# Patient Record
Sex: FEMALE | Race: WHITE | NOT HISPANIC OR LATINO | Employment: OTHER | URBAN - METROPOLITAN AREA
[De-identification: names, ages, dates, MRNs, and addresses within clinical notes are randomized per-mention and may not be internally consistent; named-entity substitution may affect disease eponyms.]

---

## 2017-01-07 ENCOUNTER — HOSPITAL ENCOUNTER (EMERGENCY)
Facility: CLINIC | Age: 52
Discharge: HOME | End: 2017-01-07
Attending: EMERGENCY MEDICINE | Admitting: EMERGENCY MEDICINE
Payer: COMMERCIAL

## 2017-01-07 VITALS
WEIGHT: 267 LBS | HEIGHT: 66 IN | BODY MASS INDEX: 42.91 KG/M2 | DIASTOLIC BLOOD PRESSURE: 91 MMHG | OXYGEN SATURATION: 96 % | TEMPERATURE: 98.7 F | SYSTOLIC BLOOD PRESSURE: 146 MMHG | HEART RATE: 72 BPM

## 2017-01-07 DIAGNOSIS — R10.11 RUQ ABDOMINAL PAIN: ICD-10-CM

## 2017-01-07 DIAGNOSIS — R35.0 URINARY FREQUENCY: ICD-10-CM

## 2017-01-07 LAB
ALBUMIN SERPL-MCNC: 3.5 G/DL (ref 3.4–5)
ALBUMIN UR-MCNC: NEGATIVE MG/DL
ALP SERPL-CCNC: 93 U/L (ref 40–150)
ALT SERPL W P-5'-P-CCNC: 41 U/L (ref 0–50)
ANION GAP SERPL CALCULATED.3IONS-SCNC: 5 MMOL/L (ref 3–14)
APPEARANCE UR: ABNORMAL
AST SERPL W P-5'-P-CCNC: 13 U/L (ref 0–45)
BACTERIA #/AREA URNS HPF: ABNORMAL /HPF
BASOPHILS # BLD AUTO: 0 10E9/L (ref 0–0.2)
BASOPHILS NFR BLD AUTO: 0.3 %
BILIRUB SERPL-MCNC: 0.5 MG/DL (ref 0.2–1.3)
BILIRUB UR QL STRIP: NEGATIVE
BUN SERPL-MCNC: 14 MG/DL (ref 7–30)
CALCIUM SERPL-MCNC: 9.3 MG/DL (ref 8.5–10.1)
CHLORIDE SERPL-SCNC: 106 MMOL/L (ref 94–109)
CO2 SERPL-SCNC: 30 MMOL/L (ref 20–32)
COLOR UR AUTO: YELLOW
CREAT SERPL-MCNC: 0.75 MG/DL (ref 0.52–1.04)
DIFFERENTIAL METHOD BLD: NORMAL
EOSINOPHIL # BLD AUTO: 0.4 10E9/L (ref 0–0.7)
EOSINOPHIL NFR BLD AUTO: 3.9 %
ERYTHROCYTE [DISTWIDTH] IN BLOOD BY AUTOMATED COUNT: 13 % (ref 10–15)
GFR SERPL CREATININE-BSD FRML MDRD: 81 ML/MIN/1.7M2
GLUCOSE SERPL-MCNC: 121 MG/DL (ref 70–99)
GLUCOSE UR STRIP-MCNC: NEGATIVE MG/DL
HCT VFR BLD AUTO: 38.4 % (ref 35–47)
HGB BLD-MCNC: 12.8 G/DL (ref 11.7–15.7)
HGB UR QL STRIP: ABNORMAL
IMM GRANULOCYTES # BLD: 0 10E9/L (ref 0–0.4)
IMM GRANULOCYTES NFR BLD: 0.3 %
KETONES UR STRIP-MCNC: NEGATIVE MG/DL
LEUKOCYTE ESTERASE UR QL STRIP: NEGATIVE
LIPASE SERPL-CCNC: 174 U/L (ref 73–393)
LYMPHOCYTES # BLD AUTO: 3.9 10E9/L (ref 0.8–5.3)
LYMPHOCYTES NFR BLD AUTO: 40.9 %
MCH RBC QN AUTO: 29.4 PG (ref 26.5–33)
MCHC RBC AUTO-ENTMCNC: 33.3 G/DL (ref 31.5–36.5)
MCV RBC AUTO: 88 FL (ref 78–100)
MICRO REPORT STATUS: NORMAL
MONOCYTES # BLD AUTO: 0.6 10E9/L (ref 0–1.3)
MONOCYTES NFR BLD AUTO: 6.4 %
NEUTROPHILS # BLD AUTO: 4.6 10E9/L (ref 1.6–8.3)
NEUTROPHILS NFR BLD AUTO: 48.2 %
NITRATE UR QL: NEGATIVE
NRBC # BLD AUTO: 0 10*3/UL
NRBC BLD AUTO-RTO: 0 /100
PH UR STRIP: 5.5 PH (ref 5–7)
PLATELET # BLD AUTO: 330 10E9/L (ref 150–450)
POTASSIUM SERPL-SCNC: 3.7 MMOL/L (ref 3.4–5.3)
PROT SERPL-MCNC: 7.2 G/DL (ref 6.8–8.8)
RBC # BLD AUTO: 4.35 10E12/L (ref 3.8–5.2)
RBC #/AREA URNS AUTO: 1 /HPF (ref 0–2)
SODIUM SERPL-SCNC: 141 MMOL/L (ref 133–144)
SP GR UR STRIP: 1.02 (ref 1–1.03)
SPECIMEN SOURCE: NORMAL
SQUAMOUS #/AREA URNS AUTO: 17 /HPF (ref 0–1)
URN SPEC COLLECT METH UR: ABNORMAL
UROBILINOGEN UR STRIP-MCNC: NORMAL MG/DL (ref 0–2)
WBC # BLD AUTO: 9.5 10E9/L (ref 4–11)
WBC #/AREA URNS AUTO: 2 /HPF (ref 0–2)
WET PREP SPEC: NORMAL

## 2017-01-07 PROCEDURE — 83690 ASSAY OF LIPASE: CPT | Performed by: EMERGENCY MEDICINE

## 2017-01-07 PROCEDURE — 99283 EMERGENCY DEPT VISIT LOW MDM: CPT | Mod: 25

## 2017-01-07 PROCEDURE — 81001 URINALYSIS AUTO W/SCOPE: CPT | Performed by: EMERGENCY MEDICINE

## 2017-01-07 PROCEDURE — 87491 CHLMYD TRACH DNA AMP PROBE: CPT | Performed by: EMERGENCY MEDICINE

## 2017-01-07 PROCEDURE — 87591 N.GONORRHOEAE DNA AMP PROB: CPT | Performed by: EMERGENCY MEDICINE

## 2017-01-07 PROCEDURE — 85025 COMPLETE CBC W/AUTO DIFF WBC: CPT | Performed by: EMERGENCY MEDICINE

## 2017-01-07 PROCEDURE — 80053 COMPREHEN METABOLIC PANEL: CPT | Performed by: EMERGENCY MEDICINE

## 2017-01-07 PROCEDURE — 87210 SMEAR WET MOUNT SALINE/INK: CPT | Performed by: EMERGENCY MEDICINE

## 2017-01-07 PROCEDURE — 96360 HYDRATION IV INFUSION INIT: CPT

## 2017-01-07 PROCEDURE — 25000128 H RX IP 250 OP 636: Performed by: EMERGENCY MEDICINE

## 2017-01-07 RX ORDER — SODIUM CHLORIDE 9 MG/ML
1000 INJECTION, SOLUTION INTRAVENOUS CONTINUOUS
Status: DISCONTINUED | OUTPATIENT
Start: 2017-01-07 | End: 2017-01-07 | Stop reason: HOSPADM

## 2017-01-07 RX ADMIN — SODIUM CHLORIDE 1000 ML: 9 INJECTION, SOLUTION INTRAVENOUS at 02:23

## 2017-01-07 ASSESSMENT — ENCOUNTER SYMPTOMS
VOMITING: 1
NUMBNESS: 0
FREQUENCY: 1
ABDOMINAL PAIN: 1
DIARRHEA: 1
NAUSEA: 1
FEVER: 1
WEAKNESS: 0

## 2017-01-07 NOTE — DISCHARGE INSTRUCTIONS
*Abdominal Pain, Unknown Cause (Female)    The exact cause of your abdominal (stomach) pain is not certain. This does not mean that this is something to worry about, or the right tests were not done. Everyone likes to know the exact cause of the problem, but sometimes with abdominal pain, there is no clear-cut cause, and this could be a good thing. The good news is that your symptoms can be treated, and you will feel better.   Your condition does not seem serious now; however, sometimes the signs of a serious problem may take more time to appear. For this reason, it is important for you to watch for any new symptoms, problems, or worsening of your condition.  Over the next few days, the abdominal pain may come and go, or be continuous. Other common symptoms can include nausea and vomiting. Sometimes it can be difficult to tell if you feel nauseous, you may just feel bad and not associate that feeling with nausea. Constipation, diarrhea, and a fever may go along with the pain.  The pain may continue even if treated correctly over the following days. Depending on how things go, sometimes the cause can become clear and may require further or different treatment. Additional evaluations, medications, or tests may be needed.  Home care  Your health care provider may prescribe medications for pain, symptoms, or an infection.  Follow the health care provider's instructions for taking these medications.  General care    Rest until your next exam. No strenuous activities.    Try to find positions that ease discomfort. A small pillow placed on the abdomen may help relieve pain.    Something warm on your abdomen (such as a heating pad) may help, but be careful not to burn yourself.  Diet    Do not force yourself to eat, especially if having cramps, vomiting, or diarrhea.    Water is important so you do not get dehydrated. Soup may also be good. Sports drinks may also help, especially if they are not too acidic. Make sure you  don't drink sugary drinks as this can make things worse. Take liquids in small amounts. Do not guzzle them.    Caffeine sometimes makes the pain and cramping worse.    Avoid dairy products if you have vomiting or diarrhea.    Don't eat large amounts at a time. Wait a few minutes between bites.    Eat a diet low in fiber (called a low-residue diet). Foods allowed include refined breads, white rice, fruit and vegetable juices without pulp, tender meats. These foods will pass more easily through the intestine.    Avoid fried or fatty foods, dairy, alcohol and spicy foods until your symptoms go away.  Follow-up care  Follow up with your health care provider as instructed, or if your pain does not begin to improve in the next 24 hours.  When to seek medical care  Seek prompt medical care if any of the following occur:    Pain gets worse or moves to the right lower abdomen    New or worsening vomiting or diarrhea    Swelling of the abdomen    Unable to pass stool for more than three days    New fever over 101  F (38.3 C), or rising fever    Blood in vomit or bowel movements (dark red or black color)    Jaundice (yellow color of eyes and skin)    Weakness, dizziness    Chest, arm, back, neck or jaw pain    Unexpected vaginal bleeding or missed period  Call 911  Call emergency services if any of the following occur:    Trouble breathing    Confusion    Fainting or loss of consciousness    Rapid heart rate    Seizure    [id number] [facility], [address] [place] [address], [place] [address]. All rights reserved. This information is not intended as a substitute for professional medical care. Always follow your healthcare professional's instructions.      Return to the emergency department or seek medical care as instructed if your symptoms fail to improve or significantly worsen.    Take Tylenol as needed for symptom/pain relief; use as directed.    Ice area of pain for 20 minutes four times per day for the next two  days    Follow-up as indicated on page 1.  Maintain adequate hydration and get plenty of rest.

## 2017-01-07 NOTE — ED PROVIDER NOTES
History     Chief Complaint:  Abdominal Pain      HPI   Ines Mott is a 51 year old female with a complicated past medical history most significant for chronic pain syndrome, metastatic neuroendocrine tumor and diabetes who presents via EMS with concerns for abdominal pain. The patient reports 2 days of 8/10 RUQ abdominal pain radiating to her back. Her pain is worsened with movement and palpation. Today the patient has had subjective fevers, urinary frequency and foul smelling urine, nausea, emesis x 2 and mild diarrhea. EMS gave the patient fentanyl and Zofran en route which did alleviate the patient's pain but she states that this is now wearing off. Here, the patient is also noting urethra swelling and has some concern for STD. She denies any abnormal vaginal discharge. The patient denies any numbness, weakness or any other symptoms. The patient did run out of her pain medications one week ago. She has an appointment with her pain doctor next Tuesday.    Per chart review the patient does have a care plan in place for her chronic abdominal pain secondary to metastatic neuroendocrine tumor. Recent imaging studies below;    CT abd/pelvis: 8/2015   1. Irregular-shaped 10.6 x 4.1 x 3.7 cm fluid density collection  within the pelvis, slightly increased since 7/4/2015.  2. Multiple hepatic lesions which appear similar to 7/4/2015.  3. CT scan of the abdomen and pelvis otherwise appears essentially  unchanged.   Head CT: 11/11/2015:   Stable head CT demonstrating a  shunt catheter. No evidence for acute intracranial pathology.     Allergies:  Ativan  Macrobid  Amoxicillin  Buspirone  Cephalosporins  Dilaudid  Diphenhydramine Hcl  Imitrex  Ketorolac Tromethamine  Lansoprazole  Metoclopramide  Paroxetine  Penicillins  Prednisone  Sulfa Drugs  Lidoderm       Medications:    cloNIDine (CATAPRES) 0.1 MG tablet  mometasone-formoterol (DULERA) 100-5 MCG/ACT oral inhaler  montelukast (SINGULAIR) 10 MG tablet  VENTOLIN  " (90 BASE) MCG/ACT inhaler  magnesium oxide (MAG-OX) 400 (241.3 MG) MG tablet  topiramate (TOPAMAX) 25 MG tablet  albuterol (2.5 MG/3ML) 0.083% nebulizer solution  losartan-hydrochlorothiazide (HYZAAR) 50-12.5 MG per tablet  potassium chloride (K-TAB,KLOR-CON) 10 MEQ tablet  sertraline (ZOLOFT) 100 MG tablet  ondansetron (ZOFRAN) 8 MG tablet  cyanocobalamin (VITAMIN B12) 1000 MCG/ML injection  Vitamin D, Cholecalciferol, 1000 UNITS TABS  hydrOXYzine (VISTARIL) 25 MG capsule  STATIN NOT PRESCRIBED, INTENTIONAL,  EPINEPHrine (EPIPEN) 0.3 MG/0.3ML injection    Past Medical History:    Hypertension  Hyperlipidemia   Migraines  Asthma  Hydrocephalus  Spina bifida  Depressive disorder  Chronic pain syndrome  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   Gastrointestinal: Positive for nausea, vomiting, abdominal pain and diarrhea.   Genitourinary: Positive for frequency.   Neurological: Negative for weakness and numbness.   All other systems reviewed and are negative.      Physical Exam     Patient Vitals for the past 24 hrs:   BP Temp Temp src Pulse SpO2 Height Weight   01/07/17 0300 - - - - 96 % - -   01/07/17 0221 (!) 146/91 mmHg - - - 96 % - -   01/07/17 0145 - - - - 95 % - -   01/07/17 0117 141/79 mmHg 98.7  F (37.1  C) Oral 72 95 % 1.676 m (5' 6\") 121.11 kg (267 lb)      Physical Exam  General: Alert and cooperative with exam. Patient in mild distress. Normal mentation  Head:  Scalp is NC/AT  Eyes:  No scleral icterus, PERRL,   ENT:  The external nose and ears are normal. The " oropharynx is normal and without erythema; mucus membranes are moist.   Neck:  Normal range of motion without rigidity.   CV:  Regular rate and rhythm    No pathologic murmur   Resp:  Breath sounds are clear bilaterally    Non-labored, no retractions or accessory muscle use  GI:  Abdomen is soft, obese, moderate RUQ tenderness with mild right CVA tenderness. No overlying skin changes. No significant guarding or peritoneal signs.   :  Normal external female genitalia. No CMT, mild scant vaginal mucous.   MS:  No lower extremity edema   Skin:  Warm and dry, No rash or lesions noted.  Neuro: Oriented x 3. No gross motor deficits.    Emergency Department Course     Laboratory:  UA: Slightly cloudy yellow urine, trace blood, few bacteria, 17 squamous cells, otherwise WNL     Wet prep: No Trichomonas seen. No yeast seen. No clue cells seen. No PMNs seen   Chlamydia trachomatis: pending  Neisseria gonorrhoeae: pending      CBC:  WBC 9.5, HGB 12.8, , otherwise WNL   CMP: glucose 121 (H), otherwise WNL (Creatinine 0.75)   Lipase: 174    Interventions:  (0223) Normal Saline, 1 liter, IV bolus     Emergency Department Course:  Nursing notes and vitals reviewed.  (0144) I performed an exam of the patient as documented above.    Blood was drawn from the patient. This was sent for laboratory testing, findings above.   Urine sample was obtained and sent for laboratory analysis, findings above.    (0311) Findings and plan explained to the patient. Patient discharged home with instructions regarding supportive care, medications, and reasons to return. The importance of close follow-up was reviewed.     Impression & Plan      Medical Decision Making:  Ines Mott is a 51 year old female who presents with abdominal pain, subjective fever, and urinary frequency. Patient's medical history and records reviewed. Patient has emergency care plan and frequent visits for recurrent abdominal pain. Abdominal pain consistent with  previous episodes of abdominal pain. Patient with history of cholecystectomy. Initial consideration for, but no limited to, infectious process, chronic pain, biliary pathology, gastritis, pancreatitis, UTI, STI, among others. Pelvic exam ws obtained and unremarkable; GC/chlamydia testing (obtained per patient request) and wet prep negative. Patient did receive fentanyl prior to ED arrival as well as Zofran. Labs obtained and unremarkable including negative UA. Patient's abdominal exam does not show any evidence of peritonitis and is consistent with previous chronic abdominal pain. Additionally patient notes that she has been out of her narcotic pain medication for the past one week; symptoms may be partially secondary to discontinuation of narcotics. At this time no indication for further imaging or laboratory work up. No emergent cause for the patient's pain can be determined. Recommended follow up with pain clinic and PCP in 2 days. Return precautions discussed. Recommended supportive care and OTC pain medication as tolerated. At the time of discharge patient was hemodynamically stable, neurologically intact, afebrile and pain was well controlled.     Diagnosis:    ICD-10-CM   1. RUQ abdominal pain R10.11   2. Urinary frequency R35.0       Disposition:  Patient is discharged to home.         Tommie Reynolds  1/7/2017    EMERGENCY DEPARTMENT    I, Tommie Reynolds, finesse serving as a scribe on 1/7/2017 at 1:44 AM to personally document services performed by Dr. Brewer based on my observations and the provider's statements to me.      Ayad Brewer,   01/07/17 0613

## 2017-01-07 NOTE — ED AVS SNAPSHOT
Emergency Department    [address] [facility] [address]    Phone:  [phone]    Fax:  [phone]                                       Ines Mott   MRN: [id number]    Department:   Emergency Department   Date of Visit:  1/7/2017           Patient Information     Date Of Birth          1965        Your diagnoses for this visit were:     RUQ abdominal pain     Urinary frequency        You were seen by Ayad Brewer DO.      Follow-up Information     Follow up with Dustin Fatima MD In 2 days.    Specialty:  Family Practice    Contact information:    [facility]EAN[facility]  [address]  [facility] [id number] [phone]          Follow up with  Emergency Department.    Specialty:  EMERGENCY MEDICINE    Why:  If symptoms worsen    Contact information:    [address] [facility] [id number] [phone]        Discharge Instructions         *Abdominal Pain, Unknown Cause (Female)    The exact cause of your abdominal (stomach) pain is not certain. This does not mean that this is something to worry about, or the right tests were not done. Everyone likes to know the exact cause of the problem, but sometimes with abdominal pain, there is no clear-cut cause, and this could be a good thing. The good news is that your symptoms can be treated, and you will feel better.   Your condition does not seem serious now; however, sometimes the signs of a serious problem may take more time to appear. For this reason, it is important for you to watch for any new symptoms, problems, or worsening of your condition.  Over the next few days, the abdominal pain may come and go, or be continuous. Other common symptoms can include nausea and vomiting. Sometimes it can be difficult to tell if you feel nauseous, you may just feel bad and not associate that feeling with nausea. Constipation, diarrhea, and a fever may go along with the pain.  The pain may  continue even if treated correctly over the following days. Depending on how things go, sometimes the cause can become clear and may require further or different treatment. Additional evaluations, medications, or tests may be needed.  Home care  Your health care provider may prescribe medications for pain, symptoms, or an infection.  Follow the health care provider's instructions for taking these medications.  General care    Rest until your next exam. No strenuous activities.    Try to find positions that ease discomfort. A small pillow placed on the abdomen may help relieve pain.    Something warm on your abdomen (such as a heating pad) may help, but be careful not to burn yourself.  Diet    Do not force yourself to eat, especially if having cramps, vomiting, or diarrhea.    Water is important so you do not get dehydrated. Soup may also be good. Sports drinks may also help, especially if they are not too acidic. Make sure you don't drink sugary drinks as this can make things worse. Take liquids in small amounts. Do not guzzle them.    Caffeine sometimes makes the pain and cramping worse.    Avoid dairy products if you have vomiting or diarrhea.    Don't eat large amounts at a time. Wait a few minutes between bites.    Eat a diet low in fiber (called a low-residue diet). Foods allowed include refined breads, white rice, fruit and vegetable juices without pulp, tender meats. These foods will pass more easily through the intestine.    Avoid fried or fatty foods, dairy, alcohol and spicy foods until your symptoms go away.  Follow-up care  Follow up with your health care provider as instructed, or if your pain does not begin to improve in the next 24 hours.  When to seek medical care  Seek prompt medical care if any of the following occur:    Pain gets worse or moves to the right lower abdomen    New or worsening vomiting or diarrhea    Swelling of the abdomen    Unable to pass stool for more than three days    New  fever over 101  F (38.3 C), or rising fever    Blood in vomit or bowel movements (dark red or black color)    Jaundice (yellow color of eyes and skin)    Weakness, dizziness    Chest, arm, back, neck or jaw pain    Unexpected vaginal bleeding or missed period  Call 911  Call emergency services if any of the following occur:    Trouble breathing    Confusion    Fainting or loss of consciousness    Rapid heart rate    Seizure    [id number] [facility], [address] [place] [address], [place] [address]. All rights reserved. This information is not intended as a substitute for professional medical care. Always follow your healthcare professional's instructions.      Return to the emergency department or seek medical care as instructed if your symptoms fail to improve or significantly worsen.    Take Tylenol as needed for symptom/pain relief; use as directed.    Ice area of pain for 20 minutes four times per day for the next two days    Follow-up as indicated on page 1.  Maintain adequate hydration and get plenty of rest.      Future Appointments        Provider Department Dept Phone Center    1/9/2017 10:00 AM Jackie Lopez PSYD [facility] [phone] [facility]      24 Hour Appointment Hotline       To make an appointment at any [facility], call [phone] ([phone]). If you don't have a family doctor or clinic, we will help you find one. [facility] are conveniently located to serve the needs of you and your family.             Review of your medicines      Our records show that you are taking the medicines listed below. If these are incorrect, please call your family doctor or clinic.        Dose / Directions Last dose taken    * albuterol (2.5 MG/3ML) 0.083% neb solution   Dose:  1 vial   Quantity:  30 vial        Take 1 vial (2.5 mg) by nebulization every 6 hours as needed for shortness of breath / dyspnea or wheezing   Refills:  3        * VENTOLIN  (90  BASE) MCG/ACT Inhaler   Quantity:  54 g   Generic drug:  albuterol        INHALE TWO PUFFS BY MOUTH EVERY 4 HOURS AS NEEDED FOR SHORTNESS OF BREATH/DYSPNEA   Refills:  1        blood glucose calibration solution   Commonly known as:  no brand specified   Quantity:  1 each        Use to calibrate blood glucose monitor as directed.   Refills:  0        cloNIDine 0.1 MG tablet   Commonly known as:  CATAPRES   Quantity:  28 tablet        Take 1 tab (0.1 mg) twice daily, Increase to 2 tabs (0.2 mg) twice daily If BP not below 140/90   Refills:  1        cyanocobalamin 1000 MCG/ML injection   Commonly known as:  VITAMIN B12   Dose:  1 mL   Quantity:  1 mL        Inject 1 mL (1,000 mcg) Subcutaneous every 30 days   Refills:  11        EPINEPHrine 0.3 MG/0.3ML injection   Dose:  0.3 mg   Quantity:  2 each        Inject 0.3 mLs (0.3 mg) into the muscle once as needed for anaphylaxis   Refills:  5        hydrOXYzine 25 MG capsule   Commonly known as:  VISTARIL   Dose:  25 mg   Quantity:  30 capsule        Take 1 capsule (25 mg) by mouth 4 times daily as needed for itching   Refills:  1        losartan-hydrochlorothiazide 50-12.5 MG per tablet   Commonly known as:  HYZAAR   Dose:  1 tablet   Quantity:  90 tablet        Take 1 tablet by mouth daily   Refills:  3        magnesium oxide 400 (241.3 MG) MG tablet   Commonly known as:  MAG-OX   Dose:  400 mg   Quantity:  90 tablet        Take 1 tablet (400 mg) by mouth daily   Refills:  3        mometasone-formoterol 100-5 MCG/ACT oral inhaler   Commonly known as:  DULERA   Dose:  2 puff   Quantity:  1 Inhaler        Inhale 2 puffs into the lungs 2 times daily   Refills:  11        montelukast 10 MG tablet   Commonly known as:  SINGULAIR   Dose:  10 mg   Quantity:  90 tablet        Take 1 tablet (10 mg) by mouth At Bedtime   Refills:  3        ondansetron 8 MG tablet   Commonly known as:  ZOFRAN   Dose:  4 mg   Quantity:  9 tablet        Take 0.5 tablets (4 mg) by mouth every 8  hours as needed for nausea   Refills:  3        potassium chloride 10 MEQ tablet   Commonly known as:  K-TAB,KLOR-CON   Dose:  10 mEq   Indication:  Low Amount of Potassium in the Blood   Quantity:  120 tablet        Take 1 tablet (10 mEq) by mouth daily   Refills:  11        sertraline 100 MG tablet   Commonly known as:  ZOLOFT   Dose:  100 mg   Indication:  Major Depressive Disorder   Quantity:  30 tablet        Take 1 tablet (100 mg) by mouth daily   Refills:  11        STATIN NOT PRESCRIBED (INTENTIONAL)   Dose:  1 each   Quantity:  0 each        1 each daily Statin not prescribed intentionally due to Active liver disease (liver failure, cirrhosis, hepatitis) LIVER METS   Refills:  0        topiramate 25 MG tablet   Commonly known as:  TOPAMAX   Dose:  25 mg   Quantity:  60 tablet        Take 1 tablet (25 mg) by mouth 2 times daily   Refills:  3        Vitamin D (Cholecalciferol) 1000 UNITS Tabs   Dose:  2000 Units   Quantity:  60 tablet        Take 2,000 Units by mouth daily   Refills:  11        * Notice:  This list has 2 medication(s) that are the same as other medications prescribed for you. Read the directions carefully, and ask your doctor or other care provider to review them with you.            Procedures and tests performed during your visit     CBC with platelets differential    Chlamydia trachomatis PCR    Comprehensive metabolic panel    Lipase    NO Rho (D) immune globulin (RhoGam) needed - NOT obstetric patient    Neisseria gonorrhoea PCR    Prep for procedure - pelvic exam    UA with Microscopic    Wet prep      Orders Needing Specimen Collection     None      Pending Results     Date and Time Order Name Status Description    1/7/2017 0146 Neisseria gonorrhoea PCR In process     1/7/2017 0146 Chlamydia trachomatis PCR In process             Pending Culture Results     Date and Time Order Name Status Description    1/7/2017 0146 Neisseria gonorrhoea PCR In process     1/7/2017 0146 Chlamydia  trachomatis PCR In process        Test Results from your hospital stay           1/7/2017  1:57 AM - Interface, Flexilab Results      Component Results     Component Value Ref Range & Units Status    WBC 9.5 4.0 - 11.0 10e9/L Final    RBC Count 4.35 3.8 - 5.2 10e12/L Final    Hemoglobin 12.8 11.7 - 15.7 g/dL Final    Hematocrit 38.4 35.0 - 47.0 % Final    MCV 88 78 - 100 fl Final    MCH 29.4 26.5 - 33.0 pg Final    MCHC 33.3 31.5 - 36.5 g/dL Final    RDW 13.0 10.0 - 15.0 % Final    Platelet Count 330 150 - 450 10e9/L Final    Diff Method Automated Method  Final    % Neutrophils 48.2 % Final    % Lymphocytes 40.9 % Final    % Monocytes 6.4 % Final    % Eosinophils 3.9 % Final    % Basophils 0.3 % Final    % Immature Granulocytes 0.3 % Final    Nucleated RBCs 0 0 /100 Final    Absolute Neutrophil 4.6 1.6 - 8.3 10e9/L Final    Absolute Lymphocytes 3.9 0.8 - 5.3 10e9/L Final    Absolute Monocytes 0.6 0.0 - 1.3 10e9/L Final    Absolute Eosinophils 0.4 0.0 - 0.7 10e9/L Final    Absolute Basophils 0.0 0.0 - 0.2 10e9/L Final    Abs Immature Granulocytes 0.0 0 - 0.4 10e9/L Final    Absolute Nucleated RBC 0.0  Final         1/7/2017  2:04 AM - Interface, Flexilab Results      Component Results     Component Value Ref Range & Units Status    Color Urine Yellow  Final    Appearance Urine Slightly Cloudy  Final    Glucose Urine Negative NEG mg/dL Final    Bilirubin Urine Negative NEG Final    Ketones Urine Negative NEG mg/dL Final    Specific Gravity Urine 1.017 1.003 - 1.035 Final    Blood Urine Trace (A) NEG Final    pH Urine 5.5 5.0 - 7.0 pH Final    Protein Albumin Urine Negative NEG mg/dL Final    Urobilinogen mg/dL Normal 0.0 - 2.0 mg/dL Final    Nitrite Urine Negative NEG Final    Leukocyte Esterase Urine Negative NEG Final    Source Midstream Urine  Final    WBC Urine 2 0 - 2 /HPF Final    RBC Urine 1 0 - 2 /HPF Final    Bacteria Urine Few (A) NEG /HPF Final    Squamous Epithelial /HPF Urine 17 (H) 0 - 1 /HPF Final          1/7/2017  2:16 AM - Interface, Flexilab Results      Component Results     Component Value Ref Range & Units Status    Sodium 141 133 - 144 mmol/L Final    Potassium 3.7 3.4 - 5.3 mmol/L Final    Chloride 106 94 - 109 mmol/L Final    Carbon Dioxide 30 20 - 32 mmol/L Final    Anion Gap 5 3 - 14 mmol/L Final    Glucose 121 (H) 70 - 99 mg/dL Final    Urea Nitrogen 14 7 - 30 mg/dL Final    Creatinine 0.75 0.52 - 1.04 mg/dL Final    GFR Estimate 81 >60 mL/min/1.7m2 Final    Non  GFR Calc    GFR Estimate If Black >90   GFR Calc   >60 mL/min/1.7m2 Final    Calcium 9.3 8.5 - 10.1 mg/dL Final    Bilirubin Total 0.5 0.2 - 1.3 mg/dL Final    Albumin 3.5 3.4 - 5.0 g/dL Final    Protein Total 7.2 6.8 - 8.8 g/dL Final    Alkaline Phosphatase 93 40 - 150 U/L Final    ALT 41 0 - 50 U/L Final    AST 13 0 - 45 U/L Final         1/7/2017  2:14 AM - Interface, Flexilab Results      Component Results     Component Value Ref Range & Units Status    Lipase 174 73 - 393 U/L Final         1/7/2017  2:37 AM - Interface, Flexilab Results      Component Results     Component    Specimen Description    Cervix    Wet Prep    No Trichomonas seen  No yeast seen  No clue cells seen  No PMNs seen      Micro Report Status    FINAL 01/07/2017 1/7/2017  2:06 AM - Interface, Flexilab Results         1/7/2017  2:06 AM - Interface, Flexilab Results                Clinical Quality Measure: Blood Pressure Screening     Your blood pressure was checked while you were in the emergency department today. The last reading we obtained was  BP: (!) 146/91 mmHg . Please read the guidelines below about what these numbers mean and what you should do about them.  If your systolic blood pressure (the top number) is less than 120 and your diastolic blood pressure (the bottom number) is less than 80, then your blood pressure is normal. There is nothing more that you need to do about it.  If your systolic blood pressure (the top  "number) is 120-139 or your diastolic blood pressure (the bottom number) is 80-89, your blood pressure may be higher than it should be. You should have your blood pressure rechecked within a year by a primary care provider.  If your systolic blood pressure (the top number) is 140 or greater or your diastolic blood pressure (the bottom number) is 90 or greater, you may have high blood pressure. High blood pressure is treatable, but if left untreated over time it can put you at risk for heart attack, stroke, or kidney failure. You should have your blood pressure rechecked by a primary care provider within the next 4 weeks.  If your provider in the emergency department today gave you specific instructions to follow-up with your doctor or provider even sooner than that, you should follow that instruction and not wait for up to 4 weeks for your follow-up visit.        Thank you for choosing [facility]       Thank you for choosing [facility] for your care. Our goal is always to provide you with excellent care. Hearing back from our patients is one way we can continue to improve our services. Please take a few minutes to complete the written survey that you may receive in the mail after you visit with us. Thank you!        [employer] Information     [employer] lets you send messages to your doctor, view your test results, renew your prescriptions, schedule appointments and more. To sign up, go to [email or web address] . Click on \"Log in\" on the left side of the screen, which will take you to the Welcome page. Then click on \"Sign up Now\" on the right side of the page.     You will be asked to enter the access code listed below, as well as some personal information. Please follow the directions to create your username and password.     Your access code is: [email or web address]  Expires: 3/29/2017  8:39 AM     Your access code will  in 90 days. If you need help or a new code, please call your [facility] clinic or [phone].        Care " EveryWhere ID     This is your Care EveryWhere ID. This could be used by other organizations to access your [facility] medical records  [id number]        After Visit Summary       This is your record. Keep this with you and show to your community pharmacist(s) and doctor(s) at your next visit.

## 2017-01-07 NOTE — ED AVS SNAPSHOT
Emergency Department    [address] [place] [address]    Phone:  [phone]    Fax:  [phone]                                       Ines Mott   MRN: [id number]    Department:   Emergency Department   Date of Visit:  1/7/2017           After Visit Summary Signature Page     I have received my discharge instructions, and my questions have been answered. I have discussed any challenges I see with this plan with the nurse or doctor.    ..........................................................................................................................................  Patient/Patient Representative Signature      ..........................................................................................................................................  Patient Representative Print Name and Relationship to Patient    ..................................................               ................................................  Date                                            Time    ..........................................................................................................................................  Reviewed by Signature/Title    ...................................................              ..............................................  Date                                                            Time

## 2017-01-08 LAB
C TRACH DNA SPEC QL NAA+PROBE: NORMAL
N GONORRHOEA DNA SPEC QL NAA+PROBE: NORMAL
SPECIMEN SOURCE: NORMAL
SPECIMEN SOURCE: NORMAL

## 2017-01-18 ENCOUNTER — APPOINTMENT (OUTPATIENT)
Dept: GENERAL RADIOLOGY | Facility: CLINIC | Age: 52
End: 2017-01-18
Attending: EMERGENCY MEDICINE
Payer: COMMERCIAL

## 2017-01-18 ENCOUNTER — HOSPITAL ENCOUNTER (EMERGENCY)
Facility: CLINIC | Age: 52
Discharge: HOME | End: 2017-01-18
Attending: EMERGENCY MEDICINE | Admitting: EMERGENCY MEDICINE
Payer: COMMERCIAL

## 2017-01-18 ENCOUNTER — APPOINTMENT (OUTPATIENT)
Dept: ULTRASOUND IMAGING | Facility: CLINIC | Age: 52
End: 2017-01-18
Attending: EMERGENCY MEDICINE
Payer: COMMERCIAL

## 2017-01-18 VITALS
BODY MASS INDEX: 43.08 KG/M2 | SYSTOLIC BLOOD PRESSURE: 165 MMHG | OXYGEN SATURATION: 95 % | TEMPERATURE: 98.1 F | WEIGHT: 266.76 LBS | DIASTOLIC BLOOD PRESSURE: 90 MMHG | RESPIRATION RATE: 20 BRPM

## 2017-01-18 DIAGNOSIS — R10.9 CHRONIC ABDOMINAL PAIN: ICD-10-CM

## 2017-01-18 DIAGNOSIS — E87.6 HYPOKALEMIA: ICD-10-CM

## 2017-01-18 DIAGNOSIS — I10 ESSENTIAL HYPERTENSION: ICD-10-CM

## 2017-01-18 DIAGNOSIS — G89.29 CHRONIC ABDOMINAL PAIN: ICD-10-CM

## 2017-01-18 LAB
ALBUMIN SERPL-MCNC: 2.6 G/DL (ref 3.4–5)
ALBUMIN UR-MCNC: NEGATIVE MG/DL
ALP SERPL-CCNC: 68 U/L (ref 40–150)
ALT SERPL W P-5'-P-CCNC: 17 U/L (ref 0–50)
ANION GAP SERPL CALCULATED.3IONS-SCNC: 9 MMOL/L (ref 3–14)
APPEARANCE UR: CLEAR
AST SERPL W P-5'-P-CCNC: 9 U/L (ref 0–45)
BACTERIA #/AREA URNS HPF: ABNORMAL /HPF
BASOPHILS # BLD AUTO: 0 10E9/L (ref 0–0.2)
BASOPHILS NFR BLD AUTO: 0.2 %
BILIRUB SERPL-MCNC: 0.4 MG/DL (ref 0.2–1.3)
BILIRUB UR QL STRIP: NEGATIVE
BUN SERPL-MCNC: 10 MG/DL (ref 7–30)
CALCIUM SERPL-MCNC: 7.3 MG/DL (ref 8.5–10.1)
CHLORIDE SERPL-SCNC: 116 MMOL/L (ref 94–109)
CO2 SERPL-SCNC: 23 MMOL/L (ref 20–32)
COLOR UR AUTO: YELLOW
CREAT SERPL-MCNC: 0.66 MG/DL (ref 0.52–1.04)
DIFFERENTIAL METHOD BLD: ABNORMAL
EOSINOPHIL # BLD AUTO: 0.3 10E9/L (ref 0–0.7)
EOSINOPHIL NFR BLD AUTO: 3.2 %
ERYTHROCYTE [DISTWIDTH] IN BLOOD BY AUTOMATED COUNT: 13.1 % (ref 10–15)
GFR SERPL CREATININE-BSD FRML MDRD: ABNORMAL ML/MIN/1.7M2
GLUCOSE SERPL-MCNC: 91 MG/DL (ref 70–99)
GLUCOSE UR STRIP-MCNC: NEGATIVE MG/DL
HCT VFR BLD AUTO: 31.7 % (ref 35–47)
HGB BLD-MCNC: 10.4 G/DL (ref 11.7–15.7)
HGB UR QL STRIP: ABNORMAL
IMM GRANULOCYTES # BLD: 0 10E9/L (ref 0–0.4)
IMM GRANULOCYTES NFR BLD: 0.2 %
KETONES UR STRIP-MCNC: NEGATIVE MG/DL
LEUKOCYTE ESTERASE UR QL STRIP: NEGATIVE
LIPASE SERPL-CCNC: 96 U/L (ref 73–393)
LYMPHOCYTES # BLD AUTO: 2.2 10E9/L (ref 0.8–5.3)
LYMPHOCYTES NFR BLD AUTO: 27 %
MCH RBC QN AUTO: 29.1 PG (ref 26.5–33)
MCHC RBC AUTO-ENTMCNC: 32.8 G/DL (ref 31.5–36.5)
MCV RBC AUTO: 89 FL (ref 78–100)
MONOCYTES # BLD AUTO: 0.6 10E9/L (ref 0–1.3)
MONOCYTES NFR BLD AUTO: 7.4 %
MUCOUS THREADS #/AREA URNS LPF: PRESENT /LPF
NEUTROPHILS # BLD AUTO: 5.1 10E9/L (ref 1.6–8.3)
NEUTROPHILS NFR BLD AUTO: 62 %
NITRATE UR QL: NEGATIVE
NON-SQ EPI CELLS #/AREA URNS LPF: ABNORMAL /LPF
NRBC # BLD AUTO: 0 10*3/UL
NRBC BLD AUTO-RTO: 0 /100
PH UR STRIP: 5.5 PH (ref 5–7)
PLATELET # BLD AUTO: 275 10E9/L (ref 150–450)
POTASSIUM SERPL-SCNC: 3.1 MMOL/L (ref 3.4–5.3)
PROT SERPL-MCNC: 5.3 G/DL (ref 6.8–8.8)
RBC # BLD AUTO: 3.58 10E12/L (ref 3.8–5.2)
RBC #/AREA URNS AUTO: ABNORMAL /HPF (ref 0–2)
SODIUM SERPL-SCNC: 148 MMOL/L (ref 133–144)
SP GR UR STRIP: 1.01 (ref 1–1.03)
URN SPEC COLLECT METH UR: ABNORMAL
UROBILINOGEN UR STRIP-ACNC: 0.2 EU/DL (ref 0.2–1)
WBC # BLD AUTO: 8.2 10E9/L (ref 4–11)
WBC #/AREA URNS AUTO: ABNORMAL /HPF (ref 0–2)

## 2017-01-18 PROCEDURE — 85025 COMPLETE CBC W/AUTO DIFF WBC: CPT | Performed by: EMERGENCY MEDICINE

## 2017-01-18 PROCEDURE — 74020 XR ABDOMEN 2 VW: CPT

## 2017-01-18 PROCEDURE — 81001 URINALYSIS AUTO W/SCOPE: CPT | Performed by: EMERGENCY MEDICINE

## 2017-01-18 PROCEDURE — 76705 ECHO EXAM OF ABDOMEN: CPT

## 2017-01-18 PROCEDURE — 25000132 ZZH RX MED GY IP 250 OP 250 PS 637: Performed by: EMERGENCY MEDICINE

## 2017-01-18 PROCEDURE — 83690 ASSAY OF LIPASE: CPT | Performed by: EMERGENCY MEDICINE

## 2017-01-18 PROCEDURE — 80053 COMPREHEN METABOLIC PANEL: CPT | Performed by: EMERGENCY MEDICINE

## 2017-01-18 PROCEDURE — 25000125 ZZHC RX 250: Performed by: EMERGENCY MEDICINE

## 2017-01-18 PROCEDURE — 99285 EMERGENCY DEPT VISIT HI MDM: CPT | Mod: 25

## 2017-01-18 RX ORDER — POTASSIUM CHLORIDE 1.5 G/1.58G
40 POWDER, FOR SOLUTION ORAL ONCE
Status: COMPLETED | OUTPATIENT
Start: 2017-01-18 | End: 2017-01-18

## 2017-01-18 RX ORDER — ONDANSETRON 4 MG/1
4 TABLET, ORALLY DISINTEGRATING ORAL ONCE
Status: COMPLETED | OUTPATIENT
Start: 2017-01-18 | End: 2017-01-18

## 2017-01-18 RX ORDER — OXYCODONE HYDROCHLORIDE 5 MG/1
10 TABLET ORAL ONCE
Status: COMPLETED | OUTPATIENT
Start: 2017-01-18 | End: 2017-01-18

## 2017-01-18 RX ORDER — ONDANSETRON 4 MG/1
4 TABLET, ORALLY DISINTEGRATING ORAL EVERY 8 HOURS PRN
Qty: 10 TABLET | Refills: 0 | Status: SHIPPED | OUTPATIENT
Start: 2017-01-18 | End: 2017-01-19

## 2017-01-18 RX ADMIN — POTASSIUM CHLORIDE 40 MEQ: 1.5 POWDER, FOR SOLUTION ORAL at 20:15

## 2017-01-18 RX ADMIN — OXYCODONE HYDROCHLORIDE 10 MG: 5 TABLET ORAL at 17:27

## 2017-01-18 RX ADMIN — ONDANSETRON 4 MG: 4 TABLET, ORALLY DISINTEGRATING ORAL at 17:27

## 2017-01-18 ASSESSMENT — ENCOUNTER SYMPTOMS
NAUSEA: 1
ABDOMINAL DISTENTION: 1
FREQUENCY: 1
ABDOMINAL PAIN: 1
FEVER: 1

## 2017-01-18 NOTE — ED AVS SNAPSHOT
Emergency Department    [address] [place] [address]    Phone:  [phone]    Fax:  [phone]                                       Ines Mott   MRN: [id number]    Department:   Emergency Department   Date of Visit:  1/18/2017           After Visit Summary Signature Page     I have received my discharge instructions, and my questions have been answered. I have discussed any challenges I see with this plan with the nurse or doctor.    ..........................................................................................................................................  Patient/Patient Representative Signature      ..........................................................................................................................................  Patient Representative Print Name and Relationship to Patient    ..................................................               ................................................  Date                                            Time    ..........................................................................................................................................  Reviewed by Signature/Title    ...................................................              ..............................................  Date                                                            Time

## 2017-01-18 NOTE — ED NOTES
Bed: ED23  Expected date:   Expected time:   Means of arrival:   Comments:  [age]F abdominal pain

## 2017-01-18 NOTE — ED AVS SNAPSHOT
Emergency Department    [address] [facility] [address]    Phone:  [phone]    Fax:  [phone]                                       Ines Mott   MRN: [id number]    Department:   Emergency Department   Date of Visit:  1/18/2017           Patient Information     Date Of Birth          1965        Your diagnoses for this visit were:     Chronic abdominal pain     Essential hypertension     Hypokalemia        You were seen by Jeronimo Uriarte MD.      Follow-up Information     Follow up with Dustin Fatima MD In 1 day.    Specialty:  Family Practice    Contact information:    [facility]  [address]  [facility] [address] [phone]          Follow up with  Emergency Department.    Specialty:  EMERGENCY MEDICINE    Why:  As needed, If symptoms worsen    Contact information:    [address] [facility] [address] [phone]        Discharge Instructions       Discharge Instructions  Abdominal Pain    Abdominal pain can be caused by many things. Your evaluation today does not show the exact cause for your pain. Your doctor today has decided that it is unlikely your pain is due to a life threatening problem, or a problem requiring surgery or hospital admission. Sometimes those problems cannot be found right away, so it is very important that you follow up as directed.  Sometimes only the changes which occur over time allow the cause of your pain to be found.    Return to the Emergency Department for a recheck in 8-12 hours if your pain continues.  If your pain gets worse, changes in location, or feels different, return to the Emergency Department right away.    ADULTS:  Return to the Emergency Department right away if:      You get an oral temperature above 102oF or as directed by your doctor.    You have blood in your stools (bright red or black, tarry stools).    You keep throwing up or can t drink liquids.    You see  blood when you throw up.    You can t have a bowel movement or you can t pass gas.    Your stomach gets bloated or bigger.    Your skin or the whites of your eyes look yellow.    You faint.    You have bloody, frequent or painful urination.    You have new symptoms or anything that worries you.    CHILDREN:  Return to the Emergency Department right away if your child has any of the above-listed symptoms or the following:      Pushes your hand away or screams/cries when his/her belly is touched.    You notice your child is very fussy or weak.    Your child is very tired and is too tired to eat or drink.    Your child is dehydrated.  Signs of dehydration can be:  o Your infant has had no wet diapers in 4-5 hours.  o Your older child has not passed urine in 6-8 hours.  o Your infant or child starts to have dry mouth and lips, or no saliva or tears.    PREGNANT WOMEN:  Return to the Emergency Department right away if you have any of the above-listed symptoms or the following:      You have bleeding, leaking fluid or passing tissue from the vagina.    You have worse pain or cramping, or pain in your shoulder or back.    You have vomiting that will not stop.    You have painful or bloody urination.    You have a temperature of 100oF or more.    Your baby is not moving as much as usual.    You faint.    You get a bad headache with or without eye problems and abdominal pain.    You have a convulsion or seizure.    You have unusual discharge from your vagina and abdominal pain.    Abdominal pain is pretty common during pregnancy.  Your pain may or may not be related to your pregnancy. You should follow-up closely with your OB doctor so they can evaluate you and your baby.  Until you follow-up with your regular doctor, do the following:       Avoid sex and do not put anything in your vagina.    Drink clear fluids.    Only take medications approved by your doctor.    MORE INFORMATION:    Appendicitis:  A possible cause of  "abdominal pain in any person who still has their appendix is acute appendicitis. Appendicitis is often hard to diagnose.  Testing does not always rule out early appendicitis or other causes of abdominal pain. Close follow-up with your doctor and re-evaluations may be needed to figure out the reason for your abdominal pain.    Follow-up:  It is very important that you make an appointment with your clinic and go to the appointment.  If you do not follow-up with your primary doctor, it may result in missing an important development which could result in permanent injury or disability and/or lasting pain.  If there is any problem keeping your appointment, call your doctor or return to the Emergency Department.    Medications:  Take your medications as directed by your doctor today.  Before using over-the-counter medications, ask your doctor and make sure to take the medications as directed.  If you have any questions about medications, ask your doctor.    Diet:  Resume your normal diet as much as possible, but do not eat fried, fatty or spicy foods while you have pain.  Do not drink alcohol or have caffeine.  Do not smoke tobacco.    Probiotics: If you have been given an antibiotic, you may want to also take a probiotic pill or eat yogurt with live cultures. Probiotics have \"good bacteria\" to help your intestines stay healthy. Studies have shown that probiotics help prevent diarrhea and other intestine problems (including C. diff infection) when you take antibiotics. You can buy these without a prescription in the pharmacy section of the store.     If you were given a prescription for medicine here today, be sure to read all of the information (including the package insert) that comes with your prescription.  This will include important information about the medicine, its side effects, and any warnings that you need to know about.  The pharmacist who fills the prescription can provide more information and answer " questions you may have about the medicine.  If you have questions or concerns that the pharmacist cannot address, please call or return to the Emergency Department.         Opioid Medication Information    Pain medications are among the most commonly prescribed medicines, so we are including this information for all our patients. If you did not receive pain medication or get a prescription for pain medicine, you can ignore it.     You may have been given a prescription for an opioid (narcotic) pain medicine and/or have received a pain medicine while here in the Emergency Department. These medicines can make you drowsy or impaired. You must not drive, operate dangerous equipment, or engage in any other dangerous activities while taking these medications. If you drive while taking these medications, you could be arrested for DUI, or driving under the influence. Do not drink any alcohol while you are taking these medications.     Opioid pain medications can cause addiction. If you have a history of chemical dependency of any type, you are at a higher risk of becoming addicted to pain medications.  Only take these prescribed medications to treat your pain when all other options have been tried. Take it for as short a time and as few doses as possible. Store your pain pills in a secure place, as they are frequently stolen and provide a dangerous opportunity for children or visitors in your house to start abusing these powerful medications. We will not replace any lost or stolen medicine.  As soon as your pain is better, you should flush all your remaining medication.     Many prescription pain medications contain Tylenol  (acetaminophen), including Vicodin , Tylenol #3 , Norco , Lortab , and Percocet .  You should not take any extra pills of Tylenol  if you are using these prescription medications or you can get very sick.  Do not ever take more than 3000 mg of acetaminophen in any 24 hour period.    All opioids tend to  cause constipation. Drink plenty of water and eat foods that have a lot of fiber, such as fruits, vegetables, prune juice, apple juice and high fiber cereal.  Take a laxative if you don t move your bowels at least every other day. Miralax , Milk of Magnesia, Colace , or Senna  can be used to keep you regular.      Remember that you can always come back to the Emergency Department if you are not able to see your regular doctor in the amount of time listed above, if you get any new symptoms, or if there is anything that worries you.    Discharge Instructions  Chronic Pain  You were seen today for an issue regarding chronic or recurrent pain. You may have a condition that gives you pain every day, or a condition that causes pain that keeps coming back, or several conditions involving pain.   Many patients with chronic or recurrent pain come to the Emergency Department thinking that a shot of narcotic pain medicine or a prescription for pain pills to take home is the best answer to their problem. We have discovered, though, that these approaches put our patients at high risk of long-term problems. This sort of treatment may actually make your pain worse, make it harder to control, and put you at an unacceptable risk of complications.   Because of the risks, we are very hesitant to treat your type of pain with short-acting or potentially habit-forming medications. These medications represent a significant risk to your health, and need to be managed by a physician who can follow your care consistently.  We have established a policy for the treatment of patients with chronic or recurrent pain that does not allow for treatment with injection narcotics or take-home prescriptions for narcotics.  We will treat your symptoms with non-narcotic medications and other treatments, and we will make referrals to pain specialists or other specialized providers if we think such referrals would benefit you.  You should expect to receive  treatment consistent with this policy during future visits.    If you have concerns about our policy, please discuss them with your primary care provider or pain specialist, who can contact us if necessary for further information or clarification.  If you were given a prescription for medicine here today, be sure to read all of the information (including the package insert) that comes with your prescription.  This will include important information about the medicine, its side effects, and any warnings that you need to know about.  The pharmacist who fills the prescription can provide more information and answer questions you may have about the medicine.  If you have questions or concerns that the pharmacist cannot address, please call or return to the Emergency Department.   Remember that you can always come back to the Emergency Department if you develop any new symptoms or if there is anything that worries you.      Hypokalemia  Hypokalemia means a low level of potassium in the blood. This most often occurs in patients who take diuretics (water pills). It can also occur due to severe vomiting or diarrhea.  It is also seen in people who take laxatives for long periods of time.  A mild case usually causes no symptoms. It is only found with blood testing. More severe potassium loss causes generalized weakness, muscle or abdominal cramping, heart palpitations (rapid or irregular heartbeats) and low blood pressure.  Home care    Take any potassium supplements prescribed.    Eat foods rich in potassium. The highest amount is found in artichoke, baked potatoes, spinach, cantaloupe, honeydew melon, cod, halibut, salmon, and scallops. White, red, or benitez beans are also very good sources. A modest amount is found in orange juice, bananas, carrots, and tomato juice.    If you take certain types of diuretics, you will also need to take potassium supplements. If take a diuretic, discuss potassium supplements with your  doctor.  Follow-up care  Follow up with your healthcare provider for a repeat blood test within the next week or as advised by our staff.  When to seek medical advice  Call your healthcare provider if any of the following occur:    Increased weakness, fatigue, muscle cramps    Dizziness  Call 911  Call 911 if any of the following occur:    Irregular heartbeat, extra beats or very fast heart rate    Loss of consciousness    [id number] [employer]. [address] [place] [address], [place] [address]. All rights reserved. This information is not intended as a substitute for professional medical care. Always follow your healthcare professional's instructions.          Future Appointments        Provider Department Dept Phone Center    1/19/2017 11:45 AM OTTO YUSUF MD [facility] [phone] [place]      24 Hour Appointment Hotline       To make an appointment at any [facility], call [phone] ([phone]). If you don't have a family doctor or clinic, we will help you find one. [facility] are conveniently located to serve the needs of you and your family.             Review of your medicines      START taking        Dose / Directions Last dose taken    ondansetron 4 MG ODT tab   Commonly known as:  ZOFRAN ODT   Dose:  4 mg   Quantity:  10 tablet        Take 1 tablet (4 mg) by mouth every 8 hours as needed for nausea   Refills:  0          Our records show that you are taking the medicines listed below. If these are incorrect, please call your family doctor or clinic.        Dose / Directions Last dose taken    * albuterol (2.5 MG/3ML) 0.083% neb solution   Dose:  1 vial   Quantity:  30 vial        Take 1 vial (2.5 mg) by nebulization every 6 hours as needed for shortness of breath / dyspnea or wheezing   Refills:  3        * VENTOLIN  (90 BASE) MCG/ACT Inhaler   Quantity:  54 g   Generic drug:  albuterol        INHALE TWO PUFFS BY MOUTH EVERY 4  HOURS AS NEEDED FOR SHORTNESS OF BREATH/DYSPNEA   Refills:  1        blood glucose calibration solution   Commonly known as:  no brand specified   Quantity:  1 each        Use to calibrate blood glucose monitor as directed.   Refills:  0        cloNIDine 0.1 MG tablet   Commonly known as:  CATAPRES   Quantity:  28 tablet        Take 1 tab (0.1 mg) twice daily, Increase to 2 tabs (0.2 mg) twice daily If BP not below 140/90   Refills:  1        cyanocobalamin 1000 MCG/ML injection   Commonly known as:  VITAMIN B12   Dose:  1 mL   Quantity:  1 mL        Inject 1 mL (1,000 mcg) Subcutaneous every 30 days   Refills:  11        EPINEPHrine 0.3 MG/0.3ML injection   Dose:  0.3 mg   Quantity:  2 each        Inject 0.3 mLs (0.3 mg) into the muscle once as needed for anaphylaxis   Refills:  5        hydrOXYzine 25 MG capsule   Commonly known as:  VISTARIL   Dose:  25 mg   Quantity:  30 capsule        Take 1 capsule (25 mg) by mouth 4 times daily as needed for itching   Refills:  1        losartan-hydrochlorothiazide 50-12.5 MG per tablet   Commonly known as:  HYZAAR   Dose:  1 tablet   Quantity:  90 tablet        Take 1 tablet by mouth daily   Refills:  3        magnesium oxide 400 (241.3 MG) MG tablet   Commonly known as:  MAG-OX   Dose:  400 mg   Quantity:  90 tablet        Take 1 tablet (400 mg) by mouth daily   Refills:  3        mometasone-formoterol 100-5 MCG/ACT oral inhaler   Commonly known as:  DULERA   Dose:  2 puff   Quantity:  1 Inhaler        Inhale 2 puffs into the lungs 2 times daily   Refills:  11        montelukast 10 MG tablet   Commonly known as:  SINGULAIR   Dose:  10 mg   Quantity:  90 tablet        Take 1 tablet (10 mg) by mouth At Bedtime   Refills:  3        ondansetron 8 MG tablet   Commonly known as:  ZOFRAN   Dose:  4 mg   Quantity:  9 tablet        Take 0.5 tablets (4 mg) by mouth every 8 hours as needed for nausea   Refills:  3        potassium chloride 10 MEQ tablet   Commonly known as:   K-TAB,KLOR-CON   Dose:  10 mEq   Indication:  Low Amount of Potassium in the Blood   Quantity:  120 tablet        Take 1 tablet (10 mEq) by mouth daily   Refills:  11        sertraline 100 MG tablet   Commonly known as:  ZOLOFT   Dose:  100 mg   Indication:  Major Depressive Disorder   Quantity:  30 tablet        Take 1 tablet (100 mg) by mouth daily   Refills:  11        STATIN NOT PRESCRIBED (INTENTIONAL)   Dose:  1 each   Quantity:  0 each        1 each daily Statin not prescribed intentionally due to Active liver disease (liver failure, cirrhosis, hepatitis) LIVER METS   Refills:  0        topiramate 25 MG tablet   Commonly known as:  TOPAMAX   Dose:  25 mg   Quantity:  60 tablet        Take 1 tablet (25 mg) by mouth 2 times daily   Refills:  3        Vitamin D (Cholecalciferol) 1000 UNITS Tabs   Dose:  2000 Units   Quantity:  60 tablet        Take 2,000 Units by mouth daily   Refills:  11        * Notice:  This list has 2 medication(s) that are the same as other medications prescribed for you. Read the directions carefully, and ask your doctor or other care provider to review them with you.            Prescriptions were sent or printed at these locations (1 Prescription)                   Other Prescriptions                Printed at Department/Unit printer (1 of 1)         ondansetron (ZOFRAN ODT) 4 MG ODT tab                Procedures and tests performed during your visit     *UA reflex to Microscopic    CBC with platelets differential    Comprehensive metabolic panel    Lipase    US Abdomen Limited    Urine Microscopic    XR Abdomen 2 Views      Orders Needing Specimen Collection     None      Pending Results     No orders found from 1/17/2017 to 1/19/2017.            Pending Culture Results     No orders found from 1/17/2017 to 1/19/2017.       Test Results from your hospital stay           1/18/2017  5:41 PM - Interface, NotaryAct Results      Component Results     Component Value Ref Range & Units Status     WBC 8.2 4.0 - 11.0 10e9/L Final    RBC Count 3.58 (L) 3.8 - 5.2 10e12/L Final    Hemoglobin 10.4 (L) 11.7 - 15.7 g/dL Final    Hematocrit 31.7 (L) 35.0 - 47.0 % Final    MCV 89 78 - 100 fl Final    MCH 29.1 26.5 - 33.0 pg Final    MCHC 32.8 31.5 - 36.5 g/dL Final    RDW 13.1 10.0 - 15.0 % Final    Platelet Count 275 150 - 450 10e9/L Final    Diff Method Automated Method  Final    % Neutrophils 62.0 % Final    % Lymphocytes 27.0 % Final    % Monocytes 7.4 % Final    % Eosinophils 3.2 % Final    % Basophils 0.2 % Final    % Immature Granulocytes 0.2 % Final    Nucleated RBCs 0 0 /100 Final    Absolute Neutrophil 5.1 1.6 - 8.3 10e9/L Final    Absolute Lymphocytes 2.2 0.8 - 5.3 10e9/L Final    Absolute Monocytes 0.6 0.0 - 1.3 10e9/L Final    Absolute Eosinophils 0.3 0.0 - 0.7 10e9/L Final    Absolute Basophils 0.0 0.0 - 0.2 10e9/L Final    Abs Immature Granulocytes 0.0 0 - 0.4 10e9/L Final    Absolute Nucleated RBC 0.0  Final         1/18/2017  6:01 PM - Interface, Flexilab Results      Component Results     Component Value Ref Range & Units Status    Sodium 148 (H) 133 - 144 mmol/L Final    Potassium 3.1 (L) 3.4 - 5.3 mmol/L Final    Chloride 116 (H) 94 - 109 mmol/L Final    Carbon Dioxide 23 20 - 32 mmol/L Final    Anion Gap 9 3 - 14 mmol/L Final    Glucose 91 70 - 99 mg/dL Final    Urea Nitrogen 10 7 - 30 mg/dL Final    Creatinine 0.66 0.52 - 1.04 mg/dL Final    GFR Estimate >90  Non  GFR Calc   >60 mL/min/1.7m2 Final    GFR Estimate If Black >90   GFR Calc   >60 mL/min/1.7m2 Final    Calcium 7.3 (L) 8.5 - 10.1 mg/dL Final    Bilirubin Total 0.4 0.2 - 1.3 mg/dL Final    Albumin 2.6 (L) 3.4 - 5.0 g/dL Final    Protein Total 5.3 (L) 6.8 - 8.8 g/dL Final    Alkaline Phosphatase 68 40 - 150 U/L Final    ALT 17 0 - 50 U/L Final    AST 9 0 - 45 U/L Final         1/18/2017  5:59 PM - Interface, Flexilab Results      Component Results     Component Value Ref Range & Units Status    Lipase  96 73 - 393 U/L Final         1/18/2017  8:18 PM - Interface, Radiant Ib      Narrative     RIGHT UPPER QUADRANT ULTRASOUND 1/18/2017 6:24 PM    HISTORY: Right upper quadrant pain. Malignant neuroendocrine tumors.  Metastatic colon cancer.    COMPARISON: 11/18/2016    FINDINGS:    Gallbladder: Cholecystectomy.    Bile ducts:   CHD is normal diameter.  No intrahepatic biliary  dilatation.    Liver:  Multiple hepatic masses. One in the right lobe of liver  anteriorly is 3.4 cm diameter. One posterior segment right lobe of the  liver is 3.2 cm diameter. One in the lateral segment left lobe of the  liver is 1.3 cm diameter. Additional smaller lesions are also visible.  Generally these are unchanged.    Pancreas:  Mostly obscured by gas. Neck and body appear normal.    Right kidney:  Small cysts laterally, unchanged. Otherwise normal.    Patient's obesity limits detail.        Impression     IMPRESSION:    1. Liver metastases are probably unchanged but difficult to compare  precisely with previous exams.  2. Cholecystectomy.  3. Poor visualization of the pancreas.  4. Right renal cyst.    JONES DUARTE MD         1/18/2017  5:53 PM - Interface, Radiant Ib      Narrative     ABDOMEN TWO VIEWS 1/18/2017 5:45 PM     HISTORY: Abdominal pain    COMPARISON: 12/9/2015        Impression     IMPRESSION: Normal bowel gas pattern. No free air. Surgical staples in  the right upper quadrant. Ventriculoperitoneal shunt catheter on the  left side of the abdomen and pelvis with the tip at the midline  anterior to the sacrum. Catheter orientation has changed slightly  since the previous exam.    JONES DUARTE MD         1/18/2017  5:58 PM - Interface, Flexilab Results      Component Results     Component Value Ref Range & Units Status    Color Urine Yellow  Final    Appearance Urine Clear  Final    Glucose Urine Negative NEG mg/dL Final    Bilirubin Urine Negative NEG Final    Ketones Urine Negative NEG mg/dL Final    Specific Gravity  Urine 1.015 1.003 - 1.035 Final    Blood Urine Small (A) NEG Final    pH Urine 5.5 5.0 - 7.0 pH Final    Protein Albumin Urine Negative NEG mg/dL Final    Urobilinogen Urine 0.2 0.2 - 1.0 EU/dL Final    Nitrite Urine Negative NEG Final    Leukocyte Esterase Urine Negative NEG Final    Source Midstream Urine  Final         1/18/2017  6:04 PM - Interface, Flexilab Results      Component Results     Component Value Ref Range & Units Status    WBC Urine O - 2 0 - 2 /HPF Final    RBC Urine O - 2 0 - 2 /HPF Final    Squamous Epithelial /LPF Urine Many (A) FEW /LPF Final    Bacteria Urine Many (A) NEG /HPF Final    Mucous Urine Present (A) NEG /LPF Final                Clinical Quality Measure: Blood Pressure Screening     Your blood pressure was checked while you were in the emergency department today. The last reading we obtained was  BP: 165/90 mmHg . Please read the guidelines below about what these numbers mean and what you should do about them.  If your systolic blood pressure (the top number) is less than 120 and your diastolic blood pressure (the bottom number) is less than 80, then your blood pressure is normal. There is nothing more that you need to do about it.  If your systolic blood pressure (the top number) is 120-139 or your diastolic blood pressure (the bottom number) is 80-89, your blood pressure may be higher than it should be. You should have your blood pressure rechecked within a year by a primary care provider.  If your systolic blood pressure (the top number) is 140 or greater or your diastolic blood pressure (the bottom number) is 90 or greater, you may have high blood pressure. High blood pressure is treatable, but if left untreated over time it can put you at risk for heart attack, stroke, or kidney failure. You should have your blood pressure rechecked by a primary care provider within the next 4 weeks.  If your provider in the emergency department today gave you specific instructions to follow-up  "with your doctor or provider even sooner than that, you should follow that instruction and not wait for up to 4 weeks for your follow-up visit.        Thank you for choosing [facility]       Thank you for choosing [facility] for your care. Our goal is always to provide you with excellent care. Hearing back from our patients is one way we can continue to improve our services. Please take a few minutes to complete the written survey that you may receive in the mail after you visit with us. Thank you!        [facility] Information     [employer] lets you send messages to your doctor, view your test results, renew your prescriptions, schedule appointments and more. To sign up, go to [email or web address] . Click on \"Log in\" on the left side of the screen, which will take you to the Welcome page. Then click on \"Sign up Now\" on the right side of the page.     You will be asked to enter the access code listed below, as well as some personal information. Please follow the directions to create your username and password.     Your access code is: [email or web address]  Expires: 3/29/2017  8:39 AM     Your access code will  in 90 days. If you need help or a new code, please call your [facility] clinic or [phone].        Care EveryWhere ID     This is your Care EveryWhere ID. This could be used by other organizations to access your [facility] medical records  [id number]        After Visit Summary       This is your record. Keep this with you and show to your community pharmacist(s) and doctor(s) at your next visit.                  "

## 2017-01-18 NOTE — ED PROVIDER NOTES
"  History     Chief Complaint:  Multiple complaints    HPI   Ines Mott is a 52 year old female with a history of short-term memory loss and active liver cancer, who presents with abdominal pain.  Today at the ED she complains of abdominal distension, nausea, and urinary frequency, with foul smelling urine.She also refers that her chronic abdominal pain has increased and that it now \"runs down her legs and makes it difficult to walk\". She hasn't taken any opioid medications in the last 3 weeks. She reports a fever of 99 F.The patient reports that she has not been under any cancer treatment x 2 yrs.    Per chart review the patient does have a care plan in place for her chronic abdominal pain secondary to metastatic neuroendocrine tumor. Recent imaging studies below;    CT abd/pelvis: 8/2015   1. Irregular-shaped 10.6 x 4.1 x 3.7 cm fluid density collection  within the pelvis, slightly increased since 7/4/2015.  2. Multiple hepatic lesions which appear similar to 7/4/2015.  3. CT scan of the abdomen and pelvis otherwise appears essentially  unchanged.    Head CT: 11/11/2015:   Stable head CT demonstrating a  shunt catheter. No evidence for acute intracranial pathology.     Allergies:  Ativan  Macrobid  Amoxicillin  Buspirone  Cephalosporins  Dilaudid  Diphenhydramine Hcl  Imitrex  Ketorolac Tromethamine  Lansoprazole  Metoclopramide  Paroxetine  Penicillins  Prednisone  Sulfa Drugs  Lidoderm       Medications:    cloNIDine (CATAPRES) 0.1 MG tablet  mometasone-formoterol (DULERA) 100-5 MCG/ACT oral inhaler  montelukast (SINGULAIR) 10 MG tablet  VENTOLIN  (90 BASE) MCG/ACT inhaler  magnesium oxide (MAG-OX) 400 (241.3 MG) MG tablet  topiramate (TOPAMAX) 25 MG tablet  albuterol (2.5 MG/3ML) 0.083% nebulizer solution  losartan-hydrochlorothiazide (HYZAAR) 50-12.5 MG per tablet  potassium chloride (K-TAB,KLOR-CON) 10 MEQ tablet  sertraline (ZOLOFT) 100 MG tablet  ondansetron (ZOFRAN) 8 MG " tablet  cyanocobalamin (VITAMIN B12) 1000 MCG/ML injection  Vitamin D, Cholecalciferol, 1000 UNITS TABS  hydrOXYzine (VISTARIL) 25 MG capsule  STATIN NOT PRESCRIBED, INTENTIONAL,  EPINEPHrine (EPIPEN) 0.3 MG/0.3ML injection    Past Medical History:    Hypertension  Hyperlipidemia   Migraines  Asthma  Hydrocephalus  Spina bifida  Depressive disorder  Chronic pain syndrome  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   Gastrointestinal: Positive for nausea, abdominal pain and abdominal distention.   Genitourinary: Positive for frequency.   All other systems reviewed and are negative.        Physical Exam     Patient Vitals for the past 24 hrs:   BP Temp Temp src Heart Rate Resp SpO2 Weight   01/18/17 1941 165/90 mmHg - - - - 95 % -   01/18/17 1900 - - - - - 94 % -   01/18/17 1830 - - - - - 96 % -   01/18/17 1716 (!) 180/111 mmHg - - - - 99 % -   01/18/17 1711 (!) 181/102 mmHg 98.1  F (36.7  C) Oral 78 20 100 % 121 kg (266 lb 12.1 oz)          Physical Exam  General: Alert, interactive in mild distress  Head:  Scalp is atraumatic  Eyes:  The pupils are equal, round, and reactive to light    EOM's intact    No scleral icterus  ENT:      Nose:  The external nose is normal  Ears:  External ears are normal  Mouth/Throat: The oropharynx is normal    Mucus membranes are moist      Neck:  Normal range of motion.      There is no rigidity.    Trachea is in the midline         CV:  Regular rate and rhythm    No murmur   Resp:  Breath sounds are clear bilaterally    Non-labored, no  retractions or accessory muscle use      GI:  RUQ tenderness, hepatomegaly. Abdomen is soft, no distension.       MS:  Normal strength in all 4 extremities, No midline cervical, thoracic, or lumbar tenderness  Skin:  Warm and dry, No rash or lesions noted.  Neuro: Strength 5/5 x4.  Sensation intact  In all 4 extremities.        Psych:  Awake. Alert.  Normal affect.      Appropriate interactions.    Emergency Department Course       Imaging:  Radiology findings were communicated with the patient who voiced understanding of the findings.  Abdomen US, per radiology:   1. Liver metastases are probably unchanged but difficult to compare precisely with previous exams.  2. Cholecystectomy.  3. Poor visualization of the pancreas.  4. Right renal cyst.    Abdomen XR, per radiology:   Normal bowel gas pattern. No free air. Surgical staples in  the right upper quadrant. Ventriculoperitoneal shunt catheter on the  left side of the abdomen and pelvis with the tip at the midline  anterior to the sacrum. Catheter orientation has changed slightly  since the previous exam.    Laboratory:  Laboratory findings were communicated with the patient who voiced understanding of the findings.  UA: blood small, many SEC/LPF, many bacteria, mucous urine, o/w WNL  CBC: WBC 8.2, HGB 10.4, , o/w WNL  CMP: Na 148, K 3.1, Cl 116, Ca 7.3, alb 2.6, Prot 5.3 (Creatinine 0.66)  Lipase: 96    Interventions:  1727: Oxycodone 10 mg, PO  1727: Zofran 4mg, PO  2015: KCl 40 mEq, PO     Emergency Department Course:  Nursing notes and vitals reviewed.  I performed an exam of the patient as documented above.   The patient was placed on continuous pulse oximetry and cardiac monitoring.    The patient was sent for an X-ray and US of the abdomen while in the emergency department, findings above.   IV was inserted and blood was drawn for laboratory testing, results above.    At 2000 the patient was rechecked and was updated on the results of her laboratory  and imaging studies.     I discussed the treatment plan with the patient. They expressed understanding of this plan and consented to discharge. They will be discharged home with instructions for care and follow up. In addition, the patient will return to the emergency department if their symptoms persist, worsen, if new symptoms arise or if there is any concern.  All questions were answered.    I personally reviewed the findings with the patient and answered all related questions prior to discharge.    Impression & Plan      Medical Decision Making:  Ms. Mott was seen and evaluated. Her care plan and previous ED visits were reviewed. The above workup was undertaken, demonstrating no significant acute findings. I believe this is an exacerbation of her chronic problem. She received oxycodone however she won't be discharged with narcotics given our chronic pain policy. There is no signs of acute bowel obstruction and from US the hepatic lesions appear stable. With no fever or significant laboratory abnormalities, I believe the risk of CT radiation outweigh the benefits. I prescribed Zofran for nausea, she has an appointment with her PCP tomorrow regarding her pain, I also believe that she'd benefit from seeing a pain clinic. Pt was subsequently discharged to home, will return if new symptoms develop.  Impression: per Epic  Plan: discharge home as noted above    Diagnosis:    ICD-10-CM    1. Chronic abdominal pain R10.9     G89.29    2. Essential hypertension I10    3. Hypokalemia E87.6        Disposition:   Discharge home    Discharge Medications:  Discharge Medication List as of 1/18/2017  8:28 PM      START taking these medications    Details   ondansetron (ZOFRAN ODT) 4 MG ODT tab Take 1 tablet (4 mg) by mouth every 8 hours as needed for nausea, Disp-10 tablet, R-0, Local Print             Scribe Disclosure:  Iman CONDE, am serving as a scribe at 5:16 PM on 1/18/2017 to document services personally  performed by Jeronimo Uriarte MD, based on my observations and the provider's statements to me.            Jeronimo Uriarte MD  01/19/17 0000

## 2017-01-19 ENCOUNTER — CARE COORDINATION (OUTPATIENT)
Dept: CARE COORDINATION | Facility: CLINIC | Age: 52
End: 2017-01-19

## 2017-01-19 ENCOUNTER — OFFICE VISIT (OUTPATIENT)
Dept: FAMILY MEDICINE | Facility: CLINIC | Age: 52
End: 2017-01-19
Payer: COMMERCIAL

## 2017-01-19 ENCOUNTER — TELEPHONE (OUTPATIENT)
Dept: FAMILY MEDICINE | Facility: CLINIC | Age: 52
End: 2017-01-19

## 2017-01-19 VITALS
HEART RATE: 75 BPM | TEMPERATURE: 97.6 F | RESPIRATION RATE: 20 BRPM | OXYGEN SATURATION: 97 % | SYSTOLIC BLOOD PRESSURE: 134 MMHG | DIASTOLIC BLOOD PRESSURE: 84 MMHG | BODY MASS INDEX: 43.86 KG/M2 | WEIGHT: 271.6 LBS

## 2017-01-19 DIAGNOSIS — Z11.59 NEED FOR HEPATITIS C SCREENING TEST: ICD-10-CM

## 2017-01-19 DIAGNOSIS — F33.41 RECURRENT MAJOR DEPRESSION IN PARTIAL REMISSION (H): ICD-10-CM

## 2017-01-19 DIAGNOSIS — G44.219 EPISODIC TENSION-TYPE HEADACHE, NOT INTRACTABLE: ICD-10-CM

## 2017-01-19 DIAGNOSIS — F19.20 CHEMICAL DEPENDENCY (H): ICD-10-CM

## 2017-01-19 DIAGNOSIS — Z12.11 SPECIAL SCREENING FOR MALIGNANT NEOPLASMS, COLON: ICD-10-CM

## 2017-01-19 DIAGNOSIS — K59.01 SLOW TRANSIT CONSTIPATION: ICD-10-CM

## 2017-01-19 DIAGNOSIS — E11.9 TYPE 2 DIABETES MELLITUS WITHOUT COMPLICATION, WITHOUT LONG-TERM CURRENT USE OF INSULIN (H): ICD-10-CM

## 2017-01-19 DIAGNOSIS — F10.10 ALCOHOL ABUSE: ICD-10-CM

## 2017-01-19 DIAGNOSIS — J45.21 MILD INTERMITTENT ASTHMA WITH ACUTE EXACERBATION: ICD-10-CM

## 2017-01-19 DIAGNOSIS — J45.20 MILD INTERMITTENT ASTHMA WITHOUT COMPLICATION: ICD-10-CM

## 2017-01-19 DIAGNOSIS — R25.2 CRAMP OF LIMB: ICD-10-CM

## 2017-01-19 DIAGNOSIS — K59.09 OTHER CONSTIPATION: ICD-10-CM

## 2017-01-19 DIAGNOSIS — E55.9 VITAMIN D DEFICIENCY: ICD-10-CM

## 2017-01-19 DIAGNOSIS — E87.6 HYPOKALEMIA: ICD-10-CM

## 2017-01-19 DIAGNOSIS — R11.0 NAUSEA: ICD-10-CM

## 2017-01-19 DIAGNOSIS — Z63.0 MARITAL DYSFUNCTION: ICD-10-CM

## 2017-01-19 DIAGNOSIS — I10 HYPERTENSION GOAL BP (BLOOD PRESSURE) < 140/80: Chronic | ICD-10-CM

## 2017-01-19 DIAGNOSIS — G89.4 CHRONIC PAIN SYNDROME: Primary | ICD-10-CM

## 2017-01-19 DIAGNOSIS — E53.8 B12 DEFICIENCY: ICD-10-CM

## 2017-01-19 DIAGNOSIS — F41.8 DEPRESSION WITH ANXIETY: ICD-10-CM

## 2017-01-19 PROCEDURE — 99214 OFFICE O/P EST MOD 30 MIN: CPT | Performed by: FAMILY MEDICINE

## 2017-01-19 RX ORDER — ALBUTEROL SULFATE 0.83 MG/ML
1 SOLUTION RESPIRATORY (INHALATION) EVERY 6 HOURS PRN
Qty: 30 VIAL | Refills: 3 | Status: SHIPPED | OUTPATIENT
Start: 2017-01-19 | End: 2017-08-18

## 2017-01-19 RX ORDER — SERTRALINE HYDROCHLORIDE 100 MG/1
100 TABLET, FILM COATED ORAL DAILY
Qty: 30 TABLET | Refills: 11 | Status: SHIPPED | OUTPATIENT
Start: 2017-01-19 | End: 2017-08-18

## 2017-01-19 RX ORDER — ONDANSETRON 4 MG/1
4 TABLET, ORALLY DISINTEGRATING ORAL EVERY 8 HOURS PRN
Qty: 10 TABLET | Refills: 0 | Status: SHIPPED | OUTPATIENT
Start: 2017-01-19 | End: 2017-08-18

## 2017-01-19 RX ORDER — LOSARTAN POTASSIUM AND HYDROCHLOROTHIAZIDE 12.5; 5 MG/1; MG/1
1 TABLET ORAL DAILY
Qty: 90 TABLET | Refills: 3 | Status: SHIPPED | OUTPATIENT
Start: 2017-01-19 | End: 2017-08-18

## 2017-01-19 RX ORDER — ALBUTEROL SULFATE 90 UG/1
AEROSOL, METERED RESPIRATORY (INHALATION)
Qty: 54 G | Refills: 1 | Status: SHIPPED | OUTPATIENT
Start: 2017-01-19 | End: 2017-08-18

## 2017-01-19 RX ORDER — BUPRENORPHINE 5 UG/H
1 PATCH TRANSDERMAL
Qty: 4 PATCH | Refills: 0 | Status: SHIPPED | OUTPATIENT
Start: 2017-01-19 | End: 2017-02-17

## 2017-01-19 RX ORDER — POLYETHYLENE GLYCOL 3350 17 G/17G
1 POWDER, FOR SOLUTION ORAL DAILY
Qty: 510 G | Refills: 3 | Status: SHIPPED | OUTPATIENT
Start: 2017-01-19 | End: 2018-02-21

## 2017-01-19 RX ORDER — POTASSIUM CHLORIDE 750 MG/1
10 TABLET, EXTENDED RELEASE ORAL DAILY
Qty: 120 TABLET | Refills: 11 | Status: SHIPPED | OUTPATIENT
Start: 2017-01-19 | End: 2017-08-18

## 2017-01-19 RX ORDER — ONDANSETRON 8 MG/1
4 TABLET, FILM COATED ORAL EVERY 8 HOURS PRN
Qty: 9 TABLET | Refills: 3 | Status: SHIPPED | OUTPATIENT
Start: 2017-01-19 | End: 2017-02-17

## 2017-01-19 RX ORDER — BLOOD-GLUCOSE CONTROL, NORMAL
EACH MISCELLANEOUS
Qty: 1 EACH | Refills: 0 | Status: SHIPPED | OUTPATIENT
Start: 2017-01-19 | End: 2017-08-18

## 2017-01-19 RX ORDER — CYANOCOBALAMIN 1000 UG/ML
1 INJECTION, SOLUTION INTRAMUSCULAR; SUBCUTANEOUS
Qty: 1 ML | Refills: 11 | Status: SHIPPED | OUTPATIENT
Start: 2017-01-19 | End: 2017-08-18

## 2017-01-19 RX ORDER — TOPIRAMATE 25 MG/1
25 TABLET, FILM COATED ORAL 2 TIMES DAILY
Qty: 60 TABLET | Refills: 3 | Status: SHIPPED | OUTPATIENT
Start: 2017-01-19 | End: 2017-02-17

## 2017-01-19 RX ORDER — MULTIVIT-MIN/IRON/FOLIC ACID/K 18-600-40
2000 CAPSULE ORAL DAILY
Qty: 60 TABLET | Refills: 11 | Status: SHIPPED | OUTPATIENT
Start: 2017-01-19 | End: 2017-08-18

## 2017-01-19 SDOH — SOCIAL STABILITY - SOCIAL INSECURITY: PROBLEMS IN RELATIONSHIP WITH SPOUSE OR PARTNER: Z63.0

## 2017-01-19 NOTE — PROGRESS NOTES
Patient wants to to to methadone clinic instead    Will send another referral to oncologist    States she won't drink anymore    Weekly office visit  Until then    Dustin Fatima Jr., MD

## 2017-01-19 NOTE — PROGRESS NOTES
SUBJECTIVE:                                                    Ines Mott is a 52 year old female who presents to clinic today for the following health issues:  Health Maintenance Due   Topic Date Due     EYE EXAM Q1 YEAR( NO INBASKET)  01/15/1966     PNEUMOVAX 1X HI RISK PATIENT < 65 (NO IB MSG)  01/15/1967     HEPATITIS C SCREENING  01/15/1983     FIT Q1 YR (NO INBASKET)  07/03/2016     TSH W/ FREE T4 REFLEX Q2 YEAR (NO INBASKET)  10/21/2016     Health Maintenance reviewed at today's visit patient asked to schedule/complete:   None, Health Maintenance up to date.    ED/UC Followup:    Facility:  [facility]   Date of visit: 1/18/2017  Reason for visit: abdominal pain,   Current Status: still having pain all over,   hasn't been on any meds for 4 days  SHORTNESS OF BREATH IN PAST MILD INTERMITTENT ASTHMA  HISTORY OF RECURRENT URINARY TRACT INFECTION   MORBIDLY OBESE   RECURRENT HEADACHES with HISTORY OF MYHELOMENINGOCOEL  CHRONIC INTERMITTENT NECK PAIN  HISTORY HYSTERECTOMY FOR BENIGN DISEASE   RECURRENT DEPRESSION   ALCOHOL ABUSE   RIGHT RENAL MASS    LIVER METS WITH SLOW GROWING VASOACTIVE CANCER  NON ADHERANT with FOLLOW UP   SEASONAL AFFECTIVE DISORDERS   CHRONIC PAIN SYNDROME  with  LUMBAR DISC DISEASE   OBSTRUCTIVE SLEEP APNEA  NON ADHERANT  BEHAVIOR  REFERRED TO METHAD[facility] PROGRAM DR MONTES       has Dyspnea and respiratory abnormality; Other specified drug dependence, in remission; Carcinoma of colon metastatic to liver (H); Kidney infection; ALEXUS (obstructive sleep apnea); Myelomeningocele with hydrocephalus, cervical region (H); Fibromyalgia; Angioedema; Pneumonia due to other Gram-negative bacteria; BMI 42; H/O hysterectomy for benign disease; Hyperlipidemia with target LDL less than 130; Infected tooth; Recurrent major depression in partial remission; Hypertension goal BP (blood pressure) < 140/80; Abdominal pain, right lateral; Renal mass, right; Health Care Home; Intracranial shunt; Migraine;  Mild intermittent asthma without complication; Other complicated headache syndrome; Seasonal affective disorder (H); Anxiety; PTSD (post-traumatic stress disorder); Pre diabetes ; Chronic pain syndrome; Chronic tension-type headache, not intractable; and Degeneration of lumbosacral intervertebral disc on her problem list.      .  Current Outpatient Prescriptions   Medication Sig Dispense Refill     ondansetron (ZOFRAN ODT) 4 MG ODT tab Take 1 tablet (4 mg) by mouth every 8 hours as needed for nausea 10 tablet 0     Vitamin D, Cholecalciferol, 1000 UNITS TABS Take 2,000 Units by mouth daily 60 tablet 11     cyanocobalamin (VITAMIN B12) 1000 MCG/ML injection Inject 1 mL (1,000 mcg) Subcutaneous every 30 days 1 mL 11     ondansetron (ZOFRAN) 8 MG tablet Take 0.5 tablets (4 mg) by mouth every 8 hours as needed for nausea 9 tablet 3     potassium chloride (K-TAB,KLOR-CON) 10 MEQ tablet Take 1 tablet (10 mEq) by mouth daily 120 tablet 11     sertraline (ZOLOFT) 100 MG tablet Take 1 tablet (100 mg) by mouth daily 30 tablet 11     losartan-hydrochlorothiazide (HYZAAR) 50-12.5 MG per tablet Take 1 tablet by mouth daily 90 tablet 3     albuterol (2.5 MG/3ML) 0.083% neb solution Take 1 vial (2.5 mg) by nebulization every 6 hours as needed for shortness of breath / dyspnea or wheezing 30 vial 3     topiramate (TOPAMAX) 25 MG tablet Take 1 tablet (25 mg) by mouth 2 times daily 60 tablet 3     magnesium oxide (MAG-OX) 400 (241.3 MG) MG tablet Take 1 tablet (400 mg) by mouth daily 90 tablet 3     albuterol (VENTOLIN HFA) 108 (90 BASE) MCG/ACT Inhaler INHALE TWO PUFFS BY MOUTH EVERY 4 HOURS AS NEEDED FOR SHORTNESS OF BREATH/DYSPNEA 54 g 1     mometasone-formoterol (DULERA) 100-5 MCG/ACT oral inhaler Inhale 2 puffs into the lungs 2 times daily 1 Inhaler 11     blood glucose monitoring (NO BRAND SPECIFIED) meter device kit Use to test blood sugar larisa times daily or as directed. 1 kit 0     blood glucose (NO BRAND SPECIFIED) lancets  standard Use to test blood sugar daily times daily or as directed. 1 Box 11     blood glucose monitoring (NO BRAND SPECIFIED) test strip Use to test blood sugars once times daily or as directed 100 strip 3     blood glucose calibration (NO BRAND SPECIFIED) solution Use to calibrate blood glucose monitor as directed. 1 each 0     polyethylene glycol (MIRALAX) powder Take 17 g (1 capful) by mouth daily 510 g 3     buprenorphine (BUTRANS) 5 MCG/HR WK patch Place 1 patch onto the skin every 7 days 4 patch 0     [DISCONTINUED] mometasone-formoterol (DULERA) 100-5 MCG/ACT oral inhaler Inhale 2 puffs into the lungs 2 times daily 1 Inhaler 11     [DISCONTINUED] VENTOLIN  (90 BASE) MCG/ACT inhaler INHALE TWO PUFFS BY MOUTH EVERY 4 HOURS AS NEEDED FOR SHORTNESS OF BREATH/DYSPNEA 54 g 1     [DISCONTINUED] topiramate (TOPAMAX) 25 MG tablet Take 1 tablet (25 mg) by mouth 2 times daily 60 tablet 3     [DISCONTINUED] albuterol (2.5 MG/3ML) 0.083% nebulizer solution Take 1 vial (2.5 mg) by nebulization every 6 hours as needed for shortness of breath / dyspnea or wheezing 30 vial 3     [DISCONTINUED] losartan-hydrochlorothiazide (HYZAAR) 50-12.5 MG per tablet Take 1 tablet by mouth daily 90 tablet 3     [DISCONTINUED] sertraline (ZOLOFT) 100 MG tablet Take 1 tablet (100 mg) by mouth daily 30 tablet 11     STATIN NOT PRESCRIBED, INTENTIONAL, 1 each daily Statin not prescribed intentionally due to Active liver disease (liver failure, cirrhosis, hepatitis)  LIVER METS 0 each 0     EPINEPHrine (EPIPEN) 0.3 MG/0.3ML injection Inject 0.3 mLs (0.3 mg) into the muscle once as needed for anaphylaxis 2 each 5          Allergies   Allergen Reactions     Ativan [Lorazepam] Anaphylaxis     Macrobid [Nitrofurantoin] Anaphylaxis     Amoxicillin      Buspirone      Buspar     Cephalosporins      Dilaudid [Hydromorphone]      Diphenhydramine Hcl      Benadryl     Imitrex [Sumatriptan Succinate]      blood pressure raised uncontrobably      Ketorolac Tromethamine      Toradol     Lansoprazole      Prevacid     Metoclopramide Hives     Reglan     Paroxetine      Paxil     Penicillins      Prednisone Hives     Sulfa Drugs      THICKENED AND DIFFICULTY SWALLOWING      Lidoderm [Lidocaine] Rash     Localized rash at patch site       Immunization History   Administered Date(s) Administered     Influenza Vaccine IM 3yrs+ 4 Valent IIV4 2015, 2016     TDAP (ADACEL AGES 11-64) 2015         reports that she drinks alcohol.      reports that she does not use illicit drugs.    family history includes CANCER in her father.    indicated that her mother is alive. She indicated that her father is .      has past surgical history that includes GYN surgery; Cholecystectomy; appendectomy; Extraction(s) dental; and Implant shunt ventriculoperitoneal.     reports that she does not engage in sexual activity.  .  Pediatric History   Patient Guardian Status     Mother:  RajatSuleimanSada Tran     Other Topics Concern     Parent/Sibling W/ Cabg, Mi Or Angioplasty Before 65f 55m? No     Social History Narrative         reports that she has quit smoking. She has never used smokeless tobacco.    Medical, social, surgical, and family histories reviewed.    /84 mmHg  Pulse 75  Temp(Src) 97.6  F (36.4  C) (Tympanic)  Resp 20  Wt 271 lb 9.6 oz (123.197 kg)  SpO2 97%    Body mass index is 43.86 kg/(m^2).      Problem list, Medication list, Allergies, and Medical/Social/Surgical histories reviewed in [facility] and updated as appropriate.  Labs reviewed in [facility]  BP Readings from Last 3 Encounters:   17 134/84   17 165/90   17 146/91    Wt Readings from Last 3 Encounters:   17 271 lb 9.6 oz (123.197 kg)   17 266 lb 12.1 oz (121 kg)   17 267 lb (121.11 kg)                  Patient Active Problem List   Diagnosis     Dyspnea and respiratory abnormality     Other specified drug dependence, in remission     Carcinoma of colon  metastatic to liver (H)     Kidney infection     ALEXUS (obstructive sleep apnea)     Myelomeningocele with hydrocephalus, cervical region (H)     Fibromyalgia     Angioedema     Pneumonia due to other Gram-negative bacteria     BMI 42     H/O hysterectomy for benign disease     Hyperlipidemia with target LDL less than 130     Infected tooth     Recurrent major depression in partial remission     Hypertension goal BP (blood pressure) < 140/80     Abdominal pain, right lateral     Renal mass, right     Health Care Home     Intracranial shunt     Migraine     Mild intermittent asthma without complication     Other complicated headache syndrome     Seasonal affective disorder (H)     Anxiety     PTSD (post-traumatic stress disorder)     Pre diabetes      Chronic pain syndrome     Chronic tension-type headache, not intractable     Degeneration of lumbosacral intervertebral disc     Past Surgical History   Procedure Laterality Date     Gyn surgery       Cholecystectomy       Appendectomy       Extraction(s) dental       Implant shunt ventriculoperitoneal         Social History   Substance Use Topics     Smoking status: Former Smoker     Smokeless tobacco: Never Used     Alcohol Use: Yes      Comment: off and on     Family History   Problem Relation Age of Onset     CANCER Father          Current Outpatient Prescriptions   Medication Sig Dispense Refill     ondansetron (ZOFRAN ODT) 4 MG ODT tab Take 1 tablet (4 mg) by mouth every 8 hours as needed for nausea 10 tablet 0     Vitamin D, Cholecalciferol, 1000 UNITS TABS Take 2,000 Units by mouth daily 60 tablet 11     cyanocobalamin (VITAMIN B12) 1000 MCG/ML injection Inject 1 mL (1,000 mcg) Subcutaneous every 30 days 1 mL 11     ondansetron (ZOFRAN) 8 MG tablet Take 0.5 tablets (4 mg) by mouth every 8 hours as needed for nausea 9 tablet 3     potassium chloride (K-TAB,KLOR-CON) 10 MEQ tablet Take 1 tablet (10 mEq) by mouth daily 120 tablet 11     sertraline (ZOLOFT) 100 MG  tablet Take 1 tablet (100 mg) by mouth daily 30 tablet 11     losartan-hydrochlorothiazide (HYZAAR) 50-12.5 MG per tablet Take 1 tablet by mouth daily 90 tablet 3     albuterol (2.5 MG/3ML) 0.083% neb solution Take 1 vial (2.5 mg) by nebulization every 6 hours as needed for shortness of breath / dyspnea or wheezing 30 vial 3     topiramate (TOPAMAX) 25 MG tablet Take 1 tablet (25 mg) by mouth 2 times daily 60 tablet 3     magnesium oxide (MAG-OX) 400 (241.3 MG) MG tablet Take 1 tablet (400 mg) by mouth daily 90 tablet 3     albuterol (VENTOLIN HFA) 108 (90 BASE) MCG/ACT Inhaler INHALE TWO PUFFS BY MOUTH EVERY 4 HOURS AS NEEDED FOR SHORTNESS OF BREATH/DYSPNEA 54 g 1     mometasone-formoterol (DULERA) 100-5 MCG/ACT oral inhaler Inhale 2 puffs into the lungs 2 times daily 1 Inhaler 11     blood glucose monitoring (NO BRAND SPECIFIED) meter device kit Use to test blood sugar larisa times daily or as directed. 1 kit 0     blood glucose (NO BRAND SPECIFIED) lancets standard Use to test blood sugar daily times daily or as directed. 1 Box 11     blood glucose monitoring (NO BRAND SPECIFIED) test strip Use to test blood sugars once times daily or as directed 100 strip 3     blood glucose calibration (NO BRAND SPECIFIED) solution Use to calibrate blood glucose monitor as directed. 1 each 0     polyethylene glycol (MIRALAX) powder Take 17 g (1 capful) by mouth daily 510 g 3     buprenorphine (BUTRANS) 5 MCG/HR WK patch Place 1 patch onto the skin every 7 days 4 patch 0     [DISCONTINUED] mometasone-formoterol (DULERA) 100-5 MCG/ACT oral inhaler Inhale 2 puffs into the lungs 2 times daily 1 Inhaler 11     [DISCONTINUED] VENTOLIN  (90 BASE) MCG/ACT inhaler INHALE TWO PUFFS BY MOUTH EVERY 4 HOURS AS NEEDED FOR SHORTNESS OF BREATH/DYSPNEA 54 g 1     [DISCONTINUED] topiramate (TOPAMAX) 25 MG tablet Take 1 tablet (25 mg) by mouth 2 times daily 60 tablet 3     [DISCONTINUED] albuterol (2.5 MG/3ML) 0.083% nebulizer solution Take  1 vial (2.5 mg) by nebulization every 6 hours as needed for shortness of breath / dyspnea or wheezing 30 vial 3     [DISCONTINUED] losartan-hydrochlorothiazide (HYZAAR) 50-12.5 MG per tablet Take 1 tablet by mouth daily 90 tablet 3     [DISCONTINUED] sertraline (ZOLOFT) 100 MG tablet Take 1 tablet (100 mg) by mouth daily 30 tablet 11     STATIN NOT PRESCRIBED, INTENTIONAL, 1 each daily Statin not prescribed intentionally due to Active liver disease (liver failure, cirrhosis, hepatitis)  LIVER METS 0 each 0     EPINEPHrine (EPIPEN) 0.3 MG/0.3ML injection Inject 0.3 mLs (0.3 mg) into the muscle once as needed for anaphylaxis 2 each 5     Allergies   Allergen Reactions     Ativan [Lorazepam] Anaphylaxis     Macrobid [Nitrofurantoin] Anaphylaxis     Amoxicillin      Buspirone      Buspar     Cephalosporins      Dilaudid [Hydromorphone]      Diphenhydramine Hcl      Benadryl     Imitrex [Sumatriptan Succinate]      blood pressure raised uncontrobably     Ketorolac Tromethamine      Toradol     Lansoprazole      Prevacid     Metoclopramide Hives     Reglan     Paroxetine      Paxil     Penicillins      Prednisone Hives     Sulfa Drugs      THICKENED AND DIFFICULTY SWALLOWING      Lidoderm [Lidocaine] Rash     Localized rash at patch site     Recent Labs   Lab Test  01/18/17   1716  01/07/17   0120  12/29/16   0045   11/04/16   1407   04/07/16   1620   05/07/15   1222   10/21/14   0230   A1C   --    --    --    --   6.1*   --   5.7   --   6.0   --    --    LDL   --    --    --    --   96   --   101*   --   127   --    --    HDL   --    --    --    --   42*   --   42*   --   36*   --    --    TRIG   --    --    --    --   161*   --   306*   --   177*   --    --    ALT  17  41  34   < >  27   < >  30   < >  24   < >  31   CR  0.66  0.75  0.82   < >  1.22*   < >   --    < >   --    < >  0.82   GFRESTIMATED  >90  Non  GFR Calc    81  74   < >  46*   < >   --    < >   --    < >  73   GFRESTBLACK  >90    American GFR Calc    >90   GFR Calc    89   < >  56*   < >   --    < >   --    < >  89   POTASSIUM  3.1*  3.7  3.0*   < >  3.8   < >   --    < >   --    < >  3.8   TSH   --    --    --    --    --    --    --    --    --    --   2.30    < > = values in this interval not displayed.        BP Readings from Last 6 Encounters:   01/19/17 134/84   01/18/17 165/90   01/07/17 146/91   12/29/16 130/80   12/29/16 179/107   12/22/16 132/76       Wt Readings from Last 3 Encounters:   01/19/17 271 lb 9.6 oz (123.197 kg)   01/18/17 266 lb 12.1 oz (121 kg)   01/07/17 267 lb (121.11 kg)         Positive symptoms or findings indicated by bold designation:     ROS: 10 point ROS neg other than the symptoms noted above in the HPI.except  has Dyspnea and respiratory abnormality; Other specified drug dependence, in remission; Carcinoma of colon metastatic to liver (H); Kidney infection; ALEXUS (obstructive sleep apnea); Myelomeningocele with hydrocephalus, cervical region (H); Fibromyalgia; Angioedema; Pneumonia due to other Gram-negative bacteria; BMI 42; H/O hysterectomy for benign disease; Hyperlipidemia with target LDL less than 130; Infected tooth; Recurrent major depression in partial remission; Hypertension goal BP (blood pressure) < 140/80; Abdominal pain, right lateral; Renal mass, right; Health Care Home; Intracranial shunt; Migraine; Mild intermittent asthma without complication; Other complicated headache syndrome; Seasonal affective disorder (H); Anxiety; PTSD (post-traumatic stress disorder); Pre diabetes ; Chronic pain syndrome; Chronic tension-type headache, not intractable; and Degeneration of lumbosacral intervertebral disc on her problem list.       Constitutional: The patient denied fatigue, fever, insomnia, night sweats, recent illness and weight loss.  Insomnia     Eyes: The patient denied blindness, eye pain, eye tearing, photophobia, vision change and visual disturbance.n    Ears/Nose/Throat/Neck:  The patient denied dizziness, facial pain, hearing loss, nasal discharge, oral pain, otalgia, postnasal drip, sinus congestion, sore throat, tinnitus and voice change.   HEADACHE    Cardiovascular: The patient denied arrhythmia, chest pain/pressure, claudication, edema, exercise intolerance, fatigue, orthopnea, palpitations and syncope.  WITHIN NORMAL LIMITS     Respiratory: The patient denied asthma, chest congestion, cough, dyspnea on exertion, dyspnea/shortness of breath, hemoptysis, pedal edema, pleuritic pain, productive sputum, snoring and wheezing. NORMAL BREATHING HISTORY OF ASTHMA     Gastrointestinal: The patient denied abdominal pain, anorexia, constipation, diarrhea, dysphagia, gastroesophageal reflux, hematochezia, hemorrhoids, melena, nausea and vomiting . ASYMPTOMATIC     Genitourinary/Nephrology: The patient denied breast complaint, dysuria, nocturia sexual dysfunction, t, urinary frequency, urinary incontinence, urinary urgency    RECENT URINARY URGENCY     Musculoskeletal: The patient denied arthralgia(s), back pain, joint complaint, muscle weakness, myalgias, osteoporosis, sciatica, stiffness and swelling.  CHRONIC LOWER BACK PAIN   NECK PAIN AND HEADACHE     Dermatoligic:: The patient denied acne, dermatitis, ecchymosis, itching, mole change, rash, skin cancer, skin lesion and sores.  NORMAL   NO NEW RASHES     Neurologic: The patient denied dizziness, gait abnormality, headache, memory loss, mental status change, paresis, paresthesia, seizure, syncope, tremor and vision change. CHRONIC NECK AND HEADACHE PAIN     Psychiatric:  SHE HAS  anxiety, depression, disturbances of memory, drug abuse, insomnia, mood swings and relationship difficulties.      Endocrine: The patient denied , goiter, obesity, polyuria and thyroid disease.  PRE DIABETES     Hematologic/Lymphatic: The patient denied abnormal bleeding and bruising, abnormal ecchymoses, anemia, lymph node enlargement/mass, petechiae and venous   Thrombosis.      Allergy/Immunology: The patient denied food allergy and  Allergic rhinitis or conjunctivitis.        PE:  /84 mmHg  Pulse 75  Temp(Src) 97.6  F (36.4  C) (Tympanic)  Resp 20  Wt 271 lb 9.6 oz (123.197 kg)  SpO2 97% Body mass index is 43.86 kg/(m^2).    Constitutional: general appearance, well nourished, well developed, in no acute distress, well developed, appears stated age, normal body habitus,  NORMAL     Eyes:; The patient has normal eyelids sclerae and conjunctivae : NORMAL      Ears/Nose/Throat: external ear, overall: normal appearance; external nose, overall: benign appearance, normal moujth gums and lips  The patient has:  NORMAL     Neck: thyroid, overall: normal size, normal consistency, nontender,  NORMAL     Respiratory:  palpation of chest, overall: normal excursion,  NORMAL   Clear to percussion and auscultation  NORMAL     Tachypnea NORMAL  Color  NORMAL     Cardiovascular:  Good color with no peripheral edema  NORMAL   Regular sinus rhythm without murmur. Physiologic heart sounds Heart is unelarged  .   Chest/Breast: normal shape  NORMAL      Abdominal exam,  Liver and spleen are  unenlarged NORMAL       Tenderness  NORMAL   Scars  NORMAL     Urogenital; no renal, flank or bladder  tenderness;  NORMAL     Lymphatic: neck nodes, NORMAL    Other notes  NOT APPLICABLE     Musculoskeletal:  Brief ortho exam normal except:   DECREASE RANGE OF MOTION OF BACK  NEGATIVE STRAIGHT LEG RAISING TEST     Integument: inspection of skin, no rash, lesions; and, palpation, no induration, no tenderness.      Neurologic mental status, overall: alert and oriented; gait, no ataxia, no unsteadiness; coordination, no tremors; cranial nerves, overall: normal motor, overall: normal bulk, tone.      Psychiatric: orientation/consciousness, overall: oriented to person, place and time; behavior/psychomotor activity, no tics, normal psychomotor activity; mood and affect, overall: normal mood and  affect; appearance, overall: well-groomed, good eye contact; speech, overall: normal quality, no aphasia and normal quality, quantity, intact.        ICD-10-CM    1. Chronic pain syndrome G89.4 ondansetron (ZOFRAN ODT) 4 MG ODT tab     buprenorphine (BUTRANS) 5 MCG/HR WK patch   2. Need for hepatitis C screening test Z11.59 CANCELED: Hepatitis C Screen Reflex to HCV RNA Quant and Genotype   3. Special screening for malignant neoplasms, colon Z12.11 Fecal colorectal cancer screen FIT - Future (S+30)   4. Vitamin D deficiency E55.9 Vitamin D, Cholecalciferol, 1000 UNITS TABS   5. B12 deficiency E53.8 cyanocobalamin (VITAMIN B12) 1000 MCG/ML injection   6. Nausea R11.0 ondansetron (ZOFRAN) 8 MG tablet   7. Hypokalemia E87.6 potassium chloride (K-TAB,KLOR-CON) 10 MEQ tablet   8. Recurrent major depression in partial remission (H) F33.41 sertraline (ZOLOFT) 100 MG tablet   9. Other constipation K59.09 sertraline (ZOLOFT) 100 MG tablet   10. Hypertension goal BP (blood pressure) < 140/80 I10 losartan-hydrochlorothiazide (HYZAAR) 50-12.5 MG per tablet   11. Mild intermittent asthma with acute exacerbation J45.21 albuterol (2.5 MG/3ML) 0.083% neb solution   12. Episodic tension-type headache, not intractable G44.219 topiramate (TOPAMAX) 25 MG tablet   13. Cramp of limb R25.2 magnesium oxide (MAG-OX) 400 (241.3 MG) MG tablet   14. Mild intermittent asthma without complication J45.20 albuterol (VENTOLIN HFA) 108 (90 BASE) MCG/ACT Inhaler   15. Type 2 diabetes mellitus without complication, without long-term current use of insulin (H) E11.9 blood glucose monitoring (NO BRAND SPECIFIED) meter device kit     blood glucose (NO BRAND SPECIFIED) lancets standard     blood glucose monitoring (NO BRAND SPECIFIED) test strip     blood glucose calibration (NO BRAND SPECIFIED) solution     CANCELED: TSH WITH FREE T4 REFLEX     DISCONTINUED: blood glucose (NO BRAND SPECIFIED) lancets standard   16. Depression with anxiety F41.8    17.  Chemical dependency (H) F19.20    18. Alcohol abuse F10.10    19. Marital dysfunction Z63.0    20. Persistent asthma J45.909 mometasone-formoterol (DULERA) 100-5 MCG/ACT oral inhaler   21. Slow transit constipation K59.01 polyethylene glycol (MIRALAX) powder        .    Side effects benefits and risks thoroughly discussed. .she may come in early if unimproved or getting worse          Importance of adhering to regimen discussed and if medications were dispensed, the importance of taking medications discussed and bringing in the medications after every visit for chronic problems         Please drink 2 glasses of water prior to meals and walk 15-30 minutes after meals    I spent  25 MINUTES SPENT  with patient discussing the following issues    The primary encounter diagnosis was Chronic pain syndrome. Diagnoses of Need for hepatitis C screening test, Special screening for malignant neoplasms, colon, Vitamin D deficiency, B12 deficiency, Nausea, Hypokalemia, Recurrent major depression in partial remission (H), Other constipation, Hypertension goal BP (blood pressure) < 140/80, Mild intermittent asthma with acute exacerbation, Episodic tension-type headache, not intractable, Cramp of limb, Mild intermittent asthma without complication, Type 2 diabetes mellitus without complication, without long-term current use of insulin (H), Depression with anxiety, Chemical dependency (H), Alcohol abuse, Marital dysfunction, Persistent asthma, and Slow transit constipation were also pertinent to this visit. over half of which involved counseling and coordination of care.    There are no Patient Instructions on file for this visit.    Diet:  MEDITERRANEAN DIET AND DIABETES     Exercise:  WALKING   Exercises Range of motion, balance, isometric, and strengthening exercises 30 repetitions twice daily of involved joints  CONCERNING REFERRAL DR MONTES AND [facility] PROGRAM  WEEKLY OFFICE VISIT     .OTTO YUSUF MD 1/19/2017 9:03  PM  January 19, 2017

## 2017-01-19 NOTE — NURSING NOTE
"Chief Complaint   Patient presents with     ER F/U       Initial /84 mmHg  Pulse 75  Temp(Src) 97.6  F (36.4  C) (Tympanic)  Resp 20  Wt 271 lb 9.6 oz (123.197 kg)  SpO2 97% Estimated body mass index is 43.86 kg/(m^2) as calculated from the following:    Height as of 1/7/17: 5' 6\" (1.676 m).    Weight as of this encounter: 271 lb 9.6 oz (123.197 kg).  BP completed using cuff size: wes Harris CMA      "

## 2017-01-19 NOTE — TELEPHONE ENCOUNTER
Suhas from [facility] called.  To fill pt's blood sugar supplies pharmacy needs to know how many times pt tests BG a day.   The test strips order reads, one times daily, and lancets and meter device reads daily. Informed to fill as one times daily.   ROBI sent to PCP.

## 2017-01-19 NOTE — DISCHARGE INSTRUCTIONS
Discharge Instructions  Abdominal Pain    Abdominal pain can be caused by many things. Your evaluation today does not show the exact cause for your pain. Your doctor today has decided that it is unlikely your pain is due to a life threatening problem, or a problem requiring surgery or hospital admission. Sometimes those problems cannot be found right away, so it is very important that you follow up as directed.  Sometimes only the changes which occur over time allow the cause of your pain to be found.    Return to the Emergency Department for a recheck in 8-12 hours if your pain continues.  If your pain gets worse, changes in location, or feels different, return to the Emergency Department right away.    ADULTS:  Return to the Emergency Department right away if:      You get an oral temperature above 102oF or as directed by your doctor.    You have blood in your stools (bright red or black, tarry stools).    You keep throwing up or can t drink liquids.    You see blood when you throw up.    You can t have a bowel movement or you can t pass gas.    Your stomach gets bloated or bigger.    Your skin or the whites of your eyes look yellow.    You faint.    You have bloody, frequent or painful urination.    You have new symptoms or anything that worries you.    CHILDREN:  Return to the Emergency Department right away if your child has any of the above-listed symptoms or the following:      Pushes your hand away or screams/cries when his/her belly is touched.    You notice your child is very fussy or weak.    Your child is very tired and is too tired to eat or drink.    Your child is dehydrated.  Signs of dehydration can be:  o Your infant has had no wet diapers in 4-5 hours.  o Your older child has not passed urine in 6-8 hours.  o Your infant or child starts to have dry mouth and lips, or no saliva or tears.    PREGNANT WOMEN:  Return to the Emergency Department right away if you have any of the above-listed symptoms or  the following:      You have bleeding, leaking fluid or passing tissue from the vagina.    You have worse pain or cramping, or pain in your shoulder or back.    You have vomiting that will not stop.    You have painful or bloody urination.    You have a temperature of 100oF or more.    Your baby is not moving as much as usual.    You faint.    You get a bad headache with or without eye problems and abdominal pain.    You have a convulsion or seizure.    You have unusual discharge from your vagina and abdominal pain.    Abdominal pain is pretty common during pregnancy.  Your pain may or may not be related to your pregnancy. You should follow-up closely with your OB doctor so they can evaluate you and your baby.  Until you follow-up with your regular doctor, do the following:       Avoid sex and do not put anything in your vagina.    Drink clear fluids.    Only take medications approved by your doctor.    MORE INFORMATION:    Appendicitis:  A possible cause of abdominal pain in any person who still has their appendix is acute appendicitis. Appendicitis is often hard to diagnose.  Testing does not always rule out early appendicitis or other causes of abdominal pain. Close follow-up with your doctor and re-evaluations may be needed to figure out the reason for your abdominal pain.    Follow-up:  It is very important that you make an appointment with your clinic and go to the appointment.  If you do not follow-up with your primary doctor, it may result in missing an important development which could result in permanent injury or disability and/or lasting pain.  If there is any problem keeping your appointment, call your doctor or return to the Emergency Department.    Medications:  Take your medications as directed by your doctor today.  Before using over-the-counter medications, ask your doctor and make sure to take the medications as directed.  If you have any questions about medications, ask your doctor.    Diet:   "Resume your normal diet as much as possible, but do not eat fried, fatty or spicy foods while you have pain.  Do not drink alcohol or have caffeine.  Do not smoke tobacco.    Probiotics: If you have been given an antibiotic, you may want to also take a probiotic pill or eat yogurt with live cultures. Probiotics have \"good bacteria\" to help your intestines stay healthy. Studies have shown that probiotics help prevent diarrhea and other intestine problems (including C. diff infection) when you take antibiotics. You can buy these without a prescription in the pharmacy section of the store.     If you were given a prescription for medicine here today, be sure to read all of the information (including the package insert) that comes with your prescription.  This will include important information about the medicine, its side effects, and any warnings that you need to know about.  The pharmacist who fills the prescription can provide more information and answer questions you may have about the medicine.  If you have questions or concerns that the pharmacist cannot address, please call or return to the Emergency Department.         Opioid Medication Information    Pain medications are among the most commonly prescribed medicines, so we are including this information for all our patients. If you did not receive pain medication or get a prescription for pain medicine, you can ignore it.     You may have been given a prescription for an opioid (narcotic) pain medicine and/or have received a pain medicine while here in the Emergency Department. These medicines can make you drowsy or impaired. You must not drive, operate dangerous equipment, or engage in any other dangerous activities while taking these medications. If you drive while taking these medications, you could be arrested for DUI, or driving under the influence. Do not drink any alcohol while you are taking these medications.     Opioid pain medications can cause " addiction. If you have a history of chemical dependency of any type, you are at a higher risk of becoming addicted to pain medications.  Only take these prescribed medications to treat your pain when all other options have been tried. Take it for as short a time and as few doses as possible. Store your pain pills in a secure place, as they are frequently stolen and provide a dangerous opportunity for children or visitors in your house to start abusing these powerful medications. We will not replace any lost or stolen medicine.  As soon as your pain is better, you should flush all your remaining medication.     Many prescription pain medications contain Tylenol  (acetaminophen), including Vicodin , Tylenol #3 , Norco , Lortab , and Percocet .  You should not take any extra pills of Tylenol  if you are using these prescription medications or you can get very sick.  Do not ever take more than 3000 mg of acetaminophen in any 24 hour period.    All opioids tend to cause constipation. Drink plenty of water and eat foods that have a lot of fiber, such as fruits, vegetables, prune juice, apple juice and high fiber cereal.  Take a laxative if you don t move your bowels at least every other day. Miralax , Milk of Magnesia, Colace , or Senna  can be used to keep you regular.      Remember that you can always come back to the Emergency Department if you are not able to see your regular doctor in the amount of time listed above, if you get any new symptoms, or if there is anything that worries you.    Discharge Instructions  Chronic Pain  You were seen today for an issue regarding chronic or recurrent pain. You may have a condition that gives you pain every day, or a condition that causes pain that keeps coming back, or several conditions involving pain.   Many patients with chronic or recurrent pain come to the Emergency Department thinking that a shot of narcotic pain medicine or a prescription for pain pills to take home is  the best answer to their problem. We have discovered, though, that these approaches put our patients at high risk of long-term problems. This sort of treatment may actually make your pain worse, make it harder to control, and put you at an unacceptable risk of complications.   Because of the risks, we are very hesitant to treat your type of pain with short-acting or potentially habit-forming medications. These medications represent a significant risk to your health, and need to be managed by a physician who can follow your care consistently.  We have established a policy for the treatment of patients with chronic or recurrent pain that does not allow for treatment with injection narcotics or take-home prescriptions for narcotics.  We will treat your symptoms with non-narcotic medications and other treatments, and we will make referrals to pain specialists or other specialized providers if we think such referrals would benefit you.  You should expect to receive treatment consistent with this policy during future visits.    If you have concerns about our policy, please discuss them with your primary care provider or pain specialist, who can contact us if necessary for further information or clarification.  If you were given a prescription for medicine here today, be sure to read all of the information (including the package insert) that comes with your prescription.  This will include important information about the medicine, its side effects, and any warnings that you need to know about.  The pharmacist who fills the prescription can provide more information and answer questions you may have about the medicine.  If you have questions or concerns that the pharmacist cannot address, please call or return to the Emergency Department.   Remember that you can always come back to the Emergency Department if you develop any new symptoms or if there is anything that worries you.      Hypokalemia  Hypokalemia means a low  level of potassium in the blood. This most often occurs in patients who take diuretics (water pills). It can also occur due to severe vomiting or diarrhea.  It is also seen in people who take laxatives for long periods of time.  A mild case usually causes no symptoms. It is only found with blood testing. More severe potassium loss causes generalized weakness, muscle or abdominal cramping, heart palpitations (rapid or irregular heartbeats) and low blood pressure.  Home care    Take any potassium supplements prescribed.    Eat foods rich in potassium. The highest amount is found in artichoke, baked potatoes, spinach, cantaloupe, honeydew melon, cod, halibut, salmon, and scallops. White, red, or benitez beans are also very good sources. A modest amount is found in orange juice, bananas, carrots, and tomato juice.    If you take certain types of diuretics, you will also need to take potassium supplements. If take a diuretic, discuss potassium supplements with your doctor.  Follow-up care  Follow up with your healthcare provider for a repeat blood test within the next week or as advised by our staff.  When to seek medical advice  Call your healthcare provider if any of the following occur:    Increased weakness, fatigue, muscle cramps    Dizziness  Call 911  Call 911 if any of the following occur:    Irregular heartbeat, extra beats or very fast heart rate    Loss of consciousness    6146-5998 [employer]. [address] [place] [address]33. All rights reserved. This information is not intended as a substitute for professional medical care. Always follow your healthcare professional's instructions.

## 2017-01-19 NOTE — PROGRESS NOTES
Clinic Care Coordination Contact  Chart Review      Clinical Data: Patient mailed unable to contact letter on 01/10/2017 with request to call Clinic Care CoordinatorSMITH by 01/24/2017. Patient has not called  Clinic Care CoordinatorSMITH received alert that patient was in ED on 01/1/2017  Patient given oxycodone & zofran in ED & d/c with prescription for xofran  ED recommende that patient be seen by pain clinic    11/22/2016-patient seen by Leticia Quinones NP with pain clinic at [place]  The recommendation was that patient was to see PT & psychology with pain clinic & to see her oncologist & then follow up with NP with Pain Clinic at [place]  Patient no showed for PT & psychology appointment at pain clinic at [place] on 12/22/2016 01/09/2017-patient no showed for appointment with individual therapist in [facility] SMITH Brunson will not make call out at this time as patient is seeing her PCP today (01/19/2017) at 11:45am      Plan:Clinic Care CoordinatorSMITH will route this info to PCP as he is seeing patient today at 11:45am  BRISSA Cruz, [facility]  Clinic Care CoordinatorSMITH with [facility]  [phone]

## 2017-01-24 ENCOUNTER — TELEPHONE (OUTPATIENT)
Dept: FAMILY MEDICINE | Facility: CLINIC | Age: 52
End: 2017-01-24

## 2017-01-24 NOTE — TELEPHONE ENCOUNTER
buprenorphine (BUTRANS) 5 MCG/HR WK patch is not covered by insurance please send alternative     PLEASE HAVE PATIENT COME BACK TO DISCUSS ALTERNATIVE     TREATMENT UNTIL SEEING PAIN MD     DR SIMON YUSUF JR., MD

## 2017-01-26 ENCOUNTER — APPOINTMENT (OUTPATIENT)
Dept: ULTRASOUND IMAGING | Facility: CLINIC | Age: 52
End: 2017-01-26
Attending: EMERGENCY MEDICINE
Payer: COMMERCIAL

## 2017-01-26 ENCOUNTER — HOSPITAL ENCOUNTER (EMERGENCY)
Facility: CLINIC | Age: 52
Discharge: HOME | End: 2017-01-26
Attending: EMERGENCY MEDICINE | Admitting: EMERGENCY MEDICINE
Payer: COMMERCIAL

## 2017-01-26 VITALS
HEIGHT: 66 IN | DIASTOLIC BLOOD PRESSURE: 81 MMHG | HEART RATE: 71 BPM | BODY MASS INDEX: 43.39 KG/M2 | RESPIRATION RATE: 16 BRPM | OXYGEN SATURATION: 99 % | SYSTOLIC BLOOD PRESSURE: 131 MMHG | TEMPERATURE: 98 F | WEIGHT: 270 LBS

## 2017-01-26 DIAGNOSIS — G89.29 OTHER CHRONIC PAIN: ICD-10-CM

## 2017-01-26 LAB
ALBUMIN SERPL-MCNC: 3.7 G/DL (ref 3.4–5)
ALP SERPL-CCNC: 94 U/L (ref 40–150)
ALT SERPL W P-5'-P-CCNC: 25 U/L (ref 0–50)
ANION GAP SERPL CALCULATED.3IONS-SCNC: 9 MMOL/L (ref 3–14)
AST SERPL W P-5'-P-CCNC: 11 U/L (ref 0–45)
BASOPHILS # BLD AUTO: 0 10E9/L (ref 0–0.2)
BASOPHILS NFR BLD AUTO: 0.1 %
BILIRUB SERPL-MCNC: 0.9 MG/DL (ref 0.2–1.3)
BUN SERPL-MCNC: 12 MG/DL (ref 7–30)
CALCIUM SERPL-MCNC: 9.2 MG/DL (ref 8.5–10.1)
CHLORIDE SERPL-SCNC: 105 MMOL/L (ref 94–109)
CO2 SERPL-SCNC: 27 MMOL/L (ref 20–32)
CREAT SERPL-MCNC: 0.86 MG/DL (ref 0.52–1.04)
DIFFERENTIAL METHOD BLD: NORMAL
EOSINOPHIL # BLD AUTO: 0.3 10E9/L (ref 0–0.7)
EOSINOPHIL NFR BLD AUTO: 3.1 %
ERYTHROCYTE [DISTWIDTH] IN BLOOD BY AUTOMATED COUNT: 13.4 % (ref 10–15)
GFR SERPL CREATININE-BSD FRML MDRD: 69 ML/MIN/1.7M2
GLUCOSE SERPL-MCNC: 98 MG/DL (ref 70–99)
HCT VFR BLD AUTO: 38.1 % (ref 35–47)
HGB BLD-MCNC: 12.6 G/DL (ref 11.7–15.7)
IMM GRANULOCYTES # BLD: 0 10E9/L (ref 0–0.4)
IMM GRANULOCYTES NFR BLD: 0.3 %
LYMPHOCYTES # BLD AUTO: 2.9 10E9/L (ref 0.8–5.3)
LYMPHOCYTES NFR BLD AUTO: 30.1 %
MCH RBC QN AUTO: 29.2 PG (ref 26.5–33)
MCHC RBC AUTO-ENTMCNC: 33.1 G/DL (ref 31.5–36.5)
MCV RBC AUTO: 88 FL (ref 78–100)
MONOCYTES # BLD AUTO: 0.7 10E9/L (ref 0–1.3)
MONOCYTES NFR BLD AUTO: 7.4 %
NEUTROPHILS # BLD AUTO: 5.7 10E9/L (ref 1.6–8.3)
NEUTROPHILS NFR BLD AUTO: 59 %
NRBC # BLD AUTO: 0 10*3/UL
NRBC BLD AUTO-RTO: 0 /100
PLATELET # BLD AUTO: 326 10E9/L (ref 150–450)
POTASSIUM SERPL-SCNC: 3.5 MMOL/L (ref 3.4–5.3)
PROT SERPL-MCNC: 7.5 G/DL (ref 6.8–8.8)
RBC # BLD AUTO: 4.32 10E12/L (ref 3.8–5.2)
SODIUM SERPL-SCNC: 141 MMOL/L (ref 133–144)
WBC # BLD AUTO: 9.6 10E9/L (ref 4–11)

## 2017-01-26 PROCEDURE — 25000125 ZZHC RX 250: Performed by: EMERGENCY MEDICINE

## 2017-01-26 PROCEDURE — 76705 ECHO EXAM OF ABDOMEN: CPT

## 2017-01-26 PROCEDURE — 85025 COMPLETE CBC W/AUTO DIFF WBC: CPT | Performed by: EMERGENCY MEDICINE

## 2017-01-26 PROCEDURE — 99284 EMERGENCY DEPT VISIT MOD MDM: CPT | Mod: 25

## 2017-01-26 PROCEDURE — 80053 COMPREHEN METABOLIC PANEL: CPT | Performed by: EMERGENCY MEDICINE

## 2017-01-26 RX ORDER — ONDANSETRON 4 MG/1
4 TABLET, ORALLY DISINTEGRATING ORAL ONCE
Status: COMPLETED | OUTPATIENT
Start: 2017-01-26 | End: 2017-01-26

## 2017-01-26 RX ORDER — ONDANSETRON 2 MG/ML
4 INJECTION INTRAMUSCULAR; INTRAVENOUS ONCE
Status: DISCONTINUED | OUTPATIENT
Start: 2017-01-26 | End: 2017-01-26 | Stop reason: CLARIF

## 2017-01-26 RX ADMIN — ONDANSETRON 4 MG: 4 TABLET, ORALLY DISINTEGRATING ORAL at 18:05

## 2017-01-26 ASSESSMENT — ENCOUNTER SYMPTOMS
DYSURIA: 0
ARTHRALGIAS: 0
VOMITING: 1
BACK PAIN: 1
ABDOMINAL PAIN: 1
FEVER: 0
COUGH: 1
NECK PAIN: 0
NAUSEA: 1
DIFFICULTY URINATING: 0
JOINT SWELLING: 0
FREQUENCY: 1

## 2017-01-26 NOTE — ED AVS SNAPSHOT
Emergency Department    [address] [place] [id number]    Phone:  [phone]    Fax:  [phone]                                       Ines Mott   MRN: [id number]    Department:   Emergency Department   Date of Visit:  1/26/2017           Patient Information     Date Of Birth          1965        Your diagnoses for this visit were:     Other chronic pain        You were seen by Pedro Preston MD.      Follow-up Information     Please follow up.    Why:  follow up with your MD and oncology to recheck regarding pain management and your cancer        Discharge Instructions       Discharge Instructions  Chronic Pain  You were seen today for an issue regarding chronic or recurrent pain. You may have a condition that gives you pain every day, or a condition that causes pain that keeps coming back, or several conditions involving pain.   Many patients with chronic or recurrent pain come to the Emergency Department thinking that a shot of narcotic pain medicine or a prescription for pain pills to take home is the best answer to their problem. We have discovered, though, that these approaches put our patients at high risk of long-term problems. This sort of treatment may actually make your pain worse, make it harder to control, and put you at an unacceptable risk of complications.   Because of the risks, we are very hesitant to treat your type of pain with short-acting or potentially habit-forming medications. These medications represent a significant risk to your health, and need to be managed by a physician who can follow your care consistently.  We have established a policy for the treatment of patients with chronic or recurrent pain that does not allow for treatment with injection narcotics or take-home prescriptions for narcotics.  We will treat your symptoms with non-narcotic medications and other treatments, and we will make referrals to pain specialists or other specialized  providers if we think such referrals would benefit you.  You should expect to receive treatment consistent with this policy during future visits.    If you have concerns about our policy, please discuss them with your primary care provider or pain specialist, who can contact us if necessary for further information or clarification.  If you were given a prescription for medicine here today, be sure to read all of the information (including the package insert) that comes with your prescription.  This will include important information about the medicine, its side effects, and any warnings that you need to know about.  The pharmacist who fills the prescription can provide more information and answer questions you may have about the medicine.  If you have questions or concerns that the pharmacist cannot address, please call or return to the Emergency Department.   Remember that you can always come back to the Emergency Department if you develop any new symptoms or if there is anything that worries you.      24 Hour Appointment Hotline       To make an appointment at any [facility], call [phone] ([phone]). If you don't have a family doctor or clinic, we will help you find one. [facility] are conveniently located to serve the needs of you and your family.             Review of your medicines      Our records show that you are taking the medicines listed below. If these are incorrect, please call your family doctor or clinic.        Dose / Directions Last dose taken    * albuterol (2.5 MG/3ML) 0.083% neb solution   Dose:  1 vial   Quantity:  30 vial        Take 1 vial (2.5 mg) by nebulization every 6 hours as needed for shortness of breath / dyspnea or wheezing   Refills:  3        * albuterol 108 (90 BASE) MCG/ACT Inhaler   Commonly known as:  VENTOLIN HFA   Quantity:  54 g        INHALE TWO PUFFS BY MOUTH EVERY 4 HOURS AS NEEDED FOR SHORTNESS OF BREATH/DYSPNEA   Refills:  1        blood glucose  calibration solution   Commonly known as:  no brand specified   Quantity:  1 each        Use to calibrate blood glucose monitor as directed.   Refills:  0        blood glucose lancets standard   Commonly known as:  no brand specified   Quantity:  1 Box        Use to test blood sugar daily times daily or as directed.   Refills:  11        blood glucose monitoring meter device kit   Commonly known as:  no brand specified   Quantity:  1 kit        Use to test blood sugar larisa times daily or as directed.   Refills:  0        blood glucose monitoring test strip   Commonly known as:  no brand specified   Quantity:  100 strip        Use to test blood sugars once times daily or as directed   Refills:  3        buprenorphine 5 MCG/HR WK patch   Commonly known as:  BUTRANS   Dose:  1 patch   Quantity:  4 patch        Place 1 patch onto the skin every 7 days   Refills:  0        cyanocobalamin 1000 MCG/ML injection   Commonly known as:  VITAMIN B12   Dose:  1 mL   Quantity:  1 mL        Inject 1 mL (1,000 mcg) Subcutaneous every 30 days   Refills:  11        EPINEPHrine 0.3 MG/0.3ML injection   Dose:  0.3 mg   Quantity:  2 each        Inject 0.3 mLs (0.3 mg) into the muscle once as needed for anaphylaxis   Refills:  5        losartan-hydrochlorothiazide 50-12.5 MG per tablet   Commonly known as:  HYZAAR   Dose:  1 tablet   Quantity:  90 tablet        Take 1 tablet by mouth daily   Refills:  3        magnesium oxide 400 (241.3 MG) MG tablet   Commonly known as:  MAG-OX   Dose:  400 mg   Quantity:  90 tablet        Take 1 tablet (400 mg) by mouth daily   Refills:  3        mometasone-formoterol 100-5 MCG/ACT oral inhaler   Commonly known as:  DULERA   Dose:  2 puff   Quantity:  1 Inhaler        Inhale 2 puffs into the lungs 2 times daily   Refills:  11        ondansetron 4 MG ODT tab   Commonly known as:  ZOFRAN ODT   Dose:  4 mg   Quantity:  10 tablet        Take 1 tablet (4 mg) by mouth every 8 hours as needed for nausea    Refills:  0        ondansetron 8 MG tablet   Commonly known as:  ZOFRAN   Dose:  4 mg   Quantity:  9 tablet        Take 0.5 tablets (4 mg) by mouth every 8 hours as needed for nausea   Refills:  3        polyethylene glycol powder   Commonly known as:  MIRALAX   Dose:  1 capful   Quantity:  510 g        Take 17 g (1 capful) by mouth daily   Refills:  3        potassium chloride 10 MEQ tablet   Commonly known as:  K-TAB,KLOR-CON   Dose:  10 mEq   Indication:  Low Amount of Potassium in the Blood   Quantity:  120 tablet        Take 1 tablet (10 mEq) by mouth daily   Refills:  11        sertraline 100 MG tablet   Commonly known as:  ZOLOFT   Dose:  100 mg   Indication:  Major Depressive Disorder   Quantity:  30 tablet        Take 1 tablet (100 mg) by mouth daily   Refills:  11        STATIN NOT PRESCRIBED (INTENTIONAL)   Dose:  1 each   Quantity:  0 each        1 each daily Statin not prescribed intentionally due to Active liver disease (liver failure, cirrhosis, hepatitis) LIVER METS   Refills:  0        topiramate 25 MG tablet   Commonly known as:  TOPAMAX   Dose:  25 mg   Quantity:  60 tablet        Take 1 tablet (25 mg) by mouth 2 times daily   Refills:  3        Vitamin D (Cholecalciferol) 1000 UNITS Tabs   Dose:  2000 Units   Quantity:  60 tablet        Take 2,000 Units by mouth daily   Refills:  11        * Notice:  This list has 2 medication(s) that are the same as other medications prescribed for you. Read the directions carefully, and ask your doctor or other care provider to review them with you.            Procedures and tests performed during your visit     CBC with platelets differential    Comprehensive metabolic panel    US Abdomen Limited      Orders Needing Specimen Collection     None      Pending Results     Date and Time Order Name Status Description    1/26/2017 1745 US Abdomen Limited Preliminary             Pending Culture Results     No orders found from 1/25/2017 to 1/27/2017.       Test  Results from your hospital stay           1/26/2017  6:20 PM - Interface, Flexilab Results      Component Results     Component Value Ref Range & Units Status    WBC 9.6 4.0 - 11.0 10e9/L Final    RBC Count 4.32 3.8 - 5.2 10e12/L Final    Hemoglobin 12.6 11.7 - 15.7 g/dL Final    Hematocrit 38.1 35.0 - 47.0 % Final    MCV 88 78 - 100 fl Final    MCH 29.2 26.5 - 33.0 pg Final    MCHC 33.1 31.5 - 36.5 g/dL Final    RDW 13.4 10.0 - 15.0 % Final    Platelet Count 326 150 - 450 10e9/L Final    Diff Method Automated Method  Final    % Neutrophils 59.0 % Final    % Lymphocytes 30.1 % Final    % Monocytes 7.4 % Final    % Eosinophils 3.1 % Final    % Basophils 0.1 % Final    % Immature Granulocytes 0.3 % Final    Nucleated RBCs 0 0 /100 Final    Absolute Neutrophil 5.7 1.6 - 8.3 10e9/L Final    Absolute Lymphocytes 2.9 0.8 - 5.3 10e9/L Final    Absolute Monocytes 0.7 0.0 - 1.3 10e9/L Final    Absolute Eosinophils 0.3 0.0 - 0.7 10e9/L Final    Absolute Basophils 0.0 0.0 - 0.2 10e9/L Final    Abs Immature Granulocytes 0.0 0 - 0.4 10e9/L Final    Absolute Nucleated RBC 0.0  Final         1/26/2017  6:43 PM - Interface, Flexilab Results      Component Results     Component Value Ref Range & Units Status    Sodium 141 133 - 144 mmol/L Final    Potassium 3.5 3.4 - 5.3 mmol/L Final    Chloride 105 94 - 109 mmol/L Final    Carbon Dioxide 27 20 - 32 mmol/L Final    Anion Gap 9 3 - 14 mmol/L Final    Glucose 98 70 - 99 mg/dL Final    Urea Nitrogen 12 7 - 30 mg/dL Final    Creatinine 0.86 0.52 - 1.04 mg/dL Final    GFR Estimate 69 >60 mL/min/1.7m2 Final    Non  GFR Calc    GFR Estimate If Black 84 >60 mL/min/1.7m2 Final    African American GFR Calc    Calcium 9.2 8.5 - 10.1 mg/dL Final    Bilirubin Total 0.9 0.2 - 1.3 mg/dL Final    Albumin 3.7 3.4 - 5.0 g/dL Final    Protein Total 7.5 6.8 - 8.8 g/dL Final    Alkaline Phosphatase 94 40 - 150 U/L Final    ALT 25 0 - 50 U/L Final    AST 11 0 - 45 U/L Final          1/26/2017  7:02 PM - Interface, Radiant Ib      Narrative     US ABDOMEN LIMITED 1/26/2017 6:44 PM    CLINICAL INFORMATION: RUQ pain, chronic, history of liver metastasis,  assess for change from recent US (1/18/2017).    COMPARISON: 1/18/2017.    FINDINGS: Limited right upper quadrant ultrasound demonstrates the  gallbladder is absent. No bile duct dilatation. Multiple liver masses.  One of these in the inferior right hepatic lobe measures 3.2 x 2.9 x  2.7 cm. A lesion higher in the right hepatic lobe measures 3.4 x 3.4 x  2.8 cm. Visualized pancreas is unremarkable. Tail of the pancreas is  obscured. Again seen is a 2.1 cm well-circumscribed hypoechoic lesion  in the lateral right kidney which is indeterminate. This is not  convincingly a cyst. No obvious corresponding lesion is seen on the CT  from 1/22/2016. No free fluid in the right upper quadrant.        Impression     IMPRESSION:  1. Again seen are multiple indeterminate hypoechoic liver lesions  measuring up to 3.4 cm. These may be liver metastases.  2. 2.1 cm indeterminate lesion right kidney.  3. No significant change.                Clinical Quality Measure: Blood Pressure Screening     Your blood pressure was checked while you were in the emergency department today. The last reading we obtained was  BP: 137/88 mmHg . Please read the guidelines below about what these numbers mean and what you should do about them.  If your systolic blood pressure (the top number) is less than 120 and your diastolic blood pressure (the bottom number) is less than 80, then your blood pressure is normal. There is nothing more that you need to do about it.  If your systolic blood pressure (the top number) is 120-139 or your diastolic blood pressure (the bottom number) is 80-89, your blood pressure may be higher than it should be. You should have your blood pressure rechecked within a year by a primary care provider.  If your systolic blood pressure (the top number) is 140  "or greater or your diastolic blood pressure (the bottom number) is 90 or greater, you may have high blood pressure. High blood pressure is treatable, but if left untreated over time it can put you at risk for heart attack, stroke, or kidney failure. You should have your blood pressure rechecked by a primary care provider within the next 4 weeks.  If your provider in the emergency department today gave you specific instructions to follow-up with your doctor or provider even sooner than that, you should follow that instruction and not wait for up to 4 weeks for your follow-up visit.        Thank you for choosing [facility]       Thank you for choosing [facility] for your care. Our goal is always to provide you with excellent care. Hearing back from our patients is one way we can continue to improve our services. Please take a few minutes to complete the written survey that you may receive in the mail after you visit with us. Thank you!        [employer] Information     [employer] lets you send messages to your doctor, view your test results, renew your prescriptions, schedule appointments and more. To sign up, go to [email or web address] . Click on \"Log in\" on the left side of the screen, which will take you to the Welcome page. Then click on \"Sign up Now\" on the right side of the page.     You will be asked to enter the access code listed below, as well as some personal information. Please follow the directions to create your username and password.     Your access code is: [email or web address]  Expires: 3/29/2017  8:39 AM     Your access code will  in 90 days. If you need help or a new code, please call your [facility] clinic or [phone].        Care EveryWhere ID     This is your Care EveryWhere ID. This could be used by other organizations to access your [facility] medical records  [id number]        After Visit Summary       This is your record. Keep this with you and show to your community pharmacist(s) and doctor(s) at " your next visit.

## 2017-01-26 NOTE — ED AVS SNAPSHOT
Emergency Department    [address] [place] [id number]    Phone:  [phone]    Fax:  [phone]                                       Ines Mtot   MRN: [id number]    Department:   Emergency Department   Date of Visit:  1/26/2017           After Visit Summary Signature Page     I have received my discharge instructions, and my questions have been answered. I have discussed any challenges I see with this plan with the nurse or doctor.    ..........................................................................................................................................  Patient/Patient Representative Signature      ..........................................................................................................................................  Patient Representative Print Name and Relationship to Patient    ..................................................               ................................................  Date                                            Time    ..........................................................................................................................................  Reviewed by Signature/Title    ...................................................              ..............................................  Date                                                            Time

## 2017-01-26 NOTE — ED PROVIDER NOTES
"  History     Chief Complaint:  Abdominal Pain      HPI   Ines Mott is a 52 year old female with a history of chronic pain syndrome and metastatic carcinoid tumor of intra-abdominal site who presents to the emergency department today via EMS for evaluation of abdominal pain. The patient reports chronic pain with her cancer and recently stopped taking fentanyl patches and oxycodone two weeks ago due to insurance complications and her primary provider Dr. Fatima has been managing her pain. However, he would prefer to the patient to follow up with [facility]. The patient last saw Dr. Fatima two weeks ago. She has been previously evaluated at [facility] however the patient states she did not like her care there as she was not interested in getting injections or a pain pump. Additionally, the patient was previously on chemotherapy beads into her groin however her last treatment was two years ago and last saw her oncologist in 2012. Then today the patient reports worsening mid abdominal pain that radiates to the left and through to her back. She has been taking tylenol for pain with no improvement, therefore prompting her to call EMS and present to the ED for further evaluation. She also endorses shooting \"stabbing\" pain down her right leg to her foot as well as feeling nauseous and vomiting this morning. She also endorses a productive cough with a brown sputum. The patient denies numbness, vision changes, fever, and changes in bowel or bladder habits other than baseline urinary frequency.     Allergies:  Ativan  Macrobid  Amoxicillin  Buspirone  Cephalosporins  Dilaudid  Diphenhydramine Hcl  Imitrex  Ketorolac Tromethamine  Lansoprazole  Metoclopramide  Paroxetine  Penicillins  Prednisone  Sulfa Drugs  Lidoderm     Medications:    Zofran  Potassium Chloride  Zoloft  Hyzaar  Albuterol  Topamax  Mag-ox  Albuterol  Dulera  Miralax  Butrans  EpiPen     Past Medical History:  " "  Hypertension   Migraines   Asthma   Hydrocephalus   Spina bifida   Depressive disorder   Chronic pain syndrome   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    Methamphetamine abuse   Obesity   Right renal mass   Diabetes (H)   Cancer (H)   Arthritis   Mild intermittent asthma     Past Surgical History:    Gyn surgery  Cholecystectomy  Appendectomy  Extraction dental  Implant shunt ventriculoperitoneal     Family History:    Cancer - Father     Social History:  The patient presents to the ED alone.   Smoking Status: Former  Smokeless Tobacco: Never  Alcohol Use: Yes  Marital Status:        Review of Systems   Constitutional: Negative for fever.   Respiratory: Positive for cough.    Gastrointestinal: Positive for nausea, vomiting and abdominal pain.   Genitourinary: Positive for frequency. Negative for dysuria, urgency, decreased urine volume and difficulty urinating.   Musculoskeletal: Positive for back pain. Negative for joint swelling, arthralgias and neck pain.   All other systems reviewed and are negative.    Physical Exam   First Vitals:  /88 mmHg  Pulse 76  Temp(Src) 98  F (36.7  C)  Resp 20  Ht 1.676 m (5' 6\")  Wt 122.471 kg (270 lb)  BMI 43.60 kg/m2  SpO2 97%       Physical Exam  SKIN:  Warm, dry.  No jaundice.  No ecchymosis about the torso.  HEMATOLOGIC/IMMUNOLOGIC/LYMPHATIC:  No pallor.  EYES:  Conjunctivae normal.  Anicteric.  CARDIOVASCULAR:  Regular rate and rhythm.  RESPIRATORY:  No respiratory distress, breath sounds equal and normal.  GASTROINTESTINAL:  Soft non-tender abdomen with active bowel sounds.  No distension.  No abdominal wall mass.  No hepatosplenomegaly.  MUSCULOSKELETAL:  ROM torso appears painless.  NEUROLOGIC:  Alert, conversant.  PSYCHIATRIC:  Normal mood.    Emergency Department Course     Imaging:  Radiology findings were communicated with the patient who voiced understanding of the findings.  US Abdomen Limited  IMPRESSION:  1. Again seen are multiple " indeterminate hypoechoic liver lesions  measuring up to 3.4 cm. These may be liver metastases.  2. 2.1 cm indeterminate lesion right kidney.  3. No significant change.  Final reading per radiology    Laboratory:  Laboratory findings were communicated with the patient who voiced understanding of the findings.  CBC: WNL. (WBC 9.6, HGB 12.6, )   CMP: AWNL (Creatinine 0.86)    Interventions:  1805 Zofran 4mg PO      Emergency Department Course:  Nursing notes and vitals reviewed.  I performed an exam of the patient as documented above.   The patient was sent for a US Abdomen Limited while in the emergency department, results above.   IV was inserted and blood was drawn for laboratory testing, results above.  1735: I initially examined the patient.   1913: Patient rechecked and updated on the imaging and laboratory results as well as plan of care.   I discussed the treatment plan with the patient. They expressed understanding of this plan and consented to discharge. They will be discharged home with instructions for care and follow up. In addition, the patient will return to the emergency department if their symptoms persist, worsen, if new symptoms arise or if there is any concern.  All questions were answered.  I personally reviewed the laboratory and imaging results with the Patient and answered all related questions prior to discharge.    Impression & Plan      Medical Decision Making:  This patient has chronic pain. Reassuring examination. Reassuring unchanged testing. She has a care plan in place regarding opiates for chronic pain. I do not think administering opiates in this context especially with a care plan in place would be appropriate. I advised tylenol or ibuprofen and primary follow up tomorrow with her physician for pain management and otherwise to follow up with a pain clinic as she has been already been referred to. Also to follow up with oncology.     Diagnosis:    ICD-10-CM    1. Other chronic  pain G89.29      Disposition:   Discharged to home    Scribe Disclosure:  I, Theresa Leivayareli, am serving as a scribe at 5:31 PM on 1/26/2017 to document services personally performed by Pedro Preston MD, based on my observations and the provider's statements to me.    1/26/2017    EMERGENCY DEPARTMENT        Pedro Preston MD  01/27/17 1002

## 2017-01-26 NOTE — ED NOTES
Bed: ED15  Expected date:   Expected time:   Means of arrival:   Comments:  [id number] Cancer pt   Pain

## 2017-01-27 NOTE — DISCHARGE INSTRUCTIONS
Discharge Instructions  Chronic Pain  You were seen today for an issue regarding chronic or recurrent pain. You may have a condition that gives you pain every day, or a condition that causes pain that keeps coming back, or several conditions involving pain.   Many patients with chronic or recurrent pain come to the Emergency Department thinking that a shot of narcotic pain medicine or a prescription for pain pills to take home is the best answer to their problem. We have discovered, though, that these approaches put our patients at high risk of long-term problems. This sort of treatment may actually make your pain worse, make it harder to control, and put you at an unacceptable risk of complications.   Because of the risks, we are very hesitant to treat your type of pain with short-acting or potentially habit-forming medications. These medications represent a significant risk to your health, and need to be managed by a physician who can follow your care consistently.  We have established a policy for the treatment of patients with chronic or recurrent pain that does not allow for treatment with injection narcotics or take-home prescriptions for narcotics.  We will treat your symptoms with non-narcotic medications and other treatments, and we will make referrals to pain specialists or other specialized providers if we think such referrals would benefit you.  You should expect to receive treatment consistent with this policy during future visits.    If you have concerns about our policy, please discuss them with your primary care provider or pain specialist, who can contact us if necessary for further information or clarification.  If you were given a prescription for medicine here today, be sure to read all of the information (including the package insert) that comes with your prescription.  This will include important information about the medicine, its side effects, and any warnings that you need to know about.   The pharmacist who fills the prescription can provide more information and answer questions you may have about the medicine.  If you have questions or concerns that the pharmacist cannot address, please call or return to the Emergency Department.   Remember that you can always come back to the Emergency Department if you develop any new symptoms or if there is anything that worries you.

## 2017-02-02 ENCOUNTER — OFFICE VISIT (OUTPATIENT)
Dept: FAMILY MEDICINE | Facility: CLINIC | Age: 52
End: 2017-02-02
Payer: COMMERCIAL

## 2017-02-02 VITALS
WEIGHT: 270.4 LBS | TEMPERATURE: 96.7 F | SYSTOLIC BLOOD PRESSURE: 118 MMHG | RESPIRATION RATE: 20 BRPM | HEART RATE: 75 BPM | DIASTOLIC BLOOD PRESSURE: 64 MMHG | BODY MASS INDEX: 43.66 KG/M2 | OXYGEN SATURATION: 99 %

## 2017-02-02 DIAGNOSIS — N28.89 RENAL MASS, RIGHT: ICD-10-CM

## 2017-02-02 DIAGNOSIS — F33.41 RECURRENT MAJOR DEPRESSION IN PARTIAL REMISSION (H): Chronic | ICD-10-CM

## 2017-02-02 DIAGNOSIS — F43.10 PTSD (POST-TRAUMATIC STRESS DISORDER): Chronic | ICD-10-CM

## 2017-02-02 DIAGNOSIS — C78.7 CARCINOMA OF COLON METASTATIC TO LIVER (H): ICD-10-CM

## 2017-02-02 DIAGNOSIS — C18.9 CARCINOMA OF COLON METASTATIC TO LIVER (H): ICD-10-CM

## 2017-02-02 DIAGNOSIS — G89.4 CHRONIC PAIN SYNDROME: Primary | ICD-10-CM

## 2017-02-02 DIAGNOSIS — M51.379 DEGENERATION OF LUMBOSACRAL INTERVERTEBRAL DISC: ICD-10-CM

## 2017-02-02 DIAGNOSIS — I10 HYPERTENSION GOAL BP (BLOOD PRESSURE) < 140/80: Chronic | ICD-10-CM

## 2017-02-02 DIAGNOSIS — R10.9 ABDOMINAL PAIN, RIGHT LATERAL: ICD-10-CM

## 2017-02-02 DIAGNOSIS — E78.5 HYPERLIPIDEMIA WITH TARGET LDL LESS THAN 130: Chronic | ICD-10-CM

## 2017-02-02 PROCEDURE — 99214 OFFICE O/P EST MOD 30 MIN: CPT | Performed by: FAMILY MEDICINE

## 2017-02-02 RX ORDER — MORPHINE SULFATE 15 MG/1
15 TABLET, FILM COATED, EXTENDED RELEASE ORAL EVERY 12 HOURS
Qty: 14 TABLET | Refills: 0 | Status: SHIPPED | OUTPATIENT
Start: 2017-02-09 | End: 2017-02-17

## 2017-02-02 RX ORDER — HYDROCODONE BITARTRATE AND ACETAMINOPHEN 7.5; 325 MG/1; MG/1
1 TABLET ORAL EVERY 4 HOURS PRN
Qty: 30 TABLET | Refills: 0 | Status: SHIPPED | OUTPATIENT
Start: 2017-02-09 | End: 2017-02-02

## 2017-02-02 RX ORDER — MORPHINE SULFATE 15 MG/1
15 TABLET, FILM COATED, EXTENDED RELEASE ORAL EVERY 12 HOURS
Qty: 14 TABLET | Refills: 0 | Status: SHIPPED | OUTPATIENT
Start: 2017-02-02 | End: 2017-02-02

## 2017-02-02 RX ORDER — HYDROCODONE BITARTRATE AND ACETAMINOPHEN 7.5; 325 MG/1; MG/1
1 TABLET ORAL EVERY 6 HOURS PRN
Qty: 30 TABLET | Refills: 0 | Status: SHIPPED | OUTPATIENT
Start: 2017-02-02 | End: 2017-02-02

## 2017-02-02 NOTE — PROGRESS NOTES
SUBJECTIVE:                                                    Ines Mott is a 52 year old female who presents to clinic today for the following health issues:  Health Maintenance Due   Topic Date Due     EYE EXAM Q1 YEAR( NO INBASKET)  01/15/1966     PNEUMOVAX 1X HI RISK PATIENT < 65 (NO IB MSG)  01/15/1967     HEPATITIS C SCREENING  01/15/1983     FIT Q1 YR (NO INBASKET)  07/03/2016     TSH W/ FREE T4 REFLEX Q2 YEAR (NO INBASKET)  10/21/2016     Health Maintenance reviewed at today's visit patient asked to schedule/complete:   Colon Cancer:  Patient agrees to schedule    Joint Pain     Onset: 3 weeks     Description:   Location: right leg  Character: Stabbing and Gnawing    Intensity: 10/10    Progression of Symptoms: worse    Accompanying Signs & Symptoms:  Other symptoms: radiation of pain to thigh from bottom of foot   History:   Previous similar pain: no       Precipitating factors:   Trauma or overuse: no     Alleviating factors:  Improved by: nothing       Therapies Tried and outcome: ice, heat, muscle cream,       Hyperlipidemia Follow-Up      Rate your low fat/cholesterol diet?: good    Taking statin?  No    Other lipid medications/supplements?:  none       Depression and Anxiety Follow-Up    Status since last visit: Worsened  MARITAL FRICTION AND VERBAL ABUSE    Other associated symptoms:None    Complicating factors:     Significant life event: No     Current substance abuse: None    PHQ-9 SCORE 5/6/2016 11/4/2016 11/25/2016   Total Score - - -   Total Score 10 7 3     RASTA-7 SCORE 8/24/2015 11/4/2016   Total Score 7 9        PHQ-9  English      PHQ-9   Any Language     GAD7         Chronic Pain Follow-Up  RIGHT UPPER QUADRANT ABDOMEN  HEADACHES  LOWER BACK PAIN RADICULOPATHY RIGHT LEG   SEEING PAIN CLINIC 02/22/2017       Type / Location of Pain:  ABOVE   Analgesia/pain control:       Recent changes:  worse      Overall control: Tolerable with discomfort  Activity level/function:      Daily  activities:  Able to do light housework, cooking    Work:  Unable to work  Adverse effects:  No  Adherance HISTORY OF ALCOHOL OVERUSAGE LAST MONTH    Taking medication as directed?  Yes    Participating in other treatments: yes  Risk Factors:    Sleep:  Fair    Mood/anxiety:  worsened    Recent family or social stressors:  conflict between family members    Other aggravating factors: BENDING AND LIFTING  PHQ-9 SCORE 5/6/2016 11/4/2016 11/25/2016   Total Score - - -   Total Score 10 7 3     RASTA-7 SCORE 8/24/2015 11/4/2016   Total Score 7 9     Encounter-Level CSA - 12/22/16:               Controlled Substance Agreement - Scan on 1/5/2017  1:14 PM : CONTROLLED SUBSTANCE AGREEMENT (below)          Encounter-Level CSA - 12/22/15:               Controlled Substance Agreement - Scan on 12/28/2015  2:53 PM : CONTROLLED MEDICATION CONTRACT, 12-22-15 (below)          Encounter-Level CSA - 4/10/15:               Controlled Substance Agreement - Scan on 4/17/2015  1:45 PM : [facility], Controlled Substance Contract, 04-10-15 (below)                 Problem list and histories reviewed & adjusted, as indicated.  Additional history: as documented      Patient Active Problem List   Diagnosis     Dyspnea and respiratory abnormality     Other specified drug dependence, in remission     Carcinoma of colon metastatic to liver (H)     Kidney infection     ALEXUS (obstructive sleep apnea)     Myelomeningocele with hydrocephalus, cervical region (H)     Fibromyalgia     Angioedema     Pneumonia due to other Gram-negative bacteria     BMI 42     H/O hysterectomy for benign disease     Hyperlipidemia with target LDL less than 130     Infected tooth     Recurrent major depression in partial remission     Hypertension goal BP (blood pressure) < 140/80     Abdominal pain, right lateral     Renal mass, right     Health Care Home     Intracranial shunt     Migraine     Mild intermittent asthma without complication     Other complicated headache syndrome      Seasonal affective disorder (H)     Anxiety     PTSD (post-traumatic stress disorder)     Pre diabetes      Chronic pain syndrome     Chronic tension-type headache, not intractable     Degeneration of lumbosacral intervertebral disc     Past Surgical History   Procedure Laterality Date     Gyn surgery       Cholecystectomy       Appendectomy       Extraction(s) dental       Implant shunt ventriculoperitoneal         Social History   Substance Use Topics     Smoking status: Former Smoker     Smokeless tobacco: Never Used     Alcohol Use: Yes      Comment: off and on     Family History   Problem Relation Age of Onset     CANCER Father          Current Outpatient Prescriptions   Medication Sig Dispense Refill     [START ON 2/9/2017] morphine (MS CONTIN) 15 MG 12 hr tablet Take 1 tablet (15 mg) by mouth every 12 hours Maximum  2* tablet(s) per day 14 tablet 0     ondansetron (ZOFRAN ODT) 4 MG ODT tab Take 1 tablet (4 mg) by mouth every 8 hours as needed for nausea 10 tablet 0     Vitamin D, Cholecalciferol, 1000 UNITS TABS Take 2,000 Units by mouth daily 60 tablet 11     cyanocobalamin (VITAMIN B12) 1000 MCG/ML injection Inject 1 mL (1,000 mcg) Subcutaneous every 30 days 1 mL 11     ondansetron (ZOFRAN) 8 MG tablet Take 0.5 tablets (4 mg) by mouth every 8 hours as needed for nausea 9 tablet 3     potassium chloride (K-TAB,KLOR-CON) 10 MEQ tablet Take 1 tablet (10 mEq) by mouth daily 120 tablet 11     sertraline (ZOLOFT) 100 MG tablet Take 1 tablet (100 mg) by mouth daily 30 tablet 11     losartan-hydrochlorothiazide (HYZAAR) 50-12.5 MG per tablet Take 1 tablet by mouth daily 90 tablet 3     albuterol (2.5 MG/3ML) 0.083% neb solution Take 1 vial (2.5 mg) by nebulization every 6 hours as needed for shortness of breath / dyspnea or wheezing 30 vial 3     topiramate (TOPAMAX) 25 MG tablet Take 1 tablet (25 mg) by mouth 2 times daily 60 tablet 3     magnesium oxide (MAG-OX) 400 (241.3 MG) MG tablet Take 1 tablet (400 mg)  by mouth daily 90 tablet 3     albuterol (VENTOLIN HFA) 108 (90 BASE) MCG/ACT Inhaler INHALE TWO PUFFS BY MOUTH EVERY 4 HOURS AS NEEDED FOR SHORTNESS OF BREATH/DYSPNEA 54 g 1     mometasone-formoterol (DULERA) 100-5 MCG/ACT oral inhaler Inhale 2 puffs into the lungs 2 times daily 1 Inhaler 11     blood glucose monitoring (NO BRAND SPECIFIED) meter device kit Use to test blood sugar larisa times daily or as directed. 1 kit 0     blood glucose (NO BRAND SPECIFIED) lancets standard Use to test blood sugar daily times daily or as directed. 1 Box 11     blood glucose monitoring (NO BRAND SPECIFIED) test strip Use to test blood sugars once times daily or as directed 100 strip 3     blood glucose calibration (NO BRAND SPECIFIED) solution Use to calibrate blood glucose monitor as directed. 1 each 0     polyethylene glycol (MIRALAX) powder Take 17 g (1 capful) by mouth daily 510 g 3     buprenorphine (BUTRANS) 5 MCG/HR WK patch Place 1 patch onto the skin every 7 days 4 patch 0     STATIN NOT PRESCRIBED, INTENTIONAL, 1 each daily Statin not prescribed intentionally due to Active liver disease (liver failure, cirrhosis, hepatitis)  LIVER METS 0 each 0     EPINEPHrine (EPIPEN) 0.3 MG/0.3ML injection Inject 0.3 mLs (0.3 mg) into the muscle once as needed for anaphylaxis 2 each 5     Allergies   Allergen Reactions     Ativan [Lorazepam] Anaphylaxis     Macrobid [Nitrofurantoin] Anaphylaxis     Amoxicillin      Buspirone      Buspar     Cephalosporins      Dilaudid [Hydromorphone]      Diphenhydramine Hcl      Benadryl     Imitrex [Sumatriptan Succinate]      blood pressure raised uncontrobably     Ketorolac Tromethamine      Toradol     Lansoprazole      Prevacid     Metoclopramide Hives     Reglan     Paroxetine      Paxil     Penicillins      Prednisone Hives     Sulfa Drugs      THICKENED AND DIFFICULTY SWALLOWING      Lidoderm [Lidocaine] Rash     Localized rash at patch site     Recent Labs   Lab Test  01/26/17   2371   01/18/17   1716  01/07/17   0120   11/04/16   1407   04/07/16   1620   05/07/15   1222   10/21/14   0230   A1C   --    --    --    --   6.1*   --   5.7   --   6.0   --    --    LDL   --    --    --    --   96   --   101*   --   127   --    --    HDL   --    --    --    --   42*   --   42*   --   36*   --    --    TRIG   --    --    --    --   161*   --   306*   --   177*   --    --    ALT  25  17  41   < >  27   < >  30   < >  24   < >  31   CR  0.86  0.66  0.75   < >  1.22*   < >   --    < >   --    < >  0.82   GFRESTIMATED  69  >90  Non  GFR Calc    81   < >  46*   < >   --    < >   --    < >  73   GFRESTBLACK  84  >90   GFR Calc    >90   GFR Calc     < >  56*   < >   --    < >   --    < >  89   POTASSIUM  3.5  3.1*  3.7   < >  3.8   < >   --    < >   --    < >  3.8   TSH   --    --    --    --    --    --    --    --    --    --   2.30    < > = values in this interval not displayed.      BP Readings from Last 3 Encounters:   02/02/17 118/64   01/26/17 131/81   01/19/17 134/84    Wt Readings from Last 3 Encounters:   02/02/17 270 lb 6.4 oz (122.653 kg)   01/26/17 270 lb (122.471 kg)   01/19/17 271 lb 9.6 oz (123.197 kg)                  Labs reviewed in [facility]  Problem list, Medication list, Allergies, and Medical/Social/Surgical histories reviewed in [facility] and updated as appropriate.    ROS: has Dyspnea and respiratory abnormality; Other specified drug dependence, in remission; Carcinoma of colon metastatic to liver (H); Kidney infection; ALEXUS (obstructive sleep apnea); Myelomeningocele with hydrocephalus, cervical region (H); Fibromyalgia; Angioedema; Pneumonia due to other Gram-negative bacteria; BMI 42; H/O hysterectomy for benign disease; Hyperlipidemia with target LDL less than 130; Infected tooth; Recurrent major depression in partial remission; Hypertension goal BP (blood pressure) < 140/80; Abdominal pain, right lateral; Renal mass, right; Health Care Home;  Intracranial shunt; Migraine; Mild intermittent asthma without complication; Other complicated headache syndrome; Seasonal affective disorder (H); Anxiety; PTSD (post-traumatic stress disorder); Pre diabetes ; Chronic pain syndrome; Chronic tension-type headache, not intractable; and Degeneration of lumbosacral intervertebral disc on her problem list.    C: NEGATIVE for fever, chills, change in weight  I: NEGATIVE for worrisome rashes, moles or lesions  E: NEGATIVE for vision changes or irritation  E/M: NEGATIVE for ear, mouth and throat problems  R: NEGATIVE for significant cough or SOB  CV: NEGATIVE for chest pain, palpitations or peripheral edema  GI: NEGATIVE for nausea, abdominal pain, heartburn, or change in bowel habits RIGHT UPPER QUADRANT ABDOMENAL PAIN  : NEGATIVE for frequency, dysuria, or hematuria  MUSCULOSKELETAL: RIGHT LOWER BACK AND LEG PAIN     N: NEGATIVE for weakness, dizziness or paresthesias  E: NEGATIVE for temperature intolerance, skin/hair changes  H: NEGATIVE for bleeding problems  PSYCHIATRIC: WORSENED DEPRESSION   POST TRAUMATIC STRESS SYMPTOMS   MARITAL FRICTION   SEVERE FINANCIA ISSUES  MORBID OBESITY    OBJECTIVE:                                                    /64 mmHg  Pulse 75  Temp(Src) 96.7  F (35.9  C) (Tympanic)  Resp 20  Wt 270 lb 6.4 oz (122.653 kg)  SpO2 99%  Body mass index is 43.66 kg/(m^2).  GENERAL: healthy, alert and no distress  EYES: Eyes grossly normal to inspection, PERRL and conjunctivae and sclerae normal  HENT: ear canals and TM's normal, nose and mouth without ulcers or lesions  NECK: no adenopathy, no asymmetry, masses, or scars and thyroid normal to palpation  RESP: lungs clear to auscultation - no rales, rhonchi or wheezes  CV: regular rate and rhythm, normal S1 S2, no S3 or S4, no murmur, click or rub, no peripheral edema and peripheral pulses strong  ABDOMEN: soft, nontender, no hepatosplenomegaly, no masses and bowel sounds normal  MS:  LOWER  BACK PAIN with RADICULOPATHY RIGHT  LEG   DECREASE RANGE OF MOTION   NEGATIVE STRAIGHT LEG RAISING TEST   NORMAL REFLEXES     Diagnostic Test Results:  Results for orders placed or performed during the hospital encounter of 01/26/17   US Abdomen Limited    Narrative    US ABDOMEN LIMITED 1/26/2017 6:44 PM    CLINICAL INFORMATION: RUQ pain, chronic, history of liver metastasis,  assess for change from recent US (1/18/2017).    COMPARISON: 1/18/2017.    FINDINGS: Limited right upper quadrant ultrasound demonstrates the  gallbladder is absent. No bile duct dilatation. Multiple liver masses.  One of these in the inferior right hepatic lobe measures 3.2 x 2.9 x  2.7 cm. A lesion higher in the right hepatic lobe measures 3.4 x 3.4 x  2.8 cm. Visualized pancreas is unremarkable. Tail of the pancreas is  obscured. Again seen is a 2.1 cm well-circumscribed hypoechoic lesion  in the lateral right kidney which is indeterminate. This is not  convincingly a cyst. No obvious corresponding lesion is seen on the CT  from 1/22/2016. No free fluid in the right upper quadrant.      Impression    IMPRESSION:  1. Again seen are multiple indeterminate hypoechoic liver lesions  measuring up to 3.4 cm. These may be liver metastases.  2. 2.1 cm indeterminate lesion right kidney.  3. No significant change.    WALLY JONES MD   CBC with platelets differential   Result Value Ref Range    WBC 9.6 4.0 - 11.0 10e9/L    RBC Count 4.32 3.8 - 5.2 10e12/L    Hemoglobin 12.6 11.7 - 15.7 g/dL    Hematocrit 38.1 35.0 - 47.0 %    MCV 88 78 - 100 fl    MCH 29.2 26.5 - 33.0 pg    MCHC 33.1 31.5 - 36.5 g/dL    RDW 13.4 10.0 - 15.0 %    Platelet Count 326 150 - 450 10e9/L    Diff Method Automated Method     % Neutrophils 59.0 %    % Lymphocytes 30.1 %    % Monocytes 7.4 %    % Eosinophils 3.1 %    % Basophils 0.1 %    % Immature Granulocytes 0.3 %    Nucleated RBCs 0 0 /100    Absolute Neutrophil 5.7 1.6 - 8.3 10e9/L    Absolute Lymphocytes 2.9 0.8 -  5.3 10e9/L    Absolute Monocytes 0.7 0.0 - 1.3 10e9/L    Absolute Eosinophils 0.3 0.0 - 0.7 10e9/L    Absolute Basophils 0.0 0.0 - 0.2 10e9/L    Abs Immature Granulocytes 0.0 0 - 0.4 10e9/L    Absolute Nucleated RBC 0.0    Comprehensive metabolic panel   Result Value Ref Range    Sodium 141 133 - 144 mmol/L    Potassium 3.5 3.4 - 5.3 mmol/L    Chloride 105 94 - 109 mmol/L    Carbon Dioxide 27 20 - 32 mmol/L    Anion Gap 9 3 - 14 mmol/L    Glucose 98 70 - 99 mg/dL    Urea Nitrogen 12 7 - 30 mg/dL    Creatinine 0.86 0.52 - 1.04 mg/dL    GFR Estimate 69 >60 mL/min/1.7m2    GFR Estimate If Black 84 >60 mL/min/1.7m2    Calcium 9.2 8.5 - 10.1 mg/dL    Bilirubin Total 0.9 0.2 - 1.3 mg/dL    Albumin 3.7 3.4 - 5.0 g/dL    Protein Total 7.5 6.8 - 8.8 g/dL    Alkaline Phosphatase 94 40 - 150 U/L    ALT 25 0 - 50 U/L    AST 11 0 - 45 U/L        ASSESSMENT/PLAN:                                                        ICD-10-CM    1. Chronic pain syndrome G89.4 ONC/HEME ADULT REFERRAL     morphine (MS CONTIN) 15 MG 12 hr tablet     DISCONTINUED: HYDROcodone-acetaminophen (NORCO) 7.5-325 MG per tablet     DISCONTINUED: HYDROcodone-acetaminophen (NORCO) 7.5-325 MG per tablet     DISCONTINUED: morphine (MS CONTIN) 15 MG 12 hr tablet   2. Degeneration of lumbosacral intervertebral disc M51.37 DISCONTINUED: HYDROcodone-acetaminophen (NORCO) 7.5-325 MG per tablet     DISCONTINUED: HYDROcodone-acetaminophen (NORCO) 7.5-325 MG per tablet   3. Carcinoma of colon metastatic to liver (H) C18.9 ONC/HEME ADULT REFERRAL    C78.7    4. Abdominal pain, right lateral R10.9 ONC/HEME ADULT REFERRAL   5. Renal mass, right N28.89 ONC/HEME ADULT REFERRAL   6. Hypertension goal BP (blood pressure) < 140/80 I10    7. Recurrent major depression in partial remission F33.41    8. Hyperlipidemia with target LDL less than 130 E78.5    9. PTSD (post-traumatic stress disorder) F43.10        Patient Instructions   (G89.4) Chronic pain syndrome  (primary  encounter diagnosis)  Comment:    Plan: ONC/HEME ADULT REFERRAL, morphine (MS CONTIN)         15 MG 12 hr tablet, DISCONTINUED:         HYDROcodone-acetaminophen (NORCO) 7.5-325 MG         per tablet, DISCONTINUED:         HYDROcodone-acetaminophen (NORCO) 7.5-325 MG         per tablet, DISCONTINUED: morphine (MS CONTIN)         15 MG 12 hr tablet             (M51.37) Degeneration of lumbosacral intervertebral disc  Comment:    Plan: DISCONTINUED: HYDROcodone-acetaminophen (NORCO)        7.5-325 MG per tablet, DISCONTINUED:         HYDROcodone-acetaminophen (NORCO) 7.5-325 MG         per tablet             (C18.9,  C78.7) Carcinoma of colon metastatic to liver (H)  Comment:    Plan: ONC/HEME ADULT REFERRAL             (R10.9) Abdominal pain, right lateral  Comment:    Plan: ONC/HEME ADULT REFERRAL             (N28.89) Renal mass, right  Comment:    Plan: ONC/HEME ADULT REFERRAL                       OTTO YUSUF MD  [facility]

## 2017-02-02 NOTE — NURSING NOTE
"Chief Complaint   Patient presents with     Musculoskeletal Problem     right leg     Pain     [facility] appt 2/20/2017 with Dr Higgins       Initial /64 mmHg  Pulse 75  Temp(Src) 96.7  F (35.9  C) (Tympanic)  Resp 20  Wt 270 lb 6.4 oz (122.653 kg)  SpO2 99% Estimated body mass index is 43.66 kg/(m^2) as calculated from the following:    Height as of 1/26/17: 5' 6\" (1.676 m).    Weight as of this encounter: 270 lb 6.4 oz (122.653 kg).  BP completed using cuff size: wes Harris CMA      "

## 2017-02-02 NOTE — MR AVS SNAPSHOT
After Visit Summary   2/2/2017    Ines Mott    MRN: [id number]           Patient Information     Date Of Birth          1965        Visit Information        Provider Department      2/2/2017 2:45 PM Dustin Fatima MD [facility]        Today's Diagnoses     Chronic pain syndrome    -  1     Degeneration of lumbosacral intervertebral disc         Carcinoma of colon metastatic to liver (H)         Abdominal pain, right lateral         Renal mass, right           Care Instructions    (G89.4) Chronic pain syndrome  (primary encounter diagnosis)  Comment:    Plan: ONC/HEME ADULT REFERRAL, morphine (MS CONTIN)         15 MG 12 hr tablet, DISCONTINUED:         HYDROcodone-acetaminophen (NORCO) 7.5-325 MG         per tablet, DISCONTINUED:         HYDROcodone-acetaminophen (NORCO) 7.5-325 MG         per tablet, DISCONTINUED: morphine (MS CONTIN)         15 MG 12 hr tablet             (M51.37) Degeneration of lumbosacral intervertebral disc  Comment:    Plan: DISCONTINUED: HYDROcodone-acetaminophen (NORCO)        7.5-325 MG per tablet, DISCONTINUED:         HYDROcodone-acetaminophen (NORCO) 7.5-325 MG         per tablet             (C18.9,  C78.7) Carcinoma of colon metastatic to liver (H)  Comment:    Plan: ONC/HEME ADULT REFERRAL             (R10.9) Abdominal pain, right lateral  Comment:    Plan: ONC/HEME ADULT REFERRAL             (N28.89) Renal mass, right  Comment:    Plan: ONC/HEME ADULT REFERRAL                       Follow-ups after your visit        Additional Services     ONC/HEME ADULT REFERRAL       Your provider has referred you to: N: [facility] [place] [phone]   [email or web address] DR EDER DOHERTY   FOLLOW UP  LIVER METS RIGHT RENAL LESION     Please be aware that coverage of these services is subject to the terms and limitations of your health insurance plan.  Call  "member services at your health plan with any benefit or coverage questions.      Please bring the following with you to your appointment:    (1) Any X-Rays, CTs or MRIs which have been performed.  Contact the facility where they were done to arrange for  prior to your scheduled appointment.   (2) List of current medications  (3) This referral request   (4) Any documents/labs given to you for this referral                  Follow-up notes from your care team     Return in about 2 weeks (around 2017).      Who to contact     If you have questions or need follow up information about today's clinic visit or your schedule please contact [facility] directly at [phone].  Normal or non-critical lab and imaging results will be communicated to you by MyChart, letter or phone within 4 business days after the clinic has received the results. If you do not hear from us within 7 days, please contact the clinic through [employer] or phone. If you have a critical or abnormal lab result, we will notify you by phone as soon as possible.  Submit refill requests through [employer] or call your pharmacy and they will forward the refill request to us. Please allow 3 business days for your refill to be completed.          Additional Information About Your Visit        [employer] Information     [employer] lets you send messages to your doctor, view your test results, renew your prescriptions, schedule appointments and more. To sign up, go to [email or web address] . Click on \"Log in\" on the left side of the screen, which will take you to the Welcome page. Then click on \"Sign up Now\" on the right side of the page.     You will be asked to enter the access code listed below, as well as some personal information. Please follow the directions to create your username and password.     Your access code is: [email or web address]  Expires: 3/29/2017  8:39 AM     Your access code will  in 90 days. If you need " help or a new code, please call your [place] clinic or [phone].        Care EveryWhere ID     This is your Care EveryWhere ID. This could be used by other organizations to access your [place] medical records  [id number]        Your Vitals Were     Pulse Temperature Respirations Pulse Oximetry          75 96.7  F (35.9  C) (Tympanic) 20 99%         Blood Pressure from Last 3 Encounters:   02/02/17 118/64   01/26/17 131/81   01/19/17 134/84    Weight from Last 3 Encounters:   02/02/17 270 lb 6.4 oz (122.653 kg)   01/26/17 270 lb (122.471 kg)   01/19/17 271 lb 9.6 oz (123.197 kg)              We Performed the Following     ONC/HEME ADULT REFERRAL          Today's Medication Changes          These changes are accurate as of: 2/2/17  3:27 PM.  If you have any questions, ask your nurse or doctor.               Start taking these medicines.        Dose/Directions    morphine 15 MG 12 hr tablet   Commonly known as:  MS CONTIN   Used for:  Chronic pain syndrome   Started by:  Dustin Fatima MD        Dose:  15 mg   Start taking on:  2/9/2017   Take 1 tablet (15 mg) by mouth every 12 hours Maximum  2* tablet(s) per day   Quantity:  14 tablet   Refills:  0            Where to get your medicines      Some of these will need a paper prescription and others can be bought over the counter.  Ask your nurse if you have questions.     Bring a paper prescription for each of these medications    - morphine 15 MG 12 hr tablet             Primary Care Provider Office Phone # Fax #    Dustin Fatima [phone]       [facility] [address] [facility] [address]        Goals        Patient-Stated    As of 12/15/2015, I will take reasonability for making it to all my medical appointments as scheduled.  I will not just not go if I can not go day scheduled & will take responsibility for rescheduling.      Goal Comments - Note edited  2/17/2016  2:37 PM by Erin Lau LSW     As of today's date 12/17/2015 goal is met at 0 - 25%.   Goal Status:  Active  As of today's date 12/22/2015 goal is met at 26 - 50%.   Goal Status:  Active  As of today's date 2/17/2016 goal is met at 76 - 100%.   Goal Status:  Ongoing              As of 12/15/2015, I will work on finding different housing by visit [facility] where I have lived before plus call housing resource phone numbers for [facility] & [facility] in the next week & report back to my health plan care coordinator, RN     Goal Comments - Note edited  5/9/2016 10:02 AM by Erin Lau LSW    As of today's date 12/17/2015 goal is met at 0 - 25%.   Goal Status:  Active  As of today's date 12/22/2015 goal is met at 0 - 25%.   Goal Status:  Active  As of today's date 2/23/2016 goal is met at 26 - 50%.   Goal Status:  Active  Patient reports that she has called [facility] & looks for housing on [place]. Patient is to work with her [facility] worker on locating other housing  As of today's date 5/9/2016 goal is met at 51 - 75%.   Goal Status:  Active          As of 12/22/2015, I agree to call 1 of the following before I go to Emergency Room, call [facility] at [phone] to talk with a RN during business hours or call [place]'s 24 hour advisory line at [phone] humaira     Goal Comments - Note created  12/30/2015  2:28 PM by Erin Lau LSW    As of today's date 12/30/2015 goal is met at 0 - 25%.   Goal Status:  Active        As of 12/22/2015, I have agreed to follow what is stated in [place]'s Controlled Medication Agreement & accept consequences if I break it.     Goal Comments - Note created  12/30/2015  2:23 PM by Erin Lau LSW    As of today's date 12/30/2015 goal is met at 0 - 25%.   Goal Status:  Active          Thank you!     Thank you for choosing [facility]  for your care. Our goal is always to provide you with excellent care. Hearing back  from our patients is one way we can continue to improve our services. Please take a few minutes to complete the written survey that you may receive in the mail after your visit with us. Thank you!             Your Updated Medication List - Protect others around you: Learn how to safely use, store and throw away your medicines at www.disposemymeds.org.          This list is accurate as of: 2/2/17  3:27 PM.  Always use your most recent med list.                   Brand Name Dispense Instructions for use    * albuterol (2.5 MG/3ML) 0.083% neb solution     30 vial    Take 1 vial (2.5 mg) by nebulization every 6 hours as needed for shortness of breath / dyspnea or wheezing       * albuterol 108 (90 BASE) MCG/ACT Inhaler    VENTOLIN HFA    54 g    INHALE TWO PUFFS BY MOUTH EVERY 4 HOURS AS NEEDED FOR SHORTNESS OF BREATH/DYSPNEA       blood glucose calibration solution    no brand specified    1 each    Use to calibrate blood glucose monitor as directed.       blood glucose lancets standard    no brand specified    1 Box    Use to test blood sugar daily times daily or as directed.       blood glucose monitoring meter device kit    no brand specified    1 kit    Use to test blood sugar larisa times daily or as directed.       blood glucose monitoring test strip    no brand specified    100 strip    Use to test blood sugars once times daily or as directed       buprenorphine 5 MCG/HR WK patch    BUTRANS    4 patch    Place 1 patch onto the skin every 7 days       cyanocobalamin 1000 MCG/ML injection    VITAMIN B12    1 mL    Inject 1 mL (1,000 mcg) Subcutaneous every 30 days       EPINEPHrine 0.3 MG/0.3ML injection     2 each    Inject 0.3 mLs (0.3 mg) into the muscle once as needed for anaphylaxis       losartan-hydrochlorothiazide 50-12.5 MG per tablet    HYZAAR    90 tablet    Take 1 tablet by mouth daily       magnesium oxide 400 (241.3 MG) MG tablet    MAG-OX    90 tablet    Take 1 tablet (400 mg) by mouth daily        mometasone-formoterol 100-5 MCG/ACT oral inhaler    DULERA    1 Inhaler    Inhale 2 puffs into the lungs 2 times daily       morphine 15 MG 12 hr tablet   Start taking on:  2/9/2017    MS CONTIN    14 tablet    Take 1 tablet (15 mg) by mouth every 12 hours Maximum  2* tablet(s) per day       ondansetron 4 MG ODT tab    ZOFRAN ODT    10 tablet    Take 1 tablet (4 mg) by mouth every 8 hours as needed for nausea       ondansetron 8 MG tablet    ZOFRAN    9 tablet    Take 0.5 tablets (4 mg) by mouth every 8 hours as needed for nausea       polyethylene glycol powder    MIRALAX    510 g    Take 17 g (1 capful) by mouth daily       potassium chloride 10 MEQ tablet    K-TAB,KLOR-CON    120 tablet    Take 1 tablet (10 mEq) by mouth daily       sertraline 100 MG tablet    ZOLOFT    30 tablet    Take 1 tablet (100 mg) by mouth daily       STATIN NOT PRESCRIBED (INTENTIONAL)     0 each    1 each daily Statin not prescribed intentionally due to Active liver disease (liver failure, cirrhosis, hepatitis) LIVER METS       topiramate 25 MG tablet    TOPAMAX    60 tablet    Take 1 tablet (25 mg) by mouth 2 times daily       Vitamin D (Cholecalciferol) 1000 UNITS Tabs     60 tablet    Take 2,000 Units by mouth daily       * Notice:  This list has 2 medication(s) that are the same as other medications prescribed for you. Read the directions carefully, and ask your doctor or other care provider to review them with you.

## 2017-02-02 NOTE — PATIENT INSTRUCTIONS
(G89.4) Chronic pain syndrome  (primary encounter diagnosis)  Comment:    Plan: ONC/HEME ADULT REFERRAL, morphine (MS CONTIN)         15 MG 12 hr tablet, DISCONTINUED:         HYDROcodone-acetaminophen (NORCO) 7.5-325 MG         per tablet, DISCONTINUED:         HYDROcodone-acetaminophen (NORCO) 7.5-325 MG         per tablet, DISCONTINUED: morphine (MS CONTIN)         15 MG 12 hr tablet             (M51.37) Degeneration of lumbosacral intervertebral disc  Comment:    Plan: DISCONTINUED: HYDROcodone-acetaminophen (NORCO)        7.5-325 MG per tablet, DISCONTINUED:         HYDROcodone-acetaminophen (NORCO) 7.5-325 MG         per tablet             (C18.9,  C78.7) Carcinoma of colon metastatic to liver (H)  Comment:    Plan: ONC/HEME ADULT REFERRAL             (R10.9) Abdominal pain, right lateral  Comment:    Plan: ONC/HEME ADULT REFERRAL             (N28.89) Renal mass, right  Comment:    Plan: ONC/HEME ADULT REFERRAL

## 2017-02-06 ENCOUNTER — CARE COORDINATION (OUTPATIENT)
Dept: CARE COORDINATION | Facility: CLINIC | Age: 52
End: 2017-02-06

## 2017-02-06 NOTE — PROGRESS NOTES
Clinic Care Coordination Contact  OUTREACH    Referral Information:  Referral Source: PCP  Reason for Contact: follow up from ED on 01/26/2017-3rd ED in 2017  Care Conference: No     Universal Utilization:   ED Visits in last year: 28  Hospital visits in last year: 0  Last PCP appointment: 02/02/17  Missed Appointments: 0  Concerns: none known  Multiple Providers or Specialists: oncology. [facility]  Patient reports that she has not been in to see oncologist in over 1 year. She is aware that she needs to schedule an office visit with hter onologist  Patient thinks she has an appointment with [facility] in [place] on 02/10/2017 but is unsure as she is not at home    Clinical Concerns:  Current Medical Concerns: chronic pain    Current Behavioral Concerns: not discussed today    Education Provided to patient: n/a   Clinical Pathway Name: Depression  Clinical Pathway: not reviewed today    Medication Management:  Not reviewed today     Functional Status:  Mobility Status: Independent  Equipment Currently Used at Home: none  Transportation: Patient uses public transportation, rides from her spouse & medical transportation           Psychosocial:  Current living arrangement:: Other (I live in an apartment building that does not have an elevat)  Financial/Insurance: none stated today  Patient continues to work with      Resources and Interventions:  Current Resources:  (n/a);  (n/a)  PAS Number:  (n/a)  Senior Linkage Line Referral Placed:  (n/a)  Advanced Care Plans/Directives on file:: No  Referrals Placed: none today   Goals:   Goal 1 Statement: As of 11/04/2016, I will call the clinic at [phone] during clinic hours when I am not feeling well before going immediately to Emergency Department.  Goal 1 Progression Percent: 0%  Goal 1 Progression Date: 02/06/17  Goal 2 Statement: As of 11/04/2016, I will call 24/7 nurse advisory line at [phone] when I am not feeling well  before going immediately to Emergency Department  Goal 2 Progression Percent: 0%  Goal 2 Progression Date: 02/06/17  Goal 3 Statement: I will get information to help me with a decision related to recommendation of Lilliana that I have a pain pump place  Goal 3 Progression Percent: 0%  Goal 3 Progression Date: 02/06/17              Barriers: Patient lack of motivation to follow through  Strengths: none noted at this time  Patient/Caregiver understanding: Patient is aware that she needs to make appointments & attend scheduled appointments  Frequency of Care Coordination: as neeeded  Upcoming appointment: 07/17/2017     Plan: see goals  Patient to outreach to Clinic Care Coordinator, SW as needed  BRISSA Cruz, [facility]  Clinic Care Coordinator, SW with [facility]  [phone]

## 2017-02-17 ENCOUNTER — OFFICE VISIT (OUTPATIENT)
Dept: FAMILY MEDICINE | Facility: CLINIC | Age: 52
End: 2017-02-17
Payer: COMMERCIAL

## 2017-02-17 VITALS
DIASTOLIC BLOOD PRESSURE: 64 MMHG | RESPIRATION RATE: 20 BRPM | HEART RATE: 78 BPM | BODY MASS INDEX: 43.19 KG/M2 | SYSTOLIC BLOOD PRESSURE: 124 MMHG | TEMPERATURE: 97.7 F | WEIGHT: 267.6 LBS | OXYGEN SATURATION: 98 %

## 2017-02-17 DIAGNOSIS — E66.01 MORBID OBESITY DUE TO EXCESS CALORIES (H): Chronic | ICD-10-CM

## 2017-02-17 DIAGNOSIS — E78.5 HYPERLIPIDEMIA WITH TARGET LDL LESS THAN 130: Chronic | ICD-10-CM

## 2017-02-17 DIAGNOSIS — G89.4 PAIN SYNDROME, CHRONIC: Primary | ICD-10-CM

## 2017-02-17 DIAGNOSIS — I10 HYPERTENSION GOAL BP (BLOOD PRESSURE) < 140/80: Chronic | ICD-10-CM

## 2017-02-17 DIAGNOSIS — F43.10 PTSD (POST-TRAUMATIC STRESS DISORDER): Chronic | ICD-10-CM

## 2017-02-17 PROCEDURE — 99213 OFFICE O/P EST LOW 20 MIN: CPT | Performed by: FAMILY MEDICINE

## 2017-02-17 RX ORDER — OXYCODONE AND ACETAMINOPHEN 5; 325 MG/1; MG/1
1 TABLET ORAL EVERY 4 HOURS PRN
Qty: 30 TABLET | Refills: 0 | Status: SHIPPED | OUTPATIENT
Start: 2017-02-24 | End: 2017-03-03

## 2017-02-17 RX ORDER — OXYCODONE AND ACETAMINOPHEN 5; 325 MG/1; MG/1
1 TABLET ORAL EVERY 4 HOURS PRN
Qty: 30 TABLET | Refills: 0 | Status: SHIPPED | OUTPATIENT
Start: 2017-02-17 | End: 2017-03-03

## 2017-02-17 NOTE — NURSING NOTE
"Chief Complaint   Patient presents with     Pain       Initial /64 (BP Location: Left arm, Cuff Size: Adult Large)  Pulse 78  Temp 97.7  F (36.5  C) (Tympanic)  Resp 20  Wt 267 lb 9.6 oz (121.4 kg)  SpO2 98%  BMI 43.19 kg/m2 Estimated body mass index is 43.19 kg/(m^2) as calculated from the following:    Height as of 1/26/17: 5' 6\" (1.676 m).    Weight as of this encounter: 267 lb 9.6 oz (121.4 kg).  Medication Reconciliation: complete   Maggi Harris CMA    "

## 2017-02-17 NOTE — PROGRESS NOTES
SUBJECTIVE:                                                    Ines Mott is a 52 year old female who presents to clinic today for the following health issues:        Chronic Pain Follow-Up       Type / Location of Pain: back  Analgesia/pain control:       Recent changes:  worse      Overall control: Inadequate pain control  Activity level/function:      Daily activities:  walking    Work:  not applicable  Adverse effects:  No  Adherance    Taking medication as directed?  Yes    Participating in other treatments: pain clinic 2/20/2017  Risk Factors:    Sleep:  Poor    Mood/anxiety:  worsened    Recent family or social stressors:  and living situation    Other aggravating factors: sedentary lifestyle  PHQ-9 SCORE 5/6/2016 11/4/2016 11/25/2016   Total Score - - -   Total Score 10 7 3     RASTA-7 SCORE 8/24/2015 11/4/2016   Total Score 7 9     Encounter-Level CSA - 12/22/2016:                 Controlled Substance Agreement - Scan on 1/5/2017  1:14 PM : CONTROLLED SUBSTANCE AGREEMENT (below)          Encounter-Level CSA - 12/22/2016:                 Controlled Substance Agreement - Scan on 12/28/2015  2:53 PM : CONTROLLED MEDICATION CONTRACT, 12-22-15 (below)          Encounter-Level CSA - 12/22/2016:                 Controlled Substance Agreement - Scan on 4/17/2015  1:45 PM : BM, Controlled Substance Contract, 04-10-15 (below)                 Amount of exercise or physical activity: 6-7 days/week for an average of 45-60 minutes    Problems taking medications regularly: No    Medication side effects: none  Diet: regular (no restrictions) DIABETES RECOMMENDED MEDITERRANEAN DIET AND WEIGHT LOSS    SCHEDULED TO SEE PAIN CLINIC AND with ONCOLOGIST    Problem list and histories reviewed & adjusted, as indicated.  Additional history: as documented    Patient Active Problem List   Diagnosis     Dyspnea and respiratory abnormality     Other specified drug dependence, in remission     Carcinoma of colon  metastatic to liver (H)     Kidney infection     ALEXUS (obstructive sleep apnea)     Myelomeningocele with hydrocephalus, cervical region (H)     Fibromyalgia     Angioedema     Pneumonia due to other Gram-negative bacteria     BMI 42     H/O hysterectomy for benign disease     Hyperlipidemia with target LDL less than 130     Infected tooth     Recurrent major depression in partial remission     Hypertension goal BP (blood pressure) < 140/80     Abdominal pain, right lateral     Renal mass, right     Health Care Home     Intracranial shunt     Migraine     Mild intermittent asthma without complication     Other complicated headache syndrome     Seasonal affective disorder (H)     Anxiety     PTSD (post-traumatic stress disorder)     Pre diabetes      Chronic pain syndrome     Chronic tension-type headache, not intractable     Degeneration of lumbosacral intervertebral disc     Past Surgical History   Procedure Laterality Date     Gyn surgery       Cholecystectomy       Appendectomy       Extraction(s) dental       Implant shunt ventriculoperitoneal         Social History   Substance Use Topics     Smoking status: Former Smoker     Smokeless tobacco: Never Used     Alcohol use No      Comment: off and on     Family History   Problem Relation Age of Onset     CANCER Father          Current Outpatient Prescriptions   Medication Sig Dispense Refill     oxyCODONE-acetaminophen (PERCOCET) 5-325 MG per tablet Take 1 tablet by mouth every 4 hours as needed for pain Maximum 4X TIMES DAILY  tablet(s) per day 30 tablet 0     [START ON 2/24/2017] oxyCODONE-acetaminophen (PERCOCET) 5-325 MG per tablet Take 1 tablet by mouth every 4 hours as needed for pain maximum FOUR TIMES DAILY  tablet(s) per day 30 tablet 0     ondansetron (ZOFRAN ODT) 4 MG ODT tab Take 1 tablet (4 mg) by mouth every 8 hours as needed for nausea 10 tablet 0     Vitamin D, Cholecalciferol, 1000 UNITS TABS Take 2,000 Units by mouth daily 60 tablet 11      cyanocobalamin (VITAMIN B12) 1000 MCG/ML injection Inject 1 mL (1,000 mcg) Subcutaneous every 30 days 1 mL 11     potassium chloride (K-TAB,KLOR-CON) 10 MEQ tablet Take 1 tablet (10 mEq) by mouth daily 120 tablet 11     sertraline (ZOLOFT) 100 MG tablet Take 1 tablet (100 mg) by mouth daily 30 tablet 11     losartan-hydrochlorothiazide (HYZAAR) 50-12.5 MG per tablet Take 1 tablet by mouth daily 90 tablet 3     albuterol (2.5 MG/3ML) 0.083% neb solution Take 1 vial (2.5 mg) by nebulization every 6 hours as needed for shortness of breath / dyspnea or wheezing 30 vial 3     magnesium oxide (MAG-OX) 400 (241.3 MG) MG tablet Take 1 tablet (400 mg) by mouth daily 90 tablet 3     albuterol (VENTOLIN HFA) 108 (90 BASE) MCG/ACT Inhaler INHALE TWO PUFFS BY MOUTH EVERY 4 HOURS AS NEEDED FOR SHORTNESS OF BREATH/DYSPNEA 54 g 1     mometasone-formoterol (DULERA) 100-5 MCG/ACT oral inhaler Inhale 2 puffs into the lungs 2 times daily 1 Inhaler 11     blood glucose monitoring (NO BRAND SPECIFIED) meter device kit Use to test blood sugar larisa times daily or as directed. 1 kit 0     blood glucose (NO BRAND SPECIFIED) lancets standard Use to test blood sugar daily times daily or as directed. 1 Box 11     blood glucose monitoring (NO BRAND SPECIFIED) test strip Use to test blood sugars once times daily or as directed 100 strip 3     blood glucose calibration (NO BRAND SPECIFIED) solution Use to calibrate blood glucose monitor as directed. 1 each 0     polyethylene glycol (MIRALAX) powder Take 17 g (1 capful) by mouth daily 510 g 3     STATIN NOT PRESCRIBED, INTENTIONAL, 1 each daily Statin not prescribed intentionally due to Active liver disease (liver failure, cirrhosis, hepatitis)  LIVER METS 0 each 0     EPINEPHrine (EPIPEN) 0.3 MG/0.3ML injection Inject 0.3 mLs (0.3 mg) into the muscle once as needed for anaphylaxis 2 each 5     Allergies   Allergen Reactions     Ativan [Lorazepam] Anaphylaxis     Macrobid [Nitrofurantoin]  Anaphylaxis     Amoxicillin      Buspirone      Buspar     Cephalosporins      Dilaudid [Hydromorphone]      Diphenhydramine Hcl      Benadryl     Imitrex [Sumatriptan Succinate]      blood pressure raised uncontrobably     Ketorolac Tromethamine      Toradol     Lansoprazole      Prevacid     Metoclopramide Hives     Reglan     Paroxetine      Paxil     Penicillins      Prednisone Hives     Sulfa Drugs      THICKENED AND DIFFICULTY SWALLOWING      Lidoderm [Lidocaine] Rash     Localized rash at patch site     Recent Labs   Lab Test  01/26/17   1803  01/18/17   1716  01/07/17   0120   11/04/16   1407   04/07/16   1620   05/07/15   1222   10/21/14   0230   A1C   --    --    --    --   6.1*   --   5.7   --   6.0   --    --    LDL   --    --    --    --   96   --   101*   --   127   --    --    HDL   --    --    --    --   42*   --   42*   --   36*   --    --    TRIG   --    --    --    --   161*   --   306*   --   177*   --    --    ALT  25  17  41   < >  27   < >  30   < >  24   < >  31   CR  0.86  0.66  0.75   < >  1.22*   < >   --    < >   --    < >  0.82   GFRESTIMATED  69  >90  Non  GFR Calc    81   < >  46*   < >   --    < >   --    < >  73   GFRESTBLACK  84  >90   GFR Calc    >90   GFR Calc     < >  56*   < >   --    < >   --    < >  89   POTASSIUM  3.5  3.1*  3.7   < >  3.8   < >   --    < >   --    < >  3.8   TSH   --    --    --    --    --    --    --    --    --    --   2.30    < > = values in this interval not displayed.      BP Readings from Last 3 Encounters:   02/17/17 124/64   02/02/17 118/64   01/26/17 131/81    Wt Readings from Last 3 Encounters:   02/17/17 267 lb 9.6 oz (121.4 kg)   02/02/17 270 lb 6.4 oz (122.7 kg)   01/26/17 270 lb (122.5 kg)                  Labs reviewed in EPIC  Problem list, Medication list, Allergies, and Medical/Social/Surgical histories reviewed in EPIC and updated as appropriate.    ROS: has Dyspnea and respiratory  abnormality; Other specified drug dependence, in remission; Carcinoma of colon metastatic to liver (H); Kidney infection; ALEXUS (obstructive sleep apnea); Myelomeningocele with hydrocephalus, cervical region (H); Fibromyalgia; Angioedema; Pneumonia due to other Gram-negative bacteria; BMI 42; H/O hysterectomy for benign disease; Hyperlipidemia with target LDL less than 130; Infected tooth; Recurrent major depression in partial remission; Hypertension goal BP (blood pressure) < 140/80; Abdominal pain, right lateral; Renal mass, right; Health Care Home; Intracranial shunt; Migraine; Mild intermittent asthma without complication; Other complicated headache syndrome; Seasonal affective disorder (H); Anxiety; PTSD (post-traumatic stress disorder); Pre diabetes ; Chronic pain syndrome; Chronic tension-type headache, not intractable; and Degeneration of lumbosacral intervertebral disc on her problem list.    C: NEGATIVE for fever, chills, change in weight  I: NEGATIVE for worrisome rashes, moles or lesions  E: NEGATIVE for vision changes or irritation  E/M: NEGATIVE for ear, mouth and throat problems  R: NEGATIVE for significant cough or SHORTNESS OF BREATH ASTHMA CONTROLLED   CV: NEGATIVE for chest pain, palpitations or peripheral edema  GI:  RIGHT UPPER QUADRANT ABDOMINAL PAIN   HISTORY OF VASOACTIVE LIVER METS   : NEGATIVE for frequency, dysuria, or hematuria  MUSCULOSKELETAL: CHRONIC LOWER BACK PAIN   N: NEGATIVE for weakness, dizziness or paresthesias  ENDOCRINE:  PRE DIABETES  AND OBESITY   H: NEGATIVE for bleeding problems  P: NEGATIVE for changes in mood or affect HISTORY OF CHEMICAL DEPENDENCY     OBJECTIVE:                                                    /64 (BP Location: Left arm, Cuff Size: Adult Large)  Pulse 78  Temp 97.7  F (36.5  C) (Tympanic)  Resp 20  Wt 267 lb 9.6 oz (121.4 kg)  SpO2 98%  BMI 43.19 kg/m2  Body mass index is 43.19 kg/(m^2).  GENERAL: healthy, alert and no distress  EYES: Eyes  grossly normal to inspection, PERRL and conjunctivae and sclerae normal  HENT: ear canals and TM's normal, nose and mouth without ulcers or lesions  NECK: no adenopathy, no asymmetry, masses, or scars and thyroid normal to palpation  RESP: lungs clear to auscultation - no rales, rhonchi or wheezes  CV: regular rate and rhythm, normal S1 S2, no S3 or S4, no murmur, click or rub, no peripheral edema and peripheral pulses strong  ABDOMEN: soft, nontender, no hepatosplenomegaly, no masses and bowel sounds normal  MS: no gross musculoskeletal defects noted, no edema  SKIN: no suspicious lesions or rashes  NEURO: Normal strength and tone, mentation intact and speech normal  BACK: no CVA tenderness, no paralumbar tenderness  PSYCH: mentation appears normal, affect normal/bright  LYMPH: no cervical, supraclavicular, axillary, or inguinal adenopathy    Diagnostic Test Results:  Results for orders placed or performed during the hospital encounter of 01/26/17   US Abdomen Limited    Narrative    US ABDOMEN LIMITED 1/26/2017 6:44 PM    CLINICAL INFORMATION: RUQ pain, chronic, history of liver metastasis,  assess for change from recent US (1/18/2017).    COMPARISON: 1/18/2017.    FINDINGS: Limited right upper quadrant ultrasound demonstrates the  gallbladder is absent. No bile duct dilatation. Multiple liver masses.  One of these in the inferior right hepatic lobe measures 3.2 x 2.9 x  2.7 cm. A lesion higher in the right hepatic lobe measures 3.4 x 3.4 x  2.8 cm. Visualized pancreas is unremarkable. Tail of the pancreas is  obscured. Again seen is a 2.1 cm well-circumscribed hypoechoic lesion  in the lateral right kidney which is indeterminate. This is not  convincingly a cyst. No obvious corresponding lesion is seen on the CT  from 1/22/2016. No free fluid in the right upper quadrant.      Impression    IMPRESSION:  1. Again seen are multiple indeterminate hypoechoic liver lesions  measuring up to 3.4 cm. These may be liver  metastases.  2. 2.1 cm indeterminate lesion right kidney.  3. No significant change.    WALLY JONES MD   CBC with platelets differential   Result Value Ref Range    WBC 9.6 4.0 - 11.0 10e9/L    RBC Count 4.32 3.8 - 5.2 10e12/L    Hemoglobin 12.6 11.7 - 15.7 g/dL    Hematocrit 38.1 35.0 - 47.0 %    MCV 88 78 - 100 fl    MCH 29.2 26.5 - 33.0 pg    MCHC 33.1 31.5 - 36.5 g/dL    RDW 13.4 10.0 - 15.0 %    Platelet Count 326 150 - 450 10e9/L    Diff Method Automated Method     % Neutrophils 59.0 %    % Lymphocytes 30.1 %    % Monocytes 7.4 %    % Eosinophils 3.1 %    % Basophils 0.1 %    % Immature Granulocytes 0.3 %    Nucleated RBCs 0 0 /100    Absolute Neutrophil 5.7 1.6 - 8.3 10e9/L    Absolute Lymphocytes 2.9 0.8 - 5.3 10e9/L    Absolute Monocytes 0.7 0.0 - 1.3 10e9/L    Absolute Eosinophils 0.3 0.0 - 0.7 10e9/L    Absolute Basophils 0.0 0.0 - 0.2 10e9/L    Abs Immature Granulocytes 0.0 0 - 0.4 10e9/L    Absolute Nucleated RBC 0.0    Comprehensive metabolic panel   Result Value Ref Range    Sodium 141 133 - 144 mmol/L    Potassium 3.5 3.4 - 5.3 mmol/L    Chloride 105 94 - 109 mmol/L    Carbon Dioxide 27 20 - 32 mmol/L    Anion Gap 9 3 - 14 mmol/L    Glucose 98 70 - 99 mg/dL    Urea Nitrogen 12 7 - 30 mg/dL    Creatinine 0.86 0.52 - 1.04 mg/dL    GFR Estimate 69 >60 mL/min/1.7m2    GFR Estimate If Black 84 >60 mL/min/1.7m2    Calcium 9.2 8.5 - 10.1 mg/dL    Bilirubin Total 0.9 0.2 - 1.3 mg/dL    Albumin 3.7 3.4 - 5.0 g/dL    Protein Total 7.5 6.8 - 8.8 g/dL    Alkaline Phosphatase 94 40 - 150 U/L    ALT 25 0 - 50 U/L    AST 11 0 - 45 U/L        ASSESSMENT/PLAN:                                                          ICD-10-CM    1. Pain syndrome, chronic G89.4 oxyCODONE-acetaminophen (PERCOCET) 5-325 MG per tablet     oxyCODONE-acetaminophen (PERCOCET) 5-325 MG per tablet       Work on weight loss  Regular exercise  See Patient Instructions  Patient Instructions   (G89.4) Pain syndrome, chronic  (primary  encounter diagnosis)  Comment:    Plan: oxyCODONE-acetaminophen (PERCOCET) 5-325 MG per        tablet, oxyCODONE-acetaminophen (PERCOCET)         5-325 MG per tablet                    OTTO YUSUF MD  [facility]

## 2017-02-17 NOTE — PATIENT INSTRUCTIONS
(G89.4) Pain syndrome, chronic  (primary encounter diagnosis)  Comment:    Plan: oxyCODONE-acetaminophen (PERCOCET) 5-325 MG per        tablet, oxyCODONE-acetaminophen (PERCOCET)         5-325 MG per tablet    (G89.4) Pain syndrome, chronic  (primary encounter diagnosis)      (E66.01) Morbid obesity due to excess calories (H)  Comment:  WEIGHT LOSS RECOMMENDED   Plan:      (I10) Hypertension goal BP (blood pressure) < 140/80  Comment:  S[place] RESTRI[place] MEDITERRANEAN DIET   Plan:      (E78.5) Hyperlipidemia with target LDL less than 130  Comment:    Plan:  MEDITERRANEAN DIET     (F43.10) PTSD (post-traumatic stress disorder)  Comment:  SUPPPORTIVE COUNSELING   Plan:

## 2017-02-17 NOTE — MR AVS SNAPSHOT
"              After Visit Summary   2/17/2017    Ines Mott    MRN: [id number]           Patient Information     Date Of Birth          1965        Visit Information        Provider Department      2/17/2017 11:00 AM Dustin Fatima MD [facility]        Today's Diagnoses     Pain syndrome, chronic    -  1      Care Instructions    (G89.4) Pain syndrome, chronic  (primary encounter diagnosis)  Comment:    Plan: oxyCODONE-acetaminophen (PERCOCET) 5-325 MG per        tablet, oxyCODONE-acetaminophen (PERCOCET)         5-325 MG per tablet                      Follow-ups after your visit        Follow-up notes from your care team     Return in about 2 weeks (around 3/3/2017).      Who to contact     If you have questions or need follow up information about today's clinic visit or your schedule please contact [facility] directly at [phone].  Normal or non-critical lab and imaging results will be communicated to you by MyChart, letter or phone within 4 business days after the clinic has received the results. If you do not hear from us within 7 days, please contact the clinic through [facility] or phone. If you have a critical or abnormal lab result, we will notify you by phone as soon as possible.  Submit refill requests through ubitus or call your pharmacy and they will forward the refill request to us. Please allow 3 business days for your refill to be completed.          Additional Information About Your Visit        [facility] Information     [facility]itus lets you send messages to your doctor, view your test results, renew your prescriptions, schedule appointments and more. To sign up, go to [email or web address] . Click on \"Log in\" on the left side of the screen, which will take you to the Welcome page. Then click on \"Sign up Now\" on the right side of the page.     You will be asked to enter the access code listed below, as well as " some personal information. Please follow the directions to create your username and password.     Your access code is: [email or web address]  Expires: 3/29/2017  8:39 AM     Your access code will  in 90 days. If you need help or a new code, please call your [place] clinic or [phone].        Care EveryWhere ID     This is your Care EveryWhere ID. This could be used by other organizations to access your [place] medical records  [id number]        Your Vitals Were     Pulse Temperature Respirations Pulse Oximetry BMI (Body Mass Index)       78 97.7  F (36.5  C) (Tympanic) 20 98% 43.19 kg/m2        Blood Pressure from Last 3 Encounters:   17 124/64   17 118/64   17 131/81    Weight from Last 3 Encounters:   17 267 lb 9.6 oz (121.4 kg)   17 270 lb 6.4 oz (122.7 kg)   17 270 lb (122.5 kg)              Today, you had the following     No orders found for display         Today's Medication Changes          These changes are accurate as of: 17 11:47 AM.  If you have any questions, ask your nurse or doctor.               Start taking these medicines.        Dose/Directions    * oxyCODONE-acetaminophen 5-325 MG per tablet   Commonly known as:  PERCOCET   Used for:  Pain syndrome, chronic   Started by:  Dustin Fatima MD        Dose:  1 tablet   Take 1 tablet by mouth every 4 hours as needed for pain Maximum 4X TIMES DAILY  tablet(s) per day   Quantity:  30 tablet   Refills:  0       * oxyCODONE-acetaminophen 5-325 MG per tablet   Commonly known as:  PERCOCET   Used for:  Pain syndrome, chronic   Started by:  Dustin Fatima MD        Dose:  1 tablet   Start taking on:  2017   Take 1 tablet by mouth every 4 hours as needed for pain maximum FOUR TIMES DAILY  tablet(s) per day   Quantity:  30 tablet   Refills:  0       * Notice:  This list has 2 medication(s) that are the same as other medications prescribed for you. Read the directions carefully, and ask your  doctor or other care provider to review them with you.         Where to get your medicines      Some of these will need a paper prescription and others can be bought over the counter.  Ask your nurse if you have questions.     Bring a paper prescription for each of these medications     oxyCODONE-acetaminophen 5-325 MG per tablet    oxyCODONE-acetaminophen 5-325 MG per tablet                Primary Care Provider Office Phone # Fax #    Dustin Rina Fatima [phone]       [facility] [address] [address]  [facility] [address]        Thank you!     Thank you for choosing [facility]  for your care. Our goal is always to provide you with excellent care. Hearing back from our patients is one way we can continue to improve our services. Please take a few minutes to complete the written survey that you may receive in the mail after your visit with us. Thank you!             Your Updated Medication List - Protect others around you: Learn how to safely use, store and throw away your medicines at www.disposemymeds.org.          This list is accurate as of: 2/17/17 11:47 AM.  Always use your most recent med list.                   Brand Name Dispense Instructions for use    * albuterol (2.5 MG/3ML) 0.083% neb solution     30 vial    Take 1 vial (2.5 mg) by nebulization every 6 hours as needed for shortness of breath / dyspnea or wheezing       * albuterol 108 (90 BASE) MCG/ACT Inhaler    VENTOLIN HFA    54 g    INHALE TWO PUFFS BY MOUTH EVERY 4 HOURS AS NEEDED FOR SHORTNESS OF BREATH/DYSPNEA       blood glucose calibration solution    no brand specified    1 each    Use to calibrate blood glucose monitor as directed.       blood glucose lancets standard    no brand specified    1 Box    Use to test blood sugar daily times daily or as directed.       blood glucose monitoring meter device kit    no brand specified    1 kit    Use to test blood sugar larisa times  daily or as directed.       blood glucose monitoring test strip    no brand specified    100 strip    Use to test blood sugars once times daily or as directed       cyanocobalamin 1000 MCG/ML injection    VITAMIN B12    1 mL    Inject 1 mL (1,000 mcg) Subcutaneous every 30 days       EPINEPHrine 0.3 MG/0.3ML injection     2 each    Inject 0.3 mLs (0.3 mg) into the muscle once as needed for anaphylaxis       losartan-hydrochlorothiazide 50-12.5 MG per tablet    HYZAAR    90 tablet    Take 1 tablet by mouth daily       magnesium oxide 400 (241.3 MG) MG tablet    MAG-OX    90 tablet    Take 1 tablet (400 mg) by mouth daily       mometasone-formoterol 100-5 MCG/ACT oral inhaler    DULERA    1 Inhaler    Inhale 2 puffs into the lungs 2 times daily       ondansetron 4 MG ODT tab    ZOFRAN ODT    10 tablet    Take 1 tablet (4 mg) by mouth every 8 hours as needed for nausea       * oxyCODONE-acetaminophen 5-325 MG per tablet    PERCOCET    30 tablet    Take 1 tablet by mouth every 4 hours as needed for pain Maximum 4X TIMES DAILY  tablet(s) per day       * oxyCODONE-acetaminophen 5-325 MG per tablet   Start taking on:  2/24/2017    PERCOCET    30 tablet    Take 1 tablet by mouth every 4 hours as needed for pain maximum FOUR TIMES DAILY  tablet(s) per day       polyethylene glycol powder    MIRALAX    510 g    Take 17 g (1 capful) by mouth daily       potassium chloride 10 MEQ tablet    K-TAB,KLOR-CON    120 tablet    Take 1 tablet (10 mEq) by mouth daily       sertraline 100 MG tablet    ZOLOFT    30 tablet    Take 1 tablet (100 mg) by mouth daily       STATIN NOT PRESCRIBED (INTENTIONAL)     0 each    1 each daily Statin not prescribed intentionally due to Active liver disease (liver failure, cirrhosis, hepatitis) LIVER METS       Vitamin D (Cholecalciferol) 1000 UNITS Tabs     60 tablet    Take 2,000 Units by mouth daily       * Notice:  This list has 4 medication(s) that are the same as other medications prescribed for  you. Read the directions carefully, and ask your doctor or other care provider to review them with you.

## 2017-03-01 ENCOUNTER — HOSPITAL ENCOUNTER (EMERGENCY)
Facility: CLINIC | Age: 52
Discharge: LEFT BEFORE DISCHARGE | End: 2017-03-01
Admitting: EMERGENCY MEDICINE
Payer: COMMERCIAL

## 2017-03-01 VITALS
WEIGHT: 268 LBS | RESPIRATION RATE: 18 BRPM | SYSTOLIC BLOOD PRESSURE: 152 MMHG | BODY MASS INDEX: 43.07 KG/M2 | DIASTOLIC BLOOD PRESSURE: 98 MMHG | TEMPERATURE: 98.8 F | HEART RATE: 76 BPM | OXYGEN SATURATION: 95 % | HEIGHT: 66 IN

## 2017-03-01 PROCEDURE — 40000268 ZZH STATISTIC NO CHARGES

## 2017-03-03 ENCOUNTER — OFFICE VISIT (OUTPATIENT)
Dept: FAMILY MEDICINE | Facility: CLINIC | Age: 52
End: 2017-03-03
Payer: COMMERCIAL

## 2017-03-03 VITALS
BODY MASS INDEX: 44 KG/M2 | RESPIRATION RATE: 16 BRPM | WEIGHT: 272.6 LBS | OXYGEN SATURATION: 96 % | TEMPERATURE: 97.7 F | SYSTOLIC BLOOD PRESSURE: 126 MMHG | HEART RATE: 77 BPM | DIASTOLIC BLOOD PRESSURE: 64 MMHG

## 2017-03-03 DIAGNOSIS — E66.01 MORBID OBESITY DUE TO EXCESS CALORIES (H): Chronic | ICD-10-CM

## 2017-03-03 DIAGNOSIS — G47.33 OBSTRUCTIVE SLEEP APNEA SYNDROME: Primary | ICD-10-CM

## 2017-03-03 DIAGNOSIS — F43.10 PTSD (POST-TRAUMATIC STRESS DISORDER): Chronic | ICD-10-CM

## 2017-03-03 DIAGNOSIS — I10 HYPERTENSION GOAL BP (BLOOD PRESSURE) < 140/80: Chronic | ICD-10-CM

## 2017-03-03 DIAGNOSIS — G89.4 PAIN SYNDROME, CHRONIC: ICD-10-CM

## 2017-03-03 DIAGNOSIS — F33.41 RECURRENT MAJOR DEPRESSION IN PARTIAL REMISSION (H): Chronic | ICD-10-CM

## 2017-03-03 DIAGNOSIS — E78.5 HYPERLIPIDEMIA WITH TARGET LDL LESS THAN 130: Chronic | ICD-10-CM

## 2017-03-03 PROCEDURE — 99213 OFFICE O/P EST LOW 20 MIN: CPT | Performed by: FAMILY MEDICINE

## 2017-03-03 RX ORDER — OXYCODONE AND ACETAMINOPHEN 5; 325 MG/1; MG/1
1 TABLET ORAL EVERY 4 HOURS PRN
Qty: 30 TABLET | Refills: 0 | Status: SHIPPED | OUTPATIENT
Start: 2017-03-10 | End: 2017-03-03

## 2017-03-03 RX ORDER — OXYCODONE AND ACETAMINOPHEN 5; 325 MG/1; MG/1
1 TABLET ORAL EVERY 4 HOURS PRN
Qty: 30 TABLET | Refills: 0 | Status: SHIPPED | OUTPATIENT
Start: 2017-03-03 | End: 2017-04-03

## 2017-03-03 RX ORDER — OXYCODONE AND ACETAMINOPHEN 5; 325 MG/1; MG/1
1 TABLET ORAL EVERY 4 HOURS PRN
Qty: 30 TABLET | Refills: 0 | Status: SHIPPED | OUTPATIENT
Start: 2017-03-17 | End: 2017-04-03

## 2017-03-03 NOTE — NURSING NOTE
"Chief Complaint   Patient presents with     Pain       Initial /64 (BP Location: Left arm, Cuff Size: Adult Large)  Pulse 77  Temp 97.7  F (36.5  C) (Tympanic)  Resp 16  Wt 272 lb 9.6 oz (123.7 kg)  SpO2 96%  BMI 44 kg/m2 Estimated body mass index is 44 kg/(m^2) as calculated from the following:    Height as of 3/1/17: 5' 6\" (1.676 m).    Weight as of this encounter: 272 lb 9.6 oz (123.7 kg).  Medication Reconciliation: complete   Maggi Harris CMA    "

## 2017-03-03 NOTE — PROGRESS NOTES
SUBJECTIVE:                                                    Ines Mott is a 52 year old female who presents to clinic today for the following health issues:  Health Maintenance Due   Topic Date Due     EYE EXAM Q1 YEAR( NO INBASKET)  01/15/1966     PNEUMOVAX 1X HI RISK PATIENT < 65 (NO IB MSG)  01/15/1967     HEPATITIS C SCREENING  01/15/1983     FIT Q1 YR (NO INBASKET)  07/03/2016     TSH W/ FREE T4 REFLEX Q2 YEAR (NO INBASKET)  10/21/2016     Health Maintenance reviewed at today's visit patient asked to schedule/complete:   Colon Cancer:  Patient agrees to schedule      Chronic Pain Follow-Up       Type / Location of Pain: back, right leg and foot  Analgesia/pain control:       Recent changes:  worse      Overall control: Tolerable with discomfort  Activity level/function:      Daily activities:  Unable to perform most daily activities - chores, hobbies, social activities, driving    Work:  not applicable  Adverse effects:  No  Adherance    Taking medication as directed?  Yes    Participating in other treatments: has been to the initial appt with Dr Higgins, he stated that he does not think he will be able to give pain meds due to recent heroin use  Risk Factors:    Sleep:  Poor, wants referral to sleep center    Mood/anxiety:  worsened    Recent family or social stressors:  none noted    Other aggravating factors: none  PHQ-9 SCORE 5/6/2016 11/4/2016 11/25/2016   Total Score - - -   Total Score 10 7 3     RASTA-7 SCORE 8/24/2015 11/4/2016   Total Score 7 9     Encounter-Level CSA - 12/22/2016:                 Controlled Substance Agreement - Scan on 1/5/2017  1:14 PM : CONTROLLED SUBSTANCE AGREEMENT (below)          Encounter-Level CSA - 12/22/2016:                 Controlled Substance Agreement - Scan on 12/28/2015  2:53 PM : CONTROLLED MEDICATION CONTRACT, 12-22-15 (below)          Encounter-Level CSA - 12/22/2016:                 Controlled Substance Agreement - Scan on 4/17/2015  1:45 PM : JOSIE,  Controlled Substance Contract, 04-10-15 (below)                 Amount of exercise or physical activity: None    Problems taking medications regularly: No    Medication side effects: none  Diet: regular (no restrictions)        Problem list and histories reviewed & adjusted, as indicated.  Additional history: as documented      Patient Active Problem List   Diagnosis     Dyspnea and respiratory abnormality     Other specified drug dependence, in remission     Carcinoma of colon metastatic to liver (H)     Kidney infection     ALEXUS (obstructive sleep apnea)     Myelomeningocele with hydrocephalus, cervical region (H)     Fibromyalgia     Angioedema     Pneumonia due to other Gram-negative bacteria     BMI 42     H/O hysterectomy for benign disease     Hyperlipidemia with target LDL less than 130     Infected tooth     Recurrent major depression in partial remission     Hypertension goal BP (blood pressure) < 140/80     Abdominal pain, right lateral     Renal mass, right     Health Care Home     Intracranial shunt     Migraine     Mild intermittent asthma without complication     Other complicated headache syndrome     Seasonal affective disorder (H)     Anxiety     PTSD (post-traumatic stress disorder)     Pre diabetes      Chronic pain syndrome     Chronic tension-type headache, not intractable     Degeneration of lumbosacral intervertebral disc     Past Surgical History   Procedure Laterality Date     Gyn surgery       Cholecystectomy       Appendectomy       Extraction(s) dental       Implant shunt ventriculoperitoneal         Social History   Substance Use Topics     Smoking status: Former Smoker     Smokeless tobacco: Never Used     Alcohol use No      Comment: off and on     Family History   Problem Relation Age of Onset     CANCER Father          Current Outpatient Prescriptions   Medication Sig Dispense Refill     oxyCODONE-acetaminophen (PERCOCET) 5-325 MG per tablet Take 1 tablet by mouth every 4 hours as  needed for pain maximum FOUR TIMES DAILY  tablet(s) per day 30 tablet 0     [START ON 3/17/2017] oxyCODONE-acetaminophen (PERCOCET) 5-325 MG per tablet Take 1 tablet by mouth every 4 hours as needed for pain Maximum 4X TIMES DAILY  tablet(s) per day 30 tablet 0     ondansetron (ZOFRAN ODT) 4 MG ODT tab Take 1 tablet (4 mg) by mouth every 8 hours as needed for nausea 10 tablet 0     Vitamin D, Cholecalciferol, 1000 UNITS TABS Take 2,000 Units by mouth daily 60 tablet 11     cyanocobalamin (VITAMIN B12) 1000 MCG/ML injection Inject 1 mL (1,000 mcg) Subcutaneous every 30 days 1 mL 11     potassium chloride (K-TAB,KLOR-CON) 10 MEQ tablet Take 1 tablet (10 mEq) by mouth daily 120 tablet 11     sertraline (ZOLOFT) 100 MG tablet Take 1 tablet (100 mg) by mouth daily 30 tablet 11     losartan-hydrochlorothiazide (HYZAAR) 50-12.5 MG per tablet Take 1 tablet by mouth daily 90 tablet 3     albuterol (2.5 MG/3ML) 0.083% neb solution Take 1 vial (2.5 mg) by nebulization every 6 hours as needed for shortness of breath / dyspnea or wheezing 30 vial 3     magnesium oxide (MAG-OX) 400 (241.3 MG) MG tablet Take 1 tablet (400 mg) by mouth daily 90 tablet 3     albuterol (VENTOLIN HFA) 108 (90 BASE) MCG/ACT Inhaler INHALE TWO PUFFS BY MOUTH EVERY 4 HOURS AS NEEDED FOR SHORTNESS OF BREATH/DYSPNEA 54 g 1     mometasone-formoterol (DULERA) 100-5 MCG/ACT oral inhaler Inhale 2 puffs into the lungs 2 times daily 1 Inhaler 11     blood glucose monitoring (NO BRAND SPECIFIED) meter device kit Use to test blood sugar larisa times daily or as directed. 1 kit 0     blood glucose (NO BRAND SPECIFIED) lancets standard Use to test blood sugar daily times daily or as directed. 1 Box 11     blood glucose monitoring (NO BRAND SPECIFIED) test strip Use to test blood sugars once times daily or as directed 100 strip 3     blood glucose calibration (NO BRAND SPECIFIED) solution Use to calibrate blood glucose monitor as directed. 1 each 0     polyethylene  glycol (MIRALAX) powder Take 17 g (1 capful) by mouth daily 510 g 3     STATIN NOT PRESCRIBED, INTENTIONAL, 1 each daily Statin not prescribed intentionally due to Active liver disease (liver failure, cirrhosis, hepatitis)  LIVER METS 0 each 0     EPINEPHrine (EPIPEN) 0.3 MG/0.3ML injection Inject 0.3 mLs (0.3 mg) into the muscle once as needed for anaphylaxis 2 each 5     Allergies   Allergen Reactions     Ativan [Lorazepam] Anaphylaxis     Macrobid [Nitrofurantoin] Anaphylaxis     Amoxicillin      Buspirone      Buspar     Cephalosporins      Dilaudid [Hydromorphone]      Diphenhydramine Hcl      Benadryl     Imitrex [Sumatriptan Succinate]      blood pressure raised uncontrobably     Ketorolac Tromethamine      Toradol     Lansoprazole      Prevacid     Metoclopramide Hives     Reglan     Paroxetine      Paxil     Penicillins      Prednisone Hives     Sulfa Drugs      THICKENED AND DIFFICULTY SWALLOWING      Lidoderm [Lidocaine] Rash     Localized rash at patch site     Recent Labs   Lab Test  01/26/17   1803  01/18/17   1716  01/07/17   0120   11/04/16   1407   04/07/16   1620   05/07/15   1222   10/21/14   0230   A1C   --    --    --    --   6.1*   --   5.7   --   6.0   --    --    LDL   --    --    --    --   96   --   101*   --   127   --    --    HDL   --    --    --    --   42*   --   42*   --   36*   --    --    TRIG   --    --    --    --   161*   --   306*   --   177*   --    --    ALT  25  17  41   < >  27   < >  30   < >  24   < >  31   CR  0.86  0.66  0.75   < >  1.22*   < >   --    < >   --    < >  0.82   GFRESTIMATED  69  >90  Non  GFR Calc    81   < >  46*   < >   --    < >   --    < >  73   GFRESTBLACK  84  >90   GFR Calc    >90   GFR Calc     < >  56*   < >   --    < >   --    < >  89   POTASSIUM  3.5  3.1*  3.7   < >  3.8   < >   --    < >   --    < >  3.8   TSH   --    --    --    --    --    --    --    --    --    --   2.30    < > = values in  this interval not displayed.      BP Readings from Last 3 Encounters:   03/03/17 126/64   03/01/17 (!) 152/98   02/17/17 124/64    Wt Readings from Last 3 Encounters:   03/03/17 272 lb 9.6 oz (123.7 kg)   03/01/17 268 lb (121.6 kg)   02/17/17 267 lb 9.6 oz (121.4 kg)                  Labs reviewed in EPIC    Reviewed and updated as needed this visit by clinical staff  Tobacco  Allergies  Meds  Med Hx  Surg Hx  Fam Hx  Soc Hx      Reviewed and updated as needed this visit by Provider         ROS: has Dyspnea and respiratory abnormality; Other specified drug dependence, in remission; Carcinoma of colon metastatic to liver (H); Kidney infection; ALEXUS (obstructive sleep apnea); Myelomeningocele with hydrocephalus, cervical region (H); Fibromyalgia; Angioedema; Pneumonia due to other Gram-negative bacteria; BMI 42; H/O hysterectomy for benign disease; Hyperlipidemia with target LDL less than 130; Infected tooth; Recurrent major depression in partial remission; Hypertension goal BP (blood pressure) < 140/80; Abdominal pain, right lateral; Renal mass, right; Health Care Home; Intracranial shunt; Migraine; Mild intermittent asthma without complication; Other complicated headache syndrome; Seasonal affective disorder (H); Anxiety; PTSD (post-traumatic stress disorder); Pre diabetes ; Chronic pain syndrome; Chronic tension-type headache, not intractable; and Degeneration of lumbosacral intervertebral disc on her problem list.    CONSTITUTIONAL: MORBID OBESITY  I: NEGATIVE for worrisome rashes, moles or lesions  E: NEGATIVE for vision changes or irritation  E/M: NEGATIVE for ear, mouth and throat problems  R: NEGATIVE for significant cough or SOB  CV: NEGATIVE for chest pain, palpitations or peripheral edema  GI: NEGATIVE for nausea, abdominal pain, heartburn, or change in bowel habits RIGHT UPPER QUADRANT ABDOMINAL PAIN   : NEGATIVE for frequency, dysuria, or hematuria  M: CHRONIC LOWER BACK PAIN AND KNEE PAIN   NEURO:   RECURRENT HEADACHES   E: NEGATIVE for temperature intolerance, skin/hair changes  H: NEGATIVE for bleeding problems  P: NEGATIVE for changes in mood or affect    OBJECTIVE:                                                    /64 (BP Location: Left arm, Cuff Size: Adult Large)  Pulse 77  Temp 97.7  F (36.5  C) (Tympanic)  Resp 16  Wt 272 lb 9.6 oz (123.7 kg)  SpO2 96%  BMI 44 kg/m2  Body mass index is 44 kg/(m^2).  GENERAL: healthy, alert and no distress  NECK: no adenopathy, no asymmetry, masses, or scars and thyroid normal to palpation  RESP: lungs clear to auscultation - no rales, rhonchi or wheezes  CV: regular rate and rhythm, normal S1 S2, no S3 or S4, no murmur, click or rub, no peripheral edema and peripheral pulses strong  ABDOMEN:  MILD RIGHT UPPER QUADRANT PAIN   MS:  DECREASE RANGE OF MOTION OF BACK       Diagnostic Test Results:  Results for orders placed or performed during the hospital encounter of 01/26/17   US Abdomen Limited    Narrative    US ABDOMEN LIMITED 1/26/2017 6:44 PM    CLINICAL INFORMATION: RUQ pain, chronic, history of liver metastasis,  assess for change from recent US (1/18/2017).    COMPARISON: 1/18/2017.    FINDINGS: Limited right upper quadrant ultrasound demonstrates the  gallbladder is absent. No bile duct dilatation. Multiple liver masses.  One of these in the inferior right hepatic lobe measures 3.2 x 2.9 x  2.7 cm. A lesion higher in the right hepatic lobe measures 3.4 x 3.4 x  2.8 cm. Visualized pancreas is unremarkable. Tail of the pancreas is  obscured. Again seen is a 2.1 cm well-circumscribed hypoechoic lesion  in the lateral right kidney which is indeterminate. This is not  convincingly a cyst. No obvious corresponding lesion is seen on the CT  from 1/22/2016. No free fluid in the right upper quadrant.      Impression    IMPRESSION:  1. Again seen are multiple indeterminate hypoechoic liver lesions  measuring up to 3.4 cm. These may be liver metastases.  2. 2.1 cm  indeterminate lesion right kidney.  3. No significant change.    WALLY JONES MD   CBC with platelets differential   Result Value Ref Range    WBC 9.6 4.0 - 11.0 10e9/L    RBC Count 4.32 3.8 - 5.2 10e12/L    Hemoglobin 12.6 11.7 - 15.7 g/dL    Hematocrit 38.1 35.0 - 47.0 %    MCV 88 78 - 100 fl    MCH 29.2 26.5 - 33.0 pg    MCHC 33.1 31.5 - 36.5 g/dL    RDW 13.4 10.0 - 15.0 %    Platelet Count 326 150 - 450 10e9/L    Diff Method Automated Method     % Neutrophils 59.0 %    % Lymphocytes 30.1 %    % Monocytes 7.4 %    % Eosinophils 3.1 %    % Basophils 0.1 %    % Immature Granulocytes 0.3 %    Nucleated RBCs 0 0 /100    Absolute Neutrophil 5.7 1.6 - 8.3 10e9/L    Absolute Lymphocytes 2.9 0.8 - 5.3 10e9/L    Absolute Monocytes 0.7 0.0 - 1.3 10e9/L    Absolute Eosinophils 0.3 0.0 - 0.7 10e9/L    Absolute Basophils 0.0 0.0 - 0.2 10e9/L    Abs Immature Granulocytes 0.0 0 - 0.4 10e9/L    Absolute Nucleated RBC 0.0    Comprehensive metabolic panel   Result Value Ref Range    Sodium 141 133 - 144 mmol/L    Potassium 3.5 3.4 - 5.3 mmol/L    Chloride 105 94 - 109 mmol/L    Carbon Dioxide 27 20 - 32 mmol/L    Anion Gap 9 3 - 14 mmol/L    Glucose 98 70 - 99 mg/dL    Urea Nitrogen 12 7 - 30 mg/dL    Creatinine 0.86 0.52 - 1.04 mg/dL    GFR Estimate 69 >60 mL/min/1.7m2    GFR Estimate If Black 84 >60 mL/min/1.7m2    Calcium 9.2 8.5 - 10.1 mg/dL    Bilirubin Total 0.9 0.2 - 1.3 mg/dL    Albumin 3.7 3.4 - 5.0 g/dL    Protein Total 7.5 6.8 - 8.8 g/dL    Alkaline Phosphatase 94 40 - 150 U/L    ALT 25 0 - 50 U/L    AST 11 0 - 45 U/L        ASSESSMENT/PLAN:                                                        ICD-10-CM    1. Obstructive sleep apnea syndrome G47.33 SLEEP EVALUATION & MANAGEMENT REFERRAL - ADULT   2. Pain syndrome, chronic G89.4 oxyCODONE-acetaminophen (PERCOCET) 5-325 MG per tablet     oxyCODONE-acetaminophen (PERCOCET) 5-325 MG per tablet     DISCONTINUED: oxyCODONE-acetaminophen (PERCOCET) 5-325 MG per tablet    3. Morbid obesity due to excess calories (H) E66.01    4. Hypertension goal BP (blood pressure) < 140/80 I10    5. Hyperlipidemia with target LDL less than 130 E78.5    6. Recurrent major depression in partial remission F33.41    7. PTSD (post-traumatic stress disorder) F43.10        Patient Instructions     DR MONTES EVIDENTLY DOES NOT WANT TO TAKE HER ON    CONSIDER REFERRAL TO ANOTHER PAIN OR [facility] CLINIC     (G47.33) Obstructive sleep apnea syndrome  (primary encounter diagnosis)    Comment:      Plan: SLEEP EVALUATION & MANAGEMENT REFERRAL - ADULT                   (G89.4) Pain syndrome, chronic    Comment:      Plan: oxyCODONE-acetaminophen (PERCOCET) 5-325 MG per          tablet, oxyCODONE-acetaminophen (PERCOCET)           5-325 MG per tablet, DISCONTINUED:           oxyCODONE-acetaminophen (PERCOCET) 5-325 MG per          tablet                   (E66.01) Morbid obesity due to excess calories (H)    Comment:      Plan:  WEIGHT LOSS PROGRAM NEXT OFFICE VISIT         (I10) Hypertension goal BP (blood pressure) < 140/80    Comment:      Plan:          (E78.5) Hyperlipidemia with target LDL less than 130    Comment:      Plan:          (F33.41) Recurrent major depression in partial remission    Comment:      Plan:          (F43.10) PTSD (post-traumatic stress disorder)    Comment:      Plan:   PROMISED NO MORE DRINKING OR RECREATIONAL DRUG USAGE               OTTO YUSUF MD  [facility]

## 2017-03-03 NOTE — PATIENT INSTRUCTIONS
DR MONTES EVIDENTLY DOES NOT WANT TO TAKE HER ON    CONSIDER REFERRAL TO ANOTHER PAIN OR [facility] CLINIC     (G47.33) Obstructive sleep apnea syndrome  (primary encounter diagnosis)    Comment:      Plan: SLEEP EVALUATION & MANAGEMENT REFERRAL - ADULT                   (G89.4) Pain syndrome, chronic    Comment:      Plan: oxyCODONE-acetaminophen (PERCOCET) 5-325 MG per          tablet, oxyCODONE-acetaminophen (PERCOCET)           5-325 MG per tablet, DISCONTINUED:           oxyCODONE-acetaminophen (PERCOCET) 5-325 MG per          tablet                   (E66.01) Morbid obesity due to excess calories (H)    Comment:      Plan:  WEIGHT LOSS PROGRAM NEXT OFFICE VISIT         (I10) Hypertension goal BP (blood pressure) < 140/80    Comment:      Plan:          (E78.5) Hyperlipidemia with target LDL less than 130    Comment:      Plan:          (F33.41) Recurrent major depression in partial remission    Comment:      Plan:          (F43.10) PTSD (post-traumatic stress disorder)    Comment:      Plan:   PROMISED NO MORE DRINKING OR RECREATIONAL DRUG USAGE

## 2017-03-03 NOTE — MR AVS SNAPSHOT
After Visit Summary   3/3/2017    Ines Mott    MRN: [id number]           Patient Information     Date Of Birth          1965        Visit Information        Provider Department      3/3/2017 1:45 PM Dustni Fatima MD [facility]        Today's Diagnoses     Obstructive sleep apnea syndrome    -  1    Pain syndrome, chronic        Morbid obesity due to excess calories (H)        Hypertension goal BP (blood pressure) < 140/80        Hyperlipidemia with target LDL less than 130        Recurrent major depression in partial remission        PTSD (post-traumatic stress disorder)          Care Instructions      DR MONTES EVIDENTLY DOES NOT WANT TO TAKE HER ON    CONSIDER REFERRAL TO ANOTHER PAIN OR METHADONE CLINIC     (G47.33) Obstructive sleep apnea syndrome  (primary encounter diagnosis)    Comment:      Plan: SLEEP EVALUATION & MANAGEMENT REFERRAL - ADULT                   (G89.4) Pain syndrome, chronic    Comment:      Plan: oxyCODONE-acetaminophen (PERCOCET) 5-325 MG per          tablet, oxyCODONE-acetaminophen (PERCOCET)           5-325 MG per tablet, DISCONTINUED:           oxyCODONE-acetaminophen (PERCOCET) 5-325 MG per          tablet                   (E66.01) Morbid obesity due to excess calories (H)    Comment:      Plan:  WEIGHT LOSS PROGRAM NEXT OFFICE VISIT         (I10) Hypertension goal BP (blood pressure) < 140/80    Comment:      Plan:          (E78.5) Hyperlipidemia with target LDL less than 130    Comment:      Plan:          (F33.41) Recurrent major depression in partial remission    Comment:      Plan:          (F43.10) PTSD (post-traumatic stress disorder)    Comment:      Plan:   PROMISED NO MORE DRINKING OR RECREATIONAL DRUG USAGE                 Follow-ups after your visit        Additional Services     SLEEP EVALUATION & MANAGEMENT REFERRAL - ADULT       Please be aware that coverage of these services is subject to the  "terms and limitations of your health insurance plan.  Call member services at your health plan with any benefit or coverage questions.      Please bring the following to your appointment:    >>   List of current medications   >>   This referral request   >>   Any documents/labs given to you for this referral    [facility]  Ph [phone] (Age 15 and up)                  Follow-up notes from your care team     Return in about 3 weeks (around 3/24/2017).      Future tests that were ordered for you today     Open Future Orders        Priority Expected Expires Ordered    SLEEP EVALUATION & MANAGEMENT REFERRAL - ADULT Routine  3/3/2018 3/3/2017            Who to contact     If you have questions or need follow up information about today's clinic visit or your schedule please contact [facility] directly at [phone].  Normal or non-critical lab and imaging results will be communicated to you by MyChart, letter or phone within 4 business days after the clinic has received the results. If you do not hear from us within 7 days, please contact the clinic through MyChart or phone. If you have a critical or abnormal lab result, we will notify you by phone as soon as possible.  Submit refill requests through [employer] or call your pharmacy and they will forward the refill request to us. Please allow 3 business days for your refill to be completed.          Additional Information About Your Visit        [employer] Information     [employer] lets you send messages to your doctor, view your test results, renew your prescriptions, schedule appointments and more. To sign up, go to [email or web address] . Click on \"Log in\" on the left side of the screen, which will take you to the Welcome page. Then click on \"Sign up Now\" on the right side of the page.     You will be asked to enter the access code listed below, as well as some personal information. Please follow the " directions to create your username and password.     Your access code is: [email or web address]  Expires: 3/29/2017  8:39 AM     Your access code will  in 90 days. If you need help or a new code, please call your [place] clinic or [phone].        Care EveryWhere ID     This is your Care EveryWhere ID. This could be used by other organizations to access your [place] medical records  [id number]        Your Vitals Were     Pulse Temperature Respirations Pulse Oximetry BMI (Body Mass Index)       77 97.7  F (36.5  C) (Tympanic) 16 96% 44 kg/m2        Blood Pressure from Last 3 Encounters:   17 126/64   17 (!) 152/98   17 124/64    Weight from Last 3 Encounters:   17 272 lb 9.6 oz (123.7 kg)   17 268 lb (121.6 kg)   17 267 lb 9.6 oz (121.4 kg)                 Today's Medication Changes          These changes are accurate as of: 3/3/17  5:34 PM.  If you have any questions, ask your nurse or doctor.               These medicines have changed or have updated prescriptions.        Dose/Directions    * oxyCODONE-acetaminophen 5-325 MG per tablet   Commonly known as:  PERCOCET   This may have changed:  Another medication with the same name was added. Make sure you understand how and when to take each.   Used for:  Pain syndrome, chronic   Changed by:  Dustin Fatima MD        Dose:  1 tablet   Take 1 tablet by mouth every 4 hours as needed for pain maximum FOUR TIMES DAILY  tablet(s) per day   Quantity:  30 tablet   Refills:  0       * oxyCODONE-acetaminophen 5-325 MG per tablet   Commonly known as:  PERCOCET   This may have changed:  You were already taking a medication with the same name, and this prescription was added. Make sure you understand how and when to take each.   Used for:  Pain syndrome, chronic   Changed by:  Dustin Fatima MD        Dose:  1 tablet   Start taking on:  3/17/2017   Take 1 tablet by mouth every 4 hours as needed for pain Maximum 4X TIMES  DAILY  tablet(s) per day   Quantity:  30 tablet   Refills:  0       * Notice:  This list has 2 medication(s) that are the same as other medications prescribed for you. Read the directions carefully, and ask your doctor or other care provider to review them with you.         Where to get your medicines      Some of these will need a paper prescription and others can be bought over the counter.  Ask your nurse if you have questions.     Bring a paper prescription for each of these medications     oxyCODONE-acetaminophen 5-325 MG per tablet    oxyCODONE-acetaminophen 5-325 MG per tablet                Primary Care Provider Office Phone # Fax #    Dustin Rina Fatima [phone]       [facility] [address]  [facility] [address]        Thank you!     Thank you for choosing [facility]  for your care. Our goal is always to provide you with excellent care. Hearing back from our patients is one way we can continue to improve our services. Please take a few minutes to complete the written survey that you may receive in the mail after your visit with us. Thank you!             Your Updated Medication List - Protect others around you: Learn how to safely use, store and throw away your medicines at www.disposemymeds.org.          This list is accurate as of: 3/3/17  5:34 PM.  Always use your most recent med list.                   Brand Name Dispense Instructions for use    * albuterol (2.5 MG/3ML) 0.083% neb solution     30 vial    Take 1 vial (2.5 mg) by nebulization every 6 hours as needed for shortness of breath / dyspnea or wheezing       * albuterol 108 (90 BASE) MCG/ACT Inhaler    VENTOLIN HFA    54 g    INHALE TWO PUFFS BY MOUTH EVERY 4 HOURS AS NEEDED FOR SHORTNESS OF BREATH/DYSPNEA       blood glucose calibration solution    no brand specified    1 each    Use to calibrate blood glucose monitor as directed.       blood glucose lancets standard     no brand specified    1 Box    Use to test blood sugar daily times daily or as directed.       blood glucose monitoring meter device kit    no brand specified    1 kit    Use to test blood sugar larisa times daily or as directed.       blood glucose monitoring test strip    no brand specified    100 strip    Use to test blood sugars once times daily or as directed       cyanocobalamin 1000 MCG/ML injection    VITAMIN B12    1 mL    Inject 1 mL (1,000 mcg) Subcutaneous every 30 days       EPINEPHrine 0.3 MG/0.3ML injection     2 each    Inject 0.3 mLs (0.3 mg) into the muscle once as needed for anaphylaxis       losartan-hydrochlorothiazide 50-12.5 MG per tablet    HYZAAR    90 tablet    Take 1 tablet by mouth daily       magnesium oxide 400 (241.3 MG) MG tablet    MAG-OX    90 tablet    Take 1 tablet (400 mg) by mouth daily       mometasone-formoterol 100-5 MCG/ACT oral inhaler    DULERA    1 Inhaler    Inhale 2 puffs into the lungs 2 times daily       ondansetron 4 MG ODT tab    ZOFRAN ODT    10 tablet    Take 1 tablet (4 mg) by mouth every 8 hours as needed for nausea       * oxyCODONE-acetaminophen 5-325 MG per tablet    PERCOCET    30 tablet    Take 1 tablet by mouth every 4 hours as needed for pain maximum FOUR TIMES DAILY  tablet(s) per day       * oxyCODONE-acetaminophen 5-325 MG per tablet   Start taking on:  3/17/2017    PERCOCET    30 tablet    Take 1 tablet by mouth every 4 hours as needed for pain Maximum 4X TIMES DAILY  tablet(s) per day       polyethylene glycol powder    MIRALAX    510 g    Take 17 g (1 capful) by mouth daily       potassium chloride 10 MEQ tablet    K-TAB,KLOR-CON    120 tablet    Take 1 tablet (10 mEq) by mouth daily       sertraline 100 MG tablet    ZOLOFT    30 tablet    Take 1 tablet (100 mg) by mouth daily       STATIN NOT PRESCRIBED (INTENTIONAL)     0 each    1 each daily Statin not prescribed intentionally due to Active liver disease (liver failure, cirrhosis, hepatitis)  LIVER METS       Vitamin D (Cholecalciferol) 1000 UNITS Tabs     60 tablet    Take 2,000 Units by mouth daily       * Notice:  This list has 4 medication(s) that are the same as other medications prescribed for you. Read the directions carefully, and ask your doctor or other care provider to review them with you.

## 2017-03-21 DIAGNOSIS — G89.4 PAIN SYNDROME, CHRONIC: ICD-10-CM

## 2017-03-21 NOTE — TELEPHONE ENCOUNTER
Reason for Call:  Medication or medication refill:    Do you use a [facility]?  Name of the pharmacy and phone number for the current request:  [facility] pu at [facility]    Name of the medication requested:oxyCODONE-acetaminophen (PERCOCET) 5-325 MG per tablet     Other request: restricted to [facility]-states forgot to ask for meds earlier and states always needs ov when getting meds    Can we leave a detailed message on this number? YES    Phone number patient can be reached at: Home number on file [phone] (home)    Best Time:     Call taken on 3/21/2017 at 8:19 AM by ASHOK AHUMADA

## 2017-03-22 NOTE — TELEPHONE ENCOUNTER
oxyCODONE-acetaminophen (PERCOCET) 5-325 MG per tablet      Last Written Prescription Date:  03/03/17  Last Fill Quantity: 30,   # refills: 0  Last Office Visit with [facility], [facility] or [facility] prescribing provider: 03/03/17  Future Office visit:       Routing refill request to provider for review/approval because:  Drug not on the [facility], [facility] or [facility] refill protocol or controlled substance

## 2017-03-23 RX ORDER — OXYCODONE AND ACETAMINOPHEN 5; 325 MG/1; MG/1
1 TABLET ORAL EVERY 4 HOURS PRN
Qty: 30 TABLET | Refills: 0 | OUTPATIENT
Start: 2017-03-23

## 2017-03-23 NOTE — TELEPHONE ENCOUNTER
Controlled Substance Refill Request for oxyCODONE-acetaminophen (PERCOCET) 5-325 MG per tablet  Problem List Complete:  YesPatient is followed by OTTO YUSUF MD for ongoing prescription of pain medication.  All refills should only be approved by this provider, or covering partner.    Medication(s):  PERCOCET 5MG PO FOUR TIMES DAILY .   Maximum quantity per month: 120   Clinic visit frequency required: Q 2 weeks     Controlled substance agreement:  Encounter-Level CSA - 12/22/15:               Controlled Substance Agreement - Scan on 12/28/2015  2:53 PM : CONTROLLED MEDICATION CONTRACT, 12-22-15 (below)          Encounter-Level CSA - 4/10/15:               Controlled Substance Agreement - Scan on 4/17/2015  1:45 PM : BOB, Controlled Substance Contract, 04-10-15 (below)            Pain Clinic evaluation in the past: Yes       Date/Location:      DIRE Total Score(s):  No flowsheet data found.    Last [facility] website verification:  done on 01/03/2017   [email or web address]  REFERRAL DR MONTES METHADONE CLINIC AS SOON AS POSSIBLE   FAILED DRANK ALCOHOL BEFORE LAST OFFICE VISIT        checked in past 6 months?  Yes 01/03/2017

## 2017-04-03 ENCOUNTER — OFFICE VISIT (OUTPATIENT)
Dept: FAMILY MEDICINE | Facility: CLINIC | Age: 52
End: 2017-04-03
Payer: COMMERCIAL

## 2017-04-03 VITALS
TEMPERATURE: 97.5 F | WEIGHT: 266 LBS | HEIGHT: 66 IN | BODY MASS INDEX: 42.75 KG/M2 | OXYGEN SATURATION: 97 % | DIASTOLIC BLOOD PRESSURE: 70 MMHG | RESPIRATION RATE: 16 BRPM | SYSTOLIC BLOOD PRESSURE: 132 MMHG | HEART RATE: 73 BPM

## 2017-04-03 DIAGNOSIS — R73.9 HYPERGLYCEMIA: ICD-10-CM

## 2017-04-03 DIAGNOSIS — G89.4 CHRONIC PAIN SYNDROME: ICD-10-CM

## 2017-04-03 DIAGNOSIS — K12.0 ORAL APHTHAE: Primary | ICD-10-CM

## 2017-04-03 DIAGNOSIS — E78.5 HYPERLIPIDEMIA WITH TARGET LDL LESS THAN 130: Chronic | ICD-10-CM

## 2017-04-03 DIAGNOSIS — I10 HYPERTENSION GOAL BP (BLOOD PRESSURE) < 140/80: Chronic | ICD-10-CM

## 2017-04-03 LAB — HBA1C MFR BLD: 5.9 % (ref 4.3–6)

## 2017-04-03 PROCEDURE — 99214 OFFICE O/P EST MOD 30 MIN: CPT | Performed by: FAMILY MEDICINE

## 2017-04-03 PROCEDURE — 83036 HEMOGLOBIN GLYCOSYLATED A1C: CPT | Performed by: FAMILY MEDICINE

## 2017-04-03 PROCEDURE — 84443 ASSAY THYROID STIM HORMONE: CPT | Performed by: FAMILY MEDICINE

## 2017-04-03 PROCEDURE — 36415 COLL VENOUS BLD VENIPUNCTURE: CPT | Performed by: FAMILY MEDICINE

## 2017-04-03 RX ORDER — OXYCODONE AND ACETAMINOPHEN 5; 325 MG/1; MG/1
1 TABLET ORAL EVERY 4 HOURS PRN
Qty: 30 TABLET | Refills: 0 | Status: SHIPPED | OUTPATIENT
Start: 2017-04-10 | End: 2017-04-18

## 2017-04-03 RX ORDER — OXYCODONE AND ACETAMINOPHEN 5; 325 MG/1; MG/1
1 TABLET ORAL EVERY 4 HOURS PRN
Qty: 30 TABLET | Refills: 0 | Status: SHIPPED | OUTPATIENT
Start: 2017-04-03 | End: 2017-04-03

## 2017-04-03 RX ORDER — TRIAMCINOLONE ACETONIDE 0.1 %
PASTE (GRAM) DENTAL 2 TIMES DAILY
Qty: 5 G | Refills: 0 | Status: SHIPPED | OUTPATIENT
Start: 2017-04-03 | End: 2018-01-24

## 2017-04-03 NOTE — PATIENT INSTRUCTIONS
8985-4352 calory diet    Walking and  FIT BIT TWITCH RECOMMENDED  (K12.0) Oral aphthae  (primary encounter diagnosis)    Comment:      Plan: triamcinolone (KENALOG) 0.1 % paste                   (R73.9) Hyperglycemia    Comment:      Plan: Hemoglobin A1c, TSH                   (G89.4) Chronic pain syndrome    Comment:      Plan: oxyCODONE-acetaminophen (PERCOCET) 5-325 MG per          tablet, DISCONTINUED: oxyCODONE-acetaminophen           (PERCOCET) 5-325 MG per tablet                   (I10) Hypertension goal BP (blood pressure) < 140/80    Comment:      Plan:          (E78.5) Hyperlipidemia with target LDL less than 130    Comment:      Plan:        OTTO YUSUF JR., MD

## 2017-04-03 NOTE — NURSING NOTE
"Chief Complaint   Patient presents with     Recheck Medication       Initial /70  Pulse 73  Temp 97.5  F (36.4  C) (Tympanic)  Resp 16  Ht 5' 6\" (1.676 m)  Wt 266 lb (120.7 kg)  SpO2 97%  BMI 42.93 kg/m2 Estimated body mass index is 42.93 kg/(m^2) as calculated from the following:    Height as of this encounter: 5' 6\" (1.676 m).    Weight as of this encounter: 266 lb (120.7 kg).  Medication Reconciliation: complete   .Ric DANIELLE      "

## 2017-04-03 NOTE — MR AVS SNAPSHOT
After Visit Summary   4/3/2017    Ines Mott    MRN: [id number]           Patient Information     Date Of Birth          1965        Visit Information        Provider Department      4/3/2017 9:15 AM Otto Fatima MD [facility]        Today's Diagnoses     Oral aphthae    -  1    Hyperglycemia        Chronic pain syndrome        Hypertension goal BP (blood pressure) < 140/80        Hyperlipidemia with target LDL less than 130          Care Instructions      [id number] calory diet    Walking and  FIT BIT TWITCH RECOMMENDED  (K12.0) Oral aphthae  (primary encounter diagnosis)    Comment:      Plan: triamcinolone (KENALOG) 0.1 % paste                   (R73.9) Hyperglycemia    Comment:      Plan: Hemoglobin A1c, TSH                   (G89.4) Chronic pain syndrome    Comment:      Plan: oxyCODONE-acetaminophen (PERCOCET) 5-325 MG per          tablet, DISCONTINUED: oxyCODONE-acetaminophen           (PERCOCET) 5-325 MG per tablet                   (I10) Hypertension goal BP (blood pressure) < 140/80    Comment:      Plan:          (E78.5) Hyperlipidemia with target LDL less than 130    Comment:      Plan:        OTTO FATIMA JR., MD             Follow-ups after your visit        Follow-up notes from your care team     Return in about 2 weeks (around 4/17/2017).      Your next 10 appointments already scheduled     Apr 11, 2017  8:00 AM CDT   New Sleep Patient with MICHEAL Dixon CNP   [facility] [place], Sleep Study ([facility])    [address] [place] [address] [phone]              Who to contact     If you have questions or need follow up information about today's clinic visit or your schedule please contact [facility] directly at [phone].  Normal or non-critical lab and imaging results will be communicated to you by MyChart, letter or  "phone within 4 business days after the clinic has received the results. If you do not hear from us within 7 days, please contact the clinic through [employer] or phone. If you have a critical or abnormal lab result, we will notify you by phone as soon as possible.  Submit refill requests through [employer] or call your pharmacy and they will forward the refill request to us. Please allow 3 business days for your refill to be completed.          Additional Information About Your Visit        [employer] Information     [employer] lets you send messages to your doctor, view your test results, renew your prescriptions, schedule appointments and more. To sign up, go to [email or web address] . Click on \"Log in\" on the left side of the screen, which will take you to the Welcome page. Then click on \"Sign up Now\" on the right side of the page.     You will be asked to enter the access code listed below, as well as some personal information. Please follow the directions to create your username and password.     Your access code is: [email or web address]  Expires: 2017  9:57 AM     Your access code will  in 90 days. If you need help or a new code, please call your [place] clinic or [phone].        Care EveryWhere ID     This is your Care EveryWhere ID. This could be used by other organizations to access your [place] medical records  [id number]        Your Vitals Were     Pulse Temperature Respirations Height Pulse Oximetry BMI (Body Mass Index)    73 97.5  F (36.4  C) (Tympanic) 16 5' 6\" (1.676 m) 97% 42.93 kg/m2       Blood Pressure from Last 3 Encounters:   17 132/70   17 126/64   17 (!) 152/98    Weight from Last 3 Encounters:   17 266 lb (120.7 kg)   17 272 lb 9.6 oz (123.7 kg)   17 268 lb (121.6 kg)              We Performed the Following     Hemoglobin A1c     TSH          Today's Medication Changes          These changes are accurate as of: 4/3/17  9:57 AM.  If you have any questions, " ask your nurse or doctor.               Start taking these medicines.        Dose/Directions    oxyCODONE-acetaminophen 5-325 MG per tablet   Commonly known as:  PERCOCET   Used for:  Chronic pain syndrome   Started by:  Dustin Fatima MD        Dose:  1 tablet   Start taking on:  4/10/2017   Take 1 tablet by mouth every 4 hours as needed for moderate to severe pain   Quantity:  30 tablet   Refills:  0       triamcinolone 0.1 % paste   Commonly known as:  KENALOG   Used for:  Oral aphthae   Started by:  Dustin Fatima MD        Take by mouth 2 times daily   Quantity:  5 g   Refills:  0            Where to get your medicines      These medications were sent to [facility] [address] [place] [address] [address] [facility]  [address] [facility] [address]     Phone:  [phone]     triamcinolone 0.1 % paste         Some of these will need a paper prescription and others can be bought over the counter.  Ask your nurse if you have questions.     Bring a paper prescription for each of these medications     oxyCODONE-acetaminophen 5-325 MG per tablet                Primary Care Provider Office Phone # Fax #    Dustin Fatima [phone]       [facility] [address]  [facility] [address]        Thank you!     Thank you for choosing [facility]  for your care. Our goal is always to provide you with excellent care. Hearing back from our patients is one way we can continue to improve our services. Please take a few minutes to complete the written survey that you may receive in the mail after your visit with us. Thank you!             Your Updated Medication List - Protect others around you: Learn how to safely use, store and throw away your medicines at www.disposemymeds.org.          This list is accurate as of: 4/3/17  9:57 AM.  Always use your most recent med list.                   Brand Name Dispense  Instructions for use    * albuterol (2.5 MG/3ML) 0.083% neb solution     30 vial    Take 1 vial (2.5 mg) by nebulization every 6 hours as needed for shortness of breath / dyspnea or wheezing       * albuterol 108 (90 BASE) MCG/ACT Inhaler    VENTOLIN HFA    54 g    INHALE TWO PUFFS BY MOUTH EVERY 4 HOURS AS NEEDED FOR SHORTNESS OF BREATH/DYSPNEA       blood glucose calibration solution    no brand specified    1 each    Use to calibrate blood glucose monitor as directed.       blood glucose lancets standard    no brand specified    1 Box    Use to test blood sugar daily times daily or as directed.       blood glucose monitoring meter device kit    no brand specified    1 kit    Use to test blood sugar larisa times daily or as directed.       blood glucose monitoring test strip    no brand specified    100 strip    Use to test blood sugars once times daily or as directed       cyanocobalamin 1000 MCG/ML injection    VITAMIN B12    1 mL    Inject 1 mL (1,000 mcg) Subcutaneous every 30 days       EPINEPHrine 0.3 MG/0.3ML injection     2 each    Inject 0.3 mLs (0.3 mg) into the muscle once as needed for anaphylaxis       losartan-hydrochlorothiazide 50-12.5 MG per tablet    HYZAAR    90 tablet    Take 1 tablet by mouth daily       magnesium oxide 400 (241.3 MG) MG tablet    MAG-OX    90 tablet    Take 1 tablet (400 mg) by mouth daily       mometasone-formoterol 100-5 MCG/ACT oral inhaler    DULERA    1 Inhaler    Inhale 2 puffs into the lungs 2 times daily       ondansetron 4 MG ODT tab    ZOFRAN ODT    10 tablet    Take 1 tablet (4 mg) by mouth every 8 hours as needed for nausea       oxyCODONE-acetaminophen 5-325 MG per tablet   Start taking on:  4/10/2017    PERCOCET    30 tablet    Take 1 tablet by mouth every 4 hours as needed for moderate to severe pain       polyethylene glycol powder    MIRALAX    510 g    Take 17 g (1 capful) by mouth daily       potassium chloride 10 MEQ tablet    K-TAB,KLOR-CON    120 tablet     Take 1 tablet (10 mEq) by mouth daily       sertraline 100 MG tablet    ZOLOFT    30 tablet    Take 1 tablet (100 mg) by mouth daily       STATIN NOT PRESCRIBED (INTENTIONAL)     0 each    1 each daily Statin not prescribed intentionally due to Active liver disease (liver failure, cirrhosis, hepatitis) LIVER METS       triamcinolone 0.1 % paste    KENALOG    5 g    Take by mouth 2 times daily       Vitamin D (Cholecalciferol) 1000 UNITS Tabs     60 tablet    Take 2,000 Units by mouth daily       * Notice:  This list has 2 medication(s) that are the same as other medications prescribed for you. Read the directions carefully, and ask your doctor or other care provider to review them with you.

## 2017-04-03 NOTE — LETTER
[facility]  [address]  [address]  [place] [address]  [phone]                                                                                                           Ines Mott  [address]  [facility] [address]    April 6, 2017      Dear Ines,    The results of your recent tests were reviewed and are enclosed.     NORMAL THYROID STIMULATING HORMONE TEST   THREE MONTH GLUCOSE AVERAGE AT PRE DIABETES LEVEL   CONGRATULATIONS   Results for orders placed or performed in visit on 04/03/17   Hemoglobin A1c   Result Value Ref Range    Hemoglobin A1C 5.9 4.3 - 6.0 %   TSH   Result Value Ref Range    TSH 1.62 0.40 - 4.00 mU/L     Thank you for choosing [facility].  We appreciate the opportunity to serve you and look forward to supporting your healthcare needs in the future.    If you have any questions or concerns, please call me or my staff at [phone].      Sincerely,    Dustin Fatima Jr MD

## 2017-04-03 NOTE — PROGRESS NOTES
SUBJECTIVE:                                                    Ines Mott is a 52 year old female who presents to clinic today for the following health issues:        Chronic Pain Follow-Up       Type / Location of Pain:   Analgesia/pain control:       Recent changes:  same      Overall control: Tolerable with discomfort  Activity level/function:      Daily activities:  Able to do moderate activities and Can do most things most days, with some rest    Work:  Unable to work  Adverse effects:  No  Adherance    Taking medication as directed?  Yes    Participating in other treatments: no -   Risk Factors:    Sleep:  Poor    Mood/anxiety:  controlled    Recent family or social stressors:  none noted    Other aggravating factors: none  PHQ-9 SCORE 5/6/2016 11/4/2016 11/25/2016   Total Score - - -   Total Score 10 7 3     RASTA-7 SCORE 8/24/2015 11/4/2016   Total Score 7 9     Encounter-Level CSA - 12/22/2016:                 Controlled Substance Agreement - Scan on 1/5/2017  1:14 PM : CONTROLLED SUBSTANCE AGREEMENT (below)          Encounter-Level CSA - 12/22/2016:                 Controlled Substance Agreement - Scan on 12/28/2015  2:53 PM : CONTROLLED MEDICATION CONTRACT, 12-22-15 (below)          Encounter-Level CSA - 12/22/2016:                 Controlled Substance Agreement - Scan on 4/17/2015  1:45 PM : [facility], Controlled Substance Contract, 04-10-15 (below)                 Amount of exercise or physical activity: 2-3 days/week for an average of 15-30 minutes    Problems taking medications regularly: No    Medication side effects: none    Diet: regular (no restrictions)  .OTTO YUSUF MD .4/3/2017 10:37 AM .April 3, 2017    Ines Mott is a 52 year old female who is who presents with  FOLLOW UP  RIGHT UPPER QUADRANT ABDOMINAL PAIN  HEADACHES   CHRONIC PAIN SYNDROME   RIGHT KNEE PAIN   PAIN MEDICATIONS LASTED 3 WEEKS   OVERDUE X ONE WEEK   Onset :  LONG STANDING    Severity: MODERATE     Home treatments   RANGE OF MOTION AND EXERCISES    Additional Symptoms:  DEPRESSION AND ANXIETY IMPROVED  Course  ONGOING     In addition I am monitoring .has BMI 42 and Hypertension goal BP (blood pressure) < 140/80 on her pertinent problem list.   has BMI 42 and Hypertension goal BP (blood pressure) < 140/80 on her pertinent problem list.     SHORTNESS OF BREATH IMPROVED     RIGHT LIVER METS with VASOACTIVE     OBSTRUCTIVE SLEEP APNEA WITH     MYELOMENINGOCOELE     NECK PAIN     HYSTERECTOMY FOR BENIGHTN     HISTORY OF RENAL MASS    COMPLEX HEADACHES     SEASONAL ALLERGIC RHINITIS     ANXIETY     POST TRAUMATIC STRESS SYMPTOMS     PRE DIABETES     CHRONIC TENSION HEADACHES    DEPRESSION AND MANIPULATIVE BEHAVIOR     MILD INTERMITTENT ASTHMA CONTROLLED    RECENT APTHOUS ULCER LEFT LOWER LIP     CHRONIC LOWER BACK PAIN with RADICULOPATHY       .  Current Outpatient Prescriptions   Medication Sig Dispense Refill     triamcinolone (KENALOG) 0.1 % paste Take by mouth 2 times daily 5 g 0     [START ON 4/10/2017] oxyCODONE-acetaminophen (PERCOCET) 5-325 MG per tablet Take 1 tablet by mouth every 4 hours as needed for moderate to severe pain 30 tablet 0     ondansetron (ZOFRAN ODT) 4 MG ODT tab Take 1 tablet (4 mg) by mouth every 8 hours as needed for nausea 10 tablet 0     Vitamin D, Cholecalciferol, 1000 UNITS TABS Take 2,000 Units by mouth daily 60 tablet 11     cyanocobalamin (VITAMIN B12) 1000 MCG/ML injection Inject 1 mL (1,000 mcg) Subcutaneous every 30 days 1 mL 11     potassium chloride (K-TAB,KLOR-CON) 10 MEQ tablet Take 1 tablet (10 mEq) by mouth daily 120 tablet 11     sertraline (ZOLOFT) 100 MG tablet Take 1 tablet (100 mg) by mouth daily 30 tablet 11     losartan-hydrochlorothiazide (HYZAAR) 50-12.5 MG per tablet Take 1 tablet by mouth daily 90 tablet 3     albuterol (2.5 MG/3ML) 0.083% neb solution Take 1 vial (2.5 mg) by nebulization every 6 hours as needed for shortness of breath / dyspnea or wheezing 30 vial 3      magnesium oxide (MAG-OX) 400 (241.3 MG) MG tablet Take 1 tablet (400 mg) by mouth daily 90 tablet 3     albuterol (VENTOLIN HFA) 108 (90 BASE) MCG/ACT Inhaler INHALE TWO PUFFS BY MOUTH EVERY 4 HOURS AS NEEDED FOR SHORTNESS OF BREATH/DYSPNEA 54 g 1     mometasone-formoterol (DULERA) 100-5 MCG/ACT oral inhaler Inhale 2 puffs into the lungs 2 times daily 1 Inhaler 11     blood glucose monitoring (NO BRAND SPECIFIED) meter device kit Use to test blood sugar larisa times daily or as directed. 1 kit 0     blood glucose (NO BRAND SPECIFIED) lancets standard Use to test blood sugar daily times daily or as directed. 1 Box 11     blood glucose monitoring (NO BRAND SPECIFIED) test strip Use to test blood sugars once times daily or as directed 100 strip 3     blood glucose calibration (NO BRAND SPECIFIED) solution Use to calibrate blood glucose monitor as directed. 1 each 0     polyethylene glycol (MIRALAX) powder Take 17 g (1 capful) by mouth daily 510 g 3     STATIN NOT PRESCRIBED, INTENTIONAL, 1 each daily Statin not prescribed intentionally due to Active liver disease (liver failure, cirrhosis, hepatitis)  LIVER METS 0 each 0     EPINEPHrine (EPIPEN) 0.3 MG/0.3ML injection Inject 0.3 mLs (0.3 mg) into the muscle once as needed for anaphylaxis 2 each 5          Allergies   Allergen Reactions     Ativan [Lorazepam] Anaphylaxis     Macrobid [Nitrofurantoin] Anaphylaxis     Amoxicillin      Buspirone      Buspar     Cephalosporins      Dilaudid [Hydromorphone]      Diphenhydramine Hcl      Benadryl     Imitrex [Sumatriptan Succinate]      blood pressure raised uncontrobably     Ketorolac Tromethamine      Toradol     Lansoprazole      Prevacid     Metoclopramide Hives     Reglan     Paroxetine      Paxil     Penicillins      Prednisone Hives     Sulfa Drugs      THICKENED AND DIFFICULTY SWALLOWING      Lidoderm [Lidocaine] Rash     Localized rash at patch site       Immunization History   Administered Date(s) Administered      Influenza Vaccine IM 3yrs+ 4 Valent IIV4 2015, 2016     TDAP Vaccine (Adacel) 2015         reports that she does not drink alcohol.      reports that she does not use illicit drugs.    family history includes CANCER in her father.    indicated that her mother is alive. She indicated that her father is .      has a past surgical history that includes GYN surgery; Cholecystectomy; appendectomy; Extraction(s) dental; and Implant shunt ventriculoperitoneal.     reports that she does not engage in sexual activity.  .  Pediatric History   Patient Guardian Status     Mother:  RajatSuleimanSada Tran     Other Topics Concern     Parent/Sibling W/ Cabg, Mi Or Angioplasty Before 65f 55m? No     Social History Narrative         reports that she has quit smoking. She has never used smokeless tobacco.    Medical, social, surgical, and family histories reviewed.    Problem list, Medication list, Allergies, and Medical/Social/Surgical histories reviewed in [facility] and updated as appropriate.  Labs reviewed in [facility]  Patient Active Problem List   Diagnosis     Dyspnea and respiratory abnormality     Other specified drug dependence, in remission     Carcinoma of colon metastatic to liver (H)     Kidney infection     ALEXUS (obstructive sleep apnea)     Myelomeningocele with hydrocephalus, cervical region (H)     Fibromyalgia     Angioedema     Pneumonia due to other Gram-negative bacteria     BMI 42     H/O hysterectomy for benign disease     Hyperlipidemia with target LDL less than 130     Infected tooth     Recurrent major depression in partial remission     Hypertension goal BP (blood pressure) < 140/80     Abdominal pain, right lateral     Renal mass, right     Health Care Home     Intracranial shunt     Migraine     Mild intermittent asthma without complication     Other complicated headache syndrome     Seasonal affective disorder (H)     Anxiety     PTSD (post-traumatic stress disorder)     Pre diabetes       Chronic pain syndrome     Chronic tension-type headache, not intractable     Degeneration of lumbosacral intervertebral disc     Past Surgical History:   Procedure Laterality Date     APPENDECTOMY       CHOLECYSTECTOMY       EXTRACTION(S) DENTAL       GYN SURGERY       IMPLANT SHUNT VENTRICULOPERITONEAL         Social History   Substance Use Topics     Smoking status: Former Smoker     Smokeless tobacco: Never Used     Alcohol use No      Comment: off and on     Family History   Problem Relation Age of Onset     CANCER Father          Allergies   Allergen Reactions     Ativan [Lorazepam] Anaphylaxis     Macrobid [Nitrofurantoin] Anaphylaxis     Amoxicillin      Buspirone      Buspar     Cephalosporins      Dilaudid [Hydromorphone]      Diphenhydramine Hcl      Benadryl     Imitrex [Sumatriptan Succinate]      blood pressure raised uncontrobably     Ketorolac Tromethamine      Toradol     Lansoprazole      Prevacid     Metoclopramide Hives     Reglan     Paroxetine      Paxil     Penicillins      Prednisone Hives     Sulfa Drugs      THICKENED AND DIFFICULTY SWALLOWING      Lidoderm [Lidocaine] Rash     Localized rash at patch site     Recent Labs   Lab Test  01/26/17   1803  01/18/17   1716  01/07/17   0120   11/04/16   1407   04/07/16   1620   05/07/15   1222   10/21/14   0230   A1C   --    --    --    --   6.1*   --   5.7   --   6.0   --    --    LDL   --    --    --    --   96   --   101*   --   127   --    --    HDL   --    --    --    --   42*   --   42*   --   36*   --    --    TRIG   --    --    --    --   161*   --   306*   --   177*   --    --    ALT  25  17  41   < >  27   < >  30   < >  24   < >  31   CR  0.86  0.66  0.75   < >  1.22*   < >   --    < >   --    < >  0.82   GFRESTIMATED  69  >90  Non  GFR Calc    81   < >  46*   < >   --    < >   --    < >  73   GFRESTBLACK  84  >90   GFR Calc    >90   GFR Calc     < >  56*   < >   --    < >   --    < >  89    POTASSIUM  3.5  3.1*  3.7   < >  3.8   < >   --    < >   --    < >  3.8   TSH   --    --    --    --    --    --    --    --    --    --   2.30    < > = values in this interval not displayed.        BP Readings from Last 6 Encounters:   04/03/17 132/70   03/03/17 126/64   03/01/17 (!) 152/98   02/17/17 124/64   02/02/17 118/64   01/26/17 131/81       Wt Readings from Last 3 Encounters:   04/03/17 266 lb (120.7 kg)   03/03/17 272 lb 9.6 oz (123.7 kg)   03/01/17 268 lb (121.6 kg)         Positive symptoms or findings indicated by bold designation:     ROS: 10 point ROS neg other than the symptoms noted above in the HPI.except  has Dyspnea and respiratory abnormality; Other specified drug dependence, in remission; Carcinoma of colon metastatic to liver (H); Kidney infection; ALEXUS (obstructive sleep apnea); Myelomeningocele with hydrocephalus, cervical region (H); Fibromyalgia; Angioedema; Pneumonia due to other Gram-negative bacteria; BMI 42; H/O hysterectomy for benign disease; Hyperlipidemia with target LDL less than 130; Infected tooth; Recurrent major depression in partial remission; Hypertension goal BP (blood pressure) < 140/80; Abdominal pain, right lateral; Renal mass, right; Health Care Home; Intracranial shunt; Migraine; Mild intermittent asthma without complication; Other complicated headache syndrome; Seasonal affective disorder (H); Anxiety; PTSD (post-traumatic stress disorder); Pre diabetes ; Chronic pain syndrome; Chronic tension-type headache, not intractable; and Degeneration of lumbosacral intervertebral disc on her problem list.   Constitutional: The patient denied fatigue, fever, insomnia, night sweats, recent illness and weight loss.  MILD WEIGHT LOSS     Eyes: The patient denied blindness, eye pain, eye tearing, photophobia, vision change and visual disturbance.       Ears/Nose/Throat/Neck: The patient denied dizziness, facial pain, hearing loss, nasal discharge, oral pain, otalgia, postnasal  drip, sinus congestion, sore throat, tinnitus and voice change.   IMPROVED HEDACHACHES     Cardiovascular: The patient denied arrhythmia, chest pain/pressure, claudication, edema, exercise intolerance, fatigue, orthopnea, palpitations and syncope.      Respiratory: The patient denied asthma, chest congestion, cough, dyspnea on exertion, dyspnea/shortness of breath, hemoptysis, pedal edema, pleuritic pain, productive sputum, snoring and wheezing. NORMAL BREATHING     Gastrointestinal: The patient denied abdominal pain, anorexia, constipation, diarrhea, dysphagia, gastroesophageal reflux, hematochezia, hemorrhoids, melena, nausea and vomiting . GASTROESOPHAGEAL REFLUX DISEASE WITHOUT ESOPHAGITIS   RIGHT SIDED ABDOMINAL PAIN RECURRENT   VASOACTIVE TUMOR     Genitourinary/Nephrology: The patient denied breast complaint, dysuria, nocturia sexual dysfunction, t, urinary frequency, urinary incontinence, urinary urgency    HYSTERECTOMY HISTORY   HOT FLASHES     Musculoskeletal: The patient denied arthralgia(s), back pain, joint complaint, muscle weakness, myalgias, osteoporosis, sciatica, stiffness and swelling.  CHRONIC LOWER BACK PAIN     Dermatoligic:: The patient denied acne, dermatitis, ecchymosis, itching, mole change, rash, skin cancer, skin lesion and sores.      Neurologic: The patient denied dizziness, gait abnormality, headache, memory loss, mental status change, paresis, paresthesia, seizure, syncope, tremor and vision change.     Psychiatric: The patient denied anxiety, depression, disturbances of memory, drug abuse, insomnia, mood swings and relationship difficulties.  DEPRESSION ANXIETY AND DRUG ABUSE HISTORY     Endocrine: The patient denied , goiter, obesity, polyuria and thyroid disease.  MORBID OBESITY AND DIABETES     Hematologic/Lymphatic: The patient denied abnormal bleeding and bruising, abnormal ecchymoses, anemia, lymph node enlargement/mass, petechiae and venous  Thrombosis.   "    Allergy/Immunology: The patient denied food allergy and  Allergic rhinitis or conjunctivitis.        PE:  /70  Pulse 73  Temp 97.5  F (36.4  C) (Tympanic)  Resp 16  Ht 5' 6\" (1.676 m)  Wt 266 lb (120.7 kg)  SpO2 97%  BMI 42.93 kg/m2 Body mass index is 42.93 kg/(m^2).    Constitutional: general appearance, well nourished, well developed, in no acute distress, well developed, appears stated age, normal body habitus,      Eyes:; The patient has normal eyelids sclerae and conjunctivae :      Ears/Nose/Throat: external ear, overall: normal appearance; external nose, overall: benign appearance, normal moujth gums and lips  The patient has:      Neck: thyroid, overall: normal size, normal consistency, nontender,      Respiratory:  palpation of chest, overall: normal excursion,    Clear to percussion and auscultation  WITHIN NORMAL LIMITS     Tachypnea  NORMAL  Color  NORMAL     Cardiovascular:  Good color with no peripheral edema    Regular sinus rhythm without murmur. Physiologic heart sounds Heart is unelarged  .   Chest/Breast: normal shape       Abdominal exam,  Liver and spleen are  unenlarged        Tenderness  WITHIN NORMAL LIMITS    Scars  NOT APPLICABLE     Urogenital; no renal, flank or bladder  tenderness;  NORMAL     Lymphatic: neck nodes,  NORMAL    Other nodes NOT APPLICABLE      Musculoskeletal:  Brief ortho exam normal except:   DECREASE RANGE OF MOTION OF BACK   NEGATIVE STRAIGHT LEG RAISING TEST   NORMAL REFLEXES      Integument: inspection of skin, no rash, lesions; and, palpation, no induration, no tenderness.      Neurologic mental status, overall: alert and oriented; gait, no ataxia, no unsteadiness; coordination, no tremors; cranial nerves, overall: normal motor, overall: normal bulk, tone.      Psychiatric: orientation/consciousness, overall: oriented to person, place and time; behavior/psychomotor activity, no tics, normal psychomotor activity; mood and affect, overall: normal mood " and affect; appearance, overall: well-groomed, good eye contact; speech, overall: normal quality, no aphasia and normal quality, quantity, intact.      Diagnostic Test Results:  Results for orders placed or performed during the hospital encounter of 01/26/17   US Abdomen Limited    Narrative    US ABDOMEN LIMITED 1/26/2017 6:44 PM    CLINICAL INFORMATION: RUQ pain, chronic, history of liver metastasis,  assess for change from recent US (1/18/2017).    COMPARISON: 1/18/2017.    FINDINGS: Limited right upper quadrant ultrasound demonstrates the  gallbladder is absent. No bile duct dilatation. Multiple liver masses.  One of these in the inferior right hepatic lobe measures 3.2 x 2.9 x  2.7 cm. A lesion higher in the right hepatic lobe measures 3.4 x 3.4 x  2.8 cm. Visualized pancreas is unremarkable. Tail of the pancreas is  obscured. Again seen is a 2.1 cm well-circumscribed hypoechoic lesion  in the lateral right kidney which is indeterminate. This is not  convincingly a cyst. No obvious corresponding lesion is seen on the CT  from 1/22/2016. No free fluid in the right upper quadrant.      Impression    IMPRESSION:  1. Again seen are multiple indeterminate hypoechoic liver lesions  measuring up to 3.4 cm. These may be liver metastases.  2. 2.1 cm indeterminate lesion right kidney.  3. No significant change.    WALLY JONES MD   CBC with platelets differential   Result Value Ref Range    WBC 9.6 4.0 - 11.0 10e9/L    RBC Count 4.32 3.8 - 5.2 10e12/L    Hemoglobin 12.6 11.7 - 15.7 g/dL    Hematocrit 38.1 35.0 - 47.0 %    MCV 88 78 - 100 fl    MCH 29.2 26.5 - 33.0 pg    MCHC 33.1 31.5 - 36.5 g/dL    RDW 13.4 10.0 - 15.0 %    Platelet Count 326 150 - 450 10e9/L    Diff Method Automated Method     % Neutrophils 59.0 %    % Lymphocytes 30.1 %    % Monocytes 7.4 %    % Eosinophils 3.1 %    % Basophils 0.1 %    % Immature Granulocytes 0.3 %    Nucleated RBCs 0 0 /100    Absolute Neutrophil 5.7 1.6 - 8.3 10e9/L     Absolute Lymphocytes 2.9 0.8 - 5.3 10e9/L    Absolute Monocytes 0.7 0.0 - 1.3 10e9/L    Absolute Eosinophils 0.3 0.0 - 0.7 10e9/L    Absolute Basophils 0.0 0.0 - 0.2 10e9/L    Abs Immature Granulocytes 0.0 0 - 0.4 10e9/L    Absolute Nucleated RBC 0.0    Comprehensive metabolic panel   Result Value Ref Range    Sodium 141 133 - 144 mmol/L    Potassium 3.5 3.4 - 5.3 mmol/L    Chloride 105 94 - 109 mmol/L    Carbon Dioxide 27 20 - 32 mmol/L    Anion Gap 9 3 - 14 mmol/L    Glucose 98 70 - 99 mg/dL    Urea Nitrogen 12 7 - 30 mg/dL    Creatinine 0.86 0.52 - 1.04 mg/dL    GFR Estimate 69 >60 mL/min/1.7m2    GFR Estimate If Black 84 >60 mL/min/1.7m2    Calcium 9.2 8.5 - 10.1 mg/dL    Bilirubin Total 0.9 0.2 - 1.3 mg/dL    Albumin 3.7 3.4 - 5.0 g/dL    Protein Total 7.5 6.8 - 8.8 g/dL    Alkaline Phosphatase 94 40 - 150 U/L    ALT 25 0 - 50 U/L    AST 11 0 - 45 U/L         ICD-10-CM    1. Oral aphthae K12.0 triamcinolone (KENALOG) 0.1 % paste   2. Hyperglycemia R73.9 Hemoglobin A1c     TSH   3. Chronic pain syndrome G89.4 oxyCODONE-acetaminophen (PERCOCET) 5-325 MG per tablet     DISCONTINUED: oxyCODONE-acetaminophen (PERCOCET) 5-325 MG per tablet   4. Hypertension goal BP (blood pressure) < 140/80 I10    5. Hyperlipidemia with target LDL less than 130 E78.5         .    Side effects benefits and risks thoroughly discussed. .she may come in early if unimproved or getting worse          Importance of adhering to regimen discussed and if medications were dispensed, the importance of taking medications discussed and bringing in the medications after every visit for chronic problems         Please drink 2 glasses of water prior to meals and walk 15-30 minutes after meals    I spent 25 MINUTES SPENT  with patient discussing the following issues   The primary encounter diagnosis was Oral aphthae. Diagnoses of Hyperglycemia, Chronic pain syndrome, Hypertension goal BP (blood pressure) < 140/80, and Hyperlipidemia with target LDL  less than 130 were also pertinent to this visit. over half of which involved counseling and coordination of care.    Patient Instructions     [id number] calory diet    Walking and  FIT BIT TWITCH RECOMMENDED  (K12.0) Oral aphthae  (primary encounter diagnosis)    Comment:      Plan: triamcinolone (KENALOG) 0.1 % paste                   (R73.9) Hyperglycemia    Comment:      Plan: Hemoglobin A1c, TSH                   (G89.4) Chronic pain syndrome    Comment:      Plan: oxyCODONE-acetaminophen (PERCOCET) 5-325 MG per          tablet, DISCONTINUED: oxyCODONE-acetaminophen           (PERCOCET) 5-325 MG per tablet                   (I10) Hypertension goal BP (blood pressure) < 140/80    Comment:      Plan:          (E78.5) Hyperlipidemia with target LDL less than 130    Comment:      Plan:        OTTO YUSUF JR., MD           Diet:  MEDITERRANEAN DIET AND DIABETES DIET      Exercise:  WALKING RANGE OF MOTION BACK  AND FIT BIT TWITCH RECOMMENDED    Exercises Range of motion, balance, isometric, and strengthening exercises 30 repetitions twice daily of involved joints      .OTTO YUSUF MD 4/3/2017 10:37 AM  April 3, 2017

## 2017-04-04 LAB — TSH SERPL DL<=0.05 MIU/L-ACNC: 1.62 MU/L (ref 0.4–4)

## 2017-04-14 DIAGNOSIS — G89.4 CHRONIC PAIN SYNDROME: ICD-10-CM

## 2017-04-14 NOTE — TELEPHONE ENCOUNTER
Reason for Call:  Medication or medication refill:    Do you use a [facility]?  Name of the pharmacy and phone number for the current request:  not applicable    Name of the medication requested: oxyCODONE-acetaminophen (PERCOCET) 5-325 MG per tablet    Other request:     Can we leave a detailed message on this number? YES    Phone number patient can be reached at: Home number on file [phone] (home)    Best Time:     Call taken on 4/14/2017 at 10:36 AM by ELIEZER ARANGO

## 2017-04-14 NOTE — TELEPHONE ENCOUNTER
Controlled Substance Refill Request for oxyCODONE-acetaminophen (PERCOCET) 5-325 MG per tablet  Problem List Complete:  Yes Patient is followed by OTTO YUSUF MD for ongoing prescription of pain medication.  All refills should only be approved by this provider, or covering partner.    Medication(s):  PERCOCET 5MG PO FOUR TIMES DAILY .   Maximum quantity per month: 120   Clinic visit frequency required: Q 2 weeks     Controlled substance agreement:  Encounter-Level CSA - 12/22/15:               Controlled Substance Agreement - Scan on 12/28/2015  2:53 PM : CONTROLLED MEDICATION CONTRACT, 12-22-15 (below)          Encounter-Level CSA - 4/10/15:               Controlled Substance Agreement - Scan on 4/17/2015  1:45 PM : [facility], Controlled Substance Contract, 04-10-15 (below)            Pain Clinic evaluation in the past: Yes       Date/Location:      DIRE Total Score(s):  No flowsheet data found.    Last [facility] website verification:  done on 01/03/2017   [email or web address]  REFERRAL [facility] AS SOON AS POSSIBLE   FAILED DRANK ALCOHOL BEFORE LAST OFFICE VISIT        checked in past 6 months?  Yes 01/03/2017        Last Written Prescription Date: 04/10/2017  Last Fill Quantity: 30,  # refills: 0   Last Office Visit with [facility], [facility] or [facility] prescribing provider: 04/03/2017                                         Next 5 appointments (look out 90 days)     Apr 18, 2017  9:45 AM CDT   [facility] [address] [address] [phone]

## 2017-04-18 ENCOUNTER — OFFICE VISIT (OUTPATIENT)
Dept: FAMILY MEDICINE | Facility: CLINIC | Age: 52
End: 2017-04-18

## 2017-04-18 VITALS
WEIGHT: 262 LBS | DIASTOLIC BLOOD PRESSURE: 72 MMHG | HEART RATE: 65 BPM | BODY MASS INDEX: 42.29 KG/M2 | OXYGEN SATURATION: 97 % | SYSTOLIC BLOOD PRESSURE: 128 MMHG | RESPIRATION RATE: 20 BRPM | TEMPERATURE: 96.8 F

## 2017-04-18 DIAGNOSIS — F33.41 RECURRENT MAJOR DEPRESSION IN PARTIAL REMISSION (H): Chronic | ICD-10-CM

## 2017-04-18 DIAGNOSIS — G44.229 CHRONIC TENSION-TYPE HEADACHE, NOT INTRACTABLE: ICD-10-CM

## 2017-04-18 DIAGNOSIS — M17.11 PRIMARY OSTEOARTHRITIS OF RIGHT KNEE: Primary | ICD-10-CM

## 2017-04-18 DIAGNOSIS — E78.5 HYPERLIPIDEMIA WITH TARGET LDL LESS THAN 130: Chronic | ICD-10-CM

## 2017-04-18 DIAGNOSIS — E66.01 MORBID OBESITY DUE TO EXCESS CALORIES (H): Chronic | ICD-10-CM

## 2017-04-18 DIAGNOSIS — M51.379 DEGENERATION OF LUMBOSACRAL INTERVERTEBRAL DISC: ICD-10-CM

## 2017-04-18 DIAGNOSIS — I10 HYPERTENSION GOAL BP (BLOOD PRESSURE) < 140/80: Chronic | ICD-10-CM

## 2017-04-18 DIAGNOSIS — F43.10 PTSD (POST-TRAUMATIC STRESS DISORDER): Chronic | ICD-10-CM

## 2017-04-18 DIAGNOSIS — G89.4 CHRONIC PAIN SYNDROME: ICD-10-CM

## 2017-04-18 RX ORDER — OXYCODONE AND ACETAMINOPHEN 5; 325 MG/1; MG/1
1 TABLET ORAL EVERY 4 HOURS PRN
Qty: 30 TABLET | Refills: 0 | OUTPATIENT
Start: 2017-04-18

## 2017-04-18 RX ORDER — OXYCODONE AND ACETAMINOPHEN 5; 325 MG/1; MG/1
1 TABLET ORAL EVERY 4 HOURS PRN
Qty: 30 TABLET | Refills: 0 | Status: SHIPPED | OUTPATIENT
Start: 2017-04-18 | End: 2017-04-25

## 2017-04-18 NOTE — PROGRESS NOTES
SUBJECTIVE:                                                    Ines Mott is a 52 year old female who presents to clinic today for the following health issues:        Chronic Pain Follow-Up       Type / Location of Pain:   Analgesia/pain control:       Recent changes:  same      Overall control: Tolerable with discomfort  Activity level/function:      Daily activities:  Able to do moderate activities and Can do most things most days, with some rest    Work:  Unable to work  Adverse effects:  No  Adherance    Taking medication as directed?  Yes    Participating in other treatments: no -   Risk Factors:    Sleep:  Poor    Mood/anxiety:  controlled    Recent family or social stressors:  none noted    Other aggravating factors: none  PHQ-9 SCORE 5/6/2016 11/4/2016 11/25/2016   Total Score - - -   Total Score 10 7 3     RASTA-7 SCORE 8/24/2015 11/4/2016   Total Score 7 9     Encounter-Level CSA - 12/22/2016:                 Controlled Substance Agreement - Scan on 1/5/2017  1:14 PM : CONTROLLED SUBSTANCE AGREEMENT (below)          Encounter-Level CSA - 12/22/2016:                 Controlled Substance Agreement - Scan on 12/28/2015  2:53 PM : CONTROLLED MEDICATION CONTRACT, 12-22-15 (below)          Encounter-Level CSA - 12/22/2016:                 Controlled Substance Agreement - Scan on 4/17/2015  1:45 PM : [facility], Controlled Substance Contract, 04-10-15 (below)                 Amount of exercise or physical activity: 2-3 days/week for an average of 15-30 minutes    Problems taking medications regularly: No    Medication side effects: none    Diet: regular (no restrictions)  .OTTO YUSUF MD  04/18/17      Ines Mott is a 52 year old female who is who presents with  FOLLOW UP  RIGHT UPPER QUADRANT ABDOMINAL PAIN  HEADACHES   CHRONIC PAIN SYNDROME   RIGHT KNEE PAIN   PAIN MEDICATIONS LASTED 3 WEEKS   OVERDUE X ONE WEEK   Onset :  LONG STANDING    Severity: MODERATE     Home treatments  RANGE OF MOTION  AND EXERCISES    Additional Symptoms:  DEPRESSION AND ANXIETY IMPROVED  Course  ONGOING     In addition I am monitoring .has BMI 42 and Hypertension goal BP (blood pressure) < 140/80 on her pertinent problem list.   has BMI 42 and Hypertension goal BP (blood pressure) < 140/80 on her pertinent problem list.     SHORTNESS OF BREATH IMPROVED     RIGHT LIVER METS with VASOACTIVE     OBSTRUCTIVE SLEEP APNEA WITH     MYELOMENINGOCOELE     NECK PAIN     HYSTERECTOMY FOR BENIGHTN     HISTORY OF RENAL MASS    COMPLEX HEADACHES     SEASONAL ALLERGIC RHINITIS     ANXIETY     POST TRAUMATIC STRESS SYMPTOMS     PRE DIABETES     CHRONIC TENSION HEADACHES    DEPRESSION AND MANIPULATIVE BEHAVIOR     MILD INTERMITTENT ASTHMA CONTROLLED    RECENT APTHOUS ULCER LEFT LOWER LIP     CHRONIC LOWER BACK PAIN with RADICULOPATHY       .  Current Outpatient Prescriptions   Medication Sig Dispense Refill     oxyCODONE-acetaminophen (PERCOCET) 5-325 MG per tablet Take 1 tablet by mouth every 4 hours as needed for moderate to severe pain 30 tablet 0     triamcinolone (KENALOG) 0.1 % paste Take by mouth 2 times daily 5 g 0     ondansetron (ZOFRAN ODT) 4 MG ODT tab Take 1 tablet (4 mg) by mouth every 8 hours as needed for nausea 10 tablet 0     Vitamin D, Cholecalciferol, 1000 UNITS TABS Take 2,000 Units by mouth daily 60 tablet 11     cyanocobalamin (VITAMIN B12) 1000 MCG/ML injection Inject 1 mL (1,000 mcg) Subcutaneous every 30 days 1 mL 11     potassium chloride (K-TAB,KLOR-CON) 10 MEQ tablet Take 1 tablet (10 mEq) by mouth daily 120 tablet 11     sertraline (ZOLOFT) 100 MG tablet Take 1 tablet (100 mg) by mouth daily 30 tablet 11     losartan-hydrochlorothiazide (HYZAAR) 50-12.5 MG per tablet Take 1 tablet by mouth daily 90 tablet 3     albuterol (2.5 MG/3ML) 0.083% neb solution Take 1 vial (2.5 mg) by nebulization every 6 hours as needed for shortness of breath / dyspnea or wheezing 30 vial 3     magnesium oxide (MAG-OX) 400 (241.3 MG) MG  tablet Take 1 tablet (400 mg) by mouth daily 90 tablet 3     albuterol (VENTOLIN HFA) 108 (90 BASE) MCG/ACT Inhaler INHALE TWO PUFFS BY MOUTH EVERY 4 HOURS AS NEEDED FOR SHORTNESS OF BREATH/DYSPNEA 54 g 1     mometasone-formoterol (DULERA) 100-5 MCG/ACT oral inhaler Inhale 2 puffs into the lungs 2 times daily 1 Inhaler 11     blood glucose monitoring (NO BRAND SPECIFIED) meter device kit Use to test blood sugar larisa times daily or as directed. 1 kit 0     blood glucose (NO BRAND SPECIFIED) lancets standard Use to test blood sugar daily times daily or as directed. 1 Box 11     blood glucose monitoring (NO BRAND SPECIFIED) test strip Use to test blood sugars once times daily or as directed 100 strip 3     blood glucose calibration (NO BRAND SPECIFIED) solution Use to calibrate blood glucose monitor as directed. 1 each 0     polyethylene glycol (MIRALAX) powder Take 17 g (1 capful) by mouth daily 510 g 3     STATIN NOT PRESCRIBED, INTENTIONAL, 1 each daily Statin not prescribed intentionally due to Active liver disease (liver failure, cirrhosis, hepatitis)  LIVER METS 0 each 0     EPINEPHrine (EPIPEN) 0.3 MG/0.3ML injection Inject 0.3 mLs (0.3 mg) into the muscle once as needed for anaphylaxis 2 each 5          Allergies   Allergen Reactions     Ativan [Lorazepam] Anaphylaxis     Macrobid [Nitrofurantoin] Anaphylaxis     Amoxicillin      Buspirone      Buspar     Cephalosporins      Dilaudid [Hydromorphone]      Diphenhydramine Hcl      Benadryl     Imitrex [Sumatriptan Succinate]      blood pressure raised uncontrobably     Ketorolac Tromethamine      Toradol     Lansoprazole      Prevacid     Metoclopramide Hives     Reglan     Paroxetine      Paxil     Penicillins      Prednisone Hives     Sulfa Drugs      THICKENED AND DIFFICULTY SWALLOWING      Lidoderm [Lidocaine] Rash     Localized rash at patch site       Immunization History   Administered Date(s) Administered     Influenza Vaccine IM 3yrs+ 4 Valent IIV4  2015, 2016     TDAP Vaccine (Adacel) 2015         reports that she does not drink alcohol.      reports that she does not use illicit drugs.    family history includes CANCER in her father.    indicated that her mother is alive. She indicated that her father is .      has a past surgical history that includes GYN surgery; Cholecystectomy; appendectomy; Extraction(s) dental; and Implant shunt ventriculoperitoneal.     reports that she does not engage in sexual activity.  .  Pediatric History   Patient Guardian Status     Mother:  Sada Francois     Other Topics Concern     Parent/Sibling W/ Cabg, Mi Or Angioplasty Before 65f 55m? No     Social History Narrative         reports that she has quit smoking. She has never used smokeless tobacco.    Medical, social, surgical, and family histories reviewed.    Problem list, Medication list, Allergies, and Medical/Social/Surgical histories reviewed in [facility] and updated as appropriate.  Labs reviewed in [facility]  Patient Active Problem List   Diagnosis     Dyspnea and respiratory abnormality     Other specified drug dependence, in remission     Carcinoma of colon metastatic to liver (H)     Kidney infection     ALEXUS (obstructive sleep apnea)     Myelomeningocele with hydrocephalus, cervical region (H)     Fibromyalgia     Angioedema     Pneumonia due to other Gram-negative bacteria     BMI 42     H/O hysterectomy for benign disease     Hyperlipidemia with target LDL less than 130     Infected tooth     Recurrent major depression in partial remission     Hypertension goal BP (blood pressure) < 140/80     Abdominal pain, right lateral     Renal mass, right     Health Care Home     Intracranial shunt     Migraine     Mild intermittent asthma without complication     Other complicated headache syndrome     Seasonal affective disorder (H)     Anxiety     PTSD (post-traumatic stress disorder)     Pre diabetes      Chronic pain syndrome     Chronic  tension-type headache, not intractable     Degeneration of lumbosacral intervertebral disc     Past Surgical History:   Procedure Laterality Date     APPENDECTOMY       CHOLECYSTECTOMY       EXTRACTION(S) DENTAL       GYN SURGERY       IMPLANT SHUNT VENTRICULOPERITONEAL         Social History   Substance Use Topics     Smoking status: Former Smoker     Smokeless tobacco: Never Used     Alcohol use No      Comment: off and on     Family History   Problem Relation Age of Onset     CANCER Father          Allergies   Allergen Reactions     Ativan [Lorazepam] Anaphylaxis     Macrobid [Nitrofurantoin] Anaphylaxis     Amoxicillin      Buspirone      Buspar     Cephalosporins      Dilaudid [Hydromorphone]      Diphenhydramine Hcl      Benadryl     Imitrex [Sumatriptan Succinate]      blood pressure raised uncontrobably     Ketorolac Tromethamine      Toradol     Lansoprazole      Prevacid     Metoclopramide Hives     Reglan     Paroxetine      Paxil     Penicillins      Prednisone Hives     Sulfa Drugs      THICKENED AND DIFFICULTY SWALLOWING      Lidoderm [Lidocaine] Rash     Localized rash at patch site     Recent Labs   Lab Test  04/03/17   1004  01/26/17   1803  01/18/17   1716  01/07/17   0120   11/04/16   1407   04/07/16   1620   05/07/15   1222   10/21/14   0230   A1C  5.9   --    --    --    --   6.1*   --   5.7   --   6.0   < >   --    LDL   --    --    --    --    --   96   --   101*   --   127   --    --    HDL   --    --    --    --    --   42*   --   42*   --   36*   --    --    TRIG   --    --    --    --    --   161*   --   306*   --   177*   --    --    ALT   --   25  17  41   < >  27   < >  30   < >  24   < >  31   CR   --   0.86  0.66  0.75   < >  1.22*   < >   --    < >   --    < >  0.82   GFRESTIMATED   --   69  >90  Non  GFR Calc    81   < >  46*   < >   --    < >   --    < >  73   GFRESTBLACK   --   84  >90   GFR Calc    >90   GFR Calc     < >  56*   < >    --    < >   --    < >  89   POTASSIUM   --   3.5  3.1*  3.7   < >  3.8   < >   --    < >   --    < >  3.8   TSH  1.62   --    --    --    --    --    --    --    --    --    --   2.30    < > = values in this interval not displayed.        BP Readings from Last 6 Encounters:   04/18/17 128/72   04/03/17 132/70   03/03/17 126/64   03/01/17 (!) 152/98   02/17/17 124/64   02/02/17 118/64       Wt Readings from Last 3 Encounters:   04/18/17 262 lb (118.8 kg)   04/03/17 266 lb (120.7 kg)   03/03/17 272 lb 9.6 oz (123.7 kg)         Positive symptoms or findings indicated by bold designation:     ROS: 10 point ROS neg other than the symptoms noted above in the HPI.except  has Dyspnea and respiratory abnormality; Other specified drug dependence, in remission; Carcinoma of colon metastatic to liver (H); Kidney infection; ALEXUS (obstructive sleep apnea); Myelomeningocele with hydrocephalus, cervical region (H); Fibromyalgia; Angioedema; Pneumonia due to other Gram-negative bacteria; BMI 42; H/O hysterectomy for benign disease; Hyperlipidemia with target LDL less than 130; Infected tooth; Recurrent major depression in partial remission; Hypertension goal BP (blood pressure) < 140/80; Abdominal pain, right lateral; Renal mass, right; Health Care Home; Intracranial shunt; Migraine; Mild intermittent asthma without complication; Other complicated headache syndrome; Seasonal affective disorder (H); Anxiety; PTSD (post-traumatic stress disorder); Pre diabetes ; Chronic pain syndrome; Chronic tension-type headache, not intractable; and Degeneration of lumbosacral intervertebral disc on her problem list.   Constitutional: The patient denied fatigue, fever, insomnia, night sweats, recent illness and weight loss.  MILD WEIGHT LOSS     Eyes: The patient denied blindness, eye pain, eye tearing, photophobia, vision change and visual disturbance.       Ears/Nose/Throat/Neck: The patient denied dizziness, facial pain, hearing loss, nasal  discharge, oral pain, otalgia, postnasal drip, sinus congestion, sore throat, tinnitus and voice change.   IMPROVED HEDACHACHES     Cardiovascular: The patient denied arrhythmia, chest pain/pressure, claudication, edema, exercise intolerance, fatigue, orthopnea, palpitations and syncope.      Respiratory: The patient denied asthma, chest congestion, cough, dyspnea on exertion, dyspnea/shortness of breath, hemoptysis, pedal edema, pleuritic pain, productive sputum, snoring and wheezing. NORMAL BREATHING     Gastrointestinal: The patient denied abdominal pain, anorexia, constipation, diarrhea, dysphagia, gastroesophageal reflux, hematochezia, hemorrhoids, melena, nausea and vomiting . GASTROESOPHAGEAL REFLUX DISEASE WITHOUT ESOPHAGITIS   RIGHT SIDED ABDOMINAL PAIN RECURRENT   VASOACTIVE TUMOR     Genitourinary/Nephrology: The patient denied breast complaint, dysuria, nocturia sexual dysfunction, t, urinary frequency, urinary incontinence, urinary urgency    HYSTERECTOMY HISTORY   HOT FLASHES     Musculoskeletal: The patient denied arthralgia(s), back pain, joint complaint, muscle weakness, myalgias, osteoporosis, sciatica, stiffness and swelling.  CHRONIC LOWER BACK PAIN     Dermatoligic:: The patient denied acne, dermatitis, ecchymosis, itching, mole change, rash, skin cancer, skin lesion and sores.      Neurologic: The patient denied dizziness, gait abnormality, headache, memory loss, mental status change, paresis, paresthesia, seizure, syncope, tremor and vision change.     Psychiatric: The patient denied anxiety, depression, disturbances of memory, drug abuse, insomnia, mood swings and relationship difficulties.  DEPRESSION ANXIETY AND DRUG ABUSE HISTORY     Endocrine: The patient denied , goiter, obesity, polyuria and thyroid disease.  MORBID OBESITY AND DIABETES     Hematologic/Lymphatic: The patient denied abnormal bleeding and bruising, abnormal ecchymoses, anemia, lymph node enlargement/mass, petechiae and  venous  Thrombosis.      Allergy/Immunology: The patient denied food allergy and  Allergic rhinitis or conjunctivitis.        PE:  /72 (BP Location: Left arm, Cuff Size: Adult Large)  Pulse 65  Temp 96.8  F (36  C) (Tympanic)  Resp 20  Wt 262 lb (118.8 kg)  SpO2 97%  BMI 42.29 kg/m2 Body mass index is 42.29 kg/(m^2).    Constitutional: general appearance, well nourished, well developed, in no acute distress, well developed, appears stated age, normal body habitus,      Eyes:; The patient has normal eyelids sclerae and conjunctivae :      Ears/Nose/Throat: external ear, overall: normal appearance; external nose, overall: benign appearance, normal moujth gums and lips  The patient has:      Neck: thyroid, overall: normal size, normal consistency, nontender,      Respiratory:  palpation of chest, overall: normal excursion,    Clear to percussion and auscultation  WITHIN NORMAL LIMITS     Tachypnea  NORMAL  Color  NORMAL     Cardiovascular:  Good color with no peripheral edema    Regular sinus rhythm without murmur. Physiologic heart sounds Heart is unelarged  .   Chest/Breast: normal shape       Abdominal exam,  Liver and spleen are  unenlarged        Tenderness  WITHIN NORMAL LIMITS    Scars  NOT APPLICABLE     Urogenital; no renal, flank or bladder  tenderness;  NORMAL     Lymphatic: neck nodes,  NORMAL    Other nodes NOT APPLICABLE      Musculoskeletal:  Brief ortho exam normal except:   DECREASE RANGE OF MOTION OF BACK   NEGATIVE STRAIGHT LEG RAISING TEST   NORMAL REFLEXES    FULL RANGE OF MOTION OF KNEE    TREATMENT PROGNOSIS BENEFITS AND RISKS DISCUSSED     ANATOMIC SITE: RIGHT MEDIAL KNEE     PROCEDURE:  INJECTION     BETADYNE CLEANSING WITHOUT COMPLICATION     1:1 MIXTURE KENALOG 1% LIDOCAINE WITHOUT COMPLICATION     AMOUNT OF KENALO MG 1:1 LIDOCAINE     NDC NUMBER [id number]    INJECTION WITH NO TOUCH TECHNIQUE    SIDE EFFECTS BENEFITS AND RISKS DISCUSSED      TREATMENT PROGNOSIS  BENEFITS AND RISKS DISCUSSED     MONITOR FOR ALLERGIC AND VASCULAR SIDE EFFECTS    MEDICATION RISKS SIDE EFFECTS BENEFITS AND RISKS DISCUSSED     OTTO YUSUF JR., MD     04/18/17          Integument: inspection of skin, no rash, lesions; and, palpation, no induration, no tenderness.      Neurologic mental status, overall: alert and oriented; gait, no ataxia, no unsteadiness; coordination, no tremors; cranial nerves, overall: normal motor, overall: normal bulk, tone.      Psychiatric: orientation/consciousness, overall: oriented to person, place and time; behavior/psychomotor activity, no tics, normal psychomotor activity; mood and affect, overall: normal mood and affect; appearance, overall: well-groomed, good eye contact; speech, overall: normal quality, no aphasia and normal quality, quantity, intact.      Diagnostic Test Results:  Results for orders placed or performed in visit on 04/03/17   Hemoglobin A1c   Result Value Ref Range    Hemoglobin A1C 5.9 4.3 - 6.0 %   TSH   Result Value Ref Range    TSH 1.62 0.40 - 4.00 mU/L         ICD-10-CM    1. Primary osteoarthritis of right knee M17.11 Lidocaine 1% or 2%     []   2. Hypertension goal BP (blood pressure) < 140/80 I10    3. Morbid obesity due to excess calories (H) E66.01    4. Chronic pain syndrome G89.4 Chemical Health Assessment     oxyCODONE-acetaminophen (PERCOCET) 5-325 MG per tablet   5. Chronic tension-type headache, not intractable G44.229    6. Degeneration of lumbosacral intervertebral disc M51.37    7. Recurrent major depression in partial remission F33.41    8. Hyperlipidemia with target LDL less than 130 E78.5    9. PTSD (post-traumatic stress disorder) F43.10         .    Side effects benefits and risks thoroughly discussed. .she may come in early if unimproved or getting worse          Importance of adhering to regimen discussed and if medications were dispensed, the importance of taking medications discussed and bringing in the  medications after every visit for chronic problems         Please drink 2 glasses of water prior to meals and walk 15-30 minutes after meals    I spent 25 MINUTES SPENT  with patient discussing the following issues   The primary encounter diagnosis was Primary osteoarthritis of right knee. Diagnoses of Hypertension goal BP (blood pressure) < 140/80, Morbid obesity due to excess calories (H), Chronic pain syndrome, Chronic tension-type headache, not intractable, Degeneration of lumbosacral intervertebral disc, Recurrent major depression in partial remission, Hyperlipidemia with target LDL less than 130, and PTSD (post-traumatic stress disorder) were also pertinent to this visit. over half of which involved counseling and coordination of care.    Patient Instructions   (I10) Hypertension goal BP (blood pressure) < 140/80  Comment:    Plan:      (E66.01) Morbid obesity due to excess calories (H)  Comment:    Plan:      (G89.4) Chronic pain syndrome  Comment:    Plan: Chemical Health Assessment             (G44.229) Chronic tension-type headache, not intractable  Comment:    Plan:      (M51.37) Degeneration of lumbosacral intervertebral disc  Comment:    Plan:      (F33.41) Recurrent major depression in partial remission  Comment:    Plan:      (E78.5) Hyperlipidemia with target LDL less than 130  Comment:    Plan:      (F43.10) PTSD (post-traumatic stress disorder)  Comment:    Plan:            TREATMENT PROGNOSIS BENEFITS AND RISKS DISCUSSED     ANATOMIC SITE:  Right medial knee    PROCEDURE:     BETADYNE CLEANSING WITHOUT COMPLICATION     1:1 MIXTURE KENALOG 1% LIDOCAINE WITHOUT COMPLICATION     AMOUNT OF KENALO:1 mixture Kenalog 60mg kenalog  Lidocaine 1`%      NDC NUMBER [id number]    INJECTION WITH NO TOUCH TECHNIQUE    SIDE EFFECTS BENEFITS AND RISKS DISCUSSED      TREATMENT PROGNOSIS BENEFITS AND RISKS DISCUSSED     MONITOR FOR ALLERGIC AND VASCULAR SIDE EFFECTS    MEDICATION RISKS SIDE EFFECTS  BENEFITS AND RISKS DISCUSSED     OTTO YUSUF JR., MD     04/18/17            Diet:  MEDITERRANEAN DIET AND DIABETES DIET      Exercise:  WALKING RANGE OF MOTION BACK  AND FIT BIT TWITCH RECOMMENDED    Exercises Range of motion, balance, isometric, and strengthening exercises 30 repetitions twice daily of involved joints      .OTTO YUSUF MD 4/3/2017 10:37 AM  April 3, 2017

## 2017-04-18 NOTE — MR AVS SNAPSHOT
After Visit Summary   2017    Ines Mott    MRN: [id number]           Patient Information     Date Of Birth          1965        Visit Information        Provider Department      2017 9:45 AM Dustin Fatima MD [facility]        Today's Diagnoses     Hypertension goal BP (blood pressure) < 140/80        Morbid obesity due to excess calories (H)        Chronic pain syndrome        Chronic tension-type headache, not intractable        Degeneration of lumbosacral intervertebral disc        Recurrent major depression in partial remission        Hyperlipidemia with target LDL less than 130        PTSD (post-traumatic stress disorder)          Care Instructions    (I10) Hypertension goal BP (blood pressure) < 140/80  Comment:    Plan:      (E66.01) Morbid obesity due to excess calories (H)  Comment:    Plan:      (G89.4) Chronic pain syndrome  Comment:    Plan: Chemical Health Assessment             (G44.229) Chronic tension-type headache, not intractable  Comment:    Plan:      (M51.37) Degeneration of lumbosacral intervertebral disc  Comment:    Plan:      (F33.41) Recurrent major depression in partial remission  Comment:    Plan:      (E78.5) Hyperlipidemia with target LDL less than 130  Comment:    Plan:      (F43.10) PTSD (post-traumatic stress disorder)  Comment:    Plan:            TREATMENT PROGNOSIS BENEFITS AND RISKS DISCUSSED     ANATOMIC SITE:  Right medial knee    PROCEDURE:     BETADYNE CLEANSING WITHOUT COMPLICATION     1:1 MIXTURE KENALOG 1% LIDOCAINE WITHOUT COMPLICATION     AMOUNT OF KENALO:1 mixture Kenalog 60mg kenalog  Lidocaine 1`%      NDC NUMBER [id number]    INJECTION WITH NO TOUCH TECHNIQUE    SIDE EFFECTS BENEFITS AND RISKS DISCUSSED      TREATMENT PROGNOSIS BENEFITS AND RISKS DISCUSSED     MONITOR FOR ALLERGIC AND VASCULAR SIDE EFFECTS    MEDICATION RISKS SIDE EFFECTS BENEFITS AND RISKS DISCUSSED      OTTO YUSUF JR., MD     04/18/17              Follow-ups after your visit        Additional Services     Chemical Health Assessment       Your provider has referred you to: [facility]: [facility] [phone]   [email or web address]  Needs chemical dependency evaluation and counseling per    Has stopped alcohol  Tapered on pain medications   Not overusing at present  Time  History of severe headaches   Chronic lower back pain and knee pain   Slowly growing vasoactive in her liver   Anxiety and depression  She has  Failed 3 pain clinics now in between  Stopped going to emergency room   All scheduling is subject to the client's specific insurance plan & benefits, provider/location availability, and provider clinical specialities.  Please arrive 15 minutes early for your first appointment and bring your completed paperwork.    Please be aware that coverage of these services is subject to the terms and limitations of your health insurance plan.  Call member services at your health plan with any benefit or coverage questions.                  Your next 10 appointments already scheduled     May 09, 2017 10:00 AM CDT   New Sleep Patient with MICHEAL Dixon CNP   [facility], [place], Sleep Study ([facility])    [address] [place] [id number] [phone]              Who to contact     If you have questions or need follow up information about today's clinic visit or your schedule please contact [facility] directly at [phone].  Normal or non-critical lab and imaging results will be communicated to you by MyChart, letter or phone within 4 business days after the clinic has received the results. If you do not hear from us within 7 days, please contact the clinic through MyChart or phone. If you have a critical or abnormal  "lab result, we will notify you by phone as soon as possible.  Submit refill requests through [employer] or call your pharmacy and they will forward the refill request to us. Please allow 3 business days for your refill to be completed.          Additional Information About Your Visit        [employer] Information     [employer] lets you send messages to your doctor, view your test results, renew your prescriptions, schedule appointments and more. To sign up, go to [email or web address] . Click on \"Log in\" on the left side of the screen, which will take you to the Welcome page. Then click on \"Sign up Now\" on the right side of the page.     You will be asked to enter the access code listed below, as well as some personal information. Please follow the directions to create your username and password.     Your access code is: [email or web address]  Expires: 2017  9:57 AM     Your access code will  in 90 days. If you need help or a new code, please call your [place] clinic or [phone].        Care EveryWhere ID     This is your Care EveryWhere ID. This could be used by other organizations to access your [place] medical records  [id number]        Your Vitals Were     Pulse Temperature Respirations Pulse Oximetry BMI (Body Mass Index)       65 96.8  F (36  C) (Tympanic) 20 97% 42.29 kg/m2        Blood Pressure from Last 3 Encounters:   17 128/72   17 132/70   17 126/64    Weight from Last 3 Encounters:   17 262 lb (118.8 kg)   17 266 lb (120.7 kg)   17 272 lb 9.6 oz (123.7 kg)              We Performed the Following     Chemical Health Assessment          Where to get your medicines      Some of these will need a paper prescription and others can be bought over the counter.  Ask your nurse if you have questions.     Bring a paper prescription for each of these medications     oxyCODONE-acetaminophen 5-325 MG per tablet          Primary Care Provider Office Phone # Fax #    Dustin " Rina Fatima [phone]       [facility] [address] [address]  [facility] [address]        Thank you!     Thank you for choosing [facility]  for your care. Our goal is always to provide you with excellent care. Hearing back from our patients is one way we can continue to improve our services. Please take a few minutes to complete the written survey that you may receive in the mail after your visit with us. Thank you!             Your Updated Medication List - Protect others around you: Learn how to safely use, store and throw away your medicines at www.disposemymeds.org.          This list is accurate as of: 4/18/17 10:37 AM.  Always use your most recent med list.                   Brand Name Dispense Instructions for use    * albuterol (2.5 MG/3ML) 0.083% neb solution     30 vial    Take 1 vial (2.5 mg) by nebulization every 6 hours as needed for shortness of breath / dyspnea or wheezing       * albuterol 108 (90 BASE) MCG/ACT Inhaler    VENTOLIN HFA    54 g    INHALE TWO PUFFS BY MOUTH EVERY 4 HOURS AS NEEDED FOR SHORTNESS OF BREATH/DYSPNEA       blood glucose calibration solution    no brand specified    1 each    Use to calibrate blood glucose monitor as directed.       blood glucose lancets standard    no brand specified    1 Box    Use to test blood sugar daily times daily or as directed.       blood glucose monitoring meter device kit    no brand specified    1 kit    Use to test blood sugar larisa times daily or as directed.       blood glucose monitoring test strip    no brand specified    100 strip    Use to test blood sugars once times daily or as directed       cyanocobalamin 1000 MCG/ML injection    VITAMIN B12    1 mL    Inject 1 mL (1,000 mcg) Subcutaneous every 30 days       EPINEPHrine 0.3 MG/0.3ML injection     2 each    Inject 0.3 mLs (0.3 mg) into the muscle once as needed for anaphylaxis       losartan-hydrochlorothiazide 50-12.5  MG per tablet    HYZAAR    90 tablet    Take 1 tablet by mouth daily       magnesium oxide 400 (241.3 MG) MG tablet    MAG-OX    90 tablet    Take 1 tablet (400 mg) by mouth daily       mometasone-formoterol 100-5 MCG/ACT oral inhaler    DULERA    1 Inhaler    Inhale 2 puffs into the lungs 2 times daily       ondansetron 4 MG ODT tab    ZOFRAN ODT    10 tablet    Take 1 tablet (4 mg) by mouth every 8 hours as needed for nausea       oxyCODONE-acetaminophen 5-325 MG per tablet    PERCOCET    30 tablet    Take 1 tablet by mouth every 4 hours as needed for moderate to severe pain       polyethylene glycol powder    MIRALAX    510 g    Take 17 g (1 capful) by mouth daily       potassium chloride 10 MEQ tablet    K-TAB,KLOR-CON    120 tablet    Take 1 tablet (10 mEq) by mouth daily       sertraline 100 MG tablet    ZOLOFT    30 tablet    Take 1 tablet (100 mg) by mouth daily       STATIN NOT PRESCRIBED (INTENTIONAL)     0 each    1 each daily Statin not prescribed intentionally due to Active liver disease (liver failure, cirrhosis, hepatitis) LIVER METS       triamcinolone 0.1 % paste    KENALOG    5 g    Take by mouth 2 times daily       Vitamin D (Cholecalciferol) 1000 UNITS Tabs     60 tablet    Take 2,000 Units by mouth daily       * Notice:  This list has 2 medication(s) that are the same as other medications prescribed for you. Read the directions carefully, and ask your doctor or other care provider to review them with you.

## 2017-04-18 NOTE — LETTER
[facility]    04/18/17    Patient: Ines Mott  YOB: 1965  Medical Record Number: [id number]                                                                  Controlled Substance Agreement  I understand that my care provider has prescribed controlled substances (narcotics, tranquilizers, and/or stimulants) to help manage my condition(s).  I am taking this medicine to help me function or work.  I know that this is strong medicine.  It could have serious side effects and even cause a dependency on the drug.  If I stop these medicines suddenly, I could have severe withdrawal symptoms.    The risks, benefits, and side effects of these medication(s) were explained to me.  I agree that:  1. I will take part in other treatments as advised by my provider.  This may be psychiatry or counseling, physical therapy, behavioral therapy, group treatment, or a referral to a pain clinic.  I will reduce or stop my medicine when my provider tells me to do so.   2. I will take my medicines as prescribed.  I will not change the dose or schedule unless my provider tells me to.  There will be no refills if I  run out early.   I may be contacted at any time without warning and asked to complete a drug test or pill count.   3. I will keep all my appointments at the clinic.  If I miss appointments or fail to follow instructions, my provider may stop my medicine.  4. I will not ask other providers to prescribe controlled substances. And I will not accept controlled substances from other people. If I need another prescribed controlled substance for a new reason, I will notify my provider within one business day.  5. If I enroll in the [place] Medical Marijuana program, I will tell my provider.  I will also sign an agreement to share my medical records with my provider.  6. I will use one pharmacy to fill all of my controlled substance prescriptions.  If my prescription is mailed to my  pharmacy, it may take 5 to 7 days for my medicine to be ready.  7. I understand that my provider, clinic care team, and pharmacy can track controlled substance prescriptions from other providers through a central database (prescription monitoring program).  8. I will bring in my list of medications (or my medicine bottles) each time I come to the clinic.  REV-  04/2016                                                                                                                                            Page 1 of 2      [facility]    04/18/17    Patient: Ines Mott  YOB: 1965  Medical Record Number: [id number]    9. Refills of controlled substances will be made only during office hours.  It is up to me to make sure that I do not run out of my medicines on weekends or holidays.    10. I am responsible for my prescriptions.  If the medicine is lost or stolen, it will not be replaced.   I also agree not to share these medicines with anyone.  11. I agree to not use ANY illegal or recreational drugs.  This includes marijuana, cocaine, bath salts or other drugs.  I agree not to use alcohol unless my provider says I may.  I agree to give urine samples whenever asked.  If I fail to give a urine sample, the provider may stop my medicine.     12. I will tell my nurse or provider right away if I become pregnant or have a new medical problem treated outside of [facility].  13. I understand that this medicine can affect my thinking and judgment.  It may be unsafe for me to drive, use machinery and do dangerous tasks.  I will not do any of these things until I know how the medicine affects me.  If my dose changes, I will wait to see how it affects me.  I will contact my provider if I have concerns about medicine side effects.  I understand that if I do not follow any of the conditions above, my prescriptions or treatment may be stopped.    I agree that my provider,  clinic care team, and pharmacy may work with any city, state or federal law enforcement agency that investigates the misuse, sale, or other diversion of my controlled medicine. I will allow my provider to discuss my care with or share a copy of this agreement with any other treating provider, pharmacy or emergency room where I receive care.  I agree to give up (waive) any right of privacy or confidentiality with respect to these authorizations.   I have read this agreement and have asked questions about anything I did not understand.   ___________________________________    ___________________________  Patient Signature                                                           Date and Time  ___________________________________     ____________________________  Witness                                                                            Date and Time  ___________________________________  OTTO YUSUF MD  REV-  04/2016                                                                                                                                                                 Page 2 of 2

## 2017-04-18 NOTE — NURSING NOTE
"Chief Complaint   Patient presents with     Pain       Initial /72 (BP Location: Left arm, Cuff Size: Adult Large)  Pulse 65  Temp 96.8  F (36  C) (Tympanic)  Resp 20  Wt 262 lb (118.8 kg)  SpO2 97%  BMI 42.29 kg/m2 Estimated body mass index is 42.29 kg/(m^2) as calculated from the following:    Height as of 4/3/17: 5' 6\" (1.676 m).    Weight as of this encounter: 262 lb (118.8 kg).  Medication Reconciliation: complete   Maggi Harris CMA    "

## 2017-04-18 NOTE — PATIENT INSTRUCTIONS
(I10) Hypertension goal BP (blood pressure) < 140/80  Comment:    Plan:      (E66.01) Morbid obesity due to excess calories (H)  Comment:    Plan:      (G89.4) Chronic pain syndrome  Comment:    Plan: Chemical Health Assessment             (G44.229) Chronic tension-type headache, not intractable  Comment:    Plan:      (M51.37) Degeneration of lumbosacral intervertebral disc  Comment:    Plan:      (F33.41) Recurrent major depression in partial remission  Comment:    Plan:      (E78.5) Hyperlipidemia with target LDL less than 130  Comment:    Plan:      (F43.10) PTSD (post-traumatic stress disorder)  Comment:    Plan:            TREATMENT PROGNOSIS BENEFITS AND RISKS DISCUSSED     ANATOMIC SITE:  Right medial knee    PROCEDURE:     BETADYNE CLEANSING WITHOUT COMPLICATION     1:1 MIXTURE KENALOG 1% LIDOCAINE WITHOUT COMPLICATION     AMOUNT OF KENALO:1 mixture Kenalog 60mg kenalog  Lidocaine 1`%      NDC NUMBER [id number]    INJECTION WITH NO TOUCH TECHNIQUE    SIDE EFFECTS BENEFITS AND RISKS DISCUSSED      TREATMENT PROGNOSIS BENEFITS AND RISKS DISCUSSED     MONITOR FOR ALLERGIC AND VASCULAR SIDE EFFECTS    MEDICATION RISKS SIDE EFFECTS BENEFITS AND RISKS DISCUSSED     OTTO YUSUF JR., MD     17

## 2017-04-18 NOTE — LETTER
[facility]    04/18/17    Patient: Ines Mott  YOB: 1965  Medical Record Number: [id number]                                                                  Controlled Substance Agreement  I understand that my care provider has prescribed controlled substances (narcotics, tranquilizers, and/or stimulants) to help manage my condition(s).  I am taking this medicine to help me function or work.  I know that this is strong medicine.  It could have serious side effects and even cause a dependency on the drug.  If I stop these medicines suddenly, I could have severe withdrawal symptoms.    The risks, benefits, and side effects of these medication(s) were explained to me.  I agree that:  1. I will take part in other treatments as advised by my provider.  This may be psychiatry or counseling, physical therapy, behavioral therapy, group treatment, or a referral to a pain clinic.  I will reduce or stop my medicine when my provider tells me to do so.   2. I will take my medicines as prescribed.  I will not change the dose or schedule unless my provider tells me to.  There will be no refills if I  run out early.   I may be contacted at any time without warning and asked to complete a drug test or pill count.   3. I will keep all my appointments at the clinic.  If I miss appointments or fail to follow instructions, my provider may stop my medicine.  4. I will not ask other providers to prescribe controlled substances. And I will not accept controlled substances from other people. If I need another prescribed controlled substance for a new reason, I will notify my provider within one business day.  5. If I enroll in the [place] Medical Marijuana program, I will tell my provider.  I will also sign an agreement to share my medical records with my provider.  6. I will use one pharmacy to fill all of my controlled substance prescriptions.  If my prescription is mailed to my  pharmacy, it may take 5 to 7 days for my medicine to be ready.  7. I understand that my provider, clinic care team, and pharmacy can track controlled substance prescriptions from other providers through a central database (prescription monitoring program).  8. I will bring in my list of medications (or my medicine bottles) each time I come to the clinic.  REV-  04/2016                                                                                                                                            Page 1 of 2      [facility]    04/18/17    Patient: Ines Mott  YOB: 1965  Medical Record Number: [id number]    9. Refills of controlled substances will be made only during office hours.  It is up to me to make sure that I do not run out of my medicines on weekends or holidays.    10. I am responsible for my prescriptions.  If the medicine is lost or stolen, it will not be replaced.   I also agree not to share these medicines with anyone.  11. I agree to not use ANY illegal or recreational drugs.  This includes marijuana, cocaine, bath salts or other drugs.  I agree not to use alcohol unless my provider says I may.  I agree to give urine samples whenever asked.  If I fail to give a urine sample, the provider may stop my medicine.     12. I will tell my nurse or provider right away if I become pregnant or have a new medical problem treated outside of [facility].  13. I understand that this medicine can affect my thinking and judgment.  It may be unsafe for me to drive, use machinery and do dangerous tasks.  I will not do any of these things until I know how the medicine affects me.  If my dose changes, I will wait to see how it affects me.  I will contact my provider if I have concerns about medicine side effects.  I understand that if I do not follow any of the conditions above, my prescriptions or treatment may be stopped.    I agree that my provider,  clinic care team, and pharmacy may work with any city, state or federal law enforcement agency that investigates the misuse, sale, or other diversion of my controlled medicine. I will allow my provider to discuss my care with or share a copy of this agreement with any other treating provider, pharmacy or emergency room where I receive care.  I agree to give up (waive) any right of privacy or confidentiality with respect to these authorizations.   I have read this agreement and have asked questions about anything I did not understand.   ___________________________________    ___________________________  Patient Signature                                                           Date and Time  ___________________________________     ____________________________  Witness                                                                            Date and Time  ___________________________________  OTTO YUSUF MD  REV-  04/2016                                                                                                                                                                 Page 2 of 2

## 2017-04-18 NOTE — PROGRESS NOTES
SUBJECTIVE:                                                    Ines Mott is a 52 year old female who presents to clinic today for the following health issues:  Health Maintenance Due   Topic Date Due     EYE EXAM Q1 YEAR( NO INBASKET)  01/15/1966     PNEUMOVAX 1X HI RISK PATIENT < 65 (NO IB MSG)  01/15/1967     HEPATITIS C SCREENING  01/15/1983     FIT Q1 YR (NO INBASKET)  07/03/2016     FOOT EXAM Q1 YEAR( NO INBASKET)  04/07/2017     Health Maintenance reviewed at today's visit patient asked to schedule/complete:   Colon Cancer:  Patient agrees to schedule      Chronic Pain Follow-Up       Type / Location of Pain: right knee pain is bad today  Analgesia/pain control:       Recent changes:  same      Overall control: Tolerable with discomfort  Activity level/function:      Daily activities:  Able to do light housework, cooking    Work:  not applicable  Adverse effects:  No  Adherance    Taking medication as directed?  Yes    Participating in other treatments: has been to many pain clinics  Risk Factors:    Sleep:  Fair    Mood/anxiety:  controlled    Recent family or social stressors:  none noted    Other aggravating factors: sedentary lifestyle  PHQ-9 SCORE 5/6/2016 11/4/2016 11/25/2016   Total Score - - -   Total Score 10 7 3     RASTA-7 SCORE 8/24/2015 11/4/2016   Total Score 7 9     Encounter-Level CSA - 12/22/2016:                 Controlled Substance Agreement - Scan on 1/5/2017  1:14 PM : CONTROLLED SUBSTANCE AGREEMENT (below)          Encounter-Level CSA - 12/22/2016:                 Controlled Substance Agreement - Scan on 12/28/2015  2:53 PM : CONTROLLED MEDICATION CONTRACT, 12-22-15 (below)          Encounter-Level CSA - 12/22/2016:                 Controlled Substance Agreement - Scan on 4/17/2015  1:45 PM : [facility], Controlled Substance Contract, 04-10-15 (below)                 Amount of exercise or physical activity: 4-5 days/week for an average of 45-60 minutes    Problems taking medications  regularly: No    Medication side effects: none    Diet: regular (no restrictions)

## 2017-04-19 ASSESSMENT — ASTHMA QUESTIONNAIRES: ACT_TOTALSCORE: 20

## 2017-04-25 ENCOUNTER — OFFICE VISIT (OUTPATIENT)
Dept: FAMILY MEDICINE | Facility: CLINIC | Age: 52
End: 2017-04-25
Payer: COMMERCIAL

## 2017-04-25 VITALS
WEIGHT: 259.5 LBS | BODY MASS INDEX: 41.88 KG/M2 | RESPIRATION RATE: 20 BRPM | DIASTOLIC BLOOD PRESSURE: 74 MMHG | SYSTOLIC BLOOD PRESSURE: 128 MMHG | HEART RATE: 77 BPM | TEMPERATURE: 96.6 F | OXYGEN SATURATION: 97 %

## 2017-04-25 DIAGNOSIS — E11.9 CONTROLLED TYPE 2 DIABETES MELLITUS WITHOUT COMPLICATION, WITHOUT LONG-TERM CURRENT USE OF INSULIN (H): ICD-10-CM

## 2017-04-25 DIAGNOSIS — E11.9 COMPREHENSIVE DIABETIC FOOT EXAMINATION, TYPE 2 DM, ENCOUNTER FOR (H): ICD-10-CM

## 2017-04-25 DIAGNOSIS — G89.4 CHRONIC PAIN SYNDROME: Primary | ICD-10-CM

## 2017-04-25 DIAGNOSIS — Z12.11 SPECIAL SCREENING FOR MALIGNANT NEOPLASMS, COLON: ICD-10-CM

## 2017-04-25 DIAGNOSIS — I10 HYPERTENSION GOAL BP (BLOOD PRESSURE) < 140/80: Chronic | ICD-10-CM

## 2017-04-25 DIAGNOSIS — E78.5 HYPERLIPIDEMIA WITH TARGET LDL LESS THAN 130: Chronic | ICD-10-CM

## 2017-04-25 DIAGNOSIS — F33.41 RECURRENT MAJOR DEPRESSION IN PARTIAL REMISSION (H): Chronic | ICD-10-CM

## 2017-04-25 DIAGNOSIS — E66.01 MORBID OBESITY DUE TO EXCESS CALORIES (H): Chronic | ICD-10-CM

## 2017-04-25 DIAGNOSIS — J30.1 SEASONAL ALLERGIC RHINITIS DUE TO POLLEN: ICD-10-CM

## 2017-04-25 PROCEDURE — 99214 OFFICE O/P EST MOD 30 MIN: CPT | Performed by: FAMILY MEDICINE

## 2017-04-25 RX ORDER — OXYCODONE AND ACETAMINOPHEN 5; 325 MG/1; MG/1
1 TABLET ORAL EVERY 4 HOURS PRN
Qty: 30 TABLET | Refills: 0 | Status: SHIPPED | OUTPATIENT
Start: 2017-05-02 | End: 2017-05-09

## 2017-04-25 RX ORDER — ECHINACEA PURPUREA EXTRACT 125 MG
1 TABLET ORAL DAILY PRN
Qty: 1 BOTTLE | Refills: 11 | Status: SHIPPED | OUTPATIENT
Start: 2017-04-25 | End: 2018-01-24

## 2017-04-25 RX ORDER — CETIRIZINE HYDROCHLORIDE 10 MG/1
10 TABLET ORAL EVERY EVENING
Qty: 30 TABLET | Refills: 1 | Status: SHIPPED | OUTPATIENT
Start: 2017-04-25 | End: 2017-05-09

## 2017-04-25 RX ORDER — OXYCODONE AND ACETAMINOPHEN 5; 325 MG/1; MG/1
1 TABLET ORAL EVERY 4 HOURS PRN
Qty: 30 TABLET | Refills: 0 | Status: SHIPPED | OUTPATIENT
Start: 2017-04-25 | End: 2017-04-25

## 2017-04-25 NOTE — PATIENT INSTRUCTIONS
(G89.4) Chronic pain syndrome  (primary encounter diagnosis)  Comment:        Plan: oxyCODONE-acetaminophen (PERCOCET) 5-325 MG per        tablet             (Z12.11) Special screening for malignant neoplasms, colon  Comment:    Plan: Fecal colorectal cancer screen (FIT)             (J30.1) Seasonal allergic rhinitis due to pollen  Comment:    Plan: sodium chloride (SALINE MIST) 0.65 % nasal         spray, cetirizine (ZYRTEC) 10 MG tablet             (I10) Hypertension goal BP (blood pressure) < 140/80  Comment:    Plan:      (E78.5) Hyperlipidemia with target LDL less than 130  Comment:    Plan:      (F33.41) Recurrent major depression in partial remission  Comment:    Plan:      (E66.01) Morbid obesity due to excess calories (H)  Comment:    Plan:

## 2017-04-25 NOTE — NURSING NOTE
"Chief Complaint   Patient presents with     Back Pain       Initial /74 (BP Location: Left arm, Cuff Size: Adult Large)  Pulse 77  Temp 96.6  F (35.9  C) (Tympanic)  Resp 20  Wt 259 lb 8 oz (117.7 kg)  SpO2 97%  BMI 41.88 kg/m2 Estimated body mass index is 41.88 kg/(m^2) as calculated from the following:    Height as of 4/3/17: 5' 6\" (1.676 m).    Weight as of this encounter: 259 lb 8 oz (117.7 kg).  Medication Reconciliation: complete   Maggi Harris CMA    "

## 2017-04-25 NOTE — PROGRESS NOTES
SUBJECTIVE:                                                        Ines Mott is a 52 year old female who presents to clinic today for the following health issues:    Health Maintenance Due     Topic   Date Due         EYE EXAM Q1 YEAR( NO INBASKET)    01/15/1966         PNEUMOVAX 1X HI RISK PATIENT < 65 (NO IB MSG)    01/15/1967         HEPATITIS C SCREENING    01/15/1983         FIT Q1 YR (NO INBASKET)    07/03/2016         FOOT EXAM Q1 YEAR( NO INBASKET)    04/07/2017         PHQ-9 Q6 MONTHS (NO INBASKET)    05/22/2017         Health Maintenance reviewed at today's visit patient asked to schedule/complete:     Colon Cancer:  Patient agrees to schedule    Depression:  Completed at today's visit.            Chronic Pain Follow-Up           Type / Location of Pain: back    Analgesia/pain control:         Recent changes:  Worse, recent pain in right side of lower back        Overall control: Tolerable with discomfort    Activity level/function:        Daily activities:  Can do most things most days, with some rest      Work:  not applicable    Adverse effects:  No    Adherance      Taking medication as directed?  Yes      Participating in other treatments: supposed to do a chemical dependency keith, in [place], unable to make that appt    Risk Factors:      Sleep:  Fair      Mood/anxiety:  controlled      Recent family or social stressors:  conflict between family members      Other aggravating factors: none    PHQ-9 SCORE   5/6/2016 11/4/2016 11/25/2016     Total Score   -   -   -     Total Score   10   7   3         RASTA-7 SCORE   8/24/2015 11/4/2016     Total Score   7   9                  Amount of exercise or physical activity: 6-7 days/week for an average of 45-60 minutes    Problems taking medications regularly: No    Medication side effects: none    Diet: regular (no restrictions)        CONTROLLEDS SUBSTANCE AGREEMENT ON FILE         Amount of exercise or physical activity: 4-5 days/week  for an average of 45-60 minutes    Problems taking medications regularly: No    Medication side effects: none    Diet: regular (no restrictions)    WEIGHT LOSS DESIRED WITH DIET AND EXERCISE    HEADACHES ARE IMPROVED    NO FURTHER ABDOMINAL PAINS     NO FURTHER EMERGENCY ROOM VISITS     OR ALCOHOL EPISODES    HAVING TO MOVE TO ANOTHER APARTMETN       Diabetes Follow-up    Patient is checking blood sugars: not at all  Diabetic concerns: None   Symptoms of hypoglycemia (low blood sugar): none   Paresthesias (numbness or burning in feet) or sores: No   Date of last diabetic eye exam:  SCHEDULE AS SOON AS POSSIBLE      Hyperlipidemia Follow-Up    Rate your low fat/cholesterol diet?: good  Taking statin?  NO [id number]}  Other lipid medications/supplements?:  none       Depression and Anxiety Follow-Up  Status since last visit: IMPROVED   Other associated symptoms:None  Complicating factors:   Significant life event: No   Current substance abuse: None    PHQ-9 SCORE 5/6/2016 11/4/2016 11/25/2016   Total Score - - -   Total Score 10 7 3     RASTA-7 SCORE 8/24/2015 11/4/2016   Total Score 7 9        PHQ-9  English      PHQ-9   Any Language     GAD7     Asthma Follow-Up    Was ACT completed today?  No    Respiratory symptoms:   Cough: No   Wheezing: No   Shortness of breath: No  Use of short- acting(rescue) inhaler: NO   Taking controlled (daily) meds as prescribed: Yes  ER/UC visits or hospital admissions since last visit: none   Recent asthma triggers that patient is dealing with: None           Problem list and histories reviewed & adjusted, as indicated.    Additional history: as documented        Patient Active Problem List   Diagnosis     Dyspnea and respiratory abnormality     Other specified drug dependence, in remission     Carcinoma of colon metastatic to liver (H)     Kidney infection     ALEXUS (obstructive sleep apnea)     Myelomeningocele with hydrocephalus, cervical region (H)     Fibromyalgia     Angioedema      Pneumonia due to other Gram-negative bacteria     BMI 42     H/O hysterectomy for benign disease     Hyperlipidemia with target LDL less than 130     Infected tooth     Recurrent major depression in partial remission     Hypertension goal BP (blood pressure) < 140/80     Abdominal pain, right lateral     Renal mass, right     Health Care Home     Intracranial shunt     Migraine     Mild intermittent asthma without complication     Other complicated headache syndrome     Seasonal affective disorder (H)     Anxiety     PTSD (post-traumatic stress disorder)     Pre diabetes      Chronic pain syndrome     Chronic tension-type headache, not intractable     Degeneration of lumbosacral intervertebral disc     Comprehensive diabetic foot examination, type 2 DM, encounter for (H)     Past Surgical History:   Procedure Laterality Date     APPENDECTOMY       CHOLECYSTECTOMY       EXTRACTION(S) DENTAL       GYN SURGERY       IMPLANT SHUNT VENTRICULOPERITONEAL         Social History   Substance Use Topics     Smoking status: Former Smoker     Smokeless tobacco: Never Used     Alcohol use No      Comment: off and on     Family History   Problem Relation Age of Onset     CANCER Father          Current Outpatient Prescriptions   Medication Sig Dispense Refill     sodium chloride (SALINE MIST) 0.65 % nasal spray Spray 1 spray into both nostrils daily as needed for congestion 1 Bottle 11     cetirizine (ZYRTEC) 10 MG tablet Take 1 tablet (10 mg) by mouth every evening 30 tablet 1     [START ON 5/2/2017] oxyCODONE-acetaminophen (PERCOCET) 5-325 MG per tablet Take 1 tablet by mouth every 4 hours as needed for moderate to severe pain 30 tablet 0     triamcinolone (KENALOG) 0.1 % paste Take by mouth 2 times daily 5 g 0     ondansetron (ZOFRAN ODT) 4 MG ODT tab Take 1 tablet (4 mg) by mouth every 8 hours as needed for nausea 10 tablet 0     Vitamin D, Cholecalciferol, 1000 UNITS TABS Take 2,000 Units by mouth daily 60 tablet 11      cyanocobalamin (VITAMIN B12) 1000 MCG/ML injection Inject 1 mL (1,000 mcg) Subcutaneous every 30 days 1 mL 11     potassium chloride (K-TAB,KLOR-CON) 10 MEQ tablet Take 1 tablet (10 mEq) by mouth daily 120 tablet 11     sertraline (ZOLOFT) 100 MG tablet Take 1 tablet (100 mg) by mouth daily 30 tablet 11     losartan-hydrochlorothiazide (HYZAAR) 50-12.5 MG per tablet Take 1 tablet by mouth daily 90 tablet 3     albuterol (2.5 MG/3ML) 0.083% neb solution Take 1 vial (2.5 mg) by nebulization every 6 hours as needed for shortness of breath / dyspnea or wheezing 30 vial 3     magnesium oxide (MAG-OX) 400 (241.3 MG) MG tablet Take 1 tablet (400 mg) by mouth daily 90 tablet 3     albuterol (VENTOLIN HFA) 108 (90 BASE) MCG/ACT Inhaler INHALE TWO PUFFS BY MOUTH EVERY 4 HOURS AS NEEDED FOR SHORTNESS OF BREATH/DYSPNEA 54 g 1     mometasone-formoterol (DULERA) 100-5 MCG/ACT oral inhaler Inhale 2 puffs into the lungs 2 times daily 1 Inhaler 11     blood glucose monitoring (NO BRAND SPECIFIED) meter device kit Use to test blood sugar larisa times daily or as directed. 1 kit 0     blood glucose (NO BRAND SPECIFIED) lancets standard Use to test blood sugar daily times daily or as directed. 1 Box 11     blood glucose monitoring (NO BRAND SPECIFIED) test strip Use to test blood sugars once times daily or as directed 100 strip 3     blood glucose calibration (NO BRAND SPECIFIED) solution Use to calibrate blood glucose monitor as directed. 1 each 0     polyethylene glycol (MIRALAX) powder Take 17 g (1 capful) by mouth daily 510 g 3     STATIN NOT PRESCRIBED, INTENTIONAL, 1 each daily Statin not prescribed intentionally due to Active liver disease (liver failure, cirrhosis, hepatitis)  LIVER METS 0 each 0     EPINEPHrine (EPIPEN) 0.3 MG/0.3ML injection Inject 0.3 mLs (0.3 mg) into the muscle once as needed for anaphylaxis 2 each 5     Allergies   Allergen Reactions     Ativan [Lorazepam] Anaphylaxis     Macrobid [Nitrofurantoin]  Anaphylaxis     Amoxicillin      Buspirone      Buspar     Cephalosporins      Dilaudid [Hydromorphone]      Diphenhydramine Hcl      Benadryl     Imitrex [Sumatriptan Succinate]      blood pressure raised uncontrobably     Ketorolac Tromethamine      Toradol     Lansoprazole      Prevacid     Metoclopramide Hives     Reglan     Paroxetine      Paxil     Penicillins      Prednisone Hives     Sulfa Drugs      THICKENED AND DIFFICULTY SWALLOWING      Lidoderm [Lidocaine] Rash     Localized rash at patch site     Recent Labs   Lab Test  04/03/17   1004  01/26/17   1803  01/18/17   1716  01/07/17   0120   11/04/16   1407   04/07/16   1620   05/07/15   1222   10/21/14   0230   A1C  5.9   --    --    --    --   6.1*   --   5.7   --   6.0   < >   --    LDL   --    --    --    --    --   96   --   101*   --   127   --    --    HDL   --    --    --    --    --   42*   --   42*   --   36*   --    --    TRIG   --    --    --    --    --   161*   --   306*   --   177*   --    --    ALT   --   25  17  41   < >  27   < >  30   < >  24   < >  31   CR   --   0.86  0.66  0.75   < >  1.22*   < >   --    < >   --    < >  0.82   GFRESTIMATED   --   69  >90  Non  GFR Calc    81   < >  46*   < >   --    < >   --    < >  73   GFRESTBLACK   --   84  >90   GFR Calc    >90   GFR Calc     < >  56*   < >   --    < >   --    < >  89   POTASSIUM   --   3.5  3.1*  3.7   < >  3.8   < >   --    < >   --    < >  3.8   TSH  1.62   --    --    --    --    --    --    --    --    --    --   2.30    < > = values in this interval not displayed.      BP Readings from Last 3 Encounters:   04/25/17 128/74   04/18/17 128/72   04/03/17 132/70    Wt Readings from Last 3 Encounters:   04/25/17 259 lb 8 oz (117.7 kg)   04/18/17 262 lb (118.8 kg)   04/03/17 266 lb (120.7 kg)                  Labs reviewed in EPIC        Reviewed and updated as needed this visit by clinical staff             Reviewed and updated as  needed this visit by Provider               ROS: has Dyspnea and respiratory abnormality; Other specified drug dependence, in remission; Carcinoma of colon metastatic to liver (H); Kidney infection; ALEXUS (obstructive sleep apnea); Myelomeningocele with hydrocephalus, cervical region (H); Fibromyalgia; Angioedema; Pneumonia due to other Gram-negative bacteria; BMI 42; H/O hysterectomy for benign disease; Hyperlipidemia with target LDL less than 130; Infected tooth; Recurrent major depression in partial remission; Hypertension goal BP (blood pressure) < 140/80; Abdominal pain, right lateral; Renal mass, right; Health Care Home; Intracranial shunt; Migraine; Mild intermittent asthma without complication; Other complicated headache syndrome; Seasonal affective disorder (H); Anxiety; PTSD (post-traumatic stress disorder); Pre diabetes ; Chronic pain syndrome; Chronic tension-type headache, not intractable; Degeneration of lumbosacral intervertebral disc; and Comprehensive diabetic foot examination, type 2 DM, encounter for (H) on her problem list.    C: NEGATIVE for fever, chills, change in weight  DESIRED WEIGHT LOSS   I: NEGATIVE for worrisome rashes, moles or lesions  E: NEGATIVE for vision changes or irritation  E/M: NEGATIVE for ear, mouth and throat problems  R: NEGATIVE for significant cough or SOB  B: NEGATIVE for masses, tenderness or discharge  CV: NEGATIVE for chest pain, palpitations or peripheral edema  GI: NEGATIVE for nausea, abdominal pain, heartburn, or change in bowel habits  : NEGATIVE for frequency, dysuria, or hematuria  MUSCULOSKELETAL: BACK AND KNEE PAIN   N: NEGATIVE for weakness, dizziness or paresthesias  E: NEGATIVE for temperature intolerance, skin/hair changes  H: NEGATIVE for bleeding problems  P: NEGATIVE for changes in mood or affect    OBJECTIVE:                                                    /74 (BP Location: Left arm, Cuff Size: Adult Large)  Pulse 77  Temp 96.6  F (35.9   C) (Tympanic)  Resp 20  Wt 259 lb 8 oz (117.7 kg)  SpO2 97%  BMI 41.88 kg/m2  Body mass index is 41.88 kg/(m^2).  GENERAL: healthy, alert and no distress  EYES: Eyes grossly normal to inspection, PERRL and conjunctivae and sclerae normal  EYES: Eyes grossly normal to inspection  NECK: no adenopathy, no asymmetry, masses, or scars and thyroid normal to palpation  RESP: lungs clear to auscultation - no rales, rhonchi or wheezes  CV: regular rate and rhythm, normal S1 S2, no S3 or S4, no murmur, click or rub, no peripheral edema and peripheral pulses strong  ABDOMEN: soft, nontender, no hepatosplenomegaly, no masses and bowel sounds normal  MS:  DECREASE RANGE OF MOTION OF BACK   NEGATIVE STRAIGHT LEG RAISING TEST   OSTEOARTHRITIS KNEES   RIGHT KNEE PAIN IMPROVED     Diagnostic Test Results:  Results for orders placed or performed in visit on 04/03/17   Hemoglobin A1c   Result Value Ref Range    Hemoglobin A1C 5.9 4.3 - 6.0 %   TSH   Result Value Ref Range    TSH 1.62 0.40 - 4.00 mU/L        ASSESSMENT/PLAN:                                                        ICD-10-CM    1. Chronic pain syndrome G89.4 oxyCODONE-acetaminophen (PERCOCET) 5-325 MG per tablet     DISCONTINUED: oxyCODONE-acetaminophen (PERCOCET) 5-325 MG per tablet   2. Special screening for malignant neoplasms, colon Z12.11 Fecal colorectal cancer screen (FIT)   3. Seasonal allergic rhinitis due to pollen J30.1 sodium chloride (SALINE MIST) 0.65 % nasal spray     cetirizine (ZYRTEC) 10 MG tablet   4. Hypertension goal BP (blood pressure) < 140/80 I10    5. Hyperlipidemia with target LDL less than 130 E78.5    6. Recurrent major depression in partial remission F33.41    7. Morbid obesity due to excess calories (H) E66.01    8. Comprehensive diabetic foot examination, type 2 DM, encounter for (H) E11.9    9. Controlled type 2 diabetes mellitus without complication, without long-term current use of insulin (H) E11.9 OPHTHALMOLOGY ADULT REFERRAL        Patient Instructions   (G89.4) Chronic pain syndrome  (primary encounter diagnosis)  Comment:        Plan: oxyCODONE-acetaminophen (PERCOCET) 5-325 MG per        tablet             (Z12.11) Special screening for malignant neoplasms, colon  Comment:    Plan: Fecal colorectal cancer screen (FIT)             (J30.1) Seasonal allergic rhinitis due to pollen  Comment:    Plan: sodium chloride (SALINE MIST) 0.65 % nasal         spray, cetirizine (ZYRTEC) 10 MG tablet             (I10) Hypertension goal BP (blood pressure) < 140/80  Comment:    Plan:      (E78.5) Hyperlipidemia with target LDL less than 130  Comment:    Plan:      (F33.41) Recurrent major depression in partial remission  Comment:    Plan:      (E66.01) Morbid obesity due to excess calories (H)  Comment:    Plan:            OTTO YUSUF MD    [facility]

## 2017-04-25 NOTE — MR AVS SNAPSHOT
After Visit Summary   4/25/2017    Ines Mott    MRN: [id number]           Patient Information     Date Of Birth          1965        Visit Information        Provider Department      4/25/2017 9:45 AM Dustin Fatima MD [facility]        Today's Diagnoses     Chronic pain syndrome    -  1    Special screening for malignant neoplasms, colon        Seasonal allergic rhinitis due to pollen        Hypertension goal BP (blood pressure) < 140/80        Hyperlipidemia with target LDL less than 130        Recurrent major depression in partial remission        Morbid obesity due to excess calories (H)        Comprehensive diabetic foot examination, type 2 DM, encounter for (H)        Controlled type 2 diabetes mellitus without complication, without long-term current use of insulin (H)          Care Instructions    (G89.4) Chronic pain syndrome  (primary encounter diagnosis)  Comment:        Plan: oxyCODONE-acetaminophen (PERCOCET) 5-325 MG per        tablet             (Z12.11) Special screening for malignant neoplasms, colon  Comment:    Plan: Fecal colorectal cancer screen (FIT)             (J30.1) Seasonal allergic rhinitis due to pollen  Comment:    Plan: sodium chloride (SALINE MIST) 0.65 % nasal         spray, cetirizine (ZYRTEC) 10 MG tablet             (I10) Hypertension goal BP (blood pressure) < 140/80  Comment:    Plan:      (E78.5) Hyperlipidemia with target LDL less than 130  Comment:    Plan:      (F33.41) Recurrent major depression in partial remission  Comment:    Plan:      (E66.01) Morbid obesity due to excess calories (H)  Comment:    Plan:              Follow-ups after your visit        Additional Services     OPHTHALMOLOGY ADULT REFERRAL       Your provider has referred you to: [facility]: [facility] - [place] [phone] [email or web address]   Elmer Hunt MD  Diabetes eye exam  Please be aware that  coverage of these services is subject to the terms and limitations of your health insurance plan.  Call member services at your health plan with any benefit or coverage questions.      Please bring the following with you to your appointment:    (1) Any X-Rays, CTs or MRIs which have been performed.  Contact the facility where they were done to arrange for  prior to your scheduled appointment.    (2) List of current medications  (3) This referral request   (4) Any documents/labs given to you for this referral                  Follow-up notes from your care team     Return in about 2 weeks (around 5/9/2017).      Your next 10 appointments already scheduled     May 09, 2017 10:00 AM CDT   New Sleep Patient with MICHEAL Dixon CNP   [facility], [place], Sleep Study ([facility])    [address] [place] [id number] [phone]              Future tests that were ordered for you today     Open Future Orders        Priority Expected Expires Ordered    Fecal colorectal cancer screen (FIT) Routine 5/16/2017 7/18/2017 4/25/2017            Who to contact     If you have questions or need follow up information about today's clinic visit or your schedule please contact [facility] directly at [phone].  Normal or non-critical lab and imaging results will be communicated to you by MyChart, letter or phone within 4 business days after the clinic has received the results. If you do not hear from us within 7 days, please contact the clinic through [employer] or phone. If you have a critical or abnormal lab result, we will notify you by phone as soon as possible.  Submit refill requests through [employer] or call your pharmacy and they will forward the refill request to us. Please allow 3 business days for your refill to be completed.          Additional Information About Your Visit        [employer] Information     [employer] lets you  "send messages to your doctor, view your test results, renew your prescriptions, schedule appointments and more. To sign up, go to [email or web address] . Click on \"Log in\" on the left side of the screen, which will take you to the Welcome page. Then click on \"Sign up Now\" on the right side of the page.     You will be asked to enter the access code listed below, as well as some personal information. Please follow the directions to create your username and password.     Your access code is: [email or web address]  Expires: 2017  9:57 AM     Your access code will  in 90 days. If you need help or a new code, please call your [place] clinic or [phone].        Care EveryWhere ID     This is your Care EveryWhere ID. This could be used by other organizations to access your [facility] [place] medical records  [id number]        Your Vitals Were     Pulse Temperature Respirations Pulse Oximetry BMI (Body Mass Index)       77 96.6  F (35.9  C) (Tympanic) 20 97% 41.88 kg/m2        Blood Pressure from Last 3 Encounters:   17 128/74   17 128/72   17 132/70    Weight from Last 3 Encounters:   17 259 lb 8 oz (117.7 kg)   17 262 lb (118.8 kg)   17 266 lb (120.7 kg)              We Performed the Following     OPHTHALMOLOGY ADULT REFERRAL          Today's Medication Changes          These changes are accurate as of: 17 10:06 AM.  If you have any questions, ask your nurse or doctor.               Start taking these medicines.        Dose/Directions    cetirizine 10 MG tablet   Commonly known as:  zyrTEC   Used for:  Seasonal allergic rhinitis due to pollen   Started by:  Dustin Fatima MD        Dose:  10 mg   Take 1 tablet (10 mg) by mouth every evening   Quantity:  30 tablet   Refills:  1       oxyCODONE-acetaminophen 5-325 MG per tablet   Commonly known as:  PERCOCET   Used for:  Chronic pain syndrome   Started by:  Dustin Fatima MD        Dose:  1 tablet   Start taking " on:  5/2/2017   Take 1 tablet by mouth every 4 hours as needed for moderate to severe pain   Quantity:  30 tablet   Refills:  0       sodium chloride 0.65 % nasal spray   Commonly known as:  SALINE MIST   Used for:  Seasonal allergic rhinitis due to pollen   Started by:  Dustin Fatima MD        Dose:  1 spray   Spray 1 spray into both nostrils daily as needed for congestion   Quantity:  1 Bottle   Refills:  11            Where to get your medicines      These medications were sent to [facility] [address] [place] [address] [address] [facility]  [address] [facility] [address]     Phone:  [phone]     cetirizine 10 MG tablet    sodium chloride 0.65 % nasal spray         Some of these will need a paper prescription and others can be bought over the counter.  Ask your nurse if you have questions.     Bring a paper prescription for each of these medications     oxyCODONE-acetaminophen 5-325 MG per tablet                Primary Care Provider Office Phone # Fax #    Dustin Fatima [phone]       [facility] [address] [facility] [address]        Thank you!     Thank you for choosing [facility]  for your care. Our goal is always to provide you with excellent care. Hearing back from our patients is one way we can continue to improve our services. Please take a few minutes to complete the written survey that you may receive in the mail after your visit with us. Thank you!             Your Updated Medication List - Protect others around you: Learn how to safely use, store and throw away your medicines at www.disposemymeds.org.          This list is accurate as of: 4/25/17 10:06 AM.  Always use your most recent med list.                   Brand Name Dispense Instructions for use    * albuterol (2.5 MG/3ML) 0.083% neb solution     30 vial    Take 1 vial (2.5 mg) by nebulization every 6 hours as needed for  shortness of breath / dyspnea or wheezing       * albuterol 108 (90 BASE) MCG/ACT Inhaler    VENTOLIN HFA    54 g    INHALE TWO PUFFS BY MOUTH EVERY 4 HOURS AS NEEDED FOR SHORTNESS OF BREATH/DYSPNEA       blood glucose calibration solution    no brand specified    1 each    Use to calibrate blood glucose monitor as directed.       blood glucose lancets standard    no brand specified    1 Box    Use to test blood sugar daily times daily or as directed.       blood glucose monitoring meter device kit    no brand specified    1 kit    Use to test blood sugar larisa times daily or as directed.       blood glucose monitoring test strip    no brand specified    100 strip    Use to test blood sugars once times daily or as directed       cetirizine 10 MG tablet    zyrTEC    30 tablet    Take 1 tablet (10 mg) by mouth every evening       cyanocobalamin 1000 MCG/ML injection    VITAMIN B12    1 mL    Inject 1 mL (1,000 mcg) Subcutaneous every 30 days       EPINEPHrine 0.3 MG/0.3ML injection     2 each    Inject 0.3 mLs (0.3 mg) into the muscle once as needed for anaphylaxis       losartan-hydrochlorothiazide 50-12.5 MG per tablet    HYZAAR    90 tablet    Take 1 tablet by mouth daily       magnesium oxide 400 (241.3 MG) MG tablet    MAG-OX    90 tablet    Take 1 tablet (400 mg) by mouth daily       mometasone-formoterol 100-5 MCG/ACT oral inhaler    DULERA    1 Inhaler    Inhale 2 puffs into the lungs 2 times daily       ondansetron 4 MG ODT tab    ZOFRAN ODT    10 tablet    Take 1 tablet (4 mg) by mouth every 8 hours as needed for nausea       oxyCODONE-acetaminophen 5-325 MG per tablet   Start taking on:  5/2/2017    PERCOCET    30 tablet    Take 1 tablet by mouth every 4 hours as needed for moderate to severe pain       polyethylene glycol powder    MIRALAX    510 g    Take 17 g (1 capful) by mouth daily       potassium chloride 10 MEQ tablet    K-TAB,KLOR-CON    120 tablet    Take 1 tablet (10 mEq) by mouth daily        sertraline 100 MG tablet    ZOLOFT    30 tablet    Take 1 tablet (100 mg) by mouth daily       sodium chloride 0.65 % nasal spray    SALINE MIST    1 Bottle    Spray 1 spray into both nostrils daily as needed for congestion       STATIN NOT PRESCRIBED (INTENTIONAL)     0 each    1 each daily Statin not prescribed intentionally due to Active liver disease (liver failure, cirrhosis, hepatitis) LIVER METS       triamcinolone 0.1 % paste    KENALOG    5 g    Take by mouth 2 times daily       Vitamin D (Cholecalciferol) 1000 UNITS Tabs     60 tablet    Take 2,000 Units by mouth daily       * Notice:  This list has 2 medication(s) that are the same as other medications prescribed for you. Read the directions carefully, and ask your doctor or other care provider to review them with you.

## 2017-04-26 ASSESSMENT — PATIENT HEALTH QUESTIONNAIRE - PHQ9: SUM OF ALL RESPONSES TO PHQ QUESTIONS 1-9: 5

## 2017-05-09 ENCOUNTER — OFFICE VISIT (OUTPATIENT)
Dept: SLEEP MEDICINE | Facility: CLINIC | Age: 52
End: 2017-05-09
Attending: FAMILY MEDICINE
Payer: COMMERCIAL

## 2017-05-09 ENCOUNTER — OFFICE VISIT (OUTPATIENT)
Dept: FAMILY MEDICINE | Facility: CLINIC | Age: 52
End: 2017-05-09
Payer: COMMERCIAL

## 2017-05-09 VITALS
DIASTOLIC BLOOD PRESSURE: 85 MMHG | WEIGHT: 262.8 LBS | RESPIRATION RATE: 18 BRPM | OXYGEN SATURATION: 96 % | HEIGHT: 66 IN | SYSTOLIC BLOOD PRESSURE: 139 MMHG | HEART RATE: 73 BPM | BODY MASS INDEX: 42.23 KG/M2

## 2017-05-09 VITALS
OXYGEN SATURATION: 99 % | WEIGHT: 262.5 LBS | TEMPERATURE: 97 F | DIASTOLIC BLOOD PRESSURE: 64 MMHG | SYSTOLIC BLOOD PRESSURE: 120 MMHG | BODY MASS INDEX: 42.37 KG/M2 | HEART RATE: 70 BPM | RESPIRATION RATE: 20 BRPM

## 2017-05-09 DIAGNOSIS — G89.4 CHRONIC PAIN SYNDROME: ICD-10-CM

## 2017-05-09 DIAGNOSIS — E66.01 MORBID OBESITY, UNSPECIFIED OBESITY TYPE (H): ICD-10-CM

## 2017-05-09 DIAGNOSIS — G47.33 OBSTRUCTIVE SLEEP APNEA SYNDROME: Primary | ICD-10-CM

## 2017-05-09 DIAGNOSIS — J30.1 SEASONAL ALLERGIC RHINITIS DUE TO POLLEN: Primary | ICD-10-CM

## 2017-05-09 DIAGNOSIS — G47.8 UNHEALTHY SLEEP HABIT: ICD-10-CM

## 2017-05-09 PROCEDURE — 99211 OFF/OP EST MAY X REQ PHY/QHP: CPT | Mod: ZF

## 2017-05-09 PROCEDURE — 99214 OFFICE O/P EST MOD 30 MIN: CPT | Performed by: FAMILY MEDICINE

## 2017-05-09 RX ORDER — CETIRIZINE HYDROCHLORIDE 10 MG/1
10 TABLET ORAL EVERY EVENING
Qty: 30 TABLET | Refills: 1 | Status: SHIPPED | OUTPATIENT
Start: 2017-05-09 | End: 2018-01-24

## 2017-05-09 RX ORDER — MONTELUKAST SODIUM 10 MG/1
10 TABLET ORAL AT BEDTIME
Qty: 30 TABLET | Refills: 11 | Status: SHIPPED | OUTPATIENT
Start: 2017-05-09 | End: 2017-05-09

## 2017-05-09 RX ORDER — FLUTICASONE PROPIONATE 50 MCG
1-2 SPRAY, SUSPENSION (ML) NASAL DAILY
Qty: 3 BOTTLE | Refills: 1 | Status: SHIPPED | OUTPATIENT
Start: 2017-05-09 | End: 2018-01-24

## 2017-05-09 RX ORDER — MONTELUKAST SODIUM 10 MG/1
10 TABLET ORAL AT BEDTIME
Qty: 30 TABLET | Refills: 11 | Status: SHIPPED | OUTPATIENT
Start: 2017-05-09 | End: 2018-01-24

## 2017-05-09 RX ORDER — OXYCODONE AND ACETAMINOPHEN 5; 325 MG/1; MG/1
1 TABLET ORAL EVERY 4 HOURS PRN
Qty: 30 TABLET | Refills: 0 | Status: SHIPPED | OUTPATIENT
Start: 2017-05-16 | End: 2017-05-22

## 2017-05-09 RX ORDER — OXYCODONE AND ACETAMINOPHEN 5; 325 MG/1; MG/1
1 TABLET ORAL EVERY 4 HOURS PRN
Qty: 30 TABLET | Refills: 0 | Status: SHIPPED | OUTPATIENT
Start: 2017-05-09 | End: 2017-05-09

## 2017-05-09 NOTE — NURSING NOTE
"Chief Complaint   Patient presents with     Recheck Medication     work in by [facility]       Initial /64  Pulse 70  Temp 97  F (36.1  C) (Tympanic)  Resp 20  Wt 262 lb 8 oz (119.1 kg)  SpO2 99%  BMI 42.37 kg/m2 Estimated body mass index is 42.37 kg/(m^2) as calculated from the following:    Height as of an earlier encounter on 5/9/17: 5' 6\" (1.676 m).    Weight as of this encounter: 262 lb 8 oz (119.1 kg).  Medication Reconciliation: complete   Maggi Harris CMA    "

## 2017-05-09 NOTE — LETTER
"      [facility]  [address]  [address]  [facility] [address]  [phone]                                                                                                           Ines Mott  [address] [address]    May 9, 2017            Dear Ines Mott,            Diabetes: Exchange List    What are the exchange lists?     The exchange lists show you portions of food that equal 1 exchange. Foods are divided into food lists. The foods on each list are called exchanges because they have a similar number of calories, protein, carbohydrate and fat content. Foods from each list can be traded or \"exchanged\" for any other food on the same list because they all have a similar exchange value. A dietitian will help you plan how much food your child should eat at each meal and from what lists the foods should come from. At first you should measure your food until you are able to make good estimates about serving sizes. The following list is a sample of foods found on the exchange lists. For more information, you can buy the Exchange Lists for Meal Planning from: The American Diabetes Association [address] [place] [address] [phone] http://www.diabetes.org    Carbohydrate group     Starch List: One starch exchange contains about 15 grams of carbohydrate, 3 grams of protein, 0 to 1 grams of fat, and 80 calories. A starch exchange is sometimes called a carb exchange. Examples of one starch (carb) exchange are:     one slice of bread     1/2 hamburger or hot dog bun     3/4 cup of unsweetened cereal     1/3 cup pasta     3 cups popcorn     crackers (6 small saltines, 3 squares of vilma crackers, 3 of most other crackers)     1 pancake or waffle (5 inch)     15 to 20 fat-free or baked potato or corn chips.     The vegetables included in the starch exchanges include:     corn (1/2 cup or 1/2 cob)     white potato (1/4 large " "baked with skin or 1/2 cup mashed)     yam or sweet potato (1/2 cup)     green peas (1/2 cup)     squash, winter (1 cup)     lima beans (2/3 cup).     Fruit List: 1 fruit exchange contains about 15 grams of carbohydrate and 60 calories. Examples of one fruit exchange are:     grape juice (1/3 cup)     apple or pineapple juice (1/2 cup)     orange or grapefruit juice (1/2 cup)     1 small apple     orange or peach     1/2 banana     1 cup raspberries     1/3 of a small cantaloupe     1 slice of watermelon.     Milk List: 1 milk exchange contains about 8 grams of protein and 12 grams of carbohydrate. Items on the milk list are divided into fat-free, reduced fat, and whole milk depending on the number of fat grams in the exchange. Examples of one milk exchange are: Fat-Free (0 to 3 grams of fat)     1 cup of skim or non-fat milk     1 cup of 1% milk (also includes 1/2 fat exchange)     6 ounces flavored fat-free yogurt     Reduced-Fat (5 grams of fat)     6 ounces of plain, low-fat yogurt     1 cup 2% milk (also includes one fat exchange).     Whole Milk (8 grams of fat)     8 ounces of plain yogurt (made from whole milk)     1 cup whole milk.     Vegetable List: One vegetable exchange has 5 grams of carbohydrate, 2 grams of protein, no fat, and 25 calories. One-half cup of cooked or a cup of raw vegetables is a good measure for 1 exchange of most vegetables. Raw lettuce may be taken in larger quantities, but salad dressing usually equals 1 fat exchange. Other Carbohydrates List: One \"other carbohydrate\" exchange has 15 grams of carbohydrate. Many of these foods count as a starch exchange and one or more fat exchanges.     brownie (2 inch square) = 1 carb, 1 fat exchange     fruit snack roll = 1 carb exchange     granola bar = 1 and 1/2 carb exchanges     ice cream (1/2 cup) = 1 carb, 2 fat exchanges     frozen yogurt (1/2 cup) = 1 carb, 0 to 1 fat exchanges     tortilla chips (6-12) = 1 carb, 2 fat exchanges.     Meat " and Meat Substitute Group     Meats are divided into very lean meats, lean meats, medium-fat meats, and high-fat meats. People with diabetes should try to eat more lean and medium fat meats and stay away from the high fat choices. The Very Lean meat group includes foods that contain 7 grams of protein, 0 to 1 gram of fat, and 35 calories for 1 exchange. Examples include:     1 ounce chicken or turkey (white meat, no skin)     1 ounce fresh fish     1 ounce fat-free cheese     2 egg whites     The Lean meat group includes foods that contain 7 grams of protein, 3 grams of fat, and 55 calories for 1 meat exchange. Examples include:     1 ounce chicken or turkey (dark meat, no skin)     1 ounce fish     1 ounce lean pork     1 ounce USDA Select or Choice grades of lean beef     1 ounce cheese (with 3 grams of fat or less per ounce).     The Medium-Fat group includes foods that have 7 grams of protein, 5 grams of fat, and 75 calories for 1 meat exchange. Examples include:     1 ounce of ground beef, most cuts of beef, pork, lamb or veal     1 ounce of cheese (5 grams of fat per ounce or less)     1 egg     1 ounce fried fish.     The High-Fat foods have 7 grams of protein, 8 grams of fat, and 100 calories for 1 meat exchange. This group includes:     1 ounce of pork sausage     1 ounce of spare ribs     1 oz of regular cheese (American, Swiss etc.)     1 oz of lunch meat     1 hot dog (turkey or chicken).     Fat Group     Fat List: Fat is necessary for the body and is particularly important during periods of fasting (overnight), when it is very slowly absorbed. 1 fat exchange contains 5 grams of fat and 45 calories. The monounsaturated fats and polyunsaturated fats are better for us than saturated fats. The fat list includes: 1 exchange of monounsaturated fats equals:     1/2 Tbsp peanut butter     6 almonds     1 tsp of oil (olive, peanut, canola).     1 exchange of polyunsaturated fats equals:     1 tsp margarine      1 tsp of any vegetable oil (except coconut).     1 exchange of saturated fat includes:     1 tsp butter     1 strip of zaidi     2 Tbsp of cream (half and half).     Free Foods     A free food contains less than 20 calories or less than 5 grams of carbohydrate per serving. If the food has a serving size listed on its package, it should be limited to 3 servings spread throughout the day. Examples of free foods include:     4 Tbsp fat-free margarine     1 Tbsp fat-free Miracle Whip     sugar-free gelatin     diet soft drinks     catsup     soy sauce     spices.     Combination Foods     Many foods, such as casseroles, are mixed together. Your dietitian can help you figure out how many exchanges to count for combination foods. For example:     lasagna (1 cup) = 2 carb exchanges and 2 medium-fat meat exchanges     spaghetti with meatballs (1 cup) = 2 carb exchanges and 2 medium-fat meat exchanges     pizza, cheese (1/4 of 12 in.) = 2 carbs, 2 medium-fat meats     chicken noodle soup (1 cup) = 1 carb exchange     frozen entrée (less than 300 calories) = 2 carbs, 3 lean meat exchanges     macaroni and cheese (1 cup) = 2 carb exchanges and 2 medium-fat meat exchanges.         Dustin Fatima Jr., MD

## 2017-05-09 NOTE — MR AVS SNAPSHOT
After Visit Summary   5/9/2017    Ines Mott    MRN: [id number]           Patient Information     Date Of Birth          1965        Visit Information        Provider Department      5/9/2017 10:00 AM Elena Costello APRN CNP [facility], [place], Sleep Study        Today's Diagnoses     Pain    -  1    Obstructive sleep apnea syndrome        Morbid obesity, unspecified obesity type (H)          Care Instructions    Machine to clinic for a download (chip or card in back)  PIETRO for [facility] ([place])  For 2015 or 2016 PSG, PIETRO to [facility], [id number] ANW study  Call insurance: where can I get my equipment.  Is [place].  Follow up in 3 wks with download at visit     Your BMI is Body mass index is 42.42 kg/(m^2).  Weight management is a personal decision.  If you are interested in exploring weight loss strategies, the following discussion covers the approaches that may be successful. Body mass index (BMI) is one way to tell whether you are at a healthy weight, overweight, or obese. It measures your weight in relation to your height.  A BMI of 18.5 to 24.9 is in the healthy range. A person with a BMI of 25 to 29.9 is considered overweight, and someone with a BMI of 30 or greater is considered obese. More than two-thirds of American adults are considered overweight or obese.  Being overweight or obese increases the risk for further weight gain. Excess weight may lead to heart disease and diabetes.  Creating and following plans for healthy eating and physical activity may help you improve your health.  Weight control is part of healthy lifestyle and includes exercise, emotional health, and healthy eating habits. Careful eating habits lifelong are the mainstay of weight control. Though there are significant health benefits from weight loss, long-term weight loss with diet alone may be very difficult to achieve- studies show long-term success with dietary management in less than  10% of people. Attaining a healthy weight may be especially difficult to achieve in those with severe obesity. In some cases, medications, devices and surgical management might be considered.  What can you do?  If you are overweight or obese and are interested in methods for weight loss, you should discuss this with your provider.     Consider reducing daily calorie intake by 500 calories.     Keep a food journal.     Avoiding skipping meals, consider cutting portions instead.    Diet combined with exercise helps maintain muscle while optimizing fat loss. Strength training is particularly important for building and maintaining muscle mass. Exercise helps reduce stress, increase energy, and improves fitness. Increasing exercise without diet control, however, may not burn enough calories to loose weight.       Start walking three days a week 10-20 minutes at a time    Work towards walking thirty minutes five days a week     Eventually, increase the speed of your walking for 1-2 minutes at time    In addition, we recommend that you review healthy lifestyles and methods for weight loss available through the National Institutes of Health patient information sites:  http://win.niddk.nih.gov/publications/index.htm    And look into health and wellness programs that may be available through your health insurance provider, employer, local community center, or bree club.    Weight management plan: Patient was referred to their PCP to discuss a diet and exercise plan.            Follow-ups after your visit        Follow-up notes from your care team     Return in about 3 weeks (around 5/30/2017) for download and ov.      Your next 10 appointments already scheduled     May 11, 2017  9:45 AM CDT   [facility]    [address]  [address]  [facility] [address] [phone]           Bring a  "current list of meds and any records pertaining to this visit.  For Physicals, please bring immunization records and any forms needing to be filled out.  Please arrive 10 minutes early to complete paperwork.            2017  2:00 PM CDT   Return Sleep Patient with MICHEAL Dixon CNP   [facility]    [address] [place] [id number] [phone]              Who to contact     If you have questions or need follow up information about today's clinic visit or your schedule please contact [facility] directly at [phone].  Normal or non-critical lab and imaging results will be communicated to you by MyChart, letter or phone within 4 business days after the clinic has received the results. If you do not hear from us within 7 days, please contact the clinic through "[employer]hart or phone. If you have a critical or abnormal lab result, we will notify you by phone as soon as possible.  Submit refill requests through [employer] or call your pharmacy and they will forward the refill request to us. Please allow 3 business days for your refill to be completed.          Additional Information About Your Visit        MyChart Information     [employer] lets you send messages to your doctor, view your test results, renew your prescriptions, schedule appointments and more. To sign up, go to [email or web address] . Click on \"Log in\" on the left side of the screen, which will take you to the Welcome page. Then click on \"Sign up Now\" on the right side of the page.     You will be asked to enter the access code listed below, as well as some personal information. Please follow the directions to create your username and password.     Your access code is: [email or web address]  Expires: 2017  9:57 AM     Your access code will  in 90 days. If you need help or a new code, please call your [place] clinic or [phone].   " "     Care EveryWhere ID     This is your Care EveryWhere ID. This could be used by other organizations to access your [facility] medical records  [id number]        Your Vitals Were     Pulse Respirations Height Pulse Oximetry BMI (Body Mass Index)       73 18 1.676 m (5' 6\") 96% 42.42 kg/m2        Blood Pressure from Last 3 Encounters:   05/09/17 139/85   04/25/17 128/74   04/18/17 128/72    Weight from Last 3 Encounters:   05/09/17 119.2 kg (262 lb 12.8 oz)   04/25/17 117.7 kg (259 lb 8 oz)   04/18/17 118.8 kg (262 lb)              We Performed the Following     SLEEP EVALUATION & MANAGEMENT REFERRAL - ADULT        Primary Care Provider Office Phone # Fax #    Dustin Fatima [phone]       [facility] [address]  [facility] [address]        Thank you!     Thank you for choosing [facility], SLEEP STUDY  for your care. Our goal is always to provide you with excellent care. Hearing back from our patients is one way we can continue to improve our services. Please take a few minutes to complete the written survey that you may receive in the mail after your visit with us. Thank you!             Your Updated Medication List - Protect others around you: Learn how to safely use, store and throw away your medicines at www.disposemymeds.org.          This list is accurate as of: 5/9/17 11:13 AM.  Always use your most recent med list.                   Brand Name Dispense Instructions for use    * albuterol (2.5 MG/3ML) 0.083% neb solution     30 vial    Take 1 vial (2.5 mg) by nebulization every 6 hours as needed for shortness of breath / dyspnea or wheezing       * albuterol 108 (90 BASE) MCG/ACT Inhaler    VENTOLIN HFA    54 g    INHALE TWO PUFFS BY MOUTH EVERY 4 HOURS AS NEEDED FOR SHORTNESS OF BREATH/DYSPNEA       blood glucose calibration solution    no brand specified    1 each    Use to calibrate blood glucose monitor as directed.       blood glucose lancets standard "    no brand specified    1 Box    Use to test blood sugar daily times daily or as directed.       blood glucose monitoring meter device kit    no brand specified    1 kit    Use to test blood sugar larisa times daily or as directed.       blood glucose monitoring test strip    no brand specified    100 strip    Use to test blood sugars once times daily or as directed       cetirizine 10 MG tablet    zyrTEC    30 tablet    Take 1 tablet (10 mg) by mouth every evening       cyanocobalamin 1000 MCG/ML injection    VITAMIN B12    1 mL    Inject 1 mL (1,000 mcg) Subcutaneous every 30 days       EPINEPHrine 0.3 MG/0.3ML injection     2 each    Inject 0.3 mLs (0.3 mg) into the muscle once as needed for anaphylaxis       losartan-hydrochlorothiazide 50-12.5 MG per tablet    HYZAAR    90 tablet    Take 1 tablet by mouth daily       magnesium oxide 400 (241.3 MG) MG tablet    MAG-OX    90 tablet    Take 1 tablet (400 mg) by mouth daily       mometasone-formoterol 100-5 MCG/ACT oral inhaler    DULERA    1 Inhaler    Inhale 2 puffs into the lungs 2 times daily       ondansetron 4 MG ODT tab    ZOFRAN ODT    10 tablet    Take 1 tablet (4 mg) by mouth every 8 hours as needed for nausea       oxyCODONE-acetaminophen 5-325 MG per tablet    PERCOCET    30 tablet    Take 1 tablet by mouth every 4 hours as needed for moderate to severe pain       polyethylene glycol powder    MIRALAX    510 g    Take 17 g (1 capful) by mouth daily       potassium chloride 10 MEQ tablet    K-TAB,KLOR-CON    120 tablet    Take 1 tablet (10 mEq) by mouth daily       sertraline 100 MG tablet    ZOLOFT    30 tablet    Take 1 tablet (100 mg) by mouth daily       sodium chloride 0.65 % nasal spray    SALINE MIST    1 Bottle    Spray 1 spray into both nostrils daily as needed for congestion       STATIN NOT PRESCRIBED (INTENTIONAL)     0 each    1 each daily Statin not prescribed intentionally due to Active liver disease (liver failure, cirrhosis, hepatitis)  LIVER METS       triamcinolone 0.1 % paste    KENALOG    5 g    Take by mouth 2 times daily       Vitamin D (Cholecalciferol) 1000 UNITS Tabs     60 tablet    Take 2,000 Units by mouth daily       * Notice:  This list has 2 medication(s) that are the same as other medications prescribed for you. Read the directions carefully, and ask your doctor or other care provider to review them with you.
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
## 2017-05-09 NOTE — MR AVS SNAPSHOT
After Visit Summary   5/9/2017    Ines Mott    MRN: [id number]           Patient Information     Date Of Birth          1965        Visit Information        Provider Department      5/9/2017 11:45 AM Dustin Fatima MD [facility]        Today's Diagnoses     Seasonal allergic rhinitis due to pollen    -  1    Chronic pain syndrome          Care Instructions    (J30.1) Seasonal allergic rhinitis due to pollen  (primary encounter diagnosis)  Comment:    Plan: montelukast (SINGULAIR) 10 MG tablet,         cetirizine (ZYRTEC) 10 MG tablet, fluticasone         (FLONASE) 50 MCG/ACT spray, DISCONTINUED:         montelukast (SINGULAIR) 10 MG tablet             (G89.4) Chronic pain syndrome  Comment:    Plan: oxyCODONE-acetaminophen (PERCOCET) 5-325 MG per        tablet, fluticasone (FLONASE) 50 MCG/ACT spray,        DISCONTINUED: oxyCODONE-acetaminophen         (PERCOCET) 5-325 MG per tablet                       Follow-ups after your visit        Your next 10 appointments already scheduled     May 11, 2017  9:45 AM CDT   Office Visit with Dustin Fatima MD   [facility] ([facility])    [address] [address] [phone]           Bring a current list of meds and any records pertaining to this visit.  For Physicals, please bring immunization records and any forms needing to be filled out.  Please arrive 10 minutes early to complete paperwork.            Jun 08, 2017  2:00 PM CDT   Return Sleep Patient with MICHEAL Dixon CNP   [facility] [place], Sleep Study ([facility])    [address] [place] [address] [id number] [phone]              Who to contact     If you have questions or need follow up information about today's clinic visit or your schedule please contact [facility]  "[facility] directly at [phone].  Normal or non-critical lab and imaging results will be communicated to you by MyChart, letter or phone within 4 business days after the clinic has received the results. If you do not hear from us within 7 days, please contact the clinic through [employer] or phone. If you have a critical or abnormal lab result, we will notify you by phone as soon as possible.  Submit refill requests through [employer] or call your pharmacy and they will forward the refill request to us. Please allow 3 business days for your refill to be completed.          Additional Information About Your Visit        [employer] Information     [employer] lets you send messages to your doctor, view your test results, renew your prescriptions, schedule appointments and more. To sign up, go to [email or web address] . Click on \"Log in\" on the left side of the screen, which will take you to the Welcome page. Then click on \"Sign up Now\" on the right side of the page.     You will be asked to enter the access code listed below, as well as some personal information. Please follow the directions to create your username and password.     Your access code is: [email or web address]  Expires: 2017  9:57 AM     Your access code will  in 90 days. If you need help or a new code, please call your [facility] clinic or [phone].        Care EveryWhere ID     This is your Care EveryWhere ID. This could be used by other organizations to access your [facility] medical records  [id number]        Your Vitals Were     Pulse Temperature Respirations Pulse Oximetry BMI (Body Mass Index)       70 97  F (36.1  C) (Tympanic) 20 99% 42.37 kg/m2        Blood Pressure from Last 3 Encounters:   17 120/64   17 139/85   17 128/74    Weight from Last 3 Encounters:   17 262 lb 8 oz (119.1 kg)   17 262 lb 12.8 oz (119.2 kg)   17 259 lb 8 oz (117.7 kg)              Today, you had the following     No " orders found for display         Today's Medication Changes          These changes are accurate as of: 5/9/17  1:33 PM.  If you have any questions, ask your nurse or doctor.               Start taking these medicines.        Dose/Directions    fluticasone 50 MCG/ACT spray   Commonly known as:  FLONASE   Used for:  Seasonal allergic rhinitis due to pollen, Chronic pain syndrome   Started by:  Dustin Fatima MD        Dose:  1-2 spray   Spray 1-2 sprays into both nostrils daily   Quantity:  3 Bottle   Refills:  1       montelukast 10 MG tablet   Commonly known as:  SINGULAIR   Used for:  Seasonal allergic rhinitis due to pollen   Started by:  Dustin Fatima MD        Dose:  10 mg   Take 1 tablet (10 mg) by mouth At Bedtime   Quantity:  30 tablet   Refills:  11       oxyCODONE-acetaminophen 5-325 MG per tablet   Commonly known as:  PERCOCET   Used for:  Chronic pain syndrome   Started by:  Dustin Fatima MD        Dose:  1 tablet   Start taking on:  5/16/2017   Take 1 tablet by mouth every 4 hours as needed for moderate to severe pain   Quantity:  30 tablet   Refills:  0            Where to get your medicines      These medications were sent to [facility] [address] [place] [address] [address] [facility]  [address] [facility] [address]     Phone:  [phone]     cetirizine 10 MG tablet    fluticasone 50 MCG/ACT spray    montelukast 10 MG tablet         Some of these will need a paper prescription and others can be bought over the counter.  Ask your nurse if you have questions.     Bring a paper prescription for each of these medications     oxyCODONE-acetaminophen 5-325 MG per tablet                Primary Care Provider Office Phone # Fax #    Dustin Fatima [phone]       [facility] [address]  [facility] [address]        Thank you!     Thank you for choosing [facility]  for your  care. Our goal is always to provide you with excellent care. Hearing back from our patients is one way we can continue to improve our services. Please take a few minutes to complete the written survey that you may receive in the mail after your visit with us. Thank you!             Your Updated Medication List - Protect others around you: Learn how to safely use, store and throw away your medicines at www.disposemymeds.org.          This list is accurate as of: 5/9/17  1:33 PM.  Always use your most recent med list.                   Brand Name Dispense Instructions for use    * albuterol (2.5 MG/3ML) 0.083% neb solution     30 vial    Take 1 vial (2.5 mg) by nebulization every 6 hours as needed for shortness of breath / dyspnea or wheezing       * albuterol 108 (90 BASE) MCG/ACT Inhaler    VENTOLIN HFA    54 g    INHALE TWO PUFFS BY MOUTH EVERY 4 HOURS AS NEEDED FOR SHORTNESS OF BREATH/DYSPNEA       blood glucose calibration solution    no brand specified    1 each    Use to calibrate blood glucose monitor as directed.       blood glucose lancets standard    no brand specified    1 Box    Use to test blood sugar daily times daily or as directed.       blood glucose monitoring meter device kit    no brand specified    1 kit    Use to test blood sugar larisa times daily or as directed.       blood glucose monitoring test strip    no brand specified    100 strip    Use to test blood sugars once times daily or as directed       cetirizine 10 MG tablet    zyrTEC    30 tablet    Take 1 tablet (10 mg) by mouth every evening       cyanocobalamin 1000 MCG/ML injection    VITAMIN B12    1 mL    Inject 1 mL (1,000 mcg) Subcutaneous every 30 days       EPINEPHrine 0.3 MG/0.3ML injection     2 each    Inject 0.3 mLs (0.3 mg) into the muscle once as needed for anaphylaxis       fluticasone 50 MCG/ACT spray    FLONASE    3 Bottle    Spray 1-2 sprays into both nostrils daily       losartan-hydrochlorothiazide 50-12.5 MG per tablet     HYZAAR    90 tablet    Take 1 tablet by mouth daily       magnesium oxide 400 (241.3 MG) MG tablet    MAG-OX    90 tablet    Take 1 tablet (400 mg) by mouth daily       mometasone-formoterol 100-5 MCG/ACT oral inhaler    DULERA    1 Inhaler    Inhale 2 puffs into the lungs 2 times daily       montelukast 10 MG tablet    SINGULAIR    30 tablet    Take 1 tablet (10 mg) by mouth At Bedtime       ondansetron 4 MG ODT tab    ZOFRAN ODT    10 tablet    Take 1 tablet (4 mg) by mouth every 8 hours as needed for nausea       oxyCODONE-acetaminophen 5-325 MG per tablet   Start taking on:  5/16/2017    PERCOCET    30 tablet    Take 1 tablet by mouth every 4 hours as needed for moderate to severe pain       polyethylene glycol powder    MIRALAX    510 g    Take 17 g (1 capful) by mouth daily       potassium chloride 10 MEQ tablet    K-TAB,KLOR-CON    120 tablet    Take 1 tablet (10 mEq) by mouth daily       sertraline 100 MG tablet    ZOLOFT    30 tablet    Take 1 tablet (100 mg) by mouth daily       sodium chloride 0.65 % nasal spray    SALINE MIST    1 Bottle    Spray 1 spray into both nostrils daily as needed for congestion       STATIN NOT PRESCRIBED (INTENTIONAL)     0 each    1 each daily Statin not prescribed intentionally due to Active liver disease (liver failure, cirrhosis, hepatitis) LIVER METS       triamcinolone 0.1 % paste    KENALOG    5 g    Take by mouth 2 times daily       Vitamin D (Cholecalciferol) 1000 UNITS Tabs     60 tablet    Take 2,000 Units by mouth daily       * Notice:  This list has 2 medication(s) that are the same as other medications prescribed for you. Read the directions carefully, and ask your doctor or other care provider to review them with you.

## 2017-05-09 NOTE — PATIENT INSTRUCTIONS
(J30.1) Seasonal allergic rhinitis due to pollen  (primary encounter diagnosis)  Comment:    Plan: montelukast (SINGULAIR) 10 MG tablet,         cetirizine (ZYRTEC) 10 MG tablet, fluticasone         (FLONASE) 50 MCG/ACT spray, DISCONTINUED:         montelukast (SINGULAIR) 10 MG tablet             (G89.4) Chronic pain syndrome  Comment:    Plan: oxyCODONE-acetaminophen (PERCOCET) 5-325 MG per        tablet, fluticasone (FLONASE) 50 MCG/ACT spray,        DISCONTINUED: oxyCODONE-acetaminophen         (PERCOCET) 5-325 MG per tablet

## 2017-05-09 NOTE — PROGRESS NOTES
SUBJECTIVE:                                                    Ines Mott is a 52 year old female who presents to clinic today for the following health issues:      Medication Followup of meds talked to Dr Fatima, he said to work in    Taking Medication as prescribed: yes    Side Effects:  None    Medication Helping Symptoms:  yes             Chronic Pain Follow-Up           Type / Location of Pain: back    Analgesia/pain control:         Recent changes:  Worse, recent pain in right side of lower back        Overall control: Tolerable with discomfort    Activity level/function:        Daily activities:  Can do most things most days, with some rest      Work:  not applicable    Adverse effects:  No    Adherance      Taking medication as directed?  Yes      Participating in other treatments: supposed to do a chemical dependency eval, in [place], unable to make that appt    Risk Factors:      Sleep:  Fair      Mood/anxiety:  controlled      Recent family or social stressors:  conflict between family members      Other aggravating factors: none    PHQ-9 SCORE   5/6/2016 11/4/2016 11/25/2016     Total Score   -   -   -     Total Score   10   7   3         RASTA-7 SCORE   8/24/2015 11/4/2016     Total Score   7   9                  Amount of exercise or physical activity: 6-7 days/week for an average of 45-60 minutes    Problems taking medications regularly: No    Medication side effects: none    Diet: regular (no restrictions)        CONTROLLEDS SUBSTANCE AGREEMENT ON FILE         Amount of exercise or physical activity: 4-5 days/week for an average of 45-60 minutes    Problems taking medications regularly: No    Medication side effects: none    Diet: regular (no restrictions)    WEIGHT LOSS DESIRED WITH DIET AND EXERCISE    HEADACHES ARE IMPROVED    NO FURTHER ABDOMINAL PAINS     NO FURTHER EMERGENCY ROOM VISITS     OR ALCOHOL EPISODES    HAVING TO MOVE TO ANOTHER APARTMETN       Diabetes  Follow-up    Patient is checking blood sugars: not at all  Diabetic concerns: None   Symptoms of hypoglycemia (low blood sugar): none   Paresthesias (numbness or burning in feet) or sores: No   Date of last diabetic eye exam:  SCHEDULE AS SOON AS POSSIBLE      Hyperlipidemia Follow-Up    Rate your low fat/cholesterol diet?: good  Taking statin?  NO [id number]}  Other lipid medications/supplements?:  none       Depression and Anxiety Follow-Up  Status since last visit: IMPROVED   Other associated symptoms:None  Complicating factors:   Significant life event: No   Current substance abuse: None    PHQ-9 SCORE 5/6/2016 11/4/2016 11/25/2016   Total Score - - -   Total Score 10 7 3     RASTA-7 SCORE 8/24/2015 11/4/2016   Total Score 7 9        PHQ-9  English      PHQ-9   Any Language     GAD7     Asthma Follow-Up    Was ACT completed today?  No    Respiratory symptoms:   Cough: No   Wheezing: No   Shortness of breath: No  Use of short- acting(rescue) inhaler: NO   Taking controlled (daily) meds as prescribed: Yes  ER/UC visits or hospital admissions since last visit: none   Recent asthma triggers that patient is dealing with: None           Problem list and histories reviewed & adjusted, as indicated.    Additional history: as documented              .OTTO YUSUF MD .5/9/2017 1:29 PM .May 9, 2017        .  Current Outpatient Prescriptions   Medication Sig Dispense Refill     [START ON 5/16/2017] oxyCODONE-acetaminophen (PERCOCET) 5-325 MG per tablet Take 1 tablet by mouth every 4 hours as needed for moderate to severe pain 30 tablet 0     montelukast (SINGULAIR) 10 MG tablet Take 1 tablet (10 mg) by mouth At Bedtime 30 tablet 11     cetirizine (ZYRTEC) 10 MG tablet Take 1 tablet (10 mg) by mouth every evening 30 tablet 1     fluticasone (FLONASE) 50 MCG/ACT spray Spray 1-2 sprays into both nostrils daily 3 Bottle 1     sodium chloride (SALINE MIST) 0.65 % nasal spray Spray 1 spray into both nostrils daily as needed for  congestion 1 Bottle 11     triamcinolone (KENALOG) 0.1 % paste Take by mouth 2 times daily 5 g 0     ondansetron (ZOFRAN ODT) 4 MG ODT tab Take 1 tablet (4 mg) by mouth every 8 hours as needed for nausea 10 tablet 0     Vitamin D, Cholecalciferol, 1000 UNITS TABS Take 2,000 Units by mouth daily 60 tablet 11     cyanocobalamin (VITAMIN B12) 1000 MCG/ML injection Inject 1 mL (1,000 mcg) Subcutaneous every 30 days 1 mL 11     potassium chloride (K-TAB,KLOR-CON) 10 MEQ tablet Take 1 tablet (10 mEq) by mouth daily 120 tablet 11     sertraline (ZOLOFT) 100 MG tablet Take 1 tablet (100 mg) by mouth daily 30 tablet 11     losartan-hydrochlorothiazide (HYZAAR) 50-12.5 MG per tablet Take 1 tablet by mouth daily 90 tablet 3     albuterol (2.5 MG/3ML) 0.083% neb solution Take 1 vial (2.5 mg) by nebulization every 6 hours as needed for shortness of breath / dyspnea or wheezing 30 vial 3     magnesium oxide (MAG-OX) 400 (241.3 MG) MG tablet Take 1 tablet (400 mg) by mouth daily 90 tablet 3     albuterol (VENTOLIN HFA) 108 (90 BASE) MCG/ACT Inhaler INHALE TWO PUFFS BY MOUTH EVERY 4 HOURS AS NEEDED FOR SHORTNESS OF BREATH/DYSPNEA 54 g 1     mometasone-formoterol (DULERA) 100-5 MCG/ACT oral inhaler Inhale 2 puffs into the lungs 2 times daily 1 Inhaler 11     blood glucose monitoring (NO BRAND SPECIFIED) meter device kit Use to test blood sugar larisa times daily or as directed. 1 kit 0     blood glucose (NO BRAND SPECIFIED) lancets standard Use to test blood sugar daily times daily or as directed. 1 Box 11     blood glucose monitoring (NO BRAND SPECIFIED) test strip Use to test blood sugars once times daily or as directed 100 strip 3     blood glucose calibration (NO BRAND SPECIFIED) solution Use to calibrate blood glucose monitor as directed. 1 each 0     polyethylene glycol (MIRALAX) powder Take 17 g (1 capful) by mouth daily 510 g 3     STATIN NOT PRESCRIBED, INTENTIONAL, 1 each daily Statin not prescribed intentionally due to  Active liver disease (liver failure, cirrhosis, hepatitis)  LIVER METS 0 each 0     EPINEPHrine (EPIPEN) 0.3 MG/0.3ML injection Inject 0.3 mLs (0.3 mg) into the muscle once as needed for anaphylaxis 2 each 5     [DISCONTINUED] montelukast (SINGULAIR) 10 MG tablet Take 1 tablet (10 mg) by mouth At Bedtime 30 tablet 11          Allergies   Allergen Reactions     Ativan [Lorazepam] Anaphylaxis     Macrobid [Nitrofurantoin] Anaphylaxis     Amoxicillin      Buspirone      Buspar     Cephalosporins      Dilaudid [Hydromorphone]      Diphenhydramine Hcl      Benadryl     Imitrex [Sumatriptan Succinate]      blood pressure raised uncontrobably     Ketorolac Tromethamine      Toradol     Lansoprazole      Prevacid     Metoclopramide Hives     Reglan     Paroxetine      Paxil     Penicillins      Prednisone Hives     Sulfa Drugs      THICKENED AND DIFFICULTY SWALLOWING      Lidoderm [Lidocaine] Rash     Localized rash at patch site       Immunization History   Administered Date(s) Administered     Influenza Vaccine IM 3yrs+ 4 Valent IIV4 2015, 2016     TDAP Vaccine (Adacel) 2015         reports that she does not drink alcohol.      reports that she does not use illicit drugs.    family history includes CANCER in her father.    indicated that her mother is alive. She indicated that her father is .      has a past surgical history that includes GYN surgery; Cholecystectomy; appendectomy; Extraction(s) dental; and Implant shunt ventriculoperitoneal.     reports that she does not engage in sexual activity.  .  Pediatric History   Patient Guardian Status     Mother:  Sada Francois     Other Topics Concern     Parent/Sibling W/ Cabg, Mi Or Angioplasty Before 65f 55m? No     Social History Narrative         reports that she has quit smoking. She has never used smokeless tobacco.    Medical, social, surgical, and family histories reviewed.    Problem list, Medication list, Allergies, and  Medical/Social/Surgical histories reviewed in [facility] and updated as appropriate.  Labs reviewed in EPIC  Patient Active Problem List   Diagnosis     Dyspnea and respiratory abnormality     Other specified drug dependence, in remission     Carcinoma of colon metastatic to liver (H)     Kidney infection     ALEXUS (obstructive sleep apnea)     Myelomeningocele with hydrocephalus, cervical region (H)     Fibromyalgia     Angioedema     Pneumonia due to other Gram-negative bacteria     BMI 42     H/O hysterectomy for benign disease     Hyperlipidemia with target LDL less than 130     Infected tooth     Recurrent major depression in partial remission     Hypertension goal BP (blood pressure) < 140/80     Abdominal pain, right lateral     Renal mass, right     Health Care Home     Intracranial shunt     Migraine     Mild intermittent asthma without complication     Other complicated headache syndrome     Seasonal affective disorder (H)     Anxiety     PTSD (post-traumatic stress disorder)     Pre diabetes      Chronic pain syndrome     Chronic tension-type headache, not intractable     Degeneration of lumbosacral intervertebral disc     Comprehensive diabetic foot examination, type 2 DM, encounter for (H)     Past Surgical History:   Procedure Laterality Date     APPENDECTOMY       CHOLECYSTECTOMY       EXTRACTION(S) DENTAL       GYN SURGERY       IMPLANT SHUNT VENTRICULOPERITONEAL         Social History   Substance Use Topics     Smoking status: Former Smoker     Smokeless tobacco: Never Used     Alcohol use No      Comment: off and on     Family History   Problem Relation Age of Onset     CANCER Father          Allergies   Allergen Reactions     Ativan [Lorazepam] Anaphylaxis     Macrobid [Nitrofurantoin] Anaphylaxis     Amoxicillin      Buspirone      Buspar     Cephalosporins      Dilaudid [Hydromorphone]      Diphenhydramine Hcl      Benadryl     Imitrex [Sumatriptan Succinate]      blood pressure raised uncontrobably      Ketorolac Tromethamine      Toradol     Lansoprazole      Prevacid     Metoclopramide Hives     Reglan     Paroxetine      Paxil     Penicillins      Prednisone Hives     Sulfa Drugs      THICKENED AND DIFFICULTY SWALLOWING      Lidoderm [Lidocaine] Rash     Localized rash at patch site     Recent Labs   Lab Test  04/03/17   1004  01/26/17   1803  01/18/17   1716  01/07/17   0120   11/04/16   1407   04/07/16   1620   05/07/15   1222   10/21/14   0230   A1C  5.9   --    --    --    --   6.1*   --   5.7   --   6.0   < >   --    LDL   --    --    --    --    --   96   --   101*   --   127   --    --    HDL   --    --    --    --    --   42*   --   42*   --   36*   --    --    TRIG   --    --    --    --    --   161*   --   306*   --   177*   --    --    ALT   --   25  17  41   < >  27   < >  30   < >  24   < >  31   CR   --   0.86  0.66  0.75   < >  1.22*   < >   --    < >   --    < >  0.82   GFRESTIMATED   --   69  >90  Non  GFR Calc    81   < >  46*   < >   --    < >   --    < >  73   GFRESTBLACK   --   84  >90   GFR Calc    >90   GFR Calc     < >  56*   < >   --    < >   --    < >  89   POTASSIUM   --   3.5  3.1*  3.7   < >  3.8   < >   --    < >   --    < >  3.8   TSH  1.62   --    --    --    --    --    --    --    --    --    --   2.30    < > = values in this interval not displayed.        BP Readings from Last 6 Encounters:   05/09/17 120/64   05/09/17 139/85   04/25/17 128/74   04/18/17 128/72   04/03/17 132/70   03/03/17 126/64       Wt Readings from Last 3 Encounters:   05/09/17 262 lb 8 oz (119.1 kg)   05/09/17 262 lb 12.8 oz (119.2 kg)   04/25/17 259 lb 8 oz (117.7 kg)         Positive symptoms or findings indicated by bold designation:     ROS: 10 point ROS neg other than the symptoms noted above in the HPI.except  has Dyspnea and respiratory abnormality; Other specified drug dependence, in remission; Carcinoma of colon metastatic to liver (H); Kidney  infection; ALEXUS (obstructive sleep apnea); Myelomeningocele with hydrocephalus, cervical region (H); Fibromyalgia; Angioedema; Pneumonia due to other Gram-negative bacteria; BMI 42; H/O hysterectomy for benign disease; Hyperlipidemia with target LDL less than 130; Infected tooth; Recurrent major depression in partial remission; Hypertension goal BP (blood pressure) < 140/80; Abdominal pain, right lateral; Renal mass, right; Health Care Home; Intracranial shunt; Migraine; Mild intermittent asthma without complication; Other complicated headache syndrome; Seasonal affective disorder (H); Anxiety; PTSD (post-traumatic stress disorder); Pre diabetes ; Chronic pain syndrome; Chronic tension-type headache, not intractable; Degeneration of lumbosacral intervertebral disc; and Comprehensive diabetic foot examination, type 2 DM, encounter for (H) on her problem list.   Constitutional: The patient denied fatigue, fever, insomnia, night sweats, recent illness and weight loss.  SLIGHT WEIGHT GAIN     Eyes: The patient denied blindness, eye pain, eye tearing, photophobia, vision change and visual disturbance. NORMAL VISION       Ears/Nose/Throat/Neck: The patient denied dizziness, facial pain, hearing loss, nasal discharge, oral pain, otalgia, postnasal drip, sinus congestion, sore throat, tinnitus and voice change.   NORMAL  HEAIRNG AND BALANCE  IMPROVED HEADACHES     Cardiovascular: The patient denied arrhythmia, chest pain/pressure, claudication, edema, exercise intolerance, fatigue, orthopnea, palpitations and syncope.      Respiratory: The patient denied asthma, chest congestion, cough, dyspnea on exertion, dyspnea/shortness of breath, hemoptysis, pedal edema, pleuritic pain, productive sputum, snoring and wheezing. NORMAL BREATHING     Gastrointestinal: The patient denied abdominal pain, anorexia, constipation, diarrhea, dysphagia, gastroesophageal reflux, hematochezia, hemorrhoids, melena, nausea and vomiting .  GASTROESOPHAGEAL REFLUX DISEASE WITHOUT ESOPHAGITIS   RECURRENT RIGHT UPPER QUADRANT ABDOMNINAL PAIN     Genitourinary/Nephrology: The patient denied breast complaint, dysuria, nocturia sexual dysfunction, t, urinary frequency, urinary incontinence, urinary urgency        Musculoskeletal: The patient denied arthralgia(s), back pain, joint complaint, muscle weakness, myalgias, osteoporosis, sciatica, stiffness and swelling.      Dermatoligic:: The patient denied acne, dermatitis, ecchymosis, itching, mole change, rash, skin cancer, skin lesion and sores.      Neurologic: The patient denied dizziness, gait abnormality, headache, memory loss, mental status change, paresis, paresthesia, seizure, syncope, tremor and vision change.     Psychiatric: The patient denied anxiety, depression, disturbances of memory, drug abuse, insomnia, mood swings and relationship difficulties.  ANXIETY DEPRESSION AND CHEMICAL DEPENDENCY AND COPER  OFF ALCOHOL FOR  LAST SEVERAL MONTHS     Endocrine: The patient denied , goiter, obesity, polyuria and thyroid disease.  BORDERLINE  DIABETES 2 GOAL 8%     Hematologic/Lymphatic: The patient denied abnormal bleeding and bruising, abnormal ecchymoses, anemia, lymph node enlargement/mass, petechiae and venous  Thrombosis.      Allergy/Immunology: The patient denied food allergy and  Allergic rhinitis or conjunctivitis.        PE:  /64  Pulse 70  Temp 97  F (36.1  C) (Tympanic)  Resp 20  Wt 262 lb 8 oz (119.1 kg)  SpO2 99%  BMI 42.37 kg/m2 Body mass index is 42.37 kg/(m^2).    Constitutional: general appearance, well nourished, well developed, in no acute distress, well developed, appears stated age, normal body habitus,      Eyes:; The patient has normal eyelids sclerae and conjunctivae :      Ears/Nose/Throat: external ear, overall: normal appearance; external nose, overall: benign appearance, normal moujth gums and lips  The patient has:      Neck: thyroid, overall: normal size, normal  consistency, nontender,      Respiratory:  palpation of chest, overall: normal excursion,    Clear to percussion and auscultation      Tachypnea  WITHIN NORMAL LIMITS   Color  NORMAL      Cardiovascular:  Good color with no peripheral edema  NORMAL;    Regular sinus rhythm without murmur. Physiologic heart sounds Heart is unelarged  .   Chest/Breast: normal shape  NORMAL        Abdominal exam,  Liver and spleen are  unenlarged  LIVER NONTENDER AND NOT ENLARGED       Tenderness  NORMAL    Scars  NORMAL       Urogenital; no renal, flank or bladder  tenderness;  NORMAL     Lymphatic: neck nodes,  NORMAL     Other nodes NORMAL      Musculoskeletal:  Brief ortho exam normal except:   OSTEOARTHRITIS KNEES BILATERAL AND DECREASE RANGE OF MOTION OF BACK   NEGATIVE STRAIGHT LEG RAISING TEST   NORMAL REFLEXES   NO SIGNIFICANT ANTALGIA      Integument: inspection of skin, no rash, lesions; and, palpation, no induration, no tenderness.      Neurologic mental status, overall: alert and oriented; gait, no ataxia, no unsteadiness; coordination, no tremors; cranial nerves, overall: normal motor, overall: normal bulk, tone.      Psychiatric: orientation/consciousness, overall: oriented to person, place and time; behavior/psychomotor activity, no tics, normal psychomotor activity; mood and affect, overall: normal mood and affect; appearance, overall: well-groomed, good eye contact; speech, overall: normal quality, no aphasia and normal quality, quantity, intact.  NORMAL     Diagnostic Test Results:  Results for orders placed or performed in visit on 04/03/17   Hemoglobin A1c   Result Value Ref Range    Hemoglobin A1C 5.9 4.3 - 6.0 %   TSH   Result Value Ref Range    TSH 1.62 0.40 - 4.00 mU/L         ICD-10-CM    1. Seasonal allergic rhinitis due to pollen J30.1 montelukast (SINGULAIR) 10 MG tablet     cetirizine (ZYRTEC) 10 MG tablet     fluticasone (FLONASE) 50 MCG/ACT spray     DISCONTINUED: montelukast (SINGULAIR) 10 MG tablet    2. Chronic pain syndrome G89.4 oxyCODONE-acetaminophen (PERCOCET) 5-325 MG per tablet     fluticasone (FLONASE) 50 MCG/ACT spray     DISCONTINUED: oxyCODONE-acetaminophen (PERCOCET) 5-325 MG per tablet        .    Side effects benefits and risks thoroughly discussed. .she may come in early if unimproved or getting worse          Importance of adhering to regimen discussed and if medications were dispensed, the importance of taking medications discussed and bringing in the medications after every visit for chronic problems         Please drink 2 glasses of water prior to meals and walk 15-30 minutes after meals    I spent  25 MINUTES SPENT  with patient discussing the following issues   The primary encounter diagnosis was Seasonal allergic rhinitis due to pollen. A diagnosis of Chronic pain syndrome was also pertinent to this visit. over half of which involved counseling and coordination of care.    Patient Instructions   (J30.1) Seasonal allergic rhinitis due to pollen  (primary encounter diagnosis)  Comment:    Plan: montelukast (SINGULAIR) 10 MG tablet,         cetirizine (ZYRTEC) 10 MG tablet, fluticasone         (FLONASE) 50 MCG/ACT spray, DISCONTINUED:         montelukast (SINGULAIR) 10 MG tablet             (G89.4) Chronic pain syndrome  Comment:    Plan: oxyCODONE-acetaminophen (PERCOCET) 5-325 MG per        tablet, fluticasone (FLONASE) 50 MCG/ACT spray,        DISCONTINUED: oxyCODONE-acetaminophen         (PERCOCET) 5-325 MG per tablet                       ALL THE ABOVE PROBLEMS ARE STABLE AND MED CHANGES AS NOTED    Diet:  MEDITERRANEAN DIET AND DIABETES     Exercise:  RANGE OF MOTION UPPER EXTREMETIES AND LOWER EXTREMITY BILATERAL AND LOWER BACK PAIN EXERCISES   Exercises Range of motion, balance, isometric, and strengthening exercises 30 repetitions twice daily of involved joints      .OTTO YUSUF MD 5/9/2017 1:29 PM  May 9, 2017

## 2017-05-09 NOTE — NURSING NOTE
"Chief Complaint   Patient presents with     Consult     Sleep apnea       Initial /85  Pulse 73  Resp 18  Ht 1.676 m (5' 6\")  Wt 119.2 kg (262 lb 12.8 oz)  SpO2 96%  BMI 42.42 kg/m2 Estimated body mass index is 42.42 kg/(m^2) as calculated from the following:    Height as of this encounter: 1.676 m (5' 6\").    Weight as of this encounter: 119.2 kg (262 lb 12.8 oz).  Medication Reconciliation: complete   Neck 41      tadAdin [facility]      "

## 2017-05-09 NOTE — PATIENT INSTRUCTIONS
Machine to clinic for a download (chip or card in back)  PIETRO for [facility] ([facility])  For 2015 or 2016 PSG, PIETRO to [facility], [id number] ANW study  Call insurance: where can I get my equipment.  Is Ronald OK.  Follow up in 3 wks with download at visit     Your BMI is Body mass index is 42.42 kg/(m^2).  Weight management is a personal decision.  If you are interested in exploring weight loss strategies, the following discussion covers the approaches that may be successful. Body mass index (BMI) is one way to tell whether you are at a healthy weight, overweight, or obese. It measures your weight in relation to your height.  A BMI of 18.5 to 24.9 is in the healthy range. A person with a BMI of 25 to 29.9 is considered overweight, and someone with a BMI of 30 or greater is considered obese. More than two-thirds of American adults are considered overweight or obese.  Being overweight or obese increases the risk for further weight gain. Excess weight may lead to heart disease and diabetes.  Creating and following plans for healthy eating and physical activity may help you improve your health.  Weight control is part of healthy lifestyle and includes exercise, emotional health, and healthy eating habits. Careful eating habits lifelong are the mainstay of weight control. Though there are significant health benefits from weight loss, long-term weight loss with diet alone may be very difficult to achieve- studies show long-term success with dietary management in less than 10% of people. Attaining a healthy weight may be especially difficult to achieve in those with severe obesity. In some cases, medications, devices and surgical management might be considered.  What can you do?  If you are overweight or obese and are interested in methods for weight loss, you should discuss this with your provider.     Consider reducing daily calorie intake by 500 calories.     Keep a food journal.     Avoiding skipping meals,  consider cutting portions instead.    Diet combined with exercise helps maintain muscle while optimizing fat loss. Strength training is particularly important for building and maintaining muscle mass. Exercise helps reduce stress, increase energy, and improves fitness. Increasing exercise without diet control, however, may not burn enough calories to loose weight.       Start walking three days a week 10-20 minutes at a time    Work towards walking thirty minutes five days a week     Eventually, increase the speed of your walking for 1-2 minutes at time    In addition, we recommend that you review healthy lifestyles and methods for weight loss available through the National [employer] of [employer] patient information sites:  http://win.niddk.nih.gov/publications/index.htm    And look into health and wellness programs that may be available through your health insurance provider, employer, local community center, or bree club.    Weight management plan: Patient was referred to their PCP to discuss a diet and exercise plan.
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
## 2017-05-12 ENCOUNTER — CARE COORDINATION (OUTPATIENT)
Dept: CARE COORDINATION | Facility: CLINIC | Age: 52
End: 2017-05-12

## 2017-05-12 NOTE — PROGRESS NOTES
Clinic Care Coordination Contact  Clinic Care Coordination Chart Review     Situation: Patient chart reviewed by care coordinator.    Background: Clinic Care CoordinatorSMITH has been working with this patient since 07/2015. In 2016, patient has 24 ED visits. So far in 2017, patient has had 4 ED visits    Assessment: ED visits rate is less in 2017 then was in 2016.    Plan/Recommendations: Clinic Care CoordinatorSMITH to follow up with patient after her ED visits    BRISSA Cruz, [facility]  Clinic Care CoordinatorSMITH with [facility]  [phone]

## 2017-05-22 ENCOUNTER — OFFICE VISIT (OUTPATIENT)
Dept: FAMILY MEDICINE | Facility: CLINIC | Age: 52
End: 2017-05-22
Payer: COMMERCIAL

## 2017-05-22 VITALS
HEART RATE: 81 BPM | BODY MASS INDEX: 41.8 KG/M2 | TEMPERATURE: 99.1 F | OXYGEN SATURATION: 98 % | WEIGHT: 259 LBS | SYSTOLIC BLOOD PRESSURE: 126 MMHG | RESPIRATION RATE: 16 BRPM | DIASTOLIC BLOOD PRESSURE: 78 MMHG

## 2017-05-22 DIAGNOSIS — C18.9 CARCINOMA OF COLON METASTATIC TO LIVER (H): ICD-10-CM

## 2017-05-22 DIAGNOSIS — F33.41 RECURRENT MAJOR DEPRESSION IN PARTIAL REMISSION (H): Chronic | ICD-10-CM

## 2017-05-22 DIAGNOSIS — G89.4 CHRONIC PAIN SYNDROME: Primary | ICD-10-CM

## 2017-05-22 DIAGNOSIS — E66.01 MORBID OBESITY, UNSPECIFIED OBESITY TYPE (H): Chronic | ICD-10-CM

## 2017-05-22 DIAGNOSIS — Q05.0: ICD-10-CM

## 2017-05-22 DIAGNOSIS — I10 HYPERTENSION GOAL BP (BLOOD PRESSURE) < 140/80: Chronic | ICD-10-CM

## 2017-05-22 DIAGNOSIS — E78.5 HYPERLIPIDEMIA WITH TARGET LDL LESS THAN 130: Chronic | ICD-10-CM

## 2017-05-22 DIAGNOSIS — Z12.11 SPECIAL SCREENING FOR MALIGNANT NEOPLASMS, COLON: ICD-10-CM

## 2017-05-22 DIAGNOSIS — C78.7 CARCINOMA OF COLON METASTATIC TO LIVER (H): ICD-10-CM

## 2017-05-22 DIAGNOSIS — G47.33 OSA (OBSTRUCTIVE SLEEP APNEA): ICD-10-CM

## 2017-05-22 PROCEDURE — 99213 OFFICE O/P EST LOW 20 MIN: CPT | Performed by: FAMILY MEDICINE

## 2017-05-22 RX ORDER — OXYCODONE AND ACETAMINOPHEN 5; 325 MG/1; MG/1
1 TABLET ORAL EVERY 4 HOURS PRN
Qty: 30 TABLET | Refills: 0 | Status: SHIPPED | OUTPATIENT
Start: 2017-05-29 | End: 2017-06-01

## 2017-05-22 RX ORDER — OXYCODONE AND ACETAMINOPHEN 5; 325 MG/1; MG/1
1 TABLET ORAL EVERY 4 HOURS PRN
Qty: 30 TABLET | Refills: 0 | Status: SHIPPED | OUTPATIENT
Start: 2017-05-22 | End: 2017-05-22

## 2017-05-22 NOTE — PATIENT INSTRUCTIONS
(G89.4) Chronic pain syndrome  Comment:       Plan: oxyCODONE-acetaminophen (PERCOCET) 5-325 MG per        tablet, DISCONTINUED: oxyCODONE-acetaminophen         (PERCOCET) 5-325 MG per tablet        (Z12.11) Special screening for malignant neoplasms, colon  (primary encounter diagnosis)    Comment:      Plan: Fecal colorectal cancer screen (FIT)                   (I10) Hypertension goal BP (blood pressure) < 140/80    Comment:      Plan:          (E66.01) Morbid obesity, unspecified obesity type (H)    Comment:      Plan:          (E78.5) Hyperlipidemia with target LDL less than 130    Comment:      Plan:          (F33.41) Recurrent major depression in partial remission    Comment:      Plan:      Dustin Fatima Jr., MD

## 2017-05-22 NOTE — PROGRESS NOTES
SUBJECTIVE:                                                    Ines Mott is a 52 year old female who presents to clinic today for the following health issues:  Health Maintenance Due   Topic Date Due     EYE EXAM Q1 YEAR( NO INBASKET)  01/15/1966     PNEUMOVAX 1X HI RISK PATIENT < 65 (NO IB MSG)  01/15/1967     HEPATITIS C SCREENING  01/15/1983     FIT Q1 YR (NO INBASKET)  07/03/2016     FOOT EXAM Q1 YEAR( NO INBASKET)  04/07/2017     Health Maintenance reviewed at today's visit patient asked to schedule/complete:   Colon Cancer:  Completed at today's visit.      Chronic Pain Follow-Up       Type / Location of Pain: back  Analgesia/pain control:       Recent changes:  Improved, right knee is better      Overall control: Comfortably manageable  Activity level/function:      Daily activities:  Able to do all daily activities    Work:  not applicable  Adverse effects:  No  Adherance    Taking medication as directed?  Yes    Participating in other treatments: not applicable  Risk Factors:    Sleep:  Poor    Mood/anxiety:  controlled    Recent family or social stressors:   issues    Other aggravating factors: none  PHQ-9 SCORE 11/4/2016 11/25/2016 4/25/2017   Total Score - - -   Total Score 7 3 5     RASTA-7 SCORE 8/24/2015 11/4/2016   Total Score 7 9     Encounter-Level CSA - 04/18/2017:                 Controlled Substance Agreement - Scan on 4/28/2017  7:00 AM : CONTROLLED SUBSTANCE AGREEMENT (below)          Encounter-Level CSA - 04/18/2017:                 Controlled Substance Agreement - Scan on 1/5/2017  1:14 PM : CONTROLLED SUBSTANCE AGREEMENT (below)          Encounter-Level CSA - 04/18/2017:                 Controlled Substance Agreement - Scan on 12/28/2015  2:53 PM : CONTROLLED MEDICATION CONTRACT, 12-22-15 (below)          Encounter-Level CSA - 04/18/2017:                 Controlled Substance Agreement - Scan on 4/17/2015  1:45 PM : BM, Controlled Substance Contract, 04-10-15 (below)                  Amount of exercise or physical activity: 6-7 days/week for an average of 45-60 minutes    Problems taking medications regularly: No    Medication side effects: none    Diet: regular (no restrictions)      Diabetes Follow-up      Patient is checking blood sugars:  Occasional     Diabetic concerns: None     Symptoms of hypoglycemia (low blood sugar): none     Paresthesias (numbness or burning in feet) or sores: No     Date of last diabetic eye exam:  Overdue     Did not keep referral exam [facility]       Depression and Anxiety Follow-Up  Divorce from  pending   Positive attitude noted  Not drinking alcohol at this time    Status since last visit: Improved     Other associated symptoms:None    Complicating factors:     Significant life event: No     Current substance abuse: None    PHQ-9 SCORE 11/4/2016 11/25/2016 4/25/2017   Total Score - - -   Total Score 7 3 5     RASTA-7 SCORE 8/24/2015 11/4/2016   Total Score 7 9        PHQ-9  English      PHQ-9   Any Language     GAD7       Problem list and histories reviewed & adjusted, as indicated.  Additional history: as documented      Patient Active Problem List   Diagnosis     Dyspnea and respiratory abnormality     Other specified drug dependence, in remission     Carcinoma of colon metastatic to liver (H)     Kidney infection     ALEXUS (obstructive sleep apnea)     Myelomeningocele with hydrocephalus, cervical region (H)     Fibromyalgia     Angioedema     Pneumonia due to other Gram-negative bacteria     BMI 42     H/O hysterectomy for benign disease     Hyperlipidemia with target LDL less than 130     Infected tooth     Recurrent major depression in partial remission     Hypertension goal BP (blood pressure) < 140/80     Abdominal pain, right lateral     Renal mass, right     Health Care Home     Intracranial shunt     Migraine     Mild intermittent asthma without complication     Other complicated headache syndrome     Seasonal affective disorder (H)      Anxiety     PTSD (post-traumatic stress disorder)     Pre diabetes      Chronic pain syndrome     Chronic tension-type headache, not intractable     Degeneration of lumbosacral intervertebral disc     Comprehensive diabetic foot examination, type 2 DM, encounter for (H)     Past Surgical History:   Procedure Laterality Date     APPENDECTOMY       CHOLECYSTECTOMY       EXTRACTION(S) DENTAL       GYN SURGERY       IMPLANT SHUNT VENTRICULOPERITONEAL         Social History   Substance Use Topics     Smoking status: Former Smoker     Smokeless tobacco: Never Used     Alcohol use No      Comment: off and on     Family History   Problem Relation Age of Onset     CANCER Father          Current Outpatient Prescriptions   Medication Sig Dispense Refill     [START ON 5/29/2017] oxyCODONE-acetaminophen (PERCOCET) 5-325 MG per tablet Take 1 tablet by mouth every 4 hours as needed for moderate to severe pain 30 tablet 0     montelukast (SINGULAIR) 10 MG tablet Take 1 tablet (10 mg) by mouth At Bedtime 30 tablet 11     cetirizine (ZYRTEC) 10 MG tablet Take 1 tablet (10 mg) by mouth every evening 30 tablet 1     fluticasone (FLONASE) 50 MCG/ACT spray Spray 1-2 sprays into both nostrils daily 3 Bottle 1     sodium chloride (SALINE MIST) 0.65 % nasal spray Spray 1 spray into both nostrils daily as needed for congestion 1 Bottle 11     triamcinolone (KENALOG) 0.1 % paste Take by mouth 2 times daily 5 g 0     ondansetron (ZOFRAN ODT) 4 MG ODT tab Take 1 tablet (4 mg) by mouth every 8 hours as needed for nausea 10 tablet 0     Vitamin D, Cholecalciferol, 1000 UNITS TABS Take 2,000 Units by mouth daily 60 tablet 11     cyanocobalamin (VITAMIN B12) 1000 MCG/ML injection Inject 1 mL (1,000 mcg) Subcutaneous every 30 days 1 mL 11     potassium chloride (K-TAB,KLOR-CON) 10 MEQ tablet Take 1 tablet (10 mEq) by mouth daily 120 tablet 11     sertraline (ZOLOFT) 100 MG tablet Take 1 tablet (100 mg) by mouth daily 30 tablet 11      losartan-hydrochlorothiazide (HYZAAR) 50-12.5 MG per tablet Take 1 tablet by mouth daily 90 tablet 3     albuterol (2.5 MG/3ML) 0.083% neb solution Take 1 vial (2.5 mg) by nebulization every 6 hours as needed for shortness of breath / dyspnea or wheezing 30 vial 3     magnesium oxide (MAG-OX) 400 (241.3 MG) MG tablet Take 1 tablet (400 mg) by mouth daily 90 tablet 3     albuterol (VENTOLIN HFA) 108 (90 BASE) MCG/ACT Inhaler INHALE TWO PUFFS BY MOUTH EVERY 4 HOURS AS NEEDED FOR SHORTNESS OF BREATH/DYSPNEA 54 g 1     mometasone-formoterol (DULERA) 100-5 MCG/ACT oral inhaler Inhale 2 puffs into the lungs 2 times daily 1 Inhaler 11     blood glucose monitoring (NO BRAND SPECIFIED) meter device kit Use to test blood sugar larisa times daily or as directed. 1 kit 0     blood glucose (NO BRAND SPECIFIED) lancets standard Use to test blood sugar daily times daily or as directed. 1 Box 11     blood glucose monitoring (NO BRAND SPECIFIED) test strip Use to test blood sugars once times daily or as directed 100 strip 3     blood glucose calibration (NO BRAND SPECIFIED) solution Use to calibrate blood glucose monitor as directed. 1 each 0     polyethylene glycol (MIRALAX) powder Take 17 g (1 capful) by mouth daily 510 g 3     STATIN NOT PRESCRIBED, INTENTIONAL, 1 each daily Statin not prescribed intentionally due to Active liver disease (liver failure, cirrhosis, hepatitis)  LIVER METS 0 each 0     EPINEPHrine (EPIPEN) 0.3 MG/0.3ML injection Inject 0.3 mLs (0.3 mg) into the muscle once as needed for anaphylaxis 2 each 5     Allergies   Allergen Reactions     Ativan [Lorazepam] Anaphylaxis     Macrobid [Nitrofurantoin] Anaphylaxis     Amoxicillin      Buspirone      Buspar     Cephalosporins      Dilaudid [Hydromorphone]      Diphenhydramine Hcl      Benadryl     Imitrex [Sumatriptan Succinate]      blood pressure raised uncontrobably     Ketorolac Tromethamine      Toradol     Lansoprazole      Prevacid     Metoclopramide Hives      Reglan     Paroxetine      Paxil     Penicillins      Prednisone Hives     Sulfa Drugs      THICKENED AND DIFFICULTY SWALLOWING      Lidoderm [Lidocaine] Rash     Localized rash at patch site     Recent Labs   Lab Test  04/03/17   1004  01/26/17   1803  01/18/17   1716  01/07/17   0120   11/04/16   1407   04/07/16   1620   05/07/15   1222   10/21/14   0230   A1C  5.9   --    --    --    --   6.1*   --   5.7   --   6.0   < >   --    LDL   --    --    --    --    --   96   --   101*   --   127   --    --    HDL   --    --    --    --    --   42*   --   42*   --   36*   --    --    TRIG   --    --    --    --    --   161*   --   306*   --   177*   --    --    ALT   --   25  17  41   < >  27   < >  30   < >  24   < >  31   CR   --   0.86  0.66  0.75   < >  1.22*   < >   --    < >   --    < >  0.82   GFRESTIMATED   --   69  >90  Non  GFR Calc    81   < >  46*   < >   --    < >   --    < >  73   GFRESTBLACK   --   84  >90   GFR Calc    >90   GFR Calc     < >  56*   < >   --    < >   --    < >  89   POTASSIUM   --   3.5  3.1*  3.7   < >  3.8   < >   --    < >   --    < >  3.8   TSH  1.62   --    --    --    --    --    --    --    --    --    --   2.30    < > = values in this interval not displayed.      BP Readings from Last 3 Encounters:   05/22/17 126/78   05/09/17 120/64   05/09/17 139/85    Wt Readings from Last 3 Encounters:   05/22/17 259 lb (117.5 kg)   05/09/17 262 lb 8 oz (119.1 kg)   05/09/17 262 lb 12.8 oz (119.2 kg)                  Labs reviewed in EPIC    Reviewed and updated as needed this visit by clinical staff  Tobacco  Allergies  Med Hx  Surg Hx  Fam Hx  Soc Hx      Reviewed and updated as needed this visit by Provider         ROS:  has Dyspnea and respiratory abnormality; Other specified drug dependence, in remission; Carcinoma of colon metastatic to liver (H); Kidney infection; ALEXUS (obstructive sleep apnea); Myelomeningocele with hydrocephalus,  cervical region (H); Fibromyalgia; Angioedema; Pneumonia due to other Gram-negative bacteria; BMI 42; H/O hysterectomy for benign disease; Hyperlipidemia with target LDL less than 130; Infected tooth; Recurrent major depression in partial remission; Hypertension goal BP (blood pressure) < 140/80; Abdominal pain, right lateral; Renal mass, right; Health Care Home; Intracranial shunt; Migraine; Mild intermittent asthma without complication; Other complicated headache syndrome; Seasonal affective disorder (H); Anxiety; PTSD (post-traumatic stress disorder); Pre diabetes ; Chronic pain syndrome; Chronic tension-type headache, not intractable; Degeneration of lumbosacral intervertebral disc; and Comprehensive diabetic foot examination, type 2 DM, encounter for (H) on her problem list.    Constitutional, HEENT, cardiovascular, pulmonary, gi and gu systems are negative, except as otherwise noted.    OBJECTIVE:                                                    /78  Pulse 81  Temp 99.1  F (37.3  C) (Tympanic)  Resp 16  Wt 259 lb (117.5 kg)  SpO2 98%  BMI 41.8 kg/m2  Body mass index is 41.8 kg/(m^2).  GENERAL: healthy, alert and no distress  EYES: Eyes grossly normal to inspection, PERRL and conjunctivae and sclerae normal  HENT: ear canals and TM's normal, nose and mouth without ulcers or lesions  NECK: no adenopathy, no asymmetry, masses, or scars and thyroid normal to palpation  RESP: lungs clear to auscultation - no rales, rhonchi or wheezes  BREAST: normal without masses, tenderness or nipple discharge and no palpable axillary masses or adenopathy  CV: regular rate and rhythm, normal S1 S2, no S3 or S4, no murmur, click or rub, no peripheral edema and peripheral pulses strong  ABDOMEN: soft, nontender, no hepatosplenomegaly, no masses and bowel sounds normal  SKIN: no suspicious lesions or rashes  PSYCH: mentation appears normal, affect normal/bright  LYMPH: no cervical, supraclavicular, axillary, or  inguinal adenopathy  Diabetic foot exam: normal DP and PT pulses, no trophic changes or ulcerative lesions, normal sensory exam and normal monofilament exam    Diagnostic Test Results:  Results for orders placed or performed in visit on 04/03/17   Hemoglobin A1c   Result Value Ref Range    Hemoglobin A1C 5.9 4.3 - 6.0 %   TSH   Result Value Ref Range    TSH 1.62 0.40 - 4.00 mU/L        ASSESSMENT/PLAN:                                                          ICD-10-CM    1. Chronic pain syndrome G89.4 oxyCODONE-acetaminophen (PERCOCET) 5-325 MG per tablet     DISCONTINUED: oxyCODONE-acetaminophen (PERCOCET) 5-325 MG per tablet   2. Carcinoma of colon metastatic to liver (H) C18.9     C78.7    3. ALEXUS (obstructive sleep apnea) G47.33    4. Myelomeningocele with hydrocephalus, cervical region (H) Q05.0    5. Special screening for malignant neoplasms, colon Z12.11 Fecal colorectal cancer screen (FIT)   6. Hypertension goal BP (blood pressure) < 140/80 I10    7. Morbid obesity, unspecified obesity type (H) E66.01    8. Hyperlipidemia with target LDL less than 130 E78.5    9. Recurrent major depression in partial remission F33.41        Patient Instructions       (G89.4) Chronic pain syndrome  Comment:       Plan: oxyCODONE-acetaminophen (PERCOCET) 5-325 MG per        tablet, DISCONTINUED: oxyCODONE-acetaminophen         (PERCOCET) 5-325 MG per tablet        (Z12.11) Special screening for malignant neoplasms, colon  (primary encounter diagnosis)    Comment:      Plan: Fecal colorectal cancer screen (FIT)                   (I10) Hypertension goal BP (blood pressure) < 140/80    Comment:      Plan:          (E66.01) Morbid obesity, unspecified obesity type (H)    Comment:      Plan:          (E78.5) Hyperlipidemia with target LDL less than 130    Comment:      Plan:          (F33.41) Recurrent major depression in partial remission    Comment:      Plan:      Dustin YUSUF,  MD  [facility]

## 2017-05-22 NOTE — MR AVS SNAPSHOT
After Visit Summary   5/22/2017    Ines Mott    MRN: [id number]           Patient Information     Date Of Birth          1965        Visit Information        Provider Department      5/22/2017 10:15 AM Dustin Fatima MD [facility]        Today's Diagnoses     Special screening for malignant neoplasms, colon    -  1    Hypertension goal BP (blood pressure) < 140/80        Morbid obesity, unspecified obesity type (H)        Hyperlipidemia with target LDL less than 130        Recurrent major depression in partial remission        Chronic pain syndrome          Care Instructions        (G89.4) Chronic pain syndrome  Comment:       Plan: oxyCODONE-acetaminophen (PERCOCET) 5-325 MG per        tablet, DISCONTINUED: oxyCODONE-acetaminophen         (PERCOCET) 5-325 MG per tablet        (Z12.11) Special screening for malignant neoplasms, colon  (primary encounter diagnosis)    Comment:      Plan: Fecal colorectal cancer screen (FIT)                   (I10) Hypertension goal BP (blood pressure) < 140/80    Comment:      Plan:          (E66.01) Morbid obesity, unspecified obesity type (H)    Comment:      Plan:          (E78.5) Hyperlipidemia with target LDL less than 130    Comment:      Plan:          (F33.41) Recurrent major depression in partial remission    Comment:      Plan:      Dustin Fatima Jr., MD                        Follow-ups after your visit        Follow-up notes from your care team     Return in about 2 weeks (around 6/5/2017).      Your next 10 appointments already scheduled     Jun 08, 2017  2:00 PM CDT   Return Sleep Patient with MICHEAL Dixon CNP   [facility], Sleep Study ([facility])    [address] [place] [id number] [phone]              Future tests that were ordered for you today     Open Future Orders        Priority Expected Expires Ordered     "Fecal colorectal cancer screen (FIT) Routine 2017            Who to contact     If you have questions or need follow up information about today's clinic visit or your schedule please contact [facility] directly at [phone].  Normal or non-critical lab and imaging results will be communicated to you by MyChart, letter or phone within 4 business days after the clinic has received the results. If you do not hear from us within 7 days, please contact the clinic through [employer] nethart or phone. If you have a critical or abnormal lab result, we will notify you by phone as soon as possible.  Submit refill requests through [employer] or call your pharmacy and they will forward the refill request to us. Please allow 3 business days for your refill to be completed.          Additional Information About Your Visit        MyChart Information     [employer] lets you send messages to your doctor, view your test results, renew your prescriptions, schedule appointments and more. To sign up, go to [email or web address] . Click on \"Log in\" on the left side of the screen, which will take you to the Welcome page. Then click on \"Sign up Now\" on the right side of the page.     You will be asked to enter the access code listed below, as well as some personal information. Please follow the directions to create your username and password.     Your access code is: [email or web address]  Expires: 2017  9:57 AM     Your access code will  in 90 days. If you need help or a new code, please call your [facility] clinic or [phone].        Care EveryWhere ID     This is your Care EveryWhere ID. This could be used by other organizations to access your [facility] medical records  [id number]        Your Vitals Were     Pulse Temperature Respirations Pulse Oximetry BMI (Body Mass Index)       81 99.1  F (37.3  C) (Tympanic) 16 98% 41.8 kg/m2        Blood Pressure from Last 3 Encounters:   17 " 126/78   05/09/17 120/64   05/09/17 139/85    Weight from Last 3 Encounters:   05/22/17 259 lb (117.5 kg)   05/09/17 262 lb 8 oz (119.1 kg)   05/09/17 262 lb 12.8 oz (119.2 kg)                 Today's Medication Changes          These changes are accurate as of: 5/22/17 10:33 AM.  If you have any questions, ask your nurse or doctor.               Start taking these medicines.        Dose/Directions    oxyCODONE-acetaminophen 5-325 MG per tablet   Commonly known as:  PERCOCET   Used for:  Chronic pain syndrome   Started by:  Dustin Fatima MD        Dose:  1 tablet   Start taking on:  5/29/2017   Take 1 tablet by mouth every 4 hours as needed for moderate to severe pain   Quantity:  30 tablet   Refills:  0            Where to get your medicines      Some of these will need a paper prescription and others can be bought over the counter.  Ask your nurse if you have questions.     Bring a paper prescription for each of these medications     oxyCODONE-acetaminophen 5-325 MG per tablet                Primary Care Provider Office Phone # Fax #    Dustin Fatima [phone]       [facility] [address] [facility] [address]        Thank you!     Thank you for choosing [facility]  for your care. Our goal is always to provide you with excellent care. Hearing back from our patients is one way we can continue to improve our services. Please take a few minutes to complete the written survey that you may receive in the mail after your visit with us. Thank you!             Your Updated Medication List - Protect others around you: Learn how to safely use, store and throw away your medicines at www.disposemymeds.org.          This list is accurate as of: 5/22/17 10:33 AM.  Always use your most recent med list.                   Brand Name Dispense Instructions for use    * albuterol (2.5 MG/3ML) 0.083% neb solution     30 vial    Take 1 vial  (2.5 mg) by nebulization every 6 hours as needed for shortness of breath / dyspnea or wheezing       * albuterol 108 (90 BASE) MCG/ACT Inhaler    VENTOLIN HFA    54 g    INHALE TWO PUFFS BY MOUTH EVERY 4 HOURS AS NEEDED FOR SHORTNESS OF BREATH/DYSPNEA       blood glucose calibration solution    no brand specified    1 each    Use to calibrate blood glucose monitor as directed.       blood glucose lancets standard    no brand specified    1 Box    Use to test blood sugar daily times daily or as directed.       blood glucose monitoring meter device kit    no brand specified    1 kit    Use to test blood sugar larisa times daily or as directed.       blood glucose monitoring test strip    no brand specified    100 strip    Use to test blood sugars once times daily or as directed       cetirizine 10 MG tablet    zyrTEC    30 tablet    Take 1 tablet (10 mg) by mouth every evening       cyanocobalamin 1000 MCG/ML injection    VITAMIN B12    1 mL    Inject 1 mL (1,000 mcg) Subcutaneous every 30 days       EPINEPHrine 0.3 MG/0.3ML injection     2 each    Inject 0.3 mLs (0.3 mg) into the muscle once as needed for anaphylaxis       fluticasone 50 MCG/ACT spray    FLONASE    3 Bottle    Spray 1-2 sprays into both nostrils daily       losartan-hydrochlorothiazide 50-12.5 MG per tablet    HYZAAR    90 tablet    Take 1 tablet by mouth daily       magnesium oxide 400 (241.3 MG) MG tablet    MAG-OX    90 tablet    Take 1 tablet (400 mg) by mouth daily       mometasone-formoterol 100-5 MCG/ACT oral inhaler    DULERA    1 Inhaler    Inhale 2 puffs into the lungs 2 times daily       montelukast 10 MG tablet    SINGULAIR    30 tablet    Take 1 tablet (10 mg) by mouth At Bedtime       ondansetron 4 MG ODT tab    ZOFRAN ODT    10 tablet    Take 1 tablet (4 mg) by mouth every 8 hours as needed for nausea       oxyCODONE-acetaminophen 5-325 MG per tablet   Start taking on:  5/29/2017    PERCOCET    30 tablet    Take 1 tablet by mouth every 4  hours as needed for moderate to severe pain       polyethylene glycol powder    MIRALAX    510 g    Take 17 g (1 capful) by mouth daily       potassium chloride 10 MEQ tablet    K-TAB,KLOR-CON    120 tablet    Take 1 tablet (10 mEq) by mouth daily       sertraline 100 MG tablet    ZOLOFT    30 tablet    Take 1 tablet (100 mg) by mouth daily       sodium chloride 0.65 % nasal spray    SALINE MIST    1 Bottle    Spray 1 spray into both nostrils daily as needed for congestion       STATIN NOT PRESCRIBED (INTENTIONAL)     0 each    1 each daily Statin not prescribed intentionally due to Active liver disease (liver failure, cirrhosis, hepatitis) LIVER METS       triamcinolone 0.1 % paste    KENALOG    5 g    Take by mouth 2 times daily       Vitamin D (Cholecalciferol) 1000 UNITS Tabs     60 tablet    Take 2,000 Units by mouth daily       * Notice:  This list has 2 medication(s) that are the same as other medications prescribed for you. Read the directions carefully, and ask your doctor or other care provider to review them with you.

## 2017-05-22 NOTE — NURSING NOTE
"Chief Complaint   Patient presents with     Back Pain       Initial /78  Pulse 81  Temp 99.1  F (37.3  C) (Tympanic)  Resp 16  Wt 259 lb (117.5 kg)  SpO2 98%  BMI 41.8 kg/m2 Estimated body mass index is 41.8 kg/(m^2) as calculated from the following:    Height as of 5/9/17: 5' 6\" (1.676 m).    Weight as of this encounter: 259 lb (117.5 kg).  Medication Reconciliation: complete   Maggi Harris CMA    "

## 2017-05-30 ENCOUNTER — CARE COORDINATION (OUTPATIENT)
Dept: FAMILY MEDICINE | Facility: CLINIC | Age: 52
End: 2017-05-30

## 2017-05-30 NOTE — LETTER
[facility] Home  Complex Care Plan  About Me  Patient Name:  Ines Mott    YOB: 1965  Age:   52 year old   Elijah MRN: [id number] Telephone Information:     Home Phone [phone]   Mobile [phone]   Home Phone Not on file.       Address:    [address] [place] [address]    [facility] [address] Email address:  [email or web address]      Emergency Contact(s)  Name Relationship Lgl Grd Work Phone Home Phone Mobile Phone   1. ADRIANA,* Spouse  none [phone]   2. JADE-JALEN* Mother  none [phone]           Primary language:  English     needed? No   [facility]:  [phone] op. 1  Other communication barriers: No  Preferred Method of Communication:  Phone  Current living arrangement: Other (I live in an apartment building that does not have an elevat)  Mobility Status/ Medical Equipment: Independent  Other information to know about me:    Health Maintenance  Health Maintenance Reviewed: Due/Overdue eye exam,pneumovax, hepatitis screen, FIT     My Access Plan  Medical Emergency 911   Primary Clinic Line [facility]- [phone]   24 Hour Appointment Line [phone] or  [phone] ([phone]) (toll-free)   24 Hour Nurse Line [phone] (toll-free)   Preferred Urgent Care [facility] [facility], [phone]   Preferred Hospital [facility]  [phone]   Preferred Pharmacy [id number] - [place], [place] - [address] [facility] Crisis Line [facility], [phone] or 911     My Care Team Members  Patient Care Team       Relationship Specialty Notifications Start End    Dustin Fatima MD PCP - General Family Practice  4/23/15     Comment:  referring to neuro    Phone: [phone] Fax: [phone]         [facility] [address] [facility] [address]     Suhas Pedro MD MD Internal Medicine  4/23/15     Phone: [phone] Fax: [phone]         [facility] [address] [facility] [address]    Erin Lau \[facility]\"" Clinic Care Coordinator  Admissions 7/30/15     Phone: [phone] Fax: [phone]        Suhas Cage MD MD Neurology  9/9/15     Comment:  attending for neuro    Phone: [phone] Fax: [phone]          [facility] [address] [facility] [id number] [facility] [address]    Jade Willis, RN Nurse Coordinator Neurology Admissions 9/29/15     Phone: [phone] Pager: [phone]        Irasema Boles, RN Nurse Coordinator Neurology Admissions 9/30/15     Comment:  General Neurology    Phone: [phone] Pager: [phone]        Caity-restricted receipent program contact     7/5/16     Comment:  [phone] ext [phone]         My Care Plans  Self Management and Treatment Plan  Goals and (Comments)  Goal 1 Statement: I would like to have a list of things to do for when I get bored vs continuing to go to casino & do those things instead of going to casino  1st step-As of 05/30/2017, I will take a walk, spend time with my grandsons & do adult coloring.  2nd step-as of 05/30/52017, I will consider other actives such as going to a library & searching for housing vs going to casino  Goal 1 Progression Percent: 30%  Goal 1 Progression Date: 05/30/17    Action Plans on File: Depression, Migraine, Other (see comments) (Nacrotic)  Advance Care Plans/Directives Type:   Type Advanced Care Plans/Directives: Other (n/a)    My Medical and Care Information  Problem List   Patient Active Problem List   Diagnosis     Dyspnea and respiratory abnormality     Other specified drug dependence, in remission     Carcinoma of colon metastatic to liver (H)     Kidney infection     ALEXUS (obstructive sleep apnea)     Myelomeningocele with hydrocephalus, cervical region (H)     Fibromyalgia     Angioedema     Pneumonia due to other Gram-negative  bacteria     BMI 42     H/O hysterectomy for benign disease     Hyperlipidemia with target LDL less than 130     Infected tooth     Recurrent major depression in partial remission     Hypertension goal BP (blood pressure) < 140/80     Abdominal pain, right lateral     Renal mass, right     Health Care Home     Intracranial shunt     Migraine     Mild intermittent asthma without complication     Other complicated headache syndrome     Seasonal affective disorder (H)     Anxiety     PTSD (post-traumatic stress disorder)     Pre diabetes      Chronic pain syndrome     Chronic tension-type headache, not intractable     Degeneration of lumbosacral intervertebral disc     Comprehensive diabetic foot examination, type 2 DM, encounter for (H)      Current Medications and Allergies:  See printed Medication Report.    Care Coordination Start Date: 07/17/15   Frequency of Care Coordination: as needed   Form Last Updated: 05/30/2017

## 2017-05-30 NOTE — PROGRESS NOTES
Clinic Care Coordination Contact  OUTREACH    Referral Information:  Referral Source: PCP  Reason for Contact: follow up  Care Conference: No     Universal Utilization:   ED Visits in last year: 4 (in 2017) Patient given recognition for last ED visit being on 03/01/2017 & that she is not going to ED frequently. Patient asked what is different. Patient stated that PCP is having her come in every 2 weeks to get her prescription. Patient stated that walking is helping to not focus on her pain & use of pain meds  Hospital visits in last year: 0 (in 2017)  Last PCP appointment: 05/22/17  Missed Appointments: 0  Concerns: none known  Multiple Providers or Specialists: oncology. [facility]    Clinical Concerns:  Current Medical Concerns: none stated    Current Behavioral Concerns: Patient stated that she no longer has an [employer] worker as she did not find it helpful.  Patient is not seeing any mental health providers at this time.  Patient aske how she is doing with her alcohol use & gambling. Patient did not address the alcohol. Patient stated that she went to [employer] yesterday. She lost $100 & her spouse is angry at her & threatening to leave her. Patient stated that she goes go [employer] weekly. Patient asked why she goes to [employer] & she stated that she is bored. Patient asked what she could do when she is bored vs going to [employer]. Patient stated she could walk & she is spending more time with her grandsons, Patient also has things she needs to deal with such as getting new id as she lost her wallet & finding different housing. Going to library would be an option & she could use computers at library for searching for housing  Education Provided to patient: Patient to think about other activities to do when she is bored vs going to [employer]   Clinical Pathway Name: Depression  Clinical Pathway: not reviewed at this time    Medication Management:  Patient stated that she is taking her meds as prescribed which is  helping to not run out & need to go to ED     Functional Status:  Mobility Status: Independent  Equipment Currently Used at Home: other (see comments) (CPAP)  Transportation: no new info           Psychosocial:  Current living arrangement:: Other (I live in an apartment building that does not have an elevat) Patient's lease is up on 07/01/2017 & patient wants to move to a different complex. Patient encourged to ask if management owns other building that she could move to, Patient encouraged to go to [employer] to get list of low cost housing. Patient is using [employer] & patient given info on [email or web address] & Disability [employer]Age  Resources for housing search  Disability LinkAge [phone]  [employer]  [phone]  [employer] [phone]    Financial/Insurance: Patient is spending money at [employer]       Resources and Interventions:  Current Resources:  (n/a);  (n/a)  PAS Number:  (n/a)  Senior Linkage Line Referral Placed:  (n/a)  Advanced Care Plans/Directives on file:: No  Referrals Placed: Disability Linkage line     Goals:   Goal 1 Statement: I would like to have a list of things to do for when I get bored vs continuing to go to casino & do those things instead of going to casino  1st step-As of 05/30/2017, I will take a walk, spend time with my grandsons & do adult coloring.  2nd step-as of 05/30/52017, I will consider other actives such as going to a library & searching for housing vs going to casino  Goal 1 Progression Percent: 30%  Goal 1 Progression Date: 05/30/17              Barriers: Patient goes to casino when bored  Strengths: Patient is going to ED less frequently  Patient/Caregiver understanding: Patient to try to do things other the going to casino when bored  Frequency of Care Coordination: as needed  Upcoming appointment:  (none scheduled at this time)     Plan: see goal  Patient to contact resources provided for housing search.  Patient can outreach to Clinic Care Coordinator, SW as  needed  BRISSA Cruz, [facility]  Clinic Care Coordinator, SW with [facility]  [phone]

## 2017-06-01 ENCOUNTER — OFFICE VISIT (OUTPATIENT)
Dept: FAMILY MEDICINE | Facility: CLINIC | Age: 52
End: 2017-06-01
Payer: COMMERCIAL

## 2017-06-01 VITALS
SYSTOLIC BLOOD PRESSURE: 128 MMHG | OXYGEN SATURATION: 96 % | RESPIRATION RATE: 16 BRPM | TEMPERATURE: 97.9 F | BODY MASS INDEX: 42.85 KG/M2 | DIASTOLIC BLOOD PRESSURE: 78 MMHG | WEIGHT: 265.5 LBS | HEART RATE: 80 BPM

## 2017-06-01 DIAGNOSIS — G89.4 CHRONIC PAIN SYNDROME: ICD-10-CM

## 2017-06-01 DIAGNOSIS — S30.0XXA COCCYGEAL CONTUSION, INITIAL ENCOUNTER: Primary | ICD-10-CM

## 2017-06-01 DIAGNOSIS — E66.01 MORBID OBESITY, UNSPECIFIED OBESITY TYPE (H): Chronic | ICD-10-CM

## 2017-06-01 DIAGNOSIS — E78.5 HYPERLIPIDEMIA WITH TARGET LDL LESS THAN 130: Chronic | ICD-10-CM

## 2017-06-01 DIAGNOSIS — I10 HYPERTENSION GOAL BP (BLOOD PRESSURE) < 140/80: Chronic | ICD-10-CM

## 2017-06-01 PROCEDURE — 99214 OFFICE O/P EST MOD 30 MIN: CPT | Performed by: FAMILY MEDICINE

## 2017-06-01 RX ORDER — OXYCODONE AND ACETAMINOPHEN 5; 325 MG/1; MG/1
1 TABLET ORAL EVERY 4 HOURS PRN
Qty: 30 TABLET | Refills: 0 | Status: SHIPPED | OUTPATIENT
Start: 2017-06-04 | End: 2017-06-09

## 2017-06-01 RX ORDER — OXYCODONE AND ACETAMINOPHEN 5; 325 MG/1; MG/1
1 TABLET ORAL EVERY 4 HOURS PRN
Qty: 30 TABLET | Refills: 0 | Status: CANCELLED | OUTPATIENT
Start: 2017-06-01

## 2017-06-01 NOTE — PROGRESS NOTES
SUBJECTIVE:                                                    Ines Mott is a 52 year old female who presents to clinic today for the following health issues:  Health Maintenance Due   Topic Date Due     EYE EXAM Q1 YEAR  01/15/1966     PNEUMOVAX 1X HI RISK PATIENT < 65 (NO IB MSG)  01/15/1967     HEPATITIS C SCREENING  01/15/1983     FIT Q1 YR  07/03/2016     Health Maintenance reviewed at today's visit patient asked to schedule/complete:   Colon Cancer:  Patient agrees to schedule      Chronic Pain Follow-Up       Type / Location of Pain: BACK, RECENT LEFT LEG PAIN  Analgesia/pain control:       Recent changes:  Worse, FELL IN BATHTUB, HURT TAILBONE      Overall control: Inadequate pain control  Activity level/function:      Daily activities:  Can do most things most days, with some rest    Work:  not applicable  Adverse effects:  No  Adherance    Taking medication as directed?  No: TAKING MORE PAIN MEDS THAN PRESCRIBED    Participating in other treatments: not applicable  Risk Factors:    Sleep:  Poor    Mood/anxiety:  improved    Recent family or social stressors:  nEEDS TO FIND SOMEWHERE TO MOVE    Other aggravating factors: none  PHQ-9 SCORE 11/4/2016 11/25/2016 4/25/2017   Total Score - - -   Total Score 7 3 5     RASTA-7 SCORE 8/24/2015 11/4/2016   Total Score 7 9     Encounter-Level CSA - 04/18/2017:                 Controlled Substance Agreement - Scan on 4/28/2017  7:00 AM : CONTROLLED SUBSTANCE AGREEMENT (below)          Encounter-Level CSA - 04/18/2017:                 Controlled Substance Agreement - Scan on 1/5/2017  1:14 PM : CONTROLLED SUBSTANCE AGREEMENT (below)          Encounter-Level CSA - 04/18/2017:                 Controlled Substance Agreement - Scan on 12/28/2015  2:53 PM : CONTROLLED MEDICATION CONTRACT, 12-22-15 (below)          Encounter-Level CSA - 04/18/2017:                 Controlled Substance Agreement - Scan on 4/17/2015  1:45 PM : BM, Controlled Substance Contract,  04-10-15 (below)                 Amount of exercise or physical activity: 6-7 days/week for an average of 30-45 minutes    Problems taking medications regularly: No, TAKING MORE THAN PRESCRIBED    Medication side effects: none    Diet: regular (no restrictions)    FELL LANDING ON TAILBONE   WENT THROUGH PAIN MEDICATIONS TOO QUICKLY   TREATMENT PROGNOSIS BENEFITS AND RISKS DISCUSSED   SIDE EFFECTS BENEFITS AND RISKS DISCUSSED    MEDICATION RISKS SIDE EFFECTS BENEFITS AND RISKS DISCUSSED   WITHDRAWAL DISCUSSION   DUE FOR MEDICATIONS IN 3 DAYS       Problem list and histories reviewed & adjusted, as indicated.  Additional history: as documented      Patient Active Problem List   Diagnosis     Dyspnea and respiratory abnormality     Other specified drug dependence, in remission     Carcinoma of colon metastatic to liver (H)     Kidney infection     ALEXUS (obstructive sleep apnea)     Myelomeningocele with hydrocephalus, cervical region (H)     Fibromyalgia     Angioedema     Pneumonia due to other Gram-negative bacteria     BMI 42     H/O hysterectomy for benign disease     Hyperlipidemia with target LDL less than 130     Infected tooth     Recurrent major depression in partial remission     Hypertension goal BP (blood pressure) < 140/80     Abdominal pain, right lateral     Renal mass, right     Health Care Home     Intracranial shunt     Migraine     Mild intermittent asthma without complication     Other complicated headache syndrome     Seasonal affective disorder (H)     Anxiety     PTSD (post-traumatic stress disorder)     Pre diabetes      Chronic pain syndrome     Chronic tension-type headache, not intractable     Degeneration of lumbosacral intervertebral disc     Comprehensive diabetic foot examination, type 2 DM, encounter for (H)     Past Surgical History:   Procedure Laterality Date     APPENDECTOMY       CHOLECYSTECTOMY       EXTRACTION(S) DENTAL       GYN SURGERY       IMPLANT SHUNT VENTRICULOPERITONEAL          Social History   Substance Use Topics     Smoking status: Former Smoker     Smokeless tobacco: Never Used     Alcohol use No      Comment: off and on     Family History   Problem Relation Age of Onset     CANCER Father          Current Outpatient Prescriptions   Medication Sig Dispense Refill     [START ON 6/4/2017] oxyCODONE-acetaminophen (PERCOCET) 5-325 MG per tablet Take 1 tablet by mouth every 4 hours as needed for moderate to severe pain 30 tablet 0     montelukast (SINGULAIR) 10 MG tablet Take 1 tablet (10 mg) by mouth At Bedtime 30 tablet 11     cetirizine (ZYRTEC) 10 MG tablet Take 1 tablet (10 mg) by mouth every evening 30 tablet 1     fluticasone (FLONASE) 50 MCG/ACT spray Spray 1-2 sprays into both nostrils daily 3 Bottle 1     sodium chloride (SALINE MIST) 0.65 % nasal spray Spray 1 spray into both nostrils daily as needed for congestion 1 Bottle 11     triamcinolone (KENALOG) 0.1 % paste Take by mouth 2 times daily 5 g 0     ondansetron (ZOFRAN ODT) 4 MG ODT tab Take 1 tablet (4 mg) by mouth every 8 hours as needed for nausea 10 tablet 0     Vitamin D, Cholecalciferol, 1000 UNITS TABS Take 2,000 Units by mouth daily 60 tablet 11     cyanocobalamin (VITAMIN B12) 1000 MCG/ML injection Inject 1 mL (1,000 mcg) Subcutaneous every 30 days 1 mL 11     potassium chloride (K-TAB,KLOR-CON) 10 MEQ tablet Take 1 tablet (10 mEq) by mouth daily 120 tablet 11     sertraline (ZOLOFT) 100 MG tablet Take 1 tablet (100 mg) by mouth daily 30 tablet 11     losartan-hydrochlorothiazide (HYZAAR) 50-12.5 MG per tablet Take 1 tablet by mouth daily 90 tablet 3     albuterol (2.5 MG/3ML) 0.083% neb solution Take 1 vial (2.5 mg) by nebulization every 6 hours as needed for shortness of breath / dyspnea or wheezing 30 vial 3     magnesium oxide (MAG-OX) 400 (241.3 MG) MG tablet Take 1 tablet (400 mg) by mouth daily 90 tablet 3     albuterol (VENTOLIN HFA) 108 (90 BASE) MCG/ACT Inhaler INHALE TWO PUFFS BY MOUTH EVERY 4 HOURS  AS NEEDED FOR SHORTNESS OF BREATH/DYSPNEA 54 g 1     mometasone-formoterol (DULERA) 100-5 MCG/ACT oral inhaler Inhale 2 puffs into the lungs 2 times daily 1 Inhaler 11     blood glucose monitoring (NO BRAND SPECIFIED) meter device kit Use to test blood sugar larisa times daily or as directed. 1 kit 0     blood glucose (NO BRAND SPECIFIED) lancets standard Use to test blood sugar daily times daily or as directed. 1 Box 11     blood glucose monitoring (NO BRAND SPECIFIED) test strip Use to test blood sugars once times daily or as directed 100 strip 3     blood glucose calibration (NO BRAND SPECIFIED) solution Use to calibrate blood glucose monitor as directed. 1 each 0     polyethylene glycol (MIRALAX) powder Take 17 g (1 capful) by mouth daily 510 g 3     STATIN NOT PRESCRIBED, INTENTIONAL, 1 each daily Statin not prescribed intentionally due to Active liver disease (liver failure, cirrhosis, hepatitis)  LIVER METS 0 each 0     EPINEPHrine (EPIPEN) 0.3 MG/0.3ML injection Inject 0.3 mLs (0.3 mg) into the muscle once as needed for anaphylaxis 2 each 5     Allergies   Allergen Reactions     Ativan [Lorazepam] Anaphylaxis     Macrobid [Nitrofurantoin] Anaphylaxis     Amoxicillin      Buspirone      Buspar     Cephalosporins      Dilaudid [Hydromorphone]      Diphenhydramine Hcl      Benadryl     Imitrex [Sumatriptan Succinate]      blood pressure raised uncontrobably     Ketorolac Tromethamine      Toradol     Lansoprazole      Prevacid     Metoclopramide Hives     Reglan     Paroxetine      Paxil     Penicillins      Prednisone Hives     Sulfa Drugs      THICKENED AND DIFFICULTY SWALLOWING      Lidoderm [Lidocaine] Rash     Localized rash at patch site     Recent Labs   Lab Test  04/03/17   1004  01/26/17   1803  01/18/17   1716  01/07/17   0120   11/04/16   1407   04/07/16   1620   05/07/15   1222   10/21/14   0230   A1C  5.9   --    --    --    --   6.1*   --   5.7   --   6.0   < >   --    LDL   --    --    --    --     --   96   --   101*   --   127   --    --    HDL   --    --    --    --    --   42*   --   42*   --   36*   --    --    TRIG   --    --    --    --    --   161*   --   306*   --   177*   --    --    ALT   --   25  17  41   < >  27   < >  30   < >  24   < >  31   CR   --   0.86  0.66  0.75   < >  1.22*   < >   --    < >   --    < >  0.82   GFRESTIMATED   --   69  >90  Non  GFR Calc    81   < >  46*   < >   --    < >   --    < >  73   GFRESTBLACK   --   84  >90   GFR Calc    >90   GFR Calc     < >  56*   < >   --    < >   --    < >  89   POTASSIUM   --   3.5  3.1*  3.7   < >  3.8   < >   --    < >   --    < >  3.8   TSH  1.62   --    --    --    --    --    --    --    --    --    --   2.30    < > = values in this interval not displayed.      BP Readings from Last 3 Encounters:   06/01/17 128/78   05/22/17 126/78   05/09/17 120/64    Wt Readings from Last 3 Encounters:   06/01/17 265 lb 8 oz (120.4 kg)   05/22/17 259 lb (117.5 kg)   05/09/17 262 lb 8 oz (119.1 kg)                  Labs reviewed in EPIC    Reviewed and updated as needed this visit by clinical staff  Tobacco  Med Hx  Surg Hx  Fam Hx  Soc Hx      Reviewed and updated as needed this visit by Provider         ROS:has Dyspnea and respiratory abnormality; Other specified drug dependence, in remission; Carcinoma of colon metastatic to liver (H); Kidney infection; ALEXUS (obstructive sleep apnea); Myelomeningocele with hydrocephalus, cervical region (H); Fibromyalgia; Angioedema; Pneumonia due to other Gram-negative bacteria; BMI 42; H/O hysterectomy for benign disease; Hyperlipidemia with target LDL less than 130; Infected tooth; Recurrent major depression in partial remission; Hypertension goal BP (blood pressure) < 140/80; Abdominal pain, right lateral; Renal mass, right; Health Care Home; Intracranial shunt; Migraine; Mild intermittent asthma without complication; Other complicated headache syndrome;  Seasonal affective disorder (H); Anxiety; PTSD (post-traumatic stress disorder); Pre diabetes ; Chronic pain syndrome; Chronic tension-type headache, not intractable; Degeneration of lumbosacral intervertebral disc; and Comprehensive diabetic foot examination, type 2 DM, encounter for (H) on her problem list.  Review Of Systems  Skin: negative  Eyes: negative  Ears/Nose/Throat: negative  Respiratory: No shortness of breath, dyspnea on exertion, cough, or hemoptysis  HISTORY OF ASTHMA   ASYMPTOMATIC   Cardiovascular: negative  Gastrointestinal: negative  HISTORY OF RIGHT UPPER QUADRANT PAIN INTERMITTENT   Genitourinary: negative  Musculoskeletal:  LOWER BACK PAIN AND CONTUSION COCCYX AND LUMBOSACRAL   DECRR OM OF BACK   Neurologic: negative  Psychiatric: positive HISTORY marital problems, excessive alcohol consumption, illegal drug usage   Hematologic/Lymphatic/Immunologic: negative  Endocrine: negative and diabetes      OBJECTIVE:                                                    /78  Pulse 80  Temp 97.9  F (36.6  C) (Tympanic)  Resp 16  Wt 265 lb 8 oz (120.4 kg)  SpO2 96%  BMI 42.85 kg/m2  Body mass index is 42.85 kg/(m^2).  GENERAL: healthy, alert and no distress  EYES: Eyes grossly normal to inspection, PERRL and conjunctivae and sclerae normal  HENT: ear canals and TM's normal, nose and mouth without ulcers or lesions  NECK: no adenopathy, no asymmetry, masses, or scars and thyroid normal to palpation  RESP: lungs clear to auscultation - no rales, rhonchi or wheezes  CV: regular rate and rhythm, normal S1 S2, no S3 or S4, no murmur, click or rub, no peripheral edema and peripheral pulses strong  ABDOMEN: soft, nontender, no hepatosplenomegaly, no masses and bowel sounds normal  MS: no gross musculoskeletal defects noted, no edema  TENDER TAILBONE   SKIN: no suspicious lesions or rashes  NEURO: Normal strength and tone, mentation intact and speech normal  BACK: no CVA tenderness, no paralumbar  tenderness  PSYCH: mentation appears normal, affect normal/bright    Diagnostic Test Results:  Results for orders placed or performed in visit on 04/03/17   Hemoglobin A1c   Result Value Ref Range    Hemoglobin A1C 5.9 4.3 - 6.0 %   TSH   Result Value Ref Range    TSH 1.62 0.40 - 4.00 mU/L        ASSESSMENT/PLAN:                                                          ICD-10-CM    1. Coccygeal contusion, initial encounter S30.0XXA    2. Chronic pain syndrome G89.4 oxyCODONE-acetaminophen (PERCOCET) 5-325 MG per tablet   3. Morbid obesity, unspecified obesity type (H) E66.01    4. Hypertension goal BP (blood pressure) < 140/80 I10    5. Hyperlipidemia with target LDL less than 130 E78.5        RECENT COCCYGEAL CONTUSION     AND RAPID USAGE OF NARCOTICS   Patient Instructions   (G89.4) Chronic pain syndrome  Comment:    Plan: oxyCODONE-acetaminophen (PERCOCET) 5-325 MG per        tablet         ONE PO FOUR TIMES DAILY   OTTO YUSUF MD  [facility]

## 2017-06-01 NOTE — MR AVS SNAPSHOT
After Visit Summary   6/1/2017    Ines Mott    MRN: [id number]           Patient Information     Date Of Birth          1965        Visit Information        Provider Department      6/1/2017 1:45 PM Otto Fatima MD [facility]        Today's Diagnoses     Chronic pain syndrome          Care Instructions    (G89.4) Chronic pain syndrome  Comment:    Plan: oxyCODONE-acetaminophen (PERCOCET) 5-325 MG per        tablet         ONE PO FOUR TIMES DAILY   OTTO FATIMA JR., MD               Follow-ups after your visit        Follow-up notes from your care team     Return in about 1 week (around 6/8/2017).      Your next 10 appointments already scheduled     Jun 08, 2017  2:00 PM CDT   Return Sleep Patient with MICHEAL Dixon CNP   [facility], [place], Sleep Study ([facility])    [address] [place] [id number] [phone]              Who to contact     If you have questions or need follow up information about today's clinic visit or your schedule please contact [facility] directly at [phone].  Normal or non-critical lab and imaging results will be communicated to you by MyChart, letter or phone within 4 business days after the clinic has received the results. If you do not hear from us within 7 days, please contact the clinic through MyChart or phone. If you have a critical or abnormal lab result, we will notify you by phone as soon as possible.  Submit refill requests through [employer] or call your pharmacy and they will forward the refill request to us. Please allow 3 business days for your refill to be completed.          Additional Information About Your Visit        MyChart Information     [employer] lets you send messages to your doctor, view your test results, renew your prescriptions, schedule appointments and more. To sign up, go to  [email or web address] . Click on \"Log in\" on the left side of the screen, which will take you to the Welcome page. Then click on \"Sign up Now\" on the right side of the page.     You will be asked to enter the access code listed below, as well as some personal information. Please follow the directions to create your username and password.     Your access code is: [email or web address]  Expires: 2017  9:57 AM     Your access code will  in 90 days. If you need help or a new code, please call your [facility] clinic or [phone].        Care EveryWhere ID     This is your Care EveryWhere ID. This could be used by other organizations to access your [facility] medical records  [id number]        Your Vitals Were     Pulse Temperature Respirations Pulse Oximetry BMI (Body Mass Index)       80 97.9  F (36.6  C) (Tympanic) 16 96% 42.85 kg/m2        Blood Pressure from Last 3 Encounters:   17 128/78   17 126/78   17 120/64    Weight from Last 3 Encounters:   17 265 lb 8 oz (120.4 kg)   17 259 lb (117.5 kg)   17 262 lb 8 oz (119.1 kg)              Today, you had the following     No orders found for display         Where to get your medicines      Some of these will need a paper prescription and others can be bought over the counter.  Ask your nurse if you have questions.     Bring a paper prescription for each of these medications     oxyCODONE-acetaminophen 5-325 MG per tablet          Primary Care Provider Office Phone # Fax #    Dustin Fatima [phone]       [facility] [address]  [facility] [address]        Thank you!     Thank you for choosing [facility]  for your care. Our goal is always to provide you with excellent care. Hearing back from our patients is one way we can continue to improve our services. Please take a few minutes to complete the written survey that you may receive in the mail after " your visit with us. Thank you!             Your Updated Medication List - Protect others around you: Learn how to safely use, store and throw away your medicines at www.disposemymeds.org.          This list is accurate as of: 6/1/17  2:39 PM.  Always use your most recent med list.                   Brand Name Dispense Instructions for use    * albuterol (2.5 MG/3ML) 0.083% neb solution     30 vial    Take 1 vial (2.5 mg) by nebulization every 6 hours as needed for shortness of breath / dyspnea or wheezing       * albuterol 108 (90 BASE) MCG/ACT Inhaler    VENTOLIN HFA    54 g    INHALE TWO PUFFS BY MOUTH EVERY 4 HOURS AS NEEDED FOR SHORTNESS OF BREATH/DYSPNEA       blood glucose calibration solution    no brand specified    1 each    Use to calibrate blood glucose monitor as directed.       blood glucose lancets standard    no brand specified    1 Box    Use to test blood sugar daily times daily or as directed.       blood glucose monitoring meter device kit    no brand specified    1 kit    Use to test blood sugar larisa times daily or as directed.       blood glucose monitoring test strip    no brand specified    100 strip    Use to test blood sugars once times daily or as directed       cetirizine 10 MG tablet    zyrTEC    30 tablet    Take 1 tablet (10 mg) by mouth every evening       cyanocobalamin 1000 MCG/ML injection    VITAMIN B12    1 mL    Inject 1 mL (1,000 mcg) Subcutaneous every 30 days       EPINEPHrine 0.3 MG/0.3ML injection     2 each    Inject 0.3 mLs (0.3 mg) into the muscle once as needed for anaphylaxis       fluticasone 50 MCG/ACT spray    FLONASE    3 Bottle    Spray 1-2 sprays into both nostrils daily       losartan-hydrochlorothiazide 50-12.5 MG per tablet    HYZAAR    90 tablet    Take 1 tablet by mouth daily       magnesium oxide 400 (241.3 MG) MG tablet    MAG-OX    90 tablet    Take 1 tablet (400 mg) by mouth daily       mometasone-formoterol 100-5 MCG/ACT oral inhaler    DULERA    1  Inhaler    Inhale 2 puffs into the lungs 2 times daily       montelukast 10 MG tablet    SINGULAIR    30 tablet    Take 1 tablet (10 mg) by mouth At Bedtime       ondansetron 4 MG ODT tab    ZOFRAN ODT    10 tablet    Take 1 tablet (4 mg) by mouth every 8 hours as needed for nausea       oxyCODONE-acetaminophen 5-325 MG per tablet   Start taking on:  6/4/2017    PERCOCET    30 tablet    Take 1 tablet by mouth every 4 hours as needed for moderate to severe pain       polyethylene glycol powder    MIRALAX    510 g    Take 17 g (1 capful) by mouth daily       potassium chloride 10 MEQ tablet    K-TAB,KLOR-CON    120 tablet    Take 1 tablet (10 mEq) by mouth daily       sertraline 100 MG tablet    ZOLOFT    30 tablet    Take 1 tablet (100 mg) by mouth daily       sodium chloride 0.65 % nasal spray    SALINE MIST    1 Bottle    Spray 1 spray into both nostrils daily as needed for congestion       STATIN NOT PRESCRIBED (INTENTIONAL)     0 each    1 each daily Statin not prescribed intentionally due to Active liver disease (liver failure, cirrhosis, hepatitis) LIVER METS       triamcinolone 0.1 % paste    KENALOG    5 g    Take by mouth 2 times daily       Vitamin D (Cholecalciferol) 1000 UNITS Tabs     60 tablet    Take 2,000 Units by mouth daily       * Notice:  This list has 2 medication(s) that are the same as other medications prescribed for you. Read the directions carefully, and ask your doctor or other care provider to review them with you.

## 2017-06-01 NOTE — PATIENT INSTRUCTIONS
(G89.4) Chronic pain syndrome  Comment:    Plan: oxyCODONE-acetaminophen (PERCOCET) 5-325 MG per        tablet         ONE PO FOUR TIMES DAILY   OTTO YUSUF JR., MD

## 2017-06-01 NOTE — TELEPHONE ENCOUNTER
Reason for Call:  Medication or medication refill:    Do you use a [facility]?  Name of the pharmacy and phone number for the current request:  Will     Name of the medication requested: Percocet 5-325 mg    Other request: Please call when ready.  [place]. Patient is out of medication.     Can we leave a detailed message on this number? YES    Phone number patient can be reached at: Home number on file [phone] (home)    Best Time: Any    Call taken on 6/1/2017 at 11:27 AM by JACLYN KAUFFMAN

## 2017-06-01 NOTE — NURSING NOTE
"Chief Complaint   Patient presents with     Back Pain       Initial /78  Pulse 80  Temp 97.9  F (36.6  C) (Tympanic)  Resp 16  Wt 265 lb 8 oz (120.4 kg)  SpO2 96%  BMI 42.85 kg/m2 Estimated body mass index is 42.85 kg/(m^2) as calculated from the following:    Height as of 5/9/17: 5' 6\" (1.676 m).    Weight as of this encounter: 265 lb 8 oz (120.4 kg).  Medication Reconciliation: complete   Maggi Harris CMA    "

## 2017-06-01 NOTE — TELEPHONE ENCOUNTER
Controlled Substance Refill Request for oxyCODONE-acetaminophen (PERCOCET) 5-325 MG per tablet  Problem List Complete:  YePatient is followed by OTTO YUSUF MD for ongoing prescription of pain medication.  All refills should only be approved by this provider, or covering partner.    Medication(s):  PERCOCET 5MG PO FOUR TIMES DAILY .   Maximum quantity per month: 120   Clinic visit frequency required: Q 2 weeks     Controlled substance agreement:  Encounter-Level CSA - 12/22/15:               Controlled Substance Agreement - Scan on 12/28/2015  2:53 PM : CONTROLLED MEDICATION CONTRACT, 12-22-15 (below)          Encounter-Level CSA - 4/10/15:               Controlled Substance Agreement - Scan on 4/17/2015  1:45 PM : BMJOSIE, Controlled Substance Contract, 04-10-15 (below)            Pain Clinic evaluation in the past: Yes       Date/Location:      DIRE Total Score(s):  No flowsheet data found.    Last [facility] website verification:  done on 01/03/2017   [email or web address]  REFERRAL [facility] AS SOON AS POSSIBLE   FAILED DRANK ALCOHOL BEFORE LAST OFFICE VISIT        checked in past 6 months?  Yes 06/01/2017 -Patient filled OXYCODONE- ACETAMINOPHEN 5- 325   05/22/2017 and 05/28/2017

## 2017-06-01 NOTE — TELEPHONE ENCOUNTER
Patient calling to say that she gets her pain medication weekly because she has liver cancer. Patient has been advised to schedule an appointment.Pt states that she took her last pill today and that she would need to come into the clinic today.Appointment scheduled.

## 2017-06-09 ENCOUNTER — OFFICE VISIT (OUTPATIENT)
Dept: FAMILY MEDICINE | Facility: CLINIC | Age: 52
End: 2017-06-09
Payer: COMMERCIAL

## 2017-06-09 ENCOUNTER — TELEPHONE (OUTPATIENT)
Dept: FAMILY MEDICINE | Facility: CLINIC | Age: 52
End: 2017-06-09

## 2017-06-09 VITALS
RESPIRATION RATE: 20 BRPM | SYSTOLIC BLOOD PRESSURE: 126 MMHG | DIASTOLIC BLOOD PRESSURE: 72 MMHG | HEART RATE: 73 BPM | WEIGHT: 263 LBS | TEMPERATURE: 97.2 F | OXYGEN SATURATION: 98 % | BODY MASS INDEX: 42.45 KG/M2

## 2017-06-09 DIAGNOSIS — G89.4 CHRONIC PAIN SYNDROME: ICD-10-CM

## 2017-06-09 DIAGNOSIS — G56.01 CARPAL TUNNEL SYNDROME OF RIGHT WRIST: Primary | ICD-10-CM

## 2017-06-09 PROCEDURE — 99214 OFFICE O/P EST MOD 30 MIN: CPT | Performed by: FAMILY MEDICINE

## 2017-06-09 RX ORDER — OXYCODONE AND ACETAMINOPHEN 5; 325 MG/1; MG/1
1 TABLET ORAL EVERY 4 HOURS PRN
Qty: 30 TABLET | Refills: 0 | Status: SHIPPED | OUTPATIENT
Start: 2017-06-18 | End: 2017-06-22

## 2017-06-09 RX ORDER — OXYCODONE AND ACETAMINOPHEN 5; 325 MG/1; MG/1
1 TABLET ORAL EVERY 4 HOURS PRN
Qty: 30 TABLET | Refills: 0 | Status: SHIPPED | OUTPATIENT
Start: 2017-06-11 | End: 2017-06-09

## 2017-06-09 NOTE — TELEPHONE ENCOUNTER
[facility]erman pharmacist from [facility] called requesting providers approval to for beatriz fill on Percocet script. Rx start date is 06/11/2017. Pt is requesting to fill today. She is at pharmacy now . Please advice.

## 2017-06-09 NOTE — MR AVS SNAPSHOT
"              After Visit Summary   6/9/2017    Ines Mott    MRN: [id number]           Patient Information     Date Of Birth          1965        Visit Information        Provider Department      6/9/2017 7:30 AM Dustin Fatima MD [facility]        Today's Diagnoses     Carpal tunnel syndrome of right wrist    -  1    Chronic pain syndrome          Care Instructions    Diabetes: Exchange List  What are the exchange lists?   The exchange lists show you portions of food that equal 1 exchange. Foods are divided into food lists. The foods on each list are called exchanges because they have a similar number of calories, protein, carbohydrate and fat content. Foods from each list can be traded or \"exchanged\" for any other food on the same list because they all have a similar exchange value. A dietitian will help you plan how much food your child should eat at each meal and from what lists the foods should come from. At first you should measure your food until you are able to make good estimates about serving sizes. The following list is a sample of foods found on the exchange lists. For more information, you can buy the Exchange Lists for Meal Planning from: The American Diabetes Association [address] [place] [address] [phone] http://www.diabetes.org  Carbohydrate group   Starch List: One starch exchange contains about 15 grams of carbohydrate, 3 grams of protein, 0 to 1 grams of fat, and 80 calories. A starch exchange is sometimes called a carb exchange. Examples of one starch (carb) exchange are:   one slice of bread   1/2 hamburger or hot dog bun   3/4 cup of unsweetened cereal   1/3 cup pasta   3 cups popcorn   crackers (6 small saltines, 3 squares of vilma crackers, 3 of most other crackers)   1 pancake or waffle (5 inch)   15 to 20 fat-free or baked potato or corn chips.   The vegetables included in the starch exchanges include:   corn (1/2 cup " "or 1/2 cob)   white potato (1/4 large baked with skin or 1/2 cup mashed)   yam or sweet potato (1/2 cup)   green peas (1/2 cup)   squash, winter (1 cup)   lima beans (2/3 cup).   Fruit List: 1 fruit exchange contains about 15 grams of carbohydrate and 60 calories. Examples of one fruit exchange are:   grape juice (1/3 cup)   apple or pineapple juice (1/2 cup)   orange or grapefruit juice (1/2 cup)   1 small apple   orange or peach   1/2 banana   1 cup raspberries   1/3 of a small cantaloupe   1 slice of watermelon.   Milk List: 1 milk exchange contains about 8 grams of protein and 12 grams of carbohydrate. Items on the milk list are divided into fat-free, reduced fat, and whole milk depending on the number of fat grams in the exchange. Examples of one milk exchange are: Fat-Free (0 to 3 grams of fat)   1 cup of skim or non-fat milk   1 cup of 1% milk (also includes 1/2 fat exchange)   6 ounces flavored fat-free yogurt   Reduced-Fat (5 grams of fat)   6 ounces of plain, low-fat yogurt   1 cup 2% milk (also includes one fat exchange).   Whole Milk (8 grams of fat)   8 ounces of plain yogurt (made from whole milk)   1 cup whole milk.   Vegetable List: One vegetable exchange has 5 grams of carbohydrate, 2 grams of protein, no fat, and 25 calories. One-half cup of cooked or a cup of raw vegetables is a good measure for 1 exchange of most vegetables. Raw lettuce may be taken in larger quantities, but salad dressing usually equals 1 fat exchange. Other Carbohydrates List: One \"other carbohydrate\" exchange has 15 grams of carbohydrate. Many of these foods count as a starch exchange and one or more fat exchanges.   brownie (2 inch square) = 1 carb, 1 fat exchange   fruit snack roll = 1 carb exchange   granola bar = 1 and 1/2 carb exchanges   ice cream (1/2 cup) = 1 carb, 2 fat exchanges   frozen yogurt (1/2 cup) = 1 carb, 0 to 1 fat exchanges   tortilla chips (6-12) = 1 carb, 2 fat exchanges.   Meat and Meat Substitute Group "   Meats are divided into very lean meats, lean meats, medium-fat meats, and high-fat meats. People with diabetes should try to eat more lean and medium fat meats and stay away from the high fat choices. The Very Lean meat group includes foods that contain 7 grams of protein, 0 to 1 gram of fat, and 35 calories for 1 exchange. Examples include:   1 ounce chicken or turkey (white meat, no skin)   1 ounce fresh fish   1 ounce fat-free cheese   2 egg whites   The Lean meat group includes foods that contain 7 grams of protein, 3 grams of fat, and 55 calories for 1 meat exchange. Examples include:   1 ounce chicken or turkey (dark meat, no skin)   1 ounce fish   1 ounce lean pork   1 ounce USDA Select or Choice grades of lean beef   1 ounce cheese (with 3 grams of fat or less per ounce).   The Medium-Fat group includes foods that have 7 grams of protein, 5 grams of fat, and 75 calories for 1 meat exchange. Examples include:   1 ounce of ground beef, most cuts of beef, pork, lamb or veal   1 ounce of cheese (5 grams of fat per ounce or less)   1 egg   1 ounce fried fish.   The High-Fat foods have 7 grams of protein, 8 grams of fat, and 100 calories for 1 meat exchange. This group includes:   1 ounce of pork sausage   1 ounce of spare ribs   1 oz of regular cheese (American, Swiss etc.)   1 oz of lunch meat   1 hot dog (turkey or chicken).   Fat Group   Fat List: Fat is necessary for the body and is particularly important during periods of fasting (overnight), when it is very slowly absorbed. 1 fat exchange contains 5 grams of fat and 45 calories. The monounsaturated fats and polyunsaturated fats are better for us than saturated fats. The fat list includes: 1 exchange of monounsaturated fats equals:   1/2 Tbsp peanut butter   6 almonds   1 tsp of oil (olive, peanut, canola).   1 exchange of polyunsaturated fats equals:   1 tsp margarine   1 tsp of any vegetable oil (except coconut).   1 exchange of saturated fat includes:    1 tsp butter   1 strip of zaidi   2 Tbsp of cream (half and half).   Free Foods   A free food contains less than 20 calories or less than 5 grams of carbohydrate per serving. If the food has a serving size listed on its package, it should be limited to 3 servings spread throughout the day. Examples of free foods include:   4 Tbsp fat-free margarine   1 Tbsp fat-free Miracle Whip   sugar-free gelatin   diet soft drinks   catsup   soy sauce   spices.   Combination Foods   Many foods, such as casseroles, are mixed together. Your dietitian can help you figure out how many exchanges to count for combination foods. For example:   lasagna (1 cup) = 2 carb exchanges and 2 medium-fat meat exchanges   spaghetti with meatballs (1 cup) = 2 carb exchanges and 2 medium-fat meat exchanges   pizza, cheese (1/4 of 12 in.) = 2 carbs, 2 medium-fat meats   chicken noodle soup (1 cup) = 1 carb exchange   frozen entrée (less than 300 calories) = 2 carbs, 3 lean meat exchanges   macaroni and cheese (1 cup) = 2 carb exchanges and 2 medium-fat meat exchanges.                 Follow-ups after your visit        Who to contact     If you have questions or need follow up information about today's clinic visit or your schedule please contact [facility] directly at [phone].  Normal or non-critical lab and imaging results will be communicated to you by [employer], letter or phone within 4 business days after the clinic has received the results. If you do not hear from us within 7 days, please contact the clinic through Fli[employer] or phone. If you have a critical or abnormal lab result, we will notify you by phone as soon as possible.  Submit refill requests through So[employer]ets or call your pharmacy and they will forward the refill request to us. Please allow 3 business days for your refill to be completed.          Additional Information About Your Visit        Soapets Information     Soapets lets you send messages  "to your doctor, view your test results, renew your prescriptions, schedule appointments and more. To sign up, go to [email or web address] . Click on \"Log in\" on the left side of the screen, which will take you to the Welcome page. Then click on \"Sign up Now\" on the right side of the page.     You will be asked to enter the access code listed below, as well as some personal information. Please follow the directions to create your username and password.     Your access code is: [email or web address]  Expires: 2017  9:57 AM     Your access code will  in 90 days. If you need help or a new code, please call your [facility] clinic or [phone].        Care EveryWhere ID     This is your Care EveryWhere ID. This could be used by other organizations to access your [facility] medical records  [id number]        Your Vitals Were     Pulse Temperature Respirations Pulse Oximetry BMI (Body Mass Index)       73 97.2  F (36.2  C) (Tympanic) 20 98% 42.45 kg/m2        Blood Pressure from Last 3 Encounters:   17 126/72   17 128/78   17 126/78    Weight from Last 3 Encounters:   17 263 lb (119.3 kg)   17 265 lb 8 oz (120.4 kg)   17 259 lb (117.5 kg)              Today, you had the following     No orders found for display         Today's Medication Changes          These changes are accurate as of: 17  7:40 AM.  If you have any questions, ask your nurse or doctor.               Start taking these medicines.        Dose/Directions    order for DME   Used for:  Carpal tunnel syndrome of right wrist   Started by:  Dustin Fatima MD        Right wrist splint for carpal tunnel syndrome  One* Use as directed at hour of sleep   Quantity:  1 Device   Refills:  1       oxyCODONE-acetaminophen 5-325 MG per tablet   Commonly known as:  PERCOCET   Used for:  Chronic pain syndrome   Started by:  Dustin Fatima MD        Dose:  1 tablet   Start taking on:  2017   Take 1 tablet by " mouth every 4 hours as needed for moderate to severe pain   Quantity:  30 tablet   Refills:  0            Where to get your medicines      Some of these will need a paper prescription and others can be bought over the counter.  Ask your nurse if you have questions.     Bring a paper prescription for each of these medications     order for DME    oxyCODONE-acetaminophen 5-325 MG per tablet                Primary Care Provider Office Phone # Fax #    Dustin Rina Fatima [phone]       [facility] RADHA CHEUNG [address]  [facility] [address]        Thank you!     Thank you for choosing [facility]  for your care. Our goal is always to provide you with excellent care. Hearing back from our patients is one way we can continue to improve our services. Please take a few minutes to complete the written survey that you may receive in the mail after your visit with us. Thank you!             Your Updated Medication List - Protect others around you: Learn how to safely use, store and throw away your medicines at www.disposemymeds.org.          This list is accurate as of: 6/9/17  7:40 AM.  Always use your most recent med list.                   Brand Name Dispense Instructions for use    * albuterol (2.5 MG/3ML) 0.083% neb solution     30 vial    Take 1 vial (2.5 mg) by nebulization every 6 hours as needed for shortness of breath / dyspnea or wheezing       * albuterol 108 (90 BASE) MCG/ACT Inhaler    VENTOLIN HFA    54 g    INHALE TWO PUFFS BY MOUTH EVERY 4 HOURS AS NEEDED FOR SHORTNESS OF BREATH/DYSPNEA       blood glucose calibration solution    no brand specified    1 each    Use to calibrate blood glucose monitor as directed.       blood glucose lancets standard    no brand specified    1 Box    Use to test blood sugar daily times daily or as directed.       blood glucose monitoring meter device kit    no brand specified    1 kit    Use to test blood sugar  larisa times daily or as directed.       blood glucose monitoring test strip    no brand specified    100 strip    Use to test blood sugars once times daily or as directed       cetirizine 10 MG tablet    zyrTEC    30 tablet    Take 1 tablet (10 mg) by mouth every evening       cyanocobalamin 1000 MCG/ML injection    VITAMIN B12    1 mL    Inject 1 mL (1,000 mcg) Subcutaneous every 30 days       EPINEPHrine 0.3 MG/0.3ML injection     2 each    Inject 0.3 mLs (0.3 mg) into the muscle once as needed for anaphylaxis       fluticasone 50 MCG/ACT spray    FLONASE    3 Bottle    Spray 1-2 sprays into both nostrils daily       losartan-hydrochlorothiazide 50-12.5 MG per tablet    HYZAAR    90 tablet    Take 1 tablet by mouth daily       magnesium oxide 400 (241.3 MG) MG tablet    MAG-OX    90 tablet    Take 1 tablet (400 mg) by mouth daily       mometasone-formoterol 100-5 MCG/ACT oral inhaler    DULERA    1 Inhaler    Inhale 2 puffs into the lungs 2 times daily       montelukast 10 MG tablet    SINGULAIR    30 tablet    Take 1 tablet (10 mg) by mouth At Bedtime       ondansetron 4 MG ODT tab    ZOFRAN ODT    10 tablet    Take 1 tablet (4 mg) by mouth every 8 hours as needed for nausea       order for DME     1 Device    Right wrist splint for carpal tunnel syndrome  One* Use as directed at hour of sleep       oxyCODONE-acetaminophen 5-325 MG per tablet   Start taking on:  6/18/2017    PERCOCET    30 tablet    Take 1 tablet by mouth every 4 hours as needed for moderate to severe pain       polyethylene glycol powder    MIRALAX    510 g    Take 17 g (1 capful) by mouth daily       potassium chloride 10 MEQ tablet    K-TAB,KLOR-CON    120 tablet    Take 1 tablet (10 mEq) by mouth daily       sertraline 100 MG tablet    ZOLOFT    30 tablet    Take 1 tablet (100 mg) by mouth daily       sodium chloride 0.65 % nasal spray    SALINE MIST    1 Bottle    Spray 1 spray into both nostrils daily as needed for congestion       STATIN  NOT PRESCRIBED (INTENTIONAL)     0 each    1 each daily Statin not prescribed intentionally due to Active liver disease (liver failure, cirrhosis, hepatitis) LIVER METS       triamcinolone 0.1 % paste    KENALOG    5 g    Take by mouth 2 times daily       Vitamin D (Cholecalciferol) 1000 UNITS Tabs     60 tablet    Take 2,000 Units by mouth daily       * Notice:  This list has 2 medication(s) that are the same as other medications prescribed for you. Read the directions carefully, and ask your doctor or other care provider to review them with you.

## 2017-06-09 NOTE — PATIENT INSTRUCTIONS
"Diabetes: Exchange List  What are the exchange lists?   The exchange lists show you portions of food that equal 1 exchange. Foods are divided into food lists. The foods on each list are called exchanges because they have a similar number of calories, protein, carbohydrate and fat content. Foods from each list can be traded or \"exchanged\" for any other food on the same list because they all have a similar exchange value. A dietitian will help you plan how much food your child should eat at each meal and from what lists the foods should come from. At first you should measure your food until you are able to make good estimates about serving sizes. The following list is a sample of foods found on the exchange lists. For more information, you can buy the Exchange Lists for Meal Planning from: The American Diabetes Association [address] [place] [address] [phone] http://www.diabetes.org  Carbohydrate group   Starch List: One starch exchange contains about 15 grams of carbohydrate, 3 grams of protein, 0 to 1 grams of fat, and 80 calories. A starch exchange is sometimes called a carb exchange. Examples of one starch (carb) exchange are:   one slice of bread   1/2 hamburger or hot dog bun   3/4 cup of unsweetened cereal   1/3 cup pasta   3 cups popcorn   crackers (6 small saltines, 3 squares of vilma crackers, 3 of most other crackers)   1 pancake or waffle (5 inch)   15 to 20 fat-free or baked potato or corn chips.   The vegetables included in the starch exchanges include:   corn (1/2 cup or 1/2 cob)   white potato (1/4 large baked with skin or 1/2 cup mashed)   yam or sweet potato (1/2 cup)   green peas (1/2 cup)   squash, winter (1 cup)   lima beans (2/3 cup).   Fruit List: 1 fruit exchange contains about 15 grams of carbohydrate and 60 calories. Examples of one fruit exchange are:   grape juice (1/3 cup)   apple or pineapple juice (1/2 cup)   orange or grapefruit juice (1/2 cup)   1 small apple   orange or " "peach   1/2 banana   1 cup raspberries   1/3 of a small cantaloupe   1 slice of watermelon.   Milk List: 1 milk exchange contains about 8 grams of protein and 12 grams of carbohydrate. Items on the milk list are divided into fat-free, reduced fat, and whole milk depending on the number of fat grams in the exchange. Examples of one milk exchange are: Fat-Free (0 to 3 grams of fat)   1 cup of skim or non-fat milk   1 cup of 1% milk (also includes 1/2 fat exchange)   6 ounces flavored fat-free yogurt   Reduced-Fat (5 grams of fat)   6 ounces of plain, low-fat yogurt   1 cup 2% milk (also includes one fat exchange).   Whole Milk (8 grams of fat)   8 ounces of plain yogurt (made from whole milk)   1 cup whole milk.   Vegetable List: One vegetable exchange has 5 grams of carbohydrate, 2 grams of protein, no fat, and 25 calories. One-half cup of cooked or a cup of raw vegetables is a good measure for 1 exchange of most vegetables. Raw lettuce may be taken in larger quantities, but salad dressing usually equals 1 fat exchange. Other Carbohydrates List: One \"other carbohydrate\" exchange has 15 grams of carbohydrate. Many of these foods count as a starch exchange and one or more fat exchanges.   brownie (2 inch square) = 1 carb, 1 fat exchange   fruit snack roll = 1 carb exchange   granola bar = 1 and 1/2 carb exchanges   ice cream (1/2 cup) = 1 carb, 2 fat exchanges   frozen yogurt (1/2 cup) = 1 carb, 0 to 1 fat exchanges   tortilla chips (6-12) = 1 carb, 2 fat exchanges.   Meat and Meat Substitute Group   Meats are divided into very lean meats, lean meats, medium-fat meats, and high-fat meats. People with diabetes should try to eat more lean and medium fat meats and stay away from the high fat choices. The Very Lean meat group includes foods that contain 7 grams of protein, 0 to 1 gram of fat, and 35 calories for 1 exchange. Examples include:   1 ounce chicken or turkey (white meat, no skin)   1 ounce fresh fish   1 ounce " fat-free cheese   2 egg whites   The Lean meat group includes foods that contain 7 grams of protein, 3 grams of fat, and 55 calories for 1 meat exchange. Examples include:   1 ounce chicken or turkey (dark meat, no skin)   1 ounce fish   1 ounce lean pork   1 ounce USDA Select or Choice grades of lean beef   1 ounce cheese (with 3 grams of fat or less per ounce).   The Medium-Fat group includes foods that have 7 grams of protein, 5 grams of fat, and 75 calories for 1 meat exchange. Examples include:   1 ounce of ground beef, most cuts of beef, pork, lamb or veal   1 ounce of cheese (5 grams of fat per ounce or less)   1 egg   1 ounce fried fish.   The High-Fat foods have 7 grams of protein, 8 grams of fat, and 100 calories for 1 meat exchange. This group includes:   1 ounce of pork sausage   1 ounce of spare ribs   1 oz of regular cheese (American, Swiss etc.)   1 oz of lunch meat   1 hot dog (turkey or chicken).   Fat Group   Fat List: Fat is necessary for the body and is particularly important during periods of fasting (overnight), when it is very slowly absorbed. 1 fat exchange contains 5 grams of fat and 45 calories. The monounsaturated fats and polyunsaturated fats are better for us than saturated fats. The fat list includes: 1 exchange of monounsaturated fats equals:   1/2 Tbsp peanut butter   6 almonds   1 tsp of oil (olive, peanut, canola).   1 exchange of polyunsaturated fats equals:   1 tsp margarine   1 tsp of any vegetable oil (except coconut).   1 exchange of saturated fat includes:   1 tsp butter   1 strip of zaidi   2 Tbsp of cream (half and half).   Free Foods   A free food contains less than 20 calories or less than 5 grams of carbohydrate per serving. If the food has a serving size listed on its package, it should be limited to 3 servings spread throughout the day. Examples of free foods include:   4 Tbsp fat-free margarine   1 Tbsp fat-free Miracle Whip   sugar-free gelatin   diet soft drinks    catsup   soy sauce   spices.   Combination Foods   Many foods, such as casseroles, are mixed together. Your dietitian can help you figure out how many exchanges to count for combination foods. For example:   lasagna (1 cup) = 2 carb exchanges and 2 medium-fat meat exchanges   spaghetti with meatballs (1 cup) = 2 carb exchanges and 2 medium-fat meat exchanges   pizza, cheese (1/4 of 12 in.) = 2 carbs, 2 medium-fat meats   chicken noodle soup (1 cup) = 1 carb exchange   frozen entrée (less than 300 calories) = 2 carbs, 3 lean meat exchanges   macaroni and cheese (1 cup) = 2 carb exchanges and 2 medium-fat meat exchanges.

## 2017-06-09 NOTE — NURSING NOTE
"Chief Complaint   Patient presents with     Pain       Initial /72  Pulse 73  Temp 97.2  F (36.2  C) (Tympanic)  Resp 20  Wt 263 lb (119.3 kg)  SpO2 98%  BMI 42.45 kg/m2 Estimated body mass index is 42.45 kg/(m^2) as calculated from the following:    Height as of 5/9/17: 5' 6\" (1.676 m).    Weight as of this encounter: 263 lb (119.3 kg).  Medication Reconciliation: complete   Maggi Harris CMA    "

## 2017-06-22 ENCOUNTER — OFFICE VISIT (OUTPATIENT)
Dept: FAMILY MEDICINE | Facility: CLINIC | Age: 52
End: 2017-06-22
Payer: COMMERCIAL

## 2017-06-22 VITALS
WEIGHT: 263 LBS | SYSTOLIC BLOOD PRESSURE: 128 MMHG | BODY MASS INDEX: 42.45 KG/M2 | HEART RATE: 71 BPM | RESPIRATION RATE: 16 BRPM | TEMPERATURE: 97.4 F | DIASTOLIC BLOOD PRESSURE: 78 MMHG | OXYGEN SATURATION: 98 %

## 2017-06-22 DIAGNOSIS — I10 HYPERTENSION GOAL BP (BLOOD PRESSURE) < 140/80: Chronic | ICD-10-CM

## 2017-06-22 DIAGNOSIS — Z23 ENCOUNTER FOR IMMUNIZATION: ICD-10-CM

## 2017-06-22 DIAGNOSIS — G47.33 OSA (OBSTRUCTIVE SLEEP APNEA): ICD-10-CM

## 2017-06-22 DIAGNOSIS — E78.5 HYPERLIPIDEMIA WITH TARGET LDL LESS THAN 130: Chronic | ICD-10-CM

## 2017-06-22 DIAGNOSIS — Z78.9 ASPIRIN INTOLERANCE: Primary | ICD-10-CM

## 2017-06-22 DIAGNOSIS — F33.41 RECURRENT MAJOR DEPRESSION IN PARTIAL REMISSION (H): Chronic | ICD-10-CM

## 2017-06-22 DIAGNOSIS — E66.01 MORBID OBESITY, UNSPECIFIED OBESITY TYPE (H): Chronic | ICD-10-CM

## 2017-06-22 DIAGNOSIS — G89.4 CHRONIC PAIN SYNDROME: ICD-10-CM

## 2017-06-22 DIAGNOSIS — G44.229 CHRONIC TENSION-TYPE HEADACHE, NOT INTRACTABLE: ICD-10-CM

## 2017-06-22 DIAGNOSIS — F43.10 PTSD (POST-TRAUMATIC STRESS DISORDER): Chronic | ICD-10-CM

## 2017-06-22 PROCEDURE — 99207 ZZC FOOT EXAM  NO CHARGE: CPT | Performed by: FAMILY MEDICINE

## 2017-06-22 PROCEDURE — 90670 PCV13 VACCINE IM: CPT | Performed by: FAMILY MEDICINE

## 2017-06-22 PROCEDURE — 90471 IMMUNIZATION ADMIN: CPT | Performed by: FAMILY MEDICINE

## 2017-06-22 PROCEDURE — 99214 OFFICE O/P EST MOD 30 MIN: CPT | Mod: 25 | Performed by: FAMILY MEDICINE

## 2017-06-22 RX ORDER — OXYCODONE AND ACETAMINOPHEN 5; 325 MG/1; MG/1
1 TABLET ORAL EVERY 4 HOURS PRN
Qty: 30 TABLET | Refills: 0 | Status: SHIPPED | OUTPATIENT
Start: 2017-07-09 | End: 2017-07-17

## 2017-06-22 RX ORDER — OXYCODONE AND ACETAMINOPHEN 5; 325 MG/1; MG/1
1 TABLET ORAL EVERY 4 HOURS PRN
Qty: 30 TABLET | Refills: 0 | Status: SHIPPED | OUTPATIENT
Start: 2017-06-25 | End: 2017-06-22

## 2017-06-22 RX ORDER — OXYCODONE AND ACETAMINOPHEN 5; 325 MG/1; MG/1
1 TABLET ORAL EVERY 4 HOURS PRN
Qty: 30 TABLET | Refills: 0 | Status: SHIPPED | OUTPATIENT
Start: 2017-07-02 | End: 2017-06-22

## 2017-06-22 NOTE — NURSING NOTE
"Chief Complaint   Patient presents with     Musculoskeletal Problem     right knee       Initial /78  Pulse 71  Temp 97.4  F (36.3  C) (Tympanic)  Resp 16  Wt 263 lb (119.3 kg)  SpO2 98%  BMI 42.45 kg/m2 Estimated body mass index is 42.45 kg/(m^2) as calculated from the following:    Height as of 5/9/17: 5' 6\" (1.676 m).    Weight as of this encounter: 263 lb (119.3 kg).  Medication Reconciliation: complete   Maggi Harris CMA    "

## 2017-06-22 NOTE — PATIENT INSTRUCTIONS
"Diabetes: Exchange List  What are the exchange lists?   The exchange lists show you portions of food that equal 1 exchange. Foods are divided into food lists. The foods on each list are called exchanges because they have a similar number of calories, protein, carbohydrate and fat content. Foods from each list can be traded or \"exchanged\" for any other food on the same list because they all have a similar exchange value. A dietitian will help you plan how much food your child should eat at each meal and from what lists the foods should come from. At first you should measure your food until you are able to make good estimates about serving sizes. The following list is a sample of foods found on the exchange lists. For more information, you can buy the Exchange Lists for Meal Planning from: The [employer] [address] [place] [address] [phone] http://www.diabetes.org  Carbohydrate group   Starch List: One starch exchange contains about 15 grams of carbohydrate, 3 grams of protein, 0 to 1 grams of fat, and 80 calories. A starch exchange is sometimes called a carb exchange. Examples of one starch (carb) exchange are:   one slice of bread   1/2 hamburger or hot dog bun   3/4 cup of unsweetened cereal   1/3 cup pasta   3 cups popcorn   crackers (6 small saltines, 3 squares of vilma crackers, 3 of most other crackers)   1 pancake or waffle (5 inch)   15 to 20 fat-free or baked potato or corn chips.   The vegetables included in the starch exchanges include:   corn (1/2 cup or 1/2 cob)   white potato (1/4 large baked with skin or 1/2 cup mashed)   yam or sweet potato (1/2 cup)   green peas (1/2 cup)   squash, winter (1 cup)   lima beans (2/3 cup).   Fruit List: 1 fruit exchange contains about 15 grams of carbohydrate and 60 calories. Examples of one fruit exchange are:   grape juice (1/3 cup)   apple or pineapple juice (1/2 cup)   orange or grapefruit juice (1/2 cup)   1 small apple   orange or " "peach   1/2 banana   1 cup raspberries   1/3 of a small cantaloupe   1 slice of watermelon.   Milk List: 1 milk exchange contains about 8 grams of protein and 12 grams of carbohydrate. Items on the milk list are divided into fat-free, reduced fat, and whole milk depending on the number of fat grams in the exchange. Examples of one milk exchange are: Fat-Free (0 to 3 grams of fat)   1 cup of skim or non-fat milk   1 cup of 1% milk (also includes 1/2 fat exchange)   6 ounces flavored fat-free yogurt   Reduced-Fat (5 grams of fat)   6 ounces of plain, low-fat yogurt   1 cup 2% milk (also includes one fat exchange).   Whole Milk (8 grams of fat)   8 ounces of plain yogurt (made from whole milk)   1 cup whole milk.   Vegetable List: One vegetable exchange has 5 grams of carbohydrate, 2 grams of protein, no fat, and 25 calories. One-half cup of cooked or a cup of raw vegetables is a good measure for 1 exchange of most vegetables. Raw lettuce may be taken in larger quantities, but salad dressing usually equals 1 fat exchange. Other Carbohydrates List: One \"other carbohydrate\" exchange has 15 grams of carbohydrate. Many of these foods count as a starch exchange and one or more fat exchanges.   brownie (2 inch square) = 1 carb, 1 fat exchange   fruit snack roll = 1 carb exchange   granola bar = 1 and 1/2 carb exchanges   ice cream (1/2 cup) = 1 carb, 2 fat exchanges   frozen yogurt (1/2 cup) = 1 carb, 0 to 1 fat exchanges   tortilla chips (6-12) = 1 carb, 2 fat exchanges.   Meat and Meat Substitute Group   Meats are divided into very lean meats, lean meats, medium-fat meats, and high-fat meats. People with diabetes should try to eat more lean and medium fat meats and stay away from the high fat choices. The Very Lean meat group includes foods that contain 7 grams of protein, 0 to 1 gram of fat, and 35 calories for 1 exchange. Examples include:   1 ounce chicken or turkey (white meat, no skin)   1 ounce fresh fish   1 ounce " fat-free cheese   2 egg whites   The Lean meat group includes foods that contain 7 grams of protein, 3 grams of fat, and 55 calories for 1 meat exchange. Examples include:   1 ounce chicken or turkey (dark meat, no skin)   1 ounce fish   1 ounce lean pork   1 ounce USDA Select or Choice grades of lean beef   1 ounce cheese (with 3 grams of fat or less per ounce).   The Medium-Fat group includes foods that have 7 grams of protein, 5 grams of fat, and 75 calories for 1 meat exchange. Examples include:   1 ounce of ground beef, most cuts of beef, pork, lamb or veal   1 ounce of cheese (5 grams of fat per ounce or less)   1 egg   1 ounce fried fish.   The High-Fat foods have 7 grams of protein, 8 grams of fat, and 100 calories for 1 meat exchange. This group includes:   1 ounce of pork sausage   1 ounce of spare ribs   1 oz of regular cheese (American, Swiss etc.)   1 oz of lunch meat   1 hot dog (turkey or chicken).   Fat Group   Fat List: Fat is necessary for the body and is particularly important during periods of fasting (overnight), when it is very slowly absorbed. 1 fat exchange contains 5 grams of fat and 45 calories. The monounsaturated fats and polyunsaturated fats are better for us than saturated fats. The fat list includes: 1 exchange of monounsaturated fats equals:   1/2 Tbsp peanut butter   6 almonds   1 tsp of oil (olive, peanut, canola).   1 exchange of polyunsaturated fats equals:   1 tsp margarine   1 tsp of any vegetable oil (except coconut).   1 exchange of saturated fat includes:   1 tsp butter   1 strip of zaidi   2 Tbsp of cream (half and half).   Free Foods   A free food contains less than 20 calories or less than 5 grams of carbohydrate per serving. If the food has a serving size listed on its package, it should be limited to 3 servings spread throughout the day. Examples of free foods include:   4 Tbsp fat-free margarine   1 Tbsp fat-free Miracle Whip   sugar-free gelatin   diet soft drinks    catsup   soy sauce   spices.   Combination Foods   Many foods, such as casseroles, are mixed together. Your dietitian can help you figure out how many exchanges to count for combination foods. For example:   lasagna (1 cup) = 2 carb exchanges and 2 medium-fat meat exchanges   spaghetti with meatballs (1 cup) = 2 carb exchanges and 2 medium-fat meat exchanges   pizza, cheese (1/4 of 12 in.) = 2 carbs, 2 medium-fat meats   chicken noodle soup (1 cup) = 1 carb exchange   frozen entrée (less than 300 calories) = 2 carbs, 3 lean meat exchanges   macaroni and cheese (1 cup) = 2 carb exchanges and 2 medium-fat meat exchanges.     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.9) Aspirin intolerance  (primary encounter diagnosis)  Comment:    Plan: ASPIRIN NOT PRESCRIBED (INTENTIONAL)             (I10) Hypertension goal BP (blood pressure) < 140/80  Comment:    Plan:      (E78.5) Hyperlipidemia with target LDL less than 130  Comment:    Plan:      (F33.41) Recurrent major depression in partial remission  Comment:    Plan:      (E66.01) Morbid obesity, unspecified obesity type (H)  Comment:    Plan:      (F43.10) PTSD (post-traumatic stress disorder)  Comment:    Plan:      (G89.4) Chronic pain syndrome  Comment:    Plan: oxyCODONE-acetaminophen (PERCOCET) 5-325 MG per        tablet, DISCONTINUED: oxyCODONE-acetaminophen         (PERCOCET) 5-325 MG per tablet, DISCONTINUED:         oxyCODONE-acetaminophen (PERCOCET) 5-325 MG per        tablet             (G44.229) Chronic tension-type headache, not intractable  Comment:    Plan:    Obstrucive sleep apnea referral [facility] Dr Kit PRADO  LUNG

## 2017-06-22 NOTE — PROGRESS NOTES
SUBJECTIVE:                                                    Ines Mott is a 52 year old female who presents to clinic today for the following health issues:      Musculoskeletal problem/pain      Duration: ongoing    Description  Location: right knee    Intensity:  moderate    Accompanying signs and symptoms: vericose vein in knee is painful    History  Previous similar problem: YES  Previous evaluation:  yes    Precipitating or alleviating factors:  Trauma or overuse: YES  Aggravating factors include: constant pain    Therapies tried and outcome: injection     Needs magnesium   Intermittent headaches  Needs to go back to Dr Pantoja for OBSTRUCTIVE SLEEP APNEA  Machine  LEASE RENEWAL    2 GRANDCHILDREN  HYPERTENSION WITH GOAL OF LESS THAN 140/80 DOING WELL       Diabetes Follow-up      Patient is checking blood sugars:  ONCE WEEKLY     Diabetic concerns: low blood sugar several less than 70 in the past few weeks     Symptoms of hypoglycemia (low blood sugar): shaky, dizzy, weak,  IRRITABLE      Paresthesias (numbness or burning in feet) or sores: Yes  RIGHT HAND CARPAL TUNNEL SYNDROME      Date of last diabetic eye exam:  YEARLY RECOMMENDED     REFERRAL MADE      Hyperlipidemia Follow-Up      Rate your low fat/cholesterol diet?: good    Taking statin?  No LIVER CANCER     Other lipid medications/supplements?:  none     Hypertension Follow-up      Outpatient blood pressures are not being checked.    Low Salt Diet: no added salt MEDITERRANEAN DIET AND DIABETES DIET          Depression and Anxiety Follow-Up    Status since last visit: No change    Other associated symptoms:None    Complicating factors:     Significant life event: No     Current substance abuse: None    PHQ-9 SCORE 11/4/2016 11/25/2016 4/25/2017   Total Score - - -   Total Score 7 3 5     RASTA-7 SCORE 8/24/2015 11/4/2016   Total Score 7 9        PHQ-9  English      PHQ-9   Any Language     GAD7     Migraine Follow-Up    Headaches symptoms:    STABLE  2 HEADACHE THIS TIME     OBSTRUCTIVE SLEEP APNEA TREATMENT HELPS     Frequency: 2-3 X PER WEEK      Duration of headaches:  1-2 HOURS     Able to do normal daily activities/work with migraines: No -  IMPROVED ACTIVITIY     Rescue/Relief medication:narcotics ( oxycodone)              Effectiveness: moderate relief    Preventative medication: None    Neurologic complications: No new stroke-like symptoms, loss of vision or speech, numbness or weakness    In the past 4 weeks, how often have you gone to Urgent Care or the emergency room because of your headaches?  0       Chronic Pain Follow-Up  HEADACHES  RIGHT UPPER QUADRANT   LOWER BACK PAIN with RADICULOPATHY   OSTEOARTHRITIS KNEES        Type / Location of Pain: AS ABOVE   Analgesia/pain control:       Recent changes:  improved      Overall control: Tolerable with discomfort  Activity level/function:      Daily activities:  Can do most things most days, with some rest    Work:  not applicable  Adverse effects:  No  Adherance    Taking medication as directed?  Yes    Participating in other treatments: yes  Risk Factors:    Sleep:  Fair    Mood/anxiety:  controlled    Recent family or social stressors:  none noted    Other aggravating factors: none  PHQ-9 SCORE 11/4/2016 11/25/2016 4/25/2017   Total Score - - -   Total Score 7 3 5     RASTA-7 SCORE 8/24/2015 11/4/2016   Total Score 7 9     Encounter-Level CSA - 04/18/2017:                 Controlled Substance Agreement - Scan on 4/28/2017  7:00 AM : CONTROLLED SUBSTANCE AGREEMENT (below)          Encounter-Level CSA - 04/18/2017:                 Controlled Substance Agreement - Scan on 1/5/2017  1:14 PM : CONTROLLED SUBSTANCE AGREEMENT (below)          Encounter-Level CSA - 04/18/2017:                 Controlled Substance Agreement - Scan on 12/28/2015  2:53 PM : CONTROLLED MEDICATION CONTRACT, 12-22-15 (below)          Encounter-Level CSA - 04/18/2017:                 Controlled Substance Agreement - Scan on  4/17/2015  1:45 PM : [facility], Controlled Substance Contract, 04-10-15 (below)               Back Pain Follow Up       Description:   Location of pain:  center  Character of pain: constant  Pain radiation: Does not radiate, radiates into the left buttocks and radiates below the knee   Since last visit, pain is:  improved  New numbness or weakness in legs, not attributed to pain:  no     Intensity: moderate    History:   Pain interferes with job: Not applicable,    Therapies tried without relief: Physical Therapy  Therapies tried with relief: opioids         Accompanying Signs & Symptoms:  Risk of Fracture:  None  Risk of Cauda Equina:  None  Risk of Infection:  None  Risk of Cancer:  None            Problem list and histories reviewed & adjusted, as indicated.  Additional history: as documented      Patient Active Problem List   Diagnosis     Dyspnea and respiratory abnormality     Other specified drug dependence, in remission     Carcinoma of colon metastatic to liver (H)     Kidney infection     ALEXUS (obstructive sleep apnea)     Myelomeningocele with hydrocephalus, cervical region (H)     Fibromyalgia     Angioedema     Pneumonia due to other Gram-negative bacteria     BMI 42     H/O hysterectomy for benign disease     Hyperlipidemia with target LDL less than 130     Infected tooth     Recurrent major depression in partial remission     Hypertension goal BP (blood pressure) < 140/80     Abdominal pain, right lateral     Renal mass, right     Health Care Home     Intracranial shunt     Migraine     Mild intermittent asthma without complication     Other complicated headache syndrome     Seasonal affective disorder (H)     Anxiety     PTSD (post-traumatic stress disorder)     Pre diabetes      Chronic pain syndrome     Chronic tension-type headache, not intractable     Degeneration of lumbosacral intervertebral disc     Comprehensive diabetic foot examination, type 2 DM, encounter for (H)     Past Surgical History:    Procedure Laterality Date     APPENDECTOMY       CHOLECYSTECTOMY       EXTRACTION(S) DENTAL       GYN SURGERY       IMPLANT SHUNT VENTRICULOPERITONEAL         Social History   Substance Use Topics     Smoking status: Former Smoker     Smokeless tobacco: Never Used     Alcohol use No      Comment: off and on     Family History   Problem Relation Age of Onset     CANCER Father          Current Outpatient Prescriptions   Medication Sig Dispense Refill     ASPIRIN NOT PRESCRIBED (INTENTIONAL) Please choose reason not prescribed, below 1 each 0     [START ON 7/9/2017] oxyCODONE-acetaminophen (PERCOCET) 5-325 MG per tablet Take 1 tablet by mouth every 4 hours as needed for moderate to severe pain 30 tablet 0     order for DME Right wrist splint for carpal tunnel syndrome   One*  Use as directed at hour of sleep 1 Device 1     montelukast (SINGULAIR) 10 MG tablet Take 1 tablet (10 mg) by mouth At Bedtime 30 tablet 11     cetirizine (ZYRTEC) 10 MG tablet Take 1 tablet (10 mg) by mouth every evening 30 tablet 1     fluticasone (FLONASE) 50 MCG/ACT spray Spray 1-2 sprays into both nostrils daily 3 Bottle 1     sodium chloride (SALINE MIST) 0.65 % nasal spray Spray 1 spray into both nostrils daily as needed for congestion 1 Bottle 11     triamcinolone (KENALOG) 0.1 % paste Take by mouth 2 times daily 5 g 0     ondansetron (ZOFRAN ODT) 4 MG ODT tab Take 1 tablet (4 mg) by mouth every 8 hours as needed for nausea 10 tablet 0     Vitamin D, Cholecalciferol, 1000 UNITS TABS Take 2,000 Units by mouth daily 60 tablet 11     cyanocobalamin (VITAMIN B12) 1000 MCG/ML injection Inject 1 mL (1,000 mcg) Subcutaneous every 30 days 1 mL 11     potassium chloride (K-TAB,KLOR-CON) 10 MEQ tablet Take 1 tablet (10 mEq) by mouth daily 120 tablet 11     sertraline (ZOLOFT) 100 MG tablet Take 1 tablet (100 mg) by mouth daily 30 tablet 11     losartan-hydrochlorothiazide (HYZAAR) 50-12.5 MG per tablet Take 1 tablet by mouth daily 90 tablet 3      albuterol (2.5 MG/3ML) 0.083% neb solution Take 1 vial (2.5 mg) by nebulization every 6 hours as needed for shortness of breath / dyspnea or wheezing 30 vial 3     magnesium oxide (MAG-OX) 400 (241.3 MG) MG tablet Take 1 tablet (400 mg) by mouth daily 90 tablet 3     albuterol (VENTOLIN HFA) 108 (90 BASE) MCG/ACT Inhaler INHALE TWO PUFFS BY MOUTH EVERY 4 HOURS AS NEEDED FOR SHORTNESS OF BREATH/DYSPNEA 54 g 1     mometasone-formoterol (DULERA) 100-5 MCG/ACT oral inhaler Inhale 2 puffs into the lungs 2 times daily 1 Inhaler 11     blood glucose monitoring (NO BRAND SPECIFIED) meter device kit Use to test blood sugar larisa times daily or as directed. 1 kit 0     blood glucose (NO BRAND SPECIFIED) lancets standard Use to test blood sugar daily times daily or as directed. 1 Box 11     blood glucose monitoring (NO BRAND SPECIFIED) test strip Use to test blood sugars once times daily or as directed 100 strip 3     blood glucose calibration (NO BRAND SPECIFIED) solution Use to calibrate blood glucose monitor as directed. 1 each 0     polyethylene glycol (MIRALAX) powder Take 17 g (1 capful) by mouth daily 510 g 3     STATIN NOT PRESCRIBED, INTENTIONAL, 1 each daily Statin not prescribed intentionally due to Active liver disease (liver failure, cirrhosis, hepatitis)  LIVER METS 0 each 0     EPINEPHrine (EPIPEN) 0.3 MG/0.3ML injection Inject 0.3 mLs (0.3 mg) into the muscle once as needed for anaphylaxis 2 each 5     Allergies   Allergen Reactions     Ativan [Lorazepam] Anaphylaxis     Macrobid [Nitrofurantoin] Anaphylaxis     Amoxicillin      Buspirone      Buspar     Cephalosporins      Dilaudid [Hydromorphone]      Diphenhydramine Hcl      Benadryl     Imitrex [Sumatriptan Succinate]      blood pressure raised uncontrobably     Ketorolac Tromethamine      Toradol     Lansoprazole      Prevacid     Metoclopramide Hives     Reglan     Paroxetine      Paxil     Penicillins      Prednisone Hives     Sulfa Drugs      THICKENED  AND DIFFICULTY SWALLOWING      Lidoderm [Lidocaine] Rash     Localized rash at patch site     Recent Labs   Lab Test  04/03/17   1004  01/26/17   1803  01/18/17   1716  01/07/17   0120   11/04/16   1407   04/07/16   1620   05/07/15   1222   10/21/14   0230   A1C  5.9   --    --    --    --   6.1*   --   5.7   --   6.0   < >   --    LDL   --    --    --    --    --   96   --   101*   --   127   --    --    HDL   --    --    --    --    --   42*   --   42*   --   36*   --    --    TRIG   --    --    --    --    --   161*   --   306*   --   177*   --    --    ALT   --   25  17  41   < >  27   < >  30   < >  24   < >  31   CR   --   0.86  0.66  0.75   < >  1.22*   < >   --    < >   --    < >  0.82   GFRESTIMATED   --   69  >90  Non  GFR Calc    81   < >  46*   < >   --    < >   --    < >  73   GFRESTBLACK   --   84  >90   GFR Calc    >90   GFR Calc     < >  56*   < >   --    < >   --    < >  89   POTASSIUM   --   3.5  3.1*  3.7   < >  3.8   < >   --    < >   --    < >  3.8   TSH  1.62   --    --    --    --    --    --    --    --    --    --   2.30    < > = values in this interval not displayed.      BP Readings from Last 3 Encounters:   06/22/17 128/78   06/09/17 126/72   06/01/17 128/78    Wt Readings from Last 3 Encounters:   06/22/17 263 lb (119.3 kg)   06/09/17 263 lb (119.3 kg)   06/01/17 265 lb 8 oz (120.4 kg)                  Labs reviewed in EPIC    Reviewed and updated as needed this visit by clinical staff  Tobacco  Allergies  Meds  Problems  Med Hx  Surg Hx  Fam Hx  Soc Hx        Reviewed and updated as needed this visit by Provider  Allergies  Meds  Problems         ROS: has Dyspnea and respiratory abnormality; Other specified drug dependence, in remission; Carcinoma of colon metastatic to liver (H); Kidney infection; ALEXUS (obstructive sleep apnea); Myelomeningocele with hydrocephalus, cervical region (H); Fibromyalgia; Angioedema; Pneumonia due to  other Gram-negative bacteria; BMI 42; H/O hysterectomy for benign disease; Hyperlipidemia with target LDL less than 130; Infected tooth; Recurrent major depression in partial remission; Hypertension goal BP (blood pressure) < 140/80; Abdominal pain, right lateral; Renal mass, right; Health Care Home; Intracranial shunt; Migraine; Mild intermittent asthma without complication; Other complicated headache syndrome; Seasonal affective disorder (H); Anxiety; PTSD (post-traumatic stress disorder); Pre diabetes ; Chronic pain syndrome; Chronic tension-type headache, not intractable; Degeneration of lumbosacral intervertebral disc; and Comprehensive diabetic foot examination, type 2 DM, encounter for (H) on her problem list.  OBSTRUCTIVE SLEEP APNEA  NEED NEW OBSTRUCTIVE SLEEP APNEA MACHINE NEEDS TO SEE DR CURRIE   MYELOMENINGOCOEL STABLE   FIBROMYALGIA  ACHING AT TIMES  ALLERGIC REACTION SHE HAS EPI PENS AT HOUSE  BMI COMING DOWN   HYSTERECTOMY   DEPRESSION BETTER   HYPERTENSION WITH GOAL OF LESS THAN 140/80   PRE DIABETES   LUMBAR DISC     C: NEGATIVE for fever, chills, change in weight  I: NEGATIVE for worrisome rashes, moles or lesions  E: NEGATIVE for vision changes or irritation  E/M: NEGATIVE for ear, mouth and throat problems  R: NEGATIVE for significant cough or SOB  B: NEGATIVE for masses, tenderness or discharge  CV: NEGATIVE for chest pain, palpitations or peripheral edema  GI: NEGATIVE for nausea, abdominal pain, heartburn, or change in bowel habits  : NEGATIVE for frequency, dysuria, or hematuria  MUSCULOSKELETAL: OSTEOARTHRITIS KNEES   LOWER BACK PAIN   N: NEGATIVE for weakness, dizziness or paresthesias  E: NEGATIVE for temperature intolerance, skin/hair changes  H: NEGATIVE for bleeding problems  P: NEGATIVE for changes in mood or affect IMPROVED DEPRESSION    OBJECTIVE:                                                    /78  Pulse 71  Temp 97.4  F (36.3  C) (Tympanic)  Resp 16  Wt 263 lb (119.3  kg)  SpO2 98%  BMI 42.45 kg/m2  Body mass index is 42.45 kg/(m^2).  GENERAL: healthy, alert and no distress  EYES: Eyes grossly normal to inspection, PERRL and conjunctivae and sclerae normal  HENT: ear canals and TM's normal, nose and mouth without ulcers or lesions  NECK: no adenopathy, no asymmetry, masses, or scars and thyroid normal to palpation  RESP: lungs clear to auscultation - no rales, rhonchi or wheezes  CV: regular rate and rhythm, normal S1 S2, no S3 or S4, no murmur, click or rub, no peripheral edema and peripheral pulses strong  ABDOMEN: soft, nontender, no hepatosplenomegaly, no masses and bowel sounds normal  MS: no gross musculoskeletal defects noted, no edema  SKIN: no suspicious lesions or rashes  NEURO: Normal strength and tone, mentation intact and speech normal  BACK: no CVA tenderness, no paralumbar tenderness  PSYCH: mentation appears normal, affect normal/bright  LYMPH: no cervical, supraclavicular, axillary, or inguinal adenopathy  Diabetic foot exam: normal DP and PT pulses, no trophic changes or ulcerative lesions, normal sensory exam and normal monofilament exam    Diagnostic Test Results:  Results for orders placed or performed in visit on 04/03/17   Hemoglobin A1c   Result Value Ref Range    Hemoglobin A1C 5.9 4.3 - 6.0 %   TSH   Result Value Ref Range    TSH 1.62 0.40 - 4.00 mU/L        ASSESSMENT/PLAN:                                                        ICD-10-CM    1. Aspirin intolerance Z78.9 ASPIRIN NOT PRESCRIBED (INTENTIONAL)   2. Hypertension goal BP (blood pressure) < 140/80 I10    3. Hyperlipidemia with target LDL less than 130 E78.5    4. Recurrent major depression in partial remission F33.41    5. Morbid obesity, unspecified obesity type (H) E66.01    6. PTSD (post-traumatic stress disorder) F43.10    7. Chronic pain syndrome G89.4 oxyCODONE-acetaminophen (PERCOCET) 5-325 MG per tablet     DISCONTINUED: oxyCODONE-acetaminophen (PERCOCET) 5-325 MG per tablet      "DISCONTINUED: oxyCODONE-acetaminophen (PERCOCET) 5-325 MG per tablet   8. Chronic tension-type headache, not intractable G44.229    9. ALEXUS (obstructive sleep apnea) G47.33 SLEEP EVALUATION & MANAGEMENT REFERRAL - ADULT       Patient Instructions   Diabetes: Exchange List  What are the exchange lists?   The exchange lists show you portions of food that equal 1 exchange. Foods are divided into food lists. The foods on each list are called exchanges because they have a similar number of calories, protein, carbohydrate and fat content. Foods from each list can be traded or \"exchanged\" for any other food on the same list because they all have a similar exchange value. A dietitian will help you plan how much food your child should eat at each meal and from what lists the foods should come from. At first you should measure your food until you are able to make good estimates about serving sizes. The following list is a sample of foods found on the exchange lists. For more information, you can buy the Exchange Lists for Meal Planning from: The American Diabetes Association [address] [place] [address] 1-123.789.8601 http://www.diabetes.org  Carbohydrate group   Starch List: One starch exchange contains about 15 grams of carbohydrate, 3 grams of protein, 0 to 1 grams of fat, and 80 calories. A starch exchange is sometimes called a carb exchange. Examples of one starch (carb) exchange are:   one slice of bread   1/2 hamburger or hot dog bun   3/4 cup of unsweetened cereal   1/3 cup pasta   3 cups popcorn   crackers (6 small saltines, 3 squares of vilma crackers, 3 of most other crackers)   1 pancake or waffle (5 inch)   15 to 20 fat-free or baked potato or corn chips.   The vegetables included in the starch exchanges include:   corn (1/2 cup or 1/2 cob)   white potato (1/4 large baked with skin or 1/2 cup mashed)   yam or sweet potato (1/2 cup)   green peas (1/2 cup)   squash, winter (1 cup)   lima beans (2/3 cup). " "  Fruit List: 1 fruit exchange contains about 15 grams of carbohydrate and 60 calories. Examples of one fruit exchange are:   grape juice (1/3 cup)   apple or pineapple juice (1/2 cup)   orange or grapefruit juice (1/2 cup)   1 small apple   orange or peach   1/2 banana   1 cup raspberries   1/3 of a small cantaloupe   1 slice of watermelon.   Milk List: 1 milk exchange contains about 8 grams of protein and 12 grams of carbohydrate. Items on the milk list are divided into fat-free, reduced fat, and whole milk depending on the number of fat grams in the exchange. Examples of one milk exchange are: Fat-Free (0 to 3 grams of fat)   1 cup of skim or non-fat milk   1 cup of 1% milk (also includes 1/2 fat exchange)   6 ounces flavored fat-free yogurt   Reduced-Fat (5 grams of fat)   6 ounces of plain, low-fat yogurt   1 cup 2% milk (also includes one fat exchange).   Whole Milk (8 grams of fat)   8 ounces of plain yogurt (made from whole milk)   1 cup whole milk.   Vegetable List: One vegetable exchange has 5 grams of carbohydrate, 2 grams of protein, no fat, and 25 calories. One-half cup of cooked or a cup of raw vegetables is a good measure for 1 exchange of most vegetables. Raw lettuce may be taken in larger quantities, but salad dressing usually equals 1 fat exchange. Other Carbohydrates List: One \"other carbohydrate\" exchange has 15 grams of carbohydrate. Many of these foods count as a starch exchange and one or more fat exchanges.   brownie (2 inch square) = 1 carb, 1 fat exchange   fruit snack roll = 1 carb exchange   granola bar = 1 and 1/2 carb exchanges   ice cream (1/2 cup) = 1 carb, 2 fat exchanges   frozen yogurt (1/2 cup) = 1 carb, 0 to 1 fat exchanges   tortilla chips (6-12) = 1 carb, 2 fat exchanges.   Meat and Meat Substitute Group   Meats are divided into very lean meats, lean meats, medium-fat meats, and high-fat meats. People with diabetes should try to eat more lean and medium fat meats and stay away " from the high fat choices. The Very Lean meat group includes foods that contain 7 grams of protein, 0 to 1 gram of fat, and 35 calories for 1 exchange. Examples include:   1 ounce chicken or turkey (white meat, no skin)   1 ounce fresh fish   1 ounce fat-free cheese   2 egg whites   The Lean meat group includes foods that contain 7 grams of protein, 3 grams of fat, and 55 calories for 1 meat exchange. Examples include:   1 ounce chicken or turkey (dark meat, no skin)   1 ounce fish   1 ounce lean pork   1 ounce USDA Select or Choice grades of lean beef   1 ounce cheese (with 3 grams of fat or less per ounce).   The Medium-Fat group includes foods that have 7 grams of protein, 5 grams of fat, and 75 calories for 1 meat exchange. Examples include:   1 ounce of ground beef, most cuts of beef, pork, lamb or veal   1 ounce of cheese (5 grams of fat per ounce or less)   1 egg   1 ounce fried fish.   The High-Fat foods have 7 grams of protein, 8 grams of fat, and 100 calories for 1 meat exchange. This group includes:   1 ounce of pork sausage   1 ounce of spare ribs   1 oz of regular cheese (American, Swiss etc.)   1 oz of lunch meat   1 hot dog (turkey or chicken).   Fat Group   Fat List: Fat is necessary for the body and is particularly important during periods of fasting (overnight), when it is very slowly absorbed. 1 fat exchange contains 5 grams of fat and 45 calories. The monounsaturated fats and polyunsaturated fats are better for us than saturated fats. The fat list includes: 1 exchange of monounsaturated fats equals:   1/2 Tbsp peanut butter   6 almonds   1 tsp of oil (olive, peanut, canola).   1 exchange of polyunsaturated fats equals:   1 tsp margarine   1 tsp of any vegetable oil (except coconut).   1 exchange of saturated fat includes:   1 tsp butter   1 strip of zaidi   2 Tbsp of cream (half and half).   Free Foods   A free food contains less than 20 calories or less than 5 grams of carbohydrate per serving.  If the food has a serving size listed on its package, it should be limited to 3 servings spread throughout the day. Examples of free foods include:   4 Tbsp fat-free margarine   1 Tbsp fat-free Miracle Whip   sugar-free gelatin   diet soft drinks   catsup   soy sauce   spices.   Combination Foods   Many foods, such as casseroles, are mixed together. Your dietitian can help you figure out how many exchanges to count for combination foods. For example:   lasagna (1 cup) = 2 carb exchanges and 2 medium-fat meat exchanges   spaghetti with meatballs (1 cup) = 2 carb exchanges and 2 medium-fat meat exchanges   pizza, cheese (1/4 of 12 in.) = 2 carbs, 2 medium-fat meats   chicken noodle soup (1 cup) = 1 carb exchange   frozen entrée (less than 300 calories) = 2 carbs, 3 lean meat exchanges   macaroni and cheese (1 cup) = 2 carb exchanges and 2 medium-fat meat exchanges.     (Z78.9) Aspirin intolerance  (primary encounter diagnosis)  Comment:    Plan: ASPIRIN NOT PRESCRIBED (INTENTIONAL)             (I10) Hypertension goal BP (blood pressure) < 140/80  Comment:    Plan:      (E78.5) Hyperlipidemia with target LDL less than 130  Comment:    Plan:      (F33.41) Recurrent major depression in partial remission  Comment:    Plan:      (E66.01) Morbid obesity, unspecified obesity type (H)  Comment:    Plan:      (F43.10) PTSD (post-traumatic stress disorder)  Comment:    Plan:      (G89.4) Chronic pain syndrome  Comment:    Plan: oxyCODONE-acetaminophen (PERCOCET) 5-325 MG per        tablet, DISCONTINUED: oxyCODONE-acetaminophen         (PERCOCET) 5-325 MG per tablet, DISCONTINUED:         oxyCODONE-acetaminophen (PERCOCET) 5-325 MG per        tablet             (G44.229) Chronic tension-type headache, not intractable  Comment:    Plan:    Obstrucive sleep apnea referral [facility] lung Dr Kit PRADO  LUNG           OTTO YUSUF MD  [facility]

## 2017-06-22 NOTE — PROGRESS NOTES
Screening Questionnaire for Adult Immunization    Are you sick today?   No   Do you have allergies to medications, food, a vaccine component or latex?   No   Have you ever had a serious reaction after receiving a vaccination?   No   Do you have a long-term health problem with heart disease, lung disease, asthma, kidney disease, metabolic disease (e.g. diabetes), anemia, or other blood disorder?   No   Do you have cancer, leukemia, HIV/AIDS, or any other immune system problem?   No   In the past 3 months, have you taken medications that affect  your immune system, such as prednisone, other steroids, or anticancer drugs; drugs for the treatment of rheumatoid arthritis, Crohn s disease, or psoriasis; or have you had radiation treatments?   No   Have you had a seizure, or a brain or other nervous system problem?   No   During the past year, have you received a transfusion of blood or blood     products, or been given immune (gamma) globulin or antiviral drug?   No   For women: Are you pregnant or is there a chance you could become        pregnant during the next month?   No   Have you received any vaccinations in the past 4 weeks?   No     Immunization questionnaire answers were all negative.      MNVFC doesn't apply on this patient    Per orders of Dr. Fatima, injection of prevnar given by Maggi Harris. Patient instructed to remain in clinic for 20 minutes afterwards, and to report any adverse reaction to me immediately.       Screening performed by Maggi Harris on 6/22/2017 at 8:38 AM.

## 2017-06-22 NOTE — MR AVS SNAPSHOT
"              After Visit Summary   6/22/2017    Ines Mott    MRN: [id number]           Patient Information     Date Of Birth          1965        Visit Information        Provider Department      6/22/2017 7:30 AM Dustin Fatima MD [facility]        Today's Diagnoses     Aspirin intolerance    -  1    Hypertension goal BP (blood pressure) < 140/80        Hyperlipidemia with target LDL less than 130        Recurrent major depression in partial remission        Morbid obesity, unspecified obesity type (H)        PTSD (post-traumatic stress disorder)        Chronic pain syndrome        Chronic tension-type headache, not intractable        ALEXUS (obstructive sleep apnea)          Care Instructions    Diabetes: Exchange List  What are the exchange lists?   The exchange lists show you portions of food that equal 1 exchange. Foods are divided into food lists. The foods on each list are called exchanges because they have a similar number of calories, protein, carbohydrate and fat content. Foods from each list can be traded or \"exchanged\" for any other food on the same list because they all have a similar exchange value. A dietitian will help you plan how much food your child should eat at each meal and from what lists the foods should come from. At first you should measure your food until you are able to make good estimates about serving sizes. The following list is a sample of foods found on the exchange lists. For more information, you can buy the Exchange Lists for Meal Planning from: The American Diabetes Association [address] [place] [address] 1-481.340.9290 http://www.diabetes.org  Carbohydrate group   Starch List: One starch exchange contains about 15 grams of carbohydrate, 3 grams of protein, 0 to 1 grams of fat, and 80 calories. A starch exchange is sometimes called a carb exchange. Examples of one starch (carb) exchange are:   one slice of bread   1/2 " "hamburger or hot dog bun   3/4 cup of unsweetened cereal   1/3 cup pasta   3 cups popcorn   crackers (6 small saltines, 3 squares of vilma crackers, 3 of most other crackers)   1 pancake or waffle (5 inch)   15 to 20 fat-free or baked potato or corn chips.   The vegetables included in the starch exchanges include:   corn (1/2 cup or 1/2 cob)   white potato (1/4 large baked with skin or 1/2 cup mashed)   yam or sweet potato (1/2 cup)   green peas (1/2 cup)   squash, winter (1 cup)   lima beans (2/3 cup).   Fruit List: 1 fruit exchange contains about 15 grams of carbohydrate and 60 calories. Examples of one fruit exchange are:   grape juice (1/3 cup)   apple or pineapple juice (1/2 cup)   orange or grapefruit juice (1/2 cup)   1 small apple   orange or peach   1/2 banana   1 cup raspberries   1/3 of a small cantaloupe   1 slice of watermelon.   Milk List: 1 milk exchange contains about 8 grams of protein and 12 grams of carbohydrate. Items on the milk list are divided into fat-free, reduced fat, and whole milk depending on the number of fat grams in the exchange. Examples of one milk exchange are: Fat-Free (0 to 3 grams of fat)   1 cup of skim or non-fat milk   1 cup of 1% milk (also includes 1/2 fat exchange)   6 ounces flavored fat-free yogurt   Reduced-Fat (5 grams of fat)   6 ounces of plain, low-fat yogurt   1 cup 2% milk (also includes one fat exchange).   Whole Milk (8 grams of fat)   8 ounces of plain yogurt (made from whole milk)   1 cup whole milk.   Vegetable List: One vegetable exchange has 5 grams of carbohydrate, 2 grams of protein, no fat, and 25 calories. One-half cup of cooked or a cup of raw vegetables is a good measure for 1 exchange of most vegetables. Raw lettuce may be taken in larger quantities, but salad dressing usually equals 1 fat exchange. Other Carbohydrates List: One \"other carbohydrate\" exchange has 15 grams of carbohydrate. Many of these foods count as a starch exchange and one or more " fat exchanges.   brownie (2 inch square) = 1 carb, 1 fat exchange   fruit snack roll = 1 carb exchange   granola bar = 1 and 1/2 carb exchanges   ice cream (1/2 cup) = 1 carb, 2 fat exchanges   frozen yogurt (1/2 cup) = 1 carb, 0 to 1 fat exchanges   tortilla chips (6-12) = 1 carb, 2 fat exchanges.   Meat and Meat Substitute Group   Meats are divided into very lean meats, lean meats, medium-fat meats, and high-fat meats. People with diabetes should try to eat more lean and medium fat meats and stay away from the high fat choices. The Very Lean meat group includes foods that contain 7 grams of protein, 0 to 1 gram of fat, and 35 calories for 1 exchange. Examples include:   1 ounce chicken or turkey (white meat, no skin)   1 ounce fresh fish   1 ounce fat-free cheese   2 egg whites   The Lean meat group includes foods that contain 7 grams of protein, 3 grams of fat, and 55 calories for 1 meat exchange. Examples include:   1 ounce chicken or turkey (dark meat, no skin)   1 ounce fish   1 ounce lean pork   1 ounce USDA Select or Choice grades of lean beef   1 ounce cheese (with 3 grams of fat or less per ounce).   The Medium-Fat group includes foods that have 7 grams of protein, 5 grams of fat, and 75 calories for 1 meat exchange. Examples include:   1 ounce of ground beef, most cuts of beef, pork, lamb or veal   1 ounce of cheese (5 grams of fat per ounce or less)   1 egg   1 ounce fried fish.   The High-Fat foods have 7 grams of protein, 8 grams of fat, and 100 calories for 1 meat exchange. This group includes:   1 ounce of pork sausage   1 ounce of spare ribs   1 oz of regular cheese (American, Swiss etc.)   1 oz of lunch meat   1 hot dog (turkey or chicken).   Fat Group   Fat List: Fat is necessary for the body and is particularly important during periods of fasting (overnight), when it is very slowly absorbed. 1 fat exchange contains 5 grams of fat and 45 calories. The monounsaturated fats and polyunsaturated fats  are better for us than saturated fats. The fat list includes: 1 exchange of monounsaturated fats equals:   1/2 Tbsp peanut butter   6 almonds   1 tsp of oil (olive, peanut, canola).   1 exchange of polyunsaturated fats equals:   1 tsp margarine   1 tsp of any vegetable oil (except coconut).   1 exchange of saturated fat includes:   1 tsp butter   1 strip of zaidi   2 Tbsp of cream (half and half).   Free Foods   A free food contains less than 20 calories or less than 5 grams of carbohydrate per serving. If the food has a serving size listed on its package, it should be limited to 3 servings spread throughout the day. Examples of free foods include:   4 Tbsp fat-free margarine   1 Tbsp fat-free Miracle Whip   sugar-free gelatin   diet soft drinks   catsup   soy sauce   spices.   Combination Foods   Many foods, such as casseroles, are mixed together. Your dietitian can help you figure out how many exchanges to count for combination foods. For example:   lasagna (1 cup) = 2 carb exchanges and 2 medium-fat meat exchanges   spaghetti with meatballs (1 cup) = 2 carb exchanges and 2 medium-fat meat exchanges   pizza, cheese (1/4 of 12 in.) = 2 carbs, 2 medium-fat meats   chicken noodle soup (1 cup) = 1 carb exchange   frozen entrée (less than 300 calories) = 2 carbs, 3 lean meat exchanges   macaroni and cheese (1 cup) = 2 carb exchanges and 2 medium-fat meat exchanges.     (Z78.9) Aspirin intolerance  (primary encounter diagnosis)  Comment:    Plan: ASPIRIN NOT PRESCRIBED (INTENTIONAL)             (I10) Hypertension goal BP (blood pressure) < 140/80  Comment:    Plan:      (E78.5) Hyperlipidemia with target LDL less than 130  Comment:    Plan:      (F33.41) Recurrent major depression in partial remission  Comment:    Plan:      (E66.01) Morbid obesity, unspecified obesity type (H)  Comment:    Plan:      (F43.10) PTSD (post-traumatic stress disorder)  Comment:    Plan:      (G89.4) Chronic pain syndrome  Comment:    Plan:  oxyCODONE-acetaminophen (PERCOCET) 5-325 MG per        tablet, DISCONTINUED: oxyCODONE-acetaminophen         (PERCOCET) 5-325 MG per tablet, DISCONTINUED:         oxyCODONE-acetaminophen (PERCOCET) 5-325 MG per        tablet             (G44.229) Chronic tension-type headache, not intractable  Comment:    Plan:    Obstrucive sleep apnea referral [facility] Kit PRADO  [facility]               Follow-ups after your visit        Additional Services     SLEEP EVALUATION & MANAGEMENT REFERRAL - ADULT       Please be aware that coverage of these services is subject to the terms and limitations of your health insurance plan.  Call member services at your health plan with any benefit or coverage questions.      Please bring the following to your appointment:    >>   List of current medications   >>   This referral request   >>   Any documents/labs given to you for this referral    Other:  [facility] - Numerous Locations - [phone]                  Follow-up notes from your care team     Return in about 4 weeks (around 7/20/2017) for Routine Visit.      Future tests that were ordered for you today     Open Future Orders        Priority Expected Expires Ordered    SLEEP EVALUATION & MANAGEMENT REFERRAL - ADULT Routine  6/22/2018 6/22/2017            Who to contact     If you have questions or need follow up information about today's clinic visit or your schedule please contact [facility] directly at [phone].  Normal or non-critical lab and imaging results will be communicated to you by MyChart, letter or phone within 4 business days after the clinic has received the results. If you do not hear from us within 7 days, please contact the clinic through MyChart or phone. If you have a critical or abnormal lab result, we will notify you by phone as soon as possible.  Submit refill requests through [employer] or call your pharmacy and they will forward the refill request to us. Please  "allow 3 business days for your refill to be completed.          Additional Information About Your Visit        MyChart Information     [employer] lets you send messages to your doctor, view your test results, renew your prescriptions, schedule appointments and more. To sign up, go to [email or web address] . Click on \"Log in\" on the left side of the screen, which will take you to the Welcome page. Then click on \"Sign up Now\" on the right side of the page.     You will be asked to enter the access code listed below, as well as some personal information. Please follow the directions to create your username and password.     Your access code is: [email or web address]  Expires: 2017  9:57 AM     Your access code will  in 90 days. If you need help or a new code, please call your [place] clinic or [phone].        Care EveryWhere ID     This is your Care EveryWhere ID. This could be used by other organizations to access your [place] medical records  [id number]        Your Vitals Were     Pulse Temperature Respirations Pulse Oximetry BMI (Body Mass Index)       71 97.4  F (36.3  C) (Tympanic) 16 98% 42.45 kg/m2        Blood Pressure from Last 3 Encounters:   17 128/78   17 126/72   17 128/78    Weight from Last 3 Encounters:   17 263 lb (119.3 kg)   17 263 lb (119.3 kg)   17 265 lb 8 oz (120.4 kg)                 Today's Medication Changes          These changes are accurate as of: 17  8:09 AM.  If you have any questions, ask your nurse or doctor.               Start taking these medicines.        Dose/Directions    ASPIRIN NOT PRESCRIBED   Commonly known as:  INTENTIONAL   Used for:  Aspirin intolerance   Started by:  Dustin Fatima MD        Please choose reason not prescribed, below   Quantity:  1 each   Refills:  0       oxyCODONE-acetaminophen 5-325 MG per tablet   Commonly known as:  PERCOCET   Used for:  Chronic pain syndrome   Started by:  Dustin Fatima " MD Rina        Dose:  1 tablet   Start taking on:  7/9/2017   Take 1 tablet by mouth every 4 hours as needed for moderate to severe pain   Quantity:  30 tablet   Refills:  0            Where to get your medicines      Some of these will need a paper prescription and others can be bought over the counter.  Ask your nurse if you have questions.     Bring a paper prescription for each of these medications     oxyCODONE-acetaminophen 5-325 MG per tablet       You don't need a prescription for these medications     ASPIRIN NOT PRESCRIBED                Primary Care Provider Office Phone # Fax #    Dustin Rina Fatima [phone]       [facility] [address]  [place] [address]        Equal Access to Services     VALENTE RIVERA : Hadii casimiro ku hadasho Soevangelina, waaxda luqadaha, qaybta kaalmada adeegyada, emilee coyne. So [facility] [phone].    ATENCIÓN: Si habla español, tiene a christianson disposición servicios gratuitos de asistencia lingüística. Llame al [phone].    We comply with applicable federal civil rights laws and [place] laws. We do not discriminate on the basis of race, color, national origin, age, disability sex, sexual orientation or gender identity.            Thank you!     Thank you for choosing [facility]  for your care. Our goal is always to provide you with excellent care. Hearing back from our patients is one way we can continue to improve our services. Please take a few minutes to complete the written survey that you may receive in the mail after your visit with us. Thank you!             Your Updated Medication List - Protect others around you: Learn how to safely use, store and throw away your medicines at www.disposemymeds.org.          This list is accurate as of: 6/22/17  8:09 AM.  Always use your most recent med list.                   Brand Name Dispense Instructions for use Diagnosis    *  albuterol (2.5 MG/3ML) 0.083% neb solution     30 vial    Take 1 vial (2.5 mg) by nebulization every 6 hours as needed for shortness of breath / dyspnea or wheezing    Mild intermittent asthma with acute exacerbation       * albuterol 108 (90 BASE) MCG/ACT Inhaler    VENTOLIN HFA    54 g    INHALE TWO PUFFS BY MOUTH EVERY 4 HOURS AS NEEDED FOR SHORTNESS OF BREATH/DYSPNEA    Mild intermittent asthma without complication       ASPIRIN NOT PRESCRIBED    INTENTIONAL    1 each    Please choose reason not prescribed, below    Aspirin intolerance       blood glucose calibration solution    no brand specified    1 each    Use to calibrate blood glucose monitor as directed.    Type 2 diabetes mellitus without complication, without long-term current use of insulin (H)       blood glucose lancets standard    no brand specified    1 Box    Use to test blood sugar daily times daily or as directed.    Type 2 diabetes mellitus without complication, without long-term current use of insulin (H)       blood glucose monitoring meter device kit    no brand specified    1 kit    Use to test blood sugar larisa times daily or as directed.    Type 2 diabetes mellitus without complication, without long-term current use of insulin (H)       blood glucose monitoring test strip    no brand specified    100 strip    Use to test blood sugars once times daily or as directed    Type 2 diabetes mellitus without complication, without long-term current use of insulin (H)       cetirizine 10 MG tablet    zyrTEC    30 tablet    Take 1 tablet (10 mg) by mouth every evening    Seasonal allergic rhinitis due to pollen       cyanocobalamin 1000 MCG/ML injection    VITAMIN B12    1 mL    Inject 1 mL (1,000 mcg) Subcutaneous every 30 days    B12 deficiency       EPINEPHrine 0.3 MG/0.3ML injection     2 each    Inject 0.3 mLs (0.3 mg) into the muscle once as needed for anaphylaxis    Late effect of other and unspecified external causes       fluticasone 50  MCG/ACT spray    FLONASE    3 Bottle    Spray 1-2 sprays into both nostrils daily    Seasonal allergic rhinitis due to pollen, Chronic pain syndrome       losartan-hydrochlorothiazide 50-12.5 MG per tablet    HYZAAR    90 tablet    Take 1 tablet by mouth daily    Hypertension goal BP (blood pressure) < 140/80       magnesium oxide 400 (241.3 MG) MG tablet    MAG-OX    90 tablet    Take 1 tablet (400 mg) by mouth daily    Cramp of limb       mometasone-formoterol 100-5 MCG/ACT oral inhaler    DULERA    1 Inhaler    Inhale 2 puffs into the lungs 2 times daily    Persistent asthma       montelukast 10 MG tablet    SINGULAIR    30 tablet    Take 1 tablet (10 mg) by mouth At Bedtime    Seasonal allergic rhinitis due to pollen       ondansetron 4 MG ODT tab    ZOFRAN ODT    10 tablet    Take 1 tablet (4 mg) by mouth every 8 hours as needed for nausea    Chronic pain syndrome       order for DME     1 Device    Right wrist splint for carpal tunnel syndrome  One* Use as directed at hour of sleep    Carpal tunnel syndrome of right wrist       oxyCODONE-acetaminophen 5-325 MG per tablet   Start taking on:  7/9/2017    PERCOCET    30 tablet    Take 1 tablet by mouth every 4 hours as needed for moderate to severe pain    Chronic pain syndrome       polyethylene glycol powder    MIRALAX    510 g    Take 17 g (1 capful) by mouth daily    Slow transit constipation       potassium chloride 10 MEQ tablet    K-TAB,KLOR-CON    120 tablet    Take 1 tablet (10 mEq) by mouth daily    Hypokalemia       sertraline 100 MG tablet    ZOLOFT    30 tablet    Take 1 tablet (100 mg) by mouth daily    Recurrent major depression in partial remission (H), Other constipation       sodium chloride 0.65 % nasal spray    SALINE MIST    1 Bottle    Spray 1 spray into both nostrils daily as needed for congestion    Seasonal allergic rhinitis due to pollen       STATIN NOT PRESCRIBED (INTENTIONAL)     0 each    1 each daily Statin not prescribed  intentionally due to Active liver disease (liver failure, cirrhosis, hepatitis) LIVER METS    Hyperlipidemia LDL goal <100, Type 2 diabetes mellitus without complication (H)       triamcinolone 0.1 % paste    KENALOG    5 g    Take by mouth 2 times daily    Oral aphthae       Vitamin D (Cholecalciferol) 1000 UNITS Tabs     60 tablet    Take 2,000 Units by mouth daily    Vitamin D deficiency       * Notice:  This list has 2 medication(s) that are the same as other medications prescribed for you. Read the directions carefully, and ask your doctor or other care provider to review them with you.

## 2017-07-04 ENCOUNTER — HOSPITAL ENCOUNTER (EMERGENCY)
Facility: CLINIC | Age: 52
Discharge: HOME | End: 2017-07-04
Attending: EMERGENCY MEDICINE | Admitting: EMERGENCY MEDICINE
Payer: COMMERCIAL

## 2017-07-04 VITALS
TEMPERATURE: 97.8 F | RESPIRATION RATE: 16 BRPM | OXYGEN SATURATION: 95 % | HEIGHT: 67 IN | DIASTOLIC BLOOD PRESSURE: 80 MMHG | HEART RATE: 65 BPM | WEIGHT: 260 LBS | BODY MASS INDEX: 40.81 KG/M2 | SYSTOLIC BLOOD PRESSURE: 136 MMHG

## 2017-07-04 DIAGNOSIS — R10.30 LOWER ABDOMINAL PAIN: ICD-10-CM

## 2017-07-04 DIAGNOSIS — N89.8 VAGINAL DISCHARGE: ICD-10-CM

## 2017-07-04 DIAGNOSIS — G89.29 CHRONIC NONINTRACTABLE HEADACHE, UNSPECIFIED HEADACHE TYPE: ICD-10-CM

## 2017-07-04 DIAGNOSIS — T74.21XA SEXUAL ASSAULT OF ADULT, INITIAL ENCOUNTER: ICD-10-CM

## 2017-07-04 DIAGNOSIS — R51.9 CHRONIC NONINTRACTABLE HEADACHE, UNSPECIFIED HEADACHE TYPE: ICD-10-CM

## 2017-07-04 LAB
ALBUMIN UR-MCNC: NEGATIVE MG/DL
APPEARANCE UR: CLEAR
BACTERIA #/AREA URNS HPF: ABNORMAL /HPF
BILIRUB UR QL STRIP: NEGATIVE
COLOR UR AUTO: YELLOW
GLUCOSE BLDC GLUCOMTR-MCNC: 91 MG/DL (ref 70–99)
GLUCOSE UR STRIP-MCNC: NEGATIVE MG/DL
HGB UR QL STRIP: ABNORMAL
KETONES UR STRIP-MCNC: ABNORMAL MG/DL
LEUKOCYTE ESTERASE UR QL STRIP: NEGATIVE
MUCOUS THREADS #/AREA URNS LPF: PRESENT /LPF
NITRATE UR QL: NEGATIVE
NON-SQ EPI CELLS #/AREA URNS LPF: ABNORMAL /LPF
PH UR STRIP: 5.5 PH (ref 5–7)
RBC #/AREA URNS AUTO: ABNORMAL /HPF (ref 0–2)
SP GR UR STRIP: >1.03 (ref 1–1.03)
URN SPEC COLLECT METH UR: ABNORMAL
UROBILINOGEN UR STRIP-ACNC: 0.2 EU/DL (ref 0.2–1)
WBC #/AREA URNS AUTO: ABNORMAL /HPF (ref 0–2)

## 2017-07-04 PROCEDURE — 25000125 ZZHC RX 250: Performed by: EMERGENCY MEDICINE

## 2017-07-04 PROCEDURE — 99285 EMERGENCY DEPT VISIT HI MDM: CPT | Mod: 25

## 2017-07-04 PROCEDURE — 25000128 H RX IP 250 OP 636: Performed by: EMERGENCY MEDICINE

## 2017-07-04 PROCEDURE — 00000146 ZZHCL STATISTIC GLUCOSE BY METER IP

## 2017-07-04 PROCEDURE — 25000132 ZZH RX MED GY IP 250 OP 250 PS 637: Performed by: EMERGENCY MEDICINE

## 2017-07-04 PROCEDURE — 81001 URINALYSIS AUTO W/SCOPE: CPT | Performed by: EMERGENCY MEDICINE

## 2017-07-04 PROCEDURE — 96372 THER/PROPH/DIAG INJ SC/IM: CPT

## 2017-07-04 RX ORDER — METRONIDAZOLE 500 MG/1
2000 TABLET ORAL ONCE
Status: COMPLETED | OUTPATIENT
Start: 2017-07-04 | End: 2017-07-04

## 2017-07-04 RX ORDER — CEFTRIAXONE SODIUM 250 MG
250 VIAL (EA) INJECTION ONCE
Status: COMPLETED | OUTPATIENT
Start: 2017-07-04 | End: 2017-07-04

## 2017-07-04 RX ORDER — LIDOCAINE HYDROCHLORIDE 10 MG/ML
INJECTION, SOLUTION EPIDURAL; INFILTRATION; INTRACAUDAL; PERINEURAL
Status: DISCONTINUED
Start: 2017-07-04 | End: 2017-07-04 | Stop reason: HOSPADM

## 2017-07-04 RX ORDER — DOXYCYCLINE 100 MG/1
100 CAPSULE ORAL ONCE
Status: COMPLETED | OUTPATIENT
Start: 2017-07-04 | End: 2017-07-04

## 2017-07-04 RX ORDER — ONDANSETRON 4 MG/1
4 TABLET, ORALLY DISINTEGRATING ORAL ONCE
Status: COMPLETED | OUTPATIENT
Start: 2017-07-04 | End: 2017-07-04

## 2017-07-04 RX ORDER — DOXYCYCLINE 100 MG/1
100 CAPSULE ORAL EVERY 12 HOURS SCHEDULED
Status: DISCONTINUED | OUTPATIENT
Start: 2017-07-04 | End: 2017-07-04

## 2017-07-04 RX ORDER — DOXYCYCLINE 100 MG/1
100 CAPSULE ORAL 2 TIMES DAILY
Qty: 14 CAPSULE | Refills: 0 | Status: SHIPPED | OUTPATIENT
Start: 2017-07-05 | End: 2017-07-12

## 2017-07-04 RX ADMIN — CEFTRIAXONE SODIUM 250 MG: 250 INJECTION, POWDER, FOR SOLUTION INTRAMUSCULAR; INTRAVENOUS at 18:13

## 2017-07-04 RX ADMIN — ONDANSETRON 4 MG: 4 TABLET, ORALLY DISINTEGRATING ORAL at 18:13

## 2017-07-04 RX ADMIN — DOXYCYCLINE HYCLATE 100 MG: 100 CAPSULE ORAL at 18:56

## 2017-07-04 RX ADMIN — METRONIDAZOLE 2000 MG: 500 TABLET ORAL at 18:13

## 2017-07-04 ASSESSMENT — ENCOUNTER SYMPTOMS
WEAKNESS: 0
ABDOMINAL PAIN: 1
NAUSEA: 0
FEVER: 0
NUMBNESS: 0
VOMITING: 0
DIARRHEA: 0

## 2017-07-04 NOTE — ED AVS SNAPSHOT
Emergency Department    [address] [place] [id number]    Phone:  [phone]    Fax:  [phone]                                       Ines Mott   MRN: [id number]    Department:   Emergency Department   Date of Visit:  7/4/2017           After Visit Summary Signature Page     I have received my discharge instructions, and my questions have been answered. I have discussed any challenges I see with this plan with the nurse or doctor.    ..........................................................................................................................................  Patient/Patient Representative Signature      ..........................................................................................................................................  Patient Representative Print Name and Relationship to Patient    ..................................................               ................................................  Date                                            Time    ..........................................................................................................................................  Reviewed by Signature/Title    ...................................................              ..............................................  Date                                                            Time

## 2017-07-04 NOTE — ED NOTES
Bed: ED25  Expected date: 7/4/17  Expected time: 4:54 PM  Means of arrival: Ambulance  Comments:  [age]f UTI,  shunt occluded?
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
## 2017-07-05 ENCOUNTER — CARE COORDINATION (OUTPATIENT)
Dept: CARE COORDINATION | Facility: CLINIC | Age: 52
End: 2017-07-05

## 2017-07-05 ASSESSMENT — ENCOUNTER SYMPTOMS
SHORTNESS OF BREATH: 0
HEADACHES: 1
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
## 2017-07-05 NOTE — LETTER
[place] CARE COORDINATION  [facility]  [address]  [place] [address]          July 12, 2017      Ines Mott  [address]  [place] [address]    Dear Ines,  I am the Clinic Care Coordinator that works with your primary care provider's clinic. I recently tried to call and was unable to reach you. Below is a description of what Clinic Care Coordination is and how I can further assist you.     The Clinic Care Coordinator role is a Registered Nurse and/or  who understands the health care system. The goal of Clinic Care Coordination is to help you manage your health and improve access to the [facility] system in the most efficient manner.  The Registered Nurse can assist you in meeting your health care goals by providing education, coordinating services, and strengthening the communication among your providers. The  can assist you with financial, behavioral, psychosocial, and chemical dependency and counseling/psychiatric resources.    Please feel free to keep this letter and contact information to contact me at [phone] by July 26th, 2017 with any further questions or concerns that may arise. We at [facility] are focused on providing you with the highest-quality healthcare experience possible and that all starts with you.     Sincerely,       BRISSA Cruz, [facility]  Clinic Care Coordinator,    [phone]
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
## 2017-07-17 ENCOUNTER — TELEPHONE (OUTPATIENT)
Dept: PALLIATIVE MEDICINE | Facility: CLINIC | Age: 52
End: 2017-07-17

## 2017-07-17 ENCOUNTER — OFFICE VISIT (OUTPATIENT)
Dept: FAMILY MEDICINE | Facility: CLINIC | Age: 52
End: 2017-07-17
Payer: COMMERCIAL

## 2017-07-17 ENCOUNTER — TELEPHONE (OUTPATIENT)
Dept: FAMILY MEDICINE | Facility: CLINIC | Age: 52
End: 2017-07-17

## 2017-07-17 VITALS
HEIGHT: 67 IN | BODY MASS INDEX: 40.34 KG/M2 | HEART RATE: 65 BPM | TEMPERATURE: 98.8 F | DIASTOLIC BLOOD PRESSURE: 92 MMHG | OXYGEN SATURATION: 97 % | SYSTOLIC BLOOD PRESSURE: 144 MMHG | RESPIRATION RATE: 16 BRPM | WEIGHT: 257 LBS

## 2017-07-17 DIAGNOSIS — G89.4 CHRONIC PAIN SYNDROME: ICD-10-CM
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
## 2017-07-17 PROCEDURE — 36415 COLL VENOUS BLD VENIPUNCTURE: CPT | Performed by: FAMILY MEDICINE

## 2017-07-17 PROCEDURE — 99214 OFFICE O/P EST MOD 30 MIN: CPT | Performed by: FAMILY MEDICINE

## 2017-07-17 PROCEDURE — 86780 TREPONEMA PALLIDUM: CPT | Performed by: FAMILY MEDICINE

## 2017-07-17 PROCEDURE — 87389 HIV-1 AG W/HIV-1&-2 AB AG IA: CPT | Performed by: FAMILY MEDICINE

## 2017-07-17 RX ORDER — OXYCODONE AND ACETAMINOPHEN 5; 325 MG/1; MG/1
1 TABLET ORAL EVERY 4 HOURS PRN
Qty: 30 TABLET | Refills: 0 | Status: SHIPPED | OUTPATIENT
Start: 2017-07-17 | End: 2017-07-24
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
## 2017-07-17 NOTE — NURSING NOTE
"Chief Complaint   Patient presents with     Recheck Medication       Initial BP (!) 144/92  Pulse 65  Temp 98.8  F (37.1  C) (Tympanic)  Resp 16  Ht 5' 6.5\" (1.689 m)  Wt 257 lb (116.6 kg)  SpO2 97%  BMI 40.86 kg/m2 Estimated body mass index is 40.86 kg/(m^2) as calculated from the following:    Height as of this encounter: 5' 6.5\" (1.689 m).    Weight as of this encounter: 257 lb (116.6 kg).  Medication Reconciliation: complete   .Ric DANIELLE      "

## 2017-07-17 NOTE — TELEPHONE ENCOUNTER
Patient Contact    Attempt # 1    Was call answered?  No.  Left message on voicemail with information to call me back.
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
## 2017-07-17 NOTE — TELEPHONE ENCOUNTER
[facility] Referral      1. Confirmed address with patient? Yes  2. Confirmed phone number with patient? Yes  3. Confirmed referring provider? Yes  4. Is the PCP the same as the referring provider? Yes  5. Has the patient been to any previous pain clinics? Yes- [facility]  (If yes, send PIETRO with welcome letter)  6. Which insurance are we to bill for this appointment?  [employer]    7. Informed pt of cancellation (48 hour) policy? Yes    REGARDING OPIOID MEDICATIONS: We will always address appropriateness of opioid pain medications, but we generally will not automatically take on a prescribing role. When we do take on prescribing of opioids for chronic pain, it is in collaboration with the referring physician for an intermediate period of time (months), with an expectation that the primary physician or provider will assume the prescribing role if medications are effective at stable doses with demonstrated compliance. Therefore, please do not assume that your prescribing responsibilities end on the day of pain clinic consultation.  7. Informed pt of prescribing policy? Yes      8. Referring Provider: Dustin Fatima    9. Criteria for Triage Eval:   -Missed/Failed 1st DUAL appointment? N/A   -Medication Focused? N/A   -Mental Health Concerns? (e.g. Recent psych hospitalization/snap shot)? N/A   -Active substance abuse? N/A   -Patient behaviors (e.g. Offensive language/raised voice)? N/A    Janny GREGORIO    [facility] [place]
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
## 2017-07-17 NOTE — LETTER
[facility]  [address]  [address]  [place] [facility] [place] [address]  [phone]                                                                                                           Ines Mott  [address] [address]    July 18, 2017      Dear Ines,    The results of your recent tests were reviewed and are enclosed.     CONGRATULATIONS!    NORMAL HUMAN IMMUNO VIRUS AND NORMAL TREPONEMAL TESTING     REPEAT IN SIX MONTHS     Results for orders placed or performed in visit on 07/17/17   Anti Treponema   Result Value Ref Range    Treponema pallidum Antibody Negative NEG   HIV Antigen Antibody Combo   Result Value Ref Range    HIV Antigen Antibody Combo  NR     Nonreactive   HIV-1 p24 Ag & HIV-1/HIV-2 Ab Not Detected       Thank you for choosing [facility].  We appreciate the opportunity to serve you and look forward to supporting your healthcare needs in the future.    If you have any questions or concerns, please call me or my staff at [phone].      Sincerely,    Dustin Fatima Jr MD

## 2017-07-17 NOTE — LETTER
July 17, 2017    Ines Mott  [address] [address]    Dear Ines                                                                   Welcome to the [facility] at the [facility]. We are located on the 6th floor, [address], of the [facility] located at [address] [place] [address]. For general parking, the [facility] [address] is the closest to our building.     Your appointment at the [facility] has been scheduled on Thursday, August 31st at 2:00 pm with Leticia Young NP.    At your first visit, you will meet your team of caregivers who will help you to develop pain management strategies that will last a lifetime. You will meet with our support staff to validate parking at a reduced rate, review your insurance information, and collect your co-payment if required by your insurance company. You will also meet with a medical pain specialist and care coordinator who will assess your pain and develop a plan of care for your successful pain rehabilitation. You should expect to spend 1-2 hours at your first visit with us. Usually, patients work with us for a period of 6-12 months, and eventually return to their primary doctor once their pain management has stabilized.      To help us make your visit go as smoothly as possible, please bring the following items with you on your visit:   Completed Pain Questionnaire enclosed in this packet.  If you do not bring the completed questionnaire, we may have to reschedule your appointment.  List of any medicines that you are currently taking or have been prescribed  Important NON-[place] medical information such as medical records or tests results (X-rays, or laboratory tests)  Your health insurance card  Financial resources to cover your co-payment or balance due at the time of service (cash, personal check, Visa, and MasterCard are  acceptable methods of payment)     Due to the demand for new patient evaluations, you must notify the scheduling department 48 hours in advance if you are not able to keep this appointment.  Failure to do so could affect your ability to reschedule with our clinic. Please do not assume that you will  receive any prescription medications at your first visit.    Please call [phone] with any questions regarding your appointment.  We look forward to meeting you and working to address your health care needs.       Sincerely,      [facility] [place]

## 2017-07-18 LAB
HIV 1+2 AB+HIV1 P24 AG SERPL QL IA: NORMAL
T PALLIDUM IGG+IGM SER QL: NEGATIVE

## 2017-07-18 NOTE — PROGRESS NOTES
Please send normal lab letter when labs are complete  NORMAL HUMAN IMMUNO VIRUS AND NORMAL TREPONEMAL TESTING   CONGRATULATIONS  REPEAT IN 6 MONTHS   OTTO YUSUF JR., MD

## 2017-07-24 ENCOUNTER — OFFICE VISIT (OUTPATIENT)
Dept: FAMILY MEDICINE | Facility: CLINIC | Age: 52
End: 2017-07-24
Payer: COMMERCIAL

## 2017-07-24 VITALS
DIASTOLIC BLOOD PRESSURE: 78 MMHG | TEMPERATURE: 96.8 F | SYSTOLIC BLOOD PRESSURE: 132 MMHG | HEART RATE: 63 BPM | BODY MASS INDEX: 41.18 KG/M2 | WEIGHT: 259 LBS | RESPIRATION RATE: 20 BRPM | OXYGEN SATURATION: 98 %

## 2017-07-24 DIAGNOSIS — E66.01 MORBID OBESITY, UNSPECIFIED OBESITY TYPE (H): Primary | Chronic | ICD-10-CM

## 2017-07-24 DIAGNOSIS — I10 HYPERTENSION GOAL BP (BLOOD PRESSURE) < 140/80: Chronic | ICD-10-CM

## 2017-07-24 DIAGNOSIS — C78.7 CARCINOMA OF COLON METASTATIC TO LIVER (H): ICD-10-CM

## 2017-07-24 DIAGNOSIS — C18.9 CARCINOMA OF COLON METASTATIC TO LIVER (H): ICD-10-CM

## 2017-07-24 DIAGNOSIS — F33.41 RECURRENT MAJOR DEPRESSION IN PARTIAL REMISSION (H): Chronic | ICD-10-CM
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
## 2017-07-24 DIAGNOSIS — G89.4 CHRONIC PAIN SYNDROME: ICD-10-CM

## 2017-07-24 DIAGNOSIS — G47.33 OSA (OBSTRUCTIVE SLEEP APNEA): ICD-10-CM

## 2017-07-24 PROCEDURE — 99214 OFFICE O/P EST MOD 30 MIN: CPT | Performed by: FAMILY MEDICINE

## 2017-07-24 RX ORDER — OXYCODONE AND ACETAMINOPHEN 5; 325 MG/1; MG/1
1 TABLET ORAL EVERY 4 HOURS PRN
Qty: 30 TABLET | Refills: 0 | Status: SHIPPED | OUTPATIENT
Start: 2017-08-01 | End: 2017-08-07

## 2017-07-24 RX ORDER — OXYCODONE AND ACETAMINOPHEN 5; 325 MG/1; MG/1
1 TABLET ORAL EVERY 4 HOURS PRN
Qty: 30 TABLET | Refills: 0 | Status: SHIPPED | OUTPATIENT
Start: 2017-07-25 | End: 2017-07-24
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
## 2017-07-24 NOTE — NURSING NOTE
"Chief Complaint   Patient presents with     Pain       Initial /78  Pulse 63  Temp 96.8  F (36  C) (Tympanic)  Resp 20  Wt 259 lb (117.5 kg)  SpO2 98%  BMI 41.18 kg/m2 Estimated body mass index is 41.18 kg/(m^2) as calculated from the following:    Height as of 7/17/17: 5' 6.5\" (1.689 m).    Weight as of this encounter: 259 lb (117.5 kg).  Medication Reconciliation: complete   Maggi Harris CMA    "

## 2017-07-24 NOTE — MR AVS SNAPSHOT
After Visit Summary   2017    Ines Mott    MRN: [id number]           Patient Information     Date Of Birth          1965        Visit Information        Provider Department      2017 9:15 AM Otto Fatima MD [facility]        Today's Diagnoses     Chronic pain syndrome          Care Instructions      TREATMENT PROGNOSIS BENEFITS AND RISKS DISCUSSED     ANATOMIC SITE:  Right medial knee     PROCEDURE: injection     BETADYNE CLEANSING WITHOUT COMPLICATION     1:1 MIXTURE KENALOG 1% LIDOCAINE WITHOUT COMPLICATION     AMOUNT OF KENALO mg     NDC NUMBER [id number]    INJECTION WITH NO TOUCH TECHNIQUE    SIDE EFFECTS BENEFITS AND RISKS DISCUSSED      TREATMENT PROGNOSIS BENEFITS AND RISKS DISCUSSED     MONITOR FOR ALLERGIC AND VASCULAR SIDE EFFECTS    MEDICATION RISKS SIDE EFFECTS BENEFITS AND RISKS DISCUSSED     OTTO FATIMA JR., MD     17                Follow-ups after your visit        Your next 10 appointments already scheduled     Aug 31, 2017  2:00 PM CDT   New Visit with MICHEAL Matias CNP   [facility] ([facility]    [address]  [facility] [address]  [place] [address] [phone]              Who to contact     If you have questions or need follow up information about today's clinic visit or your schedule please contact [facility] directly at [phone].  Normal or non-critical lab and imaging results will be communicated to you by MyChart, letter or phone within 4 business days after the clinic has received the results. If you do not hear from us within 7 days, please contact the clinic through MyChart or phone. If you have a critical or abnormal lab result, we will notify you by phone as soon as possible.  Submit refill requests through [employer] or call your pharmacy and they will forward the refill request to us.  "Please allow 3 business days for your refill to be completed.          Additional Information About Your Visit        MyChart Information     [employer] lets you send messages to your doctor, view your test results, renew your prescriptions, schedule appointments and more. To sign up, go to [email or web address] . Click on \"Log in\" on the left side of the screen, which will take you to the Welcome page. Then click on \"Sign up Now\" on the right side of the page.     You will be asked to enter the access code listed below, as well as some personal information. Please follow the directions to create your username and password.     Your access code is: [email or web address]  Expires: 10/2/2017  7:26 PM     Your access code will  in 90 days. If you need help or a new code, please call your [place] clinic or [phone].        Care EveryWhere ID     This is your Care EveryWhere ID. This could be used by other organizations to access your [place] medical records  [id number]        Your Vitals Were     Pulse Temperature Respirations Pulse Oximetry BMI (Body Mass Index)       63 96.8  F (36  C) (Tympanic) 20 98% 41.18 kg/m2        Blood Pressure from Last 3 Encounters:   17 132/78   17 (!) 144/92   17 136/80    Weight from Last 3 Encounters:   17 259 lb (117.5 kg)   17 257 lb (116.6 kg)   17 260 lb (117.9 kg)              Today, you had the following     No orders found for display         Today's Medication Changes          These changes are accurate as of: 17  9:40 AM.  If you have any questions, ask your nurse or doctor.               Start taking these medicines.        Dose/Directions    oxyCODONE-acetaminophen 5-325 MG per tablet   Commonly known as:  PERCOCET   Used for:  Chronic pain syndrome   Started by:  Dustin Fatima MD        Dose:  1 tablet   Start taking on:  2017   Take 1 tablet by mouth every 4 hours as needed for moderate to severe pain   Quantity:  " 30 tablet   Refills:  0            Where to get your medicines      Some of these will need a paper prescription and others can be bought over the counter.  Ask your nurse if you have questions.     Bring a paper prescription for each of these medications     oxyCODONE-acetaminophen 5-325 MG per tablet                Primary Care Provider Office Phone # Fax #    Dustin Rina Fatima [phone]       [facility] [address]  [facility] [address]        Equal Access to Services     VALENTE RIVERA : Hadii aad ku hadasho Soomaali, waaxda luqadaha, qaybta kaalmada adeegyada, waxay idiin hayaan adeamandeep kharash ladeonna . So [facility] [phone].    ATENCIÓN: Si habla español, tiene a christianson disposición servicios gratuitos de asistencia lingüística. [facility] [phone].    We comply with applicable federal civil rights laws and [place] laws. We do not discriminate on the basis of race, color, national origin, age, disability sex, sexual orientation or gender identity.            Thank you!     Thank you for choosing [facility]  for your care. Our goal is always to provide you with excellent care. Hearing back from our patients is one way we can continue to improve our services. Please take a few minutes to complete the written survey that you may receive in the mail after your visit with us. Thank you!             Your Updated Medication List - Protect others around you: Learn how to safely use, store and throw away your medicines at www.disposemymeds.org.          This list is accurate as of: 7/24/17  9:40 AM.  Always use your most recent med list.                   Brand Name Dispense Instructions for use Diagnosis    * albuterol (2.5 MG/3ML) 0.083% neb solution     30 vial    Take 1 vial (2.5 mg) by nebulization every 6 hours as needed for shortness of breath / dyspnea or wheezing    Mild intermittent asthma with acute exacerbation       * albuterol 108  (90 BASE) MCG/ACT Inhaler    VENTOLIN HFA    54 g    INHALE TWO PUFFS BY MOUTH EVERY 4 HOURS AS NEEDED FOR SHORTNESS OF BREATH/DYSPNEA    Mild intermittent asthma without complication       ASPIRIN NOT PRESCRIBED    INTENTIONAL    1 each    Please choose reason not prescribed, below    Aspirin intolerance       blood glucose calibration solution    no brand specified    1 each    Use to calibrate blood glucose monitor as directed.    Type 2 diabetes mellitus without complication, without long-term current use of insulin (H)       blood glucose lancets standard    no brand specified    1 Box    Use to test blood sugar daily times daily or as directed.    Type 2 diabetes mellitus without complication, without long-term current use of insulin (H)       blood glucose monitoring meter device kit    no brand specified    1 kit    Use to test blood sugar larisa times daily or as directed.    Type 2 diabetes mellitus without complication, without long-term current use of insulin (H)       blood glucose monitoring test strip    no brand specified    100 strip    Use to test blood sugars once times daily or as directed    Type 2 diabetes mellitus without complication, without long-term current use of insulin (H)       cetirizine 10 MG tablet    zyrTEC    30 tablet    Take 1 tablet (10 mg) by mouth every evening    Seasonal allergic rhinitis due to pollen       cyanocobalamin 1000 MCG/ML injection    VITAMIN B12    1 mL    Inject 1 mL (1,000 mcg) Subcutaneous every 30 days    B12 deficiency       EPINEPHrine 0.3 MG/0.3ML injection 2-pack    EPIPEN/ADRENACLICK/or ANY BX GENERIC EQUIV    2 each    Inject 0.3 mLs (0.3 mg) into the muscle once as needed for anaphylaxis    Late effect of other and unspecified external causes       fluticasone 50 MCG/ACT spray    FLONASE    3 Bottle    Spray 1-2 sprays into both nostrils daily    Seasonal allergic rhinitis due to pollen, Chronic pain syndrome       losartan-hydrochlorothiazide 50-12.5  MG per tablet    HYZAAR    90 tablet    Take 1 tablet by mouth daily    Hypertension goal BP (blood pressure) < 140/80       magnesium oxide 400 (241.3 MG) MG tablet    MAG-OX    90 tablet    Take 1 tablet (400 mg) by mouth daily    Cramp of limb       mometasone-formoterol 100-5 MCG/ACT oral inhaler    DULERA    1 Inhaler    Inhale 2 puffs into the lungs 2 times daily    Persistent asthma       montelukast 10 MG tablet    SINGULAIR    30 tablet    Take 1 tablet (10 mg) by mouth At Bedtime    Seasonal allergic rhinitis due to pollen       ondansetron 4 MG ODT tab    ZOFRAN ODT    10 tablet    Take 1 tablet (4 mg) by mouth every 8 hours as needed for nausea    Chronic pain syndrome       order for DME     1 Device    Right wrist splint for carpal tunnel syndrome  One* Use as directed at hour of sleep    Carpal tunnel syndrome of right wrist       oxyCODONE-acetaminophen 5-325 MG per tablet   Start taking on:  8/1/2017    PERCOCET    30 tablet    Take 1 tablet by mouth every 4 hours as needed for moderate to severe pain    Chronic pain syndrome       polyethylene glycol powder    MIRALAX    510 g    Take 17 g (1 capful) by mouth daily    Slow transit constipation       potassium chloride 10 MEQ tablet    K-TAB,KLOR-CON    120 tablet    Take 1 tablet (10 mEq) by mouth daily    Hypokalemia       sertraline 100 MG tablet    ZOLOFT    30 tablet    Take 1 tablet (100 mg) by mouth daily    Recurrent major depression in partial remission (H), Other constipation       sodium chloride 0.65 % nasal spray    SALINE MIST    1 Bottle    Spray 1 spray into both nostrils daily as needed for congestion    Seasonal allergic rhinitis due to pollen       STATIN NOT PRESCRIBED (INTENTIONAL)     0 each    1 each daily Statin not prescribed intentionally due to Active liver disease (liver failure, cirrhosis, hepatitis) LIVER METS    Hyperlipidemia LDL goal <100, Type 2 diabetes mellitus without complication (H)       triamcinolone 0.1 %  paste    KENALOG    5 g    Take by mouth 2 times daily    Oral aphthae       Vitamin D (Cholecalciferol) 1000 UNITS Tabs     60 tablet    Take 2,000 Units by mouth daily    Vitamin D deficiency       * Notice:  This list has 2 medication(s) that are the same as other medications prescribed for you. Read the directions carefully, and ask your doctor or other care provider to review them with you.
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
## 2017-07-30 ENCOUNTER — APPOINTMENT (OUTPATIENT)
Dept: GENERAL RADIOLOGY | Facility: CLINIC | Age: 52
End: 2017-07-30
Attending: PHYSICIAN ASSISTANT
Payer: COMMERCIAL

## 2017-07-30 ENCOUNTER — HOSPITAL ENCOUNTER (EMERGENCY)
Facility: CLINIC | Age: 52
Discharge: HOME | End: 2017-07-30
Attending: PHYSICIAN ASSISTANT | Admitting: PHYSICIAN ASSISTANT
Payer: COMMERCIAL

## 2017-07-30 VITALS
BODY MASS INDEX: 41.28 KG/M2 | DIASTOLIC BLOOD PRESSURE: 98 MMHG | RESPIRATION RATE: 14 BRPM | WEIGHT: 263 LBS | HEIGHT: 67 IN | TEMPERATURE: 98.8 F | OXYGEN SATURATION: 97 % | SYSTOLIC BLOOD PRESSURE: 175 MMHG

## 2017-07-30 DIAGNOSIS — S92.514A CLOSED NONDISPLACED FRACTURE OF PROXIMAL PHALANX OF LESSER TOE OF RIGHT FOOT, INITIAL ENCOUNTER: ICD-10-CM

## 2017-07-30 PROCEDURE — 73630 X-RAY EXAM OF FOOT: CPT | Mod: RT

## 2017-07-30 PROCEDURE — 25000132 ZZH RX MED GY IP 250 OP 250 PS 637: Performed by: PHYSICIAN ASSISTANT

## 2017-07-30 PROCEDURE — 99283 EMERGENCY DEPT VISIT LOW MDM: CPT

## 2017-07-30 RX ORDER — OXYCODONE HYDROCHLORIDE 5 MG/1
5 TABLET ORAL ONCE
Status: COMPLETED | OUTPATIENT
Start: 2017-07-30 | End: 2017-07-30

## 2017-07-30 RX ADMIN — OXYCODONE HYDROCHLORIDE 5 MG: 5 TABLET ORAL at 19:25

## 2017-07-30 RX ADMIN — OXYCODONE HYDROCHLORIDE 5 MG: 5 TABLET ORAL at 20:08

## 2017-07-30 ASSESSMENT — ENCOUNTER SYMPTOMS: NUMBNESS: 1

## 2017-07-30 NOTE — ED PROVIDER NOTES
History     Chief Complaint:  Foot Pain     HPI   Ines Mott is a 52 year old female who presents via EMS with foot pain. The patient states that approximately two hours ago she was getting off the shuttle bus at [facility] when she misjudged her step, twisted her right foot, and hit it on concrete. She developed right fifth and fourth pain, swelling, and tingling; which led her to call EMS. Upon her arrival here she notes some numbness in her toes. Patient denies all other complaints. No other areas of pain or injury.     Allergies:  Ativan [Lorazepam]  Macrobid [Nitrofurantoin]  Amoxicillin  Buspirone  Cephalosporins  Dilaudid [Hydromorphone]  Diphenhydramine Hcl  Imitrex [Sumatriptan Succinate]  Ketorolac Tromethamine  Lansoprazole  Metoclopramide  Paroxetine  Penicillins  Prednisone  Sulfa Drugs  Lidoderm [Lidocaine]      Medications:    Percocet  Aspirin  Singulair  Zyrtec  Flonase  Saline Mist  Kenalog  Zofran  K-Tab  Zoloft  Hyzaar  Albuterol Neb Solution  Mag-Ox  Ventolin HFA  Dulera  Miralax  EpiPen    Past Medical History:    Arthritis  Asthma  Cancer  Chronic Pain Syndrome  Depression  Diabetes  Hydrocephalus  Hypertension  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  Methamphetamine Abuse  Obesity  Right Renal Mass  Spinal Bifida  Dyspnea and Respiratory Abnormality  PTSD  Fibromyalgia  Angioedema  Carcinoma of Colon Metastatic to Liver  Migraine  Seasonal Affective Disorder  ALEXUS  Intracranial Shunt    Past Surgical History:    Appendectomy  Cholecystectomy  Extractions - Dental  Gyn Surgery  Implant shut Ventriculoperitoneal     Family History:    Cancer    Social History:  Presents via EMS   Tobacco use: former smoker  Alcohol use: off and on   PCP: OTTO YUSUF    Marital Status:        Review of Systems   Musculoskeletal:        Foot pain   Neurological: Positive for numbness.   All other systems reviewed and are negative.    Physical Exam     Patient Vitals for the past 24  "hrs:   BP Temp Temp src Heart Rate Resp SpO2 Height Weight   07/30/17 1824 151/81 98.8  F (37.1  C) Oral 57 14 98 % 1.689 m (5' 6.5\") 119.3 kg (263 lb)        Physical Exam  General:         Resting comfortably on the gurney.     Head:              The scalp, head and face appear normal. No evidence of trauma.    Resp:              Non-labored breathing. No tachypnea.    CV:                  DP and PT pulses 2+ on the right                           Capillary refill less than two seconds right toes.    MS:                  Normal muscular tone.                            5/5 strength with right dorsi- and plantar-flexion at the great toe and ankle    Able to wiggle all toes.                             Right fifth and fourth toe swelling, tenderness, and ecchymosis; most significantly to the proximal aspect fifth toe.  The remainder of the right foot and ankle are non-tender with palpation.   Neuro:            Awake and alert.                            Sensation intact to light touch bilateral lower extremities, including distal to the injury.    Skin:               As in MS, but no abrasions or lacerations.   Psych:             Normal affect. Appropriate interactions.       Emergency Department Course   Imaging:  Radiographic findings were communicated with the patient who voiced understanding of the findings.    XR Foot, Right:   IMPRESSION: Three views right foot. There is a mildly comminuted but nondisplaced fracture through the proximal phalanx right little toe. No other fracture or dislocation is evident. No significant soft  tissue swelling.     Results per radiology.     Interventions:  1925: Oxycodone IR Tablet 5 mg PO  2008:  Oxycodone IR Tablet 5 mg PO    The patient's symptoms were partially improved with parenteral narcotics.    Emergency Department Course:  Past medical records, nursing notes, and vitals reviewed.  1906: I performed an exam of the patient as documented above. Clinical findings and " plan explained to the Patient. Patient discharged home with instructions regarding supportive care, medications, and reasons to return as well as the importance of close follow-up were reviewed.      Impression & Plan    Medical Decision Making:  Ines Mott is a 52 year old female who presents for evaluation of right-sided toe pain after stubbing toe while getting off of a shuttle bus.     CMS is intact distally in the extremity. Xray's of the foot reveal a nondisplaced fracture through the proximal phalanx right little toe that does not need reduction at this time. The toe was lico taped and she was placed in a post op boot.     No indication for reduction, emergent surgical intervention or orthopedic consultation from the ED. Follow-up is indicated in the next days.  Fracture precautions for home.   No other areas of pain or injury to require further emergent evaluation. I discussed the results, plan and any additional questions with the patient. She verbalized understanding and agreement with the plan.      I evaluated this patient in shared service with Dr. Avina.      Diagnosis:    ICD-10-CM   1. Closed nondisplaced fracture of proximal phalanx of lesser toe of right foot, initial encounter S92.514A       Disposition:  Discharged to home with plan as outlined.       7/30/2017    EMERGENCY DEPARTMENT  I, Emilee Walton, am serving as a scribe at 7:06 PM on 7/30/2017 to document services personally performed by Carlyn Gan PA-C based on my observations and the provider's statements to me.        Emergency Department Attending Supervision Note    7/30/2017  7:30 PM      I evaluated this patient in conjunction with Carlyn Gan PA-C    Briefly, the patient presented with foot injury.    My exam:  Foot:  There is mild bruising involving the proximal phalanx of the fifth toe  There is no malrotation and the overall alignment of the fifth toe is normal    X-ray is reviewed and shows a nondisplaced  fracture of the proximal phalanx of the fifth toe.        My impression/diagnosis is:    Right fifth toe proximal phalanx nondisplaced fracture.    Zachary Avina MD  Emergency Physician             Carlyn Gan PA-C  07/31/17 0254

## 2017-07-30 NOTE — ED AVS SNAPSHOT
Emergency Department    [address] [place] [address]    Phone:  [phone]    Fax:  [phone]                                       Ines Mott   MRN: [id number]    Department:   Emergency Department   Date of Visit:  7/30/2017           After Visit Summary Signature Page     I have received my discharge instructions, and my questions have been answered. I have discussed any challenges I see with this plan with the nurse or doctor.    ..........................................................................................................................................  Patient/Patient Representative Signature      ..........................................................................................................................................  Patient Representative Print Name and Relationship to Patient    ..................................................               ................................................  Date                                            Time    ..........................................................................................................................................  Reviewed by Signature/Title    ...................................................              ..............................................  Date                                                            Time

## 2017-07-30 NOTE — ED NOTES
Bed: FT01  Expected date:   Expected time:   Means of arrival:   Comments:  Enrico 89 Foot injury 62 female

## 2017-07-30 NOTE — ED AVS SNAPSHOT
Emergency Department    [address] [facility] [address]    Phone:  [phone]    Fax:  [phone]                                       Ines Mott   MRN: [id number]    Department:   Emergency Department   Date of Visit:  7/30/2017           Patient Information     Date Of Birth          1965        Your diagnoses for this visit were:     Closed nondisplaced fracture of proximal phalanx of lesser toe of right foot, initial encounter        You were seen by Carlyn Gan PA-C.      Follow-up Information     Follow up with Dustin Fatima MD.    Specialty:  Family Practice    Contact information:    [facility] [place] [facility]  [address]  [facility] [address] [phone]          Follow up with  Emergency Department.    Specialty:  EMERGENCY MEDICINE    Why:  If symptoms worsen    Contact information:    [address] [facility] [address] [phone]        Discharge Instructions         Closed Toe Fracture  Your toe is broken (fractured). This causes local pain, swelling, and sometimes bruising. This injury usually takes about 4 to 6 weeks to heal, but can sometimes take longer. Toe injuries are often treated by taping the injured toe to the next one (lico taping). This protects the injured toe and holds it in position.    If the toenail has been severely injured, it may fall off in 1 to 2 weeks. It takes up to 12 months for a new toenail to grow back.  Home care  Follow these guidelines when caring for yourself at home:    You may be given a cast shoe to wear to keep your toe from moving. If not, you can use a sandal or any shoe that doesn t put pressure on the injured toe until the swelling and pain go away. If using a sandal, be careful not to strike your foot against anything. Another injury could make the fracture worse. If you were given crutches, don t put full weight on the injured foot until you can do so without pain, or as  directed by your healthcare provider.    Keep your foot elevated to reduce pain and swelling. When sleeping, put a pillow under the injured leg. When sitting, support the injured leg so it is above your waist. This is very important during the first 2 days (48 hours).    Put an ice pack on the injured area. Do this for 20 minutes every 1 to 2 hours the first day for pain relief. You can make an ice pack by wrapping a plastic bag of ice cubes in a thin towel. As the ice melts, be careful that any cloth or paper tape doesn t get wet. Continue using the ice pack 3 to 4 times a day for the next 2 days. Then use the ice pack as needed to ease pain and swelling.    If buddy tape was used and it becomes wet or dirty, change it. You may replace it with paper, plastic, or cloth tape. Cloth tape and paper tapes must be kept dry.    You may use acetaminophen or ibuprofen to control pain, unless another pain medicine was prescribed. If you have chronic liver or kidney disease, talk with your healthcare provider before using these medicines. Also talk with your provider if you ve had a stomach ulcer or gastrointestinal bleeding.    You may return to sports or physical education activities after 4 weeks when you can run without pain, or as directed by your healthcare provider.  Follow-up care  Follow up with your healthcare provider in 1 week, or as advised. This is to make sure the bone is healing the way it should.  X-rays may be taken. You will be told of any new findings that may affect your care.  When to seek medical advice  Call your healthcare provider right away if any of these occur:    Pain or swelling gets worse    The cast/splint cracks    The cast and padding get wet and stays wet more than 24 hours    Bad odor from the cast/splint or wound fluid stains the cast    Tightness or pressure under the cast/splint gets worse    Toe becomes cold, blue, numb, or tingly    You can t move the toe    Signs of infection: fever,  redness, warmth, swelling, or drainage from the wound or cast    Fever of 100.4 F (38 C) or higher, or as directed by your healthcare provider  Date Last Reviewed: 2/1/2017 2000-2017 [employer]. [address] [place] [address], [place] [address]. All rights reserved. This information is not intended as a substitute for professional medical care. Always follow your healthcare professional's instructions.          Future Appointments        Provider Department Dept Phone Center    8/31/2017 2:00 PM MICHEAL Matias CNP [facility] [phone] [facility]      24 Hour Appointment Hotline       To make an appointment at any [facility] clinic, call [phone] ([phone]). If you don't have a family doctor or clinic, we will help you find one. [facility] clinics are conveniently located to serve the needs of you and your family.             Review of your medicines      Our records show that you are taking the medicines listed below. If these are incorrect, please call your family doctor or clinic.        Dose / Directions Last dose taken    * albuterol (2.5 MG/3ML) 0.083% neb solution   Dose:  1 vial   Quantity:  30 vial        Take 1 vial (2.5 mg) by nebulization every 6 hours as needed for shortness of breath / dyspnea or wheezing   Refills:  3        * albuterol 108 (90 BASE) MCG/ACT Inhaler   Commonly known as:  VENTOLIN HFA   Quantity:  54 g        INHALE TWO PUFFS BY MOUTH EVERY 4 HOURS AS NEEDED FOR SHORTNESS OF BREATH/DYSPNEA   Refills:  1        ASPIRIN NOT PRESCRIBED   Commonly known as:  INTENTIONAL   Quantity:  1 each        Please choose reason not prescribed, below   Refills:  0        blood glucose calibration solution   Commonly known as:  no brand specified   Quantity:  1 each        Use to calibrate blood glucose monitor as directed.   Refills:  0        blood glucose lancets standard   Commonly known as:  no brand specified   Quantity:  1 Box        Use to test  blood sugar daily times daily or as directed.   Refills:  11        blood glucose monitoring meter device kit   Commonly known as:  no brand specified   Quantity:  1 kit        Use to test blood sugar larisa times daily or as directed.   Refills:  0        blood glucose monitoring test strip   Commonly known as:  no brand specified   Quantity:  100 strip        Use to test blood sugars once times daily or as directed   Refills:  3        cetirizine 10 MG tablet   Commonly known as:  zyrTEC   Dose:  10 mg   Quantity:  30 tablet        Take 1 tablet (10 mg) by mouth every evening   Refills:  1        cyanocobalamin 1000 MCG/ML injection   Commonly known as:  VITAMIN B12   Dose:  1 mL   Quantity:  1 mL        Inject 1 mL (1,000 mcg) Subcutaneous every 30 days   Refills:  11        EPINEPHrine 0.3 MG/0.3ML injection 2-pack   Commonly known as:  EPIPEN/ADRENACLICK/or ANY BX GENERIC EQUIV   Dose:  0.3 mg   Quantity:  2 each        Inject 0.3 mLs (0.3 mg) into the muscle once as needed for anaphylaxis   Refills:  5        fluticasone 50 MCG/ACT spray   Commonly known as:  FLONASE   Dose:  1-2 spray   Quantity:  3 Bottle        Spray 1-2 sprays into both nostrils daily   Refills:  1        losartan-hydrochlorothiazide 50-12.5 MG per tablet   Commonly known as:  HYZAAR   Dose:  1 tablet   Quantity:  90 tablet        Take 1 tablet by mouth daily   Refills:  3        magnesium oxide 400 (241.3 MG) MG tablet   Commonly known as:  MAG-OX   Dose:  400 mg   Quantity:  90 tablet        Take 1 tablet (400 mg) by mouth daily   Refills:  3        mometasone-formoterol 100-5 MCG/ACT oral inhaler   Commonly known as:  DULERA   Dose:  2 puff   Quantity:  1 Inhaler        Inhale 2 puffs into the lungs 2 times daily   Refills:  11        montelukast 10 MG tablet   Commonly known as:  SINGULAIR   Dose:  10 mg   Quantity:  30 tablet        Take 1 tablet (10 mg) by mouth At Bedtime   Refills:  11        ondansetron 4 MG ODT tab   Commonly known  as:  ZOFRAN ODT   Dose:  4 mg   Quantity:  10 tablet        Take 1 tablet (4 mg) by mouth every 8 hours as needed for nausea   Refills:  0        order for DME   Quantity:  1 Device        Right wrist splint for carpal tunnel syndrome  One* Use as directed at hour of sleep   Refills:  1        oxyCODONE-acetaminophen 5-325 MG per tablet   Commonly known as:  PERCOCET   Dose:  1 tablet   Quantity:  30 tablet   Start taking on:  8/1/2017        Take 1 tablet by mouth every 4 hours as needed for moderate to severe pain   Refills:  0        polyethylene glycol powder   Commonly known as:  MIRALAX   Dose:  1 capful   Quantity:  510 g        Take 17 g (1 capful) by mouth daily   Refills:  3        potassium chloride 10 MEQ tablet   Commonly known as:  K-TAB,KLOR-CON   Dose:  10 mEq   Indication:  Low Amount of Potassium in the Blood   Quantity:  120 tablet        Take 1 tablet (10 mEq) by mouth daily   Refills:  11        sertraline 100 MG tablet   Commonly known as:  ZOLOFT   Dose:  100 mg   Indication:  Major Depressive Disorder   Quantity:  30 tablet        Take 1 tablet (100 mg) by mouth daily   Refills:  11        sodium chloride 0.65 % nasal spray   Commonly known as:  SALINE MIST   Dose:  1 spray   Quantity:  1 Bottle        Spray 1 spray into both nostrils daily as needed for congestion   Refills:  11        STATIN NOT PRESCRIBED (INTENTIONAL)   Dose:  1 each   Quantity:  0 each        1 each daily Statin not prescribed intentionally due to Active liver disease (liver failure, cirrhosis, hepatitis) LIVER METS   Refills:  0        triamcinolone 0.1 % paste   Commonly known as:  KENALOG   Quantity:  5 g        Take by mouth 2 times daily   Refills:  0        Vitamin D (Cholecalciferol) 1000 UNITS Tabs   Dose:  2000 Units   Quantity:  60 tablet        Take 2,000 Units by mouth daily   Refills:  11        * Notice:  This list has 2 medication(s) that are the same as other medications prescribed for you. Read the  directions carefully, and ask your doctor or other care provider to review them with you.            Procedures and tests performed during your visit     Foot  XR, G/E 3 views, right      Orders Needing Specimen Collection     None      Pending Results     No orders found from 7/28/2017 to 7/31/2017.            Pending Culture Results     No orders found from 7/28/2017 to 7/31/2017.            Pending Results Instructions     If you had any lab results that were not finalized at the time of your Discharge, you can call the ED Lab Result RN at [phone]. You will be contacted by this team for any positive Lab results or changes in treatment. The nurses are available 7 days a week from 10A to 6:30P.  You can leave a message 24 hours per day and they will return your call.        Test Results From Your Hospital Stay        7/30/2017  7:03 PM      Narrative     FOOT RIGHT THREE OR MORE VIEWS 7/30/2017 6:36 PM     HISTORY: Bruising, pain.        Impression     IMPRESSION: Three views right foot. There is a mildly comminuted but  nondisplaced fracture through the proximal phalanx right little toe.  No other fracture or dislocation is evident. No significant soft  tissue swelling.    NADER LEWIS MD                Clinical Quality Measure: Blood Pressure Screening     Your blood pressure was checked while you were in the emergency department today. The last reading we obtained was  BP: 151/81 . Please read the guidelines below about what these numbers mean and what you should do about them.  If your systolic blood pressure (the top number) is less than 120 and your diastolic blood pressure (the bottom number) is less than 80, then your blood pressure is normal. There is nothing more that you need to do about it.  If your systolic blood pressure (the top number) is 120-139 or your diastolic blood pressure (the bottom number) is 80-89, your blood pressure may be higher than it should be. You should have your blood pressure  "rechecked within a year by a primary care provider.  If your systolic blood pressure (the top number) is 140 or greater or your diastolic blood pressure (the bottom number) is 90 or greater, you may have high blood pressure. High blood pressure is treatable, but if left untreated over time it can put you at risk for heart attack, stroke, or kidney failure. You should have your blood pressure rechecked by a primary care provider within the next 4 weeks.  If your provider in the emergency department today gave you specific instructions to follow-up with your doctor or provider even sooner than that, you should follow that instruction and not wait for up to 4 weeks for your follow-up visit.        Thank you for choosing [facility]       Thank you for choosing [facility] for your care. Our goal is always to provide you with excellent care. Hearing back from our patients is one way we can continue to improve our services. Please take a few minutes to complete the written survey that you may receive in the mail after you visit with us. Thank you!        [employer] Information     [facility] lets you send messages to your doctor, view your test results, renew your prescriptions, schedule appointments and more. To sign up, go to [email or web address] . Click on \"Log in\" on the left side of the screen, which will take you to the Welcome page. Then click on \"Sign up Now\" on the right side of the page.     You will be asked to enter the access code listed below, as well as some personal information. Please follow the directions to create your username and password.     Your access code is: [email or web address]  Expires: 10/2/2017  7:26 PM     Your access code will  in 90 days. If you need help or a new code, please call your [facility] clinic or [phone].        Care EveryWhere ID     This is your Care EveryWhere ID. This could be used by other organizations to access your [facility] medical records  [id number]        Equal Access to " Services     [facility]: Marc Leonard, wakristen ludoreenadaha, qaemilee le. So [facility] [phone].    ATENCIÓN: Si habla español, tiene a christianson disposición servicios gratuitos de asistencia lingüística. Llame al [phone].    We comply with applicable federal civil rights laws and [place] laws. We do not discriminate on the basis of race, color, national origin, age, disability sex, sexual orientation or gender identity.            After Visit Summary       This is your record. Keep this with you and show to your community pharmacist(s) and doctor(s) at your next visit.

## 2017-07-31 ENCOUNTER — CARE COORDINATION (OUTPATIENT)
Dept: CARE COORDINATION | Facility: CLINIC | Age: 52
End: 2017-07-31

## 2017-07-31 ENCOUNTER — TELEPHONE (OUTPATIENT)
Dept: FAMILY MEDICINE | Facility: CLINIC | Age: 52
End: 2017-07-31

## 2017-07-31 NOTE — PROGRESS NOTES
Clinic Care Coordination Contact  OUTREACH    Referral Information:  Referral Source: PCP  Reason for Contact: follow ED visit on 07/30/2017 for fx toe  Care Conference: No     Universal Utilization:   ED Visits in last year: 6 (in 2017)  Hospital visits in last year: 0 (in 2017)  Last PCP appointment: 05/22/17  Missed Appointments: 0  Concerns: none known  Multiple Providers or Specialists: oncology. [facility]    Clinical Concerns:  Current Medical Concerns: Patient has fx toe. Patient encouraged to call clinic about her pain level & get scheduled to be seen by PCP    Current Behavioral Concerns: none discussed at this time    Education Provided to patient: Patient to contact clinic to talk about pain level & scheduling with PCP   Clinical Pathway Name: Depression  Clinical Pathway:not reviewed at this time    Medication Management:Sandraeint got oxycodone at ED on 07/30/2017 due to acute pain issue. Patient is not to get refilled on oxycodone until 08/01/2017. Patient thinks PCP is on vacation & is restricted. Another PCP can write script is PCP is on vacation if they are comfortable doing. Encouraged patient to call clinic to discuss this    Functional Status:  Mobility Status: Independent  Equipment Currently Used at Home: other (see comments) (CPAP)  Transportation: no new info           Psychosocial:  Current living arrangement:: Other (I live in an apartment building that does not have an elevat)  Financial/Insurance: no new info       Resources and Interventions:  Current Resources:  (n/a);  (n/a)  PAS Number:  (n/a)  Senior Linkage Line Referral Placed:  (n/a)  Advanced Care Plans/Directives on file:: No  Referrals Placed: Community Resources ([facility]-mailed)     Goals:   Goal 1 Statement: discontinue              Barriers: Patient has limited finances  Strengths: Patient is aware of her restriction that she cannot get pain meds in ED for chronic in that this was an acute issue that is the  only reason she got med in ED for pain  Patient/Caregiver understanding: Patient to call clinic to talk about her pain level & scheduling to see PCP  Frequency of Care Coordination: as needed  Upcoming appointment:  (none scheduled at this time)     Plan: Patient to call clinic to talk about her pain level & scheduling to see PCP    Patient can outreach to Clinic Care Coordinator, SMITH as needed  BRISSA Cruz, [facility]  Clinic Care Coordinator, SMITH with [facility]  [phone]

## 2017-07-31 NOTE — TELEPHONE ENCOUNTER
"No.  Cannot take more medicine if \"runs out early\" - that is in the controlled contract.  If she has a fracture or acute injury she should come in to be seen.  Dr. Karla Fernandez MD/[facility]    "

## 2017-07-31 NOTE — DISCHARGE INSTRUCTIONS
Closed Toe Fracture  Your toe is broken (fractured). This causes local pain, swelling, and sometimes bruising. This injury usually takes about 4 to 6 weeks to heal, but can sometimes take longer. Toe injuries are often treated by taping the injured toe to the next one (buddy taping). This protects the injured toe and holds it in position.    If the toenail has been severely injured, it may fall off in 1 to 2 weeks. It takes up to 12 months for a new toenail to grow back.  Home care  Follow these guidelines when caring for yourself at home:    You may be given a cast shoe to wear to keep your toe from moving. If not, you can use a sandal or any shoe that doesn t put pressure on the injured toe until the swelling and pain go away. If using a sandal, be careful not to strike your foot against anything. Another injury could make the fracture worse. If you were given crutches, don t put full weight on the injured foot until you can do so without pain, or as directed by your healthcare provider.    Keep your foot elevated to reduce pain and swelling. When sleeping, put a pillow under the injured leg. When sitting, support the injured leg so it is above your waist. This is very important during the first 2 days (48 hours).    Put an ice pack on the injured area. Do this for 20 minutes every 1 to 2 hours the first day for pain relief. You can make an ice pack by wrapping a plastic bag of ice cubes in a thin towel. As the ice melts, be careful that any cloth or paper tape doesn t get wet. Continue using the ice pack 3 to 4 times a day for the next 2 days. Then use the ice pack as needed to ease pain and swelling.    If buddy tape was used and it becomes wet or dirty, change it. You may replace it with paper, plastic, or cloth tape. Cloth tape and paper tapes must be kept dry.    You may use acetaminophen or ibuprofen to control pain, unless another pain medicine was prescribed. If you have chronic liver or kidney disease,  talk with your healthcare provider before using these medicines. Also talk with your provider if you ve had a stomach ulcer or gastrointestinal bleeding.    You may return to sports or physical education activities after 4 weeks when you can run without pain, or as directed by your healthcare provider.  Follow-up care  Follow up with your healthcare provider in 1 week, or as advised. This is to make sure the bone is healing the way it should.  X-rays may be taken. You will be told of any new findings that may affect your care.  When to seek medical advice  Call your healthcare provider right away if any of these occur:    Pain or swelling gets worse    The cast/splint cracks    The cast and padding get wet and stays wet more than 24 hours    Bad odor from the cast/splint or wound fluid stains the cast    Tightness or pressure under the cast/splint gets worse    Toe becomes cold, blue, numb, or tingly    You can t move the toe    Signs of infection: fever, redness, warmth, swelling, or drainage from the wound or cast    Fever of 100.4 F (38 C) or higher, or as directed by your healthcare provider  Date Last Reviewed: 2/1/2017 2000-2017 [employer]. [address] [place] [address]. All rights reserved. This information is not intended as a substitute for professional medical care. Always follow your healthcare professional's instructions.

## 2017-07-31 NOTE — TELEPHONE ENCOUNTER
Patient called asking if pharmacy can fill her percocet Rx 1 day early.   She is completely out, she took a little more yesterday because she broke/cracked 2 toes on side of foot, they are taped now, but hurt 10/10.     oxyCODONE-acetaminophen (PERCOCET) 5-325 MG per tablet 30 tablet 0 8/1/2017  No   Sig: Take 1 tablet by mouth every 4 hours as needed for moderate to      The script is now at [facility] in [place].   Please call pharmacy and the patient with further directions.

## 2017-08-05 ENCOUNTER — HOSPITAL ENCOUNTER (EMERGENCY)
Facility: CLINIC | Age: 52
Discharge: HOME | End: 2017-08-05
Attending: EMERGENCY MEDICINE | Admitting: EMERGENCY MEDICINE
Payer: COMMERCIAL

## 2017-08-05 VITALS
TEMPERATURE: 98.8 F | HEIGHT: 66 IN | RESPIRATION RATE: 18 BRPM | SYSTOLIC BLOOD PRESSURE: 150 MMHG | DIASTOLIC BLOOD PRESSURE: 92 MMHG | OXYGEN SATURATION: 99 %

## 2017-08-05 DIAGNOSIS — R51.9 CHRONIC NONINTRACTABLE HEADACHE, UNSPECIFIED HEADACHE TYPE: ICD-10-CM

## 2017-08-05 DIAGNOSIS — G89.29 CHRONIC NONINTRACTABLE HEADACHE, UNSPECIFIED HEADACHE TYPE: ICD-10-CM

## 2017-08-05 DIAGNOSIS — N39.0 URINARY TRACT INFECTION IN FEMALE: ICD-10-CM

## 2017-08-05 LAB
ALBUMIN SERPL-MCNC: 3.5 G/DL (ref 3.4–5)
ALBUMIN UR-MCNC: NEGATIVE MG/DL
ALP SERPL-CCNC: 92 U/L (ref 40–150)
ALT SERPL W P-5'-P-CCNC: 26 U/L (ref 0–50)
ANION GAP SERPL CALCULATED.3IONS-SCNC: 8 MMOL/L (ref 3–14)
APPEARANCE UR: CLEAR
AST SERPL W P-5'-P-CCNC: 7 U/L (ref 0–45)
BACTERIA #/AREA URNS HPF: ABNORMAL /HPF
BASOPHILS # BLD AUTO: 0 10E9/L (ref 0–0.2)
BASOPHILS NFR BLD AUTO: 0.2 %
BILIRUB SERPL-MCNC: 1.1 MG/DL (ref 0.2–1.3)
BILIRUB UR QL STRIP: NEGATIVE
BUN SERPL-MCNC: 10 MG/DL (ref 7–30)
CALCIUM SERPL-MCNC: 9.9 MG/DL (ref 8.5–10.1)
CHLORIDE SERPL-SCNC: 107 MMOL/L (ref 94–109)
CO2 SERPL-SCNC: 28 MMOL/L (ref 20–32)
COLOR UR AUTO: ABNORMAL
CREAT SERPL-MCNC: 0.7 MG/DL (ref 0.52–1.04)
DIFFERENTIAL METHOD BLD: ABNORMAL
EOSINOPHIL # BLD AUTO: 0.1 10E9/L (ref 0–0.7)
EOSINOPHIL NFR BLD AUTO: 0.7 %
ERYTHROCYTE [DISTWIDTH] IN BLOOD BY AUTOMATED COUNT: 13.6 % (ref 10–15)
GFR SERPL CREATININE-BSD FRML MDRD: 88 ML/MIN/1.7M2
GLUCOSE SERPL-MCNC: 93 MG/DL (ref 70–99)
GLUCOSE UR STRIP-MCNC: NEGATIVE MG/DL
HCT VFR BLD AUTO: 37 % (ref 35–47)
HGB BLD-MCNC: 12.1 G/DL (ref 11.7–15.7)
HGB UR QL STRIP: NEGATIVE
IMM GRANULOCYTES # BLD: 0 10E9/L (ref 0–0.4)
IMM GRANULOCYTES NFR BLD: 0.2 %
KETONES UR STRIP-MCNC: NEGATIVE MG/DL
LEUKOCYTE ESTERASE UR QL STRIP: ABNORMAL
LYMPHOCYTES # BLD AUTO: 2.3 10E9/L (ref 0.8–5.3)
LYMPHOCYTES NFR BLD AUTO: 19.3 %
MCH RBC QN AUTO: 29.4 PG (ref 26.5–33)
MCHC RBC AUTO-ENTMCNC: 32.7 G/DL (ref 31.5–36.5)
MCV RBC AUTO: 90 FL (ref 78–100)
MONOCYTES # BLD AUTO: 0.7 10E9/L (ref 0–1.3)
MONOCYTES NFR BLD AUTO: 6.2 %
NEUTROPHILS # BLD AUTO: 8.8 10E9/L (ref 1.6–8.3)
NEUTROPHILS NFR BLD AUTO: 73.4 %
NITRATE UR QL: POSITIVE
NRBC # BLD AUTO: 0 10*3/UL
NRBC BLD AUTO-RTO: 0 /100
PH UR STRIP: 7 PH (ref 5–7)
PLATELET # BLD AUTO: 346 10E9/L (ref 150–450)
POTASSIUM SERPL-SCNC: 3.6 MMOL/L (ref 3.4–5.3)
PROT SERPL-MCNC: 7.1 G/DL (ref 6.8–8.8)
RBC # BLD AUTO: 4.12 10E12/L (ref 3.8–5.2)
RBC #/AREA URNS AUTO: 2 /HPF (ref 0–2)
SODIUM SERPL-SCNC: 143 MMOL/L (ref 133–144)
SP GR UR STRIP: 1.01 (ref 1–1.03)
SQUAMOUS #/AREA URNS AUTO: 3 /HPF (ref 0–1)
URN SPEC COLLECT METH UR: ABNORMAL
UROBILINOGEN UR STRIP-MCNC: NORMAL MG/DL (ref 0–2)
WBC # BLD AUTO: 12 10E9/L (ref 4–11)
WBC #/AREA URNS AUTO: 3 /HPF (ref 0–2)
YEAST #/AREA URNS HPF: ABNORMAL /HPF

## 2017-08-05 PROCEDURE — 25000128 H RX IP 250 OP 636: Performed by: EMERGENCY MEDICINE

## 2017-08-05 PROCEDURE — 85025 COMPLETE CBC W/AUTO DIFF WBC: CPT | Performed by: EMERGENCY MEDICINE

## 2017-08-05 PROCEDURE — 81001 URINALYSIS AUTO W/SCOPE: CPT | Performed by: EMERGENCY MEDICINE

## 2017-08-05 PROCEDURE — 80053 COMPREHEN METABOLIC PANEL: CPT | Performed by: EMERGENCY MEDICINE

## 2017-08-05 PROCEDURE — 99284 EMERGENCY DEPT VISIT MOD MDM: CPT | Mod: 25

## 2017-08-05 PROCEDURE — 96365 THER/PROPH/DIAG IV INF INIT: CPT

## 2017-08-05 PROCEDURE — 96361 HYDRATE IV INFUSION ADD-ON: CPT

## 2017-08-05 RX ORDER — SODIUM CHLORIDE 9 MG/ML
1000 INJECTION, SOLUTION INTRAVENOUS CONTINUOUS
Status: DISCONTINUED | OUTPATIENT
Start: 2017-08-05 | End: 2017-08-05

## 2017-08-05 RX ORDER — CEFTRIAXONE 1 G/1
1 INJECTION, POWDER, FOR SOLUTION INTRAMUSCULAR; INTRAVENOUS ONCE
Status: COMPLETED | OUTPATIENT
Start: 2017-08-05 | End: 2017-08-05

## 2017-08-05 RX ADMIN — CEFTRIAXONE 1 G: 1 INJECTION, POWDER, FOR SOLUTION INTRAMUSCULAR; INTRAVENOUS at 14:15

## 2017-08-05 RX ADMIN — SODIUM CHLORIDE 1000 ML: 9 INJECTION, SOLUTION INTRAVENOUS at 13:18

## 2017-08-05 ASSESSMENT — ENCOUNTER SYMPTOMS
VOMITING: 1
PHOTOPHOBIA: 1
FREQUENCY: 1
NAUSEA: 1
FEVER: 1
HEADACHES: 1

## 2017-08-05 NOTE — ED NOTES
Bed: ED26  Expected date:   Expected time:   Means of arrival:   Comments:  443 to rm 26   52 female   N&v  Yellow   5 min

## 2017-08-05 NOTE — ED PROVIDER NOTES
History     Chief Complaint:  Headache    HPI   Ines Mott is a 52 year old female with a history of migraines, HTN, liver cancer, diabetes mellitus, and asthma who presents to the emergency department via EMS for evaluation of a headache. Last night, the patient reports the onset of a fever of 100.1. Then this morning, she reports intermittent nausea and vomiting x2 and has developed a severe, sharp and throbbing headache with pain rated as 10/10. She reports vomiting up her lisinopril this morning. These symptoms prompted the patient to seek evaluation here in the emergency department. She reports stress triggers her headaches. She also reports photophobia and that she normally takes oxycodone for pain though notes her prescription is out. She goes to pain clinic for this and has a refill on Monday. She denies the use of tylenol and ibuprofen. She reports normal bowels but an increased urinary output. She denies recent colds, cough, chest pain, urinary issues, or other illnesses.  Of note, the patient reports she has a  shunt in her brain. She reports she hasn't seen anyone recently regarding the shunt though she reports seeing her primary care provider two weeks ago.     Of note, the patient has an emergency care plan for headaches and abdominal pain, see below.      EMERGENCY CARE PLAN:   At each Emergency Department visit, we will evaluate this patient to determine if an emergency medical condition is present.     Acute care plan for the following conditions: Chronic headaches and chronic abdominal pain  Brief History of Condition: Ms. Ines Mott is a 51 year old female with chronic pain (headache, abdominal pain), metastatic neuroendocrine carcinoma (primary colon),  shunt after a tumor resection in the 1980 s with frequent ED and office visits for pain and other multiple medical issues. She has a history of alcohol, narcotic and gambling addictions.  She currently sees a pain clinic for her  pain medicine and her primary weekly in attempt to avoid unnecessary ED visits.  She also has a history of angioedema and asthma.  Authorized treatments in the Emergency Department (with specific medications and doses): The patient has been given the following plan:     1. Chronic daily headache: Given chronicity of headaches avoid shunt series/imaging unless neurologic findings on exam or high clinical concern.  No oral or IV narcotics for headaches in the ED should be administered.       2. Chronic abdominal pain:  Metastatic neuroendocrine tumor, under treatment by [facility].  Avoid narcotics unless new findings on are seen on imaging.       Indications for Admission or Consultation:  Hospitalization of Ms. Mott for chronic pain is to be avoided.  Consult [facility] questions about imaging/complications given metastatic carcinoma.        Expected home rescue plan:  The patient has oral narcotics and nausea medications     Follow up plan after an ED visit: Primary care follow-up     Restricted Status if restricted to Hospital or Prescriber:  11/2016 Pt changed restriction to LEEANNA Canales     PCP:  Dustin Fatima MD, Family medicine, [facility]     Pain: [facility] (first appointment scheduled 1/2016)     Oncology: Suhas Pedro MD, [facility]     ED Visits: #67 in 2015, #25 in 2016                  Admissions: #3 in 2015, 0 in 2016     [place] Prescription Monitoring Program:  Recurrent Narcotic prescriptions from PCP  Procedures:  shunt, Gyn surgery  Past Imaging and Studies:   CT abd/pelvis: 8/2015 IMPRESSION:  1. Irregular-shaped 10.6 x 4.1 x 3.7 cm fluid density collection  within the pelvis, slightly increased since 7/4/2015.  2. Multiple hepatic lesions which appear similar to 7/4/2015.  3. CT scan of the abdomen and pelvis otherwise appears essentially  unchanged.   Head CT: 11/11/2015: Stable head CT demonstrating a  shunt catheter. No evidence for acute intracranial pathology.   Initiated:  February  "2016  Chronic Pain with Controlled Substance Agreement/Plan   Allergies:  Ativan   Macrobid   Amoxicillin   Buspirone   Cephalosporins   Dilaudid    Diphenhydramine Hcl   Benadryl  Imitrex   Ketorolac Tromethamine   Toradol  Lansoprazole   Prevacid  Metoclopramide   Reglan  Paroxetine    Penicillins   Prednisone   Sulfa Drugs   Lidoderm        Medications:    oxycodone-acetaminophen   montelukast   cetirizine   fluticasone   sodium chloride   triamcinolone   ondansetron   cyanocobalamin   potassium chloride   sertraline   losartan-hydrochlorothiazide  albuterol   magnesium oxide   albuterol   mometasone-formoterol   polyethylene glycol   Epinephrine       Past Medical History:    Arthritis   Asthma   Cancer    Chronic pain syndrome   Depressive disorder   Diabetes    Hydrocephalus   Hypertension   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   Methamphetamine abuse   Migraines   Mild intermittent asthma   Obesity   Right renal mass   Spina bifida   PTSD  Fibromyalgia     Past Surgical History:    Appendectomy  Cholecystectomy  Dental surgery  Gyn surgery  Ventriculoperitoneal shunt implantation     Family History:    Cancer    Social History:  Former smoker  Negative for alcohol use.  Marital Status:   [2]     Review of Systems   Constitutional: Positive for fever.   Eyes: Positive for photophobia.   Gastrointestinal: Positive for nausea and vomiting.   Genitourinary: Positive for frequency.   Neurological: Positive for headaches.   All other systems reviewed and are negative.        Physical Exam   First Vitals:  BP: (!) 173/93  Heart Rate: 71  Temp: 98.8  F (37.1  C)  Resp: 18  Height: 167.6 cm (5' 6\")  SpO2: 97 %    Vitals:    08/05/17 1500 08/05/17 1517 08/05/17 1518 08/05/17 1522   BP:  (!) 150/92  (!) 150/92   Resp:       Temp:       TempSrc:       SpO2: 98% 98% 99%    Height:           Physical Exam      Physical Exam   Constitutional:  The patient is laying in bed with a towel over her head. "   Mouth/Throat:   Oropharynx is clear and moist.   Eyes:    Conjunctivae normal and EOM are normal. Pupils are equal, round, and reactive to light. 3 mm bilaterally. No nystagmus.  Neck:    Normal range of motion.   Cardiovascular: Normal rate, regular rhythm and normal heart sounds.  Exam reveals no gallop and no friction rub.  No murmur heard.  Pulmonary/Chest:  Effort normal and breath sounds normal. Patient has no wheezes. Patient has no rales.   Abdominal:   Soft. Bowel sounds are normal. Patient exhibits no mass. There is no tenderness. There is no rebound and no guarding. High BMI.  Musculoskeletal:  Normal range of motion. Patient exhibits no edema.   Neurological:   Patient is alert and oriented to person, place, and time. Patient has normal strength. No cranial nerve deficit or sensory deficit. GCS 15.  Skin:   Skin is warm and dry. No rash noted. No erythema.   Psychiatric:   Patient has a normal mood and affect. Patient's behavior is normal. Judgment and thought content normal.     Emergency Department Course     Laboratory:  UA with micro: Nitrate positive, Leukocyte Esterase small, yeast few, bacteria moderate, WBC 3 (H), Squamous epithelial 3 (H),  o/w negative  CBC: WBC: 12.0 (H), HGB: 12.1, PLT: 246  CMP: All WNL (Creatinine: 0.70)    Interventions:  1000 ml NS  1447 Rocephin 1 g IV    Emergency Department Course:  Nursing notes and vitals reviewed. I performed an exam of the patient as documented above.     IV inserted. Medicine administered as documented above. Blood drawn. This was sent to the lab for further testing, results above.    The patient provided a urine sample here in the emergency department. This was sent for laboratory testing, findings above.     1517 I rechecked the patient and discussed the results of their workup thus far.     Findings and plan explained to the Patient. Patient discharged home with instructions regarding supportive care, medications, and reasons to return. The  importance of close follow-up was reviewed. The patient was prescribed Keflex.    I personally reviewed the laboratory results with the Patient and answered all related questions prior to discharge.       Impression & Plan      Medical Decision Making:  Ines Mott is a 52 year old female with an emergency care plan due to chronic headaches and abdominal pain who presents with headache, nausea, and vomiting. This all started this morning. She also reports a fever last night of 100. She didn't take anything for that. Here she is found to be afebrile. Her exam was reassuring in that she had no focal deficits. She did not have any emesis here however she did received Zofran by EMS prior to arrival. Based on her emergency department care plan, I did not do any advanced imaging over her based on no focal deficients. She received IV fluids and a dose of Zofran. I did check her urine and blood work and she does have a mild leukocytosis as well as a UTI for which she received rocephin. I do note she has a penicillin allergy however when she was here two times ago she did received rocephin without any significant side effects.     After the administration of rocephin, the patient felt much better and requesting to go home. At this time, I suspected she has a recurrent migraine. The vomiting could be do the migraine but also the UTI could be compounding things. She is vitally stable and at this point I feel she is safe for discharge. I will place her on keflex for 1 week and she is follow up closely with her PCP. The patient is comfortable with this plan.     Diagnosis:    ICD-10-CM    1. Urinary tract infection in female N39.0    2. Chronic nonintractable headache, unspecified headache type R51        Disposition:  discharged to home    Discharge Medications:  Discharge Medication List as of 8/5/2017  3:18 PM      START taking these medications    Details   cephalexin (KEFLEX) 250 MG capsule Take 1 capsule (250 mg) by  mouth 3 times daily for 7 days, Disp-21 capsule, R-0, Local Print             I, Lucretia William, am serving as a scribe on 8/5/2017 at 1:09 PM to personally document services performed by Kalyani Hoover MD based on my observations and the provider's statements to me.       Lucretia William  8/5/2017    EMERGENCY DEPARTMENT       Kalyani Hoover MD  08/06/17 104

## 2017-08-05 NOTE — ED AVS SNAPSHOT
Emergency Department    [address] [place] [address]    Phone:  [phone]    Fax:  [phone]                                       Ines Mott   MRN: [id number]    Department:   Emergency Department   Date of Visit:  8/5/2017           After Visit Summary Signature Page     I have received my discharge instructions, and my questions have been answered. I have discussed any challenges I see with this plan with the nurse or doctor.    ..........................................................................................................................................  Patient/Patient Representative Signature      ..........................................................................................................................................  Patient Representative Print Name and Relationship to Patient    ..................................................               ................................................  Date                                            Time    ..........................................................................................................................................  Reviewed by Signature/Title    ...................................................              ..............................................  Date                                                            Time

## 2017-08-05 NOTE — ED AVS SNAPSHOT
Emergency Department    [address] [facility] [place] [facility] [address]    Phone:  [phone]    Fax:  [phone]                                       Ines Mott   MRN: [id number]    Department:   Emergency Department   Date of Visit:  8/5/2017           Patient Information     Date Of Birth          1965        Your diagnoses for this visit were:     Urinary tract infection in female     Chronic nonintractable headache, unspecified headache type        You were seen by Kalyani Hoover MD.      Follow-up Information     Follow up with Dustin Fatima MD In 2 days.    Specialty:  Family Practice    Contact information:    [facility]  [address]  [facility] [address] [phone]        Discharge References/Attachments     HEADACHE, UNSPECIFIED (ENGLISH)    URINARY TRACT INFECTIONS IN WOMEN (ENGLISH)      Future Appointments        Provider Department Dept Phone Center    8/31/2017 2:00 PM MICHEAL Matias CNP [facility] [phone] [facility]      24 Hour Appointment Hotline       To make an appointment at any [facility], call [phone] ([phone]). If you don't have a family doctor or clinic, we will help you find one. [facility] are conveniently located to serve the needs of you and your family.             Review of your medicines      START taking        Dose / Directions Last dose taken    cephalexin 250 MG capsule   Commonly known as:  KEFLEX   Dose:  250 mg   Quantity:  21 capsule        Take 1 capsule (250 mg) by mouth 3 times daily for 7 days   Refills:  0          Our records show that you are taking the medicines listed below. If these are incorrect, please call your family doctor or clinic.        Dose / Directions Last dose taken    * albuterol (2.5 MG/3ML) 0.083% neb solution   Dose:  1 vial   Quantity:  30 vial        Take 1 vial (2.5 mg) by nebulization every 6 hours as needed for shortness of  breath / dyspnea or wheezing   Refills:  3        * albuterol 108 (90 BASE) MCG/ACT Inhaler   Commonly known as:  VENTOLIN HFA   Quantity:  54 g        INHALE TWO PUFFS BY MOUTH EVERY 4 HOURS AS NEEDED FOR SHORTNESS OF BREATH/DYSPNEA   Refills:  1        ASPIRIN NOT PRESCRIBED   Commonly known as:  INTENTIONAL   Quantity:  1 each        Please choose reason not prescribed, below   Refills:  0        blood glucose calibration solution   Commonly known as:  no brand specified   Quantity:  1 each        Use to calibrate blood glucose monitor as directed.   Refills:  0        blood glucose lancets standard   Commonly known as:  no brand specified   Quantity:  1 Box        Use to test blood sugar daily times daily or as directed.   Refills:  11        blood glucose monitoring meter device kit   Commonly known as:  no brand specified   Quantity:  1 kit        Use to test blood sugar larisa times daily or as directed.   Refills:  0        blood glucose monitoring test strip   Commonly known as:  no brand specified   Quantity:  100 strip        Use to test blood sugars once times daily or as directed   Refills:  3        cetirizine 10 MG tablet   Commonly known as:  zyrTEC   Dose:  10 mg   Quantity:  30 tablet        Take 1 tablet (10 mg) by mouth every evening   Refills:  1        cyanocobalamin 1000 MCG/ML injection   Commonly known as:  VITAMIN B12   Dose:  1 mL   Quantity:  1 mL        Inject 1 mL (1,000 mcg) Subcutaneous every 30 days   Refills:  11        EPINEPHrine 0.3 MG/0.3ML injection 2-pack   Commonly known as:  EPIPEN/ADRENACLICK/or ANY BX GENERIC EQUIV   Dose:  0.3 mg   Quantity:  2 each        Inject 0.3 mLs (0.3 mg) into the muscle once as needed for anaphylaxis   Refills:  5        fluticasone 50 MCG/ACT spray   Commonly known as:  FLONASE   Dose:  1-2 spray   Quantity:  3 Bottle        Spray 1-2 sprays into both nostrils daily   Refills:  1        losartan-hydrochlorothiazide 50-12.5 MG per tablet   Commonly  known as:  HYZAAR   Dose:  1 tablet   Quantity:  90 tablet        Take 1 tablet by mouth daily   Refills:  3        magnesium oxide 400 (241.3 MG) MG tablet   Commonly known as:  MAG-OX   Dose:  400 mg   Quantity:  90 tablet        Take 1 tablet (400 mg) by mouth daily   Refills:  3        mometasone-formoterol 100-5 MCG/ACT oral inhaler   Commonly known as:  DULERA   Dose:  2 puff   Quantity:  1 Inhaler        Inhale 2 puffs into the lungs 2 times daily   Refills:  11        montelukast 10 MG tablet   Commonly known as:  SINGULAIR   Dose:  10 mg   Quantity:  30 tablet        Take 1 tablet (10 mg) by mouth At Bedtime   Refills:  11        ondansetron 4 MG ODT tab   Commonly known as:  ZOFRAN ODT   Dose:  4 mg   Quantity:  10 tablet        Take 1 tablet (4 mg) by mouth every 8 hours as needed for nausea   Refills:  0        order for DME   Quantity:  1 Device        Right wrist splint for carpal tunnel syndrome  One* Use as directed at hour of sleep   Refills:  1        oxyCODONE-acetaminophen 5-325 MG per tablet   Commonly known as:  PERCOCET   Dose:  1 tablet   Quantity:  30 tablet        Take 1 tablet by mouth every 4 hours as needed for moderate to severe pain   Refills:  0        polyethylene glycol powder   Commonly known as:  MIRALAX   Dose:  1 capful   Quantity:  510 g        Take 17 g (1 capful) by mouth daily   Refills:  3        potassium chloride 10 MEQ tablet   Commonly known as:  K-TAB,KLOR-CON   Dose:  10 mEq   Indication:  Low Amount of Potassium in the Blood   Quantity:  120 tablet        Take 1 tablet (10 mEq) by mouth daily   Refills:  11        sertraline 100 MG tablet   Commonly known as:  ZOLOFT   Dose:  100 mg   Indication:  Major Depressive Disorder   Quantity:  30 tablet        Take 1 tablet (100 mg) by mouth daily   Refills:  11        sodium chloride 0.65 % nasal spray   Commonly known as:  SALINE MIST   Dose:  1 spray   Quantity:  1 Bottle        Spray 1 spray into both nostrils daily as  needed for congestion   Refills:  11        STATIN NOT PRESCRIBED (INTENTIONAL)   Dose:  1 each   Quantity:  0 each        1 each daily Statin not prescribed intentionally due to Active liver disease (liver failure, cirrhosis, hepatitis) LIVER METS   Refills:  0        triamcinolone 0.1 % paste   Commonly known as:  KENALOG   Quantity:  5 g        Take by mouth 2 times daily   Refills:  0        Vitamin D (Cholecalciferol) 1000 UNITS Tabs   Dose:  2000 Units   Quantity:  60 tablet        Take 2,000 Units by mouth daily   Refills:  11        * Notice:  This list has 2 medication(s) that are the same as other medications prescribed for you. Read the directions carefully, and ask your doctor or other care provider to review them with you.            Prescriptions were sent or printed at these locations (1 Prescription)                   Other Prescriptions                Printed at Department/Unit printer (1 of 1)         cephalexin (KEFLEX) 250 MG capsule                Procedures and tests performed during your visit     CBC with platelets differential    Comprehensive metabolic panel    UA with Microscopic      Orders Needing Specimen Collection     None      Pending Results     No orders found from 8/3/2017 to 8/6/2017.            Pending Culture Results     No orders found from 8/3/2017 to 8/6/2017.            Pending Results Instructions     If you had any lab results that were not finalized at the time of your Discharge, you can call the ED Lab Result RN at [phone]. You will be contacted by this team for any positive Lab results or changes in treatment. The nurses are available 7 days a week from 10A to 6:30P.  You can leave a message 24 hours per day and they will return your call.        Test Results From Your Hospital Stay        8/5/2017  1:24 PM      Component Results     Component Value Ref Range & Units Status    WBC 12.0 (H) 4.0 - 11.0 10e9/L Final    RBC Count 4.12 3.8 - 5.2 10e12/L Final     Hemoglobin 12.1 11.7 - 15.7 g/dL Final    Hematocrit 37.0 35.0 - 47.0 % Final    MCV 90 78 - 100 fl Final    MCH 29.4 26.5 - 33.0 pg Final    MCHC 32.7 31.5 - 36.5 g/dL Final    RDW 13.6 10.0 - 15.0 % Final    Platelet Count 346 150 - 450 10e9/L Final    Diff Method Automated Method  Final    % Neutrophils 73.4 % Final    % Lymphocytes 19.3 % Final    % Monocytes 6.2 % Final    % Eosinophils 0.7 % Final    % Basophils 0.2 % Final    % Immature Granulocytes 0.2 % Final    Nucleated RBCs 0 0 /100 Final    Absolute Neutrophil 8.8 (H) 1.6 - 8.3 10e9/L Final    Absolute Lymphocytes 2.3 0.8 - 5.3 10e9/L Final    Absolute Monocytes 0.7 0.0 - 1.3 10e9/L Final    Absolute Eosinophils 0.1 0.0 - 0.7 10e9/L Final    Absolute Basophils 0.0 0.0 - 0.2 10e9/L Final    Abs Immature Granulocytes 0.0 0 - 0.4 10e9/L Final    Absolute Nucleated RBC 0.0  Final         8/5/2017  1:49 PM      Component Results     Component Value Ref Range & Units Status    Sodium 143 133 - 144 mmol/L Final    Potassium 3.6 3.4 - 5.3 mmol/L Final    Chloride 107 94 - 109 mmol/L Final    Carbon Dioxide 28 20 - 32 mmol/L Final    Anion Gap 8 3 - 14 mmol/L Final    Glucose 93 70 - 99 mg/dL Final    Urea Nitrogen 10 7 - 30 mg/dL Final    Creatinine 0.70 0.52 - 1.04 mg/dL Final    GFR Estimate 88 >60 mL/min/1.7m2 Final    Non  GFR Calc    GFR Estimate If Black >90   GFR Calc   >60 mL/min/1.7m2 Final    Calcium 9.9 8.5 - 10.1 mg/dL Final    Bilirubin Total 1.1 0.2 - 1.3 mg/dL Final    Albumin 3.5 3.4 - 5.0 g/dL Final    Protein Total 7.1 6.8 - 8.8 g/dL Final    Alkaline Phosphatase 92 40 - 150 U/L Final    ALT 26 0 - 50 U/L Final    AST 7 0 - 45 U/L Final         8/5/2017  1:51 PM      Component Results     Component Value Ref Range & Units Status    Color Urine Light Yellow  Final    Appearance Urine Clear  Final    Glucose Urine Negative NEG mg/dL Final    Bilirubin Urine Negative NEG Final    Ketones Urine Negative NEG mg/dL  Final    Specific Gravity Urine 1.007 1.003 - 1.035 Final    Blood Urine Negative NEG Final    pH Urine 7.0 5.0 - 7.0 pH Final    Protein Albumin Urine Negative NEG mg/dL Final    Urobilinogen mg/dL Normal 0.0 - 2.0 mg/dL Final    Nitrite Urine Positive (A) NEG Final    Leukocyte Esterase Urine Small (A) NEG Final    Source Midstream Urine  Final    WBC Urine 3 (H) 0 - 2 /HPF Final    RBC Urine 2 0 - 2 /HPF Final    Bacteria Urine Moderate (A) NEG /HPF Final    Yeast Urine Few (A) NEG /HPF Final    Squamous Epithelial /HPF Urine 3 (H) 0 - 1 /HPF Final                Clinical Quality Measure: Blood Pressure Screening     Your blood pressure was checked while you were in the emergency department today. The last reading we obtained was  BP: 165/90 . Please read the guidelines below about what these numbers mean and what you should do about them.  If your systolic blood pressure (the top number) is less than 120 and your diastolic blood pressure (the bottom number) is less than 80, then your blood pressure is normal. There is nothing more that you need to do about it.  If your systolic blood pressure (the top number) is 120-139 or your diastolic blood pressure (the bottom number) is 80-89, your blood pressure may be higher than it should be. You should have your blood pressure rechecked within a year by a primary care provider.  If your systolic blood pressure (the top number) is 140 or greater or your diastolic blood pressure (the bottom number) is 90 or greater, you may have high blood pressure. High blood pressure is treatable, but if left untreated over time it can put you at risk for heart attack, stroke, or kidney failure. You should have your blood pressure rechecked by a primary care provider within the next 4 weeks.  If your provider in the emergency department today gave you specific instructions to follow-up with your doctor or provider even sooner than that, you should follow that instruction and not wait  "for up to 4 weeks for your follow-up visit.        Thank you for choosing [facility]       Thank you for choosing [facility] for your care. Our goal is always to provide you with excellent care. Hearing back from our patients is one way we can continue to improve our services. Please take a few minutes to complete the written survey that you may receive in the mail after you visit with us. Thank you!        [employer] Information     [employer] lets you send messages to your doctor, view your test results, renew your prescriptions, schedule appointments and more. To sign up, go to [email or web address] . Click on \"Log in\" on the left side of the screen, which will take you to the Welcome page. Then click on \"Sign up Now\" on the right side of the page.     You will be asked to enter the access code listed below, as well as some personal information. Please follow the directions to create your username and password.     Your access code is: [email or web address]  Expires: 10/2/2017  7:26 PM     Your access code will  in 90 days. If you need help or a new code, please call your [facility] clinic or [phone].        Care EveryWhere ID     This is your Care EveryWhere ID. This could be used by other organizations to access your [facility] medical records  [id number]        Equal Access to Services     VALENTE RIVERA AH: Hadii casimiro Leonard, waaxda luqadaha, qaybta kaalmada mary, emilee coyne. So [facility] [phone].    ATENCIÓN: Si habla español, tiene a christianson disposición servicios gratuitos de asistencia lingüística. Llame al [phone].    We comply with applicable federal civil rights laws and [place] laws. We do not discriminate on the basis of race, color, national origin, age, disability sex, sexual orientation or gender identity.            After Visit Summary       This is your record. Keep this with you and show to your community pharmacist(s) and doctor(s) at your next visit.  "

## 2017-08-06 ENCOUNTER — NURSE TRIAGE (OUTPATIENT)
Dept: NURSING | Facility: CLINIC | Age: 52
End: 2017-08-06

## 2017-08-06 ENCOUNTER — HOSPITAL ENCOUNTER (EMERGENCY)
Facility: CLINIC | Age: 52
Discharge: HOME | End: 2017-08-06
Attending: EMERGENCY MEDICINE | Admitting: EMERGENCY MEDICINE
Payer: COMMERCIAL

## 2017-08-06 VITALS
RESPIRATION RATE: 16 BRPM | HEART RATE: 66 BPM | SYSTOLIC BLOOD PRESSURE: 156 MMHG | BODY MASS INDEX: 42.59 KG/M2 | HEIGHT: 66 IN | DIASTOLIC BLOOD PRESSURE: 89 MMHG | TEMPERATURE: 99.2 F | WEIGHT: 265 LBS | OXYGEN SATURATION: 96 %

## 2017-08-06 DIAGNOSIS — N39.0 URINARY TRACT INFECTION WITHOUT HEMATURIA, SITE UNSPECIFIED: ICD-10-CM

## 2017-08-06 PROCEDURE — 25000128 H RX IP 250 OP 636: Performed by: EMERGENCY MEDICINE

## 2017-08-06 PROCEDURE — 99284 EMERGENCY DEPT VISIT MOD MDM: CPT | Mod: 25

## 2017-08-06 PROCEDURE — 96372 THER/PROPH/DIAG INJ SC/IM: CPT

## 2017-08-06 RX ORDER — CEFTRIAXONE SODIUM 1 G
1 VIAL (EA) INJECTION ONCE
Status: COMPLETED | OUTPATIENT
Start: 2017-08-06 | End: 2017-08-06

## 2017-08-06 RX ORDER — WATER 10 ML/10ML
INJECTION INTRAMUSCULAR; INTRAVENOUS; SUBCUTANEOUS
Status: DISCONTINUED
Start: 2017-08-06 | End: 2017-08-06 | Stop reason: WASHOUT

## 2017-08-06 RX ADMIN — CEFTRIAXONE 1 G: 1 INJECTION, POWDER, FOR SOLUTION INTRAMUSCULAR; INTRAVENOUS at 18:45

## 2017-08-06 ASSESSMENT — ENCOUNTER SYMPTOMS
VOMITING: 1
FREQUENCY: 1
CHILLS: 1
NAUSEA: 1
FLANK PAIN: 1
HEADACHES: 1
FEVER: 1

## 2017-08-06 NOTE — ED AVS SNAPSHOT
Emergency Department    [address] [place] [address]    Phone:  [phone]    Fax:  [phone]                                       Ines Mott   MRN: [id number]    Department:   Emergency Department   Date of Visit:  8/6/2017           After Visit Summary Signature Page     I have received my discharge instructions, and my questions have been answered. I have discussed any challenges I see with this plan with the nurse or doctor.    ..........................................................................................................................................  Patient/Patient Representative Signature      ..........................................................................................................................................  Patient Representative Print Name and Relationship to Patient    ..................................................               ................................................  Date                                            Time    ..........................................................................................................................................  Reviewed by Signature/Title    ...................................................              ..............................................  Date                                                            Time

## 2017-08-06 NOTE — TELEPHONE ENCOUNTER
Ines has a UTI. She was seen and diagnosed in ER yesterday for this.  Ines has an intracranial shunt that drains into her bladder. Her UIT symptoms are worsening.  She has restricted insurance. Restriction is for provider, hospital and pharmacy so was unable to fill her Keflex. She's experiencing frequency, burning with urination, back pain and a fever. She has no transportation. She understands she needs to be seen again today. She'll call an ambulance again for transport to  ER.  Routed: [id number] Dr PAVAN CANCHOLA RN [facility]

## 2017-08-06 NOTE — ED AVS SNAPSHOT
Emergency Department    [address] [facility] [address]    Phone:  [phone]    Fax:  [phone]                                       Ines Mott   MRN: [id number]    Department:   Emergency Department   Date of Visit:  8/6/2017           Patient Information     Date Of Birth          1965        Your diagnoses for this visit were:     Urinary tract infection without hematuria, site unspecified        You were seen by Mara Cat MD.      Follow-up Information     Follow up with Dustin Fatima MD. Schedule an appointment as soon as possible for a visit in 1 day.    Specialty:  Family Practice    Contact information:    LEEANNA Star [facility]HA [facility]G  [address]  [facility] [id number] [phone]          Follow up with  Emergency Department.    Specialty:  EMERGENCY MEDICINE    Why:  As needed, If symptoms worsen    Contact information:    [address] [facility] [address] [phone]        Discharge Instructions       See your doctor tomorrow - you need to start taking your antibiotics.      Future Appointments        Provider Department Dept Phone Center    8/31/2017 2:00 PM MICHEAL Matias CNP [facility] [phone] [facility]      24 Hour Appointment Hotline       To make an appointment at any [facility] clinic, call [phone] ([phone]). If you don't have a family doctor or clinic, we will help you find one. [facility] clinics are conveniently located to serve the needs of you and your family.             Review of your medicines      Our records show that you are taking the medicines listed below. If these are incorrect, please call your family doctor or clinic.        Dose / Directions Last dose taken    * albuterol (2.5 MG/3ML) 0.083% neb solution   Dose:  1 vial   Quantity:  30 vial        Take 1 vial (2.5 mg) by nebulization every 6 hours as needed for shortness of breath / dyspnea or wheezing    Refills:  3        * albuterol 108 (90 BASE) MCG/ACT Inhaler   Commonly known as:  VENTOLIN HFA   Quantity:  54 g        INHALE TWO PUFFS BY MOUTH EVERY 4 HOURS AS NEEDED FOR SHORTNESS OF BREATH/DYSPNEA   Refills:  1        ASPIRIN NOT PRESCRIBED   Commonly known as:  INTENTIONAL   Quantity:  1 each        Please choose reason not prescribed, below   Refills:  0        blood glucose calibration solution   Commonly known as:  no brand specified   Quantity:  1 each        Use to calibrate blood glucose monitor as directed.   Refills:  0        blood glucose lancets standard   Commonly known as:  no brand specified   Quantity:  1 Box        Use to test blood sugar daily times daily or as directed.   Refills:  11        blood glucose monitoring meter device kit   Commonly known as:  no brand specified   Quantity:  1 kit        Use to test blood sugar larisa times daily or as directed.   Refills:  0        blood glucose monitoring test strip   Commonly known as:  no brand specified   Quantity:  100 strip        Use to test blood sugars once times daily or as directed   Refills:  3        cephalexin 250 MG capsule   Commonly known as:  KEFLEX   Dose:  250 mg   Quantity:  21 capsule        Take 1 capsule (250 mg) by mouth 3 times daily for 7 days   Refills:  0        cetirizine 10 MG tablet   Commonly known as:  zyrTEC   Dose:  10 mg   Quantity:  30 tablet        Take 1 tablet (10 mg) by mouth every evening   Refills:  1        cyanocobalamin 1000 MCG/ML injection   Commonly known as:  VITAMIN B12   Dose:  1 mL   Quantity:  1 mL        Inject 1 mL (1,000 mcg) Subcutaneous every 30 days   Refills:  11        EPINEPHrine 0.3 MG/0.3ML injection 2-pack   Commonly known as:  EPIPEN/ADRENACLICK/or ANY BX GENERIC EQUIV   Dose:  0.3 mg   Quantity:  2 each        Inject 0.3 mLs (0.3 mg) into the muscle once as needed for anaphylaxis   Refills:  5        fluticasone 50 MCG/ACT spray   Commonly known as:  FLONASE   Dose:  1-2 spray    Quantity:  3 Bottle        Spray 1-2 sprays into both nostrils daily   Refills:  1        losartan-hydrochlorothiazide 50-12.5 MG per tablet   Commonly known as:  HYZAAR   Dose:  1 tablet   Quantity:  90 tablet        Take 1 tablet by mouth daily   Refills:  3        magnesium oxide 400 (241.3 MG) MG tablet   Commonly known as:  MAG-OX   Dose:  400 mg   Quantity:  90 tablet        Take 1 tablet (400 mg) by mouth daily   Refills:  3        mometasone-formoterol 100-5 MCG/ACT oral inhaler   Commonly known as:  DULERA   Dose:  2 puff   Quantity:  1 Inhaler        Inhale 2 puffs into the lungs 2 times daily   Refills:  11        montelukast 10 MG tablet   Commonly known as:  SINGULAIR   Dose:  10 mg   Quantity:  30 tablet        Take 1 tablet (10 mg) by mouth At Bedtime   Refills:  11        ondansetron 4 MG ODT tab   Commonly known as:  ZOFRAN ODT   Dose:  4 mg   Quantity:  10 tablet        Take 1 tablet (4 mg) by mouth every 8 hours as needed for nausea   Refills:  0        order for DME   Quantity:  1 Device        Right wrist splint for carpal tunnel syndrome  One* Use as directed at hour of sleep   Refills:  1        oxyCODONE-acetaminophen 5-325 MG per tablet   Commonly known as:  PERCOCET   Dose:  1 tablet   Quantity:  30 tablet        Take 1 tablet by mouth every 4 hours as needed for moderate to severe pain   Refills:  0        polyethylene glycol powder   Commonly known as:  MIRALAX   Dose:  1 capful   Quantity:  510 g        Take 17 g (1 capful) by mouth daily   Refills:  3        potassium chloride 10 MEQ tablet   Commonly known as:  K-TAB,KLOR-CON   Dose:  10 mEq   Indication:  Low Amount of Potassium in the Blood   Quantity:  120 tablet        Take 1 tablet (10 mEq) by mouth daily   Refills:  11        sertraline 100 MG tablet   Commonly known as:  ZOLOFT   Dose:  100 mg   Indication:  Major Depressive Disorder   Quantity:  30 tablet        Take 1 tablet (100 mg) by mouth daily   Refills:  11         sodium chloride 0.65 % nasal spray   Commonly known as:  SALINE MIST   Dose:  1 spray   Quantity:  1 Bottle        Spray 1 spray into both nostrils daily as needed for congestion   Refills:  11        STATIN NOT PRESCRIBED (INTENTIONAL)   Dose:  1 each   Quantity:  0 each        1 each daily Statin not prescribed intentionally due to Active liver disease (liver failure, cirrhosis, hepatitis) LIVER METS   Refills:  0        triamcinolone 0.1 % paste   Commonly known as:  KENALOG   Quantity:  5 g        Take by mouth 2 times daily   Refills:  0        Vitamin D (Cholecalciferol) 1000 UNITS Tabs   Dose:  2000 Units   Quantity:  60 tablet        Take 2,000 Units by mouth daily   Refills:  11        * Notice:  This list has 2 medication(s) that are the same as other medications prescribed for you. Read the directions carefully, and ask your doctor or other care provider to review them with you.            Orders Needing Specimen Collection     None      Pending Results     No orders found from 8/4/2017 to 8/7/2017.            Pending Culture Results     No orders found from 8/4/2017 to 8/7/2017.            Pending Results Instructions     If you had any lab results that were not finalized at the time of your Discharge, you can call the ED Lab Result RN at [phone]. You will be contacted by this team for any positive Lab results or changes in treatment. The nurses are available 7 days a week from 10A to 6:30P.  You can leave a message 24 hours per day and they will return your call.        Test Results From Your Hospital Stay               Clinical Quality Measure: Blood Pressure Screening     Your blood pressure was checked while you were in the emergency department today. The last reading we obtained was  BP: 156/89 . Please read the guidelines below about what these numbers mean and what you should do about them.  If your systolic blood pressure (the top number) is less than 120 and your diastolic blood pressure  "(the bottom number) is less than 80, then your blood pressure is normal. There is nothing more that you need to do about it.  If your systolic blood pressure (the top number) is 120-139 or your diastolic blood pressure (the bottom number) is 80-89, your blood pressure may be higher than it should be. You should have your blood pressure rechecked within a year by a primary care provider.  If your systolic blood pressure (the top number) is 140 or greater or your diastolic blood pressure (the bottom number) is 90 or greater, you may have high blood pressure. High blood pressure is treatable, but if left untreated over time it can put you at risk for heart attack, stroke, or kidney failure. You should have your blood pressure rechecked by a primary care provider within the next 4 weeks.  If your provider in the emergency department today gave you specific instructions to follow-up with your doctor or provider even sooner than that, you should follow that instruction and not wait for up to 4 weeks for your follow-up visit.        Thank you for choosing [facility]       Thank you for choosing [facility] for your care. Our goal is always to provide you with excellent care. Hearing back from our patients is one way we can continue to improve our services. Please take a few minutes to complete the written survey that you may receive in the mail after you visit with us. Thank you!        [employer] Information     [employer] lets you send messages to your doctor, view your test results, renew your prescriptions, schedule appointments and more. To sign up, go to [email or web address] . Click on \"Log in\" on the left side of the screen, which will take you to the Welcome page. Then click on \"Sign up Now\" on the right side of the page.     You will be asked to enter the access code listed below, as well as some personal information. Please follow the directions to create your username and password.     Your access code is: " [email or web address]  Expires: 10/2/2017  7:26 PM     Your access code will  in 90 days. If you need help or a new code, please call your [facility] clinic or [phone].        Care EveryWhere ID     This is your Care EveryWhere ID. This could be used by other organizations to access your [facility] medical records  [id number]        Equal Access to Services     [facility]: Hadii casimiro Leonard, waaxda ludoreenadaha, qaybta kaalmada ademigel, emilee pardo . So [facility] [phone].    ATENCIÓN: Si habla español, tiene a christianson disposición servicios gratuitos de asistencia lingüística. Llame al [phone].    We comply with applicable federal civil rights laws and [place] laws. We do not discriminate on the basis of race, color, national origin, age, disability sex, sexual orientation or gender identity.            After Visit Summary       This is your record. Keep this with you and show to your community pharmacist(s) and doctor(s) at your next visit.

## 2017-08-06 NOTE — ED PROVIDER NOTES
History     Chief Complaint:  Flank pain     HPI   Ines Mott is a 52 year old female  with a history of migraines, HTN, hepatic cancer, diabetes mellitus, and asthma who presents with flank pain. The patient was seen here yesterday afternoon for evaluation of a headache. She additionally reported a fever and increased urinary frequency. During her ED course, she had blood work and urinalysis performed. UAA was positive for moderate bacteria, the patient was diagnosed with a UTI, and she was discharged to home with a prescription for Keflex. Due to her prescription restrictions, the patient has been unable to get this prescription filled. Since discharge, she describes right flank pain as well as continued fever, chills, urinary frequency, and headache. She measured her temperature to be 99.0F. Additionally, this afternoon, she has had two emetic episodes, and the patient also notes multiple episodes of urinary incontinence.     Of note, the patient normally takes oxycodone for pain though notes her prescription is currently out.     Allergies:  Ativan   Macrobid   Amoxicillin                   Buspirone                    Cephalosporins                       Dilaudid                       Diphenhydramine Hcl             Benadryl  Imitrex   Ketorolac Tromethamine                    Toradol  Lansoprazole              Prevacid  Metoclopramide                      Reglan  Paroxetine                                         Penicillins                    Prednisone                  Sulfa Drugs                 Lidoderm      Medications:    oxycodone-acetaminophen   montelukast   cetirizine   fluticasone   sodium chloride   triamcinolone   ondansetron   cyanocobalamin   potassium chloride   sertraline   losartan-hydrochlorothiazide  albuterol   magnesium oxide   albuterol   mometasone-formoterol   polyethylene glycol   Epinephrine      Past Medical History:    Arthritis                        Asthma                  "       Cancer                        Chronic pain syndrome                       Depressive disorder                Diabetes                      Hydrocephalus                        Hypertension               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   Methamphetamine abuse                   Migraines                    Mild intermittent asthma                     Obesity                        Right renal mass                     Spina bifida                 PTSD  Fibromyalgia      Past Surgical History:    Appendectomy  Cholecystectomy  Dental surgery  Gyn surgery  Ventriculoperitoneal shunt implantation      Family History:    Cancer     Social History:  Former smoker  Negative for alcohol use.  Marital Status:   [2]      Review of Systems   Constitutional: Positive for chills and fever.   Gastrointestinal: Positive for nausea and vomiting.   Genitourinary: Positive for flank pain (right) and frequency.   Neurological: Positive for headaches.   All other systems reviewed and are negative.    Physical Exam   Patient Vitals for the past 24 hrs:   BP Temp Temp src Pulse Resp SpO2 Height Weight   08/06/17 1830 156/89 99.2  F (37.3  C) Oral 66 16 96 % 1.676 m (5' 6\") 120.2 kg (265 lb)      Physical Exam  General: Resting on the gurney.   Head:  The scalp, face, and head appear normal  Mouth/Throat: Mucus membranes are moist  CV:  Regular rate    Normal S1 and S2  No pathological murmur   Resp:  Breath sounds clear and equal bilaterally    Non-labored, no retractions or accessory muscle use    No coarseness    No wheezing   GI:  Mild diffuse abdominal pain worse in the right upper quadrant. No superpubic tenderness. No CVA tenderness to percussion.   MS:  Normal motor assessment of all extremities.    Good capillary refill noted.  Skin:   No rash or lesions noted.  Neuro:   Speech is normal and fluent. No apparent deficit.  Psych: Awake. Alert.  Normal affect.      Appropriate " interactions.    Emergency Department Course   Interventions:  1845 Rocephin 1 g IV     Emergency Department Course:  Nursing notes and vitals reviewed.  I performed an exam of the patient as documented above.     IV inserted. Medicine administered as documented above.     Findings and plan explained to the Patient. Patient discharged home with instructions regarding supportive care, medications, and reasons to return. The importance of close follow-up was reviewed.     Impression & Plan      Medical Decision Making:  Ines Mott is a 52 year old female who presents for evaluation of right flank pain. She was diagnosed with a UTI yesterday during her ED visit per urinalysis, however, has not been able to fill her prescription for Keflex. There is no clinical evidence of pyelonephritis, appendicitis, colitis, diverticulitis or any intraabdominal catastrophe. The patient was given a gram of Rocephin here in the ED, and was told to have her prescription for Keflex filled. Return if increasing pain, vomiting, fever, or inability to tolerate the oral antibiotic.  Follow up with primary physician is indicated if not improving in 2-3 days.     Diagnosis:    ICD-10-CM    1. Urinary tract infection without hematuria, site unspecified N39.0        IMartinez, am serving as a scribe on 8/6/2017 at 6:22 PM to personally document services performed by Mara Cat MD based on my observations and the provider's statements to me.     Martinez Walker  8/6/2017    EMERGENCY DEPARTMENT       Mara Cat MD  08/07/17 0811

## 2017-08-07 ENCOUNTER — TELEPHONE (OUTPATIENT)
Dept: FAMILY MEDICINE | Facility: CLINIC | Age: 52
End: 2017-08-07

## 2017-08-07 ENCOUNTER — OFFICE VISIT (OUTPATIENT)
Dept: FAMILY MEDICINE | Facility: CLINIC | Age: 52
End: 2017-08-07
Payer: COMMERCIAL

## 2017-08-07 ENCOUNTER — HOSPITAL ENCOUNTER (EMERGENCY)
Facility: CLINIC | Age: 52
Discharge: HOME | End: 2017-08-08
Attending: EMERGENCY MEDICINE | Admitting: EMERGENCY MEDICINE
Payer: COMMERCIAL

## 2017-08-07 ENCOUNTER — CARE COORDINATION (OUTPATIENT)
Dept: CARE COORDINATION | Facility: CLINIC | Age: 52
End: 2017-08-07

## 2017-08-07 VITALS
HEART RATE: 69 BPM | OXYGEN SATURATION: 99 % | RESPIRATION RATE: 16 BRPM | TEMPERATURE: 97.2 F | DIASTOLIC BLOOD PRESSURE: 76 MMHG | BODY MASS INDEX: 41.08 KG/M2 | SYSTOLIC BLOOD PRESSURE: 126 MMHG | WEIGHT: 254.5 LBS

## 2017-08-07 DIAGNOSIS — S92.911D CLOSED NONDISPLACED FRACTURE OF PHALANX OF TOE OF RIGHT FOOT WITH ROUTINE HEALING, UNSPECIFIED TOE, SUBSEQUENT ENCOUNTER: Primary | ICD-10-CM

## 2017-08-07 DIAGNOSIS — J30.1 CHRONIC SEASONAL ALLERGIC RHINITIS DUE TO POLLEN: ICD-10-CM

## 2017-08-07 DIAGNOSIS — I10 HYPERTENSION GOAL BP (BLOOD PRESSURE) < 140/80: Chronic | ICD-10-CM

## 2017-08-07 DIAGNOSIS — R30.0 DYSURIA: Primary | ICD-10-CM

## 2017-08-07 DIAGNOSIS — R10.9 FLANK PAIN: ICD-10-CM

## 2017-08-07 DIAGNOSIS — G89.4 CHRONIC PAIN SYNDROME: ICD-10-CM

## 2017-08-07 DIAGNOSIS — R30.0 DYSURIA: ICD-10-CM

## 2017-08-07 LAB
ALBUMIN UR-MCNC: 10 MG/DL
ALBUMIN UR-MCNC: NEGATIVE MG/DL
APPEARANCE UR: ABNORMAL
APPEARANCE UR: CLEAR
BILIRUB UR QL STRIP: NEGATIVE
BILIRUB UR QL STRIP: NEGATIVE
COLOR UR AUTO: YELLOW
COLOR UR AUTO: YELLOW
GLUCOSE UR STRIP-MCNC: NEGATIVE MG/DL
GLUCOSE UR STRIP-MCNC: NEGATIVE MG/DL
HGB UR QL STRIP: ABNORMAL
HGB UR QL STRIP: ABNORMAL
KETONES UR STRIP-MCNC: NEGATIVE MG/DL
KETONES UR STRIP-MCNC: NEGATIVE MG/DL
LEUKOCYTE ESTERASE UR QL STRIP: NEGATIVE
LEUKOCYTE ESTERASE UR QL STRIP: NEGATIVE
MUCOUS THREADS #/AREA URNS LPF: PRESENT /LPF
NITRATE UR QL: NEGATIVE
NITRATE UR QL: NEGATIVE
NON-SQ EPI CELLS #/AREA URNS LPF: NORMAL /LPF
PH UR STRIP: 5.5 PH (ref 5–7)
PH UR STRIP: 6 PH (ref 5–7)
RBC #/AREA URNS AUTO: 1 /HPF (ref 0–2)
RBC #/AREA URNS AUTO: NORMAL /HPF (ref 0–2)
SP GR UR STRIP: 1.02 (ref 1–1.03)
SP GR UR STRIP: 1.03 (ref 1–1.03)
SQUAMOUS #/AREA URNS AUTO: 9 /HPF (ref 0–1)
URN SPEC COLLECT METH UR: ABNORMAL
URN SPEC COLLECT METH UR: ABNORMAL
UROBILINOGEN UR STRIP-ACNC: 0.2 EU/DL (ref 0.2–1)
UROBILINOGEN UR STRIP-MCNC: NORMAL MG/DL (ref 0–2)
WBC #/AREA URNS AUTO: 3 /HPF (ref 0–2)
WBC #/AREA URNS AUTO: NORMAL /HPF (ref 0–2)

## 2017-08-07 PROCEDURE — 81001 URINALYSIS AUTO W/SCOPE: CPT | Performed by: EMERGENCY MEDICINE

## 2017-08-07 PROCEDURE — 99283 EMERGENCY DEPT VISIT LOW MDM: CPT

## 2017-08-07 PROCEDURE — 25000125 ZZHC RX 250: Performed by: EMERGENCY MEDICINE

## 2017-08-07 PROCEDURE — 81001 URINALYSIS AUTO W/SCOPE: CPT | Performed by: FAMILY MEDICINE

## 2017-08-07 PROCEDURE — 99214 OFFICE O/P EST MOD 30 MIN: CPT | Performed by: FAMILY MEDICINE

## 2017-08-07 RX ORDER — ONDANSETRON 4 MG/1
4 TABLET, ORALLY DISINTEGRATING ORAL ONCE
Status: COMPLETED | OUTPATIENT
Start: 2017-08-07 | End: 2017-08-07

## 2017-08-07 RX ORDER — OXYCODONE AND ACETAMINOPHEN 5; 325 MG/1; MG/1
1 TABLET ORAL EVERY 4 HOURS PRN
Qty: 30 TABLET | Refills: 0 | Status: SHIPPED | OUTPATIENT
Start: 2017-08-07 | End: 2017-08-18

## 2017-08-07 RX ORDER — CIPROFLOXACIN 500 MG/1
500 TABLET, FILM COATED ORAL 2 TIMES DAILY
Qty: 20 TABLET | Refills: 1 | Status: SHIPPED | OUTPATIENT
Start: 2017-08-07 | End: 2017-08-18

## 2017-08-07 RX ADMIN — ONDANSETRON 4 MG: 4 TABLET, ORALLY DISINTEGRATING ORAL at 22:55

## 2017-08-07 ASSESSMENT — ENCOUNTER SYMPTOMS
VOMITING: 1
FREQUENCY: 1
DYSURIA: 1
FEVER: 1
BACK PAIN: 1
NAUSEA: 1
ABDOMINAL PAIN: 1

## 2017-08-07 NOTE — PROGRESS NOTES
Clinic Care Coordination Contact    Situation: Patient chart reviewed by care coordinator.    Background: Patient in ED on 08/05 & 08/06/2017. Both related to UTIs. Patient seen by PCP on 08/07/2017    Assessment: No need to outreach to patient as she has already been seen by PCP    Plan/Recommendations: Clinic Care Coordinator, SMITH to follow up with patient when scheduled  BRISSA Cruz, [facility]  Clinic Care Coordinator, SMITH with [facility]  [phone]

## 2017-08-07 NOTE — MR AVS SNAPSHOT
After Visit Summary   8/7/2017    Ines Mott    MRN: [id number]           Patient Information     Date Of Birth          1965        Visit Information        Provider Department      8/7/2017 9:45 AM Dustin Fatima MD [facility]        Today's Diagnoses     Closed nondisplaced fracture of phalanx of toe of right foot with routine healing, unspecified toe, subsequent encounter    -  1      Care Instructions    (S92.911D) Closed nondisplaced fracture of phalanx of toe of right foot with routine healing, unspecified toe, subsequent encounter  (primary encounter diagnosis)  Comment:  CONTINUE WITH SPECIAL SHOE   Plan:  REST ICE ELEVATION   SPLINT TO NEXT TOE               Follow-ups after your visit        Your next 10 appointments already scheduled     Aug 31, 2017  2:00 PM CDT   New Visit with MICHEAL Matias CNP   [facility] ([facility])    [address]  [address]  [facility] [place] [address] [phone]              Who to contact     If you have questions or need follow up information about today's clinic visit or your schedule please contact [facility] directly at [phone].  Normal or non-critical lab and imaging results will be communicated to you by MyChart, letter or phone within 4 business days after the clinic has received the results. If you do not hear from us within 7 days, please contact the clinic through MyChart or phone. If you have a critical or abnormal lab result, we will notify you by phone as soon as possible.  Submit refill requests through [employer] or call your pharmacy and they will forward the refill request to us. Please allow 3 business days for your refill to be completed.          Additional Information About Your Visit        MyChart Information     [employer] lets you send messages to your doctor, view your test results, renew your  "prescriptions, schedule appointments and more. To sign up, go to [email or web address] . Click on \"Log in\" on the left side of the screen, which will take you to the Welcome page. Then click on \"Sign up Now\" on the right side of the page.     You will be asked to enter the access code listed below, as well as some personal information. Please follow the directions to create your username and password.     Your access code is: [email or web address]  Expires: 10/2/2017  7:26 PM     Your access code will  in 90 days. If you need help or a new code, please call your [facility] clinic or [phone].        Care EveryWhere ID     This is your Care EveryWhere ID. This could be used by other organizations to access your [facility] medical records  [id number]        Your Vitals Were     Pulse Temperature Respirations Pulse Oximetry BMI (Body Mass Index)       69 97.2  F (36.2  C) (Tympanic) 16 99% 41.08 kg/m2        Blood Pressure from Last 3 Encounters:   17 126/76   17 156/89   17 (!) 150/92    Weight from Last 3 Encounters:   17 254 lb 8 oz (115.4 kg)   17 265 lb (120.2 kg)   17 263 lb (119.3 kg)              We Performed the Following     UA reflex to Microscopic and Culture     Urine Microscopic          Today's Medication Changes          These changes are accurate as of: 17 10:55 AM.  If you have any questions, ask your nurse or doctor.               Start taking these medicines.        Dose/Directions    ciprofloxacin 500 MG tablet   Commonly known as:  CIPRO   Started by:  Dustin Fatima MD        Dose:  500 mg   Take 1 tablet (500 mg) by mouth 2 times daily   Quantity:  20 tablet   Refills:  1            Where to get your medicines      These medications were sent to [facility] [address] [place] [address] [address] [address]     Phone:  [phone]     ciprofloxacin 500 MG tablet         Some of these will need a " paper prescription and others can be bought over the counter.  Ask your nurse if you have questions.     Bring a paper prescription for each of these medications     oxyCODONE-acetaminophen 5-325 MG per tablet                Primary Care Provider Office Phone # Fax #    Dustin Fatima [phone]       [facility] [address]  [facility] [address]        Equal Access to Services     VALENTE RIVERA : Hadii aad ku hadasho Soomaali, waaxda luqadaha, qaybta kaalmada adeegyada, waxay idiin hayaan adeeg kharash la'scooter . So [facility] [phone].    ATENCIÓN: Si jennyla esphector, tiene a christianson disposición servicios gratuitos de asistencia lingüística. Llame al [phone].    We comply with applicable federal civil rights laws and [place] laws. We do not discriminate on the basis of race, color, national origin, age, disability sex, sexual orientation or gender identity.            Thank you!     Thank you for choosing [facility]  for your care. Our goal is always to provide you with excellent care. Hearing back from our patients is one way we can continue to improve our services. Please take a few minutes to complete the written survey that you may receive in the mail after your visit with us. Thank you!             Your Updated Medication List - Protect others around you: Learn how to safely use, store and throw away your medicines at www.disposemymeds.org.          This list is accurate as of: 8/7/17 10:55 AM.  Always use your most recent med list.                   Brand Name Dispense Instructions for use Diagnosis    * albuterol (2.5 MG/3ML) 0.083% neb solution     30 vial    Take 1 vial (2.5 mg) by nebulization every 6 hours as needed for shortness of breath / dyspnea or wheezing    Mild intermittent asthma with acute exacerbation       * albuterol 108 (90 BASE) MCG/ACT Inhaler    VENTOLIN HFA    54 g    INHALE TWO PUFFS BY MOUTH EVERY 4 HOURS AS  NEEDED FOR SHORTNESS OF BREATH/DYSPNEA    Mild intermittent asthma without complication       ASPIRIN NOT PRESCRIBED    INTENTIONAL    1 each    Please choose reason not prescribed, below    Aspirin intolerance       blood glucose calibration solution    no brand specified    1 each    Use to calibrate blood glucose monitor as directed.    Type 2 diabetes mellitus without complication, without long-term current use of insulin (H)       blood glucose lancets standard    no brand specified    1 Box    Use to test blood sugar daily times daily or as directed.    Type 2 diabetes mellitus without complication, without long-term current use of insulin (H)       blood glucose monitoring meter device kit    no brand specified    1 kit    Use to test blood sugar larisa times daily or as directed.    Type 2 diabetes mellitus without complication, without long-term current use of insulin (H)       blood glucose monitoring test strip    no brand specified    100 strip    Use to test blood sugars once times daily or as directed    Type 2 diabetes mellitus without complication, without long-term current use of insulin (H)       cephalexin 250 MG capsule    KEFLEX    21 capsule    Take 1 capsule (250 mg) by mouth 3 times daily for 7 days        cetirizine 10 MG tablet    zyrTEC    30 tablet    Take 1 tablet (10 mg) by mouth every evening    Seasonal allergic rhinitis due to pollen       ciprofloxacin 500 MG tablet    CIPRO    20 tablet    Take 1 tablet (500 mg) by mouth 2 times daily        cyanocobalamin 1000 MCG/ML injection    VITAMIN B12    1 mL    Inject 1 mL (1,000 mcg) Subcutaneous every 30 days    B12 deficiency       EPINEPHrine 0.3 MG/0.3ML injection 2-pack    EPIPEN/ADRENACLICK/or ANY BX GENERIC EQUIV    2 each    Inject 0.3 mLs (0.3 mg) into the muscle once as needed for anaphylaxis    Late effect of other and unspecified external causes       fluticasone 50 MCG/ACT spray    FLONASE    3 Bottle    Spray 1-2 sprays into  both nostrils daily    Seasonal allergic rhinitis due to pollen, Chronic pain syndrome       losartan-hydrochlorothiazide 50-12.5 MG per tablet    HYZAAR    90 tablet    Take 1 tablet by mouth daily    Hypertension goal BP (blood pressure) < 140/80       magnesium oxide 400 (241.3 MG) MG tablet    MAG-OX    90 tablet    Take 1 tablet (400 mg) by mouth daily    Cramp of limb       mometasone-formoterol 100-5 MCG/ACT oral inhaler    DULERA    1 Inhaler    Inhale 2 puffs into the lungs 2 times daily    Persistent asthma       montelukast 10 MG tablet    SINGULAIR    30 tablet    Take 1 tablet (10 mg) by mouth At Bedtime    Seasonal allergic rhinitis due to pollen       ondansetron 4 MG ODT tab    ZOFRAN ODT    10 tablet    Take 1 tablet (4 mg) by mouth every 8 hours as needed for nausea    Chronic pain syndrome       order for DME     1 Device    Right wrist splint for carpal tunnel syndrome  One* Use as directed at hour of sleep    Carpal tunnel syndrome of right wrist       oxyCODONE-acetaminophen 5-325 MG per tablet    PERCOCET    30 tablet    Take 1 tablet by mouth every 4 hours as needed for moderate to severe pain        polyethylene glycol powder    MIRALAX    510 g    Take 17 g (1 capful) by mouth daily    Slow transit constipation       potassium chloride 10 MEQ tablet    K-TAB,KLOR-CON    120 tablet    Take 1 tablet (10 mEq) by mouth daily    Hypokalemia       sertraline 100 MG tablet    ZOLOFT    30 tablet    Take 1 tablet (100 mg) by mouth daily    Recurrent major depression in partial remission (H), Other constipation       sodium chloride 0.65 % nasal spray    SALINE MIST    1 Bottle    Spray 1 spray into both nostrils daily as needed for congestion    Seasonal allergic rhinitis due to pollen       STATIN NOT PRESCRIBED (INTENTIONAL)     0 each    1 each daily Statin not prescribed intentionally due to Active liver disease (liver failure, cirrhosis, hepatitis) LIVER METS    Hyperlipidemia LDL goal <100,  Type 2 diabetes mellitus without complication (H)       triamcinolone 0.1 % paste    KENALOG    5 g    Take by mouth 2 times daily    Oral aphthae       Vitamin D (Cholecalciferol) 1000 UNITS Tabs     60 tablet    Take 2,000 Units by mouth daily    Vitamin D deficiency       * Notice:  This list has 2 medication(s) that are the same as other medications prescribed for you. Read the directions carefully, and ask your doctor or other care provider to review them with you.
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
## 2017-08-07 NOTE — PATIENT INSTRUCTIONS
(O86.953F) Closed nondisplaced fracture of phalanx of toe of right foot with routine healing, unspecified toe, subsequent encounter  (primary encounter diagnosis)  Comment:  CONTINUE WITH SPECIAL SHOE   Plan:  REST ICE ELEVATION   SPLINT TO NEXT TOE

## 2017-08-07 NOTE — TELEPHONE ENCOUNTER
Patient called reporting UTI    URINARY TRACT SYMPTOMS  Onset: Started this weekend, shunt in brain, tube goes down into my bladder    Description:   Painful urination (Dysuria): YES  Blood in urine (Hematuria): no   Delay in urine (Hesitency): YES    Intensity: moderate    Progression of Symptoms:  worsening    Accompanying Signs & Symptoms:  Fever/chills: YES, low grade the last few days (btw )  Flank pain YES  Nausea and vomiting: YES  Any vaginal symptoms: none  Abdominal/Pelvic Pain: YES    History:   History of frequent UTI's: YES  History of kidney stones: YES  Sexually Active: no   Possibility of pregnancy: No    Precipitating factors:   None identified    Therapies Tried and outcome: antibiotics and Increase fluid intake       Recent Medication changes: none    Home Care information given: increase fluid intake, keep area clean, no tub baths  Advised: Follow up with clinic if: appointment made.  Follow up with Emergent Care if: indicated by the provider.    References used: Telephone Triage Protocols for Nurses fifth edition     Please advise as appropriate with further recommendations as appropriate.    Leandra Marr, RN  Triage RN  [facility]

## 2017-08-07 NOTE — LETTER
Ines Mott  [address] [place] [id number]      August 7, 2017    Dear ,    We are writing to inform you of your test results.      Resulted Orders   UA reflex to Microscopic and Culture   Result Value Ref Range    Color Urine Yellow     Appearance Urine Clear     Glucose Urine Negative NEG mg/dL    Bilirubin Urine Negative NEG    Ketones Urine Negative NEG mg/dL    Specific Gravity Urine 1.025 1.003 - 1.035    Blood Urine Small (A) NEG    pH Urine 6.0 5.0 - 7.0 pH    Protein Albumin Urine Negative NEG mg/dL    Urobilinogen Urine 0.2 0.2 - 1.0 EU/dL    Nitrite Urine Negative NEG    Leukocyte Esterase Urine Negative NEG    Source Midstream Urine    Urine Microscopic   Result Value Ref Range    WBC Urine O - 2 0 - 2 /HPF    RBC Urine O - 2 0 - 2 /HPF    Squamous Epithelial /LPF Urine Few FEW /LPF       If you have any questions or concerns, please call the clinic at the number listed above.   Sincerely,      OTTO YUSUF MD      Sincerely,        OTTO YUSUF MD

## 2017-08-07 NOTE — ED AVS SNAPSHOT
"  Emergency Department    [address] [facility] [address]    Phone:  [phone]    Fax:  [phone]                                       Ines Mott   MRN: [id number]    Department:   Emergency Department   Date of Visit:  8/7/2017           Patient Information     Date Of Birth          1965        Your diagnoses for this visit were:     Dysuria        You were seen by Maryjane Diggs MD.      Follow-up Information     Follow up with  Emergency Department.    Specialty:  EMERGENCY MEDICINE    Why:  immediately , If symptoms worsen    Contact information:    [address] [facility] [id number] [phone]        Follow up with Dustin Fatima MD In 2 days.    Specialty:  Family Practice    Why:  As needed    Contact information:    LEEANNA [place] RADHA CHEUNG  [address]  [facility] [address]  [phone]          Discharge Instructions         Dysuria     Painful urination (dysuria) is often caused by a problem in the urinary tract.   Dysuria is pain felt during urination. It is often described as a burning. Learn more about this problem and how it can be treated.  What causes dysuria?  Possible causes include:    Infection with a bacteria or virus such as a urinary tract infection (UTI or a sexually transmitted infection (STI)    Sensitivity or allergy to chemicals such as those found in lotions and other products    Prostate or bladder problems    Radiation therapy to the pelvic area  How is dysuria diagnosed?  Your healthcare provider will examine you. He or she will ask about your symptoms and health. After talking with you and doing a physical exam, your healthcare provider may know what is causing your dysuria. He or she will usually request  a sample of your urine. Tests of your urine, or a \"urinalysis,\" are done. A urinalysis may include:    Looking at the urine sample (visual exam)    Checking for substances (chemical " exam)    Looking at a small amount under a microscope (microscopic exam)  Some parts of the urinalysis may be done in the provider's office and some in a lab. And, the urine sample may be checked for bacteria and yeast (urine culture). Your healthcare provider will tell you more about these tests if they are needed.  How is dysuria treated?  Treatment depends on the cause. If you have a bacterial infection, you may need antibiotics. You may be given medicines to make it easier for you to urinate and help relieve pain. Your healthcare provider can tell you more about your treatment options. Untreated, symptoms may get worse.  When to call your healthcare provider  Call the healthcare provider right away if you have any of the following:    Fever of 100.4 F (38 C) or higher     No improvement after three days of treatment    Trouble urinating because of pain    New or increased discharge from the vagina or penis    Rash or joint pain    Increased back or abdominal pain    Enlarged painful lymph nodes (lumps) in the groin   Date Last Reviewed: 1/1/2017 2000-2017 [employer]. [address] [place] [address]. All rights reserved. This information is not intended as a substitute for professional medical care. Always follow your healthcare professional's instructions.          Future Appointments        Provider Department Dept Phone Center    8/31/2017 2:00 PM MICHEAL Matias CNP [facility] [phone] [facility]      24 Hour Appointment Hotline       To make an appointment at any [facility], call [phone] ([phone]). If you don't have a family doctor or clinic, we will help you find one. [facility] are conveniently located to serve the needs of you and your family.             Review of your medicines      Our records show that you are taking the medicines listed below. If these are incorrect, please call your family doctor or clinic.        Dose  / Directions Last dose taken    * albuterol (2.5 MG/3ML) 0.083% neb solution   Dose:  1 vial   Quantity:  30 vial        Take 1 vial (2.5 mg) by nebulization every 6 hours as needed for shortness of breath / dyspnea or wheezing   Refills:  3        * albuterol 108 (90 BASE) MCG/ACT Inhaler   Commonly known as:  VENTOLIN HFA   Quantity:  54 g        INHALE TWO PUFFS BY MOUTH EVERY 4 HOURS AS NEEDED FOR SHORTNESS OF BREATH/DYSPNEA   Refills:  1        ASPIRIN NOT PRESCRIBED   Commonly known as:  INTENTIONAL   Quantity:  1 each        Please choose reason not prescribed, below   Refills:  0        blood glucose calibration solution   Commonly known as:  no brand specified   Quantity:  1 each        Use to calibrate blood glucose monitor as directed.   Refills:  0        blood glucose lancets standard   Commonly known as:  no brand specified   Quantity:  1 Box        Use to test blood sugar daily times daily or as directed.   Refills:  11        blood glucose monitoring meter device kit   Commonly known as:  no brand specified   Quantity:  1 kit        Use to test blood sugar larisa times daily or as directed.   Refills:  0        blood glucose monitoring test strip   Commonly known as:  no brand specified   Quantity:  100 strip        Use to test blood sugars once times daily or as directed   Refills:  3        cephalexin 250 MG capsule   Commonly known as:  KEFLEX   Dose:  250 mg   Quantity:  21 capsule        Take 1 capsule (250 mg) by mouth 3 times daily for 7 days   Refills:  0        cetirizine 10 MG tablet   Commonly known as:  zyrTEC   Dose:  10 mg   Quantity:  30 tablet        Take 1 tablet (10 mg) by mouth every evening   Refills:  1        ciprofloxacin 500 MG tablet   Commonly known as:  CIPRO   Dose:  500 mg   Quantity:  20 tablet        Take 1 tablet (500 mg) by mouth 2 times daily   Refills:  1        cyanocobalamin 1000 MCG/ML injection   Commonly known as:  VITAMIN B12   Dose:  1 mL   Quantity:  1 mL         Inject 1 mL (1,000 mcg) Subcutaneous every 30 days   Refills:  11        EPINEPHrine 0.3 MG/0.3ML injection 2-pack   Commonly known as:  EPIPEN/ADRENACLICK/or ANY BX GENERIC EQUIV   Dose:  0.3 mg   Quantity:  2 each        Inject 0.3 mLs (0.3 mg) into the muscle once as needed for anaphylaxis   Refills:  5        fluticasone 50 MCG/ACT spray   Commonly known as:  FLONASE   Dose:  1-2 spray   Quantity:  3 Bottle        Spray 1-2 sprays into both nostrils daily   Refills:  1        losartan-hydrochlorothiazide 50-12.5 MG per tablet   Commonly known as:  HYZAAR   Dose:  1 tablet   Quantity:  90 tablet        Take 1 tablet by mouth daily   Refills:  3        magnesium oxide 400 (241.3 MG) MG tablet   Commonly known as:  MAG-OX   Dose:  400 mg   Quantity:  90 tablet        Take 1 tablet (400 mg) by mouth daily   Refills:  3        mometasone-formoterol 100-5 MCG/ACT oral inhaler   Commonly known as:  DULERA   Dose:  2 puff   Quantity:  1 Inhaler        Inhale 2 puffs into the lungs 2 times daily   Refills:  11        montelukast 10 MG tablet   Commonly known as:  SINGULAIR   Dose:  10 mg   Quantity:  30 tablet        Take 1 tablet (10 mg) by mouth At Bedtime   Refills:  11        ondansetron 4 MG ODT tab   Commonly known as:  ZOFRAN ODT   Dose:  4 mg   Quantity:  10 tablet        Take 1 tablet (4 mg) by mouth every 8 hours as needed for nausea   Refills:  0        order for DME   Quantity:  1 Device        Right wrist splint for carpal tunnel syndrome  One* Use as directed at hour of sleep   Refills:  1        oxyCODONE-acetaminophen 5-325 MG per tablet   Commonly known as:  PERCOCET   Dose:  1 tablet   Quantity:  30 tablet        Take 1 tablet by mouth every 4 hours as needed for moderate to severe pain   Refills:  0        polyethylene glycol powder   Commonly known as:  MIRALAX   Dose:  1 capful   Quantity:  510 g        Take 17 g (1 capful) by mouth daily   Refills:  3        potassium chloride 10 MEQ tablet    Commonly known as:  K-TAB,KLOR-CON   Dose:  10 mEq   Indication:  Low Amount of Potassium in the Blood   Quantity:  120 tablet        Take 1 tablet (10 mEq) by mouth daily   Refills:  11        sertraline 100 MG tablet   Commonly known as:  ZOLOFT   Dose:  100 mg   Indication:  Major Depressive Disorder   Quantity:  30 tablet        Take 1 tablet (100 mg) by mouth daily   Refills:  11        sodium chloride 0.65 % nasal spray   Commonly known as:  SALINE MIST   Dose:  1 spray   Quantity:  1 Bottle        Spray 1 spray into both nostrils daily as needed for congestion   Refills:  11        STATIN NOT PRESCRIBED (INTENTIONAL)   Dose:  1 each   Quantity:  0 each        1 each daily Statin not prescribed intentionally due to Active liver disease (liver failure, cirrhosis, hepatitis) LIVER METS   Refills:  0        triamcinolone 0.1 % paste   Commonly known as:  KENALOG   Quantity:  5 g        Take by mouth 2 times daily   Refills:  0        Vitamin D (Cholecalciferol) 1000 UNITS Tabs   Dose:  2000 Units   Quantity:  60 tablet        Take 2,000 Units by mouth daily   Refills:  11        * Notice:  This list has 2 medication(s) that are the same as other medications prescribed for you. Read the directions carefully, and ask your doctor or other care provider to review them with you.            Procedures and tests performed during your visit     UA with Microscopic      Orders Needing Specimen Collection     None      Pending Results     No orders found for last 3 day(s).            Pending Culture Results     No orders found for last 3 day(s).            Pending Results Instructions     If you had any lab results that were not finalized at the time of your Discharge, you can call the ED Lab Result RN at [phone]. You will be contacted by this team for any positive Lab results or changes in treatment. The nurses are available 7 days a week from 10A to 6:30P.  You can leave a message 24 hours per day and they will  return your call.        Test Results From Your Hospital Stay        8/7/2017 11:53 PM      Component Results     Component Value Ref Range & Units Status    Color Urine Yellow  Final    Appearance Urine Slightly Cloudy  Final    Glucose Urine Negative NEG mg/dL Final    Bilirubin Urine Negative NEG Final    Ketones Urine Negative NEG mg/dL Final    Specific Gravity Urine 1.029 1.003 - 1.035 Final    Blood Urine Trace (A) NEG Final    pH Urine 5.5 5.0 - 7.0 pH Final    Protein Albumin Urine 10 (A) NEG mg/dL Final    Urobilinogen mg/dL Normal 0.0 - 2.0 mg/dL Final    Nitrite Urine Negative NEG Final    Leukocyte Esterase Urine Negative NEG Final    Source Midstream Urine  Final    WBC Urine 3 (H) 0 - 2 /HPF Final    RBC Urine 1 0 - 2 /HPF Final    Squamous Epithelial /HPF Urine 9 (H) 0 - 1 /HPF Final    Mucous Urine Present (A) NEG /LPF Final                Clinical Quality Measure: Blood Pressure Screening     Your blood pressure was checked while you were in the emergency department today. The last reading we obtained was  BP: 136/74 . Please read the guidelines below about what these numbers mean and what you should do about them.  If your systolic blood pressure (the top number) is less than 120 and your diastolic blood pressure (the bottom number) is less than 80, then your blood pressure is normal. There is nothing more that you need to do about it.  If your systolic blood pressure (the top number) is 120-139 or your diastolic blood pressure (the bottom number) is 80-89, your blood pressure may be higher than it should be. You should have your blood pressure rechecked within a year by a primary care provider.  If your systolic blood pressure (the top number) is 140 or greater or your diastolic blood pressure (the bottom number) is 90 or greater, you may have high blood pressure. High blood pressure is treatable, but if left untreated over time it can put you at risk for heart attack, stroke, or kidney failure.  "You should have your blood pressure rechecked by a primary care provider within the next 4 weeks.  If your provider in the emergency department today gave you specific instructions to follow-up with your doctor or provider even sooner than that, you should follow that instruction and not wait for up to 4 weeks for your follow-up visit.        Thank you for choosing [facility]       Thank you for choosing [facility] for your care. Our goal is always to provide you with excellent care. Hearing back from our patients is one way we can continue to improve our services. Please take a few minutes to complete the written survey that you may receive in the mail after you visit with us. Thank you!        [employer] Information     [email or web address] lets you send messages to your doctor, view your test results, renew your prescriptions, schedule appointments and more. To sign up, go to [email or web address] . Click on \"Log in\" on the left side of the screen, which will take you to the Welcome page. Then click on \"Sign up Now\" on the right side of the page.     You will be asked to enter the access code listed below, as well as some personal information. Please follow the directions to create your username and password.     Your access code is: [email or web address]  Expires: 10/2/2017  7:26 PM     Your access code will  in 90 days. If you need help or a new code, please call your [facility] clinic or [phone].        Care EveryWhere ID     This is your Care EveryWhere ID. This could be used by other organizations to access your [facility] medical records  [id number]        Equal Access to Services     [facility] Center[facility]DO : Hadii aad ku hadasho Sodanisali, waaxda luqadaha, qaybta kaalmada adeamandeepyada, emilee coyne. So [facility] [phone].    ATENCIÓN: Si habla español, tiene a christianson disposición servicios gratuitos de asistencia lingüística. Llame al [phone].    We comply with applicable federal civil rights laws and " [place] laws. We do not discriminate on the basis of race, color, national origin, age, disability sex, sexual orientation or gender identity.            After Visit Summary       This is your record. Keep this with you and show to your community pharmacist(s) and doctor(s) at your next visit.

## 2017-08-07 NOTE — PATIENT INSTRUCTIONS
RIGHT RENAL PAIN     NO CHILLS OR FEVER AT THIS MOMENT    ELEVATED WHITE BLOOD CELL COUNT     URINE CULTURE ORDERED PENDING    ROCEPHIN YESTERDAY     CEPHALOSPORIN ALLERGY     NO HIVES YET     KEFLEX 500MG PO  FOUR TIMES DAILY FROM EMERGENCY ROOM     STOPPED BY ME WITH CEPHALOSPORING    ANAPHYLAXIS with MACROBID     AND SULFA with RASH     GIVEN CIPROFLOXIN 500MG PO TWICE DAILY     STOP METALS ORALLY     MAGNESIUM TO BE HELD IRON     CUT BP PILLS IN HALF     FRESH FRUITS AND VEGETABLES     MONITOR FOR FEVER AND INCREASE PAIN     EMERGENCY ROOM     (R30.0) Dysuria  (primary encounter diagnosis)    Comment:      Plan:           (R10.9) Flank pain    Comment:      Plan:           (I10) Hypertension goal BP (blood pressure) < 140/80    Comment:      Plan:           (G89.4) Chronic pain syndrome    Comment:      Plan:           (J30.1) Chronic seasonal allergic rhinitis due to pollen    Comment:      Plan:                   OTTO YUSUF JR., MD

## 2017-08-07 NOTE — ED NOTES
Patient discharged in stable condition. Patient received follow-up instructions and 0RXs. No questions at time of discharge.

## 2017-08-07 NOTE — MR AVS SNAPSHOT
After Visit Summary   8/7/2017    Ines Mott    MRN: [id number]           Patient Information     Date Of Birth          1965        Visit Information        Provider Department      8/7/2017 10:15 AM Otto Fatima MD [facility]        Today's Diagnoses     Dysuria    -  1    Flank pain        Hypertension goal BP (blood pressure) < 140/80        Chronic pain syndrome        Chronic seasonal allergic rhinitis due to pollen          Care Instructions                  RIGHT RENAL PAIN     NO CHILLS OR FEVER AT THIS MOMENT    ELEVATED WHITE BLOOD CELL COUNT     URINE CULTURE ORDERED PENDING    ROCEPHIN YESTERDAY     CEPHALOSPORIN ALLERGY     NO HIVES YET     KEFLEX 500MG PO  FOUR TIMES DAILY FROM EMERGENCY ROOM     STOPPED BY ME WITH CEPHALOSPORING    ANAPHYLAXIS with MACROBID     AND SULFA with RASH     GIVEN CIPROFLOXIN 500MG PO TWICE DAILY     STOP METALS ORALLY     MAGNESIUM TO BE HELD IRON     CUT BP PILLS IN HALF     FRESH FRUITS AND VEGETABLES     MONITOR FOR FEVER AND INCREASE PAIN     EMERGENCY ROOM     (R30.0) Dysuria  (primary encounter diagnosis)    Comment:      Plan:           (R10.9) Flank pain    Comment:      Plan:           (I10) Hypertension goal BP (blood pressure) < 140/80    Comment:      Plan:           (G89.4) Chronic pain syndrome    Comment:      Plan:           (J30.1) Chronic seasonal allergic rhinitis due to pollen    Comment:      Plan:                   OTTO FATIMA JR., MD                         Follow-ups after your visit        Your next 10 appointments already scheduled     Aug 31, 2017  2:00 PM CDT   New Visit with MICHEAL Matias CNP   [facility] ([facility])    [address] [place] [id number] [phone]              Who to contact     If you have questions or need follow up information about today's clinic visit or your schedule please  "contact [facility] directly at [phone].  Normal or non-critical lab and imaging results will be communicated to you by MyChart, letter or phone within 4 business days after the clinic has received the results. If you do not hear from us within 7 days, please contact the clinic through MyChart or phone. If you have a critical or abnormal lab result, we will notify you by phone as soon as possible.  Submit refill requests through [employer] or call your pharmacy and they will forward the refill request to us. Please allow 3 business days for your refill to be completed.          Additional Information About Your Visit        [employer] Information     [employer] lets you send messages to your doctor, view your test results, renew your prescriptions, schedule appointments and more. To sign up, go to [email or web address] . Click on \"Log in\" on the left side of the screen, which will take you to the Welcome page. Then click on \"Sign up Now\" on the right side of the page.     You will be asked to enter the access code listed below, as well as some personal information. Please follow the directions to create your username and password.     Your access code is: [email or web address]  Expires: 10/2/2017  7:26 PM     Your access code will  in 90 days. If you need help or a new code, please call your [place] clinic or [phone].        Care EveryWhere ID     This is your Care EveryWhere ID. This could be used by other organizations to access your [place] medical records  [id number]         Blood Pressure from Last 3 Encounters:   17 126/76   17 156/89   17 (!) 150/92    Weight from Last 3 Encounters:   17 254 lb 8 oz (115.4 kg)   17 265 lb (120.2 kg)   17 263 lb (119.3 kg)              Today, you had the following     No orders found for display         Today's Medication Changes          These changes are accurate as of: 17 10:19 AM.  If you have " any questions, ask your nurse or doctor.               Start taking these medicines.        Dose/Directions    ciprofloxacin 500 MG tablet   Commonly known as:  CIPRO   Used for:  Dysuria   Started by:  Dustin Fatima MD        Dose:  500 mg   Take 1 tablet (500 mg) by mouth 2 times daily   Quantity:  20 tablet   Refills:  1            Where to get your medicines      These medications were sent to [facility] [address] [place] [address] [address] [facility]  [address] [facility] [address]     Phone:  [phone]     ciprofloxacin 500 MG tablet         Some of these will need a paper prescription and others can be bought over the counter.  Ask your nurse if you have questions.     Bring a paper prescription for each of these medications     oxyCODONE-acetaminophen 5-325 MG per tablet                Primary Care Provider Office Phone # Fax #    Dustin Fatima [phone]       [facility] [address] [facility] [address]        Equal Access to Services     WARREN [facility] HospitalLEOPOLDO AH: Hadii aad ku hadasho Soomaali, waaxda luqadaha, qaybta kaalmada adeegyada, waxay idiin hayaan adeeg kharaeric ladeonna . So [facility] [phone].    ATENCIÓN: Si habla español, tiene a christianson disposición servicios gratuitos de asistencia lingüística. Ll[facility] [phone].    We comply with applicable federal civil rights laws and [place] laws. We do not discriminate on the basis of race, color, national origin, age, disability sex, sexual orientation or gender identity.            Thank you!     Thank you for choosing [facility]  for your care. Our goal is always to provide you with excellent care. Hearing back from our patients is one way we can continue to improve our services. Please take a few minutes to complete the written survey that you may receive in the mail after your visit with us. Thank you!             Your Updated Medication List -  Protect others around you: Learn how to safely use, store and throw away your medicines at www.disposemymeds.org.          This list is accurate as of: 8/7/17 10:19 AM.  Always use your most recent med list.                   Brand Name Dispense Instructions for use Diagnosis    * albuterol (2.5 MG/3ML) 0.083% neb solution     30 vial    Take 1 vial (2.5 mg) by nebulization every 6 hours as needed for shortness of breath / dyspnea or wheezing    Mild intermittent asthma with acute exacerbation       * albuterol 108 (90 BASE) MCG/ACT Inhaler    VENTOLIN HFA    54 g    INHALE TWO PUFFS BY MOUTH EVERY 4 HOURS AS NEEDED FOR SHORTNESS OF BREATH/DYSPNEA    Mild intermittent asthma without complication       ASPIRIN NOT PRESCRIBED    INTENTIONAL    1 each    Please choose reason not prescribed, below    Aspirin intolerance       blood glucose calibration solution    no brand specified    1 each    Use to calibrate blood glucose monitor as directed.    Type 2 diabetes mellitus without complication, without long-term current use of insulin (H)       blood glucose lancets standard    no brand specified    1 Box    Use to test blood sugar daily times daily or as directed.    Type 2 diabetes mellitus without complication, without long-term current use of insulin (H)       blood glucose monitoring meter device kit    no brand specified    1 kit    Use to test blood sugar larisa times daily or as directed.    Type 2 diabetes mellitus without complication, without long-term current use of insulin (H)       blood glucose monitoring test strip    no brand specified    100 strip    Use to test blood sugars once times daily or as directed    Type 2 diabetes mellitus without complication, without long-term current use of insulin (H)       cephalexin 250 MG capsule    KEFLEX    21 capsule    Take 1 capsule (250 mg) by mouth 3 times daily for 7 days        cetirizine 10 MG tablet    zyrTEC    30 tablet    Take 1 tablet (10 mg) by mouth  every evening    Seasonal allergic rhinitis due to pollen       ciprofloxacin 500 MG tablet    CIPRO    20 tablet    Take 1 tablet (500 mg) by mouth 2 times daily    Dysuria       cyanocobalamin 1000 MCG/ML injection    VITAMIN B12    1 mL    Inject 1 mL (1,000 mcg) Subcutaneous every 30 days    B12 deficiency       EPINEPHrine 0.3 MG/0.3ML injection 2-pack    EPIPEN/ADRENACLICK/or ANY BX GENERIC EQUIV    2 each    Inject 0.3 mLs (0.3 mg) into the muscle once as needed for anaphylaxis    Late effect of other and unspecified external causes       fluticasone 50 MCG/ACT spray    FLONASE    3 Bottle    Spray 1-2 sprays into both nostrils daily    Seasonal allergic rhinitis due to pollen, Chronic pain syndrome       losartan-hydrochlorothiazide 50-12.5 MG per tablet    HYZAAR    90 tablet    Take 1 tablet by mouth daily    Hypertension goal BP (blood pressure) < 140/80       magnesium oxide 400 (241.3 MG) MG tablet    MAG-OX    90 tablet    Take 1 tablet (400 mg) by mouth daily    Cramp of limb       mometasone-formoterol 100-5 MCG/ACT oral inhaler    DULERA    1 Inhaler    Inhale 2 puffs into the lungs 2 times daily    Persistent asthma       montelukast 10 MG tablet    SINGULAIR    30 tablet    Take 1 tablet (10 mg) by mouth At Bedtime    Seasonal allergic rhinitis due to pollen       ondansetron 4 MG ODT tab    ZOFRAN ODT    10 tablet    Take 1 tablet (4 mg) by mouth every 8 hours as needed for nausea    Chronic pain syndrome       order for DME     1 Device    Right wrist splint for carpal tunnel syndrome  One* Use as directed at hour of sleep    Carpal tunnel syndrome of right wrist       oxyCODONE-acetaminophen 5-325 MG per tablet    PERCOCET    30 tablet    Take 1 tablet by mouth every 4 hours as needed for moderate to severe pain    Chronic pain syndrome       polyethylene glycol powder    MIRALAX    510 g    Take 17 g (1 capful) by mouth daily    Slow transit constipation       potassium chloride 10 MEQ tablet     K-TAB,KLOR-CON    120 tablet    Take 1 tablet (10 mEq) by mouth daily    Hypokalemia       sertraline 100 MG tablet    ZOLOFT    30 tablet    Take 1 tablet (100 mg) by mouth daily    Recurrent major depression in partial remission (H), Other constipation       sodium chloride 0.65 % nasal spray    SALINE MIST    1 Bottle    Spray 1 spray into both nostrils daily as needed for congestion    Seasonal allergic rhinitis due to pollen       STATIN NOT PRESCRIBED (INTENTIONAL)     0 each    1 each daily Statin not prescribed intentionally due to Active liver disease (liver failure, cirrhosis, hepatitis) LIVER METS    Hyperlipidemia LDL goal <100, Type 2 diabetes mellitus without complication (H)       triamcinolone 0.1 % paste    KENALOG    5 g    Take by mouth 2 times daily    Oral aphthae       Vitamin D (Cholecalciferol) 1000 UNITS Tabs     60 tablet    Take 2,000 Units by mouth daily    Vitamin D deficiency       * Notice:  This list has 2 medication(s) that are the same as other medications prescribed for you. Read the directions carefully, and ask your doctor or other care provider to review them with you.

## 2017-08-07 NOTE — NURSING NOTE
"Chief Complaint   Patient presents with     Emergency Department     UTI and broken toes # 4 & 5 right foot       Initial /76  Pulse 69  Temp 97.2  F (36.2  C) (Tympanic)  Resp 16  Wt 254 lb 8 oz (115.4 kg)  SpO2 99%  BMI 41.08 kg/m2 Estimated body mass index is 41.08 kg/(m^2) as calculated from the following:    Height as of 8/6/17: 5' 6\" (1.676 m).    Weight as of this encounter: 254 lb 8 oz (115.4 kg).  Medication Reconciliation: complete   Maggi Harris CMA    "

## 2017-08-07 NOTE — ED AVS SNAPSHOT
Emergency Department    [address] [place] [address]    Phone:  [phone]    Fax:  [phone]                                       Ines Mott   MRN: [id number]    Department:   Emergency Department   Date of Visit:  8/7/2017           After Visit Summary Signature Page     I have received my discharge instructions, and my questions have been answered. I have discussed any challenges I see with this plan with the nurse or doctor.    ..........................................................................................................................................  Patient/Patient Representative Signature      ..........................................................................................................................................  Patient Representative Print Name and Relationship to Patient    ..................................................               ................................................  Date                                            Time    ..........................................................................................................................................  Reviewed by Signature/Title    ...................................................              ..............................................  Date                                                            Time

## 2017-08-07 NOTE — PROGRESS NOTES
Please send normal lab letter when labs are complete  NORMAL URINE ANALYSIS   EXCEPT BLOOD CELLS ON URINE ANALYSIS   NEGATIVE LEUKOCYTES TODAY AS OPPOSED TWO DAYS AGO   CONGRATULATIONS   OTTO YUSUF JR., MD

## 2017-08-08 VITALS
OXYGEN SATURATION: 98 % | TEMPERATURE: 98 F | RESPIRATION RATE: 16 BRPM | WEIGHT: 255.95 LBS | SYSTOLIC BLOOD PRESSURE: 132 MMHG | DIASTOLIC BLOOD PRESSURE: 81 MMHG | HEART RATE: 51 BPM | BODY MASS INDEX: 41.14 KG/M2 | HEIGHT: 66 IN

## 2017-08-08 NOTE — ED NOTES
PO challenged, will continue to monitor.  Jaqueline Villa RN,.......................................... 8/7/2017   11:31 PM

## 2017-08-08 NOTE — DISCHARGE INSTRUCTIONS

## 2017-08-08 NOTE — ED PROVIDER NOTES
History     Chief Complaint:  Nausea and Vomiting     DEEPAK Mott is a 52 year old female with a history of carcinoid tumor and hydrocephalus with  shunt and a recently diagnosed urinary tract infection currently on Ciprofloxacin who presents for evaluation of nausea and vomiting. The patient has been evaluated twice in the ED recently for a urinary tract infection. She was provided two doses of IV Rocephin, one on 8/5 and another on 8/6. Additionally, she was reevaluated in her clinic today with a urinalysis notable for a small amount of blood but no other signs of infection, and the patient was started on a course of Ciprofloxacin. Tonight after taking her first dose of the Ciprofloxacin, the patient started to develop nausea and vomiting, prompting her to seek reevaluation in the ED with concern that she could be having an allergic reaction to the Cipro. Otherwise, she has had subjective feelings of a fever and persistent dysuria and urinary frequency, but she reports that overall her symptoms have been improving somewhat over time. The patient additionally complains of lower back pain, which is a chronic issue for her. She also notes some slight abdominal pain, which she attributes to her liver cancer.     Allergies:  Ativan  Macrobid  Amoxicillin  Buspirone   Cephalosporins  Dilaudid   Benadryl  Imitrex  Toradol   Lansoprazole   Metoclopramide   Paroxetine   Penicillins   Prednisone   Sulfa drugs   Lidoderm      Medications:    ciprofloxacin (CIPRO) 500 MG tablet  oxyCODONE-acetaminophen (PERCOCET) 5-325 MG per tablet  cephalexin (KEFLEX) 250 MG capsule  montelukast (SINGULAIR) 10 MG tablet  cetirizine (ZYRTEC) 10 MG tablet  fluticasone (FLONASE) 50 MCG/ACT spray  sodium chloride (SALINE MIST) 0.65 % nasal spray  triamcinolone (KENALOG) 0.1 % paste  ondansetron (ZOFRAN ODT) 4 MG ODT tab  Vitamin D, Cholecalciferol, 1000 UNITS TABS  cyanocobalamin (VITAMIN B12) 1000 MCG/ML injection  potassium  "chloride (K-TAB,KLOR-CON) 10 MEQ tablet  sertraline (ZOLOFT) 100 MG tablet  losartan-hydrochlorothiazide (HYZAAR) 50-12.5 MG per tablet  albuterol (2.5 MG/3ML) 0.083% neb solution  magnesium oxide (MAG-OX) 400 (241.3 MG) MG tablet  albuterol (VENTOLIN HFA) 108 (90 BASE) MCG/ACT Inhaler  mometasone-formoterol (DULERA) 100-5 MCG/ACT oral inhaler  polyethylene glycol (MIRALAX) powder  EPINEPHrine (EPIPEN) 0.3 MG/0.3ML injection    Past Medical History:    Arthritis  Asthma  Cancer  Chronic pain syndrome  Depressive disorder  Diabetes   Hydrocephalus  Hypertension  Hyperlipidemia   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   Methamphetamine abuse   Migraine headaches  Obesity  Right renal mass  Spina bifida     Past Surgical History:    Appendectomy   Cholecystectomy  Dental extractions  Gyn surgery  Implant shunt ventriculoperitoneal     Family History:    Cancer - Father     Social History:  Tobacco use:    Former smoker  Alcohol use:    Negative   Marital status:       Accompanied to ED by:  Alone      Review of Systems   Constitutional: Positive for fever (subjective ).   Gastrointestinal: Positive for abdominal pain, nausea and vomiting.   Genitourinary: Positive for dysuria and frequency.   Musculoskeletal: Positive for back pain.   All other systems reviewed and are negative.    Physical Exam   First Vitals:  BP: 136/74  Pulse: 51  Temp: 98  F (36.7  C)  Resp: 16  Height: 167.6 cm (5' 6\")  Weight: 116.1 kg (255 lb 15.3 oz)  SpO2: 98 %      Physical Exam  Gen: Pleasant, appears stated age.  Well appearing.    Eye:   Pupils are equal, round, and reactive.     Sclera non-injected.    ENT:   Moist mucus membranes.     Normal tongue.    Oropharynx without lesions.    Cardiac:     Normal rate and regular rhythm.    No murmurs, gallops, or rubs.    Pulmonary:     Clear to auscultation bilaterally.    No wheezes, rales, or rhonchi.    Abdomen:     Normal active bowel sounds.     Abdomen is soft and " non-distended.    Mild right suprapubic abdominal tenderness.    Musculoskeletal:     Normal movement of all extremities without evidence for deficit.   No CVA tenderness.    Extremities:    No edema.    Skin:   Warm and dry.    Neurologic:    Non-focal exam without asymmetric weakness or numbness.    Normal tone    Psychiatric:     Normal affect with appropriate interaction with examiner.    Emergency Department Course     Laboratory:  UA with Microscopic: Trace blood, Protein albumin 10, WBC 3 high, Squamous epithelial 9 high, Mucous present, o/w Negative     Interventions:  2255 Zofran ODT 4 mg PO     Emergency Department Course:  Nursing notes and vitals reviewed.  2314: I performed an exam of the patient as documented above.     0018: I updated and reassessed the patient.     Findings and plan explained to the Patient. Patient discharged home with instructions regarding supportive care, medications, and reasons to return. The importance of close follow-up was reviewed.     Impression & Plan      Medical Decision Making:  Ines Mott is a 52 year old female with a history of carcinoid tumor and hydrocephalus with  shunt who presents today with vomiting after starting Ciprofloxacin. On exam, the patient is well-appearing. She has been able to tolerate fluids and crackers in the ED. She has had no witnessed episodes of vomiting. She is afebrile. She has a benign abdominal exam. Recheck of urinalysis is not consistent with infection. At this point, she has received a total of three days of antibiotics. Her initial UA is contaminated but consistent with infection. I recommended cessation of antibiotics given that it appears that the infection has cleared, and that she does not otherwise appear to have signs of worsening UTI. She continues to complain of some dysuria and frequency. I have recommended follow up with PCP for additional evaluation as needed, but otherwise will hold off on prescribing any other  medications given her extensive list of sensitivities. I think she is safe to be discharged home to follow up with PCP as needed.     Diagnosis:    ICD-10-CM   1. Dysuria R30.0       Disposition:  Discharged to home.       I, Devonte Oro, am serving as a scribe at 11:14 PM on 8/7/2017 to document services personally performed by Dr. Diggs, based on my observations and the provider's statements to me.     EMERGENCY DEPARTMENT       Maryjane Diggs MD  08/09/17 0889

## 2017-08-09 ENCOUNTER — APPOINTMENT (OUTPATIENT)
Dept: GENERAL RADIOLOGY | Facility: CLINIC | Age: 52
End: 2017-08-09
Attending: EMERGENCY MEDICINE
Payer: COMMERCIAL

## 2017-08-09 ENCOUNTER — CARE COORDINATION (OUTPATIENT)
Dept: CARE COORDINATION | Facility: CLINIC | Age: 52
End: 2017-08-09

## 2017-08-09 ENCOUNTER — HOSPITAL ENCOUNTER (EMERGENCY)
Facility: CLINIC | Age: 52
Discharge: HOME | End: 2017-08-09
Attending: EMERGENCY MEDICINE | Admitting: EMERGENCY MEDICINE
Payer: COMMERCIAL

## 2017-08-09 VITALS
DIASTOLIC BLOOD PRESSURE: 90 MMHG | OXYGEN SATURATION: 93 % | BODY MASS INDEX: 41.14 KG/M2 | WEIGHT: 256 LBS | SYSTOLIC BLOOD PRESSURE: 143 MMHG | HEIGHT: 66 IN | TEMPERATURE: 97.1 F | RESPIRATION RATE: 12 BRPM

## 2017-08-09 DIAGNOSIS — R07.9 ACUTE CHEST PAIN: ICD-10-CM

## 2017-08-09 LAB
ALBUMIN SERPL-MCNC: 3.3 G/DL (ref 3.4–5)
ALP SERPL-CCNC: 80 U/L (ref 40–150)
ALT SERPL W P-5'-P-CCNC: 23 U/L (ref 0–50)
ANION GAP SERPL CALCULATED.3IONS-SCNC: 6 MMOL/L (ref 3–14)
AST SERPL W P-5'-P-CCNC: 8 U/L (ref 0–45)
BASOPHILS # BLD AUTO: 0 10E9/L (ref 0–0.2)
BASOPHILS NFR BLD AUTO: 0.2 %
BILIRUB SERPL-MCNC: 0.5 MG/DL (ref 0.2–1.3)
BUN SERPL-MCNC: 16 MG/DL (ref 7–30)
CALCIUM SERPL-MCNC: 9.5 MG/DL (ref 8.5–10.1)
CHLORIDE SERPL-SCNC: 106 MMOL/L (ref 94–109)
CO2 SERPL-SCNC: 29 MMOL/L (ref 20–32)
CREAT SERPL-MCNC: 0.86 MG/DL (ref 0.52–1.04)
DIFFERENTIAL METHOD BLD: NORMAL
EOSINOPHIL # BLD AUTO: 0.3 10E9/L (ref 0–0.7)
EOSINOPHIL NFR BLD AUTO: 3.2 %
ERYTHROCYTE [DISTWIDTH] IN BLOOD BY AUTOMATED COUNT: 13.6 % (ref 10–15)
GFR SERPL CREATININE-BSD FRML MDRD: 69 ML/MIN/1.7M2
GLUCOSE SERPL-MCNC: 103 MG/DL (ref 70–99)
HCT VFR BLD AUTO: 36.2 % (ref 35–47)
HGB BLD-MCNC: 11.7 G/DL (ref 11.7–15.7)
IMM GRANULOCYTES # BLD: 0 10E9/L (ref 0–0.4)
IMM GRANULOCYTES NFR BLD: 0.1 %
INTERPRETATION ECG - MUSE: NORMAL
LIPASE SERPL-CCNC: 158 U/L (ref 73–393)
LYMPHOCYTES # BLD AUTO: 2.7 10E9/L (ref 0.8–5.3)
LYMPHOCYTES NFR BLD AUTO: 29.3 %
MCH RBC QN AUTO: 29.5 PG (ref 26.5–33)
MCHC RBC AUTO-ENTMCNC: 32.3 G/DL (ref 31.5–36.5)
MCV RBC AUTO: 91 FL (ref 78–100)
MONOCYTES # BLD AUTO: 0.8 10E9/L (ref 0–1.3)
MONOCYTES NFR BLD AUTO: 8.1 %
NEUTROPHILS # BLD AUTO: 5.5 10E9/L (ref 1.6–8.3)
NEUTROPHILS NFR BLD AUTO: 59.1 %
NRBC # BLD AUTO: 0 10*3/UL
NRBC BLD AUTO-RTO: 0 /100
PLATELET # BLD AUTO: 325 10E9/L (ref 150–450)
POTASSIUM SERPL-SCNC: 4 MMOL/L (ref 3.4–5.3)
PROT SERPL-MCNC: 7 G/DL (ref 6.8–8.8)
RBC # BLD AUTO: 3.96 10E12/L (ref 3.8–5.2)
SODIUM SERPL-SCNC: 141 MMOL/L (ref 133–144)
TROPONIN I SERPL-MCNC: NORMAL UG/L (ref 0–0.04)
WBC # BLD AUTO: 9.3 10E9/L (ref 4–11)

## 2017-08-09 PROCEDURE — 25000128 H RX IP 250 OP 636: Performed by: EMERGENCY MEDICINE

## 2017-08-09 PROCEDURE — 96374 THER/PROPH/DIAG INJ IV PUSH: CPT

## 2017-08-09 PROCEDURE — 71020 XR CHEST 2 VW: CPT

## 2017-08-09 PROCEDURE — 93005 ELECTROCARDIOGRAM TRACING: CPT

## 2017-08-09 PROCEDURE — 99285 EMERGENCY DEPT VISIT HI MDM: CPT | Mod: 25

## 2017-08-09 PROCEDURE — 36415 COLL VENOUS BLD VENIPUNCTURE: CPT | Performed by: EMERGENCY MEDICINE

## 2017-08-09 PROCEDURE — 83690 ASSAY OF LIPASE: CPT | Performed by: EMERGENCY MEDICINE

## 2017-08-09 PROCEDURE — 84484 ASSAY OF TROPONIN QUANT: CPT | Performed by: EMERGENCY MEDICINE

## 2017-08-09 PROCEDURE — 85025 COMPLETE CBC W/AUTO DIFF WBC: CPT | Performed by: EMERGENCY MEDICINE

## 2017-08-09 PROCEDURE — 80053 COMPREHEN METABOLIC PANEL: CPT | Performed by: EMERGENCY MEDICINE

## 2017-08-09 RX ORDER — ONDANSETRON 2 MG/ML
4 INJECTION INTRAMUSCULAR; INTRAVENOUS ONCE
Status: COMPLETED | OUTPATIENT
Start: 2017-08-09 | End: 2017-08-09

## 2017-08-09 RX ADMIN — ONDANSETRON 4 MG: 2 SOLUTION INTRAMUSCULAR; INTRAVENOUS at 03:29

## 2017-08-09 ASSESSMENT — ENCOUNTER SYMPTOMS
COUGH: 1
ABDOMINAL PAIN: 1
NAUSEA: 1
DIAPHORESIS: 1
CHILLS: 1

## 2017-08-09 NOTE — PROGRESS NOTES
Clinic Care Coordination Contact    Situation: Patient chart reviewed by care coordinator.    Background: Patient has had increased in ED visits. Patient seen in ED on 08/05, 08/06 & 08/07/2017 related to UTI  Patient saw PCP on 08/07/2017.  Clinic Care Coordinator, SMITH spoke with PCP on 08/08/2017, at that time was determined that patient was making appropriate use of ED  Patient in ED on 08/09/2017 for chest pain. Patient referred to have outpt stress test    Assessment: No need to contact patient at this time.    Plan/Recommendations: Plan to follow up with patient as planned & will verify at that time that she had a stress test    BRISSA Cruz, [facility]  Clinic Care Coordinator, SMITH with [facility]  [phone]

## 2017-08-09 NOTE — ED AVS SNAPSHOT
Emergency Department    [address] [place] [address]    Phone:  [phone]    Fax:  [phone]                                       Ines Mott   MRN: [id number]    Department:   Emergency Department   Date of Visit:  8/9/2017           After Visit Summary Signature Page     I have received my discharge instructions, and my questions have been answered. I have discussed any challenges I see with this plan with the nurse or doctor.    ..........................................................................................................................................  Patient/Patient Representative Signature      ..........................................................................................................................................  Patient Representative Print Name and Relationship to Patient    ..................................................               ................................................  Date                                            Time    ..........................................................................................................................................  Reviewed by Signature/Title    ...................................................              ..............................................  Date                                                            Time

## 2017-08-09 NOTE — ED NOTES
Bed: ED14  Expected date: 8/9/17  Expected time: 12:30 AM  Means of arrival: Ambulance  Comments:  [phone] CP/anxiety

## 2017-08-09 NOTE — ED PROVIDER NOTES
History   Chief Complaint:  Chest Pain    HPI   Ines oMtt is a 52 year old female with a history of cancer, diabetes, hypertension, and hyperlipidemia who presents via EMS with chest pain and anxiety. EMS gave the patient 1 Nitro which helped alleviate some of the chest pain. The patient reports she has sleep apnea and uses a BiPAP machine but it has not been working correctly causing her to wake up; after she woke up she was diaphoretic, shaking and had intermittent mild low sternal chest pain that lasted for about 20 minutes. While the chest pain was occurring her right arm began to go numb. She states she then felt like her blood sugar was low and ate a bowel of cereal; patients blood sugar on arrival was 107. She notes she has been nauseous and having some abdominal pain over the past few days. She states she has been coughing today. She denies vomiting or personal history of MI.     The patient notes she was recently treated for a UTI with IV Cipro and prescribed Cipro; she was rechecked by her PMD and the UTI has resolved.     CARDIAC RISK FACTORS:  Sex:    female  Tobacco:   former  Hypertension:   yes  Hyperlipidemia:  yes  Diabetes:   yes  Family History:  no    PE/DVT RISK FACTORS:  Sex:    female  Hormones:   no  Tobacco:   former  Cancer:   yes  Travel:   no  Surgery:   no  Other immobilization: no  Personal history:  no  Family history:  no      Allergies:  Ativan [Lorazepam]  Macrobid [Nitrofurantoin]  Amoxicillin  Buspirone  Cephalosporins  Dilaudid [Hydromorphone]  Diphenhydramine Hcl  Imitrex [Sumatriptan Succinate]  Ketorolac Tromethamine  Lansoprazole  Metoclopramide  Paroxetine  Penicillins  Prednisone  Sulfa Drugs  Lidoderm [Lidocaine]     Medications:    Cipro  Percocet  Singulair  Zyrtec  Flonase  Albuterol neb/inhaler  Dulera  Miralax  Epipen     Past Medical History:    Arthritis  Asthma  Cancer  Chronic pain syndrome  Depression  Diabetes  Hydrocephalus  HTN  HLD  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   Respiratory: Positive for cough.    Cardiovascular: Positive for chest pain.   Gastrointestinal: Positive for abdominal pain and nausea.   All other systems reviewed and are negative.    Physical Exam   Patient Vitals for the past 24 hrs:   BP Temp Temp src Heart Rate Resp SpO2 Height Weight   08/09/17 0230 143/90 - - 57 12 93 % - -   08/09/17 0215 137/74 - - - - - - -   08/09/17 0206 - - - 53 12 93 % - -   08/09/17 0200 120/70 - - 57 9 94 % - -   08/09/17 0145 133/71 - - 55 15 93 % - -   08/09/17 0045 - - - - - - 1.676 m (5' 6\") 116.1 kg (256 lb)   08/09/17 0041 137/87 97.1  F (36.2  C) Oral 70 18 99 % - -       Physical Exam  Nursing note and vitals reviewed.  Constitutional:  Appears well-developed and well-nourished.   HENT:   Head:    Atraumatic.   Mouth/Throat:   Oropharynx is clear and moist. No oropharyngeal exudate.   Eyes:    Pupils are equal, round, and reactive to light.   Neck:    Normal range of motion. Neck supple.      No tracheal deviation present. No thyromegaly present.   Cardiovascular:  Normal rate, regular rhythm, no murmur   Pulmonary/Chest: Breath sounds are clear and equal without wheezes or crackles.  Abdominal:   Soft. Bowel sounds are normal. Exhibits no distension and      no mass. There is no tenderness.      There is no rebound and no guarding.     Mild mid epigastric tenderness. Mild lower sternal chest wall tenderness  Musculoskeletal:  Exhibits no " edema.   Lymphadenopathy:  No cervical adenopathy.   Neurological:   Alert and oriented to person, place, and time.   Skin:    Skin is warm and dry. No rash noted. No pallor.     Emergency Department Course   ECG (0:32:19):  Rate 71 bpm. NJ interval 188. QRS duration 94. QT/QTc 378/410. P-R-T axes 29 -18 -14. Normal sinus rhythm. Minimal voltage criteria for LVH, may be normal variant. Inferior infarct, age undetermined. Abnormal ECG. Interpreted at 0150 by Sheryl Acosta MD.    Imaging:  Radiographic findings were communicated with the patient who voiced understanding of the findings.  Chest XR, Pa & LAT:  No infiltrates or other acute findings. Normal-sized  cardiac silhouette. Mild torsion aorta. No change.  As read by Radiology.    Laboratory:  CBC: WNL (WBC 9.3, HGB 11.7, )   CMP: glucose 103(H), albumin 3.3(L) o/w WNL (Creatinine 0.86)  Lipase: 158  Troponin: <0.015    Interventions:  0329 Zofran 4 mg IV    Emergency Department Course:  Past medical records, nursing notes, and vitals reviewed.  0032 ECG was obtained.   0115: I performed an exam of the patient and obtained history, as documented above.  0148 The patient was sent for a chest xray while in the emergency department, findings above.  0148 IV inserted and blood drawn. The patient was placed on continuous cardiac monitoring and pulse oximetry.  0329 Zofran 4 mg IV  0345: I rechecked the patient.  Findings and plan explained to the Patient. Patient discharged home with instructions regarding supportive care, medications, and reasons to return. The importance of close follow-up was reviewed.     Impression & Plan    Medical Decision Making:  I found the patient to have chest pain of unclear etiology. She does not have any signs or symptoms of pulmonary embolism, tachycardia, or hypoxia, so I did not feel D Dimer or CT chest was indicated. Her ECG does not show any ischemic changes and her troponin is normal, thus I felt she could be safely  discharged to home. I ordered an outpatient stress Echo for her and she was told to return to the ED if her chest pain returns or worsens. I recommended that she follow up with her primary care doctor within the next 24-48 hours for recheck. She does not have any pneumothorax or pneumonia on her chest xray either.    Diagnosis:    ICD-10-CM    1. Acute chest pain R07.9 Exercise Stress Echocardiogram     Disposition:  Discharged to home    Emilee Breen  8/9/2017    EMERGENCY DEPARTMENT    I, Emilee Breen am serving as a scribe at 1:10 AM on 8/9/2017 to document services personally performed by Sheryl Acosta MD based on my observations and the provider's statements to me.        Sheryl Acosta MD  08/09/17 0528

## 2017-08-09 NOTE — ED AVS SNAPSHOT
Emergency Department    [address] [facility] [address]    Phone:  [phone]    Fax:  [phone]                                       Ines Mott   MRN: [id number]    Department:   Emergency Department   Date of Visit:  8/9/2017           Patient Information     Date Of Birth          1965        Your diagnoses for this visit were:     Acute chest pain        You were seen by Sheryl Acosta MD.      Follow-up Information     Follow up with  Emergency Department.    Specialty:  EMERGENCY MEDICINE    Why:  As needed if chest pain returns.    Contact information:    [address] [facility] [address] [phone]        Follow up with Dustin Fatima MD.    Specialty:  Family Practice    Why:  tomorrow for recheck    Contact information:    [address]  [facility] [id number] [phone]        Discharge References/Attachments     CHEST PAIN, UNCERTAIN CAUSE (ENGLISH)      Future Appointments        Provider Department Dept Phone Center    8/31/2017 2:00 PM MICHEAL Matias CNP [facility] [phone] [facility]      24 Hour Appointment Hotline       To make an appointment at any [facility], call [phone] ([phone]). If you don't have a family doctor or clinic, we will help you find one. [facility] clinics are conveniently located to serve the needs of you and your family.          ED Discharge Orders     Exercise Stress Echocardiogram       Administration of IV contrast will be tailored to this examination per the appropriate written protocol listed in the Echocardiography department Protocol Book, or by the supervising Cardiologist. This may result in an order change.    Use of contrast is at the discretion of the supervising Cardiologist.                     Review of your medicines      Our records show that you are taking the medicines listed below. If these are incorrect, please call your family  doctor or clinic.        Dose / Directions Last dose taken    * albuterol (2.5 MG/3ML) 0.083% neb solution   Dose:  1 vial   Quantity:  30 vial        Take 1 vial (2.5 mg) by nebulization every 6 hours as needed for shortness of breath / dyspnea or wheezing   Refills:  3        * albuterol 108 (90 BASE) MCG/ACT Inhaler   Commonly known as:  VENTOLIN HFA   Quantity:  54 g        INHALE TWO PUFFS BY MOUTH EVERY 4 HOURS AS NEEDED FOR SHORTNESS OF BREATH/DYSPNEA   Refills:  1        ASPIRIN NOT PRESCRIBED   Commonly known as:  INTENTIONAL   Quantity:  1 each        Please choose reason not prescribed, below   Refills:  0        blood glucose calibration solution   Commonly known as:  no brand specified   Quantity:  1 each        Use to calibrate blood glucose monitor as directed.   Refills:  0        blood glucose lancets standard   Commonly known as:  no brand specified   Quantity:  1 Box        Use to test blood sugar daily times daily or as directed.   Refills:  11        blood glucose monitoring meter device kit   Commonly known as:  no brand specified   Quantity:  1 kit        Use to test blood sugar larisa times daily or as directed.   Refills:  0        blood glucose monitoring test strip   Commonly known as:  no brand specified   Quantity:  100 strip        Use to test blood sugars once times daily or as directed   Refills:  3        cephalexin 250 MG capsule   Commonly known as:  KEFLEX   Dose:  250 mg   Quantity:  21 capsule        Take 1 capsule (250 mg) by mouth 3 times daily for 7 days   Refills:  0        cetirizine 10 MG tablet   Commonly known as:  zyrTEC   Dose:  10 mg   Quantity:  30 tablet        Take 1 tablet (10 mg) by mouth every evening   Refills:  1        ciprofloxacin 500 MG tablet   Commonly known as:  CIPRO   Dose:  500 mg   Quantity:  20 tablet        Take 1 tablet (500 mg) by mouth 2 times daily   Refills:  1        cyanocobalamin 1000 MCG/ML injection   Commonly known as:  VITAMIN B12    Dose:  1 mL   Quantity:  1 mL        Inject 1 mL (1,000 mcg) Subcutaneous every 30 days   Refills:  11        EPINEPHrine 0.3 MG/0.3ML injection 2-pack   Commonly known as:  EPIPEN/ADRENACLICK/or ANY BX GENERIC EQUIV   Dose:  0.3 mg   Quantity:  2 each        Inject 0.3 mLs (0.3 mg) into the muscle once as needed for anaphylaxis   Refills:  5        fluticasone 50 MCG/ACT spray   Commonly known as:  FLONASE   Dose:  1-2 spray   Quantity:  3 Bottle        Spray 1-2 sprays into both nostrils daily   Refills:  1        losartan-hydrochlorothiazide 50-12.5 MG per tablet   Commonly known as:  HYZAAR   Dose:  1 tablet   Quantity:  90 tablet        Take 1 tablet by mouth daily   Refills:  3        magnesium oxide 400 (241.3 MG) MG tablet   Commonly known as:  MAG-OX   Dose:  400 mg   Quantity:  90 tablet        Take 1 tablet (400 mg) by mouth daily   Refills:  3        mometasone-formoterol 100-5 MCG/ACT oral inhaler   Commonly known as:  DULERA   Dose:  2 puff   Quantity:  1 Inhaler        Inhale 2 puffs into the lungs 2 times daily   Refills:  11        montelukast 10 MG tablet   Commonly known as:  SINGULAIR   Dose:  10 mg   Quantity:  30 tablet        Take 1 tablet (10 mg) by mouth At Bedtime   Refills:  11        ondansetron 4 MG ODT tab   Commonly known as:  ZOFRAN ODT   Dose:  4 mg   Quantity:  10 tablet        Take 1 tablet (4 mg) by mouth every 8 hours as needed for nausea   Refills:  0        order for DME   Quantity:  1 Device        Right wrist splint for carpal tunnel syndrome  One* Use as directed at hour of sleep   Refills:  1        oxyCODONE-acetaminophen 5-325 MG per tablet   Commonly known as:  PERCOCET   Dose:  1 tablet   Quantity:  30 tablet        Take 1 tablet by mouth every 4 hours as needed for moderate to severe pain   Refills:  0        polyethylene glycol powder   Commonly known as:  MIRALAX   Dose:  1 capful   Quantity:  510 g        Take 17 g (1 capful) by mouth daily   Refills:  3         potassium chloride 10 MEQ tablet   Commonly known as:  K-TAB,KLOR-CON   Dose:  10 mEq   Indication:  Low Amount of Potassium in the Blood   Quantity:  120 tablet        Take 1 tablet (10 mEq) by mouth daily   Refills:  11        sertraline 100 MG tablet   Commonly known as:  ZOLOFT   Dose:  100 mg   Indication:  Major Depressive Disorder   Quantity:  30 tablet        Take 1 tablet (100 mg) by mouth daily   Refills:  11        sodium chloride 0.65 % nasal spray   Commonly known as:  SALINE MIST   Dose:  1 spray   Quantity:  1 Bottle        Spray 1 spray into both nostrils daily as needed for congestion   Refills:  11        STATIN NOT PRESCRIBED (INTENTIONAL)   Dose:  1 each   Quantity:  0 each        1 each daily Statin not prescribed intentionally due to Active liver disease (liver failure, cirrhosis, hepatitis) LIVER METS   Refills:  0        triamcinolone 0.1 % paste   Commonly known as:  KENALOG   Quantity:  5 g        Take by mouth 2 times daily   Refills:  0        Vitamin D (Cholecalciferol) 1000 UNITS Tabs   Dose:  2000 Units   Quantity:  60 tablet        Take 2,000 Units by mouth daily   Refills:  11        * Notice:  This list has 2 medication(s) that are the same as other medications prescribed for you. Read the directions carefully, and ask your doctor or other care provider to review them with you.            Procedures and tests performed during your visit     CBC with platelets + differential    Cardiac Continuous Monitoring    Chest XR,  PA & LAT    Comprehensive metabolic panel    EKG 12 lead    Lipase    Troponin I      Orders Needing Specimen Collection     None      Pending Results     Date and Time Order Name Status Description    8/9/2017 0148 Chest XR,  PA & LAT Preliminary             Pending Culture Results     No orders found from 8/7/2017 to 8/10/2017.            Pending Results Instructions     If you had any lab results that were not finalized at the time of your Discharge, you can call  the ED Lab Result RN at [phone]. You will be contacted by this team for any positive Lab results or changes in treatment. The nurses are available 7 days a week from 10A to 6:30P.  You can leave a message 24 hours per day and they will return your call.        Test Results From Your Hospital Stay        8/9/2017  3:34 AM      Narrative     XR CHEST 2 VW  8/9/2017 3:10 AM     INDICATION: Chest pain.    COMPARISON: 11/26/2016.        Impression     IMPRESSION: No infiltrates or other acute findings. Normal-sized  cardiac silhouette. Mild torsion aorta. No change.         8/9/2017  2:55 AM      Component Results     Component Value Ref Range & Units Status    Sodium 141 133 - 144 mmol/L Final    Potassium 4.0 3.4 - 5.3 mmol/L Final    Chloride 106 94 - 109 mmol/L Final    Carbon Dioxide 29 20 - 32 mmol/L Final    Anion Gap 6 3 - 14 mmol/L Final    Glucose 103 (H) 70 - 99 mg/dL Final    Urea Nitrogen 16 7 - 30 mg/dL Final    Creatinine 0.86 0.52 - 1.04 mg/dL Final    GFR Estimate 69 >60 mL/min/1.7m2 Final    Non  GFR Calc    GFR Estimate If Black 84 >60 mL/min/1.7m2 Final    African American GFR Calc    Calcium 9.5 8.5 - 10.1 mg/dL Final    Bilirubin Total 0.5 0.2 - 1.3 mg/dL Final    Albumin 3.3 (L) 3.4 - 5.0 g/dL Final    Protein Total 7.0 6.8 - 8.8 g/dL Final    Alkaline Phosphatase 80 40 - 150 U/L Final    ALT 23 0 - 50 U/L Final    AST 8 0 - 45 U/L Final         8/9/2017  2:51 AM      Component Results     Component Value Ref Range & Units Status    Lipase 158 73 - 393 U/L Final         8/9/2017  2:38 AM      Component Results     Component Value Ref Range & Units Status    WBC 9.3 4.0 - 11.0 10e9/L Final    RBC Count 3.96 3.8 - 5.2 10e12/L Final    Hemoglobin 11.7 11.7 - 15.7 g/dL Final    Hematocrit 36.2 35.0 - 47.0 % Final    MCV 91 78 - 100 fl Final    MCH 29.5 26.5 - 33.0 pg Final    MCHC 32.3 31.5 - 36.5 g/dL Final    RDW 13.6 10.0 - 15.0 % Final    Platelet Count 325 150 - 450 10e9/L  Final    Diff Method Automated Method  Final    % Neutrophils 59.1 % Final    % Lymphocytes 29.3 % Final    % Monocytes 8.1 % Final    % Eosinophils 3.2 % Final    % Basophils 0.2 % Final    % Immature Granulocytes 0.1 % Final    Nucleated RBCs 0 0 /100 Final    Absolute Neutrophil 5.5 1.6 - 8.3 10e9/L Final    Absolute Lymphocytes 2.7 0.8 - 5.3 10e9/L Final    Absolute Monocytes 0.8 0.0 - 1.3 10e9/L Final    Absolute Eosinophils 0.3 0.0 - 0.7 10e9/L Final    Absolute Basophils 0.0 0.0 - 0.2 10e9/L Final    Abs Immature Granulocytes 0.0 0 - 0.4 10e9/L Final    Absolute Nucleated RBC 0.0  Final         8/9/2017  2:55 AM      Component Results     Component Value Ref Range & Units Status    Troponin I ES  0.000 - 0.045 ug/L Final    <0.015  The 99th percentile for upper reference range is 0.045 ug/L.  Troponin values in   the range of 0.045 - 0.120 ug/L may be associated with risks of adverse   clinical events.                  Clinical Quality Measure: Blood Pressure Screening     Your blood pressure was checked while you were in the emergency department today. The last reading we obtained was  BP: 143/90 . Please read the guidelines below about what these numbers mean and what you should do about them.  If your systolic blood pressure (the top number) is less than 120 and your diastolic blood pressure (the bottom number) is less than 80, then your blood pressure is normal. There is nothing more that you need to do about it.  If your systolic blood pressure (the top number) is 120-139 or your diastolic blood pressure (the bottom number) is 80-89, your blood pressure may be higher than it should be. You should have your blood pressure rechecked within a year by a primary care provider.  If your systolic blood pressure (the top number) is 140 or greater or your diastolic blood pressure (the bottom number) is 90 or greater, you may have high blood pressure. High blood pressure is treatable, but if left untreated over  "time it can put you at risk for heart attack, stroke, or kidney failure. You should have your blood pressure rechecked by a primary care provider within the next 4 weeks.  If your provider in the emergency department today gave you specific instructions to follow-up with your doctor or provider even sooner than that, you should follow that instruction and not wait for up to 4 weeks for your follow-up visit.        Thank you for choosing [facility]       Thank you for choosing [facility] for your care. Our goal is always to provide you with excellent care. Hearing back from our patients is one way we can continue to improve our services. Please take a few minutes to complete the written survey that you may receive in the mail after you visit with us. Thank you!        [employer] Information     [employer] lets you send messages to your doctor, view your test results, renew your prescriptions, schedule appointments and more. To sign up, go to [email or web address] . Click on \"Log in\" on the left side of the screen, which will take you to the Welcome page. Then click on \"Sign up Now\" on the right side of the page.     You will be asked to enter the access code listed below, as well as some personal information. Please follow the directions to create your username and password.     Your access code is: [email or web address]  Expires: 10/2/2017  7:26 PM     Your access code will  in 90 days. If you need help or a new code, please call your [facility] clinic or [phone].        Care EveryWhere ID     This is your Care EveryWhere ID. This could be used by other organizations to access your [facility] medical records  [id number]        Equal Access to Services     [facility]: Hadii casimiro Leonard, waaislinnda lupaul, qaybemilee harris. So [facility] [phone].    ATENCIÓN: Si habla español, tiene a christianson disposición servicios gratuitos de asistencia lingüística. Llame al " [phone].    We comply with applicable federal civil rights laws and [place] laws. We do not discriminate on the basis of race, color, national origin, age, disability sex, sexual orientation or gender identity.            After Visit Summary       This is your record. Keep this with you and show to your community pharmacist(s) and doctor(s) at your next visit.

## 2017-08-14 DIAGNOSIS — G89.4 CHRONIC PAIN SYNDROME: Primary | ICD-10-CM

## 2017-08-14 DIAGNOSIS — R25.2 CRAMP OF LIMB: ICD-10-CM

## 2017-08-14 NOTE — TELEPHONE ENCOUNTER
Patient calling to note that she is completely out of medication as of today, 8/14/17 & she is concerned about going through withdrawals  She is scheduled for an office visit on 8/21/17 as it is the first available appointment at the time of scheduling

## 2017-08-14 NOTE — TELEPHONE ENCOUNTER
magnesium oxide (MAG-OX) 400 (241.3 MG) MG tablet  Last Written Prescription Date: 01/19/2017  Last Fill Quantity: 90,  # refills: 3   Last Office Visit with [facility], [facility] or [facility] prescribing provider: 08/07/2017                                         Next 5 appointments (look out 90 days)     Aug 21, 2017  9:00 AM CDT   [facility]   [facility]    [facility] [address] [phone]

## 2017-08-14 NOTE — TELEPHONE ENCOUNTER
Controlled Substance Refill Request for oxyCODONE-acetaminophen (PERCOCET) 5-325 MG per tablet  Problem List Complete:  Yes  Patient is followed by OTTO YUSUF MD for ongoing prescription of pain medication.  All refills should only be approved by this provider, or covering partner.    Medication(s):  PERCOCET 5MG PO FOUR TIMES DAILY .   Maximum quantity per month: 120   Clinic visit frequency required: Q 2 weeks     Controlled substance agreement:  Encounter-Level CSA - 12/22/15:               Controlled Substance Agreement - Scan on 12/28/2015  2:53 PM : CONTROLLED MEDICATION CONTRACT, 12-22-15 (below)          Encounter-Level CSA - 4/10/15:               Controlled Substance Agreement - Scan on 4/17/2015  1:45 PM : BOB, Controlled Substance Contract, 04-10-15 (below)            Pain Clinic evaluation in the past: Yes       Date/Location:      DIRE Total Score(s):  No flowsheet data found.    Last [facility] website verification:  done on 01/03/2017   [email or web address]  REFERRAL [facility] AS SOON AS POSSIBLE   FAILED DRANK ALCOHOL BEFORE LAST OFFICE VISIT    checked in past 6 months?  Yes 08/14/2017- all Rx's from PCP.

## 2017-08-14 NOTE — TELEPHONE ENCOUNTER
Reason for Call:  Medication or medication refill:    Do you use a [facility]?  Name of the pharmacy and phone number for the current request:  not applicable    Name of the medication requested: oxyCODONE-acetaminophen (PERCOCET) 5-325 MG per tablet    Other request:     Can we leave a detailed message on this number? YES    Phone number patient can be reached at: Home number on file [phone] (home)    Best Time:     Call taken on 8/14/2017 at 9:29 AM by ELIEZER ARANGO

## 2017-08-15 ENCOUNTER — TELEPHONE (OUTPATIENT)
Dept: FAMILY MEDICINE | Facility: CLINIC | Age: 52
End: 2017-08-15

## 2017-08-15 RX ORDER — OXYCODONE AND ACETAMINOPHEN 5; 325 MG/1; MG/1
1 TABLET ORAL EVERY 4 HOURS PRN
Qty: 30 TABLET | Refills: 0 | OUTPATIENT
Start: 2017-08-15

## 2017-08-15 NOTE — TELEPHONE ENCOUNTER
Patient called the clinic reporting feeling of tiredness since yesterday which gotten worse today. Also, achyness in the legs 8/10 and leg cramps and agnesium seems like it's not working. Periodic SOB, but using inhaler. Her pharmacist suggested that her Sertraline might need to be decreased. Slight headache. No cold symptoms of runny nose and congestion. Patient drank 1/2 a galon of Brent D orange juice because she thought she was having low blood sugar. Tongue looks and feels cracked, dry, and burning. Able to swallow without difficulty.     Patient requesting her oxycodone and magnesium refills, see refill encounters.     Patient requested to see Dr. Fatima today or tomorrow and refused to see other providers, no availability with Dr. Fatima today and not in clinic tomorrow.     Provider to review.

## 2017-08-18 ENCOUNTER — OFFICE VISIT (OUTPATIENT)
Dept: FAMILY MEDICINE | Facility: CLINIC | Age: 52
End: 2017-08-18
Payer: COMMERCIAL

## 2017-08-18 VITALS
WEIGHT: 259 LBS | SYSTOLIC BLOOD PRESSURE: 155 MMHG | RESPIRATION RATE: 16 BRPM | TEMPERATURE: 97 F | HEART RATE: 68 BPM | OXYGEN SATURATION: 99 % | DIASTOLIC BLOOD PRESSURE: 95 MMHG | BODY MASS INDEX: 41.8 KG/M2

## 2017-08-18 DIAGNOSIS — K59.09 OTHER CONSTIPATION: ICD-10-CM

## 2017-08-18 DIAGNOSIS — E11.9 TYPE 2 DIABETES MELLITUS WITHOUT COMPLICATION, WITHOUT LONG-TERM CURRENT USE OF INSULIN (H): ICD-10-CM

## 2017-08-18 DIAGNOSIS — E55.9 VITAMIN D DEFICIENCY: ICD-10-CM

## 2017-08-18 DIAGNOSIS — I10 HYPERTENSION GOAL BP (BLOOD PRESSURE) < 140/80: Primary | Chronic | ICD-10-CM

## 2017-08-18 DIAGNOSIS — J45.21 MILD INTERMITTENT ASTHMA WITH ACUTE EXACERBATION: ICD-10-CM

## 2017-08-18 DIAGNOSIS — J45.20 MILD INTERMITTENT ASTHMA WITHOUT COMPLICATION: ICD-10-CM

## 2017-08-18 DIAGNOSIS — E87.6 HYPOKALEMIA: ICD-10-CM

## 2017-08-18 DIAGNOSIS — F33.41 RECURRENT MAJOR DEPRESSION IN PARTIAL REMISSION (H): ICD-10-CM

## 2017-08-18 DIAGNOSIS — G89.4 CHRONIC PAIN SYNDROME: ICD-10-CM

## 2017-08-18 DIAGNOSIS — E53.8 B12 DEFICIENCY: ICD-10-CM

## 2017-08-18 PROCEDURE — 99213 OFFICE O/P EST LOW 20 MIN: CPT | Performed by: FAMILY MEDICINE

## 2017-08-18 RX ORDER — ONDANSETRON 4 MG/1
4 TABLET, ORALLY DISINTEGRATING ORAL EVERY 8 HOURS PRN
Qty: 10 TABLET | Refills: 0 | Status: SHIPPED | OUTPATIENT
Start: 2017-08-18 | End: 2017-12-14

## 2017-08-18 RX ORDER — ALBUTEROL SULFATE 0.83 MG/ML
1 SOLUTION RESPIRATORY (INHALATION) EVERY 6 HOURS PRN
Qty: 30 VIAL | Refills: 3 | Status: SHIPPED | OUTPATIENT
Start: 2017-08-18 | End: 2019-05-14

## 2017-08-18 RX ORDER — OXYCODONE AND ACETAMINOPHEN 5; 325 MG/1; MG/1
1 TABLET ORAL EVERY 4 HOURS PRN
Qty: 30 TABLET | Refills: 0 | Status: SHIPPED | OUTPATIENT
Start: 2017-08-18 | End: 2017-08-18

## 2017-08-18 RX ORDER — CYANOCOBALAMIN 1000 UG/ML
1 INJECTION, SOLUTION INTRAMUSCULAR; SUBCUTANEOUS
Qty: 1 ML | Refills: 11 | Status: SHIPPED | OUTPATIENT
Start: 2017-08-18 | End: 2018-10-29

## 2017-08-18 RX ORDER — BLOOD-GLUCOSE CONTROL, NORMAL
EACH MISCELLANEOUS
Qty: 1 EACH | Refills: 0 | Status: SHIPPED | OUTPATIENT
Start: 2017-08-18 | End: 2019-05-14

## 2017-08-18 RX ORDER — ALBUTEROL SULFATE 90 UG/1
AEROSOL, METERED RESPIRATORY (INHALATION)
Qty: 54 G | Refills: 1 | Status: SHIPPED | OUTPATIENT
Start: 2017-08-18 | End: 2018-02-01

## 2017-08-18 RX ORDER — SERTRALINE HYDROCHLORIDE 100 MG/1
100 TABLET, FILM COATED ORAL DAILY
Qty: 30 TABLET | Refills: 11 | Status: SHIPPED | OUTPATIENT
Start: 2017-08-18 | End: 2017-11-17

## 2017-08-18 RX ORDER — OXYCODONE AND ACETAMINOPHEN 5; 325 MG/1; MG/1
1 TABLET ORAL EVERY 4 HOURS PRN
Qty: 30 TABLET | Refills: 0 | Status: SHIPPED | OUTPATIENT
Start: 2017-08-25 | End: 2017-08-31

## 2017-08-18 RX ORDER — POTASSIUM CHLORIDE 750 MG/1
10 TABLET, EXTENDED RELEASE ORAL DAILY
Qty: 120 TABLET | Refills: 11 | Status: SHIPPED | OUTPATIENT
Start: 2017-08-18 | End: 2018-08-27

## 2017-08-18 RX ORDER — MULTIVIT-MIN/IRON/FOLIC ACID/K 18-600-40
2000 CAPSULE ORAL DAILY
Qty: 60 TABLET | Refills: 11 | Status: SHIPPED | OUTPATIENT
Start: 2017-08-18 | End: 2019-05-14

## 2017-08-18 RX ORDER — LOSARTAN POTASSIUM AND HYDROCHLOROTHIAZIDE 12.5; 5 MG/1; MG/1
1 TABLET ORAL DAILY
Qty: 90 TABLET | Refills: 3 | Status: SHIPPED | OUTPATIENT
Start: 2017-08-18 | End: 2018-03-23
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
## 2017-08-18 NOTE — MR AVS SNAPSHOT
After Visit Summary   8/18/2017    Ines Mott    MRN: [id number]           Patient Information     Date Of Birth          1965        Visit Information        Provider Department      8/18/2017 2:45 PM Dustin Fatima MD [facility]        Today's Diagnoses     Hypertension goal BP (blood pressure) < 140/80    -  1    Type 2 diabetes mellitus without complication, without long-term current use of insulin (H)        Persistent asthma        Mild intermittent asthma without complication        Mild intermittent asthma with acute exacerbation        Recurrent major depression in partial remission (H)        Other constipation        Hypokalemia        B12 deficiency        Vitamin D deficiency        Chronic pain syndrome          Care Instructions      (I10) Hypertension goal BP (blood pressure) < 140/80  (primary encounter diagnosis)    Comment:      Plan: oxyCODONE-acetaminophen (PERCOCET) 5-325 MG per          tablet, losartan-hydrochlorothiazide (HYZAAR)           50-12.5 MG per tablet, DISCONTINUED:           oxyCODONE-acetaminophen (PERCOCET) 5-325 MG per          tablet                   (E11.9) Type 2 diabetes mellitus without complication, without long-term current use of insulin (H)    Comment:      Plan: blood glucose monitoring (NO BRAND SPECIFIED)           test strip, blood glucose calibration (NO BRAND          SPECIFIED) solution, blood glucose (NO BRAND           SPECIFIED) lancets standard, blood glucose           monitoring (NO BRAND SPECIFIED) meter device           kit                   (J45.909) Persistent asthma    Comment:      Plan: mometasone-formoterol (DULERA) 100-5 MCG/ACT           oral inhaler                   (J45.20) Mild intermittent asthma without complication    Comment:      Plan: albuterol (VENTOLIN HFA) 108 (90 BASE) MCG/ACT           Inhaler                   (J45.21) Mild intermittent asthma with acute  exacerbation    Comment:      Plan: albuterol (2.5 MG/3ML) 0.083% neb solution                   (F33.41) Recurrent major depression in partial remission (H)    Comment:      Plan: sertraline (ZOLOFT) 100 MG tablet                   (K59.09) Other constipation    Comment:      Plan: sertraline (ZOLOFT) 100 MG tablet                   (E87.6) Hypokalemia    Comment:      Plan: potassium chloride (K-TAB,KLOR-CON) 10 MEQ           tablet                   (E53.8) B12 deficiency    Comment:      Plan: cyanocobalamin (VITAMIN B12) 1000 MCG/ML           injection                   (E55.9) Vitamin D deficiency    Comment:      Plan: Vitamin D, Cholecalciferol, 1000 UNITS TABS                   (G89.4) Chronic pain syndrome    Comment:      Plan: ondansetron (ZOFRAN ODT) 4 MG ODT tab                                Follow-ups after your visit        Your next 10 appointments already scheduled     Aug 18, 2017  2:45 PM CDT   SHORT with Dustin Fatima MD   [facility] ([facility]    [address] [place] [address] [address]  [facility] [address] [phone]            Aug 31, 2017  2:00 PM CDT   New Visit with MICHEAL Matias CNP   [facility] ([facility])    [address] [place] [id number] [phone]              Who to contact     If you have questions or need follow up information about today's clinic visit or your schedule please contact [facility] directly at [phone].  Normal or non-critical lab and imaging results will be communicated to you by MyChart, letter or phone within 4 business days after the clinic has received the results. If you do not hear from us within 7 days, please contact the clinic through MyChart or phone. If you have a critical or abnormal lab result, we will notify you by phone as soon as possible.  Submit refill requests  "through Anyt[employer] or call your pharmacy and they will forward the refill request to us. Please allow 3 business days for your refill to be completed.          Additional Information About Your Visit        [employer] Information     Anyt[employer] lets you send messages to your doctor, view your test results, renew your prescriptions, schedule appointments and more. To sign up, go to [email or web address] . Click on \"Log in\" on the left side of the screen, which will take you to the Welcome page. Then click on \"Sign up Now\" on the right side of the page.     You will be asked to enter the access code listed below, as well as some personal information. Please follow the directions to create your username and password.     Your access code is: [email or web address]  Expires: 10/2/2017  7:26 PM     Your access code will  in 90 days. If you need help or a new code, please call your [place] clinic or [phone].        Care EveryWhere ID     This is your Care EveryWhere ID. This could be used by other organizations to access your [place] medical records  [id number]        Your Vitals Were     Pulse Temperature Respirations Pulse Oximetry BMI (Body Mass Index)       68 97  F (36.1  C) (Tympanic) 16 99% 41.8 kg/m2        Blood Pressure from Last 3 Encounters:   17 (!) 155/95   17 143/90   17 132/81    Weight from Last 3 Encounters:   17 259 lb (117.5 kg)   17 256 lb (116.1 kg)   17 255 lb 15.3 oz (116.1 kg)              Today, you had the following     No orders found for display         Today's Medication Changes          These changes are accurate as of: 17  2:18 PM.  If you have any questions, ask your nurse or doctor.               Start taking these medicines.        Dose/Directions    oxyCODONE-acetaminophen 5-325 MG per tablet   Commonly known as:  PERCOCET   Used for:  Hypertension goal BP (blood pressure) < 140/80   Started by:  Dustin Fatima MD        Dose:  1 tablet "   Start taking on:  8/25/2017   Take 1 tablet by mouth every 4 hours as needed for moderate to severe pain   Quantity:  30 tablet   Refills:  0            Where to get your medicines      These medications were sent to [facility] [address] [facility]  [address] [facility] [address]     Phone:  [phone]     albuterol (2.5 MG/3ML) 0.083% neb solution    albuterol 108 (90 BASE) MCG/ACT Inhaler    blood glucose calibration solution    blood glucose lancets standard    blood glucose monitoring meter device kit    blood glucose monitoring test strip    cyanocobalamin 1000 MCG/ML injection    losartan-hydrochlorothiazide 50-12.5 MG per tablet    mometasone-formoterol 100-5 MCG/ACT oral inhaler    ondansetron 4 MG ODT tab    potassium chloride 10 MEQ tablet    sertraline 100 MG tablet    Vitamin D (Cholecalciferol) 1000 UNITS Tabs         Some of these will need a paper prescription and others can be bought over the counter.  Ask your nurse if you have questions.     Bring a paper prescription for each of these medications     oxyCODONE-acetaminophen 5-325 MG per tablet                Primary Care Provider Office Phone # Fax #    Dustin Rina Fatima [phone] [address]  [facility] [address]        Equal Access to Services     VALENTE RIVERA AH: Hadii casimiro zapata hadasho Soomaali, waaxda luqadaha, qaybta kaalmada adeegyada, emilee coyne. So [facility] [phone].    ATENCIÓN: Si habla español, tiene a christianson disposición servicios gratuitos de asistencia lingüística. ame al [phone].    We comply with applicable federal civil rights laws and [place] laws. We do not discriminate on the basis of race, color, national origin, age, disability sex, sexual orientation or gender identity.            Thank you!     Thank you for choosing [facility]  for your care. Our goal is always to  provide you with excellent care. Hearing back from our patients is one way we can continue to improve our services. Please take a few minutes to complete the written survey that you may receive in the mail after your visit with us. Thank you!             Your Updated Medication List - Protect others around you: Learn how to safely use, store and throw away your medicines at www.disposemymeds.org.          This list is accurate as of: 8/18/17  2:18 PM.  Always use your most recent med list.                   Brand Name Dispense Instructions for use Diagnosis    * albuterol 108 (90 BASE) MCG/ACT Inhaler    VENTOLIN HFA    54 g    INHALE TWO PUFFS BY MOUTH EVERY 4 HOURS AS NEEDED FOR SHORTNESS OF BREATH/DYSPNEA    Mild intermittent asthma without complication       * albuterol (2.5 MG/3ML) 0.083% neb solution     30 vial    Take 1 vial (2.5 mg) by nebulization every 6 hours as needed for shortness of breath / dyspnea or wheezing    Mild intermittent asthma with acute exacerbation       ASPIRIN NOT PRESCRIBED    INTENTIONAL    1 each    Please choose reason not prescribed, below    Aspirin intolerance       blood glucose calibration solution    no brand specified    1 each    Use to calibrate blood glucose monitor as directed.    Type 2 diabetes mellitus without complication, without long-term current use of insulin (H)       blood glucose lancets standard    no brand specified    100 each    Use to test blood sugar daily times daily or as directed.    Type 2 diabetes mellitus without complication, without long-term current use of insulin (H)       blood glucose monitoring meter device kit    no brand specified    1 kit    Use to test blood sugar larisa times daily or as directed.    Type 2 diabetes mellitus without complication, without long-term current use of insulin (H)       blood glucose monitoring test strip    no brand specified    100 strip    Use to test blood sugars once times daily or as directed    Type 2  diabetes mellitus without complication, without long-term current use of insulin (H)       cetirizine 10 MG tablet    zyrTEC    30 tablet    Take 1 tablet (10 mg) by mouth every evening    Seasonal allergic rhinitis due to pollen       cyanocobalamin 1000 MCG/ML injection    VITAMIN B12    1 mL    Inject 1 mL (1,000 mcg) Subcutaneous every 30 days    B12 deficiency       EPINEPHrine 0.3 MG/0.3ML injection 2-pack    EPIPEN/ADRENACLICK/or ANY BX GENERIC EQUIV    2 each    Inject 0.3 mLs (0.3 mg) into the muscle once as needed for anaphylaxis    Late effect of other and unspecified external causes       fluticasone 50 MCG/ACT spray    FLONASE    3 Bottle    Spray 1-2 sprays into both nostrils daily    Seasonal allergic rhinitis due to pollen, Chronic pain syndrome       losartan-hydrochlorothiazide 50-12.5 MG per tablet    HYZAAR    90 tablet    Take 1 tablet by mouth daily    Hypertension goal BP (blood pressure) < 140/80       magnesium oxide 400 (241.3 MG) MG tablet    MAG-OX    90 tablet    Take 1 tablet (400 mg) by mouth daily    Cramp of limb       mometasone-formoterol 100-5 MCG/ACT oral inhaler    DULERA    1 Inhaler    Inhale 2 puffs into the lungs 2 times daily    Persistent asthma       montelukast 10 MG tablet    SINGULAIR    30 tablet    Take 1 tablet (10 mg) by mouth At Bedtime    Seasonal allergic rhinitis due to pollen       ondansetron 4 MG ODT tab    ZOFRAN ODT    10 tablet    Take 1 tablet (4 mg) by mouth every 8 hours as needed for nausea    Chronic pain syndrome       order for DME     1 Device    Right wrist splint for carpal tunnel syndrome  One* Use as directed at hour of sleep    Carpal tunnel syndrome of right wrist       oxyCODONE-acetaminophen 5-325 MG per tablet   Start taking on:  8/25/2017    PERCOCET    30 tablet    Take 1 tablet by mouth every 4 hours as needed for moderate to severe pain    Hypertension goal BP (blood pressure) < 140/80       polyethylene glycol powder    MIRALAX     510 g    Take 17 g (1 capful) by mouth daily    Slow transit constipation       potassium chloride 10 MEQ tablet    K-TAB,KLOR-CON    120 tablet    Take 1 tablet (10 mEq) by mouth daily    Hypokalemia       sertraline 100 MG tablet    ZOLOFT    30 tablet    Take 1 tablet (100 mg) by mouth daily    Recurrent major depression in partial remission (H), Other constipation       sodium chloride 0.65 % nasal spray    SALINE MIST    1 Bottle    Spray 1 spray into both nostrils daily as needed for congestion    Seasonal allergic rhinitis due to pollen       STATIN NOT PRESCRIBED (INTENTIONAL)     0 each    1 each daily Statin not prescribed intentionally due to Active liver disease (liver failure, cirrhosis, hepatitis) LIVER METS    Hyperlipidemia LDL goal <100, Type 2 diabetes mellitus without complication (H)       triamcinolone 0.1 % paste    KENALOG    5 g    Take by mouth 2 times daily    Oral aphthae       Vitamin D (Cholecalciferol) 1000 UNITS Tabs     60 tablet    Take 2,000 Units by mouth daily    Vitamin D deficiency       * Notice:  This list has 2 medication(s) that are the same as other medications prescribed for you. Read the directions carefully, and ask your doctor or other care provider to review them with you.

## 2017-08-18 NOTE — PATIENT INSTRUCTIONS
(I10) Hypertension goal BP (blood pressure) < 140/80  (primary encounter diagnosis)    Comment:      Plan: oxyCODONE-acetaminophen (PERCOCET) 5-325 MG per          tablet, losartan-hydrochlorothiazide (HYZAAR)           50-12.5 MG per tablet, DISCONTINUED:           oxyCODONE-acetaminophen (PERCOCET) 5-325 MG per          tablet                   (E11.9) Type 2 diabetes mellitus without complication, without long-term current use of insulin (H)    Comment:      Plan: blood glucose monitoring (NO BRAND SPECIFIED)           test strip, blood glucose calibration (NO BRAND          SPECIFIED) solution, blood glucose (NO BRAND           SPECIFIED) lancets standard, blood glucose           monitoring (NO BRAND SPECIFIED) meter device           kit                   (J45.909) Persistent asthma    Comment:      Plan: mometasone-formoterol (DULERA) 100-5 MCG/ACT           oral inhaler                   (J45.20) Mild intermittent asthma without complication    Comment:      Plan: albuterol (VENTOLIN HFA) 108 (90 BASE) MCG/ACT           Inhaler                   (J45.21) Mild intermittent asthma with acute exacerbation    Comment:      Plan: albuterol (2.5 MG/3ML) 0.083% neb solution                   (F33.41) Recurrent major depression in partial remission (H)    Comment:      Plan: sertraline (ZOLOFT) 100 MG tablet                   (K59.09) Other constipation    Comment:      Plan: sertraline (ZOLOFT) 100 MG tablet                   (E87.6) Hypokalemia    Comment:      Plan: potassium chloride (K-TAB,KLOR-CON) 10 MEQ           tablet                   (E53.8) B12 deficiency    Comment:      Plan: cyanocobalamin (VITAMIN B12) 1000 MCG/ML           injection                   (E55.9) Vitamin D deficiency    Comment:      Plan: Vitamin D, Cholecalciferol, 1000 UNITS TABS                   (G89.4) Chronic pain syndrome    Comment:      Plan: ondansetron (ZOFRAN ODT) 4 MG ODT tab

## 2017-08-23 ENCOUNTER — CARE COORDINATION (OUTPATIENT)
Dept: CARE COORDINATION | Facility: CLINIC | Age: 52
End: 2017-08-23

## 2017-08-23 NOTE — PROGRESS NOTES
PLEASE GET THIS PATIENT IN AS SHE WILL END UP IN EMERGENCY ROOM   OFTEN FOR NO GOOD REASON   SHE HAS HAD RECENT URINARY TRACT INFECTION   MULTIPLE MEDICINE ALLERGIES  EMOTIONAL LABILITY   MANIPULATION  DRUG SEEKING AND ALCOHOL RELATED BEHAVIORS  OTTO YUSUF JR., MD

## 2017-08-23 NOTE — PROGRESS NOTES
Clinic Care Coordination Contact  OUTREACH    Referral Information:  Referral Source: PCP  Reason for Contact: follow up  Care Conference: No     Universal Utilization:   ED Visits in last year: 8 (in 2017)  Hospital visits in last year: 0 (in 2017)  Last PCP appointment: 08/07/17  Missed Appointments: 0  Concerns: none known  Multiple Providers or Specialists: oncology. [facility]    Clinical Concerns:  Current Medical Concerns: Patient stating that on 08/21/2017 that she had vomiting with yellow stuff & blood. Also, reported that she was running a fever at that time. Patient had not contacted her PCP or clinic about this. Patient stating that she has no appetite. Patient stated that she thinks she has her cancer retuning. Patient asked when she last followed up with oncology.Patient stated that it has been awhile & that she stopped as she was needing to do hormone injections & she had bleeding which should not have occurred as she has had a hysterectomy so she stopped the treatments. Patient also stating that she will probably loss her health care coverage in next couple of months. Clinic Care Coordinator, SMITH encouraged patient to contact PCP triage clinic line today to review her symptoms & is all that more important to be seen now if she is not going to have health care coverage in future  Current Behavioral Concerns: none discussed at this time    Education Provided to patient: Patient to contact triage line for her PCP's clinic   Clinical Pathway Name: Depression  Clinical Pathway: not reviewed at this time    Medication Management:  Not reviewed at this time     Functional Status:  Mobility Status: Independent  Equipment Currently Used at Home: other (see comments) (CPAP)  Transportation: no new info           Psychosocial:  Current living arrangement:: Other (I live in an apartment building that does not have an elevat)  Financial/Insurance: Patient stated that her spouse is working 2 jobs.  Patient reported that she is living with spouse & the income that patient's spouse is making. This is resulting in patient no longer being eligible for MA (health care coverage) or other assistance through [facility]. She might be able to continue on health care covereage but would then be subject to copays which she cannot afford & spouse does not five patient financial support  Patient encouraged to contact [facility] for assistance  [phone]-crisis #  [phone]-legal  [phone]-financial     Resources and Interventions:  Current Resources:  (n/a);  (n/a)  PAS Number:  (n/a)  Senior Linkage Line Referral Placed:  (n/a)  Advanced Care Plans/Directives on file:: No  Referrals Placed: Community Resources (see progress note)     Goals:   Goal 1 Statement: discontinue              Barriers: Patient potentially may not have health care coverage in future  Strengths: Patient has resources that she can contact to assist her  Patient/Caregiver understanding: Patient is to call clinic to talk to triage concerning her symptoms & to contact resources provided  Frequency of Care Coordination: as needed  Upcoming appointment:  (none scheduled at this time)     Plan: Patient is to call clinic to talk to triage concerning her symptoms & to contact resources provided  Patient to outreach to Clinic Care Coordinator, SW as needed  BRISSA Cruz, [facility]  Clinic Care Coordinator, SMITH with [facility]  [phone]

## 2017-08-31 ENCOUNTER — OFFICE VISIT (OUTPATIENT)
Dept: FAMILY MEDICINE | Facility: CLINIC | Age: 52
End: 2017-08-31
Payer: COMMERCIAL

## 2017-08-31 ENCOUNTER — OFFICE VISIT (OUTPATIENT)
Dept: PALLIATIVE MEDICINE | Facility: CLINIC | Age: 52
End: 2017-08-31
Payer: COMMERCIAL

## 2017-08-31 VITALS
SYSTOLIC BLOOD PRESSURE: 118 MMHG | HEART RATE: 60 BPM | RESPIRATION RATE: 16 BRPM | BODY MASS INDEX: 41.8 KG/M2 | DIASTOLIC BLOOD PRESSURE: 80 MMHG | WEIGHT: 259 LBS

## 2017-08-31 VITALS
HEART RATE: 64 BPM | DIASTOLIC BLOOD PRESSURE: 86 MMHG | WEIGHT: 259 LBS | SYSTOLIC BLOOD PRESSURE: 134 MMHG | BODY MASS INDEX: 41.8 KG/M2 | OXYGEN SATURATION: 97 % | RESPIRATION RATE: 20 BRPM | TEMPERATURE: 98.2 F

## 2017-08-31 DIAGNOSIS — G89.4 CHRONIC PAIN SYNDROME: Primary | ICD-10-CM

## 2017-08-31 DIAGNOSIS — G89.4 PAIN SYNDROME, CHRONIC: ICD-10-CM

## 2017-08-31 DIAGNOSIS — R07.0 THROAT PAIN: Primary | ICD-10-CM

## 2017-08-31 DIAGNOSIS — M51.369 DDD (DEGENERATIVE DISC DISEASE), LUMBAR: ICD-10-CM

## 2017-08-31 DIAGNOSIS — Z87.728 H/O SPINA BIFIDA: ICD-10-CM

## 2017-08-31 DIAGNOSIS — F19.20 CHEMICAL DEPENDENCY (H): ICD-10-CM

## 2017-08-31 DIAGNOSIS — I10 HYPERTENSION GOAL BP (BLOOD PRESSURE) < 140/80: Chronic | ICD-10-CM

## 2017-08-31 LAB
DEPRECATED S PYO AG THROAT QL EIA: NORMAL
SPECIMEN SOURCE: NORMAL

## 2017-08-31 PROCEDURE — 99214 OFFICE O/P EST MOD 30 MIN: CPT | Performed by: FAMILY MEDICINE

## 2017-08-31 PROCEDURE — 87081 CULTURE SCREEN ONLY: CPT | Performed by: FAMILY MEDICINE

## 2017-08-31 PROCEDURE — 87880 STREP A ASSAY W/OPTIC: CPT | Performed by: FAMILY MEDICINE

## 2017-08-31 PROCEDURE — 99214 OFFICE O/P EST MOD 30 MIN: CPT | Performed by: NURSE PRACTITIONER

## 2017-08-31 RX ORDER — OXYCODONE AND ACETAMINOPHEN 5; 325 MG/1; MG/1
1 TABLET ORAL EVERY 4 HOURS PRN
Qty: 30 TABLET | Refills: 0 | Status: CANCELLED | OUTPATIENT
Start: 2017-08-31

## 2017-08-31 RX ORDER — OXYCODONE AND ACETAMINOPHEN 5; 325 MG/1; MG/1
1 TABLET ORAL EVERY 4 HOURS PRN
Qty: 30 TABLET | Refills: 0 | Status: SHIPPED | OUTPATIENT
Start: 2017-08-31 | End: 2017-09-07

## 2017-08-31 NOTE — PATIENT INSTRUCTIONS
After Visit Instructions:     Thank you for coming to [facility] for your care. It is my goal to partner with you to help you reach your optimal state of health.   .    1. MICHEAL Vaughan, NPSuleimanC will make recommendations back to Dr Fatima for ongoing care.   2. Imaging: MRI of lumbar. Will recommend that Dr Fatima order to evaluate for possible procedures in the future.   3. Medication recommendations: No change recommended at this time. As discussed opiates are not recommended for fibromyalgia.     MICHEAL Musa, NPSuleimanC  [facility]  [facility]    Contact information: [facility]    Please call if any side effects, questions, or concerns arise.    Nurse Triage line:  [phone]   Call this number with any questions or concerns. You may leave a detailed message anytime. Calls are typically returned Monday through Friday between 8 AM and 4:30 PM. We usually get back to you within 2 business days depending on the issue/request.    Scheduling number: [phone].  Call this number to schedule or change appointments.          Medication Refills Policy:    For non-narcotic medications, please your pharmacy directly to request a refill and the pharmacy will call the [facility] for authorization. Please allow 3-4 days for these refills.    For narcotic refills, call the nurse triage line and leave a message requesting your refill along with the name of the pharmacy that you use. Narcotic prescriptions will be mailed to your pharmacy or you may pick them up at the clinic.  Please call 7-10 days before your refill is due  The above policy allows adequate time so that you do not run out of medication.    No Show - Late Cancellation - Late Arrival Policy  We believe regular attendance is key to your success in our program.    Any time you are unable to keep your appointment we ask that you call us at  least 24 hours in advance to  let us know. This will allow us to offer the appointment time to another patient. The following is our policy for missed appointments. This also applies to appointments cancelled with less than 24 hours notice.    You will receive a letter after missing your 1st and 2nd appointments without contacting the clinic before your scheduled appointment time.     After missing 3 appointments without calling first, we will cancel all of your future appointments at [facility].    At that point, you will not be able to resume services unless approved by your care team  We understand that unforseen circumstances arise, however, out of respect for all concerned and to provide this appointment to another patient, this policy will be enforced.    Please note that most follow up appointments are 30 minutes long. If you arrive late, your provider may not be able to see you for the entire 30 minutes. Please also note that if you arrive more than 15 minutes for any appointment, it may be rescheduled.

## 2017-08-31 NOTE — MR AVS SNAPSHOT
After Visit Summary   8/31/2017    Ines Mott    MRN: [id number]           Patient Information     Date Of Birth          1965        Visit Information        Provider Department      8/31/2017 4:30 PM Otto Fatima MD [facility]        Today's Diagnoses     Throat pain    -  1    Hypertension goal BP (blood pressure) < 140/80        DDD (degenerative disc disease), lumbar        H/O spina bifida        Pain syndrome, chronic          Care Instructions      ONGOING SYMPTOMS OF PAIN     WENT THROUGH MEDICATIONS TOO QUICKLY THIS WEEK     STREP NEGATIVE     RECOMMENDED SALT WATER AND HONEY GARGLES Q 2 HOURS     CHRONIC PAIN SYNDROME     SAW [place] PAIN [facility]     OK TO CONTINUE OPIOIDS     I WOULD RECOMMEND A VERY SLOW TAPER     OTTO FATIMA JR., MD           Follow-ups after your visit        Additional Services     RADIOLOGY REFERRAL       Your provider has referred you to: [facility] RADIOLOGY  [phone]  [phone]   MAGNETIC RESONANCE IMAGING OF LUMBAR SACRUM AND COCCYX   CHRONIC PAIN SYNDROME   NO DYE ALLERGIES   NORMAL RENAL FUNCTION     Please be aware that coverage of these services is subject to the terms and limitations of your health insurance plan.  Call member services at your health plan with any benefit or coverage questions.      Please bring the following to your appointment:    >>   Any x-rays, CTs or MRIs which have been performed.  Contact the facility where they were done to arrange for  prior to your scheduled appointment.    >>   List of current medications   >>   This referral request   >>   Any documents/labs given to you for this referral    Prior authorization is required for MRI/MRA, CT, Dexa Scans and Worker's Compensation cases.                  Follow-up notes from your care team     Return in about 1 week (around 9/7/2017).      Your next 10 appointments already scheduled     Aug 31, 2017  4:30 PM  "CDT   SHORT with Dustin Fatima MD   [facility]    [address]  [address]  [place] [address] [phone]            Sep 01, 2017  7:30 AM CDT   Office Visit with Dustin Fatima MD   [facility]    [address]  [address]  [place] [address]   [phone]           Bring a current list of meds and any records pertaining to this visit. For Physicals, please bring immunization records and any forms needing to be filled out. Please arrive 10 minutes early to complete paperwork.              Who to contact     If you have questions or need follow up information about today's clinic visit or your schedule please contact [facility] directly at [phone].  Normal or non-critical lab and imaging results will be communicated to you by MyChart, letter or phone within 4 business days after the clinic has received the results. If you do not hear from us within 7 days, please contact the clinic through [employer] or phone. If you have a critical or abnormal lab result, we will notify you by phone as soon as possible.  Submit refill requests through [employer] or call your pharmacy and they will forward the refill request to us. Please allow 3 business days for your refill to be completed.          Additional Information About Your Visit        [employer] Information     [employer] lets you send messages to your doctor, view your test results, renew your prescriptions, schedule appointments and more. To sign up, go to [email or web address] . Click on \"Log in\" on the left side of the screen, which will take you to the Welcome page. Then click on \"Sign up Now\" on the right side of the page.     You will be asked to enter the access code listed below, as well as some personal information. " Please follow the directions to create your username and password.     Your access code is: [email or web address]  Expires: 10/2/2017  7:26 PM     Your access code will  in 90 days. If you need help or a new code, please call your [facility] clinic or [phone].        Care EveryWhere ID     This is your Care EveryWhere ID. This could be used by other organizations to access your [facility] medical records  [id number]        Your Vitals Were     Pulse Temperature Respirations Pulse Oximetry BMI (Body Mass Index)       64 98.2  F (36.8  C) (Tympanic) 20 97% 41.8 kg/m2        Blood Pressure from Last 3 Encounters:   17 134/86   17 118/80   17 (!) 155/95    Weight from Last 3 Encounters:   17 259 lb (117.5 kg)   17 259 lb (117.5 kg)   17 259 lb (117.5 kg)              We Performed the Following     Beta strep group A culture     RADIOLOGY REFERRAL     Strep, Rapid Screen          Where to get your medicines      Some of these will need a paper prescription and others can be bought over the counter.  Ask your nurse if you have questions.     Bring a paper prescription for each of these medications     oxyCODONE-acetaminophen 5-325 MG per tablet          Primary Care Provider Office Phone # Fax #    Dustin Rina Fatima [phone] [address] [address]        Equal Access to Services     VALENTE RIVERA AH: Hadii casimiro ku hadasho Sodanisali, waaxda luqadaha, qaybta kaalmada adeegyada, waxay antione pardo . So [facility] [phone].    ATENCIÓN: Si habla español, tiene a christianson disposición servicios gratuitos de asistencia lingüística. Llame al [phone].    We comply with applicable federal civil rights laws and [place] laws. We do not discriminate on the basis of race, color, national origin, age, disability sex, sexual orientation or gender identity.            Thank you!     Thank you for choosing [facility]  [place]  for your care. Our goal is always to provide you with excellent care. Hearing back from our patients is one way we can continue to improve our services. Please take a few minutes to complete the written survey that you may receive in the mail after your visit with us. Thank you!             Your Updated Medication List - Protect others around you: Learn how to safely use, store and throw away your medicines at www.disposemymeds.org.          This list is accurate as of: 8/31/17  4:22 PM.  Always use your most recent med list.                   Brand Name Dispense Instructions for use Diagnosis    * albuterol 108 (90 BASE) MCG/ACT Inhaler    VENTOLIN HFA    54 g    INHALE TWO PUFFS BY MOUTH EVERY 4 HOURS AS NEEDED FOR SHORTNESS OF BREATH/DYSPNEA    Mild intermittent asthma without complication       * albuterol (2.5 MG/3ML) 0.083% neb solution     30 vial    Take 1 vial (2.5 mg) by nebulization every 6 hours as needed for shortness of breath / dyspnea or wheezing    Mild intermittent asthma with acute exacerbation       ASPIRIN NOT PRESCRIBED    INTENTIONAL    1 each    Please choose reason not prescribed, below    Aspirin intolerance       blood glucose calibration solution    no brand specified    1 each    Use to calibrate blood glucose monitor as directed.    Type 2 diabetes mellitus without complication, without long-term current use of insulin (H)       blood glucose lancets standard    no brand specified    100 each    Use to test blood sugar daily times daily or as directed.    Type 2 diabetes mellitus without complication, without long-term current use of insulin (H)       blood glucose monitoring meter device kit    no brand specified    1 kit    Use to test blood sugar larisa times daily or as directed.    Type 2 diabetes mellitus without complication, without long-term current use of insulin (H)       blood glucose monitoring test strip    no brand specified    100 strip    Use to test blood  sugars once times daily or as directed    Type 2 diabetes mellitus without complication, without long-term current use of insulin (H)       cetirizine 10 MG tablet    zyrTEC    30 tablet    Take 1 tablet (10 mg) by mouth every evening    Seasonal allergic rhinitis due to pollen       cyanocobalamin 1000 MCG/ML injection    VITAMIN B12    1 mL    Inject 1 mL (1,000 mcg) Subcutaneous every 30 days    B12 deficiency       EPINEPHrine 0.3 MG/0.3ML injection 2-pack    EPIPEN/ADRENACLICK/or ANY BX GENERIC EQUIV    2 each    Inject 0.3 mLs (0.3 mg) into the muscle once as needed for anaphylaxis    Late effect of other and unspecified external causes       fluticasone 50 MCG/ACT spray    FLONASE    3 Bottle    Spray 1-2 sprays into both nostrils daily    Seasonal allergic rhinitis due to pollen, Chronic pain syndrome       losartan-hydrochlorothiazide 50-12.5 MG per tablet    HYZAAR    90 tablet    Take 1 tablet by mouth daily    Hypertension goal BP (blood pressure) < 140/80       magnesium oxide 400 (241.3 MG) MG tablet    MAG-OX    90 tablet    Take 1 tablet (400 mg) by mouth daily    Cramp of limb       mometasone-formoterol 100-5 MCG/ACT oral inhaler    DULERA    1 Inhaler    Inhale 2 puffs into the lungs 2 times daily    Persistent asthma       montelukast 10 MG tablet    SINGULAIR    30 tablet    Take 1 tablet (10 mg) by mouth At Bedtime    Seasonal allergic rhinitis due to pollen       ondansetron 4 MG ODT tab    ZOFRAN ODT    10 tablet    Take 1 tablet (4 mg) by mouth every 8 hours as needed for nausea    Chronic pain syndrome       order for DME     1 Device    Right wrist splint for carpal tunnel syndrome  One* Use as directed at hour of sleep    Carpal tunnel syndrome of right wrist       oxyCODONE-acetaminophen 5-325 MG per tablet    PERCOCET    30 tablet    Take 1 tablet by mouth every 4 hours as needed for moderate to severe pain    Hypertension goal BP (blood pressure) < 140/80       polyethylene glycol  powder    MIRALAX    510 g    Take 17 g (1 capful) by mouth daily    Slow transit constipation       potassium chloride 10 MEQ tablet    K-TAB,KLOR-CON    120 tablet    Take 1 tablet (10 mEq) by mouth daily    Hypokalemia       sertraline 100 MG tablet    ZOLOFT    30 tablet    Take 1 tablet (100 mg) by mouth daily    Recurrent major depression in partial remission (H), Other constipation       sodium chloride 0.65 % nasal spray    SALINE MIST    1 Bottle    Spray 1 spray into both nostrils daily as needed for congestion    Seasonal allergic rhinitis due to pollen       STATIN NOT PRESCRIBED (INTENTIONAL)     0 each    1 each daily Statin not prescribed intentionally due to Active liver disease (liver failure, cirrhosis, hepatitis) LIVER METS    Hyperlipidemia LDL goal <100, Type 2 diabetes mellitus without complication (H)       triamcinolone 0.1 % paste    KENALOG    5 g    Take by mouth 2 times daily    Oral aphthae       Vitamin D (Cholecalciferol) 1000 UNITS Tabs     60 tablet    Take 2,000 Units by mouth daily    Vitamin D deficiency       * Notice:  This list has 2 medication(s) that are the same as other medications prescribed for you. Read the directions carefully, and ask your doctor or other care provider to review them with you.

## 2017-08-31 NOTE — NURSING NOTE
"Chief Complaint   Patient presents with     Pain       Initial /80 (BP Location: Right arm, Patient Position: Chair, Cuff Size: Adult Large)  Pulse 60  Resp 16  Wt 117.5 kg (259 lb)  BMI 41.8 kg/m2 Estimated body mass index is 41.8 kg/(m^2) as calculated from the following:    Height as of 8/9/17: 1.676 m (5' 6\").    Weight as of this encounter: 117.5 kg (259 lb).  Medication Reconciliation: complete       Sharlene Thomas MA  [facility]      "

## 2017-08-31 NOTE — PROGRESS NOTES
"[facility]    CHIEF COMPLAINT: All over pain    INTERVAL HISTORY:  Last seen on 11/22/16.          Resent to Pain Center by PCP Dr Fatima for consultation only    Reason for Referral: Consult only - without ongoing management    Please complete the following questions:    What is your diagnosis for the patient's pain?  Chronic pain syndorme  History of chemical dependency and alcohol usage   Dysfunctional relationship at home     Do you have any specific questions for the pain specialist? Yes:    She has has stayed out of EMERGENCY ROOM over last several months except for legitimate issue   [facility] wanted to use pain pump and patient was reluctant     Are there any red flags that may impact the assessment or management of the patient? Mental Illness / Communication Difficulties (explain):      Recommendations/plan at the last visit included:  1. Schedule pain psychology assessment  2. Schedule physical therapy assessment  3. Schedule follow-up with MICHEAL Vaughan NP-C after physical therapy and health psychology assessment and oncology follow up  4. Labs: Urine drug screen  5. Release of information: [facility] [place] [place] [facility] (Dr Pedor) and [facility], all office notes, procedures, evaluations,   6. Must see Oncology before returning to see Leticia Young.   7. Medication recommendations: To remain with PCP at this time.       Since her last visit, Ines Mott reports:  - Patient states that she did not return to [facility] after initial consultation due to insurance reasons.     Pain Information:   Pain quality: Aching and Shooting    Pain timing: Constant     Pain rating: intensity ranges from 9/10 to 10/10, and averages 9/10 on a 0-10 scale.   Aggravating factors include: \"Hot or cold\"    Relieving factors include: \"Meds\"       Annual Controlled Substance Agreement/UDS due date: N/A    CURRENT RELEVANT PAIN MEDICATIONS:   Percocet 5/325 mg 1 tablet q4h hours #30 given on 8/25/17, " [facility] so[facility] for Percocet from one provider.     Patient is using the medication as prescribed:  YES  Is your medication helpful? YES   Medication side effects? denies any problems    Past Pain Treatments:                        Pain Clinic:   Yes: [facility] stopped going  because they recommended a pain pump and stopped her opioids.                         PT: Yes: Helped for a while but then insurance stopped paying                        Psychologist: Yes: [place]                       Relaxation techniques/biofeedback: Yes: NH, went in teens in [place]                       Chiropractor: Yes: NH, made knee and back pain worse                       Acupuncture: Yes: NH had 3 sessions                       Pharmacotherapy:                                             Opioids: Yes                                              Non-opioids:            Yes                        TENs Unit:Yes: a long time ago, NH                       Injections: Yes: Helped with knee pain for awhile but then started to effect glucose levels. Had Botox injections for migraines one yr ago: caused facial paralysis                       Self-care:   No     Previous Pain Relevant Medications: (H--helped; HI--Helped initially; [facility]--Somewhat helpful; NH--No help; W--worse; SE--side effects; ?--Unsure if helpful)   NOTE: This medication information taken from patient's intake form, not medical records.                         Opiates: Codeine: H, Fentanyl:H, Hydrocodone:H, Morphine:H, Oxycodone:H, Buprenorphine: Allergy                        NSAIDS: Celebrex: NH, indomethacin: NH, naproxen: NH. Can't take NSAIDs due to liver disease                        Muscle Relaxants: Baclofen: NH, clonazepam: NH, methocarbamol: NH, cyclobenzaprine: NH, Norflex: NH                        Anti-migraine mediations: Fioricet:NH, Fiorinal:NH                        Anti-depressants: Amitriptyline, bupropion, citalopram, desipramine,  fluoxetine, paroxetine, sertraline, venlafaxine, Lexapro: All NH for pain                        Sleep aids: Clonazepam:NH, diazepam:NH, hydroxyzine:NH, SE allergy, lorazepam:NH, SE allergy                        Anti-convulsants: Tegretol:NH, gabapentin:NH, Trileptal:NH, pregabalin: Allergy, topiramate:H                                    Topicals: none noted                        Other medications not covered above: Tylenol:NH, prednisone:NH    [place] Board of Pharmacy Data Base Reviewed:    YES; As expected, no concern for misuse/abuse of controlled medications based on this report. Long term use of opiates but no appearance of misuse based on this report.     SELF CARE:   How often do you practice SELF-CARE (relaxing, stretching, pacing, monitoring posture, taking mini-breaks) in a typical day:  varies      Medications:  Current Outpatient Prescriptions   Medication Sig Dispense Refill     magnesium oxide (MAG-OX) 400 (241.3 MG) MG tablet Take 1 tablet (400 mg) by mouth daily 90 tablet 3     oxyCODONE-acetaminophen (PERCOCET) 5-325 MG per tablet Take 1 tablet by mouth every 4 hours as needed for moderate to severe pain 30 tablet 0     blood glucose monitoring (NO BRAND SPECIFIED) test strip Use to test blood sugars once times daily or as directed 100 strip 3     blood glucose calibration (NO BRAND SPECIFIED) solution Use to calibrate blood glucose monitor as directed. 1 each 0     blood glucose (NO BRAND SPECIFIED) lancets standard Use to test blood sugar daily times daily or as directed. 100 each 11     blood glucose monitoring (NO BRAND SPECIFIED) meter device kit Use to test blood sugar larisa times daily or as directed. 1 kit 0     mometasone-formoterol (DULERA) 100-5 MCG/ACT oral inhaler Inhale 2 puffs into the lungs 2 times daily 1 Inhaler 11     albuterol (VENTOLIN HFA) 108 (90 BASE) MCG/ACT Inhaler INHALE TWO PUFFS BY MOUTH EVERY 4 HOURS AS NEEDED FOR SHORTNESS OF BREATH/DYSPNEA 54 g 1     albuterol  (2.5 MG/3ML) 0.083% neb solution Take 1 vial (2.5 mg) by nebulization every 6 hours as needed for shortness of breath / dyspnea or wheezing 30 vial 3     losartan-hydrochlorothiazide (HYZAAR) 50-12.5 MG per tablet Take 1 tablet by mouth daily 90 tablet 3     sertraline (ZOLOFT) 100 MG tablet Take 1 tablet (100 mg) by mouth daily 30 tablet 11     potassium chloride (K-TAB,KLOR-CON) 10 MEQ tablet Take 1 tablet (10 mEq) by mouth daily 120 tablet 11     cyanocobalamin (VITAMIN B12) 1000 MCG/ML injection Inject 1 mL (1,000 mcg) Subcutaneous every 30 days 1 mL 11     Vitamin D, Cholecalciferol, 1000 UNITS TABS Take 2,000 Units by mouth daily 60 tablet 11     ondansetron (ZOFRAN ODT) 4 MG ODT tab Take 1 tablet (4 mg) by mouth every 8 hours as needed for nausea 10 tablet 0     ASPIRIN NOT PRESCRIBED (INTENTIONAL) Please choose reason not prescribed, below 1 each 0     order for DME Right wrist splint for carpal tunnel syndrome   One*  Use as directed at hour of sleep 1 Device 1     montelukast (SINGULAIR) 10 MG tablet Take 1 tablet (10 mg) by mouth At Bedtime 30 tablet 11     cetirizine (ZYRTEC) 10 MG tablet Take 1 tablet (10 mg) by mouth every evening 30 tablet 1     fluticasone (FLONASE) 50 MCG/ACT spray Spray 1-2 sprays into both nostrils daily 3 Bottle 1     sodium chloride (SALINE MIST) 0.65 % nasal spray Spray 1 spray into both nostrils daily as needed for congestion 1 Bottle 11     triamcinolone (KENALOG) 0.1 % paste Take by mouth 2 times daily 5 g 0     polyethylene glycol (MIRALAX) powder Take 17 g (1 capful) by mouth daily 510 g 3     STATIN NOT PRESCRIBED, INTENTIONAL, 1 each daily Statin not prescribed intentionally due to Active liver disease (liver failure, cirrhosis, hepatitis)  LIVER METS 0 each 0     EPINEPHrine (EPIPEN) 0.3 MG/0.3ML injection Inject 0.3 mLs (0.3 mg) into the muscle once as needed for anaphylaxis 2 each 5       Review of Systems: A 10-point review of systems was negative, with the exception  of chronic pain issues.      Social History: Reviewed; unchanged from initial consultation.      Family history: Reviewed; unchanged from initial consultation.     PHYSICAL EXAM    Vitals:    08/31/17 1407   BP: 118/80   BP Location: Right arm   Patient Position: Chair   Cuff Size: Adult Large   Pulse: 60   Resp: 16   Weight: 117.5 kg (259 lb)       Constitutional: healthy, alert and no distress  HEENT: Head atraumatic, normocephalic. Eyes without conjunctival injection or jaundice. Neck supple. No obvious neck masses.  Cardiovascular: Regular rate/rhythm; no murmurs/rubs/gallops appreciated.   Respiratory: Lungs clear to auscultation bilaterally.   Gastrointestinal: Normoactive bowel sounds. Abdomen soft, obese, non-tender, without guarding/rebound.   : Deferred  Skin: No rash, lesions, or petechiae of exposed skin.   Extremities: Peripheral pulses intact. No clubbing, cyanosis, or edema.   Psychiatric/mental status: Alert, slow speech pattern, no lethargy or stupor. Appropriate affect. Mood normal. Fund of information limited.     Neurologic exam:  CN:  Cranial nerves 2-12 are grossly normal.  Motor:  4/5 UE and LE strength    Reflexes:     Biceps:     R:  2/4 L: 2/4   Brachioradialis   R:  2/4 L: 2/4   Triceps:  R:  2/4 L: 2/4   Patella:  R:  2/4 L: 2/4   Achilles:  R:  2/4 L: 2/4    Musculoskeletal exam:  Cervical spine:                       Flex:  30 degrees                       Ext: 30 degrees                       Rotation to right: 30 degrees                       Rotation to left:: 30 degrees                       Rotation/ext to right: painful                        Rotation/ext to left: painful                        Tenderness in the cervical spine at midline. Yes                       Tenderness in the cervical paraspinal muscles. Yes  Thoracic spine:                        Kyphosis. Yes                       Tenderness in the thoracic spine at midline. Yes                       Tenderness in the  thoracic paraspinal muscles. Yes  Lumbar/Sacral spine:                       Forward Flexion:  30 degrees                       Ext: 10 degrees                       Rotation/ext to right: painful                        Rotation/ext to left:: painful                        Lordosis. Yes                       Tenderness in the lumbar spine at midline. Yes                       Tenderness in the lumbar paraspinal muscles.Yes                       Tenderness over piriformis:                                            Right: positive                                             Left:  positive                       Tenderness over Trochanteric Bursa:                                            Right: positive                                             Left: positive   Sensory exam:   Light touch: normal bilateral upper and lower extremities    Vibration: normal in LE   No allodynia, dysesthesia, or hyperalgesia.    Fibromyalgia Assessment:    Myofascial tenderness:  Yes   Tender point survey:  18/18 2010 ACR Criteria for fibromyalgia - scoring   Widespread pain index of > or equal to 7 - yes   Symptoms present for at least 3 months - yes   No other disorder to explain their pain - yes   Symptoms Severity scale score > or equal to 5   Fatigue (0-3) - 3   Waking unrefreshed (0-3) - 3   Cognitive symptoms (0-3) - 3   Somatic Symptoms - 3  (None - 0, Few - 1, Moderate - 2, Great deal - 3)  Does meet criteria for fibromyalgia     DIRE Score for ongoing opioid management is calculated as follows:    Diagnosis = 2 pts (slowly progressive; moderate pain/objective findings)    Intractability = 1 pt (few therapies tried; passive patient role)    Risk        Psych = 1 pt (serious personality dysfunction/mental illness that significantly interferes with care)         Chem Hlth = 1 pt (active or very recent use of illicit drugs; excessive alcohol/drug abuse)       Reliability = 1 pt (medication misuse; missed appointments; rarely  follows through)       Social = 1 pt (life in chaos; little family support/close relationships; loss normal life rolls)       (Psych + Chem hlth + Reliability + Social) = 7    Efficacy = 2 pts (moderate benefit/function; low med dose; too early/not tried meds)      DIRE Score = 9        7-13: likely NOT suitable candidate for long-term opioid analgesia       14-21: may be a suitable candidate for long-term opioid analgesia    Assessment:   1.  Chronic multifactorial pain  2.  History of chemical dependency      Ines returns for consultation only regarding chronic pain issues. She was previously seen in this clinic for a complete evaluation about 9 months ago. She did not return to clinic nor did she follow up on recommendations at that time. She remains focused on opiates as her preferred mode of treatment. She is quite adamantly not interested in physical therapy or health psychology. She does not have recent imaging of her lumbar spine and I think this may be beneficial to determine if there are any interventional procedures which may be of help. On exam she does appear to have lumbar facet involvement. Regarding medication management I would recommend that she see addiction medicine to see if they think she would be a good candidate for Suboxone. She is not a good candidate for opiate medication under any other setting at this time.     Recommendations to be implemented by primary care:       1:  Imaging: MRI of lumbar. Will recommend that Dr Fatima order to evaluate for possible procedures in the future.   2: Medication recommendations: Consider consultation with addiction medicine for Suboxone for pain control in the setting of history of chemical dependence/alcohol abuse.     Total time spent face to face was 30 minutes and more than 50% of face to face time was spent in counseling and/or coordination of care regarding the diagnosis and recommendations above.      Leticia Young, MICHEAL, NP-C   [facility]

## 2017-08-31 NOTE — NURSING NOTE
"Chief Complaint   Patient presents with     URI       Initial /86 (BP Location: Right arm, Patient Position: Chair, Cuff Size: Adult Large)  Pulse 64  Temp 98.2  F (36.8  C) (Tympanic)  Resp 20  Wt 259 lb (117.5 kg)  SpO2 97%  BMI 41.8 kg/m2 Estimated body mass index is 41.8 kg/(m^2) as calculated from the following:    Height as of 8/9/17: 5' 6\" (1.676 m).    Weight as of this encounter: 259 lb (117.5 kg).  Medication Reconciliation: complete    "

## 2017-08-31 NOTE — MR AVS SNAPSHOT
After Visit Summary   8/31/2017    Ines Mott    MRN: [id number]           Patient Information     Date Of Birth          1965        Visit Information        Provider Department      8/31/2017 2:00 PM Leticia Young APRN CNP [facility]        Care Instructions    After Visit Instructions:     Thank you for coming to [facility] for your care. It is my goal to partner with you to help you reach your optimal state of health.   .    1. MICHEAL Vaughan, NPSuleimanC will make recommendations back to Dr Fatima for ongoing care.   2. Imaging: MRI of lumbar. Will recommend that Dr Fatima order to evaluate for possible procedures in the future.   3. Medication recommendations: No change recommended at this time. As discussed opiates are not recommended for fibromyalgia.     MICHEAL Musa, NP-C  [facility]    Contact information: [facility]    Please call if any side effects, questions, or concerns arise.    Nurse Triage line:  [phone]   Call this number with any questions or concerns. You may leave a detailed message anytime. Calls are typically returned Monday through Friday between 8 AM and 4:30 PM. We usually get back to you within 2 business days depending on the issue/request.    Scheduling number: [phone].  Call this number to schedule or change appointments.          Medication Refills Policy:    For non-narcotic medications, please your pharmacy directly to request a refill and the pharmacy will call the [facility] for authorization. Please allow 3-4 days for these refills.    For narcotic refills, call the nurse triage line and leave a message requesting your refill along with the name of the pharmacy that you use. Narcotic prescriptions will be mailed to your pharmacy or you may pick them up at the clinic.  Please call 7-10 days before your refill is  due  The above policy allows adequate time so that you do not run out of medication.    No Show - Late Cancellation - Late Arrival Policy  We believe regular attendance is key to your success in our program.    Any time you are unable to keep your appointment we ask that you call us at  least 24 hours in advance to let us know. This will allow us to offer the appointment time to another patient. The following is our policy for missed appointments. This also applies to appointments cancelled with less than 24 hours notice.    You will receive a letter after missing your 1st and 2nd appointments without contacting the clinic before your scheduled appointment time.     After missing 3 appointments without calling first, we will cancel all of your future appointments at [facility].    At that point, you will not be able to resume services unless approved by your care team  We understand that unforseen circumstances arise, however, out of respect for all concerned and to provide this appointment to another patient, this policy will be enforced.    Please note that most follow up appointments are 30 minutes long. If you arrive late, your provider may not be able to see you for the entire 30 minutes. Please also note that if you arrive more than 15 minutes for any appointment, it may be rescheduled.                                     Follow-ups after your visit        Your next 10 appointments already scheduled     Aug 31, 2017  4:30 PM CDT   SHORT with Dustin [facility]ima MD   [facility]    [address] [place] [id number]   [phone]            Sep 01, 2017  7:30 AM CDT   Office Visit with Dustin Fatima MD   [facility] ([facility]    [address] [place] [id number]   [phone]     "       Bring a current list of meds and any records pertaining to this visit. For Physicals, please bring immunization records and any forms needing to be filled out. Please arrive 10 minutes early to complete paperwork.              Who to contact     If you have questions or need follow up information about today's clinic visit or your schedule please contact [facility] directly at [phone].  Normal or non-critical lab and imaging results will be communicated to you by MyChart, letter or phone within 4 business days after the clinic has received the results. If you do not hear from us within 7 days, please contact the clinic through [employer] or phone. If you have a critical or abnormal lab result, we will notify you by phone as soon as possible.  Submit refill requests through "[employer] or call your pharmacy and they will forward the refill request to us. Please allow 3 business days for your refill to be completed.          Additional Information About Your Visit        [employer] Information     "[employer] lets you send messages to your doctor, view your test results, renew your prescriptions, schedule appointments and more. To sign up, go to [email or web address] . Click on \"Log in\" on the left side of the screen, which will take you to the Welcome page. Then click on \"Sign up Now\" on the right side of the page.     You will be asked to enter the access code listed below, as well as some personal information. Please follow the directions to create your username and password.     Your access code is: [email or web address]  Expires: 10/2/2017  7:26 PM     Your access code will  in 90 days. If you need help or a new code, please call your [place] clinic or [phone].        Care EveryWhere ID     This is your Care EveryWhere ID. This could be used by other organizations to access your [place] medical records  [id number]        Your Vitals Were     Pulse Respirations BMI (Body Mass Index)    "          60 16 41.8 kg/m2          Blood Pressure from Last 3 Encounters:   08/31/17 118/80   08/18/17 (!) 155/95   08/09/17 143/90    Weight from Last 3 Encounters:   08/31/17 117.5 kg (259 lb)   08/18/17 117.5 kg (259 lb)   08/09/17 116.1 kg (256 lb)              Today, you had the following     No orders found for display       Primary Care Provider Office Phone # Fax #    Dustin Rina Fatima [phone] [address] [facility] [address]        Equal Access to Services     [facility] Center[facility] : Hadii casimiro zapata hadchandrikao Aisha, waaxda ludoreenadaha, qaybta kaalmada mary, emilee pardo . So [facility] [phone].    ATENCIÓN: Si habla español, tiene a christianson disposición servicios gratuitos de asistencia lingüística. [facility] [phone].    We comply with applicable federal civil rights laws and [place] laws. We do not discriminate on the basis of race, color, national origin, age, disability sex, sexual orientation or gender identity.            Thank you!     Thank you for choosing [facility]  for your care. Our goal is always to provide you with excellent care. Hearing back from our patients is one way we can continue to improve our services. Please take a few minutes to complete the written survey that you may receive in the mail after your visit with us. Thank you!             Your Updated Medication List - Protect others around you: Learn how to safely use, store and throw away your medicines at www.disposemymeds.org.          This list is accurate as of: 8/31/17  2:39 PM.  Always use your most recent med list.                   Brand Name Dispense Instructions for use Diagnosis    * albuterol 108 (90 BASE) MCG/ACT Inhaler    VENTOLIN HFA    54 g    INHALE TWO PUFFS BY MOUTH EVERY 4 HOURS AS NEEDED FOR SHORTNESS OF BREATH/DYSPNEA    Mild intermittent asthma without complication       * albuterol (2.5 MG/3ML) 0.083% neb solution     30 vial     Take 1 vial (2.5 mg) by nebulization every 6 hours as needed for shortness of breath / dyspnea or wheezing    Mild intermittent asthma with acute exacerbation       ASPIRIN NOT PRESCRIBED    INTENTIONAL    1 each    Please choose reason not prescribed, below    Aspirin intolerance       blood glucose calibration solution    no brand specified    1 each    Use to calibrate blood glucose monitor as directed.    Type 2 diabetes mellitus without complication, without long-term current use of insulin (H)       blood glucose lancets standard    no brand specified    100 each    Use to test blood sugar daily times daily or as directed.    Type 2 diabetes mellitus without complication, without long-term current use of insulin (H)       blood glucose monitoring meter device kit    no brand specified    1 kit    Use to test blood sugar larisa times daily or as directed.    Type 2 diabetes mellitus without complication, without long-term current use of insulin (H)       blood glucose monitoring test strip    no brand specified    100 strip    Use to test blood sugars once times daily or as directed    Type 2 diabetes mellitus without complication, without long-term current use of insulin (H)       cetirizine 10 MG tablet    zyrTEC    30 tablet    Take 1 tablet (10 mg) by mouth every evening    Seasonal allergic rhinitis due to pollen       cyanocobalamin 1000 MCG/ML injection    VITAMIN B12    1 mL    Inject 1 mL (1,000 mcg) Subcutaneous every 30 days    B12 deficiency       EPINEPHrine 0.3 MG/0.3ML injection 2-pack    EPIPEN/ADRENACLICK/or ANY BX GENERIC EQUIV    2 each    Inject 0.3 mLs (0.3 mg) into the muscle once as needed for anaphylaxis    Late effect of other and unspecified external causes       fluticasone 50 MCG/ACT spray    FLONASE    3 Bottle    Spray 1-2 sprays into both nostrils daily    Seasonal allergic rhinitis due to pollen, Chronic pain syndrome       losartan-hydrochlorothiazide 50-12.5 MG per tablet     HYZAAR    90 tablet    Take 1 tablet by mouth daily    Hypertension goal BP (blood pressure) < 140/80       magnesium oxide 400 (241.3 MG) MG tablet    MAG-OX    90 tablet    Take 1 tablet (400 mg) by mouth daily    Cramp of limb       mometasone-formoterol 100-5 MCG/ACT oral inhaler    DULERA    1 Inhaler    Inhale 2 puffs into the lungs 2 times daily    Persistent asthma       montelukast 10 MG tablet    SINGULAIR    30 tablet    Take 1 tablet (10 mg) by mouth At Bedtime    Seasonal allergic rhinitis due to pollen       ondansetron 4 MG ODT tab    ZOFRAN ODT    10 tablet    Take 1 tablet (4 mg) by mouth every 8 hours as needed for nausea    Chronic pain syndrome       order for DME     1 Device    Right wrist splint for carpal tunnel syndrome  One* Use as directed at hour of sleep    Carpal tunnel syndrome of right wrist       oxyCODONE-acetaminophen 5-325 MG per tablet    PERCOCET    30 tablet    Take 1 tablet by mouth every 4 hours as needed for moderate to severe pain    Hypertension goal BP (blood pressure) < 140/80       polyethylene glycol powder    MIRALAX    510 g    Take 17 g (1 capful) by mouth daily    Slow transit constipation       potassium chloride 10 MEQ tablet    K-TAB,KLOR-CON    120 tablet    Take 1 tablet (10 mEq) by mouth daily    Hypokalemia       sertraline 100 MG tablet    ZOLOFT    30 tablet    Take 1 tablet (100 mg) by mouth daily    Recurrent major depression in partial remission (H), Other constipation       sodium chloride 0.65 % nasal spray    SALINE MIST    1 Bottle    Spray 1 spray into both nostrils daily as needed for congestion    Seasonal allergic rhinitis due to pollen       STATIN NOT PRESCRIBED (INTENTIONAL)     0 each    1 each daily Statin not prescribed intentionally due to Active liver disease (liver failure, cirrhosis, hepatitis) LIVER METS    Hyperlipidemia LDL goal <100, Type 2 diabetes mellitus without complication (H)       triamcinolone 0.1 % paste    KENALOG    5 g     Take by mouth 2 times daily    Oral aphthae       Vitamin D (Cholecalciferol) 1000 UNITS Tabs     60 tablet    Take 2,000 Units by mouth daily    Vitamin D deficiency       * Notice:  This list has 2 medication(s) that are the same as other medications prescribed for you. Read the directions carefully, and ask your doctor or other care provider to review them with you.

## 2017-08-31 NOTE — PATIENT INSTRUCTIONS
ONGOING SYMPTOMS OF PAIN     WENT THROUGH MEDICATIONS TOO QUICKLY THIS WEEK     STREP NEGATIVE     RECOMMENDED SALT WATER AND HONEY GARGLES Q 2 HOURS     CHRONIC PAIN SYNDROME     [facility]     OK TO CONTINUE OPIOIDS     I WOULD RECOMM END A VERY SLOW TAPER     NORCO 10MG  FOUR TIMES DAILY  NEXT OFFICE VISIT     OTTO YUSUF JR., MD

## 2017-08-31 NOTE — LETTER
September 2, 2017      Ines Mott  [address]  [facility] [address]        Dear ,    We are writing to inform you of your test results.    Your test results fall within the expected range(s) or remain unchanged from previous results.  Please continue with current treatment plan.    Resulted Orders   Strep, Rapid Screen   Result Value Ref Range    Specimen Description Throat     Rapid Strep A Screen       NEGATIVE: No Group A streptococcal antigen detected by immunoassay, await culture report.   Beta strep group A culture   Result Value Ref Range    Specimen Description Throat     Culture Micro No beta hemolytic Streptococcus Group A isolated        If you have any questions or concerns, please call the clinic at the number listed above.       Sincerely,        OTTO YUSUF MD
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
## 2017-09-01 LAB
BACTERIA SPEC CULT: NORMAL
SPECIMEN SOURCE: NORMAL

## 2017-09-01 NOTE — PROGRESS NOTES
Please send normal lab letter when labs are complete  NORMAL STREP CULTURE  NORMAL STREP SCREEN   CONGRATULATIONS   OTTO YUSUF JR., MD

## 2017-09-01 NOTE — PROGRESS NOTES
NORMAL STREP   RESULTS TO PATIENT   SIDE EFFECTS BENEFITS AND RISKS DISCUSSED    TREATMENT PROGNOSIS BENEFITS AND RISKS DISCUSSED   RECOMMENDED HONEY AND SALT WATER GARGLES   OTTO YUSUF JR., MD

## 2017-09-07 ENCOUNTER — OFFICE VISIT (OUTPATIENT)
Dept: FAMILY MEDICINE | Facility: CLINIC | Age: 52
End: 2017-09-07
Payer: COMMERCIAL

## 2017-09-07 VITALS
TEMPERATURE: 97.6 F | OXYGEN SATURATION: 100 % | SYSTOLIC BLOOD PRESSURE: 124 MMHG | DIASTOLIC BLOOD PRESSURE: 64 MMHG | RESPIRATION RATE: 16 BRPM | WEIGHT: 256 LBS | BODY MASS INDEX: 41.32 KG/M2 | HEART RATE: 77 BPM

## 2017-09-07 DIAGNOSIS — I10 HYPERTENSION GOAL BP (BLOOD PRESSURE) < 140/80: Chronic | ICD-10-CM

## 2017-09-07 DIAGNOSIS — F33.41 RECURRENT MAJOR DEPRESSION IN PARTIAL REMISSION (H): Chronic | ICD-10-CM

## 2017-09-07 DIAGNOSIS — E11.9 TYPE 2 DIABETES MELLITUS WITHOUT COMPLICATION, WITHOUT LONG-TERM CURRENT USE OF INSULIN (H): ICD-10-CM

## 2017-09-07 DIAGNOSIS — E78.5 HYPERLIPIDEMIA WITH TARGET LDL LESS THAN 130: Chronic | ICD-10-CM

## 2017-09-07 DIAGNOSIS — G89.4 CHRONIC PAIN SYNDROME: Primary | ICD-10-CM

## 2017-09-07 DIAGNOSIS — Z23 NEED FOR PROPHYLACTIC VACCINATION AND INOCULATION AGAINST INFLUENZA: ICD-10-CM

## 2017-09-07 DIAGNOSIS — Z12.31 VISIT FOR SCREENING MAMMOGRAM: ICD-10-CM

## 2017-09-07 DIAGNOSIS — Z12.11 SPECIAL SCREENING FOR MALIGNANT NEOPLASMS, COLON: ICD-10-CM

## 2017-09-07 DIAGNOSIS — E66.01 MORBID OBESITY, UNSPECIFIED OBESITY TYPE (H): Chronic | ICD-10-CM

## 2017-09-07 DIAGNOSIS — Z11.59 NEED FOR HEPATITIS C SCREENING TEST: ICD-10-CM

## 2017-09-07 DIAGNOSIS — F43.10 PTSD (POST-TRAUMATIC STRESS DISORDER): Chronic | ICD-10-CM

## 2017-09-07 PROCEDURE — 99213 OFFICE O/P EST LOW 20 MIN: CPT | Performed by: FAMILY MEDICINE

## 2017-09-07 RX ORDER — OXYCODONE AND ACETAMINOPHEN 5; 325 MG/1; MG/1
1 TABLET ORAL EVERY 4 HOURS PRN
Qty: 30 TABLET | Refills: 0 | Status: SHIPPED | OUTPATIENT
Start: 2017-09-07 | End: 2017-09-14

## 2017-09-07 ASSESSMENT — PATIENT HEALTH QUESTIONNAIRE - PHQ9
10. IF YOU CHECKED OFF ANY PROBLEMS, HOW DIFFICULT HAVE THESE PROBLEMS MADE IT FOR YOU TO DO YOUR WORK, TAKE CARE OF THINGS AT HOME, OR GET ALONG WITH OTHER PEOPLE: SOMEWHAT DIFFICULT
SUM OF ALL RESPONSES TO PHQ QUESTIONS 1-9: 16
SUM OF ALL RESPONSES TO PHQ QUESTIONS 1-9: 16

## 2017-09-07 NOTE — NURSING NOTE
"Chief Complaint   Patient presents with     Pain       Initial /64  Pulse 77  Temp 97.6  F (36.4  C) (Tympanic)  Resp 16  Wt 256 lb (116.1 kg)  SpO2 100%  BMI 41.32 kg/m2 Estimated body mass index is 41.32 kg/(m^2) as calculated from the following:    Height as of 8/9/17: 5' 6\" (1.676 m).    Weight as of this encounter: 256 lb (116.1 kg).  Medication Reconciliation: complete   Maggi Harris CMA    "

## 2017-09-07 NOTE — LETTER
My Depression Action Plan  Name: Ines Mott   Date of Birth 1965  Date: 9/7/2017    My doctor: Dustin Fatima   My clinic: [address]  [facility] [id number] [phone]          GREEN    ZONE   Good Control    What it looks like:     Things are going generally well. You have normal up s and down s. You may even feel depressed from time to time, but bad moods usually last less than a day.   What you need to do:  1. Continue to care for yourself (see self care plan)  2. Check your depression survival kit and update it as needed  3. Follow your physician s recommendations including any medication.  4. Do not stop taking medication unless you consult with your physician first.           YELLOW         ZONE Getting Worse    What it looks like:     Depression is starting to interfere with your life.     It may be hard to get out of bed; you may be starting to isolate yourself from others.    Symptoms of depression are starting to last most all day and this has happened for several days.     You may have suicidal thoughts but they are not constant.   What you need to do:     1. Call your care team, your response to treatment will improve if you keep your care team informed of your progress. Yellow periods are signs an adjustment may need to be made.     2. Continue your self-care, even if you have to fake it!    3. Talk to someone in your support network    4. Open up your depression survival kit           RED    ZONE Medical Alert - Get Help    What it looks like:     Depression is seriously interfering with your life.     You may experience these or other symptoms: You can t get out of bed most days, can t work or engage in other necessary activities, you have trouble taking care of basic hygiene, or basic responsibilities, thoughts of suicide or death that will not go away, self-injurious behavior.     What you need  to do:  1. Call your care team and request a same-day appointment. If they are not available (weekends or after hours) call your local crisis line, emergency room or 911.      Electronically signed by: Maggi Harris, September 7, 2017    Depression Self Care Plan / Survival Kit    Self-Care for Depression  Here s the deal. Your body and mind are really not as separate as most people think.  What you do and think affects how you feel and how you feel influences what you do and think. This means if you do things that people who feel good do, it will help you feel better.  Sometimes this is all it takes.  There is also a place for medication and therapy depending on how severe your depression is, so be sure to consult with your medical provider and/ or Behavioral Health Consultant if your symptoms are worsening or not improving.     In order to better manage my stress, I will:    Exercise  Get some form of exercise, every day. This will help reduce pain and release endorphins, the  feel good  chemicals in your brain. This is almost as good as taking antidepressants!  This is not the same as joining a gym and then never going! (they count on that by the way ) It can be as simple as just going for a walk or doing some gardening, anything that will get you moving.      Hygiene   Maintain good hygiene (Get out of bed in the morning, Make your bed, Brush your teeth, Take a shower, and Get dressed like you were going to work, even if you are unemployed).  If your clothes don't fit try to get ones that do.    Diet  I will strive to eat foods that are good for me, drink plenty of water, and avoid excessive sugar, caffeine, alcohol, and other mood-altering substances.  Some foods that are helpful in depression are: complex carbohydrates, B vitamins, flaxseed, fish or fish oil, fresh fruits and vegetables.    Psychotherapy  I agree to participate in Individual Therapy (if recommended).    Medication  If prescribed  medications, I agree to take them.  Missing doses can result in serious side effects.  I understand that drinking alcohol, or other illicit drug use, may cause potential side effects.  I will not stop my medication abruptly without first discussing it with my provider.    Staying Connected With Others  I will stay in touch with my friends, family members, and my primary care provider/team.    Use your imagination  Be creative.  We all have a creative side; it doesn t matter if it s oil painting, sand castles, or mud pies! This will also kick up the endorphins.    Witness Beauty  (AKA stop and smell the roses) Take a look outside, even in mid-winter. Notice colors, textures. Watch the squirrels and birds.     Service to others  Be of service to others.  There is always someone else in need.  By helping others we can  get out of ourselves  and remember the really important things.  This also provides opportunities for practicing all the other parts of the program.    Humor  Laugh and be silly!  Adjust your TV habits for less news and crime-drama and more comedy.    Control your stress  Try breathing deep, massage therapy, biofeedback, and meditation. Find time to relax each day.     My support system    Clinic Contact:  Phone number:    Contact 1:  Phone number:    Contact 2:  Phone number:    [employer]/:  Phone number:    Therapist:  Phone number:    [facility] crisis center:    Phone number:    Other community support:  Phone number:
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
## 2017-09-07 NOTE — PATIENT INSTRUCTIONS
[facility]      [facility]     ADDRESS: [address] [place] [id number]    OPEN 0800-4PM DAILY     PHONE [phone]    [phone]        (T66.4) Chronic pain syndrome  (primary encounter diagnosis)    Comment:      Plan: oxyCODONE-acetaminophen (PERCOCET) 5-325 MG per          tablet, PAIN MANAGEMENT EXTERNAL REFERRAL                   (Z12.31) Visit for screening mammogram    Comment:      Plan: MA SCREENING DIGITAL BILAT - Future  (s+30)                   (Z11.59) Need for hepatitis C screening test    Comment:      Plan: Hepatitis C Screen Reflex to HCV RNA Quant and           Genotype                   (Z23) Need for prophylactic vaccination and inoculation against influenza    Comment:      Plan:          (F33.41) Recurrent major depression in partial remission    Comment:      Plan:          (F43.10) PTSD (post-traumatic stress disorder)    Comment:      Plan:          (E78.5) Hyperlipidemia with target LDL less than 130    Comment:      Plan:          (I10) Hypertension goal BP (blood pressure) < 140/80    Comment:      Plan: oxyCODONE-acetaminophen (PERCOCET) 5-325 MG per          tablet                   (E66.01) Morbid obesity, unspecified obesity type (H)    Comment:      Plan:          (E11.9) Type 2 diabetes mellitus without complication, without long-term current use of insulin (H)    Comment:      Plan: HEMOGLOBIN A1C                   (Z12.11) Special screening for malignant neoplasms, colon    Comment:      Plan: Fecal colorectal cancer screen FIT - Future           (S+30)

## 2017-09-14 ENCOUNTER — OFFICE VISIT (OUTPATIENT)
Dept: FAMILY MEDICINE | Facility: CLINIC | Age: 52
End: 2017-09-14
Payer: COMMERCIAL

## 2017-09-14 VITALS
OXYGEN SATURATION: 97 % | HEART RATE: 74 BPM | DIASTOLIC BLOOD PRESSURE: 76 MMHG | RESPIRATION RATE: 20 BRPM | TEMPERATURE: 97.1 F | SYSTOLIC BLOOD PRESSURE: 118 MMHG | WEIGHT: 255 LBS | BODY MASS INDEX: 41.16 KG/M2

## 2017-09-14 DIAGNOSIS — I10 HYPERTENSION GOAL BP (BLOOD PRESSURE) < 140/80: Chronic | ICD-10-CM

## 2017-09-14 DIAGNOSIS — G89.4 CHRONIC PAIN SYNDROME: ICD-10-CM

## 2017-09-14 PROCEDURE — 99213 OFFICE O/P EST LOW 20 MIN: CPT | Performed by: FAMILY MEDICINE

## 2017-09-14 RX ORDER — OXYCODONE AND ACETAMINOPHEN 5; 325 MG/1; MG/1
1 TABLET ORAL EVERY 4 HOURS PRN
Qty: 15 TABLET | Refills: 0 | Status: SHIPPED | OUTPATIENT
Start: 2017-09-21 | End: 2017-09-26

## 2017-09-14 RX ORDER — OXYCODONE AND ACETAMINOPHEN 5; 325 MG/1; MG/1
1 TABLET ORAL EVERY 4 HOURS PRN
Qty: 20 TABLET | Refills: 0 | Status: SHIPPED | OUTPATIENT
Start: 2017-09-14 | End: 2017-09-14

## 2017-09-14 ASSESSMENT — PATIENT HEALTH QUESTIONNAIRE - PHQ9
SUM OF ALL RESPONSES TO PHQ QUESTIONS 1-9: 23
SUM OF ALL RESPONSES TO PHQ QUESTIONS 1-9: 23
10. IF YOU CHECKED OFF ANY PROBLEMS, HOW DIFFICULT HAVE THESE PROBLEMS MADE IT FOR YOU TO DO YOUR WORK, TAKE CARE OF THINGS AT HOME, OR GET ALONG WITH OTHER PEOPLE: SOMEWHAT DIFFICULT

## 2017-09-14 NOTE — MR AVS SNAPSHOT
"              After Visit Summary   2017    Ines Mott    MRN: [id number]           Patient Information     Date Of Birth          1965        Visit Information        Provider Department      2017 10:30 AM Dustin Fatima MD [facility]        Today's Diagnoses     Hypertension goal BP (blood pressure) < 140/80        Chronic pain syndrome           Follow-ups after your visit        Who to contact     If you have questions or need follow up information about today's clinic visit or your schedule please contact [facility] directly at [phone].  Normal or non-critical lab and imaging results will be communicated to you by MyChart, letter or phone within 4 business days after the clinic has received the results. If you do not hear from us within 7 days, please contact the clinic through [facility] or phone. If you have a critical or abnormal lab result, we will notify you by phone as soon as possible.  Submit refill requests through [employer] or call your pharmacy and they will forward the refill request to us. Please allow 3 business days for your refill to be completed.          Additional Information About Your Visit        MyChart Information     MECLUB lets you send messages to your doctor, view your test results, renew your prescriptions, schedule appointments and more. To sign up, go to [email or web address] . Click on \"Log in\" on the left side of the screen, which will take you to the Welcome page. Then click on \"Sign up Now\" on the right side of the page.     You will be asked to enter the access code listed below, as well as some personal information. Please follow the directions to create your username and password.     Your access code is: [email or web address]  Expires: 10/2/2017  7:26 PM     Your access code will  in 90 days. If you need help or a new code, please call your [facility] or " [phone].        Care EveryWhere ID     This is your Care EveryWhere ID. This could be used by other organizations to access your [place] medical records  [id number]        Your Vitals Were     Pulse Temperature Respirations Pulse Oximetry BMI (Body Mass Index)       74 97.1  F (36.2  C) (Tympanic) 20 97% 41.16 kg/m2        Blood Pressure from Last 3 Encounters:   09/14/17 118/76   09/07/17 124/64   08/31/17 134/86    Weight from Last 3 Encounters:   09/14/17 255 lb (115.7 kg)   09/07/17 256 lb (116.1 kg)   08/31/17 259 lb (117.5 kg)              Today, you had the following     No orders found for display         Today's Medication Changes          These changes are accurate as of: 9/14/17  1:29 PM.  If you have any questions, ask your nurse or doctor.               Start taking these medicines.        Dose/Directions    oxyCODONE-acetaminophen 5-325 MG per tablet   Commonly known as:  PERCOCET   Used for:  Hypertension goal BP (blood pressure) < 140/80, Chronic pain syndrome   Started by:  Dustin Fatima MD        Dose:  1 tablet   Start taking on:  9/21/2017   Take 1 tablet by mouth every 4 hours as needed for moderate to severe pain   Quantity:  15 tablet   Refills:  0            Where to get your medicines      Some of these will need a paper prescription and others can be bought over the counter.  Ask your nurse if you have questions.     Bring a paper prescription for each of these medications     oxyCODONE-acetaminophen 5-325 MG per tablet                Primary Care Provider Office Phone # Fax #    Dustin Fatima [phone] [address] [facility] [address]        Equal Access to Services     [facility] Hospital[facility] AH: Hadii casimiro Leonard, waaislinnda luqadaha, qaybta kaalmada stephaniyada, emilee coyne. So [facility] [phone].    ATENCIÓN: Si habla español, tiene a christianson disposición servicios gratuitos de asistencia lingüística. Llame al  [phone].    We comply with applicable federal civil rights laws and [place] laws. We do not discriminate on the basis of race, color, national origin, age, disability sex, sexual orientation or gender identity.            Thank you!     Thank you for choosing [facility]  for your care. Our goal is always to provide you with excellent care. Hearing back from our patients is one way we can continue to improve our services. Please take a few minutes to complete the written survey that you may receive in the mail after your visit with us. Thank you!             Your Updated Medication List - Protect others around you: Learn how to safely use, store and throw away your medicines at www.disposemymeds.org.          This list is accurate as of: 9/14/17  1:29 PM.  Always use your most recent med list.                   Brand Name Dispense Instructions for use Diagnosis    * albuterol 108 (90 BASE) MCG/ACT Inhaler    VENTOLIN HFA    54 g    INHALE TWO PUFFS BY MOUTH EVERY 4 HOURS AS NEEDED FOR SHORTNESS OF BREATH/DYSPNEA    Mild intermittent asthma without complication       * albuterol (2.5 MG/3ML) 0.083% neb solution     30 vial    Take 1 vial (2.5 mg) by nebulization every 6 hours as needed for shortness of breath / dyspnea or wheezing    Mild intermittent asthma with acute exacerbation       ASPIRIN NOT PRESCRIBED    INTENTIONAL    1 each    Please choose reason not prescribed, below    Aspirin intolerance       blood glucose calibration solution    no brand specified    1 each    Use to calibrate blood glucose monitor as directed.    Type 2 diabetes mellitus without complication, without long-term current use of insulin (H)       blood glucose lancets standard    no brand specified    100 each    Use to test blood sugar daily times daily or as directed.    Type 2 diabetes mellitus without complication, without long-term current use of insulin (H)       blood glucose monitoring  meter device kit    no brand specified    1 kit    Use to test blood sugar larisa times daily or as directed.    Type 2 diabetes mellitus without complication, without long-term current use of insulin (H)       blood glucose monitoring test strip    no brand specified    100 strip    Use to test blood sugars once times daily or as directed    Type 2 diabetes mellitus without complication, without long-term current use of insulin (H)       cetirizine 10 MG tablet    zyrTEC    30 tablet    Take 1 tablet (10 mg) by mouth every evening    Seasonal allergic rhinitis due to pollen       cyanocobalamin 1000 MCG/ML injection    VITAMIN B12    1 mL    Inject 1 mL (1,000 mcg) Subcutaneous every 30 days    B12 deficiency       EPINEPHrine 0.3 MG/0.3ML injection 2-pack    EPIPEN/ADRENACLICK/or ANY BX GENERIC EQUIV    2 each    Inject 0.3 mLs (0.3 mg) into the muscle once as needed for anaphylaxis    Late effect of other and unspecified external causes       fluticasone 50 MCG/ACT spray    FLONASE    3 Bottle    Spray 1-2 sprays into both nostrils daily    Seasonal allergic rhinitis due to pollen, Chronic pain syndrome       losartan-hydrochlorothiazide 50-12.5 MG per tablet    HYZAAR    90 tablet    Take 1 tablet by mouth daily    Hypertension goal BP (blood pressure) < 140/80       magnesium oxide 400 (241.3 MG) MG tablet    MAG-OX    90 tablet    Take 1 tablet (400 mg) by mouth daily    Cramp of limb       mometasone-formoterol 100-5 MCG/ACT oral inhaler    DULERA    1 Inhaler    Inhale 2 puffs into the lungs 2 times daily    Persistent asthma       montelukast 10 MG tablet    SINGULAIR    30 tablet    Take 1 tablet (10 mg) by mouth At Bedtime    Seasonal allergic rhinitis due to pollen       ondansetron 4 MG ODT tab    ZOFRAN ODT    10 tablet    Take 1 tablet (4 mg) by mouth every 8 hours as needed for nausea    Chronic pain syndrome       order for DME     1 Device    Right wrist splint for carpal tunnel syndrome  One* Use as  directed at hour of sleep    Carpal tunnel syndrome of right wrist       oxyCODONE-acetaminophen 5-325 MG per tablet   Start taking on:  9/21/2017    PERCOCET    15 tablet    Take 1 tablet by mouth every 4 hours as needed for moderate to severe pain    Hypertension goal BP (blood pressure) < 140/80, Chronic pain syndrome       polyethylene glycol powder    MIRALAX    510 g    Take 17 g (1 capful) by mouth daily    Slow transit constipation       potassium chloride 10 MEQ tablet    K-TAB,KLOR-CON    120 tablet    Take 1 tablet (10 mEq) by mouth daily    Hypokalemia       sertraline 100 MG tablet    ZOLOFT    30 tablet    Take 1 tablet (100 mg) by mouth daily    Recurrent major depression in partial remission (H), Other constipation       sodium chloride 0.65 % nasal spray    SALINE MIST    1 Bottle    Spray 1 spray into both nostrils daily as needed for congestion    Seasonal allergic rhinitis due to pollen       STATIN NOT PRESCRIBED (INTENTIONAL)     0 each    1 each daily Statin not prescribed intentionally due to Active liver disease (liver failure, cirrhosis, hepatitis) LIVER METS    Hyperlipidemia LDL goal <100, Type 2 diabetes mellitus without complication (H)       triamcinolone 0.1 % paste    KENALOG    5 g    Take by mouth 2 times daily    Oral aphthae       Vitamin D (Cholecalciferol) 1000 UNITS Tabs     60 tablet    Take 2,000 Units by mouth daily    Vitamin D deficiency       * Notice:  This list has 2 medication(s) that are the same as other medications prescribed for you. Read the directions carefully, and ask your doctor or other care provider to review them with you.

## 2017-09-14 NOTE — NURSING NOTE
"Chief Complaint   Patient presents with     Pain     Musculoskeletal Problem     RIGHT KNEE, WANTS INJECTION       Initial /76  Pulse 74  Temp 97.1  F (36.2  C) (Tympanic)  Resp 20  Wt 255 lb (115.7 kg)  SpO2 97%  BMI 41.16 kg/m2 Estimated body mass index is 41.16 kg/(m^2) as calculated from the following:    Height as of 8/9/17: 5' 6\" (1.676 m).    Weight as of this encounter: 255 lb (115.7 kg).  Medication Reconciliation: complete   Maggi Harris CMA    "
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
## 2017-09-18 ENCOUNTER — APPOINTMENT (OUTPATIENT)
Dept: CT IMAGING | Facility: CLINIC | Age: 52
End: 2017-09-18
Attending: EMERGENCY MEDICINE
Payer: COMMERCIAL

## 2017-09-18 ENCOUNTER — HOSPITAL ENCOUNTER (EMERGENCY)
Facility: CLINIC | Age: 52
Discharge: HOME | End: 2017-09-18
Attending: EMERGENCY MEDICINE | Admitting: EMERGENCY MEDICINE
Payer: COMMERCIAL

## 2017-09-18 VITALS
HEIGHT: 67 IN | DIASTOLIC BLOOD PRESSURE: 87 MMHG | SYSTOLIC BLOOD PRESSURE: 175 MMHG | BODY MASS INDEX: 40.18 KG/M2 | HEART RATE: 69 BPM | WEIGHT: 256 LBS | OXYGEN SATURATION: 97 % | RESPIRATION RATE: 18 BRPM | TEMPERATURE: 98.6 F

## 2017-09-18 DIAGNOSIS — G89.29 CHRONIC INTRACTABLE HEADACHE, UNSPECIFIED HEADACHE TYPE: ICD-10-CM

## 2017-09-18 DIAGNOSIS — Z98.2 VP (VENTRICULOPERITONEAL) SHUNT STATUS: ICD-10-CM

## 2017-09-18 DIAGNOSIS — R51.9 CHRONIC INTRACTABLE HEADACHE, UNSPECIFIED HEADACHE TYPE: ICD-10-CM

## 2017-09-18 PROCEDURE — 70450 CT HEAD/BRAIN W/O DYE: CPT

## 2017-09-18 PROCEDURE — 25000132 ZZH RX MED GY IP 250 OP 250 PS 637: Performed by: EMERGENCY MEDICINE

## 2017-09-18 PROCEDURE — 99284 EMERGENCY DEPT VISIT MOD MDM: CPT | Mod: 25

## 2017-09-18 RX ORDER — ACETAMINOPHEN 500 MG
500 TABLET ORAL ONCE
Status: COMPLETED | OUTPATIENT
Start: 2017-09-18 | End: 2017-09-18

## 2017-09-18 RX ORDER — IBUPROFEN 600 MG/1
600 TABLET, FILM COATED ORAL ONCE
Status: COMPLETED | OUTPATIENT
Start: 2017-09-18 | End: 2017-09-18

## 2017-09-18 RX ADMIN — ACETAMINOPHEN 500 MG: 500 TABLET, FILM COATED ORAL at 21:25

## 2017-09-18 RX ADMIN — IBUPROFEN 600 MG: 600 TABLET ORAL at 21:25

## 2017-09-18 ASSESSMENT — ENCOUNTER SYMPTOMS
CHILLS: 0
FEVER: 0
NAUSEA: 1
HEADACHES: 1

## 2017-09-18 NOTE — ED AVS SNAPSHOT
Emergency Department    [address] [place] [id number]    Phone:  [phone]    Fax:  [phone]                                       Ines Mott   MRN: [id number]    Department:   Emergency Department   Date of Visit:  9/18/2017           After Visit Summary Signature Page     I have received my discharge instructions, and my questions have been answered. I have discussed any challenges I see with this plan with the nurse or doctor.    ..........................................................................................................................................  Patient/Patient Representative Signature      ..........................................................................................................................................  Patient Representative Print Name and Relationship to Patient    ..................................................               ................................................  Date                                            Time    ..........................................................................................................................................  Reviewed by Signature/Title    ...................................................              ..............................................  Date                                                            Time

## 2017-09-18 NOTE — ED AVS SNAPSHOT
Emergency Department    [address] [facility] [address]    Phone:  [phone]    Fax:  [phone]                                       Ines Mott   MRN: [id number]    Department:   Emergency Department   Date of Visit:  9/18/2017           Patient Information     Date Of Birth          1965        Your diagnoses for this visit were:     Chronic intractable headache, unspecified headache type      (ventriculoperitoneal) shunt status        You were seen by Zachary Albright MD and Michelle Mart MD.      Follow-up Information     Follow up with your neurologist at [facility].        Follow up with Dustin Fatima MD.    Specialty:  Family Practice    Contact information:    [address]  [facility] [id number] [phone]          Discharge Instructions          * HEADACHE [unspecified]    The cause of your headache today is not clear, but it does not appear to be the sign of any serious illness.  Under stress, some people tense the muscles of their shoulder, neck and scalp without knowing it. If this condition lasts long enough, a TENSION HEADACHE can occur.  A MIGRAINE HEADACHE is caused by changes in blood flow to the brain. It can be mild or severe. A migraine attack may be triggered by emotional stress, hormone changes during the menstrual cycle, oral contraceptives, alcohol use, certain foods containing tyramine, eye strain, weather changes, missing meals, lack of sleep or oversleeping.  Other causes of headache include a viral illness, sinus, ear or throat infection, dental pain and TMJ (jaw joint) pain.  HOME CARE:    If you were given pain medicine for this headache, do not drive yourself home. Arrange for a ride, instead. When you get home, try to sleep. You should feel much better when you wake up.    If you are having nausea or vomiting, follow a light diet until your headache is relieved.    If you have a migraine type  headache, use sunglasses when in the daylight or around bright indoor lighting until symptoms improve. Bright glaring light can worsen this kind of headache.  FOLLOW UP with your doctor if the headache is not better within the next 24 hours. If you have frequent headaches you should discuss a treatment plan with your primary care doctor. By being aware of the earliest signs of headache, and starting treatment right away, you may be able to stop the pain yourself.  GET PROMPT MEDICAL ATTENTION if any of the following occur:    Worsening of your head pain or no improvement within 24 hours    Repeated vomiting (unable to keep liquids down)    Fever over 101 F (38.3 C)    Stiff neck    Extreme drowsiness, confusion or fainting    Weakness of an arm or leg or one side of the face    Difficulty with speech or vision    [id number] [facility], [address] [place] [address], [place] [address]. All rights reserved. This information is not intended as a substitute for professional medical care. Always follow your healthcare professional's instructions.      24 Hour Appointment Hotline       To make an appointment at any [facility], call [phone] ([phone]). If you don't have a family doctor or clinic, we will help you find one. [place] clinics are conveniently located to serve the needs of you and your family.             Review of your medicines      Our records show that you are taking the medicines listed below. If these are incorrect, please call your family doctor or clinic.        Dose / Directions Last dose taken    * albuterol 108 (90 BASE) MCG/ACT Inhaler   Commonly known as:  VENTOLIN HFA   Quantity:  54 g        INHALE TWO PUFFS BY MOUTH EVERY 4 HOURS AS NEEDED FOR SHORTNESS OF BREATH/DYSPNEA   Refills:  1        * albuterol (2.5 MG/3ML) 0.083% neb solution   Dose:  1 vial   Quantity:  30 vial        Take 1 vial (2.5 mg) by nebulization every 6 hours as needed for shortness of breath / dyspnea or  wheezing   Refills:  3        ASPIRIN NOT PRESCRIBED   Commonly known as:  INTENTIONAL   Quantity:  1 each        Please choose reason not prescribed, below   Refills:  0        blood glucose calibration solution   Commonly known as:  no brand specified   Quantity:  1 each        Use to calibrate blood glucose monitor as directed.   Refills:  0        blood glucose lancets standard   Commonly known as:  no brand specified   Quantity:  100 each        Use to test blood sugar daily times daily or as directed.   Refills:  11        blood glucose monitoring meter device kit   Commonly known as:  no brand specified   Quantity:  1 kit        Use to test blood sugar larisa times daily or as directed.   Refills:  0        blood glucose monitoring test strip   Commonly known as:  no brand specified   Quantity:  100 strip        Use to test blood sugars once times daily or as directed   Refills:  3        cetirizine 10 MG tablet   Commonly known as:  zyrTEC   Dose:  10 mg   Quantity:  30 tablet        Take 1 tablet (10 mg) by mouth every evening   Refills:  1        cyanocobalamin 1000 MCG/ML injection   Commonly known as:  VITAMIN B12   Dose:  1 mL   Quantity:  1 mL        Inject 1 mL (1,000 mcg) Subcutaneous every 30 days   Refills:  11        EPINEPHrine 0.3 MG/0.3ML injection 2-pack   Commonly known as:  EPIPEN/ADRENACLICK/or ANY BX GENERIC EQUIV   Dose:  0.3 mg   Quantity:  2 each        Inject 0.3 mLs (0.3 mg) into the muscle once as needed for anaphylaxis   Refills:  5        fluticasone 50 MCG/ACT spray   Commonly known as:  FLONASE   Dose:  1-2 spray   Quantity:  3 Bottle        Spray 1-2 sprays into both nostrils daily   Refills:  1        losartan-hydrochlorothiazide 50-12.5 MG per tablet   Commonly known as:  HYZAAR   Dose:  1 tablet   Quantity:  90 tablet        Take 1 tablet by mouth daily   Refills:  3        magnesium oxide 400 (241.3 MG) MG tablet   Commonly known as:  MAG-OX   Dose:  400 mg   Quantity:  90  tablet        Take 1 tablet (400 mg) by mouth daily   Refills:  3        mometasone-formoterol 100-5 MCG/ACT oral inhaler   Commonly known as:  DULERA   Dose:  2 puff   Quantity:  1 Inhaler        Inhale 2 puffs into the lungs 2 times daily   Refills:  11        montelukast 10 MG tablet   Commonly known as:  SINGULAIR   Dose:  10 mg   Quantity:  30 tablet        Take 1 tablet (10 mg) by mouth At Bedtime   Refills:  11        ondansetron 4 MG ODT tab   Commonly known as:  ZOFRAN ODT   Dose:  4 mg   Quantity:  10 tablet        Take 1 tablet (4 mg) by mouth every 8 hours as needed for nausea   Refills:  0        order for DME   Quantity:  1 Device        Right wrist splint for carpal tunnel syndrome  One* Use as directed at hour of sleep   Refills:  1        oxyCODONE-acetaminophen 5-325 MG per tablet   Commonly known as:  PERCOCET   Dose:  1 tablet   Quantity:  15 tablet   Start taking on:  9/21/2017        Take 1 tablet by mouth every 4 hours as needed for moderate to severe pain   Refills:  0        polyethylene glycol powder   Commonly known as:  MIRALAX   Dose:  1 capful   Quantity:  510 g        Take 17 g (1 capful) by mouth daily   Refills:  3        potassium chloride 10 MEQ tablet   Commonly known as:  K-TAB,KLOR-CON   Dose:  10 mEq   Indication:  Low Amount of Potassium in the Blood   Quantity:  120 tablet        Take 1 tablet (10 mEq) by mouth daily   Refills:  11        sertraline 100 MG tablet   Commonly known as:  ZOLOFT   Dose:  100 mg   Indication:  Major Depressive Disorder   Quantity:  30 tablet        Take 1 tablet (100 mg) by mouth daily   Refills:  11        sodium chloride 0.65 % nasal spray   Commonly known as:  SALINE MIST   Dose:  1 spray   Quantity:  1 Bottle        Spray 1 spray into both nostrils daily as needed for congestion   Refills:  11        STATIN NOT PRESCRIBED (INTENTIONAL)   Dose:  1 each   Quantity:  0 each        1 each daily Statin not prescribed intentionally due to Active  liver disease (liver failure, cirrhosis, hepatitis) LIVER METS   Refills:  0        triamcinolone 0.1 % paste   Commonly known as:  KENALOG   Quantity:  5 g        Take by mouth 2 times daily   Refills:  0        Vitamin D (Cholecalciferol) 1000 UNITS Tabs   Dose:  2000 Units   Quantity:  60 tablet        Take 2,000 Units by mouth daily   Refills:  11        * Notice:  This list has 2 medication(s) that are the same as other medications prescribed for you. Read the directions carefully, and ask your doctor or other care provider to review them with you.            Procedures and tests performed during your visit     Head CT w/o contrast      Orders Needing Specimen Collection     None      Pending Results     No orders found from 9/16/2017 to 9/19/2017.            Pending Culture Results     No orders found from 9/16/2017 to 9/19/2017.            Pending Results Instructions     If you had any lab results that were not finalized at the time of your Discharge, you can call the ED Lab Result RN at [phone]. You will be contacted by this team for any positive Lab results or changes in treatment. The nurses are available 7 days a week from 10A to 6:30P.  You can leave a message 24 hours per day and they will return your call.        Test Results From Your Hospital Stay        9/18/2017 10:03 PM      Narrative     CT HEAD W/O CONTRAST   9/18/2017 9:54 PM     HISTORY: hx of  shunt with worsening pain    TECHNIQUE:  Axial images of the head without IV contrast material.  Radiation dose for this scan was reduced using automated exposure  control, adjustment of the mA and/or kV according to patient size, or  iterative reconstruction technique.    COMPARISON: CT dated 10/30/2016    FINDINGS: A ventriculostomy catheter is seen entering from the right  parietal region. The catheter passes through the right lateral  ventricle. The tip is in the white matter adjacent to the frontal horn  of the left lateral ventricle. This  is unchanged in position. The  ventricles are relatively small and unchanged since 10/30/2016.. No  acute pathology. There is no evidence of intracranial hemorrhage,  [mass,] acute infarct or anomaly. The visualized portions of the  sinuses and mastoids appear normal. There is no evidence of trauma.  There is a small extra-axial mass in the right lateral frontal region.  This measures about 1 cm in AP dimension. This probably represents a  small meningioma. This is unchanged compared the previous scan.        Impression     IMPRESSION:    1. Right parietal ventriculostomy catheter. No hydrocephalus. No  change compared to 10/30/2016.  2. Small extra-axial mass in the right lateral frontal region. This is  consistent with a meningioma. This is also unchanged.    KRYSTAL LOUISE MD                Clinical Quality Measure: Blood Pressure Screening     Your blood pressure was checked while you were in the emergency department today. The last reading we obtained was  BP: 175/87 . Please read the guidelines below about what these numbers mean and what you should do about them.  If your systolic blood pressure (the top number) is less than 120 and your diastolic blood pressure (the bottom number) is less than 80, then your blood pressure is normal. There is nothing more that you need to do about it.  If your systolic blood pressure (the top number) is 120-139 or your diastolic blood pressure (the bottom number) is 80-89, your blood pressure may be higher than it should be. You should have your blood pressure rechecked within a year by a primary care provider.  If your systolic blood pressure (the top number) is 140 or greater or your diastolic blood pressure (the bottom number) is 90 or greater, you may have high blood pressure. High blood pressure is treatable, but if left untreated over time it can put you at risk for heart attack, stroke, or kidney failure. You should have your blood pressure rechecked by a primary care  "provider within the next 4 weeks.  If your provider in the emergency department today gave you specific instructions to follow-up with your doctor or provider even sooner than that, you should follow that instruction and not wait for up to 4 weeks for your follow-up visit.        Thank you for choosing [facility]       Thank you for choosing [facility] for your care. Our goal is always to provide you with excellent care. Hearing back from our patients is one way we can continue to improve our services. Please take a few minutes to complete the written survey that you may receive in the mail after you visit with us. Thank you!        [email or web address] Information     [email or web address] lets you send messages to your doctor, view your test results, renew your prescriptions, schedule appointments and more. To sign up, go to [email or web address] . Click on \"Log in\" on the left side of the screen, which will take you to the Welcome page. Then click on \"Sign up Now\" on the right side of the page.     You will be asked to enter the access code listed below, as well as some personal information. Please follow the directions to create your username and password.     Your access code is: [email or web address]  Expires: 10/2/2017  7:26 PM     Your access code will  in 90 days. If you need help or a new code, please call your [facility] clinic or [phone].        Care EveryWhere ID     This is your Care EveryWhere ID. This could be used by other organizations to access your [facility] medical records  [id number]        Equal Access to Services     VALENTE RIVERA : Hadii casimiro keller Soevangelina, waaxda des, qaybta kaalmaemilee doll . So [facility] [phone].    ATENCIÓN: Si habla español, tiene a christianson disposición servicios gratuitos de asistencia lingüística. Llame al [phone].    We comply with applicable federal civil rights laws and [place] laws. We do not discriminate on the basis of race, color, " national origin, age, disability sex, sexual orientation or gender identity.            After Visit Summary       This is your record. Keep this with you and show to your community pharmacist(s) and doctor(s) at your next visit.

## 2017-09-19 ENCOUNTER — CARE COORDINATION (OUTPATIENT)
Dept: CARE COORDINATION | Facility: CLINIC | Age: 52
End: 2017-09-19
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
## 2017-09-19 NOTE — DISCHARGE INSTRUCTIONS
* HEADACHE [unspecified]    The cause of your headache today is not clear, but it does not appear to be the sign of any serious illness.  Under stress, some people tense the muscles of their shoulder, neck and scalp without knowing it. If this condition lasts long enough, a TENSION HEADACHE can occur.  A MIGRAINE HEADACHE is caused by changes in blood flow to the brain. It can be mild or severe. A migraine attack may be triggered by emotional stress, hormone changes during the menstrual cycle, oral contraceptives, alcohol use, certain foods containing tyramine, eye strain, weather changes, missing meals, lack of sleep or oversleeping.  Other causes of headache include a viral illness, sinus, ear or throat infection, dental pain and TMJ (jaw joint) pain.  HOME CARE:    If you were given pain medicine for this headache, do not drive yourself home. Arrange for a ride, instead. When you get home, try to sleep. You should feel much better when you wake up.    If you are having nausea or vomiting, follow a light diet until your headache is relieved.    If you have a migraine type headache, use sunglasses when in the daylight or around bright indoor lighting until symptoms improve. Bright glaring light can worsen this kind of headache.  FOLLOW UP with your doctor if the headache is not better within the next 24 hours. If you have frequent headaches you should discuss a treatment plan with your primary care doctor. By being aware of the earliest signs of headache, and starting treatment right away, you may be able to stop the pain yourself.  GET PROMPT MEDICAL ATTENTION if any of the following occur:    Worsening of your head pain or no improvement within 24 hours    Repeated vomiting (unable to keep liquids down)    Fever over 101 F (38.3 C)    Stiff neck    Extreme drowsiness, confusion or fainting    Weakness of an arm or leg or one side of the face    Difficulty with speech or vision    [id number] [address], [address]  [place] [address]. All rights reserved. This information is not intended as a substitute for professional medical care. Always follow your healthcare professional's instructions.

## 2017-09-26 ENCOUNTER — OFFICE VISIT (OUTPATIENT)
Dept: FAMILY MEDICINE | Facility: CLINIC | Age: 52
End: 2017-09-26
Payer: COMMERCIAL

## 2017-09-26 VITALS
BODY MASS INDEX: 40.43 KG/M2 | WEIGHT: 254.3 LBS | DIASTOLIC BLOOD PRESSURE: 78 MMHG | SYSTOLIC BLOOD PRESSURE: 128 MMHG | TEMPERATURE: 96 F | HEART RATE: 55 BPM | RESPIRATION RATE: 20 BRPM | OXYGEN SATURATION: 99 %

## 2017-09-26 DIAGNOSIS — M54.41 CHRONIC BILATERAL LOW BACK PAIN WITH RIGHT-SIDED SCIATICA: Primary | ICD-10-CM

## 2017-09-26 DIAGNOSIS — I10 HYPERTENSION GOAL BP (BLOOD PRESSURE) < 140/80: Chronic | ICD-10-CM

## 2017-09-26 DIAGNOSIS — G89.4 CHRONIC PAIN SYNDROME: ICD-10-CM

## 2017-09-26 DIAGNOSIS — G89.29 CHRONIC BILATERAL LOW BACK PAIN WITH RIGHT-SIDED SCIATICA: Primary | ICD-10-CM

## 2017-09-26 PROCEDURE — 99214 OFFICE O/P EST MOD 30 MIN: CPT | Performed by: FAMILY MEDICINE

## 2017-09-26 RX ORDER — OXYCODONE AND ACETAMINOPHEN 5; 325 MG/1; MG/1
1 TABLET ORAL EVERY 4 HOURS PRN
Qty: 40 TABLET | Refills: 0 | Status: SHIPPED | OUTPATIENT
Start: 2017-09-26 | End: 2017-10-12

## 2017-09-26 RX ORDER — BACLOFEN 10 MG/1
10 TABLET ORAL 3 TIMES DAILY
Qty: 90 TABLET | Refills: 1 | Status: SHIPPED | OUTPATIENT
Start: 2017-09-26 | End: 2017-11-17

## 2017-09-26 NOTE — NURSING NOTE
"Chief Complaint   Patient presents with     Pain       Initial /78  Pulse 55  Temp 96  F (35.6  C) (Tympanic)  Resp 20  Wt 254 lb 4.8 oz (115.3 kg)  SpO2 99%  BMI 40.43 kg/m2 Estimated body mass index is 40.43 kg/(m^2) as calculated from the following:    Height as of 9/18/17: 5' 6.5\" (1.689 m).    Weight as of this encounter: 254 lb 4.8 oz (115.3 kg).  Medication Reconciliation: complete   Maggi Harris CMA    "

## 2017-09-26 NOTE — PATIENT INSTRUCTIONS
(M54.41,  G89.29) Chronic bilateral low back pain with right-sided sciatica  (primary encounter diagnosis)  Comment:    Plan: baclofen (LIORESAL) 10 MG tablet             (I10) Hypertension goal BP (blood pressure) < 140/80  Comment:    Plan: oxyCODONE-acetaminophen (PERCOCET) 5-325 MG per        tablet             (G89.4) Chronic pain syndrome  Comment:    Plan: oxyCODONE-acetaminophen (PERCOCET) 5-325 MG per        tablet          Tapering dosage
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
## 2017-09-27 ASSESSMENT — ASTHMA QUESTIONNAIRES: ACT_TOTALSCORE: 16

## 2017-10-03 ENCOUNTER — APPOINTMENT (OUTPATIENT)
Dept: GENERAL RADIOLOGY | Facility: CLINIC | Age: 52
End: 2017-10-03
Attending: EMERGENCY MEDICINE
Payer: COMMERCIAL

## 2017-10-03 ENCOUNTER — APPOINTMENT (OUTPATIENT)
Dept: ULTRASOUND IMAGING | Facility: CLINIC | Age: 52
End: 2017-10-03
Attending: EMERGENCY MEDICINE
Payer: COMMERCIAL

## 2017-10-03 ENCOUNTER — HOSPITAL ENCOUNTER (EMERGENCY)
Facility: CLINIC | Age: 52
Discharge: HOME | End: 2017-10-03
Attending: EMERGENCY MEDICINE | Admitting: EMERGENCY MEDICINE
Payer: COMMERCIAL

## 2017-10-03 VITALS
TEMPERATURE: 98.1 F | BODY MASS INDEX: 39.71 KG/M2 | SYSTOLIC BLOOD PRESSURE: 144 MMHG | WEIGHT: 253 LBS | HEIGHT: 67 IN | DIASTOLIC BLOOD PRESSURE: 100 MMHG | OXYGEN SATURATION: 97 % | HEART RATE: 63 BPM | RESPIRATION RATE: 16 BRPM

## 2017-10-03 DIAGNOSIS — R10.9 RIGHT FLANK PAIN: ICD-10-CM

## 2017-10-03 PROCEDURE — 99284 EMERGENCY DEPT VISIT MOD MDM: CPT | Mod: 25

## 2017-10-03 PROCEDURE — 71101 X-RAY EXAM UNILAT RIBS/CHEST: CPT | Mod: RT

## 2017-10-03 PROCEDURE — 76705 ECHO EXAM OF ABDOMEN: CPT

## 2017-10-03 ASSESSMENT — ENCOUNTER SYMPTOMS
FLANK PAIN: 1
ABDOMINAL PAIN: 1

## 2017-10-03 NOTE — ED AVS SNAPSHOT
Emergency Department    [address] [facility] [address]    Phone:  [phone]    Fax:  [phone]                                       Ines Mott   MRN: [id number]    Department:   Emergency Department   Date of Visit:  10/3/2017           Patient Information     Date Of Birth          1965        Your diagnoses for this visit were:     Right flank pain        You were seen by Mara Cat MD.      Follow-up Information     Follow up with Dustin Fatima MD. Schedule an appointment as soon as possible for a visit in 2 days.    Specialty:  Family Practice    Contact information:    [address]  [facility] [address] [phone]          Follow up with  Emergency Department.    Specialty:  EMERGENCY MEDICINE    Why:  As needed, If symptoms worsen    Contact information:    [address] [id number] [phone]        Discharge Instructions       Oct 12, 2017  9:00 AM CDT   Office Visit with Dustin Fatima MD   [facility] ([facility]    [address] [facility]  [address] [place] [id number] [phone]        Discharge Instructions  Abdominal Pain    Abdominal pain can be caused by many things. Your evaluation today does not show the exact cause for your pain. Your doctor today has decided that it is unlikely your pain is due to a life threatening problem, or a problem requiring surgery or hospital admission. Sometimes those problems cannot be found right away, so it is very important that you follow up as directed.  Sometimes only the changes which occur over time allow the cause of your pain to be found.    Return to the Emergency Department for a recheck in 8-12 hours if your pain continues.  If your pain gets worse, changes in location, or feels different, return to the Emergency Department right away.    ADULTS:  Return to the Emergency  Department right away if:      You get an oral temperature above 102oF or as directed by your doctor.    You have blood in your stools (bright red or black, tarry stools).    You keep throwing up or can t drink liquids.    You see blood when you throw up.    You can t have a bowel movement or you can t pass gas.    Your stomach gets bloated or bigger.    Your skin or the whites of your eyes look yellow.    You faint.    You have bloody, frequent or painful urination.    You have new symptoms or anything that worries you.    CHILDREN:  Return to the Emergency Department right away if your child has any of the above-listed symptoms or the following:      Pushes your hand away or screams/cries when his/her belly is touched.    You notice your child is very fussy or weak.    Your child is very tired and is too tired to eat or drink.    Your child is dehydrated.  Signs of dehydration can be:  o Your infant has had no wet diapers in 4-5 hours.  o Your older child has not passed urine in 6-8 hours.  o Your infant or child starts to have dry mouth and lips, or no saliva or tears.    PREGNANT WOMEN:  Return to the Emergency Department right away if you have any of the above-listed symptoms or the following:      You have bleeding, leaking fluid or passing tissue from the vagina.    You have worse pain or cramping, or pain in your shoulder or back.    You have vomiting that will not stop.    You have painful or bloody urination.    You have a temperature of 100oF or more.    Your baby is not moving as much as usual.    You faint.    You get a bad headache with or without eye problems and abdominal pain.    You have a convulsion or seizure.    You have unusual discharge from your vagina and abdominal pain.    Abdominal pain is pretty common during pregnancy.  Your pain may or may not be related to your pregnancy. You should follow-up closely with your OB doctor so they can evaluate you and your baby.  Until you follow-up with  "your regular doctor, do the following:       Avoid sex and do not put anything in your vagina.    Drink clear fluids.    Only take medications approved by your doctor.    MORE INFORMATION:    Appendicitis:  A possible cause of abdominal pain in any person who still has their appendix is acute appendicitis. Appendicitis is often hard to diagnose.  Testing does not always rule out early appendicitis or other causes of abdominal pain. Close follow-up with your doctor and re-evaluations may be needed to figure out the reason for your abdominal pain.    Follow-up:  It is very important that you make an appointment with your clinic and go to the appointment.  If you do not follow-up with your primary doctor, it may result in missing an important development which could result in permanent injury or disability and/or lasting pain.  If there is any problem keeping your appointment, call your doctor or return to the Emergency Department.    Medications:  Take your medications as directed by your doctor today.  Before using over-the-counter medications, ask your doctor and make sure to take the medications as directed.  If you have any questions about medications, ask your doctor.    Diet:  Resume your normal diet as much as possible, but do not eat fried, fatty or spicy foods while you have pain.  Do not drink alcohol or have caffeine.  Do not smoke tobacco.    Probiotics: If you have been given an antibiotic, you may want to also take a probiotic pill or eat yogurt with live cultures. Probiotics have \"good bacteria\" to help your intestines stay healthy. Studies have shown that probiotics help prevent diarrhea and other intestine problems (including C. diff infection) when you take antibiotics. You can buy these without a prescription in the pharmacy section of the store.     If you were given a prescription for medicine here today, be sure to read all of the information (including the package insert) that comes with your " prescription.  This will include important information about the medicine, its side effects, and any warnings that you need to know about.  The pharmacist who fills the prescription can provide more information and answer questions you may have about the medicine.  If you have questions or concerns that the pharmacist cannot address, please call or return to the Emergency Department.         Opioid Medication Information    Pain medications are among the most commonly prescribed medicines, so we are including this information for all our patients. If you did not receive pain medication or get a prescription for pain medicine, you can ignore it.     You may have been given a prescription for an opioid (narcotic) pain medicine and/or have received a pain medicine while here in the Emergency Department. These medicines can make you drowsy or impaired. You must not drive, operate dangerous equipment, or engage in any other dangerous activities while taking these medications. If you drive while taking these medications, you could be arrested for DUI, or driving under the influence. Do not drink any alcohol while you are taking these medications.     Opioid pain medications can cause addiction. If you have a history of chemical dependency of any type, you are at a higher risk of becoming addicted to pain medications.  Only take these prescribed medications to treat your pain when all other options have been tried. Take it for as short a time and as few doses as possible. Store your pain pills in a secure place, as they are frequently stolen and provide a dangerous opportunity for children or visitors in your house to start abusing these powerful medications. We will not replace any lost or stolen medicine.  As soon as your pain is better, you should flush all your remaining medication.     Many prescription pain medications contain Tylenol  (acetaminophen), including Vicodin , Tylenol #3 , Norco , Lortab , and Percocet .   You should not take any extra pills of Tylenol  if you are using these prescription medications or you can get very sick.  Do not ever take more than 3000 mg of acetaminophen in any 24 hour period.    All opioids tend to cause constipation. Drink plenty of water and eat foods that have a lot of fiber, such as fruits, vegetables, prune juice, apple juice and high fiber cereal.  Take a laxative if you don t move your bowels at least every other day. Miralax , Milk of Magnesia, Colace , or Senna  can be used to keep you regular.      Remember that you can always come back to the Emergency Department if you are not able to see your regular doctor in the amount of time listed above, if you get any new symptoms, or if there is anything that worries you.          Future Appointments        Provider Department Dept Phone Center    10/12/2017 9:00 AM OTTO YUSUF MD [facility] [phone] [place]      24 Hour Appointment Hotline       To make an appointment at any [facility], call [phone] ([phone]). If you don't have a family doctor or clinic, we will help you find one. [facility] are conveniently located to serve the needs of you and your family.             Review of your medicines      Our records show that you are taking the medicines listed below. If these are incorrect, please call your family doctor or clinic.        Dose / Directions Last dose taken    * albuterol 108 (90 BASE) MCG/ACT Inhaler   Commonly known as:  VENTOLIN HFA   Quantity:  54 g        INHALE TWO PUFFS BY MOUTH EVERY 4 HOURS AS NEEDED FOR SHORTNESS OF BREATH/DYSPNEA   Refills:  1        * albuterol (2.5 MG/3ML) 0.083% neb solution   Dose:  1 vial   Quantity:  30 vial        Take 1 vial (2.5 mg) by nebulization every 6 hours as needed for shortness of breath / dyspnea or wheezing   Refills:  3        ASPIRIN NOT PRESCRIBED   Commonly known as:  INTENTIONAL   Quantity:  1 each         Please choose reason not prescribed, below   Refills:  0        baclofen 10 MG tablet   Commonly known as:  LIORESAL   Dose:  10 mg   Quantity:  90 tablet        Take 1 tablet (10 mg) by mouth 3 times daily   Refills:  1        blood glucose calibration solution   Commonly known as:  no brand specified   Quantity:  1 each        Use to calibrate blood glucose monitor as directed.   Refills:  0        blood glucose lancets standard   Commonly known as:  no brand specified   Quantity:  100 each        Use to test blood sugar daily times daily or as directed.   Refills:  11        blood glucose monitoring meter device kit   Commonly known as:  no brand specified   Quantity:  1 kit        Use to test blood sugar larisa times daily or as directed.   Refills:  0        blood glucose monitoring test strip   Commonly known as:  no brand specified   Quantity:  100 strip        Use to test blood sugars once times daily or as directed   Refills:  3        cetirizine 10 MG tablet   Commonly known as:  zyrTEC   Dose:  10 mg   Quantity:  30 tablet        Take 1 tablet (10 mg) by mouth every evening   Refills:  1        cyanocobalamin 1000 MCG/ML injection   Commonly known as:  VITAMIN B12   Dose:  1 mL   Quantity:  1 mL        Inject 1 mL (1,000 mcg) Subcutaneous every 30 days   Refills:  11        EPINEPHrine 0.3 MG/0.3ML injection 2-pack   Commonly known as:  EPIPEN/ADRENACLICK/or ANY BX GENERIC EQUIV   Dose:  0.3 mg   Quantity:  2 each        Inject 0.3 mLs (0.3 mg) into the muscle once as needed for anaphylaxis   Refills:  5        fluticasone 50 MCG/ACT spray   Commonly known as:  FLONASE   Dose:  1-2 spray   Quantity:  3 Bottle        Spray 1-2 sprays into both nostrils daily   Refills:  1        losartan-hydrochlorothiazide 50-12.5 MG per tablet   Commonly known as:  HYZAAR   Dose:  1 tablet   Quantity:  90 tablet        Take 1 tablet by mouth daily   Refills:  3        magnesium oxide 400 (241.3 MG) MG tablet   Commonly  known as:  MAG-OX   Dose:  400 mg   Quantity:  90 tablet        Take 1 tablet (400 mg) by mouth daily   Refills:  3        mometasone-formoterol 100-5 MCG/ACT oral inhaler   Commonly known as:  DULERA   Dose:  2 puff   Quantity:  1 Inhaler        Inhale 2 puffs into the lungs 2 times daily   Refills:  11        montelukast 10 MG tablet   Commonly known as:  SINGULAIR   Dose:  10 mg   Quantity:  30 tablet        Take 1 tablet (10 mg) by mouth At Bedtime   Refills:  11        ondansetron 4 MG ODT tab   Commonly known as:  ZOFRAN ODT   Dose:  4 mg   Quantity:  10 tablet        Take 1 tablet (4 mg) by mouth every 8 hours as needed for nausea   Refills:  0        order for DME   Quantity:  1 Device        Right wrist splint for carpal tunnel syndrome  One* Use as directed at hour of sleep   Refills:  1        oxyCODONE-acetaminophen 5-325 MG per tablet   Commonly known as:  PERCOCET   Dose:  1 tablet   Quantity:  40 tablet        Take 1 tablet by mouth every 4 hours as needed for moderate to severe pain   Refills:  0        polyethylene glycol powder   Commonly known as:  MIRALAX   Dose:  1 capful   Quantity:  510 g        Take 17 g (1 capful) by mouth daily   Refills:  3        potassium chloride 10 MEQ tablet   Commonly known as:  K-TAB,KLOR-CON   Dose:  10 mEq   Indication:  Low Amount of Potassium in the Blood   Quantity:  120 tablet        Take 1 tablet (10 mEq) by mouth daily   Refills:  11        sertraline 100 MG tablet   Commonly known as:  ZOLOFT   Dose:  100 mg   Indication:  Major Depressive Disorder   Quantity:  30 tablet        Take 1 tablet (100 mg) by mouth daily   Refills:  11        sodium chloride 0.65 % nasal spray   Commonly known as:  SALINE MIST   Dose:  1 spray   Quantity:  1 Bottle        Spray 1 spray into both nostrils daily as needed for congestion   Refills:  11        STATIN NOT PRESCRIBED (INTENTIONAL)   Dose:  1 each   Quantity:  0 each        1 each daily Statin not prescribed  intentionally due to Active liver disease (liver failure, cirrhosis, hepatitis) LIVER METS   Refills:  0        triamcinolone 0.1 % paste   Commonly known as:  KENALOG   Quantity:  5 g        Take by mouth 2 times daily   Refills:  0        Vitamin D (Cholecalciferol) 1000 UNITS Tabs   Dose:  2000 Units   Quantity:  60 tablet        Take 2,000 Units by mouth daily   Refills:  11        * Notice:  This list has 2 medication(s) that are the same as other medications prescribed for you. Read the directions carefully, and ask your doctor or other care provider to review them with you.            Procedures and tests performed during your visit     Abdomen US, limited (RUQ only)    Ribs XR, unilat 3 views + PA chest, right      Orders Needing Specimen Collection     None      Pending Results     Date and Time Order Name Status Description    10/3/2017 1205 Abdomen US, limited (RUQ only) Preliminary             Pending Culture Results     No orders found from 10/1/2017 to 10/4/2017.            Pending Results Instructions     If you had any lab results that were not finalized at the time of your Discharge, you can call the ED Lab Result RN at [phone]. You will be contacted by this team for any positive Lab results or changes in treatment. The nurses are available 7 days a week from 10A to 6:30P.  You can leave a message 24 hours per day and they will return your call.        Test Results From Your Hospital Stay        10/3/2017  1:27 PM      Narrative     ULTRASOUND ABDOMEN LIMITED 10/3/2017 1:16 PM     HISTORY: Liver cancer, fell to right, now complaining of liver pain.    FINDINGS: The gallbladder was not visible and reported to be  surgically absent. No biliary dilatation was demonstrated. Multiple  solid but hypoechoic lesions were noted within the liver consistent  with hepatic metastasis. The largest measured 3.0 cm in the right lobe  and 1.4 cm in the left lobe. Solid 1.8 x 2.4 x 1.7 cm hypoechoic mass  is  noted lateral to the right kidney. The right kidney was otherwise  unremarkable. No hydronephrosis. The visualized portion of the  pancreas appeared within normal limits.        Impression     IMPRESSION:  1. Multiple hepatic metastases, the largest measuring 3.0 cm in  maximal diameter within the right lobe.  2. Hypoechoic mass lateral to the right kidney measuring 2.7 cm. This  was present on 1/26/2017 but may have increased in size.  3. Cholecystectomy.         10/3/2017 12:56 PM      Narrative     XR RIBS & CHEST RT 3VW  10/3/2017 12:39 PM    HISTORY:  fall, pain    COMPARISON:  8/9/2017        Impression     IMPRESSION: PA chest and 2 right rib images. Tubing overlies the right  neck, the chest and upper abdomen. Otherwise negative chest. No rib  fractures or evidence for acute traumatic injury.    JULEE QUIÑONEZ MD                Clinical Quality Measure: Blood Pressure Screening     Your blood pressure was checked while you were in the emergency department today. The last reading we obtained was  BP: (!) 144/100 . Please read the guidelines below about what these numbers mean and what you should do about them.  If your systolic blood pressure (the top number) is less than 120 and your diastolic blood pressure (the bottom number) is less than 80, then your blood pressure is normal. There is nothing more that you need to do about it.  If your systolic blood pressure (the top number) is 120-139 or your diastolic blood pressure (the bottom number) is 80-89, your blood pressure may be higher than it should be. You should have your blood pressure rechecked within a year by a primary care provider.  If your systolic blood pressure (the top number) is 140 or greater or your diastolic blood pressure (the bottom number) is 90 or greater, you may have high blood pressure. High blood pressure is treatable, but if left untreated over time it can put you at risk for heart attack, stroke, or kidney failure. You should have  "your blood pressure rechecked by a primary care provider within the next 4 weeks.  If your provider in the emergency department today gave you specific instructions to follow-up with your doctor or provider even sooner than that, you should follow that instruction and not wait for up to 4 weeks for your follow-up visit.        Thank you for choosing [facility]       Thank you for choosing [facility] for your care. Our goal is always to provide you with excellent care. Hearing back from our patients is one way we can continue to improve our services. Please take a few minutes to complete the written survey that you may receive in the mail after you visit with us. Thank you!        [employer] Information     [employer] lets you send messages to your doctor, view your test results, renew your prescriptions, schedule appointments and more. To sign up, go to [email or web address] . Click on \"Log in\" on the left side of the screen, which will take you to the Welcome page. Then click on \"Sign up Now\" on the right side of the page.     You will be asked to enter the access code listed below, as well as some personal information. Please follow the directions to create your username and password.     Your access code is: [email or web address]  Expires: 2018 11:55 AM     Your access code will  in 90 days. If you need help or a new code, please call your [facility] clinic or [phone].        Care EveryWhere ID     This is your Care EveryWhere ID. This could be used by other organizations to access your [facility] medical records  [id number]        Equal Access to Services     WARREN RIVERA : Hadii casimiro zapata hadasho Sodanisali, waaxda luqadaha, qaybta kaalmada adeegyada, emilee coyne. So [facility] [phone].    ATENCIÓN: Si habla español, tiene a christianson disposición servicios gratuitos de asistencia lingüística. Llame al [phone].    We comply with applicable federal civil rights laws and [place] laws. We do not " discriminate on the basis of race, color, national origin, age, disability, sex, sexual orientation, or gender identity.            After Visit Summary       This is your record. Keep this with you and show to your community pharmacist(s) and doctor(s) at your next visit.

## 2017-10-03 NOTE — ED PROVIDER NOTES
"  History     Chief Complaint:  Flank Pain     HPI   Ines Mott is a 52 year old female with a complex medical history including liver cancer who presents to the emergency department today for evaluation of flank pain. The patient reports falling onto her right side 3 days ago while walking to her bedroom. She reports last night developing pain in her right upper quadrant pain radiating around to her back. She states it feels as if she is being \"punched in my liver\". Due to to her history of liver cancer and concern for pain in this region she presents to the emergency department for further evaluation.     Allergies:  Ativan   Macrobid   Amoxicillin   Buspirone   Cephalosporins   Dilaudid   Diphenhydramine   Imitrex   Ketorolac   Lansoprazole   Metoclopramide   Paroxetine   Penicillins   Prednisone   Sulfa drugs   Lidoderm      Medications:    baclofen (LIORESAL) 10 MG tablet  oxyCODONE-acetaminophen (PERCOCET) 5-325 MG per tablet  magnesium oxide (MAG-OX) 400 (241.3 MG) MG tablet  mometasone-formoterol (DULERA) 100-5 MCG/ACT oral inhaler  albuterol (VENTOLIN HFA) 108 (90 BASE) MCG/ACT Inhaler  albuterol (2.5 MG/3ML) 0.083% neb solution  losartan-hydrochlorothiazide (HYZAAR) 50-12.5 MG per tablet  sertraline (ZOLOFT) 100 MG tablet  potassium chloride (K-TAB,KLOR-CON) 10 MEQ tablet  cyanocobalamin (VITAMIN B12) 1000 MCG/ML injection  Vitamin D, Cholecalciferol, 1000 UNITS TABS  ondansetron (ZOFRAN ODT) 4 MG ODT tab  montelukast (SINGULAIR) 10 MG tablet  cetirizine (ZYRTEC) 10 MG tablet  fluticasone (FLONASE) 50 MCG/ACT spray  EPINEPHrine (EPIPEN) 0.3 MG/0.3ML injection    Past Medical History:    Arthritis   Asthma   Cancer   Chronic pain syndrome   Depressive disorder   Diabetes   Hydrocephalus   Hypertension   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  Smoking Status: Former smoker  Smokeless Tobacco: Never used   Alcohol Use: No    Marital Status:        Review of Systems   Gastrointestinal: Positive for abdominal pain (right upper quadrant).   Genitourinary: Positive for flank pain (right flank).   All other systems reviewed and are negative.    Physical Exam     Patient Vitals for the past 24 hrs:   BP Temp Temp src Pulse Resp SpO2 Height Weight   10/03/17 1113 (!) 144/100 98.1  F (36.7  C) Temporal 63 16 97 % 1.689 m (5' 6.5\") 114.8 kg (253 lb)      Physical Exam  Physical Exam  General:                    Resting on the gurney.   Head:                                  The scalp, face, and head appear normal  Mouth/Throat:            Mucus membranes are moist  CV:                                      Regular rate                                              Normal S1 and S2  No pathological murmur   Resp:                                  Breath sounds clear and equal bilaterally                                              Non-labored, no retractions or accessory muscle use                                              No coarseness                                              No wheezing   GI:                                       Mild diffuse abdominal pain worse in the right upper quadrant. No suprapubic tenderness. No CVA tenderness to percussion.   MS:                                      Normal motor assessment of all extremities.                                              Good capillary refill noted.  Skin:                                    No rash or lesions noted.  Neuro:                                 Speech is normal and fluent. No apparent deficit.  Psych:                      Awake. Alert.  Normal affect.                                              "   Appropriate interactions.  Emergency Department Course     Imaging:  Radiology findings were communicated with the patient who voiced understanding of the findings.    Abdomen US, limited RUQ:  IMPRESSION:  1. Multiple hepatic metastases, the largest measuring 3.0 cm in  maximal diameter within the right lobe.  2. Hypoechoic mass lateral to the right kidney measuring 2.7 cm. This  was present on 1/26/2017 but may have increased in size.  3. Cholecystectomy.  Report per radiology      Ribs XR, unilat 3+ views, PA chest, right:  IMPRESSION: PA chest and 2 right rib images. Tubing overlies the right  neck, the chest and upper abdomen. Otherwise negative chest. No rib  fractures or evidence for acute traumatic injury.  Report per radiology      Emergency Department Course:  Nursing notes and vitals reviewed.  1145 I performed an exam of the patient as documented above.   1330 Patient rechecked and updated on imaging results.   The patient was sent for a abdomen ultrasound and ribs x-ray while in the emergency department, results above.    I discussed the treatment plan with the patient. They expressed understanding of this plan and consented to discharge. They will be discharged home with instructions for care and follow up. In addition, the patient will return to the emergency department if their symptoms worsen, if new symptoms arise or if there is any concern.  All questions were answered. I personally reviewed the imaging results with the Patient and answered all related questions prior to discharge.   Impression & Plan      Medical Decision Making:  Ines Mott is a 51 year old female with metastatic neuroendocrine tumor, on a care plan, who comes in with abdominal pain. She has had multiple previous visits for RUQ abdominal pain. Today, the patient returns for re-evaluation of her ongoing abdominal pain which seemed persist from yesterday. Her symptoms are chronic and recurring and fall into our chronic  pain policy guidelines. Per [facility] narcotic pain policy, I explained to the patient that I would not be able to discharge the patient home with narcotic pain medication or provide narcotics in the emergency department.  A right upper quadrant ultrasound was obtained, which is negative for biliary distension or signs of cholelithiasis, or cholecystitis. She has appeared comfortable here and will be discharged home.     Diagnosis:    ICD-10-CM    1. Right flank pain R10.9        Disposition:  Discharged to home      Dick Medrano  10/3/2017    EMERGENCY DEPARTMENT  Scribe Disclosure:  I, Dick Medrano, am serving as a scribe at 11:34 AM on 10/3/2017 to document services personally performed by Mara Cat MD based on my observations and the provider's statements to me.       Mara Cat MD  10/11/17 4930

## 2017-10-03 NOTE — ED AVS SNAPSHOT
Emergency Department    [address] [place] [address]    Phone:  [phone]    Fax:  [phone]                                       Ines Mott   MRN: [id number]    Department:   Emergency Department   Date of Visit:  10/3/2017           After Visit Summary Signature Page     I have received my discharge instructions, and my questions have been answered. I have discussed any challenges I see with this plan with the nurse or doctor.    ..........................................................................................................................................  Patient/Patient Representative Signature      ..........................................................................................................................................  Patient Representative Print Name and Relationship to Patient    ..................................................               ................................................  Date                                            Time    ..........................................................................................................................................  Reviewed by Signature/Title    ...................................................              ..............................................  Date                                                            Time

## 2017-10-03 NOTE — DISCHARGE INSTRUCTIONS
Oct 12, 2017  9:00 AM CDT   Office Visit with Dustin Fatima MD   [facility] ([facility]    [address]  [address]  [facility] [address] [phone]        Discharge Instructions  Abdominal Pain    Abdominal pain can be caused by many things. Your evaluation today does not show the exact cause for your pain. Your doctor today has decided that it is unlikely your pain is due to a life threatening problem, or a problem requiring surgery or hospital admission. Sometimes those problems cannot be found right away, so it is very important that you follow up as directed.  Sometimes only the changes which occur over time allow the cause of your pain to be found.    Return to the Emergency Department for a recheck in 8-12 hours if your pain continues.  If your pain gets worse, changes in location, or feels different, return to the Emergency Department right away.    ADULTS:  Return to the Emergency Department right away if:      You get an oral temperature above 102oF or as directed by your doctor.    You have blood in your stools (bright red or black, tarry stools).    You keep throwing up or can t drink liquids.    You see blood when you throw up.    You can t have a bowel movement or you can t pass gas.    Your stomach gets bloated or bigger.    Your skin or the whites of your eyes look yellow.    You faint.    You have bloody, frequent or painful urination.    You have new symptoms or anything that worries you.    CHILDREN:  Return to the Emergency Department right away if your child has any of the above-listed symptoms or the following:      Pushes your hand away or screams/cries when his/her belly is touched.    You notice your child is very fussy or weak.    Your child is very tired and is too tired to eat or drink.    Your child is dehydrated.  Signs of dehydration can be:  o Your infant has had no wet diapers in 4-5  hours.  o Your older child has not passed urine in 6-8 hours.  o Your infant or child starts to have dry mouth and lips, or no saliva or tears.    PREGNANT WOMEN:  Return to the Emergency Department right away if you have any of the above-listed symptoms or the following:      You have bleeding, leaking fluid or passing tissue from the vagina.    You have worse pain or cramping, or pain in your shoulder or back.    You have vomiting that will not stop.    You have painful or bloody urination.    You have a temperature of 100oF or more.    Your baby is not moving as much as usual.    You faint.    You get a bad headache with or without eye problems and abdominal pain.    You have a convulsion or seizure.    You have unusual discharge from your vagina and abdominal pain.    Abdominal pain is pretty common during pregnancy.  Your pain may or may not be related to your pregnancy. You should follow-up closely with your OB doctor so they can evaluate you and your baby.  Until you follow-up with your regular doctor, do the following:       Avoid sex and do not put anything in your vagina.    Drink clear fluids.    Only take medications approved by your doctor.    MORE INFORMATION:    Appendicitis:  A possible cause of abdominal pain in any person who still has their appendix is acute appendicitis. Appendicitis is often hard to diagnose.  Testing does not always rule out early appendicitis or other causes of abdominal pain. Close follow-up with your doctor and re-evaluations may be needed to figure out the reason for your abdominal pain.    Follow-up:  It is very important that you make an appointment with your clinic and go to the appointment.  If you do not follow-up with your primary doctor, it may result in missing an important development which could result in permanent injury or disability and/or lasting pain.  If there is any problem keeping your appointment, call your doctor or return to the Emergency  "Department.    Medications:  Take your medications as directed by your doctor today.  Before using over-the-counter medications, ask your doctor and make sure to take the medications as directed.  If you have any questions about medications, ask your doctor.    Diet:  Resume your normal diet as much as possible, but do not eat fried, fatty or spicy foods while you have pain.  Do not drink alcohol or have caffeine.  Do not smoke tobacco.    Probiotics: If you have been given an antibiotic, you may want to also take a probiotic pill or eat yogurt with live cultures. Probiotics have \"good bacteria\" to help your intestines stay healthy. Studies have shown that probiotics help prevent diarrhea and other intestine problems (including C. diff infection) when you take antibiotics. You can buy these without a prescription in the pharmacy section of the store.     If you were given a prescription for medicine here today, be sure to read all of the information (including the package insert) that comes with your prescription.  This will include important information about the medicine, its side effects, and any warnings that you need to know about.  The pharmacist who fills the prescription can provide more information and answer questions you may have about the medicine.  If you have questions or concerns that the pharmacist cannot address, please call or return to the Emergency Department.         Opioid Medication Information    Pain medications are among the most commonly prescribed medicines, so we are including this information for all our patients. If you did not receive pain medication or get a prescription for pain medicine, you can ignore it.     You may have been given a prescription for an opioid (narcotic) pain medicine and/or have received a pain medicine while here in the Emergency Department. These medicines can make you drowsy or impaired. You must not drive, operate dangerous equipment, or engage in any other " dangerous activities while taking these medications. If you drive while taking these medications, you could be arrested for DUI, or driving under the influence. Do not drink any alcohol while you are taking these medications.     Opioid pain medications can cause addiction. If you have a history of chemical dependency of any type, you are at a higher risk of becoming addicted to pain medications.  Only take these prescribed medications to treat your pain when all other options have been tried. Take it for as short a time and as few doses as possible. Store your pain pills in a secure place, as they are frequently stolen and provide a dangerous opportunity for children or visitors in your house to start abusing these powerful medications. We will not replace any lost or stolen medicine.  As soon as your pain is better, you should flush all your remaining medication.     Many prescription pain medications contain Tylenol  (acetaminophen), including Vicodin , Tylenol #3 , Norco , Lortab , and Percocet .  You should not take any extra pills of Tylenol  if you are using these prescription medications or you can get very sick.  Do not ever take more than 3000 mg of acetaminophen in any 24 hour period.    All opioids tend to cause constipation. Drink plenty of water and eat foods that have a lot of fiber, such as fruits, vegetables, prune juice, apple juice and high fiber cereal.  Take a laxative if you don t move your bowels at least every other day. Miralax , Milk of Magnesia, Colace , or Senna  can be used to keep you regular.      Remember that you can always come back to the Emergency Department if you are not able to see your regular doctor in the amount of time listed above, if you get any new symptoms, or if there is anything that worries you.

## 2017-10-04 ENCOUNTER — CARE COORDINATION (OUTPATIENT)
Dept: CARE COORDINATION | Facility: CLINIC | Age: 52
End: 2017-10-04

## 2017-10-04 NOTE — PROGRESS NOTES
Clinic Care Coordination Contact  [facility]/Voicemail    Referral Source: ED Follow-Up    Clinical Data: Care Coordinator Outreach    Outreach attempted  Left message on voicemail with call back information and requested return call.    Patient was seen in in [facility] ED on 10/3/17 for complaints of right flank and abdominal pain. Patient indicated she had fallen several days prior on her right side and was concerned as the pain continued and she has liver cancer. Patient had an abdominal ultrasound, which indicated multiple metastatic lesions in the liver. Chest x-ray was negative for rib fractures.      Patient was not given narcotics and was discharged home with no services.         Plan: Clinic care coordinator will await return call from patient. Patient is scheduled to follow up with PCP on 10/12/17.     Karen Jacinto RN, CCM - Care Coordinator     10/4/2017    2:09 PM  [phone]

## 2017-10-12 ENCOUNTER — OFFICE VISIT (OUTPATIENT)
Dept: FAMILY MEDICINE | Facility: CLINIC | Age: 52
End: 2017-10-12
Payer: COMMERCIAL

## 2017-10-12 VITALS
DIASTOLIC BLOOD PRESSURE: 88 MMHG | RESPIRATION RATE: 16 BRPM | TEMPERATURE: 98.2 F | WEIGHT: 257 LBS | HEART RATE: 77 BPM | SYSTOLIC BLOOD PRESSURE: 130 MMHG | BODY MASS INDEX: 40.86 KG/M2 | OXYGEN SATURATION: 97 %

## 2017-10-12 DIAGNOSIS — Z11.59 NEED FOR HEPATITIS C SCREENING TEST: ICD-10-CM

## 2017-10-12 DIAGNOSIS — E66.01 MORBID OBESITY (H): Chronic | ICD-10-CM

## 2017-10-12 DIAGNOSIS — E78.5 HYPERLIPIDEMIA WITH TARGET LDL LESS THAN 130: Chronic | ICD-10-CM

## 2017-10-12 DIAGNOSIS — G89.4 CHRONIC PAIN SYNDROME: ICD-10-CM

## 2017-10-12 DIAGNOSIS — I10 HYPERTENSION GOAL BP (BLOOD PRESSURE) < 140/80: Chronic | ICD-10-CM

## 2017-10-12 DIAGNOSIS — E11.9 TYPE 2 DIABETES MELLITUS WITHOUT COMPLICATION, WITHOUT LONG-TERM CURRENT USE OF INSULIN (H): Primary | ICD-10-CM

## 2017-10-12 DIAGNOSIS — F33.41 RECURRENT MAJOR DEPRESSION IN PARTIAL REMISSION (H): Chronic | ICD-10-CM

## 2017-10-12 DIAGNOSIS — Z12.11 SCREEN FOR COLON CANCER: ICD-10-CM

## 2017-10-12 DIAGNOSIS — Z23 NEED FOR PROPHYLACTIC VACCINATION AND INOCULATION AGAINST INFLUENZA: ICD-10-CM

## 2017-10-12 DIAGNOSIS — Z12.31 VISIT FOR SCREENING MAMMOGRAM: ICD-10-CM

## 2017-10-12 LAB — HBA1C MFR BLD: 5.6 % (ref 4.3–6)

## 2017-10-12 PROCEDURE — 90686 IIV4 VACC NO PRSV 0.5 ML IM: CPT | Performed by: FAMILY MEDICINE

## 2017-10-12 PROCEDURE — 90471 IMMUNIZATION ADMIN: CPT | Performed by: FAMILY MEDICINE

## 2017-10-12 PROCEDURE — 82043 UR ALBUMIN QUANTITATIVE: CPT | Performed by: FAMILY MEDICINE

## 2017-10-12 PROCEDURE — 83036 HEMOGLOBIN GLYCOSYLATED A1C: CPT | Performed by: FAMILY MEDICINE

## 2017-10-12 PROCEDURE — 36415 COLL VENOUS BLD VENIPUNCTURE: CPT | Performed by: FAMILY MEDICINE

## 2017-10-12 PROCEDURE — 80061 LIPID PANEL: CPT | Performed by: FAMILY MEDICINE

## 2017-10-12 PROCEDURE — 99214 OFFICE O/P EST MOD 30 MIN: CPT | Mod: 25 | Performed by: FAMILY MEDICINE

## 2017-10-12 RX ORDER — OXYCODONE AND ACETAMINOPHEN 5; 325 MG/1; MG/1
1 TABLET ORAL EVERY 4 HOURS PRN
Qty: 60 TABLET | Refills: 0 | Status: SHIPPED | OUTPATIENT
Start: 2017-10-12 | End: 2017-11-01

## 2017-10-12 ASSESSMENT — PATIENT HEALTH QUESTIONNAIRE - PHQ9
10. IF YOU CHECKED OFF ANY PROBLEMS, HOW DIFFICULT HAVE THESE PROBLEMS MADE IT FOR YOU TO DO YOUR WORK, TAKE CARE OF THINGS AT HOME, OR GET ALONG WITH OTHER PEOPLE: NOT DIFFICULT AT ALL
SUM OF ALL RESPONSES TO PHQ QUESTIONS 1-9: 13
SUM OF ALL RESPONSES TO PHQ QUESTIONS 1-9: 13

## 2017-10-12 NOTE — PATIENT INSTRUCTIONS
(E11.9) Type 2 diabetes mellitus without complication, without long-term current use of insulin (H)  (primary encounter diagnosis)  Comment:    Plan: HEMOGLOBIN A1C, Lipid panel reflex to direct         LDL, Albumin Random Urine Quantitative with         Creat Ratio             (I10) Hypertension goal BP (blood pressure) < 140/80  Comment:    Plan: oxyCODONE-acetaminophen (PERCOCET) 5-325 MG per        tablet             (G89.4) Chronic pain syndrome  Comment:    Plan: oxyCODONE-acetaminophen (PERCOCET) 5-325 MG per        tablet             (E78.5) Hyperlipidemia with target LDL less than 130  Comment:     Plan: Lipid panel reflex to direct LDL             (E66.01) BMI 42  Comment:    Plan:      (F33.41) Recurrent major depression in partial remission  Comment:    Plan:      (Z12.11) Screen for colon cancer  Comment:    Plan: Fecal colorectal cancer screen FIT - Future         (S+30)             (Z12.31) Visit for screening mammogram  Comment:     Plan:      (Z11.59) Need for hepatitis C screening test  Comment:    Plan:      (Z23) Need for prophylactic vaccination and inoculation against influenza  Comment:    Plan:
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
## 2017-10-12 NOTE — NURSING NOTE
"Chief Complaint   Patient presents with     Flank Pain     RECHECK       Initial /88  Pulse 77  Temp 98.2  F (36.8  C)  Resp 16  Wt 257 lb (116.6 kg)  SpO2 97%  BMI 40.86 kg/m2 Estimated body mass index is 40.86 kg/(m^2) as calculated from the following:    Height as of 10/3/17: 5' 6.5\" (1.689 m).    Weight as of this encounter: 257 lb (116.6 kg).  Medication Reconciliation: mike Villeda CMA      "

## 2017-10-12 NOTE — LETTER
"October 13, 2017      Ines Mott  [address]  [facility] [address]        Dear ,      What You Can Do to Reduce Cholesterol Levels   and Reduce Risk of Diabetes, Heart, and Blood Vessel Disease   1. Eat six to eight servings of green or root vegetables or fruit (raw or cooked) per day. You need 35 grams of fiber per day.   Examples: carrots apples   broccoli oranges   spinach grapefruit   lettuce pears   bananas   2. Use up to 2 cup of walnuts, almonds, or pecans as a source of \"good\" fat per day.   3. Drink one to three servings of soy milk (great for cereal) or 2 cup of soynuts per day.   4. Use margarine with reduced trans or saturated fats (e.g. Benecol, Smart Balance, olive oil margarine) as replacement for butter or margarine.   5. Eat fewer or half-portions of desserts, baked goods, chips, cookies, processed flour and sugar, pasta, butter, cream, non-skim dairy, ice cream.   6. Use olive or canola oil as the only oils for cooking and dressings.   7. Use one tablespoon of ground flaxseed or Natural Ovens \"Energy Mix\" per day (great on cereal or over salad).   8. Eat one to two servings per week of wild salmon or water-packed tuna.   9. Limit beef, lamb, and pork to at most one serving per day. (Range-fed beef, if you can get it, is much preferred and allows for greater use in diet).   10. Eat three to four servings per day of whole grains (legumes, rice, wheat, oats).   11. Limit sodas and beer at most to three per week (only special occasions).   12. 65 percent of us need to lose weight and all of us need to keep moving! You should be walking at least 150 minutes per week. You should lose approximately seven percent of your weight in the next year if overweight. Check with me for more details.   1. Andrew Weil's paperback book, The Healthy Kitchen, is a great addition to your healthy lifestyle library.   2. American Diabetic Association (www.diabetes.org) - a wealth of information " "on nutritional excellence.   3. Other great websites for Mediterranean diets are available.   Diabetes: Exchange List   What are the exchange lists?   The exchange lists show you portions of food that equal 1 exchange. Foods are divided into food lists. The foods on each list are called exchanges because they have a similar number of calories, protein, carbohydrate and fat content. Foods from each list can be traded or \"exchanged\" for any other food on the same list because they all have a similar exchange value. A dietitian will help you plan how much food your child should eat at each meal and from what lists the foods should come from. At first you should measure your food until you are able to make good estimates about serving sizes. The following list is a sample of foods found on the exchange lists. For more information, you can buy the Exchange Lists for Meal Planning from: The American Diabetes Association [address] [place] [address] 1-915.144.8505 http://www.diabetes.org   Carbohydrate group   Starch List: One starch exchange contains about 15 grams of carbohydrate, 3 grams of protein, 0 to 1 grams of fat, and 80 calories. A starch exchange is sometimes called a carb exchange. Examples of one starch (carb) exchange are:   one slice of bread   1/2 hamburger or hot dog bun   3/4 cup of unsweetened cereal   1/3 cup pasta   3 cups popcorn   crackers (6 small saltines, 3 squares of vilma crackers, 3 of most other crackers)   1 pancake or waffle (5 inch)   15 to 20 fat-free or baked potato or corn chips.   The vegetables included in the starch exchanges include:   corn (1/2 cup or 1/2 cob)   white potato (1/4 large baked with skin or 1/2 cup mashed)   yam or sweet potato (1/2 cup)   green peas (1/2 cup)   squash, winter (1 cup)   lima beans (2/3 cup).   Fruit List: 1 fruit exchange contains about 15 grams of carbohydrate and 60 calories. Examples of one fruit exchange are:   grape juice (1/3 cup)   apple " "or pineapple juice (1/2 cup)   orange or grapefruit juice (1/2 cup)   1 small apple   orange or peach   1/2 banana   1 cup raspberries   1/3 of a small cantaloupe   1 slice of watermelon.   Milk List: 1 milk exchange contains about 8 grams of protein and 12 grams of carbohydrate. Items on the milk list are divided into fat-free, reduced fat, and whole milk depending on the number of fat grams in the exchange. Examples of one milk exchange are: Fat-Free (0 to 3 grams of fat)   1 cup of skim or non-fat milk   1 cup of 1% milk (also includes 1/2 fat exchange)   6 ounces flavored fat-free yogurt   Reduced-Fat (5 grams of fat)   6 ounces of plain, low-fat yogurt   1 cup 2% milk (also includes one fat exchange).   Whole Milk (8 grams of fat)   8 ounces of plain yogurt (made from whole milk)   1 cup whole milk.   Vegetable List: One vegetable exchange has 5 grams of carbohydrate, 2 grams of protein, no fat, and 25 calories. One-half cup of cooked or a cup of raw vegetables is a good measure for 1 exchange of most vegetables. Raw lettuce may be taken in larger quantities, but salad dressing usually equals 1 fat exchange. Other Carbohydrates List: One \"other carbohydrate\" exchange has 15 grams of carbohydrate. Many of these foods count as a starch exchange and one or more fat exchanges.   brownie (2 inch square) = 1 carb, 1 fat exchange   fruit snack roll = 1 carb exchange   granola bar = 1 and 1/2 carb exchanges   ice cream (1/2 cup) = 1 carb, 2 fat exchanges   frozen yogurt (1/2 cup) = 1 carb, 0 to 1 fat exchanges   tortilla chips (6-12) = 1 carb, 2 fat exchanges.   Meat and Meat Substitute Group   Meats are divided into very lean meats, lean meats, medium-fat meats, and high-fat meats. People with diabetes should try to eat more lean and medium fat meats and stay away from the high fat choices. The Very Lean meat group includes foods that contain 7 grams of protein, 0 to 1 gram of fat, and 35 calories for 1 exchange. " Examples include:   1 ounce chicken or turkey (white meat, no skin)   1 ounce fresh fish   1 ounce fat-free cheese   2 egg whites   The Lean meat group includes foods that contain 7 grams of protein, 3 grams of fat, and 55 calories for 1 meat exchange. Examples include:   1 ounce chicken or turkey (dark meat, no skin)   1 ounce fish   1 ounce lean pork   1 ounce USDA Select or Choice grades of lean beef   1 ounce cheese (with 3 grams of fat or less per ounce).   The Medium-Fat group includes foods that have 7 grams of protein, 5 grams of fat, and 75 calories for 1 meat exchange. Examples include:   1 ounce of ground beef, most cuts of beef, pork, lamb or veal   1 ounce of cheese (5 grams of fat per ounce or less)   1 egg   1 ounce fried fish.   The High-Fat foods have 7 grams of protein, 8 grams of fat, and 100 calories for 1 meat exchange. This group includes:   1 ounce of pork sausage   1 ounce of spare ribs   1 oz of regular cheese (American, Swiss etc.)   1 oz of lunch meat   1 hot dog (turkey or chicken).   Fat Group   Fat List: Fat is necessary for the body and is particularly important during periods of fasting (overnight), when it is very slowly absorbed. 1 fat exchange contains 5 grams of fat and 45 calories. The monounsaturated fats and polyunsaturated fats are better for us than saturated fats. The fat list includes: 1 exchange of monounsaturated fats equals:   1/2 Tbsp peanut butter   6 almonds   1 tsp of oil (olive, peanut, canola).   1 exchange of polyunsaturated fats equals:   1 tsp margarine   1 tsp of any vegetable oil (except coconut).   1 exchange of saturated fat includes:   1 tsp butter   1 strip of zaidi   2 Tbsp of cream (half and half).   Free Foods   A free food contains less than 20 calories or less than 5 grams of carbohydrate per serving. If the food has a serving size listed on its package, it should be limited to 3 servings spread throughout the day. Examples of free foods include:   4  Tbsp fat-free margarine   1 Tbsp fat-free Miracle Whip   sugar-free gelatin   diet soft drinks   catsup   soy sauce   spices.   Combination Foods   Many foods, such as casseroles, are mixed together. Your dietitian can help you figure out how many exchanges to count for combination foods. For example:   lasagna (1 cup) = 2 carb exchanges and 2 medium-fat meat exchanges   spaghetti with meatballs (1 cup) = 2 carb exchanges and 2 medium-fat meat exchanges   pizza, cheese (1/4 of 12 in.) = 2 carbs, 2 medium-fat meats   chicken noodle soup (1 cup) = 1 carb exchange   frozen entrée (less than 300 calories) = 2 carbs, 3 lean meat exchanges   macaroni and cheese (1 cup) = 2 carb exchanges and 2 medium-fat meat exchanges.     Resulted Orders   HEMOGLOBIN A1C   Result Value Ref Range    Hemoglobin A1C 5.6 4.3 - 6.0 %   Lipid panel reflex to direct LDL   Result Value Ref Range    Cholesterol 182 <200 mg/dL    Triglycerides 155 (H) <150 mg/dL      Comment:      Borderline high:  150-199 mg/dl  High:             200-499 mg/dl  Very high:       >499 mg/dl  Fasting specimen      HDL Cholesterol 49 (L) >49 mg/dL    LDL Cholesterol Calculated 102 (H) <100 mg/dL      Comment:      Above desirable:  100-129 mg/dl  Borderline High:  130-159 mg/dL  High:             160-189 mg/dL  Very high:       >189 mg/dl      Non HDL Cholesterol 133 (H) <130 mg/dL      Comment:      Above Desirable:  130-159 mg/dl  Borderline high:  160-189 mg/dl  High:             190-219 mg/dl  Very high:       >219 mg/dl     Albumin Random Urine Quantitative with Creat Ratio   Result Value Ref Range    Creatinine Urine 264 mg/dL    Albumin Urine mg/L 19 mg/L    Albumin Urine mg/g Cr 7.27 0 - 25 mg/g Cr       If you have any questions or concerns, please call the clinic at the number listed above.       Sincerely,        OTTO YUSUF MD

## 2017-10-12 NOTE — MR AVS SNAPSHOT
After Visit Summary   10/12/2017    Ines Mott    MRN: [id number]           Patient Information     Date Of Birth          1965        Visit Information        Provider Department      10/12/2017 9:00 AM Dustin Fatima MD [facility]        Today's Diagnoses     Type 2 diabetes mellitus without complication, without long-term current use of insulin (H)    -  1    Hypertension goal BP (blood pressure) < 140/80        Chronic pain syndrome        Hyperlipidemia with target LDL less than 130        BMI 42        Recurrent major depression in partial remission        Screen for colon cancer        Visit for screening mammogram        Need for hepatitis C screening test        Need for prophylactic vaccination and inoculation against influenza          Care Instructions    (E11.9) Type 2 diabetes mellitus without complication, without long-term current use of insulin (H)  (primary encounter diagnosis)  Comment:    Plan: HEMOGLOBIN A1C, Lipid panel reflex to direct         LDL, Albumin Random Urine Quantitative with         Creat Ratio             (I10) Hypertension goal BP (blood pressure) < 140/80  Comment:    Plan: oxyCODONE-acetaminophen (PERCOCET) 5-325 MG per        tablet             (G89.4) Chronic pain syndrome  Comment:    Plan: oxyCODONE-acetaminophen (PERCOCET) 5-325 MG per        tablet             (E78.5) Hyperlipidemia with target LDL less than 130  Comment:     Plan: Lipid panel reflex to direct LDL             (E66.01) BMI 42  Comment:    Plan:      (F33.41) Recurrent major depression in partial remission  Comment:    Plan:      (Z12.11) Screen for colon cancer  Comment:    Plan: Fecal colorectal cancer screen FIT - Future         (S+30)             (Z12.31) Visit for screening mammogram  Comment:     Plan:      (Z11.59) Need for hepatitis C screening test  Comment:    Plan:      (Z23) Need for prophylactic vaccination and inoculation  "against influenza  Comment:    Plan:                    Follow-ups after your visit        Future tests that were ordered for you today     Open Future Orders        Priority Expected Expires Ordered    Fecal colorectal cancer screen FIT - Future (S+30) Routine 2017 2017 10/12/2017            Who to contact     If you have questions or need follow up information about today's clinic visit or your schedule please contact [facility] directly at [phone].  Normal or non-critical lab and imaging results will be communicated to you by [id number], letter or phone within 4 business days after the clinic has received the results. If you do not hear from us within 7 days, please contact the clinic through [employer] or phone. If you have a critical or abnormal lab result, we will notify you by phone as soon as possible.  Submit refill requests through [employer] or call your pharmacy and they will forward the refill request to us. Please allow 3 business days for your refill to be completed.          Additional Information About Your Visit        [employer] Information     [employer] lets you send messages to your doctor, view your test results, renew your prescriptions, schedule appointments and more. To sign up, go to [email or web address] . Click on \"Log in\" on the left side of the screen, which will take you to the Welcome page. Then click on \"Sign up Now\" on the right side of the page.     You will be asked to enter the access code listed below, as well as some personal information. Please follow the directions to create your username and password.     Your access code is: [email or web address]  Expires: 2018 11:55 AM     Your access code will  in 90 days. If you need help or a new code, please call your [facility] clinic or [phone].        Care EveryWhere ID     This is your Care EveryWhere ID. This could be used by other organizations to access your [facility] medical " records  [id number]        Your Vitals Were     Pulse Temperature Respirations Pulse Oximetry BMI (Body Mass Index)       77 98.2  F (36.8  C) 16 97% 40.86 kg/m2        Blood Pressure from Last 3 Encounters:   10/12/17 130/88   10/03/17 (!) 144/100   09/26/17 128/78    Weight from Last 3 Encounters:   10/12/17 257 lb (116.6 kg)   10/03/17 253 lb (114.8 kg)   09/26/17 254 lb 4.8 oz (115.3 kg)              We Performed the Following     Albumin Random Urine Quantitative with Creat Ratio     HEMOGLOBIN A1C     Lipid panel reflex to direct LDL          Where to get your medicines      Some of these will need a paper prescription and others can be bought over the counter.  Ask your nurse if you have questions.     Bring a paper prescription for each of these medications     oxyCODONE-acetaminophen 5-325 MG per tablet          Primary Care Provider Office Phone # Fax #    Dustin Rina Fatima [phone] [address] [facility] [address]        Equal Access to Services     [facility]: Hadii casimiro ku hadasho Soomaali, waaxda luqadaha, qaybta kaalmada ademigel, emilee pardo . So [facility] [phone].    ATENCIÓN: Si habla español, tiene a christianson disposición servicios gratuitos de asistencia lingüística. [facility] [phone].    We comply with applicable federal civil rights laws and [place] laws. We do not discriminate on the basis of race, color, national origin, age, disability, sex, sexual orientation, or gender identity.            Thank you!     Thank you for choosing [facility]  for your care. Our goal is always to provide you with excellent care. Hearing back from our patients is one way we can continue to improve our services. Please take a few minutes to complete the written survey that you may receive in the mail after your visit with us. Thank you!             Your Updated Medication List - Protect others around you:  Learn how to safely use, store and throw away your medicines at www.disposemymeds.org.          This list is accurate as of: 10/12/17  9:35 AM.  Always use your most recent med list.                   Brand Name Dispense Instructions for use Diagnosis    * albuterol 108 (90 BASE) MCG/ACT Inhaler    VENTOLIN HFA    54 g    INHALE TWO PUFFS BY MOUTH EVERY 4 HOURS AS NEEDED FOR SHORTNESS OF BREATH/DYSPNEA    Mild intermittent asthma without complication       * albuterol (2.5 MG/3ML) 0.083% neb solution     30 vial    Take 1 vial (2.5 mg) by nebulization every 6 hours as needed for shortness of breath / dyspnea or wheezing    Mild intermittent asthma with acute exacerbation       ASPIRIN NOT PRESCRIBED    INTENTIONAL    1 each    Please choose reason not prescribed, below    Aspirin intolerance       baclofen 10 MG tablet    LIORESAL    90 tablet    Take 1 tablet (10 mg) by mouth 3 times daily    Chronic bilateral low back pain with right-sided sciatica       blood glucose calibration solution    no brand specified    1 each    Use to calibrate blood glucose monitor as directed.    Type 2 diabetes mellitus without complication, without long-term current use of insulin (H)       blood glucose lancets standard    no brand specified    100 each    Use to test blood sugar daily times daily or as directed.    Type 2 diabetes mellitus without complication, without long-term current use of insulin (H)       blood glucose monitoring meter device kit    no brand specified    1 kit    Use to test blood sugar larisa times daily or as directed.    Type 2 diabetes mellitus without complication, without long-term current use of insulin (H)       blood glucose monitoring test strip    no brand specified    100 strip    Use to test blood sugars once times daily or as directed    Type 2 diabetes mellitus without complication, without long-term current use of insulin (H)       cetirizine 10 MG tablet    zyrTEC    30 tablet    Take 1  tablet (10 mg) by mouth every evening    Seasonal allergic rhinitis due to pollen       cyanocobalamin 1000 MCG/ML injection    VITAMIN B12    1 mL    Inject 1 mL (1,000 mcg) Subcutaneous every 30 days    B12 deficiency       EPINEPHrine 0.3 MG/0.3ML injection 2-pack    EPIPEN/ADRENACLICK/or ANY BX GENERIC EQUIV    2 each    Inject 0.3 mLs (0.3 mg) into the muscle once as needed for anaphylaxis    Late effect of other and unspecified external causes       fluticasone 50 MCG/ACT spray    FLONASE    3 Bottle    Spray 1-2 sprays into both nostrils daily    Seasonal allergic rhinitis due to pollen, Chronic pain syndrome       losartan-hydrochlorothiazide 50-12.5 MG per tablet    HYZAAR    90 tablet    Take 1 tablet by mouth daily    Hypertension goal BP (blood pressure) < 140/80       magnesium oxide 400 (241.3 MG) MG tablet    MAG-OX    90 tablet    Take 1 tablet (400 mg) by mouth daily    Cramp of limb       mometasone-formoterol 100-5 MCG/ACT oral inhaler    DULERA    1 Inhaler    Inhale 2 puffs into the lungs 2 times daily    Persistent asthma       montelukast 10 MG tablet    SINGULAIR    30 tablet    Take 1 tablet (10 mg) by mouth At Bedtime    Seasonal allergic rhinitis due to pollen       ondansetron 4 MG ODT tab    ZOFRAN ODT    10 tablet    Take 1 tablet (4 mg) by mouth every 8 hours as needed for nausea    Chronic pain syndrome       order for DME     1 Device    Right wrist splint for carpal tunnel syndrome  One* Use as directed at hour of sleep    Carpal tunnel syndrome of right wrist       oxyCODONE-acetaminophen 5-325 MG per tablet    PERCOCET    60 tablet    Take 1 tablet by mouth every 4 hours as needed for moderate to severe pain    Hypertension goal BP (blood pressure) < 140/80, Chronic pain syndrome       polyethylene glycol powder    MIRALAX    510 g    Take 17 g (1 capful) by mouth daily    Slow transit constipation       potassium chloride 10 MEQ tablet    K-TAB,KLOR-CON    120 tablet    Take 1  tablet (10 mEq) by mouth daily    Hypokalemia       sertraline 100 MG tablet    ZOLOFT    30 tablet    Take 1 tablet (100 mg) by mouth daily    Recurrent major depression in partial remission (H), Other constipation       sodium chloride 0.65 % nasal spray    SALINE MIST    1 Bottle    Spray 1 spray into both nostrils daily as needed for congestion    Seasonal allergic rhinitis due to pollen       STATIN NOT PRESCRIBED (INTENTIONAL)     0 each    1 each daily Statin not prescribed intentionally due to Active liver disease (liver failure, cirrhosis, hepatitis) LIVER METS    Hyperlipidemia LDL goal <100, Type 2 diabetes mellitus without complication (H)       triamcinolone 0.1 % paste    KENALOG    5 g    Take by mouth 2 times daily    Oral aphthae       Vitamin D (Cholecalciferol) 1000 UNITS Tabs     60 tablet    Take 2,000 Units by mouth daily    Vitamin D deficiency       * Notice:  This list has 2 medication(s) that are the same as other medications prescribed for you. Read the directions carefully, and ask your doctor or other care provider to review them with you.

## 2017-10-12 NOTE — LETTER
"October 13, 2017      Ines Mott  [address]  [facility] [address]        Dear ,    We are writing to inform you of your test results.    NORMAL DIABETES URINE PROTEIN TEST   NORMAL TOTAL CHOLESTEROL   HIGH TRIGLYCERIDES AVOID ALCOHOL AND SUGARS AND EXCESS CARBOHYDRATES   HDL OR \"GOOD\" CHOLESTEROL INCREASE EXERCISE   LDL OR \"BAD\" CHOLESTEROL BORDERLINE  WEIGHT LOSS AND EXERCISE   VERY LOW DENSITY CHOLESTEROL WEIGHT LOSS AND EXERCISE   THREE MONTH GLUCOSE AVERAGE IN NORMAL RANGE CONGRATULATIONS     Resulted Orders   HEMOGLOBIN A1C   Result Value Ref Range    Hemoglobin A1C 5.6 4.3 - 6.0 %   Lipid panel reflex to direct LDL   Result Value Ref Range    Cholesterol 182 <200 mg/dL    Triglycerides 155 (H) <150 mg/dL      Comment:      Borderline high:  150-199 mg/dl  High:             200-499 mg/dl  Very high:       >499 mg/dl  Fasting specimen      HDL Cholesterol 49 (L) >49 mg/dL    LDL Cholesterol Calculated 102 (H) <100 mg/dL      Comment:      Above desirable:  100-129 mg/dl  Borderline High:  130-159 mg/dL  High:             160-189 mg/dL  Very high:       >189 mg/dl      Non HDL Cholesterol 133 (H) <130 mg/dL      Comment:      Above Desirable:  130-159 mg/dl  Borderline high:  160-189 mg/dl  High:             190-219 mg/dl  Very high:       >219 mg/dl     Albumin Random Urine Quantitative with Creat Ratio   Result Value Ref Range    Creatinine Urine 264 mg/dL    Albumin Urine mg/L 19 mg/L    Albumin Urine mg/g Cr 7.27 0 - 25 mg/g Cr       If you have any questions or concerns, please call the clinic at the number listed above.       Sincerely,        OTTO YUSUF MD                "

## 2017-10-12 NOTE — PROGRESS NOTES
JONNATHAN Higginbothamnjectable Influenza Immunization Documentation    1.  Is the person to be vaccinated sick today?   No    2. Does the person to be vaccinated have an allergy to a component   of the vaccine?   No    3. Has the person to be vaccinated ever had a serious reaction   to influenza vaccine in the past?   No    4. Has the person to be vaccinated ever had Guillain-Barré syndrome?   No    Form completed by Siobhan Lugo LPN

## 2017-10-13 LAB
CHOLEST SERPL-MCNC: 182 MG/DL
CREAT UR-MCNC: 264 MG/DL
HDLC SERPL-MCNC: 49 MG/DL
LDLC SERPL CALC-MCNC: 102 MG/DL
MICROALBUMIN UR-MCNC: 19 MG/L
MICROALBUMIN/CREAT UR: 7.27 MG/G CR (ref 0–25)
NONHDLC SERPL-MCNC: 133 MG/DL
TRIGL SERPL-MCNC: 155 MG/DL

## 2017-10-13 NOTE — PROGRESS NOTES
"Please send normal lab letter when labs are complete  NORMAL DIABETES URINE PROTEIN TEST   NORMAL TOTAL CHOLESTEROL   HIGH TRIGLYCERIDES AVOID ALCOHOL AND SUGARS AND EXCESS CARBOHYDRATES   HDL OR \"GOOD\" CHOLESTEROL INCREASE EXERCISE  LDL OR \"BAD\" CHOLESTEROL BORDERLINE  WEIGHT LOSS AND EXERCISE  VERY LOW DENSITY CHOLESTEROL WEIGHT LOSS AND EXERCISE   THREE MONTH GLUCOSE AVERAGE IN NORMAL RANGE CONGRATULATIONS   OTOT YUSUF JR., MD"

## 2017-10-18 ENCOUNTER — HOSPITAL ENCOUNTER (EMERGENCY)
Facility: CLINIC | Age: 52
Discharge: HOME | End: 2017-10-19
Attending: EMERGENCY MEDICINE | Admitting: EMERGENCY MEDICINE
Payer: COMMERCIAL

## 2017-10-18 DIAGNOSIS — R07.9 CHEST PAIN, UNSPECIFIED TYPE: ICD-10-CM

## 2017-10-18 DIAGNOSIS — M62.830 LUMBAR PARASPINAL MUSCLE SPASM: ICD-10-CM

## 2017-10-18 DIAGNOSIS — S39.012A STRAIN OF LUMBAR REGION, INITIAL ENCOUNTER: ICD-10-CM

## 2017-10-18 LAB
ANION GAP SERPL CALCULATED.3IONS-SCNC: 7 MMOL/L (ref 3–14)
BASOPHILS # BLD AUTO: 0 10E9/L (ref 0–0.2)
BASOPHILS NFR BLD AUTO: 0.1 %
BUN SERPL-MCNC: 16 MG/DL (ref 7–30)
CALCIUM SERPL-MCNC: 9.7 MG/DL (ref 8.5–10.1)
CHLORIDE SERPL-SCNC: 105 MMOL/L (ref 94–109)
CO2 SERPL-SCNC: 29 MMOL/L (ref 20–32)
CREAT SERPL-MCNC: 0.82 MG/DL (ref 0.52–1.04)
DIFFERENTIAL METHOD BLD: NORMAL
EOSINOPHIL # BLD AUTO: 0.3 10E9/L (ref 0–0.7)
EOSINOPHIL NFR BLD AUTO: 2.8 %
ERYTHROCYTE [DISTWIDTH] IN BLOOD BY AUTOMATED COUNT: 13.4 % (ref 10–15)
GFR SERPL CREATININE-BSD FRML MDRD: 73 ML/MIN/1.7M2
GLUCOSE SERPL-MCNC: 99 MG/DL (ref 70–99)
HCT VFR BLD AUTO: 35.7 % (ref 35–47)
HGB BLD-MCNC: 11.8 G/DL (ref 11.7–15.7)
IMM GRANULOCYTES # BLD: 0 10E9/L (ref 0–0.4)
IMM GRANULOCYTES NFR BLD: 0.2 %
LYMPHOCYTES # BLD AUTO: 3 10E9/L (ref 0.8–5.3)
LYMPHOCYTES NFR BLD AUTO: 30.2 %
MCH RBC QN AUTO: 29.5 PG (ref 26.5–33)
MCHC RBC AUTO-ENTMCNC: 33.1 G/DL (ref 31.5–36.5)
MCV RBC AUTO: 89 FL (ref 78–100)
MONOCYTES # BLD AUTO: 0.7 10E9/L (ref 0–1.3)
MONOCYTES NFR BLD AUTO: 7.1 %
NEUTROPHILS # BLD AUTO: 5.8 10E9/L (ref 1.6–8.3)
NEUTROPHILS NFR BLD AUTO: 59.6 %
NRBC # BLD AUTO: 0 10*3/UL
NRBC BLD AUTO-RTO: 0 /100
PLATELET # BLD AUTO: 310 10E9/L (ref 150–450)
POTASSIUM SERPL-SCNC: 3.6 MMOL/L (ref 3.4–5.3)
RBC # BLD AUTO: 4 10E12/L (ref 3.8–5.2)
SODIUM SERPL-SCNC: 141 MMOL/L (ref 133–144)
TROPONIN I SERPL-MCNC: <0.015 UG/L (ref 0–0.04)
WBC # BLD AUTO: 9.8 10E9/L (ref 4–11)

## 2017-10-18 PROCEDURE — 99284 EMERGENCY DEPT VISIT MOD MDM: CPT

## 2017-10-18 PROCEDURE — 25000132 ZZH RX MED GY IP 250 OP 250 PS 637: Performed by: EMERGENCY MEDICINE

## 2017-10-18 PROCEDURE — 85025 COMPLETE CBC W/AUTO DIFF WBC: CPT | Performed by: EMERGENCY MEDICINE

## 2017-10-18 PROCEDURE — 93005 ELECTROCARDIOGRAM TRACING: CPT

## 2017-10-18 PROCEDURE — 80048 BASIC METABOLIC PNL TOTAL CA: CPT | Performed by: EMERGENCY MEDICINE

## 2017-10-18 PROCEDURE — 84484 ASSAY OF TROPONIN QUANT: CPT | Performed by: EMERGENCY MEDICINE

## 2017-10-18 RX ORDER — METHOCARBAMOL 750 MG/1
1500 TABLET, FILM COATED ORAL ONCE
Status: COMPLETED | OUTPATIENT
Start: 2017-10-18 | End: 2017-10-18

## 2017-10-18 RX ORDER — METHOCARBAMOL 750 MG/1
750-1500 TABLET, FILM COATED ORAL 3 TIMES DAILY PRN
Qty: 30 TABLET | Refills: 0 | Status: SHIPPED | OUTPATIENT
Start: 2017-10-18 | End: 2017-10-23

## 2017-10-18 RX ORDER — METHOCARBAMOL 100 MG/ML
1000 INJECTION, SOLUTION INTRAMUSCULAR; INTRAVENOUS ONCE
Status: DISCONTINUED | OUTPATIENT
Start: 2017-10-18 | End: 2017-10-18

## 2017-10-18 RX ADMIN — METHOCARBAMOL 1500 MG: 750 TABLET ORAL at 23:52

## 2017-10-18 NOTE — ED AVS SNAPSHOT
Emergency Department    [address] [place] [address]    Phone:  [phone]    Fax:  [phone]                                       Ines Mott   MRN: [id number]    Department:   Emergency Department   Date of Visit:  10/18/2017           After Visit Summary Signature Page     I have received my discharge instructions, and my questions have been answered. I have discussed any challenges I see with this plan with the nurse or doctor.    ..........................................................................................................................................  Patient/Patient Representative Signature      ..........................................................................................................................................  Patient Representative Print Name and Relationship to Patient    ..................................................               ................................................  Date                                            Time    ..........................................................................................................................................  Reviewed by Signature/Title    ...................................................              ..............................................  Date                                                            Time

## 2017-10-18 NOTE — ED AVS SNAPSHOT
Emergency Department    [address] [facility] [address]    Phone:  [phone]    Fax:  [phone]                                       Ines Mott   MRN: [id number]    Department:   Emergency Department   Date of Visit:  10/18/2017           Patient Information     Date Of Birth          1965        Your diagnoses for this visit were:     Strain of lumbar region, initial encounter     Lumbar paraspinal muscle spasm     Chest pain, unspecified type        You were seen by Zachary Avina MD.      Follow-up Information     Follow up with Dustin Fatima MD.    Specialty:  Family Practice    Why:  As needed, If symptoms worsen    Contact information:    [address]  [facility] [address] [phone]          Follow up with  Emergency Department.    Specialty:  EMERGENCY MEDICINE    Why:  As needed, If symptoms worsen    Contact information:    [address] [facility] [address] [phone]        Discharge Instructions         Causes of Lumbar (Low Back) Pain  Low back pain can be caused by problems with any part of the lumbar spine. A disk can herniate (push out) and press on a nerve. Vertebrae can rub against each other or slip out of place. This can irritate facet joints and nerves. It can also lead to stenosis, a narrowing of the spinal canal or foramen.  Pressure from a disk  Constant wear and tear on a disk can cause it to weaken and push outward. Part of the disk may then press on nearby nerves. There are two common types of herniated disks:  Contained means the soft nucleus is protruding outward.   Extruded means the firm annulus has torn, letting the soft center squeeze through.     Pressure from bone  An unstable spine   With age, a disk may thin and wear out. Vertebrae above and below the disk may begin to touch. This can put pressure on nerves. It can also cause bone spurs (growths) to form where the bones rub together.    Stenosis  results when bone spurs narrow the foramen or spinal canal. This also puts pressure on nerves. Slipping vertebrae can irritate nerves and joints. They can also worsen stenosis.    In some cases, vertebrae become unstable and slip forward. This is called spondylolisthesis.     Date Last Reviewed: 10/12/2015    9762-4263 [employer] [address] [place] [address], [place] [address]. All rights reserved. This information is not intended as a substitute for professional medical care. Always follow your healthcare professional's instructions.          Relieving Back Pain  Back pain is a common problem. You can strain back muscles by lifting too much weight or just by moving the wrong way. Back strain can be uncomfortable, even painful. And it can take weeks or months to improve. To help yourself feel better and prevent future back strains, try these tips.  Important Note: Do not give aspirin to children or teens without first discussing it with your healthcare provider.      ? Ice    Ice reduces muscle pain and swelling. It helps most during the first 24 to 48 hours after an injury.    Wrap an ice pack or a bag of frozen peas in a thin towel. (Never place ice directly on your skin.)    Place the ice where your back hurts the most.    Don t ice for more than 20 minutes at a time.    You can use ice several times a day.  ? Medicines  Over-the-counter pain relievers can include acetaminophen and anti-inflammatory medicines, which includes aspirin or ibuprofen. They can help ease discomfort. Some also reduce swelling.    Tell your healthcare provider about any medicines you are already taking.    Take medicines only as directed.  ? Heat  After the first 48 hours, heat can relax sore muscles and improve blood flow.    Try a warm bath or shower. Or use a heating pad set on low. To prevent a burn, keep a cloth between you and the heating pad.    Don t use a heating pad for more than 15 minutes at a time. Never sleep on a  heating pad.  Date Last Reviewed: 9/1/2015 2000-2017 [employer]. [address] [place] [address], [place] [address]. All rights reserved. This information is not intended as a substitute for professional medical care. Always follow your healthcare professional's instructions.      Discharge Instructions  Back Pain  You were seen today for back pain. Back pain can have many causes, but most will get better without surgery or other specific treatment. Sometimes there is a herniated ( slipped ) disc. We do not usually do MRI scans to look for these right away, since most herniated discs will get better on their own with time.  Today, we did not find any evidence that your back pain was caused by a serious condition. However, sometimes symptoms develop over time and cannot be found during an emergency visit, so it is very important that you follow up with your primary provider.  Generally, every Emergency Department visit should have a follow-up clinic visit with either a primary or a specialty clinic/provider. Please follow-up as instructed by your emergency provider today.    Return to the Emergency Department if:    You develop a fever with your back pain.     You have weakness or change in sensation in one or both legs.    You lose control of your bowels or bladder, or cannot empty your bladder (cannot pee).    Your pain gets much worse.     Follow-up with your provider:    Unless your pain has completely gone away, please make an appointment with your provider within one week. Most of the routine care for back pain is available in a clinic and not the Emergency Department. You may need further management of your back pain, such as more pain medication, imaging such as an X-ray or MRI, or physical therapy.    What can I do to help myself?    Remain Active -- People are often afraid that they will hurt their back further or delay recovery by remaining active, but this is one of the best things you can do for  your back. In fact, staying in bed for a long time to rest is not recommended. Studies have shown that people with low back pain recover faster when they remain active. Movement helps to bring blood flow to the muscles and relieve muscle spasms as well as preventing loss of muscle strength.    Heat -- Using a heating pad can help with low back pain during the first few weeks. Do not sleep with a heating pad, as you can be burned.     Pain medications - You may take a pain medication such as Tylenol  (acetaminophen), Advil , Motrin  (ibuprofen) or Aleve  (naproxen).  If you were given a prescription for medicine here today, be sure to read all of the information (including the package insert) that comes with your prescription.  This will include important information about the medicine, its side effects, and any warnings that you need to know about.  The pharmacist who fills the prescription can provide more information and answer questions you may have about the medicine.  If you have questions or concerns that the pharmacist cannot address, please call or return to the Emergency Department.   Remember that you can always come back to the Emergency Department if you are not able to see your regular provider in the amount of time listed above, if you get any new symptoms, or if there is anything that worries you.    Discharge Instructions  Chest Pain    You have been seen today for chest pain or discomfort.  At this time, your provider has found no signs that your chest pain is due to a serious or life-threatening condition, (or you have declined more testing and/or admission to the hospital). However, sometimes there is a serious problem that does not show up right away. Your evaluation today may not be complete and you may need further testing and evaluation.     Generally, every Emergency Department visit should have a follow-up clinic visit with either a primary or a specialty clinic/provider. Please follow-up as  instructed by your emergency provider today.  Return to the Emergency Department if:    Your chest pain changes, gets worse, starts to happen more often, or comes with less activity.    You are newly short of breath.    You get very weak or tired.    You pass out or faint.    You have any new symptoms, like fever, cough, numb legs, or you cough up blood.    You have anything else that worries you.    Until you follow-up with your regular provider, please do the following:    Take one aspirin daily unless you have an allergy or are told not to by your provider.    If a stress test appointment has been made, go to the appointment.    If you have questions, contact your regular provider.    Follow-up with your regular provider/clinic as directed; this is very important.    If you were given a prescription for medicine here today, be sure to read all of the information (including the package insert) that comes with your prescription.  This will include important information about the medicine, its side effects, and any warnings that you need to know about.  The pharmacist who fills the prescription can provide more information and answer questions you may have about the medicine.  If you have questions or concerns that the pharmacist cannot address, please call or return to the Emergency Department.       Remember that you can always come back to the Emergency Department if you are not able to see your regular provider in the amount of time listed above, if you get any new symptoms, or if there is anything that worries you.            24 Hour Appointment Hotline       To make an appointment at any [facility], call [phone] ([phone]). If you don't have a family doctor or clinic, we will help you find one. [facility] are conveniently located to serve the needs of you and your family.             Review of your medicines      START taking        Dose / Directions Last dose taken    methocarbamol 750  MG tablet   Commonly known as:  ROBAXIN   Dose:  750-1500 mg   Quantity:  30 tablet        Take 1-2 tablets (750-1,500 mg) by mouth 3 times daily as needed for muscle spasms   Refills:  0          Our records show that you are taking the medicines listed below. If these are incorrect, please call your family doctor or clinic.        Dose / Directions Last dose taken    * albuterol 108 (90 BASE) MCG/ACT Inhaler   Commonly known as:  VENTOLIN HFA   Quantity:  54 g        INHALE TWO PUFFS BY MOUTH EVERY 4 HOURS AS NEEDED FOR SHORTNESS OF BREATH/DYSPNEA   Refills:  1        * albuterol (2.5 MG/3ML) 0.083% neb solution   Dose:  1 vial   Quantity:  30 vial        Take 1 vial (2.5 mg) by nebulization every 6 hours as needed for shortness of breath / dyspnea or wheezing   Refills:  3        ASPIRIN NOT PRESCRIBED   Commonly known as:  INTENTIONAL   Quantity:  1 each        Please choose reason not prescribed, below   Refills:  0        baclofen 10 MG tablet   Commonly known as:  LIORESAL   Dose:  10 mg   Quantity:  90 tablet        Take 1 tablet (10 mg) by mouth 3 times daily   Refills:  1        blood glucose calibration solution   Commonly known as:  no brand specified   Quantity:  1 each        Use to calibrate blood glucose monitor as directed.   Refills:  0        blood glucose lancets standard   Commonly known as:  no brand specified   Quantity:  100 each        Use to test blood sugar daily times daily or as directed.   Refills:  11        blood glucose monitoring meter device kit   Commonly known as:  no brand specified   Quantity:  1 kit        Use to test blood sugar larisa times daily or as directed.   Refills:  0        blood glucose monitoring test strip   Commonly known as:  no brand specified   Quantity:  100 strip        Use to test blood sugars once times daily or as directed   Refills:  3        cetirizine 10 MG tablet   Commonly known as:  zyrTEC   Dose:  10 mg   Quantity:  30 tablet        Take 1 tablet  (10 mg) by mouth every evening   Refills:  1        cyanocobalamin 1000 MCG/ML injection   Commonly known as:  VITAMIN B12   Dose:  1 mL   Quantity:  1 mL        Inject 1 mL (1,000 mcg) Subcutaneous every 30 days   Refills:  11        EPINEPHrine 0.3 MG/0.3ML injection 2-pack   Commonly known as:  EPIPEN/ADRENACLICK/or ANY BX GENERIC EQUIV   Dose:  0.3 mg   Quantity:  2 each        Inject 0.3 mLs (0.3 mg) into the muscle once as needed for anaphylaxis   Refills:  5        fluticasone 50 MCG/ACT spray   Commonly known as:  FLONASE   Dose:  1-2 spray   Quantity:  3 Bottle        Spray 1-2 sprays into both nostrils daily   Refills:  1        losartan-hydrochlorothiazide 50-12.5 MG per tablet   Commonly known as:  HYZAAR   Dose:  1 tablet   Quantity:  90 tablet        Take 1 tablet by mouth daily   Refills:  3        magnesium oxide 400 (241.3 MG) MG tablet   Commonly known as:  MAG-OX   Dose:  400 mg   Quantity:  90 tablet        Take 1 tablet (400 mg) by mouth daily   Refills:  3        mometasone-formoterol 100-5 MCG/ACT oral inhaler   Commonly known as:  DULERA   Dose:  2 puff   Quantity:  1 Inhaler        Inhale 2 puffs into the lungs 2 times daily   Refills:  11        montelukast 10 MG tablet   Commonly known as:  SINGULAIR   Dose:  10 mg   Quantity:  30 tablet        Take 1 tablet (10 mg) by mouth At Bedtime   Refills:  11        ondansetron 4 MG ODT tab   Commonly known as:  ZOFRAN ODT   Dose:  4 mg   Quantity:  10 tablet        Take 1 tablet (4 mg) by mouth every 8 hours as needed for nausea   Refills:  0        order for DME   Quantity:  1 Device        Right wrist splint for carpal tunnel syndrome  One* Use as directed at hour of sleep   Refills:  1        oxyCODONE-acetaminophen 5-325 MG per tablet   Commonly known as:  PERCOCET   Dose:  1 tablet   Quantity:  60 tablet        Take 1 tablet by mouth every 4 hours as needed for moderate to severe pain   Refills:  0        polyethylene glycol powder   Commonly  known as:  MIRALAX   Dose:  1 capful   Quantity:  510 g        Take 17 g (1 capful) by mouth daily   Refills:  3        potassium chloride 10 MEQ tablet   Commonly known as:  K-TAB,KLOR-CON   Dose:  10 mEq   Indication:  Low Amount of Potassium in the Blood   Quantity:  120 tablet        Take 1 tablet (10 mEq) by mouth daily   Refills:  11        sertraline 100 MG tablet   Commonly known as:  ZOLOFT   Dose:  100 mg   Indication:  Major Depressive Disorder   Quantity:  30 tablet        Take 1 tablet (100 mg) by mouth daily   Refills:  11        sodium chloride 0.65 % nasal spray   Commonly known as:  SALINE MIST   Dose:  1 spray   Quantity:  1 Bottle        Spray 1 spray into both nostrils daily as needed for congestion   Refills:  11        STATIN NOT PRESCRIBED (INTENTIONAL)   Dose:  1 each   Quantity:  0 each        1 each daily Statin not prescribed intentionally due to Active liver disease (liver failure, cirrhosis, hepatitis) LIVER METS   Refills:  0        triamcinolone 0.1 % paste   Commonly known as:  KENALOG   Quantity:  5 g        Take by mouth 2 times daily   Refills:  0        Vitamin D (Cholecalciferol) 1000 UNITS Tabs   Dose:  2000 Units   Quantity:  60 tablet        Take 2,000 Units by mouth daily   Refills:  11        * Notice:  This list has 2 medication(s) that are the same as other medications prescribed for you. Read the directions carefully, and ask your doctor or other care provider to review them with you.            Prescriptions were sent or printed at these locations (1 Prescription)                   Other Prescriptions                Printed at Department/Unit printer (1 of 1)         methocarbamol (ROBAXIN) 750 MG tablet                Procedures and tests performed during your visit     Basic metabolic panel    CBC with platelets differential    IV access    Troponin I      Orders Needing Specimen Collection     None      Pending Results     No orders found from 10/16/2017 to  10/19/2017.            Pending Culture Results     No orders found from 10/16/2017 to 10/19/2017.            Pending Results Instructions     If you had any lab results that were not finalized at the time of your Discharge, you can call the ED Lab Result RN at [phone]. You will be contacted by this team for any positive Lab results or changes in treatment. The nurses are available 7 days a week from 10A to 6:30P.  You can leave a message 24 hours per day and they will return your call.        Test Results From Your Hospital Stay        10/18/2017 11:14 PM      Component Results     Component Value Ref Range & Units Status    WBC 9.8 4.0 - 11.0 10e9/L Final    RBC Count 4.00 3.8 - 5.2 10e12/L Final    Hemoglobin 11.8 11.7 - 15.7 g/dL Final    Hematocrit 35.7 35.0 - 47.0 % Final    MCV 89 78 - 100 fl Final    MCH 29.5 26.5 - 33.0 pg Final    MCHC 33.1 31.5 - 36.5 g/dL Final    RDW 13.4 10.0 - 15.0 % Final    Platelet Count 310 150 - 450 10e9/L Final    Diff Method Automated Method  Final    % Neutrophils 59.6 % Final    % Lymphocytes 30.2 % Final    % Monocytes 7.1 % Final    % Eosinophils 2.8 % Final    % Basophils 0.1 % Final    % Immature Granulocytes 0.2 % Final    Nucleated RBCs 0 0 /100 Final    Absolute Neutrophil 5.8 1.6 - 8.3 10e9/L Final    Absolute Lymphocytes 3.0 0.8 - 5.3 10e9/L Final    Absolute Monocytes 0.7 0.0 - 1.3 10e9/L Final    Absolute Eosinophils 0.3 0.0 - 0.7 10e9/L Final    Absolute Basophils 0.0 0.0 - 0.2 10e9/L Final    Abs Immature Granulocytes 0.0 0 - 0.4 10e9/L Final    Absolute Nucleated RBC 0.0  Final         10/18/2017 11:31 PM      Component Results     Component Value Ref Range & Units Status    Sodium 141 133 - 144 mmol/L Final    Potassium 3.6 3.4 - 5.3 mmol/L Final    Chloride 105 94 - 109 mmol/L Final    Carbon Dioxide 29 20 - 32 mmol/L Final    Anion Gap 7 3 - 14 mmol/L Final    Glucose 99 70 - 99 mg/dL Final    Urea Nitrogen 16 7 - 30 mg/dL Final    Creatinine 0.82 0.52 -  1.04 mg/dL Final    GFR Estimate 73 >60 mL/min/1.7m2 Final    Non  GFR Calc    GFR Estimate If Black 88 >60 mL/min/1.7m2 Final    African American GFR Calc    Calcium 9.7 8.5 - 10.1 mg/dL Final         10/18/2017 11:35 PM      Component Results     Component Value Ref Range & Units Status    Troponin I ES <0.015 0.000 - 0.045 ug/L Final    The 99th percentile for upper reference range is 0.045 ug/L.  Troponin values   in the range of 0.045 - 0.120 ug/L may be associated with risks of adverse   clinical events.                  Clinical Quality Measure: Blood Pressure Screening     Your blood pressure was checked while you were in the emergency department today. The last reading we obtained was  BP: (!) 206/107 . Please read the guidelines below about what these numbers mean and what you should do about them.  If your systolic blood pressure (the top number) is less than 120 and your diastolic blood pressure (the bottom number) is less than 80, then your blood pressure is normal. There is nothing more that you need to do about it.  If your systolic blood pressure (the top number) is 120-139 or your diastolic blood pressure (the bottom number) is 80-89, your blood pressure may be higher than it should be. You should have your blood pressure rechecked within a year by a primary care provider.  If your systolic blood pressure (the top number) is 140 or greater or your diastolic blood pressure (the bottom number) is 90 or greater, you may have high blood pressure. High blood pressure is treatable, but if left untreated over time it can put you at risk for heart attack, stroke, or kidney failure. You should have your blood pressure rechecked by a primary care provider within the next 4 weeks.  If your provider in the emergency department today gave you specific instructions to follow-up with your doctor or provider even sooner than that, you should follow that instruction and not wait for up to 4 weeks for  "your follow-up visit.        Thank you for choosing [facility]       Thank you for choosing [facility] for your care. Our goal is always to provide you with excellent care. Hearing back from our patients is one way we can continue to improve our services. Please take a few minutes to complete the written survey that you may receive in the mail after you visit with us. Thank you!        [employer] Information     [employer] lets you send messages to your doctor, view your test results, renew your prescriptions, schedule appointments and more. To sign up, go to [email or web address] . Click on \"Log in\" on the left side of the screen, which will take you to the Welcome page. Then click on \"Sign up Now\" on the right side of the page.     You will be asked to enter the access code listed below, as well as some personal information. Please follow the directions to create your username and password.     Your access code is: [email or web address]  Expires: 2018 11:55 AM     Your access code will  in 90 days. If you need help or a new code, please call your [facility] clinic or [phone].        Care EveryWhere ID     This is your Care EveryWhere ID. This could be used by other organizations to access your [facility] medical records  [id number]        Equal Access to Services     VALENTE RIVERA : Marc Leonard, waaxda luqadaha, qaybta kaalmada adeamandeepyakandy, emilee coyne. [facility] [phone].    ATENCIÓN: Si habla español, tiene a christianson disposición servicios gratuitos de asistencia lingüística. Llame al [phone].    We comply with applicable federal civil rights laws and [place] laws. We do not discriminate on the basis of race, color, national origin, age, disability, sex, sexual orientation, or gender identity.            After Visit Summary       This is your record. Keep this with you and show to your community pharmacist(s) and doctor(s) at your next visit.                  "

## 2017-10-19 ENCOUNTER — CARE COORDINATION (OUTPATIENT)
Dept: CARE COORDINATION | Facility: CLINIC | Age: 52
End: 2017-10-19

## 2017-10-19 VITALS
SYSTOLIC BLOOD PRESSURE: 186 MMHG | OXYGEN SATURATION: 94 % | RESPIRATION RATE: 16 BRPM | HEIGHT: 66 IN | TEMPERATURE: 97.7 F | WEIGHT: 259.2 LBS | DIASTOLIC BLOOD PRESSURE: 100 MMHG | BODY MASS INDEX: 41.66 KG/M2

## 2017-10-19 LAB — INTERPRETATION ECG - MUSE: NORMAL

## 2017-10-19 NOTE — ED NOTES
"\"I take oxycodone and marielena been in a lot of pain today so marielena had to take more then 4\" chest pain started earlier in the evening. Pt feels dizzy and nauseated. \"marielena been under a lot of stress lately\"  "

## 2017-10-19 NOTE — DISCHARGE INSTRUCTIONS
Causes of Lumbar (Low Back) Pain  Low back pain can be caused by problems with any part of the lumbar spine. A disk can herniate (push out) and press on a nerve. Vertebrae can rub against each other or slip out of place. This can irritate facet joints and nerves. It can also lead to stenosis, a narrowing of the spinal canal or foramen.  Pressure from a disk  Constant wear and tear on a disk can cause it to weaken and push outward. Part of the disk may then press on nearby nerves. There are two common types of herniated disks:  Contained means the soft nucleus is protruding outward.   Extruded means the firm annulus has torn, letting the soft center squeeze through.     Pressure from bone  An unstable spine   With age, a disk may thin and wear out. Vertebrae above and below the disk may begin to touch. This can put pressure on nerves. It can also cause bone spurs (growths) to form where the bones rub together.    Stenosis results when bone spurs narrow the foramen or spinal canal. This also puts pressure on nerves. Slipping vertebrae can irritate nerves and joints. They can also worsen stenosis.    In some cases, vertebrae become unstable and slip forward. This is called spondylolisthesis.     Date Last Reviewed: 10/12/2015    9415-4531 [employer]. [address] [place] [address], [place] [address]. All rights reserved. This information is not intended as a substitute for professional medical care. Always follow your healthcare professional's instructions.          Relieving Back Pain  Back pain is a common problem. You can strain back muscles by lifting too much weight or just by moving the wrong way. Back strain can be uncomfortable, even painful. And it can take weeks or months to improve. To help yourself feel better and prevent future back strains, try these tips.  Important Note: Do not give aspirin to children or teens without first discussing it with your healthcare provider.      ? Ice    Ice reduces  muscle pain and swelling. It helps most during the first 24 to 48 hours after an injury.    Wrap an ice pack or a bag of frozen peas in a thin towel. (Never place ice directly on your skin.)    Place the ice where your back hurts the most.    Don t ice for more than 20 minutes at a time.    You can use ice several times a day.  ? Medicines  Over-the-counter pain relievers can include acetaminophen and anti-inflammatory medicines, which includes aspirin or ibuprofen. They can help ease discomfort. Some also reduce swelling.    Tell your healthcare provider about any medicines you are already taking.    Take medicines only as directed.  ? Heat  After the first 48 hours, heat can relax sore muscles and improve blood flow.    Try a warm bath or shower. Or use a heating pad set on low. To prevent a burn, keep a cloth between you and the heating pad.    Don t use a heating pad for more than 15 minutes at a time. Never sleep on a heating pad.  Date Last Reviewed: 9/1/2015 2000-2017 [employer]. [address] [place] [address]. All rights reserved. This information is not intended as a substitute for professional medical care. Always follow your healthcare professional's instructions.      Discharge Instructions  Back Pain  You were seen today for back pain. Back pain can have many causes, but most will get better without surgery or other specific treatment. Sometimes there is a herniated ( slipped ) disc. We do not usually do MRI scans to look for these right away, since most herniated discs will get better on their own with time.  Today, we did not find any evidence that your back pain was caused by a serious condition. However, sometimes symptoms develop over time and cannot be found during an emergency visit, so it is very important that you follow up with your primary provider.  Generally, every Emergency Department visit should have a follow-up clinic visit with either a primary or a  specialty clinic/provider. Please follow-up as instructed by your emergency provider today.    Return to the Emergency Department if:    You develop a fever with your back pain.     You have weakness or change in sensation in one or both legs.    You lose control of your bowels or bladder, or cannot empty your bladder (cannot pee).    Your pain gets much worse.     Follow-up with your provider:    Unless your pain has completely gone away, please make an appointment with your provider within one week. Most of the routine care for back pain is available in a clinic and not the Emergency Department. You may need further management of your back pain, such as more pain medication, imaging such as an X-ray or MRI, or physical therapy.    What can I do to help myself?    Remain Active -- People are often afraid that they will hurt their back further or delay recovery by remaining active, but this is one of the best things you can do for your back. In fact, staying in bed for a long time to rest is not recommended. Studies have shown that people with low back pain recover faster when they remain active. Movement helps to bring blood flow to the muscles and relieve muscle spasms as well as preventing loss of muscle strength.    Heat -- Using a heating pad can help with low back pain during the first few weeks. Do not sleep with a heating pad, as you can be burned.     Pain medications - You may take a pain medication such as Tylenol  (acetaminophen), Advil , Motrin  (ibuprofen) or Aleve  (naproxen).  If you were given a prescription for medicine here today, be sure to read all of the information (including the package insert) that comes with your prescription.  This will include important information about the medicine, its side effects, and any warnings that you need to know about.  The pharmacist who fills the prescription can provide more information and answer questions you may have about the medicine.  If you have  questions or concerns that the pharmacist cannot address, please call or return to the Emergency Department.   Remember that you can always come back to the Emergency Department if you are not able to see your regular provider in the amount of time listed above, if you get any new symptoms, or if there is anything that worries you.    Discharge Instructions  Chest Pain    You have been seen today for chest pain or discomfort.  At this time, your provider has found no signs that your chest pain is due to a serious or life-threatening condition, (or you have declined more testing and/or admission to the hospital). However, sometimes there is a serious problem that does not show up right away. Your evaluation today may not be complete and you may need further testing and evaluation.     Generally, every Emergency Department visit should have a follow-up clinic visit with either a primary or a specialty clinic/provider. Please follow-up as instructed by your emergency provider today.  Return to the Emergency Department if:    Your chest pain changes, gets worse, starts to happen more often, or comes with less activity.    You are newly short of breath.    You get very weak or tired.    You pass out or faint.    You have any new symptoms, like fever, cough, numb legs, or you cough up blood.    You have anything else that worries you.    Until you follow-up with your regular provider, please do the following:    Take one aspirin daily unless you have an allergy or are told not to by your provider.    If a stress test appointment has been made, go to the appointment.    If you have questions, contact your regular provider.    Follow-up with your regular provider/clinic as directed; this is very important.    If you were given a prescription for medicine here today, be sure to read all of the information (including the package insert) that comes with your prescription.  This will include important information about the  medicine, its side effects, and any warnings that you need to know about.  The pharmacist who fills the prescription can provide more information and answer questions you may have about the medicine.  If you have questions or concerns that the pharmacist cannot address, please call or return to the Emergency Department.       Remember that you can always come back to the Emergency Department if you are not able to see your regular provider in the amount of time listed above, if you get any new symptoms, or if there is anything that worries you.
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
## 2017-10-19 NOTE — PROGRESS NOTES
Clinic Care Coordination Contact  [facility]/Voicemail    Referral Source: ED Follow-Up    Clinical Data: Care Coordinator Outreach      Outreach to patient attempted.  Left message on voicemail with call back information and requested return call.    Patient was seen in PCP clinic in am. Was raking leaves at home in the afternoon and developed back pain between her shoulders. Patient presented to ED as she was concerned that she may be having a cardiac event as her spouse had a recent heart attack.  Patient diagnosed with back strain and discharged to home with Rx for Robaxan only.    Plan: Will await return call from patient.  Will follow as needed.       Karen Jacinto RN, CCM - Care Coordinator     10/19/2017    11:25 AM  [phone]

## 2017-11-01 ENCOUNTER — TELEPHONE (OUTPATIENT)
Dept: FAMILY MEDICINE | Facility: CLINIC | Age: 52
End: 2017-11-01

## 2017-11-01 DIAGNOSIS — I10 HYPERTENSION GOAL BP (BLOOD PRESSURE) < 140/80: Chronic | ICD-10-CM

## 2017-11-01 DIAGNOSIS — G89.4 CHRONIC PAIN SYNDROME: ICD-10-CM

## 2017-11-01 RX ORDER — OXYCODONE AND ACETAMINOPHEN 5; 325 MG/1; MG/1
1 TABLET ORAL EVERY 6 HOURS PRN
Qty: 8 TABLET | Refills: 0 | Status: SHIPPED | OUTPATIENT
Start: 2017-11-01 | End: 2017-11-02

## 2017-11-01 NOTE — TELEPHONE ENCOUNTER
Patient called requesting OV today because she is out of oxyCODONE-acetaminophen (PERCOCET) 5-325 MG. She has been out of medication for two days and complains she is under a lot of back pain. She is a restricted patient for why Caity from [facility] was called and informed PCP is not clinic and pt will schedule with a another provider. Caity reports patient is no longer restricted as of yesterday. OV was scheduled today for 40 min appt. Pt is typically scheduled for long appts with PCP. Please let me know if provider ok with OV as scheduled or would like this changed to a 20 minute appt. No need to reply if no change needed.

## 2017-11-01 NOTE — TELEPHONE ENCOUNTER
Patient was called with providers message below. Rx left at BM . Pt verbalized agreement with plan.

## 2017-11-01 NOTE — TELEPHONE ENCOUNTER
Given this is a chronic pain issue, more appropriate that patient f/u with KC as he is her PCP, even if she is no longer restricted. Looks like she has appt with him tomorrow. Given she is out of Percocet, I would be happy to given limited supply (#8) to tide her over until her appt with [facility] tomorrow. Rx printed & signed, in NAC out box. Please cancel her appt with me today and instruct her to keep appt with [facility] tomorrow.

## 2017-11-02 ENCOUNTER — OFFICE VISIT (OUTPATIENT)
Dept: FAMILY MEDICINE | Facility: CLINIC | Age: 52
End: 2017-11-02
Payer: COMMERCIAL

## 2017-11-02 VITALS
SYSTOLIC BLOOD PRESSURE: 118 MMHG | WEIGHT: 258 LBS | OXYGEN SATURATION: 98 % | TEMPERATURE: 97.1 F | BODY MASS INDEX: 41.64 KG/M2 | HEART RATE: 68 BPM | DIASTOLIC BLOOD PRESSURE: 72 MMHG | RESPIRATION RATE: 18 BRPM

## 2017-11-02 DIAGNOSIS — F33.41 RECURRENT MAJOR DEPRESSION IN PARTIAL REMISSION (H): Chronic | ICD-10-CM

## 2017-11-02 DIAGNOSIS — E66.01 MORBID OBESITY (H): Primary | Chronic | ICD-10-CM

## 2017-11-02 DIAGNOSIS — G89.4 CHRONIC PAIN SYNDROME: ICD-10-CM

## 2017-11-02 DIAGNOSIS — I10 HYPERTENSION GOAL BP (BLOOD PRESSURE) < 140/80: Chronic | ICD-10-CM

## 2017-11-02 PROCEDURE — 99213 OFFICE O/P EST LOW 20 MIN: CPT | Performed by: FAMILY MEDICINE

## 2017-11-02 RX ORDER — OXYCODONE AND ACETAMINOPHEN 5; 325 MG/1; MG/1
1 TABLET ORAL EVERY 6 HOURS PRN
Qty: 60 TABLET | Refills: 0 | Status: SHIPPED | OUTPATIENT
Start: 2017-11-04 | End: 2017-11-02

## 2017-11-02 RX ORDER — OXYCODONE AND ACETAMINOPHEN 5; 325 MG/1; MG/1
1 TABLET ORAL EVERY 6 HOURS PRN
Qty: 60 TABLET | Refills: 0 | Status: SHIPPED | OUTPATIENT
Start: 2017-11-03 | End: 2017-11-17

## 2017-11-02 ASSESSMENT — PATIENT HEALTH QUESTIONNAIRE - PHQ9
SUM OF ALL RESPONSES TO PHQ QUESTIONS 1-9: 6
SUM OF ALL RESPONSES TO PHQ QUESTIONS 1-9: 6

## 2017-11-02 NOTE — NURSING NOTE
"Chief Complaint   Patient presents with     Back Pain       Initial /72  Pulse 68  Temp 97.1  F (36.2  C) (Tympanic)  Resp 18  Wt 258 lb (117 kg)  SpO2 98%  BMI 41.64 kg/m2 Estimated body mass index is 41.64 kg/(m^2) as calculated from the following:    Height as of 10/18/17: 5' 6\" (1.676 m).    Weight as of this encounter: 258 lb (117 kg).  Medication Reconciliation: complete   Maggi Harris CMA    "

## 2017-11-02 NOTE — MR AVS SNAPSHOT
After Visit Summary   11/2/2017    Ines Mott    MRN: [id number]           Patient Information     Date Of Birth          1965        Visit Information        Provider Department      11/2/2017 4:15 PM Dustin Fatima MD [facility]        Today's Diagnoses     BMI 42    -  1    Chronic pain syndrome        Hypertension goal BP (blood pressure) < 140/80        Recurrent major depression in partial remission          Care Instructions    (E66.01) BMI 42  (primary encounter diagnosis)  Comment:    Plan:      (G89.4) Chronic pain syndrome  Comment:    Plan: oxyCODONE-acetaminophen (PERCOCET) 5-325 MG per        tablet, DISCONTINUED: oxyCODONE-acetaminophen         (PERCOCET) 5-325 MG per tablet             (I10) Hypertension goal BP (blood pressure) < 140/80  Comment:    Plan:      (F33.41) Recurrent major depression in partial remission  Comment:    Plan:        Assessment: Lower back pain    Plan: do these exercises at least twice daily:  Standing or sitting exercise can be done every two hours    Bo' Flexion Versus Da   Extension Exercises For   Low Back Pain   Examples of Bo' Flexion Exercises  1. Pelvic tilt.  Please press the small of your back against the floor.  Start with 5-10  and increase to 100 count over one month   2. Single Knee to chest. Lie on your back with legs in bent position. Alternate one leg and the other very slowly bringing the knee to chest.  Start with a count of 5-10   Over one month work up to 100  3. Double knee to chest.  Lie on your back with knees in bent position.  Bring both knees to the chest slowly hold for a count of 5-to 10. Over one month work to 100  4. Partial sit-up or crunch.  Lie on your back in bent leg position.  Please bring your body with arms crossed in front  To 30 degrees of flexion. Start with 5-10 over one month work up to 100 or more  5. Sit back.  Please sit on the side of the  bed or a stair landing  And lie backwards until the abdominal muscles start to quiver.  Hold for a count of 5-10 and over a month work up to 100.  5. Hamstring stretch.  Please extend your legs while sitting on the floor as tolerated for 5-10 count.  Gradually increase to count of 30 over one month      Alternately find a stair landing or sturdy chair and place heel  In a comfortable level of extension  And stretch one hamstring at a time for 5-10 seconds.  Increase to count of 30 over one month                         Squat.  Stand with legs comfortably apart and lower the body slowly by flexing the knees  for count of 5-10 over one month increase to 30.  Useful for anterior disc protrusion, facette joint arthritis spond-10ylolysis, spondylolisthesis and spinal stenosis    Nadia method or extension exercises  Useful disc bulging posteriorly  1. Prone pressups  Please lie on your abdomen.   Please do a push with the upper half of your body only.  This should not cause the pain to shoot down your buttock thigh leg or foot  Start with 10 and repeat x 3 one minute apart.  Repeat x 10 every 1-2 hours  Pain tends to increase in the center of back  And leaves buttock thighs legs and foot over time  You may shift your pelvis in opposite direction of buttock and leg pain to achieve even better results  2. Superman with arms extended  Or at the side Simultaneously lift your arms and legs off the floor. Start with 5-10 over one month increase to 100.  3. Cat stretch or cobra Start  As if in extended position of prone pressup but hold the stretch for 5 or 10 count. Over one moth to count of 30.  4.  Extensions can be done in standing position  As well if prone pressup is inconvenient.  Shift your pelvis in opposite direction of limb pain.  Put your hands  Flat on your buttocks and lean backwards without loss of balance.  Count 10 x 3 times then 10 extensions hourly                    Follow-ups after your visit        Who  "to contact     If you have questions or need follow up information about today's clinic visit or your schedule please contact [facility] directly at [phone].  Normal or non-critical lab and imaging results will be communicated to you by MyChart, letter or phone within 4 business days after the clinic has received the results. If you do not hear from us within 7 days, please contact the clinic through MyChart or phone. If you have a critical or abnormal lab result, we will notify you by phone as soon as possible.  Submit refill requests through [employer] or call your pharmacy and they will forward the refill request to us. Please allow 3 business days for your refill to be completed.          Additional Information About Your Visit        [email or web address] Information     [facility] lets you send messages to your doctor, view your test results, renew your prescriptions, schedule appointments and more. To sign up, go to [email or web address] . Click on \"Log in\" on the left side of the screen, which will take you to the Welcome page. Then click on \"Sign up Now\" on the right side of the page.     You will be asked to enter the access code listed below, as well as some personal information. Please follow the directions to create your username and password.     Your access code is: [email or web address]  Expires: 2018 11:55 AM     Your access code will  in 90 days. If you need help or a new code, please call your [place] clinic or [phone].        Care EveryWhere ID     This is your Care EveryWhere ID. This could be used by other organizations to access your [place] medical records  [id number]        Your Vitals Were     Pulse Temperature Respirations Pulse Oximetry BMI (Body Mass Index)       68 97.1  F (36.2  C) (Tympanic) 18 98% 41.64 kg/m2        Blood Pressure from Last 3 Encounters:   17 118/72   10/18/17 (!) 186/100   10/12/17 130/88    Weight from Last 3 Encounters: "   11/02/17 258 lb (117 kg)   10/18/17 259 lb 3.2 oz (117.6 kg)   10/12/17 257 lb (116.6 kg)              Today, you had the following     No orders found for display         Today's Medication Changes          These changes are accurate as of: 11/2/17  4:16 PM.  If you have any questions, ask your nurse or doctor.               Start taking these medicines.        Dose/Directions    oxyCODONE-acetaminophen 5-325 MG per tablet   Commonly known as:  PERCOCET   Used for:  Chronic pain syndrome   Started by:  Dustin Fatima MD        Dose:  1 tablet   Start taking on:  11/3/2017   Take 1 tablet by mouth every 6 hours as needed for moderate to severe pain Max 4 tab/day   Quantity:  60 tablet   Refills:  0            Where to get your medicines      Some of these will need a paper prescription and others can be bought over the counter.  Ask your nurse if you have questions.     Bring a paper prescription for each of these medications     oxyCODONE-acetaminophen 5-325 MG per tablet                Primary Care Provider Office Phone # Fax #    Dustin Fatima [phone] [address] [facility] [address]        Equal Access to Services     VALENTE RIVERA AH: Hadii casimiro toledoo Soevangelina, waaxda luqadaha, qaybta kaalmada adeegyada, emilee coyne. So [facility] [phone].    ATENCIÓN: Si habla español, tiene a christianson disposición servicios gratuitos de asistencia lingüística. Llame al [phone].    We comply with applicable federal civil rights laws and [place] laws. We do not discriminate on the basis of race, color, national origin, age, disability, sex, sexual orientation, or gender identity.            Thank you!     Thank you for choosing [facility]  for your care. Our goal is always to provide you with excellent care. Hearing back from our patients is one way we can continue to improve our services. Please take a few  minutes to complete the written survey that you may receive in the mail after your visit with us. Thank you!             Your Updated Medication List - Protect others around you: Learn how to safely use, store and throw away your medicines at www.disposemymeds.org.          This list is accurate as of: 11/2/17  4:16 PM.  Always use your most recent med list.                   Brand Name Dispense Instructions for use Diagnosis    * albuterol 108 (90 BASE) MCG/ACT Inhaler    VENTOLIN HFA    54 g    INHALE TWO PUFFS BY MOUTH EVERY 4 HOURS AS NEEDED FOR SHORTNESS OF BREATH/DYSPNEA    Mild intermittent asthma without complication       * albuterol (2.5 MG/3ML) 0.083% neb solution     30 vial    Take 1 vial (2.5 mg) by nebulization every 6 hours as needed for shortness of breath / dyspnea or wheezing    Mild intermittent asthma with acute exacerbation       ASPIRIN NOT PRESCRIBED    INTENTIONAL    1 each    Please choose reason not prescribed, below    Aspirin intolerance       baclofen 10 MG tablet    LIORESAL    90 tablet    Take 1 tablet (10 mg) by mouth 3 times daily    Chronic bilateral low back pain with right-sided sciatica       blood glucose calibration solution    no brand specified    1 each    Use to calibrate blood glucose monitor as directed.    Type 2 diabetes mellitus without complication, without long-term current use of insulin (H)       blood glucose lancets standard    no brand specified    100 each    Use to test blood sugar daily times daily or as directed.    Type 2 diabetes mellitus without complication, without long-term current use of insulin (H)       blood glucose monitoring meter device kit    no brand specified    1 kit    Use to test blood sugar larisa times daily or as directed.    Type 2 diabetes mellitus without complication, without long-term current use of insulin (H)       blood glucose monitoring test strip    no brand specified    100 strip    Use to test blood sugars once times daily  or as directed    Type 2 diabetes mellitus without complication, without long-term current use of insulin (H)       cetirizine 10 MG tablet    zyrTEC    30 tablet    Take 1 tablet (10 mg) by mouth every evening    Seasonal allergic rhinitis due to pollen       cyanocobalamin 1000 MCG/ML injection    VITAMIN B12    1 mL    Inject 1 mL (1,000 mcg) Subcutaneous every 30 days    B12 deficiency       EPINEPHrine 0.3 MG/0.3ML injection 2-pack    EPIPEN/ADRENACLICK/or ANY BX GENERIC EQUIV    2 each    Inject 0.3 mLs (0.3 mg) into the muscle once as needed for anaphylaxis    Late effect of other and unspecified external causes       fluticasone 50 MCG/ACT spray    FLONASE    3 Bottle    Spray 1-2 sprays into both nostrils daily    Seasonal allergic rhinitis due to pollen, Chronic pain syndrome       losartan-hydrochlorothiazide 50-12.5 MG per tablet    HYZAAR    90 tablet    Take 1 tablet by mouth daily    Hypertension goal BP (blood pressure) < 140/80       magnesium oxide 400 (241.3 MG) MG tablet    MAG-OX    90 tablet    Take 1 tablet (400 mg) by mouth daily    Cramp of limb       mometasone-formoterol 100-5 MCG/ACT oral inhaler    DULERA    1 Inhaler    Inhale 2 puffs into the lungs 2 times daily    Persistent asthma       montelukast 10 MG tablet    SINGULAIR    30 tablet    Take 1 tablet (10 mg) by mouth At Bedtime    Seasonal allergic rhinitis due to pollen       ondansetron 4 MG ODT tab    ZOFRAN ODT    10 tablet    Take 1 tablet (4 mg) by mouth every 8 hours as needed for nausea    Chronic pain syndrome       order for DME     1 Device    Right wrist splint for carpal tunnel syndrome  One* Use as directed at hour of sleep    Carpal tunnel syndrome of right wrist       oxyCODONE-acetaminophen 5-325 MG per tablet   Start taking on:  11/3/2017    PERCOCET    60 tablet    Take 1 tablet by mouth every 6 hours as needed for moderate to severe pain Max 4 tab/day    Chronic pain syndrome       polyethylene glycol powder     MIRALAX    510 g    Take 17 g (1 capful) by mouth daily    Slow transit constipation       potassium chloride 10 MEQ tablet    K-TAB,KLOR-CON    120 tablet    Take 1 tablet (10 mEq) by mouth daily    Hypokalemia       sertraline 100 MG tablet    ZOLOFT    30 tablet    Take 1 tablet (100 mg) by mouth daily    Recurrent major depression in partial remission (H), Other constipation       sodium chloride 0.65 % nasal spray    SALINE MIST    1 Bottle    Spray 1 spray into both nostrils daily as needed for congestion    Seasonal allergic rhinitis due to pollen       STATIN NOT PRESCRIBED (INTENTIONAL)     0 each    1 each daily Statin not prescribed intentionally due to Active liver disease (liver failure, cirrhosis, hepatitis) LIVER METS    Hyperlipidemia LDL goal <100, Type 2 diabetes mellitus without complication (H)       triamcinolone 0.1 % paste    KENALOG    5 g    Take by mouth 2 times daily    Oral aphthae       Vitamin D (Cholecalciferol) 1000 UNITS Tabs     60 tablet    Take 2,000 Units by mouth daily    Vitamin D deficiency       * Notice:  This list has 2 medication(s) that are the same as other medications prescribed for you. Read the directions carefully, and ask your doctor or other care provider to review them with you.
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
## 2017-11-02 NOTE — PATIENT INSTRUCTIONS
(E66.01) BMI 42  (primary encounter diagnosis)  Comment:    Plan:      (G89.4) Chronic pain syndrome  Comment:    Plan: oxyCODONE-acetaminophen (PERCOCET) 5-325 MG per        tablet, DISCONTINUED: oxyCODONE-acetaminophen         (PERCOCET) 5-325 MG per tablet             (I10) Hypertension goal BP (blood pressure) < 140/80  Comment:    Plan:      (F33.41) Recurrent major depression in partial remission  Comment:    Plan:        Assessment: Lower back pain    Plan: do these exercises at least twice daily:  Standing or sitting exercise can be done every two hours    Bo' Flexion Versus Da   Extension Exercises For   Low Back Pain   Examples of Bo' Flexion Exercises  1. Pelvic tilt.  Please press the small of your back against the floor.  Start with 5-10  and increase to 100 count over one month   2. Single Knee to chest. Lie on your back with legs in bent position. Alternate one leg and the other very slowly bringing the knee to chest.  Start with a count of 5-10   Over one month work up to 100  3. Double knee to chest.  Lie on your back with knees in bent position.  Bring both knees to the chest slowly hold for a count of 5-to 10. Over one month work to 100  4. Partial sit-up or crunch.  Lie on your back in bent leg position.  Please bring your body with arms crossed in front  To 30 degrees of flexion. Start with 5-10 over one month work up to 100 or more  5. Sit back.  Please sit on the side of the bed or a stair landing  And lie backwards until the abdominal muscles start to quiver.  Hold for a count of 5-10 and over a month work up to 100.  5. Hamstring stretch.  Please extend your legs while sitting on the floor as tolerated for 5-10 count.  Gradually increase to count of 30 over one month      Alternately find a stair landing or sturdy chair and place heel  In a comfortable level of extension  And stretch one hamstring at a time for 5-10 seconds.  Increase to count of 30 over one month                          Squat.  Stand with legs comfortably apart and lower the body slowly by flexing the knees  for count of 5-10 over one month increase to 30.  Useful for anterior disc protrusion, facette joint arthritis spond-10ylolysis, spondylolisthesis and spinal stenosis    Nadia method or extension exercises  Useful disc bulging posteriorly  1. Prone pressups  Please lie on your abdomen.   Please do a push with the upper half of your body only.  This should not cause the pain to shoot down your buttock thigh leg or foot  Start with 10 and repeat x 3 one minute apart.  Repeat x 10 every 1-2 hours  Pain tends to increase in the center of back  And leaves buttock thighs legs and foot over time  You may shift your pelvis in opposite direction of buttock and leg pain to achieve even better results  2. Superman with arms extended  Or at the side Simultaneously lift your arms and legs off the floor. Start with 5-10 over one month increase to 100.  3. Cat stretch or cobra Start  As if in extended position of prone pressup but hold the stretch for 5 or 10 count. Over one moth to count of 30.  4.  Extensions can be done in standing position  As well if prone pressup is inconvenient.  Shift your pelvis in opposite direction of limb pain.  Put your hands  Flat on your buttocks and lean backwards without loss of balance.  Count 10 x 3 times then 10 extensions hourly

## 2017-11-12 ENCOUNTER — HOSPITAL ENCOUNTER (EMERGENCY)
Facility: CLINIC | Age: 52
Discharge: HOME | End: 2017-11-12
Attending: EMERGENCY MEDICINE | Admitting: EMERGENCY MEDICINE
Payer: COMMERCIAL

## 2017-11-12 VITALS
HEIGHT: 66 IN | TEMPERATURE: 96.6 F | DIASTOLIC BLOOD PRESSURE: 105 MMHG | OXYGEN SATURATION: 97 % | WEIGHT: 256 LBS | HEART RATE: 72 BPM | SYSTOLIC BLOOD PRESSURE: 153 MMHG | RESPIRATION RATE: 18 BRPM | BODY MASS INDEX: 41.14 KG/M2

## 2017-11-12 DIAGNOSIS — M54.40 ACUTE LEFT-SIDED LOW BACK PAIN WITH SCIATICA, SCIATICA LATERALITY UNSPECIFIED: ICD-10-CM

## 2017-11-12 DIAGNOSIS — R51.9 NONINTRACTABLE HEADACHE, UNSPECIFIED CHRONICITY PATTERN, UNSPECIFIED HEADACHE TYPE: ICD-10-CM

## 2017-11-12 PROCEDURE — 25000132 ZZH RX MED GY IP 250 OP 250 PS 637: Performed by: EMERGENCY MEDICINE

## 2017-11-12 PROCEDURE — 99283 EMERGENCY DEPT VISIT LOW MDM: CPT

## 2017-11-12 RX ORDER — CYCLOBENZAPRINE HCL 10 MG
10 TABLET ORAL ONCE
Status: COMPLETED | OUTPATIENT
Start: 2017-11-12 | End: 2017-11-12

## 2017-11-12 RX ORDER — CYCLOBENZAPRINE HCL 10 MG
5-10 TABLET ORAL 3 TIMES DAILY PRN
Qty: 20 TABLET | Refills: 0 | Status: SHIPPED | OUTPATIENT
Start: 2017-11-12 | End: 2017-12-05

## 2017-11-12 RX ADMIN — CYCLOBENZAPRINE HYDROCHLORIDE 10 MG: 10 TABLET, FILM COATED ORAL at 18:11

## 2017-11-12 ASSESSMENT — ENCOUNTER SYMPTOMS
HEADACHES: 1
COUGH: 0
FATIGUE: 0
PHOTOPHOBIA: 0
NUMBNESS: 0
BACK PAIN: 1
DIZZINESS: 0
NAUSEA: 0
WEAKNESS: 0
JOINT SWELLING: 0
ABDOMINAL PAIN: 0
DYSURIA: 0
HEMATURIA: 0
MYALGIAS: 1
SHORTNESS OF BREATH: 0
NECK PAIN: 0
DIFFICULTY URINATING: 0
FEVER: 0
COLOR CHANGE: 0
NECK STIFFNESS: 0
WOUND: 0
ARTHRALGIAS: 1
SPEECH DIFFICULTY: 0
DIARRHEA: 0

## 2017-11-12 NOTE — ED AVS SNAPSHOT
Emergency Department    [address] [facility] [address]    Phone:  [phone]    Fax:  [phone]                                       Ines Mott   MRN: [id number]    Department:   Emergency Department   Date of Visit:  11/12/2017           Patient Information     Date Of Birth          1965        Your diagnoses for this visit were:     Nonintractable headache, unspecified chronicity pattern, unspecified headache type     Acute left-sided low back pain with sciatica, sciatica laterality unspecified        You were seen by Michelle Genao MD.      Follow-up Information     Schedule an appointment as soon as possible for a visit with Dustin Fatima MD.    Specialty:  Family Practice    Contact information:    [address]  [facility] [address] [phone]          Follow up with  Emergency Department.    Specialty:  EMERGENCY MEDICINE    Why:  If symptoms worsen    Contact information:    [address] [facility] [address] [phone]        Discharge Instructions       Continue the ibuprofen  Refill your narcotics tomorrow  Use the flexeril for the muscle spasms    Discharge Instructions  Back Pain  You were seen today for back pain. Back pain can have many causes, but most will get better without surgery or other specific treatment. Sometimes there is a herniated ( slipped ) disc. We do not usually do MRI scans to look for these right away, since most herniated discs will get better on their own with time.  Today, we did not find any evidence that your back pain was caused by a serious condition. However, sometimes symptoms develop over time and cannot be found during an emergency visit, so it is very important that you follow up with your primary provider.  Generally, every Emergency Department visit should have a follow-up clinic visit with either a primary or a specialty clinic/provider. Please follow-up as instructed by your  emergency provider today.    Return to the Emergency Department if:    You develop a fever with your back pain.     You have weakness or change in sensation in one or both legs.    You lose control of your bowels or bladder, or cannot empty your bladder (cannot pee).    Your pain gets much worse.     Follow-up with your provider:    Unless your pain has completely gone away, please make an appointment with your provider within one week. Most of the routine care for back pain is available in a clinic and not the Emergency Department. You may need further management of your back pain, such as more pain medication, imaging such as an X-ray or MRI, or physical therapy.    What can I do to help myself?    Remain Active -- People are often afraid that they will hurt their back further or delay recovery by remaining active, but this is one of the best things you can do for your back. In fact, staying in bed for a long time to rest is not recommended. Studies have shown that people with low back pain recover faster when they remain active. Movement helps to bring blood flow to the muscles and relieve muscle spasms as well as preventing loss of muscle strength.    Heat -- Using a heating pad can help with low back pain during the first few weeks. Do not sleep with a heating pad, as you can be burned.     Pain medications - You may take a pain medication such as Tylenol  (acetaminophen), Advil , Motrin  (ibuprofen) or Aleve  (naproxen).  If you were given a prescription for medicine here today, be sure to read all of the information (including the package insert) that comes with your prescription.  This will include important information about the medicine, its side effects, and any warnings that you need to know about.  The pharmacist who fills the prescription can provide more information and answer questions you may have about the medicine.  If you have questions or concerns that the pharmacist cannot address, please call  or return to the Emergency Department.   Remember that you can always come back to the Emergency Department if you are not able to see your regular provider in the amount of time listed above, if you get any new symptoms, or if there is anything that worries you.    Discharge Instructions  Headache    You were seen today for a headache. Headaches may be caused by many different things such as muscle tension, sinus inflammation, anxiety and stress, having too little sleep, too much alcohol, some medical conditions or injury. You may have a migraine, which is caused by changes in the blood vessels in your head.  At this time your provider does not find that your headache is a sign of anything dangerous or life-threatening.  However, sometimes the signs of serious illness do not show up right away.      Generally, every Emergency Department visit should have a follow-up clinic visit with either a primary or a specialty clinic/provider. Please follow-up as instructed by your emergency provider today.    Return to the Emergency Department if:    You get a new fever of 100.4 F or higher.    Your headache gets much worse.    You get a stiff neck with your headache.    You get a new headache that is significantly different or worse than headaches you have had before.    You are vomiting (throwing up) and cannot keep food or water down.    You have blurry or double vision or other problems with your eyes.    You have a new weakness on one side of your body.    You have difficulty with balance which is new.    You or your family thinks you are confused.    You have a seizure.    What can I do to help myself?    Pain medications - You may take a pain medication such as Tylenol  (acetaminophen), Advil , Motrin  (ibuprofen) or Aleve  (naproxen).    Take a pain reliever as soon as you notice symptoms.  Starting medications as soon as you start to have symptoms may lessen the amount of pain you have.    Relaxing in a quiet, dark  room may help.    Get enough sleep and eat meals regularly.    You may need to watch for certain foods or other things which may trigger your headaches.  Keeping a journal of your headaches and possible triggers may help you and your primary provider to identify things which you should avoid which may be causing your headaches.  If you were given a prescription for medicine here today, be sure to read all of the information (including the package insert) that comes with your prescription.  This will include important information about the medicine, its side effects, and any warnings that you need to know about.  The pharmacist who fills the prescription can provide more information and answer questions you may have about the medicine.  If you have questions or concerns that the pharmacist cannot address, please call or return to the Emergency Department.   Remember that you can always come back to the Emergency Department if you are not able to see your regular provider in the amount of time listed above, if you get any new symptoms, or if there is anything that worries you.        24 Hour Appointment Hotline       To make an appointment at any [facility], call [phone] ([phone]). If you don't have a family doctor or clinic, we will help you find one. [facility] are conveniently located to serve the needs of you and your family.             Review of your medicines      START taking        Dose / Directions Last dose taken    cyclobenzaprine 10 MG tablet   Commonly known as:  FLEXERIL   Dose:  5-10 mg   Quantity:  20 tablet        Take 0.5-1 tablets (5-10 mg) by mouth 3 times daily as needed for muscle spasms   Refills:  0          Our records show that you are taking the medicines listed below. If these are incorrect, please call your family doctor or clinic.        Dose / Directions Last dose taken    * albuterol 108 (90 BASE) MCG/ACT Inhaler   Commonly known as:  VENTOLIN HFA   Quantity:   54 g        INHALE TWO PUFFS BY MOUTH EVERY 4 HOURS AS NEEDED FOR SHORTNESS OF BREATH/DYSPNEA   Refills:  1        * albuterol (2.5 MG/3ML) 0.083% neb solution   Dose:  1 vial   Quantity:  30 vial        Take 1 vial (2.5 mg) by nebulization every 6 hours as needed for shortness of breath / dyspnea or wheezing   Refills:  3        ASPIRIN NOT PRESCRIBED   Commonly known as:  INTENTIONAL   Quantity:  1 each        Please choose reason not prescribed, below   Refills:  0        baclofen 10 MG tablet   Commonly known as:  LIORESAL   Dose:  10 mg   Quantity:  90 tablet        Take 1 tablet (10 mg) by mouth 3 times daily   Refills:  1        blood glucose calibration solution   Commonly known as:  no brand specified   Quantity:  1 each        Use to calibrate blood glucose monitor as directed.   Refills:  0        blood glucose lancets standard   Commonly known as:  no brand specified   Quantity:  100 each        Use to test blood sugar daily times daily or as directed.   Refills:  11        blood glucose monitoring meter device kit   Commonly known as:  no brand specified   Quantity:  1 kit        Use to test blood sugar larisa times daily or as directed.   Refills:  0        blood glucose monitoring test strip   Commonly known as:  no brand specified   Quantity:  100 strip        Use to test blood sugars once times daily or as directed   Refills:  3        cetirizine 10 MG tablet   Commonly known as:  zyrTEC   Dose:  10 mg   Quantity:  30 tablet        Take 1 tablet (10 mg) by mouth every evening   Refills:  1        cyanocobalamin 1000 MCG/ML injection   Commonly known as:  VITAMIN B12   Dose:  1 mL   Quantity:  1 mL        Inject 1 mL (1,000 mcg) Subcutaneous every 30 days   Refills:  11        EPINEPHrine 0.3 MG/0.3ML injection 2-pack   Commonly known as:  EPIPEN/ADRENACLICK/or ANY BX GENERIC EQUIV   Dose:  0.3 mg   Quantity:  2 each        Inject 0.3 mLs (0.3 mg) into the muscle once as needed for anaphylaxis    Refills:  5        fluticasone 50 MCG/ACT spray   Commonly known as:  FLONASE   Dose:  1-2 spray   Quantity:  3 Bottle        Spray 1-2 sprays into both nostrils daily   Refills:  1        losartan-hydrochlorothiazide 50-12.5 MG per tablet   Commonly known as:  HYZAAR   Dose:  1 tablet   Quantity:  90 tablet        Take 1 tablet by mouth daily   Refills:  3        magnesium oxide 400 (241.3 MG) MG tablet   Commonly known as:  MAG-OX   Dose:  400 mg   Quantity:  90 tablet        Take 1 tablet (400 mg) by mouth daily   Refills:  3        mometasone-formoterol 100-5 MCG/ACT oral inhaler   Commonly known as:  DULERA   Dose:  2 puff   Quantity:  1 Inhaler        Inhale 2 puffs into the lungs 2 times daily   Refills:  11        montelukast 10 MG tablet   Commonly known as:  SINGULAIR   Dose:  10 mg   Quantity:  30 tablet        Take 1 tablet (10 mg) by mouth At Bedtime   Refills:  11        ondansetron 4 MG ODT tab   Commonly known as:  ZOFRAN ODT   Dose:  4 mg   Quantity:  10 tablet        Take 1 tablet (4 mg) by mouth every 8 hours as needed for nausea   Refills:  0        order for DME   Quantity:  1 Device        Right wrist splint for carpal tunnel syndrome  One* Use as directed at hour of sleep   Refills:  1        oxyCODONE-acetaminophen 5-325 MG per tablet   Commonly known as:  PERCOCET   Dose:  1 tablet   Quantity:  60 tablet        Take 1 tablet by mouth every 6 hours as needed for moderate to severe pain Max 4 tab/day   Refills:  0        polyethylene glycol powder   Commonly known as:  MIRALAX   Dose:  1 capful   Quantity:  510 g        Take 17 g (1 capful) by mouth daily   Refills:  3        potassium chloride 10 MEQ tablet   Commonly known as:  K-TAB,KLOR-CON   Dose:  10 mEq   Indication:  Low Amount of Potassium in the Blood   Quantity:  120 tablet        Take 1 tablet (10 mEq) by mouth daily   Refills:  11        sertraline 100 MG tablet   Commonly known as:  ZOLOFT   Dose:  100 mg   Indication:  Major  Depressive Disorder   Quantity:  30 tablet        Take 1 tablet (100 mg) by mouth daily   Refills:  11        sodium chloride 0.65 % nasal spray   Commonly known as:  SALINE MIST   Dose:  1 spray   Quantity:  1 Bottle        Spray 1 spray into both nostrils daily as needed for congestion   Refills:  11        STATIN NOT PRESCRIBED (INTENTIONAL)   Dose:  1 each   Quantity:  0 each        1 each daily Statin not prescribed intentionally due to Active liver disease (liver failure, cirrhosis, hepatitis) LIVER METS   Refills:  0        triamcinolone 0.1 % paste   Commonly known as:  KENALOG   Quantity:  5 g        Take by mouth 2 times daily   Refills:  0        Vitamin D (Cholecalciferol) 1000 UNITS Tabs   Dose:  2000 Units   Quantity:  60 tablet        Take 2,000 Units by mouth daily   Refills:  11        * Notice:  This list has 2 medication(s) that are the same as other medications prescribed for you. Read the directions carefully, and ask your doctor or other care provider to review them with you.            Prescriptions were sent or printed at these locations (1 Prescription)                   Other Prescriptions                Printed at Department/Unit printer (1 of 1)         cyclobenzaprine (FLEXERIL) 10 MG tablet                Orders Needing Specimen Collection     None      Pending Results     No orders found from 11/10/2017 to 11/13/2017.            Pending Culture Results     No orders found from 11/10/2017 to 11/13/2017.            Pending Results Instructions     If you had any lab results that were not finalized at the time of your Discharge, you can call the ED Lab Result RN at [phone]. You will be contacted by this team for any positive Lab results or changes in treatment. The nurses are available 7 days a week from 10A to 6:30P.  You can leave a message 24 hours per day and they will return your call.        Test Results From Your Hospital Stay               Clinical Quality Measure: Blood  Pressure Screening     Your blood pressure was checked while you were in the emergency department today. The last reading we obtained was  BP: (!) 153/105 . Please read the guidelines below about what these numbers mean and what you should do about them.  If your systolic blood pressure (the top number) is less than 120 and your diastolic blood pressure (the bottom number) is less than 80, then your blood pressure is normal. There is nothing more that you need to do about it.  If your systolic blood pressure (the top number) is 120-139 or your diastolic blood pressure (the bottom number) is 80-89, your blood pressure may be higher than it should be. You should have your blood pressure rechecked within a year by a primary care provider.  If your systolic blood pressure (the top number) is 140 or greater or your diastolic blood pressure (the bottom number) is 90 or greater, you may have high blood pressure. High blood pressure is treatable, but if left untreated over time it can put you at risk for heart attack, stroke, or kidney failure. You should have your blood pressure rechecked by a primary care provider within the next 4 weeks.  If your provider in the emergency department today gave you specific instructions to follow-up with your doctor or provider even sooner than that, you should follow that instruction and not wait for up to 4 weeks for your follow-up visit.        Thank you for choosing [facility]       Thank you for choosing [facility] for your care. Our goal is always to provide you with excellent care. Hearing back from our patients is one way we can continue to improve our services. Please take a few minutes to complete the written survey that you may receive in the mail after you visit with us. Thank you!        [employer] Information     [facility] lets you send messages to your doctor, view your test results, renew your prescriptions, schedule appointments and more. To sign up, go to [email or web address]  [email or web address] Click on \"Log in\" on the left side of the screen, which will take you to the Welcome page. Then click on \"Sign up Now\" on the right side of the page.     You will be asked to enter the access code listed below, as well as some personal information. Please follow the directions to create your username and password.     Your access code is: [email or web address]  Expires: 2018 10:55 AM     Your access code will  in 90 days. If you need help or a new code, please call your [place] clinic or [phone].        Care EveryWhere ID     This is your Care EveryWhere ID. This could be used by other organizations to access your [place] medical records  [id number]        Equal Access to Services     VALENTE RIVERA : Marc Leonard, deion nunes, candida hernandez, emilee pardo . So [facility] [phone].    ATENCIÓN: Si habla español, tiene a christianson disposición servicios gratuitos de asistencia lingüística. Llame al [phone].    We comply with applicable federal civil rights laws and [place] laws. We do not discriminate on the basis of race, color, national origin, age, disability, sex, sexual orientation, or gender identity.            After Visit Summary       This is your record. Keep this with you and show to your community pharmacist(s) and doctor(s) at your next visit.                  "

## 2017-11-12 NOTE — ED AVS SNAPSHOT
Emergency Department    [address] [place] [address]    Phone:  [phone]    Fax:  [phone]                                       Ines Mott   MRN: [id number]    Department:   Emergency Department   Date of Visit:  11/12/2017           After Visit Summary Signature Page     I have received my discharge instructions, and my questions have been answered. I have discussed any challenges I see with this plan with the nurse or doctor.    ..........................................................................................................................................  Patient/Patient Representative Signature      ..........................................................................................................................................  Patient Representative Print Name and Relationship to Patient    ..................................................               ................................................  Date                                            Time    ..........................................................................................................................................  Reviewed by Signature/Title    ...................................................              ..............................................  Date                                                            Time

## 2017-11-12 NOTE — ED PROVIDER NOTES
"  History     Chief Complaint:  Headache    HPI   nIes Mott is a 52 year old female who presents with a headache. The patient reports that she has been experiencing a headache for the past 24 hours or so along with some left sided hip, buttock, leg, and knee pain. She states that the pain in her left lower extremity starts at her left buttock/hip and extends down to her left knee and describes it as a spasm/pressure. She denies any recent falls/injuries. She took both Tylenol and Aleve at home with states that these are \"bothering her liver\". She has pain medications at home for her chronic pain but reports she has run out of her prescriptions and will not be able to refill them until tomorrow. Has chronic headaches and headache feels similar to prior headache. No vomiting, fever. No abdominal pain. She denies any other symptoms or complaints.    Allergies:  Ativan [Lorazepam]  Macrobid [Nitrofurantoin]  Amoxicillin  Buspirone  Cephalosporins  Dilaudid [Hydromorphone]  Diphenhydramine Hcl  Imitrex [Sumatriptan Succinate]  Ketorolac Tromethamine  Lansoprazole  Metoclopramide  Paroxetine  Penicillins  Prednisone  Sulfa Drugs  Lidoderm [Lidocaine]     Medications:    Percocet  Lioresal  Magnesium oxide  Dulera  Ventolin HFA  Albuterol  HYZAAR  Zoloft  Potassium chlroide  Vitamin B12  Vitamin D  Zofran ODT  Singulair  Zyrtec  Flonase  Saline Mist  Kenalog  Miralax  Epipen    Past Medical History:    Arthritis  Asthma  Cancer  Chronic pain syndrome  Depressive disorder  Diabetes  Hydrocephalus  Hypertension  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  Methamphetamine abuse  Migraines  Mild intermittent asthma  Obesity  Right renal mass  Spina bifida    Past Surgical History:    Appendectomy  Cholecystectomy  Extractions: dental  Gyn surgery  Implant shunt ventriculoperitoneal    Family History:    Cancer    Social History:.  Smoking Status: Former smoker  Smokeless Tobacco: never  Alcohol Use: no  Marital " "Status:       Review of Systems   Constitutional: Negative for fatigue and fever.   Eyes: Negative for photophobia and visual disturbance.   Respiratory: Negative for cough and shortness of breath.    Cardiovascular: Negative for chest pain and leg swelling.   Gastrointestinal: Negative for abdominal pain, diarrhea and nausea.   Genitourinary: Negative for difficulty urinating, dysuria and hematuria.   Musculoskeletal: Positive for arthralgias, back pain and myalgias. Negative for gait problem, joint swelling, neck pain and neck stiffness.   Skin: Negative for color change and wound.   Neurological: Positive for headaches. Negative for dizziness, speech difficulty, weakness and numbness.   All other systems reviewed and are negative.      Physical Exam     Patient Vitals for the past 24 hrs:   BP Temp Temp src Pulse Resp SpO2 Height Weight   11/12/17 1745 (!) 153/105 96.6  F (35.9  C) Temporal 72 18 97 % 1.676 m (5' 6\") 116.1 kg (256 lb)   11/12/17 1740 - - - - - - 1.702 m (5' 7\") 119.3 kg (263 lb)       Physical Exam  General: Resting comfortably on the gurney.    Shunt on right side of scalp.  Eyes:  The pupils are equal and round    Conjunctivae and sclerae are normal  ENT:    Moist mucous membranes  Neck:  Normal range of motion    No neck stiffness    Mild pain on palpation on right side of neck  CV:  Regular rate and rhythm    Skin warm and well perfused   Resp:  Lungs are clear    Non-labored    No rales    No wheezing   GI:  Abdomen is soft, there is no rigidity    No distension    No rebound tenderness    No abdominal tenderness   MS:  Normal muscular tone    Tenderness to palpation on left lower back  No midline back tenderness  Skin:  No rash or acute skin lesions noted  NEURO: Awake, alert    Speech is normal and fluent    Face is symmetric  SILT on yoko lower extremities  Reflexes normal on yoko lower extremities    Moves all extremities equally with no weakness     Equal sensation bilaterally on " UE/LE    No arm or leg drift    Gait stable   Psych: Normal affect.  Appropriate interactions.    Emergency Department Course   Interventions:  (1811) Flexeril 10 mg, PO    Emergency Department Course:  Nursing notes and vitals reviewed.  (1756) I performed an exam of the patient as documented above.    Findings and plan explained to the patient. Patient discharged home with instructions regarding supportive care, medications, and reasons to return. The importance of close follow-up was reviewed. The patient was prescribed flexeril.   Impression & Plan      Medical Decision Making:  Ines Mott is a 52 year old female who presented to the ED with several symptoms and complaints including headache and back pain. Has a chronic daily headache and has a care plan associated with headaches and pain complaints. She has normal neurologic exam. She does have a shunt though with her normal neurologic exam, no vomiting, and her well appearance, do not think patient needs imaging of her head and does state in her care plan to try to avoid imaging if possible. Did have recent imaging in September of this year with a head CT. No fever to suggest meningitis. No sudden onset to suggest subarachnoid hemorrhage. Is complaining of back pain and there is some tenderness to palpation of the left lower back.. She has a normal neurologic exam on lower extremities with no numbness or tingling to suggest cauda equina. No bowel or bladder symptoms. No fever to suggest epidural abscess. No abdominal tenderness, urinary symptoms to suggest AAA, ureteral stone, dissection. Given Flexeril in the ED and a prescription for Flexeril for home. Recommend using ibuprofen and she will refill her narcotics tomorrow and can use these as well. Recommend follow up with PCP. Do not think any emergent imaging of her back is indicated. Reasons to return to the ED were discussed with patient.     Diagnosis:    ICD-10-CM   1. Nonintractable headache,  unspecified chronicity pattern, unspecified headache type R51   2. Acute left-sided low back pain with sciatica, sciatica laterality unspecified M54.40       Disposition:  Patient is discharged to home.    Discharge Medications:  New Prescriptions    CYCLOBENZAPRINE (FLEXERIL) 10 MG TABLET    Take 0.5-1 tablets (5-10 mg) by mouth 3 times daily as needed for muscle spasms     IMichael, am serving as a scribe on 11/12/2017 at 5:56 PM to personally document services performed by Dr. Genao based on my observations and the provider's statements to me.      Michael Paredes  11/12/2017    EMERGENCY DEPARTMENT       Michelle Genao MD  11/12/17 2030

## 2017-11-13 NOTE — DISCHARGE INSTRUCTIONS
Continue the ibuprofen  Refill your narcotics tomorrow  Use the flexeril for the muscle spasms    Discharge Instructions  Back Pain  You were seen today for back pain. Back pain can have many causes, but most will get better without surgery or other specific treatment. Sometimes there is a herniated ( slipped ) disc. We do not usually do MRI scans to look for these right away, since most herniated discs will get better on their own with time.  Today, we did not find any evidence that your back pain was caused by a serious condition. However, sometimes symptoms develop over time and cannot be found during an emergency visit, so it is very important that you follow up with your primary provider.  Generally, every Emergency Department visit should have a follow-up clinic visit with either a primary or a specialty clinic/provider. Please follow-up as instructed by your emergency provider today.    Return to the Emergency Department if:    You develop a fever with your back pain.     You have weakness or change in sensation in one or both legs.    You lose control of your bowels or bladder, or cannot empty your bladder (cannot pee).    Your pain gets much worse.     Follow-up with your provider:    Unless your pain has completely gone away, please make an appointment with your provider within one week. Most of the routine care for back pain is available in a clinic and not the Emergency Department. You may need further management of your back pain, such as more pain medication, imaging such as an X-ray or MRI, or physical therapy.    What can I do to help myself?    Remain Active -- People are often afraid that they will hurt their back further or delay recovery by remaining active, but this is one of the best things you can do for your back. In fact, staying in bed for a long time to rest is not recommended. Studies have shown that people with low back pain recover faster when they remain active. Movement helps to bring  blood flow to the muscles and relieve muscle spasms as well as preventing loss of muscle strength.    Heat -- Using a heating pad can help with low back pain during the first few weeks. Do not sleep with a heating pad, as you can be burned.     Pain medications - You may take a pain medication such as Tylenol  (acetaminophen), Advil , Motrin  (ibuprofen) or Aleve  (naproxen).  If you were given a prescription for medicine here today, be sure to read all of the information (including the package insert) that comes with your prescription.  This will include important information about the medicine, its side effects, and any warnings that you need to know about.  The pharmacist who fills the prescription can provide more information and answer questions you may have about the medicine.  If you have questions or concerns that the pharmacist cannot address, please call or return to the Emergency Department.   Remember that you can always come back to the Emergency Department if you are not able to see your regular provider in the amount of time listed above, if you get any new symptoms, or if there is anything that worries you.    Discharge Instructions  Headache    You were seen today for a headache. Headaches may be caused by many different things such as muscle tension, sinus inflammation, anxiety and stress, having too little sleep, too much alcohol, some medical conditions or injury. You may have a migraine, which is caused by changes in the blood vessels in your head.  At this time your provider does not find that your headache is a sign of anything dangerous or life-threatening.  However, sometimes the signs of serious illness do not show up right away.      Generally, every Emergency Department visit should have a follow-up clinic visit with either a primary or a specialty clinic/provider. Please follow-up as instructed by your emergency provider today.    Return to the Emergency Department if:    You get a new  fever of 100.4 F or higher.    Your headache gets much worse.    You get a stiff neck with your headache.    You get a new headache that is significantly different or worse than headaches you have had before.    You are vomiting (throwing up) and cannot keep food or water down.    You have blurry or double vision or other problems with your eyes.    You have a new weakness on one side of your body.    You have difficulty with balance which is new.    You or your family thinks you are confused.    You have a seizure.    What can I do to help myself?    Pain medications - You may take a pain medication such as Tylenol  (acetaminophen), Advil , Motrin  (ibuprofen) or Aleve  (naproxen).    Take a pain reliever as soon as you notice symptoms.  Starting medications as soon as you start to have symptoms may lessen the amount of pain you have.    Relaxing in a quiet, dark room may help.    Get enough sleep and eat meals regularly.    You may need to watch for certain foods or other things which may trigger your headaches.  Keeping a journal of your headaches and possible triggers may help you and your primary provider to identify things which you should avoid which may be causing your headaches.  If you were given a prescription for medicine here today, be sure to read all of the information (including the package insert) that comes with your prescription.  This will include important information about the medicine, its side effects, and any warnings that you need to know about.  The pharmacist who fills the prescription can provide more information and answer questions you may have about the medicine.  If you have questions or concerns that the pharmacist cannot address, please call or return to the Emergency Department.   Remember that you can always come back to the Emergency Department if you are not able to see your regular provider in the amount of time listed above, if you get any new symptoms, or if there is  anything that worries you.

## 2017-11-17 ENCOUNTER — OFFICE VISIT (OUTPATIENT)
Dept: FAMILY MEDICINE | Facility: CLINIC | Age: 52
End: 2017-11-17
Payer: COMMERCIAL

## 2017-11-17 VITALS
WEIGHT: 253 LBS | RESPIRATION RATE: 20 BRPM | BODY MASS INDEX: 40.84 KG/M2 | SYSTOLIC BLOOD PRESSURE: 124 MMHG | TEMPERATURE: 96.7 F | DIASTOLIC BLOOD PRESSURE: 72 MMHG | OXYGEN SATURATION: 98 % | HEART RATE: 84 BPM

## 2017-11-17 DIAGNOSIS — F33.41 RECURRENT MAJOR DEPRESSION IN PARTIAL REMISSION (H): ICD-10-CM

## 2017-11-17 DIAGNOSIS — M54.41 CHRONIC BILATERAL LOW BACK PAIN WITH RIGHT-SIDED SCIATICA: ICD-10-CM

## 2017-11-17 DIAGNOSIS — K59.09 OTHER CONSTIPATION: ICD-10-CM

## 2017-11-17 DIAGNOSIS — G89.4 CHRONIC PAIN SYNDROME: ICD-10-CM

## 2017-11-17 DIAGNOSIS — G89.29 CHRONIC BILATERAL LOW BACK PAIN WITH RIGHT-SIDED SCIATICA: ICD-10-CM

## 2017-11-17 DIAGNOSIS — M51.369 DDD (DEGENERATIVE DISC DISEASE), LUMBAR: Primary | ICD-10-CM

## 2017-11-17 PROCEDURE — 99214 OFFICE O/P EST MOD 30 MIN: CPT | Performed by: FAMILY MEDICINE

## 2017-11-17 RX ORDER — OXYCODONE AND ACETAMINOPHEN 5; 325 MG/1; MG/1
1 TABLET ORAL EVERY 6 HOURS PRN
Qty: 30 TABLET | Refills: 0 | Status: SHIPPED | OUTPATIENT
Start: 2017-11-24 | End: 2017-12-05

## 2017-11-17 RX ORDER — SERTRALINE HYDROCHLORIDE 100 MG/1
150 TABLET, FILM COATED ORAL DAILY
Qty: 45 TABLET | Refills: 11 | Status: SHIPPED | OUTPATIENT
Start: 2017-11-17 | End: 2018-08-27

## 2017-11-17 RX ORDER — BACLOFEN 10 MG/1
10 TABLET ORAL 3 TIMES DAILY
Qty: 90 TABLET | Refills: 11 | Status: SHIPPED | OUTPATIENT
Start: 2017-11-17 | End: 2018-01-24

## 2017-11-17 RX ORDER — OXYCODONE AND ACETAMINOPHEN 5; 325 MG/1; MG/1
1 TABLET ORAL EVERY 6 HOURS PRN
Qty: 30 TABLET | Refills: 0 | Status: SHIPPED | OUTPATIENT
Start: 2017-11-17 | End: 2017-11-17

## 2017-11-17 ASSESSMENT — PATIENT HEALTH QUESTIONNAIRE - PHQ9
SUM OF ALL RESPONSES TO PHQ QUESTIONS 1-9: 6
10. IF YOU CHECKED OFF ANY PROBLEMS, HOW DIFFICULT HAVE THESE PROBLEMS MADE IT FOR YOU TO DO YOUR WORK, TAKE CARE OF THINGS AT HOME, OR GET ALONG WITH OTHER PEOPLE: NOT DIFFICULT AT ALL
SUM OF ALL RESPONSES TO PHQ QUESTIONS 1-9: 6

## 2017-11-17 NOTE — NURSING NOTE
"Chief Complaint   Patient presents with     Hospital F/U     left hip pain       Initial /72  Pulse 84  Temp 96.7  F (35.9  C) (Tympanic)  Resp 20  Wt 253 lb (114.8 kg)  SpO2 98%  BMI 40.84 kg/m2 Estimated body mass index is 40.84 kg/(m^2) as calculated from the following:    Height as of 11/12/17: 5' 6\" (1.676 m).    Weight as of this encounter: 253 lb (114.8 kg).  Medication Reconciliation: complete   Maggi Harris CMA    "

## 2017-11-17 NOTE — PATIENT INSTRUCTIONS
Assessment: Lower back pain    Plan: do these exercises at least twice daily:  Standing or sitting exercise can be done every two hours    Bo' Flexion Versus Nadia   Extension Exercises For   Low Back Pain   Examples of Bo' Flexion Exercises  1. Pelvic tilt.  Please press the small of your back against the floor.  Start with 5-10  and increase to 100 count over one month   2. Single Knee to chest. Lie on your back with legs in bent position. Alternate one leg and the other very slowly bringing the knee to chest.  Start with a count of 5-10   Over one month work up to 100  3. Double knee to chest.  Lie on your back with knees in bent position.  Bring both knees to the chest slowly hold for a count of 5-to 10. Over one month work to 100  4. Partial sit-up or crunch.  Lie on your back in bent leg position.  Please bring your body with arms crossed in front  To 30 degrees of flexion. Start with 5-10 over one month work up to 100 or more  5. Sit back.  Please sit on the side of the bed or a stair landing  And lie backwards until the abdominal muscles start to quiver.  Hold for a count of 5-10 and over a month work up to 100.  5. Hamstring stretch.  Please extend your legs while sitting on the floor as tolerated for 5-10 count.  Gradually increase to count of 30 over one month      Alternately find a stair landing or sturdy chair and place heel  In a comfortable level of extension  And stretch one hamstring at a time for 5-10 seconds.  Increase to count of 30 over one month                         Squat.  Stand with legs comfortably apart and lower the body slowly by flexing the knees  for count of 5-10 over one month increase to 30.  Useful for anterior disc protrusion, facette joint arthritis spond-10ylolysis, spondylolisthesis and spinal stenosis    Nadia method or extension exercises  Useful disc bulging posteriorly  1. Prone pressups  Please lie on your abdomen.   Please do a push with the upper  half of your body only.  This should not cause the pain to shoot down your buttock thigh leg or foot  Start with 10 and repeat x 3 one minute apart.  Repeat x 10 every 1-2 hours  Pain tends to increase in the center of back  And leaves buttock thighs legs and foot over time  You may shift your pelvis in opposite direction of buttock and leg pain to achieve even better results  2. Superman with arms extended  Or at the side Simultaneously lift your arms and legs off the floor. Start with 5-10 over one month increase to 100.  3. Cat stretch or cobra Start  As if in extended position of prone pressup but hold the stretch for 5 or 10 count. Over one moth to count of 30.  4.  Extensions can be done in standing position  As well if prone pressup is inconvenient.  Shift your pelvis in opposite direction of limb pain.  Put your hands  Flat on your buttocks and lean backwards without loss of balance.  Count 10 x 3 times then 10 extensions hourly     (M51.36) DDD (degenerative disc disease), lumbar  (primary encounter diagnosis)  Comment:    Plan: [facility] REFERRAL             (G89.4) Chronic pain syndrome  Comment:    Plan: oxyCODONE-acetaminophen (PERCOCET) 5-325 MG per        tablet, DISCONTINUED: oxyCODONE-acetaminophen         (PERCOCET) 5-325 MG per tablet             (F33.41) Recurrent major depression in partial remission (H)  Comment:    Plan: sertraline (ZOLOFT) 100 MG tablet             (K59.09) Other constipation  Comment:    Plan: sertraline (ZOLOFT) 100 MG tablet

## 2017-11-17 NOTE — PROGRESS NOTES
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 stretch.  Please extend your legs while sitting on the floor as tolerated for 5-10 count.  Gradually increase to count of 30 over one month      Alternately find a stair landing or sturdy chair and place heel  In a comfortable level of extension  And stretch one hamstring at a time for 5-10 seconds.  Increase to count of 30 over one month                         Squat.  Stand with legs comfortably apart and lower the body slowly by flexing the knees  for count of 5-10 over one month increase to 30.  Useful for anterior disc protrusion, facette joint arthritis spond-10ylolysis, spondylolisthesis and spinal stenosis    Nadia method or extension exercises  Useful disc bulging posteriorly  1. Prone pressups  Please lie on your abdomen.   Please do a push with the upper half of your body only.  This should not cause the pain to shoot down your buttock thigh leg or foot  Start with 10 and repeat x 3 one minute apart.  Repeat x 10 every 1-2 hours  Pain tends to increase in the center of back  And leaves buttock thighs legs and foot over time  You may shift your pelvis in opposite direction of buttock and leg pain to achieve even better results  2. Superman with arms extended  Or at the side Simultaneously lift your arms and legs off the floor.  Start with 5-10 over one month increase to 100.  3. Cat stretch or cobra Start  As if in extended position of prone pressup but hold the stretch for 5 or 10 count. Over one moth to count of 30.  4.  Extensions can be done in standing position  As well if prone pressup is inconvenient.  Shift your pelvis in opposite direction of limb pain.  Put your hands  Flat on your buttocks and lean backwards without loss of balance.  Count 10 x 3 times then 10 extensions hourly     (M51.36) DDD (degenerative disc disease), lumbar  (primary encounter diagnosis)  Comment:    Plan: [facility] PT, HAND, AND CHIROPRACTIC REFERRAL             (G89.4) Chronic pain syndrome  Comment:    Plan: oxyCODONE-acetaminophen (PERCOCET) 5-325 MG per        tablet, DISCONTINUED: oxyCODONE-acetaminophen         (PERCOCET) 5-325 MG per tablet             (F33.41) Recurrent major depression in partial remission (H)  Comment:    Plan: sertraline (ZOLOFT) 100 MG tablet             (K59.09) Other constipation  Comment:    Plan: sertraline (ZOLOFT) 100 MG tablet                            ALL THE ABOVE PROBLEMS ARE STABLE AND MED CHANGES AS NOTED    Diet:  Mediterranean diet and Diabetes  Weight loss      Exercise:  FIT BIT TWITCH RECOMMENDED  AND AEROBIC RANGE OF MOTION OF BACK   LUMBAR TRACTION DEVICE   Exercises Range of motion, balance, isometric, and strengthening exercises 30 repetitions twice daily of involved joints      .OTTO YUSUF MD 11/17/2017 9:58 AM  November 17, 2017

## 2017-11-17 NOTE — MR AVS SNAPSHOT
After Visit Summary   11/17/2017    Ines Mott    MRN: [id number]           Patient Information     Date Of Birth          1965        Visit Information        Provider Department      11/17/2017 9:30 AM Dustin Fatima MD [facility]        Today's Diagnoses     DDD (degenerative disc disease), lumbar    -  1    Chronic pain syndrome        Recurrent major depression in partial remission (H)        Other constipation        Chronic bilateral low back pain with right-sided sciatica          Care Instructions    Assessment: Lower back pain    Plan: do these exercises at least twice daily:  Standing or sitting exercise can be done every two hours    Bo' Flexion Versus Da   Extension Exercises For   Low Back Pain   Examples of Bo' Flexion Exercises  1. Pelvic tilt.  Please press the small of your back against the floor.  Start with 5-10  and increase to 100 count over one month   2. Single Knee to chest. Lie on your back with legs in bent position. Alternate one leg and the other very slowly bringing the knee to chest.  Start with a count of 5-10   Over one month work up to 100  3. Double knee to chest.  Lie on your back with knees in bent position.  Bring both knees to the chest slowly hold for a count of 5-to 10. Over one month work to 100  4. Partial sit-up or crunch.  Lie on your back in bent leg position.  Please bring your body with arms crossed in front  To 30 degrees of flexion. Start with 5-10 over one month work up to 100 or more  5. Sit back.  Please sit on the side of the bed or a stair landing  And lie backwards until the abdominal muscles start to quiver.  Hold for a count of 5-10 and over a month work up to 100.  5. Hamstring stretch.  Please extend your legs while sitting on the floor as tolerated for 5-10 count.  Gradually increase to count of 30 over one month      Alternately find a stair landing or sturdy chair and  place heel  In a comfortable level of extension  And stretch one hamstring at a time for 5-10 seconds.  Increase to count of 30 over one month                         Squat.  Stand with legs comfortably apart and lower the body slowly by flexing the knees  for count of 5-10 over one month increase to 30.  Useful for anterior disc protrusion, facette joint arthritis spond-10ylolysis, spondylolisthesis and spinal stenosis    Nadia method or extension exercises  Useful disc bulging posteriorly  1. Prone pressups  Please lie on your abdomen.   Please do a push with the upper half of your body only.  This should not cause the pain to shoot down your buttock thigh leg or foot  Start with 10 and repeat x 3 one minute apart.  Repeat x 10 every 1-2 hours  Pain tends to increase in the center of back  And leaves buttock thighs legs and foot over time  You may shift your pelvis in opposite direction of buttock and leg pain to achieve even better results  2. Superman with arms extended  Or at the side Simultaneously lift your arms and legs off the floor. Start with 5-10 over one month increase to 100.  3. Cat stretch or cobra Start  As if in extended position of prone pressup but hold the stretch for 5 or 10 count. Over one moth to count of 30.  4.  Extensions can be done in standing position  As well if prone pressup is inconvenient.  Shift your pelvis in opposite direction of limb pain.  Put your hands  Flat on your buttocks and lean backwards without loss of balance.  Count 10 x 3 times then 10 extensions hourly     (M51.36) DDD (degenerative disc disease), lumbar  (primary encounter diagnosis)  Comment:    Plan: [facility] PT, HAND, AND CHIROPRACTIC REFERRAL             (G89.4) Chronic pain syndrome  Comment:    Plan: oxyCODONE-acetaminophen (PERCOCET) 5-325 MG per        tablet, DISCONTINUED: oxyCODONE-acetaminophen         (PERCOCET) 5-325 MG per tablet             (F33.41) Recurrent major depression in partial remission  (H)  Comment:    Plan: sertraline (ZOLOFT) 100 MG tablet             (K59.09) Other constipation  Comment:    Plan: sertraline (ZOLOFT) 100 MG tablet                              Follow-ups after your visit        Additional Services     [facility] PT, HAND, AND CHIROPRACTIC REFERRAL       **This order will print in the [facility] Scheduling Office**    Physical Therapy, Hand Therapy and Chiropractic Care are available through:    *[facility]  *[facility]  *[facility]    Call one number to schedule at any of the above locations: [phone].    Your provider has referred you to: Physical Therapy at [facility] or [facility]    Indication/Reason for Referral: Low Back Pain radiculopathy   Onset of Illness:  Many years relapse from time to time   Left leg and buttock   Left S1 radiculopathy   Therapy Orders: Evaluate and Treat  Special Programs:  HOME LUMBAR TRACTION DEVICE PLEASE   Special Request: Exercise: Conditioning, Home Exercise Program and Passive ROM  Manual Therapy: Myofascial Release/Massage  Modalities: Spinal Traction  None    Areli Singleton      Additional Comments for the Therapist or Chiropractor:  OTTO YUSUF JR., MD     Please be aware that coverage of these services is subject to the terms and limitations of your health insurance plan.  Call member services at your health plan with any benefit or coverage questions.      Please bring the following to your appointment:    *Your personal calendar for scheduling future appointments  *Comfortable clothing                  Follow-up notes from your care team     Return in about 2 weeks (around 12/1/2017).      Who to contact     If you have questions or need follow up information about today's clinic visit or your schedule please contact [facility] directly at [phone].  Normal or non-critical lab and imaging results will be communicated to you by MyChart, letter or phone within  "4 business days after the clinic has received the results. If you do not hear from us within 7 days, please contact the clinic through [facility] or phone. If you have a critical or abnormal lab result, we will notify you by phone as soon as possible.  Submit refill requests through [facility] or call your pharmacy and they will forward the refill request to us. Please allow 3 business days for your refill to be completed.          Additional Information About Your Visit        [facility] Information     [facility] lets you send messages to your doctor, view your test results, renew your prescriptions, schedule appointments and more. To sign up, go to [email or web address] . Click on \"Log in\" on the left side of the screen, which will take you to the Welcome page. Then click on \"Sign up Now\" on the right side of the page.     You will be asked to enter the access code listed below, as well as some personal information. Please follow the directions to create your username and password.     Your access code is: [email or web address]  Expires: 2018 10:55 AM     Your access code will  in 90 days. If you need help or a new code, please call your [place] clinic or [phone].        Care EveryWhere ID     This is your Care EveryWhere ID. This could be used by other organizations to access your [place] medical records  [id number]        Your Vitals Were     Pulse Temperature Respirations Pulse Oximetry BMI (Body Mass Index)       84 96.7  F (35.9  C) (Tympanic) 20 98% 40.84 kg/m2        Blood Pressure from Last 3 Encounters:   17 124/72   17 (!) 153/105   17 118/72    Weight from Last 3 Encounters:   17 253 lb (114.8 kg)   17 256 lb (116.1 kg)   17 258 lb (117 kg)              We Performed the Following     [facility] PT, HAND, AND CHIROPRACTIC REFERRAL          Today's Medication Changes          These changes are accurate as of: 17 11:13 AM.  If you have any questions, ask your nurse or " doctor.               Start taking these medicines.        Dose/Directions    oxyCODONE-acetaminophen 5-325 MG per tablet   Commonly known as:  PERCOCET   Used for:  Chronic pain syndrome   Started by:  Dustin Fatima MD        Dose:  1 tablet   Start taking on:  11/24/2017   Take 1 tablet by mouth every 6 hours as needed for moderate to severe pain Max 4 tab/day   Quantity:  30 tablet   Refills:  0         These medicines have changed or have updated prescriptions.        Dose/Directions    sertraline 100 MG tablet   Commonly known as:  ZOLOFT   Indication:  Major Depressive Disorder   This may have changed:  how much to take   Used for:  Recurrent major depression in partial remission (H), Other constipation   Changed by:  Dustin Fatima MD        Dose:  150 mg   Take 1.5 tablets (150 mg) by mouth daily   Quantity:  45 tablet   Refills:  11            Where to get your medicines      These medications were sent to [facility] [address] [place] [address]     Phone:  [phone]     baclofen 10 MG tablet    sertraline 100 MG tablet         Some of these will need a paper prescription and others can be bought over the counter.  Ask your nurse if you have questions.     Bring a paper prescription for each of these medications     oxyCODONE-acetaminophen 5-325 MG per tablet                Primary Care Provider Office Phone # Fax #    Dustin Fatima [phone] [address] [facility] [address]        Equal Access to Services     [facility] Hospital[facility]OLDO AH: Hadii casimiro keller Soevangelina, waaxda luqadaha, qaybta kaalmada adeegyada, emilee coyne. So [facility] [phone].    ATENCIÓN: Si habla español, tiene a christianson disposición servicios gratuitos de asistencia lingüística. Llame al [phone].    We comply with applicable federal civil rights laws and [place] laws. We do not  discriminate on the basis of race, color, national origin, age, disability, sex, sexual orientation, or gender identity.            Thank you!     Thank you for choosing [facility]  for your care. Our goal is always to provide you with excellent care. Hearing back from our patients is one way we can continue to improve our services. Please take a few minutes to complete the written survey that you may receive in the mail after your visit with us. Thank you!             Your Updated Medication List - Protect others around you: Learn how to safely use, store and throw away your medicines at www.disposemymeds.org.          This list is accurate as of: 11/17/17 11:13 AM.  Always use your most recent med list.                   Brand Name Dispense Instructions for use Diagnosis    * albuterol 108 (90 BASE) MCG/ACT Inhaler    VENTOLIN HFA    54 g    INHALE TWO PUFFS BY MOUTH EVERY 4 HOURS AS NEEDED FOR SHORTNESS OF BREATH/DYSPNEA    Mild intermittent asthma without complication       * albuterol (2.5 MG/3ML) 0.083% neb solution     30 vial    Take 1 vial (2.5 mg) by nebulization every 6 hours as needed for shortness of breath / dyspnea or wheezing    Mild intermittent asthma with acute exacerbation       ASPIRIN NOT PRESCRIBED    INTENTIONAL    1 each    Please choose reason not prescribed, below    Aspirin intolerance       baclofen 10 MG tablet    LIORESAL    90 tablet    Take 1 tablet (10 mg) by mouth 3 times daily    Chronic bilateral low back pain with right-sided sciatica       blood glucose calibration solution    no brand specified    1 each    Use to calibrate blood glucose monitor as directed.    Type 2 diabetes mellitus without complication, without long-term current use of insulin (H)       blood glucose lancets standard    no brand specified    100 each    Use to test blood sugar daily times daily or as directed.    Type 2 diabetes mellitus without complication, without  long-term current use of insulin (H)       blood glucose monitoring meter device kit    no brand specified    1 kit    Use to test blood sugar larisa times daily or as directed.    Type 2 diabetes mellitus without complication, without long-term current use of insulin (H)       blood glucose monitoring test strip    no brand specified    100 strip    Use to test blood sugars once times daily or as directed    Type 2 diabetes mellitus without complication, without long-term current use of insulin (H)       cetirizine 10 MG tablet    zyrTEC    30 tablet    Take 1 tablet (10 mg) by mouth every evening    Seasonal allergic rhinitis due to pollen       cyanocobalamin 1000 MCG/ML injection    VITAMIN B12    1 mL    Inject 1 mL (1,000 mcg) Subcutaneous every 30 days    B12 deficiency       cyclobenzaprine 10 MG tablet    FLEXERIL    20 tablet    Take 0.5-1 tablets (5-10 mg) by mouth 3 times daily as needed for muscle spasms        EPINEPHrine 0.3 MG/0.3ML injection 2-pack    EPIPEN/ADRENACLICK/or ANY BX GENERIC EQUIV    2 each    Inject 0.3 mLs (0.3 mg) into the muscle once as needed for anaphylaxis    Late effect of other and unspecified external causes       fluticasone 50 MCG/ACT spray    FLONASE    3 Bottle    Spray 1-2 sprays into both nostrils daily    Seasonal allergic rhinitis due to pollen, Chronic pain syndrome       losartan-hydrochlorothiazide 50-12.5 MG per tablet    HYZAAR    90 tablet    Take 1 tablet by mouth daily    Hypertension goal BP (blood pressure) < 140/80       magnesium oxide 400 (241.3 MG) MG tablet    MAG-OX    90 tablet    Take 1 tablet (400 mg) by mouth daily    Cramp of limb       mometasone-formoterol 100-5 MCG/ACT oral inhaler    DULERA    1 Inhaler    Inhale 2 puffs into the lungs 2 times daily    Persistent asthma       montelukast 10 MG tablet    SINGULAIR    30 tablet    Take 1 tablet (10 mg) by mouth At Bedtime    Seasonal allergic rhinitis due to pollen       ondansetron 4 MG ODT tab     ZOFRAN ODT    10 tablet    Take 1 tablet (4 mg) by mouth every 8 hours as needed for nausea    Chronic pain syndrome       order for DME     1 Device    Right wrist splint for carpal tunnel syndrome  One* Use as directed at hour of sleep    Carpal tunnel syndrome of right wrist       oxyCODONE-acetaminophen 5-325 MG per tablet   Start taking on:  11/24/2017    PERCOCET    30 tablet    Take 1 tablet by mouth every 6 hours as needed for moderate to severe pain Max 4 tab/day    Chronic pain syndrome       polyethylene glycol powder    MIRALAX    510 g    Take 17 g (1 capful) by mouth daily    Slow transit constipation       potassium chloride 10 MEQ tablet    K-TAB,KLOR-CON    120 tablet    Take 1 tablet (10 mEq) by mouth daily    Hypokalemia       sertraline 100 MG tablet    ZOLOFT    45 tablet    Take 1.5 tablets (150 mg) by mouth daily    Recurrent major depression in partial remission (H), Other constipation       sodium chloride 0.65 % nasal spray    SALINE MIST    1 Bottle    Spray 1 spray into both nostrils daily as needed for congestion    Seasonal allergic rhinitis due to pollen       STATIN NOT PRESCRIBED (INTENTIONAL)     0 each    1 each daily Statin not prescribed intentionally due to Active liver disease (liver failure, cirrhosis, hepatitis) LIVER METS    Hyperlipidemia LDL goal <100, Type 2 diabetes mellitus without complication (H)       triamcinolone 0.1 % paste    KENALOG    5 g    Take by mouth 2 times daily    Oral aphthae       Vitamin D (Cholecalciferol) 1000 UNITS Tabs     60 tablet    Take 2,000 Units by mouth daily    Vitamin D deficiency       * Notice:  This list has 2 medication(s) that are the same as other medications prescribed for you. Read the directions carefully, and ask your doctor or other care provider to review them with you.

## 2017-11-18 ASSESSMENT — ASTHMA QUESTIONNAIRES: ACT_TOTALSCORE: 20

## 2017-12-01 DIAGNOSIS — G89.4 CHRONIC PAIN SYNDROME: ICD-10-CM

## 2017-12-01 RX ORDER — OXYCODONE AND ACETAMINOPHEN 5; 325 MG/1; MG/1
1 TABLET ORAL EVERY 6 HOURS PRN
Qty: 30 TABLET | Refills: 0 | Status: CANCELLED | OUTPATIENT
Start: 2017-12-01

## 2017-12-01 NOTE — TELEPHONE ENCOUNTER
Controlled Substance Refill Request for oxyCODONE-acetaminophen (PERCOCET) 5-325 MG per tablet  Problem List Complete:  Yes  Patient is followed by OTTO YUSUF MD for ongoing prescription of pain medication.  All refills should only be approved by this provider, or covering partner.    Medication(s):  PERCOCET 5MG PO FOUR TIMES DAILY .   Maximum quantity per month: 120   Clinic visit frequency required: Q 2 weeks     Controlled substance agreement:  Encounter-Level CSA - 12/22/15:               Controlled Substance Agreement - Scan on 12/28/2015  2:53 PM : CONTROLLED MEDICATION CONTRACT, 12-22-15 (below)          Encounter-Level CSA - 4/10/15:               Controlled Substance Agreement - Scan on 4/17/2015  1:45 PM : BMJOSIE, Controlled Substance Contract, 04-10-15 (below)            Pain Clinic evaluation in the past: Yes       Date/Location:      DIRE Total Score(s):  No flowsheet data found.    Last [facility] website verification:  08/14/2017 All Rx's from PCP.   [email or web address]  REFERRAL [facility] AS SOON AS POSSIBLE   FAILED DRANK ALCOHOL BEFORE LAST OFFICE VISIT      checked in past 6 months?  Yes 08/14/2017         Last Written Prescription Date: 11/24/2017  Last Fill Quantity: 30,  # refills: 0   Last Office Visit with [facility], [facility] or [facility] prescribing provider: 11/17/2017

## 2017-12-01 NOTE — TELEPHONE ENCOUNTER
Reason for Call:  Medication or medication refill:    Do you use a [facility]?  Name of the pharmacy and phone number for the current request:  Patient wants to  the prescription    Name of the medication requested: oxyCODONE-acetaminophen (PERCOCET) 5-325 MG per tablet    Other request: Per patient she has contacted her pharmacy, but she was told to contact her clinic for a refill    Can we leave a detailed message on this number? YES    Phone number patient can be reached at: Home number on file [phone] (home)    Best Time: anytime    Call taken on 12/1/2017 at 11:06 AM by MARI HALE

## 2017-12-05 ENCOUNTER — OFFICE VISIT (OUTPATIENT)
Dept: FAMILY MEDICINE | Facility: CLINIC | Age: 52
End: 2017-12-05
Payer: COMMERCIAL

## 2017-12-05 VITALS
DIASTOLIC BLOOD PRESSURE: 86 MMHG | OXYGEN SATURATION: 99 % | WEIGHT: 257 LBS | SYSTOLIC BLOOD PRESSURE: 130 MMHG | BODY MASS INDEX: 41.48 KG/M2 | RESPIRATION RATE: 16 BRPM | HEART RATE: 72 BPM

## 2017-12-05 DIAGNOSIS — Z12.11 SCREEN FOR COLON CANCER: ICD-10-CM

## 2017-12-05 DIAGNOSIS — G89.4 CHRONIC PAIN SYNDROME: ICD-10-CM

## 2017-12-05 DIAGNOSIS — Z12.31 VISIT FOR SCREENING MAMMOGRAM: ICD-10-CM

## 2017-12-05 DIAGNOSIS — Z11.59 NEED FOR HEPATITIS C SCREENING TEST: ICD-10-CM

## 2017-12-05 PROCEDURE — 99214 OFFICE O/P EST MOD 30 MIN: CPT | Performed by: FAMILY MEDICINE

## 2017-12-05 RX ORDER — OXYCODONE AND ACETAMINOPHEN 5; 325 MG/1; MG/1
1 TABLET ORAL EVERY 6 HOURS PRN
Qty: 30 TABLET | Refills: 0 | Status: SHIPPED | OUTPATIENT
Start: 2017-12-05 | End: 2017-12-05

## 2017-12-05 RX ORDER — OXYCODONE AND ACETAMINOPHEN 5; 325 MG/1; MG/1
1 TABLET ORAL EVERY 6 HOURS PRN
Qty: 60 TABLET | Refills: 0 | Status: SHIPPED | OUTPATIENT
Start: 2017-12-05 | End: 2017-12-22

## 2017-12-05 ASSESSMENT — PATIENT HEALTH QUESTIONNAIRE - PHQ9
SUM OF ALL RESPONSES TO PHQ QUESTIONS 1-9: 10
SUM OF ALL RESPONSES TO PHQ QUESTIONS 1-9: 10

## 2017-12-05 ASSESSMENT — ANXIETY QUESTIONNAIRES
GAD7 TOTAL SCORE: 3
7. FEELING AFRAID AS IF SOMETHING AWFUL MIGHT HAPPEN: NOT AT ALL
4. TROUBLE RELAXING: SEVERAL DAYS
7. FEELING AFRAID AS IF SOMETHING AWFUL MIGHT HAPPEN: NOT AT ALL
GAD7 TOTAL SCORE: 3
GAD7 TOTAL SCORE: 3
2. NOT BEING ABLE TO STOP OR CONTROL WORRYING: NOT AT ALL
5. BEING SO RESTLESS THAT IT IS HARD TO SIT STILL: SEVERAL DAYS
1. FEELING NERVOUS, ANXIOUS, OR ON EDGE: NOT AT ALL
3. WORRYING TOO MUCH ABOUT DIFFERENT THINGS: NOT AT ALL
6. BECOMING EASILY ANNOYED OR IRRITABLE: SEVERAL DAYS

## 2017-12-05 NOTE — MR AVS SNAPSHOT
After Visit Summary   12/5/2017    Ines Mott    MRN: [id number]           Patient Information     Date Of Birth          1965        Visit Information        Provider Department      12/5/2017 8:00 AM Dustin Fatima MD [facility]        Today's Diagnoses     Chronic pain syndrome        Screen for colon cancer        Visit for screening mammogram        Need for hepatitis C screening test          Care Instructions      (G89.4) Chronic pain syndrome    Comment:      Plan: oxyCODONE-acetaminophen (PERCOCET) 5-325 MG per          tablet, DISCONTINUED: oxyCODONE-acetaminophen           (PERCOCET) 5-325 MG per tablet                   (Z12.11) Screen for colon cancer    Comment:      Plan: Fecal colorectal cancer screen FIT - Future           (S+30)                   (Z12.31) Visit for screening mammogram    Comment:      Plan: MA SCREENING DIGITAL BILAT - Future  (s+30)                   (Z11.59) Need for hepatitis C screening test    Comment:      Plan: Hepatitis C Screen Reflex to HCV RNA Quant and           Genotype                                Follow-ups after your visit        Follow-up notes from your care team     Return in about 11 days (around 12/16/2017).      Your next 10 appointments already scheduled     Dec 14, 2017 10:50 AM CST   (Arrive by 10:35 AM)   [facility] Martha Menezes PT   [facility] ([facility]  )    [address] [place] [id number] [phone]              Future tests that were ordered for you today     Open Future Orders        Priority Expected Expires Ordered    MA SCREENING DIGITAL BILAT - Future  (s+30) Routine  12/5/2018 12/5/2017    Fecal colorectal cancer screen FIT - Future (S+30) Routine 12/26/2017 1/4/2018 12/5/2017            Who to contact     If you have questions or need follow up information about today's clinic visit or  "your schedule please contact [facility] directly at [phone].  Normal or non-critical lab and imaging results will be communicated to you by MyChart, letter or phone within 4 business days after the clinic has received the results. If you do not hear from us within 7 days, please contact the clinic through Guidekickhart or phone. If you have a critical or abnormal lab result, we will notify you by phone as soon as possible.  Submit refill requests through Atzip or call your pharmacy and they will forward the refill request to us. Please allow 3 business days for your refill to be completed.          Additional Information About Your Visit        GuidekickharPathAR Information     Atzip lets you send messages to your doctor, view your test results, renew your prescriptions, schedule appointments and more. To sign up, go to [email or web address] . Click on \"Log in\" on the left side of the screen, which will take you to the Welcome page. Then click on \"Sign up Now\" on the right side of the page.     You will be asked to enter the access code listed below, as well as some personal information. Please follow the directions to create your username and password.     Your access code is: [email or web address]  Expires: 2018 10:55 AM     Your access code will  in 90 days. If you need help or a new code, please call your [place] clinic or [phone].        Care EveryWhere ID     This is your Care EveryWhere ID. This could be used by other organizations to access your [place] medical records  [id number]        Your Vitals Were     Pulse Respirations Pulse Oximetry BMI (Body Mass Index)          72 16 99% 41.48 kg/m2         Blood Pressure from Last 3 Encounters:   17 130/86   17 124/72   17 (!) 153/105    Weight from Last 3 Encounters:   17 257 lb (116.6 kg)   17 253 lb (114.8 kg)   17 256 lb (116.1 kg)              We Performed the Following     Asthma " Action Plan (AAP)     Hepatitis C Screen Reflex to HCV RNA Quant and Genotype          Today's Medication Changes          These changes are accurate as of: 12/5/17  8:41 AM.  If you have any questions, ask your nurse or doctor.               Start taking these medicines.        Dose/Directions    oxyCODONE-acetaminophen 5-325 MG per tablet   Commonly known as:  PERCOCET   Used for:  Chronic pain syndrome   Started by:  Dustin Fatima MD        Dose:  1 tablet   Take 1 tablet by mouth every 6 hours as needed for moderate to severe pain Max 4 tab/day   Quantity:  60 tablet   Refills:  0            Where to get your medicines      Some of these will need a paper prescription and others can be bought over the counter.  Ask your nurse if you have questions.     Bring a paper prescription for each of these medications     oxyCODONE-acetaminophen 5-325 MG per tablet                Primary Care Provider Office Phone # Fax #    Dustin Fatima [phone] [address] [facility] [address]        Equal Access to Services     [facility]: Hadii aad ku hadasho Soomaali, waaxda luqadaha, qaybta kaalmada adeegyada, waxay idiin chilon stephani pardo . So [facility] [phone].    ATENCIÓN: Si habla español, tiene a christianson disposición servicios gratuitos de asistencia lingüística. Llame al [phone].    We comply with applicable federal civil rights laws and [place] laws. We do not discriminate on the basis of race, color, national origin, age, disability, sex, sexual orientation, or gender identity.            Thank you!     Thank you for choosing [facility]  for your care. Our goal is always to provide you with excellent care. Hearing back from our patients is one way we can continue to improve our services. Please take a few minutes to complete the written survey that you may receive in the mail after your visit with us. Thank you!              Your Updated Medication List - Protect others around you: Learn how to safely use, store and throw away your medicines at www.disposemymeds.org.          This list is accurate as of: 12/5/17  8:41 AM.  Always use your most recent med list.                   Brand Name Dispense Instructions for use Diagnosis    * albuterol 108 (90 BASE) MCG/ACT Inhaler    VENTOLIN HFA    54 g    INHALE TWO PUFFS BY MOUTH EVERY 4 HOURS AS NEEDED FOR SHORTNESS OF BREATH/DYSPNEA    Mild intermittent asthma without complication       * albuterol (2.5 MG/3ML) 0.083% neb solution     30 vial    Take 1 vial (2.5 mg) by nebulization every 6 hours as needed for shortness of breath / dyspnea or wheezing    Mild intermittent asthma with acute exacerbation       ASPIRIN NOT PRESCRIBED    INTENTIONAL    1 each    Please choose reason not prescribed, below    Aspirin intolerance       baclofen 10 MG tablet    LIORESAL    90 tablet    Take 1 tablet (10 mg) by mouth 3 times daily    Chronic bilateral low back pain with right-sided sciatica       blood glucose calibration solution    no brand specified    1 each    Use to calibrate blood glucose monitor as directed.    Type 2 diabetes mellitus without complication, without long-term current use of insulin (H)       blood glucose lancets standard    no brand specified    100 each    Use to test blood sugar daily times daily or as directed.    Type 2 diabetes mellitus without complication, without long-term current use of insulin (H)       blood glucose monitoring meter device kit    no brand specified    1 kit    Use to test blood sugar larisa times daily or as directed.    Type 2 diabetes mellitus without complication, without long-term current use of insulin (H)       blood glucose monitoring test strip    no brand specified    100 strip    Use to test blood sugars once times daily or as directed    Type 2 diabetes mellitus without complication, without long-term current use of insulin (H)        cetirizine 10 MG tablet    zyrTEC    30 tablet    Take 1 tablet (10 mg) by mouth every evening    Seasonal allergic rhinitis due to pollen       cyanocobalamin 1000 MCG/ML injection    VITAMIN B12    1 mL    Inject 1 mL (1,000 mcg) Subcutaneous every 30 days    B12 deficiency       EPINEPHrine 0.3 MG/0.3ML injection 2-pack    EPIPEN/ADRENACLICK/or ANY BX GENERIC EQUIV    2 each    Inject 0.3 mLs (0.3 mg) into the muscle once as needed for anaphylaxis    Late effect of other and unspecified external causes       fluticasone 50 MCG/ACT spray    FLONASE    3 Bottle    Spray 1-2 sprays into both nostrils daily    Seasonal allergic rhinitis due to pollen, Chronic pain syndrome       losartan-hydrochlorothiazide 50-12.5 MG per tablet    HYZAAR    90 tablet    Take 1 tablet by mouth daily    Hypertension goal BP (blood pressure) < 140/80       magnesium oxide 400 (241.3 MG) MG tablet    MAG-OX    90 tablet    Take 1 tablet (400 mg) by mouth daily    Cramp of limb       mometasone-formoterol 100-5 MCG/ACT oral inhaler    DULERA    1 Inhaler    Inhale 2 puffs into the lungs 2 times daily    Persistent asthma       montelukast 10 MG tablet    SINGULAIR    30 tablet    Take 1 tablet (10 mg) by mouth At Bedtime    Seasonal allergic rhinitis due to pollen       ondansetron 4 MG ODT tab    ZOFRAN ODT    10 tablet    Take 1 tablet (4 mg) by mouth every 8 hours as needed for nausea    Chronic pain syndrome       order for DME     1 Device    Right wrist splint for carpal tunnel syndrome  One* Use as directed at hour of sleep    Carpal tunnel syndrome of right wrist       oxyCODONE-acetaminophen 5-325 MG per tablet    PERCOCET    60 tablet    Take 1 tablet by mouth every 6 hours as needed for moderate to severe pain Max 4 tab/day    Chronic pain syndrome       polyethylene glycol powder    MIRALAX    510 g    Take 17 g (1 capful) by mouth daily    Slow transit constipation       potassium chloride 10 MEQ tablet    K-TAB,KLOR-CON     120 tablet    Take 1 tablet (10 mEq) by mouth daily    Hypokalemia       sertraline 100 MG tablet    ZOLOFT    45 tablet    Take 1.5 tablets (150 mg) by mouth daily    Recurrent major depression in partial remission (H), Other constipation       sodium chloride 0.65 % nasal spray    SALINE MIST    1 Bottle    Spray 1 spray into both nostrils daily as needed for congestion    Seasonal allergic rhinitis due to pollen       STATIN NOT PRESCRIBED (INTENTIONAL)     0 each    1 each daily Statin not prescribed intentionally due to Active liver disease (liver failure, cirrhosis, hepatitis) LIVER METS    Hyperlipidemia LDL goal <100, Type 2 diabetes mellitus without complication (H)       triamcinolone 0.1 % paste    KENALOG    5 g    Take by mouth 2 times daily    Oral aphthae       Vitamin D (Cholecalciferol) 1000 UNITS Tabs     60 tablet    Take 2,000 Units by mouth daily    Vitamin D deficiency       * Notice:  This list has 2 medication(s) that are the same as other medications prescribed for you. Read the directions carefully, and ask your doctor or other care provider to review them with you.

## 2017-12-05 NOTE — LETTER
My Asthma Action Plan  Name: Ines Mott   YOB: 1965  Date: 12/5/2017   My doctor: OTTO YUSUF MD   My clinic: [facility]        My Control Medicine: { :871467}  My Rescue Medicine: { :229141}  {AAP include Oral Steroid:932582} My Asthma Severity: { :536079}  Avoid your asthma triggers: { :098671}  None     {Is patient a child or adult?:132958}       GREEN ZONE   Good Control    I feel good    No cough or wheeze    Can work, sleep and play without asthma symptoms       Take your asthma control medicine every day.     1. If exercise triggers your asthma, take your rescue medication    15 minutes before exercise or sports, and    During exercise if you have asthma symptoms  2. Spacer to use with inhaler: If you have a spacer, make sure to use it with your inhaler             YELLOW ZONE Getting Worse  I have ANY of these:    I do not feel good    Cough or wheeze    Chest feels tight    Wake up at night   1. Keep taking your Green Zone medications  2. Start taking your rescue medicine:    every 20 minutes for up to 1 hour. Then every 4 hours for 24-48 hours.  3. If you stay in the Yellow Zone for more than 12-24 hours, contact your doctor.  4. If you do not return to the Green Zone in 12-24 hours or you get worse, start taking your oral steroid medicine if prescribed by your provider.           RED ZONE Medical Alert - Get Help  I have ANY of these:    I feel awful    Medicine is not helping    Breathing getting harder    Trouble walking or talking    Nose opens wide to breathe       1. Take your rescue medicine NOW  2. If your provider has prescribed an oral steroid medicine, start taking it NOW  3. Call your doctor NOW  4. If you are still in the Red Zone after 20 minutes and you have not reached your doctor:    Take your rescue medicine again and    Call 911 or go to the emergency room right away    See your regular doctor within 2 weeks of an Emergency  Room or Urgent Care visit for follow-up treatment.        Electronically signed by: Alisa Villeda, December 5, 2017    Annual Reminders:  Meet with Asthma Educator,  Flu Shot in the Fall, consider Pneumonia Vaccination for patients with asthma (aged 19 and older).    Pharmacy: [facility] [address] [place] [address]                    Asthma Triggers  How To Control Things That Make Your Asthma Worse    Triggers are things that make your asthma worse.  Look at the list below to help you find your triggers and what you can do about them.  You can help prevent asthma flare-ups by staying away from your triggers.      Trigger                                                          What you can do   Cigarette Smoke  Tobacco smoke can make asthma worse. Do not allow smoking in your home, car or around you.  Be sure no one smokes at a child s day care or school.  If you smoke, ask your health care provider for ways to help you quit.  Ask family members to quit too.  Ask your health care provider for a referral to Quit Plan to help you quit smoking, or call 8-163-326-PLAN.     Colds, Flu, Bronchitis  These are common triggers of asthma. Wash your hands often.  Don t touch your eyes, nose or mouth.  Get a flu shot every year.     Dust Mites  These are tiny bugs that live in cloth or carpet. They are too small to see. Wash sheets and blankets in hot water every week.   Encase pillows and mattress in dust mite proof covers.  Avoid having carpet if you can. If you have carpet, vacuum weekly.   Use a dust mask and HEPA vacuum.   Pollen and Outdoor Mold  Some people are allergic to trees, grass, or weed pollen, or molds. Try to keep your windows closed.  Limit time out doors when pollen count is high.   Ask you health care provider about taking medicine during allergy season.     Animal Dander  Some people are allergic to skin flakes, urine or saliva from pets with fur or feathers. Keep pets with fur  or feathers out of your home.    If you can t keep the pet outdoors, then keep the pet out of your bedroom.  Keep the bedroom door closed.  Keep pets off cloth furniture and away from stuffed toys.     Mice, Rats, and Cockroaches  Some people are allergic to the waste from these pests.   Cover food and garbage.  Clean up spills and food crumbs.  Store grease in the refrigerator.   Keep food out of the bedroom.   Indoor Mold  This can be a trigger if your home has high moisture. Fix leaking faucets, pipes, or other sources of water.   Clean moldy surfaces.  Dehumidify basement if it is damp and smelly.   Smoke, Strong Odors, and Sprays  These can reduce air quality. Stay away from strong odors and sprays, such as perfume, powder, hair spray, paints, smoke incense, paint, cleaning products, candles and new carpet.   Exercise or Sports  Some people with asthma have this trigger. Be active!  Ask your doctor about taking medicine before sports or exercise to prevent symptoms.    Warm up for 5-10 minutes before and after sports or exercise.     Other Triggers of Asthma  Cold air:  Cover your nose and mouth with a scarf.  Sometimes laughing or crying can be a trigger.  Some medicines and food can trigger asthma.

## 2017-12-05 NOTE — NURSING NOTE
"Chief Complaint   Patient presents with     Recheck Medication       Initial /86  Pulse 72  Resp 16  Wt 257 lb (116.6 kg)  SpO2 99%  BMI 41.48 kg/m2 Estimated body mass index is 41.48 kg/(m^2) as calculated from the following:    Height as of 11/12/17: 5' 6\" (1.676 m).    Weight as of this encounter: 257 lb (116.6 kg).  Medication Reconciliation: mike Villeda CMA      "
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
## 2017-12-05 NOTE — PATIENT INSTRUCTIONS
(G89.4) Chronic pain syndrome    Comment:      Plan: oxyCODONE-acetaminophen (PERCOCET) 5-325 MG per          tablet, DISCONTINUED: oxyCODONE-acetaminophen           (PERCOCET) 5-325 MG per tablet                   (Z12.11) Screen for colon cancer    Comment:      Plan: Fecal colorectal cancer screen FIT - Future           (S+30)                   (Z12.31) Visit for screening mammogram    Comment:      Plan: MA SCREENING DIGITAL BILAT - Future  (s+30)                   (Z11.59) Need for hepatitis C screening test    Comment:      Plan: Hepatitis C Screen Reflex to HCV RNA Quant and           Genotype

## 2017-12-06 ASSESSMENT — ANXIETY QUESTIONNAIRES: GAD7 TOTAL SCORE: 3

## 2017-12-13 ENCOUNTER — HOSPITAL ENCOUNTER (EMERGENCY)
Facility: CLINIC | Age: 52
Discharge: HOME | End: 2017-12-14
Attending: EMERGENCY MEDICINE | Admitting: EMERGENCY MEDICINE
Payer: COMMERCIAL

## 2017-12-13 DIAGNOSIS — R11.10 VOMITING AND DIARRHEA: ICD-10-CM

## 2017-12-13 DIAGNOSIS — R19.7 VOMITING AND DIARRHEA: ICD-10-CM

## 2017-12-13 LAB
ALBUMIN SERPL-MCNC: 4 G/DL (ref 3.4–5)
ALP SERPL-CCNC: 101 U/L (ref 40–150)
ALT SERPL W P-5'-P-CCNC: 28 U/L (ref 0–50)
ANION GAP SERPL CALCULATED.3IONS-SCNC: 10 MMOL/L (ref 3–14)
AST SERPL W P-5'-P-CCNC: 11 U/L (ref 0–45)
BASOPHILS # BLD AUTO: 0 10E9/L (ref 0–0.2)
BASOPHILS NFR BLD AUTO: 0.1 %
BILIRUB SERPL-MCNC: 1.9 MG/DL (ref 0.2–1.3)
BUN SERPL-MCNC: 10 MG/DL (ref 7–30)
CALCIUM SERPL-MCNC: 10 MG/DL (ref 8.5–10.1)
CHLORIDE SERPL-SCNC: 104 MMOL/L (ref 94–109)
CO2 SERPL-SCNC: 24 MMOL/L (ref 20–32)
CREAT SERPL-MCNC: 0.8 MG/DL (ref 0.52–1.04)
DIFFERENTIAL METHOD BLD: ABNORMAL
EOSINOPHIL # BLD AUTO: 0.2 10E9/L (ref 0–0.7)
EOSINOPHIL NFR BLD AUTO: 1.6 %
ERYTHROCYTE [DISTWIDTH] IN BLOOD BY AUTOMATED COUNT: 13.6 % (ref 10–15)
GFR SERPL CREATININE-BSD FRML MDRD: 75 ML/MIN/1.7M2
GLUCOSE SERPL-MCNC: 124 MG/DL (ref 70–99)
HCT VFR BLD AUTO: 43.5 % (ref 35–47)
HGB BLD-MCNC: 14.8 G/DL (ref 11.7–15.7)
IMM GRANULOCYTES # BLD: 0.1 10E9/L (ref 0–0.4)
IMM GRANULOCYTES NFR BLD: 0.4 %
LIPASE SERPL-CCNC: 76 U/L (ref 73–393)
LYMPHOCYTES # BLD AUTO: 1.3 10E9/L (ref 0.8–5.3)
LYMPHOCYTES NFR BLD AUTO: 9.5 %
MCH RBC QN AUTO: 30.5 PG (ref 26.5–33)
MCHC RBC AUTO-ENTMCNC: 34 G/DL (ref 31.5–36.5)
MCV RBC AUTO: 90 FL (ref 78–100)
MONOCYTES # BLD AUTO: 0.8 10E9/L (ref 0–1.3)
MONOCYTES NFR BLD AUTO: 6 %
NEUTROPHILS # BLD AUTO: 11.3 10E9/L (ref 1.6–8.3)
NEUTROPHILS NFR BLD AUTO: 82.4 %
NRBC # BLD AUTO: 0 10*3/UL
NRBC BLD AUTO-RTO: 0 /100
PLATELET # BLD AUTO: 389 10E9/L (ref 150–450)
POTASSIUM SERPL-SCNC: 3.5 MMOL/L (ref 3.4–5.3)
PROT SERPL-MCNC: 8.4 G/DL (ref 6.8–8.8)
RBC # BLD AUTO: 4.86 10E12/L (ref 3.8–5.2)
SODIUM SERPL-SCNC: 138 MMOL/L (ref 133–144)
WBC # BLD AUTO: 13.7 10E9/L (ref 4–11)

## 2017-12-13 PROCEDURE — 96372 THER/PROPH/DIAG INJ SC/IM: CPT

## 2017-12-13 PROCEDURE — 25000125 ZZHC RX 250: Performed by: EMERGENCY MEDICINE

## 2017-12-13 PROCEDURE — 96361 HYDRATE IV INFUSION ADD-ON: CPT

## 2017-12-13 PROCEDURE — 99284 EMERGENCY DEPT VISIT MOD MDM: CPT | Mod: 25

## 2017-12-13 PROCEDURE — 85025 COMPLETE CBC W/AUTO DIFF WBC: CPT | Performed by: EMERGENCY MEDICINE

## 2017-12-13 PROCEDURE — 80053 COMPREHEN METABOLIC PANEL: CPT | Performed by: EMERGENCY MEDICINE

## 2017-12-13 PROCEDURE — 25000128 H RX IP 250 OP 636: Performed by: EMERGENCY MEDICINE

## 2017-12-13 PROCEDURE — 83690 ASSAY OF LIPASE: CPT | Performed by: EMERGENCY MEDICINE

## 2017-12-13 PROCEDURE — 96374 THER/PROPH/DIAG INJ IV PUSH: CPT

## 2017-12-13 RX ORDER — SODIUM CHLORIDE 9 MG/ML
1000 INJECTION, SOLUTION INTRAVENOUS CONTINUOUS
Status: DISCONTINUED | OUTPATIENT
Start: 2017-12-13 | End: 2017-12-14 | Stop reason: HOSPADM

## 2017-12-13 RX ORDER — OLANZAPINE 10 MG/2ML
10 INJECTION, POWDER, FOR SOLUTION INTRAMUSCULAR ONCE
Status: COMPLETED | OUTPATIENT
Start: 2017-12-13 | End: 2017-12-13

## 2017-12-13 RX ORDER — ONDANSETRON 2 MG/ML
4 INJECTION INTRAMUSCULAR; INTRAVENOUS
Status: COMPLETED | OUTPATIENT
Start: 2017-12-13 | End: 2017-12-13

## 2017-12-13 RX ADMIN — OLANZAPINE 10 MG: 10 INJECTION, POWDER, FOR SOLUTION INTRAMUSCULAR at 23:14

## 2017-12-13 RX ADMIN — SODIUM CHLORIDE 1000 ML: 9 INJECTION, SOLUTION INTRAVENOUS at 23:15

## 2017-12-13 RX ADMIN — SODIUM CHLORIDE 1000 ML: 9 INJECTION, SOLUTION INTRAVENOUS at 23:13

## 2017-12-13 RX ADMIN — ONDANSETRON 4 MG: 2 INJECTION INTRAMUSCULAR; INTRAVENOUS at 23:13

## 2017-12-13 ASSESSMENT — ENCOUNTER SYMPTOMS
ABDOMINAL PAIN: 1
VOMITING: 1
DIARRHEA: 1
HEADACHES: 1
NAUSEA: 1
FEVER: 1

## 2017-12-13 NOTE — ED AVS SNAPSHOT
Emergency Department    [address] [facility] [address]    Phone:  [phone]    Fax:  [phone]                                       Ines Mott   MRN: [id number]    Department:   Emergency Department   Date of Visit:  12/13/2017           Patient Information     Date Of Birth          1965        Your diagnoses for this visit were:     Vomiting and diarrhea        You were seen by Trierweiler, Chad A, MD.      Follow-up Information     Follow up with Dustin Fatima MD In 1 week.    Specialty:  Family Practice    Contact information:    [address]  [facility] [id number] [phone]          Follow up with  Emergency Department.    Specialty:  EMERGENCY MEDICINE    Why:  If symptoms worsen    Contact information:    [address] [facility] [address] [phone]        Discharge Instructions       Discharge Instructions  Gastroenteritis    You have been seen today for vomiting (throwing up) and diarrhea (loose stools), called gastroenteritis or the stomach flu. This is usually caused by a virus, but some bacteria, parasites, medicines or other medical conditions can cause similar symptoms. At this time your provider does not find that your vomiting and diarrhea is a sign of anything dangerous or life-threatening.  However, sometimes the signs of serious illness do not show up right away. Remember that serious problems like appendicitis can look like gastroenteritis at first.       Generally, every Emergency Department visit should have a follow-up clinic visit with either a primary or a specialty clinic/provider. Please follow-up as instructed by your emergency provider today.    Return to the Emergency Department if:    You keep vomiting and you are not able to keep liquids down.    You feel you are getting dehydrated, such as being very thirsty, not urinating (peeing), or feeling faint or lightheaded.     You develop a new fever.    You  have abdominal (belly) pain that seems worse than cramps, is in one spot, or is getting worse over time.     You have blood in your vomit or in your diarrhea.    You feel very weak.    What can I do to help myself?    The most important thing to do is to drink clear liquids.  If you have been vomiting a lot, it is best to have only small, frequent sips of liquids.  Drinking too much at once may cause more vomiting. Water is a good first option for rehydration. If you are vomiting often, you must also replace electrolytes (salts and minerals) lost with your illness. Pedialyte  is the best rehydration liquid but many don t like the taste so sports drinks (like Gatorade ) are a good option. Sodas and juice are also options but are high in sugar. Avoid acid liquids (orange), caffeine (coffee) or alcohol. Do not drink milk until you no longer have diarrhea.    After liquids are staying down, you may start eating mild foods. Soda crackers, toast, plain noodles, gelatin, applesauce and bananas are good first choices.  Avoid foods that have acid, are spicy, fatty or fibrous (such as meats, coarse grains, vegetables). You may start eating these foods again in about 3 days when you are better.    Sometimes treatment includes prescription medicine to prevent nausea (sick to your stomach) and vomiting and to prevent diarrhea. If your provider prescribes these for you, take them as directed.    Nonprescription medicine is available for the treatment of diarrhea and can be very effective.  If you use it, make sure you use the dose recommended on the package. Avoid Lomotil . Check with your healthcare provider before you use any medicine for diarrhea.    Do not take ibuprofen, or other nonsteroidal anti-inflammatory medicines without checking with your healthcare provider.  If you were given a prescription for medicine here today, be sure to read all of the information (including the package insert) that comes with your  prescription.  This will include important information about the medicine, its side effects, and any warnings that you need to know about.  The pharmacist who fills the prescription can provide more information and answer questions you may have about the medicine.  If you have questions or concerns that the pharmacist cannot address, please call or return to the Emergency Department.   Remember that you can always come back to the Emergency Department if you are not able to see your regular provider in the amount of time listed above, if you get any new symptoms, or if there is anything that worries you.      Future Appointments        Provider Department Dept Phone Center    12/14/2017 10:50 AM Martha Menezes PT [facility] [phone] [address]    12/22/2017 7:30 AM OTTO YUSUF MD [facility] [phone] [place]      24 Hour Appointment Hotline       To make an appointment at any [facility], call [phone] ([phone]). If you don't have a family doctor or clinic, we will help you find one. [facility] are conveniently located to serve the needs of you and your family.             Review of your medicines      CONTINUE these medicines which may have CHANGED, or have new prescriptions. If we are uncertain of the size of tablets/capsules you have at home, strength may be listed as something that might have changed.        Dose / Directions Last dose taken    ondansetron 4 MG ODT tab   Commonly known as:  ZOFRAN ODT   Dose:  4 mg   What changed:  reasons to take this   Quantity:  10 tablet        Take 1 tablet (4 mg) by mouth every 8 hours as needed   Refills:  0          Our records show that you are taking the medicines listed below. If these are incorrect, please call your family doctor or clinic.        Dose / Directions Last dose taken    * albuterol 108 (90 BASE) MCG/ACT Inhaler   Commonly  known as:  VENTOLIN HFA   Quantity:  54 g        INHALE TWO PUFFS BY MOUTH EVERY 4 HOURS AS NEEDED FOR SHORTNESS OF BREATH/DYSPNEA   Refills:  1        * albuterol (2.5 MG/3ML) 0.083% neb solution   Dose:  1 vial   Quantity:  30 vial        Take 1 vial (2.5 mg) by nebulization every 6 hours as needed for shortness of breath / dyspnea or wheezing   Refills:  3        ASPIRIN NOT PRESCRIBED   Commonly known as:  INTENTIONAL   Quantity:  1 each        Please choose reason not prescribed, below   Refills:  0        baclofen 10 MG tablet   Commonly known as:  LIORESAL   Dose:  10 mg   Quantity:  90 tablet        Take 1 tablet (10 mg) by mouth 3 times daily   Refills:  11        blood glucose calibration solution   Commonly known as:  no brand specified   Quantity:  1 each        Use to calibrate blood glucose monitor as directed.   Refills:  0        blood glucose lancets standard   Commonly known as:  no brand specified   Quantity:  100 each        Use to test blood sugar daily times daily or as directed.   Refills:  11        blood glucose monitoring meter device kit   Commonly known as:  no brand specified   Quantity:  1 kit        Use to test blood sugar larisa times daily or as directed.   Refills:  0        blood glucose monitoring test strip   Commonly known as:  no brand specified   Quantity:  100 strip        Use to test blood sugars once times daily or as directed   Refills:  3        cetirizine 10 MG tablet   Commonly known as:  zyrTEC   Dose:  10 mg   Quantity:  30 tablet        Take 1 tablet (10 mg) by mouth every evening   Refills:  1        cyanocobalamin 1000 MCG/ML injection   Commonly known as:  VITAMIN B12   Dose:  1 mL   Quantity:  1 mL        Inject 1 mL (1,000 mcg) Subcutaneous every 30 days   Refills:  11        EPINEPHrine 0.3 MG/0.3ML injection 2-pack   Commonly known as:  EPIPEN/ADRENACLICK/or ANY BX GENERIC EQUIV   Dose:  0.3 mg   Quantity:  2 each        Inject 0.3 mLs (0.3 mg) into the muscle  once as needed for anaphylaxis   Refills:  5        fluticasone 50 MCG/ACT spray   Commonly known as:  FLONASE   Dose:  1-2 spray   Quantity:  3 Bottle        Spray 1-2 sprays into both nostrils daily   Refills:  1        losartan-hydrochlorothiazide 50-12.5 MG per tablet   Commonly known as:  HYZAAR   Dose:  1 tablet   Quantity:  90 tablet        Take 1 tablet by mouth daily   Refills:  3        magnesium oxide 400 (241.3 MG) MG tablet   Commonly known as:  MAG-OX   Dose:  400 mg   Quantity:  90 tablet        Take 1 tablet (400 mg) by mouth daily   Refills:  3        mometasone-formoterol 100-5 MCG/ACT oral inhaler   Commonly known as:  DULERA   Dose:  2 puff   Quantity:  1 Inhaler        Inhale 2 puffs into the lungs 2 times daily   Refills:  11        montelukast 10 MG tablet   Commonly known as:  SINGULAIR   Dose:  10 mg   Quantity:  30 tablet        Take 1 tablet (10 mg) by mouth At Bedtime   Refills:  11        order for DME   Quantity:  1 Device        Right wrist splint for carpal tunnel syndrome  One* Use as directed at hour of sleep   Refills:  1        oxyCODONE-acetaminophen 5-325 MG per tablet   Commonly known as:  PERCOCET   Dose:  1 tablet   Quantity:  60 tablet        Take 1 tablet by mouth every 6 hours as needed for moderate to severe pain Max 4 tab/day   Refills:  0        polyethylene glycol powder   Commonly known as:  MIRALAX   Dose:  1 capful   Quantity:  510 g        Take 17 g (1 capful) by mouth daily   Refills:  3        potassium chloride 10 MEQ tablet   Commonly known as:  K-TAB,KLOR-CON   Dose:  10 mEq   Indication:  Low Amount of Potassium in the Blood   Quantity:  120 tablet        Take 1 tablet (10 mEq) by mouth daily   Refills:  11        sertraline 100 MG tablet   Commonly known as:  ZOLOFT   Dose:  150 mg   Indication:  Major Depressive Disorder   Quantity:  45 tablet        Take 1.5 tablets (150 mg) by mouth daily   Refills:  11        sodium chloride 0.65 % nasal spray   Commonly  known as:  SALINE MIST   Dose:  1 spray   Quantity:  1 Bottle        Spray 1 spray into both nostrils daily as needed for congestion   Refills:  11        STATIN NOT PRESCRIBED (INTENTIONAL)   Dose:  1 each   Quantity:  0 each        1 each daily Statin not prescribed intentionally due to Active liver disease (liver failure, cirrhosis, hepatitis) LIVER METS   Refills:  0        triamcinolone 0.1 % paste   Commonly known as:  KENALOG   Quantity:  5 g        Take by mouth 2 times daily   Refills:  0        Vitamin D (Cholecalciferol) 1000 UNITS Tabs   Dose:  2000 Units   Quantity:  60 tablet        Take 2,000 Units by mouth daily   Refills:  11        * Notice:  This list has 2 medication(s) that are the same as other medications prescribed for you. Read the directions carefully, and ask your doctor or other care provider to review them with you.            Prescriptions were sent or printed at these locations (1 Prescription)                   Other Prescriptions                Printed at Department/Unit printer (1 of 1)         ondansetron (ZOFRAN ODT) 4 MG ODT tab                Procedures and tests performed during your visit     CBC with platelets differential    Comprehensive metabolic panel    Lipase    UA with Microscopic      Orders Needing Specimen Collection     None      Pending Results     No orders found for last 3 day(s).            Pending Culture Results     No orders found for last 3 day(s).            Pending Results Instructions     If you had any lab results that were not finalized at the time of your Discharge, you can call the ED Lab Result RN at [phone]. You will be contacted by this team for any positive Lab results or changes in treatment. The nurses are available 7 days a week from 10A to 6:30P.  You can leave a message 24 hours per day and they will return your call.        Test Results From Your Hospital Stay        12/13/2017 11:00 PM      Component Results     Component Value Ref  Range & Units Status    WBC 13.7 (H) 4.0 - 11.0 10e9/L Final    RBC Count 4.86 3.8 - 5.2 10e12/L Final    Hemoglobin 14.8 11.7 - 15.7 g/dL Final    Hematocrit 43.5 35.0 - 47.0 % Final    MCV 90 78 - 100 fl Final    MCH 30.5 26.5 - 33.0 pg Final    MCHC 34.0 31.5 - 36.5 g/dL Final    RDW 13.6 10.0 - 15.0 % Final    Platelet Count 389 150 - 450 10e9/L Final    Diff Method Automated Method  Final    % Neutrophils 82.4 % Final    % Lymphocytes 9.5 % Final    % Monocytes 6.0 % Final    % Eosinophils 1.6 % Final    % Basophils 0.1 % Final    % Immature Granulocytes 0.4 % Final    Nucleated RBCs 0 0 /100 Final    Absolute Neutrophil 11.3 (H) 1.6 - 8.3 10e9/L Final    Absolute Lymphocytes 1.3 0.8 - 5.3 10e9/L Final    Absolute Monocytes 0.8 0.0 - 1.3 10e9/L Final    Absolute Eosinophils 0.2 0.0 - 0.7 10e9/L Final    Absolute Basophils 0.0 0.0 - 0.2 10e9/L Final    Abs Immature Granulocytes 0.1 0 - 0.4 10e9/L Final    Absolute Nucleated RBC 0.0  Final         12/13/2017 11:17 PM      Component Results     Component Value Ref Range & Units Status    Sodium 138 133 - 144 mmol/L Final    Potassium 3.5 3.4 - 5.3 mmol/L Final    Chloride 104 94 - 109 mmol/L Final    Carbon Dioxide 24 20 - 32 mmol/L Final    Anion Gap 10 3 - 14 mmol/L Final    Glucose 124 (H) 70 - 99 mg/dL Final    Urea Nitrogen 10 7 - 30 mg/dL Final    Creatinine 0.80 0.52 - 1.04 mg/dL Final    GFR Estimate 75 >60 mL/min/1.7m2 Final    Non  GFR Calc    GFR Estimate If Black >90 >60 mL/min/1.7m2 Final    African American GFR Calc    Calcium 10.0 8.5 - 10.1 mg/dL Final    Bilirubin Total 1.9 (H) 0.2 - 1.3 mg/dL Final    Albumin 4.0 3.4 - 5.0 g/dL Final    Protein Total 8.4 6.8 - 8.8 g/dL Final    Alkaline Phosphatase 101 40 - 150 U/L Final    ALT 28 0 - 50 U/L Final    AST 11 0 - 45 U/L Final         12/13/2017 11:15 PM      Component Results     Component Value Ref Range & Units Status    Lipase 76 73 - 393 U/L Final         12/14/2017 12:24 AM       Component Results     Component Value Ref Range & Units Status    Color Urine Yellow  Final    Appearance Urine Slightly Cloudy  Final    Glucose Urine Negative NEG^Negative mg/dL Final    Bilirubin Urine Negative NEG^Negative Final    Ketones Urine Negative NEG^Negative mg/dL Final    Specific Gravity Urine 1.022 1.003 - 1.035 Final    Blood Urine Moderate (A) NEG^Negative Final    pH Urine 5.5 5.0 - 7.0 pH Final    Protein Albumin Urine 10 (A) NEG^Negative mg/dL Final    Urobilinogen mg/dL Normal 0.0 - 2.0 mg/dL Final    Nitrite Urine Negative NEG^Negative Final    Leukocyte Esterase Urine Small (A) NEG^Negative Final    Source Midstream Urine  Final    WBC Urine 3 (H) 0 - 2 /HPF Final    RBC Urine 6 (H) 0 - 2 /HPF Final    Squamous Epithelial /HPF Urine 12 (H) 0 - 1 /HPF Final    Mucous Urine Present (A) NEG^Negative /LPF Final                Clinical Quality Measure: Blood Pressure Screening     Your blood pressure was checked while you were in the emergency department today. The last reading we obtained was  BP: (!) 171/99 . Please read the guidelines below about what these numbers mean and what you should do about them.  If your systolic blood pressure (the top number) is less than 120 and your diastolic blood pressure (the bottom number) is less than 80, then your blood pressure is normal. There is nothing more that you need to do about it.  If your systolic blood pressure (the top number) is 120-139 or your diastolic blood pressure (the bottom number) is 80-89, your blood pressure may be higher than it should be. You should have your blood pressure rechecked within a year by a primary care provider.  If your systolic blood pressure (the top number) is 140 or greater or your diastolic blood pressure (the bottom number) is 90 or greater, you may have high blood pressure. High blood pressure is treatable, but if left untreated over time it can put you at risk for heart attack, stroke, or kidney failure. You  "should have your blood pressure rechecked by a primary care provider within the next 4 weeks.  If your provider in the emergency department today gave you specific instructions to follow-up with your doctor or provider even sooner than that, you should follow that instruction and not wait for up to 4 weeks for your follow-up visit.        Thank you for choosing [facility]       Thank you for choosing [facility] for your care. Our goal is always to provide you with excellent care. Hearing back from our patients is one way we can continue to improve our services. Please take a few minutes to complete the written survey that you may receive in the mail after you visit with us. Thank you!        [employer] Information     [employer] lets you send messages to your doctor, view your test results, renew your prescriptions, schedule appointments and more. To sign up, go to [email or web address] . Click on \"Log in\" on the left side of the screen, which will take you to the Welcome page. Then click on \"Sign up Now\" on the right side of the page.     You will be asked to enter the access code listed below, as well as some personal information. Please follow the directions to create your username and password.     Your access code is: [email or web address]  Expires: 2018 10:55 AM     Your access code will  in 90 days. If you need help or a new code, please call your [facility] clinic or [phone].        Care EveryWhere ID     This is your Care EveryWhere ID. This could be used by other organizations to access your [facility] medical records  [id number]        Equal Access to Services     [facility] CenterLEOPOLDO : Hadii aad ku hadasho Sodanisali, waaxda luqadaha, qaybta kaalmada adeamandeepyada, emilee pardo . So [facility] [phone].    ATENCIÓN: Si habla español, tiene a christianson disposición servicios gratuitos de asistencia lingüística. Llame al [phone].    We comply with applicable federal civil rights laws and [place] " laws. We do not discriminate on the basis of race, color, national origin, age, disability, sex, sexual orientation, or gender identity.            After Visit Summary       This is your record. Keep this with you and show to your community pharmacist(s) and doctor(s) at your next visit.

## 2017-12-13 NOTE — ED AVS SNAPSHOT
Emergency Department    [address] [place] [address]    Phone:  [phone]    Fax:  [phone]                                       Ines Mott   MRN: [id number]    Department:   Emergency Department   Date of Visit:  12/13/2017           After Visit Summary Signature Page     I have received my discharge instructions, and my questions have been answered. I have discussed any challenges I see with this plan with the nurse or doctor.    ..........................................................................................................................................  Patient/Patient Representative Signature      ..........................................................................................................................................  Patient Representative Print Name and Relationship to Patient    ..................................................               ................................................  Date                                            Time    ..........................................................................................................................................  Reviewed by Signature/Title    ...................................................              ..............................................  Date                                                            Time

## 2017-12-14 VITALS
TEMPERATURE: 99.8 F | WEIGHT: 250 LBS | DIASTOLIC BLOOD PRESSURE: 99 MMHG | HEIGHT: 66 IN | OXYGEN SATURATION: 98 % | SYSTOLIC BLOOD PRESSURE: 171 MMHG | BODY MASS INDEX: 40.18 KG/M2 | RESPIRATION RATE: 16 BRPM

## 2017-12-14 LAB
ALBUMIN UR-MCNC: 10 MG/DL
APPEARANCE UR: ABNORMAL
BILIRUB UR QL STRIP: NEGATIVE
COLOR UR AUTO: YELLOW
GLUCOSE UR STRIP-MCNC: NEGATIVE MG/DL
HGB UR QL STRIP: ABNORMAL
KETONES UR STRIP-MCNC: NEGATIVE MG/DL
LEUKOCYTE ESTERASE UR QL STRIP: ABNORMAL
MUCOUS THREADS #/AREA URNS LPF: PRESENT /LPF
NITRATE UR QL: NEGATIVE
PH UR STRIP: 5.5 PH (ref 5–7)
RBC #/AREA URNS AUTO: 6 /HPF (ref 0–2)
SOURCE: ABNORMAL
SP GR UR STRIP: 1.02 (ref 1–1.03)
SQUAMOUS #/AREA URNS AUTO: 12 /HPF (ref 0–1)
UROBILINOGEN UR STRIP-MCNC: NORMAL MG/DL (ref 0–2)
WBC #/AREA URNS AUTO: 3 /HPF (ref 0–2)

## 2017-12-14 PROCEDURE — 81001 URINALYSIS AUTO W/SCOPE: CPT | Performed by: EMERGENCY MEDICINE

## 2017-12-14 RX ORDER — ONDANSETRON 4 MG/1
4 TABLET, ORALLY DISINTEGRATING ORAL EVERY 8 HOURS PRN
Qty: 10 TABLET | Refills: 0 | Status: SHIPPED | OUTPATIENT
Start: 2017-12-14 | End: 2018-01-24

## 2017-12-14 ASSESSMENT — ENCOUNTER SYMPTOMS
FREQUENCY: 0
HEMATURIA: 0
DYSURIA: 0

## 2017-12-14 NOTE — ED PROVIDER NOTES
History     Chief Complaint:  Nausea, Vomiting, and Diarrhea     HPI   Ines Mott is a 52 year old female with a history of chronic abdominal pain and migraine headaches who presents for evaluation of nausea, vomiting, and diarrhea. On the night of 12/12/2017 the patient ate buttermilk chicken at [email or web address]. This morning, the patient started to develop nausea, vomiting, and watery diarrhea. Since then, the patient has had eight episodes of vomiting and eight to ten episodes of diarrhea. She has also developed a low-grade fever, abdominal pain, and a headache. Due to her new nausea, vomiting, and diarrhea, the patient came into the ED with primary concern that she could have food poisoning. Currently in the ED, the patient rates her pain at a severity of 10/10, and she reports that her headache and abdominal pain are consistent with her frequent migraines and chronic abdominal pain. She has not taken any antibiotics or traveled recently. She has not had any dysuria, hematuria, or urinary frequency in association with her current symptoms.     Allergies:  Ativan  Macrobid  Amoxicillin   Buspirone   Cephalosporins   Dilaudid   Diphenhydramine hcl   Imitrex  Ketorolac tromethamine   Lansoprazole   Metoclopramide   Paroxetine  Penicillins   Prednisone   Sulfa drugs  Lidoderm      Medications:    oxyCODONE-acetaminophen (PERCOCET) 5-325 MG per tablet  sertraline (ZOLOFT) 100 MG tablet  baclofen (LIORESAL) 10 MG tablet  magnesium oxide (MAG-OX) 400 (241.3 MG) MG tablet  mometasone-formoterol (DULERA) 100-5 MCG/ACT oral inhaler  albuterol (VENTOLIN HFA) 108 (90 BASE) MCG/ACT Inhaler  albuterol (2.5 MG/3ML) 0.083% neb solution  losartan-hydrochlorothiazide (HYZAAR) 50-12.5 MG per tablet  potassium chloride (K-TAB,KLOR-CON) 10 MEQ tablet  cyanocobalamin (VITAMIN B12) 1000 MCG/ML injection  Vitamin D, Cholecalciferol, 1000 UNITS TABS  ondansetron (ZOFRAN ODT) 4 MG ODT tab  montelukast (SINGULAIR) 10 MG  "tablet  cetirizine (ZYRTEC) 10 MG tablet  fluticasone (FLONASE) 50 MCG/ACT spray  sodium chloride (SALINE MIST) 0.65 % nasal spray  triamcinolone (KENALOG) 0.1 % paste  polyethylene glycol (MIRALAX) powder  EPINEPHrine (EPIPEN) 0.3 MG/0.3ML injection     Past Medical History:    Arthritis  Asthma  Cancer  Chronic pain syndrome  Depression  Hydrocephalus  Hypertension  Hyperlipidemia   Metastatic 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abdominal site   Methamphetamine abuse  Migraine headaches  Asthma  Obesity  Right renal mass  PTSD   Fibromyalgia  Obstructive sleep apnea  Spina bifida     Past Surgical History:    Appendectomy  Cholecystectomy   Extractions dental  Gyn surgery  Implant shunt ventriculoperitoneal     Family History:    Cancer - Father     Social History:  Tobacco use:    Former smoker  Alcohol use:    Negative  Marital status:       Accompanied to ED by:  Alone     Review of Systems   Constitutional: Positive for fever.   Gastrointestinal: Positive for abdominal pain, diarrhea, nausea and vomiting.   Genitourinary: Negative for dysuria, frequency and hematuria.   Neurological: Positive for headaches.   All other systems reviewed and are negative.    Physical Exam   First Vitals:  BP: (!) 171/99  Heart Rate: 97  Temp: 99.8  F (37.7  C)  Resp: 18  Height: 167.6 cm (5' 6\")  Weight: 113.4 kg (250 lb)  SpO2: 97 %      Physical Exam  Eye:  Pupils are equal, round, and reactive.  Extraocular movements intact.    ENT:  No rhinorrhea.  Moist mucus membranes.  Normal tongue and tonsil.    Cardiac:  Regular rate and rhythm.  No murmurs, gallops, or rubs.    Pulmonary:  Clear to auscultation bilaterally.  No wheezes, rales, or rhonchi.    Abdomen:  Positive bowel sounds.  Abdomen is soft and non-distended, without focal tenderness.    Musculoskeletal:  Normal movement of all extremities without evidence for deficit.    Skin:  Warm and dry without rashes.    Neurologic:  Non-focal exam without asymmetric weakness or " numbness.     Psychiatric:  Normal affect with appropriate interaction with examiner.    Emergency Department Course     Laboratory:  CBC: WBC 13.7 high, o/w WNL (HGB 14.8, )  CMP: Glucose 124 high, Bilirubin total 1.9 high, o/w WNL (Creatinine 0.80)  Lipase: 76  UA with Microscopic: Moderate blood, Protein albumin 10, Small leukocyte esterase, WBC 3 high, RBC 6 high, Squamous epithelial 12 high, Mucous present, o/w Negative       Interventions:  2313 NS 1,000 mL IV  2313 Zofran 4 mg IV   2314 Zyprexa 10 mg IM  2315 NS 1,000 mL IV    Emergency Department Course:  Nursing notes and vitals reviewed.  2250: I performed an exam of the patient as documented above.     0004: I updated and reassessed the patient.     Findings and plan explained to the Patient. Patient discharged home with instructions regarding supportive care, medications, and reasons to return. The importance of close follow-up was reviewed. The patient was prescribed Zofran ODT.     Impression & Plan      Medical Decision Making:  This unfortunate young woman with a long-standing history of chronic abdominal pain and headaches presents to us with vomiting and diarrhea that began this morning after having a large meal at [employer] last night.  She describes approximately 8-10 episodes of vomiting and diarrhea throughout the day.  However, she denies having any significant abdominal pain or fever with this.  Her chief concern was being dehydrated.  Her laboratory investigation is reassuring except for a mild elevation of her white blood cell count which is likely due to the gastrointestinal infection that she is dealing with.  She denies recent travel outside the country or antibiotic use.  Considering the cocommittance of the vomiting and diarrhea, this likely represents some degree of gastroenteritis.  The patient is without an appendix or a gallbladder.  I do not feel that a CT of the abdomen is indicated at this juncture.  She feels markedly  improved after the above interventions.  She will be discharged with Zofran and the advice to return for a recheck in 24 hours if her pain is not improving or her vomiting and diarrhea persists.    Diagnosis:    ICD-10-CM   1. Vomiting and diarrhea R11.10    R19.7       Disposition:  Discharged to home with Zofran ODT.     Discharge Medications:  Zofran ODT 4 mg PO - 1 tablet every 8 hours as needed - 10 tablets       IDevonte, finesse serving as a scribe at 10:50 PM on 12/13/2017 to document services personally performed by Dr. Trierweiler, based on my observations and the provider's statements to me.     EMERGENCY DEPARTMENT       Trierweiler, Chad A, MD  12/14/17 1288

## 2017-12-18 ENCOUNTER — THERAPY VISIT (OUTPATIENT)
Dept: PHYSICAL THERAPY | Facility: CLINIC | Age: 52
End: 2017-12-18
Payer: COMMERCIAL

## 2017-12-18 DIAGNOSIS — M54.50 BILATERAL LOW BACK PAIN WITHOUT SCIATICA: Primary | ICD-10-CM

## 2017-12-18 PROCEDURE — 97161 PT EVAL LOW COMPLEX 20 MIN: CPT | Mod: GP | Performed by: PHYSICAL THERAPIST

## 2017-12-18 PROCEDURE — 97110 THERAPEUTIC EXERCISES: CPT | Mod: GP | Performed by: PHYSICAL THERAPIST

## 2017-12-18 NOTE — MR AVS SNAPSHOT
After Visit Summary   12/18/2017    Ines Mott    MRN: [id number]           Patient Information     Date Of Birth          1965        Visit Information        Provider Department      12/18/2017 10:10 AM Helen Leyva PT [facility]        Today's Diagnoses     Bilateral low back pain without sciatica    -  1       Follow-ups after your visit        Your next 10 appointments already scheduled     Dec 22, 2017  7:30 AM CST   Office Visit with Dustin Fatima MD   [facility]    [address] [address] [phone]           Bring a current list of meds and any records pertaining to this visit. For Physicals, please bring immunization records and any forms needing to be filled out. Please arrive 10 minutes early to complete paperwork.              Who to contact     If you have questions or need follow up information about today's clinic visit or your schedule please contact [facility] directly at [phone].  Normal or non-critical lab and imaging results will be communicated to you by NeoAccelhart, letter or phone within 4 business days after the clinic has received the results. If you do not hear from us within 7 days, please contact the clinic through Surphacet or phone. If you have a critical or abnormal lab result, we will notify you by phone as soon as possible.  Submit refill requests through [email or web address] or call your pharmacy and they will forward the refill request to us. Please allow 3 business days for your refill to be completed.          Additional Information About Your Visit        MyChart Information     [email or web address] lets you send messages to your doctor, view your test results, renew your prescriptions, schedule appointments and more. To sign up, go to  [email or web address] . Click on \"Log in\" on the left side of the screen, which will take you to the Welcome page. Then click on \"Sign up Now\" on the right side of the page.     You will be asked to enter the access code listed below, as well as some personal information. Please follow the directions to create your username and password.     Your access code is: [email or web address]  Expires: 2018 10:55 AM     Your access code will  in 90 days. If you need help or a new code, please call your [facility] clinic or [phone].        Care EveryWhere ID     This is your Care EveryWhere ID. This could be used by other organizations to access your [facility] medical records  [id number]         Blood Pressure from Last 3 Encounters:   17 (!) 171/99   17 130/86   17 124/72    Weight from Last 3 Encounters:   17 113.4 kg (250 lb)   17 116.6 kg (257 lb)   17 114.8 kg (253 lb)              We Performed the Following     KENNY Inital Eval Report     PT Eval, Low Complexity (31026)     Therapeutic Exercises        Primary Care Provider Office Phone # Fax #    Dustin Rina Fatima [phone] [address] [facility] [address]        Equal Access to Services     VALENTE RIVERA : Hadii aad ku hadasho Sodanisali, waaxda luqadaha, qaybta kaalmada adeegyada, emilee coyne. So [facility] [phone].    ATENCIÓN: Si habla español, tiene a christianson disposición servicios gratuitos de asistencia lingüística. Llame al [phone].    We comply with applicable federal civil rights laws and [place] laws. We do not discriminate on the basis of race, color, national origin, age, disability, sex, sexual orientation, or gender identity.            Thank you!     Thank you for choosing [facility]  for your care. Our goal is always to provide you with excellent care. Hearing back from our patients is one way we " can continue to improve our services. Please take a few minutes to complete the written survey that you may receive in the mail after your visit with us. Thank you!             Your Updated Medication List - Protect others around you: Learn how to safely use, store and throw away your medicines at www.disposemymeds.org.          This list is accurate as of: 12/18/17 11:59 PM.  Always use your most recent med list.                   Brand Name Dispense Instructions for use Diagnosis    * albuterol 108 (90 BASE) MCG/ACT Inhaler    VENTOLIN HFA    54 g    INHALE TWO PUFFS BY MOUTH EVERY 4 HOURS AS NEEDED FOR SHORTNESS OF BREATH/DYSPNEA    Mild intermittent asthma without complication       * albuterol (2.5 MG/3ML) 0.083% neb solution     30 vial    Take 1 vial (2.5 mg) by nebulization every 6 hours as needed for shortness of breath / dyspnea or wheezing    Mild intermittent asthma with acute exacerbation       ASPIRIN NOT PRESCRIBED    INTENTIONAL    1 each    Please choose reason not prescribed, below    Aspirin intolerance       baclofen 10 MG tablet    LIORESAL    90 tablet    Take 1 tablet (10 mg) by mouth 3 times daily    Chronic bilateral low back pain with right-sided sciatica       blood glucose calibration solution    no brand specified    1 each    Use to calibrate blood glucose monitor as directed.    Type 2 diabetes mellitus without complication, without long-term current use of insulin (H)       blood glucose lancets standard    no brand specified    100 each    Use to test blood sugar daily times daily or as directed.    Type 2 diabetes mellitus without complication, without long-term current use of insulin (H)       blood glucose monitoring meter device kit    no brand specified    1 kit    Use to test blood sugar larisa times daily or as directed.    Type 2 diabetes mellitus without complication, without long-term current use of insulin (H)       blood glucose monitoring test strip    no brand specified     100 strip    Use to test blood sugars once times daily or as directed    Type 2 diabetes mellitus without complication, without long-term current use of insulin (H)       cetirizine 10 MG tablet    zyrTEC    30 tablet    Take 1 tablet (10 mg) by mouth every evening    Seasonal allergic rhinitis due to pollen       cyanocobalamin 1000 MCG/ML injection    VITAMIN B12    1 mL    Inject 1 mL (1,000 mcg) Subcutaneous every 30 days    B12 deficiency       EPINEPHrine 0.3 MG/0.3ML injection 2-pack    EPIPEN/ADRENACLICK/or ANY BX GENERIC EQUIV    2 each    Inject 0.3 mLs (0.3 mg) into the muscle once as needed for anaphylaxis    Late effect of other and unspecified external causes       fluticasone 50 MCG/ACT spray    FLONASE    3 Bottle    Spray 1-2 sprays into both nostrils daily    Seasonal allergic rhinitis due to pollen, Chronic pain syndrome       losartan-hydrochlorothiazide 50-12.5 MG per tablet    HYZAAR    90 tablet    Take 1 tablet by mouth daily    Hypertension goal BP (blood pressure) < 140/80       magnesium oxide 400 (241.3 MG) MG tablet    MAG-OX    90 tablet    Take 1 tablet (400 mg) by mouth daily    Cramp of limb       mometasone-formoterol 100-5 MCG/ACT oral inhaler    DULERA    1 Inhaler    Inhale 2 puffs into the lungs 2 times daily    Persistent asthma       montelukast 10 MG tablet    SINGULAIR    30 tablet    Take 1 tablet (10 mg) by mouth At Bedtime    Seasonal allergic rhinitis due to pollen       ondansetron 4 MG ODT tab    ZOFRAN ODT    10 tablet    Take 1 tablet (4 mg) by mouth every 8 hours as needed        order for DME     1 Device    Right wrist splint for carpal tunnel syndrome  One* Use as directed at hour of sleep    Carpal tunnel syndrome of right wrist       oxyCODONE-acetaminophen 5-325 MG per tablet    PERCOCET    60 tablet    Take 1 tablet by mouth every 6 hours as needed for moderate to severe pain Max 4 tab/day    Chronic pain syndrome       polyethylene glycol powder     MIRALAX    510 g    Take 17 g (1 capful) by mouth daily    Slow transit constipation       potassium chloride 10 MEQ tablet    K-TAB,KLOR-CON    120 tablet    Take 1 tablet (10 mEq) by mouth daily    Hypokalemia       sertraline 100 MG tablet    ZOLOFT    45 tablet    Take 1.5 tablets (150 mg) by mouth daily    Recurrent major depression in partial remission (H), Other constipation       sodium chloride 0.65 % nasal spray    SALINE MIST    1 Bottle    Spray 1 spray into both nostrils daily as needed for congestion    Seasonal allergic rhinitis due to pollen       STATIN NOT PRESCRIBED (INTENTIONAL)     0 each    1 each daily Statin not prescribed intentionally due to Active liver disease (liver failure, cirrhosis, hepatitis) LIVER METS    Hyperlipidemia LDL goal <100, Type 2 diabetes mellitus without complication (H)       triamcinolone 0.1 % paste    KENALOG    5 g    Take by mouth 2 times daily    Oral aphthae       Vitamin D (Cholecalciferol) 1000 UNITS Tabs     60 tablet    Take 2,000 Units by mouth daily    Vitamin D deficiency       * Notice:  This list has 2 medication(s) that are the same as other medications prescribed for you. Read the directions carefully, and ask your doctor or other care provider to review them with you.

## 2017-12-18 NOTE — PROGRESS NOTES
"[facility] Evaluation  Subjective:    Patient is a 52 year old female presenting with rehab back hpi.   Ines Mott is a 52 year old female with a lumbar condition.      This is a chronic condition  Pt presents with complaints of low back pain with referred pain into bilateral thighs. Pt reports long history of low back pain with various treatments over the year. Pt reported onset of low back pain after a shunt was placed in 2001. Pt reported medical history significant for \"spina bifida and metastatic CA (liver). Pt reported back pain problematic in the fall/winter months when her activity level is low. Pt reported walking up to 2.5 miles in the summer without issue. Pt reported she currently does not drive therefore has difficulty getting around. Pt reported back pain worse with lying down on her back, sitting and standing.    Patient reports pain:  Lumbar spine right, lumbar spine left and central lumbar spine.  Radiates to:  Gluteals right, gluteals left, thigh right and thigh left (pt reports occasional sx's below the knees).  Pain is described as sharp, stabbing and aching and is constant and reported as 6/10.  Associated symptoms:  Tingling. Pain is worse during the day and worse during the night.  Symptoms are exacerbated by bending, lifting, carrying, sitting, walking, lying down and standing and relieved by analgesics and rest.  Since onset symptoms are unchanged.    Previous treatment includes physical therapy and other (injections at [facility] clinic).    General health as reported by patient is fair.                  Barriers include:  Transportation.                            Objective:      Gait:    Gait Type:  Normal   Assistive Devices:  None                 Lumbar/SI Evaluation  ROM:    AROM Lumbar:   Flexion:        Fingertips to below knees; increased midline low back pain  Ext:                    Moderate loss in standing, increased midline low back pain; moderate loss in " lying, increased midline low back/thigh sx's   Side Bend:        Left:     Right:   Rotation:           Left:     Right:   Side Glide:        Left:     Right:           Lumbar Myotomes:        L4 (Ankle DF):  Left:  5    Right:  5    S1 (Toe Raise):  Left: 5    Right: 5        Neural Tension/Mobility:    Left side:  SLR positive.   Right side:   SLR positive.      Lumbar Provocation:    Left positive with:  PROM hip  Right positive with: PROM hip                                                 General     ROS    Assessment/Plan:      Patient is a 52 year old female with lumbar complaints.    Patient has the following significant findings with corresponding treatment plan.                Diagnosis 1:  Chronic low back pain  Pain -  self management, education and home program  Decreased ROM/flexibility - manual therapy and therapeutic exercise  Decreased strength - therapeutic exercise and therapeutic activities  Decreased function - therapeutic activities    Therapy Evaluation Codes:   1) History comprised of:   Personal factors that impact the plan of care:      Past/current experiences and Time since onset of symptoms.    Comorbidity factors that impact the plan of care are:      Fibromyalgia, Osteoarthritis and Overweight.     Medications impacting care: Pain.  2) Examination of Body Systems comprised of:   Body structures and functions that impact the plan of care:      Lumbar spine.   Activity limitations that impact the plan of care are:      Bending, Lifting, Sitting, Stairs, Standing, Walking and Sleeping.  3) Clinical presentation characteristics are:   Stable/Uncomplicated.  4) Decision-Making    Low complexity using standardized patient assessment instrument and/or measureable assessment of functional outcome.  Cumulative Therapy Evaluation is: Low complexity.    Previous and current functional limitations:  (See Goal Flow Sheet for this information)    Short term and Long term goals: (See Goal Flow Sheet  for this information)     Communication ability:  Patient appears to be able to communicate and understand verbal and written communication and follow directions correctly.  Treatment Explanation - The following has been discussed with the patient:   RX ordered/plan of care  Anticipated outcomes  Possible risks and side effects  This patient would benefit from PT intervention to resume normal activities.   Rehab potential is fair.    Frequency:  1 X week, once daily  Duration:  for 6 weeks  Discharge Plan:  Achieve all LTG.  Independent in home treatment program.  Reach maximal therapeutic benefit.    Please refer to the daily flowsheet for treatment today, total treatment time and time spent performing 1:1 timed codes.

## 2017-12-19 NOTE — PROGRESS NOTES
[facility] Evaluation  Subjective:    Patient is a 52 year old female presenting with rehab left ankle/foot hpi.                                      Pertinent medical history includes:  Cancer, overweight, high blood pressure, depression, fibromyalgia, asthma, migraines, sleep disorder/apnea, menopausal and other (pain at night/rest, headaches).  Medical allergies: yes (Latex).    Current medications:  High blood pressure medication.      Primary job tasks include:  Lifting (carrying, pushing/pulling).              Oswestry Score: 62 %                 Objective:    System    Physical Exam    General     ROS    Assessment/Plan:

## 2017-12-22 ENCOUNTER — OFFICE VISIT (OUTPATIENT)
Dept: FAMILY MEDICINE | Facility: CLINIC | Age: 52
End: 2017-12-22
Payer: COMMERCIAL

## 2017-12-22 VITALS
SYSTOLIC BLOOD PRESSURE: 130 MMHG | BODY MASS INDEX: 41.16 KG/M2 | WEIGHT: 255 LBS | HEART RATE: 71 BPM | DIASTOLIC BLOOD PRESSURE: 84 MMHG | OXYGEN SATURATION: 98 % | RESPIRATION RATE: 16 BRPM

## 2017-12-22 DIAGNOSIS — E78.5 HYPERLIPIDEMIA WITH TARGET LDL LESS THAN 130: Chronic | ICD-10-CM

## 2017-12-22 DIAGNOSIS — F33.41 RECURRENT MAJOR DEPRESSION IN PARTIAL REMISSION (H): Chronic | ICD-10-CM

## 2017-12-22 DIAGNOSIS — I10 HYPERTENSION GOAL BP (BLOOD PRESSURE) < 140/80: Primary | Chronic | ICD-10-CM

## 2017-12-22 DIAGNOSIS — Z11.59 NEED FOR HEPATITIS C SCREENING TEST: ICD-10-CM

## 2017-12-22 DIAGNOSIS — G89.4 CHRONIC PAIN SYNDROME: ICD-10-CM

## 2017-12-22 DIAGNOSIS — F43.10 PTSD (POST-TRAUMATIC STRESS DISORDER): Chronic | ICD-10-CM

## 2017-12-22 DIAGNOSIS — Z12.31 VISIT FOR SCREENING MAMMOGRAM: ICD-10-CM

## 2017-12-22 DIAGNOSIS — Z12.11 SCREEN FOR COLON CANCER: ICD-10-CM

## 2017-12-22 PROCEDURE — 99213 OFFICE O/P EST LOW 20 MIN: CPT | Performed by: FAMILY MEDICINE

## 2017-12-22 RX ORDER — OXYCODONE AND ACETAMINOPHEN 5; 325 MG/1; MG/1
1 TABLET ORAL EVERY 6 HOURS PRN
Qty: 60 TABLET | Refills: 0 | Status: SHIPPED | OUTPATIENT
Start: 2017-12-22 | End: 2018-01-12

## 2017-12-22 ASSESSMENT — PATIENT HEALTH QUESTIONNAIRE - PHQ9
10. IF YOU CHECKED OFF ANY PROBLEMS, HOW DIFFICULT HAVE THESE PROBLEMS MADE IT FOR YOU TO DO YOUR WORK, TAKE CARE OF THINGS AT HOME, OR GET ALONG WITH OTHER PEOPLE: NOT DIFFICULT AT ALL
SUM OF ALL RESPONSES TO PHQ QUESTIONS 1-9: 10
SUM OF ALL RESPONSES TO PHQ QUESTIONS 1-9: 10

## 2017-12-22 NOTE — PATIENT INSTRUCTIONS
(I10) Hypertension goal BP (blood pressure) < 140/80  (primary encounter diagnosis)  Comment:    Plan:      (G89.4) Chronic pain syndrome  Comment:    Plan: oxyCODONE-acetaminophen (PERCOCET) 5-325 MG per        tablet             (Z12.11) Screen for colon cancer  Comment:    Plan: Fecal colorectal cancer screen FIT - Future         (S+30)             (Z12.31) Visit for screening mammogram  Comment:    Plan: MA SCREENING DIGITAL BILAT - Future  (s+30)             (Z11.59) Need for hepatitis C screening test  Comment:    Plan:      (E78.5) Hyperlipidemia with target LDL less than 130  Comment:    Plan:      (F33.41) Recurrent major depression in partial remission  Comment:    Plan:      (F43.10) PTSD (post-traumatic stress disorder)  Comment:    Plan:
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
## 2017-12-22 NOTE — MR AVS SNAPSHOT
After Visit Summary   12/22/2017    Ines Mott    MRN: [id number]           Patient Information     Date Of Birth          1965        Visit Information        Provider Department      12/22/2017 7:30 AM Dustin Fatima MD [facility]        Today's Diagnoses     Hypertension goal BP (blood pressure) < 140/80    -  1    Chronic pain syndrome        Screen for colon cancer        Visit for screening mammogram        Need for hepatitis C screening test        Hyperlipidemia with target LDL less than 130        Recurrent major depression in partial remission        PTSD (post-traumatic stress disorder)          Care Instructions    (I10) Hypertension goal BP (blood pressure) < 140/80  (primary encounter diagnosis)  Comment:    Plan:      (G89.4) Chronic pain syndrome  Comment:    Plan: oxyCODONE-acetaminophen (PERCOCET) 5-325 MG per        tablet             (Z12.11) Screen for colon cancer  Comment:    Plan: Fecal colorectal cancer screen FIT - Future         (S+30)             (Z12.31) Visit for screening mammogram  Comment:    Plan: MA SCREENING DIGITAL BILAT - Future  (s+30)             (Z11.59) Need for hepatitis C screening test  Comment:    Plan:      (E78.5) Hyperlipidemia with target LDL less than 130  Comment:    Plan:      (F33.41) Recurrent major depression in partial remission  Comment:    Plan:      (F43.10) PTSD (post-traumatic stress disorder)  Comment:    Plan:                    Follow-ups after your visit        Follow-up notes from your care team     Return in about 4 weeks (around 1/19/2018).      Future tests that were ordered for you today     Open Future Orders        Priority Expected Expires Ordered    MA SCREENING DIGITAL BILAT - Future  (s+30) Routine  12/22/2018 12/22/2017    Fecal colorectal cancer screen FIT - Future (S+30) Routine 1/12/2018 1/21/2018 12/22/2017            Who to contact     If you have questions or need  "follow up information about today's clinic visit or your schedule please contact [facility] directly at [phone].  Normal or non-critical lab and imaging results will be communicated to you by MyChart, letter or phone within 4 business days after the clinic has received the results. If you do not hear from us within 7 days, please contact the clinic through [employer] or phone. If you have a critical or abnormal lab result, we will notify you by phone as soon as possible.  Submit refill requests through [employer] or call your pharmacy and they will forward the refill request to us. Please allow 3 business days for your refill to be completed.          Additional Information About Your Visit        [employer] Information     [employer] lets you send messages to your doctor, view your test results, renew your prescriptions, schedule appointments and more. To sign up, go to [email or web address] . Click on \"Log in\" on the left side of the screen, which will take you to the Welcome page. Then click on \"Sign up Now\" on the right side of the page.     You will be asked to enter the access code listed below, as well as some personal information. Please follow the directions to create your username and password.     Your access code is: [email or web address]  Expires: 2018 10:55 AM     Your access code will  in 90 days. If you need help or a new code, please call your [place] clinic or [phone].        Care EveryWhere ID     This is your Care EveryWhere ID. This could be used by other organizations to access your [place] medical records  [id number]        Your Vitals Were     Pulse Respirations Pulse Oximetry BMI (Body Mass Index)          71 16 98% 41.16 kg/m2         Blood Pressure from Last 3 Encounters:   17 130/84   17 (!) 171/99   17 130/86    Weight from Last 3 Encounters:   17 255 lb (115.7 kg)   17 250 lb (113.4 kg)   17 257 lb (116.6 kg)    "              Where to get your medicines      Some of these will need a paper prescription and others can be bought over the counter.  Ask your nurse if you have questions.     Bring a paper prescription for each of these medications     oxyCODONE-acetaminophen 5-325 MG per tablet          Primary Care Provider Office Phone # Fax #    Dustin Rina Fatima [phone]       [address] [facility] [address]        Equal Access to Services     VALENTE RIVERA : Hadii aad ku hadasho Soomaali, waaxda luqadaha, qaybta kaalmada adeegyada, waxay idiin hayaan adeeg kharash ladeonna . So [facility] [phone].    ATENCIÓN: Si habla esphector, tiene a christianson disposición servicios gratuitos de asistencia lingüística. Ll[facility] [phone].    We comply with applicable federal civil rights laws and [place] laws. We do not discriminate on the basis of race, color, national origin, age, disability, sex, sexual orientation, or gender identity.            Thank you!     Thank you for choosing [facility]  for your care. Our goal is always to provide you with excellent care. Hearing back from our patients is one way we can continue to improve our services. Please take a few minutes to complete the written survey that you may receive in the mail after your visit with us. Thank you!             Your Updated Medication List - Protect others around you: Learn how to safely use, store and throw away your medicines at www.disposemymeds.org.          This list is accurate as of: 12/22/17  7:45 AM.  Always use your most recent med list.                   Brand Name Dispense Instructions for use Diagnosis    * albuterol 108 (90 BASE) MCG/ACT Inhaler    VENTOLIN HFA    54 g    INHALE TWO PUFFS BY MOUTH EVERY 4 HOURS AS NEEDED FOR SHORTNESS OF BREATH/DYSPNEA    Mild intermittent asthma without complication       * albuterol (2.5 MG/3ML) 0.083% neb solution     30 vial    Take 1 vial (2.5 mg) by  nebulization every 6 hours as needed for shortness of breath / dyspnea or wheezing    Mild intermittent asthma with acute exacerbation       ASPIRIN NOT PRESCRIBED    INTENTIONAL    1 each    Please choose reason not prescribed, below    Aspirin intolerance       baclofen 10 MG tablet    LIORESAL    90 tablet    Take 1 tablet (10 mg) by mouth 3 times daily    Chronic bilateral low back pain with right-sided sciatica       blood glucose calibration solution    no brand specified    1 each    Use to calibrate blood glucose monitor as directed.    Type 2 diabetes mellitus without complication, without long-term current use of insulin (H)       blood glucose lancets standard    no brand specified    100 each    Use to test blood sugar daily times daily or as directed.    Type 2 diabetes mellitus without complication, without long-term current use of insulin (H)       blood glucose monitoring meter device kit    no brand specified    1 kit    Use to test blood sugar larisa times daily or as directed.    Type 2 diabetes mellitus without complication, without long-term current use of insulin (H)       blood glucose monitoring test strip    no brand specified    100 strip    Use to test blood sugars once times daily or as directed    Type 2 diabetes mellitus without complication, without long-term current use of insulin (H)       cetirizine 10 MG tablet    zyrTEC    30 tablet    Take 1 tablet (10 mg) by mouth every evening    Seasonal allergic rhinitis due to pollen       cyanocobalamin 1000 MCG/ML injection    VITAMIN B12    1 mL    Inject 1 mL (1,000 mcg) Subcutaneous every 30 days    B12 deficiency       EPINEPHrine 0.3 MG/0.3ML injection 2-pack    EPIPEN/ADRENACLICK/or ANY BX GENERIC EQUIV    2 each    Inject 0.3 mLs (0.3 mg) into the muscle once as needed for anaphylaxis    Late effect of other and unspecified external causes       fluticasone 50 MCG/ACT spray    FLONASE    3 Bottle    Spray 1-2 sprays into both nostrils  daily    Seasonal allergic rhinitis due to pollen, Chronic pain syndrome       losartan-hydrochlorothiazide 50-12.5 MG per tablet    HYZAAR    90 tablet    Take 1 tablet by mouth daily    Hypertension goal BP (blood pressure) < 140/80       magnesium oxide 400 (241.3 MG) MG tablet    MAG-OX    90 tablet    Take 1 tablet (400 mg) by mouth daily    Cramp of limb       mometasone-formoterol 100-5 MCG/ACT oral inhaler    DULERA    1 Inhaler    Inhale 2 puffs into the lungs 2 times daily    Persistent asthma       montelukast 10 MG tablet    SINGULAIR    30 tablet    Take 1 tablet (10 mg) by mouth At Bedtime    Seasonal allergic rhinitis due to pollen       ondansetron 4 MG ODT tab    ZOFRAN ODT    10 tablet    Take 1 tablet (4 mg) by mouth every 8 hours as needed        order for DME     1 Device    Right wrist splint for carpal tunnel syndrome  One* Use as directed at hour of sleep    Carpal tunnel syndrome of right wrist       oxyCODONE-acetaminophen 5-325 MG per tablet    PERCOCET    60 tablet    Take 1 tablet by mouth every 6 hours as needed for moderate to severe pain Max 4 tab/day    Chronic pain syndrome       polyethylene glycol powder    MIRALAX    510 g    Take 17 g (1 capful) by mouth daily    Slow transit constipation       potassium chloride 10 MEQ tablet    K-TAB,KLOR-CON    120 tablet    Take 1 tablet (10 mEq) by mouth daily    Hypokalemia       sertraline 100 MG tablet    ZOLOFT    45 tablet    Take 1.5 tablets (150 mg) by mouth daily    Recurrent major depression in partial remission (H), Other constipation       sodium chloride 0.65 % nasal spray    SALINE MIST    1 Bottle    Spray 1 spray into both nostrils daily as needed for congestion    Seasonal allergic rhinitis due to pollen       STATIN NOT PRESCRIBED (INTENTIONAL)     0 each    1 each daily Statin not prescribed intentionally due to Active liver disease (liver failure, cirrhosis, hepatitis) LIVER METS    Hyperlipidemia LDL goal <100, Type 2  diabetes mellitus without complication (H)       triamcinolone 0.1 % paste    KENALOG    5 g    Take by mouth 2 times daily    Oral aphthae       Vitamin D (Cholecalciferol) 1000 UNITS Tabs     60 tablet    Take 2,000 Units by mouth daily    Vitamin D deficiency       * Notice:  This list has 2 medication(s) that are the same as other medications prescribed for you. Read the directions carefully, and ask your doctor or other care provider to review them with you.

## 2017-12-22 NOTE — NURSING NOTE
"Chief Complaint   Patient presents with     Recheck Medication       Initial /84  Pulse 71  Resp 16  Wt 255 lb (115.7 kg)  SpO2 98%  BMI 41.16 kg/m2 Estimated body mass index is 41.16 kg/(m^2) as calculated from the following:    Height as of 12/13/17: 5' 6\" (1.676 m).    Weight as of this encounter: 255 lb (115.7 kg).  Medication Reconciliation: mike Villeda CMA      "

## 2018-01-07 ENCOUNTER — HOSPITAL ENCOUNTER (EMERGENCY)
Facility: CLINIC | Age: 53
Discharge: HOME | End: 2018-01-07
Attending: EMERGENCY MEDICINE | Admitting: EMERGENCY MEDICINE
Payer: COMMERCIAL

## 2018-01-07 VITALS
HEART RATE: 87 BPM | HEIGHT: 66 IN | OXYGEN SATURATION: 99 % | TEMPERATURE: 97.6 F | BODY MASS INDEX: 40.18 KG/M2 | DIASTOLIC BLOOD PRESSURE: 104 MMHG | WEIGHT: 250 LBS | RESPIRATION RATE: 20 BRPM | SYSTOLIC BLOOD PRESSURE: 205 MMHG

## 2018-01-07 DIAGNOSIS — T74.21XA SEXUAL ASSAULT OF ADULT, INITIAL ENCOUNTER: ICD-10-CM

## 2018-01-07 PROCEDURE — 99284 EMERGENCY DEPT VISIT MOD MDM: CPT | Mod: 25

## 2018-01-07 PROCEDURE — 25000132 ZZH RX MED GY IP 250 OP 250 PS 637

## 2018-01-07 PROCEDURE — 25000128 H RX IP 250 OP 636: Performed by: EMERGENCY MEDICINE

## 2018-01-07 PROCEDURE — 25000132 ZZH RX MED GY IP 250 OP 250 PS 637: Performed by: EMERGENCY MEDICINE

## 2018-01-07 PROCEDURE — 96372 THER/PROPH/DIAG INJ SC/IM: CPT

## 2018-01-07 PROCEDURE — 25000125 ZZHC RX 250

## 2018-01-07 PROCEDURE — 94640 AIRWAY INHALATION TREATMENT: CPT

## 2018-01-07 PROCEDURE — 25000125 ZZHC RX 250: Performed by: EMERGENCY MEDICINE

## 2018-01-07 RX ORDER — EMTRICITABINE AND TENOFOVIR DISOPROXIL FUMARATE 200; 300 MG/1; MG/1
1 TABLET, FILM COATED ORAL DAILY
Qty: 25 TABLET | Refills: 0 | Status: SHIPPED | OUTPATIENT
Start: 2018-01-07 | End: 2018-01-24

## 2018-01-07 RX ORDER — CEFTRIAXONE SODIUM 250 MG
250 VIAL (EA) INJECTION ONCE
Status: COMPLETED | OUTPATIENT
Start: 2018-01-07 | End: 2018-01-07

## 2018-01-07 RX ORDER — ACETAMINOPHEN 500 MG
500 TABLET ORAL ONCE
Status: COMPLETED | OUTPATIENT
Start: 2018-01-07 | End: 2018-01-07

## 2018-01-07 RX ORDER — METRONIDAZOLE 500 MG/1
2000 TABLET ORAL ONCE
Qty: 4 TABLET | Refills: 0 | Status: SHIPPED | OUTPATIENT
Start: 2018-01-07 | End: 2018-01-07

## 2018-01-07 RX ORDER — IPRATROPIUM BROMIDE AND ALBUTEROL SULFATE 2.5; .5 MG/3ML; MG/3ML
3 SOLUTION RESPIRATORY (INHALATION) ONCE
Status: COMPLETED | OUTPATIENT
Start: 2018-01-07 | End: 2018-01-07

## 2018-01-07 RX ORDER — AZITHROMYCIN 250 MG/1
1000 TABLET, FILM COATED ORAL ONCE
Status: COMPLETED | OUTPATIENT
Start: 2018-01-07 | End: 2018-01-07

## 2018-01-07 RX ORDER — LIDOCAINE HYDROCHLORIDE 10 MG/ML
INJECTION, SOLUTION EPIDURAL; INFILTRATION; INTRACAUDAL; PERINEURAL
Status: COMPLETED
Start: 2018-01-07 | End: 2018-01-07

## 2018-01-07 RX ORDER — ONDANSETRON 4 MG/1
4 TABLET, ORALLY DISINTEGRATING ORAL EVERY 8 HOURS PRN
Qty: 10 TABLET | Refills: 0 | Status: SHIPPED | OUTPATIENT
Start: 2018-01-07 | End: 2018-01-10

## 2018-01-07 RX ORDER — IBUPROFEN 600 MG/1
600 TABLET, FILM COATED ORAL ONCE
Status: DISCONTINUED | OUTPATIENT
Start: 2018-01-07 | End: 2018-01-07

## 2018-01-07 RX ADMIN — LIDOCAINE HYDROCHLORIDE 300 MG: 10 INJECTION, SOLUTION EPIDURAL; INFILTRATION; INTRACAUDAL; PERINEURAL at 18:51

## 2018-01-07 RX ADMIN — IPRATROPIUM BROMIDE AND ALBUTEROL SULFATE 3 ML: .5; 3 SOLUTION RESPIRATORY (INHALATION) at 16:23

## 2018-01-07 RX ADMIN — ACETAMINOPHEN 500 MG: 500 TABLET, FILM COATED ORAL at 16:34

## 2018-01-07 RX ADMIN — AZITHROMYCIN 1000 MG: 250 TABLET, FILM COATED ORAL at 18:49

## 2018-01-07 RX ADMIN — CEFTRIAXONE SODIUM 250 MG: 250 INJECTION, POWDER, FOR SOLUTION INTRAMUSCULAR; INTRAVENOUS at 18:49

## 2018-01-07 ASSESSMENT — ENCOUNTER SYMPTOMS: COUGH: 1
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
## 2018-01-07 NOTE — ED AVS SNAPSHOT
Emergency Department    [address] [place] [address]    Phone:  [phone]    Fax:  [phone]                                       Ines Mott   MRN: [id number]    Department:   Emergency Department   Date of Visit:  1/7/2018           After Visit Summary Signature Page     I have received my discharge instructions, and my questions have been answered. I have discussed any challenges I see with this plan with the nurse or doctor.    ..........................................................................................................................................  Patient/Patient Representative Signature      ..........................................................................................................................................  Patient Representative Print Name and Relationship to Patient    ..................................................               ................................................  Date                                            Time    ..........................................................................................................................................  Reviewed by Signature/Title    ...................................................              ..............................................  Date                                                            Time

## 2018-01-12 ENCOUNTER — OFFICE VISIT (OUTPATIENT)
Dept: FAMILY MEDICINE | Facility: CLINIC | Age: 53
End: 2018-01-12
Payer: COMMERCIAL

## 2018-01-12 VITALS
TEMPERATURE: 96.8 F | DIASTOLIC BLOOD PRESSURE: 76 MMHG | HEART RATE: 61 BPM | WEIGHT: 255 LBS | RESPIRATION RATE: 16 BRPM | SYSTOLIC BLOOD PRESSURE: 134 MMHG | OXYGEN SATURATION: 99 % | BODY MASS INDEX: 41.16 KG/M2

## 2018-01-12 DIAGNOSIS — N28.89 RENAL MASS, RIGHT: ICD-10-CM

## 2018-01-12 DIAGNOSIS — I10 HYPERTENSION GOAL BP (BLOOD PRESSURE) < 140/80: Chronic | ICD-10-CM

## 2018-01-12 DIAGNOSIS — E78.5 HYPERLIPIDEMIA WITH TARGET LDL LESS THAN 130: Primary | Chronic | ICD-10-CM

## 2018-01-12 DIAGNOSIS — C18.9 CARCINOMA OF COLON METASTATIC TO LIVER (H): ICD-10-CM

## 2018-01-12 DIAGNOSIS — G89.4 CHRONIC PAIN SYNDROME: ICD-10-CM

## 2018-01-12 DIAGNOSIS — C78.7 CARCINOMA OF COLON METASTATIC TO LIVER (H): ICD-10-CM

## 2018-01-12 DIAGNOSIS — E11.9 TYPE 2 DIABETES MELLITUS WITHOUT COMPLICATION, WITHOUT LONG-TERM CURRENT USE OF INSULIN (H): ICD-10-CM

## 2018-01-12 PROCEDURE — 99214 OFFICE O/P EST MOD 30 MIN: CPT | Performed by: FAMILY MEDICINE

## 2018-01-12 RX ORDER — EMTRICITABINE AND TENOFOVIR DISOPROXIL FUMARATE 200; 300 MG/1; MG/1
1 TABLET, FILM COATED ORAL DAILY
Qty: 25 TABLET | Refills: 0 | Status: SHIPPED | OUTPATIENT
Start: 2018-01-12 | End: 2018-01-24

## 2018-01-12 RX ORDER — OXYCODONE AND ACETAMINOPHEN 5; 325 MG/1; MG/1
1 TABLET ORAL EVERY 6 HOURS PRN
Qty: 60 TABLET | Refills: 0 | Status: SHIPPED | OUTPATIENT
Start: 2018-01-26 | End: 2018-02-01

## 2018-01-12 RX ORDER — OXYCODONE AND ACETAMINOPHEN 5; 325 MG/1; MG/1
1 TABLET ORAL EVERY 6 HOURS PRN
Qty: 60 TABLET | Refills: 0 | Status: SHIPPED | OUTPATIENT
Start: 2018-01-12 | End: 2018-01-12

## 2018-01-12 ASSESSMENT — PATIENT HEALTH QUESTIONNAIRE - PHQ9: SUM OF ALL RESPONSES TO PHQ QUESTIONS 1-9: 6
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
## 2018-01-24 ENCOUNTER — APPOINTMENT (OUTPATIENT)
Dept: GENERAL RADIOLOGY | Facility: CLINIC | Age: 53
End: 2018-01-24
Attending: EMERGENCY MEDICINE
Payer: COMMERCIAL

## 2018-01-24 ENCOUNTER — HOSPITAL ENCOUNTER (EMERGENCY)
Facility: CLINIC | Age: 53
Discharge: HOME | End: 2018-01-25
Attending: EMERGENCY MEDICINE | Admitting: EMERGENCY MEDICINE
Payer: COMMERCIAL

## 2018-01-24 DIAGNOSIS — J45.21 MILD INTERMITTENT ASTHMA WITH EXACERBATION: ICD-10-CM

## 2018-01-24 DIAGNOSIS — J06.9 VIRAL URI WITH COUGH: ICD-10-CM

## 2018-01-24 PROCEDURE — 99283 EMERGENCY DEPT VISIT LOW MDM: CPT | Mod: 25

## 2018-01-24 PROCEDURE — 71046 X-RAY EXAM CHEST 2 VIEWS: CPT

## 2018-01-24 PROCEDURE — 94640 AIRWAY INHALATION TREATMENT: CPT

## 2018-01-24 RX ORDER — IPRATROPIUM BROMIDE AND ALBUTEROL SULFATE 2.5; .5 MG/3ML; MG/3ML
3 SOLUTION RESPIRATORY (INHALATION)
Status: DISPENSED | OUTPATIENT
Start: 2018-01-24 | End: 2018-01-25

## 2018-01-24 RX ORDER — ACETAMINOPHEN 500 MG
1000 TABLET ORAL ONCE
Status: COMPLETED | OUTPATIENT
Start: 2018-01-24 | End: 2018-01-25

## 2018-01-24 ASSESSMENT — ENCOUNTER SYMPTOMS
COUGH: 1
FEVER: 1
FATIGUE: 1

## 2018-01-24 NOTE — ED AVS SNAPSHOT
Emergency Department    [address] [facility] [address]    Phone:  [phone]    Fax:  [phone]                                       Ines Mott   MRN: [id number]    Department:   Emergency Department   Date of Visit:  1/24/2018           Patient Information     Date Of Birth          1965        Your diagnoses for this visit were:     Viral URI with cough     Mild intermittent asthma with exacerbation        You were seen by Jemal Carlisle MD.      Follow-up Information     Follow up with Dustin Fatima MD.    Specialty:  Family Practice    Contact information:    [address]  [facility] [address] [phone]          Discharge Instructions       Discharge Instructions  Upper Respiratory Infection    The upper respiratory tract includes the sinuses, nasal passages, pharynx, and larynx. A URI, or upper respiratory infection, is an infection of any of the parts of the upper airway. Symptoms include runny nose, congestion, sneezing, sore throat, cough, and fever. URIs are almost always caused by a virus. Antibiotics do not help with viral infections, so are generally not prescribed. A URI is very contagious through coughing and nasal secretions; make sure you wash your hands often and clean surfaces after sneezing, coughing or touching them. While you should start to improve in 3 - 5 days, remember that sometimes a cough can linger for several weeks.    Generally, every Emergency Department visit should have a follow-up clinic visit with either a primary or a specialty clinic/provider. Please follow-up as instructed by your emergency provider today.    Return to the Emergency Department if:    Any of your symptoms get much worse.    You seem very sick, like being too weak to get up.    You have chest pain or shortness of breath.     You have a severe headache.    You are vomiting (throwing up) so much you cannot keep fluids or medicines down.    You have  confusion or seem unusually drowsy.    You have a seizure.    What can I do to help myself?    Fill any prescriptions the provider gave you and take them right away    If you have a fever, get plenty of rest and drink lots of fluids, especially water.    Using a humidifier or saline nose spray will also help loosen mucous.     Clothes or blankets will not change your fever. Do what is comfortable for you.    Bathing or sponging in lukewarm water may help you feel better.    Acetaminophen (Tylenol ) or ibuprofen (Advil , Motrin ) will help bring fever down and may help you feel more comfortable. Be sure to read and follow the package directions, and ask your provider if you have questions.    Do not drink alcohol.    Decongestants may help you feel better. You may use decongestant nose sprays Afrin  (oxymetazoline) or Solomon-Synephrine  (phenylephrine hydrochloride) for up to 3 days, or may use a decongestant tablet like Sudafed  (pseudoephedrine).  If you were given a prescription for medicine here today, be sure to read all of the information (including the package insert) that comes with your prescription.  This will include important information about the medicine, its side effects, and any warnings that you need to know about.  The pharmacist who fills the prescription can provide more information and answer questions you may have about the medicine.  If you have questions or concerns that the pharmacist cannot address, please call or return to the Emergency Department.   Remember that you can always come back to the Emergency Department if you are not able to see your regular provider in the amount of time listed above, if you get any new symptoms, or if there is anything that worries you.    Discharge Instructions  Asthma    Asthma is a condition causing narrowing and inflammation of the airways that can make it hard to breathe.  Asthma can also cause cough, wheezing, noisy breathing and tightness in the chest.   Asthma can be brought on or  triggered  by many things, including dust, mold, pollen, cigarette smoke, exercise, stress and infections (like the common cold).     Generally, every Emergency Department visit should have a follow-up clinic visit with either a primary or a specialty clinic/provider. Please follow-up as instructed by your emergency provider today.    Return to the Emergency Department if:    Your breathing gets worse.    You need to use your inhaler more often than every 4 hours, or cannot get relief from your inhaler.    You are very weak, or feel much more ill.    You develop new symptoms, such as chest pain.    You cough up blood.    You are vomiting (throwing up) enough that you cannot keep fluids or your medicine down.    What can I do to help myself?    Fill any prescriptions the provider gave you and take them right away--especially antibiotics. Be sure to finish the whole antibiotic prescription.    You may be given a prescription for an inhaler, which can help loosen tight air passages.  Use this as needed, but not more often than directed. Inhalers work much better when used with a spacer.     You may be given a prescription for a steroid to reduce inflammation. Used long-term, these can have some side effects, but for short-term use they are safe. You may notice restlessness or increased appetite (eating more).        You may use non-prescription cough or cold medicines. Cough medicines may help, but do not make the cough go away completely.     Avoid smoke, because this can make you feel worse. If you smoke, this may be a good time to quit! Consider using nicotine lozenges, gum, or patches to reduce cravings.     If you have a fever, Tylenol  (acetaminophen), Motrin  (ibuprofen), or Advil  (ibuprofen), may help bring fever down and may help you feel more comfortable. Be sure to read and follow the package directions, and ask your provider if you have questions.    Be sure to get your flu  shot each year.  For certain age groups, the pneumonia shot can help prevent pneumonia.  It is important that you follow up with your regular provider, to be sure that you are improving from this spell (an acute asthma exacerbation), and also to do what you can to keep from having trouble again. Sometimes you need long-term medicines to keep your asthma under control.   If you were given a prescription for medicine here today, be sure to read all of the information (including the package insert) that comes with your prescription.  This will include important information about the medicine, its side effects, and any warnings that you need to know about.  The pharmacist who fills the prescription can provide more information and answer questions you may have about the medicine.  If you have questions or concerns that the pharmacist cannot address, please call or return to the Emergency Department.   Remember that you can always come back to the Emergency Department if you are not able to see your regular provider in the amount of time listed above, if you get any new symptoms, or if there is anything that worries you.    Future Appointments        Provider Department Dept Phone Center    2/5/2018 10:00 AM OTTO YUSUF MD [facility] [phone] [place]      24 Hour Appointment Hotline       To make an appointment at any [facility], call [phone] ([phone]). If you don't have a family doctor or clinic, we will help you find one. [facility] are conveniently located to serve the needs of you and your family.             Review of your medicines      Our records show that you are taking the medicines listed below. If these are incorrect, please call your family doctor or clinic.        Dose / Directions Last dose taken    ASPIRIN NOT PRESCRIBED   Commonly known as:  INTENTIONAL   Quantity:  1 each        Please choose reason not prescribed, below    Refills:  0        blood glucose calibration solution   Commonly known as:  no brand specified   Quantity:  1 each        Use to calibrate blood glucose monitor as directed.   Refills:  0        blood glucose lancets standard   Commonly known as:  no brand specified   Quantity:  100 each        Use to test blood sugar daily times daily or as directed.   Refills:  11        blood glucose monitoring meter device kit   Commonly known as:  no brand specified   Quantity:  1 kit        Use to test blood sugar larisa times daily or as directed.   Refills:  0        blood glucose monitoring test strip   Commonly known as:  no brand specified   Quantity:  100 strip        Use to test blood sugars once times daily or as directed   Refills:  3        cyanocobalamin 1000 MCG/ML injection   Commonly known as:  VITAMIN B12   Dose:  1 mL   Quantity:  1 mL        Inject 1 mL (1,000 mcg) Subcutaneous every 30 days   Refills:  11        EPINEPHrine 0.3 MG/0.3ML injection 2-pack   Commonly known as:  EPIPEN/ADRENACLICK/or ANY BX GENERIC EQUIV   Dose:  0.3 mg   Quantity:  2 each        Inject 0.3 mLs (0.3 mg) into the muscle once as needed for anaphylaxis   Refills:  5        losartan-hydrochlorothiazide 50-12.5 MG per tablet   Commonly known as:  HYZAAR   Dose:  1 tablet   Quantity:  90 tablet        Take 1 tablet by mouth daily   Refills:  3        magnesium oxide 400 (241.3 MG) MG tablet   Commonly known as:  MAG-OX   Dose:  400 mg   Quantity:  90 tablet        Take 1 tablet (400 mg) by mouth daily   Refills:  3        order for DME   Quantity:  1 Device        Right wrist splint for carpal tunnel syndrome  One* Use as directed at hour of sleep   Refills:  1        oxyCODONE-acetaminophen 5-325 MG per tablet   Commonly known as:  PERCOCET   Dose:  1 tablet   Quantity:  60 tablet   Start taking on:  1/26/2018        Take 1 tablet by mouth every 6 hours as needed for moderate to severe pain Max 4 tab/day   Refills:  0         polyethylene glycol powder   Commonly known as:  MIRALAX   Dose:  1 capful   Quantity:  510 g        Take 17 g (1 capful) by mouth daily   Refills:  3        potassium chloride 10 MEQ tablet   Commonly known as:  K-TABKLOR-CON   Dose:  10 mEq   Indication:  Low Amount of Potassium in the Blood   Quantity:  120 tablet        Take 1 tablet (10 mEq) by mouth daily   Refills:  11        sertraline 100 MG tablet   Commonly known as:  ZOLOFT   Dose:  150 mg   Indication:  Major Depressive Disorder   Quantity:  45 tablet        Take 1.5 tablets (150 mg) by mouth daily   Refills:  11        STATIN NOT PRESCRIBED (INTENTIONAL)   Dose:  1 each   Quantity:  0 each        1 each daily Statin not prescribed intentionally due to Active liver disease (liver failure, cirrhosis, hepatitis) LIVER METS   Refills:  0        Vitamin D (Cholecalciferol) 1000 UNITS Tabs   Dose:  2000 Units   Quantity:  60 tablet        Take 2,000 Units by mouth daily   Refills:  11          ASK your doctor about these medications        Dose / Directions Last dose taken    * albuterol 108 (90 BASE) MCG/ACT Inhaler   Commonly known as:  VENTOLIN HFA   What changed:  Another medication with the same name was added. Make sure you understand how and when to take each.   Quantity:  54 g   Ask about: Which instructions should I use?        INHALE TWO PUFFS BY MOUTH EVERY 4 HOURS AS NEEDED FOR SHORTNESS OF BREATH/DYSPNEA   Refills:  1        * albuterol (2.5 MG/3ML) 0.083% neb solution   Dose:  1 vial   What changed:  Another medication with the same name was added. Make sure you understand how and when to take each.   Quantity:  30 vial   Ask about: Which instructions should I use?        Take 1 vial (2.5 mg) by nebulization every 6 hours as needed for shortness of breath / dyspnea or wheezing   Refills:  3        * albuterol 108 (90 BASE) MCG/ACT Inhaler   Commonly known as:  PROAIR HFA   Dose:  2 puff   What changed:  You were already taking a medication  with the same name, and this prescription was added. Make sure you understand how and when to take each.   Quantity:  1 Inhaler   Ask about: Which instructions should I use?        Inhale 2 puffs into the lungs every 4 hours as needed for shortness of breath / dyspnea or wheezing   Refills:  1        * albuterol (2.5 MG/3ML) 0.083% neb solution   Dose:  1 vial   What changed:  You were already taking a medication with the same name, and this prescription was added. Make sure you understand how and when to take each.   Quantity:  1 Box   Ask about: Which instructions should I use?        Take 1 vial (2.5 mg) by nebulization every 6 hours as needed for shortness of breath / dyspnea or wheezing   Refills:  0        * Notice:  This list has 4 medication(s) that are the same as other medications prescribed for you. Read the directions carefully, and ask your doctor or other care provider to review them with you.            Prescriptions were sent or printed at these locations (2 Prescriptions)                   Other Prescriptions                Printed at Department/Unit printer (2 of 2)         albuterol (PROAIR HFA) 108 (90 BASE) MCG/ACT Inhaler               albuterol (2.5 MG/3ML) 0.083% neb solution                Procedures and tests performed during your visit     XR Chest 2 Views      Orders Needing Specimen Collection     None      Pending Results     No orders found for last 3 day(s).            Pending Culture Results     No orders found for last 3 day(s).            Pending Results Instructions     If you had any lab results that were not finalized at the time of your Discharge, you can call the ED Lab Result RN at [phone]. You will be contacted by this team for any positive Lab results or changes in treatment. The nurses are available 7 days a week from 10A to 6:30P.  You can leave a message 24 hours per day and they will return your call.        Test Results From Your Hospital Stay        1/25/2018   1:06 AM      Narrative     XR CHEST 2 VW  1/24/2018 11:45 PM     INDICATION: Shortness of breath.    COMPARISON: 10/3/2017.        Impression     IMPRESSION: No infiltrates or other acute findings. Heart size is  within normal limits. Minimal linear atelectasis or scarring left  lower lung. Shunt tubing projecting over the chest and upper abdomen.  No significant change.    WALLY JONES MD                Clinical Quality Measure: Blood Pressure Screening     Your blood pressure was checked while you were in the emergency department today. The last reading we obtained was  BP: (!) 166/91 . Please read the guidelines below about what these numbers mean and what you should do about them.  If your systolic blood pressure (the top number) is less than 120 and your diastolic blood pressure (the bottom number) is less than 80, then your blood pressure is normal. There is nothing more that you need to do about it.  If your systolic blood pressure (the top number) is 120-139 or your diastolic blood pressure (the bottom number) is 80-89, your blood pressure may be higher than it should be. You should have your blood pressure rechecked within a year by a primary care provider.  If your systolic blood pressure (the top number) is 140 or greater or your diastolic blood pressure (the bottom number) is 90 or greater, you may have high blood pressure. High blood pressure is treatable, but if left untreated over time it can put you at risk for heart attack, stroke, or kidney failure. You should have your blood pressure rechecked by a primary care provider within the next 4 weeks.  If your provider in the emergency department today gave you specific instructions to follow-up with your doctor or provider even sooner than that, you should follow that instruction and not wait for up to 4 weeks for your follow-up visit.        Thank you for choosing [facility]       Thank you for choosing [facility] for your care. Our goal is always to  "provide you with excellent care. Hearing back from our patients is one way we can continue to improve our services. Please take a few minutes to complete the written survey that you may receive in the mail after you visit with us. Thank you!        [employer] Information     [employer] lets you send messages to your doctor, view your test results, renew your prescriptions, schedule appointments and more. To sign up, go to [email or web address] . Click on \"Log in\" on the left side of the screen, which will take you to the Welcome page. Then click on \"Sign up Now\" on the right side of the page.     You will be asked to enter the access code listed below, as well as some personal information. Please follow the directions to create your username and password.     Your access code is: [email or web address]  Expires: 2018  6:56 PM     Your access code will  in 90 days. If you need help or a new code, please call your [place] clinic or [phone].        Care EveryWhere ID     This is your Care EveryWhere ID. This could be used by other organizations to access your [place] medical records  [id number]        Equal Access to Services     VALENTE RIVERA AH: Hadii casimiro Leonard, wakristen nunes, qaaruna kaalerika hernandez, emilee coyne. So [facility] [phone].    ATENCIÓN: Si habla español, tiene a christianson disposición servicios gratuitos de asistencia lingüística. Sabine al [phone].    We comply with applicable federal civil rights laws and [place] laws. We do not discriminate on the basis of race, color, national origin, age, disability, sex, sexual orientation, or gender identity.            After Visit Summary       This is your record. Keep this with you and show to your community pharmacist(s) and doctor(s) at your next visit.                  "

## 2018-01-24 NOTE — ED AVS SNAPSHOT
Emergency Department    [address] [place] [address]    Phone:  [phone]    Fax:  [phone]                                       Ines Mott   MRN: [id number]    Department:   Emergency Department   Date of Visit:  1/24/2018           After Visit Summary Signature Page     I have received my discharge instructions, and my questions have been answered. I have discussed any challenges I see with this plan with the nurse or doctor.    ..........................................................................................................................................  Patient/Patient Representative Signature      ..........................................................................................................................................  Patient Representative Print Name and Relationship to Patient    ..................................................               ................................................  Date                                            Time    ..........................................................................................................................................  Reviewed by Signature/Title    ...................................................              ..............................................  Date                                                            Time

## 2018-01-25 VITALS
WEIGHT: 256.84 LBS | RESPIRATION RATE: 14 BRPM | OXYGEN SATURATION: 98 % | DIASTOLIC BLOOD PRESSURE: 91 MMHG | SYSTOLIC BLOOD PRESSURE: 166 MMHG | TEMPERATURE: 98.3 F | HEIGHT: 66 IN | BODY MASS INDEX: 41.28 KG/M2

## 2018-01-25 PROCEDURE — 25000132 ZZH RX MED GY IP 250 OP 250 PS 637: Performed by: EMERGENCY MEDICINE

## 2018-01-25 PROCEDURE — 25000125 ZZHC RX 250: Performed by: EMERGENCY MEDICINE

## 2018-01-25 RX ORDER — ALBUTEROL SULFATE 0.83 MG/ML
1 SOLUTION RESPIRATORY (INHALATION) EVERY 6 HOURS PRN
Qty: 1 BOX | Refills: 0 | Status: SHIPPED | OUTPATIENT
Start: 2018-01-25 | End: 2018-02-01

## 2018-01-25 RX ORDER — ALBUTEROL SULFATE 90 UG/1
2 AEROSOL, METERED RESPIRATORY (INHALATION) EVERY 4 HOURS PRN
Qty: 1 INHALER | Refills: 1 | Status: SHIPPED | OUTPATIENT
Start: 2018-01-25 | End: 2019-02-20

## 2018-01-25 RX ADMIN — ACETAMINOPHEN 1000 MG: 500 TABLET, FILM COATED ORAL at 00:02

## 2018-01-25 RX ADMIN — IPRATROPIUM BROMIDE AND ALBUTEROL SULFATE 3 ML: .5; 3 SOLUTION RESPIRATORY (INHALATION) at 00:02

## 2018-01-25 RX ADMIN — IPRATROPIUM BROMIDE AND ALBUTEROL SULFATE 3 ML: .5; 3 SOLUTION RESPIRATORY (INHALATION) at 00:03

## 2018-01-25 ASSESSMENT — ENCOUNTER SYMPTOMS: SHORTNESS OF BREATH: 1

## 2018-01-25 NOTE — DISCHARGE INSTRUCTIONS
Discharge Instructions  Upper Respiratory Infection    The upper respiratory tract includes the sinuses, nasal passages, pharynx, and larynx. A URI, or upper respiratory infection, is an infection of any of the parts of the upper airway. Symptoms include runny nose, congestion, sneezing, sore throat, cough, and fever. URIs are almost always caused by a virus. Antibiotics do not help with viral infections, so are generally not prescribed. A URI is very contagious through coughing and nasal secretions; make sure you wash your hands often and clean surfaces after sneezing, coughing or touching them. While you should start to improve in 3 - 5 days, remember that sometimes a cough can linger for several weeks.    Generally, every Emergency Department visit should have a follow-up clinic visit with either a primary or a specialty clinic/provider. Please follow-up as instructed by your emergency provider today.    Return to the Emergency Department if:    Any of your symptoms get much worse.    You seem very sick, like being too weak to get up.    You have chest pain or shortness of breath.     You have a severe headache.    You are vomiting (throwing up) so much you cannot keep fluids or medicines down.    You have confusion or seem unusually drowsy.    You have a seizure.    What can I do to help myself?    Fill any prescriptions the provider gave you and take them right away    If you have a fever, get plenty of rest and drink lots of fluids, especially water.    Using a humidifier or saline nose spray will also help loosen mucous.     Clothes or blankets will not change your fever. Do what is comfortable for you.    Bathing or sponging in lukewarm water may help you feel better.    Acetaminophen (Tylenol ) or ibuprofen (Advil , Motrin ) will help bring fever down and may help you feel more comfortable. Be sure to read and follow the package directions, and ask your provider if you have questions.    Do not drink  alcohol.    Decongestants may help you feel better. You may use decongestant nose sprays Afrin  (oxymetazoline) or Solomon-Synephrine  (phenylephrine hydrochloride) for up to 3 days, or may use a decongestant tablet like Sudafed  (pseudoephedrine).  If you were given a prescription for medicine here today, be sure to read all of the information (including the package insert) that comes with your prescription.  This will include important information about the medicine, its side effects, and any warnings that you need to know about.  The pharmacist who fills the prescription can provide more information and answer questions you may have about the medicine.  If you have questions or concerns that the pharmacist cannot address, please call or return to the Emergency Department.   Remember that you can always come back to the Emergency Department if you are not able to see your regular provider in the amount of time listed above, if you get any new symptoms, or if there is anything that worries you.    Discharge Instructions  Asthma    Asthma is a condition causing narrowing and inflammation of the airways that can make it hard to breathe.  Asthma can also cause cough, wheezing, noisy breathing and tightness in the chest.  Asthma can be brought on or  triggered  by many things, including dust, mold, pollen, cigarette smoke, exercise, stress and infections (like the common cold).     Generally, every Emergency Department visit should have a follow-up clinic visit with either a primary or a specialty clinic/provider. Please follow-up as instructed by your emergency provider today.    Return to the Emergency Department if:    Your breathing gets worse.    You need to use your inhaler more often than every 4 hours, or cannot get relief from your inhaler.    You are very weak, or feel much more ill.    You develop new symptoms, such as chest pain.    You cough up blood.    You are vomiting (throwing up) enough that you cannot  keep fluids or your medicine down.    What can I do to help myself?    Fill any prescriptions the provider gave you and take them right away--especially antibiotics. Be sure to finish the whole antibiotic prescription.    You may be given a prescription for an inhaler, which can help loosen tight air passages.  Use this as needed, but not more often than directed. Inhalers work much better when used with a spacer.     You may be given a prescription for a steroid to reduce inflammation. Used long-term, these can have some side effects, but for short-term use they are safe. You may notice restlessness or increased appetite (eating more).        You may use non-prescription cough or cold medicines. Cough medicines may help, but do not make the cough go away completely.     Avoid smoke, because this can make you feel worse. If you smoke, this may be a good time to quit! Consider using nicotine lozenges, gum, or patches to reduce cravings.     If you have a fever, Tylenol  (acetaminophen), Motrin  (ibuprofen), or Advil  (ibuprofen), may help bring fever down and may help you feel more comfortable. Be sure to read and follow the package directions, and ask your provider if you have questions.    Be sure to get your flu shot each year.  For certain age groups, the pneumonia shot can help prevent pneumonia.  It is important that you follow up with your regular provider, to be sure that you are improving from this spell (an acute asthma exacerbation), and also to do what you can to keep from having trouble again. Sometimes you need long-term medicines to keep your asthma under control.   If you were given a prescription for medicine here today, be sure to read all of the information (including the package insert) that comes with your prescription.  This will include important information about the medicine, its side effects, and any warnings that you need to know about.  The pharmacist who fills the prescription can provide  more information and answer questions you may have about the medicine.  If you have questions or concerns that the pharmacist cannot address, please call or return to the Emergency Department.   Remember that you can always come back to the Emergency Department if you are not able to see your regular provider in the amount of time listed above, if you get any new symptoms, or if there is anything that worries you.

## 2018-01-25 NOTE — ED PROVIDER NOTES
"  History     Chief Complaint:  Cough      HPI   Ines Mott is a 53 year old female with a history of HTN, hyperlipidemia, DM, chronic pain syndrome, and asthma who presents to the emergency department for evaluation of cough. The patient reports that she developed a dry cough yesterday, which has persisted into today, along with fever, shortness of breath, and generalized fatigue. She has a previously albuterol nebulizer, though she has not been using this as it is .     Allergies:  Ativan [Lorazepam]  Macrobid [Nitrofurantoin]  Amoxicillin  Buspirone  Busulfan  Cefaclor  Cephalosporins  Ciprofloxacin  Clindamycin  Dilaudid [Hydromorphone]  Diphenhydramine Hcl  Imitrex [Sumatriptan Succinate]  Ketorolac Tromethamine  Lansoprazole  Lansoprazole  Latex  Metoclopramide  Paroxetine  Penicillins  Prednisone  Sulfa Drugs  Lidoderm [Lidocaine]    Medications:    Percocet  Zoloft  Albuterol  Hyzaar  Potassium Chloride  Miralax    Past Medical History:    arthritis  Asthma  Cancer  Myelomeningocele with hydrocephalus  PTSD  Fibromyalgia  Carcinoma of colon metastatic to liver  Chronic pain syndrome  DM  Hydrocephalus  HTN  Methamphetamine  Migraines  Right renal mass  Spina bifida  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   Former Smoker.   Positive for alcohol use.   Marital Status:   [2]    Review of Systems   Constitutional: Positive for fatigue and fever.   Respiratory: Positive for cough and shortness of breath.    All other systems reviewed and are negative.    Physical Exam   First Vitals:  BP: (!) 166/91  Heart Rate: 68  Temp: 98.3  F (36.8  C)  Resp: 16  Height: 167.6 cm (5' 6\")  Weight: 116.5 kg (256 lb 13.4 oz)  SpO2: 99 " %    Physical Exam  Constitutional: The patient is oriented to person, place, and time.   HENT:   Head: Atraumatic  Right Ear: Normal  Left Ear: Normal  Nose: Nose normal.   Mouth/Throat: Oropharynx is clear and moist. No erythema or exudate.   Eyes: Conjunctivae and EOM are normal. Pupils are equal, round, and reactive to light. No discharge  Neck: Normal range of motion. Neck supple.   Cardiovascular: Normal rate, regular rhythm, no murmur gallops or rubs. Intact distal pulses.    Pulmonary/Chest: Lungs diminished with scattered expiratory wheezing. No rale or rhonchi.  Abdominal: Soft. Non tender.  No masses   Musculoskeletal: No edema. No bony deformity. Normal range of motion  Lymphadenopathy:     The patient has no cervical adenopathy.   Neurological: The patient is alert and oriented to person, place, and time. The patient has normal strength and normal reflexes. No cranial nerve deficit. Coordination normal.   Skin: Skin is warm and dry. No rash noted. The patient is not diaphoretic.   Psychiatric: The patient has a normal mood and affect.    Emergency Department Course   Imaging:  Radiographic findings were communicated with the patient who voiced understanding of the findings.    XR Chest 2 views:   No infiltrates or other acute findings. Heart size is  within normal limits. Minimal linear atelectasis or scarring left  lower lung. Shunt tubing projecting over the chest and upper abdomen.  No significant change. As per radiology.     Interventions:  0002 Tylenol 1000 mg PO   Duoneb 3 mL Nebulization  0003 Duoneb 3 mL Nebulization    Emergency Department Course:  Nursing notes and vitals reviewed. 2324 I performed an exam of the patient as documented above.     The patient was sent for a Chest XR while in the emergency department, findings above.     0400 I rechecked the patient and discussed the results of her workup thus far.     Findings and plan explained to the Patient. Patient discharged home with  instructions regarding supportive care, medications, and reasons to return. The importance of close follow-up was reviewed. The patient was prescribed Albuterol.     Impression & Plan    Medical Decision Making:  Ines Mott is a 53 year old female who presents with a 3-4 day history of persistent cough. She is out of her normal asthma medications and feels short of breath. On exam, lungs are diminished with scattered expiratory wheezes. She does report a low grade fever, so chest XR was performed to rule out pneumonia, though this was normal. She had an albuterol treatment x2, with significant improvement in work of breathing and air movement. At this point, I feel that she can be safely discharged to home. I have given her prescriptions for an albuterol inhaler, as well as nebulizer solution. She should follow up with her primary care provider and return for problems.     Diagnosis:    ICD-10-CM   1. Viral URI with cough J06.9    B97.89   2. Mild intermittent asthma with exacerbation J45.21       Disposition:  discharged to home    Discharge Medications:  New Prescriptions    ALBUTEROL (2.5 MG/3ML) 0.083% NEB SOLUTION    Take 1 vial (2.5 mg) by nebulization every 6 hours as needed for shortness of breath / dyspnea or wheezing    ALBUTEROL (PROAIR HFA) 108 (90 BASE) MCG/ACT INHALER    Inhale 2 puffs into the lungs every 4 hours as needed for shortness of breath / dyspnea or wheezing     Marilyn CONDE, am serving as a scribe on 1/24/2018 at 11:24 PM to personally document services performed by Jemal Carlisle MD based on my observations and the provider's statements to me.       Marilyn Almanzar  1/24/2018    EMERGENCY DEPARTMENT       Jemal Carlisle MD  01/25/18 0436

## 2018-01-31 ENCOUNTER — TELEPHONE (OUTPATIENT)
Dept: FAMILY MEDICINE | Facility: CLINIC | Age: 53
End: 2018-01-31

## 2018-01-31 NOTE — TELEPHONE ENCOUNTER
Cough and cold for 1 week. Dx with asthama. Used neb and inhalers. Some chest pain with deep coughing. Has been coughing up brown and thick mucus. Able to eat and drink. Possibly running a fever at night. Minimal wheezing when she goes outside and come back in. Mild headache.     Influenza-Like Illness (SOLITARIO) Protocol    Iens AGUSTIN Mott      Age: 53 year old     YOB: 1965    Are you currently sick or have you had close contact with someone who is currently sick?   Yes, this patient is currently sick.     Adult Clinical Evaluation    Is this patient experiencing ANY of the following?  Unconsciousness or unresponsiveness No   Difficulty breathing or swallowing Yes. Only at night.    Blue or dusky lips, skin, or nail beds No   Chest pain Yes. Only with deep coughing.    Severe confusion or delirium No   Seizure activity: ongoing or stopped No   Severe dehydration or signs of shock No   Patient sounds very sick on the phone No     Is this patient experiencing ANY of the following?  Fever > 104 or shaking chills Unknown    Wheezing with minimal response to usual wheezing medications or new wheezing Some when going out side and coming in.    Repeated vomiting or diarrhea with signs of dehydration (no urination within last 12 hours) No   Flu-like symptoms that initially improved but returned with fever and a worse cough No   Stiff or painful neck No   Severe headache No     Does the patient have any of the following?  Measured fever > 100 degrees Yes - Pt feel feverish at night, she did not check temp.    Chills or feels very warm to the touch Yes   Cough Yes   Sore throat Yes   Muscle/ body aches Yes   Headaches Yes   Fatigue (tiredness) Yes     Nursing Plan  Scheduled appointment - for tomorrow with PCP, because she is restricted she refused to see a covering provider today.     Provided home care instructions    General home care instruction:      Avoid contact with people in your household who are at  increased risk for more severe complications of influenza (such as pregnant women or people who have a chronic health condition, for example diabetes, heart disease, asthma, or emphysema).    Stay home from work, school, childcare or other public places until your fever (37.8 degrees Celsius [100 degrees Fahrenheit]) has been gone for at least 24 hours, except to seek medical care. (Fever should be gone without the use of fever-reducing medications.) Use a surgical mask if available, or cover your mouth and nose with a tissue if possible if you need to seek medical care. Contact your work place, school, or  as they may have longer exclusion times.    You may continue to shed virus after your fever is gone. Limit your contact with high-risk individuals for 10 days after your symptoms started and be especially careful to cover your coughs/sneezes and wash your hands.    Cover your cough and wash your hands often, and especially after coughing, sneezing, blowing your nose.    Drink plenty of fluids (such as water, broth, sports drinks, electrolyte beverages for children) to prevent dehydration.    Avoid tobacco and second hand smoke.    Get plenty of rest.    Use over-the-counter pain relievers as needed per  instructions.    Do not give aspirin (acetylsalicylic acid) or products that contain aspirin (e.g. bismuth subsalicylate - Pepto Bismol) to children or teenagers 18 years or younger.    A small number of people with influenza do not have fever. If you have respiratory symptoms and are at increased risk for complications of influenza, contact your health care provider to discuss these symptoms.    For parents of infants:    If possible, only family members who are not sick should care for infants.    Wash your hands with soap and water, or an alcohol-based hand rub (if your hands are not visibly soiled) before caring for your infant.    Cover your mouth and nose with a tissue when coughing or  sneezing, and clean your hands.    Contact a health care provider to discuss your illness within 1-2 days if you are    Pregnant    Immunocompromised    Call 911 if you experience:    Difficulty breathing or shortness of breath    Pain or pressure in the chest    Confusion or less responsive than normal    Seizure activity: ongoing or stopped    Severe dehydration or signs of shock     Blue or dusky lips, skin, or nail beds    If further questions/concerns or if new symptoms develop, call your PCP or [facility] as soon as possible.    When to seek medical attention    Contact your health care provider right away if you experience:    A painful sore throat accompanied by fever persists for more than 48 hours    Ear pain, sinus pain, persistent vomiting and/or diarrhea    Oral temperature greater than 104  Fahrenheit (40  Celsius)    Dehydration (e.g., mouth feeling dry, dizzy when sitting/standing, decreased urine output)    Severe or persistent vomiting; unable to keep fluids down    Improvement in flu-like symptoms (fever and cough or sore throat) but then return of fever and worse cough or sore throat    Not drinking enough fluid    Any other concerns not stated above      Additional educational resources include:    http://[email or web address]    http://www.cdc.gov/flu/  Jj Coyle

## 2018-01-31 NOTE — TELEPHONE ENCOUNTER
Reason for call:  Patient reporting a symptom    Symptom or request: cough, congestion, fatigue, fever    Duration (how long have symptoms been present): 1 week    Have you been treated for this before? Yes    Additional comments: none    Phone Number patient can be reached at:  Cell number on file:    Telephone Information:   Mobile [phone]       Best Time:  any    Can we leave a detailed message on this number:  YES    Call taken on 1/31/2018 at 9:31 AM by LOY MOMIN

## 2018-02-01 ENCOUNTER — OFFICE VISIT (OUTPATIENT)
Dept: FAMILY MEDICINE | Facility: CLINIC | Age: 53
End: 2018-02-01
Payer: COMMERCIAL

## 2018-02-01 VITALS
DIASTOLIC BLOOD PRESSURE: 80 MMHG | HEART RATE: 72 BPM | OXYGEN SATURATION: 98 % | BODY MASS INDEX: 41.16 KG/M2 | RESPIRATION RATE: 20 BRPM | SYSTOLIC BLOOD PRESSURE: 136 MMHG | TEMPERATURE: 97.4 F | WEIGHT: 255 LBS

## 2018-02-01 DIAGNOSIS — G89.4 CHRONIC PAIN SYNDROME: Primary | ICD-10-CM

## 2018-02-01 DIAGNOSIS — Z11.59 NEED FOR HEPATITIS C SCREENING TEST: ICD-10-CM

## 2018-02-01 DIAGNOSIS — Z12.31 VISIT FOR SCREENING MAMMOGRAM: ICD-10-CM

## 2018-02-01 DIAGNOSIS — F33.41 RECURRENT MAJOR DEPRESSION IN PARTIAL REMISSION (H): Chronic | ICD-10-CM

## 2018-02-01 DIAGNOSIS — I10 HYPERTENSION GOAL BP (BLOOD PRESSURE) < 140/80: Chronic | ICD-10-CM

## 2018-02-01 DIAGNOSIS — J20.9 ACUTE BRONCHITIS, UNSPECIFIED ORGANISM: ICD-10-CM

## 2018-02-01 DIAGNOSIS — Z12.11 SPECIAL SCREENING FOR MALIGNANT NEOPLASMS, COLON: ICD-10-CM

## 2018-02-01 DIAGNOSIS — E66.01 MORBID OBESITY (H): Chronic | ICD-10-CM

## 2018-02-01 DIAGNOSIS — E78.5 HYPERLIPIDEMIA WITH TARGET LDL LESS THAN 130: Chronic | ICD-10-CM

## 2018-02-01 PROCEDURE — 99214 OFFICE O/P EST MOD 30 MIN: CPT | Performed by: FAMILY MEDICINE

## 2018-02-01 RX ORDER — AZITHROMYCIN 500 MG/1
500 TABLET, FILM COATED ORAL DAILY
Qty: 3 TABLET | Refills: 0 | Status: SHIPPED | OUTPATIENT
Start: 2018-02-01 | End: 2018-02-08

## 2018-02-01 RX ORDER — HYDROCODONE BITARTRATE AND ACETAMINOPHEN 10; 325 MG/1; MG/1
1 TABLET ORAL EVERY 4 HOURS PRN
Qty: 30 TABLET | Refills: 0 | Status: SHIPPED | OUTPATIENT
Start: 2018-02-01 | End: 2018-02-08

## 2018-02-01 RX ORDER — GUAIFENESIN/DEXTROMETHORPHAN 100-10MG/5
5 SYRUP ORAL EVERY 4 HOURS PRN
Qty: 240 ML | Refills: 1 | Status: SHIPPED | OUTPATIENT
Start: 2018-02-01 | End: 2018-02-01

## 2018-02-01 NOTE — MR AVS SNAPSHOT
After Visit Summary   2/1/2018    Ines Mott    MRN: [id number]           Patient Information     Date Of Birth          1965        Visit Information        Provider Department      2/1/2018 9:30 AM Dustin Fatima MD [facility]        Today's Diagnoses     Chronic pain syndrome    -  1    Visit for screening mammogram        Need for hepatitis C screening test        Hypertension goal BP (blood pressure) < 140/80        Recurrent major depression in partial remission        Special screening for malignant neoplasms, colon        BMI 42        Hyperlipidemia with target LDL less than 130        Acute bronchitis, unspecified organism          Care Instructions    (G89.4) Chronic pain syndrome  (primary encounter diagnosis)  Comment:    Plan: HYDROcodone-acetaminophen (NORCO)  MG per        tablet             (Z12.31) Visit for screening mammogram  Comment:    Plan:      (Z11.59) Need for hepatitis C screening test  Comment:    Plan:      (I10) Hypertension goal BP (blood pressure) < 140/80  Comment:    Plan:      (F33.41) Recurrent major depression in partial remission  Comment:    Plan:      (Z12.11) Special screening for malignant neoplasms, colon  Comment:    Plan: Fecal colorectal cancer screen FIT - Future         (S+30)             (E66.01) BMI 42  Comment:    Plan:      (E78.5) Hyperlipidemia with target LDL less than 130  Comment:    Plan:      (J20.9) Acute bronchitis, unspecified organism  Comment:    Plan: azithromycin (ZITHROMAX) 500 MG tablet,         guaiFENesin-dextromethorphan (ROBITUSSIN DM)         100-10 MG/5ML syrup                          Follow-ups after your visit        Your next 10 appointments already scheduled     Feb 05, 2018 10:00 AM CST   Office Visit with Dustin Fatima MD   [facility]    [address] [place] [address]  "[address]  [facility] [address] [phone]           Bring a current list of meds and any records pertaining to this visit. For Physicals, please bring immunization records and any forms needing to be filled out. Please arrive 10 minutes early to complete paperwork.              Future tests that were ordered for you today     Open Future Orders        Priority Expected Expires Ordered    Fecal colorectal cancer screen FIT - Future (S+30) Routine 2018 3/3/2018 2018            Who to contact     If you have questions or need follow up information about today's clinic visit or your schedule please contact [facility] directly at [phone].  Normal or non-critical lab and imaging results will be communicated to you by MyChart, letter or phone within 4 business days after the clinic has received the results. If you do not hear from us within 7 days, please contact the clinic through [employer] or phone. If you have a critical or abnormal lab result, we will notify you by phone as soon as possible.  Submit refill requests through [employer] or call your pharmacy and they will forward the refill request to us. Please allow 3 business days for your refill to be completed.          Additional Information About Your Visit        MyChart Information     [employer] lets you send messages to your doctor, view your test results, renew your prescriptions, schedule appointments and more. To sign up, go to [email or web address] . Click on \"Log in\" on the left side of the screen, which will take you to the Welcome page. Then click on \"Sign up Now\" on the right side of the page.     You will be asked to enter the access code listed below, as well as some personal information. Please follow the directions to create your username and password.     Your access code is: [email or web address]  Expires: 2018  6:56 PM     Your access code will  in 90 days. If you need help or a new code, please " call your [place] clinic or [phone].        Care EveryWhere ID     This is your Care EveryWhere ID. This could be used by other organizations to access your [place] medical records  [id number]        Your Vitals Were     Pulse Temperature Respirations Pulse Oximetry BMI (Body Mass Index)       72 97.4  F (36.3  C) (Tympanic) 20 98% 41.16 kg/m2        Blood Pressure from Last 3 Encounters:   02/01/18 136/80   01/24/18 (!) 166/91   01/12/18 134/76    Weight from Last 3 Encounters:   02/01/18 255 lb (115.7 kg)   01/24/18 256 lb 13.4 oz (116.5 kg)   01/12/18 255 lb (115.7 kg)                 Today's Medication Changes          These changes are accurate as of 2/1/18 10:11 AM.  If you have any questions, ask your nurse or doctor.               Start taking these medicines.        Dose/Directions    azithromycin 500 MG tablet   Commonly known as:  ZITHROMAX   Used for:  Acute bronchitis, unspecified organism   Started by:  Dustin Fatima MD        Dose:  500 mg   Take 1 tablet (500 mg) by mouth daily   Quantity:  3 tablet   Refills:  0       guaiFENesin-dextromethorphan 100-10 MG/5ML syrup   Commonly known as:  ROBITUSSIN DM   Used for:  Acute bronchitis, unspecified organism   Started by:  Dustin Fatima MD        Dose:  5 mL   Take 5 mLs by mouth every 4 hours as needed   Quantity:  240 mL   Refills:  1       HYDROcodone-acetaminophen  MG per tablet   Commonly known as:  NORCO   Used for:  Chronic pain syndrome   Started by:  Dustin Fatima MD        Dose:  1 tablet   Take 1 tablet by mouth every 4 hours as needed for moderate to severe pain maximum 4 tablet(s) per day   Quantity:  30 tablet   Refills:  0            Where to get your medicines      These medications were sent to [facility] [address] [place] [address] [address] [facility]  [address] [facility] [address]     Phone:  [phone]     azithromycin 500 MG tablet    guaiFENesin-dextromethorphan  100-10 MG/5ML syrup         Some of these will need a paper prescription and others can be bought over the counter.  Ask your nurse if you have questions.     Bring a paper prescription for each of these medications     HYDROcodone-acetaminophen  MG per tablet                Primary Care Provider Office Phone # Fax #    Dustin Rina Fatima [phone]       [address] [facility] [address]        Equal Access to Services     VALENTE RIVERA : Hadii aad ku hadasho Soomaali, waaxda luqadaha, qaybta kaalmada adeegyada, waxay idiin hayaan adeeg kharash la'scooter . So [facility] [phone].    ATENCIÓN: Si habla español, tiene a christianson disposición servicios gratuitos de asistencia lingüística. [facility] [phone].    We comply with applicable federal civil rights laws and [place] laws. We do not discriminate on the basis of race, color, national origin, age, disability, sex, sexual orientation, or gender identity.            Thank you!     Thank you for choosing [facility]  for your care. Our goal is always to provide you with excellent care. Hearing back from our patients is one way we can continue to improve our services. Please take a few minutes to complete the written survey that you may receive in the mail after your visit with us. Thank you!             Your Updated Medication List - Protect others around you: Learn how to safely use, store and throw away your medicines at www.disposemymeds.org.          This list is accurate as of 2/1/18 10:11 AM.  Always use your most recent med list.                   Brand Name Dispense Instructions for use Diagnosis    * albuterol (2.5 MG/3ML) 0.083% neb solution     30 vial    Take 1 vial (2.5 mg) by nebulization every 6 hours as needed for shortness of breath / dyspnea or wheezing    Mild intermittent asthma with acute exacerbation       * albuterol 108 (90 BASE) MCG/ACT Inhaler    PROAIR HFA    1 Inhaler    Inhale  2 puffs into the lungs every 4 hours as needed for shortness of breath / dyspnea or wheezing        ASPIRIN NOT PRESCRIBED    INTENTIONAL    1 each    Please choose reason not prescribed, below    Aspirin intolerance       azithromycin 500 MG tablet    ZITHROMAX    3 tablet    Take 1 tablet (500 mg) by mouth daily    Acute bronchitis, unspecified organism       blood glucose calibration solution    no brand specified    1 each    Use to calibrate blood glucose monitor as directed.    Type 2 diabetes mellitus without complication, without long-term current use of insulin (H)       blood glucose lancets standard    no brand specified    100 each    Use to test blood sugar daily times daily or as directed.    Type 2 diabetes mellitus without complication, without long-term current use of insulin (H)       blood glucose monitoring meter device kit    no brand specified    1 kit    Use to test blood sugar larisa times daily or as directed.    Type 2 diabetes mellitus without complication, without long-term current use of insulin (H)       blood glucose monitoring test strip    no brand specified    100 strip    Use to test blood sugars once times daily or as directed    Type 2 diabetes mellitus without complication, without long-term current use of insulin (H)       cyanocobalamin 1000 MCG/ML injection    VITAMIN B12    1 mL    Inject 1 mL (1,000 mcg) Subcutaneous every 30 days    B12 deficiency       EPINEPHrine 0.3 MG/0.3ML injection 2-pack    EPIPEN/ADRENACLICK/or ANY BX GENERIC EQUIV    2 each    Inject 0.3 mLs (0.3 mg) into the muscle once as needed for anaphylaxis    Late effect of other and unspecified external causes       guaiFENesin-dextromethorphan 100-10 MG/5ML syrup    ROBITUSSIN DM    240 mL    Take 5 mLs by mouth every 4 hours as needed    Acute bronchitis, unspecified organism       HYDROcodone-acetaminophen  MG per tablet    NORCO    30 tablet    Take 1 tablet by mouth every 4 hours as needed for  moderate to severe pain maximum 4 tablet(s) per day    Chronic pain syndrome       losartan-hydrochlorothiazide 50-12.5 MG per tablet    HYZAAR    90 tablet    Take 1 tablet by mouth daily    Hypertension goal BP (blood pressure) < 140/80       magnesium oxide 400 (241.3 MG) MG tablet    MAG-OX    90 tablet    Take 1 tablet (400 mg) by mouth daily    Cramp of limb       order for DME     1 Device    Right wrist splint for carpal tunnel syndrome  One* Use as directed at hour of sleep    Carpal tunnel syndrome of right wrist       polyethylene glycol powder    MIRALAX    510 g    Take 17 g (1 capful) by mouth daily    Slow transit constipation       potassium chloride 10 MEQ tablet    K-TAB,KLOR-CON    120 tablet    Take 1 tablet (10 mEq) by mouth daily    Hypokalemia       sertraline 100 MG tablet    ZOLOFT    45 tablet    Take 1.5 tablets (150 mg) by mouth daily    Recurrent major depression in partial remission (H), Other constipation       STATIN NOT PRESCRIBED (INTENTIONAL)     0 each    1 each daily Statin not prescribed intentionally due to Active liver disease (liver failure, cirrhosis, hepatitis) LIVER METS    Hyperlipidemia LDL goal <100, Type 2 diabetes mellitus without complication (H)       Vitamin D (Cholecalciferol) 1000 UNITS Tabs     60 tablet    Take 2,000 Units by mouth daily    Vitamin D deficiency       * Notice:  This list has 2 medication(s) that are the same as other medications prescribed for you. Read the directions carefully, and ask your doctor or other care provider to review them with you.
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
## 2018-02-01 NOTE — NURSING NOTE
"Chief Complaint   Patient presents with     Cough     3 weeks       Initial /80  Pulse 72  Temp 97.4  F (36.3  C) (Tympanic)  Resp 20  Wt 255 lb (115.7 kg)  SpO2 98%  BMI 41.16 kg/m2 Estimated body mass index is 41.16 kg/(m^2) as calculated from the following:    Height as of 1/24/18: 5' 6\" (1.676 m).    Weight as of this encounter: 255 lb (115.7 kg).  Medication Reconciliation: complete   Maggi Harris CMA    "

## 2018-02-01 NOTE — PATIENT INSTRUCTIONS
(G89.4) Chronic pain syndrome  (primary encounter diagnosis)  Comment:    Plan: HYDROcodone-acetaminophen (NORCO)  MG per        tablet             (Z12.31) Visit for screening mammogram  Comment:    Plan:      (Z11.59) Need for hepatitis C screening test  Comment:    Plan:      (I10) Hypertension goal BP (blood pressure) < 140/80  Comment:    Plan:      (F33.41) Recurrent major depression in partial remission  Comment:    Plan:      (Z12.11) Special screening for malignant neoplasms, colon  Comment:    Plan: Fecal colorectal cancer screen FIT - Future         (S+30)             (E66.01) BMI 42  Comment:    Plan:      (E78.5) Hyperlipidemia with target LDL less than 130  Comment:    Plan:      (J20.9) Acute bronchitis, unspecified organism  Comment:    Plan: azithromycin (ZITHROMAX) 500 MG tablet,         guaiFENesin-dextromethorphan (ROBITUSSIN DM)         100-10 MG/5ML syrup

## 2018-02-03 DIAGNOSIS — E53.8 B12 DEFICIENCY: ICD-10-CM

## 2018-02-05 NOTE — TELEPHONE ENCOUNTER
"Requested Prescriptions   Pending Prescriptions Disp Refills     cyanocobalamin (VITAMIN B12) 1000 MCG/ML injection [Pharmacy Med Name: [id number]]  Last Written Prescription Date:  8/18/2017  Last Fill Quantity: 1ml,  # refills: 11   Last Office Visit with [facility] [place] [facility] provider:  2/1/2018   Future Office Visit:    Next 5 appointments (look out 90 days)     Feb 08, 2018  9:45 AM CST   [facility]ima [facility]   [facility]    [address] [place] [id number] [phone]                   11     Sig: INJECT 1 ML SUBCUTANEOUSLY EVERY 30 DAYS    Vitamin Supplements (Adult) Protocol Passed    2/3/2018  4:08 PM       Passed - High dose Vitamin D not ordered       Passed - Recent or future visit with authorizing provider's specialty    Patient had office visit in the last year or has a visit in the next 30 days with authorizing provider.  See \"Patient Info\" tab in inbasket, or \"Choose Columns\" in Meds & Orders section of the refill encounter.               "

## 2018-02-06 ENCOUNTER — HOSPITAL ENCOUNTER (EMERGENCY)
Facility: CLINIC | Age: 53
Discharge: HOME | End: 2018-02-07
Attending: EMERGENCY MEDICINE | Admitting: EMERGENCY MEDICINE
Payer: COMMERCIAL

## 2018-02-06 DIAGNOSIS — L02.92 FURUNCLE OF SKIN OR SUBCUTANEOUS TISSUE: ICD-10-CM

## 2018-02-06 DIAGNOSIS — L73.1 INGROWN HAIR: ICD-10-CM

## 2018-02-06 PROCEDURE — 10060 I&D ABSCESS SIMPLE/SINGLE: CPT

## 2018-02-06 PROCEDURE — 99283 EMERGENCY DEPT VISIT LOW MDM: CPT | Mod: 25

## 2018-02-06 RX ORDER — CYANOCOBALAMIN 1000 UG/ML
INJECTION, SOLUTION INTRAMUSCULAR; SUBCUTANEOUS
Qty: 3 ML | Refills: 11 | Status: SHIPPED | OUTPATIENT
Start: 2018-02-06 | End: 2018-03-15

## 2018-02-06 NOTE — ED AVS SNAPSHOT
Emergency Department    [address] [place] [address]    Phone:  [phone]    Fax:  [phone]                                       Ines Mott   MRN: [id number]    Department:   Emergency Department   Date of Visit:  2/6/2018           After Visit Summary Signature Page     I have received my discharge instructions, and my questions have been answered. I have discussed any challenges I see with this plan with the nurse or doctor.    ..........................................................................................................................................  Patient/Patient Representative Signature      ..........................................................................................................................................  Patient Representative Print Name and Relationship to Patient    ..................................................               ................................................  Date                                            Time    ..........................................................................................................................................  Reviewed by Signature/Title    ...................................................              ..............................................  Date                                                            Time

## 2018-02-06 NOTE — ED AVS SNAPSHOT
Emergency Department    [address] [facility] [address]    Phone:  [phone]    Fax:  [phone]                                       Ines Mott   MRN: [id number]    Department:   Emergency Department   Date of Visit:  2/6/2018           Patient Information     Date Of Birth          1965        Your diagnoses for this visit were:     Ingrown hair     Furuncle of skin or subcutaneous tissue        You were seen by Zachary Albright MD.      Follow-up Information     Follow up with Dustin Fatima MD. Schedule an appointment as soon as possible for a visit in 2 days.    Specialty:  Family Practice    Why:  For wound re-check    Contact information:    [address]  [facility] [address] [phone]          Follow up with  Emergency Department.    Specialty:  EMERGENCY MEDICINE    Why:  If symptoms worsen    Contact information:    [address] [facility] [address] [phone]        Discharge Instructions         Understanding Folliculitis  Folliculitis is when hair follicles become inflamed. Follicles are the tiny holes from which hair grows out of your skin. This skin condition can occur any place on the body where hair grows. But it s often found on the neck, face, and scalp.  How to say it  [id number]   What causes folliculitis?  An infection or irritation can cause this skin condition. It may be from bacteria or a fungus. The condition can also happen from a wound or irritation of the skin. Shaving is a common cause. Some cases may come from taking certain medicines, such as those that treat acne.  Symptoms of folliculitis  This skin condition tends to develop quickly. It looks like little pimples on a base of a red, inflamed hair follicles. These bumps may ooze pus. They may also be:    Itchy    Painful    Red    Swollen  Treatment for folliculitis  Treatment depends on the cause of the inflammation. In  some cases, this skin condition will go away on its own in a few days. But it may return. Treatment options include:    Warm compress. Putting a warm, wet washcloth on the inflamed skin may help.    Medicine. Many skin (topical) and oral medicines are available. Antibiotics are used for bacterial infections. Antifungal medicines are best for fungal infections.    Good hygiene. Keeping the skin clean can help. Use a clean razor when shaving. Prevent ingrown hairs after shaving. This can reduce folliculitis in the beard area. Avoid any substances that bother your skin.  When to call your healthcare provider  Call your healthcare provider right away if you have any of these:    Fever of 100.4 F (38 C) or higher, or as directed    Pain that gets worse    Symptoms that don t get better, or get worse    New symptoms   Date Last Reviewed: 5/1/2016 2000-2017 [employer]. [address] [place] [address]. All rights reserved. This information is not intended as a substitute for professional medical care. Always follow your healthcare professional's instructions.          Understanding Carbuncles    A carbuncle is a painful boil under the skin. It happens when a group of hair follicles become infected. Follicles are the tiny holes from which hair grows out of your skin.  How to say it  Sheldon   What causes carbuncles?  A carbuncle is caused by an infection with the bacteria Staphylococcus aureus. They are common on areas of the body where friction and sweat occur. They usually appear on the back of the neck, back, and thighs. This type of infection can also happen when the skin is injured, such as by a cut or bug bite.  The bacteria that causes carbuncles can spread easily from person to person. People at higher risk for boils are those with diabetes or a weak immune system. Drug users who use needles are also more likely to get them.  Symptoms of carbuncles  A carbuncle starts as a small  painful bump. But it can grow quickly. It may become:    Red    Swollen    Tender  Carbuncles may ooze pus. They may also cause fever and a general feeling of illness.  Treatment for carbuncles  A carbuncle may heal on its own in a few weeks. But the pus within it needs to come out first. Treatment options include:    Warm compress. Putting a warm, wet washcloth on the boil will help the pus drain out. You should never try to pop a boil. That can cause the infection to spread.    Surgical drainage. If the boil doesn t drain on its own, your healthcare provider may need to cut into it.    Antibiotics. In some cases, oral antibiotics may be prescribed to fight the infection, especially if the carbuncle returns. You will likely have to take the medicine for 5 to 7 days. You may need to take it longer for a severe case.    Good hygiene. Proper handwashing can prevent boils from spreading and coming back. Also wash things that have been in contact with the carbuncle in hot water. This includes items such as clothing and towels.  Complications of carbuncles  The main complication of a carbuncle is the spreading of the infection. The bacteria can infect the heart and bone. It can also lead to septic shock, an infection of the entire body.  When to call your healthcare provider  Call your healthcare provider right away if you have any of these:    Fever of 100.4 F (38 C) or higher, or as directed    Redness, swelling, or fluid leaking from a carbuncle that gets worse    Pain that gets worse    Symptoms that don t get better, or get worse    New symptoms   Date Last Reviewed: 5/1/2016 2000-2017 [employer]. [address] [place] [address], [place] [address]. All rights reserved. This information is not intended as a substitute for professional medical care. Always follow your healthcare professional's instructions.          Future Appointments        Provider Department Dept Phone Center    2/8/2018 9:45 AM OTTO  MD PARAMJIT [facility] [phone] [place]      24 Hour Appointment Hotline       To make an appointment at any [facility], call [phone] ([phone]). If you don't have a family doctor or clinic, we will help you find one. [facility] are conveniently located to serve the needs of you and your family.             Review of your medicines      Our records show that you are taking the medicines listed below. If these are incorrect, please call your family doctor or clinic.        Dose / Directions Last dose taken    albuterol (2.5 MG/3ML) 0.083% neb solution   Dose:  1 vial   Quantity:  30 vial        Take 1 vial (2.5 mg) by nebulization every 6 hours as needed for shortness of breath / dyspnea or wheezing   Refills:  3        ASPIRIN NOT PRESCRIBED   Commonly known as:  INTENTIONAL   Quantity:  1 each        Please choose reason not prescribed, below   Refills:  0        azithromycin 500 MG tablet   Commonly known as:  ZITHROMAX   Dose:  500 mg   Quantity:  3 tablet        Take 1 tablet (500 mg) by mouth daily   Refills:  0        blood glucose calibration solution   Commonly known as:  no brand specified   Quantity:  1 each        Use to calibrate blood glucose monitor as directed.   Refills:  0        blood glucose lancets standard   Commonly known as:  no brand specified   Quantity:  100 each        Use to test blood sugar daily times daily or as directed.   Refills:  11        blood glucose monitoring meter device kit   Commonly known as:  no brand specified   Quantity:  1 kit        Use to test blood sugar larisa times daily or as directed.   Refills:  0        blood glucose monitoring test strip   Commonly known as:  no brand specified   Quantity:  100 strip        Use to test blood sugars once times daily or as directed   Refills:  3        * cyanocobalamin 1000 MCG/ML injection   Commonly known as:  VITAMIN B12   Dose:  1 mL   Quantity:  1 mL         Inject 1 mL (1,000 mcg) Subcutaneous every 30 days   Refills:  11        * cyanocobalamin 1000 MCG/ML injection   Commonly known as:  VITAMIN B12   Quantity:  3 mL        INJECT 1 ML SUBCUTANEOUSLY EVERY 30 DAYS   Refills:  11        EPINEPHrine 0.3 MG/0.3ML injection 2-pack   Commonly known as:  EPIPEN/ADRENACLICK/or ANY BX GENERIC EQUIV   Dose:  0.3 mg   Quantity:  2 each        Inject 0.3 mLs (0.3 mg) into the muscle once as needed for anaphylaxis   Refills:  5        HYDROcodone-acetaminophen  MG per tablet   Commonly known as:  NORCO   Dose:  1 tablet   Quantity:  30 tablet        Take 1 tablet by mouth every 4 hours as needed for moderate to severe pain maximum 4 tablet(s) per day   Refills:  0        losartan-hydrochlorothiazide 50-12.5 MG per tablet   Commonly known as:  HYZAAR   Dose:  1 tablet   Quantity:  90 tablet        Take 1 tablet by mouth daily   Refills:  3        magnesium oxide 400 (241.3 MG) MG tablet   Commonly known as:  MAG-OX   Dose:  400 mg   Quantity:  90 tablet        Take 1 tablet (400 mg) by mouth daily   Refills:  3        order for DME   Quantity:  1 Device        Right wrist splint for carpal tunnel syndrome  One* Use as directed at hour of sleep   Refills:  1        polyethylene glycol powder   Commonly known as:  MIRALAX   Dose:  1 capful   Quantity:  510 g        Take 17 g (1 capful) by mouth daily   Refills:  3        potassium chloride 10 MEQ tablet   Commonly known as:  K-TAB,KLOR-CON   Dose:  10 mEq   Indication:  Low Amount of Potassium in the Blood   Quantity:  120 tablet        Take 1 tablet (10 mEq) by mouth daily   Refills:  11        sertraline 100 MG tablet   Commonly known as:  ZOLOFT   Dose:  150 mg   Indication:  Major Depressive Disorder   Quantity:  45 tablet        Take 1.5 tablets (150 mg) by mouth daily   Refills:  11        STATIN NOT PRESCRIBED (INTENTIONAL)   Dose:  1 each   Quantity:  0 each        1 each daily Statin not prescribed intentionally due  to Active liver disease (liver failure, cirrhosis, hepatitis) LIVER METS   Refills:  0        Vitamin D (Cholecalciferol) 1000 UNITS Tabs   Dose:  2000 Units   Quantity:  60 tablet        Take 2,000 Units by mouth daily   Refills:  11        * Notice:  This list has 2 medication(s) that are the same as other medications prescribed for you. Read the directions carefully, and ask your doctor or other care provider to review them with you.            Orders Needing Specimen Collection     None      Pending Results     No orders found for last 3 day(s).            Pending Culture Results     No orders found for last 3 day(s).            Pending Results Instructions     If you had any lab results that were not finalized at the time of your Discharge, you can call the ED Lab Result RN at [phone]. You will be contacted by this team for any positive Lab results or changes in treatment. The nurses are available 7 days a week from 10A to 6:30P.  You can leave a message 24 hours per day and they will return your call.        Test Results From Your Hospital Stay               Clinical Quality Measure: Blood Pressure Screening     Your blood pressure was checked while you were in the emergency department today. The last reading we obtained was  BP: (!) 156/91 . Please read the guidelines below about what these numbers mean and what you should do about them.  If your systolic blood pressure (the top number) is less than 120 and your diastolic blood pressure (the bottom number) is less than 80, then your blood pressure is normal. There is nothing more that you need to do about it.  If your systolic blood pressure (the top number) is 120-139 or your diastolic blood pressure (the bottom number) is 80-89, your blood pressure may be higher than it should be. You should have your blood pressure rechecked within a year by a primary care provider.  If your systolic blood pressure (the top number) is 140 or greater or your diastolic  "blood pressure (the bottom number) is 90 or greater, you may have high blood pressure. High blood pressure is treatable, but if left untreated over time it can put you at risk for heart attack, stroke, or kidney failure. You should have your blood pressure rechecked by a primary care provider within the next 4 weeks.  If your provider in the emergency department today gave you specific instructions to follow-up with your doctor or provider even sooner than that, you should follow that instruction and not wait for up to 4 weeks for your follow-up visit.        Thank you for choosing [facility]       Thank you for choosing [facility] for your care. Our goal is always to provide you with excellent care. Hearing back from our patients is one way we can continue to improve our services. Please take a few minutes to complete the written survey that you may receive in the mail after you visit with us. Thank you!        [employer] Information     [employer] lets you send messages to your doctor, view your test results, renew your prescriptions, schedule appointments and more. To sign up, go to [email or web address] . Click on \"Log in\" on the left side of the screen, which will take you to the Welcome page. Then click on \"Sign up Now\" on the right side of the page.     You will be asked to enter the access code listed below, as well as some personal information. Please follow the directions to create your username and password.     Your access code is: [email or web address]  Expires: 2018  6:56 PM     Your access code will  in 90 days. If you need help or a new code, please call your [facility] clinic or [phone].        Care EveryWhere ID     This is your Care EveryWhere ID. This could be used by other organizations to access your [facility] medical records  [id number]        Equal Access to Services     VALENTE RIVERA AH: Marc Leonard, deion nunes, emilee oconnor " la'scooter doretha. So [facility] [phone].    ATENCIÓN: Si habla español, tiene a christianson disposición servicios gratuitos de asistencia lingüística. Llame al [phone].    We comply with applicable federal civil rights laws and [place] laws. We do not discriminate on the basis of race, color, national origin, age, disability, sex, sexual orientation, or gender identity.            After Visit Summary       This is your record. Keep this with you and show to your community pharmacist(s) and doctor(s) at your next visit.

## 2018-02-07 VITALS
WEIGHT: 255 LBS | DIASTOLIC BLOOD PRESSURE: 83 MMHG | TEMPERATURE: 98.4 F | HEIGHT: 67 IN | OXYGEN SATURATION: 98 % | BODY MASS INDEX: 40.02 KG/M2 | SYSTOLIC BLOOD PRESSURE: 143 MMHG | RESPIRATION RATE: 18 BRPM | HEART RATE: 77 BPM

## 2018-02-07 ASSESSMENT — ENCOUNTER SYMPTOMS
HEMATURIA: 0
FREQUENCY: 0
DIARRHEA: 0
NAUSEA: 0
FEVER: 0
DYSURIA: 0
VOMITING: 0

## 2018-02-07 NOTE — DISCHARGE INSTRUCTIONS
Understanding Folliculitis  Folliculitis is when hair follicles become inflamed. Follicles are the tiny holes from which hair grows out of your skin. This skin condition can occur any place on the body where hair grows. But it s often found on the neck, face, and scalp.  How to say it  [id number]-LY-[id number]   What causes folliculitis?  An infection or irritation can cause this skin condition. It may be from bacteria or a fungus. The condition can also happen from a wound or irritation of the skin. Shaving is a common cause. Some cases may come from taking certain medicines, such as those that treat acne.  Symptoms of folliculitis  This skin condition tends to develop quickly. It looks like little pimples on a base of a red, inflamed hair follicles. These bumps may ooze pus. They may also be:    Itchy    Painful    Red    Swollen  Treatment for folliculitis  Treatment depends on the cause of the inflammation. In some cases, this skin condition will go away on its own in a few days. But it may return. Treatment options include:    Warm compress. Putting a warm, wet washcloth on the inflamed skin may help.    Medicine. Many skin (topical) and oral medicines are available. Antibiotics are used for bacterial infections. Antifungal medicines are best for fungal infections.    Good hygiene. Keeping the skin clean can help. Use a clean razor when shaving. Prevent ingrown hairs after shaving. This can reduce folliculitis in the beard area. Avoid any substances that bother your skin.  When to call your healthcare provider  Call your healthcare provider right away if you have any of these:    Fever of 100.4 F (38 C) or higher, or as directed    Pain that gets worse    Symptoms that don t get better, or get worse    New symptoms   Date Last Reviewed: 5/1/2016 2000-2017 [employer]. [address] [place] [address], [place] [address]. All rights reserved. This information is not intended as a substitute for professional  medical care. Always follow your healthcare professional's instructions.          Understanding Carbuncles    A carbuncle is a painful boil under the skin. It happens when a group of hair follicles become infected. Follicles are the tiny holes from which hair grows out of your skin.  How to say it  Sheldon   What causes carbuncles?  A carbuncle is caused by an infection with the bacteria Staphylococcus aureus. They are common on areas of the body where friction and sweat occur. They usually appear on the back of the neck, back, and thighs. This type of infection can also happen when the skin is injured, such as by a cut or bug bite.  The bacteria that causes carbuncles can spread easily from person to person. People at higher risk for boils are those with diabetes or a weak immune system. Drug users who use needles are also more likely to get them.  Symptoms of carbuncles  A carbuncle starts as a small painful bump. But it can grow quickly. It may become:    Red    Swollen    Tender  Carbuncles may ooze pus. They may also cause fever and a general feeling of illness.  Treatment for carbuncles  A carbuncle may heal on its own in a few weeks. But the pus within it needs to come out first. Treatment options include:    Warm compress. Putting a warm, wet washcloth on the boil will help the pus drain out. You should never try to pop a boil. That can cause the infection to spread.    Surgical drainage. If the boil doesn t drain on its own, your healthcare provider may need to cut into it.    Antibiotics. In some cases, oral antibiotics may be prescribed to fight the infection, especially if the carbuncle returns. You will likely have to take the medicine for 5 to 7 days. You may need to take it longer for a severe case.    Good hygiene. Proper handwashing can prevent boils from spreading and coming back. Also wash things that have been in contact with the carbuncle in hot water. This includes items such as clothing  and towels.  Complications of carbuncles  The main complication of a carbuncle is the spreading of the infection. The bacteria can infect the heart and bone. It can also lead to septic shock, an infection of the entire body.  When to call your healthcare provider  Call your healthcare provider right away if you have any of these:    Fever of 100.4 F (38 C) or higher, or as directed    Redness, swelling, or fluid leaking from a carbuncle that gets worse    Pain that gets worse    Symptoms that don t get better, or get worse    New symptoms   Date Last Reviewed: 5/1/2016 2000-2017 [employer]. [address] [place] [address], [place] [id number]. All rights reserved. This information is not intended as a substitute for professional medical care. Always follow your healthcare professional's instructions.

## 2018-02-07 NOTE — ED PROVIDER NOTES
History     Chief Complaint:  Wound Check     HPI   Ines Mott is a 53-year-old female who presents for evaluation of painful cyst in her groin area that she noticed today.  Specifically she notes that she has a small area of induration in the pubic care of her superior genital area, denies any lesions to the vagina or vault, stating her area of concern is more near her waistband.  No skin redness, itching, urinary symptoms, fever, nausea, vomiting.    Allergies:  Ativan-anaphylaxis  Macrobid-anaphylaxis  Amoxicillin  Buspirone  Busulfan-hives  Cefaclor-hives  Cephalosporins  Ciprofloxacin  Clindamycin-itching  Dilaudid   Diphenhydramine Hcl  Imitrex  Ketorolac Tromethamine  Lansoprazole  Lansoprazole  Latex  Metoclopramide-hives  Paroxetine  Penicillins  Prednisone  Red Dye  Sulfa Drugs-difficulty swallowing  Lidoderm-rash     Medications:    Norco  Zithromax  Zoloft  Magnesium oxide  Hyzaar  Potassium chloride    Past Medical History:    Anxiety  Arthritis  Asthma  Cancer  Chronic pain syndrome  Depressive disorder  Diabetes  Fibromyalgia  Hydrocephalus  Hyperlipidemia  Hypertension  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  Methamphetamine abuse  Migraines  Obesity  Obstructive sleep apnea  Right renal mass  Spina bifida    Past Surgical History:    Appendectomy  Cholecystectomy  Dental extraction  Ventriculoperitoneal shunt implants  Gynecological surgery    Family History:    Cancer-father     Social History:  Marital Status:   Presents to the ED alone  Tobacco Use: Former smoker  Alcohol Use: Occasional   PCP: OTTO YUSUF      Review of Systems   Constitutional: Negative for fever.   Gastrointestinal: Negative for diarrhea, nausea and vomiting.   Genitourinary: Negative for dysuria, frequency, hematuria, urgency, vaginal bleeding and vaginal discharge.        Painful cyst on the pelvic mons.   All other systems reviewed and are negative.    Physical Exam   First Vitals:  BP: (!)  "156/91  Heart Rate: 88  Temp: 98.4  F (36.9  C)  Resp: 16  Height: 168.9 cm (5' 6.5\")  Weight: 115.7 kg (255 lb)  SpO2: 98 %    Physical Exam  Vitals: reviewed by me  General: Pt seen on hospital [facility], cooperative, and alert to conversation  Eyes: Tracking well, clear conjunctiva BL  ENT: MMM, midline trachea.   Lungs:   No tachypnea, no accessory muscle use. No respiratory distress.   MSK: no peripheral edema or joint effusion.  No evidence of trauma  Skin: No rash, normal turgor and temperature.  Did have 2-3 cm of induration and 1 cm of fluctuance noted to mons pubis, no purulent drainage noted.  This appeared to be centered on a hair follicle.  External genitalia unremarkable, no evidence of Bartholin's cyst.  Exam done with female RN as chaperone.  Neuro: Clear speech and no facial droop.  Psych: Not RIS, no e/o AH/VH      Emergency Department Course   Procedures:      Procedure: Incision and Drainage     LOCATIONS:  Suprapubic area     ANESTHESIA:  Local field block using bupivacaine, total of 2 mLs    PREPARATION:  Cleansed with chlorhexadine    PROCEDURE:  Area was incised with # 11 Blade ([place]) with a Single Straight incision.  Wound treatment included 1 mL of Purulent Drainage.  Packing consisted of No Packing.  Appropriate dressing was applied to cover the area.    Patient Status: Patient tolerated the procedure well. There were no complications.    Emergency Department Course:  Nursing notes and vitals reviewed.    (0023) I entered the room with my scribe, obtained the history, and performed an exam of the patient as documented above.    I performed a incision and drainage as documented above.     Findings and plan explained to the patient. Patient discharged home with instructions regarding supportive care, medications, and reasons to return. The importance of close follow-up was reviewed.    Impression & Plan    Medical Decision Making:  Ines Mott is a 53-year-old female who " presents with what appears to be an ingrown hair and small furuncle in her suprapubic region.  The patient tolerated procedure very well, see incision and drainage note above, with roughly 1 cc drainage noted.  The patient states that she feels relief and at this point with no surrounding cellulitis or erythema, I do not feel that antibiotics are indicated.  We will discharged home.  Return to ED precautions given.  Will check with PCP in 48 hours.    Diagnosis:    ICD-10-CM   1. Ingrown hair L73.1   2. Furuncle of skin or subcutaneous tissue L02.92     Disposition:  discharged to home        [facility] [place] EMERGENCY DEPARTMENT        Scribe disclosure:  I, Adam Langston, am serving as a scribe on 2/7/2018 at 12:23 AM to personally document services performed by Dr. Albright based on my observations and the provider's statements to me.                     Zachary Albright MD  02/07/18 0406

## 2018-02-08 ENCOUNTER — OFFICE VISIT (OUTPATIENT)
Dept: FAMILY MEDICINE | Facility: CLINIC | Age: 53
End: 2018-02-08
Payer: COMMERCIAL

## 2018-02-08 VITALS
WEIGHT: 258 LBS | RESPIRATION RATE: 16 BRPM | DIASTOLIC BLOOD PRESSURE: 86 MMHG | OXYGEN SATURATION: 99 % | BODY MASS INDEX: 41.02 KG/M2 | SYSTOLIC BLOOD PRESSURE: 136 MMHG | HEART RATE: 79 BPM | TEMPERATURE: 96.3 F

## 2018-02-08 DIAGNOSIS — L02.92 FURUNCLE OF SKIN OR SUBCUTANEOUS TISSUE: ICD-10-CM

## 2018-02-08 DIAGNOSIS — L30.9 HAND ECZEMA: ICD-10-CM

## 2018-02-08 DIAGNOSIS — G89.4 CHRONIC PAIN SYNDROME: Primary | ICD-10-CM

## 2018-02-08 PROCEDURE — 99214 OFFICE O/P EST MOD 30 MIN: CPT | Performed by: FAMILY MEDICINE

## 2018-02-08 RX ORDER — TRIAMCINOLONE ACETONIDE 1 MG/G
OINTMENT TOPICAL
Qty: 80 G | Refills: 0 | Status: ON HOLD | OUTPATIENT
Start: 2018-02-08 | End: 2018-05-30

## 2018-02-08 RX ORDER — OXYCODONE AND ACETAMINOPHEN 10; 325 MG/1; MG/1
1 TABLET ORAL EVERY 6 HOURS PRN
Qty: 30 TABLET | Refills: 0 | Status: SHIPPED | OUTPATIENT
Start: 2018-02-08 | End: 2018-02-08

## 2018-02-08 RX ORDER — OXYCODONE AND ACETAMINOPHEN 5; 325 MG/1; MG/1
1 TABLET ORAL EVERY 6 HOURS PRN
Qty: 30 TABLET | Refills: 0 | Status: SHIPPED | OUTPATIENT
Start: 2018-02-08 | End: 2018-02-08

## 2018-02-08 RX ORDER — OXYCODONE AND ACETAMINOPHEN 5; 325 MG/1; MG/1
1 TABLET ORAL EVERY 6 HOURS PRN
Qty: 30 TABLET | Refills: 0 | Status: SHIPPED | OUTPATIENT
Start: 2018-02-22 | End: 2018-03-01

## 2018-02-08 RX ORDER — OXYCODONE AND ACETAMINOPHEN 5; 325 MG/1; MG/1
1 TABLET ORAL EVERY 6 HOURS PRN
Qty: 30 TABLET | Refills: 0 | Status: SHIPPED | OUTPATIENT
Start: 2018-02-15 | End: 2018-02-08

## 2018-02-08 ASSESSMENT — PATIENT HEALTH QUESTIONNAIRE - PHQ9
SUM OF ALL RESPONSES TO PHQ QUESTIONS 1-9: 8
SUM OF ALL RESPONSES TO PHQ QUESTIONS 1-9: 8

## 2018-02-08 NOTE — MR AVS SNAPSHOT
After Visit Summary   2/8/2018    Ines Mott    MRN: [id number]           Patient Information     Date Of Birth          1965        Visit Information        Provider Department      2/8/2018 9:45 AM Dustin Fatima MD [facility]        Today's Diagnoses     Chronic pain syndrome    -  1    Furuncle of skin or subcutaneous tissue        Hand eczema          Care Instructions    (G89.4) Chronic pain syndrome  (primary encounter diagnosis)  Comment:    Plan: oxyCODONE-acetaminophen (PERCOCET) 5-325 MG per        tablet, DISCONTINUED: oxyCODONE-acetaminophen         (PERCOCET)  MG per tablet, DISCONTINUED:         oxyCODONE-acetaminophen (PERCOCET) 5-325 MG per        tablet, DISCONTINUED: oxyCODONE-acetaminophen         (PERCOCET) 5-325 MG per tablet             (L02.92) Furuncle of skin or subcutaneous tissue  Comment:    Plan:      (L30.9) Hand eczema  Comment:    Plan: triamcinolone (KENALOG) 0.1 % ointment                             Follow-ups after your visit        Who to contact     If you have questions or need follow up information about today's clinic visit or your schedule please contact [facility] directly at [phone].  Normal or non-critical lab and imaging results will be communicated to you by [employer], letter or phone within 4 business days after the clinic has received the results. If you do not hear from us within 7 days, please contact the clinic through [employer] or phone. If you have a critical or abnormal lab result, we will notify you by phone as soon as possible.  Submit refill requests through [employer] or call your pharmacy and they will forward the refill request to us. Please allow 3 business days for your refill to be completed.          Additional Information About Your Visit        [employer] Information     [employer] lets you send messages to your doctor, view your test results,  "renew your prescriptions, schedule appointments and more. To sign up, go to [email or web address] . Click on \"Log in\" on the left side of the screen, which will take you to the Welcome page. Then click on \"Sign up Now\" on the right side of the page.     You will be asked to enter the access code listed below, as well as some personal information. Please follow the directions to create your username and password.     Your access code is: [email or web address]  Expires: 2018  6:56 PM     Your access code will  in 90 days. If you need help or a new code, please call your [place] clinic or [phone].        Care EveryWhere ID     This is your Care EveryWhere ID. This could be used by other organizations to access your [place] medical records  [id number]        Your Vitals Were     Pulse Temperature Respirations Pulse Oximetry BMI (Body Mass Index)       79 96.3  F (35.7  C) (Tympanic) 16 99% 41.02 kg/m2        Blood Pressure from Last 3 Encounters:   18 136/86   18 143/83   18 136/80    Weight from Last 3 Encounters:   18 258 lb (117 kg)   18 255 lb (115.7 kg)   18 255 lb (115.7 kg)              Today, you had the following     No orders found for display         Today's Medication Changes          These changes are accurate as of 18 10:08 AM.  If you have any questions, ask your nurse or doctor.               Start taking these medicines.        Dose/Directions    oxyCODONE-acetaminophen 5-325 MG per tablet   Commonly known as:  PERCOCET   Used for:  Chronic pain syndrome   Started by:  Dustin Fatima MD        Dose:  1 tablet   Start taking on:  2018   Take 1 tablet by mouth every 6 hours as needed for moderate to severe pain Max 4 tab/day   Quantity:  30 tablet   Refills:  0       triamcinolone 0.1 % ointment   Commonly known as:  KENALOG   Used for:  Hand eczema   Started by:  Dustin Fatima MD        Apply sparingly to affected area three " times daily for 14 days.   Quantity:  80 g   Refills:  0            Where to get your medicines      These medications were sent to [facility] [address] [place] [address] [address] [facility]  [address] [facility] [address]     Phone:  [phone]     triamcinolone 0.1 % ointment         Some of these will need a paper prescription and others can be bought over the counter.  Ask your nurse if you have questions.     Bring a paper prescription for each of these medications     oxyCODONE-acetaminophen 5-325 MG per tablet                Primary Care Provider Office Phone # Fax #    Dustin Rina Fatima [phone]       [address] [facility] [address]        Equal Access to Services     VALENTE RIVERA : Hadii casimiro ku hadasho Soomaali, waaxda luqadaha, qaybta kaalmada adeegyada, waxay antione coyne. So [facility] [phone].    ATENCIÓN: Si habla español, tiene a christianson disposición servicios gratuitos de asistencia lingüística. [facility] [phone].    We comply with applicable federal civil rights laws and [place] laws. We do not discriminate on the basis of race, color, national origin, age, disability, sex, sexual orientation, or gender identity.            Thank you!     Thank you for choosing [facility]  for your care. Our goal is always to provide you with excellent care. Hearing back from our patients is one way we can continue to improve our services. Please take a few minutes to complete the written survey that you may receive in the mail after your visit with us. Thank you!             Your Updated Medication List - Protect others around you: Learn how to safely use, store and throw away your medicines at www.disposemymeds.org.          This list is accurate as of 2/8/18 10:08 AM.  Always use your most recent med list.                   Brand Name Dispense Instructions for use Diagnosis    * albuterol (2.5 MG/3ML)  0.083% neb solution     30 vial    Take 1 vial (2.5 mg) by nebulization every 6 hours as needed for shortness of breath / dyspnea or wheezing    Mild intermittent asthma with acute exacerbation       * albuterol 108 (90 BASE) MCG/ACT Inhaler    PROAIR HFA    1 Inhaler    Inhale 2 puffs into the lungs every 4 hours as needed for shortness of breath / dyspnea or wheezing        ASPIRIN NOT PRESCRIBED    INTENTIONAL    1 each    Please choose reason not prescribed, below    Aspirin intolerance       blood glucose calibration solution    no brand specified    1 each    Use to calibrate blood glucose monitor as directed.    Type 2 diabetes mellitus without complication, without long-term current use of insulin (H)       blood glucose lancets standard    no brand specified    100 each    Use to test blood sugar daily times daily or as directed.    Type 2 diabetes mellitus without complication, without long-term current use of insulin (H)       blood glucose monitoring meter device kit    no brand specified    1 kit    Use to test blood sugar larisa times daily or as directed.    Type 2 diabetes mellitus without complication, without long-term current use of insulin (H)       blood glucose monitoring test strip    no brand specified    100 strip    Use to test blood sugars once times daily or as directed    Type 2 diabetes mellitus without complication, without long-term current use of insulin (H)       * cyanocobalamin 1000 MCG/ML injection    VITAMIN B12    1 mL    Inject 1 mL (1,000 mcg) Subcutaneous every 30 days    B12 deficiency       * cyanocobalamin 1000 MCG/ML injection    VITAMIN B12    3 mL    INJECT 1 ML SUBCUTANEOUSLY EVERY 30 DAYS    B12 deficiency       EPINEPHrine 0.3 MG/0.3ML injection 2-pack    EPIPEN/ADRENACLICK/or ANY BX GENERIC EQUIV    2 each    Inject 0.3 mLs (0.3 mg) into the muscle once as needed for anaphylaxis    Late effect of other and unspecified external causes        losartan-hydrochlorothiazide 50-12.5 MG per tablet    HYZAAR    90 tablet    Take 1 tablet by mouth daily    Hypertension goal BP (blood pressure) < 140/80       magnesium oxide 400 (241.3 MG) MG tablet    MAG-OX    90 tablet    Take 1 tablet (400 mg) by mouth daily    Cramp of limb       order for DME     1 Device    Right wrist splint for carpal tunnel syndrome  One* Use as directed at hour of sleep    Carpal tunnel syndrome of right wrist       oxyCODONE-acetaminophen 5-325 MG per tablet   Start taking on:  2/22/2018    PERCOCET    30 tablet    Take 1 tablet by mouth every 6 hours as needed for moderate to severe pain Max 4 tab/day    Chronic pain syndrome       polyethylene glycol powder    MIRALAX    510 g    Take 17 g (1 capful) by mouth daily    Slow transit constipation       potassium chloride 10 MEQ tablet    K-TAB,KLOR-CON    120 tablet    Take 1 tablet (10 mEq) by mouth daily    Hypokalemia       sertraline 100 MG tablet    ZOLOFT    45 tablet    Take 1.5 tablets (150 mg) by mouth daily    Recurrent major depression in partial remission (H), Other constipation       STATIN NOT PRESCRIBED (INTENTIONAL)     0 each    1 each daily Statin not prescribed intentionally due to Active liver disease (liver failure, cirrhosis, hepatitis) LIVER METS    Hyperlipidemia LDL goal <100, Type 2 diabetes mellitus without complication (H)       triamcinolone 0.1 % ointment    KENALOG    80 g    Apply sparingly to affected area three times daily for 14 days.    Hand eczema       Vitamin D (Cholecalciferol) 1000 UNITS Tabs     60 tablet    Take 2,000 Units by mouth daily    Vitamin D deficiency       * Notice:  This list has 4 medication(s) that are the same as other medications prescribed for you. Read the directions carefully, and ask your doctor or other care provider to review them with you.
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
## 2018-02-08 NOTE — PATIENT INSTRUCTIONS
(G89.4) Chronic pain syndrome  (primary encounter diagnosis)  Comment:    Plan: oxyCODONE-acetaminophen (PERCOCET) 5-325 MG per        tablet, DISCONTINUED: oxyCODONE-acetaminophen         (PERCOCET)  MG per tablet, DISCONTINUED:         oxyCODONE-acetaminophen (PERCOCET) 5-325 MG per        tablet, DISCONTINUED: oxyCODONE-acetaminophen         (PERCOCET) 5-325 MG per tablet             (L02.92) Furuncle of skin or subcutaneous tissue  Comment:    Plan:      (L30.9) Hand eczema  Comment:    Plan: triamcinolone (KENALOG) 0.1 % ointment                   ULTRASOUND ABDOMEN LIMITED 10/3/2017 1:16 PM      HISTORY: Liver cancer, fell to right, now complaining of liver pain.     FINDINGS: The gallbladder was not visible and reported to be  surgically absent. No biliary dilatation was demonstrated. Multiple  solid but hypoechoic lesions were noted within the liver consistent  with hepatic metastasis. The largest measured 3.0 cm in the right lobe  and 1.4 cm in the left lobe. Solid 1.8 x 2.4 x 1.7 cm hypoechoic mass  is noted lateral to the right kidney. The right kidney was otherwise  unremarkable. No hydronephrosis. The visualized portion of the  pancreas appeared within normal limits.         IMPRESSION:  1. Multiple hepatic metastases, the largest measuring 3.0 cm in  maximal diameter within the right lobe.  2. Hypoechoic mass lateral to the right kidney measuring 2.7 cm. This  was present on 1/26/2017 but may have increased in size.  3. Cholecystectomy.     FAWN GREER MD

## 2018-02-08 NOTE — NURSING NOTE
"Chief Complaint   Patient presents with     Emergency Department     cyst       Initial /86  Pulse 79  Temp 96.3  F (35.7  C) (Tympanic)  Resp 16  Wt 258 lb (117 kg)  SpO2 99%  BMI 41.02 kg/m2 Estimated body mass index is 41.02 kg/(m^2) as calculated from the following:    Height as of 2/6/18: 5' 6.5\" (1.689 m).    Weight as of this encounter: 258 lb (117 kg).  Medication Reconciliation: complete   Maggi Harris CMA    "

## 2018-02-09 ASSESSMENT — PATIENT HEALTH QUESTIONNAIRE - PHQ9: SUM OF ALL RESPONSES TO PHQ QUESTIONS 1-9: 8

## 2018-02-17 ENCOUNTER — APPOINTMENT (OUTPATIENT)
Dept: CT IMAGING | Facility: CLINIC | Age: 53
End: 2018-02-17
Attending: EMERGENCY MEDICINE
Payer: COMMERCIAL

## 2018-02-17 ENCOUNTER — HOSPITAL ENCOUNTER (EMERGENCY)
Facility: CLINIC | Age: 53
Discharge: HOME | End: 2018-02-17
Attending: EMERGENCY MEDICINE | Admitting: EMERGENCY MEDICINE
Payer: COMMERCIAL

## 2018-02-17 VITALS
HEART RATE: 64 BPM | OXYGEN SATURATION: 95 % | DIASTOLIC BLOOD PRESSURE: 80 MMHG | SYSTOLIC BLOOD PRESSURE: 148 MMHG | TEMPERATURE: 98 F | RESPIRATION RATE: 16 BRPM

## 2018-02-17 DIAGNOSIS — R51.9 NONINTRACTABLE HEADACHE, UNSPECIFIED CHRONICITY PATTERN, UNSPECIFIED HEADACHE TYPE: ICD-10-CM

## 2018-02-17 DIAGNOSIS — R55 NEAR SYNCOPE: ICD-10-CM

## 2018-02-17 DIAGNOSIS — R11.2 NON-INTRACTABLE VOMITING WITH NAUSEA, UNSPECIFIED VOMITING TYPE: ICD-10-CM

## 2018-02-17 LAB
ALBUMIN SERPL-MCNC: 3.4 G/DL (ref 3.4–5)
ALP SERPL-CCNC: 83 U/L (ref 40–150)
ALT SERPL W P-5'-P-CCNC: 41 U/L (ref 0–50)
ANION GAP SERPL CALCULATED.3IONS-SCNC: 5 MMOL/L (ref 3–14)
AST SERPL W P-5'-P-CCNC: 56 U/L (ref 0–45)
BASOPHILS # BLD AUTO: 0 10E9/L (ref 0–0.2)
BASOPHILS NFR BLD AUTO: 0.1 %
BILIRUB SERPL-MCNC: 1.1 MG/DL (ref 0.2–1.3)
BUN SERPL-MCNC: 11 MG/DL (ref 7–30)
CALCIUM SERPL-MCNC: 9.3 MG/DL (ref 8.5–10.1)
CHLORIDE SERPL-SCNC: 104 MMOL/L (ref 94–109)
CO2 SERPL-SCNC: 30 MMOL/L (ref 20–32)
CREAT SERPL-MCNC: 0.79 MG/DL (ref 0.52–1.04)
D DIMER PPP FEU-MCNC: 0.3 UG/ML FEU (ref 0–0.5)
DIFFERENTIAL METHOD BLD: NORMAL
EOSINOPHIL # BLD AUTO: 0.2 10E9/L (ref 0–0.7)
EOSINOPHIL NFR BLD AUTO: 2.9 %
ERYTHROCYTE [DISTWIDTH] IN BLOOD BY AUTOMATED COUNT: 13.2 % (ref 10–15)
GFR SERPL CREATININE-BSD FRML MDRD: 76 ML/MIN/1.7M2
GLUCOSE SERPL-MCNC: 95 MG/DL (ref 70–99)
HCT VFR BLD AUTO: 37.1 % (ref 35–47)
HGB BLD-MCNC: 12.3 G/DL (ref 11.7–15.7)
IMM GRANULOCYTES # BLD: 0 10E9/L (ref 0–0.4)
IMM GRANULOCYTES NFR BLD: 0.3 %
INTERPRETATION ECG - MUSE: NORMAL
LIPASE SERPL-CCNC: 196 U/L (ref 73–393)
LYMPHOCYTES # BLD AUTO: 3.1 10E9/L (ref 0.8–5.3)
LYMPHOCYTES NFR BLD AUTO: 39.1 %
MCH RBC QN AUTO: 29.9 PG (ref 26.5–33)
MCHC RBC AUTO-ENTMCNC: 33.2 G/DL (ref 31.5–36.5)
MCV RBC AUTO: 90 FL (ref 78–100)
MONOCYTES # BLD AUTO: 0.6 10E9/L (ref 0–1.3)
MONOCYTES NFR BLD AUTO: 7 %
NEUTROPHILS # BLD AUTO: 4.1 10E9/L (ref 1.6–8.3)
NEUTROPHILS NFR BLD AUTO: 50.6 %
NRBC # BLD AUTO: 0 10*3/UL
NRBC BLD AUTO-RTO: 0 /100
PLATELET # BLD AUTO: 305 10E9/L (ref 150–450)
POTASSIUM SERPL-SCNC: 3.7 MMOL/L (ref 3.4–5.3)
PROT SERPL-MCNC: 6.8 G/DL (ref 6.8–8.8)
RBC # BLD AUTO: 4.11 10E12/L (ref 3.8–5.2)
SODIUM SERPL-SCNC: 139 MMOL/L (ref 133–144)
WBC # BLD AUTO: 8 10E9/L (ref 4–11)

## 2018-02-17 PROCEDURE — 93005 ELECTROCARDIOGRAM TRACING: CPT

## 2018-02-17 PROCEDURE — 83690 ASSAY OF LIPASE: CPT | Performed by: EMERGENCY MEDICINE

## 2018-02-17 PROCEDURE — 99285 EMERGENCY DEPT VISIT HI MDM: CPT | Mod: 25

## 2018-02-17 PROCEDURE — 85025 COMPLETE CBC W/AUTO DIFF WBC: CPT | Performed by: EMERGENCY MEDICINE

## 2018-02-17 PROCEDURE — 85379 FIBRIN DEGRADATION QUANT: CPT | Performed by: EMERGENCY MEDICINE

## 2018-02-17 PROCEDURE — 80053 COMPREHEN METABOLIC PANEL: CPT | Performed by: EMERGENCY MEDICINE

## 2018-02-17 PROCEDURE — 70450 CT HEAD/BRAIN W/O DYE: CPT

## 2018-02-17 RX ORDER — ONDANSETRON 4 MG/1
4 TABLET, ORALLY DISINTEGRATING ORAL EVERY 8 HOURS PRN
Qty: 10 TABLET | Refills: 1 | Status: SHIPPED | OUTPATIENT
Start: 2018-02-17 | End: 2019-05-14

## 2018-02-17 RX ORDER — ONDANSETRON 2 MG/ML
4 INJECTION INTRAMUSCULAR; INTRAVENOUS
Status: DISCONTINUED | OUTPATIENT
Start: 2018-02-17 | End: 2018-02-17 | Stop reason: HOSPADM

## 2018-02-17 ASSESSMENT — ENCOUNTER SYMPTOMS
DIFFICULTY URINATING: 0
NAUSEA: 1
DIZZINESS: 1
VOMITING: 1
WEAKNESS: 1
HEADACHES: 1
CONSTIPATION: 1
CHILLS: 1
FEVER: 1

## 2018-02-17 NOTE — ED NOTES
Bed: ED27  Expected date:   Expected time:   Means of arrival:   Comments:  Kerry  53 F dizzy/nausea  1640

## 2018-02-17 NOTE — ED AVS SNAPSHOT
Emergency Department    [address] [place] [id number]    Phone:  [phone]    Fax:  [phone]                                       Ines Mott   MRN: [id number]    Department:   Emergency Department   Date of Visit:  2/17/2018           Patient Information     Date Of Birth          1965        Your diagnoses for this visit were:     Nonintractable headache, unspecified chronicity pattern, unspecified headache type     Non-intractable vomiting with nausea, unspecified vomiting type     Near syncope        You were seen by Pedro Preston MD.      Follow-up Information     Please follow up.    Why:  follow up with your MD as needed - zofran for nausea        Discharge Instructions       Discharge Instructions  Headache    You were seen today for a headache. Headaches may be caused by many different things such as muscle tension, sinus inflammation, anxiety and stress, having too little sleep, too much alcohol, some medical conditions or injury. You may have a migraine, which is caused by changes in the blood vessels in your head.  At this time your provider does not find that your headache is a sign of anything dangerous or life-threatening.  However, sometimes the signs of serious illness do not show up right away.      Generally, every Emergency Department visit should have a follow-up clinic visit with either a primary or a specialty clinic/provider. Please follow-up as instructed by your emergency provider today.    Return to the Emergency Department if:    You get a new fever of 100.4 F or higher.    Your headache gets much worse.    You get a stiff neck with your headache.    You get a new headache that is significantly different or worse than headaches you have had before.    You are vomiting (throwing up) and cannot keep food or water down.    You have blurry or double vision or other problems with your eyes.    You have a new weakness on one side of your  body.    You have difficulty with balance which is new.    You or your family thinks you are confused.    You have a seizure.    What can I do to help myself?    Pain medications - You may take a pain medication such as Tylenol  (acetaminophen), Advil , Motrin  (ibuprofen) or Aleve  (naproxen).    Take a pain reliever as soon as you notice symptoms.  Starting medications as soon as you start to have symptoms may lessen the amount of pain you have.    Relaxing in a quiet, dark room may help.    Get enough sleep and eat meals regularly.    You may need to watch for certain foods or other things which may trigger your headaches.  Keeping a journal of your headaches and possible triggers may help you and your primary provider to identify things which you should avoid which may be causing your headaches.  If you were given a prescription for medicine here today, be sure to read all of the information (including the package insert) that comes with your prescription.  This will include important information about the medicine, its side effects, and any warnings that you need to know about.  The pharmacist who fills the prescription can provide more information and answer questions you may have about the medicine.  If you have questions or concerns that the pharmacist cannot address, please call or return to the Emergency Department.   Remember that you can always come back to the Emergency Department if you are not able to see your regular provider in the amount of time listed above, if you get any new symptoms, or if there is anything that worries you.    Discharge Instructions  Vomiting    You have been seen today for vomiting (throwing up). This is usually caused by a virus, but some bacteria, parasites, medicines or other medical conditions can cause similar symptoms. At this time your provider does not find that your vomiting is a sign of anything dangerous or life-threatening. However, sometimes the signs of serious  illness do not show up right away. If you have new or worse symptoms, you may need to be seen again in the Emergency Department or by your primary provider. Remember that serious problems like appendicitis can start as vomiting.    Generally, every Emergency Department visit should have a follow-up clinic visit with either a primary or a specialty clinic/provider. Please follow-up as instructed by your emergency provider today.    Return to the Emergency Department if:    You keep vomiting and you are not able to keep liquids down.     You feel you are getting dehydrated, such as being very thirsty, not urinating (peeing) at least every 8-12 hours, or feeling faint or lightheaded.     You develop a new fever, or your fever continues for more than 2 days.     You have abdominal (belly pain) that seems worse than cramps, is in one spot, or is getting worse over time. Appendicitis usually causes pain in the right lower abdomen (to the right and below your belly button) so watch for pain in this location.    You have blood in your vomit or stools.     You feel very weak.    You are not starting to improve within 24 hours of your visit here.     What can I do to help myself?    The most important thing to do is to drink clear liquids. If you have been vomiting a lot, it is best to have only small, frequent sips of liquids. Drinking too much at once may cause more vomiting. If you are vomiting often, you must replace minerals, sodium and potassium lost with your illness. Pedialyte  is the best available rehydration liquid but some find that it doesn t taste good so sports drinks are an alterative. You can also drink clear liquids such as water, weak tea, apple juice, and 7-Up . Avoid acid liquids (orange), caffeine (coffee) or alcohol. Do not drink milk until you no longer have diarrhea (loose stools).     After liquids are staying down, you may start eating mild foods. Soda crackers, toast, plain noodles, gelatin,  applesauce and bananas are good first choices. Avoid foods that have acid, are spicy, fatty or have a lot of fiber (such as meats, coarse grains, vegetables). You may start eating these foods again in about 3 days when you are better.     Sometimes treatment includes prescription medicine to prevent nausea (sick to your stomach) and vomiting. If your provider prescribes these for you, take them as directed.     Do not take ibuprofen, naproxen, or other nonsteroidal anti-inflammatory (NSAID) medicines without checking with your healthcare provider.     If you were given a prescription for medicine here today, be sure to read all of the information (including the package insert) that comes with your prescription.  This will include important information about the medicine, its side effects, and any warnings that you need to know about.  The pharmacist who fills the prescription can provide more information and answer questions you may have about the medicine.  If you have questions or concerns that the pharmacist cannot address, please call or return to the Emergency Department.     Remember that you can always come back to the Emergency Department if you are not able to see your regular provider in the amount of time listed above, if you get any new symptoms, or if there is anything that worries you.    Discharge Instructions  Dizziness (Lightheaded)  Today you were seen for dizziness.  Dizziness can be caused by many things and it can be very difficult to determine the cause of dizziness.  At this time, your provider has found no signs that your dizziness is due to a serious or life-threatening condition. However, sometimes there is a serious problem that does not show up right away, and it is important for you to follow up with your regular provider as instructed.  Generally, every Emergency Department visit should have a follow-up clinic visit with either a primary or a specialty clinic/provider. Please follow-up  as instructed by your emergency provider today.      Return to the Emergency Department if:      You pass out (fainting or falling out), especially during exercise.      You develop chest pain, chest pressure or difficulty breathing.    Your feel an irregular heartbeat.    You have excessive vaginal bleeding, or blood in your stool or vomit (throw up).    You have a high fever.    Your symptoms get worse or more frequent.    If when you begin to feel dizzy or lightheaded, it is important to sit down or lay down immediately to prevent injury from falling.  If you were given a prescription for medicine here today, be sure to read all of the information (including the package insert) that comes with your prescription.  This will include important information about the medicine, its side effects, and any warnings that you need to know about.  The pharmacist who fills the prescription can provide more information and answer questions you may have about the medicine.  If you have questions or concerns that the pharmacist cannot address, please call or return to the Emergency Department.   Remember that you can always come back to the Emergency Department if you are not able to see your regular provider in the amount of time listed above, if you get any new symptoms, or if there is anything that worries you.      Future Appointments        Provider Department Dept Phone Center    3/1/2018 10:00 AM OTTO YUSUF MD [facility] [phone] [place]      24 Hour Appointment Hotline       To make an appointment at any [facility], call [phone] ([phone]). If you don't have a family doctor or clinic, we will help you find one. [facility] are conveniently located to serve the needs of you and your family.             Review of your medicines      START taking        Dose / Directions Last dose taken    ondansetron 4 MG ODT tab   Commonly known as:  ZOFRAN-ODT    Dose:  4 mg   Quantity:  10 tablet        Take 1 tablet (4 mg) by mouth every 8 hours as needed for nausea   Refills:  1          Our records show that you are taking the medicines listed below. If these are incorrect, please call your family doctor or clinic.        Dose / Directions Last dose taken    albuterol (2.5 MG/3ML) 0.083% neb solution   Dose:  1 vial   Quantity:  30 vial        Take 1 vial (2.5 mg) by nebulization every 6 hours as needed for shortness of breath / dyspnea or wheezing   Refills:  3        ASPIRIN NOT PRESCRIBED   Commonly known as:  INTENTIONAL   Quantity:  1 each        Please choose reason not prescribed, below   Refills:  0        blood glucose calibration solution   Commonly known as:  no brand specified   Quantity:  1 each        Use to calibrate blood glucose monitor as directed.   Refills:  0        blood glucose lancets standard   Commonly known as:  no brand specified   Quantity:  100 each        Use to test blood sugar daily times daily or as directed.   Refills:  11        blood glucose monitoring meter device kit   Commonly known as:  no brand specified   Quantity:  1 kit        Use to test blood sugar larisa times daily or as directed.   Refills:  0        blood glucose monitoring test strip   Commonly known as:  no brand specified   Quantity:  100 strip        Use to test blood sugars once times daily or as directed   Refills:  3        * cyanocobalamin 1000 MCG/ML injection   Commonly known as:  VITAMIN B12   Dose:  1 mL   Quantity:  1 mL        Inject 1 mL (1,000 mcg) Subcutaneous every 30 days   Refills:  11        * cyanocobalamin 1000 MCG/ML injection   Commonly known as:  VITAMIN B12   Quantity:  3 mL        INJECT 1 ML SUBCUTANEOUSLY EVERY 30 DAYS   Refills:  11        EPINEPHrine 0.3 MG/0.3ML injection 2-pack   Commonly known as:  EPIPEN/ADRENACLICK/or ANY BX GENERIC EQUIV   Dose:  0.3 mg   Quantity:  2 each        Inject 0.3 mLs (0.3 mg) into the muscle once as needed  for anaphylaxis   Refills:  5        losartan-hydrochlorothiazide 50-12.5 MG per tablet   Commonly known as:  HYZAAR   Dose:  1 tablet   Quantity:  90 tablet        Take 1 tablet by mouth daily   Refills:  3        magnesium oxide 400 (241.3 MG) MG tablet   Commonly known as:  MAG-OX   Dose:  400 mg   Quantity:  90 tablet        Take 1 tablet (400 mg) by mouth daily   Refills:  3        order for DME   Quantity:  1 Device        Right wrist splint for carpal tunnel syndrome  One* Use as directed at hour of sleep   Refills:  1        oxyCODONE-acetaminophen 5-325 MG per tablet   Commonly known as:  PERCOCET   Dose:  1 tablet   Quantity:  30 tablet   Start taking on:  2/22/2018        Take 1 tablet by mouth every 6 hours as needed for moderate to severe pain Max 4 tab/day   Refills:  0        polyethylene glycol powder   Commonly known as:  MIRALAX   Dose:  1 capful   Quantity:  510 g        Take 17 g (1 capful) by mouth daily   Refills:  3        potassium chloride 10 MEQ tablet   Commonly known as:  K-TAB,KLOR-CON   Dose:  10 mEq   Indication:  Low Amount of Potassium in the Blood   Quantity:  120 tablet        Take 1 tablet (10 mEq) by mouth daily   Refills:  11        sertraline 100 MG tablet   Commonly known as:  ZOLOFT   Dose:  150 mg   Indication:  Major Depressive Disorder   Quantity:  45 tablet        Take 1.5 tablets (150 mg) by mouth daily   Refills:  11        STATIN NOT PRESCRIBED (INTENTIONAL)   Dose:  1 each   Quantity:  0 each        1 each daily Statin not prescribed intentionally due to Active liver disease (liver failure, cirrhosis, hepatitis) LIVER METS   Refills:  0        triamcinolone 0.1 % ointment   Commonly known as:  KENALOG   Quantity:  80 g        Apply sparingly to affected area three times daily for 14 days.   Refills:  0        Vitamin D (Cholecalciferol) 1000 UNITS Tabs   Dose:  2000 Units   Quantity:  60 tablet        Take 2,000 Units by mouth daily   Refills:  11        * Notice:   This list has 2 medication(s) that are the same as other medications prescribed for you. Read the directions carefully, and ask your doctor or other care provider to review them with you.            Prescriptions were sent or printed at these locations (1 Prescription)                   Other Prescriptions                Printed at Department/Unit printer (1 of 1)         ondansetron (ZOFRAN-ODT) 4 MG ODT tab                Procedures and tests performed during your visit     CBC with platelets differential    CT Head w/o Contrast    Comprehensive metabolic panel    D dimer quantitative    EKG 12-lead, tracing only    Lipase    Peripheral IV catheter      Orders Needing Specimen Collection     None      Pending Results     Date and Time Order Name Status Description    2/17/2018 1658 EKG 12-lead, tracing only Preliminary             Pending Culture Results     No orders found from 2/15/2018 to 2/18/2018.            Pending Results Instructions     If you had any lab results that were not finalized at the time of your Discharge, you can call the ED Lab Result RN at [phone]. You will be contacted by this team for any positive Lab results or changes in treatment. The nurses are available 7 days a week from 10A to 6:30P.  You can leave a message 24 hours per day and they will return your call.        Test Results From Your Hospital Stay        2/17/2018  5:46 PM      Component Results     Component Value Ref Range & Units Status    WBC 8.0 4.0 - 11.0 10e9/L Final    RBC Count 4.11 3.8 - 5.2 10e12/L Final    Hemoglobin 12.3 11.7 - 15.7 g/dL Final    Hematocrit 37.1 35.0 - 47.0 % Final    MCV 90 78 - 100 fl Final    MCH 29.9 26.5 - 33.0 pg Final    MCHC 33.2 31.5 - 36.5 g/dL Final    RDW 13.2 10.0 - 15.0 % Final    Platelet Count 305 150 - 450 10e9/L Final    Diff Method Automated Method  Final    % Neutrophils 50.6 % Final    % Lymphocytes 39.1 % Final    % Monocytes 7.0 % Final    % Eosinophils 2.9 % Final    %  Basophils 0.1 % Final    % Immature Granulocytes 0.3 % Final    Nucleated RBCs 0 0 /100 Final    Absolute Neutrophil 4.1 1.6 - 8.3 10e9/L Final    Absolute Lymphocytes 3.1 0.8 - 5.3 10e9/L Final    Absolute Monocytes 0.6 0.0 - 1.3 10e9/L Final    Absolute Eosinophils 0.2 0.0 - 0.7 10e9/L Final    Absolute Basophils 0.0 0.0 - 0.2 10e9/L Final    Abs Immature Granulocytes 0.0 0 - 0.4 10e9/L Final    Absolute Nucleated RBC 0.0  Final         2/17/2018  6:03 PM      Component Results     Component Value Ref Range & Units Status    Sodium 139 133 - 144 mmol/L Final    Potassium 3.7 3.4 - 5.3 mmol/L Final    Chloride 104 94 - 109 mmol/L Final    Carbon Dioxide 30 20 - 32 mmol/L Final    Anion Gap 5 3 - 14 mmol/L Final    Glucose 95 70 - 99 mg/dL Final    Urea Nitrogen 11 7 - 30 mg/dL Final    Creatinine 0.79 0.52 - 1.04 mg/dL Final    GFR Estimate 76 >60 mL/min/1.7m2 Final    Non  GFR Calc    GFR Estimate If Black >90 >60 mL/min/1.7m2 Final    African American GFR Calc    Calcium 9.3 8.5 - 10.1 mg/dL Final    Bilirubin Total 1.1 0.2 - 1.3 mg/dL Final    Albumin 3.4 3.4 - 5.0 g/dL Final    Protein Total 6.8 6.8 - 8.8 g/dL Final    Alkaline Phosphatase 83 40 - 150 U/L Final    ALT 41 0 - 50 U/L Final    AST 56 (H) 0 - 45 U/L Final         2/17/2018  6:00 PM      Component Results     Component Value Ref Range & Units Status    Lipase 196 73 - 393 U/L Final         2/17/2018  6:33 PM      Narrative     CT HEAD WITHOUT CONTRAST  2/17/2018 5:46 PM    HISTORY: Headache, history of  shunt.     TECHNIQUE: Scans were obtained through the head without IV contrast.   Radiation dose for this scan was reduced using automated exposure  control, adjustment of the mA and/or kV according to patient size, or  iterative reconstruction technique.    COMPARISON: 9/18/2017.    FINDINGS: Shunt tube in place from a right parietal approach  traversing the lateral ventricles and extending into the left frontal  white matter,  unchanged in position since 9/18/2017. 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  No hemorrhage or intra-axial mass.  Small right frontal meningioma noted on the prior exam, unchanged in  the interval. It is 1.3 cm in greatest dimension without significant  mass effect on the adjacent brain. Tiny osteoma in the right ethmoid  air cells. No bony abnormality.        Impression     IMPRESSION:   1. Stable scan since 9/18/2017. Nothing acute.  2. 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 Ventricles are well decompressed and unchanged.  3. Presumed small meningioma right mid frontal region without  significant mass effect.    ТАТЬЯНА TEMPLETON MD         2/17/2018  6:24 PM      Component Results     Component Value Ref Range & Units Status    D Dimer 0.3 0.0 - 0.50 ug/ml FEU Final    This D-dimer assay is intended for use in conjunction with a clinical pretest   probability assessment model to exclude pulmonary embolism (PE) and deep   venous thrombosis (DVT) in outpatients suspected of PE or DVT. The cut-off   value is 0.5 ug/mL FEU.                  Clinical Quality Measure: Blood Pressure Screening     Your blood pressure was checked while you were in the emergency department today. The last reading we obtained was  BP: 151/82 . Please read the guidelines below about what these numbers mean and what you should do about them.  If your systolic blood pressure (the top number) is less than 120 and your diastolic blood pressure (the bottom number) is less than 80, then your blood pressure is normal. There is nothing more that you need to do about it.  If your systolic blood pressure (the top number) is 120-139 or your diastolic blood pressure (the bottom number) is 80-89, your blood pressure may be higher than it should be. You should have your blood pressure rechecked within a year by a primary care  "provider.  If your systolic blood pressure (the top number) is 140 or greater or your diastolic blood pressure (the bottom number) is 90 or greater, you may have high blood pressure. High blood pressure is treatable, but if left untreated over time it can put you at risk for heart attack, stroke, or kidney failure. You should have your blood pressure rechecked by a primary care provider within the next 4 weeks.  If your provider in the emergency department today gave you specific instructions to follow-up with your doctor or provider even sooner than that, you should follow that instruction and not wait for up to 4 weeks for your follow-up visit.        Thank you for choosing [facility]       Thank you for choosing [facility] for your care. Our goal is always to provide you with excellent care. Hearing back from our patients is one way we can continue to improve our services. Please take a few minutes to complete the written survey that you may receive in the mail after you visit with us. Thank you!        [employer] Information     [employer] lets you send messages to your doctor, view your test results, renew your prescriptions, schedule appointments and more. To sign up, go to [email or web address] . Click on \"Log in\" on the left side of the screen, which will take you to the Welcome page. Then click on \"Sign up Now\" on the right side of the page.     You will be asked to enter the access code listed below, as well as some personal information. Please follow the directions to create your username and password.     Your access code is: [email or web address]  Expires: 2018  6:56 PM     Your access code will  in 90 days. If you need help or a new code, please call your [facility] clinic or [phone].        Care EveryWhere ID     This is your Care EveryWhere ID. This could be used by other organizations to access your [facility] medical records  [id number]        Equal Access to Services     VALENTE WILEY: Marc zapata" arianna Leonard, deion nunes, candida hernandez, emilee coyne. So [facility] [phone].    ATENCIÓN: Si habla español, tiene a christianson disposición servicios gratuitos de asistencia lingüística. Llame al [phone].    We comply with applicable federal civil rights laws and [place] laws. We do not discriminate on the basis of race, color, national origin, age, disability, sex, sexual orientation, or gender identity.            After Visit Summary       This is your record. Keep this with you and show to your community pharmacist(s) and doctor(s) at your next visit.
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
## 2018-02-17 NOTE — ED AVS SNAPSHOT
Emergency Department    [address] [place] [address]    Phone:  [phone]    Fax:  [phone]                                       Ines Mott   MRN: [id number]    Department:   Emergency Department   Date of Visit:  2/17/2018           After Visit Summary Signature Page     I have received my discharge instructions, and my questions have been answered. I have discussed any challenges I see with this plan with the nurse or doctor.    ..........................................................................................................................................  Patient/Patient Representative Signature      ..........................................................................................................................................  Patient Representative Print Name and Relationship to Patient    ..................................................               ................................................  Date                                            Time    ..........................................................................................................................................  Reviewed by Signature/Title    ...................................................              ..............................................  Date                                                            Time

## 2018-02-18 NOTE — DISCHARGE INSTRUCTIONS
Discharge Instructions  Headache    You were seen today for a headache. Headaches may be caused by many different things such as muscle tension, sinus inflammation, anxiety and stress, having too little sleep, too much alcohol, some medical conditions or injury. You may have a migraine, which is caused by changes in the blood vessels in your head.  At this time your provider does not find that your headache is a sign of anything dangerous or life-threatening.  However, sometimes the signs of serious illness do not show up right away.      Generally, every Emergency Department visit should have a follow-up clinic visit with either a primary or a specialty clinic/provider. Please follow-up as instructed by your emergency provider today.    Return to the Emergency Department if:    You get a new fever of 100.4 F or higher.    Your headache gets much worse.    You get a stiff neck with your headache.    You get a new headache that is significantly different or worse than headaches you have had before.    You are vomiting (throwing up) and cannot keep food or water down.    You have blurry or double vision or other problems with your eyes.    You have a new weakness on one side of your body.    You have difficulty with balance which is new.    You or your family thinks you are confused.    You have a seizure.    What can I do to help myself?    Pain medications - You may take a pain medication such as Tylenol  (acetaminophen), Advil , Motrin  (ibuprofen) or Aleve  (naproxen).    Take a pain reliever as soon as you notice symptoms.  Starting medications as soon as you start to have symptoms may lessen the amount of pain you have.    Relaxing in a quiet, dark room may help.    Get enough sleep and eat meals regularly.    You may need to watch for certain foods or other things which may trigger your headaches.  Keeping a journal of your headaches and possible triggers may help you and your primary provider to identify  things which you should avoid which may be causing your headaches.  If you were given a prescription for medicine here today, be sure to read all of the information (including the package insert) that comes with your prescription.  This will include important information about the medicine, its side effects, and any warnings that you need to know about.  The pharmacist who fills the prescription can provide more information and answer questions you may have about the medicine.  If you have questions or concerns that the pharmacist cannot address, please call or return to the Emergency Department.   Remember that you can always come back to the Emergency Department if you are not able to see your regular provider in the amount of time listed above, if you get any new symptoms, or if there is anything that worries you.    Discharge Instructions  Vomiting    You have been seen today for vomiting (throwing up). This is usually caused by a virus, but some bacteria, parasites, medicines or other medical conditions can cause similar symptoms. At this time your provider does not find that your vomiting is a sign of anything dangerous or life-threatening. However, sometimes the signs of serious illness do not show up right away. If you have new or worse symptoms, you may need to be seen again in the Emergency Department or by your primary provider. Remember that serious problems like appendicitis can start as vomiting.    Generally, every Emergency Department visit should have a follow-up clinic visit with either a primary or a specialty clinic/provider. Please follow-up as instructed by your emergency provider today.    Return to the Emergency Department if:    You keep vomiting and you are not able to keep liquids down.     You feel you are getting dehydrated, such as being very thirsty, not urinating (peeing) at least every 8-12 hours, or feeling faint or lightheaded.     You develop a new fever, or your fever continues  for more than 2 days.     You have abdominal (belly pain) that seems worse than cramps, is in one spot, or is getting worse over time. Appendicitis usually causes pain in the right lower abdomen (to the right and below your belly button) so watch for pain in this location.    You have blood in your vomit or stools.     You feel very weak.    You are not starting to improve within 24 hours of your visit here.     What can I do to help myself?    The most important thing to do is to drink clear liquids. If you have been vomiting a lot, it is best to have only small, frequent sips of liquids. Drinking too much at once may cause more vomiting. If you are vomiting often, you must replace minerals, sodium and potassium lost with your illness. Pedialyte  is the best available rehydration liquid but some find that it doesn t taste good so sports drinks are an alterative. You can also drink clear liquids such as water, weak tea, apple juice, and 7-Up . Avoid acid liquids (orange), caffeine (coffee) or alcohol. Do not drink milk until you no longer have diarrhea (loose stools).     After liquids are staying down, you may start eating mild foods. Soda crackers, toast, plain noodles, gelatin, applesauce and bananas are good first choices. Avoid foods that have acid, are spicy, fatty or have a lot of fiber (such as meats, coarse grains, vegetables). You may start eating these foods again in about 3 days when you are better.     Sometimes treatment includes prescription medicine to prevent nausea (sick to your stomach) and vomiting. If your provider prescribes these for you, take them as directed.     Do not take ibuprofen, naproxen, or other nonsteroidal anti-inflammatory (NSAID) medicines without checking with your healthcare provider.     If you were given a prescription for medicine here today, be sure to read all of the information (including the package insert) that comes with your prescription.  This will include important  information about the medicine, its side effects, and any warnings that you need to know about.  The pharmacist who fills the prescription can provide more information and answer questions you may have about the medicine.  If you have questions or concerns that the pharmacist cannot address, please call or return to the Emergency Department.     Remember that you can always come back to the Emergency Department if you are not able to see your regular provider in the amount of time listed above, if you get any new symptoms, or if there is anything that worries you.    Discharge Instructions  Dizziness (Lightheaded)  Today you were seen for dizziness.  Dizziness can be caused by many things and it can be very difficult to determine the cause of dizziness.  At this time, your provider has found no signs that your dizziness is due to a serious or life-threatening condition. However, sometimes there is a serious problem that does not show up right away, and it is important for you to follow up with your regular provider as instructed.  Generally, every Emergency Department visit should have a follow-up clinic visit with either a primary or a specialty clinic/provider. Please follow-up as instructed by your emergency provider today.      Return to the Emergency Department if:      You pass out (fainting or falling out), especially during exercise.      You develop chest pain, chest pressure or difficulty breathing.    Your feel an irregular heartbeat.    You have excessive vaginal bleeding, or blood in your stool or vomit (throw up).    You have a high fever.    Your symptoms get worse or more frequent.    If when you begin to feel dizzy or lightheaded, it is important to sit down or lay down immediately to prevent injury from falling.  If you were given a prescription for medicine here today, be sure to read all of the information (including the package insert) that comes with your prescription.  This will include  important information about the medicine, its side effects, and any warnings that you need to know about.  The pharmacist who fills the prescription can provide more information and answer questions you may have about the medicine.  If you have questions or concerns that the pharmacist cannot address, please call or return to the Emergency Department.   Remember that you can always come back to the Emergency Department if you are not able to see your regular provider in the amount of time listed above, if you get any new symptoms, or if there is anything that worries you.

## 2018-02-20 ENCOUNTER — APPOINTMENT (OUTPATIENT)
Dept: GENERAL RADIOLOGY | Facility: CLINIC | Age: 53
End: 2018-02-20
Attending: EMERGENCY MEDICINE
Payer: COMMERCIAL

## 2018-02-20 ENCOUNTER — NURSE TRIAGE (OUTPATIENT)
Dept: NURSING | Facility: CLINIC | Age: 53
End: 2018-02-20

## 2018-02-20 ENCOUNTER — HOSPITAL ENCOUNTER (EMERGENCY)
Facility: CLINIC | Age: 53
Discharge: HOME | End: 2018-02-20
Attending: EMERGENCY MEDICINE | Admitting: EMERGENCY MEDICINE
Payer: COMMERCIAL

## 2018-02-20 VITALS
DIASTOLIC BLOOD PRESSURE: 106 MMHG | HEIGHT: 66 IN | SYSTOLIC BLOOD PRESSURE: 187 MMHG | OXYGEN SATURATION: 97 % | TEMPERATURE: 98.5 F | BODY MASS INDEX: 41.14 KG/M2 | WEIGHT: 256 LBS

## 2018-02-20 DIAGNOSIS — K59.00 CONSTIPATION, UNSPECIFIED CONSTIPATION TYPE: ICD-10-CM

## 2018-02-20 LAB
ALBUMIN SERPL-MCNC: 3.4 G/DL (ref 3.4–5)
ALP SERPL-CCNC: 71 U/L (ref 40–150)
ALT SERPL W P-5'-P-CCNC: 26 U/L (ref 0–50)
ANION GAP SERPL CALCULATED.3IONS-SCNC: 7 MMOL/L (ref 3–14)
AST SERPL W P-5'-P-CCNC: 10 U/L (ref 0–45)
BASOPHILS # BLD AUTO: 0 10E9/L (ref 0–0.2)
BASOPHILS NFR BLD AUTO: 0.2 %
BILIRUB SERPL-MCNC: 1 MG/DL (ref 0.2–1.3)
BUN SERPL-MCNC: 8 MG/DL (ref 7–30)
CALCIUM SERPL-MCNC: 9.5 MG/DL (ref 8.5–10.1)
CHLORIDE SERPL-SCNC: 107 MMOL/L (ref 94–109)
CO2 SERPL-SCNC: 27 MMOL/L (ref 20–32)
CREAT SERPL-MCNC: 0.68 MG/DL (ref 0.52–1.04)
DIFFERENTIAL METHOD BLD: NORMAL
EOSINOPHIL # BLD AUTO: 0.2 10E9/L (ref 0–0.7)
EOSINOPHIL NFR BLD AUTO: 1.8 %
ERYTHROCYTE [DISTWIDTH] IN BLOOD BY AUTOMATED COUNT: 13.1 % (ref 10–15)
GFR SERPL CREATININE-BSD FRML MDRD: 90 ML/MIN/1.7M2
GLUCOSE SERPL-MCNC: 96 MG/DL (ref 70–99)
HCT VFR BLD AUTO: 41.5 % (ref 35–47)
HGB BLD-MCNC: 13.8 G/DL (ref 11.7–15.7)
IMM GRANULOCYTES # BLD: 0 10E9/L (ref 0–0.4)
IMM GRANULOCYTES NFR BLD: 0.3 %
LIPASE SERPL-CCNC: 78 U/L (ref 73–393)
LYMPHOCYTES # BLD AUTO: 2.8 10E9/L (ref 0.8–5.3)
LYMPHOCYTES NFR BLD AUTO: 26.4 %
MCH RBC QN AUTO: 29.9 PG (ref 26.5–33)
MCHC RBC AUTO-ENTMCNC: 33.3 G/DL (ref 31.5–36.5)
MCV RBC AUTO: 90 FL (ref 78–100)
MONOCYTES # BLD AUTO: 0.6 10E9/L (ref 0–1.3)
MONOCYTES NFR BLD AUTO: 5.8 %
NEUTROPHILS # BLD AUTO: 6.9 10E9/L (ref 1.6–8.3)
NEUTROPHILS NFR BLD AUTO: 65.5 %
NRBC # BLD AUTO: 0 10*3/UL
NRBC BLD AUTO-RTO: 0 /100
PLATELET # BLD AUTO: 334 10E9/L (ref 150–450)
POTASSIUM SERPL-SCNC: 3.9 MMOL/L (ref 3.4–5.3)
PROT SERPL-MCNC: 7 G/DL (ref 6.8–8.8)
RBC # BLD AUTO: 4.61 10E12/L (ref 3.8–5.2)
SODIUM SERPL-SCNC: 141 MMOL/L (ref 133–144)
WBC # BLD AUTO: 10.5 10E9/L (ref 4–11)

## 2018-02-20 PROCEDURE — 74019 RADEX ABDOMEN 2 VIEWS: CPT

## 2018-02-20 PROCEDURE — 25000128 H RX IP 250 OP 636: Performed by: EMERGENCY MEDICINE

## 2018-02-20 PROCEDURE — 99284 EMERGENCY DEPT VISIT MOD MDM: CPT | Mod: 25

## 2018-02-20 PROCEDURE — 25000132 ZZH RX MED GY IP 250 OP 250 PS 637: Performed by: EMERGENCY MEDICINE

## 2018-02-20 PROCEDURE — 85025 COMPLETE CBC W/AUTO DIFF WBC: CPT | Performed by: EMERGENCY MEDICINE

## 2018-02-20 PROCEDURE — 96360 HYDRATION IV INFUSION INIT: CPT

## 2018-02-20 PROCEDURE — 96372 THER/PROPH/DIAG INJ SC/IM: CPT

## 2018-02-20 PROCEDURE — 83690 ASSAY OF LIPASE: CPT | Performed by: EMERGENCY MEDICINE

## 2018-02-20 PROCEDURE — 80053 COMPREHEN METABOLIC PANEL: CPT | Performed by: EMERGENCY MEDICINE

## 2018-02-20 PROCEDURE — 96361 HYDRATE IV INFUSION ADD-ON: CPT

## 2018-02-20 RX ORDER — OXYCODONE HYDROCHLORIDE 5 MG/1
10 TABLET ORAL ONCE
Status: COMPLETED | OUTPATIENT
Start: 2018-02-20 | End: 2018-02-20

## 2018-02-20 RX ORDER — POLYETHYLENE GLYCOL 3350 17 G/17G
1 POWDER, FOR SOLUTION ORAL DAILY
Qty: 527 G | Refills: 0 | Status: SHIPPED | OUTPATIENT
Start: 2018-02-20 | End: 2018-03-01

## 2018-02-20 RX ORDER — HYDROXYZINE HYDROCHLORIDE 50 MG/ML
50 INJECTION, SOLUTION INTRAMUSCULAR ONCE
Status: COMPLETED | OUTPATIENT
Start: 2018-02-20 | End: 2018-02-20

## 2018-02-20 RX ADMIN — OXYCODONE HYDROCHLORIDE 10 MG: 5 TABLET ORAL at 14:06

## 2018-02-20 RX ADMIN — SODIUM CHLORIDE 1000 ML: 9 INJECTION, SOLUTION INTRAVENOUS at 12:15

## 2018-02-20 RX ADMIN — HYDROXYZINE HYDROCHLORIDE 50 MG: 50 INJECTION, SOLUTION INTRAMUSCULAR at 14:06

## 2018-02-20 ASSESSMENT — ENCOUNTER SYMPTOMS
NAUSEA: 1
ABDOMINAL PAIN: 1
CONSTIPATION: 1
ABDOMINAL DISTENTION: 1
RECTAL PAIN: 1
APPETITE CHANGE: 1
VOMITING: 1

## 2018-02-20 NOTE — ED NOTES
Bed: ED25  Expected date: 2/20/18  Expected time: 11:01 AM  Means of arrival: Ambulance  Comments:  418 53F abd pain, ETA 1111

## 2018-02-20 NOTE — ED AVS SNAPSHOT
Emergency Department    [address] [place] [address]    Phone:  [phone]    Fax:  [phone]                                       Ines Mott   MRN: [id number]    Department:   Emergency Department   Date of Visit:  2/20/2018           After Visit Summary Signature Page     I have received my discharge instructions, and my questions have been answered. I have discussed any challenges I see with this plan with the nurse or doctor.    ..........................................................................................................................................  Patient/Patient Representative Signature      ..........................................................................................................................................  Patient Representative Print Name and Relationship to Patient    ..................................................               ................................................  Date                                            Time    ..........................................................................................................................................  Reviewed by Signature/Title    ...................................................              ..............................................  Date                                                            Time

## 2018-02-20 NOTE — DISCHARGE INSTRUCTIONS
Constipation (Adult)  Constipation means that you have bowel movements that are less frequent than usual. Stools often become very hard and difficult to pass.  Constipation is very common. At some point in life it affects almost everyone. Since everyone's bowel habits are different, what is constipation to one person may not be to another. Your healthcare provider may do tests to diagnose constipation. It depends on what he or she finds when evaluating you.    Symptoms of constipation include:    Abdominal pain    Bloating    Vomiting    Painful bowel movements    Itching, swelling, bleeding, or pain around the anus  Causes  Constipation can have many causes. These include:    Diet low in fiber    Too much dairy    Not drinking enough liquids    Lack of exercise or physical activity. This is especially true for older adults.    Changes in lifestyle or daily routine, including pregnancy, aging, work, and travel    Frequent use or misuse of laxatives    Ignoring the urge to have a bowel movement or delaying it until later    Medicines, such as certain prescription pain medicines, iron supplements, antacids, certain antidepressants, and calcium supplements    Diseases like irritable bowel syndrome, bowel obstructions, stroke, diabetes, thyroid disease, Parkinson disease, hemorrhoids, and colon cancer  Complications  Potential complications of constipation can include:    Hemorrhoids    Rectal bleeding from hemorrhoids or anal fissures (skin tears)    Hernias    Dependency on laxatives    Chronic constipation    Fecal impaction    Bowel obstruction or perforation  Home care  All treatment should be done after talking with your healthcare provider. This is especially true if you have another medical problems, are taking prescription medicines, or are an older adult. Treatment most often involves lifestyle changes. You may also need medicines. Your healthcare provider will tell you which will work best for you. Follow  the advice below to help avoid this problem in the future.  Lifestyle changes  These lifestyle changes can help prevent constipation:    Diet. Eat a high-fiber diet, with fresh fruit and vegetables, and reduce dairy intake, meats, and processed foods    Fluids. It's important to get enough fluids each day. Drink plenty of water when you eat more fiber. If you are on diet that limits the amount of fluid you can have, talk about this with your healthcare provider.    Regular exercise. Check with your healthcare provider first.  Medications  Take any medicines as directed. Some laxatives are safe to use only every now and then. Others can be taken on a regular basis. Talk with your doctor or pharmacist if you have questions.  Prescription pain medicines can cause constipation. If you are taking this kind of medicine, ask your healthcare provider if you should also take a stool softener.  Medicines you may take to treat constipation include:    Fiber supplements    Stool softeners    Laxatives    Enemas    Rectal suppositories  Follow-up care  Follow up with your healthcare provider if symptoms don't get better in the next few days. You may need to have more tests or see a specialist.  Call 911  Call 911 if any of these occur:    Trouble breathing    Stiff, rigid abdomen that is severely painful to touch    Confusion    Fainting or loss of consciousness    Rapid heart rate    Chest pain  When to seek medical advice  Call your healthcare provider right away if any of these occur:    Fever over 100.4 F (38 C)    Failure to resume normal bowel movements    Pain in your abdomen or back gets worse    Nausea or vomiting    Swelling in your abdomen    Blood in the stool    Black, tarry stool    Involuntary weight loss    Weakness  Date Last Reviewed: 12/30/2015 2000-2017 [email or web address] [address] [place] [address], [place] [address]. All rights reserved. This information is not intended as a substitute for  professional medical care. Always follow your healthcare professional's instructions.

## 2018-02-20 NOTE — ED AVS SNAPSHOT
Emergency Department    [address] [facility] [address]    Phone:  [phone]    Fax:  [phone]                                       Ines Mott   MRN: [id number]    Department:   Emergency Department   Date of Visit:  2/20/2018           Patient Information     Date Of Birth          1965        Your diagnoses for this visit were:     Constipation, unspecified constipation type        You were seen by Brett Solorio MD.      Follow-up Information     Follow up with Dustin Fatima MD.    Specialty:  Family Practice    Contact information:    [address]  [facility] [id number] [phone]          Discharge Instructions         Constipation (Adult)  Constipation means that you have bowel movements that are less frequent than usual. Stools often become very hard and difficult to pass.  Constipation is very common. At some point in life it affects almost everyone. Since everyone's bowel habits are different, what is constipation to one person may not be to another. Your healthcare provider may do tests to diagnose constipation. It depends on what he or she finds when evaluating you.    Symptoms of constipation include:    Abdominal pain    Bloating    Vomiting    Painful bowel movements    Itching, swelling, bleeding, or pain around the anus  Causes  Constipation can have many causes. These include:    Diet low in fiber    Too much dairy    Not drinking enough liquids    Lack of exercise or physical activity. This is especially true for older adults.    Changes in lifestyle or daily routine, including pregnancy, aging, work, and travel    Frequent use or misuse of laxatives    Ignoring the urge to have a bowel movement or delaying it until later    Medicines, such as certain prescription pain medicines, iron supplements, antacids, certain antidepressants, and calcium supplements    Diseases like irritable bowel syndrome, bowel obstructions, stroke, diabetes,  thyroid disease, Parkinson disease, hemorrhoids, and colon cancer  Complications  Potential complications of constipation can include:    Hemorrhoids    Rectal bleeding from hemorrhoids or anal fissures (skin tears)    Hernias    Dependency on laxatives    Chronic constipation    Fecal impaction    Bowel obstruction or perforation  Home care  All treatment should be done after talking with your healthcare provider. This is especially true if you have another medical problems, are taking prescription medicines, or are an older adult. Treatment most often involves lifestyle changes. You may also need medicines. Your healthcare provider will tell you which will work best for you. Follow the advice below to help avoid this problem in the future.  Lifestyle changes  These lifestyle changes can help prevent constipation:    Diet. Eat a high-fiber diet, with fresh fruit and vegetables, and reduce dairy intake, meats, and processed foods    Fluids. It's important to get enough fluids each day. Drink plenty of water when you eat more fiber. If you are on diet that limits the amount of fluid you can have, talk about this with your healthcare provider.    Regular exercise. Check with your healthcare provider first.  Medications  Take any medicines as directed. Some laxatives are safe to use only every now and then. Others can be taken on a regular basis. Talk with your doctor or pharmacist if you have questions.  Prescription pain medicines can cause constipation. If you are taking this kind of medicine, ask your healthcare provider if you should also take a stool softener.  Medicines you may take to treat constipation include:    Fiber supplements    Stool softeners    Laxatives    Enemas    Rectal suppositories  Follow-up care  Follow up with your healthcare provider if symptoms don't get better in the next few days. You may need to have more tests or see a specialist.  Call 911  Call 911 if any of these occur:    Trouble  breathing    Stiff, rigid abdomen that is severely painful to touch    Confusion    Fainting or loss of consciousness    Rapid heart rate    Chest pain  When to seek medical advice  Call your healthcare provider right away if any of these occur:    Fever over 100.4 F (38 C)    Failure to resume normal bowel movements    Pain in your abdomen or back gets worse    Nausea or vomiting    Swelling in your abdomen    Blood in the stool    Black, tarry stool    Involuntary weight loss    Weakness  Date Last Reviewed: 12/30/2015 2000-2017 [employer] [address] [place] [id number]. All rights reserved. This information is not intended as a substitute for professional medical care. Always follow your healthcare professional's instructions.          Future Appointments        Provider Department Dept Phone Center    3/1/2018 10:00 AM OTTO YUSUF MD [facility] [phone] [place]      24 Hour Appointment Hotline       To make an appointment at any [facility], call [phone] ([phone]). If you don't have a family doctor or clinic, we will help you find one. [facility] are conveniently located to serve the needs of you and your family.             Review of your medicines      CONTINUE these medicines which may have CHANGED, or have new prescriptions. If we are uncertain of the size of tablets/capsules you have at home, strength may be listed as something that might have changed.        Dose / Directions Last dose taken    * polyethylene glycol powder   Commonly known as:  MIRALAX   Dose:  1 capful   What changed:  Another medication with the same name was added. Make sure you understand how and when to take each.   Quantity:  510 g        Take 17 g (1 capful) by mouth daily   Refills:  3        * polyethylene glycol powder   Commonly known as:  MIRALAX   Dose:  1 capful   What changed:  You were already taking a medication with the  same name, and this prescription was added. Make sure you understand how and when to take each.   Quantity:  527 g        Take 17 g (1 capful) by mouth daily   Refills:  0        * Notice:  This list has 2 medication(s) that are the same as other medications prescribed for you. Read the directions carefully, and ask your doctor or other care provider to review them with you.      Our records show that you are taking the medicines listed below. If these are incorrect, please call your family doctor or clinic.        Dose / Directions Last dose taken    albuterol (2.5 MG/3ML) 0.083% neb solution   Dose:  1 vial   Quantity:  30 vial        Take 1 vial (2.5 mg) by nebulization every 6 hours as needed for shortness of breath / dyspnea or wheezing   Refills:  3        ASPIRIN NOT PRESCRIBED   Commonly known as:  INTENTIONAL   Quantity:  1 each        Please choose reason not prescribed, below   Refills:  0        blood glucose calibration solution   Commonly known as:  no brand specified   Quantity:  1 each        Use to calibrate blood glucose monitor as directed.   Refills:  0        blood glucose lancets standard   Commonly known as:  no brand specified   Quantity:  100 each        Use to test blood sugar daily times daily or as directed.   Refills:  11        blood glucose monitoring meter device kit   Commonly known as:  no brand specified   Quantity:  1 kit        Use to test blood sugar larisa times daily or as directed.   Refills:  0        blood glucose monitoring test strip   Commonly known as:  no brand specified   Quantity:  100 strip        Use to test blood sugars once times daily or as directed   Refills:  3        * cyanocobalamin 1000 MCG/ML injection   Commonly known as:  VITAMIN B12   Dose:  1 mL   Quantity:  1 mL        Inject 1 mL (1,000 mcg) Subcutaneous every 30 days   Refills:  11        * cyanocobalamin 1000 MCG/ML injection   Commonly known as:  VITAMIN B12   Quantity:  3 mL        INJECT 1 ML  SUBCUTANEOUSLY EVERY 30 DAYS   Refills:  11        EPINEPHrine 0.3 MG/0.3ML injection 2-pack   Commonly known as:  EPIPEN/ADRENACLICK/or ANY BX GENERIC EQUIV   Dose:  0.3 mg   Quantity:  2 each        Inject 0.3 mLs (0.3 mg) into the muscle once as needed for anaphylaxis   Refills:  5        losartan-hydrochlorothiazide 50-12.5 MG per tablet   Commonly known as:  HYZAAR   Dose:  1 tablet   Quantity:  90 tablet        Take 1 tablet by mouth daily   Refills:  3        magnesium oxide 400 (241.3 MG) MG tablet   Commonly known as:  MAG-OX   Dose:  400 mg   Quantity:  90 tablet        Take 1 tablet (400 mg) by mouth daily   Refills:  3        ondansetron 4 MG ODT tab   Commonly known as:  ZOFRAN-ODT   Dose:  4 mg   Quantity:  10 tablet        Take 1 tablet (4 mg) by mouth every 8 hours as needed for nausea   Refills:  1        order for DME   Quantity:  1 Device        Right wrist splint for carpal tunnel syndrome  One* Use as directed at hour of sleep   Refills:  1        oxyCODONE-acetaminophen 5-325 MG per tablet   Commonly known as:  PERCOCET   Dose:  1 tablet   Quantity:  30 tablet   Start taking on:  2/22/2018        Take 1 tablet by mouth every 6 hours as needed for moderate to severe pain Max 4 tab/day   Refills:  0        potassium chloride 10 MEQ tablet   Commonly known as:  K-TAB,KLOR-CON   Dose:  10 mEq   Indication:  Low Amount of Potassium in the Blood   Quantity:  120 tablet        Take 1 tablet (10 mEq) by mouth daily   Refills:  11        sertraline 100 MG tablet   Commonly known as:  ZOLOFT   Dose:  150 mg   Indication:  Major Depressive Disorder   Quantity:  45 tablet        Take 1.5 tablets (150 mg) by mouth daily   Refills:  11        STATIN NOT PRESCRIBED (INTENTIONAL)   Dose:  1 each   Quantity:  0 each        1 each daily Statin not prescribed intentionally due to Active liver disease (liver failure, cirrhosis, hepatitis) LIVER METS   Refills:  0        triamcinolone 0.1 % ointment   Commonly  known as:  KENALOG   Quantity:  80 g        Apply sparingly to affected area three times daily for 14 days.   Refills:  0        Vitamin D (Cholecalciferol) 1000 UNITS Tabs   Dose:  2000 Units   Quantity:  60 tablet        Take 2,000 Units by mouth daily   Refills:  11        * Notice:  This list has 2 medication(s) that are the same as other medications prescribed for you. Read the directions carefully, and ask your doctor or other care provider to review them with you.            Prescriptions were sent or printed at these locations (1 Prescription)                   Other Prescriptions                Printed at Department/Unit printer (1 of 1)         polyethylene glycol (MIRALAX) powder                Procedures and tests performed during your visit     Abdomen XR, 2 vw, flat and upright    CBC with platelets + differential    Comprehensive metabolic panel    Lipase    Soap suds enema      Orders Needing Specimen Collection     None      Pending Results     No orders found from 2/18/2018 to 2/21/2018.            Pending Culture Results     No orders found from 2/18/2018 to 2/21/2018.            Pending Results Instructions     If you had any lab results that were not finalized at the time of your Discharge, you can call the ED Lab Result RN at [phone]. You will be contacted by this team for any positive Lab results or changes in treatment. The nurses are available 7 days a week from 10A to 6:30P.  You can leave a message 24 hours per day and they will return your call.        Test Results From Your Hospital Stay        2/20/2018  2:15 PM      Narrative     ABDOMEN TWO VIEWS  2/20/2018 12:00 PM    HISTORY:  Vomiting, pain, evaluate small bowel obstruction.     COMPARISON:  1/18/2017.        Impression     IMPRESSION:  Bowel gas pattern within normal limits. No evidence for  obstruction.  shunt tubing in the left abdomen and pelvis without  evidence for fracture. Surgical clips in the right upper quadrant.      JULEE QUIÑONEZ MD         2/20/2018 12:31 PM      Component Results     Component Value Ref Range & Units Status    WBC 10.5 4.0 - 11.0 10e9/L Final    RBC Count 4.61 3.8 - 5.2 10e12/L Final    Hemoglobin 13.8 11.7 - 15.7 g/dL Final    Hematocrit 41.5 35.0 - 47.0 % Final    MCV 90 78 - 100 fl Final    MCH 29.9 26.5 - 33.0 pg Final    MCHC 33.3 31.5 - 36.5 g/dL Final    RDW 13.1 10.0 - 15.0 % Final    Platelet Count 334 150 - 450 10e9/L Final    Diff Method Automated Method  Final    % Neutrophils 65.5 % Final    % Lymphocytes 26.4 % Final    % Monocytes 5.8 % Final    % Eosinophils 1.8 % Final    % Basophils 0.2 % Final    % Immature Granulocytes 0.3 % Final    Nucleated RBCs 0 0 /100 Final    Absolute Neutrophil 6.9 1.6 - 8.3 10e9/L Final    Absolute Lymphocytes 2.8 0.8 - 5.3 10e9/L Final    Absolute Monocytes 0.6 0.0 - 1.3 10e9/L Final    Absolute Eosinophils 0.2 0.0 - 0.7 10e9/L Final    Absolute Basophils 0.0 0.0 - 0.2 10e9/L Final    Abs Immature Granulocytes 0.0 0 - 0.4 10e9/L Final    Absolute Nucleated RBC 0.0  Final         2/20/2018 12:55 PM      Component Results     Component Value Ref Range & Units Status    Sodium 141 133 - 144 mmol/L Final    Potassium 3.9 3.4 - 5.3 mmol/L Final    Chloride 107 94 - 109 mmol/L Final    Carbon Dioxide 27 20 - 32 mmol/L Final    Anion Gap 7 3 - 14 mmol/L Final    Glucose 96 70 - 99 mg/dL Final    Urea Nitrogen 8 7 - 30 mg/dL Final    Creatinine 0.68 0.52 - 1.04 mg/dL Final    GFR Estimate 90 >60 mL/min/1.7m2 Final    Non  GFR Calc    GFR Estimate If Black >90 >60 mL/min/1.7m2 Final    African American GFR Calc    Calcium 9.5 8.5 - 10.1 mg/dL Final    Bilirubin Total 1.0 0.2 - 1.3 mg/dL Final    Albumin 3.4 3.4 - 5.0 g/dL Final    Protein Total 7.0 6.8 - 8.8 g/dL Final    Alkaline Phosphatase 71 40 - 150 U/L Final    ALT 26 0 - 50 U/L Final    AST 10 0 - 45 U/L Final         2/20/2018 12:50 PM      Component Results     Component Value Ref Range &  Units Status    Lipase 78 73 - 393 U/L Final                Clinical Quality Measure: Blood Pressure Screening     Your blood pressure was checked while you were in the emergency department today. The last reading we obtained was  BP: (!) 187/106 . Please read the guidelines below about what these numbers mean and what you should do about them.  If your systolic blood pressure (the top number) is less than 120 and your diastolic blood pressure (the bottom number) is less than 80, then your blood pressure is normal. There is nothing more that you need to do about it.  If your systolic blood pressure (the top number) is 120-139 or your diastolic blood pressure (the bottom number) is 80-89, your blood pressure may be higher than it should be. You should have your blood pressure rechecked within a year by a primary care provider.  If your systolic blood pressure (the top number) is 140 or greater or your diastolic blood pressure (the bottom number) is 90 or greater, you may have high blood pressure. High blood pressure is treatable, but if left untreated over time it can put you at risk for heart attack, stroke, or kidney failure. You should have your blood pressure rechecked by a primary care provider within the next 4 weeks.  If your provider in the emergency department today gave you specific instructions to follow-up with your doctor or provider even sooner than that, you should follow that instruction and not wait for up to 4 weeks for your follow-up visit.        Thank you for choosing [facility]       Thank you for choosing [facility] for your care. Our goal is always to provide you with excellent care. Hearing back from our patients is one way we can continue to improve our services. Please take a few minutes to complete the written survey that you may receive in the mail after you visit with us. Thank you!        [facility] Information     [employer] lets you send messages to your doctor, view your test results, renew  "your prescriptions, schedule appointments and more. To sign up, go to [email or web address] . Click on \"Log in\" on the left side of the screen, which will take you to the Welcome page. Then click on \"Sign up Now\" on the right side of the page.     You will be asked to enter the access code listed below, as well as some personal information. Please follow the directions to create your username and password.     Your access code is: [email or web address]  Expires: 2018  6:56 PM     Your access code will  in 90 days. If you need help or a new code, please call your [facility] clinic or [phone].        Care EveryWhere ID     This is your Care EveryWhere ID. This could be used by other organizations to access your [facility] medical records  [id number]        Equal Access to Services     VALENTE RIVERA : Marc Leonard, deion nunes, candida hernandez, emilee pardo . So [facility] [phone].    ATENCIÓN: Si habla español, tiene a christianson disposición servicios gratuitos de asistencia lingüística. Llame al [phone].    We comply with applicable federal civil rights laws and [place] laws. We do not discriminate on the basis of race, color, national origin, age, disability, sex, sexual orientation, or gender identity.            After Visit Summary       This is your record. Keep this with you and show to your community pharmacist(s) and doctor(s) at your next visit.                  "

## 2018-02-20 NOTE — ED PROVIDER NOTES
History     Chief Complaint:  Constipation and Abdominal Pain     HPI   Ines Mott is a 53 year old female with a history of liver cancer and chronic pain on Oxycodone who presents via EMS for evaluation of constipation and abdominal pain. Approximately four days ago, the patient started to develop constipation as well as abdominal pain and abdominal distension. Since then, the patient has continued to pass gas. She has taken stool softeners without improvement of her constipation. She has also had a reduced appetite and has developed nausea and vomiting after eating. Due to her persistent symptoms this morning, the patient called EMS to bring her into the ED for evaluation with primary concern that she could have a bowel obstruction. Per EMS, the patient was vitally stable and her nausea did improve after 4 mg of oral Zofran. She does report some rectal pain and has the sensation of needing to have a bowel movement. She does not have any history of bowel obstructions.     Allergies:  Ativan  Macrobid  Amoxicillin   Buspirone  Busulfan   Cefaclor  Cephalosporins  Ciprofloxacin  Clindamycin  Benadryl  Dilaudid   Imitrex  Toradol  Prevacid   Latex  Reglan  Paroxetine  Penicillins  Prednisone  Red dye   Sulfa drugs  Lidocaine      Medications:    ondansetron (ZOFRAN-ODT) 4 MG ODT tab  triamcinolone (KENALOG) 0.1 % ointment  cyanocobalamin (VITAMIN B12) 1000 MCG/ML injection  sertraline (ZOLOFT) 100 MG tablet  magnesium oxide (MAG-OX) 400 (241.3 MG) MG tablet  albuterol (2.5 MG/3ML) 0.083% neb solution  losartan-hydrochlorothiazide (HYZAAR) 50-12.5 MG per tablet  potassium chloride (K-TAB,KLOR-CON) 10 MEQ tablet  cyanocobalamin (VITAMIN B12) 1000 MCG/ML injection  Vitamin D, Cholecalciferol, 1000 UNITS TABS  polyethylene glycol (MIRALAX) powder  EPINEPHrine (EPIPEN) 0.3 MG/0.3ML injection    Past Medical History:    Arthritis  Asthma  Cancer  Chronic pain syndrome  Depression  Diabetes  "  Hydrocephalus  Hypertension  PTSD   Obstructive sleep apnea  Fibromyalgia   Metastatic 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abdominal site  Methamphetamine abuse  Migraine headaches  Mild intermittent asthma  Obesity  Right renal mass   Spina bifida     Past Surgical History:    Appendectomy  Cholecystectomy  Dental extractions   Gyn surgery  Implant shunt ventriculoperitoneal     Family History:    Cancer - Father     Social History:  Tobacco use:    Former smoker  Alcohol use:    Negative  Marital status:       Accompanied to ED by:  EMS      Review of Systems   Constitutional: Positive for appetite change.   Gastrointestinal: Positive for abdominal distention, abdominal pain, constipation, nausea, rectal pain and vomiting.   All other systems reviewed and are negative.    Physical Exam   First Vitals:  BP: (!) 178/97  Heart Rate: 65  Temp: 98.5  F (36.9  C)  Height: 167.6 cm (5' 6\")  Weight: 116.1 kg (256 lb)  SpO2: 96 %      Physical Exam  GENERAL: well developed, pleasant  HEAD: atraumatic  EYES: pupils reactive, extraocular muscles intact, conjunctivae normal  ENT:  mucus membranes moist  NECK:  trachea midline, normal range of motion  RESPIRATORY: no tachypnea, breath sounds clear to auscultation   CVS: normal S1/S2, no murmurs, intact distal pulses  ABDOMEN: soft, nondistention, generalized abdominal pain, active bowel sounds, no high-pitched bowel sounds   : Firm stool in the rectal vault   MUSCULOSKELETAL: no deformities  SKIN: warm and dry, no acute rashes or ulceration  NEURO: GCS 15, cranial nerves intact, alert and oriented x3  PSYCH:  Mood/affect normal    Emergency Department Course     Imaging:  Radiographic findings were communicated with the patient who voiced understanding of the findings.    Abdomen XR:  IMPRESSION:  Bowel gas pattern within normal limits. No evidence for obstruction.  shunt tubing in the left abdomen and pelvis without evidence for fracture. Surgical clips in the right upper " quadrant.   Preliminary report per radiology.     Laboratory:  CBC: WNL (WBC 10.5, HGB 13.8, )  CMP: WNL (Creatinine 0.68)  Lipase: 78      Interventions:  1215 NS 1,000 mL IV   1406 Vistaril 50 mg IM  1406 Oxycodone 10 mg PO    Emergency Department Course:  Patient was brought to the ED via EMS.     Nursing notes and vitals reviewed.  1115: I performed an exam of the patient as documented above.     1344: I updated and reassessed the patient.     Findings and plan explained to the Patient. Patient discharged home with instructions regarding supportive care, medications, and reasons to return. The importance of close follow-up was reviewed. The patient was prescribed Miralax.      Impression & Plan      Medical Decision Making:  Ines Mott is a 53 year old female who presents with constipation and concerns for bowel obstruction. Abdominal exam shows active bowel sounds. No high-pitched sounds or significant distension to go along with bowel obstruction. Rectal exam showed some firm stool but mainly pellets. X-ray is negative for obstruction. I do not feel she needs advanced imaging. She has a care plan with one of them being abdominal pain, and the patient does have chronic pain issues. She does have a prescription that she has run out of but will be refilled tomorrow. The patient was given oral meds here and fluids and Zofran. The patient had an enema with significant relief.     Diagnosis:    ICD-10-CM   1. Constipation, unspecified constipation type K59.00       Disposition:  Discharged to home with Miralax.     Discharge Medications:   Details   polyethylene glycol (MIRALAX) powder Take 17 g (1 capful) by mouth daily, Disp-527 g, R-0, Local Print                Devonte CONDE, am serving as a scribe at 11:15 AM on 2/20/2018 to document services personally performed by Dr. Solorio, based on my observations and the provider's statements to me.      EMERGENCY DEPARTMENT       Brett Solorio,  MD  02/23/18 [id number]

## 2018-02-20 NOTE — TELEPHONE ENCOUNTER
Ines has liver cancer and takes oxycodone and for the last two days vomiting and can't have a bowel movement and has fever and chills.  Ines can't eat.  Pain is severe in abdomen.

## 2018-02-20 NOTE — TELEPHONE ENCOUNTER
"  Reason for Disposition    [1] SEVERE pain (e.g., excruciating) AND [2] present > 1 hour    Additional Information    Negative: Shock suspected (e.g., cold/pale/clammy skin, too weak to stand, low BP, rapid pulse)    Negative: Difficult to awaken or acting confused  (e.g., disoriented, slurred speech)    Negative: Passed out (i.e., lost consciousness, collapsed and was not responding)    Negative: Sounds like a life-threatening emergency to the triager    Negative: Chest pain    Negative: Pain is mainly in upper abdomen  (if needed ask: \"is it mainly above the belly button?\")    Negative: Followed an abdomen (stomach) injury    Negative: [1] Abdominal pain AND [2] pregnant < 20 weeks    Negative: [1] Abdominal pain AND [2] pregnant > 20 weeks    Negative: [1] Abdominal pain AND [2] postpartum < 1 month since delivery    Protocols used: ABDOMINAL PAIN - FEMALE-ADULT-    "

## 2018-02-21 ENCOUNTER — HOSPITAL ENCOUNTER (EMERGENCY)
Facility: CLINIC | Age: 53
Discharge: HOME | End: 2018-02-22
Attending: EMERGENCY MEDICINE | Admitting: EMERGENCY MEDICINE
Payer: COMMERCIAL

## 2018-02-21 VITALS
HEART RATE: 68 BPM | DIASTOLIC BLOOD PRESSURE: 99 MMHG | TEMPERATURE: 98.9 F | HEIGHT: 66 IN | BODY MASS INDEX: 40.5 KG/M2 | WEIGHT: 252 LBS | SYSTOLIC BLOOD PRESSURE: 187 MMHG | RESPIRATION RATE: 16 BRPM | OXYGEN SATURATION: 98 %

## 2018-02-21 DIAGNOSIS — R11.0 NAUSEA: ICD-10-CM

## 2018-02-21 DIAGNOSIS — R51.9 NONINTRACTABLE HEADACHE, UNSPECIFIED CHRONICITY PATTERN, UNSPECIFIED HEADACHE TYPE: ICD-10-CM

## 2018-02-21 DIAGNOSIS — Z98.2 VP (VENTRICULOPERITONEAL) SHUNT STATUS: ICD-10-CM

## 2018-02-21 PROCEDURE — 99283 EMERGENCY DEPT VISIT LOW MDM: CPT

## 2018-02-21 RX ORDER — ONDANSETRON 2 MG/ML
4 INJECTION INTRAMUSCULAR; INTRAVENOUS ONCE
Status: DISCONTINUED | OUTPATIENT
Start: 2018-02-21 | End: 2018-02-21

## 2018-02-21 NOTE — ED AVS SNAPSHOT
Emergency Department    [address] [facility] [address]    Phone:  [phone]    Fax:  [phone]                                       Ines Mott   MRN: [id number]    Department:   Emergency Department   Date of Visit:  2/21/2018           Patient Information     Date Of Birth          1965        Your diagnoses for this visit were:     Nonintractable headache, unspecified chronicity pattern, unspecified headache type      (ventriculoperitoneal) shunt status     Nausea        You were seen by Rebekah Patel MD and Jason Sorto MD.      Follow-up Information     Follow up with Dustin Fatima MD. Call in 1 day.    Specialty:  Family Practice    Contact information:    [address]  [facility] [address] [phone]          Follow up with  Emergency Department.    Specialty:  EMERGENCY MEDICINE    Why:  As needed, If symptoms worsen    Contact information:    [address] [facility] [address] [phone]        Discharge Instructions       Discharge Instructions  Headache    You were seen today for a headache. Headaches may be caused by many different things such as muscle tension, sinus inflammation, anxiety and stress, having too little sleep, too much alcohol, some medical conditions or injury. You may have a migraine, which is caused by changes in the blood vessels in your head.  At this time your provider does not find that your headache is a sign of anything dangerous or life-threatening.  However, sometimes the signs of serious illness do not show up right away.      Generally, every Emergency Department visit should have a follow-up clinic visit with either a primary or a specialty clinic/provider. Please follow-up as instructed by your emergency provider today.    Return to the Emergency Department if:    You get a new fever of 100.4 F or higher.    Your headache gets much worse.    You get a stiff neck with  your headache.    You get a new headache that is significantly different or worse than headaches you have had before.    You are vomiting (throwing up) and cannot keep food or water down.    You have blurry or double vision or other problems with your eyes.    You have a new weakness on one side of your body.    You have difficulty with balance which is new.    You or your family thinks you are confused.    You have a seizure.    What can I do to help myself?    Pain medications - You may take a pain medication such as Tylenol  (acetaminophen), Advil , Motrin  (ibuprofen) or Aleve  (naproxen).    Take a pain reliever as soon as you notice symptoms.  Starting medications as soon as you start to have symptoms may lessen the amount of pain you have.    Relaxing in a quiet, dark room may help.    Get enough sleep and eat meals regularly.    You may need to watch for certain foods or other things which may trigger your headaches.  Keeping a journal of your headaches and possible triggers may help you and your primary provider to identify things which you should avoid which may be causing your headaches.  If you were given a prescription for medicine here today, be sure to read all of the information (including the package insert) that comes with your prescription.  This will include important information about the medicine, its side effects, and any warnings that you need to know about.  The pharmacist who fills the prescription can provide more information and answer questions you may have about the medicine.  If you have questions or concerns that the pharmacist cannot address, please call or return to the Emergency Department.   Remember that you can always come back to the Emergency Department if you are not able to see your regular provider in the amount of time listed above, if you get any new symptoms, or if there is anything that worries you.      Future Appointments        Provider Department Dept Phone Center     3/1/2018 10:00 AM OTTO YUSUF MD [facility] [phone] [place]      24 Hour Appointment Hotline       To make an appointment at any [facility], call [phone] ([phone]). If you don't have a family doctor or clinic, we will help you find one. [facility] are conveniently located to serve the needs of you and your family.             Review of your medicines      Our records show that you are taking the medicines listed below. If these are incorrect, please call your family doctor or clinic.        Dose / Directions Last dose taken    albuterol (2.5 MG/3ML) 0.083% neb solution   Dose:  1 vial   Quantity:  30 vial        Take 1 vial (2.5 mg) by nebulization every 6 hours as needed for shortness of breath / dyspnea or wheezing   Refills:  3        ASPIRIN NOT PRESCRIBED   Commonly known as:  INTENTIONAL   Quantity:  1 each        Please choose reason not prescribed, below   Refills:  0        blood glucose calibration solution   Commonly known as:  no brand specified   Quantity:  1 each        Use to calibrate blood glucose monitor as directed.   Refills:  0        blood glucose lancets standard   Commonly known as:  no brand specified   Quantity:  100 each        Use to test blood sugar daily times daily or as directed.   Refills:  11        blood glucose monitoring meter device kit   Commonly known as:  no brand specified   Quantity:  1 kit        Use to test blood sugar larisa times daily or as directed.   Refills:  0        blood glucose monitoring test strip   Commonly known as:  no brand specified   Quantity:  100 strip        Use to test blood sugars once times daily or as directed   Refills:  3        * cyanocobalamin 1000 MCG/ML injection   Commonly known as:  VITAMIN B12   Dose:  1 mL   Quantity:  1 mL        Inject 1 mL (1,000 mcg) Subcutaneous every 30 days   Refills:  11        * cyanocobalamin 1000 MCG/ML injection   Commonly known as:   VITAMIN B12   Quantity:  3 mL        INJECT 1 ML SUBCUTANEOUSLY EVERY 30 DAYS   Refills:  11        EPINEPHrine 0.3 MG/0.3ML injection 2-pack   Commonly known as:  EPIPEN/ADRENACLICK/or ANY BX GENERIC EQUIV   Dose:  0.3 mg   Quantity:  2 each        Inject 0.3 mLs (0.3 mg) into the muscle once as needed for anaphylaxis   Refills:  5        losartan-hydrochlorothiazide 50-12.5 MG per tablet   Commonly known as:  HYZAAR   Dose:  1 tablet   Quantity:  90 tablet        Take 1 tablet by mouth daily   Refills:  3        magnesium oxide 400 (241.3 MG) MG tablet   Commonly known as:  MAG-OX   Dose:  400 mg   Quantity:  90 tablet        Take 1 tablet (400 mg) by mouth daily   Refills:  3        ondansetron 4 MG ODT tab   Commonly known as:  ZOFRAN-ODT   Dose:  4 mg   Quantity:  10 tablet        Take 1 tablet (4 mg) by mouth every 8 hours as needed for nausea   Refills:  1        order for DME   Quantity:  1 Device        Right wrist splint for carpal tunnel syndrome  One* Use as directed at hour of sleep   Refills:  1        oxyCODONE-acetaminophen 5-325 MG per tablet   Commonly known as:  PERCOCET   Dose:  1 tablet   Quantity:  30 tablet        Take 1 tablet by mouth every 6 hours as needed for moderate to severe pain Max 4 tab/day   Refills:  0        * polyethylene glycol powder   Commonly known as:  MIRALAX   Dose:  1 capful   Quantity:  510 g        Take 17 g (1 capful) by mouth daily   Refills:  3        * polyethylene glycol powder   Commonly known as:  MIRALAX   Dose:  1 capful   Quantity:  527 g        Take 17 g (1 capful) by mouth daily   Refills:  0        potassium chloride 10 MEQ tablet   Commonly known as:  K-TAB,KLOR-CON   Dose:  10 mEq   Indication:  Low Amount of Potassium in the Blood   Quantity:  120 tablet        Take 1 tablet (10 mEq) by mouth daily   Refills:  11        sertraline 100 MG tablet   Commonly known as:  ZOLOFT   Dose:  150 mg   Indication:  Major Depressive Disorder   Quantity:  45 tablet         Take 1.5 tablets (150 mg) by mouth daily   Refills:  11        STATIN NOT PRESCRIBED (INTENTIONAL)   Dose:  1 each   Quantity:  0 each        1 each daily Statin not prescribed intentionally due to Active liver disease (liver failure, cirrhosis, hepatitis) LIVER METS   Refills:  0        triamcinolone 0.1 % ointment   Commonly known as:  KENALOG   Quantity:  80 g        Apply sparingly to affected area three times daily for 14 days.   Refills:  0        Vitamin D (Cholecalciferol) 1000 UNITS Tabs   Dose:  2000 Units   Quantity:  60 tablet        Take 2,000 Units by mouth daily   Refills:  11        * Notice:  This list has 4 medication(s) that are the same as other medications prescribed for you. Read the directions carefully, and ask your doctor or other care provider to review them with you.            Orders Needing Specimen Collection     None      Pending Results     No orders found for last 3 day(s).            Pending Culture Results     No orders found for last 3 day(s).            Pending Results Instructions     If you had any lab results that were not finalized at the time of your Discharge, you can call the ED Lab Result RN at [phone]. You will be contacted by this team for any positive Lab results or changes in treatment. The nurses are available 7 days a week from 10A to 6:30P.  You can leave a message 24 hours per day and they will return your call.        Test Results From Your Hospital Stay               Clinical Quality Measure: Blood Pressure Screening     Your blood pressure was checked while you were in the emergency department today. The last reading we obtained was  BP: (!) 187/99 . Please read the guidelines below about what these numbers mean and what you should do about them.  If your systolic blood pressure (the top number) is less than 120 and your diastolic blood pressure (the bottom number) is less than 80, then your blood pressure is normal. There is nothing more that you  "need to do about it.  If your systolic blood pressure (the top number) is 120-139 or your diastolic blood pressure (the bottom number) is 80-89, your blood pressure may be higher than it should be. You should have your blood pressure rechecked within a year by a primary care provider.  If your systolic blood pressure (the top number) is 140 or greater or your diastolic blood pressure (the bottom number) is 90 or greater, you may have high blood pressure. High blood pressure is treatable, but if left untreated over time it can put you at risk for heart attack, stroke, or kidney failure. You should have your blood pressure rechecked by a primary care provider within the next 4 weeks.  If your provider in the emergency department today gave you specific instructions to follow-up with your doctor or provider even sooner than that, you should follow that instruction and not wait for up to 4 weeks for your follow-up visit.        Thank you for choosing [facility]       Thank you for choosing [facility] for your care. Our goal is always to provide you with excellent care. Hearing back from our patients is one way we can continue to improve our services. Please take a few minutes to complete the written survey that you may receive in the mail after you visit with us. Thank you!        [employer] Information     [employer] lets you send messages to your doctor, view your test results, renew your prescriptions, schedule appointments and more. To sign up, go to [email or web address] . Click on \"Log in\" on the left side of the screen, which will take you to the Welcome page. Then click on \"Sign up Now\" on the right side of the page.     You will be asked to enter the access code listed below, as well as some personal information. Please follow the directions to create your username and password.     Your access code is: [email or web address]  Expires: 2018  6:56 PM     Your access code will  in 90 days. If you need help or a new code, " please call your [facility] clinic or [phone].        Care EveryWhere ID     This is your Care EveryWhere ID. This could be used by other organizations to access your [facility] medical records  [id number]        Equal Access to Services     VALENTE RIVERA : Marc Leonard, waaislinnda luqadaha, qaybta kaalmada mary, emilee coyne. So [facility] [phone].    ATENCIÓN: Si habla español, tiene a christianson disposición servicios gratuitos de asistencia lingüística. Llame al [phone].    We comply with applicable federal civil rights laws and [place] laws. We do not discriminate on the basis of race, color, national origin, age, disability, sex, sexual orientation, or gender identity.            After Visit Summary       This is your record. Keep this with you and show to your community pharmacist(s) and doctor(s) at your next visit.

## 2018-02-21 NOTE — ED AVS SNAPSHOT
Emergency Department    [address] [place] [address]    Phone:  [phone]    Fax:  [phone]                                       Ines Mott   MRN: [id number]    Department:   Emergency Department   Date of Visit:  2/21/2018           After Visit Summary Signature Page     I have received my discharge instructions, and my questions have been answered. I have discussed any challenges I see with this plan with the nurse or doctor.    ..........................................................................................................................................  Patient/Patient Representative Signature      ..........................................................................................................................................  Patient Representative Print Name and Relationship to Patient    ..................................................               ................................................  Date                                            Time    ..........................................................................................................................................  Reviewed by Signature/Title    ...................................................              ..............................................  Date                                                            Time

## 2018-02-22 PROCEDURE — 25000125 ZZHC RX 250: Performed by: EMERGENCY MEDICINE

## 2018-02-22 PROCEDURE — 25000132 ZZH RX MED GY IP 250 OP 250 PS 637: Performed by: EMERGENCY MEDICINE

## 2018-02-22 RX ORDER — ONDANSETRON 4 MG/1
4 TABLET, ORALLY DISINTEGRATING ORAL ONCE
Status: COMPLETED | OUTPATIENT
Start: 2018-02-22 | End: 2018-02-22

## 2018-02-22 RX ORDER — OXYCODONE HYDROCHLORIDE 5 MG/1
5 TABLET ORAL ONCE
Status: COMPLETED | OUTPATIENT
Start: 2018-02-22 | End: 2018-02-22

## 2018-02-22 RX ADMIN — OXYCODONE HYDROCHLORIDE 5 MG: 5 TABLET ORAL at 00:14

## 2018-02-22 RX ADMIN — ONDANSETRON 4 MG: 4 TABLET, ORALLY DISINTEGRATING ORAL at 00:14

## 2018-02-22 NOTE — ED PROVIDER NOTES
History     Chief Complaint:  Headache    HPI   Ines Mott is a 53 year old female with a complex medical history including chronic pain, hydrocephalus, diabetes, hypertension, migraines, and spina bifida, s/p ventriculoperitoneal shunting who presents to the emergency department today for evaluation of a headache.  The patient was last seen in the emergency department on 2/17/18 for a headache and underwent a CT scan which was grossly unremarkable, results below.  Today, the patient reports an ongoing posterior headache that wraps around to the front as well as nausea.  She normally takes 5 mg of oxycodone four times per day, but is out of this and her Zofran, though she has prescriptions for these which she is not able to fill until tomorrow.  She denies any fevers and notes that she last had her shunt adjusted a long time ago where [place] [facility]  This feels like 1 of her usual headaches and she seeks symptomatic relief.  She is a frequent ED visitor and has a care plan for her headaches and frequently recurring abdominal pain.  No recent trauma and she is not anticoagulated.  She would normally take Zofran and oxycodone for these symptoms.  She states she is chronically slightly weak in the left hand due to her spina bifida.    Allergies:  Ativan [Lorazepam]  Macrobid [Nitrofurantoin]  Amoxicillin  Buspirone  Busulfan  Cefaclor  Cephalosporins  Ciprofloxacin  Clindamycin  Dilaudid [Hydromorphone]  Diphenhydramine Hcl  Imitrex [Sumatriptan Succinate]  Ketorolac Tromethamine  Lansoprazole  Lansoprazole  Latex  Metoclopramide  Paroxetine  Penicillins  Prednisone  Red Dye  Sulfa Drugs  Lidoderm [Lidocaine]    Medications:    MiraLAX  Zofran  Percocet  Kenalog  Vitamin B12  Zoloft  Magnesium oxide  Albuterol neb  Losartan-hydrochlorothiazide  Potassium chloride  Vitamin D  EpiPen  Blood glucose monitoring lancets and strips    Past Medical History:    Arthritis   Asthma   Cancer (H)   Chronic pain  "syndrome   Depressive disorder   Diabetes (H)   Hydrocephalus   Hypertension   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)   Methamphetamine abuse   Migraines   Mild intermittent asthma   Obesity   Right renal mass   Spina bifida     Past Surgical History:    Appendectomy  Cholecystectomy  Dental extraction(s)  Gyn surgery  Implant shunt ventriculoperitoneal    Family History:    Cancer-father    Social History:  The patient presented to the ED alone.  Smoking Status: Former smoker  Smokeless Tobacco: Never used  Alcohol Use: Yes, occasionally  Marital Status:   [2]  The patient lives at home with her .     Review of Systems   All other systems reviewed and are negative.    Physical Exam     Patient Vitals for the past 24 hrs:   BP Temp Temp src Pulse Resp SpO2 Height Weight   02/21/18 2246 (!) 187/99 98.9  F (37.2  C) Oral 68 16 98 % 1.676 m (5' 6\") 114.3 kg (252 lb)     Physical Exam  General: nontoxic appearing woman recumbent in room 2  HENT: mucous membranes moist, OP clear, TMs clear,  shunt palpable on R posterior scalp  Eyes: pupils normal without nystagmus, no photophobia  CV:  regular rate, regular rhythm  Resp: clear throughout, normal effort  GI: abdomen soft and nontender  MSK: no bony tenderness to face, skull, or cervical spine  Skin: appropriately warm and dry, no petechiae, no vesicles  Neuro: alert, clear speech, oriented, face symmetric,  not perceptibly asymmetric, lifts both legs off bed, ambulatory, no meningismus, responds briskly and appropriately to all questions and commands  Psych: cooperative, calm      Emergency Department Course     Interventions:  0014 oxycodone 5 mg PO  0014 Zofran ODT 4 mg PO    Emergency Department Course:  Nursing notes and vitals reviewed.  0006: I performed an exam of the patient as documented above.  I discussed the treatment plan with the patient.  She expressed understanding of this plan and consented to discharge.  She will be " discharged home with instructions for care and follow up. In addition, the patient will return to the emergency department if her symptoms worsen, if new symptoms arise or if there is any concern.  All questions were answered prior to discharge.    Impression & Plan      Medical Decision Making:  This headache is consistent with her prior pattern, and she sought symptomatic relief which was provided.  Certainly considered more dangerous causes such as malfunction of her  shunt, meningitis, intracranial tumor, intracranial hemorrhage and others, though the patient and I both readily agreed to defer specific testing for these.  Her neurologic exam is at baseline and she is afebrile.  Some additional reassurance is provided by the benign CT of her head done in this emergency department just days ago.  Nothing to suggest the need for immediate neurosurgical or neurologic consultation.  The fact that she is out of her usual medications and cannot refill them until tomorrow seems to be a eric factor in her presentation here today.  She is welcome back for acute problems and otherwise to follow closely through her outpatient clinics.      Diagnosis:    ICD-10-CM    1. Nonintractable headache, unspecified chronicity pattern, unspecified headache type R51    2.  (ventriculoperitoneal) shunt status Z98.2    3. Nausea R11.0      Disposition:   Home    Scribe Disclosure:  IGregoria, am serving as a scribe at 12:06 AM on 2/22/2018 to document services personally performed by Jason Sorto MD, based on my observations and the provider's statements to me.    2/21/2018    EMERGENCY DEPARTMENT       Jason Sorto MD  02/22/18 0429

## 2018-02-25 ENCOUNTER — HOSPITAL ENCOUNTER (EMERGENCY)
Facility: CLINIC | Age: 53
Discharge: HOME | End: 2018-02-25
Attending: EMERGENCY MEDICINE | Admitting: EMERGENCY MEDICINE
Payer: COMMERCIAL

## 2018-02-25 ENCOUNTER — APPOINTMENT (OUTPATIENT)
Dept: GENERAL RADIOLOGY | Facility: CLINIC | Age: 53
End: 2018-02-25
Attending: EMERGENCY MEDICINE
Payer: COMMERCIAL

## 2018-02-25 VITALS — TEMPERATURE: 98 F | OXYGEN SATURATION: 100 % | DIASTOLIC BLOOD PRESSURE: 90 MMHG | SYSTOLIC BLOOD PRESSURE: 171 MMHG

## 2018-02-25 VITALS
HEART RATE: 64 BPM | DIASTOLIC BLOOD PRESSURE: 82 MMHG | HEIGHT: 66 IN | SYSTOLIC BLOOD PRESSURE: 170 MMHG | WEIGHT: 252 LBS | RESPIRATION RATE: 18 BRPM | BODY MASS INDEX: 40.5 KG/M2 | OXYGEN SATURATION: 96 % | TEMPERATURE: 97.8 F

## 2018-02-25 DIAGNOSIS — K59.01 SLOW TRANSIT CONSTIPATION: ICD-10-CM

## 2018-02-25 DIAGNOSIS — R51.9 CHRONIC NONINTRACTABLE HEADACHE, UNSPECIFIED HEADACHE TYPE: ICD-10-CM

## 2018-02-25 DIAGNOSIS — G89.29 CHRONIC NONINTRACTABLE HEADACHE, UNSPECIFIED HEADACHE TYPE: ICD-10-CM

## 2018-02-25 PROCEDURE — 74018 RADEX ABDOMEN 1 VIEW: CPT

## 2018-02-25 PROCEDURE — 25000125 ZZHC RX 250: Performed by: EMERGENCY MEDICINE

## 2018-02-25 PROCEDURE — 25000132 ZZH RX MED GY IP 250 OP 250 PS 637: Performed by: EMERGENCY MEDICINE

## 2018-02-25 PROCEDURE — 99282 EMERGENCY DEPT VISIT SF MDM: CPT

## 2018-02-25 PROCEDURE — 99283 EMERGENCY DEPT VISIT LOW MDM: CPT

## 2018-02-25 RX ORDER — OLANZAPINE 10 MG/2ML
10 INJECTION, POWDER, FOR SOLUTION INTRAMUSCULAR ONCE
Status: DISCONTINUED | OUTPATIENT
Start: 2018-02-25 | End: 2018-02-26 | Stop reason: HOSPADM

## 2018-02-25 RX ORDER — POLYETHYLENE GLYCOL 3350 17 G/17G
17 POWDER, FOR SOLUTION ORAL DAILY
Status: DISCONTINUED | OUTPATIENT
Start: 2018-02-25 | End: 2018-02-25 | Stop reason: HOSPADM

## 2018-02-25 RX ORDER — OXYCODONE HYDROCHLORIDE 5 MG/1
5 TABLET ORAL ONCE
Status: COMPLETED | OUTPATIENT
Start: 2018-02-25 | End: 2018-02-25

## 2018-02-25 RX ORDER — ACETAMINOPHEN 500 MG
1000 TABLET ORAL ONCE
Status: DISCONTINUED | OUTPATIENT
Start: 2018-02-25 | End: 2018-02-26 | Stop reason: HOSPADM

## 2018-02-25 RX ORDER — WATER 10 ML/10ML
INJECTION INTRAMUSCULAR; INTRAVENOUS; SUBCUTANEOUS
Status: DISCONTINUED
Start: 2018-02-25 | End: 2018-02-26 | Stop reason: HOSPADM

## 2018-02-25 RX ORDER — ONDANSETRON 4 MG/1
4 TABLET, ORALLY DISINTEGRATING ORAL ONCE
Status: COMPLETED | OUTPATIENT
Start: 2018-02-25 | End: 2018-02-25

## 2018-02-25 RX ORDER — MAGNESIUM CARB/ALUMINUM HYDROX 105-160MG
296 TABLET,CHEWABLE ORAL ONCE
Status: COMPLETED | OUTPATIENT
Start: 2018-02-25 | End: 2018-02-25

## 2018-02-25 RX ADMIN — OXYCODONE HYDROCHLORIDE 5 MG: 5 TABLET ORAL at 15:12

## 2018-02-25 RX ADMIN — POLYETHYLENE GLYCOL 3350 17 G: 17 POWDER, FOR SOLUTION ORAL at 15:33

## 2018-02-25 RX ADMIN — MAGESIUM CITRATE 296 ML: 1.75 LIQUID ORAL at 15:12

## 2018-02-25 RX ADMIN — ONDANSETRON 4 MG: 4 TABLET, ORALLY DISINTEGRATING ORAL at 15:11

## 2018-02-25 ASSESSMENT — ENCOUNTER SYMPTOMS
NAUSEA: 1
CONSTIPATION: 1
HEADACHES: 1
HEADACHES: 1

## 2018-02-25 NOTE — ED AVS SNAPSHOT
Emergency Department    [address] [place] [address]    Phone:  [phone]    Fax:  [phone]                                       Ines Mott   MRN: [id number]    Department:   Emergency Department   Date of Visit:  2/25/2018           After Visit Summary Signature Page     I have received my discharge instructions, and my questions have been answered. I have discussed any challenges I see with this plan with the nurse or doctor.    ..........................................................................................................................................  Patient/Patient Representative Signature      ..........................................................................................................................................  Patient Representative Print Name and Relationship to Patient    ..................................................               ................................................  Date                                            Time    ..........................................................................................................................................  Reviewed by Signature/Title    ...................................................              ..............................................  Date                                                            Time

## 2018-02-25 NOTE — DISCHARGE INSTRUCTIONS
Push fluids, MiraLAX 2 times a day, take another dose of mag citrate or milk of magnesia tomorrow if you have not had good results.  Any further pain medicine needs to be obtained through your primary physician.        Constipation (Adult)  Constipation means that you have bowel movements that are less frequent than usual. Stools often become very hard and difficult to pass.  Constipation is very common. At some point in life it affects almost everyone. Since everyone's bowel habits are different, what is constipation to one person may not be to another. Your healthcare provider may do tests to diagnose constipation. It depends on what he or she finds when evaluating you.    Symptoms of constipation include:    Abdominal pain    Bloating    Vomiting    Painful bowel movements    Itching, swelling, bleeding, or pain around the anus  Causes  Constipation can have many causes. These include:    Diet low in fiber    Too much dairy    Not drinking enough liquids    Lack of exercise or physical activity. This is especially true for older adults.    Changes in lifestyle or daily routine, including pregnancy, aging, work, and travel    Frequent use or misuse of laxatives    Ignoring the urge to have a bowel movement or delaying it until later    Medicines, such as certain prescription pain medicines, iron supplements, antacids, certain antidepressants, and calcium supplements    Diseases like irritable bowel syndrome, bowel obstructions, stroke, diabetes, thyroid disease, Parkinson disease, hemorrhoids, and colon cancer  Complications  Potential complications of constipation can include:    Hemorrhoids    Rectal bleeding from hemorrhoids or anal fissures (skin tears)    Hernias    Dependency on laxatives    Chronic constipation    Fecal impaction    Bowel obstruction or perforation  Home care  All treatment should be done after talking with your healthcare provider. This is especially true if you have another medical  problems, are taking prescription medicines, or are an older adult. Treatment most often involves lifestyle changes. You may also need medicines. Your healthcare provider will tell you which will work best for you. Follow the advice below to help avoid this problem in the future.  Lifestyle changes  These lifestyle changes can help prevent constipation:    Diet. Eat a high-fiber diet, with fresh fruit and vegetables, and reduce dairy intake, meats, and processed foods    Fluids. It's important to get enough fluids each day. Drink plenty of water when you eat more fiber. If you are on diet that limits the amount of fluid you can have, talk about this with your healthcare provider.    Regular exercise. Check with your healthcare provider first.  Medications  Take any medicines as directed. Some laxatives are safe to use only every now and then. Others can be taken on a regular basis. Talk with your doctor or pharmacist if you have questions.  Prescription pain medicines can cause constipation. If you are taking this kind of medicine, ask your healthcare provider if you should also take a stool softener.  Medicines you may take to treat constipation include:    Fiber supplements    Stool softeners    Laxatives    Enemas    Rectal suppositories  Follow-up care  Follow up with your healthcare provider if symptoms don't get better in the next few days. You may need to have more tests or see a specialist.  Call 911  Call 911 if any of these occur:    Trouble breathing    Stiff, rigid abdomen that is severely painful to touch    Confusion    Fainting or loss of consciousness    Rapid heart rate    Chest pain  When to seek medical advice  Call your healthcare provider right away if any of these occur:    Fever over 100.4 F (38 C)    Failure to resume normal bowel movements    Pain in your abdomen or back gets worse    Nausea or vomiting    Swelling in your abdomen    Blood in the stool    Black, tarry stool    Involuntary  weight loss    Weakness  Date Last Reviewed: 12/30/2015 2000-2017 [employer] [address] [place] [address], [place] [address]. All rights reserved. This information is not intended as a substitute for professional medical care. Always follow your healthcare professional's instructions.

## 2018-02-25 NOTE — ED AVS SNAPSHOT
Emergency Department    [address] [address]    Phone:  [phone]    Fax:  [phone]                                       Ines Mott   MRN: [id number]    Department:   Emergency Department   Date of Visit:  2/25/2018           Patient Information     Date Of Birth          1965        Your diagnoses for this visit were:     Slow transit constipation        You were seen by Maryjane Alexander MD.      Follow-up Information     Follow up with Dustin Fatima MD.    Specialty:  Family Practice    Contact information:    [address]  [facility] [id number] [phone]          Discharge Instructions       Push fluids, MiraLAX 2 times a day, take another dose of mag citrate or milk of magnesia tomorrow if you have not had good results.  Any further pain medicine needs to be obtained through your primary physician.        Constipation (Adult)  Constipation means that you have bowel movements that are less frequent than usual. Stools often become very hard and difficult to pass.  Constipation is very common. At some point in life it affects almost everyone. Since everyone's bowel habits are different, what is constipation to one person may not be to another. Your healthcare provider may do tests to diagnose constipation. It depends on what he or she finds when evaluating you.    Symptoms of constipation include:    Abdominal pain    Bloating    Vomiting    Painful bowel movements    Itching, swelling, bleeding, or pain around the anus  Causes  Constipation can have many causes. These include:    Diet low in fiber    Too much dairy    Not drinking enough liquids    Lack of exercise or physical activity. This is especially true for older adults.    Changes in lifestyle or daily routine, including pregnancy, aging, work, and travel    Frequent use or misuse of laxatives    Ignoring the urge to have a bowel movement or delaying it until later    Medicines, such  as certain prescription pain medicines, iron supplements, antacids, certain antidepressants, and calcium supplements    Diseases like irritable bowel syndrome, bowel obstructions, stroke, diabetes, thyroid disease, Parkinson disease, hemorrhoids, and colon cancer  Complications  Potential complications of constipation can include:    Hemorrhoids    Rectal bleeding from hemorrhoids or anal fissures (skin tears)    Hernias    Dependency on laxatives    Chronic constipation    Fecal impaction    Bowel obstruction or perforation  Home care  All treatment should be done after talking with your healthcare provider. This is especially true if you have another medical problems, are taking prescription medicines, or are an older adult. Treatment most often involves lifestyle changes. You may also need medicines. Your healthcare provider will tell you which will work best for you. Follow the advice below to help avoid this problem in the future.  Lifestyle changes  These lifestyle changes can help prevent constipation:    Diet. Eat a high-fiber diet, with fresh fruit and vegetables, and reduce dairy intake, meats, and processed foods    Fluids. It's important to get enough fluids each day. Drink plenty of water when you eat more fiber. If you are on diet that limits the amount of fluid you can have, talk about this with your healthcare provider.    Regular exercise. Check with your healthcare provider first.  Medications  Take any medicines as directed. Some laxatives are safe to use only every now and then. Others can be taken on a regular basis. Talk with your doctor or pharmacist if you have questions.  Prescription pain medicines can cause constipation. If you are taking this kind of medicine, ask your healthcare provider if you should also take a stool softener.  Medicines you may take to treat constipation include:    Fiber supplements    Stool softeners    Laxatives    Enemas    Rectal suppositories  Follow-up  care  Follow up with your healthcare provider if symptoms don't get better in the next few days. You may need to have more tests or see a specialist.  Call 911  Call 911 if any of these occur:    Trouble breathing    Stiff, rigid abdomen that is severely painful to touch    Confusion    Fainting or loss of consciousness    Rapid heart rate    Chest pain  When to seek medical advice  Call your healthcare provider right away if any of these occur:    Fever over 100.4 F (38 C)    Failure to resume normal bowel movements    Pain in your abdomen or back gets worse    Nausea or vomiting    Swelling in your abdomen    Blood in the stool    Black, tarry stool    Involuntary weight loss    Weakness  Date Last Reviewed: 12/30/2015 2000-2017 [employer]. [address] [place] [address], [place] [address]. All rights reserved. This information is not intended as a substitute for professional medical care. Always follow your healthcare professional's instructions.          Future Appointments        Provider Department Dept Phone Center    3/1/2018 10:00 AM OTTO YUSUF MD [facility] [phone] [place]      24 Hour Appointment Hotline       To make an appointment at any [facility], call [phone] ([phone]). If you don't have a family doctor or clinic, we will help you find one. [facility] are conveniently located to serve the needs of you and your family.             Review of your medicines      Our records show that you are taking the medicines listed below. If these are incorrect, please call your family doctor or clinic.        Dose / Directions Last dose taken    albuterol (2.5 MG/3ML) 0.083% neb solution   Dose:  1 vial   Quantity:  30 vial        Take 1 vial (2.5 mg) by nebulization every 6 hours as needed for shortness of breath / dyspnea or wheezing   Refills:  3        ASPIRIN NOT PRESCRIBED   Commonly known as:  INTENTIONAL   Quantity:  1  each        Please choose reason not prescribed, below   Refills:  0        blood glucose calibration solution   Commonly known as:  no brand specified   Quantity:  1 each        Use to calibrate blood glucose monitor as directed.   Refills:  0        blood glucose lancets standard   Commonly known as:  no brand specified   Quantity:  100 each        Use to test blood sugar daily times daily or as directed.   Refills:  11        blood glucose monitoring meter device kit   Commonly known as:  no brand specified   Quantity:  1 kit        Use to test blood sugar larisa times daily or as directed.   Refills:  0        blood glucose monitoring test strip   Commonly known as:  no brand specified   Quantity:  100 strip        Use to test blood sugars once times daily or as directed   Refills:  3        * cyanocobalamin 1000 MCG/ML injection   Commonly known as:  VITAMIN B12   Dose:  1 mL   Quantity:  1 mL        Inject 1 mL (1,000 mcg) Subcutaneous every 30 days   Refills:  11        * cyanocobalamin 1000 MCG/ML injection   Commonly known as:  VITAMIN B12   Quantity:  3 mL        INJECT 1 ML SUBCUTANEOUSLY EVERY 30 DAYS   Refills:  11        EPINEPHrine 0.3 MG/0.3ML injection 2-pack   Commonly known as:  EPIPEN/ADRENACLICK/or ANY BX GENERIC EQUIV   Dose:  0.3 mg   Quantity:  2 each        Inject 0.3 mLs (0.3 mg) into the muscle once as needed for anaphylaxis   Refills:  5        losartan-hydrochlorothiazide 50-12.5 MG per tablet   Commonly known as:  HYZAAR   Dose:  1 tablet   Quantity:  90 tablet        Take 1 tablet by mouth daily   Refills:  3        magnesium oxide 400 (241.3 MG) MG tablet   Commonly known as:  MAG-OX   Dose:  400 mg   Quantity:  90 tablet        Take 1 tablet (400 mg) by mouth daily   Refills:  3        ondansetron 4 MG ODT tab   Commonly known as:  ZOFRAN-ODT   Dose:  4 mg   Quantity:  10 tablet        Take 1 tablet (4 mg) by mouth every 8 hours as needed for nausea   Refills:  1        order for DME    Quantity:  1 Device        Right wrist splint for carpal tunnel syndrome  One* Use as directed at hour of sleep   Refills:  1        oxyCODONE-acetaminophen 5-325 MG per tablet   Commonly known as:  PERCOCET   Dose:  1 tablet   Quantity:  30 tablet        Take 1 tablet by mouth every 6 hours as needed for moderate to severe pain Max 4 tab/day   Refills:  0        * polyethylene glycol powder   Commonly known as:  MIRALAX   Dose:  1 capful   Quantity:  527 g        Take 17 g (1 capful) by mouth daily   Refills:  0        * polyethylene glycol powder   Commonly known as:  MIRALAX/GLYCOLAX   Quantity:  1581 g        TAKE 17 GRAMS (1 CAPFUL) BY MOUTH DAILY   Refills:  3        potassium chloride 10 MEQ tablet   Commonly known as:  K-TAB,KLOR-CON   Dose:  10 mEq   Indication:  Low Amount of Potassium in the Blood   Quantity:  120 tablet        Take 1 tablet (10 mEq) by mouth daily   Refills:  11        sertraline 100 MG tablet   Commonly known as:  ZOLOFT   Dose:  150 mg   Indication:  Major Depressive Disorder   Quantity:  45 tablet        Take 1.5 tablets (150 mg) by mouth daily   Refills:  11        STATIN NOT PRESCRIBED (INTENTIONAL)   Dose:  1 each   Quantity:  0 each        1 each daily Statin not prescribed intentionally due to Active liver disease (liver failure, cirrhosis, hepatitis) LIVER METS   Refills:  0        triamcinolone 0.1 % ointment   Commonly known as:  KENALOG   Quantity:  80 g        Apply sparingly to affected area three times daily for 14 days.   Refills:  0        Vitamin D (Cholecalciferol) 1000 UNITS Tabs   Dose:  2000 Units   Quantity:  60 tablet        Take 2,000 Units by mouth daily   Refills:  11        * Notice:  This list has 4 medication(s) that are the same as other medications prescribed for you. Read the directions carefully, and ask your doctor or other care provider to review them with you.            Procedures and tests performed during your visit     Abdomen XR 1 vw       Orders Needing Specimen Collection     None      Pending Results     No orders found from 2/23/2018 to 2/26/2018.            Pending Culture Results     No orders found from 2/23/2018 to 2/26/2018.            Pending Results Instructions     If you had any lab results that were not finalized at the time of your Discharge, you can call the ED Lab Result RN at [phone]. You will be contacted by this team for any positive Lab results or changes in treatment. The nurses are available 7 days a week from 10A to 6:30P.  You can leave a message 24 hours per day and they will return your call.        Test Results From Your Hospital Stay        2/25/2018  3:09 PM      Narrative     ABDOMEN ONE VIEW  2/25/2018 2:58 PM     HISTORY: Constipation    COMPARISON: 2/20/2018 abdomen film        Impression     IMPRESSION: Tubing projecting over the left pelvis consistent with the  patient's ventriculoperitoneal shunt. Prominent stool in the left  abdomen in region of distal transverse and descending colon and  splenic flexure. Nonobstructive bowel gas pattern. Surgical clips  compatible with prior cholecystectomy.    HENOK CAROLINA MD                Clinical Quality Measure: Blood Pressure Screening     Your blood pressure was checked while you were in the emergency department today. The last reading we obtained was  BP: (!) 168/97 . Please read the guidelines below about what these numbers mean and what you should do about them.  If your systolic blood pressure (the top number) is less than 120 and your diastolic blood pressure (the bottom number) is less than 80, then your blood pressure is normal. There is nothing more that you need to do about it.  If your systolic blood pressure (the top number) is 120-139 or your diastolic blood pressure (the bottom number) is 80-89, your blood pressure may be higher than it should be. You should have your blood pressure rechecked within a year by a primary care provider.  If your  "systolic blood pressure (the top number) is 140 or greater or your diastolic blood pressure (the bottom number) is 90 or greater, you may have high blood pressure. High blood pressure is treatable, but if left untreated over time it can put you at risk for heart attack, stroke, or kidney failure. You should have your blood pressure rechecked by a primary care provider within the next 4 weeks.  If your provider in the emergency department today gave you specific instructions to follow-up with your doctor or provider even sooner than that, you should follow that instruction and not wait for up to 4 weeks for your follow-up visit.        Thank you for choosing [facility]       Thank you for choosing [facility] for your care. Our goal is always to provide you with excellent care. Hearing back from our patients is one way we can continue to improve our services. Please take a few minutes to complete the written survey that you may receive in the mail after you visit with us. Thank you!        [employer] Information     [employer] lets you send messages to your doctor, view your test results, renew your prescriptions, schedule appointments and more. To sign up, go to [email or web address] . Click on \"Log in\" on the left side of the screen, which will take you to the Welcome page. Then click on \"Sign up Now\" on the right side of the page.     You will be asked to enter the access code listed below, as well as some personal information. Please follow the directions to create your username and password.     Your access code is: [email or web address]  Expires: 2018  6:56 PM     Your access code will  in 90 days. If you need help or a new code, please call your [facility] clinic or [phone].        Care EveryWhere ID     This is your Care EveryWhere ID. This could be used by other organizations to access your [facility] medical records  [id number]        Equal Access to Services     VALENTE RIVERA AH: Marc Leonard, " deion nunes, emilee oconnor. So [facility] [phone].    ATENCIÓN: Si habla español, tiene a christianson disposición servicios gratuitos de asistencia lingüística. Llame al [phone].    We comply with applicable federal civil rights laws and [place] laws. We do not discriminate on the basis of race, color, national origin, age, disability, sex, sexual orientation, or gender identity.            After Visit Summary       This is your record. Keep this with you and show to your community pharmacist(s) and doctor(s) at your next visit.

## 2018-02-25 NOTE — ED PROVIDER NOTES
History     Chief Complaint:  Constipation     The history is provided by the patient.      Ines Mott is a 53 year old female who presents to the emergency department today for evaluation of constipation. The patient reports that she has had no bowel movements for the past 7 days. She has reportedly been taking oxycodone and has not been taking a stool softener along with it. She reports having done a soap and water enema at home, and has also been experiencing nausea. She associates her migraines as secondary to being out of her migraine medication.    Allergies:  Ativan [Lorazepam]  Macrobid [Nitrofurantoin]  Amoxicillin  Buspirone  Busulfan  Cefaclor  Cephalosporins  Ciprofloxacin  Clindamycin  Dilaudid [Hydromorphone]  Diphenhydramine Hcl  Imitrex [Sumatriptan Succinate]  Ketorolac Tromethamine  Lansoprazole  Latex  Metoclopramide  Paroxetine  Penicillins  Prednisone  Red Dye  Sulfa Drugs  Lidoderm [Lidocaine]      Medications:    polyethylene glycol (MIRALAX/GLYCOLAX) powder  ondansetron (ZOFRAN-ODT) 4 MG ODT tab  Oxycodone-acetaminophen (PERCOCET) 5-325 MG per tablet  triamcinolone (KENALOG) 0.1 % ointment  cyanocobalamin (VITAMIN B12) 1000 MCG/ML injection  sertraline (ZOLOFT) 100 MG tablet  magnesium oxide (MAG-OX) 400 (241.3 MG) MG tablet  blood glucose monitoring (NO BRAND SPECIFIED) test strip  blood glucose calibration (NO BRAND SPECIFIED) solution  blood glucose (NO BRAND SPECIFIED) lancets standard  blood glucose monitoring (NO BRAND SPECIFIED) meter device kit  albuterol (2.5 MG/3ML) 0.083% neb solution  losartan-hydrochlorothiazide (HYZAAR) 50-12.5 MG per tablet  potassium chloride (K-TAB,KLOR-CON) 10 MEQ tablet  cyanocobalamin (VITAMIN B12) 1000 MCG/ML injection  Vitamin D, Cholecalciferol, 1000 UNITS TABS  Epinephrine (EPIPEN) 0.3 MG/0.3ML injection    Past Medical History:    Arthritis   Asthma   Cancer (H)   Chronic pain syndrome   Depressive disorder   Diabetes  "(H)   Hydrocephalus   Hypertension   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)   Methamphetamine abuse   Migraines   Mild intermittent asthma   Obesity   Right renal mass   Spina bifida     Past Surgical History:    Appendectomy  Cholecystectomy  Dental extraction  GYN surgery  Implant shunt ventriculoperitoneal    Family History:    Cancer  Father    Social History:  The patient was accompanied to the ED by herself.  Smoking Status: Former Smoker  Smokeless Tobacco: Never Used  Alcohol Use: Positive   Marital Status:       Review of Systems   Gastrointestinal: Positive for constipation and nausea.   Neurological: Positive for headaches (migraine, out of medication).   All other systems reviewed and are negative.    Physical Exam     Patient Vitals for the past 24 hrs:   BP Temp Temp src Pulse Resp SpO2 Height Weight   02/25/18 1417 (!) 168/97 97.8  F (36.6  C) Temporal 64 16 96 % 1.676 m (5' 6\") 114.3 kg (252 lb)      Physical Exam  Nursing note and vitals reviewed.    Constitutional:  Appears well-developed and well-nourished, comfortable.  Appearing stated age.  HENT:   No evidence of facial or scalp trauma.       Nose normal.  No discharge.      Oropharynx is clear and moist.  Eyes:    Conjunctivae are normal without injection. No lid droop.     Pupils are equal, round, and reactive to light.      Right eye exhibits no discharge. Left eye exhibits no discharge.      No scleral icterus.   Cardiovascular:  Normal rate, regular rhythm with normal S1 and S2.      Normal heart sounds and peripheral pulses 2+ and equal.       No murmur or barrett.  Pulmonary/Respiratory:     Effort normal and breath sounds clear to auscultation bilaterally.         No respiratory distress.  No stridor.     No wheezes. No rales. No chest wall tenderness.    GI:    Soft. No distension and no mass.  Mild epigastric tenderness.  Obese.     No rebound and no guarding. No flank pain.  No HSM.  Skin:    Skin is warm and " dry. No rash noted. No diaphoresis.      No erythema. No pallor.  No lesions.  Psychiatric:   Behavior is normal. Appropriate mood and affect.     Judgment and thought content normal.     Emergency Department Course     Imaging:  Radiology findings were communicated with the patient who voiced understanding of the findings.    Abdomen XR 1 vw  Tubing projecting over the left pelvis consistent with the  patient's ventriculoperitoneal shunt. Prominent stool in the left  abdomen in region of distal transverse and descending colon and  splenic flexure. Nonobstructive bowel gas pattern. Surgical clips  compatible with prior cholecystectomy.  Reading per radiology    Interventions:  1511 Zofran 4 mg PO  1512 Oxycodone 5 mg PO  1512 Magnesium Citrate 296 mL PO  1533 MiraLAX 17 g PO    Emergency Department Course:    1417 Nursing notes and vitals reviewed.    1443 The patient was sent for a XR while in the emergency department, results above.      1500 I performed an exam of the patient as documented above.     1600 I personally reviewed the imaging results with the patient and answered all related questions prior to discharge.    Impression & Plan      Medical Decision Making:  Ines Mott is a 53 year old female who presents to the emergency department today for evaluation of constipation and abdominal cramping. She states she ran out of her pain medication and other medications as well. Said she could not get her MiraLAX until tomorrow. She has a care plan which recommends limiting any narcotic prescriptions or pain medication. Plain x-ray confirms large amount of stool in her colon. She had some nausea with this was given on ODT Zofran tablet 4 mg p.o. then 1 bottle of magnesium citrate and 1 scoop of MiraLAX p.o. She was due for her afternoon Roxicodone tablets that she was given 5 mg p.o. per our pain protocol but I explained to her that she would not be getting a prescription. She would need to contact her  doctors tomorrow for refills or further evaluation.    Diagnosis:    ICD-10-CM    1. Slow transit constipation K59.01      Disposition:   Discharge to home    Push fluids, MiraLAX 2 times a day, take another dose of mag citrate or milk of magnesia tomorrow if you have not had good results.  Any further pain medicine needs to be obtained through your primary physician.    Scribe Disclosure:  I, Vicente Fernandez, am serving as a scribe at 2:40 PM on 2/25/2018 to document services personally performed by Maryjane Alexander MD based on my observations and the provider's statements to me.      EMERGENCY DEPARTMENT       Maryjane Alexander MD  02/25/18 2705

## 2018-02-26 ENCOUNTER — APPOINTMENT (OUTPATIENT)
Dept: GENERAL RADIOLOGY | Facility: CLINIC | Age: 53
End: 2018-02-26
Attending: EMERGENCY MEDICINE
Payer: COMMERCIAL

## 2018-02-26 ENCOUNTER — HOSPITAL ENCOUNTER (EMERGENCY)
Facility: CLINIC | Age: 53
Discharge: HOME | End: 2018-02-26
Attending: EMERGENCY MEDICINE | Admitting: EMERGENCY MEDICINE
Payer: COMMERCIAL

## 2018-02-26 VITALS
RESPIRATION RATE: 18 BRPM | SYSTOLIC BLOOD PRESSURE: 179 MMHG | HEIGHT: 66 IN | OXYGEN SATURATION: 99 % | DIASTOLIC BLOOD PRESSURE: 102 MMHG | TEMPERATURE: 98.9 F

## 2018-02-26 DIAGNOSIS — R51.9 NONINTRACTABLE HEADACHE, UNSPECIFIED CHRONICITY PATTERN, UNSPECIFIED HEADACHE TYPE: ICD-10-CM

## 2018-02-26 DIAGNOSIS — M54.9 UPPER BACK PAIN: ICD-10-CM

## 2018-02-26 PROCEDURE — 99283 EMERGENCY DEPT VISIT LOW MDM: CPT

## 2018-02-26 PROCEDURE — 25000132 ZZH RX MED GY IP 250 OP 250 PS 637: Performed by: EMERGENCY MEDICINE

## 2018-02-26 PROCEDURE — 72072 X-RAY EXAM THORAC SPINE 3VWS: CPT

## 2018-02-26 RX ORDER — CYCLOBENZAPRINE HCL 10 MG
10 TABLET ORAL ONCE
Status: COMPLETED | OUTPATIENT
Start: 2018-02-26 | End: 2018-02-26

## 2018-02-26 RX ADMIN — CYCLOBENZAPRINE HYDROCHLORIDE 10 MG: 10 TABLET, FILM COATED ORAL at 22:25

## 2018-02-26 ASSESSMENT — ENCOUNTER SYMPTOMS
WEAKNESS: 0
NAUSEA: 1
HEADACHES: 1
PHOTOPHOBIA: 1
NUMBNESS: 0
NECK PAIN: 1

## 2018-02-26 NOTE — ED PROVIDER NOTES
History     Chief Complaint:  Headache       HPI   Ines Mott is a 53 year old female who presents to the emergency department today for evaluation of a headache. The patient reports that she has a constant headache that is a daily occurrence for her. She denies any change from her previous headaches. She states that she would like narcotics as she is out of her home prescriptions and is scheduled to have these refilled tomorrow. Per chart review, the patient was seen the emergency department earlier today for evaluation of abdominal pain and constipation. At that time, she was given oxycodone and Zofran. She is currently refusing Zyprexa, Haldol, Tylenol, Motrin, Compazine, Reglan, Imitrex, Zofran and fluids. She states that she took 400 mg magnesium oxide at home.    Allergies:  Ativan [Lorazepam]  Macrobid [Nitrofurantoin]  Amoxicillin  Buspirone  Busulfan  Cefaclor  Cephalosporins  Ciprofloxacin  Clindamycin  Dilaudid [Hydromorphone]  Diphenhydramine Hcl  Imitrex [Sumatriptan Succinate]  Ketorolac Tromethamine  Lansoprazole  Latex  Metoclopramide  Paroxetine  Penicillins  Prednisone  Red Dye  Sulfa Drugs  Lidoderm [Lidocaine]      Medications:    polyethylene glycol (MIRALAX/GLYCOLAX) powder  ondansetron (ZOFRAN-ODT) 4 MG ODT tab  oxyCODONE-acetaminophen (PERCOCET) 5-325 MG per tablet  cyanocobalamin (VITAMIN B12) 1000 MCG/ML injection  sertraline (ZOLOFT) 100 MG tablet  magnesium oxide (MAG-OX) 400 (241.3 MG) MG tablet  albuterol (2.5 MG/3ML) 0.083% neb solution  losartan-hydrochlorothiazide (HYZAAR) 50-12.5 MG per tablet  potassium chloride (K-TAB,KLOR-CON) 10 MEQ tablet  cyanocobalamin (VITAMIN B12) 1000 MCG/ML injection  Vitamin D, Cholecalciferol, 1000 UNITS TABS  EPINEPHrine (EPIPEN) 0.3 MG/0.3ML injection    Past Medical History:    Arthritis   Asthma   Cancer (H)   Chronic pain syndrome   Depressive disorder   Diabetes (H)   Hydrocephalus   Hypertension   Metastatic carcinoid tumor to  intra-abdominal site (H)   Methamphetamine abuse   Migraines   Mild intermittent asthma   Obesity   Right renal mass   Spina bifida     Past Surgical History:    Appendectomy  Cholecystectomy  Dental extraction  GYN Surgery  Implant shunt ventriculoperitoneal    Family History:    Cancer    Social History:  The patient was alone.  Smoking Status: Former Smoker  Smokeless Tobacco: Never Used  Alcohol Use: Yes   Marital Status:        Review of Systems   Neurological: Positive for headaches.   All other systems reviewed and are negative.    Physical Exam   First Vitals:  BP: 171/90  Heart Rate: 74  Temp: 98  F (36.7  C)  SpO2: 100 %    Physical Exam  Vitals: reviewed by me  General: Pt seen on hospitals, pleasant, cooperative, and alert to conversation, does appear somewhat frustrated.  Eyes: Tracking well, clear conjunctiva BL  ENT: MMM, midline trachea.   Lungs:   No tachypnea, no accessory muscle use. No respiratory distress.   MSK: no peripheral edema or joint effusion.   Skin: No rash, normal turgor and temperature  Neuro: Clear speech and no facial droop.  Moving all extremities spontaneously, following commands, ambulatory with strong and independent gait.  Psych: Not RIS, no e/o AH/VH      Emergency Department Course     Emergency Department Course:  Nursing notes and vitals reviewed.  2152: I performed an exam of the patient as documented above. Patient refused all medications other than narcotics. Discussed that per her care plan, I am unable to provide these.   2223: The patient eloped.  Patient eloped without treatment or formal discharge.     Impression & Plan      Medical Decision Making:  Ines Mott is a 53 year old female who presents to the emergency room with a chronic headache, unchanged from previous earlier today. Patient has normal neurological exam and I do see no evidence of an acute component, and based on her profile see no evidence of subarachnoid hemorrhage, meningitis,  or other emergent malady. The patient states this is exactly like her old ones. Patient was offered numerous medications for her headache, including list above, and did refuse all of them. She states that she is requesting narcotics only. I politely discussed the care plan in place and patient understands that no narcotics will be given for her headache today. Patient did elope prior to formal discharge and will be gladly seen and treated should she return.     Diagnosis:    ICD-10-CM    1. Chronic nonintractable headache, unspecified headache type R51      Disposition:  Eloped    Scribe Disclosure:  I, Irene Catherine, am serving as a scribe at 9:57 PM on 2/25/2018 to document services personally performed by Zachary Albright MD based on my observations and the provider's statements to me.    2/25/2018    EMERGENCY DEPARTMENT       Zachary Albright MD  02/26/18 0003

## 2018-02-27 ENCOUNTER — CARE COORDINATION (OUTPATIENT)
Dept: CARE COORDINATION | Facility: CLINIC | Age: 53
End: 2018-02-27

## 2018-02-27 NOTE — ED PROVIDER NOTES
History     Chief Complaint:  Headache    HPI   Ines Mott is a 53 year old female who presents with a headache. The patient was seen twice here in the ED yesterday for a headache and constipation. She also reportedly suffered a mechanical fall in the shower yesterday afternoon, hitting her neck/upper back against the ceramic shower. She states that she has been experiencing pain in her neck since that time, though there was no mention of this pain during her visits to the ED yesterday which she now states occurred after this fall. The patient now presents to the ED this evening due to a headache that has persisted for weeks but was worse this morning. She describes this headache as diffuse across her entire head, with associated photo and audio sensitivity. The patient does have a history of headaches and states her current episode feels like a severe migraine. She normally takes Oxycodone for this, but states that she has now been out of this for four days. Her primary care physician is currently out of town and as such she has been unable to see him for this. She does believe that the nausea medication that EMS gave her alleviated her nausea somewhat, though this is still present.     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   Gastrointestinal: Positive for nausea.   Musculoskeletal: Positive for neck pain.   Neurological: Positive for headaches. Negative for weakness and numbness.   All other systems reviewed and are negative.    Physical Exam   Vitals:  Patient Vitals for the past 24 hrs:   BP Temp Temp src Heart Rate Resp SpO2 Height   02/26/18 2029 (!) 179/102 98.9  F (37.2  C) Oral 70 18 99 % 1.676 m (5' 6\")     Physical Exam  Eyes:  The pupils are equal and round    Conjunctivae and sclerae are normal  ENT:    The nose is normal    Pinnae are normal    The oropharynx is normal  Neck:  Normal range of motion    There is no rigidity noted    There is no midline cervical spine tenderness    Trachea is in the midline  CV:  Regular rate and rhythm     No edema  Resp:  Lungs are clear    Non-labored    No rales    No wheezing   MS:  Normal muscular tone    No asymmetric leg swelling    Tenderness of the mid-thoracic spine.  Skin:  No rash or acute skin lesions noted  Neuro:   Awake, alert.      EOMI    PERRL     equal in upper extremities    Normal strength in lower extremities    Speech is normal and fluent.    Face is symmetric.       Emergency Department Course     Imaging:  Radiology findings were communicated with the patient who voiced understanding of the findings.  Thoracic spine XR, 3 views:  Mild multilevel degenerative changes are present. There is no evidence for fracture or malalignment.  Per Radiologist.     Interventions:  2225 Flexeril, 10 mg, PO     Emergency Department Course:  Nursing notes and vitals reviewed.  2052 I examined the patient.  Plan of care discussed.  Patient agrees with this plan.  I performed an exam of the patient as documented above.   The patient " was sent for a XR while in the emergency department, results above.    2258 The patient eloped prior to discharge.    Impression & Plan      Medical Decision Making:  Ines Mott is a 53 year old female who presents to the emergency department today with a headache. She also reports having had a fall yesterday at 1 pm in the afternoon in her shower. She said she fell and hit her neck. On exam the place where she said she hit is actually in her upper back area. X ray of this area was obtained and negative for any acute fractures. She has been up and ambulatory since then and was actually in the ED once after that event yesterday, but made no mention of it per documentation. She does complain of headache and reports that she is out of her narcotics. Headache has been ongoing but worse today. Description is not consistent with intracranial hemorrhage, meningitis, or other concerning cause of headache.  It was explained to her that we do not treat headaches with narcotics in the ED. She says that her primary care physician is out of town. She said that used more than she expected of her pain medication before her refill and that is why she ran out. She was offered Compazine as that is not listed as an allergy of hers, but declined saying that it wouldn't work. She did not want any medications other than narcotics. I explained to her that this was not an option. She did eventually agree to take some Flexeril to see if that would help with her headache, which was ordered. She then eloped home prior to my re evaluation.    Diagnosis:    ICD-10-CM    1. Nonintractable headache, unspecified chronicity pattern, unspecified headache type R51    2. Upper back pain M54.9      Disposition:   Eloped    Scribe Disclosure:  I, Emir Santos, am serving as a scribe at 8:47 PM on 2/26/2018 to document services personally performed by Petr Villeda MD, based on my observations and the provider's statements to  me.    2/26/2018    EMERGENCY DEPARTMENT       Petr Villeda MD  02/27/18 0026

## 2018-02-27 NOTE — ED NOTES
Pt states she is ready to go because her  is outside waiting for her. MD was notified. MD went to pt's room and she was not in the room.

## 2018-02-27 NOTE — PROGRESS NOTES
Clinic Care Coordination Contact  [facility]/Voicemail    Referral Source: ED Follow-Up    Clinic care coordinator received email from [facility] ED care coordinator requesting an outreach call to patient.  Patient has been seen six times in ED in 10 days.   Patient is restricted to PCP and he has been out of the office for the past two weeks.  Patient was seen by PCP on 28/18 for the same chronic complaints to presents to the ED with.     Per chart review patient indicated she had been without her oxycodone for four days on ED visit 2/26/18. Per medication check she was given 30 tablets on 2/22/18 and is not to exceed four per day.      Clinical Data: Care Coordinator Outreach  Outreach attempted.  Left message on [employer] with call back information and requested return call.      Plan: Patient will see PCP as scheduled on 3/1/18 when he returns to the office.     Karen Jacinto RN, [facility] - Care Coordinator     2/27/2018    1:12 PM  [phone]

## 2018-03-01 ENCOUNTER — OFFICE VISIT (OUTPATIENT)
Dept: FAMILY MEDICINE | Facility: CLINIC | Age: 53
End: 2018-03-01
Payer: COMMERCIAL

## 2018-03-01 ENCOUNTER — TELEPHONE (OUTPATIENT)
Dept: FAMILY MEDICINE | Facility: CLINIC | Age: 53
End: 2018-03-01

## 2018-03-01 VITALS
DIASTOLIC BLOOD PRESSURE: 84 MMHG | TEMPERATURE: 96.6 F | SYSTOLIC BLOOD PRESSURE: 154 MMHG | HEART RATE: 63 BPM | RESPIRATION RATE: 20 BRPM | BODY MASS INDEX: 41 KG/M2 | WEIGHT: 254 LBS | OXYGEN SATURATION: 98 %

## 2018-03-01 DIAGNOSIS — G89.4 CHRONIC PAIN SYNDROME: ICD-10-CM

## 2018-03-01 DIAGNOSIS — K59.01 SLOW TRANSIT CONSTIPATION: ICD-10-CM

## 2018-03-01 DIAGNOSIS — G47.33 OSA (OBSTRUCTIVE SLEEP APNEA): ICD-10-CM

## 2018-03-01 DIAGNOSIS — C78.7 CARCINOMA OF COLON METASTATIC TO LIVER (H): Primary | ICD-10-CM

## 2018-03-01 DIAGNOSIS — F43.10 PTSD (POST-TRAUMATIC STRESS DISORDER): Chronic | ICD-10-CM

## 2018-03-01 DIAGNOSIS — I10 HYPERTENSION GOAL BP (BLOOD PRESSURE) < 140/80: Chronic | ICD-10-CM

## 2018-03-01 DIAGNOSIS — F33.41 RECURRENT MAJOR DEPRESSION IN PARTIAL REMISSION (H): Chronic | ICD-10-CM

## 2018-03-01 DIAGNOSIS — E66.01 MORBID OBESITY (H): Chronic | ICD-10-CM

## 2018-03-01 DIAGNOSIS — E78.5 HYPERLIPIDEMIA WITH TARGET LDL LESS THAN 130: Chronic | ICD-10-CM

## 2018-03-01 DIAGNOSIS — C18.9 CARCINOMA OF COLON METASTATIC TO LIVER (H): Primary | ICD-10-CM

## 2018-03-01 PROCEDURE — 99214 OFFICE O/P EST MOD 30 MIN: CPT | Performed by: FAMILY MEDICINE

## 2018-03-01 RX ORDER — POLYETHYLENE GLYCOL 3350 17 G/17G
POWDER, FOR SOLUTION ORAL
Qty: 1581 G | Refills: 11 | Status: SHIPPED | OUTPATIENT
Start: 2018-03-01 | End: 2020-05-01

## 2018-03-01 RX ORDER — OXYCODONE AND ACETAMINOPHEN 5; 325 MG/1; MG/1
1 TABLET ORAL EVERY 6 HOURS PRN
Qty: 30 TABLET | Refills: 0 | Status: SHIPPED | OUTPATIENT
Start: 2018-03-08 | End: 2018-03-15

## 2018-03-01 RX ORDER — OXYCODONE AND ACETAMINOPHEN 5; 325 MG/1; MG/1
1 TABLET ORAL EVERY 6 HOURS PRN
Qty: 30 TABLET | Refills: 0 | Status: SHIPPED | OUTPATIENT
Start: 2018-03-01 | End: 2018-03-01

## 2018-03-01 ASSESSMENT — PATIENT HEALTH QUESTIONNAIRE - PHQ9
SUM OF ALL RESPONSES TO PHQ QUESTIONS 1-9: 14
SUM OF ALL RESPONSES TO PHQ QUESTIONS 1-9: 14

## 2018-03-01 NOTE — NURSING NOTE
"Chief Complaint   Patient presents with     Emergency Department     headaches       Initial /84  Pulse 63  Temp 96.6  F (35.9  C) (Tympanic)  Resp 20  Wt 254 lb (115.2 kg)  SpO2 98%  BMI 41 kg/m2 Estimated body mass index is 41 kg/(m^2) as calculated from the following:    Height as of 2/26/18: 5' 6\" (1.676 m).    Weight as of this encounter: 254 lb (115.2 kg).  Medication Reconciliation: complete   Maggi Harris CMA    "

## 2018-03-01 NOTE — MR AVS SNAPSHOT
After Visit Summary   3/1/2018    Ines Mott    MRN: [id number]           Patient Information     Date Of Birth          1965        Visit Information        Provider Department      3/1/2018 10:00 AM Dustin Fatima MD [facility]        Today's Diagnoses     Carcinoma of colon metastatic to liver (H)    -  1    Slow transit constipation        Chronic pain syndrome        BMI 42        Hyperlipidemia with target LDL less than 130        Recurrent major depression in partial remission        Hypertension goal BP (blood pressure) < 140/80        PTSD (post-traumatic stress disorder)        ALEXUS (obstructive sleep apnea)          Care Instructions    (C18.9,  C78.7) Carcinoma of colon metastatic to liver (H)  (primary encounter diagnosis)  Comment:    Plan:      (K59.01) Slow transit constipation  Comment:    Plan: polyethylene glycol (MIRALAX/GLYCOLAX) powder             (G89.4) Chronic pain syndrome  Comment:    Plan: oxyCODONE-acetaminophen (PERCOCET) 5-325 MG per        tablet, DISCONTINUED: oxyCODONE-acetaminophen         (PERCOCET) 5-325 MG per tablet             (E66.01) BMI 42  Comment:    Plan:      (E78.5) Hyperlipidemia with target LDL less than 130  Comment:    Plan:      (F33.41) Recurrent major depression in partial remission  Comment:    Plan:      (I10) Hypertension goal BP (blood pressure) < 140/80  Comment:    Plan:      (F43.10) PTSD (post-traumatic stress disorder)  Comment:    Plan:        Dustin Fatima Jr., MD              Follow-ups after your visit        Additional Services     SLEEP EVALUATION & MANAGEMENT REFERRAL - [facility] [phone]  (Age 18 and up)       Please be aware that coverage of these services is subject to the terms and limitations of your health insurance plan.  Call member services at your health plan with any benefit or coverage questions.      Please bring the following  "to your appointment:    >>   List of current medications   >>   This referral request   >>   Any documents/labs given to you for this referral                      Follow-up notes from your care team     Return in about 1 week (around 3/8/2018).      Future tests that were ordered for you today     Open Future Orders        Priority Expected Expires Ordered    SLEEP EVALUATION & MANAGEMENT REFERRAL - ADULT -[facility] [place] [phone]  (Age 18 and up) Routine  3/1/2019 3/1/2018            Who to contact     If you have questions or need follow up information about today's clinic visit or your schedule please contact [facility] directly at [phone].  Normal or non-critical lab and imaging results will be communicated to you by MyChart, letter or phone within 4 business days after the clinic has received the results. If you do not hear from us within 7 days, please contact the clinic through [employer] or phone. If you have a critical or abnormal lab result, we will notify you by phone as soon as possible.  Submit refill requests through [employer] or call your pharmacy and they will forward the refill request to us. Please allow 3 business days for your refill to be completed.          Additional Information About Your Visit        [employer] Information     [employer] lets you send messages to your doctor, view your test results, renew your prescriptions, schedule appointments and more. To sign up, go to [email or web address] . Click on \"Log in\" on the left side of the screen, which will take you to the Welcome page. Then click on \"Sign up Now\" on the right side of the page.     You will be asked to enter the access code listed below, as well as some personal information. Please follow the directions to create your username and password.     Your access code is: [email or web address]  Expires: 2018  6:56 PM     Your access code will  in 90 days. If you need help or a " new code, please call your [facility] or [phone].        Care EveryWhere ID     This is your Care EveryWhere ID. This could be used by other organizations to access your [facility] medical records  [id number]        Your Vitals Were     Pulse Temperature Respirations Pulse Oximetry BMI (Body Mass Index)       63 96.6  F (35.9  C) (Tympanic) 20 98% 41 kg/m2        Blood Pressure from Last 3 Encounters:   03/01/18 154/84   02/26/18 (!) 179/102   02/25/18 171/90    Weight from Last 3 Encounters:   03/01/18 254 lb (115.2 kg)   02/25/18 252 lb (114.3 kg)   02/21/18 252 lb (114.3 kg)                 Today's Medication Changes          These changes are accurate as of 3/1/18 10:28 AM.  If you have any questions, ask your nurse or doctor.               Start taking these medicines.        Dose/Directions    oxyCODONE-acetaminophen 5-325 MG per tablet   Commonly known as:  PERCOCET   Used for:  Chronic pain syndrome   Started by:  Dustin Fatima MD        Dose:  1 tablet   Start taking on:  3/8/2018   Take 1 tablet by mouth every 6 hours as needed for moderate to severe pain Max 4 tab/day   Quantity:  30 tablet   Refills:  0         These medicines have changed or have updated prescriptions.        Dose/Directions    polyethylene glycol powder   Commonly known as:  MIRALAX/GLYCOLAX   This may have changed:  See the new instructions.   Used for:  Slow transit constipation   Changed by:  Dustin Fatima MD        TAKE 17 GRAMS (1 CAPFUL) BY MOUTH DAILY   Quantity:  1581 g   Refills:  11            Where to get your medicines      These medications were sent to [facility] [address] [place] [address] [address] [facility]  [address] [facility] [address]     Phone:  [phone]     polyethylene glycol powder         Some of these will need a paper prescription and others can be bought over the counter.  Ask your nurse if you have questions.     Bring a paper prescription for each of  these medications     oxyCODONE-acetaminophen 5-325 MG per tablet                Primary Care Provider Office Phone # Fax #    Dustin Rina Fatima [phone] [address]  [facility] [address]        Equal Access to Services     VALENTE RIVERA : Hadii aad ku hadasho Soomaali, waaxda luqadaha, qaybta kaalmada adeegyada, waxay luigiin chilon adeeg tena ladeonna coyne. So [facility] [phone].    ATENCIÓN: Si habla español, tiene a christianson disposición servicios gratuitos de asistencia lingüística. Llame al [phone].    We comply with applicable federal civil rights laws and [place] laws. We do not discriminate on the basis of race, color, national origin, age, disability, sex, sexual orientation, or gender identity.            Thank you!     Thank you for choosing [facility]  for your care. Our goal is always to provide you with excellent care. Hearing back from our patients is one way we can continue to improve our services. Please take a few minutes to complete the written survey that you may receive in the mail after your visit with us. Thank you!             Your Updated Medication List - Protect others around you: Learn how to safely use, store and throw away your medicines at www.disposemymeds.org.          This list is accurate as of 3/1/18 10:28 AM.  Always use your most recent med list.                   Brand Name Dispense Instructions for use Diagnosis    albuterol (2.5 MG/3ML) 0.083% neb solution     30 vial    Take 1 vial (2.5 mg) by nebulization every 6 hours as needed for shortness of breath / dyspnea or wheezing    Mild intermittent asthma with acute exacerbation       ASPIRIN NOT PRESCRIBED    INTENTIONAL    1 each    Please choose reason not prescribed, below    Aspirin intolerance       blood glucose calibration solution    no brand specified    1 each    Use to calibrate blood glucose monitor as directed.    Type 2 diabetes mellitus without  complication, without long-term current use of insulin (H)       blood glucose lancets standard    no brand specified    100 each    Use to test blood sugar daily times daily or as directed.    Type 2 diabetes mellitus without complication, without long-term current use of insulin (H)       blood glucose monitoring meter device kit    no brand specified    1 kit    Use to test blood sugar larisa times daily or as directed.    Type 2 diabetes mellitus without complication, without long-term current use of insulin (H)       blood glucose monitoring test strip    no brand specified    100 strip    Use to test blood sugars once times daily or as directed    Type 2 diabetes mellitus without complication, without long-term current use of insulin (H)       * cyanocobalamin 1000 MCG/ML injection    VITAMIN B12    1 mL    Inject 1 mL (1,000 mcg) Subcutaneous every 30 days    B12 deficiency       * cyanocobalamin 1000 MCG/ML injection    VITAMIN B12    3 mL    INJECT 1 ML SUBCUTANEOUSLY EVERY 30 DAYS    B12 deficiency       EPINEPHrine 0.3 MG/0.3ML injection 2-pack    EPIPEN/ADRENACLICK/or ANY BX GENERIC EQUIV    2 each    Inject 0.3 mLs (0.3 mg) into the muscle once as needed for anaphylaxis    Late effect of other and unspecified external causes       losartan-hydrochlorothiazide 50-12.5 MG per tablet    HYZAAR    90 tablet    Take 1 tablet by mouth daily    Hypertension goal BP (blood pressure) < 140/80       magnesium oxide 400 (241.3 MG) MG tablet    MAG-OX    90 tablet    Take 1 tablet (400 mg) by mouth daily    Cramp of limb       ondansetron 4 MG ODT tab    ZOFRAN-ODT    10 tablet    Take 1 tablet (4 mg) by mouth every 8 hours as needed for nausea        order for DME     1 Device    Right wrist splint for carpal tunnel syndrome  One* Use as directed at hour of sleep    Carpal tunnel syndrome of right wrist       oxyCODONE-acetaminophen 5-325 MG per tablet   Start taking on:  3/8/2018    PERCOCET    30 tablet    Take 1  tablet by mouth every 6 hours as needed for moderate to severe pain Max 4 tab/day    Chronic pain syndrome       polyethylene glycol powder    MIRALAX/GLYCOLAX    1581 g    TAKE 17 GRAMS (1 CAPFUL) BY MOUTH DAILY    Slow transit constipation       potassium chloride 10 MEQ tablet    K-TAB,KLOR-CON    120 tablet    Take 1 tablet (10 mEq) by mouth daily    Hypokalemia       sertraline 100 MG tablet    ZOLOFT    45 tablet    Take 1.5 tablets (150 mg) by mouth daily    Recurrent major depression in partial remission (H), Other constipation       STATIN NOT PRESCRIBED (INTENTIONAL)     0 each    1 each daily Statin not prescribed intentionally due to Active liver disease (liver failure, cirrhosis, hepatitis) LIVER METS    Hyperlipidemia LDL goal <100, Type 2 diabetes mellitus without complication (H)       triamcinolone 0.1 % ointment    KENALOG    80 g    Apply sparingly to affected area three times daily for 14 days.    Hand eczema       Vitamin D (Cholecalciferol) 1000 UNITS Tabs     60 tablet    Take 2,000 Units by mouth daily    Vitamin D deficiency       * Notice:  This list has 2 medication(s) that are the same as other medications prescribed for you. Read the directions carefully, and ask your doctor or other care provider to review them with you.

## 2018-03-01 NOTE — PATIENT INSTRUCTIONS
(C18.9,  C78.7) Carcinoma of colon metastatic to liver (H)  (primary encounter diagnosis)  Comment:    Plan:      (K59.01) Slow transit constipation  Comment:    Plan: polyethylene glycol (MIRALAX/GLYCOLAX) powder             (G89.4) Chronic pain syndrome  Comment:    Plan: oxyCODONE-acetaminophen (PERCOCET) 5-325 MG per        tablet, DISCONTINUED: oxyCODONE-acetaminophen         (PERCOCET) 5-325 MG per tablet             (E66.01) BMI 42  Comment:    Plan:      (E78.5) Hyperlipidemia with target LDL less than 130  Comment:    Plan:      (F33.41) Recurrent major depression in partial remission  Comment:    Plan:      (I10) Hypertension goal BP (blood pressure) < 140/80  Comment:    Plan:      (F43.10) PTSD (post-traumatic stress disorder)  Comment:    Plan:        Dustin Fatima Jr., MD    ULTRASOUND ABDOMEN LIMITED 10/3/2017 1:16 PM      HISTORY: Liver cancer, fell to right, now complaining of liver pain.     FINDINGS: The gallbladder was not visible and reported to be  surgically absent. No biliary dilatation was demonstrated. Multiple  solid but hypoechoic lesions were noted within the liver consistent  with hepatic metastasis. The largest measured 3.0 cm in the right lobe  and 1.4 cm in the left lobe. Solid 1.8 x 2.4 x 1.7 cm hypoechoic mass  is noted lateral to the right kidney. The right kidney was otherwise  unremarkable. No hydronephrosis. The visualized portion of the  pancreas appeared within normal limits.         IMPRESSION:  1. Multiple hepatic metastases, the largest measuring 3.0 cm in  maximal diameter within the right lobe.  2. Hypoechoic mass lateral to the right kidney measuring 2.7 cm. This  was present on 1/26/2017 but may have increased in size.  3. Cholecystectomy.     FAWN GREER MD
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
## 2018-03-01 NOTE — TELEPHONE ENCOUNTER
Melissa from [facility] called requesting report or any notes re: increase of cancer tumors. Pt has OV tomorrow with oncology and they needs pertinent records. Oncologist did get ED notes but did nurse is unable to find note re: tumor increase. Pt states she saw PCP today and was told tumors were larger. She request report or OV notes be faxed to [phone].

## 2018-03-02 ENCOUNTER — HOSPITAL ENCOUNTER (EMERGENCY)
Facility: CLINIC | Age: 53
Discharge: HOME | End: 2018-03-03
Attending: EMERGENCY MEDICINE | Admitting: EMERGENCY MEDICINE
Payer: COMMERCIAL

## 2018-03-02 DIAGNOSIS — L02.219 SUPRAPUBIC ABSCESS: ICD-10-CM

## 2018-03-02 DIAGNOSIS — R07.9 CHEST PAIN, UNSPECIFIED TYPE: ICD-10-CM

## 2018-03-02 PROCEDURE — 10060 I&D ABSCESS SIMPLE/SINGLE: CPT

## 2018-03-02 PROCEDURE — 76536 US EXAM OF HEAD AND NECK: CPT

## 2018-03-02 PROCEDURE — 93005 ELECTROCARDIOGRAM TRACING: CPT

## 2018-03-02 PROCEDURE — 99285 EMERGENCY DEPT VISIT HI MDM: CPT | Mod: 25

## 2018-03-02 ASSESSMENT — PATIENT HEALTH QUESTIONNAIRE - PHQ9: SUM OF ALL RESPONSES TO PHQ QUESTIONS 1-9: 14

## 2018-03-02 NOTE — ED AVS SNAPSHOT
Emergency Department    [address] [place] [id number]    Phone:  [phone]    Fax:  [phone]                                       Ines Mott   MRN: [id number]    Department:   Emergency Department   Date of Visit:  3/2/2018           After Visit Summary Signature Page     I have received my discharge instructions, and my questions have been answered. I have discussed any challenges I see with this plan with the nurse or doctor.    ..........................................................................................................................................  Patient/Patient Representative Signature      ..........................................................................................................................................  Patient Representative Print Name and Relationship to Patient    ..................................................               ................................................  Date                                            Time    ..........................................................................................................................................  Reviewed by Signature/Title    ...................................................              ..............................................  Date                                                            Time

## 2018-03-02 NOTE — ED AVS SNAPSHOT
Emergency Department    [address] [facility] [address]    Phone:  [phone]    Fax:  [phone]                                       Ines Mott   MRN: [id number]    Department:   Emergency Department   Date of Visit:  3/2/2018           Patient Information     Date Of Birth          1965        Your diagnoses for this visit were:     Chest pain, unspecified type     Suprapubic abscess        You were seen by Rebekah Patel MD.      Follow-up Information     Schedule an appointment as soon as possible for a visit with Dustin Fatima MD.    Specialty:  Family Practice    Contact information:    [address]  [facility] [address] [phone]          Follow up with  Emergency Department.    Specialty:  EMERGENCY MEDICINE    Why:  As needed, If symptoms worsen    Contact information:    [address] [facility] [address] [phone]        Discharge Instructions         Abscess (Incision & Drainage)  An abscess is sometimes called a boil. It happens when bacteria get trapped under the skin and start to grow. Pus forms inside the abscess as the body responds to the bacteria. An abscess can happen with an insect bite, ingrown hair, blocked oil gland, pimple, cyst, or puncture wound.  Your healthcare provider has drained the pus from your abscess. If the abscess pocket was large, your healthcare provider may have put in gauze packing. Your provider will need to remove it on your next visit. He or she may also replace it at that time. You may not need antibiotics to treat a simple abscess, unless the infection is spreading into the skin around the wound (cellulitis).  The wound will take about 1 to 2 weeks to heal, depending on the size of the abscess. Healthy tissue will grow from the bottom and sides of the opening until it seals over.  Home care  These tips can help your wound heal:    The wound may drain for the first 2 days.  Cover the wound with a clean dry dressing. Change the dressing if it becomes soaked with blood or pus.    If a gauze packing was placed inside the abscess pocket, you may be told to remove it yourself. You may do this in the shower. Once the packing is removed, you should wash the area in the shower, or clean the area as directed by your provider. Continue to do this until the skin opening has closed. Make sure you wash your hands after changing the packing or cleaning the wound.    If you were prescribed antibiotics, take them as directed until they are all gone.    You may use acetaminophen or ibuprofen to control pain, unless another pain medicine was prescribed. If you have liver disease or ever had a stomach ulcer, talk with your doctor before using these medicines.  Follow-up care  Follow up with your healthcare provider, or as advised. If a gauze packing was put in your wound, it should be removed in 1 to 2 days. Check your wound every day for any signs that the infection is getting worse. The signs are listed below.  When to seek medical advice  Call your healthcare provider right away if any of these occur:    Increasing redness or swelling    Red streaks in the skin leading away from the wound    Increasing local pain or swelling    Continued pus draining from the wound 2 days after treatment    Fever of 100.4 F (38 C) or higher, or as directed by your healthcare provider    Boil returns when you are at home  Date Last Reviewed: 9/1/2016 2000-2017 [employer]. [address] [place] [address], [place] [id number]. All rights reserved. This information is not intended as a substitute for professional medical care. Always follow your healthcare professional's instructions.           *CHEST PAIN, UNCERTAIN CAUSE    Based on your exam today, the exact cause of your chest pain is not certain. Your condition does not seem serious at this time, and your pain does not appear to be coming from your heart.  However, sometimes the signs of a serious problem take more time to appear. Therefore, watch for the warning signs listed below.  HOME CARE:  1. Rest today and avoid strenuous activity.  2. Take any prescribed medicine as directed.  FOLLOW UP with your doctor in 1-3 days.   GET PROMPT MEDICAL ATTENTION if any of the following occur:    A change in the type of pain: if it feels different, becomes more severe, lasts longer, or begins to spread into your shoulder, arm, neck, jaw or back    Shortness of breath or increased pain with breathing    Weakness, dizziness, or fainting    Cough with blood or dark colored sputum (phlegm)    Fever over 101  F (38.3  C)    Swelling, pain or redness in one leg    9101-1285 [employer]. [address] [place] [address], [place] [address]. All rights reserved. This information is not intended as a substitute for professional medical care. Always follow your healthcare professional's instructions.  This information has been modified by your health care provider with permission from the publisher.      Future Appointments        Provider Department Dept Phone Center    3/5/2018 4:30 PM [facility] ROOM 2 [facility] [phone] [facility]    3/12/2018 9:45 AM OTTO YUSUF MD [facility] [phone] [place]      24 Hour Appointment Hotline       To make an appointment at any [facility], call [phone] ([phone]). If you don't have a family doctor or clinic, we will help you find one. [facility] are conveniently located to serve the needs of you and your family.             Review of your medicines      Our records show that you are taking the medicines listed below. If these are incorrect, please call your family doctor or clinic.        Dose / Directions Last dose taken    albuterol (2.5 MG/3ML) 0.083% neb solution   Dose:  1 vial   Quantity:  30 vial        Take 1 vial (2.5 mg) by nebulization  every 6 hours as needed for shortness of breath / dyspnea or wheezing   Refills:  3        ASPIRIN NOT PRESCRIBED   Commonly known as:  INTENTIONAL   Quantity:  1 each        Please choose reason not prescribed, below   Refills:  0        blood glucose calibration solution   Commonly known as:  no brand specified   Quantity:  1 each        Use to calibrate blood glucose monitor as directed.   Refills:  0        blood glucose lancets standard   Commonly known as:  no brand specified   Quantity:  100 each        Use to test blood sugar daily times daily or as directed.   Refills:  11        blood glucose monitoring meter device kit   Commonly known as:  no brand specified   Quantity:  1 kit        Use to test blood sugar larisa times daily or as directed.   Refills:  0        blood glucose monitoring test strip   Commonly known as:  no brand specified   Quantity:  100 strip        Use to test blood sugars once times daily or as directed   Refills:  3        * cyanocobalamin 1000 MCG/ML injection   Commonly known as:  VITAMIN B12   Dose:  1 mL   Quantity:  1 mL        Inject 1 mL (1,000 mcg) Subcutaneous every 30 days   Refills:  11        * cyanocobalamin 1000 MCG/ML injection   Commonly known as:  VITAMIN B12   Quantity:  3 mL        INJECT 1 ML SUBCUTANEOUSLY EVERY 30 DAYS   Refills:  11        EPINEPHrine 0.3 MG/0.3ML injection 2-pack   Commonly known as:  EPIPEN/ADRENACLICK/or ANY BX GENERIC EQUIV   Dose:  0.3 mg   Quantity:  2 each        Inject 0.3 mLs (0.3 mg) into the muscle once as needed for anaphylaxis   Refills:  5        losartan-hydrochlorothiazide 50-12.5 MG per tablet   Commonly known as:  HYZAAR   Dose:  1 tablet   Quantity:  90 tablet        Take 1 tablet by mouth daily   Refills:  3        magnesium oxide 400 (241.3 MG) MG tablet   Commonly known as:  MAG-OX   Dose:  400 mg   Quantity:  90 tablet        Take 1 tablet (400 mg) by mouth daily   Refills:  3        ondansetron 4 MG ODT tab   Commonly  known as:  ZOFRAN-ODT   Dose:  4 mg   Quantity:  10 tablet        Take 1 tablet (4 mg) by mouth every 8 hours as needed for nausea   Refills:  1        order for DME   Quantity:  1 Device        Right wrist splint for carpal tunnel syndrome  One* Use as directed at hour of sleep   Refills:  1        oxyCODONE-acetaminophen 5-325 MG per tablet   Commonly known as:  PERCOCET   Dose:  1 tablet   Quantity:  30 tablet   Start taking on:  3/8/2018        Take 1 tablet by mouth every 6 hours as needed for moderate to severe pain Max 4 tab/day   Refills:  0        polyethylene glycol powder   Commonly known as:  MIRALAX/GLYCOLAX   Quantity:  1581 g        TAKE 17 GRAMS (1 CAPFUL) BY MOUTH DAILY   Refills:  11        potassium chloride 10 MEQ tablet   Commonly known as:  K-TAB,KLOR-CON   Dose:  10 mEq   Indication:  Low Amount of Potassium in the Blood   Quantity:  120 tablet        Take 1 tablet (10 mEq) by mouth daily   Refills:  11        sertraline 100 MG tablet   Commonly known as:  ZOLOFT   Dose:  150 mg   Indication:  Major Depressive Disorder   Quantity:  45 tablet        Take 1.5 tablets (150 mg) by mouth daily   Refills:  11        STATIN NOT PRESCRIBED (INTENTIONAL)   Dose:  1 each   Quantity:  0 each        1 each daily Statin not prescribed intentionally due to Active liver disease (liver failure, cirrhosis, hepatitis) LIVER METS   Refills:  0        triamcinolone 0.1 % ointment   Commonly known as:  KENALOG   Quantity:  80 g        Apply sparingly to affected area three times daily for 14 days.   Refills:  0        Vitamin D (Cholecalciferol) 1000 UNITS Tabs   Dose:  2000 Units   Quantity:  60 tablet        Take 2,000 Units by mouth daily   Refills:  11        * Notice:  This list has 2 medication(s) that are the same as other medications prescribed for you. Read the directions carefully, and ask your doctor or other care provider to review them with you.            Procedures and tests performed during your  visit     EKG 12-lead, tracing only    POC US SOFT TISSUE    Troponin I (now)      Orders Needing Specimen Collection     None      Pending Results     Date and Time Order Name Status Description    3/3/2018 0012 EKG 12-lead, tracing only Preliminary             Pending Culture Results     No orders found for last 3 day(s).            Pending Results Instructions     If you had any lab results that were not finalized at the time of your Discharge, you can call the ED Lab Result RN at [phone]. You will be contacted by this team for any positive Lab results or changes in treatment. The nurses are available 7 days a week from 10A to 6:30P.  You can leave a message 24 hours per day and they will return your call.        Test Results From Your Hospital Stay        3/3/2018 12:50 AM      Component Results     Component Value Ref Range & Units Status    Troponin I ES <0.015 0.000 - 0.045 ug/L Final    The 99th percentile for upper reference range is 0.045 ug/L.  Troponin values   in the range of 0.045 - 0.120 ug/L may be associated with risks of adverse   clinical events.           3/3/2018 12:49 AM      Impression     0.5cmx0.5cm area of fluctuance/fluid pocket just under skin consistent with abscess. No surrounding cellulitis.                Clinical Quality Measure: Blood Pressure Screening     Your blood pressure was checked while you were in the emergency department today. The last reading we obtained was  BP: (!) 137/92 . Please read the guidelines below about what these numbers mean and what you should do about them.  If your systolic blood pressure (the top number) is less than 120 and your diastolic blood pressure (the bottom number) is less than 80, then your blood pressure is normal. There is nothing more that you need to do about it.  If your systolic blood pressure (the top number) is 120-139 or your diastolic blood pressure (the bottom number) is 80-89, your blood pressure may be higher than it should  "be. You should have your blood pressure rechecked within a year by a primary care provider.  If your systolic blood pressure (the top number) is 140 or greater or your diastolic blood pressure (the bottom number) is 90 or greater, you may have high blood pressure. High blood pressure is treatable, but if left untreated over time it can put you at risk for heart attack, stroke, or kidney failure. You should have your blood pressure rechecked by a primary care provider within the next 4 weeks.  If your provider in the emergency department today gave you specific instructions to follow-up with your doctor or provider even sooner than that, you should follow that instruction and not wait for up to 4 weeks for your follow-up visit.        Thank you for choosing [facility]       Thank you for choosing [facility] for your care. Our goal is always to provide you with excellent care. Hearing back from our patients is one way we can continue to improve our services. Please take a few minutes to complete the written survey that you may receive in the mail after you visit with us. Thank you!        ticketea Information     ticketea lets you send messages to your doctor, view your test results, renew your prescriptions, schedule appointments and more. To sign up, go to [email or web address] . Click on \"Log in\" on the left side of the screen, which will take you to the Welcome page. Then click on \"Sign up Now\" on the right side of the page.     You will be asked to enter the access code listed below, as well as some personal information. Please follow the directions to create your username and password.     Your access code is: [email or web address]  Expires: 2018  6:56 PM     Your access code will  in 90 days. If you need help or a new code, please call your [facility] clinic or [phone].        Care EveryWhere ID     This is your Care EveryWhere ID. This could be used by other organizations to access your [facility] medical " records  [id number]        Equal Access to Services     VALENTE RIVERA : Marc Leonard, deion nunes, emilee oconnor. So [facility] [phone].    ATENCIÓN: Si habla español, tiene a christianson disposición servicios gratuitos de asistencia lingüística. Llame al [phone].    We comply with applicable federal civil rights laws and [place] laws. We do not discriminate on the basis of race, color, national origin, age, disability, sex, sexual orientation, or gender identity.            After Visit Summary       This is your record. Keep this with you and show to your community pharmacist(s) and doctor(s) at your next visit.

## 2018-03-02 NOTE — TELEPHONE ENCOUNTER
Chief Complaint:  Constipation      The history is provided by the patient.      Ines Mott is a 53 year old female who presents to the emergency department today for evaluation of constipation. The patient reports that she has had no bowel movements for the past 7 days. She has reportedly been taking oxycodone and has not been taking a stool softener along with it. She reports having done a soap and water enema at home, and has also been experiencing nausea. She associates her migraines as secondary to being out of her migraine medication.     Allergies:  Ativan [Lorazepam]  Macrobid [Nitrofurantoin]  Amoxicillin  Buspirone  Busulfan  Cefaclor  Cephalosporins  Ciprofloxacin  Clindamycin  Dilaudid [Hydromorphone]  Diphenhydramine Hcl  Imitrex [Sumatriptan Succinate]  Ketorolac Tromethamine  Lansoprazole  Latex  Metoclopramide  Paroxetine  Penicillins  Prednisone  Red Dye  Sulfa Drugs  Lidoderm [Lidocaine]       Medications:    polyethylene glycol (MIRALAX/GLYCOLAX) powder  ondansetron (ZOFRAN-ODT) 4 MG ODT tab  Oxycodone-acetaminophen (PERCOCET) 5-325 MG per tablet  triamcinolone (KENALOG) 0.1 % ointment  cyanocobalamin (VITAMIN B12) 1000 MCG/ML injection  sertraline (ZOLOFT) 100 MG tablet  magnesium oxide (MAG-OX) 400 (241.3 MG) MG tablet  blood glucose monitoring (NO BRAND SPECIFIED) test strip  blood glucose calibration (NO BRAND SPECIFIED) solution  blood glucose (NO BRAND SPECIFIED) lancets standard  blood glucose monitoring (NO BRAND SPECIFIED) meter device kit  albuterol (2.5 MG/3ML) 0.083% neb solution  losartan-hydrochlorothiazide (HYZAAR) 50-12.5 MG per tablet  potassium chloride (K-TAB,KLOR-CON) 10 MEQ tablet  cyanocobalamin (VITAMIN B12) 1000 MCG/ML injection  Vitamin D, Cholecalciferol, 1000 UNITS TABS  Epinephrine (EPIPEN) 0.3 MG/0.3ML injection     Past Medical History:    Arthritis                        Asthma                        Cancer (H)                  Chronic pain syndrome              "          Depressive disorder                Diabetes (H)                Hydrocephalus                        Hypertension               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)                      Methamphetamine abuse                   Migraines                    Mild intermittent asthma                     Obesity                        Right renal mass                     Spina bifida                    Past Surgical History:    Appendectomy  Cholecystectomy  Dental extraction  GYN surgery  Implant shunt ventriculoperitoneal     Family History:    Cancer                      Father     Social History:  The patient was accompanied to the ED by herself.  Smoking Status: Former Smoker  Smokeless Tobacco: Never Used  Alcohol Use: Positive   Marital Status:        Review of Systems   Gastrointestinal: Positive for constipation and nausea.   Neurological: Positive for headaches (migraine, out of medication).   All other systems reviewed and are negative.     Physical Exam      Patient Vitals for the past 24 hrs:    BP Temp Temp src Pulse Resp SpO2 Height Weight   02/25/18 1417 (!) 168/97 97.8  F (36.6  C) Temporal 64 16 96 % 1.676 m (5' 6\") 114.3 kg (252 lb)      Physical Exam  Nursing note and vitals reviewed.     Constitutional:                       Appears well-developed and well-nourished, comfortable.             Appearing stated age.  HENT:                                             No evidence of facial or scalp trauma.                                                                      Nose normal.  No discharge.                                                                     Oropharynx is clear and moist.  Eyes:                                                         Conjunctivae are normal without injection. No lid droop.                                                                    Pupils are equal, round, and reactive to light.                                              "                        Right eye exhibits no discharge. Left eye exhibits no discharge.                                                                     No scleral icterus.   Cardiovascular:                     Normal rate, regular rhythm with normal S1 and S2.                                                                     Normal heart sounds and peripheral pulses 2+ and equal.                                                                      No murmur or barrett.  Pulmonary/Respiratory:                                                                    Effort normal and breath sounds clear to auscultation bilaterally.                                                                                                                               No respiratory distress.  No stridor.                                                                    No wheezes. No rales. No chest wall tenderness.    GI:                                                             Soft. No distension and no mass.  Mild epigastric tenderness.  Obese.                                                                    No rebound and no guarding. No flank pain.  No HSM.  Skin:                                                          Skin is warm and dry. No rash noted. No diaphoresis.                                                                     No erythema. No pallor.  No lesions.  Psychiatric:                                      Behavior is normal. Appropriate mood and affect.                                                                    Judgment and thought content normal.      Emergency Department Course      Imaging:  Radiology findings were communicated with the patient who voiced understanding of the findings.     Abdomen XR 1 vw  Tubing projecting over the left pelvis consistent with the  patient's ventriculoperitoneal shunt. Prominent stool in the left  abdomen in region of distal transverse and descending colon  and  splenic flexure. Nonobstructive bowel gas pattern. Surgical clips  compatible with prior cholecystectomy.  Reading per radiology     Interventions:  1511              Zofran 4 mg PO  1512              Oxycodone 5 mg PO  1512              Magnesium Citrate 296 mL PO  1533              MiraLAX 17 g PO     Emergency Department Course:     1417              Nursing notes and vitals reviewed.     1443              The patient was sent for a XR while in the emergency department, results above.       1500              I performed an exam of the patient as documented above.      1600              I personally reviewed the imaging results with the patient and answered all related questions prior to discharge.     Impression & Plan       Medical Decision Making:  Ines Mott is a 53 year old female who presents to the emergency department today for evaluation of constipation and abdominal cramping. She states she ran out of her pain medication and other medications as well. Said she could not get her MiraLAX until tomorrow. She has a care plan which recommends limiting any narcotic prescriptions or pain medication. Plain x-ray confirms large amount of stool in her colon. She had some nausea with this was given on ODT Zofran tablet 4 mg p.o. then 1 bottle of magnesium citrate and 1 scoop of MiraLAX p.o. She was due for her afternoon Roxicodone tablets that she was given 5 mg p.o. per our pain protocol but I explained to her that she would not be getting a prescription. She would need to contact her doctors tomorrow for refills or further evaluation.     Diagnosis:      ICD-10-CM     1. Slow transit constipation K59.01        Disposition:   Discharge to home     Push fluids, MiraLAX 2 times a day, take another dose of mag citrate or milk of magnesia tomorrow if you have not had good results.  Any further pain medicine needs to be obtained through your primary physician.     Scribe Disclosure:  Vicente CONDE am  serving as a scribe at 2:40 PM on 2/25/2018 to document services personally performed by Maryjane Alexander MD based on my observations and the provider's statements to me.      EMERGENCY DEPARTMENT        Maryjane Alexander MD  02/25/18 1643    ULTRASOUND ABDOMEN LIMITED 10/3/2017 1:16 PM      HISTORY: Liver cancer, fell to right, now complaining of liver pain.     FINDINGS: The gallbladder was not visible and reported to be  surgically absent. No biliary dilatation was demonstrated. Multiple  solid but hypoechoic lesions were noted within the liver consistent  with hepatic metastasis. The largest measured 3.0 cm in the right lobe  and 1.4 cm in the left lobe. Solid 1.8 x 2.4 x 1.7 cm hypoechoic mass  is noted lateral to the right kidney. The right kidney was otherwise  unremarkable. No hydronephrosis. The visualized portion of the  pancreas appeared within normal limits.         IMPRESSION:  1. Multiple hepatic metastases, the largest measuring 3.0 cm in  maximal diameter within the right lobe.  2. Hypoechoic mass lateral to the right kidney measuring 2.7 cm. This  was present on 1/26/2017 but may have increased in size.  3. Cholecystectomy.     FAWN GREER MD

## 2018-03-03 VITALS
DIASTOLIC BLOOD PRESSURE: 79 MMHG | SYSTOLIC BLOOD PRESSURE: 127 MMHG | TEMPERATURE: 98.6 F | OXYGEN SATURATION: 99 % | RESPIRATION RATE: 20 BRPM

## 2018-03-03 LAB
INTERPRETATION ECG - MUSE: NORMAL
TROPONIN I SERPL-MCNC: <0.015 UG/L (ref 0–0.04)

## 2018-03-03 PROCEDURE — 84484 ASSAY OF TROPONIN QUANT: CPT | Performed by: EMERGENCY MEDICINE

## 2018-03-03 ASSESSMENT — ENCOUNTER SYMPTOMS
HEADACHES: 1
WOUND: 1

## 2018-03-03 NOTE — DISCHARGE INSTRUCTIONS
Abscess (Incision & Drainage)  An abscess is sometimes called a boil. It happens when bacteria get trapped under the skin and start to grow. Pus forms inside the abscess as the body responds to the bacteria. An abscess can happen with an insect bite, ingrown hair, blocked oil gland, pimple, cyst, or puncture wound.  Your healthcare provider has drained the pus from your abscess. If the abscess pocket was large, your healthcare provider may have put in gauze packing. Your provider will need to remove it on your next visit. He or she may also replace it at that time. You may not need antibiotics to treat a simple abscess, unless the infection is spreading into the skin around the wound (cellulitis).  The wound will take about 1 to 2 weeks to heal, depending on the size of the abscess. Healthy tissue will grow from the bottom and sides of the opening until it seals over.  Home care  These tips can help your wound heal:    The wound may drain for the first 2 days. Cover the wound with a clean dry dressing. Change the dressing if it becomes soaked with blood or pus.    If a gauze packing was placed inside the abscess pocket, you may be told to remove it yourself. You may do this in the shower. Once the packing is removed, you should wash the area in the shower, or clean the area as directed by your provider. Continue to do this until the skin opening has closed. Make sure you wash your hands after changing the packing or cleaning the wound.    If you were prescribed antibiotics, take them as directed until they are all gone.    You may use acetaminophen or ibuprofen to control pain, unless another pain medicine was prescribed. If you have liver disease or ever had a stomach ulcer, talk with your doctor before using these medicines.  Follow-up care  Follow up with your healthcare provider, or as advised. If a gauze packing was put in your wound, it should be removed in 1 to 2 days. Check your wound every day for any  signs that the infection is getting worse. The signs are listed below.  When to seek medical advice  Call your healthcare provider right away if any of these occur:    Increasing redness or swelling    Red streaks in the skin leading away from the wound    Increasing local pain or swelling    Continued pus draining from the wound 2 days after treatment    Fever of 100.4 F (38 C) or higher, or as directed by your healthcare provider    Boil returns when you are at home  Date Last Reviewed: 9/1/2016 2000-2017 [employer]. [address] [place] [address]. All rights reserved. This information is not intended as a substitute for professional medical care. Always follow your healthcare professional's instructions.           *CHEST PAIN, UNCERTAIN CAUSE    Based on your exam today, the exact cause of your chest pain is not certain. Your condition does not seem serious at this time, and your pain does not appear to be coming from your heart. However, sometimes the signs of a serious problem take more time to appear. Therefore, watch for the warning signs listed below.  HOME CARE:  1. Rest today and avoid strenuous activity.  2. Take any prescribed medicine as directed.  FOLLOW UP with your doctor in 1-3 days.   GET PROMPT MEDICAL ATTENTION if any of the following occur:    A change in the type of pain: if it feels different, becomes more severe, lasts longer, or begins to spread into your shoulder, arm, neck, jaw or back    Shortness of breath or increased pain with breathing    Weakness, dizziness, or fainting    Cough with blood or dark colored sputum (phlegm)    Fever over 101  F (38.3  C)    Swelling, pain or redness in one leg    8027-2073 [employer]. [address] [place] [address]. All rights reserved. This information is not intended as a substitute for professional medical care. Always follow your healthcare professional's instructions.  This information has been  modified by your health care provider with permission from the publisher.

## 2018-03-03 NOTE — ED NOTES
Bed: ED25  Expected date: 3/2/18  Expected time: 11:45 PM  Means of arrival: Ambulance  Comments:  HEMS 419 [id number] CP; Nausea

## 2018-03-03 NOTE — ED PROVIDER NOTES
History     Chief Complaint:  Hypertension    HPI   Ines Mott is a 53 year old female with complicated medical hx including metastatic carcinoid tumor, hydrocephalus s/p shunt, chronic pain syndrome, on care plan who presents to the emergency department via EMS for evaluation of hypertension. The patient states that she got a shot earlier today around 1100 for her cancer treatment causing her nausea and vomiting. At around 1500 or 1600 she began to feel sick with a headache and chest pounding sensation. Her blood pressure also reportedly increased as well. The patient notes that she is still currently having mild HA symptoms. She also mentions a suprapubic abscess on her groin that she started feeling tonight.    Allergies:  Ativan [Lorazepam]  Macrobid [Nitrofurantoin]  Amoxicillin  Buspirone  Busulfan  Cefaclor  Cephalosporins  Ciprofloxacin  Clindamycin  Dilaudid [Hydromorphone]  Diphenhydramine Hcl  Imitrex [Sumatriptan Succinate]  Ketorolac Tromethamine  Lansoprazole  Lansoprazole  Latex  Metoclopramide  Paroxetine  Penicillins  Prednisone  Red Dye  Sulfa Drugs  Lidoderm [Lidocaine]        Medications:    polyethylene glycol (MIRALAX/GLYCOLAX) powder  [START ON 3/8/2018] oxyCODONE-acetaminophen (PERCOCET) 5-325 MG per tablet  ondansetron (ZOFRAN-ODT) 4 MG ODT tab  triamcinolone (KENALOG) 0.1 % ointment  cyanocobalamin (VITAMIN B12) 1000 MCG/ML injection  sertraline (ZOLOFT) 100 MG tablet  magnesium oxide (MAG-OX) 400 (241.3 MG) MG tablet  blood glucose monitoring  test strip  blood glucose calibration solution  blood glucose  lancets standard  blood glucose monitoring  meter device kit  albuterol (2.5 MG/3ML) 0.083% neb solution  losartan-hydrochlorothiazide (HYZAAR) 50-12.5 MG per tablet  potassium chloride (K-TAB,KLOR-CON) 10 MEQ tablet  cyanocobalamin (VITAMIN B12) 1000 MCG/ML injection  Vitamin D, Cholecalciferol, 1000 UNITS TABS  EPINEPHrine (EPIPEN) 0.3 MG/0.3ML injection    Past Medical History:  "   Arthritis   Asthma   Cancer    Chronic pain syndrome   Depressive disorder   Diabetes    Hydrocephalus   Hypertension   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    Methamphetamine abuse   Migraines   Mild intermittent asthma   Obesity   Right renal mass   Spina bifida       Past Surgical History:    Appendectomy  Cholecystectomy  Dental extraction  Gyn surgery  Implant shunt ventriculoperitoneal      Family History:    Father- cancer    Social History:  Marital Status:   [2]  Social History   Substance Use Topics     Smoking status: Former Smoker     Smokeless tobacco: Never Used     Alcohol use Yes      Comment: occassionally        Review of Systems   Skin: Positive for wound.   Neurological: Positive for headaches.   All other systems reviewed and are negative.        Physical Exam     Patient Vitals for the past 24 hrs:   BP Temp Temp src Heart Rate Resp SpO2   03/03/18 0111 127/79 - - 89 20 99 %   03/02/18 2359 (!) 137/92 98.6  F (37  C) Oral 86 14 98 %          Physical Exam  General/Appearance: appears stated age, well-groomed, appears comfortable  Eyes: EOMI, no scleral injection, no icterus  ENT: MMM  Neck: supple, nl ROM, no stiffness  Cardiovascular: RRR, nl S1S2, no m/r/g, 2+ pulses in all 4 extremities, cap refill <2sec  Respiratory: CTAB, good air movement throughout, no wheezes/rhonchi/rales, no increased WOB, no retractions  Back: no lesions  GI: abd soft, non-distended, nttp,  no HSM, no rebound, no guarding, nl BS  MSK: RAMIREZ, good tone, no bony abnormality  Skin: warm and well-perfused, no rash, no edema, no ecchymosis, nl turgor, 1\"x1\" area of fluctuance and mild erythema and warmth/ttp suprapubic  Neuro: GCS 15, alert and oriented, no gross focal neuro deficits  Psych: interacts appropriately  Heme: no petechia, no purpura, no active bleeding        Emergency Department Course   ECG:  ECG taken at 0012, ECG read at 0020  Normal sinus rhythm  Possible inferior infarct  Abnormal " "ECG  Rate 74 bpm. SC interval 204. QRS duration 96. QT/QTc 366/406. P-R-T axes 49 -19 5.    Imaging:  Radiology findings were communicated with the patient who voiced understanding of the findings.    POC US SOFT TISSUE  Final Result  0.5cmx0.5cm area of fluctuance/fluid pocket just under skin consistent with abscess. No surrounding cellulitis.      Procedure: Incision and Drainage   LOCATIONS:  suprapubic     ANESTHESIA:  Local field block using Lidocaine 1% plain, total of 1 mLs     PREPARATION:  Cleansed with Alcohol     PROCEDURE:  Area was aspirated with 18G needle. 0.5cc purulent material aspirated. Appropriate dressing was applied to cover the area.    Patient Status:        Patient tolerated the procedure well. There were no complications.            Laboratory:  Laboratory findings were communicated with the patient who voiced understanding of the findings.    Troponin (Collected 0008): <0.015     Emergency Department Course:  Nursing notes and vitals reviewed.  I performed an exam of the patient as documented above.   I discussed the treatment plan with the patient. They expressed understanding of this plan and consented to discharge. They will be discharged home with instructions for care and follow up. In addition, the patient will return to the emergency department if their symptoms persist, worsen, if new symptoms arise or if there is any concern.  All questions were answered.     I personally reviewed the imaging and lab results with the patient and answered all related questions prior to discharge.  Impression & Plan      Medical Decision Making:  This patient is a 53-year-old female with care plans for multiple issues who presents today with concerns for hypertension and some chest \"pounding.\"  Regarding the chest pounding this is currently absent.  She thinks it was secondary to medication side effect.  Is difficult to tell however at this time her EKG and troponin are unremarkable.  The symptoms " were atypical of ACS and they are absent.  Her headache is minor and chronic in nature.  She is not asking for any medication for it at this time.  She does have a small suprapubic abscess that was aspirated with an 18-gauge needle with some purulent material returned.  There was no evidence on exam or ultrasound of surrounding cellulitis.  I have advised her to keep an eye on this to ensure it does not enlarge.    Diagnosis:    ICD-10-CM    1. Chest pain, unspecified type R07.9    2. Suprapubic abscess L02.219      Disposition:   Discharged     Scribe Disclosure:  AMAYA Miles Edgarabel, am serving as a scribe at 1:15 AM on 3/3/2018 to document services personally performed by Rebekah Patel MD,based on my observations and the provider's statements to me.    EMERGENCY DEPARTMENT       Rebekah Patel MD  03/03/18 0329

## 2018-03-05 ENCOUNTER — HOSPITAL ENCOUNTER (EMERGENCY)
Facility: CLINIC | Age: 53
Discharge: HOME | End: 2018-03-05
Attending: EMERGENCY MEDICINE | Admitting: EMERGENCY MEDICINE
Payer: COMMERCIAL

## 2018-03-05 ENCOUNTER — HOSPITAL ENCOUNTER (OUTPATIENT)
Dept: CT IMAGING | Facility: CLINIC | Age: 53
Discharge: HOME | End: 2018-03-05
Attending: INTERNAL MEDICINE | Admitting: INTERNAL MEDICINE
Payer: COMMERCIAL

## 2018-03-05 VITALS
OXYGEN SATURATION: 95 % | TEMPERATURE: 98.2 F | RESPIRATION RATE: 16 BRPM | HEIGHT: 66 IN | DIASTOLIC BLOOD PRESSURE: 101 MMHG | BODY MASS INDEX: 41.14 KG/M2 | SYSTOLIC BLOOD PRESSURE: 173 MMHG | WEIGHT: 256 LBS

## 2018-03-05 DIAGNOSIS — C80.1 CARCINOMA (H): ICD-10-CM

## 2018-03-05 DIAGNOSIS — T78.40XA ALLERGIC REACTION, INITIAL ENCOUNTER: ICD-10-CM

## 2018-03-05 LAB — RADIOLOGIST FLAGS: ABNORMAL

## 2018-03-05 PROCEDURE — 99282 EMERGENCY DEPT VISIT SF MDM: CPT

## 2018-03-05 PROCEDURE — 74177 CT ABD & PELVIS W/CONTRAST: CPT

## 2018-03-05 PROCEDURE — 25000128 H RX IP 250 OP 636: Performed by: INTERNAL MEDICINE

## 2018-03-05 PROCEDURE — 25000125 ZZHC RX 250: Performed by: INTERNAL MEDICINE

## 2018-03-05 RX ORDER — IOPAMIDOL 755 MG/ML
127 INJECTION, SOLUTION INTRAVASCULAR ONCE
Status: COMPLETED | OUTPATIENT
Start: 2018-03-05 | End: 2018-03-05

## 2018-03-05 RX ADMIN — IOPAMIDOL 127 ML: 755 INJECTION, SOLUTION INTRAVENOUS at 16:51

## 2018-03-05 RX ADMIN — SODIUM CHLORIDE, PRESERVATIVE FREE 79 ML: 5 INJECTION INTRAVENOUS at 16:51

## 2018-03-05 ASSESSMENT — ENCOUNTER SYMPTOMS
TROUBLE SWALLOWING: 0
FACIAL SWELLING: 0
VOMITING: 0
FEVER: 0

## 2018-03-06 NOTE — ED PROVIDER NOTES
"  History     Chief Complaint:  Medication Reaction     The history is provided by the patient.      Ines Mott is a 53 year old female who presents with a medication reaction. The patient received IV CT contrast dye at 1700 this evening prior undergoing CT imaging for her headache and abdominal pain. After CT imaging the patient started sneezing and complained of a \"tingling\" in her lips. She was sent to the ED for further evaluation. Here the patient reports that her symptoms started approximately 20 minutes after receiving the dye. She denies any facial swelling, difficulty swallowing or breathing, vomiting, fever, rash, or other medical concerns.    Allergies:  Ativan [Lorazepam]  Macrobid [Nitrofurantoin]  Amoxicillin  Buspirone  Busulfan  Cefaclor  Cephalosporins  Ciprofloxacin  Clindamycin  Dilaudid [Hydromorphone]  Diphenhydramine Hcl  Imitrex [Sumatriptan Succinate]  Ketorolac Tromethamine  Lansoprazole  Lansoprazole  Latex  Metoclopramide  Paroxetine  Penicillins  Prednisone  Red Dye  Sulfa Drugs  Lidoderm [Lidocaine]      Medications:    Miralax  Percocet  Zofran-ODT  Kenalog  Zoloft  Hyzaar  Klor-con  Epipen    Past Medical History:    Arthritis   Asthma   Cancer (liver)   Chronic pain syndrome   Depressive disorder   Diabetes    Hydrocephalus   Hypertension   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   Methamphetamine abuse   Migraines   Mild intermittent asthma   Obesity   Right renal mass   Spina bifida     Past Surgical History:    Appendectomy  Cholecystectomy  Dental extractions  Gyn surgery  Implant shunt ventriculoperitoneal     Family History:    Cancer    Social History:  Presents alone   Tobacco use: Former smoker  Alcohol use: Occasionally   PCP: OTTO YUSUF    Marital Status:        Review of Systems   Constitutional: Negative for fever.   HENT: Positive for sneezing. Negative for facial swelling and trouble swallowing.    Gastrointestinal: Negative for vomiting. " "  Skin: Negative for rash.   All other systems reviewed and are negative.    Physical Exam     Patient Vitals for the past 24 hrs:   BP Temp Temp src Heart Rate Resp SpO2 Height Weight   03/05/18 1815 - - - - - 95 % - -   03/05/18 1810 (!) 173/101 - - - 16 94 % - -   03/05/18 1803 (!) 186/102 98.2  F (36.8  C) Oral 68 16 94 % 1.676 m (5' 6\") 116.1 kg (256 lb)      Physical Exam  Nursing note and vitals reviewed.  Constitutional:  Appears well-developed and well-nourished. No respiratory distress. Swallowing and speaking normally.  HENT:   Head:    No facial swelling  Mouth/Throat:   Oropharynx is clear and moist. No oropharyngeal exudate. No edema to the uvula. No lip or tongue swelling.  Eyes:    Pupils are equal, round, and reactive to light.   Neck:    Normal range of motion. Neck supple.      No tracheal deviation present. No thyromegaly present.   Cardiovascular:  Normal rate, regular rhythm, no murmur   Pulmonary/Chest: Breath sounds are clear and equal without wheezes or crackles.  Abdominal:   Soft. Bowel sounds are normal. Exhibits no distension and      no mass. There is no tenderness.      There is no rebound and no guarding.   Musculoskeletal:  Exhibits no edema.   Lymphadenopathy:  No cervical adenopathy.   Neurological:   Alert and oriented to person, place, and time.   Skin:    Skin is warm and dry. No rash or hives noted. No pallor.  Emergency Department Course   Emergency Department Course:  Past medical records, nursing notes, and vitals reviewed.  1800: I performed an exam of the patient and obtained history, as documented above.      Patient eloped prior to being discharged.  Impression & Plan    Medical Decision Making:  The patient had a mild reaction to the IV CT dye that she received.She is not having any definite allergic reaction symptoms; however and she is completely symptom free here. Her complaint was tingling of the lips without any facial swelling and she was not having any difficulty " breathing or rash. Her headache and abdominal pain preceded the CT scan and were the reason for the CT scan being performed. She does not have any sign of acute intercranial or intraabdominal problem. She is allergic to Benadryl and all steroids and did not require Epinephrine here. My plan was to observe her for 1 hour and make sure she remained symptoms free; however, the patient eloped prior to being discharged because she did not want to wait here to be observed as she is feeling normally. She did not let anyone know she was leaving.     Diagnosis:    ICD-10-CM    1. Allergic reaction, initial encounter T78.40XA        Disposition:  Patient eloped prior to being discharged.      Pietro Yañez  3/5/2018    EMERGENCY DEPARTMENT  I, Pietro Yañez, am serving as a scribe at 6:00 PM on 3/5/2018 to document services personally performed by Sheryl Acosta MD based on my observations and the provider's statements to me.       Sheryl Acosta MD  03/05/18 7720

## 2018-03-06 NOTE — ED NOTES
"I noticed patient's pulse oximeter was removed shortly after I left the patient's room. I went back into patient's room and patient was on her cell phone and stated, \"I don't want to stay for an hour. I feel better but I still have a headache.\" I told patient I would update her doctor. I updated Dr. Acosta with patient's wishes. I went back into patient's room and told her that her doctor would be back in to speak with her in a few minutes. I rechecked patient's BP and O2. Patient was on the phone at this time and stated, \"I'd like to set up a cab for [facility].\" Shortly after I left the room I heard patient's door shut. Patient left at this time.  "

## 2018-03-12 ENCOUNTER — TELEPHONE (OUTPATIENT)
Dept: FAMILY MEDICINE | Facility: CLINIC | Age: 53
End: 2018-03-12

## 2018-03-12 DIAGNOSIS — G89.4 CHRONIC PAIN SYNDROME: ICD-10-CM

## 2018-03-12 NOTE — TELEPHONE ENCOUNTER
Called patient to let her know she needs an OV before getting any meds. She will call in the AM to schedule a same day.

## 2018-03-12 NOTE — TELEPHONE ENCOUNTER
oxyCODONE-acetaminophen (PERCOCET) 5-325 MG per tablet   Last Written Prescription Date:  3/8/18  Last Fill Quantity: 30,   # refills: 0  Last Office Visit: 3/1/18  Future Office visit:       Routing refill request to provider for review/approval because:  Drug not on the [facility], P or [facility] refill protocol or controlled substance    Controlled Substance Refill Request for oxyCODONE-acetaminophen (PERCOCET) 5-325 MG per tablet  Problem List Complete:  Yes  Patient is followed by OTTO YUSUF MD for ongoing prescription of pain medication.  All refills should only be approved by this provider, or covering partner.    Medication(s):  PERCOCET 5MG PO FOUR TIMES DAILY .   Maximum quantity per month: 120   Clinic visit frequency required: Q 2 weeks     Controlled substance agreement:  Encounter-Level CSA - 12/22/15:               Controlled Substance Agreement - Scan on 12/28/2015  2:53 PM : CONTROLLED MEDICATION CONTRACT, 12-22-15 (below)          Encounter-Level CSA - 4/10/15:               Controlled Substance Agreement - Scan on 4/17/2015  1:45 PM : [facility], Controlled Substance Contract, 04-10-15 (below)            Pain Clinic evaluation in the past: Yes       Date/Location:      DIRE Total Score(s):  No flowsheet data found.    Last [facility] website verification:  08/14/2017 All Rx's from PCP.   [email or web address]  REFERRAL [facility] AS SOON AS POSSIBLE   FAILED DRANK ALCOHOL BEFORE LAST OFFICE VISIT        checked in past 6 months?  Yes 3/12/18

## 2018-03-13 ENCOUNTER — TELEPHONE (OUTPATIENT)
Dept: FAMILY MEDICINE | Facility: CLINIC | Age: 53
End: 2018-03-13

## 2018-03-13 NOTE — TELEPHONE ENCOUNTER
Pt would like to be seen today for a follow up. Please call pt to advise.        Thank you,   Kelsey NASH   Central Scheduling  [phone]

## 2018-03-15 ENCOUNTER — OFFICE VISIT (OUTPATIENT)
Dept: FAMILY MEDICINE | Facility: CLINIC | Age: 53
End: 2018-03-15
Payer: COMMERCIAL

## 2018-03-15 ENCOUNTER — OFFICE VISIT (OUTPATIENT)
Dept: SLEEP MEDICINE | Facility: CLINIC | Age: 53
End: 2018-03-15
Payer: COMMERCIAL

## 2018-03-15 VITALS
HEART RATE: 70 BPM | OXYGEN SATURATION: 98 % | BODY MASS INDEX: 41.32 KG/M2 | WEIGHT: 256 LBS | SYSTOLIC BLOOD PRESSURE: 124 MMHG | RESPIRATION RATE: 20 BRPM | DIASTOLIC BLOOD PRESSURE: 78 MMHG | TEMPERATURE: 96.9 F

## 2018-03-15 VITALS
HEART RATE: 61 BPM | DIASTOLIC BLOOD PRESSURE: 94 MMHG | BODY MASS INDEX: 40.18 KG/M2 | OXYGEN SATURATION: 99 % | RESPIRATION RATE: 20 BRPM | HEIGHT: 67 IN | SYSTOLIC BLOOD PRESSURE: 148 MMHG | WEIGHT: 256 LBS

## 2018-03-15 DIAGNOSIS — C18.9 CARCINOMA OF COLON METASTATIC TO LIVER (H): Primary | ICD-10-CM

## 2018-03-15 DIAGNOSIS — E66.01 MORBID OBESITY (H): Primary | Chronic | ICD-10-CM

## 2018-03-15 DIAGNOSIS — F33.41 RECURRENT MAJOR DEPRESSION IN PARTIAL REMISSION (H): Chronic | ICD-10-CM

## 2018-03-15 DIAGNOSIS — G47.33 OSA (OBSTRUCTIVE SLEEP APNEA): ICD-10-CM

## 2018-03-15 DIAGNOSIS — C78.7 CARCINOMA OF COLON METASTATIC TO LIVER (H): Primary | ICD-10-CM

## 2018-03-15 DIAGNOSIS — E78.5 HYPERLIPIDEMIA WITH TARGET LDL LESS THAN 130: Chronic | ICD-10-CM

## 2018-03-15 DIAGNOSIS — Z72.821 POOR SLEEP HYGIENE: ICD-10-CM

## 2018-03-15 DIAGNOSIS — I10 HYPERTENSION GOAL BP (BLOOD PRESSURE) < 140/80: Chronic | ICD-10-CM

## 2018-03-15 DIAGNOSIS — E66.01 MORBID OBESITY (H): Chronic | ICD-10-CM

## 2018-03-15 DIAGNOSIS — F43.10 PTSD (POST-TRAUMATIC STRESS DISORDER): Chronic | ICD-10-CM

## 2018-03-15 DIAGNOSIS — G89.4 CHRONIC PAIN SYNDROME: ICD-10-CM

## 2018-03-15 PROCEDURE — 99214 OFFICE O/P EST MOD 30 MIN: CPT | Performed by: FAMILY MEDICINE

## 2018-03-15 PROCEDURE — 99244 OFF/OP CNSLTJ NEW/EST MOD 40: CPT | Performed by: INTERNAL MEDICINE

## 2018-03-15 RX ORDER — OXYCODONE AND ACETAMINOPHEN 5; 325 MG/1; MG/1
1 TABLET ORAL EVERY 6 HOURS PRN
Qty: 30 TABLET | Refills: 0 | Status: SHIPPED | OUTPATIENT
Start: 2018-03-15 | End: 2018-03-15

## 2018-03-15 RX ORDER — OXYCODONE AND ACETAMINOPHEN 5; 325 MG/1; MG/1
1 TABLET ORAL EVERY 6 HOURS PRN
Qty: 30 TABLET | Refills: 0 | Status: SHIPPED | OUTPATIENT
Start: 2018-03-22 | End: 2018-03-23

## 2018-03-15 ASSESSMENT — PATIENT HEALTH QUESTIONNAIRE - PHQ9
SUM OF ALL RESPONSES TO PHQ QUESTIONS 1-9: 9
SUM OF ALL RESPONSES TO PHQ QUESTIONS 1-9: 9

## 2018-03-15 NOTE — PATIENT INSTRUCTIONS
(C18.9,  C78.7) Carcinoma of colon metastatic to liver (H)  (primary encounter diagnosis)    Comment:      Plan:          (G89.4) Chronic pain syndrome    Comment:      Plan: oxyCODONE-acetaminophen (PERCOCET) 5-325 MG per          tablet, DISCONTINUED: oxyCODONE-acetaminophen           (PERCOCET) 5-325 MG per tablet                   (E66.01) BMI 42    Comment:      Plan:          (E78.5) Hyperlipidemia with target LDL less than 130    Comment:      Plan:          (F33.41) Recurrent major depression in partial remission    Comment:      Plan:          (I10) Hypertension goal BP (blood pressure) < 140/80    Comment:      Plan:          (F43.10) PTSD (post-traumatic stress disorder)    Comment:      Plan:          (G47.33) ALEXUS (obstructive sleep apnea)    Comment:      Plan:
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
## 2018-03-15 NOTE — NURSING NOTE
"Chief Complaint   Patient presents with     Sleep Problem     Sleep apnea, establish care. Needs a new machine        Initial BP (!) 173/100  Pulse 61  Resp 20  Ht 1.689 m (5' 6.5\")  Wt 116.1 kg (256 lb)  SpO2 99%  BMI 40.7 kg/m2 Estimated body mass index is 40.7 kg/(m^2) as calculated from the following:    Height as of this encounter: 1.689 m (5' 6.5\").    Weight as of this encounter: 116.1 kg (256 lb).  Medication Reconciliation: complete   ESS   Neck   Erin Barrera MA      "
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
## 2018-03-15 NOTE — MR AVS SNAPSHOT
After Visit Summary   3/15/2018    Ines Mott    MRN: [id number]           Patient Information     Date Of Birth          1965        Visit Information        Provider Department      3/15/2018 9:30 AM Dustin Fatima MD [facility]        Today's Diagnoses     Carcinoma of colon metastatic to liver (H)    -  1    Chronic pain syndrome        BMI 42        Hyperlipidemia with target LDL less than 130        Recurrent major depression in partial remission        Hypertension goal BP (blood pressure) < 140/80        PTSD (post-traumatic stress disorder)        ALEXUS (obstructive sleep apnea)          Care Instructions          (C18.9,  C78.7) Carcinoma of colon metastatic to liver (H)  (primary encounter diagnosis)    Comment:      Plan:          (G89.4) Chronic pain syndrome    Comment:      Plan: oxyCODONE-acetaminophen (PERCOCET) 5-325 MG per          tablet, DISCONTINUED: oxyCODONE-acetaminophen           (PERCOCET) 5-325 MG per tablet                   (E66.01) BMI 42    Comment:      Plan:          (E78.5) Hyperlipidemia with target LDL less than 130    Comment:      Plan:          (F33.41) Recurrent major depression in partial remission    Comment:      Plan:          (I10) Hypertension goal BP (blood pressure) < 140/80    Comment:      Plan:          (F43.10) PTSD (post-traumatic stress disorder)    Comment:      Plan:          (G47.33) LAEXUS (obstructive sleep apnea)    Comment:      Plan:                 Follow-ups after your visit        Your next 10 appointments already scheduled     Mar 15, 2018 11:00 AM CDT   New Sleep Patient with Aniceto Mujica MD   [facility] ([facility])    [address] [place] [id number]   [phone]            Mar 23, 2018 10:00 AM CDT   Pre-Op physical with Dustin Fatima MD   [facility]  "[facility]    [address] [address] [phone]              Who to contact     If you have questions or need follow up information about today's clinic visit or your schedule please contact [facility] directly at [phone].  Normal or non-critical lab and imaging results will be communicated to you by MyChart, letter or phone within 4 business days after the clinic has received the results. If you do not hear from us within 7 days, please contact the clinic through MyChart or phone. If you have a critical or abnormal lab result, we will notify you by phone as soon as possible.  Submit refill requests through [employer] or call your pharmacy and they will forward the refill request to us. Please allow 3 business days for your refill to be completed.          Additional Information About Your Visit        MyChart Information     [employer] lets you send messages to your doctor, view your test results, renew your prescriptions, schedule appointments and more. To sign up, go to [email or web address] . Click on \"Log in\" on the left side of the screen, which will take you to the Welcome page. Then click on \"Sign up Now\" on the right side of the page.     You will be asked to enter the access code listed below, as well as some personal information. Please follow the directions to create your username and password.     Your access code is: [email or web address]  Expires: 2018  7:56 PM     Your access code will  in 90 days. If you need help or a new code, please call your [facility] or [phone].        Care EveryWhere ID     This is your Care EveryWhere ID. This could be used by other organizations to access your [place] medical records  [id number]        Your Vitals Were     Pulse Temperature Respirations Pulse Oximetry BMI (Body Mass Index)       70 96.9  F (36.1  C) (Tympanic) 20 98% 41.32 kg/m2        Blood " Pressure from Last 3 Encounters:   03/15/18 124/78   03/05/18 (!) 173/101   03/03/18 127/79    Weight from Last 3 Encounters:   03/15/18 256 lb (116.1 kg)   03/05/18 256 lb (116.1 kg)   03/01/18 254 lb (115.2 kg)              Today, you had the following     No orders found for display         Today's Medication Changes          These changes are accurate as of 3/15/18  9:58 AM.  If you have any questions, ask your nurse or doctor.               Start taking these medicines.        Dose/Directions    oxyCODONE-acetaminophen 5-325 MG per tablet   Commonly known as:  PERCOCET   Used for:  Chronic pain syndrome   Started by:  Dustin Fatima MD        Dose:  1 tablet   Start taking on:  3/22/2018   Take 1 tablet by mouth every 6 hours as needed for moderate to severe pain Max 4 tab/day   Quantity:  30 tablet   Refills:  0            Where to get your medicines      Some of these will need a paper prescription and others can be bought over the counter.  Ask your nurse if you have questions.     Bring a paper prescription for each of these medications     oxyCODONE-acetaminophen 5-325 MG per tablet                Primary Care Provider Office Phone # Fax #    Dustin Fatima [phone] [address] [facility] [address]        Equal Access to Services     VALENTE RIVERA AH: Hadii casimiro ku hadasho Soomaali, waaxda luqadaha, qaybta kaalmada adeegyada, emilee coyne. So [facility] [phone].    ATENCIÓN: Si habla español, tiene a christianson disposición servicios gratuitos de asistencia lingüística. Llame al [phone].    We comply with applicable federal civil rights laws and [place] laws. We do not discriminate on the basis of race, color, national origin, age, disability, sex, sexual orientation, or gender identity.            Thank you!     Thank you for choosing [facility]  for your care. Our goal is always to provide you  with excellent care. Hearing back from our patients is one way we can continue to improve our services. Please take a few minutes to complete the written survey that you may receive in the mail after your visit with us. Thank you!             Your Updated Medication List - Protect others around you: Learn how to safely use, store and throw away your medicines at www.disposemymeds.org.          This list is accurate as of 3/15/18  9:58 AM.  Always use your most recent med list.                   Brand Name Dispense Instructions for use Diagnosis    albuterol (2.5 MG/3ML) 0.083% neb solution     30 vial    Take 1 vial (2.5 mg) by nebulization every 6 hours as needed for shortness of breath / dyspnea or wheezing    Mild intermittent asthma with acute exacerbation       ASPIRIN NOT PRESCRIBED    INTENTIONAL    1 each    Please choose reason not prescribed, below    Aspirin intolerance       blood glucose calibration solution    no brand specified    1 each    Use to calibrate blood glucose monitor as directed.    Type 2 diabetes mellitus without complication, without long-term current use of insulin (H)       blood glucose lancets standard    no brand specified    100 each    Use to test blood sugar daily times daily or as directed.    Type 2 diabetes mellitus without complication, without long-term current use of insulin (H)       blood glucose monitoring meter device kit    no brand specified    1 kit    Use to test blood sugar larisa times daily or as directed.    Type 2 diabetes mellitus without complication, without long-term current use of insulin (H)       blood glucose monitoring test strip    no brand specified    100 strip    Use to test blood sugars once times daily or as directed    Type 2 diabetes mellitus without complication, without long-term current use of insulin (H)       cyanocobalamin 1000 MCG/ML injection    VITAMIN B12    1 mL    Inject 1 mL (1,000 mcg) Subcutaneous every 30 days    B12 deficiency        EPINEPHrine 0.3 MG/0.3ML injection 2-pack    EPIPEN/ADRENACLICK/or ANY BX GENERIC EQUIV    2 each    Inject 0.3 mLs (0.3 mg) into the muscle once as needed for anaphylaxis    Late effect of other and unspecified external causes       losartan-hydrochlorothiazide 50-12.5 MG per tablet    HYZAAR    90 tablet    Take 1 tablet by mouth daily    Hypertension goal BP (blood pressure) < 140/80       magnesium oxide 400 (241.3 MG) MG tablet    MAG-OX    90 tablet    Take 1 tablet (400 mg) by mouth daily    Cramp of limb       ondansetron 4 MG ODT tab    ZOFRAN-ODT    10 tablet    Take 1 tablet (4 mg) by mouth every 8 hours as needed for nausea        order for DME     1 Device    Right wrist splint for carpal tunnel syndrome  One* Use as directed at hour of sleep    Carpal tunnel syndrome of right wrist       oxyCODONE-acetaminophen 5-325 MG per tablet   Start taking on:  3/22/2018    PERCOCET    30 tablet    Take 1 tablet by mouth every 6 hours as needed for moderate to severe pain Max 4 tab/day    Chronic pain syndrome       polyethylene glycol powder    MIRALAX/GLYCOLAX    1581 g    TAKE 17 GRAMS (1 CAPFUL) BY MOUTH DAILY    Slow transit constipation       potassium chloride 10 MEQ tablet    K-TAB,KLOR-CON    120 tablet    Take 1 tablet (10 mEq) by mouth daily    Hypokalemia       sertraline 100 MG tablet    ZOLOFT    45 tablet    Take 1.5 tablets (150 mg) by mouth daily    Recurrent major depression in partial remission (H), Other constipation       STATIN NOT PRESCRIBED (INTENTIONAL)     0 each    1 each daily Statin not prescribed intentionally due to Active liver disease (liver failure, cirrhosis, hepatitis) LIVER METS    Hyperlipidemia LDL goal <100, Type 2 diabetes mellitus without complication (H)       triamcinolone 0.1 % ointment    KENALOG    80 g    Apply sparingly to affected area three times daily for 14 days.    Hand eczema       Vitamin D (Cholecalciferol) 1000 UNITS Tabs     60 tablet    Take 2,000  Units by mouth daily    Vitamin D deficiency

## 2018-03-15 NOTE — NURSING NOTE
"Chief Complaint   Patient presents with     RECHECK       Initial /78 (Cuff Size: Adult Large)  Pulse 70  Temp 96.9  F (36.1  C) (Tympanic)  Resp 20  Wt 256 lb (116.1 kg)  SpO2 98%  BMI 41.32 kg/m2 Estimated body mass index is 41.32 kg/(m^2) as calculated from the following:    Height as of 3/5/18: 5' 6\" (1.676 m).    Weight as of this encounter: 256 lb (116.1 kg).  Medication Reconciliation: complete   Maggi Harris CMA    "

## 2018-03-15 NOTE — PATIENT INSTRUCTIONS

## 2018-03-15 NOTE — MR AVS SNAPSHOT
After Visit Summary   3/15/2018    Ines Mott    MRN: [id number]           Patient Information     Date Of Birth          1965        Visit Information        Provider Department      3/15/2018 11:00 AM Aniceto Mujica MD [facility]        Today's Diagnoses     BMI 42    -  1    ALEXUS (obstructive sleep apnea)        Hypertension goal BP (blood pressure) < 140/80        Poor sleep hygiene          Care Instructions      Your BMI is Body mass index is 40.7 kg/(m^2).  Weight management is a personal decision.  If you are interested in exploring weight loss strategies, the following discussion covers the approaches that may be successful. Body mass index (BMI) is one way to tell whether you are at a healthy weight, overweight, or obese. It measures your weight in relation to your height.  A BMI of 18.5 to 24.9 is in the healthy range. A person with a BMI of 25 to 29.9 is considered overweight, and someone with a BMI of 30 or greater is considered obese. More than two-thirds of American adults are considered overweight or obese.  Being overweight or obese increases the risk for further weight gain. Excess weight may lead to heart disease and diabetes.  Creating and following plans for healthy eating and physical activity may help you improve your health.  Weight control is part of healthy lifestyle and includes exercise, emotional health, and healthy eating habits. Careful eating habits lifelong are the mainstay of weight control. Though there are significant health benefits from weight loss, long-term weight loss with diet alone may be very difficult to achieve- studies show long-term success with dietary management in less than 10% of people. Attaining a healthy weight may be especially difficult to achieve in those with severe obesity. In some cases, medications, devices and surgical management might be considered.  What can you do?  If you are overweight or obese  and are interested in methods for weight loss, you should discuss this with your provider.     Consider reducing daily calorie intake by 500 calories.     Keep a food journal.     Avoiding skipping meals, consider cutting portions instead.    Diet combined with exercise helps maintain muscle while optimizing fat loss. Strength training is particularly important for building and maintaining muscle mass. Exercise helps reduce stress, increase energy, and improves fitness. Increasing exercise without diet control, however, may not burn enough calories to loose weight.       Start walking three days a week 10-20 minutes at a time    Work towards walking thirty minutes five days a week     Eventually, increase the speed of your walking for 1-2 minutes at time    In addition, we recommend that you review healthy lifestyles and methods for weight loss available through the National Institutes of Health patient information sites:  http://win.niddk.nih.gov/publications/index.htm    And look into health and wellness programs that may be available through your health insurance provider, employer, local community center, or bree club.    Weight management plan: Patient was referred to their PCP to discuss a diet and exercise plan.              Follow-ups after your visit        Your next 10 appointments already scheduled     Mar 23, 2018 10:00 AM CDT   [facility]  [facility] [place] [facility] [address]   [phone]            May 13, 2018  8:30 PM CDT   Psg Split W/Tcm with BED 3 SH SLEEP   [facility] ([facility])    [address]  [place] [facility] [address]   [phone]            May 31, 2018 10:30 AM CDT   Return Sleep Patient with Aniceto Mujica MD   [facility] ([facility]  "[place])    [address] [address] [phone]              Future tests that were ordered for you today     Open Future Orders        Priority Expected Expires Ordered    Comprehensive Sleep Study Routine  2018 3/15/2018            Who to contact     If you have questions or need follow up information about today's clinic visit or your schedule please contact [facility] directly at [phone].  Normal or non-critical lab and imaging results will be communicated to you by [email or web address], letter or phone within 4 business days after the clinic has received the results. If you do not hear from us within 7 days, please contact the clinic through [email or web address] or phone. If you have a critical or abnormal lab result, we will notify you by phone as soon as possible.  Submit refill requests through ZenSuite or call your pharmacy and they will forward the refill request to us. Please allow 3 business days for your refill to be completed.          Additional Information About Your Visit        ZenSuite Information     ZenSuite lets you send messages to your doctor, view your test results, renew your prescriptions, schedule appointments and more. To sign up, go to [email or web address] . Click on \"Log in\" on the left side of the screen, which will take you to the Welcome page. Then click on \"Sign up Now\" on the right side of the page.     You will be asked to enter the access code listed below, as well as some personal information. Please follow the directions to create your username and password.     Your access code is: [email or web address]  Expires: 2018  7:56 PM     Your access code will  in 90 days. If you need help or a new code, please call your [facility] clinic or [phone].        Care EveryWhere ID     This is your Care EveryWhere ID. This could be used by other organizations to access your [facility] medical records  [id number]        Your Vitals Were     Pulse " "Respirations Height Pulse Oximetry BMI (Body Mass Index)       61 20 1.689 m (5' 6.5\") 99% 40.7 kg/m2        Blood Pressure from Last 3 Encounters:   03/15/18 (!) 148/94   03/15/18 124/78   03/05/18 (!) 173/101    Weight from Last 3 Encounters:   03/15/18 116.1 kg (256 lb)   03/15/18 116.1 kg (256 lb)   03/05/18 116.1 kg (256 lb)              We Performed the Following     SLEEP EVALUATION & MANAGEMENT REFERRAL - [facility] [phone]  (Age 18 and up)          Today's Medication Changes          These changes are accurate as of 3/15/18 12:05 PM.  If you have any questions, ask your nurse or doctor.               Start taking these medicines.        Dose/Directions    oxyCODONE-acetaminophen 5-325 MG per tablet   Commonly known as:  PERCOCET   Used for:  Chronic pain syndrome   Started by:  Dustin Fatima MD        Dose:  1 tablet   Start taking on:  3/22/2018   Take 1 tablet by mouth every 6 hours as needed for moderate to severe pain Max 4 tab/day   Quantity:  30 tablet   Refills:  0            Where to get your medicines      Some of these will need a paper prescription and others can be bought over the counter.  Ask your nurse if you have questions.     Bring a paper prescription for each of these medications     oxyCODONE-acetaminophen 5-325 MG per tablet                Primary Care Provider Office Phone # Fax #    Dustin Fatima [phone] [address] [facility] [address]        Equal Access to Services     [facility] AH: Hadii casimiro keller Soevangelina, waaxda luqadaha, qaybta kaalmaemilee doll. So [facility] [phone].    ATENCIÓN: Si habla español, tiene a christianson disposición servicios gratuitos de asistencia lingüística. Llame al [phone].    We comply with applicable federal civil rights laws and [place] laws. We do not discriminate on the basis of race, color, national origin, age, " disability, sex, sexual orientation, or gender identity.            Thank you!     Thank you for choosing [facility]  for your care. Our goal is always to provide you with excellent care. Hearing back from our patients is one way we can continue to improve our services. Please take a few minutes to complete the written survey that you may receive in the mail after your visit with us. Thank you!             Your Updated Medication List - Protect others around you: Learn how to safely use, store and throw away your medicines at www.disposemymeds.org.          This list is accurate as of 3/15/18 12:05 PM.  Always use your most recent med list.                   Brand Name Dispense Instructions for use Diagnosis    albuterol (2.5 MG/3ML) 0.083% neb solution     30 vial    Take 1 vial (2.5 mg) by nebulization every 6 hours as needed for shortness of breath / dyspnea or wheezing    Mild intermittent asthma with acute exacerbation       ASPIRIN NOT PRESCRIBED    INTENTIONAL    1 each    Please choose reason not prescribed, below    Aspirin intolerance       blood glucose calibration solution    no brand specified    1 each    Use to calibrate blood glucose monitor as directed.    Type 2 diabetes mellitus without complication, without long-term current use of insulin (H)       blood glucose lancets standard    no brand specified    100 each    Use to test blood sugar daily times daily or as directed.    Type 2 diabetes mellitus without complication, without long-term current use of insulin (H)       blood glucose monitoring meter device kit    no brand specified    1 kit    Use to test blood sugar larisa times daily or as directed.    Type 2 diabetes mellitus without complication, without long-term current use of insulin (H)       blood glucose monitoring test strip    no brand specified    100 strip    Use to test blood sugars once times daily or as directed    Type 2 diabetes mellitus without complication,  without long-term current use of insulin (H)       cyanocobalamin 1000 MCG/ML injection    VITAMIN B12    1 mL    Inject 1 mL (1,000 mcg) Subcutaneous every 30 days    B12 deficiency       EPINEPHrine 0.3 MG/0.3ML injection 2-pack    EPIPEN/ADRENACLICK/or ANY BX GENERIC EQUIV    2 each    Inject 0.3 mLs (0.3 mg) into the muscle once as needed for anaphylaxis    Late effect of other and unspecified external causes       losartan-hydrochlorothiazide 50-12.5 MG per tablet    HYZAAR    90 tablet    Take 1 tablet by mouth daily    Hypertension goal BP (blood pressure) < 140/80       magnesium oxide 400 (241.3 MG) MG tablet    MAG-OX    90 tablet    Take 1 tablet (400 mg) by mouth daily    Cramp of limb       ondansetron 4 MG ODT tab    ZOFRAN-ODT    10 tablet    Take 1 tablet (4 mg) by mouth every 8 hours as needed for nausea        order for DME     1 Device    Right wrist splint for carpal tunnel syndrome  One* Use as directed at hour of sleep    Carpal tunnel syndrome of right wrist       oxyCODONE-acetaminophen 5-325 MG per tablet   Start taking on:  3/22/2018    PERCOCET    30 tablet    Take 1 tablet by mouth every 6 hours as needed for moderate to severe pain Max 4 tab/day    Chronic pain syndrome       polyethylene glycol powder    MIRALAX/GLYCOLAX    1581 g    TAKE 17 GRAMS (1 CAPFUL) BY MOUTH DAILY    Slow transit constipation       potassium chloride 10 MEQ tablet    K-TAB,KLOR-CON    120 tablet    Take 1 tablet (10 mEq) by mouth daily    Hypokalemia       sertraline 100 MG tablet    ZOLOFT    45 tablet    Take 1.5 tablets (150 mg) by mouth daily    Recurrent major depression in partial remission (H), Other constipation       STATIN NOT PRESCRIBED (INTENTIONAL)     0 each    1 each daily Statin not prescribed intentionally due to Active liver disease (liver failure, cirrhosis, hepatitis) LIVER METS    Hyperlipidemia LDL goal <100, Type 2 diabetes mellitus without complication (H)       triamcinolone 0.1 %  ointment    KENALOG    80 g    Apply sparingly to affected area three times daily for 14 days.    Hand eczema       Vitamin D (Cholecalciferol) 1000 UNITS Tabs     60 tablet    Take 2,000 Units by mouth daily    Vitamin D deficiency
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
## 2018-03-16 ASSESSMENT — PATIENT HEALTH QUESTIONNAIRE - PHQ9: SUM OF ALL RESPONSES TO PHQ QUESTIONS 1-9: 9

## 2018-03-18 ENCOUNTER — HOSPITAL ENCOUNTER (EMERGENCY)
Facility: CLINIC | Age: 53
Discharge: HOME | End: 2018-03-19
Attending: EMERGENCY MEDICINE | Admitting: EMERGENCY MEDICINE
Payer: COMMERCIAL

## 2018-03-18 ENCOUNTER — APPOINTMENT (OUTPATIENT)
Dept: ULTRASOUND IMAGING | Facility: CLINIC | Age: 53
End: 2018-03-18
Attending: EMERGENCY MEDICINE
Payer: COMMERCIAL

## 2018-03-18 VITALS
OXYGEN SATURATION: 97 % | BODY MASS INDEX: 40.18 KG/M2 | TEMPERATURE: 98.8 F | DIASTOLIC BLOOD PRESSURE: 106 MMHG | HEIGHT: 67 IN | SYSTOLIC BLOOD PRESSURE: 190 MMHG | WEIGHT: 256 LBS

## 2018-03-18 DIAGNOSIS — K52.9 GASTROENTERITIS: ICD-10-CM

## 2018-03-18 DIAGNOSIS — R10.9 FLANK PAIN: ICD-10-CM

## 2018-03-18 LAB
ALBUMIN SERPL-MCNC: 3.5 G/DL (ref 3.4–5)
ALBUMIN UR-MCNC: NEGATIVE MG/DL
ALP SERPL-CCNC: 87 U/L (ref 40–150)
ALT SERPL W P-5'-P-CCNC: 21 U/L (ref 0–50)
ANION GAP SERPL CALCULATED.3IONS-SCNC: 7 MMOL/L (ref 3–14)
APPEARANCE UR: CLEAR
AST SERPL W P-5'-P-CCNC: 10 U/L (ref 0–45)
BACTERIA #/AREA URNS HPF: ABNORMAL /HPF
BASOPHILS # BLD AUTO: 0 10E9/L (ref 0–0.2)
BASOPHILS NFR BLD AUTO: 0.2 %
BILIRUB SERPL-MCNC: 0.7 MG/DL (ref 0.2–1.3)
BILIRUB UR QL STRIP: NEGATIVE
BUN SERPL-MCNC: 12 MG/DL (ref 7–30)
CALCIUM SERPL-MCNC: 9.1 MG/DL (ref 8.5–10.1)
CHLORIDE SERPL-SCNC: 105 MMOL/L (ref 94–109)
CO2 SERPL-SCNC: 29 MMOL/L (ref 20–32)
COLOR UR AUTO: ABNORMAL
CREAT SERPL-MCNC: 0.9 MG/DL (ref 0.52–1.04)
DIFFERENTIAL METHOD BLD: NORMAL
EOSINOPHIL # BLD AUTO: 0.3 10E9/L (ref 0–0.7)
EOSINOPHIL NFR BLD AUTO: 3.3 %
ERYTHROCYTE [DISTWIDTH] IN BLOOD BY AUTOMATED COUNT: 12.9 % (ref 10–15)
GFR SERPL CREATININE-BSD FRML MDRD: 65 ML/MIN/1.7M2
GLUCOSE SERPL-MCNC: 95 MG/DL (ref 70–99)
GLUCOSE UR STRIP-MCNC: NEGATIVE MG/DL
HCT VFR BLD AUTO: 36.5 % (ref 35–47)
HGB BLD-MCNC: 11.9 G/DL (ref 11.7–15.7)
HGB UR QL STRIP: NEGATIVE
IMM GRANULOCYTES # BLD: 0 10E9/L (ref 0–0.4)
IMM GRANULOCYTES NFR BLD: 0.3 %
KETONES UR STRIP-MCNC: NEGATIVE MG/DL
LEUKOCYTE ESTERASE UR QL STRIP: NEGATIVE
LIPASE SERPL-CCNC: 119 U/L (ref 73–393)
LYMPHOCYTES # BLD AUTO: 2.9 10E9/L (ref 0.8–5.3)
LYMPHOCYTES NFR BLD AUTO: 31.7 %
MCH RBC QN AUTO: 29.2 PG (ref 26.5–33)
MCHC RBC AUTO-ENTMCNC: 32.6 G/DL (ref 31.5–36.5)
MCV RBC AUTO: 90 FL (ref 78–100)
MONOCYTES # BLD AUTO: 0.7 10E9/L (ref 0–1.3)
MONOCYTES NFR BLD AUTO: 7.1 %
NEUTROPHILS # BLD AUTO: 5.3 10E9/L (ref 1.6–8.3)
NEUTROPHILS NFR BLD AUTO: 57.4 %
NITRATE UR QL: NEGATIVE
NRBC # BLD AUTO: 0 10*3/UL
NRBC BLD AUTO-RTO: 0 /100
PH UR STRIP: 7.5 PH (ref 5–7)
PLATELET # BLD AUTO: 308 10E9/L (ref 150–450)
POTASSIUM SERPL-SCNC: 3.6 MMOL/L (ref 3.4–5.3)
PROT SERPL-MCNC: 6.9 G/DL (ref 6.8–8.8)
RBC # BLD AUTO: 4.08 10E12/L (ref 3.8–5.2)
RBC #/AREA URNS AUTO: <1 /HPF (ref 0–2)
SODIUM SERPL-SCNC: 141 MMOL/L (ref 133–144)
SOURCE: ABNORMAL
SP GR UR STRIP: 1 (ref 1–1.03)
UROBILINOGEN UR STRIP-MCNC: NORMAL MG/DL (ref 0–2)
WBC # BLD AUTO: 9.3 10E9/L (ref 4–11)
WBC #/AREA URNS AUTO: 1 /HPF (ref 0–5)

## 2018-03-18 PROCEDURE — 83690 ASSAY OF LIPASE: CPT | Performed by: EMERGENCY MEDICINE

## 2018-03-18 PROCEDURE — 96374 THER/PROPH/DIAG INJ IV PUSH: CPT

## 2018-03-18 PROCEDURE — 96375 TX/PRO/DX INJ NEW DRUG ADDON: CPT

## 2018-03-18 PROCEDURE — 96361 HYDRATE IV INFUSION ADD-ON: CPT

## 2018-03-18 PROCEDURE — 81001 URINALYSIS AUTO W/SCOPE: CPT | Performed by: EMERGENCY MEDICINE

## 2018-03-18 PROCEDURE — 25000132 ZZH RX MED GY IP 250 OP 250 PS 637: Performed by: EMERGENCY MEDICINE

## 2018-03-18 PROCEDURE — 25000128 H RX IP 250 OP 636: Performed by: EMERGENCY MEDICINE

## 2018-03-18 PROCEDURE — 80053 COMPREHEN METABOLIC PANEL: CPT | Performed by: EMERGENCY MEDICINE

## 2018-03-18 PROCEDURE — 99285 EMERGENCY DEPT VISIT HI MDM: CPT | Mod: 25

## 2018-03-18 PROCEDURE — 76770 US EXAM ABDO BACK WALL COMP: CPT

## 2018-03-18 PROCEDURE — 85025 COMPLETE CBC W/AUTO DIFF WBC: CPT | Performed by: EMERGENCY MEDICINE

## 2018-03-18 RX ORDER — MORPHINE SULFATE 4 MG/ML
4 INJECTION, SOLUTION INTRAMUSCULAR; INTRAVENOUS ONCE
Status: COMPLETED | OUTPATIENT
Start: 2018-03-18 | End: 2018-03-18

## 2018-03-18 RX ORDER — SODIUM CHLORIDE 9 MG/ML
1000 INJECTION, SOLUTION INTRAVENOUS CONTINUOUS
Status: DISCONTINUED | OUTPATIENT
Start: 2018-03-18 | End: 2018-03-19 | Stop reason: HOSPADM

## 2018-03-18 RX ORDER — OXYCODONE AND ACETAMINOPHEN 5; 325 MG/1; MG/1
1 TABLET ORAL ONCE
Status: COMPLETED | OUTPATIENT
Start: 2018-03-18 | End: 2018-03-18

## 2018-03-18 RX ORDER — ONDANSETRON 4 MG/1
4 TABLET, ORALLY DISINTEGRATING ORAL EVERY 8 HOURS PRN
Qty: 10 TABLET | Refills: 0 | Status: SHIPPED | OUTPATIENT
Start: 2018-03-18 | End: 2019-02-20

## 2018-03-18 RX ORDER — ONDANSETRON 2 MG/ML
4 INJECTION INTRAMUSCULAR; INTRAVENOUS
Status: COMPLETED | OUTPATIENT
Start: 2018-03-18 | End: 2018-03-18

## 2018-03-18 RX ADMIN — OXYCODONE HYDROCHLORIDE AND ACETAMINOPHEN 1 TABLET: 5; 325 TABLET ORAL at 23:27

## 2018-03-18 RX ADMIN — SODIUM CHLORIDE 1000 ML: 9 INJECTION, SOLUTION INTRAVENOUS at 20:44

## 2018-03-18 RX ADMIN — OXYCODONE HYDROCHLORIDE AND ACETAMINOPHEN 1 TABLET: 5; 325 TABLET ORAL at 21:29

## 2018-03-18 RX ADMIN — MORPHINE SULFATE 4 MG: 4 INJECTION, SOLUTION INTRAMUSCULAR; INTRAVENOUS at 20:46

## 2018-03-18 RX ADMIN — ONDANSETRON 4 MG: 2 INJECTION INTRAMUSCULAR; INTRAVENOUS at 20:44

## 2018-03-18 ASSESSMENT — ENCOUNTER SYMPTOMS
FLANK PAIN: 1
CHILLS: 0
BLOOD IN STOOL: 0
DIARRHEA: 1
SHORTNESS OF BREATH: 0
ABDOMINAL PAIN: 1
FREQUENCY: 1
ABDOMINAL DISTENTION: 1
BACK PAIN: 0
FEVER: 0
DIAPHORESIS: 0
VOMITING: 1
CONSTIPATION: 0
APPETITE CHANGE: 1
NAUSEA: 1

## 2018-03-18 NOTE — ED AVS SNAPSHOT
"  Emergency Department    [address] [facility] [address]    Phone:  [phone]    Fax:  [phone]                                       Ines Mott   MRN: [id number]    Department:   Emergency Department   Date of Visit:  3/18/2018           Patient Information     Date Of Birth          1965        Your diagnoses for this visit were:     Gastroenteritis     Flank pain        You were seen by Brett Solorio MD.      Follow-up Information     Follow up with Dustin Fatima MD.    Specialty:  Family Practice    Contact information:    [address]  [facility] [address]  [phone]          Discharge Instructions          * FOOD POISONING or VIRAL GASTROENTERITIS (6yr-Adult)  FOOD POISONING may occur from 6 to 24 hours after eating food that has spoiled and lasts up to1-2 days. VIRAL GASTRO-ENTERITIS is commonly known as the \"stomach flu\" and may last 2-7 days. Symptoms of both illnesses may include vomiting, diarrhea, fever, abdominal cramping. Antibiotics are not effective, but simple home treatment will be helpful.  HOME CARE:    If symptoms are severe, rest at home for the next 24 hours.    Avoid tobacco and alcohol. These may worsen your symptoms.    If medicines for diarrhea (low dose of Immodium: one tablet a day for an adult) or vomiting were prescribed, take only as directed.   During the first 12 to 24 hours follow the diet below:    DRINKS: Sport drinks like Gatorade, soft drinks without caffeine; ginger ale, mineral water (plain or flavored), decaffeinated tea and coffee.    SOUPS: Clear broth, consommé and bouillon    DESSERTS: Plain gelatin (Jell-O), popsicles and fruit juice bars.  During the next 24 hours you may add the following to the above:    Hot cereal, plain toast, bread, rolls, crackers    Plain noodles, rice, mashed potatoes, chicken noodle or rice soup    Unsweetened canned fruit (avoid pineapple), bananas    Limit fat intake to less than 15 " grams per day by avoiding margarine, butter, oils, mayonnaise, sauces, gravies, fried foods, peanut butter, meat, poultry and fish.    Limit fiber; avoid raw or cooked vegetables, fresh fruits (except bananas) and bran cereals.    Limit caffeine and chocolate. No spices or seasonings except salt.  Slowly go back to a normal diet as you feel better and your symptoms lessen.  FOLLOW UP with your doctor as advised if you are not better in 2 days. If a stool (diarrhea) sample was taken, you may call in 2 days (or as directed) for the results.  GET PROMPT MEDICAL ATTENTION if any of the following occur:    Increasing abdominal pain or constant pain in one spot    Continued vomiting (unable to keep liquids down)    Frequent diarrhea (more than 5 times a day)    Blood in vomit or stool (black or red color)    Unable to take in fluids at all    No urine output for 12 hours or extreme thirst    Weakness, dizziness, fainting    Drowsiness, confusion, stiff neck or seizure    Fever over 101.0  F (38.3  C) for more than 3 days    New rash    2187-6963 [employer]. [address] [place] [address]. All rights reserved. This information is not intended as a substitute for professional medical care. Always follow your healthcare professional's instructions.  This information has been modified by your health care provider with permission from the publisher.    Discharge Instructions  Abdominal Pain    Abdominal pain can be caused by many things. Your evaluation today does not show the exact cause for your pain. Your doctor today has decided that it is unlikely your pain is due to a life threatening problem, or a problem requiring surgery or hospital admission. Sometimes those problems cannot be found right away, so it is very important that you follow up as directed.  Sometimes only the changes which occur over time allow the cause of your pain to be found.    Return to the Emergency Department for a recheck  in 8-12 hours if your pain continues.  If your pain gets worse, changes in location, or feels different, return to the Emergency Department right away.    ADULTS:  Return to the Emergency Department right away if:      You get an oral temperature above 102oF or as directed by your doctor.    You have blood in your stools (bright red or black, tarry stools).    You keep throwing up or can t drink liquids.    You see blood when you throw up.    You can t have a bowel movement or you can t pass gas.    Your stomach gets bloated or bigger.    Your skin or the whites of your eyes look yellow.    You faint.    You have bloody, frequent or painful urination.    You have new symptoms or anything that worries you.    CHILDREN:  Return to the Emergency Department right away if your child has any of the above-listed symptoms or the following:      Pushes your hand away or screams/cries when his/her belly is touched.    You notice your child is very fussy or weak.    Your child is very tired and is too tired to eat or drink.    Your child is dehydrated.  Signs of dehydration can be:  o Your infant has had no wet diapers in 4-5 hours.  o Your older child has not passed urine in 6-8 hours.  o Your infant or child starts to have dry mouth and lips, or no saliva or tears.    PREGNANT WOMEN:  Return to the Emergency Department right away if you have any of the above-listed symptoms or the following:      You have bleeding, leaking fluid or passing tissue from the vagina.    You have worse pain or cramping, or pain in your shoulder or back.    You have vomiting that will not stop.    You have painful or bloody urination.    You have a temperature of 100oF or more.    Your baby is not moving as much as usual.    You faint.    You get a bad headache with or without eye problems and abdominal pain.    You have a convulsion or seizure.    You have unusual discharge from your vagina and abdominal pain.    Abdominal pain is pretty common  "during pregnancy.  Your pain may or may not be related to your pregnancy. You should follow-up closely with your OB doctor so they can evaluate you and your baby.  Until you follow-up with your regular doctor, do the following:       Avoid sex and do not put anything in your vagina.    Drink clear fluids.    Only take medications approved by your doctor.    MORE INFORMATION:    Appendicitis:  A possible cause of abdominal pain in any person who still has their appendix is acute appendicitis. Appendicitis is often hard to diagnose.  Testing does not always rule out early appendicitis or other causes of abdominal pain. Close follow-up with your doctor and re-evaluations may be needed to figure out the reason for your abdominal pain.    Follow-up:  It is very important that you make an appointment with your clinic and go to the appointment.  If you do not follow-up with your primary doctor, it may result in missing an important development which could result in permanent injury or disability and/or lasting pain.  If there is any problem keeping your appointment, call your doctor or return to the Emergency Department.    Medications:  Take your medications as directed by your doctor today.  Before using over-the-counter medications, ask your doctor and make sure to take the medications as directed.  If you have any questions about medications, ask your doctor.    Diet:  Resume your normal diet as much as possible, but do not eat fried, fatty or spicy foods while you have pain.  Do not drink alcohol or have caffeine.  Do not smoke tobacco.    Probiotics: If you have been given an antibiotic, you may want to also take a probiotic pill or eat yogurt with live cultures. Probiotics have \"good bacteria\" to help your intestines stay healthy. Studies have shown that probiotics help prevent diarrhea and other intestine problems (including C. diff infection) when you take antibiotics. You can buy these without a prescription in " the pharmacy section of the store.     If you were given a prescription for medicine here today, be sure to read all of the information (including the package insert) that comes with your prescription.  This will include important information about the medicine, its side effects, and any warnings that you need to know about.  The pharmacist who fills the prescription can provide more information and answer questions you may have about the medicine.  If you have questions or concerns that the pharmacist cannot address, please call or return to the Emergency Department.         Opioid Medication Information    Pain medications are among the most commonly prescribed medicines, so we are including this information for all our patients. If you did not receive pain medication or get a prescription for pain medicine, you can ignore it.     You may have been given a prescription for an opioid (narcotic) pain medicine and/or have received a pain medicine while here in the Emergency Department. These medicines can make you drowsy or impaired. You must not drive, operate dangerous equipment, or engage in any other dangerous activities while taking these medications. If you drive while taking these medications, you could be arrested for DUI, or driving under the influence. Do not drink any alcohol while you are taking these medications.     Opioid pain medications can cause addiction. If you have a history of chemical dependency of any type, you are at a higher risk of becoming addicted to pain medications.  Only take these prescribed medications to treat your pain when all other options have been tried. Take it for as short a time and as few doses as possible. Store your pain pills in a secure place, as they are frequently stolen and provide a dangerous opportunity for children or visitors in your house to start abusing these powerful medications. We will not replace any lost or stolen medicine.  As soon as your pain is  better, you should flush all your remaining medication.     Many prescription pain medications contain Tylenol  (acetaminophen), including Vicodin , Tylenol #3 , Norco , Lortab , and Percocet .  You should not take any extra pills of Tylenol  if you are using these prescription medications or you can get very sick.  Do not ever take more than 3000 mg of acetaminophen in any 24 hour period.    All opioids tend to cause constipation. Drink plenty of water and eat foods that have a lot of fiber, such as fruits, vegetables, prune juice, apple juice and high fiber cereal.  Take a laxative if you don t move your bowels at least every other day. Miralax , Milk of Magnesia, Colace , or Senna  can be used to keep you regular.      Remember that you can always come back to the Emergency Department if you are not able to see your regular doctor in the amount of time listed above, if you get any new symptoms, or if there is anything that worries you.          Future Appointments        Provider Department Dept Phone Center    3/23/2018 10:00 AM OTTO YUSUF MD [facility] [phone] [place]    5/13/2018 8:30 PM BED 3 SH SLEEP [facility] [phone] [facility]    5/31/2018 10:30 AM Aniceto Mujica MD [facility] [phone] [place]      24 Hour Appointment Hotline       To make an appointment at any [facility], call [phone] ([phone]). If you don't have a family doctor or clinic, we will help you find one. [facility] are conveniently located to serve the needs of you and your family.             Review of your medicines      CONTINUE these medicines which may have CHANGED, or have new prescriptions. If we are uncertain of the size of tablets/capsules you have at home, strength may be listed as something that might have changed.        Dose / Directions Last dose taken    * ondansetron 4 MG ODT tab   Commonly known  as:  ZOFRAN-ODT   Dose:  4 mg   What changed:  Another medication with the same name was added. Make sure you understand how and when to take each.   Quantity:  10 tablet        Take 1 tablet (4 mg) by mouth every 8 hours as needed for nausea   Refills:  1        * ondansetron 4 MG ODT tab   Commonly known as:  ZOFRAN ODT   Dose:  4 mg   What changed:  You were already taking a medication with the same name, and this prescription was added. Make sure you understand how and when to take each.   Quantity:  10 tablet        Take 1 tablet (4 mg) by mouth every 8 hours as needed   Refills:  0        * Notice:  This list has 2 medication(s) that are the same as other medications prescribed for you. Read the directions carefully, and ask your doctor or other care provider to review them with you.      Our records show that you are taking the medicines listed below. If these are incorrect, please call your family doctor or clinic.        Dose / Directions Last dose taken    albuterol (2.5 MG/3ML) 0.083% neb solution   Dose:  1 vial   Quantity:  30 vial        Take 1 vial (2.5 mg) by nebulization every 6 hours as needed for shortness of breath / dyspnea or wheezing   Refills:  3        ASPIRIN NOT PRESCRIBED   Commonly known as:  INTENTIONAL   Quantity:  1 each        Please choose reason not prescribed, below   Refills:  0        blood glucose calibration solution   Commonly known as:  no brand specified   Quantity:  1 each        Use to calibrate blood glucose monitor as directed.   Refills:  0        blood glucose lancets standard   Commonly known as:  no brand specified   Quantity:  100 each        Use to test blood sugar daily times daily or as directed.   Refills:  11        blood glucose monitoring meter device kit   Commonly known as:  no brand specified   Quantity:  1 kit        Use to test blood sugar larisa times daily or as directed.   Refills:  0        blood glucose monitoring test strip   Commonly known as:  no  brand specified   Quantity:  100 strip        Use to test blood sugars once times daily or as directed   Refills:  3        cyanocobalamin 1000 MCG/ML injection   Commonly known as:  VITAMIN B12   Dose:  1 mL   Quantity:  1 mL        Inject 1 mL (1,000 mcg) Subcutaneous every 30 days   Refills:  11        EPINEPHrine 0.3 MG/0.3ML injection 2-pack   Commonly known as:  EPIPEN/ADRENACLICK/or ANY BX GENERIC EQUIV   Dose:  0.3 mg   Quantity:  2 each        Inject 0.3 mLs (0.3 mg) into the muscle once as needed for anaphylaxis   Refills:  5        losartan-hydrochlorothiazide 50-12.5 MG per tablet   Commonly known as:  HYZAAR   Dose:  1 tablet   Quantity:  90 tablet        Take 1 tablet by mouth daily   Refills:  3        magnesium oxide 400 (241.3 MG) MG tablet   Commonly known as:  MAG-OX   Dose:  400 mg   Quantity:  90 tablet        Take 1 tablet (400 mg) by mouth daily   Refills:  3        order for DME   Quantity:  1 Device        Right wrist splint for carpal tunnel syndrome  One* Use as directed at hour of sleep   Refills:  1        oxyCODONE-acetaminophen 5-325 MG per tablet   Commonly known as:  PERCOCET   Dose:  1 tablet   Quantity:  30 tablet   Start taking on:  3/22/2018        Take 1 tablet by mouth every 6 hours as needed for moderate to severe pain Max 4 tab/day   Refills:  0        polyethylene glycol powder   Commonly known as:  MIRALAX/GLYCOLAX   Quantity:  1581 g        TAKE 17 GRAMS (1 CAPFUL) BY MOUTH DAILY   Refills:  11        potassium chloride 10 MEQ tablet   Commonly known as:  K-TAB,KLOR-CON   Dose:  10 mEq   Indication:  Low Amount of Potassium in the Blood   Quantity:  120 tablet        Take 1 tablet (10 mEq) by mouth daily   Refills:  11        sertraline 100 MG tablet   Commonly known as:  ZOLOFT   Dose:  150 mg   Indication:  Major Depressive Disorder   Quantity:  45 tablet        Take 1.5 tablets (150 mg) by mouth daily   Refills:  11        STATIN NOT PRESCRIBED (INTENTIONAL)   Dose:  1  each   Quantity:  0 each        1 each daily Statin not prescribed intentionally due to Active liver disease (liver failure, cirrhosis, hepatitis) LIVER METS   Refills:  0        triamcinolone 0.1 % ointment   Commonly known as:  KENALOG   Quantity:  80 g        Apply sparingly to affected area three times daily for 14 days.   Refills:  0        Vitamin D (Cholecalciferol) 1000 UNITS Tabs   Dose:  2000 Units   Quantity:  60 tablet        Take 2,000 Units by mouth daily   Refills:  11                Prescriptions were sent or printed at these locations (1 Prescription)                   Other Prescriptions                Printed at Department/Unit printer (1 of 1)         ondansetron (ZOFRAN ODT) 4 MG ODT tab                Procedures and tests performed during your visit     CBC with platelets differential    Comprehensive metabolic panel    Lipase    Retroperitoneal US    UA with Microscopic      Orders Needing Specimen Collection     None      Pending Results     Date and Time Order Name Status Description    3/18/2018 2055 Retroperitoneal US Preliminary             Pending Culture Results     No orders found from 3/16/2018 to 3/19/2018.            Pending Results Instructions     If you had any lab results that were not finalized at the time of your Discharge, you can call the ED Lab Result RN at [phone]. You will be contacted by this team for any positive Lab results or changes in treatment. The nurses are available 7 days a week from 10A to 6:30P.  You can leave a message 24 hours per day and they will return your call.        Test Results From Your Hospital Stay        3/18/2018  8:50 PM      Component Results     Component Value Ref Range & Units Status    WBC 9.3 4.0 - 11.0 10e9/L Final    RBC Count 4.08 3.8 - 5.2 10e12/L Final    Hemoglobin 11.9 11.7 - 15.7 g/dL Final    Hematocrit 36.5 35.0 - 47.0 % Final    MCV 90 78 - 100 fl Final    MCH 29.2 26.5 - 33.0 pg Final    MCHC 32.6 31.5 - 36.5 g/dL Final     RDW 12.9 10.0 - 15.0 % Final    Platelet Count 308 150 - 450 10e9/L Final    Diff Method Automated Method  Final    % Neutrophils 57.4 % Final    % Lymphocytes 31.7 % Final    % Monocytes 7.1 % Final    % Eosinophils 3.3 % Final    % Basophils 0.2 % Final    % Immature Granulocytes 0.3 % Final    Nucleated RBCs 0 0 /100 Final    Absolute Neutrophil 5.3 1.6 - 8.3 10e9/L Final    Absolute Lymphocytes 2.9 0.8 - 5.3 10e9/L Final    Absolute Monocytes 0.7 0.0 - 1.3 10e9/L Final    Absolute Eosinophils 0.3 0.0 - 0.7 10e9/L Final    Absolute Basophils 0.0 0.0 - 0.2 10e9/L Final    Abs Immature Granulocytes 0.0 0 - 0.4 10e9/L Final    Absolute Nucleated RBC 0.0  Final         3/18/2018  9:05 PM      Component Results     Component Value Ref Range & Units Status    Sodium 141 133 - 144 mmol/L Final    Potassium 3.6 3.4 - 5.3 mmol/L Final    Chloride 105 94 - 109 mmol/L Final    Carbon Dioxide 29 20 - 32 mmol/L Final    Anion Gap 7 3 - 14 mmol/L Final    Glucose 95 70 - 99 mg/dL Final    Urea Nitrogen 12 7 - 30 mg/dL Final    Creatinine 0.90 0.52 - 1.04 mg/dL Final    GFR Estimate 65 >60 mL/min/1.7m2 Final    Non  GFR Calc    GFR Estimate If Black 79 >60 mL/min/1.7m2 Final    African American GFR Calc    Calcium 9.1 8.5 - 10.1 mg/dL Final    Bilirubin Total 0.7 0.2 - 1.3 mg/dL Final    Albumin 3.5 3.4 - 5.0 g/dL Final    Protein Total 6.9 6.8 - 8.8 g/dL Final    Alkaline Phosphatase 87 40 - 150 U/L Final    ALT 21 0 - 50 U/L Final    AST 10 0 - 45 U/L Final         3/18/2018  9:02 PM      Component Results     Component Value Ref Range & Units Status    Lipase 119 73 - 393 U/L Final         3/18/2018  8:49 PM      Component Results     Component Value Ref Range & Units Status    Color Urine Straw  Final    Appearance Urine Clear  Final    Glucose Urine Negative NEG^Negative mg/dL Final    Bilirubin Urine Negative NEG^Negative Final    Ketones Urine Negative NEG^Negative mg/dL Final    Specific Gravity Urine  1.002 (L) 1.003 - 1.035 Final    Blood Urine Negative NEG^Negative Final    pH Urine 7.5 (H) 5.0 - 7.0 pH Final    Protein Albumin Urine Negative NEG^Negative mg/dL Final    Urobilinogen mg/dL Normal 0.0 - 2.0 mg/dL Final    Nitrite Urine Negative NEG^Negative Final    Leukocyte Esterase Urine Negative NEG^Negative Final    Source Midstream Urine  Final    WBC Urine 1 0 - 5 /HPF Final    RBC Urine <1 0 - 2 /HPF Final    Bacteria Urine Few (A) NEG^Negative /HPF Final         3/18/2018 11:08 PM      Narrative     US RENAL COMPLETE   3/18/2018 11:02 PM     HISTORY: Right flank pain, recent diagnosis of renal mass, possible  cancer, evaluate hydronephrosis.    COMPARISON: CT 3/5/2018.    FINDINGS:  The kidneys are normal in size, shape and position. The  right kidney measures 10.1 x 5.7 x 4.1 cm with cortical thickness of  2.1 cm. The left kidney measures 10.9 x 6.9 x 5.3 cm with cortical  thickness of 2.3 cm. A hypoechoic, exophytic lesion in the lower pole  of the right kidney is consistent with a cyst seen on CT and measures  up to 2.5 cm. The known solid mass at the lower pole of the right  kidney is not definitely seen. There is no hydronephrosis on either  side. Urinary bladder is well distended and appears normal.        Impression     IMPRESSION:  1. No hydronephrosis.  2. Small exophytic cyst seen at the lower pole of the right kidney.  The solid right renal mass seen on recent CT is not definitely  identified.                  Clinical Quality Measure: Blood Pressure Screening     Your blood pressure was checked while you were in the emergency department today. The last reading we obtained was  BP: 190/86 . Please read the guidelines below about what these numbers mean and what you should do about them.  If your systolic blood pressure (the top number) is less than 120 and your diastolic blood pressure (the bottom number) is less than 80, then your blood pressure is normal. There is nothing more that you need  "to do about it.  If your systolic blood pressure (the top number) is 120-139 or your diastolic blood pressure (the bottom number) is 80-89, your blood pressure may be higher than it should be. You should have your blood pressure rechecked within a year by a primary care provider.  If your systolic blood pressure (the top number) is 140 or greater or your diastolic blood pressure (the bottom number) is 90 or greater, you may have high blood pressure. High blood pressure is treatable, but if left untreated over time it can put you at risk for heart attack, stroke, or kidney failure. You should have your blood pressure rechecked by a primary care provider within the next 4 weeks.  If your provider in the emergency department today gave you specific instructions to follow-up with your doctor or provider even sooner than that, you should follow that instruction and not wait for up to 4 weeks for your follow-up visit.        Thank you for choosing [facility]       Thank you for choosing [facility] for your care. Our goal is always to provide you with excellent care. Hearing back from our patients is one way we can continue to improve our services. Please take a few minutes to complete the written survey that you may receive in the mail after you visit with us. Thank you!        [employer] Information     [employer] lets you send messages to your doctor, view your test results, renew your prescriptions, schedule appointments and more. To sign up, go to [email or web address] . Click on \"Log in\" on the left side of the screen, which will take you to the Welcome page. Then click on \"Sign up Now\" on the right side of the page.     You will be asked to enter the access code listed below, as well as some personal information. Please follow the directions to create your username and password.     Your access code is: [email or web address]  Expires: 2018  7:56 PM     Your access code will  in 90 days. If you need help or a new code, " please call your [facility] clinic or [phone].        Care EveryWhere ID     This is your Care EveryWhere ID. This could be used by other organizations to access your [facility] medical records  [id number]        Equal Access to Services     VALENTE RIVERA : Marc Leonard, waaislinnda luqadaha, qaybta kaalmada mary, emilee coyne. So [facility] [phone].    ATENCIÓN: Si habla español, tiene a christianson disposición servicios gratuitos de asistencia lingüística. Llame al [phone].    We comply with applicable federal civil rights laws and [place] laws. We do not discriminate on the basis of race, color, national origin, age, disability, sex, sexual orientation, or gender identity.            After Visit Summary       This is your record. Keep this with you and show to your community pharmacist(s) and doctor(s) at your next visit.

## 2018-03-18 NOTE — ED AVS SNAPSHOT
Emergency Department    [address] [place] [id number]    Phone:  [phone]    Fax:  [phone]                                       Ines Mott   MRN: [id number]    Department:   Emergency Department   Date of Visit:  3/18/2018           After Visit Summary Signature Page     I have received my discharge instructions, and my questions have been answered. I have discussed any challenges I see with this plan with the nurse or doctor.    ..........................................................................................................................................  Patient/Patient Representative Signature      ..........................................................................................................................................  Patient Representative Print Name and Relationship to Patient    ..................................................               ................................................  Date                                            Time    ..........................................................................................................................................  Reviewed by Signature/Title    ...................................................              ..............................................  Date                                                            Time

## 2018-03-19 VITALS
WEIGHT: 256 LBS | SYSTOLIC BLOOD PRESSURE: 145 MMHG | RESPIRATION RATE: 20 BRPM | OXYGEN SATURATION: 94 % | HEIGHT: 66 IN | TEMPERATURE: 98.3 F | DIASTOLIC BLOOD PRESSURE: 68 MMHG | HEART RATE: 68 BPM | BODY MASS INDEX: 41.14 KG/M2

## 2018-03-19 DIAGNOSIS — G89.3 CHRONIC PAIN DUE TO NEOPLASM: ICD-10-CM

## 2018-03-19 DIAGNOSIS — R11.0 NAUSEA: ICD-10-CM

## 2018-03-19 LAB
ALBUMIN SERPL-MCNC: 3.3 G/DL (ref 3.4–5)
ALP SERPL-CCNC: 84 U/L (ref 40–150)
ALT SERPL W P-5'-P-CCNC: 20 U/L (ref 0–50)
ANION GAP SERPL CALCULATED.3IONS-SCNC: 6 MMOL/L (ref 3–14)
AST SERPL W P-5'-P-CCNC: 14 U/L (ref 0–45)
BASOPHILS # BLD AUTO: 0 10E9/L (ref 0–0.2)
BASOPHILS NFR BLD AUTO: 0.2 %
BILIRUB DIRECT SERPL-MCNC: <0.1 MG/DL (ref 0–0.2)
BILIRUB SERPL-MCNC: 0.6 MG/DL (ref 0.2–1.3)
BUN SERPL-MCNC: 8 MG/DL (ref 7–30)
CALCIUM SERPL-MCNC: 9.1 MG/DL (ref 8.5–10.1)
CHLORIDE SERPL-SCNC: 108 MMOL/L (ref 94–109)
CO2 SERPL-SCNC: 28 MMOL/L (ref 20–32)
CREAT SERPL-MCNC: 0.73 MG/DL (ref 0.52–1.04)
DIFFERENTIAL METHOD BLD: NORMAL
EOSINOPHIL # BLD AUTO: 0.3 10E9/L (ref 0–0.7)
EOSINOPHIL NFR BLD AUTO: 3 %
ERYTHROCYTE [DISTWIDTH] IN BLOOD BY AUTOMATED COUNT: 12.9 % (ref 10–15)
GFR SERPL CREATININE-BSD FRML MDRD: 83 ML/MIN/1.7M2
GLUCOSE SERPL-MCNC: 93 MG/DL (ref 70–99)
HCT VFR BLD AUTO: 37.8 % (ref 35–47)
HGB BLD-MCNC: 12.3 G/DL (ref 11.7–15.7)
IMM GRANULOCYTES # BLD: 0 10E9/L (ref 0–0.4)
IMM GRANULOCYTES NFR BLD: 0.3 %
LIPASE SERPL-CCNC: 87 U/L (ref 73–393)
LYMPHOCYTES # BLD AUTO: 2.6 10E9/L (ref 0.8–5.3)
LYMPHOCYTES NFR BLD AUTO: 29.7 %
MCH RBC QN AUTO: 29.1 PG (ref 26.5–33)
MCHC RBC AUTO-ENTMCNC: 32.5 G/DL (ref 31.5–36.5)
MCV RBC AUTO: 90 FL (ref 78–100)
MONOCYTES # BLD AUTO: 0.6 10E9/L (ref 0–1.3)
MONOCYTES NFR BLD AUTO: 6.5 %
NEUTROPHILS # BLD AUTO: 5.3 10E9/L (ref 1.6–8.3)
NEUTROPHILS NFR BLD AUTO: 60.3 %
NRBC # BLD AUTO: 0 10*3/UL
NRBC BLD AUTO-RTO: 0 /100
PLATELET # BLD AUTO: 314 10E9/L (ref 150–450)
POTASSIUM SERPL-SCNC: 4 MMOL/L (ref 3.4–5.3)
PROT SERPL-MCNC: 7 G/DL (ref 6.8–8.8)
RBC # BLD AUTO: 4.22 10E12/L (ref 3.8–5.2)
SODIUM SERPL-SCNC: 142 MMOL/L (ref 133–144)
WBC # BLD AUTO: 8.8 10E9/L (ref 4–11)

## 2018-03-19 PROCEDURE — 99284 EMERGENCY DEPT VISIT MOD MDM: CPT | Mod: 25

## 2018-03-19 PROCEDURE — 25000132 ZZH RX MED GY IP 250 OP 250 PS 637: Performed by: EMERGENCY MEDICINE

## 2018-03-19 PROCEDURE — 80048 BASIC METABOLIC PNL TOTAL CA: CPT | Performed by: EMERGENCY MEDICINE

## 2018-03-19 PROCEDURE — 85025 COMPLETE CBC W/AUTO DIFF WBC: CPT | Performed by: EMERGENCY MEDICINE

## 2018-03-19 PROCEDURE — 83690 ASSAY OF LIPASE: CPT | Performed by: EMERGENCY MEDICINE

## 2018-03-19 PROCEDURE — 96374 THER/PROPH/DIAG INJ IV PUSH: CPT

## 2018-03-19 PROCEDURE — 25000128 H RX IP 250 OP 636: Performed by: EMERGENCY MEDICINE

## 2018-03-19 PROCEDURE — 96361 HYDRATE IV INFUSION ADD-ON: CPT

## 2018-03-19 PROCEDURE — 80076 HEPATIC FUNCTION PANEL: CPT | Performed by: EMERGENCY MEDICINE

## 2018-03-19 RX ORDER — ONDANSETRON 2 MG/ML
4 INJECTION INTRAMUSCULAR; INTRAVENOUS ONCE
Status: DISCONTINUED | OUTPATIENT
Start: 2018-03-19 | End: 2018-03-19 | Stop reason: HOSPADM

## 2018-03-19 RX ORDER — OXYCODONE AND ACETAMINOPHEN 5; 325 MG/1; MG/1
2 TABLET ORAL ONCE
Status: COMPLETED | OUTPATIENT
Start: 2018-03-19 | End: 2018-03-19

## 2018-03-19 RX ORDER — LABETALOL HYDROCHLORIDE 5 MG/ML
20 INJECTION, SOLUTION INTRAVENOUS ONCE
Status: DISCONTINUED | OUTPATIENT
Start: 2018-03-19 | End: 2018-03-19 | Stop reason: HOSPADM

## 2018-03-19 RX ADMIN — OXYCODONE HYDROCHLORIDE AND ACETAMINOPHEN 2 TABLET: 5; 325 TABLET ORAL at 14:31

## 2018-03-19 RX ADMIN — SODIUM CHLORIDE 1000 ML: 9 INJECTION, SOLUTION INTRAVENOUS at 14:27

## 2018-03-19 RX ADMIN — PROCHLORPERAZINE EDISYLATE 5 MG: 5 INJECTION INTRAMUSCULAR; INTRAVENOUS at 14:31

## 2018-03-19 ASSESSMENT — ENCOUNTER SYMPTOMS
VOMITING: 1
NAUSEA: 1
FLANK PAIN: 1

## 2018-03-19 NOTE — ED PROVIDER NOTES
History     Chief Complaint:  Nausea & Vomiting    HPI   Ines Mott is a 53 year old female with a history of hypertension, hyperlipidemia, liver cancer, and prediabetes who presents to the ED for evaluation of nausea & vomiting. The patient reports recently being informed that her cancer has now metastasized to her kidney, and she is scheduled for a biopsy on 3/23/18 of this month. She was seen here by Dr. Solorio yesterday for similar symptoms in addition to abdominal pain and urinary symptoms, and had a Retroperitoneal US, as below. An exophilic cyst involving the right kidney was seen on the US. A solid right renal mass, recently seen on CT, was not definitively seen. Screening blood work and urinalysis were normal. She was given Zofran, Morphine, a dose of Percocet, and she was discharged.    Here in the ED, the patient reports that she has had an increase in flank pain, nausea, & vomiting since yesterday, and states that she is not able to get a refill of her Zofran until her PCP is available in 3 days. Thus she returned to the ED for evaluation and control of her symptoms. Additionally, she notes she has been unable to tolerate PO. EMS was called, and gave her Zofran ODT en route. For previous abdomen surgeries, she reports having a hysterectomy, cholecystectomy, appendectomy, and one fallopian tube removed. Of note, her Nephrologist is at [facility].     US Retroperitoneal (3/18/18):  IMPRESSION:  1. No hydronephrosis.  2. Small exophytic cyst seen at the lower pole of the right kidney.  The solid right renal mass seen on recent CT is not definitely  identified.  Results per radiology.     Allergies:  Ativan [Lorazepam]  Macrobid [Nitrofurantoin]  Amoxicillin  Buspirone  Busulfan  Cefaclor  Cephalosporins  Ciprofloxacin  Clindamycin  Dilaudid [Hydromorphone]  Diphenhydramine Hcl  Imitrex [Sumatriptan Succinate]  Ketorolac  "Tromethamine  Lansoprazole  Lansoprazole  Latex  Metoclopramide  Paroxetine  Penicillins  Prednisone  Red Dye  Sulfa Drugs  Lidoderm [Lidocaine]       Medications:    Miralax  Percocet  Zofran-ODT  Kenalog  Zoloft  Hyzaar  Klor-con  Epipen  Mag-ox  Potassium chloride  Vitamin B12     Past Medical History:    Arthritis  Asthma  Cancer (liver)  Chronic pain syndrome  Depressive disorder  Diabetes   Hydrocephalus  Hypertension  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  Methamphetamine abuse  Migraines  Mild intermittent asthma  Obesity  Right renal mass  Spina bifida  PTSD  Dyspnea and respiratory abnormality  Fibromyalgia  ALEXUS  Myelomeningocele with hydrocephalus, cervical region  Carcinoma of colon metastatic to liver  Angioedema  Pneumonia due to other gram-negative bacteria  Hyperlipidemia  Infected tooth  Abdominal pain, right lateral  Migraine  Intracranial shunt  Seasonal affective disorder  Anxiety  Prediabetes  Degeneration of lumbosacral intervertebral disc  Chronic tension-type headache, not intractable  Chronic seasonal allergic rhinitis due to pollen  Bilateral low back pain without sciatica  Furuncle of skin or subcutaneous tissue     Past Surgical History:    Appendectomy  Cholecystectomy  Dental extractions  Hysterectomy   GYN surgery  Implant shunt ventriculoperitoneal      Family History:    Cancer     Social History:   Tobacco use: Former smoker  Alcohol use: Occasionally   Marital Status:         Review of Systems   Gastrointestinal: Positive for nausea and vomiting.   Genitourinary: Positive for flank pain.   All other systems reviewed and are negative.    Physical Exam     Patient Vitals for the past 24 hrs:   BP Temp Temp src Pulse Resp SpO2 Height Weight   03/19/18 1530 154/70 - - - - 94 % - -   03/19/18 1457 166/77 - - - - 95 % - -   03/19/18 1428 150/88 - - 66 - 96 % - -   03/19/18 1421 - - - - - - 1.676 m (5' 6\") 116.1 kg (256 lb)   03/19/18 1419 (!) 176/127 98.3  F (36.8  C) " Oral 70 20 96 % - -     Physical Exam  General: Resting comfortably on the gurney, no emesis at this time  Head:  The scalp, face, and head appear normal  Eyes:  The pupils are equal, round, and reactive to light    There is no nystagmus    Extraocular muscles are intact    Conjunctivae and sclerae are normal  ENT:    The nose is normal    Pinnae are normal    The oropharynx is normal    Uvula is in the midline  Neck:  Normal range of motion    There is no rigidity noted    There is no midline cervical spine pain/tenderness    Trachea is in the midline    No mass is detected  CV:  Regular rate and underlying rhythm     Normal S1/S2, no S3/S4    No pathological murmur detected  Resp:  Lungs are clear    There is no tachypnea    Non-labored    No rales    No wheezing   GI:  Abdomen is soft, there is no rigidity    No distension    No tympani    No rebound tenderness     Non-surgical without peritoneal features  MS:  Normal muscular tone    Symmetric motor strength    No major joint effusions    No asymmetric leg swelling, no calf tenderness  Skin:  No rash or acute skin lesions noted  Neuro: Speech is normal and fluent  Psych:  Awake. Alert.      Normal affect.  Appropriate interactions.  Lymph: No anterior cervical lymphadenopathy noted    Emergency Department Course     Laboratory:  CBC: WBC: 8.8, HGB: 12.3, PLT: 314  BMP: All WNL (Creatinine: 0.73)    Hepatic Panel: Albumin 3.3 (L), o/w WNL    Lipase: 87    Interventions:  1427 NS 1L IV  1431 Compazine 5 mg IV   Percocet 5-325 mg per tablet, 2 tablets PO   Please see MAR for full list of medications administered in the ED.    Emergency Department Course:  Nursing notes and vitals reviewed. (1410) I performed an exam of the patient as documented above.     IV inserted. Medicine administered as documented above. Blood drawn. This was sent to the lab for further testing, results above.    (1620) I rechecked the patient and discussed the results of her workup thus far.      Findings and plan explained to the Patient. Patient discharged home with instructions regarding supportive care, medications, and reasons to return. The importance of close follow-up was reviewed.    I personally reviewed the laboratory results with the Patient and answered all related questions prior to discharge.     Impression & Plan      Medical Decision Making:  Ines Mott is a 53 year old female presents with abdominal pain nausea reports of vomiting right flank soreness.  She was seen yesterday and had a negative evaluation.  The patient's prescriptions are restricted to primary care.  She is currently out of her Zofran and oxycodone as she consume them more rapidly than expected.  Her prescriptions from the emergency department are not filled.  Patient was given Zofran a Percocet and prochlorperazine here with good improvement in symptoms.  I cannot offered any take home prescriptions given her restricted status.  I encouraged her to call her primary care physician tomorrow to see if her prescriptions could be expedited.  No life-threatening etiologies are detected in the abdomen at this juncture.  A broad differential diagnosis is considered.  I have known Ms. Mott for many years and she appears at her baseline.    Diagnosis:    ICD-10-CM   1. Chronic pain due to neoplasm G89.3   2. Nausea R11.0       Disposition:  discharged to home    Scribe Disclosure:  I, Donna Reynolds, am serving as a scribe on 3/19/2018 at 2:29 PM to personally document services performed by Zachary Avina MD based on my observations and the provider's statements to me.     Donna Reynolds  3/19/2018    EMERGENCY DEPARTMENT        Zachary Avina MD  03/19/18 [id number]

## 2018-03-19 NOTE — ED PROVIDER NOTES
History     Chief Complaint:  Abdominal pain    HPI   Ines Mott is a 53 year old female with a history of hypertension, hyperlipidemia, liver cancer, and prediabetes who presents to the ED for evaluation of abdominal pain. The patient reports recently being diagnosed with kidney cancer and is scheduled for a biopsy on 3/23/18 of this month. The patient reports having nausea, vomiting, diarrhea, right kidney pain, and increased urination all day today. She notes feeling distended in her abdomen as well. She has been unable to eat today. Yesterday she notes having a fever but this has resolved today. For previous abdomen surgeries, she reports having a hysterectomy, cholecystectomy, appendectomy, and one fallopian tube removed. No recent antibiotic use.    Allergies:  Ativan [Lorazepam]  Macrobid [Nitrofurantoin]  Amoxicillin  Buspirone  Busulfan  Cefaclor  Cephalosporins  Ciprofloxacin  Clindamycin  Dilaudid [Hydromorphone]  Diphenhydramine Hcl  Imitrex [Sumatriptan Succinate]  Ketorolac Tromethamine  Lansoprazole  Lansoprazole  Latex  Metoclopramide  Paroxetine  Penicillins  Prednisone  Red Dye  Sulfa Drugs  Lidoderm [Lidocaine]       Medications:    Miralax  Percocet  Zofran-ODT  Kenalog  Zoloft  Hyzaar  Klor-con  Epipen  Mag-ox  Potassium chloride  Vitamin B12     Past Medical History:    Arthritis  Asthma  Cancer (liver)  Chronic pain syndrome  Depressive disorder  Diabetes   Hydrocephalus  Hypertension  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  Methamphetamine abuse  Migraines  Mild intermittent asthma  Obesity  Right renal mass  Spina bifida  PTSD  Dyspnea and respiratory abnormality  Fibromyalgia  ALEXUS  Myelomeningocele with hydrocephalus, cervical region  Carcinoma of colon metastatic to liver  Angioedema  Pneumonia due to other gram-negative bacteria  Hyperlipidemia  Infected tooth  Abdominal pain, right lateral  Migraine  Intracranial shunt  Seasonal affective  "disorder  Anxiety  Prediabetes  Degeneration of lumbosacral intervertebral disc  Chronic tension-type headache, not intractable  Chronic seasonal allergic rhinitis due to pollen  Bilateral low back pain without sciatica  Furuncle of skin or subcutaneous tissue     Past Surgical History:    Appendectomy  Cholecystectomy  Dental extractions  Hysterectomy   GYN surgery  Implant shunt ventriculoperitoneal      Family History:    Cancer     Social History:   Tobacco use: Former smoker  Alcohol use: Occasionally   Marital Status:         Review of Systems   Constitutional: Positive for appetite change. Negative for chills, diaphoresis and fever.   Respiratory: Negative for shortness of breath.    Cardiovascular: Negative for chest pain.   Gastrointestinal: Positive for abdominal distention, abdominal pain, diarrhea, nausea and vomiting. Negative for blood in stool and constipation.   Genitourinary: Positive for flank pain (right kidney pain) and frequency.   Musculoskeletal: Negative for back pain.   All other systems reviewed and are negative.    Physical Exam   Patient Vitals for the past 24 hrs:   BP Temp Temp src Heart Rate SpO2 Height Weight   03/18/18 2130 - - - - 97 % - -   03/18/18 2129 190/86 - - 67 98 % - -   03/18/18 2048 (!) 188/92 - - - 97 % - -   03/18/18 2012 192/87 98.8  F (37.1  C) Oral 65 96 % 1.702 m (5' 7\") 116.1 kg (256 lb)     Physical Exam  GENERAL: well developed, pleasant  HEAD: atraumatic  EYES: pupils reactive, extraocular muscles intact, conjunctivae normal  ENT:  mucus membranes moist  NECK:  trachea midline, normal range of motion  RESPIRATORY: no tachypnea, breath sounds clear to auscultation   CVS: normal S1/S2, no murmurs, intact distal pulses  ABDOMEN: soft, nondistention, some right CVA tenderness  MUSCULOSKELETAL: no deformities  SKIN: warm and dry, no acute rashes or ulceration  NEURO: GCS 15, cranial nerves intact, alert and oriented x3  PSYCH:  Mood/affect normal    Emergency " Department Course     Imaging:  Radiographic findings were communicated with the patient who voiced understanding of the findings.    US Retroperitoneal:  IMPRESSION:  1. No hydronephrosis.  2. Small exophytic cyst seen at the lower pole of the right kidney.  The solid right renal mass seen on recent CT is not definitely  identified.  Results per radiology.    Laboratory:  CBC: WNL (WBC 9.3, HGB 11.9, )   CMP: WNL (Creatinine 0.90)  Lipase: 119  UA: Specific gravity 1.002 (L), pH 7.5 (H), Few bacteria, o/w Negative    Interventions:  2044: NS 1L IV Bolus  2044: Zofran 4mg IV injection   2046: Morphine 4 mg IV injection  2129: Percocet 5-325 mg tablet PO    Emergency Department Course:  Past medical records, nursing notes, and vitals reviewed.  2031: I performed an exam of the patient and obtained history, as documented above. GCS 15.    IV inserted and blood drawn.    The patient was sent for an ultrasound while in the emergency department, findings above.    2336: I rechecked the patient. Findings and plan explained to the Patient. Patient discharged home with instructions regarding supportive care, medications, and reasons to return. The importance of close follow-up was reviewed.     Impression & Plan      Medical Decision Making:  Ines Mott is a 53 year old female who presents with vomiting, diarrhea, and abdominal pain. She does have a care plan but was recently diagnosed with an abnormality in her right kidney, suggesting a possible malignancy. Labs are unremarkable. She is given fluids, nausea medication, and pain medication. She did require morphine and noted that the last time I saw her I would not give her anything and this time was giving morphine and was inquiring how much. She was then given percocet and was inquiring about the dose but she noted that she takes that at home. We are currently waiting for ultrasound to see that there is no evidence of obstruction. I do not see any evidence  of infection and there is no mention of stones. Unlikely that the tumor would suddenly develop an obstructive process but currently awaiting ultrasound to confirm this. US is noted above, no obstruction.    Diagnosis:    ICD-10-CM   1. Gastroenteritis K52.9   2. Flank pain R10.9       Disposition:  discharged to home    Discharge Medications:  New Prescriptions    ONDANSETRON (ZOFRAN ODT) 4 MG ODT TAB    Take 1 tablet (4 mg) by mouth every 8 hours as needed         Lucretia Barfield  3/18/2018    EMERGENCY DEPARTMENT  I, Lucretia Barfield, am serving as a scribe at 8:31 PM on 3/18/2018 to document services personally performed by Brett Solorio MD based on my observations and the provider's statements to me.        Brett Solorio MD  03/19/18 [id number]

## 2018-03-19 NOTE — ED AVS SNAPSHOT
Emergency Department    [address] [facility] [address]    Phone:  [phone]    Fax:  [phone]                                       Ines Mott   MRN: [id number]    Department:   Emergency Department   Date of Visit:  3/19/2018           Patient Information     Date Of Birth          1965        Your diagnoses for this visit were:     Chronic pain due to neoplasm     Nausea        You were seen by Zachary Avina MD.      Discharge References/Attachments     VOMITING (6Y-ADULT) (ENGLISH)    CHRONIC PAIN (ENGLISH)      Future Appointments        Provider Department Dept Phone Center    3/23/2018 10:00 AM OTTO YUSUF MD [facility] [phone] [place]    5/13/2018 8:30 PM BED 3  SLEEP [facility] [phone] [facility]    5/31/2018 10:30 AM Aniceto Mujica MD [facility] [phone] [facility]      24 Hour Appointment Hotline       To make an appointment at any [facility], call [phone] ([phone]). If you don't have a family doctor or clinic, we will help you find one. [facility] are conveniently located to serve the needs of you and your family.             Review of your medicines      Our records show that you are taking the medicines listed below. If these are incorrect, please call your family doctor or clinic.        Dose / Directions Last dose taken    albuterol (2.5 MG/3ML) 0.083% neb solution   Dose:  1 vial   Quantity:  30 vial        Take 1 vial (2.5 mg) by nebulization every 6 hours as needed for shortness of breath / dyspnea or wheezing   Refills:  3        ASPIRIN NOT PRESCRIBED   Commonly known as:  INTENTIONAL   Quantity:  1 each        Please choose reason not prescribed, below   Refills:  0        blood glucose calibration solution   Commonly known as:  no brand specified   Quantity:  1 each        Use to calibrate blood glucose monitor as directed.    Refills:  0        blood glucose lancets standard   Commonly known as:  no brand specified   Quantity:  100 each        Use to test blood sugar daily times daily or as directed.   Refills:  11        blood glucose monitoring meter device kit   Commonly known as:  no brand specified   Quantity:  1 kit        Use to test blood sugar larisa times daily or as directed.   Refills:  0        blood glucose monitoring test strip   Commonly known as:  no brand specified   Quantity:  100 strip        Use to test blood sugars once times daily or as directed   Refills:  3        cyanocobalamin 1000 MCG/ML injection   Commonly known as:  VITAMIN B12   Dose:  1 mL   Quantity:  1 mL        Inject 1 mL (1,000 mcg) Subcutaneous every 30 days   Refills:  11        EPINEPHrine 0.3 MG/0.3ML injection 2-pack   Commonly known as:  EPIPEN/ADRENACLICK/or ANY BX GENERIC EQUIV   Dose:  0.3 mg   Quantity:  2 each        Inject 0.3 mLs (0.3 mg) into the muscle once as needed for anaphylaxis   Refills:  5        losartan-hydrochlorothiazide 50-12.5 MG per tablet   Commonly known as:  HYZAAR   Dose:  1 tablet   Quantity:  90 tablet        Take 1 tablet by mouth daily   Refills:  3        magnesium oxide 400 (241.3 MG) MG tablet   Commonly known as:  MAG-OX   Dose:  400 mg   Quantity:  90 tablet        Take 1 tablet (400 mg) by mouth daily   Refills:  3        * ondansetron 4 MG ODT tab   Commonly known as:  ZOFRAN-ODT   Dose:  4 mg   Quantity:  10 tablet        Take 1 tablet (4 mg) by mouth every 8 hours as needed for nausea   Refills:  1        * ondansetron 4 MG ODT tab   Commonly known as:  ZOFRAN ODT   Dose:  4 mg   Quantity:  10 tablet        Take 1 tablet (4 mg) by mouth every 8 hours as needed   Refills:  0        order for DME   Quantity:  1 Device        Right wrist splint for carpal tunnel syndrome  One* Use as directed at hour of sleep   Refills:  1        oxyCODONE-acetaminophen 5-325 MG per tablet   Commonly known as:  PERCOCET    Dose:  1 tablet   Quantity:  30 tablet   Start taking on:  3/22/2018        Take 1 tablet by mouth every 6 hours as needed for moderate to severe pain Max 4 tab/day   Refills:  0        polyethylene glycol powder   Commonly known as:  MIRALAX/GLYCOLAX   Quantity:  1581 g        TAKE 17 GRAMS (1 CAPFUL) BY MOUTH DAILY   Refills:  11        potassium chloride 10 MEQ tablet   Commonly known as:  K-TAB,KLOR-CON   Dose:  10 mEq   Indication:  Low Amount of Potassium in the Blood   Quantity:  120 tablet        Take 1 tablet (10 mEq) by mouth daily   Refills:  11        sertraline 100 MG tablet   Commonly known as:  ZOLOFT   Dose:  150 mg   Indication:  Major Depressive Disorder   Quantity:  45 tablet        Take 1.5 tablets (150 mg) by mouth daily   Refills:  11        STATIN NOT PRESCRIBED (INTENTIONAL)   Dose:  1 each   Quantity:  0 each        1 each daily Statin not prescribed intentionally due to Active liver disease (liver failure, cirrhosis, hepatitis) LIVER METS   Refills:  0        triamcinolone 0.1 % ointment   Commonly known as:  KENALOG   Quantity:  80 g        Apply sparingly to affected area three times daily for 14 days.   Refills:  0        Vitamin D (Cholecalciferol) 1000 UNITS Tabs   Dose:  2000 Units   Quantity:  60 tablet        Take 2,000 Units by mouth daily   Refills:  11        * Notice:  This list has 2 medication(s) that are the same as other medications prescribed for you. Read the directions carefully, and ask your doctor or other care provider to review them with you.            Procedures and tests performed during your visit     Basic metabolic panel    CBC with platelets differential    Hepatic panel    IV access    Lipase      Orders Needing Specimen Collection     None      Pending Results     No orders found from 3/17/2018 to 3/20/2018.            Pending Culture Results     No orders found from 3/17/2018 to 3/20/2018.            Pending Results Instructions     If you had any lab  results that were not finalized at the time of your Discharge, you can call the ED Lab Result RN at [phone]. You will be contacted by this team for any positive Lab results or changes in treatment. The nurses are available 7 days a week from 10A to 6:30P.  You can leave a message 24 hours per day and they will return your call.        Test Results From Your Hospital Stay        3/19/2018  2:42 PM      Component Results     Component Value Ref Range & Units Status    WBC 8.8 4.0 - 11.0 10e9/L Final    RBC Count 4.22 3.8 - 5.2 10e12/L Final    Hemoglobin 12.3 11.7 - 15.7 g/dL Final    Hematocrit 37.8 35.0 - 47.0 % Final    MCV 90 78 - 100 fl Final    MCH 29.1 26.5 - 33.0 pg Final    MCHC 32.5 31.5 - 36.5 g/dL Final    RDW 12.9 10.0 - 15.0 % Final    Platelet Count 314 150 - 450 10e9/L Final    Diff Method Automated Method  Final    % Neutrophils 60.3 % Final    % Lymphocytes 29.7 % Final    % Monocytes 6.5 % Final    % Eosinophils 3.0 % Final    % Basophils 0.2 % Final    % Immature Granulocytes 0.3 % Final    Nucleated RBCs 0 0 /100 Final    Absolute Neutrophil 5.3 1.6 - 8.3 10e9/L Final    Absolute Lymphocytes 2.6 0.8 - 5.3 10e9/L Final    Absolute Monocytes 0.6 0.0 - 1.3 10e9/L Final    Absolute Eosinophils 0.3 0.0 - 0.7 10e9/L Final    Absolute Basophils 0.0 0.0 - 0.2 10e9/L Final    Abs Immature Granulocytes 0.0 0 - 0.4 10e9/L Final    Absolute Nucleated RBC 0.0  Final         3/19/2018  3:04 PM      Component Results     Component Value Ref Range & Units Status    Sodium 142 133 - 144 mmol/L Final    Potassium 4.0 3.4 - 5.3 mmol/L Final    Chloride 108 94 - 109 mmol/L Final    Carbon Dioxide 28 20 - 32 mmol/L Final    Anion Gap 6 3 - 14 mmol/L Final    Glucose 93 70 - 99 mg/dL Final    Urea Nitrogen 8 7 - 30 mg/dL Final    Creatinine 0.73 0.52 - 1.04 mg/dL Final    GFR Estimate 83 >60 mL/min/1.7m2 Final    Non  GFR Calc    GFR Estimate If Black >90 >60 mL/min/1.7m2 Final    African American  GFR Calc    Calcium 9.1 8.5 - 10.1 mg/dL Final         3/19/2018  3:06 PM      Component Results     Component Value Ref Range & Units Status    Bilirubin Direct <0.1 0.0 - 0.2 mg/dL Final    Bilirubin Total 0.6 0.2 - 1.3 mg/dL Final    Albumin 3.3 (L) 3.4 - 5.0 g/dL Final    Protein Total 7.0 6.8 - 8.8 g/dL Final    Alkaline Phosphatase 84 40 - 150 U/L Final    ALT 20 0 - 50 U/L Final    AST 14 0 - 45 U/L Final         3/19/2018  3:04 PM      Component Results     Component Value Ref Range & Units Status    Lipase 87 73 - 393 U/L Final                Clinical Quality Measure: Blood Pressure Screening     Your blood pressure was checked while you were in the emergency department today. The last reading we obtained was  BP: (!) 156/97 . Please read the guidelines below about what these numbers mean and what you should do about them.  If your systolic blood pressure (the top number) is less than 120 and your diastolic blood pressure (the bottom number) is less than 80, then your blood pressure is normal. There is nothing more that you need to do about it.  If your systolic blood pressure (the top number) is 120-139 or your diastolic blood pressure (the bottom number) is 80-89, your blood pressure may be higher than it should be. You should have your blood pressure rechecked within a year by a primary care provider.  If your systolic blood pressure (the top number) is 140 or greater or your diastolic blood pressure (the bottom number) is 90 or greater, you may have high blood pressure. High blood pressure is treatable, but if left untreated over time it can put you at risk for heart attack, stroke, or kidney failure. You should have your blood pressure rechecked by a primary care provider within the next 4 weeks.  If your provider in the emergency department today gave you specific instructions to follow-up with your doctor or provider even sooner than that, you should follow that instruction and not wait for up to 4  "weeks for your follow-up visit.        Thank you for choosing [facility]       Thank you for choosing [facility] for your care. Our goal is always to provide you with excellent care. Hearing back from our patients is one way we can continue to improve our services. Please take a few minutes to complete the written survey that you may receive in the mail after you visit with us. Thank you!        [employer] Information     [employer] lets you send messages to your doctor, view your test results, renew your prescriptions, schedule appointments and more. To sign up, go to [email or web address] . Click on \"Log in\" on the left side of the screen, which will take you to the Welcome page. Then click on \"Sign up Now\" on the right side of the page.     You will be asked to enter the access code listed below, as well as some personal information. Please follow the directions to create your username and password.     Your access code is: [email or web address]  Expires: 2018  7:56 PM     Your access code will  in 90 days. If you need help or a new code, please call your [facility] clinic or [phone].        Care EveryWhere ID     This is your Care EveryWhere ID. This could be used by other organizations to access your [facility] medical records  [id number]        Equal Access to Services     VALENTE RIVERA : Marc Leonard, waaxda luqadaha, qaybta kaalmada adeamandeepyakandy, emilee coyne. So [facility] [phone].    ATENCIÓN: Si habla español, tiene a christianson disposición servicios gratuitos de asistencia lingüística. Llame al [phone].    We comply with applicable federal civil rights laws and [place] laws. We do not discriminate on the basis of race, color, national origin, age, disability, sex, sexual orientation, or gender identity.            After Visit Summary       This is your record. Keep this with you and show to your community pharmacist(s) and doctor(s) at your next visit.                "

## 2018-03-19 NOTE — DISCHARGE INSTRUCTIONS
"   * FOOD POISONING or VIRAL GASTROENTERITIS (6yr-Adult)  FOOD POISONING may occur from 6 to 24 hours after eating food that has spoiled and lasts up to1-2 days. VIRAL GASTRO-ENTERITIS is commonly known as the \"stomach flu\" and may last 2-7 days. Symptoms of both illnesses may include vomiting, diarrhea, fever, abdominal cramping. Antibiotics are not effective, but simple home treatment will be helpful.  HOME CARE:    If symptoms are severe, rest at home for the next 24 hours.    Avoid tobacco and alcohol. These may worsen your symptoms.    If medicines for diarrhea (low dose of Immodium: one tablet a day for an adult) or vomiting were prescribed, take only as directed.   During the first 12 to 24 hours follow the diet below:    DRINKS: Sport drinks like Gatorade, soft drinks without caffeine; ginger ale, mineral water (plain or flavored), decaffeinated tea and coffee.    SOUPS: Clear broth, consommé and bouillon    DESSERTS: Plain gelatin (Jell-O), popsicles and fruit juice bars.  During the next 24 hours you may add the following to the above:    Hot cereal, plain toast, bread, rolls, crackers    Plain noodles, rice, mashed potatoes, chicken noodle or rice soup    Unsweetened canned fruit (avoid pineapple), bananas    Limit fat intake to less than 15 grams per day by avoiding margarine, butter, oils, mayonnaise, sauces, gravies, fried foods, peanut butter, meat, poultry and fish.    Limit fiber; avoid raw or cooked vegetables, fresh fruits (except bananas) and bran cereals.    Limit caffeine and chocolate. No spices or seasonings except salt.  Slowly go back to a normal diet as you feel better and your symptoms lessen.  FOLLOW UP with your doctor as advised if you are not better in 2 days. If a stool (diarrhea) sample was taken, you may call in 2 days (or as directed) for the results.  GET PROMPT MEDICAL ATTENTION if any of the following occur:    Increasing abdominal pain or constant pain in one spot    Continued " vomiting (unable to keep liquids down)    Frequent diarrhea (more than 5 times a day)    Blood in vomit or stool (black or red color)    Unable to take in fluids at all    No urine output for 12 hours or extreme thirst    Weakness, dizziness, fainting    Drowsiness, confusion, stiff neck or seizure    Fever over 101.0  F (38.3  C) for more than 3 days    New rash    8713-5236 [employer]. [address] [place] [address], [place] [address]. All rights reserved. This information is not intended as a substitute for professional medical care. Always follow your healthcare professional's instructions.  This information has been modified by your health care provider with permission from the publisher.    Discharge Instructions  Abdominal Pain    Abdominal pain can be caused by many things. Your evaluation today does not show the exact cause for your pain. Your doctor today has decided that it is unlikely your pain is due to a life threatening problem, or a problem requiring surgery or hospital admission. Sometimes those problems cannot be found right away, so it is very important that you follow up as directed.  Sometimes only the changes which occur over time allow the cause of your pain to be found.    Return to the Emergency Department for a recheck in 8-12 hours if your pain continues.  If your pain gets worse, changes in location, or feels different, return to the Emergency Department right away.    ADULTS:  Return to the Emergency Department right away if:      You get an oral temperature above 102oF or as directed by your doctor.    You have blood in your stools (bright red or black, tarry stools).    You keep throwing up or can t drink liquids.    You see blood when you throw up.    You can t have a bowel movement or you can t pass gas.    Your stomach gets bloated or bigger.    Your skin or the whites of your eyes look yellow.    You faint.    You have bloody, frequent or painful urination.    You have new  symptoms or anything that worries you.    CHILDREN:  Return to the Emergency Department right away if your child has any of the above-listed symptoms or the following:      Pushes your hand away or screams/cries when his/her belly is touched.    You notice your child is very fussy or weak.    Your child is very tired and is too tired to eat or drink.    Your child is dehydrated.  Signs of dehydration can be:  o Your infant has had no wet diapers in 4-5 hours.  o Your older child has not passed urine in 6-8 hours.  o Your infant or child starts to have dry mouth and lips, or no saliva or tears.    PREGNANT WOMEN:  Return to the Emergency Department right away if you have any of the above-listed symptoms or the following:      You have bleeding, leaking fluid or passing tissue from the vagina.    You have worse pain or cramping, or pain in your shoulder or back.    You have vomiting that will not stop.    You have painful or bloody urination.    You have a temperature of 100oF or more.    Your baby is not moving as much as usual.    You faint.    You get a bad headache with or without eye problems and abdominal pain.    You have a convulsion or seizure.    You have unusual discharge from your vagina and abdominal pain.    Abdominal pain is pretty common during pregnancy.  Your pain may or may not be related to your pregnancy. You should follow-up closely with your OB doctor so they can evaluate you and your baby.  Until you follow-up with your regular doctor, do the following:       Avoid sex and do not put anything in your vagina.    Drink clear fluids.    Only take medications approved by your doctor.    MORE INFORMATION:    Appendicitis:  A possible cause of abdominal pain in any person who still has their appendix is acute appendicitis. Appendicitis is often hard to diagnose.  Testing does not always rule out early appendicitis or other causes of abdominal pain. Close follow-up with your doctor and re-evaluations  "may be needed to figure out the reason for your abdominal pain.    Follow-up:  It is very important that you make an appointment with your clinic and go to the appointment.  If you do not follow-up with your primary doctor, it may result in missing an important development which could result in permanent injury or disability and/or lasting pain.  If there is any problem keeping your appointment, call your doctor or return to the Emergency Department.    Medications:  Take your medications as directed by your doctor today.  Before using over-the-counter medications, ask your doctor and make sure to take the medications as directed.  If you have any questions about medications, ask your doctor.    Diet:  Resume your normal diet as much as possible, but do not eat fried, fatty or spicy foods while you have pain.  Do not drink alcohol or have caffeine.  Do not smoke tobacco.    Probiotics: If you have been given an antibiotic, you may want to also take a probiotic pill or eat yogurt with live cultures. Probiotics have \"good bacteria\" to help your intestines stay healthy. Studies have shown that probiotics help prevent diarrhea and other intestine problems (including C. diff infection) when you take antibiotics. You can buy these without a prescription in the pharmacy section of the store.     If you were given a prescription for medicine here today, be sure to read all of the information (including the package insert) that comes with your prescription.  This will include important information about the medicine, its side effects, and any warnings that you need to know about.  The pharmacist who fills the prescription can provide more information and answer questions you may have about the medicine.  If you have questions or concerns that the pharmacist cannot address, please call or return to the Emergency Department.         Opioid Medication Information    Pain medications are among the most commonly prescribed " medicines, so we are including this information for all our patients. If you did not receive pain medication or get a prescription for pain medicine, you can ignore it.     You may have been given a prescription for an opioid (narcotic) pain medicine and/or have received a pain medicine while here in the Emergency Department. These medicines can make you drowsy or impaired. You must not drive, operate dangerous equipment, or engage in any other dangerous activities while taking these medications. If you drive while taking these medications, you could be arrested for DUI, or driving under the influence. Do not drink any alcohol while you are taking these medications.     Opioid pain medications can cause addiction. If you have a history of chemical dependency of any type, you are at a higher risk of becoming addicted to pain medications.  Only take these prescribed medications to treat your pain when all other options have been tried. Take it for as short a time and as few doses as possible. Store your pain pills in a secure place, as they are frequently stolen and provide a dangerous opportunity for children or visitors in your house to start abusing these powerful medications. We will not replace any lost or stolen medicine.  As soon as your pain is better, you should flush all your remaining medication.     Many prescription pain medications contain Tylenol  (acetaminophen), including Vicodin , Tylenol #3 , Norco , Lortab , and Percocet .  You should not take any extra pills of Tylenol  if you are using these prescription medications or you can get very sick.  Do not ever take more than 3000 mg of acetaminophen in any 24 hour period.    All opioids tend to cause constipation. Drink plenty of water and eat foods that have a lot of fiber, such as fruits, vegetables, prune juice, apple juice and high fiber cereal.  Take a laxative if you don t move your bowels at least every other day. Miralax , Milk of Magnesia,  Colace , or Senna  can be used to keep you regular.      Remember that you can always come back to the Emergency Department if you are not able to see your regular doctor in the amount of time listed above, if you get any new symptoms, or if there is anything that worries you.

## 2018-03-19 NOTE — ED NOTES
Bed: ED20  Expected date:   Expected time:   Means of arrival:   Comments:  423  53 F vomiting  1406

## 2018-03-19 NOTE — ED NOTES
"Pt was given Morphine 4 mg IV at 2046 as ordered.  Pt stated to writer \"that doctor must have read my chart, last time I was here he wouldn't give me anything for my pain.\"  Pt then asked \"how much Morphine ar you giving me?\"     Pt asking for pain medication, reports pain 8/10 in her right flank after Morphine dose.   "

## 2018-03-19 NOTE — ED AVS SNAPSHOT
Emergency Department    [address] [place] [address]    Phone:  [phone]    Fax:  [phone]                                       Ines Mott   MRN: [id number]    Department:   Emergency Department   Date of Visit:  3/19/2018           After Visit Summary Signature Page     I have received my discharge instructions, and my questions have been answered. I have discussed any challenges I see with this plan with the nurse or doctor.    ..........................................................................................................................................  Patient/Patient Representative Signature      ..........................................................................................................................................  Patient Representative Print Name and Relationship to Patient    ..................................................               ................................................  Date                                            Time    ..........................................................................................................................................  Reviewed by Signature/Title    ...................................................              ..............................................  Date                                                            Time

## 2018-03-21 ENCOUNTER — TELEPHONE (OUTPATIENT)
Dept: FAMILY MEDICINE | Facility: CLINIC | Age: 53
End: 2018-03-21

## 2018-03-21 NOTE — TELEPHONE ENCOUNTER
Carla from [facility] pharmacy called requesting clarification on percocet frequency. Sig reads take 1 tablets Q 6 hours. Per pt, she can take more frequently and is out of medication. No notes found re: dose increase or frequency. Pt is able to fill Rx tomorrow with current directions. Writer informed pharmacist, if pt needed today she will need appt with another provider since PCP is out of the office.

## 2018-03-22 ENCOUNTER — HOSPITAL ENCOUNTER (EMERGENCY)
Facility: CLINIC | Age: 53
Discharge: HOME | End: 2018-03-22
Attending: EMERGENCY MEDICINE | Admitting: EMERGENCY MEDICINE
Payer: COMMERCIAL

## 2018-03-22 VITALS
WEIGHT: 253 LBS | RESPIRATION RATE: 20 BRPM | SYSTOLIC BLOOD PRESSURE: 163 MMHG | TEMPERATURE: 98.6 F | OXYGEN SATURATION: 98 % | DIASTOLIC BLOOD PRESSURE: 111 MMHG | BODY MASS INDEX: 40.66 KG/M2 | HEIGHT: 66 IN

## 2018-03-22 DIAGNOSIS — R51.9 NONINTRACTABLE EPISODIC HEADACHE, UNSPECIFIED HEADACHE TYPE: ICD-10-CM

## 2018-03-22 LAB
ALBUMIN SERPL-MCNC: 3.9 G/DL (ref 3.4–5)
ALP SERPL-CCNC: 87 U/L (ref 40–150)
ALT SERPL W P-5'-P-CCNC: 23 U/L (ref 0–50)
ANION GAP SERPL CALCULATED.3IONS-SCNC: 10 MMOL/L (ref 3–14)
AST SERPL W P-5'-P-CCNC: 10 U/L (ref 0–45)
BASOPHILS # BLD AUTO: 0 10E9/L (ref 0–0.2)
BASOPHILS NFR BLD AUTO: 0.2 %
BILIRUB SERPL-MCNC: 1.1 MG/DL (ref 0.2–1.3)
BUN SERPL-MCNC: 8 MG/DL (ref 7–30)
CALCIUM SERPL-MCNC: 9.8 MG/DL (ref 8.5–10.1)
CHLORIDE SERPL-SCNC: 105 MMOL/L (ref 94–109)
CO2 SERPL-SCNC: 26 MMOL/L (ref 20–32)
CREAT SERPL-MCNC: 0.72 MG/DL (ref 0.52–1.04)
DIFFERENTIAL METHOD BLD: ABNORMAL
EOSINOPHIL # BLD AUTO: 0.1 10E9/L (ref 0–0.7)
EOSINOPHIL NFR BLD AUTO: 0.8 %
ERYTHROCYTE [DISTWIDTH] IN BLOOD BY AUTOMATED COUNT: 13 % (ref 10–15)
GFR SERPL CREATININE-BSD FRML MDRD: 85 ML/MIN/1.7M2
GLUCOSE SERPL-MCNC: 147 MG/DL (ref 70–99)
HCT VFR BLD AUTO: 39.2 % (ref 35–47)
HGB BLD-MCNC: 13.1 G/DL (ref 11.7–15.7)
IMM GRANULOCYTES # BLD: 0 10E9/L (ref 0–0.4)
IMM GRANULOCYTES NFR BLD: 0.2 %
INTERPRETATION ECG - MUSE: NORMAL
LYMPHOCYTES # BLD AUTO: 2.6 10E9/L (ref 0.8–5.3)
LYMPHOCYTES NFR BLD AUTO: 20.9 %
MCH RBC QN AUTO: 29.6 PG (ref 26.5–33)
MCHC RBC AUTO-ENTMCNC: 33.4 G/DL (ref 31.5–36.5)
MCV RBC AUTO: 89 FL (ref 78–100)
MONOCYTES # BLD AUTO: 0.8 10E9/L (ref 0–1.3)
MONOCYTES NFR BLD AUTO: 6.7 %
NEUTROPHILS # BLD AUTO: 8.8 10E9/L (ref 1.6–8.3)
NEUTROPHILS NFR BLD AUTO: 71.2 %
PLATELET # BLD AUTO: 352 10E9/L (ref 150–450)
POTASSIUM SERPL-SCNC: 3.6 MMOL/L (ref 3.4–5.3)
PROT SERPL-MCNC: 7.5 G/DL (ref 6.8–8.8)
RBC # BLD AUTO: 4.43 10E12/L (ref 3.8–5.2)
SODIUM SERPL-SCNC: 141 MMOL/L (ref 133–144)
WBC # BLD AUTO: 12.4 10E9/L (ref 4–11)

## 2018-03-22 PROCEDURE — 25000128 H RX IP 250 OP 636: Performed by: EMERGENCY MEDICINE

## 2018-03-22 PROCEDURE — 93005 ELECTROCARDIOGRAM TRACING: CPT

## 2018-03-22 PROCEDURE — 96365 THER/PROPH/DIAG IV INF INIT: CPT

## 2018-03-22 PROCEDURE — 85025 COMPLETE CBC W/AUTO DIFF WBC: CPT | Performed by: EMERGENCY MEDICINE

## 2018-03-22 PROCEDURE — 96375 TX/PRO/DX INJ NEW DRUG ADDON: CPT

## 2018-03-22 PROCEDURE — 80053 COMPREHEN METABOLIC PANEL: CPT | Performed by: EMERGENCY MEDICINE

## 2018-03-22 PROCEDURE — 99284 EMERGENCY DEPT VISIT MOD MDM: CPT | Mod: 25

## 2018-03-22 RX ORDER — SODIUM CHLORIDE 9 MG/ML
1000 INJECTION, SOLUTION INTRAVENOUS CONTINUOUS
Status: DISCONTINUED | OUTPATIENT
Start: 2018-03-22 | End: 2018-03-22 | Stop reason: HOSPADM

## 2018-03-22 RX ORDER — MAGNESIUM SULFATE 1 G/100ML
1 INJECTION INTRAVENOUS ONCE
Status: DISCONTINUED | OUTPATIENT
Start: 2018-03-22 | End: 2018-03-22

## 2018-03-22 RX ADMIN — MAGNESIUM SULFATE HEPTAHYDRATE 1 G: 500 INJECTION, SOLUTION INTRAMUSCULAR; INTRAVENOUS at 04:23

## 2018-03-22 RX ADMIN — SODIUM CHLORIDE 1000 ML: 9 INJECTION, SOLUTION INTRAVENOUS at 03:59

## 2018-03-22 RX ADMIN — PROCHLORPERAZINE EDISYLATE 10 MG: 5 INJECTION INTRAMUSCULAR; INTRAVENOUS at 04:02

## 2018-03-22 ASSESSMENT — ENCOUNTER SYMPTOMS
NAUSEA: 1
VOMITING: 1
FREQUENCY: 1
LIGHT-HEADEDNESS: 1
HEADACHES: 1

## 2018-03-22 NOTE — ED PROVIDER NOTES
"  History     Chief Complaint:  Nausea & Vomiting    The history is provided by the patient and the EMS personnel.      Ines Mott is a 51 year old female with chronic pain (headache, abdominal pain), metastatic neuroendocrine carcinoma (primary colon),  shunt after a tumor resection in the 1980 s with frequent ED and office visits for pain and other multiple medical issues. She has a history of alcohol, narcotic and gambling addictions.  She currently sees a pain clinic for her pain medicine and her primary weekly in attempt to avoid unnecessary ED visits.  She also has a history of angioedema and asthma. The patient presents today via EMS due to nausea and vomiting. The patient reports that she felt lightheaded earlier this evening and at midnight she developed a headache, nausea, and 1 episode of vomiting. She also endorses frequent urination as well as \"weird smelling pee.\" She has no other medical concerns.    Allergies:  Ativan [Lorazepam]  Macrobid [Nitrofurantoin]  Amoxicillin  Buspirone  Busulfan  Cefaclor  Cephalosporins  Ciprofloxacin  Clindamycin  Dilaudid [Hydromorphone]  Diphenhydramine Hcl  Imitrex [Sumatriptan Succinate]  Ketorolac Tromethamine  Lansoprazole  Lansoprazole  Latex  Metoclopramide  Paroxetine  Penicillins  Prednisone  Red Dye  Sulfa Drugs  Lidoderm [Lidocaine]      Medications:    Percocet  Miralax  Zofran-ODT  Zoloft  Albuterol neb    Past Medical History:    Arthritis   Asthma   Cancer    Chronic pain syndrome   Depressive disorder   Diabetes    Hydrocephalus   Hypertension   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    Methamphetamine abuse   Migraines   Mild intermittent asthma   Obesity   Right renal mass   Spina bifida     Past Surgical History:    Appendectomy  Cholecystectomy  Dental extractions  Gyn surgery  Implant shunt ventriculoperitoneal    Family History:    Cancer  MI    Social History:  Presents via EMS   Tobacco use: Former smoker  Alcohol use: " "Occasional  PCP: OTTO YUSUF    Marital Status:        Review of Systems   Gastrointestinal: Positive for nausea and vomiting.   Genitourinary: Positive for frequency.   Neurological: Positive for light-headedness and headaches.   All other systems reviewed and are negative.    Physical Exam     Patient Vitals for the past 24 hrs:   BP Temp Temp src Heart Rate Resp SpO2 Height Weight   03/22/18 0430 (!) 173/113 - - - - 95 % - -   03/22/18 0353 (!) 157/102 98.6  F (37  C) Oral 98 20 98 % 1.676 m (5' 6\") 114.8 kg (253 lb)      Physical Exam  Constitutional: The patient is oriented to person, place, and time.   HENT:   Head: Atraumatic  Right Ear: Normal. Shunt palpable behind right ear with no evidence of erythema or inflammation  Left Ear: Normal  Nose: Nose normal.   Mouth/Throat: Oropharynx is clear and moist. No erythema or exudate.   Eyes: Conjunctivae and EOM are normal. Pupils are equal, round, and reactive to light. No discharge  Neck: Normal range of motion. Neck supple.   Cardiovascular: Normal rate, regular rhythm, no murmur gallops or rubs. Intact distal pulses.    Pulmonary/Chest: CTA bilaterally. No wheezes rale or rhonchi.  Abdominal: Soft. Non tender.  No masses   Musculoskeletal: No edema. No bony deformity. Normal range of motion  Lymphadenopathy:     The patient has no cervical adenopathy.   Neurological: The patient is alert and oriented to person, place, and time. The patient has normal strength and normal reflexes. No cranial nerve deficit. Coordination normal.   Skin: Skin is warm and dry. No rash noted. The patient is not diaphoretic.   Psychiatric: The patient has a normal mood and affect.    Emergency Department Course   ECG (3:55:27):  Rate 98 bpm. MA interval 184. QRS duration 92. QT/QTc 358/457. P-R-T axes 44 -27 34. Normal sinus rhythm. Minimal voltage criteria for LVH, may be normal variant. Inferior infarct, age undetermined. Possible anterolateral infarct, age undetermined. " Abnormal ECG. Agree with computer interpretation. Interpreted at 0356 by Jemal Carlisle MD.     Laboratory:  CBC: WNL (WBC 12.4 (H), HGB 13.1, )    CMP: Glucose 147 (H), o/w WNL (Creatinine 0.72)     Interventions:  0359: NS 1L IV Bolus  0402: Compazine 10 mg IV  0423: Magnesium sulfate 1 g IV     Emergency Department Course:  Past medical records, nursing notes, and vitals reviewed.  0345: I performed an exam of the patient and obtained history, as documented above.      0445: I rechecked the patient. Findings and plan explained to the Patient. Patient discharged home with instructions regarding supportive care, medications, and reasons to return. The importance of close follow-up was reviewed.      I personally reviewed the laboratory results with the patient and answered all related questions prior to discharge.   Impression & Plan    Medical Decision Making:  Ines Mott is a 53 year old female well known to the emergency department who comes in with complaints of a headache, nausea, and vomiting. She has known metastatic liver cancer but is also treated for chronic abdominal pain and headache. She was seen approximately 2-3 days ago for similar symptoms. She does have a shunt but no findings to suggest an infectious process or shunt abnormality. Laboratory examination is unremarkable. IV was started and she was given IV fluid, 10 mg compazine, 1 g magnesium. On repeat examination she was feeling significantly better. She was able to tolerate PO fluids and I feel she is safe for discharge home. Continue her regular medications and return for any other problems.    Diagnosis:    ICD-10-CM    1. Nonintractable episodic headache, unspecified headache type R51        Disposition:  Discharged to home with plan as outlined.      Pietro Yañez  3/22/2018    EMERGENCY DEPARTMENT  Pietro CONDE am serving as a scribe at 3:45 AM on 3/22/2018 to document services personally performed by Jemal Carlisle  MD SHANITA based on my observations and the provider's statements to me.         Jemal Carlisle MD  03/22/18 0518

## 2018-03-22 NOTE — ED AVS SNAPSHOT
Emergency Department    [address] [facility] [address]    Phone:  [phone]    Fax:  [phone]                                       Ines Mott   MRN: [id number]    Department:   Emergency Department   Date of Visit:  3/22/2018           Patient Information     Date Of Birth          1965        Your diagnoses for this visit were:     Nonintractable episodic headache, unspecified headache type        You were seen by Jemal Carlisle MD.      Follow-up Information     Follow up with Dustin Fatima MD.    Specialty:  Family Practice    Contact information:    [address]  [facility] [address] [phone]          Discharge Instructions       Discharge Instructions  Headache    You were seen today for a headache. Headaches may be caused by many different things such as muscle tension, sinus inflammation, anxiety and stress, having too little sleep, too much alcohol, some medical conditions or injury. You may have a migraine, which is caused by changes in the blood vessels in your head.  At this time your provider does not find that your headache is a sign of anything dangerous or life-threatening.  However, sometimes the signs of serious illness do not show up right away.      Generally, every Emergency Department visit should have a follow-up clinic visit with either a primary or a specialty clinic/provider. Please follow-up as instructed by your emergency provider today.    Return to the Emergency Department if:    You get a new fever of 100.4 F or higher.    Your headache gets much worse.    You get a stiff neck with your headache.    You get a new headache that is significantly different or worse than headaches you have had before.    You are vomiting (throwing up) and cannot keep food or water down.    You have blurry or double vision or other problems with your eyes.    You have a new weakness on one side of your body.    You have difficulty with balance  which is new.    You or your family thinks you are confused.    You have a seizure.    What can I do to help myself?    Pain medications - You may take a pain medication such as Tylenol  (acetaminophen), Advil , Motrin  (ibuprofen) or Aleve  (naproxen).    Take a pain reliever as soon as you notice symptoms.  Starting medications as soon as you start to have symptoms may lessen the amount of pain you have.    Relaxing in a quiet, dark room may help.    Get enough sleep and eat meals regularly.    You may need to watch for certain foods or other things which may trigger your headaches.  Keeping a journal of your headaches and possible triggers may help you and your primary provider to identify things which you should avoid which may be causing your headaches.  If you were given a prescription for medicine here today, be sure to read all of the information (including the package insert) that comes with your prescription.  This will include important information about the medicine, its side effects, and any warnings that you need to know about.  The pharmacist who fills the prescription can provide more information and answer questions you may have about the medicine.  If you have questions or concerns that the pharmacist cannot address, please call or return to the Emergency Department.   Remember that you can always come back to the Emergency Department if you are not able to see your regular provider in the amount of time listed above, if you get any new symptoms, or if there is anything that worries you.    Your next 10 appointments already scheduled     Mar 23, 2018 10:00 AM CDT   Pre-Op physical with Dustin Fatima MD   [facility]    [address]  [address]  [place] [address] [phone]            May 13, 2018  8:30 PM CDT   Psg Split W/Tcm with BED 3 SH SLEEP   [facility] ([facility])    [address]  [place] [address]   [phone]            May 31, 2018 10:30 AM CDT   Return Sleep Patient with Aniceto Mujica MD   [facility] ([facility])    [address]  [place] [address]   [phone]              24 Hour Appointment Hotline       To make an appointment at any [facility], call [phone] ([phone]). If you don't have a family doctor or clinic, we will help you find one. [facility] clinics are conveniently located to serve the needs of you and your family.             Review of your medicines      Our records show that you are taking the medicines listed below. If these are incorrect, please call your family doctor or clinic.        Dose / Directions Last dose taken    albuterol (2.5 MG/3ML) 0.083% neb solution   Dose:  1 vial   Quantity:  30 vial        Take 1 vial (2.5 mg) by nebulization every 6 hours as needed for shortness of breath / dyspnea or wheezing   Refills:  3        ASPIRIN NOT PRESCRIBED   Commonly known as:  INTENTIONAL   Quantity:  1 each        Please choose reason not prescribed, below   Refills:  0        blood glucose calibration solution   Commonly known as:  no brand specified   Quantity:  1 each        Use to calibrate blood glucose monitor as directed.   Refills:  0        blood glucose lancets standard   Commonly known as:  no brand specified   Quantity:  100 each        Use to test blood sugar daily times daily or as directed.   Refills:  11        blood glucose monitoring meter device kit   Commonly known as:  no brand specified   Quantity:  1 kit        Use to test blood sugar larisa times daily or as directed.   Refills:  0        blood glucose monitoring test strip   Commonly known as:  no brand specified   Quantity:  100 strip        Use to test blood sugars once times daily or as directed   Refills:  3        cyanocobalamin 1000  MCG/ML injection   Commonly known as:  VITAMIN B12   Dose:  1 mL   Quantity:  1 mL        Inject 1 mL (1,000 mcg) Subcutaneous every 30 days   Refills:  11        EPINEPHrine 0.3 MG/0.3ML injection 2-pack   Commonly known as:  EPIPEN/ADRENACLICK/or ANY BX GENERIC EQUIV   Dose:  0.3 mg   Quantity:  2 each        Inject 0.3 mLs (0.3 mg) into the muscle once as needed for anaphylaxis   Refills:  5        losartan-hydrochlorothiazide 50-12.5 MG per tablet   Commonly known as:  HYZAAR   Dose:  1 tablet   Quantity:  90 tablet        Take 1 tablet by mouth daily   Refills:  3        magnesium oxide 400 (241.3 MG) MG tablet   Commonly known as:  MAG-OX   Dose:  400 mg   Quantity:  90 tablet        Take 1 tablet (400 mg) by mouth daily   Refills:  3        * ondansetron 4 MG ODT tab   Commonly known as:  ZOFRAN-ODT   Dose:  4 mg   Quantity:  10 tablet        Take 1 tablet (4 mg) by mouth every 8 hours as needed for nausea   Refills:  1        order for DME   Quantity:  1 Device        Right wrist splint for carpal tunnel syndrome  One* Use as directed at hour of sleep   Refills:  1        oxyCODONE-acetaminophen 5-325 MG per tablet   Commonly known as:  PERCOCET   Dose:  1 tablet   Quantity:  30 tablet        Take 1 tablet by mouth every 6 hours as needed for moderate to severe pain Max 4 tab/day   Refills:  0        polyethylene glycol powder   Commonly known as:  MIRALAX/GLYCOLAX   Quantity:  1581 g        TAKE 17 GRAMS (1 CAPFUL) BY MOUTH DAILY   Refills:  11        potassium chloride 10 MEQ tablet   Commonly known as:  K-TAB,KLOR-CON   Dose:  10 mEq   Indication:  Low Amount of Potassium in the Blood   Quantity:  120 tablet        Take 1 tablet (10 mEq) by mouth daily   Refills:  11        sertraline 100 MG tablet   Commonly known as:  ZOLOFT   Dose:  150 mg   Indication:  Major Depressive Disorder   Quantity:  45 tablet        Take 1.5 tablets (150 mg) by mouth daily   Refills:  11        STATIN NOT PRESCRIBED  (INTENTIONAL)   Dose:  1 each   Quantity:  0 each        1 each daily Statin not prescribed intentionally due to Active liver disease (liver failure, cirrhosis, hepatitis) LIVER METS   Refills:  0        triamcinolone 0.1 % ointment   Commonly known as:  KENALOG   Quantity:  80 g        Apply sparingly to affected area three times daily for 14 days.   Refills:  0        Vitamin D (Cholecalciferol) 1000 UNITS Tabs   Dose:  2000 Units   Quantity:  60 tablet        Take 2,000 Units by mouth daily   Refills:  11        * Notice:  This list has 1 medication(s) that are the same as other medications prescribed for you. Read the directions carefully, and ask your doctor or other care provider to review them with you.      ASK your doctor about these medications        Dose / Directions Last dose taken    * ondansetron 4 MG ODT tab   Commonly known as:  ZOFRAN ODT   Dose:  4 mg   Quantity:  10 tablet   Ask about: Should I take this medication?        Take 1 tablet (4 mg) by mouth every 8 hours as needed   Refills:  0        * Notice:  This list has 1 medication(s) that are the same as other medications prescribed for you. Read the directions carefully, and ask your doctor or other care provider to review them with you.            Procedures and tests performed during your visit     CBC with platelets differential    Comprehensive metabolic panel    EKG 12 lead      Orders Needing Specimen Collection     None      Pending Results     No orders found from 3/20/2018 to 3/23/2018.            Pending Culture Results     No orders found from 3/20/2018 to 3/23/2018.            Pending Results Instructions     If you had any lab results that were not finalized at the time of your Discharge, you can call the ED Lab Result RN at [phone]. You will be contacted by this team for any positive Lab results or changes in treatment. The nurses are available 7 days a week from 10A to 6:30P.  You can leave a message 24 hours per day and  they will return your call.        Test Results From Your Hospital Stay        3/22/2018  4:00 AM      Component Results     Component Value Ref Range & Units Status    WBC 12.4 (H) 4.0 - 11.0 10e9/L Final    RBC Count 4.43 3.8 - 5.2 10e12/L Final    Hemoglobin 13.1 11.7 - 15.7 g/dL Final    Hematocrit 39.2 35.0 - 47.0 % Final    MCV 89 78 - 100 fl Final    MCH 29.6 26.5 - 33.0 pg Final    MCHC 33.4 31.5 - 36.5 g/dL Final    RDW 13.0 10.0 - 15.0 % Final    Platelet Count 352 150 - 450 10e9/L Final    Diff Method Automated Method  Final    % Neutrophils 71.2 % Final    % Lymphocytes 20.9 % Final    % Monocytes 6.7 % Final    % Eosinophils 0.8 % Final    % Basophils 0.2 % Final    % Immature Granulocytes 0.2 % Final    Absolute Neutrophil 8.8 (H) 1.6 - 8.3 10e9/L Final    Absolute Lymphocytes 2.6 0.8 - 5.3 10e9/L Final    Absolute Monocytes 0.8 0.0 - 1.3 10e9/L Final    Absolute Eosinophils 0.1 0.0 - 0.7 10e9/L Final    Absolute Basophils 0.0 0.0 - 0.2 10e9/L Final    Abs Immature Granulocytes 0.0 0 - 0.4 10e9/L Final         3/22/2018  4:16 AM      Component Results     Component Value Ref Range & Units Status    Sodium 141 133 - 144 mmol/L Final    Potassium 3.6 3.4 - 5.3 mmol/L Final    Chloride 105 94 - 109 mmol/L Final    Carbon Dioxide 26 20 - 32 mmol/L Final    Anion Gap 10 3 - 14 mmol/L Final    Glucose 147 (H) 70 - 99 mg/dL Final    Urea Nitrogen 8 7 - 30 mg/dL Final    Creatinine 0.72 0.52 - 1.04 mg/dL Final    GFR Estimate 85 >60 mL/min/1.7m2 Final    Non  GFR Calc    GFR Estimate If Black >90 >60 mL/min/1.7m2 Final    African American GFR Calc    Calcium 9.8 8.5 - 10.1 mg/dL Final    Bilirubin Total 1.1 0.2 - 1.3 mg/dL Final    Albumin 3.9 3.4 - 5.0 g/dL Final    Protein Total 7.5 6.8 - 8.8 g/dL Final    Alkaline Phosphatase 87 40 - 150 U/L Final    ALT 23 0 - 50 U/L Final    AST 10 0 - 45 U/L Final                Clinical Quality Measure: Blood Pressure Screening     Your blood pressure  "was checked while you were in the emergency department today. The last reading we obtained was  BP: (!) 173/113 . Please read the guidelines below about what these numbers mean and what you should do about them.  If your systolic blood pressure (the top number) is less than 120 and your diastolic blood pressure (the bottom number) is less than 80, then your blood pressure is normal. There is nothing more that you need to do about it.  If your systolic blood pressure (the top number) is 120-139 or your diastolic blood pressure (the bottom number) is 80-89, your blood pressure may be higher than it should be. You should have your blood pressure rechecked within a year by a primary care provider.  If your systolic blood pressure (the top number) is 140 or greater or your diastolic blood pressure (the bottom number) is 90 or greater, you may have high blood pressure. High blood pressure is treatable, but if left untreated over time it can put you at risk for heart attack, stroke, or kidney failure. You should have your blood pressure rechecked by a primary care provider within the next 4 weeks.  If your provider in the emergency department today gave you specific instructions to follow-up with your doctor or provider even sooner than that, you should follow that instruction and not wait for up to 4 weeks for your follow-up visit.        Thank you for choosing [facility]       Thank you for choosing [facility] for your care. Our goal is always to provide you with excellent care. Hearing back from our patients is one way we can continue to improve our services. Please take a few minutes to complete the written survey that you may receive in the mail after you visit with us. Thank you!        [employer] Information     [employer] lets you send messages to your doctor, view your test results, renew your prescriptions, schedule appointments and more. To sign up, go to [email or web address] . Click on \"Log in\" on the left side of " "the screen, which will take you to the Welcome page. Then click on \"Sign up Now\" on the right side of the page.     You will be asked to enter the access code listed below, as well as some personal information. Please follow the directions to create your username and password.     Your access code is: [email or web address]  Expires: 2018  7:56 PM     Your access code will  in 90 days. If you need help or a new code, please call your [facility] clinic or [phone].        Care EveryWhere ID     This is your Care EveryWhere ID. This could be used by other organizations to access your [facility] medical records  [id number]        Equal Access to Services     VALENTE RIVERA : Marc Leonard, deion nunes, candida hernandez, emilee coyne. So [facility] [phone].    ATENCIÓN: Si habla español, tiene a christianson disposición servicios gratuitos de asistencia lingüística. Llame al [phone].    We comply with applicable federal civil rights laws and [place] laws. We do not discriminate on the basis of race, color, national origin, age, disability, sex, sexual orientation, or gender identity.            After Visit Summary       This is your record. Keep this with you and show to your community pharmacist(s) and doctor(s) at your next visit.                  "

## 2018-03-22 NOTE — ED NOTES
Bed: ED19  Expected date:   Expected time:   Means of arrival:   Comments:   53f weak, lightheaded. ETA 1552

## 2018-03-22 NOTE — ED AVS SNAPSHOT
Emergency Department    [address] [place] [address]    Phone:  [phone]    Fax:  [phone]                                       Ines Mott   MRN: [id number]    Department:   Emergency Department   Date of Visit:  3/22/2018           After Visit Summary Signature Page     I have received my discharge instructions, and my questions have been answered. I have discussed any challenges I see with this plan with the nurse or doctor.    ..........................................................................................................................................  Patient/Patient Representative Signature      ..........................................................................................................................................  Patient Representative Print Name and Relationship to Patient    ..................................................               ................................................  Date                                            Time    ..........................................................................................................................................  Reviewed by Signature/Title    ...................................................              ..............................................  Date                                                            Time

## 2018-03-22 NOTE — ED NOTES
Pt was able to ambulate to the restroom with no assistance.  Pt returned back to room, and hooked back up to the monitors.  Pt says she is feeling much better than when she got here.  MD aware.

## 2018-03-23 ENCOUNTER — OFFICE VISIT (OUTPATIENT)
Dept: FAMILY MEDICINE | Facility: CLINIC | Age: 53
End: 2018-03-23
Payer: COMMERCIAL

## 2018-03-23 VITALS
TEMPERATURE: 96.8 F | OXYGEN SATURATION: 96 % | BODY MASS INDEX: 41.14 KG/M2 | SYSTOLIC BLOOD PRESSURE: 165 MMHG | DIASTOLIC BLOOD PRESSURE: 102 MMHG | HEART RATE: 70 BPM | WEIGHT: 256 LBS | HEIGHT: 66 IN

## 2018-03-23 DIAGNOSIS — R30.0 DYSURIA: ICD-10-CM

## 2018-03-23 DIAGNOSIS — M54.41 CHRONIC BILATERAL LOW BACK PAIN WITH RIGHT-SIDED SCIATICA: ICD-10-CM

## 2018-03-23 DIAGNOSIS — Z87.898 HISTORY OF CHEST PAIN: ICD-10-CM

## 2018-03-23 DIAGNOSIS — E11.9 TYPE 2 DIABETES MELLITUS WITHOUT COMPLICATION, WITHOUT LONG-TERM CURRENT USE OF INSULIN (H): ICD-10-CM

## 2018-03-23 DIAGNOSIS — Z01.818 PREOP GENERAL PHYSICAL EXAM: Primary | ICD-10-CM

## 2018-03-23 DIAGNOSIS — F43.10 PTSD (POST-TRAUMATIC STRESS DISORDER): Chronic | ICD-10-CM

## 2018-03-23 DIAGNOSIS — R06.89 DYSPNEA AND RESPIRATORY ABNORMALITY: ICD-10-CM

## 2018-03-23 DIAGNOSIS — F33.41 RECURRENT MAJOR DEPRESSION IN PARTIAL REMISSION (H): Chronic | ICD-10-CM

## 2018-03-23 DIAGNOSIS — R06.00 DYSPNEA AND RESPIRATORY ABNORMALITY: ICD-10-CM

## 2018-03-23 DIAGNOSIS — G89.29 CHRONIC BILATERAL LOW BACK PAIN WITH RIGHT-SIDED SCIATICA: ICD-10-CM

## 2018-03-23 DIAGNOSIS — N28.9 KIDNEY LESION, NATIVE, RIGHT: ICD-10-CM

## 2018-03-23 DIAGNOSIS — G89.4 CHRONIC PAIN SYNDROME: ICD-10-CM

## 2018-03-23 DIAGNOSIS — E78.5 HYPERLIPIDEMIA WITH TARGET LDL LESS THAN 130: Chronic | ICD-10-CM

## 2018-03-23 DIAGNOSIS — I10 HYPERTENSION GOAL BP (BLOOD PRESSURE) < 140/80: Chronic | ICD-10-CM

## 2018-03-23 PROBLEM — M54.50 BILATERAL LOW BACK PAIN WITHOUT SCIATICA: Status: RESOLVED | Noted: 2017-12-18 | Resolved: 2018-03-23

## 2018-03-23 LAB
ALBUMIN UR-MCNC: NEGATIVE MG/DL
APPEARANCE UR: CLEAR
BILIRUB UR QL STRIP: NEGATIVE
COLOR UR AUTO: YELLOW
GLUCOSE UR STRIP-MCNC: NEGATIVE MG/DL
HBA1C MFR BLD: 5.6 % (ref 4.3–6)
HGB UR QL STRIP: ABNORMAL
KETONES UR STRIP-MCNC: NEGATIVE MG/DL
LEUKOCYTE ESTERASE UR QL STRIP: NEGATIVE
NITRATE UR QL: NEGATIVE
NON-SQ EPI CELLS #/AREA URNS LPF: ABNORMAL /LPF
PH UR STRIP: 5.5 PH (ref 5–7)
RBC #/AREA URNS AUTO: ABNORMAL /HPF
SOURCE: ABNORMAL
SP GR UR STRIP: 1.02 (ref 1–1.03)
UROBILINOGEN UR STRIP-ACNC: 0.2 EU/DL (ref 0.2–1)
WBC #/AREA URNS AUTO: ABNORMAL /HPF

## 2018-03-23 PROCEDURE — 83036 HEMOGLOBIN GLYCOSYLATED A1C: CPT | Performed by: FAMILY MEDICINE

## 2018-03-23 PROCEDURE — 82550 ASSAY OF CK (CPK): CPT | Performed by: FAMILY MEDICINE

## 2018-03-23 PROCEDURE — 81001 URINALYSIS AUTO W/SCOPE: CPT | Performed by: FAMILY MEDICINE

## 2018-03-23 PROCEDURE — 99214 OFFICE O/P EST MOD 30 MIN: CPT | Performed by: FAMILY MEDICINE

## 2018-03-23 PROCEDURE — 36415 COLL VENOUS BLD VENIPUNCTURE: CPT | Performed by: FAMILY MEDICINE

## 2018-03-23 RX ORDER — BACLOFEN 10 MG/1
10 TABLET ORAL 3 TIMES DAILY
Qty: 60 TABLET | Refills: 11 | Status: ON HOLD | OUTPATIENT
Start: 2018-03-23 | End: 2018-05-30

## 2018-03-23 RX ORDER — LOSARTAN POTASSIUM AND HYDROCHLOROTHIAZIDE 25; 100 MG/1; MG/1
1 TABLET ORAL DAILY
Qty: 90 TABLET | Refills: 3 | Status: SHIPPED | OUTPATIENT
Start: 2018-03-23 | End: 2019-05-14

## 2018-03-23 RX ORDER — OXYCODONE AND ACETAMINOPHEN 5; 325 MG/1; MG/1
1 TABLET ORAL EVERY 6 HOURS PRN
Qty: 30 TABLET | Refills: 0 | Status: SHIPPED | OUTPATIENT
Start: 2018-03-30 | End: 2018-03-29

## 2018-03-23 NOTE — NURSING NOTE
"Chief Complaint   Patient presents with     Pre-Op Exam       Initial BP (!) 165/102 (BP Location: Left arm, Patient Position: Chair, Cuff Size: Adult Regular)  Pulse 70  Temp 96.8  F (36  C)  Ht 5' 6\" (1.676 m)  Wt 256 lb (116.1 kg)  SpO2 96%  Breastfeeding? No  BMI 41.32 kg/m2 Estimated body mass index is 41.32 kg/(m^2) as calculated from the following:    Height as of this encounter: 5' 6\" (1.676 m).    Weight as of this encounter: 256 lb (116.1 kg).  Medication Reconciliation: complete     Genia Cerda MA     "
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
## 2018-03-23 NOTE — LETTER
March 26, 2018      Iens Mott  [address]  [facility] [address]        Dear ,    We are writing to inform you of your test results.  RECENT NORMAL EXCEPT BLOOD SUGARCOMPLETE METABOLIC PROFILE RENAL BLOOD SALTS LIVER GLUCOSE TEST   NORMAL THREE MONTH GLUCOSE AVERAGE   NORMAL URINE ANALYSIS EXCEPT MINIMAL BLOOD IN URINE ANALYSIS     Your test results fall within the expected range(s) or remain unchanged from previous results.  Please continue with current treatment plan.    Resulted Orders   *UA reflex to Microscopic and Culture ([place] [facility] (except [place] and [place])   Result Value Ref Range    Color Urine Yellow     Appearance Urine Clear     Glucose Urine Negative NEG^Negative mg/dL    Bilirubin Urine Negative NEG^Negative    Ketones Urine Negative NEG^Negative mg/dL    Specific Gravity Urine 1.020 1.003 - 1.035    Blood Urine Small (A) NEG^Negative    pH Urine 5.5 5.0 - 7.0 pH    Protein Albumin Urine Negative NEG^Negative mg/dL    Urobilinogen Urine 0.2 0.2 - 1.0 EU/dL    Nitrite Urine Negative NEG^Negative    Leukocyte Esterase Urine Negative NEG^Negative    Source Midstream Urine    Urine Microscopic   Result Value Ref Range    WBC Urine 0 - 5 OTO5^0 - 5 /HPF    RBC Urine O - 2 OTO2^O - 2 /HPF    Squamous Epithelial /LPF Urine Moderate (A) FEW^Few /LPF   Hemoglobin A1c   Result Value Ref Range    Hemoglobin A1C 5.6 4.3 - 6.0 %       If you have any questions or concerns, please call the clinic at the number listed above.       Sincerely,        OTTO YUSUF MD

## 2018-03-23 NOTE — MR AVS SNAPSHOT
After Visit Summary   3/23/2018    Ines Mott    MRN: [id number]           Patient Information     Date Of Birth          1965        Visit Information        Provider Department      3/23/2018 10:00 AM Dustin Fatima MD [facility]        Today's Diagnoses     Preop general physical exam    -  1    Kidney lesion, native, right        Dysuria        Hyperlipidemia with target LDL less than 130        Recurrent major depression in partial remission        Hypertension goal BP (blood pressure) < 140/80        PTSD (post-traumatic stress disorder)        Dyspnea and respiratory abnormality        Chronic pain syndrome        Chronic bilateral low back pain with right-sided sciatica        Type 2 diabetes mellitus without complication, without long-term current use of insulin (H)        History of chest pain          Care Instructions      Before Your Surgery      Call your surgeon if there is any change in your health. This includes signs of a cold or flu (such as a sore throat, runny nose, cough, rash or fever).    Do not smoke, drink alcohol or take over the counter medicine (unless your surgeon or primary care doctor tells you to) for the 24 hours before and after surgery.    If you take prescribed drugs: Follow your doctor s orders about which medicines to take and which to stop until after surgery.    Eating and drinking prior to surgery: follow the instructions from your surgeon    Take a shower or bath the night before surgery. Use the soap your surgeon gave you to gently clean your skin. If you do not have soap from your surgeon, use your regular soap. Do not shave or scrub the surgery site.  Wear clean pajamas and have clean sheets on your bed.     (Z01.818) Preop general physical exam  (primary encounter diagnosis)    Comment:      Plan: UA reflex to Microscopic and Culture, Urine           Microscopic                   (N28.9) Kidney  lesion, native, right    Comment:      Plan:          (R30.0) Dysuria    Comment:      Plan: *UA reflex to Microscopic and Culture ([place]           and [facility] (except [place] and           [place]), UA reflex to Microscopic and Culture                   (E78.5) Hyperlipidemia with target LDL less than 130    Comment:      Plan:          (F33.41) Recurrent major depression in partial remission    Comment:      Plan:          (I10) Hypertension goal BP (blood pressure) < 140/80    Comment:      Plan: losartan-hydrochlorothiazide (HYZAAR) 100-25 MG          per tablet                   (F43.10) PTSD (post-traumatic stress disorder)    Comment:      Plan:          (R06.00,  R06.89) Dyspnea and respiratory abnormality    Comment:      Plan:          (G89.4) Chronic pain syndrome    Comment:      Plan: oxyCODONE-acetaminophen (PERCOCET) 5-325 MG per          tablet                   (M54.41,  G89.29) Chronic bilateral low back pain with right-sided sciatica    Comment:      Plan: baclofen (LIORESAL) 10 MG tablet                   (E11.9) Type 2 diabetes mellitus without complication, without long-term current use of insulin (H)    Comment:      Plan: Hemoglobin A1c                   (Z87.898) History of chest pain    Comment:      Plan: CK total                                  Follow-ups after your visit        Your next 10 appointments already scheduled     May 13, 2018  8:30 PM CDT   Psg Split W/Tcm with BED 3 SH SLEEP   [facility] ([facility])    [address] [place] [id number] [phone]            May 31, 2018 10:30 AM CDT   Return Sleep Patient with Aniceto Mujica MD   [facility] ([facility])    [address] [id number] [phone]              Who to contact     If you have questions or need follow up information about today's clinic visit or your  "schedule please contact [facility] directly at [phone].  Normal or non-critical lab and imaging results will be communicated to you by MyChart, letter or phone within 4 business days after the clinic has received the results. If you do not hear from us within 7 days, please contact the clinic through MyChart or phone. If you have a critical or abnormal lab result, we will notify you by phone as soon as possible.  Submit refill requests through [employer] or call your pharmacy and they will forward the refill request to us. Please allow 3 business days for your refill to be completed.          Additional Information About Your Visit        [employer] Information     [employer] lets you send messages to your doctor, view your test results, renew your prescriptions, schedule appointments and more. To sign up, go to [email or web address] . Click on \"Log in\" on the left side of the screen, which will take you to the Welcome page. Then click on \"Sign up Now\" on the right side of the page.     You will be asked to enter the access code listed below, as well as some personal information. Please follow the directions to create your username and password.     Your access code is: [email or web address]  Expires: 2018  7:56 PM     Your access code will  in 90 days. If you need help or a new code, please call your [facility] clinic or [phone].        Care EveryWhere ID     This is your Care EveryWhere ID. This could be used by other organizations to access your [facility] medical records  [id number]        Your Vitals Were     Pulse Temperature Height Pulse Oximetry Breastfeeding? BMI (Body Mass Index)    70 96.8  F (36  C) 5' 6\" (1.676 m) 96% No 41.32 kg/m2       Blood Pressure from Last 3 Encounters:   18 (!) 165/102   18 (!) 163/111   18 145/68    Weight from Last 3 Encounters:   18 256 lb (116.1 kg)   18 253 lb (114.8 kg)   18 256 lb (116.1 kg)         "      We Performed the Following     *UA reflex to Microscopic and Culture ([place] and [facility] (except [place] and [place])     CK total     Hemoglobin A1c     UA reflex to Microscopic and Culture     Urine Microscopic          Today's Medication Changes          These changes are accurate as of 3/23/18 10:51 AM.  If you have any questions, ask your nurse or doctor.               Start taking these medicines.        Dose/Directions    baclofen 10 MG tablet   Commonly known as:  LIORESAL   Used for:  Chronic bilateral low back pain with right-sided sciatica   Started by:  Dustin Fatima MD        Dose:  10 mg   Take 1 tablet (10 mg) by mouth 3 times daily   Quantity:  60 tablet   Refills:  11       losartan-hydrochlorothiazide 100-25 MG per tablet   Commonly known as:  HYZAAR   Used for:  Hypertension goal BP (blood pressure) < 140/80   Started by:  Dustin Fatima MD        Dose:  1 tablet   Take 1 tablet by mouth daily   Quantity:  90 tablet   Refills:  3            Where to get your medicines      These medications were sent to [facility] [address] [place] [address]     Phone:  [phone]     baclofen 10 MG tablet    losartan-hydrochlorothiazide 100-25 MG per tablet         Some of these will need a paper prescription and others can be bought over the counter.  Ask your nurse if you have questions.     Bring a paper prescription for each of these medications     oxyCODONE-acetaminophen 5-325 MG per tablet                Primary Care Provider Office Phone # Fax #    Dustin Fatima [phone] [address] [facility] [address]        Equal Access to Services     [facility]: Hadmichelle keller Soevangelina, waaxda luqadaha, qaybta kaalmada emilee hernandez iditsih coyne. [facility] [phone].    ATENCIÓN: Si habla español, tiene a christianson disposición servicios  ama de asistencia lingüística. Sabine galeas [phone].    We comply with applicable federal civil rights laws and [place] laws. We do not discriminate on the basis of race, color, national origin, age, disability, sex, sexual orientation, or gender identity.            Thank you!     Thank you for choosing [facility]  for your care. Our goal is always to provide you with excellent care. Hearing back from our patients is one way we can continue to improve our services. Please take a few minutes to complete the written survey that you may receive in the mail after your visit with us. Thank you!             Your Updated Medication List - Protect others around you: Learn how to safely use, store and throw away your medicines at www.disposemymeds.org.          This list is accurate as of 3/23/18 10:51 AM.  Always use your most recent med list.                   Brand Name Dispense Instructions for use Diagnosis    albuterol (2.5 MG/3ML) 0.083% neb solution     30 vial    Take 1 vial (2.5 mg) by nebulization every 6 hours as needed for shortness of breath / dyspnea or wheezing    Mild intermittent asthma with acute exacerbation       ASPIRIN NOT PRESCRIBED    INTENTIONAL    1 each    Please choose reason not prescribed, below    Aspirin intolerance       baclofen 10 MG tablet    LIORESAL    60 tablet    Take 1 tablet (10 mg) by mouth 3 times daily    Chronic bilateral low back pain with right-sided sciatica       blood glucose calibration solution    no brand specified    1 each    Use to calibrate blood glucose monitor as directed.    Type 2 diabetes mellitus without complication, without long-term current use of insulin (H)       blood glucose lancets standard    no brand specified    100 each    Use to test blood sugar daily times daily or as directed.    Type 2 diabetes mellitus without complication, without long-term current use of insulin (H)       blood glucose monitoring  meter device kit    no brand specified    1 kit    Use to test blood sugar larisa times daily or as directed.    Type 2 diabetes mellitus without complication, without long-term current use of insulin (H)       blood glucose monitoring test strip    no brand specified    100 strip    Use to test blood sugars once times daily or as directed    Type 2 diabetes mellitus without complication, without long-term current use of insulin (H)       cyanocobalamin 1000 MCG/ML injection    VITAMIN B12    1 mL    Inject 1 mL (1,000 mcg) Subcutaneous every 30 days    B12 deficiency       EPINEPHrine 0.3 MG/0.3ML injection 2-pack    EPIPEN/ADRENACLICK/or ANY BX GENERIC EQUIV    2 each    Inject 0.3 mLs (0.3 mg) into the muscle once as needed for anaphylaxis    Late effect of other and unspecified external causes       losartan-hydrochlorothiazide 100-25 MG per tablet    HYZAAR    90 tablet    Take 1 tablet by mouth daily    Hypertension goal BP (blood pressure) < 140/80       magnesium oxide 400 (241.3 MG) MG tablet    MAG-OX    90 tablet    Take 1 tablet (400 mg) by mouth daily    Cramp of limb       ondansetron 4 MG ODT tab    ZOFRAN-ODT    10 tablet    Take 1 tablet (4 mg) by mouth every 8 hours as needed for nausea        order for DME     1 Device    Right wrist splint for carpal tunnel syndrome  One* Use as directed at hour of sleep    Carpal tunnel syndrome of right wrist       oxyCODONE-acetaminophen 5-325 MG per tablet   Start taking on:  3/30/2018    PERCOCET    30 tablet    Take 1 tablet by mouth every 6 hours as needed for moderate to severe pain Max 4 tab/day    Chronic pain syndrome       polyethylene glycol powder    MIRALAX/GLYCOLAX    1581 g    TAKE 17 GRAMS (1 CAPFUL) BY MOUTH DAILY    Slow transit constipation       potassium chloride 10 MEQ tablet    K-TAB,KLOR-CON    120 tablet    Take 1 tablet (10 mEq) by mouth daily    Hypokalemia       sertraline 100 MG tablet    ZOLOFT    45 tablet    Take 1.5 tablets (150  mg) by mouth daily    Recurrent major depression in partial remission (H), Other constipation       STATIN NOT PRESCRIBED (INTENTIONAL)     0 each    1 each daily Statin not prescribed intentionally due to Active liver disease (liver failure, cirrhosis, hepatitis) LIVER METS    Hyperlipidemia LDL goal <100, Type 2 diabetes mellitus without complication (H)       triamcinolone 0.1 % ointment    KENALOG    80 g    Apply sparingly to affected area three times daily for 14 days.    Hand eczema       Vitamin D (Cholecalciferol) 1000 UNITS Tabs     60 tablet    Take 2,000 Units by mouth daily    Vitamin D deficiency

## 2018-03-23 NOTE — LETTER
March 27, 2018    Ines Mott  [address]   [facility] [address]      Dear ,    We are writing to inform you of your test results.  NORMAL CREATININE PHOSPHOKINASE MUSCLE AND HEART ENZYMES   NORMAL THREE MONTH GLUCOSE AVERAGE   RECENT NORMAL EXCEPT BLOOD SUGAR COMPLETE METABOLIC PROFILE RENAL BLOOD SALTS LIVER GLUCOSE TEST   NORMAL URINE ANALYSIS EXCEPT MINIMAL BLOOD IN URINE ANALYSIS     Resulted Orders   *UA reflex to Microscopic and Culture ([place] [facility] (except [place] and [place])   Result Value Ref Range    Color Urine Yellow     Appearance Urine Clear     Glucose Urine Negative NEG^Negative mg/dL    Bilirubin Urine Negative NEG^Negative    Ketones Urine Negative NEG^Negative mg/dL    Specific Gravity Urine 1.020 1.003 - 1.035    Blood Urine Small (A) NEG^Negative    pH Urine 5.5 5.0 - 7.0 pH    Protein Albumin Urine Negative NEG^Negative mg/dL    Urobilinogen Urine 0.2 0.2 - 1.0 EU/dL    Nitrite Urine Negative NEG^Negative    Leukocyte Esterase Urine Negative NEG^Negative    Source Midstream Urine    Urine Microscopic   Result Value Ref Range    WBC Urine 0 - 5 OTO5^0 - 5 /HPF    RBC Urine O - 2 OTO2^O - 2 /HPF    Squamous Epithelial /LPF Urine Moderate (A) FEW^Few /LPF   Hemoglobin A1c   Result Value Ref Range    Hemoglobin A1C 5.6 4.3 - 6.0 %   CK total   Result Value Ref Range    CK Total 81 30 - 225 U/L       If you have any questions or concerns, please call the clinic at the number listed above.       Sincerely,        OTTO YUSUF MD

## 2018-03-23 NOTE — PATIENT INSTRUCTIONS
Before Your Surgery      Call your surgeon if there is any change in your health. This includes signs of a cold or flu (such as a sore throat, runny nose, cough, rash or fever).    Do not smoke, drink alcohol or take over the counter medicine (unless your surgeon or primary care doctor tells you to) for the 24 hours before and after surgery.    If you take prescribed drugs: Follow your doctor s orders about which medicines to take and which to stop until after surgery.    Eating and drinking prior to surgery: follow the instructions from your surgeon    Take a shower or bath the night before surgery. Use the soap your surgeon gave you to gently clean your skin. If you do not have soap from your surgeon, use your regular soap. Do not shave or scrub the surgery site.  Wear clean pajamas and have clean sheets on your bed.     (Z01.818) Preop general physical exam  (primary encounter diagnosis)    Comment:      Plan: UA reflex to Microscopic and Culture, Urine           Microscopic                   (N28.9) Kidney lesion, native, right    Comment:      Plan:          (R30.0) Dysuria    Comment:      Plan: *UA reflex to Microscopic and Culture ([place]           and [facility] (except [place] and           [place]), UA reflex to Microscopic and Culture                   (E78.5) Hyperlipidemia with target LDL less than 130    Comment:      Plan:          (F33.41) Recurrent major depression in partial remission    Comment:      Plan:          (I10) Hypertension goal BP (blood pressure) < 140/80    Comment:      Plan: losartan-hydrochlorothiazide (HYZAAR) 100-25 MG          per tablet                   (F43.10) PTSD (post-traumatic stress disorder)    Comment:      Plan:          (R06.00,  R06.89) Dyspnea and respiratory abnormality    Comment:      Plan:          (G89.4) Chronic pain syndrome    Comment:      Plan: oxyCODONE-acetaminophen (PERCOCET) 5-325 MG per          tablet                   (M54.41,  G89.29)  Chronic bilateral low back pain with right-sided sciatica    Comment:      Plan: baclofen (LIORESAL) 10 MG tablet                   (E11.9) Type 2 diabetes mellitus without complication, without long-term current use of insulin (H)    Comment:      Plan: Hemoglobin A1c                   (Z87.898) History of chest pain    Comment:      Plan: CK total

## 2018-03-24 LAB — CK SERPL-CCNC: 81 U/L (ref 30–225)

## 2018-03-26 ENCOUNTER — TELEPHONE (OUTPATIENT)
Dept: FAMILY MEDICINE | Facility: CLINIC | Age: 53
End: 2018-03-26

## 2018-03-26 NOTE — TELEPHONE ENCOUNTER
[facility] called re: patient and that they had to cancel her Renal Biopsy procedure due to high BP-200/80? This procedure is not scheduled yet for future. Urology just wanted you to be aware of this, and you can reach Jessica(# attached) if need be.  Rosaline Servin, SALAZAR

## 2018-03-28 ENCOUNTER — TELEPHONE (OUTPATIENT)
Dept: FAMILY MEDICINE | Facility: CLINIC | Age: 53
End: 2018-03-28

## 2018-03-29 ENCOUNTER — TELEPHONE (OUTPATIENT)
Dept: FAMILY MEDICINE | Facility: CLINIC | Age: 53
End: 2018-03-29

## 2018-03-29 ENCOUNTER — OFFICE VISIT (OUTPATIENT)
Dept: FAMILY MEDICINE | Facility: CLINIC | Age: 53
End: 2018-03-29
Payer: COMMERCIAL

## 2018-03-29 VITALS
OXYGEN SATURATION: 97 % | WEIGHT: 254 LBS | HEART RATE: 57 BPM | TEMPERATURE: 97.3 F | SYSTOLIC BLOOD PRESSURE: 171 MMHG | RESPIRATION RATE: 20 BRPM | BODY MASS INDEX: 41 KG/M2 | DIASTOLIC BLOOD PRESSURE: 90 MMHG

## 2018-03-29 DIAGNOSIS — I10 ESSENTIAL HYPERTENSION, BENIGN: Primary | ICD-10-CM

## 2018-03-29 DIAGNOSIS — Z98.2 HISTORY OF BRAIN SHUNT: ICD-10-CM

## 2018-03-29 DIAGNOSIS — G89.4 CHRONIC PAIN SYNDROME: ICD-10-CM

## 2018-03-29 DIAGNOSIS — R82.90 FOUL SMELLING URINE: ICD-10-CM

## 2018-03-29 LAB
ALBUMIN UR-MCNC: NEGATIVE MG/DL
APPEARANCE UR: CLEAR
BILIRUB UR QL STRIP: NEGATIVE
COLOR UR AUTO: YELLOW
GLUCOSE UR STRIP-MCNC: NEGATIVE MG/DL
HGB UR QL STRIP: ABNORMAL
KETONES UR STRIP-MCNC: NEGATIVE MG/DL
LEUKOCYTE ESTERASE UR QL STRIP: NEGATIVE
NITRATE UR QL: NEGATIVE
NON-SQ EPI CELLS #/AREA URNS LPF: NORMAL /LPF
PH UR STRIP: 6.5 PH (ref 5–7)
RBC #/AREA URNS AUTO: NORMAL /HPF
SOURCE: ABNORMAL
SP GR UR STRIP: 1.01 (ref 1–1.03)
UROBILINOGEN UR STRIP-ACNC: 0.2 EU/DL (ref 0.2–1)
WBC #/AREA URNS AUTO: NORMAL /HPF

## 2018-03-29 PROCEDURE — 99214 OFFICE O/P EST MOD 30 MIN: CPT | Performed by: FAMILY MEDICINE

## 2018-03-29 PROCEDURE — 81001 URINALYSIS AUTO W/SCOPE: CPT | Performed by: FAMILY MEDICINE

## 2018-03-29 RX ORDER — OXYCODONE AND ACETAMINOPHEN 5; 325 MG/1; MG/1
1 TABLET ORAL EVERY 6 HOURS PRN
Qty: 30 TABLET | Refills: 0 | Status: SHIPPED | OUTPATIENT
Start: 2018-03-29 | End: 2018-04-06

## 2018-03-29 RX ORDER — AMLODIPINE BESYLATE 5 MG/1
5 TABLET ORAL DAILY
Qty: 30 TABLET | Refills: 3 | Status: ON HOLD | OUTPATIENT
Start: 2018-03-29 | End: 2018-05-30

## 2018-03-29 NOTE — LETTER
March 30, 2018      Ines Mott  [address]  [facility] [address]        Dear ,    We are writing to inform you of your test results.    NORMAL URINE ANALYSIS   TRACE BLOOD URINE ANALYSIS NOT SIGNIFICANT    Resulted Orders   UA reflex to Microscopic and Culture   Result Value Ref Range    Color Urine Yellow     Appearance Urine Clear     Glucose Urine Negative NEG^Negative mg/dL    Bilirubin Urine Negative NEG^Negative    Ketones Urine Negative NEG^Negative mg/dL    Specific Gravity Urine 1.015 1.003 - 1.035    Blood Urine Trace (A) NEG^Negative    pH Urine 6.5 5.0 - 7.0 pH    Protein Albumin Urine Negative NEG^Negative mg/dL    Urobilinogen Urine 0.2 0.2 - 1.0 EU/dL    Nitrite Urine Negative NEG^Negative    Leukocyte Esterase Urine Negative NEG^Negative    Source Midstream Urine    Urine Microscopic   Result Value Ref Range    WBC Urine 0 - 5 OTO5^0 - 5 /HPF    RBC Urine O - 2 OTO2^O - 2 /HPF    Squamous Epithelial /LPF Urine Few FEW^Few /LPF       If you have any questions or concerns, please call the clinic at the number listed above.       Sincerely,        OTTO YUSUF MD
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
## 2018-03-29 NOTE — NURSING NOTE
"Chief Complaint   Patient presents with     Hypertension       Initial /90 (Cuff Size: Adult Large)  Pulse 57  Temp 97.3  F (36.3  C) (Tympanic)  Resp 20  Wt 254 lb (115.2 kg)  SpO2 97%  BMI 41 kg/m2 Estimated body mass index is 41 kg/(m^2) as calculated from the following:    Height as of 3/23/18: 5' 6\" (1.676 m).    Weight as of this encounter: 254 lb (115.2 kg).  Medication Reconciliation: complete   Maggi Harris CMA    "

## 2018-03-29 NOTE — TELEPHONE ENCOUNTER
"Patient is requesting appointment today with PCP for a stabbing \"liver pain\" and elevated BP readings 166/77 and 179/101. Per pt, BP at hospital was over 200 diastolic. Her surgery was canceled until her blood pressure is under control. Rates side pain 9/10 denies chest pain or breading problems. Other symptoms reported include HA and \"my urine smells\". Pt reports she is out of Oxycodone since she takes QID and PCP only prescribed 30 tablets for why she is running out before due date. She has script for Percocet with fill date 03/30/2018. Please advice on double book.   "

## 2018-03-29 NOTE — PATIENT INSTRUCTIONS
(I10) Essential hypertension, benign  (primary encounter diagnosis)    Comment:      Plan: amLODIPine (NORVASC) 5 MG tablet                   (Z98.2) History of brain shunt    Comment:      Plan: amLODIPine (NORVASC) 5 MG tablet                   (G89.4) Chronic pain syndrome    Comment:      Plan: oxyCODONE-acetaminophen (PERCOCET) 5-325 MG per          tablet                   (R82.90) Foul smelling urine    Comment:      Plan: UA reflex to Microscopic and Culture

## 2018-03-29 NOTE — MR AVS SNAPSHOT
After Visit Summary   3/29/2018    Ines Mott    MRN: [id number]           Patient Information     Date Of Birth          1965        Visit Information        Provider Department      3/29/2018 2:15 PM Dustin Fatima MD [facility]        Today's Diagnoses     Essential hypertension, benign    -  1    History of brain shunt        Chronic pain syndrome        Foul smelling urine          Care Instructions      (I10) Essential hypertension, benign  (primary encounter diagnosis)    Comment:      Plan: amLODIPine (NORVASC) 5 MG tablet                   (Z98.2) History of brain shunt    Comment:      Plan: amLODIPine (NORVASC) 5 MG tablet                   (G89.4) Chronic pain syndrome    Comment:      Plan: oxyCODONE-acetaminophen (PERCOCET) 5-325 MG per          tablet                   (R82.90) Foul smelling urine    Comment:      Plan: UA reflex to Microscopic and Culture                                  Follow-ups after your visit        Your next 10 appointments already scheduled     Apr 06, 2018 10:15 AM CDT   [facility] with Dustin Fatima MD   [facility]    [address] [place] [id number]   [phone]            May 13, 2018  8:30 PM CDT   Psg Split W/Tcm with BED 3 SH SLEEP   [facility] ([facility])    [address] [address] [phone]            May 31, 2018 10:30 AM CDT   Return Sleep Patient with Aniceto Mujica MD   [facility] ([facility])    [address] [address] [phone]              Who to contact     If you have questions or need follow up information about today's clinic visit or your schedule please contact [facility]  "directly at [phone].  Normal or non-critical lab and imaging results will be communicated to you by [employer], letter or phone within 4 business days after the clinic has received the results. If you do not hear from us within 7 days, please contact the clinic through [employer] or phone. If you have a critical or abnormal lab result, we will notify you by phone as soon as possible.  Submit refill requests through [employer] or call your pharmacy and they will forward the refill request to us. Please allow 3 business days for your refill to be completed.          Additional Information About Your Visit        [employer] Information     [employer] lets you send messages to your doctor, view your test results, renew your prescriptions, schedule appointments and more. To sign up, go to [email or web address] . Click on \"Log in\" on the left side of the screen, which will take you to the Welcome page. Then click on \"Sign up Now\" on the right side of the page.     You will be asked to enter the access code listed below, as well as some personal information. Please follow the directions to create your username and password.     Your access code is: [email or web address]  Expires: 2018  7:56 PM     Your access code will  in 90 days. If you need help or a new code, please call your [facility] clinic or [phone].        Care EveryWhere ID     This is your Care EveryWhere ID. This could be used by other organizations to access your [facility] medical records  [id number]        Your Vitals Were     Pulse Temperature Respirations Pulse Oximetry BMI (Body Mass Index)       57 97.3  F (36.3  C) (Tympanic) 20 97% 41 kg/m2        Blood Pressure from Last 3 Encounters:   18 171/90   18 (!) 165/102   18 (!) 163/111    Weight from Last 3 Encounters:   18 254 lb (115.2 kg)   18 256 lb (116.1 kg)   18 253 lb (114.8 kg)              We Performed the Following     UA reflex to Microscopic and Culture     " Urine Microscopic          Today's Medication Changes          These changes are accurate as of 3/29/18  9:47 PM.  If you have any questions, ask your nurse or doctor.               Start taking these medicines.        Dose/Directions    amLODIPine 5 MG tablet   Commonly known as:  NORVASC   Used for:  Essential hypertension, benign, History of brain shunt   Started by:  Dustin Fatima MD        Dose:  5 mg   Take 1 tablet (5 mg) by mouth daily   Quantity:  30 tablet   Refills:  3            Where to get your medicines      These medications were sent to [facility] [address] [place] [address] - [facility]  [address] [facility] [address]     Phone:  [phone]     amLODIPine 5 MG tablet         Some of these will need a paper prescription and others can be bought over the counter.  Ask your nurse if you have questions.     Bring a paper prescription for each of these medications     oxyCODONE-acetaminophen 5-325 MG per tablet                Primary Care Provider Office Phone # Fax #    Dustin Fatima [phone] [address] [facility] [address]        Equal Access to Services     [facility] : Hadii casimiro ku hadasho Soomaali, waaxda luqadaha, qaybta kaalmada ademigel, emilee coyne. So [facility] [phone].    ATENCIÓN: Si habla español, tiene a christianson disposición servicios gratuitos de asistencia lingüística. Sabine al [phone].    We comply with applicable federal civil rights laws and [place] laws. We do not discriminate on the basis of race, color, national origin, age, disability, sex, sexual orientation, or gender identity.            Thank you!     Thank you for choosing [facility]  for your care. Our goal is always to provide you with excellent care. Hearing back from our patients is one way we can continue to improve our services. Please take a few minutes to complete the  written survey that you may receive in the mail after your visit with us. Thank you!             Your Updated Medication List - Protect others around you: Learn how to safely use, store and throw away your medicines at www.disposemymeds.org.          This list is accurate as of 3/29/18  9:47 PM.  Always use your most recent med list.                   Brand Name Dispense Instructions for use Diagnosis    albuterol (2.5 MG/3ML) 0.083% neb solution     30 vial    Take 1 vial (2.5 mg) by nebulization every 6 hours as needed for shortness of breath / dyspnea or wheezing    Mild intermittent asthma with acute exacerbation       amLODIPine 5 MG tablet    NORVASC    30 tablet    Take 1 tablet (5 mg) by mouth daily    Essential hypertension, benign, History of brain shunt       ASPIRIN NOT PRESCRIBED    INTENTIONAL    1 each    Please choose reason not prescribed, below    Aspirin intolerance       baclofen 10 MG tablet    LIORESAL    60 tablet    Take 1 tablet (10 mg) by mouth 3 times daily    Chronic bilateral low back pain with right-sided sciatica       blood glucose calibration solution    no brand specified    1 each    Use to calibrate blood glucose monitor as directed.    Type 2 diabetes mellitus without complication, without long-term current use of insulin (H)       blood glucose lancets standard    no brand specified    100 each    Use to test blood sugar daily times daily or as directed.    Type 2 diabetes mellitus without complication, without long-term current use of insulin (H)       blood glucose monitoring meter device kit    no brand specified    1 kit    Use to test blood sugar larisa times daily or as directed.    Type 2 diabetes mellitus without complication, without long-term current use of insulin (H)       blood glucose monitoring test strip    no brand specified    100 strip    Use to test blood sugars once times daily or as directed    Type 2 diabetes mellitus without complication, without long-term  current use of insulin (H)       cyanocobalamin 1000 MCG/ML injection    VITAMIN B12    1 mL    Inject 1 mL (1,000 mcg) Subcutaneous every 30 days    B12 deficiency       EPINEPHrine 0.3 MG/0.3ML injection 2-pack    EPIPEN/ADRENACLICK/or ANY BX GENERIC EQUIV    2 each    Inject 0.3 mLs (0.3 mg) into the muscle once as needed for anaphylaxis    Late effect of other and unspecified external causes       losartan-hydrochlorothiazide 100-25 MG per tablet    HYZAAR    90 tablet    Take 1 tablet by mouth daily    Hypertension goal BP (blood pressure) < 140/80       magnesium oxide 400 (241.3 MG) MG tablet    MAG-OX    90 tablet    Take 1 tablet (400 mg) by mouth daily    Cramp of limb       ondansetron 4 MG ODT tab    ZOFRAN-ODT    10 tablet    Take 1 tablet (4 mg) by mouth every 8 hours as needed for nausea        order for DME     1 Device    Right wrist splint for carpal tunnel syndrome  One* Use as directed at hour of sleep    Carpal tunnel syndrome of right wrist       oxyCODONE-acetaminophen 5-325 MG per tablet    PERCOCET    30 tablet    Take 1 tablet by mouth every 6 hours as needed for moderate to severe pain Max 4 tab/day    Chronic pain syndrome       polyethylene glycol powder    MIRALAX/GLYCOLAX    1581 g    TAKE 17 GRAMS (1 CAPFUL) BY MOUTH DAILY    Slow transit constipation       potassium chloride 10 MEQ tablet    K-TAB,KLOR-CON    120 tablet    Take 1 tablet (10 mEq) by mouth daily    Hypokalemia       sertraline 100 MG tablet    ZOLOFT    45 tablet    Take 1.5 tablets (150 mg) by mouth daily    Recurrent major depression in partial remission (H), Other constipation       STATIN NOT PRESCRIBED (INTENTIONAL)     0 each    1 each daily Statin not prescribed intentionally due to Active liver disease (liver failure, cirrhosis, hepatitis) LIVER METS    Hyperlipidemia LDL goal <100, Type 2 diabetes mellitus without complication (H)       triamcinolone 0.1 % ointment    KENALOG    80 g    Apply sparingly to  affected area three times daily for 14 days.    Hand eczema       Vitamin D (Cholecalciferol) 1000 UNITS Tabs     60 tablet    Take 2,000 Units by mouth daily    Vitamin D deficiency

## 2018-04-02 ENCOUNTER — TELEPHONE (OUTPATIENT)
Dept: FAMILY MEDICINE | Facility: CLINIC | Age: 53
End: 2018-04-02

## 2018-04-02 ENCOUNTER — HOSPITAL ENCOUNTER (EMERGENCY)
Facility: CLINIC | Age: 53
Discharge: HOME | End: 2018-04-02
Attending: EMERGENCY MEDICINE | Admitting: EMERGENCY MEDICINE
Payer: COMMERCIAL

## 2018-04-02 VITALS
DIASTOLIC BLOOD PRESSURE: 76 MMHG | HEART RATE: 78 BPM | TEMPERATURE: 98.7 F | HEIGHT: 66 IN | WEIGHT: 256 LBS | SYSTOLIC BLOOD PRESSURE: 165 MMHG | RESPIRATION RATE: 18 BRPM | BODY MASS INDEX: 41.14 KG/M2 | OXYGEN SATURATION: 97 %

## 2018-04-02 DIAGNOSIS — R51.9 NONINTRACTABLE HEADACHE, UNSPECIFIED CHRONICITY PATTERN, UNSPECIFIED HEADACHE TYPE: ICD-10-CM

## 2018-04-02 DIAGNOSIS — G89.29 OTHER CHRONIC PAIN: ICD-10-CM

## 2018-04-02 DIAGNOSIS — R93.89 ABNORMAL CAT SCAN: ICD-10-CM

## 2018-04-02 DIAGNOSIS — R10.84 ABDOMINAL PAIN, GENERALIZED: ICD-10-CM

## 2018-04-02 DIAGNOSIS — Z98.2 VP (VENTRICULOPERITONEAL) SHUNT STATUS: ICD-10-CM

## 2018-04-02 PROCEDURE — 99282 EMERGENCY DEPT VISIT SF MDM: CPT

## 2018-04-02 PROCEDURE — 99283 EMERGENCY DEPT VISIT LOW MDM: CPT

## 2018-04-02 RX ORDER — ACETAMINOPHEN 500 MG
1000 TABLET ORAL ONCE
Status: DISCONTINUED | OUTPATIENT
Start: 2018-04-02 | End: 2018-04-02 | Stop reason: HOSPADM

## 2018-04-02 NOTE — ED PROVIDER NOTES
History     Chief Complaint:  Headache and Abdominal Pain     HPI   Ines Mott is a 53 year old female with a history of liver mass and longstanding  shunt in place who presents to the emergency department via EMS for evaluation of headache and abdominal pain. The patient has ongoing abdominal pain and headache due to her cancer, and states that now her abdominal pain radiates into her back. The patient reports that she was at a friend's house today when she began to experience headache, lightheadedness, nausea, and abdominal pain consistent with her prior pattern.  She volunteers that she ran out of her oxycodone today, and will have refills available to her in 2 days.  No fevers or urinary symptoms.  No vomiting or diarrhea.  She has been this ED numerous times and has a care plan in place.  She has had an extensive serum laboratory studies done on 3 different occasions in the past 2 weeks, as well as 3 urinalysis without worrisome findings during that time span.  She had a CT of her abdomen and pelvis about 1 month ago showing no new acute findings, the note was made of possible renal mass.  Subsequent retroperitoneal ultrasound did not confirm this finding, and she states she has not yet had a biopsy because she needs to get her blood pressure under better control.  Headache was not sudden in onset.  No focal numbness or weakness that is new today.  She had a head CT on February 17 of this year which was benign.  I am somewhat familiar with her condition from prior ED visit.  Her care plan recommends that no opioids to be administered by any route for her recurring headaches and abdominal pain the absence of new acute objective features.    Allergies:  Ativan [Lorazepam]  Macrobid [Nitrofurantoin]  Amoxicillin  Buspirone  Busulfan  Cefaclor  Cephalosporins  Ciprofloxacin  Clindamycin  Dilaudid [Hydromorphone]  Diphenhydramine Hcl  Imitrex [Sumatriptan Succinate]  Ketorolac  "Tromethamine  Lansoprazole  Lansoprazole  Latex  Metoclopramide  Paroxetine  Penicillins  Prednisone  Red Dye  Sulfa Drugs  Lidoderm [Lidocaine]     Medications:    Norvasc  Percocet  Hyzaar  Lioresal  Miralax  Zofran  Kenalog  Zoloft  Mag-Ox  Blood glucose monitoring kit  Albuterol neb solution  Potassium chloride  Epipen     Past Medical History:    Arthritis   Asthma   Cancer   Chronic pain syndrome   Depressive disorder   Diabetes   Hydrocephalus   Hypertension   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    Methamphetamine abuse   Migraines   Mild intermittent asthma   Obesity   Right renal mass   Spina bifida     Past Surgical History:    Appendectomy  Cholecystectomy  Dental extraction  Gyn surgery  Implant ventriculoperitoneal shunt    Family History:    Family history reviewed. No pertinent family history.    Social History:  Lives with her .    Review of Systems   All other systems reviewed and are negative.    Physical Exam     Patient Vitals for the past 24 hrs:   BP Temp Temp src Pulse Resp SpO2 Height Weight   04/02/18 1615 165/76 - - 78 18 97 % - -   04/02/18 1614 - 98.7  F (37.1  C) Oral - - - 1.676 m (5' 6\") 116.1 kg (256 lb)     Physical Exam  General: woman semi-recumbent in room 29  HENT: mucous membranes moist, OP clear, TMs clear,  shunt palpable behind R ear  Eyes: pupils normal without nystagmus, no photophobia  CV:  regular rate, regular rhythm  Resp: clear throughout, normal effort  GI: abdomen soft and mild diffuse tenderness, no guarding, bowel sounds present, negative Del Toro's  MSK: no bony tenderness to face, skull, or cervical spine  Skin: appropriately warm and dry, no petechiae, no vesicles, no erythema overlying  shunt site  Neuro: alert, clear speech, oriented, moves all extremities with good tone, no meningismus, responds briskly and appropriately to all questions and commands  Psych: calm    Emergency Department Course     Interventions:  1708 Tylenol 1,000 mg " PO     Emergency Department Course:     Nursing notes and vitals reviewed.    I performed electronic chart review in EPIC.  I reviewed her [facility] care plan.    I performed an exam of the patient as documented above.     I personally reviewed the physical exam results with the patient and answered all related questions prior to discharge.    Impression & Plan      Medical Decision Making:  Ines Mott is a 53 year old female who presents to the emergency department today with headache and abdominal pain. I have reviewed her records and her care plan and performed a thorough history and physical. There are no worrisome features such as abrupt onset or focal neurologic features to suggest the need for emergent neuro imaging or neuro surgical consultation. I considered, but do not suspect,  shunt malfunction, SAH, intracranial tumor, acute glaucoma, temporal arteritis, etc. Some reassurance is provided by her head CT from about a month and a half ago. I offered to perform basic laboratory studies, which Inse politely declines. She has had the same laboratory studies that I would consider repeating (CBC, CMP) on three separate occasions within the last few weeks without worrisome findings, and I think this would almost certainly remain the case. She expressed essentially no interest in diagnosis and focuses on treatment, though she accepted my proposal for over-the-counter analgesics and discharge without dispute or further questions.  I referenced her Care plan which she seems to be familiar with.  She is of course welcome back for crisis and is encouraged as always to follow up very closely to her primary clinic as I do expect frequently recurring symptoms of this nature based on her prior pattern. It will be important for her to complete workup of the recently seen renal mass, as a new primary lesion or metastatic disease cannot be fully ruled out, though this does not need to happen emergently.      Diagnosis:    ICD-10-CM    1. Nonintractable headache, unspecified chronicity pattern, unspecified headache type R51    2. Abdominal pain, generalized R10.84    3. Other chronic pain G89.29    4.  (ventriculoperitoneal) shunt status Z98.2    5. Abnormal CAT scan R93.8     early March 2018     Disposition:   The patient was discharged home.      Scribe Disclosure:  I, Danielle Angel, am serving as a scribe at 4:30 PM on 4/2/2018 to document services personally performed by Jason Sorto MD based on my observations and the provider's statements to me.     EMERGENCY DEPARTMENT       Jason Sorto MD  04/02/18 6799

## 2018-04-02 NOTE — ED NOTES
Bed: ED29  Expected date: 4/2/18  Expected time: 3:55 PM  Means of arrival: Ambulance  Comments:  438 53F abd head pain  ETA 1600

## 2018-04-02 NOTE — TELEPHONE ENCOUNTER
Pt's care coordinator Jessica called requesting a call with pt's office visit plan Friday. Pt was to have a biopsy but that was canceled due to elevated BP readings. Ok to leave a detailed message for care coordinator at [phone]. Triage please send request to provider Friday 04/06/2018. Message will be postponed until then.

## 2018-04-02 NOTE — DISCHARGE INSTRUCTIONS
Discharge Instructions  Headache    You were seen today for a headache. Headaches may be caused by many different things such as muscle tension, sinus inflammation, anxiety and stress, having too little sleep, too much alcohol, some medical conditions or injury. You may have a migraine, which is caused by changes in the blood vessels in your head.  At this time your provider does not find that your headache is a sign of anything dangerous or life-threatening.  However, sometimes the signs of serious illness do not show up right away.      Generally, every Emergency Department visit should have a follow-up clinic visit with either a primary or a specialty clinic/provider. Please follow-up as instructed by your emergency provider today.    Return to the Emergency Department if:    You get a new fever of 100.4 F or higher.    Your headache gets much worse.    You get a stiff neck with your headache.    You get a new headache that is significantly different or worse than headaches you have had before.    You are vomiting (throwing up) and cannot keep food or water down.    You have blurry or double vision or other problems with your eyes.    You have a new weakness on one side of your body.    You have difficulty with balance which is new.    You or your family thinks you are confused.    You have a seizure.    What can I do to help myself?    Pain medications - You may take a pain medication such as Tylenol  (acetaminophen), Advil , Motrin  (ibuprofen) or Aleve  (naproxen).    Take a pain reliever as soon as you notice symptoms.  Starting medications as soon as you start to have symptoms may lessen the amount of pain you have.    Relaxing in a quiet, dark room may help.    Get enough sleep and eat meals regularly.    You may need to watch for certain foods or other things which may trigger your headaches.  Keeping a journal of your headaches and possible triggers may help you and your primary provider to identify  things which you should avoid which may be causing your headaches.  If you were given a prescription for medicine here today, be sure to read all of the information (including the package insert) that comes with your prescription.  This will include important information about the medicine, its side effects, and any warnings that you need to know about.  The pharmacist who fills the prescription can provide more information and answer questions you may have about the medicine.  If you have questions or concerns that the pharmacist cannot address, please call or return to the Emergency Department.   Remember that you can always come back to the Emergency Department if you are not able to see your regular provider in the amount of time listed above, if you get any new symptoms, or if there is anything that worries you.        Discharge Instructions  Abdominal Pain    Abdominal pain (belly pain) can be caused by many things. Your evaluation today does not show the exact cause for your pain. Your provider today has decided that it is unlikely your pain is due to a life threatening problem, or a problem requiring surgery or hospital admission. Sometimes those problems cannot be found right away, so it is very important that you follow up as directed.  Sometimes only the changes which occur over time allow the cause of your pain to be found.    Generally, every Emergency Department visit should have a follow-up clinic visit with either a primary or a specialty clinic/provider. Please follow-up as instructed by your emergency provider today. With abdominal pain, we often recommend very close follow-up, such as the following day.    ADULTS:  Return to the Emergency Department right away if:      You get an oral temperature above 102oF or as directed by your provider.    You have blood in your stools. This may be bright red or appear as black, tarry stools.      You keep vomiting (throwing up) or cannot drink liquids.    You  see blood when you vomit.     You cannot have a bowel movement or you cannot pass gas.    Your stomach gets bloated or bigger.    Your skin or the whites of your eyes look yellow.    You faint.    You have bloody, frequent or painful urination (peeing).    You have new symptoms or anything that worries you.    CHILDREN:  Return to the Emergency Department right away if your child has any of the above-listed symptoms or the following:      Pushes your hand away or screams/cries when his/her belly is touched.    You notice your child is very fussy or weak.    Your child is very tired and is too tired to eat or drink.    Your child is dehydrated.  Signs of dehydration can be:  o Significant change in the amount of wet diapers/urine.  o Your infant or child starts to have dry mouth and lips, or no saliva (spit) or tears.    PREGNANT WOMEN:  Return to the Emergency Department right away if you have any of the above-listed symptoms or the following:      You have bleeding, leaking fluid or passing tissue from the vagina.    You have worse pain or cramping, or pain in your shoulder or back.    You have vomiting that will not stop.    You have a temperature of 100oF or more.    Your baby is not moving as much as usual.    You faint.    You get a bad headache with or without eye problems and abdominal pain.    You have a seizure.    You have unusual discharge from your vagina and abdominal pain.    Abdominal pain is pretty common during pregnancy.  Your pain may or may not be related to your pregnancy. You should follow-up closely with your OB provider so they can evaluate you and your baby.  Until you follow-up with your regular provider, do the following:       Avoid sex and do not put anything in your vagina.    Drink clear fluids.    Only take medications approved by your provider.    MORE INFORMATION:    Appendicitis:  A possible cause of abdominal pain in any person who still has their appendix is acute appendicitis.  "Appendicitis is often hard to diagnose.  Testing does not always rule out early appendicitis or other causes of abdominal pain. Close follow-up with your provider and re-evaluations may be needed to figure out the reason for your abdominal pain.    Follow-up:  It is very important that you make an appointment with your clinic and go to the appointment.  If you do not follow-up with your primary provider, it may result in missing an important development which could result in permanent injury or disability and/or lasting pain.  If there is any problem keeping your appointment, call your provider or return to the Emergency Department.    Medications:  Take your medications as directed by your provider today.  Before using over-the-counter medications, ask your provider and make sure to take the medications as directed.  If you have any questions about medications, ask your provider.    Diet:  Resume your normal diet as much as possible, but do not eat fried, fatty or spicy foods while you have pain.  Do not drink alcohol or have caffeine.  Do not smoke tobacco.    Probiotics: If you have been given an antibiotic, you may want to also take a probiotic pill or eat yogurt with live cultures. Probiotics have \"good bacteria\" to help your intestines stay healthy. Studies have shown that probiotics help prevent diarrhea (loose stools) and other intestine problems (including C. diff infection) when you take antibiotics. You can buy these without a prescription in the pharmacy section of the store.     If you were given a prescription for medicine here today, be sure to read all of the information (including the package insert) that comes with your prescription.  This will include important information about the medicine, its side effects, and any warnings that you need to know about.  The pharmacist who fills the prescription can provide more information and answer questions you may have about the medicine.  If you have " questions or concerns that the pharmacist cannot address, please call or return to the Emergency Department.       Remember that you can always come back to the Emergency Department if you are not able to see your regular provider in the amount of time listed above, if you get any new symptoms, or if there is anything that worries you.

## 2018-04-02 NOTE — ED AVS SNAPSHOT
Emergency Department    [address] [facility] [address]    Phone:  [phone]    Fax:  [phone]                                       Ines Mott   MRN: [id number]    Department:   Emergency Department   Date of Visit:  4/2/2018           Patient Information     Date Of Birth          1965        Your diagnoses for this visit were:     Nonintractable headache, unspecified chronicity pattern, unspecified headache type     Abdominal pain, generalized     Other chronic pain      (ventriculoperitoneal) shunt status     Abnormal CAT scan early March 2018       You were seen by Jason Sorto MD.      Follow-up Information     Follow up with Dustin Fatima MD. Call today.    Specialty:  Family Practice    Contact information:    [address]  [facility] [address] [phone]          Follow up with  Emergency Department.    Specialty:  EMERGENCY MEDICINE    Why:  As needed for crisis    Contact information:    [address] [facility] [address] [phone]        Discharge Instructions       Discharge Instructions  Headache    You were seen today for a headache. Headaches may be caused by many different things such as muscle tension, sinus inflammation, anxiety and stress, having too little sleep, too much alcohol, some medical conditions or injury. You may have a migraine, which is caused by changes in the blood vessels in your head.  At this time your provider does not find that your headache is a sign of anything dangerous or life-threatening.  However, sometimes the signs of serious illness do not show up right away.      Generally, every Emergency Department visit should have a follow-up clinic visit with either a primary or a specialty clinic/provider. Please follow-up as instructed by your emergency provider today.    Return to the Emergency Department if:    You get a new fever of 100.4 F or higher.    Your headache gets much  worse.    You get a stiff neck with your headache.    You get a new headache that is significantly different or worse than headaches you have had before.    You are vomiting (throwing up) and cannot keep food or water down.    You have blurry or double vision or other problems with your eyes.    You have a new weakness on one side of your body.    You have difficulty with balance which is new.    You or your family thinks you are confused.    You have a seizure.    What can I do to help myself?    Pain medications - You may take a pain medication such as Tylenol  (acetaminophen), Advil , Motrin  (ibuprofen) or Aleve  (naproxen).    Take a pain reliever as soon as you notice symptoms.  Starting medications as soon as you start to have symptoms may lessen the amount of pain you have.    Relaxing in a quiet, dark room may help.    Get enough sleep and eat meals regularly.    You may need to watch for certain foods or other things which may trigger your headaches.  Keeping a journal of your headaches and possible triggers may help you and your primary provider to identify things which you should avoid which may be causing your headaches.  If you were given a prescription for medicine here today, be sure to read all of the information (including the package insert) that comes with your prescription.  This will include important information about the medicine, its side effects, and any warnings that you need to know about.  The pharmacist who fills the prescription can provide more information and answer questions you may have about the medicine.  If you have questions or concerns that the pharmacist cannot address, please call or return to the Emergency Department.   Remember that you can always come back to the Emergency Department if you are not able to see your regular provider in the amount of time listed above, if you get any new symptoms, or if there is anything that worries you.        Discharge  Instructions  Abdominal Pain    Abdominal pain (belly pain) can be caused by many things. Your evaluation today does not show the exact cause for your pain. Your provider today has decided that it is unlikely your pain is due to a life threatening problem, or a problem requiring surgery or hospital admission. Sometimes those problems cannot be found right away, so it is very important that you follow up as directed.  Sometimes only the changes which occur over time allow the cause of your pain to be found.    Generally, every Emergency Department visit should have a follow-up clinic visit with either a primary or a specialty clinic/provider. Please follow-up as instructed by your emergency provider today. With abdominal pain, we often recommend very close follow-up, such as the following day.    ADULTS:  Return to the Emergency Department right away if:      You get an oral temperature above 102oF or as directed by your provider.    You have blood in your stools. This may be bright red or appear as black, tarry stools.      You keep vomiting (throwing up) or cannot drink liquids.    You see blood when you vomit.     You cannot have a bowel movement or you cannot pass gas.    Your stomach gets bloated or bigger.    Your skin or the whites of your eyes look yellow.    You faint.    You have bloody, frequent or painful urination (peeing).    You have new symptoms or anything that worries you.    CHILDREN:  Return to the Emergency Department right away if your child has any of the above-listed symptoms or the following:      Pushes your hand away or screams/cries when his/her belly is touched.    You notice your child is very fussy or weak.    Your child is very tired and is too tired to eat or drink.    Your child is dehydrated.  Signs of dehydration can be:  o Significant change in the amount of wet diapers/urine.  o Your infant or child starts to have dry mouth and lips, or no saliva (spit) or tears.    PREGNANT  WOMEN:  Return to the Emergency Department right away if you have any of the above-listed symptoms or the following:      You have bleeding, leaking fluid or passing tissue from the vagina.    You have worse pain or cramping, or pain in your shoulder or back.    You have vomiting that will not stop.    You have a temperature of 100oF or more.    Your baby is not moving as much as usual.    You faint.    You get a bad headache with or without eye problems and abdominal pain.    You have a seizure.    You have unusual discharge from your vagina and abdominal pain.    Abdominal pain is pretty common during pregnancy.  Your pain may or may not be related to your pregnancy. You should follow-up closely with your OB provider so they can evaluate you and your baby.  Until you follow-up with your regular provider, do the following:       Avoid sex and do not put anything in your vagina.    Drink clear fluids.    Only take medications approved by your provider.    MORE INFORMATION:    Appendicitis:  A possible cause of abdominal pain in any person who still has their appendix is acute appendicitis. Appendicitis is often hard to diagnose.  Testing does not always rule out early appendicitis or other causes of abdominal pain. Close follow-up with your provider and re-evaluations may be needed to figure out the reason for your abdominal pain.    Follow-up:  It is very important that you make an appointment with your clinic and go to the appointment.  If you do not follow-up with your primary provider, it may result in missing an important development which could result in permanent injury or disability and/or lasting pain.  If there is any problem keeping your appointment, call your provider or return to the Emergency Department.    Medications:  Take your medications as directed by your provider today.  Before using over-the-counter medications, ask your provider and make sure to take the medications as directed.  If you have  "any questions about medications, ask your provider.    Diet:  Resume your normal diet as much as possible, but do not eat fried, fatty or spicy foods while you have pain.  Do not drink alcohol or have caffeine.  Do not smoke tobacco.    Probiotics: If you have been given an antibiotic, you may want to also take a probiotic pill or eat yogurt with live cultures. Probiotics have \"good bacteria\" to help your intestines stay healthy. Studies have shown that probiotics help prevent diarrhea (loose stools) and other intestine problems (including C. diff infection) when you take antibiotics. You can buy these without a prescription in the pharmacy section of the store.     If you were given a prescription for medicine here today, be sure to read all of the information (including the package insert) that comes with your prescription.  This will include important information about the medicine, its side effects, and any warnings that you need to know about.  The pharmacist who fills the prescription can provide more information and answer questions you may have about the medicine.  If you have questions or concerns that the pharmacist cannot address, please call or return to the Emergency Department.       Remember that you can always come back to the Emergency Department if you are not able to see your regular provider in the amount of time listed above, if you get any new symptoms, or if there is anything that worries you.      Your next 10 appointments already scheduled     Apr 06, 2018 10:15 AM CDT   [facility]in [facility]ima MD   [facility]    [address]  [facility] [address] [phone]            May 13, 2018  8:30 PM CDT   Psg Split W/Tcm with BED 3  SLEEP   [facility] ([facility])    [address]  [facility] [id number] [phone]       "      May 31, 2018 10:30 AM CDT   Return Sleep Patient with Aniceto Mujica MD   [facility] ([facility])    [address] [id number] [phone]              24 Hour Appointment Hotline       To make an appointment at any [facility] clinic, call [phone] ([phone]). If you don't have a family doctor or clinic, we will help you find one. [facility] are conveniently located to serve the needs of you and your family.             Review of your medicines      Our records show that you are taking the medicines listed below. If these are incorrect, please call your family doctor or clinic.        Dose / Directions Last dose taken    albuterol (2.5 MG/3ML) 0.083% neb solution   Dose:  1 vial   Quantity:  30 vial        Take 1 vial (2.5 mg) by nebulization every 6 hours as needed for shortness of breath / dyspnea or wheezing   Refills:  3        amLODIPine 5 MG tablet   Commonly known as:  NORVASC   Dose:  5 mg   Quantity:  30 tablet        Take 1 tablet (5 mg) by mouth daily   Refills:  3        ASPIRIN NOT PRESCRIBED   Commonly known as:  INTENTIONAL   Quantity:  1 each        Please choose reason not prescribed, below   Refills:  0        baclofen 10 MG tablet   Commonly known as:  LIORESAL   Dose:  10 mg   Quantity:  60 tablet        Take 1 tablet (10 mg) by mouth 3 times daily   Refills:  11        blood glucose calibration solution   Commonly known as:  no brand specified   Quantity:  1 each        Use to calibrate blood glucose monitor as directed.   Refills:  0        blood glucose lancets standard   Commonly known as:  no brand specified   Quantity:  100 each        Use to test blood sugar daily times daily or as directed.   Refills:  11        blood glucose monitoring meter device kit   Commonly known as:  no brand specified   Quantity:  1 kit        Use to test blood sugar larisa times daily or as directed.   Refills:   0        blood glucose monitoring test strip   Commonly known as:  no brand specified   Quantity:  100 strip        Use to test blood sugars once times daily or as directed   Refills:  3        cyanocobalamin 1000 MCG/ML injection   Commonly known as:  VITAMIN B12   Dose:  1 mL   Quantity:  1 mL        Inject 1 mL (1,000 mcg) Subcutaneous every 30 days   Refills:  11        EPINEPHrine 0.3 MG/0.3ML injection 2-pack   Commonly known as:  EPIPEN/ADRENACLICK/or ANY BX GENERIC EQUIV   Dose:  0.3 mg   Quantity:  2 each        Inject 0.3 mLs (0.3 mg) into the muscle once as needed for anaphylaxis   Refills:  5        losartan-hydrochlorothiazide 100-25 MG per tablet   Commonly known as:  HYZAAR   Dose:  1 tablet   Quantity:  90 tablet        Take 1 tablet by mouth daily   Refills:  3        magnesium oxide 400 (241.3 MG) MG tablet   Commonly known as:  MAG-OX   Dose:  400 mg   Quantity:  90 tablet        Take 1 tablet (400 mg) by mouth daily   Refills:  3        ondansetron 4 MG ODT tab   Commonly known as:  ZOFRAN-ODT   Dose:  4 mg   Quantity:  10 tablet        Take 1 tablet (4 mg) by mouth every 8 hours as needed for nausea   Refills:  1        order for DME   Quantity:  1 Device        Right wrist splint for carpal tunnel syndrome  One* Use as directed at hour of sleep   Refills:  1        oxyCODONE-acetaminophen 5-325 MG per tablet   Commonly known as:  PERCOCET   Dose:  1 tablet   Quantity:  30 tablet        Take 1 tablet by mouth every 6 hours as needed for moderate to severe pain Max 4 tab/day   Refills:  0        polyethylene glycol powder   Commonly known as:  MIRALAX/GLYCOLAX   Quantity:  1581 g        TAKE 17 GRAMS (1 CAPFUL) BY MOUTH DAILY   Refills:  11        potassium chloride 10 MEQ tablet   Commonly known as:  K-TAB,KLOR-CON   Dose:  10 mEq   Indication:  Low Amount of Potassium in the Blood   Quantity:  120 tablet        Take 1 tablet (10 mEq) by mouth daily   Refills:  11        sertraline 100 MG tablet    Commonly known as:  ZOLOFT   Dose:  150 mg   Indication:  Major Depressive Disorder   Quantity:  45 tablet        Take 1.5 tablets (150 mg) by mouth daily   Refills:  11        STATIN NOT PRESCRIBED (INTENTIONAL)   Dose:  1 each   Quantity:  0 each        1 each daily Statin not prescribed intentionally due to Active liver disease (liver failure, cirrhosis, hepatitis) LIVER METS   Refills:  0        triamcinolone 0.1 % ointment   Commonly known as:  KENALOG   Quantity:  80 g        Apply sparingly to affected area three times daily for 14 days.   Refills:  0        Vitamin D (Cholecalciferol) 1000 UNITS Tabs   Dose:  2000 Units   Quantity:  60 tablet        Take 2,000 Units by mouth daily   Refills:  11                Orders Needing Specimen Collection     None      Pending Results     No orders found from 3/31/2018 to 4/3/2018.            Pending Culture Results     No orders found from 3/31/2018 to 4/3/2018.            Pending Results Instructions     If you had any lab results that were not finalized at the time of your Discharge, you can call the ED Lab Result RN at [phone]. You will be contacted by this team for any positive Lab results or changes in treatment. The nurses are available 7 days a week from 10A to 6:30P.  You can leave a message 24 hours per day and they will return your call.        Test Results From Your Hospital Stay               Clinical Quality Measure: Blood Pressure Screening     Your blood pressure was checked while you were in the emergency department today. The last reading we obtained was  BP: 165/76 . Please read the guidelines below about what these numbers mean and what you should do about them.  If your systolic blood pressure (the top number) is less than 120 and your diastolic blood pressure (the bottom number) is less than 80, then your blood pressure is normal. There is nothing more that you need to do about it.  If your systolic blood pressure (the top number) is  "120-139 or your diastolic blood pressure (the bottom number) is 80-89, your blood pressure may be higher than it should be. You should have your blood pressure rechecked within a year by a primary care provider.  If your systolic blood pressure (the top number) is 140 or greater or your diastolic blood pressure (the bottom number) is 90 or greater, you may have high blood pressure. High blood pressure is treatable, but if left untreated over time it can put you at risk for heart attack, stroke, or kidney failure. You should have your blood pressure rechecked by a primary care provider within the next 4 weeks.  If your provider in the emergency department today gave you specific instructions to follow-up with your doctor or provider even sooner than that, you should follow that instruction and not wait for up to 4 weeks for your follow-up visit.        Thank you for choosing [facility]       Thank you for choosing [facility] for your care. Our goal is always to provide you with excellent care. Hearing back from our patients is one way we can continue to improve our services. Please take a few minutes to complete the written survey that you may receive in the mail after you visit with us. Thank you!        [employer] Information     [employer] lets you send messages to your doctor, view your test results, renew your prescriptions, schedule appointments and more. To sign up, go to [email or web address] . Click on \"Log in\" on the left side of the screen, which will take you to the Welcome page. Then click on \"Sign up Now\" on the right side of the page.     You will be asked to enter the access code listed below, as well as some personal information. Please follow the directions to create your username and password.     Your access code is: [email or web address]  Expires: 2018  7:56 PM     Your access code will  in 90 days. If you need help or a new code, please call your [facility] clinic or [phone].        Care EveryWhere " ID     This is your Care EveryWhere ID. This could be used by other organizations to access your [facility] medical records  [id number]        Equal Access to Services     VALENTE RIVERA : Marc Leonard, deion nunes, emilee oconnor. So [facility] [phone].    ATENCIÓN: Si habla español, tiene a christianson disposición servicios gratuitos de asistencia lingüística. Llame al [phone].    We comply with applicable federal civil rights laws and [place] laws. We do not discriminate on the basis of race, color, national origin, age, disability, sex, sexual orientation, or gender identity.            After Visit Summary       This is your record. Keep this with you and show to your community pharmacist(s) and doctor(s) at your next visit.

## 2018-04-02 NOTE — ED AVS SNAPSHOT
Emergency Department    [address] [place] [address]    Phone:  [phone]    Fax:  [phone]                                       Ines Mott   MRN: [id number]    Department:   Emergency Department   Date of Visit:  4/2/2018           After Visit Summary Signature Page     I have received my discharge instructions, and my questions have been answered. I have discussed any challenges I see with this plan with the nurse or doctor.    ..........................................................................................................................................  Patient/Patient Representative Signature      ..........................................................................................................................................  Patient Representative Print Name and Relationship to Patient    ..................................................               ................................................  Date                                            Time    ..........................................................................................................................................  Reviewed by Signature/Title    ...................................................              ..............................................  Date                                                            Time

## 2018-04-04 ENCOUNTER — TELEPHONE (OUTPATIENT)
Dept: FAMILY MEDICINE | Facility: CLINIC | Age: 53
End: 2018-04-04

## 2018-04-04 NOTE — TELEPHONE ENCOUNTER
PLEASE FAX OFFICE VISIT LAST OFFICE VISIT NOTE TO REQUESTOR  WILL BE SEEING THIS WEEK AS WELL   FOR FOLLOW UP   AND FURTHER ADJUSTMENTS  OTTO YUSUF JR., MD

## 2018-04-04 NOTE — TELEPHONE ENCOUNTER
Jessica called from [facility] [phone]. Her renal biopsy was cancelled because of high BP. She is having a urology appt on Friday 4/6/18 with Dr. Haynes at [facility] in [place]. Jessica is requesting Dr. KEYSHA Fatima to fax OV notes and a BP plan to urology (fax: [phone], ph: [phone]).

## 2018-04-06 ENCOUNTER — OFFICE VISIT (OUTPATIENT)
Dept: FAMILY MEDICINE | Facility: CLINIC | Age: 53
End: 2018-04-06
Payer: COMMERCIAL

## 2018-04-06 VITALS
DIASTOLIC BLOOD PRESSURE: 84 MMHG | OXYGEN SATURATION: 97 % | HEART RATE: 74 BPM | SYSTOLIC BLOOD PRESSURE: 148 MMHG | WEIGHT: 254 LBS | TEMPERATURE: 97.2 F | BODY MASS INDEX: 41 KG/M2 | RESPIRATION RATE: 20 BRPM

## 2018-04-06 DIAGNOSIS — H43.393 VITREOUS FLOATERS OF BOTH EYES: ICD-10-CM

## 2018-04-06 DIAGNOSIS — F33.41 RECURRENT MAJOR DEPRESSION IN PARTIAL REMISSION (H): Primary | Chronic | ICD-10-CM

## 2018-04-06 DIAGNOSIS — J45.20 MILD INTERMITTENT ASTHMA WITHOUT COMPLICATION: ICD-10-CM

## 2018-04-06 DIAGNOSIS — G47.33 OSA (OBSTRUCTIVE SLEEP APNEA): ICD-10-CM

## 2018-04-06 DIAGNOSIS — I10 HYPERTENSION GOAL BP (BLOOD PRESSURE) < 140/80: Chronic | ICD-10-CM

## 2018-04-06 DIAGNOSIS — G44.229 CHRONIC TENSION-TYPE HEADACHE, NOT INTRACTABLE: ICD-10-CM

## 2018-04-06 DIAGNOSIS — E66.01 MORBID OBESITY (H): Chronic | ICD-10-CM

## 2018-04-06 DIAGNOSIS — G89.4 CHRONIC PAIN SYNDROME: ICD-10-CM

## 2018-04-06 DIAGNOSIS — F43.10 PTSD (POST-TRAUMATIC STRESS DISORDER): Chronic | ICD-10-CM

## 2018-04-06 DIAGNOSIS — Z98.2 HISTORY OF BRAIN SHUNT: ICD-10-CM

## 2018-04-06 DIAGNOSIS — E11.9 TYPE 2 DIABETES MELLITUS WITHOUT COMPLICATION, WITHOUT LONG-TERM CURRENT USE OF INSULIN (H): ICD-10-CM

## 2018-04-06 DIAGNOSIS — E78.5 HYPERLIPIDEMIA WITH TARGET LDL LESS THAN 130: Chronic | ICD-10-CM

## 2018-04-06 PROCEDURE — 99214 OFFICE O/P EST MOD 30 MIN: CPT | Performed by: FAMILY MEDICINE

## 2018-04-06 RX ORDER — OXYCODONE AND ACETAMINOPHEN 5; 325 MG/1; MG/1
1 TABLET ORAL EVERY 6 HOURS PRN
Qty: 30 TABLET | Refills: 0 | Status: SHIPPED | OUTPATIENT
Start: 2018-04-06 | End: 2018-04-13

## 2018-04-06 RX ORDER — CLONIDINE HYDROCHLORIDE 0.1 MG/1
0.1 TABLET ORAL 2 TIMES DAILY
Qty: 60 TABLET | Refills: 3 | Status: SHIPPED | OUTPATIENT
Start: 2018-04-06 | End: 2018-09-24

## 2018-04-06 NOTE — TELEPHONE ENCOUNTER
BLOOD PRESSURE IMPROVED   STARTED CATAPRES 0.1MG PO TWICE DAILY   RECHECK NEXT WEEK  OTTO YUSUF JR., MD

## 2018-04-06 NOTE — PATIENT INSTRUCTIONS
(F33.41) Recurrent major depression in partial remission  (primary encounter diagnosis)  Comment:    Plan:      (E78.5) Hyperlipidemia with target LDL less than 130  Comment:    Plan:      (I10) Hypertension goal BP (blood pressure) < 140/80  Comment:    Plan: cloNIDine (CATAPRES) 0.1 MG tablet             (E66.01) BMI 42  Comment:    Plan:      (F43.10) PTSD (post-traumatic stress disorder)  Comment:    Plan:      (G44.229) Chronic tension-type headache, not intractable  Comment:    Plan:      (J45.20) Mild intermittent asthma without complication  Comment:    Plan:      (G47.33) ALEXUS (obstructive sleep apnea)  Comment:    Plan:      (G89.4) Chronic pain syndrome  Comment:    Plan: oxyCODONE-acetaminophen (PERCOCET) 5-325 MG per        tablet, naloxone (NARCAN) nasal spray             (Z98.2) History of brain shunt  Comment:    Plan:

## 2018-04-06 NOTE — MR AVS SNAPSHOT
After Visit Summary   4/6/2018    Ines Mott    MRN: [id number]           Patient Information     Date Of Birth          1965        Visit Information        Provider Department      4/6/2018 10:15 AM Dustin Fatima MD [facility]        Today's Diagnoses     Recurrent major depression in partial remission    -  1    Hyperlipidemia with target LDL less than 130        Hypertension goal BP (blood pressure) < 140/80        BMI 42        PTSD (post-traumatic stress disorder)        Chronic tension-type headache, not intractable        Mild intermittent asthma without complication        ALEXUS (obstructive sleep apnea)        Chronic pain syndrome        History of brain shunt        Vitreous floaters of both eyes        Type 2 diabetes mellitus without complication, without long-term current use of insulin (H)          Care Instructions    (F33.41) Recurrent major depression in partial remission  (primary encounter diagnosis)  Comment:    Plan:      (E78.5) Hyperlipidemia with target LDL less than 130  Comment:    Plan:      (I10) Hypertension goal BP (blood pressure) < 140/80  Comment:    Plan: cloNIDine (CATAPRES) 0.1 MG tablet             (E66.01) BMI 42  Comment:    Plan:      (F43.10) PTSD (post-traumatic stress disorder)  Comment:    Plan:      (G44.229) Chronic tension-type headache, not intractable  Comment:    Plan:      (J45.20) Mild intermittent asthma without complication  Comment:    Plan:      (G47.33) ALEXUS (obstructive sleep apnea)  Comment:    Plan:      (G89.4) Chronic pain syndrome  Comment:    Plan: oxyCODONE-acetaminophen (PERCOCET) 5-325 MG per        tablet, naloxone (NARCAN) nasal spray             (Z98.2) History of brain shunt  Comment:    Plan:                    Follow-ups after your visit        Additional Services     OPHTHALMOLOGY ADULT REFERRAL       Your provider has referred you to: IVETH: [facility],  OSCAR Vasquez [phone] [email or web address]  Elmer Hunt or colleague  [facility]     Please be aware that coverage of these services is subject to the terms and limitations of your health insurance plan.  Call member services at your health plan with any benefit or coverage questions.      Please bring the following with you to your appointment:    (1) Any X-Rays, CTs or MRIs which have been performed.  Contact the facility where they were done to arrange for  prior to your scheduled appointment.    (2) List of current medications  (3) This referral request   (4) Any documents/labs given to you for this referral                  Follow-up notes from your care team     Return in about 1 week (around 4/13/2018).      Your next 10 appointments already scheduled     May 13, 2018  8:30 PM CDT   Psg Split W/Tcm with BED 3 SH SLEEP   [facility] ([facility])    [address]  [facility] [id number] [phone]            May 31, 2018 10:30 AM CDT   Return Sleep Patient with Aniceto Mujica MD   [facility] ([facility])    [address]  [facility] [id number] [phone]              Who to contact     If you have questions or need follow up information about today's clinic visit or your schedule please contact [facility] directly at [phone].  Normal or non-critical lab and imaging results will be communicated to you by MyChart, letter or phone within 4 business days after the clinic has received the results. If you do not hear from us within 7 days, please contact the clinic through MyChart or phone. If you have a critical or abnormal lab result, we will notify you by phone as soon as possible.  Submit refill requests through [employer] or call your pharmacy and they will forward the refill request to us. Please allow 3  "business days for your refill to be completed.          Additional Information About Your Visit        MyChart Information     [employer] lets you send messages to your doctor, view your test results, renew your prescriptions, schedule appointments and more. To sign up, go to [email or web address] . Click on \"Log in\" on the left side of the screen, which will take you to the Welcome page. Then click on \"Sign up Now\" on the right side of the page.     You will be asked to enter the access code listed below, as well as some personal information. Please follow the directions to create your username and password.     Your access code is: [email or web address]  Expires: 2018  7:56 PM     Your access code will  in 90 days. If you need help or a new code, please call your [facility] clinic or [phone].        Care EveryWhere ID     This is your Care EveryWhere ID. This could be used by other organizations to access your [facility] medical records  [id number]        Your Vitals Were     Pulse Temperature Respirations Pulse Oximetry BMI (Body Mass Index)       74 97.2  F (36.2  C) (Tympanic) 20 97% 41 kg/m2        Blood Pressure from Last 3 Encounters:   18 148/84   18 165/76   18 171/90    Weight from Last 3 Encounters:   18 254 lb (115.2 kg)   18 256 lb (116.1 kg)   18 254 lb (115.2 kg)              We Performed the Following     OPHTHALMOLOGY ADULT REFERRAL          Today's Medication Changes          These changes are accurate as of 18 11:05 AM.  If you have any questions, ask your nurse or doctor.               Start taking these medicines.        Dose/Directions    cloNIDine 0.1 MG tablet   Commonly known as:  CATAPRES   Used for:  Hypertension goal BP (blood pressure) < 140/80   Started by:  Dustin Fatima MD        Dose:  0.1 mg   Take 1 tablet (0.1 mg) by mouth 2 times daily   Quantity:  60 tablet   Refills:  3       naloxone nasal spray   Commonly known as:  " NARCAN   Used for:  Chronic pain syndrome   Started by:  Dustin Fatima MD        Dose:  4 mg   Spray 1 spray (4 mg) into one nostril alternating nostrils as needed for opioid reversal every 2-3 minutes until assistance arrives   Quantity:  0.2 mL   Refills:  0            Where to get your medicines      These medications were sent to [facility] [address] [place] [address] [address] [facility]  [address] [facility] [address]     Phone:  [phone]     cloNIDine 0.1 MG tablet    naloxone nasal spray         Some of these will need a paper prescription and others can be bought over the counter.  Ask your nurse if you have questions.     Bring a paper prescription for each of these medications     oxyCODONE-acetaminophen 5-325 MG per tablet                Primary Care Provider Office Phone # Fax #    Dustin Fatima [phone]       [address] [facility] [address]        Equal Access to Services     [facility]: Hadii aad ku hadasho Soomaali, waaxda luqadaha, qaybta kaalmada adeegyada, waxay idiin hayrupeshn stephani pardo . So [facility] [place] [facility] [phone].    ATENCIÓN: Si habla español, tiene a christianson disposición servicios gratuitos de asistencia lingüística. Llame al [phone].    We comply with applicable federal civil rights laws and [place] laws. We do not discriminate on the basis of race, color, national origin, age, disability, sex, sexual orientation, or gender identity.            Thank you!     Thank you for choosing [facility]  for your care. Our goal is always to provide you with excellent care. Hearing back from our patients is one way we can continue to improve our services. Please take a few minutes to complete the written survey that you may receive in the mail after your visit with us. Thank you!             Your Updated Medication List - Protect others around you: Learn how to safely use, store and throw  away your medicines at www.disposemymeds.org.          This list is accurate as of 4/6/18 11:05 AM.  Always use your most recent med list.                   Brand Name Dispense Instructions for use Diagnosis    albuterol (2.5 MG/3ML) 0.083% neb solution     30 vial    Take 1 vial (2.5 mg) by nebulization every 6 hours as needed for shortness of breath / dyspnea or wheezing    Mild intermittent asthma with acute exacerbation       amLODIPine 5 MG tablet    NORVASC    30 tablet    Take 1 tablet (5 mg) by mouth daily    Essential hypertension, benign, History of brain shunt       ASPIRIN NOT PRESCRIBED    INTENTIONAL    1 each    Please choose reason not prescribed, below    Aspirin intolerance       baclofen 10 MG tablet    LIORESAL    60 tablet    Take 1 tablet (10 mg) by mouth 3 times daily    Chronic bilateral low back pain with right-sided sciatica       blood glucose calibration solution    no brand specified    1 each    Use to calibrate blood glucose monitor as directed.    Type 2 diabetes mellitus without complication, without long-term current use of insulin (H)       blood glucose lancets standard    no brand specified    100 each    Use to test blood sugar daily times daily or as directed.    Type 2 diabetes mellitus without complication, without long-term current use of insulin (H)       blood glucose monitoring meter device kit    no brand specified    1 kit    Use to test blood sugar larisa times daily or as directed.    Type 2 diabetes mellitus without complication, without long-term current use of insulin (H)       blood glucose monitoring test strip    no brand specified    100 strip    Use to test blood sugars once times daily or as directed    Type 2 diabetes mellitus without complication, without long-term current use of insulin (H)       cloNIDine 0.1 MG tablet    CATAPRES    60 tablet    Take 1 tablet (0.1 mg) by mouth 2 times daily    Hypertension goal BP (blood pressure) < 140/80        cyanocobalamin 1000 MCG/ML injection    VITAMIN B12    1 mL    Inject 1 mL (1,000 mcg) Subcutaneous every 30 days    B12 deficiency       EPINEPHrine 0.3 MG/0.3ML injection 2-pack    EPIPEN/ADRENACLICK/or ANY BX GENERIC EQUIV    2 each    Inject 0.3 mLs (0.3 mg) into the muscle once as needed for anaphylaxis    Late effect of other and unspecified external causes       losartan-hydrochlorothiazide 100-25 MG per tablet    HYZAAR    90 tablet    Take 1 tablet by mouth daily    Hypertension goal BP (blood pressure) < 140/80       magnesium oxide 400 (241.3 MG) MG tablet    MAG-OX    90 tablet    Take 1 tablet (400 mg) by mouth daily    Cramp of limb       naloxone nasal spray    NARCAN    0.2 mL    Spray 1 spray (4 mg) into one nostril alternating nostrils as needed for opioid reversal every 2-3 minutes until assistance arrives    Chronic pain syndrome       ondansetron 4 MG ODT tab    ZOFRAN-ODT    10 tablet    Take 1 tablet (4 mg) by mouth every 8 hours as needed for nausea        order for DME     1 Device    Right wrist splint for carpal tunnel syndrome  One* Use as directed at hour of sleep    Carpal tunnel syndrome of right wrist       oxyCODONE-acetaminophen 5-325 MG per tablet    PERCOCET    30 tablet    Take 1 tablet by mouth every 6 hours as needed for moderate to severe pain Max 4 tab/day    Chronic pain syndrome       polyethylene glycol powder    MIRALAX/GLYCOLAX    1581 g    TAKE 17 GRAMS (1 CAPFUL) BY MOUTH DAILY    Slow transit constipation       potassium chloride 10 MEQ tablet    K-TAB,KLOR-CON    120 tablet    Take 1 tablet (10 mEq) by mouth daily    Hypokalemia       sertraline 100 MG tablet    ZOLOFT    45 tablet    Take 1.5 tablets (150 mg) by mouth daily    Recurrent major depression in partial remission (H), Other constipation       STATIN NOT PRESCRIBED (INTENTIONAL)     0 each    1 each daily Statin not prescribed intentionally due to Active liver disease (liver failure, cirrhosis, hepatitis)  LIVER METS    Hyperlipidemia LDL goal <100, Type 2 diabetes mellitus without complication (H)       triamcinolone 0.1 % ointment    KENALOG    80 g    Apply sparingly to affected area three times daily for 14 days.    Hand eczema       Vitamin D (Cholecalciferol) 1000 UNITS Tabs     60 tablet    Take 2,000 Units by mouth daily    Vitamin D deficiency

## 2018-04-06 NOTE — NURSING NOTE
"Chief Complaint   Patient presents with     Hypertension       Initial /84 (Cuff Size: Adult Large)  Pulse 74  Temp 97.2  F (36.2  C) (Tympanic)  Resp 20  Wt 254 lb (115.2 kg)  SpO2 97%  BMI 41 kg/m2 Estimated body mass index is 41 kg/(m^2) as calculated from the following:    Height as of 4/2/18: 5' 6\" (1.676 m).    Weight as of this encounter: 254 lb (115.2 kg).  Medication Reconciliation: complete   Maggi Harris CMA    "
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
## 2018-04-07 ASSESSMENT — PATIENT HEALTH QUESTIONNAIRE - PHQ9: SUM OF ALL RESPONSES TO PHQ QUESTIONS 1-9: 12

## 2018-04-09 ENCOUNTER — TRANSFERRED RECORDS (OUTPATIENT)
Dept: HEALTH INFORMATION MANAGEMENT | Facility: CLINIC | Age: 53
End: 2018-04-09

## 2018-04-10 ENCOUNTER — NURSE TRIAGE (OUTPATIENT)
Dept: NURSING | Facility: CLINIC | Age: 53
End: 2018-04-10

## 2018-04-10 ENCOUNTER — TELEPHONE (OUTPATIENT)
Dept: FAMILY MEDICINE | Facility: CLINIC | Age: 53
End: 2018-04-10

## 2018-04-10 DIAGNOSIS — A49.8 ESCHERICHIA COLI (E. COLI) INFECTION: Primary | ICD-10-CM

## 2018-04-10 DIAGNOSIS — R35.0 URINARY FREQUENCY: ICD-10-CM

## 2018-04-10 RX ORDER — CIPROFLOXACIN 500 MG/1
500 TABLET, FILM COATED ORAL 2 TIMES DAILY
Qty: 14 TABLET | Refills: 0 | Status: SHIPPED | OUTPATIENT
Start: 2018-04-10 | End: 2018-05-04

## 2018-04-10 NOTE — TELEPHONE ENCOUNTER
Urine culture ordered [facility]   LIKELY     CONTAMINANT COMPLAINING OF DYSURIA AND FREQUENCY   NOT RELIABLE HISTORIAN   E COLI 10--50,000 COLONIES   CIPRO with GASTROINTESTINAL DISTRESS IN PAST   SIDE EFFECTS BENEFITS AND RISKS DISCUSSED    TREATMENT PROGNOSIS BENEFITS AND RISKS DISCUSSED   MEDICATION RISKS SIDE EFFECTS BENEFITS AND RISKS DISCUSSED   OTTO YUSUF JR., MD

## 2018-04-10 NOTE — TELEPHONE ENCOUNTER
Ame nurse practitioner from [facility] called. She has faxed a urinalysis yesterday with a report and is faxing a urine culture now to . Culture shows E. Coli 10-50,000. Pt can or will only have Dr. Dustin Fatima manage her Abx Rx's. Report to be located by  triage and place on PCP's desk for review.

## 2018-04-10 NOTE — TELEPHONE ENCOUNTER
"Pharmacist Bladimir calling, Patient was prescribed Ciprofloxacin today for UTI, pharmacist has quinolones listed as an allergy that the Patient has, wants to know if it's ok to dispense to the Patient.    Patient's primary provider is Dr. Dustin Fatima at the [facility].  Dr. Addison Cast is on call for this clinic and was paged at 6:12pm via [place] to call this RN directly. Dr. Cast called immediately back, advised per Dr. Luke note as pasted below  \"              Urine culture ordered [facility]   LIKELY     CONTAMINANT COMPLAINING OF DYSURIA AND FREQUENCY   NOT RELIABLE HISTORIAN   E COLI 10--50,000 COLONIES   CIPRO with GASTROINTESTINAL DISTRESS IN PAST   SIDE EFFECTS BENEFITS AND RISKS DISCUSSED  \"    Dr. Cast gave verbal ok to dispense the Ciprofloxacin as ordered by the provider.  This information was called to the [facility] pharmacist Bladimir.    Protocol and care advice reviewed.   Advised to call back if further questions or concerns.     Reason for Disposition    Pharmacy calling with prescription questions and triager unable to answer question    Protocols used: MEDICATION QUESTION CALL-ADULT-    "

## 2018-04-13 ENCOUNTER — OFFICE VISIT (OUTPATIENT)
Dept: FAMILY MEDICINE | Facility: CLINIC | Age: 53
End: 2018-04-13
Payer: COMMERCIAL

## 2018-04-13 VITALS
OXYGEN SATURATION: 98 % | TEMPERATURE: 97.6 F | DIASTOLIC BLOOD PRESSURE: 66 MMHG | SYSTOLIC BLOOD PRESSURE: 128 MMHG | HEART RATE: 79 BPM | BODY MASS INDEX: 41.32 KG/M2 | WEIGHT: 256 LBS | RESPIRATION RATE: 16 BRPM

## 2018-04-13 DIAGNOSIS — C78.7 CARCINOMA OF COLON METASTATIC TO LIVER (H): Primary | ICD-10-CM

## 2018-04-13 DIAGNOSIS — I10 HYPERTENSION GOAL BP (BLOOD PRESSURE) < 140/80: Chronic | ICD-10-CM

## 2018-04-13 DIAGNOSIS — C18.9 CARCINOMA OF COLON METASTATIC TO LIVER (H): Primary | ICD-10-CM

## 2018-04-13 DIAGNOSIS — J30.1 CHRONIC SEASONAL ALLERGIC RHINITIS DUE TO POLLEN: ICD-10-CM

## 2018-04-13 DIAGNOSIS — F33.41 RECURRENT MAJOR DEPRESSION IN PARTIAL REMISSION (H): Chronic | ICD-10-CM

## 2018-04-13 DIAGNOSIS — E11.9 TYPE 2 DIABETES MELLITUS WITHOUT COMPLICATION, WITHOUT LONG-TERM CURRENT USE OF INSULIN (H): ICD-10-CM

## 2018-04-13 DIAGNOSIS — E78.5 HYPERLIPIDEMIA WITH TARGET LDL LESS THAN 130: Chronic | ICD-10-CM

## 2018-04-13 DIAGNOSIS — N30.00 ACUTE CYSTITIS WITHOUT HEMATURIA: ICD-10-CM

## 2018-04-13 DIAGNOSIS — F43.10 PTSD (POST-TRAUMATIC STRESS DISORDER): Chronic | ICD-10-CM

## 2018-04-13 DIAGNOSIS — N28.89 RENAL MASS, RIGHT: ICD-10-CM

## 2018-04-13 DIAGNOSIS — G89.4 CHRONIC PAIN SYNDROME: ICD-10-CM

## 2018-04-13 PROCEDURE — 99214 OFFICE O/P EST MOD 30 MIN: CPT | Performed by: FAMILY MEDICINE

## 2018-04-13 RX ORDER — OXYCODONE AND ACETAMINOPHEN 5; 325 MG/1; MG/1
1 TABLET ORAL EVERY 6 HOURS PRN
Qty: 30 TABLET | Refills: 0 | Status: SHIPPED | OUTPATIENT
Start: 2018-04-13 | End: 2018-04-13

## 2018-04-13 RX ORDER — OXYCODONE AND ACETAMINOPHEN 5; 325 MG/1; MG/1
1 TABLET ORAL EVERY 6 HOURS PRN
Qty: 30 TABLET | Refills: 0 | Status: SHIPPED | OUTPATIENT
Start: 2018-04-20 | End: 2018-04-27

## 2018-04-13 ASSESSMENT — PATIENT HEALTH QUESTIONNAIRE - PHQ9
SUM OF ALL RESPONSES TO PHQ QUESTIONS 1-9: 5
SUM OF ALL RESPONSES TO PHQ QUESTIONS 1-9: 5

## 2018-04-13 NOTE — Clinical Note
Scheduled with biopsy with oncology  Dr LUIS MIGUEL Coyle  Right renal lesion 04/19/2018 10 am  Please cover this

## 2018-04-13 NOTE — MR AVS SNAPSHOT
After Visit Summary   4/13/2018    Ines Mott    MRN: [id number]           Patient Information     Date Of Birth          1965        Visit Information        Provider Department      4/13/2018 8:45 AM Dustin Fatima MD [facility]        Today's Diagnoses     Carcinoma of colon metastatic to liver (H)    -  1    Type 2 diabetes mellitus without complication, without long-term current use of insulin (H)        Renal mass, right        Acute cystitis without hematuria        Chronic pain syndrome        Hypertension goal BP (blood pressure) < 140/80        Hyperlipidemia with target LDL less than 130        Recurrent major depression in partial remission        PTSD (post-traumatic stress disorder)        Chronic seasonal allergic rhinitis due to pollen          Care Instructions      CODMAN'S EXERCISES  ,THAT IS PENDULUM RANGE OF MOTION 30 EACH TWICE DAILY    THUMB UP EXERCISES INTO PAIN 30 EACH TWICE DAILY    INTERNAL  AND EXTERNAL ROTATION  with AND WITHOUT RESISTANCE OR WEIGHT 30 EACH TWICE DAILY    SWORD SHEATH EXERCISE 30 EACH TWICE DAILY    WALL STRETCH EXERCISE 30 SECONDS EACH TWICE DAILY    POSTERIOR SHOULDER STRETCH AND HOLD 3 10 SECOND STRETCHES TWICE DAILY    ISOMETRIC EXERCISE 60 DEGREES ABDUCTION, ADDUCTION, 90 DEGREE THUMB UP POSITION    AVOID PAINFUL ARC    ICE TWICE DAILY X 5 MINUTES PRIOR TO EXERCISE OR AS NEEDED FOR PAIN CONTROL    Left rotator syndrome      (C18.9,  C78.7) Carcinoma of colon metastatic to liver (H)  (primary encounter diagnosis)    Comment: renal lesion right kidney  biopsy scheduled April 19th 2018    Plan:          (E11.9) Type 2 diabetes mellitus without complication, without long-term current use of insulin (H)    Comment:      Plan:          (N28.89) Renal mass, right    Comment:      Plan:          (N30.00) Acute cystitis without hematuria    Comment:      Plan:          (G89.4) Chronic pain  "syndrome    Comment:      Plan: oxyCODONE-acetaminophen (PERCOCET) 5-325 MG per          tablet, DISCONTINUED: oxyCODONE-acetaminophen           (PERCOCET) 5-325 MG per tablet      Hypertension better control current regimen    Dustin Fatima Jr., MD                                     Follow-ups after your visit        Follow-up notes from your care team     Return in about 2 weeks (around 4/27/2018).      Your next 10 appointments already scheduled     May 13, 2018  8:30 PM CDT   Psg Split W/Tcm with BED 3 SH SLEEP   [facility] ([facility])    [address]  [facility] [id number] [phone]            May 31, 2018 10:30 AM CDT   Return Sleep Patient with Aniceto Mujica MD   [facility] ([facility])    [address] [id number] [phone]              Who to contact     If you have questions or need follow up information about today's clinic visit or your schedule please contact [facility] directly at [phone].  Normal or non-critical lab and imaging results will be communicated to you by MyChart, letter or phone within 4 business days after the clinic has received the results. If you do not hear from us within 7 days, please contact the clinic through [employer] or phone. If you have a critical or abnormal lab result, we will notify you by phone as soon as possible.  Submit refill requests through [employer] or call your pharmacy and they will forward the refill request to us. Please allow 3 business days for your refill to be completed.          Additional Information About Your Visit        MyChart Information     [employer] lets you send messages to your doctor, view your test results, renew your prescriptions, schedule appointments and more. To sign up, go to [email or web address] . Click on \"Log in\" on the left side of the screen, " "which will take you to the Welcome page. Then click on \"Sign up Now\" on the right side of the page.     You will be asked to enter the access code listed below, as well as some personal information. Please follow the directions to create your username and password.     Your access code is: [email or web address]  Expires: 2018  9:19 AM     Your access code will  in 90 days. If you need help or a new code, please call your [facility] or [phone].        Care EveryWhere ID     This is your Care EveryWhere ID. This could be used by other organizations to access your [place] medical records  [id number]        Your Vitals Were     Pulse Temperature Respirations Pulse Oximetry BMI (Body Mass Index)       79 97.6  F (36.4  C) (Tympanic) 16 98% 41.32 kg/m2        Blood Pressure from Last 3 Encounters:   18 128/66   18 148/84   18 165/76    Weight from Last 3 Encounters:   18 256 lb (116.1 kg)   18 254 lb (115.2 kg)   18 256 lb (116.1 kg)              Today, you had the following     No orders found for display         Today's Medication Changes          These changes are accurate as of 18  9:19 AM.  If you have any questions, ask your nurse or doctor.               Start taking these medicines.        Dose/Directions    oxyCODONE-acetaminophen 5-325 MG per tablet   Commonly known as:  PERCOCET   Used for:  Chronic pain syndrome   Started by:  Dustin Fatima MD        Dose:  1 tablet   Start taking on:  2018   Take 1 tablet by mouth every 6 hours as needed for moderate to severe pain Max 4 tab/day   Quantity:  30 tablet   Refills:  0            Where to get your medicines      Some of these will need a paper prescription and others can be bought over the counter.  Ask your nurse if you have questions.     Bring a paper prescription for each of these medications     oxyCODONE-acetaminophen 5-325 MG per tablet                Primary Care Provider Office Phone " # Fax #    Dustin Rina Fatima [phone] [address]  [facility] [address]        Equal Access to Services     VALENTE RIVERA : Hadii casimiro ku treo Sodanisali, waaxda luqadaha, qaybta kaalmada adeegyada, emilee madsen laEulaliascooter coyne. So [facility] [phone].    ATENCIÓN: Si habla español, tiene a christianson disposición servicios gratuitos de asistencia lingüística. Llame al [phone].    We comply with applicable federal civil rights laws and [place] laws. We do not discriminate on the basis of race, color, national origin, age, disability, sex, sexual orientation, or gender identity.            Thank you!     Thank you for choosing [facility]  for your care. Our goal is always to provide you with excellent care. Hearing back from our patients is one way we can continue to improve our services. Please take a few minutes to complete the written survey that you may receive in the mail after your visit with us. Thank you!             Your Updated Medication List - Protect others around you: Learn how to safely use, store and throw away your medicines at www.disposemymeds.org.          This list is accurate as of 4/13/18  9:19 AM.  Always use your most recent med list.                   Brand Name Dispense Instructions for use Diagnosis    albuterol (2.5 MG/3ML) 0.083% neb solution     30 vial    Take 1 vial (2.5 mg) by nebulization every 6 hours as needed for shortness of breath / dyspnea or wheezing    Mild intermittent asthma with acute exacerbation       amLODIPine 5 MG tablet    NORVASC    30 tablet    Take 1 tablet (5 mg) by mouth daily    Essential hypertension, benign, History of brain shunt       ASPIRIN NOT PRESCRIBED    INTENTIONAL    1 each    Please choose reason not prescribed, below    Aspirin intolerance       baclofen 10 MG tablet    LIORESAL    60 tablet    Take 1 tablet (10 mg) by mouth 3 times daily    Chronic bilateral low  back pain with right-sided sciatica       blood glucose calibration solution    no brand specified    1 each    Use to calibrate blood glucose monitor as directed.    Type 2 diabetes mellitus without complication, without long-term current use of insulin (H)       blood glucose lancets standard    no brand specified    100 each    Use to test blood sugar daily times daily or as directed.    Type 2 diabetes mellitus without complication, without long-term current use of insulin (H)       blood glucose monitoring meter device kit    no brand specified    1 kit    Use to test blood sugar larisa times daily or as directed.    Type 2 diabetes mellitus without complication, without long-term current use of insulin (H)       blood glucose monitoring test strip    no brand specified    100 strip    Use to test blood sugars once times daily or as directed    Type 2 diabetes mellitus without complication, without long-term current use of insulin (H)       ciprofloxacin 500 MG tablet    CIPRO    14 tablet    Take 1 tablet (500 mg) by mouth 2 times daily    Escherichia coli (E. coli) infection, Urinary frequency       cloNIDine 0.1 MG tablet    CATAPRES    60 tablet    Take 1 tablet (0.1 mg) by mouth 2 times daily    Hypertension goal BP (blood pressure) < 140/80       cyanocobalamin 1000 MCG/ML injection    VITAMIN B12    1 mL    Inject 1 mL (1,000 mcg) Subcutaneous every 30 days    B12 deficiency       EPINEPHrine 0.3 MG/0.3ML injection 2-pack    EPIPEN/ADRENACLICK/or ANY BX GENERIC EQUIV    2 each    Inject 0.3 mLs (0.3 mg) into the muscle once as needed for anaphylaxis    Late effect of other and unspecified external causes       losartan-hydrochlorothiazide 100-25 MG per tablet    HYZAAR    90 tablet    Take 1 tablet by mouth daily    Hypertension goal BP (blood pressure) < 140/80       magnesium oxide 400 (241.3 Mg) MG tablet    MAG-OX    90 tablet    Take 1 tablet (400 mg) by mouth daily    Cramp of limb       naloxone  nasal spray    NARCAN    0.2 mL    Spray 1 spray (4 mg) into one nostril alternating nostrils as needed for opioid reversal every 2-3 minutes until assistance arrives    Chronic pain syndrome       ondansetron 4 MG ODT tab    ZOFRAN-ODT    10 tablet    Take 1 tablet (4 mg) by mouth every 8 hours as needed for nausea        order for DME     1 Device    Right wrist splint for carpal tunnel syndrome  One* Use as directed at hour of sleep    Carpal tunnel syndrome of right wrist       oxyCODONE-acetaminophen 5-325 MG per tablet   Start taking on:  4/20/2018    PERCOCET    30 tablet    Take 1 tablet by mouth every 6 hours as needed for moderate to severe pain Max 4 tab/day    Chronic pain syndrome       polyethylene glycol powder    MIRALAX/GLYCOLAX    1581 g    TAKE 17 GRAMS (1 CAPFUL) BY MOUTH DAILY    Slow transit constipation       potassium chloride 10 MEQ tablet    K-TAB,KLOR-CON    120 tablet    Take 1 tablet (10 mEq) by mouth daily    Hypokalemia       sertraline 100 MG tablet    ZOLOFT    45 tablet    Take 1.5 tablets (150 mg) by mouth daily    Recurrent major depression in partial remission (H), Other constipation       STATIN NOT PRESCRIBED (INTENTIONAL)     0 each    1 each daily Statin not prescribed intentionally due to Active liver disease (liver failure, cirrhosis, hepatitis) LIVER METS    Hyperlipidemia LDL goal <100, Type 2 diabetes mellitus without complication (H)       triamcinolone 0.1 % ointment    KENALOG    80 g    Apply sparingly to affected area three times daily for 14 days.    Hand eczema       Vitamin D (Cholecalciferol) 1000 units Tabs     60 tablet    Take 2,000 Units by mouth daily    Vitamin D deficiency
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
## 2018-04-13 NOTE — PATIENT INSTRUCTIONS
CODMAN'S EXERCISES  ,THAT IS PENDULUM RANGE OF MOTION 30 EACH TWICE DAILY    THUMB UP EXERCISES INTO PAIN 30 EACH TWICE DAILY    INTERNAL  AND EXTERNAL ROTATION  with AND WITHOUT RESISTANCE OR WEIGHT 30 EACH TWICE DAILY    SWORD SHEATH EXERCISE 30 EACH TWICE DAILY    WALL STRETCH EXERCISE 30 SECONDS EACH TWICE DAILY    POSTERIOR SHOULDER STRETCH AND HOLD 3 10 SECOND STRETCHES TWICE DAILY    ISOMETRIC EXERCISE 60 DEGREES ABDUCTION, ADDUCTION, 90 DEGREE THUMB UP POSITION    AVOID PAINFUL ARC    ICE TWICE DAILY X 5 MINUTES PRIOR TO EXERCISE OR AS NEEDED FOR PAIN CONTROL    Left rotator syndrome      (C18.9,  C78.7) Carcinoma of colon metastatic to liver (H)  (primary encounter diagnosis)    Comment: renal lesion right kidney  biopsy scheduled April 19th 2018    Plan:          (E11.9) Type 2 diabetes mellitus without complication, without long-term current use of insulin (H)    Comment:      Plan:          (N28.89) Renal mass, right    Comment:      Plan:          (N30.00) Acute cystitis without hematuria    Comment:      Plan:          (G89.4) Chronic pain syndrome    Comment:      Plan: oxyCODONE-acetaminophen (PERCOCET) 5-325 MG per          tablet, DISCONTINUED: oxyCODONE-acetaminophen           (PERCOCET) 5-325 MG per tablet      Hypertension better control current regimen    Dustin Fatiam Jr., MD

## 2018-04-14 ASSESSMENT — PATIENT HEALTH QUESTIONNAIRE - PHQ9: SUM OF ALL RESPONSES TO PHQ QUESTIONS 1-9: 5

## 2018-04-27 ENCOUNTER — OFFICE VISIT (OUTPATIENT)
Dept: FAMILY MEDICINE | Facility: CLINIC | Age: 53
End: 2018-04-27
Payer: COMMERCIAL

## 2018-04-27 ENCOUNTER — TRANSFERRED RECORDS (OUTPATIENT)
Dept: HEALTH INFORMATION MANAGEMENT | Facility: CLINIC | Age: 53
End: 2018-04-27

## 2018-04-27 VITALS
BODY MASS INDEX: 41.16 KG/M2 | HEART RATE: 64 BPM | TEMPERATURE: 97.2 F | RESPIRATION RATE: 20 BRPM | OXYGEN SATURATION: 100 % | WEIGHT: 255 LBS | DIASTOLIC BLOOD PRESSURE: 62 MMHG | SYSTOLIC BLOOD PRESSURE: 124 MMHG

## 2018-04-27 DIAGNOSIS — G89.4 CHRONIC PAIN SYNDROME: ICD-10-CM

## 2018-04-27 DIAGNOSIS — R00.2 PALPITATIONS: Primary | ICD-10-CM

## 2018-04-27 PROCEDURE — 99214 OFFICE O/P EST MOD 30 MIN: CPT | Performed by: FAMILY MEDICINE

## 2018-04-27 RX ORDER — OXYCODONE AND ACETAMINOPHEN 5; 325 MG/1; MG/1
1 TABLET ORAL EVERY 6 HOURS PRN
Qty: 30 TABLET | Refills: 0 | Status: SHIPPED | OUTPATIENT
Start: 2018-04-27 | End: 2018-05-15

## 2018-04-27 ASSESSMENT — PATIENT HEALTH QUESTIONNAIRE - PHQ9
SUM OF ALL RESPONSES TO PHQ QUESTIONS 1-9: 14
10. IF YOU CHECKED OFF ANY PROBLEMS, HOW DIFFICULT HAVE THESE PROBLEMS MADE IT FOR YOU TO DO YOUR WORK, TAKE CARE OF THINGS AT HOME, OR GET ALONG WITH OTHER PEOPLE: SOMEWHAT DIFFICULT
SUM OF ALL RESPONSES TO PHQ QUESTIONS 1-9: 14

## 2018-04-27 NOTE — MR AVS SNAPSHOT
After Visit Summary   4/27/2018    Ines Mott    MRN: [id number]           Patient Information     Date Of Birth          1965        Visit Information        Provider Department      4/27/2018 7:45 AM Dustin Fatima MD [facility]        Today's Diagnoses     Palpitations    -  1    Chronic pain syndrome          Care Instructions    (R00.2) Palpitations  (primary encounter diagnosis)  Comment:    Plan: Basic metabolic panel             (G89.4) Chronic pain syndrome  Comment:    Plan: oxyCODONE-acetaminophen (PERCOCET) 5-325 MG per        tablet            MORBID OBESITY   1. MODIFIED FAST 250 CALORIES TWICE DAILY FEMALES  300 CALORIES TWICE DAILY FOR MALES   OATMEAL AND COTTAGE CHEESE WORK NICELY   COPIOUS WATER BETWEEN   5/2 PLAN DR VELASQUEZ [facility]  NORMAL DIET 5 DAYS PER WEEK  SEMIFAST 2 DAYS PER WEEK  LOOK UP 5-2 PLAN ON THE INTERNET    Weight Loss Tips  1. Do not eat after 6 hrs before your expected bedtime  2. Have your heaviest meal for breakfast, a slightly lighter meal at lunch and a snack 6 hrs before bed  3. No sugar/calorie drinks except milk ie no fruit juice, pop, alcohol.  4. Drink milk OR WATER  30min before meals to decrease your hunger. Also it is excellent as part of your last meal of the day snack  5. Drink lots of water  6. Increase fiber in diet: all bran cereal, salads, popcorn etc  7. Have only one small serving of fruit a day about 1/2 cup (as this is high in sugar)  8. EXERCISE is the bottom line. Without it, you will gain weight even on a low calorie diet. Best if done 2-3X a day as can    2. The only way known to prevent diabetes or keep it from getting worse is exercise, 20-40 minutes 3 times a day around the time of meals      SLEEP 8 HOURS PER DAY    BLACK AND BLUEBERRIES EVERY DAY ANTINFLAMMATORY BENEFIT     PLAIN YOGURT SEVERAL TIMES DAILY AS TOLERATED IF NOT ALLERGIC     AVOID HIGH FRUCTOSE SYRUP AND OLENE  IN YOUR DIET     GREEN TEA EXTRACT    PROBIOTIC CAPSULE DAILY  HIGH QUALITY     DON'T EAT LATE AT NIGHT    RALPH ROMANOA HELPS WITH APPETITE    KEEP A BLESSREGISTRAT-MAPI JOURNAL    888 BREATHER WHEN STRESSED OR COUNT BACK FROM 100 WITH SLOW BREATHING    POSITIVE AFFIRMATIONS         FIT KEVIN OR PEDOMETER 10,000 STEPS PER DAY     FIT BIT TWICH RECOMMENDED                     Follow-ups after your visit        Follow-up notes from your care team     Return in about 1 week (around 5/4/2018).      Your next 10 appointments already scheduled     May 04, 2018  7:30 AM CDT   [facility]    [address]  [address]  [place] [address]   [phone]            May 13, 2018  8:30 PM CDT   Psg Split W/Tcm with BED 3 SH SLEEP   [facility] ([facility])    [address]  [place] [address] [phone]            May 31, 2018 10:30 AM CDT   Return Sleep Patient with Aniceto Mujica MD   [facility] ([facility])    [address] [address]   [phone]              Who to contact     If you have questions or need follow up information about today's clinic visit or your schedule please contact [facility] directly at [phone].  Normal or non-critical lab and imaging results will be communicated to you by MyChart, letter or phone within 4 business days after the clinic has received the results. If you do not hear from us within 7 days, please contact the clinic through MyChart or phone. If you have a critical or abnormal lab result, we will notify you by phone as soon as possible.  Submit refill requests through [employer] or call your pharmacy and they will forward the refill request to us. Please allow 3 business days for your  "refill to be completed.          Additional Information About Your Visit        [employer]ux[employer] Information     [employer] lets you send messages to your doctor, view your test results, renew your prescriptions, schedule appointments and more. To sign up, go to [email or web address] . Click on \"Log in\" on the left side of the screen, which will take you to the Welcome page. Then click on \"Sign up Now\" on the right side of the page.     You will be asked to enter the access code listed below, as well as some personal information. Please follow the directions to create your username and password.     Your access code is: [email or web address]  Expires: 2018  9:19 AM     Your access code will  in 90 days. If you need help or a new code, please call your [facility] clinic or [phone].        Care EveryWhere ID     This is your Care EveryWhere ID. This could be used by other organizations to access your [facility] medical records  [id number]        Your Vitals Were     Pulse Temperature Respirations Pulse Oximetry BMI (Body Mass Index)       64 97.2  F (36.2  C) (Tympanic) 20 100% 41.16 kg/m2        Blood Pressure from Last 3 Encounters:   18 124/62   18 128/66   18 148/84    Weight from Last 3 Encounters:   18 255 lb (115.7 kg)   18 256 lb (116.1 kg)   18 254 lb (115.2 kg)              We Performed the Following     Basic metabolic panel          Where to get your medicines      Some of these will need a paper prescription and others can be bought over the counter.  Ask your nurse if you have questions.     Bring a paper prescription for each of these medications     oxyCODONE-acetaminophen 5-325 MG per tablet         Information about OPIOIDS     PRESCRIPTION OPIOIDS: WHAT YOU NEED TO KNOW   You have a prescription for an opioid (narcotic) pain medicine. Opioids can cause addiction. If you have a history of chemical dependency of any type, you are at a higher risk of becoming addicted to " opioids. Only take this medicine after all other options have been tried. Take it for as short a time and as few doses as possible.     Do not:    Drive. If you drive while taking these medicines, you could be arrested for driving under the influence (DUI).    Operate heavy machinery    Do any other dangerous activities while taking these medicines.     Drink any alcohol while taking these medicines.      Take with any other medicines that contain acetaminophen. Read all labels carefully. Look for the word  acetaminophen  or  Tylenol.  Ask your pharmacist if you have questions or are unsure.    Store your pills in a secure place, locked if possible. We will not replace any lost or stolen medicine. If you don t finish your medicine, please throw away (dispose) as directed by your pharmacist. The [place] [employer] has more information about safe disposal: https://www.pca.[facility].[place].us/living-green/managing-unwanted-medications    All opioids tend to cause constipation. Drink plenty of water and eat foods that have a lot of fiber, such as fruits, vegetables, prune juice, apple juice and high-fiber cereal. Take a laxative (Miralax, milk of magnesia, Colace, Senna) if you don t move your bowels at least every other day.          Primary Care Provider Office Phone # Fax #    Dustin Fatima [phone] [address] [facility] [address]        Equal Access to Services     VALENTE RIVERA : Hadii casimiro zapata hadasho Soevangelina, waaxda luqadaha, qaybta kaalmada mary, emilee coyne. So [facility] [phone].    ATENCIÓN: Si habla español, tiene a christianson disposición servicios gratuitos de asistencia lingüística. Llame al [phone].    We comply with applicable federal civil rights laws and [place] laws. We do not discriminate on the basis of race, color, national origin, age, disability, sex, sexual orientation, or gender identity.            Thank you!      Thank you for choosing [facility] [place]  for your care. Our goal is always to provide you with excellent care. Hearing back from our patients is one way we can continue to improve our services. Please take a few minutes to complete the written survey that you may receive in the mail after your visit with us. Thank you!             Your Updated Medication List - Protect others around you: Learn how to safely use, store and throw away your medicines at www.disposemymeds.org.          This list is accurate as of 4/27/18  8:42 AM.  Always use your most recent med list.                   Brand Name Dispense Instructions for use Diagnosis    albuterol (2.5 MG/3ML) 0.083% neb solution     30 vial    Take 1 vial (2.5 mg) by nebulization every 6 hours as needed for shortness of breath / dyspnea or wheezing    Mild intermittent asthma with acute exacerbation       amLODIPine 5 MG tablet    NORVASC    30 tablet    Take 1 tablet (5 mg) by mouth daily    Essential hypertension, benign, History of brain shunt       ASPIRIN NOT PRESCRIBED    INTENTIONAL    1 each    Please choose reason not prescribed, below    Aspirin intolerance       baclofen 10 MG tablet    LIORESAL    60 tablet    Take 1 tablet (10 mg) by mouth 3 times daily    Chronic bilateral low back pain with right-sided sciatica       blood glucose calibration solution    no brand specified    1 each    Use to calibrate blood glucose monitor as directed.    Type 2 diabetes mellitus without complication, without long-term current use of insulin (H)       blood glucose lancets standard    no brand specified    100 each    Use to test blood sugar daily times daily or as directed.    Type 2 diabetes mellitus without complication, without long-term current use of insulin (H)       blood glucose monitoring meter device kit    no brand specified    1 kit    Use to test blood sugar larisa times daily or as directed.    Type 2 diabetes mellitus  without complication, without long-term current use of insulin (H)       blood glucose monitoring test strip    no brand specified    100 strip    Use to test blood sugars once times daily or as directed    Type 2 diabetes mellitus without complication, without long-term current use of insulin (H)       ciprofloxacin 500 MG tablet    CIPRO    14 tablet    Take 1 tablet (500 mg) by mouth 2 times daily    Escherichia coli (E. coli) infection, Urinary frequency       cloNIDine 0.1 MG tablet    CATAPRES    60 tablet    Take 1 tablet (0.1 mg) by mouth 2 times daily    Hypertension goal BP (blood pressure) < 140/80       cyanocobalamin 1000 MCG/ML injection    VITAMIN B12    1 mL    Inject 1 mL (1,000 mcg) Subcutaneous every 30 days    B12 deficiency       EPINEPHrine 0.3 MG/0.3ML injection 2-pack    EPIPEN/ADRENACLICK/or ANY BX GENERIC EQUIV    2 each    Inject 0.3 mLs (0.3 mg) into the muscle once as needed for anaphylaxis    Late effect of other and unspecified external causes       losartan-hydrochlorothiazide 100-25 MG per tablet    HYZAAR    90 tablet    Take 1 tablet by mouth daily    Hypertension goal BP (blood pressure) < 140/80       magnesium oxide 400 (241.3 Mg) MG tablet    MAG-OX    90 tablet    Take 1 tablet (400 mg) by mouth daily    Cramp of limb       naloxone nasal spray    NARCAN    0.2 mL    Spray 1 spray (4 mg) into one nostril alternating nostrils as needed for opioid reversal every 2-3 minutes until assistance arrives    Chronic pain syndrome       ondansetron 4 MG ODT tab    ZOFRAN-ODT    10 tablet    Take 1 tablet (4 mg) by mouth every 8 hours as needed for nausea        order for DME     1 Device    Right wrist splint for carpal tunnel syndrome  One* Use as directed at hour of sleep    Carpal tunnel syndrome of right wrist       oxyCODONE-acetaminophen 5-325 MG per tablet    PERCOCET    30 tablet    Take 1 tablet by mouth every 6 hours as needed for moderate to severe pain Max 4 tab/day     Chronic pain syndrome       polyethylene glycol powder    MIRALAX/GLYCOLAX    1581 g    TAKE 17 GRAMS (1 CAPFUL) BY MOUTH DAILY    Slow transit constipation       potassium chloride 10 MEQ tablet    K-TAB,KLOR-CON    120 tablet    Take 1 tablet (10 mEq) by mouth daily    Hypokalemia       sertraline 100 MG tablet    ZOLOFT    45 tablet    Take 1.5 tablets (150 mg) by mouth daily    Recurrent major depression in partial remission (H), Other constipation       STATIN NOT PRESCRIBED (INTENTIONAL)     0 each    1 each daily Statin not prescribed intentionally due to Active liver disease (liver failure, cirrhosis, hepatitis) LIVER METS    Hyperlipidemia LDL goal <100, Type 2 diabetes mellitus without complication (H)       triamcinolone 0.1 % ointment    KENALOG    80 g    Apply sparingly to affected area three times daily for 14 days.    Hand eczema       Vitamin D (Cholecalciferol) 1000 units Tabs     60 tablet    Take 2,000 Units by mouth daily    Vitamin D deficiency
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
## 2018-04-27 NOTE — PATIENT INSTRUCTIONS
(R00.2) Palpitations  (primary encounter diagnosis)  Comment:    Plan: Basic metabolic panel             (G89.4) Chronic pain syndrome  Comment:    Plan: oxyCODONE-acetaminophen (PERCOCET) 5-325 MG per        tablet            MORBID OBESITY   1. MODIFIED FAST 250 CALORIES TWICE DAILY FEMALES  300 CALORIES TWICE DAILY FOR MALES   OATMEAL AND COTTAGE CHEESE WORK NICELY   COPIOUS WATER BETWEEN   5/2 PLAN DR VELASQUEZ [facility]  NORMAL DIET 5 DAYS PER WEEK  SEMIFAST 2 DAYS PER WEEK  LOOK UP 5-2 PLAN ON THE INTERNET    Weight Loss Tips  1. Do not eat after 6 hrs before your expected bedtime  2. Have your heaviest meal for breakfast, a slightly lighter meal at lunch and a snack 6 hrs before bed  3. No sugar/calorie drinks except milk ie no fruit juice, pop, alcohol.  4. Drink milk OR WATER  30min before meals to decrease your hunger. Also it is excellent as part of your last meal of the day snack  5. Drink lots of water  6. Increase fiber in diet: all bran cereal, salads, popcorn etc  7. Have only one small serving of fruit a day about 1/2 cup (as this is high in sugar)  8. EXERCISE is the bottom line. Without it, you will gain weight even on a low calorie diet. Best if done 2-3X a day as can    2. The only way known to prevent diabetes or keep it from getting worse is exercise, 20-40 minutes 3 times a day around the time of meals      SLEEP 8 HOURS PER DAY    BLACK AND BLUEBERRIES EVERY DAY ANTINFLAMMATORY BENEFIT     PLAIN YOGURT SEVERAL TIMES DAILY AS TOLERATED IF NOT ALLERGIC     AVOID HIGH FRUCTOSE SYRUP AND OLENE IN YOUR DIET     GREEN TEA EXTRACT    PROBIOTIC CAPSULE DAILY  HIGH QUALITY     DON'T EAT LATE AT NIGHT    ISABELLEUMA ROSALINAA HELPS WITH APPETITE    KEEP A BLE[employer] JOURNAL    888 BREATHER WHEN STRESSED OR COUNT BACK FROM 100 WITH SLOW BREATHING    POSITIVE AFFIRMATIONS         FIT BIT OR PEDOMETER 10,000 STEPS PER DAY     FIT BIT TWICH RECOMMENDED

## 2018-04-27 NOTE — NURSING NOTE
"Chief Complaint   Patient presents with     Recheck Medication       Initial /62 (Cuff Size: Adult Large)  Pulse 64  Temp 97.2  F (36.2  C) (Tympanic)  Resp 20  Wt 255 lb (115.7 kg)  SpO2 100%  BMI 41.16 kg/m2 Estimated body mass index is 41.16 kg/(m^2) as calculated from the following:    Height as of 4/2/18: 5' 6\" (1.676 m).    Weight as of this encounter: 255 lb (115.7 kg).  Medication Reconciliation: complete   Maggi Harris CMA    "

## 2018-04-28 ASSESSMENT — PATIENT HEALTH QUESTIONNAIRE - PHQ9: SUM OF ALL RESPONSES TO PHQ QUESTIONS 1-9: 14

## 2018-05-04 ENCOUNTER — OFFICE VISIT (OUTPATIENT)
Dept: FAMILY MEDICINE | Facility: CLINIC | Age: 53
End: 2018-05-04
Payer: COMMERCIAL

## 2018-05-04 VITALS
WEIGHT: 255 LBS | TEMPERATURE: 97.6 F | BODY MASS INDEX: 41.16 KG/M2 | OXYGEN SATURATION: 99 % | SYSTOLIC BLOOD PRESSURE: 128 MMHG | HEART RATE: 80 BPM | RESPIRATION RATE: 20 BRPM | DIASTOLIC BLOOD PRESSURE: 74 MMHG

## 2018-05-04 DIAGNOSIS — F33.41 RECURRENT MAJOR DEPRESSION IN PARTIAL REMISSION (H): Chronic | ICD-10-CM

## 2018-05-04 DIAGNOSIS — F43.10 PTSD (POST-TRAUMATIC STRESS DISORDER): Chronic | ICD-10-CM

## 2018-05-04 DIAGNOSIS — G89.4 CHRONIC PAIN SYNDROME: ICD-10-CM

## 2018-05-04 DIAGNOSIS — G47.33 OSA (OBSTRUCTIVE SLEEP APNEA): ICD-10-CM

## 2018-05-04 DIAGNOSIS — E78.5 HYPERLIPIDEMIA WITH TARGET LDL LESS THAN 130: Chronic | ICD-10-CM

## 2018-05-04 DIAGNOSIS — C64.1 MALIGNANT NEOPLASM OF KIDNEY EXCLUDING RENAL PELVIS, RIGHT (H): ICD-10-CM

## 2018-05-04 DIAGNOSIS — C18.9 CARCINOMA OF COLON METASTATIC TO LIVER (H): ICD-10-CM

## 2018-05-04 DIAGNOSIS — E66.01 MORBID OBESITY (H): Chronic | ICD-10-CM

## 2018-05-04 DIAGNOSIS — G44.219 EPISODIC TENSION-TYPE HEADACHE, NOT INTRACTABLE: Primary | ICD-10-CM

## 2018-05-04 DIAGNOSIS — F10.10 ALCOHOL ABUSE: ICD-10-CM

## 2018-05-04 DIAGNOSIS — R06.89 DYSPNEA AND RESPIRATORY ABNORMALITY: ICD-10-CM

## 2018-05-04 DIAGNOSIS — R06.00 DYSPNEA AND RESPIRATORY ABNORMALITY: ICD-10-CM

## 2018-05-04 DIAGNOSIS — C78.7 CARCINOMA OF COLON METASTATIC TO LIVER (H): ICD-10-CM

## 2018-05-04 DIAGNOSIS — I10 HYPERTENSION GOAL BP (BLOOD PRESSURE) < 140/80: Chronic | ICD-10-CM

## 2018-05-04 PROCEDURE — 99214 OFFICE O/P EST MOD 30 MIN: CPT | Performed by: FAMILY MEDICINE

## 2018-05-04 PROCEDURE — 80307 DRUG TEST PRSMV CHEM ANLYZR: CPT | Mod: 90 | Performed by: FAMILY MEDICINE

## 2018-05-04 PROCEDURE — 99000 SPECIMEN HANDLING OFFICE-LAB: CPT | Performed by: FAMILY MEDICINE

## 2018-05-04 RX ORDER — HYDROCODONE BITARTRATE AND ACETAMINOPHEN 10; 325 MG/1; MG/1
1 TABLET ORAL EVERY 4 HOURS PRN
Qty: 30 TABLET | Refills: 0 | Status: SHIPPED | OUTPATIENT
Start: 2018-05-04 | End: 2018-05-11

## 2018-05-04 NOTE — LETTER
May 10, 2018      Ines Mott  [address]   [facility] [address]        Dear ,    We are writing to inform you of your test results.    ABNORMAL DRUG SCREEN   URINE ANALYSIS SHOW COCAINE AND  MARIJUANA   BUT NOT PAIN MEDICATIONS   OR BACLOFEN OR TYLENOL   DONE WITH NARCOTICS     Resulted Orders   Pain Drug Scr UR W Rptd Meds   Result Value Ref Range    Pain Drug SCR UR W RPTD Meds FINAL       Comment:      (Note)  ====================================================================  TOXASSURE COMP DRUG ANALYSIS,UR  ====================================================================  Test                             Result       Flag       Units        Drug Present and Declared for Prescription Verification   Sertraline                     PRESENT      EXPECTED                 Desmethylsertraline            PRESENT      EXPECTED                  Desmethylsertraline is an expected metabolite of sertraline.   Clonidine                      PRESENT      EXPECTED                Drug Present not Declared for Prescription Verification   Benzoylecgonine                2949         UNEXPECTED ng/mg creat    Benzoylecgonine is a metabolite of cocaine; its presence    indicates use of this drug.  Source is most commonly illicit, but    cocaine is present in some topical anesthetic solutions.   Carboxy-THC                    18           UNEXPECTED ng/mg creat    Carboxy-THC is a metabolite of tetr  ahydrocannabinol  (THC).    Source of THC is most commonly illicit, but THC is also present    in a scheduled prescription medication.  Drug Absent but Declared for Prescription Verification   Hydrocodone                    Not Detected UNEXPECTED ng/mg creat   Oxycodone                      Not Detected UNEXPECTED ng/mg creat   Baclofen                       Not Detected UNEXPECTED               Acetaminophen                  Not Detected UNEXPECTED                Acetaminophen, as indicated in the  declared medication list, is    not always detected even when used as directed.   Salicylate                     Not Detected UNEXPECTED                Aspirin, as indicated in the declared medication list, is not    always detected even when used as directed.  ====================================================================  Test                      Result    Flag   Units      Ref Range        Creatinine              163              mg/dL      >=20            ========================  ============================================  Declared Medications:  The flagging and interpretation on this report are based on the  following declared medications.  Unexpected results may arise from  inaccuracies in the declared medications.  **Note: The testing scope of this panel includes these medications:  Baclofen (Lioresal)  Clonidine  Hydrocodone (Norco)  Oxycodone (Percocet)  Sertraline (Zoloft)  **Note: The testing scope of this panel does not include small to  moderate amounts of these reported medications:  Acetaminophen (Norco)  Acetaminophen (Percocet)  Aspirin  **Note: The testing scope of this panel does not include following  reported medications:  Albuterol  Amlodipine (Norvasc)  Epinephrine (EpiPen)  Hydrochlorothiazide (Hyzaar)  Losartan (Hyzaar)  Magnesium (Mag-Ox)  Naloxone (Narcan)  Ondansetron (Zofran)  Polyethylene Glycol  Potassium  Triamcinolone (Kenalog)  Vitamin B12  Vitamin D  ====================================================================  For clini  helena consultation, please call [phone].  ====================================================================  Analysis performed by [facility] [place] [address]         If you have any questions or concerns, please call the clinic at the number listed above.       Sincerely,        OTTO YUSUF MD

## 2018-05-04 NOTE — PATIENT INSTRUCTIONS
(G44.219) Episodic tension-type headache, not intractable  (primary encounter diagnosis)  Comment:     Plan:      (G89.4) Chronic pain syndrome  Comment:    Plan: Pain Drug Scr UR W Rptd Meds,         HYDROcodone-acetaminophen (NORCO)  MG per        tablet, PAIN MANAGEMENT REFERRAL             (F10.10) Alcohol abuse  Comment:    Plan: Pain Drug Scr UR W Rptd Meds,                (C64.1) Malignant neoplasm of kidney excluding renal pelvis, right (H)  Comment: embolisation procedure planned   Plan: HYDROcodone-acetaminophen (NORCO)  MG per        tablet             (G47.33) ALEXUS (obstructive sleep apnea)  Comment: new mask is planned overnight study May 13th  Plan:           (C18.9,  C78.7) Carcinoma of colon metastatic to liver (H)  Comment:     Plan: HYDROcodone-acetaminophen (NORCO)  MG per        tablet             (I10) Hypertension goal BP (blood pressure) < 140/80  Comment:    Plan:      (E78.5) Hyperlipidemia with target LDL less than 130  Comment:    Plan:      (F33.41) Recurrent major depression in partial remission  Comment: * **  Plan:      (F43.10) PTSD (post-traumatic stress disorder)  Comment:    Plan:      (E66.01) BMI 42  Comment:    Plan:      (R06.00,  R06.89) Dyspnea and respiratory abnormality  Comment:    Plan:
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
## 2018-05-04 NOTE — MR AVS SNAPSHOT
After Visit Summary   5/4/2018    Ines Mott    MRN: [id number]           Patient Information     Date Of Birth          1965        Visit Information        Provider Department      5/4/2018 7:30 AM Dustin Fatima MD [facility]        Today's Diagnoses     Episodic tension-type headache, not intractable    -  1    Chronic pain syndrome        Alcohol abuse        Malignant neoplasm of kidney excluding renal pelvis, right (H)        ALEXUS (obstructive sleep apnea)        Carcinoma of colon metastatic to liver (H)        Hypertension goal BP (blood pressure) < 140/80        Hyperlipidemia with target LDL less than 130        Recurrent major depression in partial remission        PTSD (post-traumatic stress disorder)        BMI 42        Dyspnea and respiratory abnormality          Care Instructions    (G44.219) Episodic tension-type headache, not intractable  (primary encounter diagnosis)  Comment:     Plan:      (G89.4) Chronic pain syndrome  Comment:    Plan: Pain Drug Scr UR W Rptd Meds,         HYDROcodone-acetaminophen (NORCO)  MG per        tablet, PAIN MANAGEMENT REFERRAL             (F10.10) Alcohol abuse  Comment:    Plan: Pain Drug Scr UR W Rptd Meds,                (C64.1) Malignant neoplasm of kidney excluding renal pelvis, right (H)  Comment: embolisation procedure planned   Plan: HYDROcodone-acetaminophen (NORCO)  MG per        tablet             (G47.33) ALEXUS (obstructive sleep apnea)  Comment: new mask is planned overnight study May 13th  Plan:           (C18.9,  C78.7) Carcinoma of colon metastatic to liver (H)  Comment:     Plan: HYDROcodone-acetaminophen (NORCO)  MG per        tablet             (I10) Hypertension goal BP (blood pressure) < 140/80  Comment:    Plan:      (E78.5) Hyperlipidemia with target LDL less than 130  Comment:    Plan:      (F33.41) Recurrent major depression in partial remission  Comment: *  **  Plan:      (F43.10) PTSD (post-traumatic stress disorder)  Comment:    Plan:      (E66.01) BMI 42  Comment:    Plan:      (R06.00,  R06.89) Dyspnea and respiratory abnormality  Comment:    Plan:                  Follow-ups after your visit        Additional Services     PAIN MANAGEMENT REFERRAL       Your provider has referred you to: N: [facility]  [place] [phone]   [email or web address]       Please call clinic directly to schedule appointment.    **ANY DIAGNOSTIC TESTS THAT ARE NOT IN EPIC SHOULD BE SENT TO THE PAIN CENTER**    REGARDING OPIOID MEDICATIONS:  The discussion of opioids management, appropriateness of therapy, and dosing will be discussed in patients being seen for evaluation.  The pain management clinics are not long-term prescribing clinics, with transition of prescribing of medications ultimately going back to the referring provider/PCP.  If prescribing is taken over at the pain clinic, it is in actively involved patients whom are appropriate for opioids, urine drug screening is completed, and long-term prescribing plan has been determined.  Therefore, we will not be automatically taking over prescribing at the patient's first visit.  Is this agreeable to you? agrees.     Please be aware that coverage of these services is subject to the terms and limitations of your health insurance plan.  Call member services at your health plan with any benefit or coverage questions.      Please bring the following with you to your appointment:    (1) Any X-Rays, CTs or MRIs which have been performed.  Contact the facility where they were done to arrange for  prior to your scheduled appointment.    (2) List of current medications   (3) This referral request   (4) Any documents/labs given to you for this referral                  Follow-up notes from your care team     Return in about 1 week (around 5/11/2018).      Your next 10 appointments already scheduled     May  "2018  8:30 PM CDT   Psg Split W/Tcm with BED 3 SH SLEEP   [facility] ([facility])    [address]  [place] [address] [phone]            May 31, 2018 10:30 AM CDT   Return Sleep Patient with Aniceto Mujica MD   [facility] ([facility])    [address]  [place] [address] [phone]              Who to contact     If you have questions or need follow up information about today's clinic visit or your schedule please contact [facility] directly at [phone].  Normal or non-critical lab and imaging results will be communicated to you by MyChart, letter or phone within 4 business days after the clinic has received the results. If you do not hear from us within 7 days, please contact the clinic through [employer] or phone. If you have a critical or abnormal lab result, we will notify you by phone as soon as possible.  Submit refill requests through [employer] or call your pharmacy and they will forward the refill request to us. Please allow 3 business days for your refill to be completed.          Additional Information About Your Visit        [employer] Information     [employer] lets you send messages to your doctor, view your test results, renew your prescriptions, schedule appointments and more. To sign up, go to [email or web address] . Click on \"Log in\" on the left side of the screen, which will take you to the Welcome page. Then click on \"Sign up Now\" on the right side of the page.     You will be asked to enter the access code listed below, as well as some personal information. Please follow the directions to create your username and password.     Your access code is: [email or web address]  Expires: 2018  9:19 AM     Your access code will  in 90 days. If you need help or a new code, please call your [place] clinic or [phone].      "   Care EveryWhere ID     This is your Care EveryWhere ID. This could be used by other organizations to access your [place] medical records  [id number]        Your Vitals Were     Pulse Temperature Respirations Pulse Oximetry BMI (Body Mass Index)       80 97.6  F (36.4  C) (Tympanic) 20 99% 41.16 kg/m2        Blood Pressure from Last 3 Encounters:   05/04/18 128/74   04/27/18 124/62   04/13/18 128/66    Weight from Last 3 Encounters:   05/04/18 255 lb (115.7 kg)   04/27/18 255 lb (115.7 kg)   04/13/18 256 lb (116.1 kg)              We Performed the Following     Pain Drug Scr UR W Rptd Meds     PAIN MANAGEMENT REFERRAL          Today's Medication Changes          These changes are accurate as of 5/4/18  8:01 AM.  If you have any questions, ask your nurse or doctor.               Start taking these medicines.        Dose/Directions    HYDROcodone-acetaminophen  MG per tablet   Commonly known as:  NORCO   Used for:  Chronic pain syndrome, Alcohol abuse, Carcinoma of colon metastatic to liver (H), Malignant neoplasm of kidney excluding renal pelvis, right (H)   Started by:  Dustin Fatima MD        Dose:  1 tablet   Take 1 tablet by mouth every 4 hours as needed for severe pain   Quantity:  30 tablet   Refills:  0            Where to get your medicines      Some of these will need a paper prescription and others can be bought over the counter.  Ask your nurse if you have questions.     Bring a paper prescription for each of these medications     HYDROcodone-acetaminophen  MG per tablet               Information about OPIOIDS     PRESCRIPTION OPIOIDS: WHAT YOU NEED TO KNOW   You have a prescription for an opioid (narcotic) pain medicine. Opioids can cause addiction. If you have a history of chemical dependency of any type, you are at a higher risk of becoming addicted to opioids. Only take this medicine after all other options have been tried. Take it for as short a time and as few doses as  possible.     Do not:    Drive. If you drive while taking these medicines, you could be arrested for driving under the influence (DUI).    Operate heavy machinery    Do any other dangerous activities while taking these medicines.     Drink any alcohol while taking these medicines.      Take with any other medicines that contain acetaminophen. Read all labels carefully. Look for the word  acetaminophen  or  Tylenol.  Ask your pharmacist if you have questions or are unsure.    Store your pills in a secure place, locked if possible. We will not replace any lost or stolen medicine. If you don t finish your medicine, please throw away (dispose) as directed by your pharmacist. The [place] Pollution Control Agency has more information about safe disposal: https://www.pca.[facility].[place].us/living-green/managing-unwanted-medications    All opioids tend to cause constipation. Drink plenty of water and eat foods that have a lot of fiber, such as fruits, vegetables, prune juice, apple juice and high-fiber cereal. Take a laxative (Miralax, milk of magnesia, Colace, Senna) if you don t move your bowels at least every other day.          Primary Care Provider Office Phone # Fax #    Dustin Rina Fatima [phone] [address] [address] [facility] [[email or web address]] [facility] [address]        Equal Access to Services     VALENTE RIVERA : Hadii casimiro ku hadasho Soomaali, waaxda luqadaha, qaybta kaalmada adeegyada, emilee coyne. So [facility] [place] [facility] [phone].    ATENCIÓN: Si habla español, tiene a christianson disposición servicios gratuitos de asistencia lingüística. Llame al [phone].    We comply with applicable federal civil rights laws and [place] laws. We do not discriminate on the basis of race, color, national origin, age, disability, sex, sexual orientation, or gender identity.            Thank you!     Thank you for choosing [facility]  for your care. Our goal is always to provide you  with excellent care. Hearing back from our patients is one way we can continue to improve our services. Please take a few minutes to complete the written survey that you may receive in the mail after your visit with us. Thank you!             Your Updated Medication List - Protect others around you: Learn how to safely use, store and throw away your medicines at www.disposemymeds.org.          This list is accurate as of 5/4/18  8:01 AM.  Always use your most recent med list.                   Brand Name Dispense Instructions for use Diagnosis    albuterol (2.5 MG/3ML) 0.083% neb solution     30 vial    Take 1 vial (2.5 mg) by nebulization every 6 hours as needed for shortness of breath / dyspnea or wheezing    Mild intermittent asthma with acute exacerbation       amLODIPine 5 MG tablet    NORVASC    30 tablet    Take 1 tablet (5 mg) by mouth daily    Essential hypertension, benign, History of brain shunt       ASPIRIN NOT PRESCRIBED    INTENTIONAL    1 each    Please choose reason not prescribed, below    Aspirin intolerance       baclofen 10 MG tablet    LIORESAL    60 tablet    Take 1 tablet (10 mg) by mouth 3 times daily    Chronic bilateral low back pain with right-sided sciatica       blood glucose calibration solution    no brand specified    1 each    Use to calibrate blood glucose monitor as directed.    Type 2 diabetes mellitus without complication, without long-term current use of insulin (H)       blood glucose lancets standard    no brand specified    100 each    Use to test blood sugar daily times daily or as directed.    Type 2 diabetes mellitus without complication, without long-term current use of insulin (H)       blood glucose monitoring meter device kit    no brand specified    1 kit    Use to test blood sugar larisa times daily or as directed.    Type 2 diabetes mellitus without complication, without long-term current use of insulin (H)       blood glucose monitoring test strip    no brand  specified    100 strip    Use to test blood sugars once times daily or as directed    Type 2 diabetes mellitus without complication, without long-term current use of insulin (H)       cloNIDine 0.1 MG tablet    CATAPRES    60 tablet    Take 1 tablet (0.1 mg) by mouth 2 times daily    Hypertension goal BP (blood pressure) < 140/80       cyanocobalamin 1000 MCG/ML injection    VITAMIN B12    1 mL    Inject 1 mL (1,000 mcg) Subcutaneous every 30 days    B12 deficiency       EPINEPHrine 0.3 MG/0.3ML injection 2-pack    EPIPEN/ADRENACLICK/or ANY BX GENERIC EQUIV    2 each    Inject 0.3 mLs (0.3 mg) into the muscle once as needed for anaphylaxis    Late effect of other and unspecified external causes       HYDROcodone-acetaminophen  MG per tablet    NORCO    30 tablet    Take 1 tablet by mouth every 4 hours as needed for severe pain    Chronic pain syndrome, Alcohol abuse, Carcinoma of colon metastatic to liver (H), Malignant neoplasm of kidney excluding renal pelvis, right (H)       losartan-hydrochlorothiazide 100-25 MG per tablet    HYZAAR    90 tablet    Take 1 tablet by mouth daily    Hypertension goal BP (blood pressure) < 140/80       magnesium oxide 400 (241.3 Mg) MG tablet    MAG-OX    90 tablet    Take 1 tablet (400 mg) by mouth daily    Cramp of limb       naloxone nasal spray    NARCAN    0.2 mL    Spray 1 spray (4 mg) into one nostril alternating nostrils as needed for opioid reversal every 2-3 minutes until assistance arrives    Chronic pain syndrome       ondansetron 4 MG ODT tab    ZOFRAN-ODT    10 tablet    Take 1 tablet (4 mg) by mouth every 8 hours as needed for nausea        order for DME     1 Device    Right wrist splint for carpal tunnel syndrome  One* Use as directed at hour of sleep    Carpal tunnel syndrome of right wrist       oxyCODONE-acetaminophen 5-325 MG per tablet    PERCOCET    30 tablet    Take 1 tablet by mouth every 6 hours as needed for moderate to severe pain Max 4 tab/day     Chronic pain syndrome       polyethylene glycol powder    MIRALAX/GLYCOLAX    1581 g    TAKE 17 GRAMS (1 CAPFUL) BY MOUTH DAILY    Slow transit constipation       potassium chloride 10 MEQ tablet    K-TAB,KLOR-CON    120 tablet    Take 1 tablet (10 mEq) by mouth daily    Hypokalemia       sertraline 100 MG tablet    ZOLOFT    45 tablet    Take 1.5 tablets (150 mg) by mouth daily    Recurrent major depression in partial remission (H), Other constipation       STATIN NOT PRESCRIBED (INTENTIONAL)     0 each    1 each daily Statin not prescribed intentionally due to Active liver disease (liver failure, cirrhosis, hepatitis) LIVER METS    Hyperlipidemia LDL goal <100, Type 2 diabetes mellitus without complication (H)       triamcinolone 0.1 % ointment    KENALOG    80 g    Apply sparingly to affected area three times daily for 14 days.    Hand eczema       Vitamin D (Cholecalciferol) 1000 units Tabs     60 tablet    Take 2,000 Units by mouth daily    Vitamin D deficiency

## 2018-05-05 ASSESSMENT — ASTHMA QUESTIONNAIRES: ACT_TOTALSCORE: 20

## 2018-05-05 ASSESSMENT — PATIENT HEALTH QUESTIONNAIRE - PHQ9: SUM OF ALL RESPONSES TO PHQ QUESTIONS 1-9: 23

## 2018-05-09 LAB — PAIN DRUG SCR UR W RPTD MEDS: NORMAL

## 2018-05-11 ENCOUNTER — PATIENT OUTREACH (OUTPATIENT)
Dept: FAMILY MEDICINE | Facility: CLINIC | Age: 53
End: 2018-05-11

## 2018-05-11 ENCOUNTER — OFFICE VISIT (OUTPATIENT)
Dept: FAMILY MEDICINE | Facility: CLINIC | Age: 53
End: 2018-05-11
Payer: COMMERCIAL

## 2018-05-11 VITALS
HEART RATE: 70 BPM | SYSTOLIC BLOOD PRESSURE: 128 MMHG | TEMPERATURE: 96.5 F | BODY MASS INDEX: 41 KG/M2 | WEIGHT: 254 LBS | RESPIRATION RATE: 20 BRPM | DIASTOLIC BLOOD PRESSURE: 76 MMHG | OXYGEN SATURATION: 97 %

## 2018-05-11 DIAGNOSIS — Q05.0: ICD-10-CM

## 2018-05-11 DIAGNOSIS — C18.9 CARCINOMA OF COLON METASTATIC TO LIVER (H): ICD-10-CM

## 2018-05-11 DIAGNOSIS — F43.10 PTSD (POST-TRAUMATIC STRESS DISORDER): Chronic | ICD-10-CM

## 2018-05-11 DIAGNOSIS — E78.5 HYPERLIPIDEMIA WITH TARGET LDL LESS THAN 130: Chronic | ICD-10-CM

## 2018-05-11 DIAGNOSIS — M51.379 DEGENERATION OF LUMBOSACRAL INTERVERTEBRAL DISC: ICD-10-CM

## 2018-05-11 DIAGNOSIS — N28.89 RENAL MASS, RIGHT: ICD-10-CM

## 2018-05-11 DIAGNOSIS — F33.41 RECURRENT MAJOR DEPRESSION IN PARTIAL REMISSION (H): Chronic | ICD-10-CM

## 2018-05-11 DIAGNOSIS — J45.20 MILD INTERMITTENT ASTHMA WITHOUT COMPLICATION: ICD-10-CM

## 2018-05-11 DIAGNOSIS — E66.01 MORBID OBESITY (H): Chronic | ICD-10-CM

## 2018-05-11 DIAGNOSIS — G47.33 OSA (OBSTRUCTIVE SLEEP APNEA): ICD-10-CM

## 2018-05-11 DIAGNOSIS — E66.01 MORBID OBESITY (H): ICD-10-CM

## 2018-05-11 DIAGNOSIS — Z98.2 INTRACRANIAL SHUNT: ICD-10-CM

## 2018-05-11 DIAGNOSIS — I10 HYPERTENSION GOAL BP (BLOOD PRESSURE) < 140/80: Chronic | ICD-10-CM

## 2018-05-11 DIAGNOSIS — Z63.0 MARITAL DYSFUNCTION: Chronic | ICD-10-CM

## 2018-05-11 DIAGNOSIS — G44.229 CHRONIC TENSION-TYPE HEADACHE, NOT INTRACTABLE: ICD-10-CM

## 2018-05-11 DIAGNOSIS — F11.20 NARCOTIC DEPENDENCE (H): ICD-10-CM

## 2018-05-11 DIAGNOSIS — R10.9 ABDOMINAL PAIN, RIGHT LATERAL: ICD-10-CM

## 2018-05-11 DIAGNOSIS — C78.7 CARCINOMA OF COLON METASTATIC TO LIVER (H): ICD-10-CM

## 2018-05-11 DIAGNOSIS — E11.9 TYPE 2 DIABETES MELLITUS WITHOUT COMPLICATION, WITHOUT LONG-TERM CURRENT USE OF INSULIN (H): ICD-10-CM

## 2018-05-11 DIAGNOSIS — F19.10 POLYSUBSTANCE ABUSE (H): Primary | ICD-10-CM

## 2018-05-11 DIAGNOSIS — G89.4 CHRONIC PAIN SYNDROME: ICD-10-CM

## 2018-05-11 PROCEDURE — 99215 OFFICE O/P EST HI 40 MIN: CPT | Performed by: FAMILY MEDICINE

## 2018-05-11 SDOH — SOCIAL STABILITY - SOCIAL INSECURITY: PROBLEMS IN RELATIONSHIP WITH SPOUSE OR PARTNER: Z63.0

## 2018-05-11 ASSESSMENT — ACTIVITIES OF DAILY LIVING (ADL): DEPENDENT_IADLS:: TRANSPORTATION

## 2018-05-11 ASSESSMENT — PATIENT HEALTH QUESTIONNAIRE - PHQ9
SUM OF ALL RESPONSES TO PHQ QUESTIONS 1-9: 9
SUM OF ALL RESPONSES TO PHQ QUESTIONS 1-9: 9

## 2018-05-11 NOTE — PATIENT INSTRUCTIONS
Contact [facility] [phone] press 0   Request a [facility] Assessment for Waiver services     Addiction Medicine [phone]- contact to schedule    [facility]- [phone]    [facility]- [phone]    BRISSA Cano- Clinic Care Coordinator- [phone]    [place] [facility]- Address: [address] [place] [address] [address], [place] [address]
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
## 2018-05-11 NOTE — MR AVS SNAPSHOT
After Visit Summary   5/11/2018    Ines Mott    MRN: [id number]           Patient Information     Date Of Birth          1965        Visit Information        Provider Department      5/11/2018 9:46 AM Ria Haas LSW [facility]        Care Instructions    Contact [facility] [phone] press [phone]   Request a [place] [facility] Assessment for Waiver services     Addiction Medicine [phone]- contact to schedule    [facility]- [phone]    [facility]- [phone]    BRISSA Cano- Clinic Care Coordinator- [phone]    [facility]- Address: [address] [place] [address] [address], [place] [address]                Follow-ups after your visit        Your next 10 appointments already scheduled     May 13, 2018  8:30 PM CDT   Psg Split W/Tcm with BED 3 SH SLEEP   [facility] ([facility])    [address]  [facility] [id number] [phone]            May 31, 2018 10:30 AM CDT   Return Sleep Patient with Aniceto Mujica MD   [facility] ([facility])    [address]  [facility] [id number] [phone]              Who to contact     If you have questions or need follow up information about today's clinic visit or your schedule please contact [facility] directly at [phone].  Normal or non-critical lab and imaging results will be communicated to you by MyChart, letter or phone within 4 business days after the clinic has received the results. If you do not hear from us within 7 days, please contact the clinic through Screeniehart or phone. If you have a critical or abnormal lab result, we will notify you by phone as soon as possible.  Submit refill requests through ScienceLogic or call your pharmacy and they will forward the refill request to us. Please allow 3 business days for  "your refill to be completed.          Additional Information About Your Visit        MyChart Information     [employer] lets you send messages to your doctor, view your test results, renew your prescriptions, schedule appointments and more. To sign up, go to [email or web address] . Click on \"Log in\" on the left side of the screen, which will take you to the Welcome page. Then click on \"Sign up Now\" on the right side of the page.     You will be asked to enter the access code listed below, as well as some personal information. Please follow the directions to create your username and password.     Your access code is: [email or web address]  Expires: 2018  9:19 AM     Your access code will  in 90 days. If you need help or a new code, please call your [place] clinic or [phone].        Care EveryWhere ID     This is your Care EveryWhere ID. This could be used by other organizations to access your [place] medical records  [id number]         Blood Pressure from Last 3 Encounters:   18 128/76   18 128/74   18 124/62    Weight from Last 3 Encounters:   18 254 lb (115.2 kg)   18 255 lb (115.7 kg)   18 255 lb (115.7 kg)              Today, you had the following     No orders found for display       Primary Care Provider Office Phone # Fax #    Dustin Rina Fatima [phone] [address] [facility] [address]        Equal Access to Services     [facility]: Hadii casimiro zapata hadasho Sodanisali, waaxda luqadaha, qaybta kaalmada mary, emilee pardo . So [facility] [phone].    ATENCIÓN: Si habla español, tiene a christianson disposición servicios gratuitos de asistencia lingüística. Llame al [phone].    We comply with applicable federal civil rights laws and [place] laws. We do not discriminate on the basis of race, color, national origin, age, disability, sex, sexual orientation, or gender identity.            Thank you!     " Thank you for choosing [facility]  for your care. Our goal is always to provide you with excellent care. Hearing back from our patients is one way we can continue to improve our services. Please take a few minutes to complete the written survey that you may receive in the mail after your visit with us. Thank you!             Your Updated Medication List - Protect others around you: Learn how to safely use, store and throw away your medicines at www.disposemymeds.org.          This list is accurate as of 5/11/18 10:05 AM.  Always use your most recent med list.                   Brand Name Dispense Instructions for use Diagnosis    albuterol (2.5 MG/3ML) 0.083% neb solution     30 vial    Take 1 vial (2.5 mg) by nebulization every 6 hours as needed for shortness of breath / dyspnea or wheezing    Mild intermittent asthma with acute exacerbation       amLODIPine 5 MG tablet    NORVASC    30 tablet    Take 1 tablet (5 mg) by mouth daily    Essential hypertension, benign, History of brain shunt       ASPIRIN NOT PRESCRIBED    INTENTIONAL    1 each    Please choose reason not prescribed, below    Aspirin intolerance       baclofen 10 MG tablet    LIORESAL    60 tablet    Take 1 tablet (10 mg) by mouth 3 times daily    Chronic bilateral low back pain with right-sided sciatica       blood glucose calibration solution    no brand specified    1 each    Use to calibrate blood glucose monitor as directed.    Type 2 diabetes mellitus without complication, without long-term current use of insulin (H)       blood glucose lancets standard    no brand specified    100 each    Use to test blood sugar daily times daily or as directed.    Type 2 diabetes mellitus without complication, without long-term current use of insulin (H)       blood glucose monitoring meter device kit    no brand specified    1 kit    Use to test blood sugar larisa times daily or as directed.    Type 2 diabetes mellitus without  complication, without long-term current use of insulin (H)       blood glucose monitoring test strip    no brand specified    100 strip    Use to test blood sugars once times daily or as directed    Type 2 diabetes mellitus without complication, without long-term current use of insulin (H)       cloNIDine 0.1 MG tablet    CATAPRES    60 tablet    Take 1 tablet (0.1 mg) by mouth 2 times daily    Hypertension goal BP (blood pressure) < 140/80       cyanocobalamin 1000 MCG/ML injection    VITAMIN B12    1 mL    Inject 1 mL (1,000 mcg) Subcutaneous every 30 days    B12 deficiency       EPINEPHrine 0.3 MG/0.3ML injection 2-pack    EPIPEN/ADRENACLICK/or ANY BX GENERIC EQUIV    2 each    Inject 0.3 mLs (0.3 mg) into the muscle once as needed for anaphylaxis    Late effect of other and unspecified external causes       losartan-hydrochlorothiazide 100-25 MG per tablet    HYZAAR    90 tablet    Take 1 tablet by mouth daily    Hypertension goal BP (blood pressure) < 140/80       magnesium oxide 400 (241.3 Mg) MG tablet    MAG-OX    90 tablet    Take 1 tablet (400 mg) by mouth daily    Cramp of limb       naloxone nasal spray    NARCAN    0.2 mL    Spray 1 spray (4 mg) into one nostril alternating nostrils as needed for opioid reversal every 2-3 minutes until assistance arrives    Chronic pain syndrome       ondansetron 4 MG ODT tab    ZOFRAN-ODT    10 tablet    Take 1 tablet (4 mg) by mouth every 8 hours as needed for nausea        order for DME     1 Device    Right wrist splint for carpal tunnel syndrome  One* Use as directed at hour of sleep    Carpal tunnel syndrome of right wrist       oxyCODONE-acetaminophen 5-325 MG per tablet    PERCOCET    30 tablet    Take 1 tablet by mouth every 6 hours as needed for moderate to severe pain Max 4 tab/day    Chronic pain syndrome       polyethylene glycol powder    MIRALAX/GLYCOLAX    1581 g    TAKE 17 GRAMS (1 CAPFUL) BY MOUTH DAILY    Slow transit constipation       potassium  chloride 10 MEQ tablet    K-TAB,KLOR-CON    120 tablet    Take 1 tablet (10 mEq) by mouth daily    Hypokalemia       sertraline 100 MG tablet    ZOLOFT    45 tablet    Take 1.5 tablets (150 mg) by mouth daily    Recurrent major depression in partial remission (H), Other constipation       STATIN NOT PRESCRIBED (INTENTIONAL)     0 each    1 each daily Statin not prescribed intentionally due to Active liver disease (liver failure, cirrhosis, hepatitis) LIVER METS    Hyperlipidemia LDL goal <100, Type 2 diabetes mellitus without complication (H)       triamcinolone 0.1 % ointment    KENALOG    80 g    Apply sparingly to affected area three times daily for 14 days.    Hand eczema       Vitamin D (Cholecalciferol) 1000 units Tabs     60 tablet    Take 2,000 Units by mouth daily    Vitamin D deficiency

## 2018-05-11 NOTE — PROGRESS NOTES
Clinic Care Coordination Contact    Clinic Care Coordination Contact  OUTREACH    Referral Information:  Referral Source: PCP    Primary Diagnosis: Behavioral Health    Chief Complaint   Patient presents with     Clinic Care Coordination - Initial     Dual Diagnosis-MH/CD      Universal Utilization: Multiple ED visits  Utilization    Last refreshed: 5/11/2018 10:42 AM:  No Show Count (past year) 5       Last refreshed: 5/11/2018 10:42 AM:  ED visits 26       Last refreshed: 5/11/2018 10:42 AM:  Hospital admissions 0          Current as of: 5/11/2018 10:42 AM         Clinical Concerns:  Current Medical Concerns: Pt presented to the clinic today for a recheck on pain management and blood pressure. Pt was recently diagnosed with cancer and is working with [facility] to address this. During a recent urine analysis pt's urine tested positive for cocaine and marijuana and did not show any of the prescribed pain medications. PCP requested that  CC meet with pt to discuss possible options moving forward and provide resources. Pt reports a long history of polysubstance abuse, alcohol abuse, and mental health.   Current Behavioral Concerns: Ongoing polysubstance abuse    Education Provided to patient:   Health Maintenance Reviewed: Due/Overdue   Clinical Pathway: Clinic Care Coordinator - Depression Lifestyle  Patient identified their support system as: daughter.    Importance of identifying support system and reaching out to supports when needed was discussed.   Clinic Care Coordinator - Medications and Treatment  Patient is using none to treat depression  Benefits of medication were  were  discussed.   Patient indicates deficit in understanding of medication and need to continue to take, even if feeling better.    Use of caffeine, alcohol, and other mood-altering substances   were  discussed.    Patient was Referred to CD treatment.  Benefits of counseling  were  discussed.   Patient is is not interested in seeing a  counselor   Patient was assisted with mental health resources: integrative clinic, addiction medicine referrals placed.    Medication Management:  Pt reports compliance with medication over the past week. Pt reports that urine made Pt is not taking medications as prescribed. Reviewed pain contract with pt and consequences of lack of compliance. Reviewed the possible signs and symptoms of withdrawal and various resources for detox centers including [facility] and [facility]. Explained the integrative clinic and addiction management with pt. Pt's daughter was present in the room- recommended that pt's daughter assisted with set-up and reminders if needed. Pt's daughter was agreeable. Pt was provided a copy of current medication regimen.      Functional Status:  Dependent ADLs:: Ambulation-no assistive device  Dependent IADLs:: Transportation  Bed or wheelchair confined:: No  Mobility Status: Independent    Living Situation:  Current living arrangement:: I live alone  Type of residence:: Apartment- private apt-$856 a month rental fee. Pt endorses that this is a bad neighborhood and is often difficult to avoid drug/alcohol use. Reviewed subsidized housing options in various locations discussed the potential to move into a more supportive living environment.     Diet/Exercise/Sleep:  Diet:: Regular  Exercise:: Currently not exercising  Inadequate activity/exercise:: Yes  Significant changes in sleep pattern: Yes- pt has a sleep study scheduled    Transportation:  Transportation concerns: Yes: daughter is assisting with transportation. Reviewed medical transportation available through [employer], [employer], etc.      Psychosocial:  Uatsdin or spiritual beliefs that impact treatment:: No  Mental health DX: Yes  Mental health management concern: Yes- Pt does not have any supportive services in place at this time. Pt is not interested at this time. Did review the counseling services that are offered at [facility]  clinic.  Informal Support system:: Children  Financial/Insurance concerns: Yes: Pt is on a fixed limited income with poor money management skills most likely a result of ongoing MH and substance abuse concerns. Discuss waiver services. Pt was on a waiver program in the past in [facility]. Provided the contact information to get services back in place.      Resources and Interventions:  Current Resources: No formal supports  Equipment Currently Used at Home: none  Advanced Care Plans/Directives on file:: No  Advanced Care Plan/Directive Status: Considering Options  Patient/Caregiver understanding: Pt reports understanding and denies any additional questions or concerns at this times. SW CC engaged in AIDET communication during encounter.  Outreach Frequency: monthly  Future Appointments              In 2 days BED 3 SH SLEEP [facility]    In 3 days Dustin Fatima MD [facility]    In 2 weeks Aniceto Mujica MD [facility]          Plan: Care Team discussed setting strong boundaries with patient and referring her to integrative clinic to provide more comprehensive support to the patient due to high complexity.  Pt made an appointment with Dr. Fatima for Monday, 5/14 for a reevaluation of urine and to discuss pain management until pt is able to be assessed by integrative clinic.     BRISSA Cano  Clinic Care Coordinator  [phone]  [email or web address]

## 2018-05-12 ASSESSMENT — PATIENT HEALTH QUESTIONNAIRE - PHQ9: SUM OF ALL RESPONSES TO PHQ QUESTIONS 1-9: 9

## 2018-05-13 ENCOUNTER — THERAPY VISIT (OUTPATIENT)
Dept: SLEEP MEDICINE | Facility: CLINIC | Age: 53
End: 2018-05-13
Payer: COMMERCIAL

## 2018-05-13 DIAGNOSIS — G47.33 OSA (OBSTRUCTIVE SLEEP APNEA): ICD-10-CM

## 2018-05-13 PROCEDURE — 95811 POLYSOM 6/>YRS CPAP 4/> PARM: CPT | Performed by: INTERNAL MEDICINE

## 2018-05-13 NOTE — MR AVS SNAPSHOT
"              After Visit Summary   5/13/2018    Ines Mott    MRN: [id number]           Patient Information     Date Of Birth          1965        Visit Information        Provider Department      5/13/2018 8:30 PM BED 3 SH SLEEP [facility]        Today's Diagnoses     ALEXUS (obstructive sleep apnea)           Follow-ups after your visit        Your next 10 appointments already scheduled     May 14, 2018  9:45 AM CDT   SHORT with Dustin Fatima MD   [facility]    [address]  [facility] [address] [phone]            May 31, 2018 10:30 AM CDT   Return Sleep Patient with Aniceto Mujica MD   [facility] ([facility])    [address] [place] [address] [address]  [facility] [id number] [phone]              Who to contact     If you have questions or need follow up information about today's clinic visit or your schedule please contact [facility] directly at [phone].  Normal or non-critical lab and imaging results will be communicated to you by Jobuloushart, letter or phone within 4 business days after the clinic has received the results. If you do not hear from us within 7 days, please contact the clinic through Jobuloushart or phone. If you have a critical or abnormal lab result, we will notify you by phone as soon as possible.  Submit refill requests through L[employer]Friend or call your pharmacy and they will forward the refill request to us. Please allow 3 business days for your refill to be completed.          Additional Information About Your Visit        Jobuloushart Information     [employer] lets you send messages to your doctor, view your test results, renew your prescriptions, schedule appointments and more. To sign up, go to [email or web address] . Click on \"Log in\" on the left side of the screen, which will take you to " "the Welcome page. Then click on \"Sign up Now\" on the right side of the page.     You will be asked to enter the access code listed below, as well as some personal information. Please follow the directions to create your username and password.     Your access code is: [email or web address]  Expires: 2018  9:19 AM     Your access code will  in 90 days. If you need help or a new code, please call your [place] clinic or [phone].        Care EveryWhere ID     This is your Care EveryWhere ID. This could be used by other organizations to access your [place] medical records  [id number]         Blood Pressure from Last 3 Encounters:   18 128/76   18 128/74   18 124/62    Weight from Last 3 Encounters:   18 115.2 kg (254 lb)   18 115.7 kg (255 lb)   18 115.7 kg (255 lb)              We Performed the Following     Comprehensive Sleep Study        Primary Care Provider Office Phone # Fax #    Dustin Fatima [phone] [address] [facility] [address]        Equal Access to Services     [facility]: Hadii casimiro ku hadasho Soomaali, waaxda luqadaha, qaybta kaalmada adeegyada, emilee pardo . So [facility] [phone].    ATENCIÓN: Si habla español, tiene a christianson disposición servicios gratuitos de asistencia lingüística. Llame al [phone].    We comply with applicable federal civil rights laws and [place] laws. We do not discriminate on the basis of race, color, national origin, age, disability, sex, sexual orientation, or gender identity.            Thank you!     Thank you for choosing [facility]  for your care. Our goal is always to provide you with excellent care. Hearing back from our patients is one way we can continue to improve our services. Please take a few minutes to complete the written survey that you may receive in the mail after your visit with us. Thank you!             Your Updated " Medication List - Protect others around you: Learn how to safely use, store and throw away your medicines at www.disposemymeds.org.          This list is accurate as of 5/13/18 11:59 PM.  Always use your most recent med list.                   Brand Name Dispense Instructions for use Diagnosis    albuterol (2.5 MG/3ML) 0.083% neb solution     30 vial    Take 1 vial (2.5 mg) by nebulization every 6 hours as needed for shortness of breath / dyspnea or wheezing    Mild intermittent asthma with acute exacerbation       amLODIPine 5 MG tablet    NORVASC    30 tablet    Take 1 tablet (5 mg) by mouth daily    Essential hypertension, benign, History of brain shunt       ASPIRIN NOT PRESCRIBED    INTENTIONAL    1 each    Please choose reason not prescribed, below    Aspirin intolerance       baclofen 10 MG tablet    LIORESAL    60 tablet    Take 1 tablet (10 mg) by mouth 3 times daily    Chronic bilateral low back pain with right-sided sciatica       blood glucose calibration solution    no brand specified    1 each    Use to calibrate blood glucose monitor as directed.    Type 2 diabetes mellitus without complication, without long-term current use of insulin (H)       blood glucose lancets standard    no brand specified    100 each    Use to test blood sugar daily times daily or as directed.    Type 2 diabetes mellitus without complication, without long-term current use of insulin (H)       blood glucose monitoring meter device kit    no brand specified    1 kit    Use to test blood sugar larisa times daily or as directed.    Type 2 diabetes mellitus without complication, without long-term current use of insulin (H)       blood glucose monitoring test strip    no brand specified    100 strip    Use to test blood sugars once times daily or as directed    Type 2 diabetes mellitus without complication, without long-term current use of insulin (H)       cloNIDine 0.1 MG tablet    CATAPRES    60 tablet    Take 1 tablet (0.1 mg) by  mouth 2 times daily    Hypertension goal BP (blood pressure) < 140/80       cyanocobalamin 1000 MCG/ML injection    VITAMIN B12    1 mL    Inject 1 mL (1,000 mcg) Subcutaneous every 30 days    B12 deficiency       EPINEPHrine 0.3 MG/0.3ML injection 2-pack    EPIPEN/ADRENACLICK/or ANY BX GENERIC EQUIV    2 each    Inject 0.3 mLs (0.3 mg) into the muscle once as needed for anaphylaxis    Late effect of other and unspecified external causes       losartan-hydrochlorothiazide 100-25 MG per tablet    HYZAAR    90 tablet    Take 1 tablet by mouth daily    Hypertension goal BP (blood pressure) < 140/80       magnesium oxide 400 (241.3 Mg) MG tablet    MAG-OX    90 tablet    Take 1 tablet (400 mg) by mouth daily    Cramp of limb       naloxone nasal spray    NARCAN    0.2 mL    Spray 1 spray (4 mg) into one nostril alternating nostrils as needed for opioid reversal every 2-3 minutes until assistance arrives    Chronic pain syndrome       ondansetron 4 MG ODT tab    ZOFRAN-ODT    10 tablet    Take 1 tablet (4 mg) by mouth every 8 hours as needed for nausea        order for DME     1 Device    Right wrist splint for carpal tunnel syndrome  One* Use as directed at hour of sleep    Carpal tunnel syndrome of right wrist       oxyCODONE-acetaminophen 5-325 MG per tablet    PERCOCET    30 tablet    Take 1 tablet by mouth every 6 hours as needed for moderate to severe pain Max 4 tab/day    Chronic pain syndrome       polyethylene glycol powder    MIRALAX/GLYCOLAX    1581 g    TAKE 17 GRAMS (1 CAPFUL) BY MOUTH DAILY    Slow transit constipation       potassium chloride 10 MEQ tablet    K-TAB,KLOR-CON    120 tablet    Take 1 tablet (10 mEq) by mouth daily    Hypokalemia       sertraline 100 MG tablet    ZOLOFT    45 tablet    Take 1.5 tablets (150 mg) by mouth daily    Recurrent major depression in partial remission (H), Other constipation       STATIN NOT PRESCRIBED (INTENTIONAL)     0 each    1 each daily Statin not prescribed  intentionally due to Active liver disease (liver failure, cirrhosis, hepatitis) LIVER METS    Hyperlipidemia LDL goal <100, Type 2 diabetes mellitus without complication (H)       triamcinolone 0.1 % ointment    KENALOG    80 g    Apply sparingly to affected area three times daily for 14 days.    Hand eczema       Vitamin D (Cholecalciferol) 1000 units Tabs     60 tablet    Take 2,000 Units by mouth daily    Vitamin D deficiency

## 2018-05-14 ENCOUNTER — TELEPHONE (OUTPATIENT)
Dept: FAMILY MEDICINE | Facility: CLINIC | Age: 53
End: 2018-05-14

## 2018-05-14 ENCOUNTER — TELEPHONE (OUTPATIENT)
Dept: ADDICTION MEDICINE | Facility: CLINIC | Age: 53
End: 2018-05-14

## 2018-05-14 ENCOUNTER — OFFICE VISIT (OUTPATIENT)
Dept: FAMILY MEDICINE | Facility: CLINIC | Age: 53
End: 2018-05-14
Payer: COMMERCIAL

## 2018-05-14 VITALS
HEIGHT: 67 IN | DIASTOLIC BLOOD PRESSURE: 82 MMHG | BODY MASS INDEX: 40.34 KG/M2 | HEART RATE: 71 BPM | OXYGEN SATURATION: 98 % | WEIGHT: 257 LBS | TEMPERATURE: 98.6 F | SYSTOLIC BLOOD PRESSURE: 138 MMHG | RESPIRATION RATE: 14 BRPM

## 2018-05-14 DIAGNOSIS — G25.81 RESTLESS LEGS SYNDROME: Primary | ICD-10-CM

## 2018-05-14 DIAGNOSIS — G89.4 CHRONIC PAIN SYNDROME: ICD-10-CM

## 2018-05-14 PROCEDURE — 99214 OFFICE O/P EST MOD 30 MIN: CPT | Performed by: FAMILY MEDICINE

## 2018-05-14 RX ORDER — HYDROCODONE BITARTRATE AND ACETAMINOPHEN 5; 325 MG/1; MG/1
1 TABLET ORAL EVERY 6 HOURS PRN
Qty: 10 TABLET | Refills: 0 | Status: SHIPPED | OUTPATIENT
Start: 2018-05-14 | End: 2018-05-18

## 2018-05-14 RX ORDER — BUPRENORPHINE 7.5 UG/H
1 PATCH TRANSDERMAL WEEKLY
Qty: 1 PATCH | Refills: 0 | Status: ON HOLD | OUTPATIENT
Start: 2018-05-14 | End: 2018-05-30
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
## 2018-05-14 NOTE — TELEPHONE ENCOUNTER
Patient was referred to the [facility], chart was reviewed, medical complexity, MH issues not well controlled,no psych involved, addiction issues-referred to Addiction medicine, chronic pain-referred to pan management, patient does qualify for our program, please schedule with a new PCP and [facility], I do not see that a conversation occurred with the patient about transferring her primary care to us, please make sure she is agreeable to this and understands our model before scheduling.    Ligia Santana P  MEDICAL LEAD     .
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
## 2018-05-14 NOTE — PROGRESS NOTES
Completed a split night PSG per provider order.    Preliminary AHI >60.  A final therapeutic PAP pressure was achieved.    Supine REM was seen on therapeutic pressure.    Patient reports feeling refreshed in AM.

## 2018-05-14 NOTE — TELEPHONE ENCOUNTER
----- Message from Tyrese Yang sent at 5/14/2018  1:59 PM CDT -----  Regarding: Addiction Med Referral   Please review.

## 2018-05-15 ENCOUNTER — PATIENT OUTREACH (OUTPATIENT)
Dept: CARE COORDINATION | Facility: CLINIC | Age: 53
End: 2018-05-15
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
## 2018-05-15 NOTE — PROGRESS NOTES
Clinic Care Coordination Contact    Call to patient to discuss the integrative care clinic and the model that is in place and patient is agreeable to the referral/ transition. Checked with the [employer] and [employer] restricted program to see about transitioning care and pt's restricted status ended 10/31/2017- no paperwork needs to be completed. No further outreaches will be made at this time unless a new referral is made or a change in the pt's status occurs.     BRISSA Cano  Clinic Care Coordinator  [phone]  [email or web address]
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
## 2018-05-15 NOTE — PROCEDURES
" SLEEP STUDY INTERPRETATION  SPLIT NIGHT STUDY      Patient: JUAN GALVEZ  YOB: 1965  Study Date: 5/13/2018  MRN: [id number]  Referring Provider: MD Fatima Kenneth  Ordering Provider: MD Mujica Louis    Indications for Polysomnography: The patient is a 53 y old Female who is 5' 6\" and weighs 256.0 lbs. Her BMI is 41.1, Randolph sleepiness scale 4.0 and neck circumference is 43.0 cm. Relevant medical history includes Obstructive Sleep Apnea, asthma, HTN, PTSD, depression, chronic pain on opioids, morbid obesity, anxiety, and spina bifida with  shunt. A diagnostic polysomnogram was performed to evaluate for ALEXUS and hypoventilation. After 136.5 minutes of sleep time the patient exhibited sufficient respiratory events qualifying her for a CPAP trial which was then initiated.    Polysomnogram Data: A full night polysomnogram recorded the standard physiologic parameters including EEG, EOG, EMG, ECG, nasal and oral airflow. Respiratory parameters of chest and abdominal movements were recorded with respiratory inductance plethysmography. Oxygen saturation was recorded by pulse oximetry.  Hypopnea scoring rule used: 1B 4%    Diagnostic PSG  Sleep Architecture: Decreased sleep latency without sleep aid, increased arousal index, and normal sleep efficiency.  The total recording time of the polysomnogram was 160.5 minutes. The total sleep time was 136.5 minutes. Sleep latency was decreased at 1.0 minutes without the use of a sleep aid. REM latency was - minutes. Arousal index was increased at 154.7 arousals per hour. Sleep efficiency was normal at 85.1%. Wake after sleep onset was 23.0 minutes. The patient spent 13.9% of total sleep time in Stage N1, 86.1% in Stage N2, 0.0% in Stage N3, and 0.0% in REM. Time in REM supine was - minutes.    Respiration: Very severe obstructive sleep apnea.    Events ? The polysomnogram revealed a presence of 66 obstructive, 1 central, and - mixed apneas resulting in an " apnea index of 29.5 events per hour. There were 90 obstructive hypopneas and - central hypopneas resulting in an obstructive hypopnea index of 39.6 and central hypopnea index of - events per hour. The combined apnea/hypopnea index was 69.0 events per hour (central apnea/hypopnea index was 0.4 events per hour).  The REM AHI was - events per hour. The supine AHI was 120.0 events per hour. The RERA index was 46.2 events per hour. The RDI was 115.2 events per hour.    Snoring - was reported as moderate.    Respiratory rate and pattern - was normal.    Sustained Sleep Associated Hypoventilation - Transcutaneous carbon dioxide monitoring was used, however significant hypoventilation was not present with a maximum change from 38.1 to 45.5 mmHg and 0 minutes at or greater than 55 mmHg.    Sleep Associated Hypoxemia - (Greater than 5 minutes O2 sat at or below 88%) was not present. Baseline oxygen saturation was 96.0%. Lowest oxygen saturation was 81.6%. Time spent less than or equal to 88% was 0.5 minutes. Time spent less than or equal to 89% was 0.8 minutes.     Treatment PSG  Sleep Architecture: On PAP, improved arousal index and increased sleep efficiency.  At 01:22:54 AM the patient was placed on PAP treatment and was titrated at pressures ranging from CPAP 8 cmH2O up to CPAP 9 cmH2O. The total recording time of the treatment portion of the study was 366.6 minutes. The total sleep time was 352.5 minutes. During the treatment portion of the study the sleep latency was 0.0 minutes. REM latency was 113.5 minutes. Arousal index was increased at 44.8 arousals per hour. Sleep efficiency was increased at 96.2%. Wake after sleep onset was 13.0 minutes. The patient spent 4.3% of total sleep time in Stage N1, 59.1% in Stage N2, 10.5% in Stage N3, and 26.1% in REM. Time in REM supine was 53.0 minutes.     Respiration: Optimal titration on CPAP 9 cmH2O.    The final pressure was CPAP 9 cmH2O with an AHI of - events per hour. Time  in REM supine on final pressure was 53.0 minutes.     This titration was considered:  - Optimal (residual AHI < 5 events per hour and including REM?supine sleep at final pressure).     Movement Activity: Marked increase in PLMs with moderate increase in PLM arousals.    Periodic Limb Movements  o During the diagnostic portion of the study, there were 97 PLMs recorded. The PLM index was 42.6 movements per hour. The PLM Arousal Index was 26.4 per hour.  o During the treatment portion of the study, there were 731 PLMs recorded. The PLM index was 124.4 movements per hour. The PLM Arousal Index was 40.5 per hour.    REM EMG Activity - Excessive muscle activity was not present.    Nocturnal Behavior - Abnormal sleep related behaviors were not noted.    Bruxism - None apparent.    Cardiac Summary: No significant arrhythmias noted.  During the diagnostic portion of the study, the average pulse rate was 61.5 bpm. The minimum pulse rate was 49.9 bpm while the maximum pulse rate was 81.9 bpm.    During the treatment portion of the study, the average pulse rate was 56.0 bpm. The minimum pulse rate was 46.0 bpm while the maximum pulse rate was 82.1 bpm.     Assessment:     Decreased sleep latency without sleep aid, increased arousal index, and normal sleep efficiency.    On PAP, improved arousal index and increased sleep efficiency.    Very severe obstructive sleep apnea.    Optimal titration on CPAP 9 cmH2O.    Marked increase in PLMs with moderate increase in PLM arousals.    No significant arrhythmias noted.    Recommendations:    Treatment of ALEXUS with CPAP at 9 cmH2O. Recommend clinical follow up with sleep management team, for coaching and review of effectiveness and compliance measures.    Treatment of ALEXUS with Auto?titrating PAP therapy with a range of 8 cmH2O to 12 cmH2O. Recommend clinical follow up with sleep management team, including review of compliance measures.    Advice regarding the risks of drowsy  driving.    Suggest optimizing sleep schedule and avoiding sleep deprivation.    Weight management (if BMI > 30).    Pharmacologic therapy should be used for management of restless legs syndrome only if present and clinically indicated and not based on the presence of periodic limb movements alone.    Diagnostic Codes:   Obstructive Sleep Apnea G47.33   _____________________________________   Electronically Signed By: Aniceto Mujica MD 5/15/2018

## 2018-05-15 NOTE — TELEPHONE ENCOUNTER
Writer spoke with pt about scheduling an appt, pt stated that now is not a good time to talk and ask writer to call back later.      Tyrese Yang

## 2018-05-17 ENCOUNTER — HOSPITAL ENCOUNTER (EMERGENCY)
Facility: CLINIC | Age: 53
Discharge: HOME | End: 2018-05-18
Attending: EMERGENCY MEDICINE | Admitting: EMERGENCY MEDICINE
Payer: COMMERCIAL

## 2018-05-17 VITALS
OXYGEN SATURATION: 97 % | RESPIRATION RATE: 18 BRPM | TEMPERATURE: 97.9 F | WEIGHT: 245.4 LBS | HEART RATE: 65 BPM | DIASTOLIC BLOOD PRESSURE: 84 MMHG | HEIGHT: 67 IN | SYSTOLIC BLOOD PRESSURE: 160 MMHG | BODY MASS INDEX: 38.52 KG/M2

## 2018-05-17 DIAGNOSIS — R10.9 RIGHT FLANK PAIN, CHRONIC: ICD-10-CM

## 2018-05-17 DIAGNOSIS — G89.29 RIGHT FLANK PAIN, CHRONIC: ICD-10-CM

## 2018-05-17 PROCEDURE — 99283 EMERGENCY DEPT VISIT LOW MDM: CPT

## 2018-05-17 RX ORDER — HYDROCODONE BITARTRATE AND ACETAMINOPHEN 5; 325 MG/1; MG/1
1 TABLET ORAL ONCE
Status: COMPLETED | OUTPATIENT
Start: 2018-05-17 | End: 2018-05-18

## 2018-05-17 NOTE — ED AVS SNAPSHOT
Emergency Department    [address] [facility] [address]    Phone:  [phone]    Fax:  [phone]                                       Ines Mott   MRN: [id number]    Department:   Emergency Department   Date of Visit:  5/17/2018           Patient Information     Date Of Birth          1965        Your diagnoses for this visit were:     Right flank pain, chronic        You were seen by Michelle Mart MD.      Follow-up Information     Follow up with Dustin Fatima MD.    Specialty:  Family Practice    Contact information:    [address]  [facility] [id number] [phone]          Follow up with  Emergency Department.    Specialty:  EMERGENCY MEDICINE    Why:  If symptoms worsen    Contact information:    [address] [facility] [address] [phone]        Discharge Instructions         Abdominal Pain    Abdominal pain is pain in the stomach or belly area. Everyone has this pain from time to time. In many cases it goes away on its own. But abdominal pain can sometimes be due to a serious problem, such as appendicitis. So it s important to know when to seek help.  Causes of abdominal pain  There are many possible causes of abdominal pain. Common causes in adults include:    Constipation, diarrhea, or gas    Stomach acid flowing back up into the esophagus (acid reflux or heartburn)    Severe acid reflux, called GERD (gastroesophageal reflux disease)    A sore in the lining of the stomach or small intestine (peptic ulcer)    Inflammation of the gallbladder, liver, or pancreas    Gallstones or kidney stones    Appendicitis     Intestinal blockage     An internal organ pushing through a muscle or other tissue (hernia)    Urinary tract infections    In women, menstrual cramps, fibroids, or endometriosis    Inflammation or infection of the intestines  Diagnosing the cause of abdominal pain  Your healthcare provider will do a physical exam  help find the cause of your pain. If needed, tests will be ordered. Belly pain has many possible causes. So it can be hard to find the reason for your pain. Giving details about your pain can help. Tell your provider where and when you feel the pain, and what makes it better or worse. Also let your provider know if you have other symptoms such as:    Fever    Tiredness    Upset stomach (nausea)    Vomiting    Changes in bathroom habits  Treating abdominal pain  Some causes of pain need emergency medical treatment right away. These include appendicitis or a bowel blockage. Other problems can be treated with rest, fluids, or medicines. Your healthcare provider can give you specific instructions for treatment or self-care based on what is causing your pain.  If you have vomiting or diarrhea, sip water or other clear fluids. When you are ready to eat solid foods again, start with small amounts of easy-to-digest, low-fat foods. These include apple sauce, toast, or crackers.   When to seek medical care  Call 911 or go to the hospital right away if you:    Can t pass stool and are vomiting    Are vomiting blood or have bloody diarrhea or black, tarry diarrhea    Have chest, neck, or shoulder pain    Feel like you might pass out    Have pain in your shoulder blades with nausea    Have sudden, severe belly pain    Have new, severe pain unlike any you have felt before    Have a belly that is rigid, hard, and tender to touch  Call your healthcare provider if you have:    Pain for more than 5 days    Bloating for more than 2 days    Diarrhea for more than 5 days    A fever of 100.4 F (38 C) or higher, or as directed by your healthcare provider    Pain that gets worse    Weight loss for no reason    Continued lack of appetite    Blood in your stool  How to prevent abdominal pain  Here are some tips to help prevent abdominal pain:    Eat smaller amounts of food at one time.    Avoid greasy, fried, or other high-fat  foods.    Avoid foods that give you gas.    Exercise regularly.    Drink plenty of fluids.  To help prevent GERD symptoms:    Quit smoking.    Reduce alcohol and certain foods that increase stomach acid.    Avoid aspirin and over-the-counter pain and fever medicines (NSAIDS or nonsteroidal anti-inflammatory drugs), if possible    Lose extra weight.    Finish eating at least 2 hours before you go to bed or lie down.    Raise the head of your bed.  Date Last Reviewed: 7/1/2016 2000-2017 [employer]. [address] [place] [address]. All rights reserved. This information is not intended as a substitute for professional medical care. Always follow your healthcare professional's instructions.          Flank Pain, Uncertain Cause  The flank is the area between your upper abdomen and your back. Pain there is often caused by a problem with your kidneys. It might be a kidney infection or a kidney stone. Other causes of flank pain include spinal arthritis, a pinched nerve from a back injury, or a back muscle strain or spasm.  The cause of your flank pain is not certain. You may need other tests.  Home care  Follow these tips when caring for yourself at home:    You may use acetaminophen or ibuprofen to control pain, unless your health care provider prescribed another medicine. If you have chronic liver or kidney disease, talk with your provider before taking these medicines. Also talk with your provider first if you ve ever had a stomach ulcer or GI bleeding.    If the pain is coming from your muscles, you may get relief with ice or heat. During the first 2 days after the injury, put an ice pack on the painful area for 20 minutes every 2 to 4 hours. This will reduce swelling and pain. A hot shower, hot bath, or heating pad works well for a muscle spasm. You can start with ice, then switch to heat after 2 days. You might find that alternating ice and heat works well. Use the method that feels the best  to you.  Follow-up care  Follow up with your healthcare provider if your symptoms don t get better over the next few days.  When to seek medical advice  Call your healthcare provider right away if any of these happen:    Repeated vomiting    Fever of 100.4 F (38 C) or higher, or as directed by your health care provider    Flank pain that gets worse    Pain that spreads to the front of your belly (abdomen)    Dizziness, weakness, or fainting    Blood in your urine    Burning feeling when you urinate or the need to urinate often    Pain in one of your legs that gets worse    Numbness or weakness in a leg  Date Last Reviewed: 10/1/2016    3145-4596 [employer]. [address] [place] [address], [place] [address]. All rights reserved. This information is not intended as a substitute for professional medical care. Always follow your healthcare professional's instructions.          Your next 10 appointments already scheduled     May 21, 2018  8:30 AM CDT   [facility]    [address] [place] [address]  [facility] [address] [phone]           Bring a current list of meds and any records pertaining to this visit. For Physicals, please bring immunization records and any forms needing to be filled out. Please arrive 10 minutes early to complete paperwork.            May 31, 2018 10:30 AM CDT   Return Sleep Patient with Aniceto Mujica MD   [facility] ([facility])    [address] [place] [id number] [phone]              24 Hour Appointment Hotline       To make an appointment at any [facility], call [phone] ([phone]). If you don't have a family doctor or clinic, we will help you find one. [facility] are conveniently located to serve the needs of you and your family.             Review  of your medicines      Our records show that you are taking the medicines listed below. If these are incorrect, please call your family doctor or clinic.        Dose / Directions Last dose taken    albuterol (2.5 MG/3ML) 0.083% neb solution   Dose:  1 vial   Quantity:  30 vial        Take 1 vial (2.5 mg) by nebulization every 6 hours as needed for shortness of breath / dyspnea or wheezing   Refills:  3        amLODIPine 5 MG tablet   Commonly known as:  NORVASC   Dose:  5 mg   Quantity:  30 tablet        Take 1 tablet (5 mg) by mouth daily   Refills:  3        ASPIRIN NOT PRESCRIBED   Commonly known as:  INTENTIONAL   Quantity:  1 each        Please choose reason not prescribed, below   Refills:  0        baclofen 10 MG tablet   Commonly known as:  LIORESAL   Dose:  10 mg   Quantity:  60 tablet        Take 1 tablet (10 mg) by mouth 3 times daily   Refills:  11        blood glucose calibration solution   Commonly known as:  no brand specified   Quantity:  1 each        Use to calibrate blood glucose monitor as directed.   Refills:  0        blood glucose lancets standard   Commonly known as:  no brand specified   Quantity:  100 each        Use to test blood sugar daily times daily or as directed.   Refills:  11        blood glucose monitoring meter device kit   Commonly known as:  no brand specified   Quantity:  1 kit        Use to test blood sugar larisa times daily or as directed.   Refills:  0        blood glucose monitoring test strip   Commonly known as:  no brand specified   Quantity:  100 strip        Use to test blood sugars once times daily or as directed   Refills:  3        Buprenorphine 7.5 MCG/HR Ptwk   Dose:  1 patch   Quantity:  1 patch        Place 1 patch onto the skin once a week   Refills:  0        cloNIDine 0.1 MG tablet   Commonly known as:  CATAPRES   Dose:  0.1 mg   Quantity:  60 tablet        Take 1 tablet (0.1 mg) by mouth 2 times daily   Refills:  3        cyanocobalamin 1000 MCG/ML injection    Commonly known as:  VITAMIN B12   Dose:  1 mL   Quantity:  1 mL        Inject 1 mL (1,000 mcg) Subcutaneous every 30 days   Refills:  11        EPINEPHrine 0.3 MG/0.3ML injection 2-pack   Commonly known as:  EPIPEN/ADRENACLICK/or ANY BX GENERIC EQUIV   Dose:  0.3 mg   Quantity:  2 each        Inject 0.3 mLs (0.3 mg) into the muscle once as needed for anaphylaxis   Refills:  5        HYDROcodone-acetaminophen 5-325 MG per tablet   Commonly known as:  NORCO   Dose:  1 tablet   Quantity:  10 tablet        Take 1 tablet by mouth every 6 hours as needed for severe pain   Refills:  0        losartan-hydrochlorothiazide 100-25 MG per tablet   Commonly known as:  HYZAAR   Dose:  1 tablet   Quantity:  90 tablet        Take 1 tablet by mouth daily   Refills:  3        magnesium oxide 400 (241.3 Mg) MG tablet   Commonly known as:  MAG-OX   Dose:  400 mg   Quantity:  90 tablet        Take 1 tablet (400 mg) by mouth daily   Refills:  3        naloxone nasal spray   Commonly known as:  NARCAN   Dose:  4 mg   Quantity:  0.2 mL        Spray 1 spray (4 mg) into one nostril alternating nostrils as needed for opioid reversal every 2-3 minutes until assistance arrives   Refills:  0        ondansetron 4 MG ODT tab   Commonly known as:  ZOFRAN-ODT   Dose:  4 mg   Quantity:  10 tablet        Take 1 tablet (4 mg) by mouth every 8 hours as needed for nausea   Refills:  1        order for DME   Quantity:  1 Device        Right wrist splint for carpal tunnel syndrome  One* Use as directed at hour of sleep   Refills:  1        polyethylene glycol powder   Commonly known as:  MIRALAX/GLYCOLAX   Quantity:  1581 g        TAKE 17 GRAMS (1 CAPFUL) BY MOUTH DAILY   Refills:  11        potassium chloride 10 MEQ tablet   Commonly known as:  K-TAB,KLOR-CON   Dose:  10 mEq   Indication:  Low Amount of Potassium in the Blood   Quantity:  120 tablet        Take 1 tablet (10 mEq) by mouth daily   Refills:  11        sertraline 100 MG tablet   Commonly  known as:  ZOLOFT   Dose:  150 mg   Indication:  Major Depressive Disorder   Quantity:  45 tablet        Take 1.5 tablets (150 mg) by mouth daily   Refills:  11        STATIN NOT PRESCRIBED (INTENTIONAL)   Dose:  1 each   Quantity:  0 each        1 each daily Statin not prescribed intentionally due to Active liver disease (liver failure, cirrhosis, hepatitis) LIVER METS   Refills:  0        triamcinolone 0.1 % ointment   Commonly known as:  KENALOG   Quantity:  80 g        Apply sparingly to affected area three times daily for 14 days.   Refills:  0        Vitamin D (Cholecalciferol) 1000 units Tabs   Dose:  2000 Units   Quantity:  60 tablet        Take 2,000 Units by mouth daily   Refills:  11                Procedures and tests performed during your visit     Basic metabolic panel    CBC with platelets differential    UA reflex to Microscopic and Culture      Orders Needing Specimen Collection     None      Pending Results     No orders found for last 3 day(s).            Pending Culture Results     No orders found for last 3 day(s).            Pending Results Instructions     If you had any lab results that were not finalized at the time of your Discharge, you can call the ED Lab Result RN at [phone]. You will be contacted by this team for any positive Lab results or changes in treatment. The nurses are available 7 days a week from 10A to 6:30P.  You can leave a message 24 hours per day and they will return your call.        Test Results From Your Hospital Stay        5/18/2018 12:33 AM      Component Results     Component Value Ref Range & Units Status    Color Urine Yellow  Final    Appearance Urine Slightly Cloudy  Final    Glucose Urine Negative NEG^Negative mg/dL Final    Bilirubin Urine Negative NEG^Negative Final    Ketones Urine Negative NEG^Negative mg/dL Final    Specific Gravity Urine 1.017 1.003 - 1.035 Final    Blood Urine Negative NEG^Negative Final    pH Urine 6.5 5.0 - 7.0 pH Final    Protein  Albumin Urine 10 (A) NEG^Negative mg/dL Final    Urobilinogen mg/dL Normal 0.0 - 2.0 mg/dL Final    Nitrite Urine Negative NEG^Negative Final    Leukocyte Esterase Urine Negative NEG^Negative Final    Source Midstream Urine  Final    RBC Urine 1 0 - 2 /HPF Final    WBC Urine 1 0 - 5 /HPF Final    Squamous Epithelial /HPF Urine 23 (H) 0 - 1 /HPF Final    Mucous Urine Present (A) NEG^Negative /LPF Final         5/18/2018  1:06 AM      Component Results     Component Value Ref Range & Units Status    Sodium 144 133 - 144 mmol/L Final    Potassium 3.7 3.4 - 5.3 mmol/L Final    Chloride 106 94 - 109 mmol/L Final    Carbon Dioxide 31 20 - 32 mmol/L Final    Anion Gap 7 3 - 14 mmol/L Final    Glucose 125 (H) 70 - 99 mg/dL Final    Urea Nitrogen 11 7 - 30 mg/dL Final    Creatinine 0.80 0.52 - 1.04 mg/dL Final    GFR Estimate 75 >60 mL/min/1.7m2 Final    Non  GFR Calc    GFR Estimate If Black >90 >60 mL/min/1.7m2 Final    African American GFR Calc    Calcium 8.8 8.5 - 10.1 mg/dL Final         5/18/2018 12:54 AM      Component Results     Component Value Ref Range & Units Status    WBC 8.1 4.0 - 11.0 10e9/L Final    RBC Count 3.96 3.8 - 5.2 10e12/L Final    Hemoglobin 11.6 (L) 11.7 - 15.7 g/dL Final    Hematocrit 36.1 35.0 - 47.0 % Final    MCV 91 78 - 100 fl Final    MCH 29.3 26.5 - 33.0 pg Final    MCHC 32.1 31.5 - 36.5 g/dL Final    RDW 13.4 10.0 - 15.0 % Final    Platelet Count 306 150 - 450 10e9/L Final    Diff Method Automated Method  Final    % Neutrophils 51.0 % Final    % Lymphocytes 38.4 % Final    % Monocytes 7.5 % Final    % Eosinophils 2.8 % Final    % Basophils 0.1 % Final    % Immature Granulocytes 0.2 % Final    Nucleated RBCs 0 0 /100 Final    Absolute Neutrophil 4.1 1.6 - 8.3 10e9/L Final    Absolute Lymphocytes 3.1 0.8 - 5.3 10e9/L Final    Absolute Monocytes 0.6 0.0 - 1.3 10e9/L Final    Absolute Eosinophils 0.2 0.0 - 0.7 10e9/L Final    Absolute Basophils 0.0 0.0 - 0.2 10e9/L Final    Abs  Immature Granulocytes 0.0 0 - 0.4 10e9/L Final    Absolute Nucleated RBC 0.0  Final                Clinical Quality Measure: Blood Pressure Screening     Your blood pressure was checked while you were in the emergency department today. The last reading we obtained was  BP: 160/84 . Please read the guidelines below about what these numbers mean and what you should do about them.  If your systolic blood pressure (the top number) is less than 120 and your diastolic blood pressure (the bottom number) is less than 80, then your blood pressure is normal. There is nothing more that you need to do about it.  If your systolic blood pressure (the top number) is 120-139 or your diastolic blood pressure (the bottom number) is 80-89, your blood pressure may be higher than it should be. You should have your blood pressure rechecked within a year by a primary care provider.  If your systolic blood pressure (the top number) is 140 or greater or your diastolic blood pressure (the bottom number) is 90 or greater, you may have high blood pressure. High blood pressure is treatable, but if left untreated over time it can put you at risk for heart attack, stroke, or kidney failure. You should have your blood pressure rechecked by a primary care provider within the next 4 weeks.  If your provider in the emergency department today gave you specific instructions to follow-up with your doctor or provider even sooner than that, you should follow that instruction and not wait for up to 4 weeks for your follow-up visit.        Thank you for choosing [facility]       Thank you for choosing [facility] for your care. Our goal is always to provide you with excellent care. Hearing back from our patients is one way we can continue to improve our services. Please take a few minutes to complete the written survey that you may receive in the mail after you visit with us. Thank you!        [employer] Information     [employer] lets you send messages to your  "doctor, view your test results, renew your prescriptions, schedule appointments and more. To sign up, go to [email or web address] . Click on \"Log in\" on the left side of the screen, which will take you to the Welcome page. Then click on \"Sign up Now\" on the right side of the page.     You will be asked to enter the access code listed below, as well as some personal information. Please follow the directions to create your username and password.     Your access code is: [email or web address]  Expires: 2018  9:19 AM     Your access code will  in 90 days. If you need help or a new code, please call your [facility] clinic or [phone].        Care EveryWhere ID     This is your Care EveryWhere ID. This could be used by other organizations to access your [facility] medical records  [id number]        Equal Access to Services     [facility]: Marc Leonard, deion nunes, candida ferreiraalerika hernandez, emilee pardo . [facility] [phone].    ATENCIÓN: Si habla español, tiene a christianson disposición servicios gratuitos de asistencia lingüística. Llame al [phone].    We comply with applicable federal civil rights laws and [place] laws. We do not discriminate on the basis of race, color, national origin, age, disability, sex, sexual orientation, or gender identity.            After Visit Summary       This is your record. Keep this with you and show to your community pharmacist(s) and doctor(s) at your next visit.                  "

## 2018-05-17 NOTE — TELEPHONE ENCOUNTER
Second attempt to reach pt; no answer. LVM requesting a call back for an appt. A final attempt will be made.     Tyrese Yang

## 2018-05-17 NOTE — TELEPHONE ENCOUNTER
Writer attempted to reach pt, no answer. LVM requesting a call back for an appt. Two more attempts will be made.      Tyrese Yang

## 2018-05-17 NOTE — ED AVS SNAPSHOT
Emergency Department    [address] [place] [address]    Phone:  [phone]    Fax:  [phone]                                       Ines Mott   MRN: [id number]    Department:   Emergency Department   Date of Visit:  5/17/2018           After Visit Summary Signature Page     I have received my discharge instructions, and my questions have been answered. I have discussed any challenges I see with this plan with the nurse or doctor.    ..........................................................................................................................................  Patient/Patient Representative Signature      ..........................................................................................................................................  Patient Representative Print Name and Relationship to Patient    ..................................................               ................................................  Date                                            Time    ..........................................................................................................................................  Reviewed by Signature/Title    ...................................................              ..............................................  Date                                                            Time

## 2018-05-18 ENCOUNTER — TELEPHONE (OUTPATIENT)
Dept: FAMILY MEDICINE | Facility: CLINIC | Age: 53
End: 2018-05-18

## 2018-05-18 ENCOUNTER — OFFICE VISIT (OUTPATIENT)
Dept: FAMILY MEDICINE | Facility: CLINIC | Age: 53
End: 2018-05-18
Payer: COMMERCIAL

## 2018-05-18 VITALS
BODY MASS INDEX: 40.86 KG/M2 | RESPIRATION RATE: 16 BRPM | TEMPERATURE: 98.1 F | HEART RATE: 67 BPM | SYSTOLIC BLOOD PRESSURE: 126 MMHG | OXYGEN SATURATION: 100 % | DIASTOLIC BLOOD PRESSURE: 74 MMHG | WEIGHT: 257 LBS

## 2018-05-18 DIAGNOSIS — G89.4 CHRONIC PAIN SYNDROME: ICD-10-CM

## 2018-05-18 DIAGNOSIS — G25.81 RESTLESS LEGS SYNDROME: ICD-10-CM

## 2018-05-18 LAB
ALBUMIN UR-MCNC: 10 MG/DL
ANION GAP SERPL CALCULATED.3IONS-SCNC: 7 MMOL/L (ref 3–14)
APPEARANCE UR: ABNORMAL
BASOPHILS # BLD AUTO: 0 10E9/L (ref 0–0.2)
BASOPHILS NFR BLD AUTO: 0.1 %
BILIRUB UR QL STRIP: NEGATIVE
BUN SERPL-MCNC: 11 MG/DL (ref 7–30)
CALCIUM SERPL-MCNC: 8.8 MG/DL (ref 8.5–10.1)
CHLORIDE SERPL-SCNC: 106 MMOL/L (ref 94–109)
CO2 SERPL-SCNC: 31 MMOL/L (ref 20–32)
COLOR UR AUTO: YELLOW
CREAT SERPL-MCNC: 0.8 MG/DL (ref 0.52–1.04)
DIFFERENTIAL METHOD BLD: ABNORMAL
EOSINOPHIL # BLD AUTO: 0.2 10E9/L (ref 0–0.7)
EOSINOPHIL NFR BLD AUTO: 2.8 %
ERYTHROCYTE [DISTWIDTH] IN BLOOD BY AUTOMATED COUNT: 13.4 % (ref 10–15)
GFR SERPL CREATININE-BSD FRML MDRD: 75 ML/MIN/1.7M2
GLUCOSE SERPL-MCNC: 125 MG/DL (ref 70–99)
GLUCOSE UR STRIP-MCNC: NEGATIVE MG/DL
HCT VFR BLD AUTO: 36.1 % (ref 35–47)
HGB BLD-MCNC: 11.6 G/DL (ref 11.7–15.7)
HGB UR QL STRIP: NEGATIVE
IMM GRANULOCYTES # BLD: 0 10E9/L (ref 0–0.4)
IMM GRANULOCYTES NFR BLD: 0.2 %
KETONES UR STRIP-MCNC: NEGATIVE MG/DL
LEUKOCYTE ESTERASE UR QL STRIP: NEGATIVE
LYMPHOCYTES # BLD AUTO: 3.1 10E9/L (ref 0.8–5.3)
LYMPHOCYTES NFR BLD AUTO: 38.4 %
MCH RBC QN AUTO: 29.3 PG (ref 26.5–33)
MCHC RBC AUTO-ENTMCNC: 32.1 G/DL (ref 31.5–36.5)
MCV RBC AUTO: 91 FL (ref 78–100)
MONOCYTES # BLD AUTO: 0.6 10E9/L (ref 0–1.3)
MONOCYTES NFR BLD AUTO: 7.5 %
MUCOUS THREADS #/AREA URNS LPF: PRESENT /LPF
NEUTROPHILS # BLD AUTO: 4.1 10E9/L (ref 1.6–8.3)
NEUTROPHILS NFR BLD AUTO: 51 %
NITRATE UR QL: NEGATIVE
NRBC # BLD AUTO: 0 10*3/UL
NRBC BLD AUTO-RTO: 0 /100
PH UR STRIP: 6.5 PH (ref 5–7)
PLATELET # BLD AUTO: 306 10E9/L (ref 150–450)
POTASSIUM SERPL-SCNC: 3.7 MMOL/L (ref 3.4–5.3)
RBC # BLD AUTO: 3.96 10E12/L (ref 3.8–5.2)
RBC #/AREA URNS AUTO: 1 /HPF (ref 0–2)
SODIUM SERPL-SCNC: 144 MMOL/L (ref 133–144)
SOURCE: ABNORMAL
SP GR UR STRIP: 1.02 (ref 1–1.03)
SQUAMOUS #/AREA URNS AUTO: 23 /HPF (ref 0–1)
UROBILINOGEN UR STRIP-MCNC: NORMAL MG/DL (ref 0–2)
WBC # BLD AUTO: 8.1 10E9/L (ref 4–11)
WBC #/AREA URNS AUTO: 1 /HPF (ref 0–5)

## 2018-05-18 PROCEDURE — 81001 URINALYSIS AUTO W/SCOPE: CPT | Performed by: EMERGENCY MEDICINE

## 2018-05-18 PROCEDURE — 80048 BASIC METABOLIC PNL TOTAL CA: CPT | Performed by: EMERGENCY MEDICINE

## 2018-05-18 PROCEDURE — 80307 DRUG TEST PRSMV CHEM ANLYZR: CPT | Mod: 90 | Performed by: FAMILY MEDICINE

## 2018-05-18 PROCEDURE — 99214 OFFICE O/P EST MOD 30 MIN: CPT | Performed by: FAMILY MEDICINE

## 2018-05-18 PROCEDURE — 85025 COMPLETE CBC W/AUTO DIFF WBC: CPT | Performed by: EMERGENCY MEDICINE

## 2018-05-18 PROCEDURE — 36415 COLL VENOUS BLD VENIPUNCTURE: CPT | Performed by: EMERGENCY MEDICINE

## 2018-05-18 PROCEDURE — 99000 SPECIMEN HANDLING OFFICE-LAB: CPT | Performed by: FAMILY MEDICINE

## 2018-05-18 PROCEDURE — 25000132 ZZH RX MED GY IP 250 OP 250 PS 637: Performed by: EMERGENCY MEDICINE

## 2018-05-18 RX ORDER — HYDROCODONE BITARTRATE AND ACETAMINOPHEN 5; 325 MG/1; MG/1
1 TABLET ORAL EVERY 6 HOURS PRN
Qty: 30 TABLET | Refills: 0 | Status: SHIPPED | OUTPATIENT
Start: 2018-05-18 | End: 2018-06-05

## 2018-05-18 RX ADMIN — HYDROCODONE BITARTRATE AND ACETAMINOPHEN 1 TABLET: 5; 325 TABLET ORAL at 00:16

## 2018-05-18 ASSESSMENT — ENCOUNTER SYMPTOMS
FLANK PAIN: 1
DIARRHEA: 0
SHORTNESS OF BREATH: 0
FEVER: 0
DYSURIA: 0
FREQUENCY: 0
VOMITING: 0
NAUSEA: 0
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
## 2018-05-18 NOTE — MR AVS SNAPSHOT
After Visit Summary   5/18/2018    Ines Mott    MRN: [id number]           Patient Information     Date Of Birth          1965        Visit Information        Provider Department      5/18/2018 3:00 PM Dustin Fatima MD [facility]        Today's Diagnoses     Restless legs syndrome        Chronic pain syndrome          Care Instructions    (G25.81) Restless legs syndrome  Comment:    Plan: HYDROcodone-acetaminophen (NORCO) 5-325 MG per         tablet             (G89.4) Chronic pain syndrome  Comment:    Plan: HYDROcodone-acetaminophen (NORCO) 5-325 MG per         tablet, Pain Drug Scr UR W Rptd Meds                          Follow-ups after your visit        Your next 10 appointments already scheduled     May 21, 2018  8:30 AM CDT   Office Visit with Dustin Fatima MD   [facility] ([facility]    [address]  [address]  [facility] [address]   [phone]           Bring a current list of meds and any records pertaining to this visit. For Physicals, please bring immunization records and any forms needing to be filled out. Please arrive 10 minutes early to complete paperwork.            May 24, 2018 10:40 AM CDT   New Visit with Gracie Mclaughlin MD   [facility]    [address]  [address]  [facility] [address]   [phone]            May 31, 2018 10:30 AM CDT   Return Sleep Patient with Aniceto Mujica MD   [facility] ([facility]    [address] [place] [address]   [phone]            Jun 05, 2018 10:30 AM CDT   New Visit with KELVIN MA VISIT   [facility] ([facility]    [address]  [address]  [facility] [facility] [address]   [phone]  "           2018 11:00 AM CDT   New Visit with MARTIN Pineda   [facility] ([facility]    [address]  [address]  [facility] [id number] [phone]            2018 11:00 AM CDT   New Visit with MICHEAL Crowley CNP   [facility] ([facility])    [address]  [address]  [facility] [id number] [phone]              Who to contact     If you have questions or need follow up information about today's clinic visit or your schedule please contact [facility] directly at [phone].  Normal or non-critical lab and imaging results will be communicated to you by MyChart, letter or phone within 4 business days after the clinic has received the results. If you do not hear from us within 7 days, please contact the clinic through MyChart or phone. If you have a critical or abnormal lab result, we will notify you by phone as soon as possible.  Submit refill requests through [employer] or call your pharmacy and they will forward the refill request to us. Please allow 3 business days for your refill to be completed.          Additional Information About Your Visit        [employer] Information     [employer] lets you send messages to your doctor, view your test results, renew your prescriptions, schedule appointments and more. To sign up, go to [email or web address] . Click on \"Log in\" on the left side of the screen, which will take you to the Welcome page. Then click on \"Sign up Now\" on the right side of the page.     You will be asked to enter the access code listed below, as well as some personal information. Please follow the directions to create your username and password.     Your access code is: [email or web address]  Expires: 2018  9:19 AM     Your access code will  in 90 days. If you need help or a new code, " please call your [place] clinic or [phone].        Care EveryWhere ID     This is your Care EveryWhere ID. This could be used by other organizations to access your [place] medical records  [id number]        Your Vitals Were     Pulse Temperature Respirations Pulse Oximetry BMI (Body Mass Index)       67 98.1  F (36.7  C) (Tympanic) 16 100% 40.86 kg/m2        Blood Pressure from Last 3 Encounters:   05/18/18 126/74   05/17/18 160/84   05/14/18 138/82    Weight from Last 3 Encounters:   05/18/18 257 lb (116.6 kg)   05/17/18 245 lb 6.4 oz (111.3 kg)   05/14/18 257 lb (116.6 kg)              We Performed the Following     Pain Drug Scr UR W Rptd Meds          Where to get your medicines      Some of these will need a paper prescription and others can be bought over the counter.  Ask your nurse if you have questions.     Bring a paper prescription for each of these medications     HYDROcodone-acetaminophen 5-325 MG per tablet         Information about OPIOIDS     PRESCRIPTION OPIOIDS: WHAT YOU NEED TO KNOW   You have a prescription for an opioid (narcotic) pain medicine. Opioids can cause addiction. If you have a history of chemical dependency of any type, you are at a higher risk of becoming addicted to opioids. Only take this medicine after all other options have been tried. Take it for as short a time and as few doses as possible.     Do not:    Drive. If you drive while taking these medicines, you could be arrested for driving under the influence (DUI).    Operate heavy machinery    Do any other dangerous activities while taking these medicines.     Drink any alcohol while taking these medicines.      Take with any other medicines that contain acetaminophen. Read all labels carefully. Look for the word  acetaminophen  or  Tylenol.  Ask your pharmacist if you have questions or are unsure.    Store your pills in a secure place, locked if possible. We will not replace any lost or stolen medicine. If you don t  finish your medicine, please throw away (dispose) as directed by your pharmacist. The [place] Pollution Control Agency has more information about safe disposal: https://www.pca.[facility].mn.us/living-green/managing-unwanted-medications    All opioids tend to cause constipation. Drink plenty of water and eat foods that have a lot of fiber, such as fruits, vegetables, prune juice, apple juice and high-fiber cereal. Take a laxative (Miralax, milk of magnesia, Colace, Senna) if you don t move your bowels at least every other day.          Primary Care Provider Office Phone # Fax #    Dustin Rina Fatima [phone] [address]  [facility] [address]        Equal Access to Services     VALENTE RIVERA : Marc keller Soevangelina, waaxda luqadaha, qaybta kaalmada adeegyakandy, emilee pardo . So [facility] [phone].    ATENCIÓN: Si habla español, tiene a christianson disposición servicios gratuitos de asistencia lingüística. Llame al [phone].    We comply with applicable federal civil rights laws and [place] laws. We do not discriminate on the basis of race, color, national origin, age, disability, sex, sexual orientation, or gender identity.            Thank you!     Thank you for choosing [facility]  for your care. Our goal is always to provide you with excellent care. Hearing back from our patients is one way we can continue to improve our services. Please take a few minutes to complete the written survey that you may receive in the mail after your visit with us. Thank you!             Your Updated Medication List - Protect others around you: Learn how to safely use, store and throw away your medicines at www.disposemymeds.org.          This list is accurate as of 5/18/18  3:47 PM.  Always use your most recent med list.                   Brand Name Dispense Instructions for use Diagnosis    albuterol (2.5 MG/3ML) 0.083% neb solution      30 vial    Take 1 vial (2.5 mg) by nebulization every 6 hours as needed for shortness of breath / dyspnea or wheezing    Mild intermittent asthma with acute exacerbation       amLODIPine 5 MG tablet    NORVASC    30 tablet    Take 1 tablet (5 mg) by mouth daily    Essential hypertension, benign, History of brain shunt       ASPIRIN NOT PRESCRIBED    INTENTIONAL    1 each    Please choose reason not prescribed, below    Aspirin intolerance       baclofen 10 MG tablet    LIORESAL    60 tablet    Take 1 tablet (10 mg) by mouth 3 times daily    Chronic bilateral low back pain with right-sided sciatica       blood glucose calibration solution    no brand specified    1 each    Use to calibrate blood glucose monitor as directed.    Type 2 diabetes mellitus without complication, without long-term current use of insulin (H)       blood glucose lancets standard    no brand specified    100 each    Use to test blood sugar daily times daily or as directed.    Type 2 diabetes mellitus without complication, without long-term current use of insulin (H)       blood glucose monitoring meter device kit    no brand specified    1 kit    Use to test blood sugar larisa times daily or as directed.    Type 2 diabetes mellitus without complication, without long-term current use of insulin (H)       blood glucose monitoring test strip    no brand specified    100 strip    Use to test blood sugars once times daily or as directed    Type 2 diabetes mellitus without complication, without long-term current use of insulin (H)       Buprenorphine 7.5 MCG/HR Ptwk     1 patch    Place 1 patch onto the skin once a week    Chronic pain syndrome       cloNIDine 0.1 MG tablet    CATAPRES    60 tablet    Take 1 tablet (0.1 mg) by mouth 2 times daily    Hypertension goal BP (blood pressure) < 140/80       cyanocobalamin 1000 MCG/ML injection    VITAMIN B12    1 mL    Inject 1 mL (1,000 mcg) Subcutaneous every 30 days    B12 deficiency       EPINEPHrine  0.3 MG/0.3ML injection 2-pack    EPIPEN/ADRENACLICK/or ANY BX GENERIC EQUIV    2 each    Inject 0.3 mLs (0.3 mg) into the muscle once as needed for anaphylaxis    Late effect of other and unspecified external causes       HYDROcodone-acetaminophen 5-325 MG per tablet    NORCO    30 tablet    Take 1 tablet by mouth every 6 hours as needed for severe pain    Restless legs syndrome, Chronic pain syndrome       losartan-hydrochlorothiazide 100-25 MG per tablet    HYZAAR    90 tablet    Take 1 tablet by mouth daily    Hypertension goal BP (blood pressure) < 140/80       magnesium oxide 400 (241.3 Mg) MG tablet    MAG-OX    90 tablet    Take 1 tablet (400 mg) by mouth daily    Cramp of limb       naloxone nasal spray    NARCAN    0.2 mL    Spray 1 spray (4 mg) into one nostril alternating nostrils as needed for opioid reversal every 2-3 minutes until assistance arrives    Chronic pain syndrome       ondansetron 4 MG ODT tab    ZOFRAN-ODT    10 tablet    Take 1 tablet (4 mg) by mouth every 8 hours as needed for nausea        order for DME     1 Device    Right wrist splint for carpal tunnel syndrome  One* Use as directed at hour of sleep    Carpal tunnel syndrome of right wrist       polyethylene glycol powder    MIRALAX/GLYCOLAX    1581 g    TAKE 17 GRAMS (1 CAPFUL) BY MOUTH DAILY    Slow transit constipation       potassium chloride 10 MEQ tablet    K-TAB,KLOR-CON    120 tablet    Take 1 tablet (10 mEq) by mouth daily    Hypokalemia       sertraline 100 MG tablet    ZOLOFT    45 tablet    Take 1.5 tablets (150 mg) by mouth daily    Recurrent major depression in partial remission (H), Other constipation       STATIN NOT PRESCRIBED (INTENTIONAL)     0 each    1 each daily Statin not prescribed intentionally due to Active liver disease (liver failure, cirrhosis, hepatitis) LIVER METS    Hyperlipidemia LDL goal <100, Type 2 diabetes mellitus without complication (H)       triamcinolone 0.1 % ointment    KENALOG    80 g     Apply sparingly to affected area three times daily for 14 days.    Hand eczema       Vitamin D (Cholecalciferol) 1000 units Tabs     60 tablet    Take 2,000 Units by mouth daily    Vitamin D deficiency

## 2018-05-18 NOTE — TELEPHONE ENCOUNTER
Pt is scheduled to see Dr. Mclaughlin on 5/24/18 at 10:40 am. Writer closing this encounter, no further action needed.      Tyrese Yang

## 2018-05-18 NOTE — PATIENT INSTRUCTIONS
(G25.81) Restless legs syndrome  Comment:  OBSTRUCTIVE SLEEP APNEA APPARATUS AVAILABLE LATER THIS WEEK          Tablet  TAPERING MODE 30 TO LAST ONE WEEK              (G89.4) Chronic pain syndrome  Comment:    Plan: HYDROcodone-acetaminophen (NORCO) 5-325 MG per         tablet, Pain Drug Scr UR W Rptd Medications  ANXIOUS TO PROVE HERSELF   DRUG ABUSE   ALCOHOL ABUSE   EMERGENCY ROOM ABUSE  MARITAL FRICTION   FOLLOW UP  NEEDED  CHANGING TO COMPLEX PRIMARY CARE   OTTO YUSUF JR., MD

## 2018-05-18 NOTE — DISCHARGE INSTRUCTIONS
Abdominal Pain    Abdominal pain is pain in the stomach or belly area. Everyone has this pain from time to time. In many cases it goes away on its own. But abdominal pain can sometimes be due to a serious problem, such as appendicitis. So it s important to know when to seek help.  Causes of abdominal pain  There are many possible causes of abdominal pain. Common causes in adults include:    Constipation, diarrhea, or gas    Stomach acid flowing back up into the esophagus (acid reflux or heartburn)    Severe acid reflux, called GERD (gastroesophageal reflux disease)    A sore in the lining of the stomach or small intestine (peptic ulcer)    Inflammation of the gallbladder, liver, or pancreas    Gallstones or kidney stones    Appendicitis     Intestinal blockage     An internal organ pushing through a muscle or other tissue (hernia)    Urinary tract infections    In women, menstrual cramps, fibroids, or endometriosis    Inflammation or infection of the intestines  Diagnosing the cause of abdominal pain  Your healthcare provider will do a physical exam help find the cause of your pain. If needed, tests will be ordered. Belly pain has many possible causes. So it can be hard to find the reason for your pain. Giving details about your pain can help. Tell your provider where and when you feel the pain, and what makes it better or worse. Also let your provider know if you have other symptoms such as:    Fever    Tiredness    Upset stomach (nausea)    Vomiting    Changes in bathroom habits  Treating abdominal pain  Some causes of pain need emergency medical treatment right away. These include appendicitis or a bowel blockage. Other problems can be treated with rest, fluids, or medicines. Your healthcare provider can give you specific instructions for treatment or self-care based on what is causing your pain.  If you have vomiting or diarrhea, sip water or other clear fluids. When you are ready to eat solid foods again,  start with small amounts of easy-to-digest, low-fat foods. These include apple sauce, toast, or crackers.   When to seek medical care  Call 911 or go to the hospital right away if you:    Can t pass stool and are vomiting    Are vomiting blood or have bloody diarrhea or black, tarry diarrhea    Have chest, neck, or shoulder pain    Feel like you might pass out    Have pain in your shoulder blades with nausea    Have sudden, severe belly pain    Have new, severe pain unlike any you have felt before    Have a belly that is rigid, hard, and tender to touch  Call your healthcare provider if you have:    Pain for more than 5 days    Bloating for more than 2 days    Diarrhea for more than 5 days    A fever of 100.4 F (38 C) or higher, or as directed by your healthcare provider    Pain that gets worse    Weight loss for no reason    Continued lack of appetite    Blood in your stool  How to prevent abdominal pain  Here are some tips to help prevent abdominal pain:    Eat smaller amounts of food at one time.    Avoid greasy, fried, or other high-fat foods.    Avoid foods that give you gas.    Exercise regularly.    Drink plenty of fluids.  To help prevent GERD symptoms:    Quit smoking.    Reduce alcohol and certain foods that increase stomach acid.    Avoid aspirin and over-the-counter pain and fever medicines (NSAIDS or nonsteroidal anti-inflammatory drugs), if possible    Lose extra weight.    Finish eating at least 2 hours before you go to bed or lie down.    Raise the head of your bed.  Date Last Reviewed: 7/1/2016 2000-2017 The [employer]. [address] [place] [address], [place] [address]. All rights reserved. This information is not intended as a substitute for professional medical care. Always follow your healthcare professional's instructions.          Flank Pain, Uncertain Cause  The flank is the area between your upper abdomen and your back. Pain there is often caused by a problem with your kidneys. It might  be a kidney infection or a kidney stone. Other causes of flank pain include spinal arthritis, a pinched nerve from a back injury, or a back muscle strain or spasm.  The cause of your flank pain is not certain. You may need other tests.  Home care  Follow these tips when caring for yourself at home:    You may use acetaminophen or ibuprofen to control pain, unless your health care provider prescribed another medicine. If you have chronic liver or kidney disease, talk with your provider before taking these medicines. Also talk with your provider first if you ve ever had a stomach ulcer or GI bleeding.    If the pain is coming from your muscles, you may get relief with ice or heat. During the first 2 days after the injury, put an ice pack on the painful area for 20 minutes every 2 to 4 hours. This will reduce swelling and pain. A hot shower, hot bath, or heating pad works well for a muscle spasm. You can start with ice, then switch to heat after 2 days. You might find that alternating ice and heat works well. Use the method that feels the best to you.  Follow-up care  Follow up with your healthcare provider if your symptoms don t get better over the next few days.  When to seek medical advice  Call your healthcare provider right away if any of these happen:    Repeated vomiting    Fever of 100.4 F (38 C) or higher, or as directed by your health care provider    Flank pain that gets worse    Pain that spreads to the front of your belly (abdomen)    Dizziness, weakness, or fainting    Blood in your urine    Burning feeling when you urinate or the need to urinate often    Pain in one of your legs that gets worse    Numbness or weakness in a leg  Date Last Reviewed: 10/1/2016    8699-1639 The [employer]. [address] [place] [address], [place] [address]. All rights reserved. This information is not intended as a substitute for professional medical care. Always follow your healthcare professional's instructions.

## 2018-05-18 NOTE — LETTER
May 18, 2018        Ines Mott  [address] [address]    Dear Ines,    Thank you for choosing [facility].   We are so happy that you decided to entrust your care to us.  Your appointment is scheduled on Tuesday 6/5/18 at 10:30 am with Marika Santana NP and Davion Rees, MARTIN.  We are dedicated to providing you with great care and results.    We believe in treating each patient as a whole person, because health issues can increase stress and stress can affect health issues.  At our clinic you will have the opportunity to meet with members of our care team, which includes a pharmacist, behavioral health clinicians and the patient navigator.  During your first visit you will meet with both a medical provider and a behavioral health clinician.  You may incur a co-pay or bill for the visit with the behavioral health clinician. The behavioral health clinician works with the primary care provider to help patients maintain and develop healthy behaviors, while addressing any challenges that might get in the way of staying as healthy as possible.  Our pharmacist, Ruby, provides consultation to the medical team and works with patients in managing medications.  Our patient navigator, Jesenia, works with patients to help arrange specialty appointments or transportation and ensures that the patient is getting to his/her appointments.  If you are have any questions about your appointment, please contact Jesenia at [phone].    We ve put together some ideas to help you best utilize and access your primary care clinic.  If you have any questions please feel free to ask any of our staff.    Please call ([phone]) if you have an urgent need    Please call us to discuss what is happening; we may be able to help you avoid having to go to the emergency room. We will do our best to see you the same day to help provide the best care. If you do feel that you need  to walk in to the clinic please be aware that we will do our best to accommodate you but it may not always be possible.    If you do require immediate care because of a life threatening condition, please go to the emergency room or call 911.       Bring your medication or empty medication bottles and name and number for your pharmacy    It is important for us to know exactly what is prescribed, when it was prescribed and how you are to take it. This will assist us in understanding any medication issues that may be occurring. Also, by knowing how to contact your pharmacy we can more easily refill your prescriptions.  We request a 72 hour notice for refills.      Bring the name and number or address of your last provider and any current providers     Be prepared to complete a Release of Information for previous medical care.  This allows us to obtain a release of information to contact them for your medical issues to provide the best possible continuity of care. Your past medical care records help us get to know you.       Other  Please call us if you cannot make the appointment or need to reschedule.  Also, please let us know if there is any change in your address or phone number as we want to stay updated on how to best contact you.    We are so excited to meet you and look forward to seeing you at your appointment.    Sincerely,        Your Care Team at [facility]

## 2018-05-18 NOTE — ED PROVIDER NOTES
History     Chief Complaint:  Flank Pain     HPI   Ines Mott is a 53-year-old female with a complex past medical history notable for metastatic colon cancer who presents to the emergency department for right-sided flank pain.  The patient was recently diagnosed with kidney cancer and states that she has pain on a regular basis.  She has a history of chronic pain syndrome and they were trying to limit her opioid intake so she is out of these pain meds.  She states that the pain is likely exacerbated by the lack of opiate pain medication at this time.  The patient was to follow-up with [facility] today to have this medication refilled and she has appointment with urology next month.  The patient states that she slept all day, which is why she was not able to follow up with [facility], and also mentions she has had a lack of appetite today.  She denies any fever, nausea, vomiting, diarrhea.  She notes that she is having darker colored urine, but denies any other urinary symptoms at this time.      Allergies:  Ativan (Lorazepam)  Macrobid (Nitrofurantoin)  Amoxicillin  Buspirone  Busulfan  Cefaclor  Cephalosporins  Ciprofloxacin  Clindamycin  Dilaudid (Hydromorphone)  Diphenhydramine Hcl  Imitrex (Sumatriptan Succinate)  Ketorolac Tromethamine  Lansoprazole  Lansoprazole  Latex  Metoclopramide  Paroxetine  Penicillins  Prednisone  Red Dye  Sulfa Drugs  Lidoderm (Lidocaine)     Medications:    Amlodipine   Clonidine   Hyzaar   Zoloft   Baclofen   Potassium chloride   Buprenorphine     Past Medical History:    Alcohol abuse   Arthritis   Asthma   Carcinoma of colon metastatic to live   Chronic pain syndrome   Depression   Diabetes   Fibromyalgia   Hydrocephalus   Hyperlipidemia   Hypertension   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   Methamphetamine use   Migraines   Obesity   PTSD   Right renal mass   Spina bifida     Past Surgical History:    Appendectomy   Cholecystectomy   Dental extraction   GYN  "surgery   Ventriculoperitoneal shunt implant     Family History:    Cancer     Social History:  Marital Status:    Presents to the ED alone   Tobacco Use: Former smoker   Alcohol Use: Binge drinker   PCP: Dustin Fatima     Review of Systems   Constitutional: Negative for fever.   Respiratory: Negative for shortness of breath.    Cardiovascular: Negative for chest pain.   Gastrointestinal: Negative for diarrhea, nausea and vomiting.   Genitourinary: Positive for flank pain. Negative for dysuria, frequency and urgency.        Positive for darker color of urine.   All other systems reviewed and are negative.    Physical Exam   First Vitals:  BP: 160/84  Pulse: 65  Temp: 97.9  F (36.6  C)  Resp: 18  Height: 168.9 cm (5' 6.5\")  Weight: 111.3 kg (245 lb 6.4 oz)  SpO2: 97 %    Physical Exam  General: Patient is alert and normal appearing.  HEENT: Head atraumatic    Eyes: pupils equal and reactive. Conjunctiva clear   Nares: patent   Oropharynx: no lesions, uvula midline, no palatal draping, normal voice, no trismus  Neck: Supple without lymphadenopathy, no meningismus  Chest: Heart regular rate and rhythm.   Lungs: Equal clear to auscultation with no wheeze or rales  Abdomen: Soft, non tender, nondistended, normal bowel sounds  Back: Right costovertebral angle tenderness, no midline C, T or L spine tenderness  Neuro: Grossly nonfocal, normal speech, strength equal bilaterally, CN 2-12 intact  Extremities: No deformities, equal radial and DP pulses. No clubbing, cyanosis.  No edema  Skin: Warm and dry with no rash.       Emergency Department Course   Laboratory:  Blood:  CBC:  WBC 8.1, HGB 11.6, , otherwise WNL  BMP: Glc 125, otherwise WNL (Creatinine 0.80)     Urine:  UA: Slightly cloudy yellow urine, Squamous epithelials 23, Mucous present, Albumin 10, otherwise WNL     Interventions:  (0016) Norco, 1 tablet, PO     Emergency Department Course:  Nursing notes and vitals reviewed.    (7908) I entered the " room with my scribe, obtained the history, and performed an exam of the patient as documented above.    A peripheral IV was established. Blood was drawn from the patient. This was sent for laboratory testing, findings above.      Urine sample was obtained and sent for laboratory analysis, findings above.     (0111) Patient updated on the results and reevaluated.     Findings and plan explained to the patient. Patient discharged home with instructions regarding supportive care, medications, and reasons to return. The importance of close follow-up was reviewed.     Impression & Plan    Medical Decision Making:  Patient well-known to the emergency department with frequent visits and chronic pain who presents for right flank pain.  Patient states his pain is consistent with her previous flank pain thought to be related to a new cancer found in her right kidney.  She states she ran out of her Norco because they only gave her 10 tablets and that is why she is having pain today.  She did endorse dark urine and subjective fevers therefore urinalysis was sent, thankfully there was no UTI noted.  Patient appeared comfortable here and had a nonspecific abdominal exam.  Certainly had no reproducible significant pain or guarding or rebound.  Given her chronic pain and frequent imaging in the past and the fact that she felt this was related to not having pain medications at home I did not feel imaging was warranted tonight.  Her white blood cell counts within normal limits as is her hemoglobin.  Her BNP is also within normal limits.  Patient has not complained of retaining urine and had no difficulty urinating.  Based on chronic pain policy she did receive one Norco here in the emergency department, but did not get prescription for home.  She understands she needs to follow-up with her primary care provider or pain specialist for further pain medications.  Patient's comfortable with plan for discharge and return precautions were  reviewed.  All patient's questions and concerns were addressed    Diagnosis:    ICD-10-CM   1. Right flank pain, chronic R10.9    G89.29     Disposition:  discharged to home        [facility] EMERGENCY DEPARTMENT       Scribe disclosure:  I, Adam Langston, am serving as a scribe on 5/17/2018 at 11:55 PM to personally document services performed by Dr. Mart based on my observations and the provider's statements to me.                Michelle Mart MD  05/18/18 0225

## 2018-05-18 NOTE — LETTER
May 24, 2018      Ines Mott  [address]   [facility] [address]        Dear ,    We are writing to inform you of your test results.    NORMAL DRUG SCREEN   CONGRATULATIONS     Resulted Orders   Pain Drug Scr UR W Rptd Meds   Result Value Ref Range    Pain Drug SCR UR W RPTD Meds Canceled, Test credited       Comment:      CAN   Drug  Screen Comprehensive , Urine with Reported Meds ([facility]) (Pain Care Package)   Result Value Ref Range    Pain Drug SCR UR W RPTD Meds FINAL       Comment:      (Note)  ====================================================================  [facility] COMP DRUG ANALYSIS,UR  ====================================================================  Test                             Result       Flag       Units        Drug Present and Declared for Prescription Verification   Hydrocodone                    229          EXPECTED   ng/mg creat   Dihydrocodeine                 100          EXPECTED   ng/mg creat   Norhydrocodone                 346          EXPECTED   ng/mg creat    Sources of hydrocodone include scheduled prescription    medications. Dihydrocodeine and norhydrocodone are expected    metabolites of hydrocodone. Dihydrocodeine is also available as a    scheduled prescription medication.   Baclofen                       PRESENT      EXPECTED                 Sertraline                     PRESENT      EXPECTED                 Desmethylsertraline            PRESENT      EXPECTED                  Desmethylsertraline is an expected metabolite of   sertraline.   Acetaminophen                  PRESENT      EXPECTED                 Clonidine                      PRESENT      EXPECTED                Drug Present not Declared for Prescription Verification   Oxymorphone                    98           UNEXPECTED ng/mg creat    Sources of oxymorphone include scheduled prescription    medications; it is also an expected metabolite of oxycodone.  Drug Absent but Declared  for Prescription Verification   Buprenorphine                  Not Detected UNEXPECTED ng/mg creat  ====================================================================  Test                      Result    Flag   Units      Ref Range        Creatinine              126              mg/dL      >=20            ====================================================================  Declared Medications:  The flagging and interpretation on this report are based on the  following declared medications.  Unexpected results may arise from  inaccuracies in the declared medications.    **Note: The testing scope of this panel includes these medications:  Baclofen  Buprenorphine  Clonidine (Catapres)  Hydrocodone (Norco)  Sertraline (Zoloft)  **Note: The testing scope of this panel does not include small to  moderate amounts of these reported medications:  Acetaminophen (Norco)  **Note: The testing scope of this panel does not include following  reported medications:  Albuterol  Amlodipine (Norvasc)  Epinephrine  Hydrochlorothiazide (Hyzaar)  Losartan (Hyzaar)  Magnesium Oxide  Naloxone (Narcan)  Ondansetron  Polyethylene Glycol  Potassium  Triamcinolone (Kenalog)  Vitamin B12  Vitamin D  ====================================================================  For clinical consultation, please call [phone].  ====================================================================  Analysis performed by [employer] [place] [address]         If you have any questions or concerns, please call the clinic at the number listed above.       Sincerely,        OTTO YUSUF MD

## 2018-05-18 NOTE — TELEPHONE ENCOUNTER
Writer spoke with pt; she is scheduled for an Establish Care visit at [facility] on 6/5/18 at 10:30 am with Marika Santana and Davion Rees.  In the meantime, pt will continue care with current PCP until she  attends this appt at [facility].     Referring provider: Dr. Dustin Fatima     Please contact Ligia Santana with questions/concerns.  [phone]    Thank you,  Tyrese Yang      [facility]

## 2018-05-18 NOTE — TELEPHONE ENCOUNTER
Reason for Call:  Other call back    Detailed comments: Patient was recently in ER for issues with her Kidneys. Still having problems, requests a call back. She doenst know if she should be seen by Dr. Fatima today or wait until Monday for her appointment. States they are tappering her off of her medication. Please call to advise.     Phone Number Patient can be reached at: Home number on file [phone] (home)    Best Time: any    Can we leave a detailed message on this number? YES    Call taken on 5/18/2018 at 12:10 PM by Lauren Upton

## 2018-05-19 ASSESSMENT — PATIENT HEALTH QUESTIONNAIRE - PHQ9: SUM OF ALL RESPONSES TO PHQ QUESTIONS 1-9: 7

## 2018-05-21 LAB — PAIN DRUG SCR UR W RPTD MEDS: NORMAL

## 2018-05-21 PROCEDURE — 80307 DRUG TEST PRSMV CHEM ANLYZR: CPT | Mod: 90 | Performed by: FAMILY MEDICINE

## 2018-05-21 PROCEDURE — 99000 SPECIMEN HANDLING OFFICE-LAB: CPT | Performed by: FAMILY MEDICINE

## 2018-05-22 ENCOUNTER — TRANSFERRED RECORDS (OUTPATIENT)
Dept: HEALTH INFORMATION MANAGEMENT | Facility: CLINIC | Age: 53
End: 2018-05-22

## 2018-05-24 ENCOUNTER — HOSPITAL ENCOUNTER (EMERGENCY)
Facility: CLINIC | Age: 53
Discharge: HOME | End: 2018-05-24
Attending: EMERGENCY MEDICINE | Admitting: EMERGENCY MEDICINE
Payer: COMMERCIAL

## 2018-05-24 ENCOUNTER — OFFICE VISIT (OUTPATIENT)
Dept: ADDICTION MEDICINE | Facility: CLINIC | Age: 53
End: 2018-05-24
Payer: COMMERCIAL

## 2018-05-24 VITALS
DIASTOLIC BLOOD PRESSURE: 88 MMHG | RESPIRATION RATE: 16 BRPM | SYSTOLIC BLOOD PRESSURE: 120 MMHG | BODY MASS INDEX: 41.1 KG/M2 | TEMPERATURE: 98.8 F | OXYGEN SATURATION: 97 % | WEIGHT: 258.5 LBS | HEART RATE: 74 BPM

## 2018-05-24 VITALS
BODY MASS INDEX: 42.27 KG/M2 | OXYGEN SATURATION: 98 % | HEIGHT: 66 IN | WEIGHT: 263 LBS | SYSTOLIC BLOOD PRESSURE: 117 MMHG | RESPIRATION RATE: 16 BRPM | DIASTOLIC BLOOD PRESSURE: 59 MMHG | TEMPERATURE: 98.6 F | HEART RATE: 65 BPM

## 2018-05-24 DIAGNOSIS — Q05.0: ICD-10-CM

## 2018-05-24 DIAGNOSIS — F33.41 RECURRENT MAJOR DEPRESSION IN PARTIAL REMISSION (H): Chronic | ICD-10-CM

## 2018-05-24 DIAGNOSIS — C64.1 MALIGNANT NEOPLASM OF KIDNEY EXCLUDING RENAL PELVIS, RIGHT (H): ICD-10-CM

## 2018-05-24 DIAGNOSIS — F10.10 ALCOHOL ABUSE: ICD-10-CM

## 2018-05-24 DIAGNOSIS — C18.9 CARCINOMA OF COLON METASTATIC TO LIVER (H): ICD-10-CM

## 2018-05-24 DIAGNOSIS — R11.2 NON-INTRACTABLE VOMITING WITH NAUSEA, UNSPECIFIED VOMITING TYPE: ICD-10-CM

## 2018-05-24 DIAGNOSIS — F11.20 OPIOID USE DISORDER, SEVERE, DEPENDENCE (H): Primary | ICD-10-CM

## 2018-05-24 DIAGNOSIS — F43.10 PTSD (POST-TRAUMATIC STRESS DISORDER): Chronic | ICD-10-CM

## 2018-05-24 DIAGNOSIS — Z63.0 MARITAL DYSFUNCTION: ICD-10-CM

## 2018-05-24 DIAGNOSIS — C78.7 CARCINOMA OF COLON METASTATIC TO LIVER (H): ICD-10-CM

## 2018-05-24 DIAGNOSIS — G89.4 CHRONIC PAIN SYNDROME: ICD-10-CM

## 2018-05-24 DIAGNOSIS — Z98.2 INTRACRANIAL SHUNT: ICD-10-CM

## 2018-05-24 LAB
AMPHETAMINES UR QL: NOT DETECTED NG/ML
BARBITURATES UR QL SCN: NOT DETECTED NG/ML
BENZODIAZ UR QL SCN: NOT DETECTED NG/ML
BUPRENORPHINE UR QL: NOT DETECTED NG/ML
CANNABINOIDS UR QL: NOT DETECTED NG/ML
COCAINE UR QL SCN: NOT DETECTED NG/ML
D-METHAMPHET UR QL: NOT DETECTED NG/ML
METHADONE UR QL SCN: NOT DETECTED NG/ML
OPIATES UR QL SCN: ABNORMAL NG/ML
OXYCODONE UR QL SCN: NOT DETECTED NG/ML
PAIN DRUG SCR UR W RPTD MEDS: NORMAL
PCP UR QL SCN: NOT DETECTED NG/ML
PROPOXYPH UR QL: NOT DETECTED NG/ML
TRICYCLICS UR QL SCN: NOT DETECTED NG/ML

## 2018-05-24 PROCEDURE — 80306 DRUG TEST PRSMV INSTRMNT: CPT | Performed by: PEDIATRICS

## 2018-05-24 PROCEDURE — 99215 OFFICE O/P EST HI 40 MIN: CPT | Performed by: PEDIATRICS

## 2018-05-24 PROCEDURE — 99284 EMERGENCY DEPT VISIT MOD MDM: CPT | Mod: 25

## 2018-05-24 PROCEDURE — 96374 THER/PROPH/DIAG INJ IV PUSH: CPT

## 2018-05-24 PROCEDURE — 25000128 H RX IP 250 OP 636: Performed by: EMERGENCY MEDICINE

## 2018-05-24 PROCEDURE — 96361 HYDRATE IV INFUSION ADD-ON: CPT

## 2018-05-24 RX ORDER — ONDANSETRON 4 MG/1
4 TABLET, ORALLY DISINTEGRATING ORAL EVERY 6 HOURS PRN
Qty: 10 TABLET | Refills: 0 | Status: SHIPPED | OUTPATIENT
Start: 2018-05-24 | End: 2019-02-20

## 2018-05-24 RX ORDER — BUPRENORPHINE AND NALOXONE 8; 2 MG/1; MG/1
FILM, SOLUBLE BUCCAL; SUBLINGUAL
Qty: 14 FILM | Refills: 0 | Status: ON HOLD | OUTPATIENT
Start: 2018-05-24 | End: 2018-05-31

## 2018-05-24 RX ORDER — ONDANSETRON 2 MG/ML
4 INJECTION INTRAMUSCULAR; INTRAVENOUS ONCE
Status: COMPLETED | OUTPATIENT
Start: 2018-05-24 | End: 2018-05-24

## 2018-05-24 RX ADMIN — ONDANSETRON 4 MG: 2 INJECTION INTRAMUSCULAR; INTRAVENOUS at 20:27

## 2018-05-24 RX ADMIN — SODIUM CHLORIDE 1000 ML: 9 INJECTION, SOLUTION INTRAVENOUS at 20:27

## 2018-05-24 SDOH — SOCIAL STABILITY - SOCIAL INSECURITY: PROBLEMS IN RELATIONSHIP WITH SPOUSE OR PARTNER: Z63.0

## 2018-05-24 ASSESSMENT — ENCOUNTER SYMPTOMS
NAUSEA: 1
SHORTNESS OF BREATH: 0
VOMITING: 1
TROUBLE SWALLOWING: 1

## 2018-05-24 NOTE — ED AVS SNAPSHOT
Emergency Department    [address] [place] [address]    Phone:  [phone]    Fax:  [phone]                                       Ines Mott   MRN: [id number]    Department:   Emergency Department   Date of Visit:  5/24/2018           After Visit Summary Signature Page     I have received my discharge instructions, and my questions have been answered. I have discussed any challenges I see with this plan with the nurse or doctor.    ..........................................................................................................................................  Patient/Patient Representative Signature      ..........................................................................................................................................  Patient Representative Print Name and Relationship to Patient    ..................................................               ................................................  Date                                            Time    ..........................................................................................................................................  Reviewed by Signature/Title    ...................................................              ..............................................  Date                                                            Time

## 2018-05-24 NOTE — MR AVS SNAPSHOT
After Visit Summary   5/24/2018    Ines Mott    MRN: [id number]           Patient Information     Date Of Birth          1965        Visit Information        Provider Department      5/24/2018 10:40 AM Gracie Mclaughlin MD [facility]        Today's Diagnoses     Opioid use disorder, severe, dependence (H)    -  1    Alcohol abuse        Chronic pain syndrome        Recurrent major depression in partial remission        PTSD (post-traumatic stress disorder)        Myelomeningocele with hydrocephalus, cervical region (H)        Intracranial shunt        Malignant neoplasm of kidney excluding renal pelvis, right (H)        Carcinoma of colon metastatic to liver (H)        Marital dysfunction           Follow-ups after your visit        Your next 10 appointments already scheduled     May 30, 2018  8:00 AM CDT   Return Visit with Gracie Mclaughlin MD   [facility] ([facility])    [address]  [address]  [facility] [address]   [phone]            May 31, 2018 10:30 AM CDT   Return Sleep Patient with Aniceto Mujica MD   [facility] ([facility])    [address]  [facility] [address]   [phone]            Jun 05, 2018 10:30 AM CDT   New Visit with KELVIN MA VISIT   [facility] ([facility])    [address]  [address]  [facility] [address]   [phone]            Jun 05, 2018 11:00 AM CDT   New Visit with MARTNI Pineda   [facility] ([facility])    [address]  [address]  [facility] [address]   [phone]            Jun 05, 2018 11:00 AM CDT   New Visit with MICHEAL Crowley CNP   [facility] ([facility])    [address]  [address]  [facility] [address]   [phone]              Future tests that were ordered for you today     Open Standing Orders        Priority Remaining Interval Expires Ordered    Urine Drugs of Abuse Screen Panel 13 Routine 99/100  2019            Who to contact     If you have questions or need follow up information about today's clinic visit or your schedule please contact [facility] directly at [phone].  Normal or non-critical lab and imaging results will be communicated to you by [facility], letter or phone within 4 business days after the clinic has received the results. If you do not hear from us within 7 days, please contact the clinic through [employer] or phone. If you have a critical or abnormal lab result, we will notify you by phone as soon as possible.  Submit refill requests through [employer] or call your pharmacy and they will forward the refill request to us. Please allow 3 business days for your refill to be completed.          Additional Information About Your Visit        [email or web address] Information     [employer] lets you send messages to your doctor, view your test results, renew your prescriptions, schedule appointments and more. To sign up, go to [email or web address] . Click on \"Log in\" on the left side of the screen, which will take you to the Welcome page. Then click on \"Sign up Now\" on the right side of the page.     You will be asked to enter the access code listed below, as well as some personal information. Please follow the directions to create your username and password.     Your access code is: [email or web address]  Expires: 2018  9:19 AM     Your access code will  in 90 days. If you need help or a new code, please call your [facility] or [phone].        Care EveryWhere ID     This is your Care EveryWhere ID. This could be used by other organizations to access your [facility] medical " records  [id number]        Your Vitals Were     Pulse Temperature Respirations Pulse Oximetry BMI (Body Mass Index)       74 98.8  F (37.1  C) (Oral) 16 97% 41.1 kg/m2        Blood Pressure from Last 3 Encounters:   05/24/18 120/88   05/18/18 126/74   05/17/18 160/84    Weight from Last 3 Encounters:   05/24/18 258 lb 8 oz (117.3 kg)   05/18/18 257 lb (116.6 kg)   05/17/18 245 lb 6.4 oz (111.3 kg)              We Performed the Following     Urine Drugs of Abuse Screen Panel 13          Today's Medication Changes          These changes are accurate as of 5/24/18  2:04 PM.  If you have any questions, ask your nurse or doctor.               Start taking these medicines.        Dose/Directions    buprenorphine HCl-naloxone HCl 8-2 MG per film   Commonly known as:  SUBOXONE   Used for:  Opioid use disorder, severe, dependence (H)   Started by:  Gracie Mclaughlin MD        One half film bid   Quantity:  14 Film   Refills:  0            Where to get your medicines      Some of these will need a paper prescription and others can be bought over the counter.  Ask your nurse if you have questions.     Bring a paper prescription for each of these medications     buprenorphine HCl-naloxone HCl 8-2 MG per film                Primary Care Provider Office Phone # Fax #    Dustin Rina Fatima [phone] [address] [facility] [address]        Equal Access to Services     [facility] AH: Hadii casimiro keller Soevangelina, waaxda luqadaha, qaybta kaalmada stephaniyada, wax antione pearson adeamandeep coyne. So [facility] [phone].    ATENCIÓN: Si habla español, tiene a christianson disposición servicios gratuitos de asistencia lingüística. Llame al [phone].    We comply with applicable federal civil rights laws and [place] laws. We do not discriminate on the basis of race, color, national origin, age, disability, sex, sexual orientation, or gender identity.            Thank you!     Thank you for  [facility]  for your care. Our goal is always to provide you with excellent care. Hearing back from our patients is one way we can continue to improve our services. Please take a few minutes to complete the written survey that you may receive in the mail after your visit with us. Thank you!             Your Updated Medication List - Protect others around you: Learn how to safely use, store and throw away your medicines at www.disposemymeds.org.          This list is accurate as of 5/24/18  2:04 PM.  Always use your most recent med list.                   Brand Name Dispense Instructions for use Diagnosis    albuterol (2.5 MG/3ML) 0.083% neb solution     30 vial    Take 1 vial (2.5 mg) by nebulization every 6 hours as needed for shortness of breath / dyspnea or wheezing    Mild intermittent asthma with acute exacerbation       amLODIPine 5 MG tablet    NORVASC    30 tablet    Take 1 tablet (5 mg) by mouth daily    Essential hypertension, benign, History of brain shunt       ASPIRIN NOT PRESCRIBED    INTENTIONAL    1 each    Please choose reason not prescribed, below    Aspirin intolerance       baclofen 10 MG tablet    LIORESAL    60 tablet    Take 1 tablet (10 mg) by mouth 3 times daily    Chronic bilateral low back pain with right-sided sciatica       blood glucose calibration solution    no brand specified    1 each    Use to calibrate blood glucose monitor as directed.    Type 2 diabetes mellitus without complication, without long-term current use of insulin (H)       blood glucose lancets standard    no brand specified    100 each    Use to test blood sugar daily times daily or as directed.    Type 2 diabetes mellitus without complication, without long-term current use of insulin (H)       blood glucose monitoring meter device kit    no brand specified    1 kit    Use to test blood sugar larisa times daily or as directed.    Type 2 diabetes mellitus without complication,  without long-term current use of insulin (H)       blood glucose monitoring test strip    no brand specified    100 strip    Use to test blood sugars once times daily or as directed    Type 2 diabetes mellitus without complication, without long-term current use of insulin (H)       Buprenorphine 7.5 MCG/HR Ptwk     1 patch    Place 1 patch onto the skin once a week    Chronic pain syndrome       buprenorphine HCl-naloxone HCl 8-2 MG per film    SUBOXONE    14 Film    One half film bid    Opioid use disorder, severe, dependence (H)       cloNIDine 0.1 MG tablet    CATAPRES    60 tablet    Take 1 tablet (0.1 mg) by mouth 2 times daily    Hypertension goal BP (blood pressure) < 140/80       cyanocobalamin 1000 MCG/ML injection    VITAMIN B12    1 mL    Inject 1 mL (1,000 mcg) Subcutaneous every 30 days    B12 deficiency       EPINEPHrine 0.3 MG/0.3ML injection 2-pack    EPIPEN/ADRENACLICK/or ANY BX GENERIC EQUIV    2 each    Inject 0.3 mLs (0.3 mg) into the muscle once as needed for anaphylaxis    Late effect of other and unspecified external causes       HYDROcodone-acetaminophen 5-325 MG per tablet    NORCO    30 tablet    Take 1 tablet by mouth every 6 hours as needed for severe pain    Restless legs syndrome, Chronic pain syndrome       losartan-hydrochlorothiazide 100-25 MG per tablet    HYZAAR    90 tablet    Take 1 tablet by mouth daily    Hypertension goal BP (blood pressure) < 140/80       magnesium oxide 400 (241.3 Mg) MG tablet    MAG-OX    90 tablet    Take 1 tablet (400 mg) by mouth daily    Cramp of limb       naloxone nasal spray    NARCAN    0.2 mL    Spray 1 spray (4 mg) into one nostril alternating nostrils as needed for opioid reversal every 2-3 minutes until assistance arrives    Chronic pain syndrome       ondansetron 4 MG ODT tab    ZOFRAN-ODT    10 tablet    Take 1 tablet (4 mg) by mouth every 8 hours as needed for nausea        order for DME     1 Device    Right wrist splint for carpal tunnel  syndrome  One* Use as directed at hour of sleep    Carpal tunnel syndrome of right wrist       polyethylene glycol powder    MIRALAX/GLYCOLAX    1581 g    TAKE 17 GRAMS (1 CAPFUL) BY MOUTH DAILY    Slow transit constipation       potassium chloride 10 MEQ tablet    K-TAB,KLOR-CON    120 tablet    Take 1 tablet (10 mEq) by mouth daily    Hypokalemia       sertraline 100 MG tablet    ZOLOFT    45 tablet    Take 1.5 tablets (150 mg) by mouth daily    Recurrent major depression in partial remission (H), Other constipation       STATIN NOT PRESCRIBED (INTENTIONAL)     0 each    1 each daily Statin not prescribed intentionally due to Active liver disease (liver failure, cirrhosis, hepatitis) LIVER METS    Hyperlipidemia LDL goal <100, Type 2 diabetes mellitus without complication (H)       triamcinolone 0.1 % ointment    KENALOG    80 g    Apply sparingly to affected area three times daily for 14 days.    Hand eczema       Vitamin D (Cholecalciferol) 1000 units Tabs     60 tablet    Take 2,000 Units by mouth daily    Vitamin D deficiency
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
## 2018-05-24 NOTE — ED AVS SNAPSHOT
"  Emergency Department    [address] [facility] [address]    Phone:  [phone]    Fax:  [phone]                                       Ines Mott   MRN: [id number]    Department:   Emergency Department   Date of Visit:  5/24/2018           Patient Information     Date Of Birth          1965        Your diagnoses for this visit were:     Non-intractable vomiting with nausea, unspecified vomiting type        You were seen by Rebekah Patel MD.      Follow-up Information     Follow up with Dustin Fatima MD.    Specialty:  Family Practice    Why:  As needed, If symptoms worsen    Contact information:    [address]  [facility] [address] [phone]          Follow up with  Emergency Department.    Specialty:  EMERGENCY MEDICINE    Why:  As needed    Contact information:    [address] [facility] [address] [phone]        Discharge Instructions          * VOMITING [6yr-Adult]  Vomiting is a common symptom that may be due to different causes. These include gastroenteritis (\"stomach-flu\"), food poisoning and gastritis. There are other more serious causes of vomiting which may be hard to diagnose early in the illness. Therefore, it is important to watch for the warning signs listed below.  The main danger from repeated vomiting is \"dehydration\". This is due to excess loss of water and minerals from the body. When this occurs, body fluids must be replaced.`  HOME CARE:      If symptoms are severe, rest at home for the next 24 hours.    You may use acetaminophen (Tylenol) 650-1000 mg every 6 hours to control fever, unless another medicine was prescribed. [ NOTE : If you have chronic liver disease, talk with your doctor before using acetaminophen.] (Aspirin should never be used in anyone under 18 years of age who is ill with a fever. It may cause severe liver damage.)    Avoid tobacco and alcohol use, which may worsen your " symptoms.    If medicines for vomiting were prescribed, take as directed.  DURING THE FIRST 12-24 HOURS follow the diet below. Try to take frequent small sips even if you vomit occasionally:    FRUIT JUICES: Apple, grape juice, clear fruit drinks, electrolyte replacement and sports drinks.    BEVERAGES: Sport drinks such as Gatorade, soft drinks without caffeine; mineral water (plain or flavored), decaffeinated tea and coffee.    SOUPS: Clear broth, consommé and bouillon    DESSERTS: Plain gelatin (Jell-O), popsicles and fruit juice bars.  DURING THE NEXT 24 HOURS you may add the following to the above:    Hot cereal, plain toast, bread, rolls, crackers    Plain noodles, rice, mashed potatoes, chicken noodle or rice soup    Unsweetened canned fruit (avoid pineapple), bananas    Avoid dairy products    Limit caffeine and chocolate. No spices or seasonings except salt.  DURING THE NEXT 24 HOURS  Slowly go back to a normal diet, as you feel better and your symptoms lessen.  FOLLOW UP with your doctor as advised if you are not improving over the next 2-3 days.  GET PROMPT MEDICAL ATTENTION if any of the following occur:    Constant abdominal pain that stays in the same spot or gets worse    Continued vomiting (unable to keep liquids down) for 24 hours    Frequent diarrhea (more than 5 times a day); blood (red or black color) in diarrhea    No urine output for 12 hours or extreme thirst    Weakness, dizziness or fainting    Unusually drowsy or confused    Fever over 101.0  F (38.3  C) for more than 3 days    Yellow color of the eyes or skin    1604-6671 [employer]. [address] [place] [address]41. All rights reserved. This information is not intended as a substitute for professional medical care. Always follow your healthcare professional's instructions.  This information has been modified by your health care provider with permission from the publisher.      Your next 10 appointments already  scheduled     May 30, 2018  8:00 AM CDT   Return Visit with Gracie Mclaughlin MD   [facility] ([facility]    [address]  [address]  [facility] [address]   [phone]            May 31, 2018 10:30 AM CDT   Return Sleep Patient with Aniceto Mujica MD   [facility] ([facility])    [address] [facility]  [address]  [facility] [address]   [phone]            Jun 05, 2018 10:30 AM CDT   New Visit with KELVIN MA VISIT   [facility] ([facility])    [address]  [address]  [facility] [address]   [phone]            Jun 05, 2018 11:00 AM CDT   New Visit with MARTIN Pineda   [facility] ([facility])    [address]  [address]  [facility] [address]   [phone]            Jun 05, 2018 11:00 AM CDT   New Visit with MICHEAL Crowley CNP   [facility] ([facility])    [address]  [address]  [facility] [address]   [phone]              24 Hour Appointment Hotline       To make an appointment at any [facility], call [phone] ([phone]). If you don't have a family doctor or clinic, we will help you find one. [facility] are conveniently located to serve the needs of you and your family.             Review of your medicines      CONTINUE these medicines which may have CHANGED, or have new prescriptions. If we are uncertain of the size of tablets/capsules you have at home, strength may be listed as something that might have changed.        Dose / Directions Last dose taken    * ondansetron 4 MG ODT tab   Commonly known as:  ZOFRAN-ODT   Dose:  4 mg   What changed:  Another medication with the same name was added. Make sure you understand how and when  to take each.   Quantity:  10 tablet        Take 1 tablet (4 mg) by mouth every 8 hours as needed for nausea   Refills:  1        * ondansetron 4 MG ODT tab   Commonly known as:  ZOFRAN ODT   Dose:  4 mg   What changed:  You were already taking a medication with the same name, and this prescription was added. Make sure you understand how and when to take each.   Quantity:  10 tablet        Take 1 tablet (4 mg) by mouth every 6 hours as needed for nausea   Refills:  0        * Notice:  This list has 2 medication(s) that are the same as other medications prescribed for you. Read the directions carefully, and ask your doctor or other care provider to review them with you.      Our records show that you are taking the medicines listed below. If these are incorrect, please call your family doctor or clinic.        Dose / Directions Last dose taken    albuterol (2.5 MG/3ML) 0.083% neb solution   Dose:  1 vial   Quantity:  30 vial        Take 1 vial (2.5 mg) by nebulization every 6 hours as needed for shortness of breath / dyspnea or wheezing   Refills:  3        amLODIPine 5 MG tablet   Commonly known as:  NORVASC   Dose:  5 mg   Quantity:  30 tablet        Take 1 tablet (5 mg) by mouth daily   Refills:  3        ASPIRIN NOT PRESCRIBED   Commonly known as:  INTENTIONAL   Quantity:  1 each        Please choose reason not prescribed, below   Refills:  0        baclofen 10 MG tablet   Commonly known as:  LIORESAL   Dose:  10 mg   Quantity:  60 tablet        Take 1 tablet (10 mg) by mouth 3 times daily   Refills:  11        blood glucose calibration solution   Commonly known as:  no brand specified   Quantity:  1 each        Use to calibrate blood glucose monitor as directed.   Refills:  0        blood glucose lancets standard   Commonly known as:  no brand specified   Quantity:  100 each        Use to test blood sugar daily times daily or as directed.   Refills:  11        blood glucose monitoring meter device kit   Commonly  known as:  no brand specified   Quantity:  1 kit        Use to test blood sugar larisa times daily or as directed.   Refills:  0        blood glucose monitoring test strip   Commonly known as:  no brand specified   Quantity:  100 strip        Use to test blood sugars once times daily or as directed   Refills:  3        Buprenorphine 7.5 MCG/HR Ptwk   Dose:  1 patch   Quantity:  1 patch        Place 1 patch onto the skin once a week   Refills:  0        buprenorphine HCl-naloxone HCl 8-2 MG per film   Commonly known as:  SUBOXONE   Quantity:  14 Film        One half film bid   Refills:  0        cloNIDine 0.1 MG tablet   Commonly known as:  CATAPRES   Dose:  0.1 mg   Quantity:  60 tablet        Take 1 tablet (0.1 mg) by mouth 2 times daily   Refills:  3        cyanocobalamin 1000 MCG/ML injection   Commonly known as:  VITAMIN B12   Dose:  1 mL   Quantity:  1 mL        Inject 1 mL (1,000 mcg) Subcutaneous every 30 days   Refills:  11        EPINEPHrine 0.3 MG/0.3ML injection 2-pack   Commonly known as:  EPIPEN/ADRENACLICK/or ANY BX GENERIC EQUIV   Dose:  0.3 mg   Quantity:  2 each        Inject 0.3 mLs (0.3 mg) into the muscle once as needed for anaphylaxis   Refills:  5        HYDROcodone-acetaminophen 5-325 MG per tablet   Commonly known as:  NORCO   Dose:  1 tablet   Quantity:  30 tablet        Take 1 tablet by mouth every 6 hours as needed for severe pain   Refills:  0        losartan-hydrochlorothiazide 100-25 MG per tablet   Commonly known as:  HYZAAR   Dose:  1 tablet   Quantity:  90 tablet        Take 1 tablet by mouth daily   Refills:  3        magnesium oxide 400 (241.3 Mg) MG tablet   Commonly known as:  MAG-OX   Dose:  400 mg   Quantity:  90 tablet        Take 1 tablet (400 mg) by mouth daily   Refills:  3        naloxone nasal spray   Commonly known as:  NARCAN   Dose:  4 mg   Quantity:  0.2 mL        Spray 1 spray (4 mg) into one nostril alternating nostrils as needed for opioid reversal every 2-3 minutes  until assistance arrives   Refills:  0        order for DME   Quantity:  1 Device        Right wrist splint for carpal tunnel syndrome  One* Use as directed at hour of sleep   Refills:  1        polyethylene glycol powder   Commonly known as:  MIRALAX/GLYCOLAX   Quantity:  1581 g        TAKE 17 GRAMS (1 CAPFUL) BY MOUTH DAILY   Refills:  11        potassium chloride 10 MEQ tablet   Commonly known as:  K-TAB,KLOR-CON   Dose:  10 mEq   Indication:  Low Amount of Potassium in the Blood   Quantity:  120 tablet        Take 1 tablet (10 mEq) by mouth daily   Refills:  11        sertraline 100 MG tablet   Commonly known as:  ZOLOFT   Dose:  150 mg   Indication:  Major Depressive Disorder   Quantity:  45 tablet        Take 1.5 tablets (150 mg) by mouth daily   Refills:  11        STATIN NOT PRESCRIBED (INTENTIONAL)   Dose:  1 each   Quantity:  0 each        1 each daily Statin not prescribed intentionally due to Active liver disease (liver failure, cirrhosis, hepatitis) LIVER METS   Refills:  0        triamcinolone 0.1 % ointment   Commonly known as:  KENALOG   Quantity:  80 g        Apply sparingly to affected area three times daily for 14 days.   Refills:  0        Vitamin D (Cholecalciferol) 1000 units Tabs   Dose:  2000 Units   Quantity:  60 tablet        Take 2,000 Units by mouth daily   Refills:  11                Prescriptions were sent or printed at these locations (1 Prescription)                   Other Prescriptions                Printed at Department/Unit printer (1 of 1)         ondansetron (ZOFRAN ODT) 4 MG ODT tab                Orders Needing Specimen Collection     None      Pending Results     No orders found from 5/22/2018 to 5/25/2018.            Pending Culture Results     No orders found from 5/22/2018 to 5/25/2018.            Pending Results Instructions     If you had any lab results that were not finalized at the time of your Discharge, you can call the ED Lab Result RN at [phone]. You will be  contacted by this team for any positive Lab results or changes in treatment. The nurses are available 7 days a week from 10A to 6:30P.  You can leave a message 24 hours per day and they will return your call.        Test Results From Your Hospital Stay               Clinical Quality Measure: Blood Pressure Screening     Your blood pressure was checked while you were in the emergency department today. The last reading we obtained was  BP: 117/59 . Please read the guidelines below about what these numbers mean and what you should do about them.  If your systolic blood pressure (the top number) is less than 120 and your diastolic blood pressure (the bottom number) is less than 80, then your blood pressure is normal. There is nothing more that you need to do about it.  If your systolic blood pressure (the top number) is 120-139 or your diastolic blood pressure (the bottom number) is 80-89, your blood pressure may be higher than it should be. You should have your blood pressure rechecked within a year by a primary care provider.  If your systolic blood pressure (the top number) is 140 or greater or your diastolic blood pressure (the bottom number) is 90 or greater, you may have high blood pressure. High blood pressure is treatable, but if left untreated over time it can put you at risk for heart attack, stroke, or kidney failure. You should have your blood pressure rechecked by a primary care provider within the next 4 weeks.  If your provider in the emergency department today gave you specific instructions to follow-up with your doctor or provider even sooner than that, you should follow that instruction and not wait for up to 4 weeks for your follow-up visit.        Thank you for choosing [facility]       Thank you for choosing [facility] for your care. Our goal is always to provide you with excellent care. Hearing back from our patients is one way we can continue to improve our services. Please take a few minutes to  "complete the written survey that you may receive in the mail after you visit with us. Thank you!        [email or web address] Information     [employer] lets you send messages to your doctor, view your test results, renew your prescriptions, schedule appointments and more. To sign up, go to [email or web address] . Click on \"Log in\" on the left side of the screen, which will take you to the Welcome page. Then click on \"Sign up Now\" on the right side of the page.     You will be asked to enter the access code listed below, as well as some personal information. Please follow the directions to create your username and password.     Your access code is: [email or web address]  Expires: 2018  9:19 AM     Your access code will  in 90 days. If you need help or a new code, please call your [place] clinic or [phone].        Care EveryWhere ID     This is your Care EveryWhere ID. This could be used by other organizations to access your [facility] medical records  [id number]        Equal Access to Services     [facility]: Hadii casimiro toledoo Soevangelina, waaxda luqadaha, qaybta kaalmakandy ademigel, emilee pardo . [facility] [phone].    ATENCIÓN: Si habla español, tiene a christianson disposición servicios gratuitos de asistencia lingüística. Llame al [phone].    We comply with applicable federal civil rights laws and [place] laws. We do not discriminate on the basis of race, color, national origin, age, disability, sex, sexual orientation, or gender identity.            After Visit Summary       This is your record. Keep this with you and show to your community pharmacist(s) and doctor(s) at your next visit.                  "

## 2018-05-25 ENCOUNTER — TELEPHONE (OUTPATIENT)
Dept: ADDICTION MEDICINE | Facility: CLINIC | Age: 53
End: 2018-05-25

## 2018-05-25 ENCOUNTER — HOSPITAL ENCOUNTER (EMERGENCY)
Facility: CLINIC | Age: 53
Discharge: HOME | End: 2018-05-25
Attending: EMERGENCY MEDICINE | Admitting: EMERGENCY MEDICINE
Payer: COMMERCIAL

## 2018-05-25 ENCOUNTER — APPOINTMENT (OUTPATIENT)
Dept: GENERAL RADIOLOGY | Facility: CLINIC | Age: 53
End: 2018-05-25
Attending: EMERGENCY MEDICINE
Payer: COMMERCIAL

## 2018-05-25 ENCOUNTER — HOSPITAL ENCOUNTER (EMERGENCY)
Facility: CLINIC | Age: 53
Discharge: HOME | End: 2018-05-26
Attending: EMERGENCY MEDICINE
Payer: COMMERCIAL

## 2018-05-25 VITALS
OXYGEN SATURATION: 95 % | SYSTOLIC BLOOD PRESSURE: 143 MMHG | DIASTOLIC BLOOD PRESSURE: 69 MMHG | WEIGHT: 263 LBS | TEMPERATURE: 97.8 F | HEIGHT: 66 IN | BODY MASS INDEX: 42.27 KG/M2 | RESPIRATION RATE: 18 BRPM

## 2018-05-25 VITALS
BODY MASS INDEX: 40.18 KG/M2 | HEIGHT: 67 IN | SYSTOLIC BLOOD PRESSURE: 126 MMHG | DIASTOLIC BLOOD PRESSURE: 83 MMHG | OXYGEN SATURATION: 94 % | RESPIRATION RATE: 18 BRPM | TEMPERATURE: 98.6 F | HEART RATE: 74 BPM | WEIGHT: 256 LBS

## 2018-05-25 DIAGNOSIS — C7B.09 METASTATIC CARCINOID TUMOR TO INTRA-ABDOMINAL SITE (H): ICD-10-CM

## 2018-05-25 DIAGNOSIS — T50.905A ADVERSE EFFECT OF DRUG, INITIAL ENCOUNTER: ICD-10-CM

## 2018-05-25 DIAGNOSIS — R00.2 PALPITATIONS: ICD-10-CM

## 2018-05-25 DIAGNOSIS — R11.2 NON-INTRACTABLE VOMITING WITH NAUSEA, UNSPECIFIED VOMITING TYPE: ICD-10-CM

## 2018-05-25 LAB
ANION GAP SERPL CALCULATED.3IONS-SCNC: 6 MMOL/L (ref 3–14)
BASOPHILS # BLD AUTO: 0 10E9/L (ref 0–0.2)
BASOPHILS NFR BLD AUTO: 0.2 %
BUN SERPL-MCNC: 11 MG/DL (ref 7–30)
CALCIUM SERPL-MCNC: 8.8 MG/DL (ref 8.5–10.1)
CHLORIDE SERPL-SCNC: 104 MMOL/L (ref 94–109)
CO2 SERPL-SCNC: 30 MMOL/L (ref 20–32)
CREAT SERPL-MCNC: 0.92 MG/DL (ref 0.52–1.04)
DIFFERENTIAL METHOD BLD: ABNORMAL
EOSINOPHIL # BLD AUTO: 0.4 10E9/L (ref 0–0.7)
EOSINOPHIL NFR BLD AUTO: 3.9 %
ERYTHROCYTE [DISTWIDTH] IN BLOOD BY AUTOMATED COUNT: 13.4 % (ref 10–15)
GFR SERPL CREATININE-BSD FRML MDRD: 63 ML/MIN/1.7M2
GLUCOSE SERPL-MCNC: 126 MG/DL (ref 70–99)
HCT VFR BLD AUTO: 34.2 % (ref 35–47)
HGB BLD-MCNC: 11 G/DL (ref 11.7–15.7)
IMM GRANULOCYTES # BLD: 0 10E9/L (ref 0–0.4)
IMM GRANULOCYTES NFR BLD: 0.2 %
INTERPRETATION ECG - MUSE: NORMAL
LYMPHOCYTES # BLD AUTO: 3.3 10E9/L (ref 0.8–5.3)
LYMPHOCYTES NFR BLD AUTO: 35.7 %
MCH RBC QN AUTO: 29.5 PG (ref 26.5–33)
MCHC RBC AUTO-ENTMCNC: 32.2 G/DL (ref 31.5–36.5)
MCV RBC AUTO: 92 FL (ref 78–100)
MONOCYTES # BLD AUTO: 0.7 10E9/L (ref 0–1.3)
MONOCYTES NFR BLD AUTO: 7.3 %
NEUTROPHILS # BLD AUTO: 4.9 10E9/L (ref 1.6–8.3)
NEUTROPHILS NFR BLD AUTO: 52.7 %
NRBC # BLD AUTO: 0 10*3/UL
NRBC BLD AUTO-RTO: 0 /100
PLATELET # BLD AUTO: 291 10E9/L (ref 150–450)
POTASSIUM SERPL-SCNC: 3.7 MMOL/L (ref 3.4–5.3)
RBC # BLD AUTO: 3.73 10E12/L (ref 3.8–5.2)
SODIUM SERPL-SCNC: 140 MMOL/L (ref 133–144)
TROPONIN I SERPL-MCNC: <0.015 UG/L (ref 0–0.04)
WBC # BLD AUTO: 9.3 10E9/L (ref 4–11)

## 2018-05-25 PROCEDURE — 80048 BASIC METABOLIC PNL TOTAL CA: CPT | Performed by: EMERGENCY MEDICINE

## 2018-05-25 PROCEDURE — 96361 HYDRATE IV INFUSION ADD-ON: CPT

## 2018-05-25 PROCEDURE — 96374 THER/PROPH/DIAG INJ IV PUSH: CPT

## 2018-05-25 PROCEDURE — 25000128 H RX IP 250 OP 636

## 2018-05-25 PROCEDURE — 85025 COMPLETE CBC W/AUTO DIFF WBC: CPT | Performed by: EMERGENCY MEDICINE

## 2018-05-25 PROCEDURE — 84484 ASSAY OF TROPONIN QUANT: CPT | Performed by: EMERGENCY MEDICINE

## 2018-05-25 PROCEDURE — 25000128 H RX IP 250 OP 636: Performed by: EMERGENCY MEDICINE

## 2018-05-25 PROCEDURE — 99282 EMERGENCY DEPT VISIT SF MDM: CPT | Mod: 25,27

## 2018-05-25 PROCEDURE — 99284 EMERGENCY DEPT VISIT MOD MDM: CPT | Mod: 25

## 2018-05-25 PROCEDURE — 71046 X-RAY EXAM CHEST 2 VIEWS: CPT

## 2018-05-25 RX ORDER — ONDANSETRON 2 MG/ML
INJECTION INTRAMUSCULAR; INTRAVENOUS
Status: COMPLETED
Start: 2018-05-25 | End: 2018-05-25

## 2018-05-25 RX ORDER — ONDANSETRON 2 MG/ML
4 INJECTION INTRAMUSCULAR; INTRAVENOUS ONCE
Status: COMPLETED | OUTPATIENT
Start: 2018-05-25 | End: 2018-05-25

## 2018-05-25 RX ADMIN — ONDANSETRON 4 MG: 2 INJECTION INTRAMUSCULAR; INTRAVENOUS at 23:15

## 2018-05-25 RX ADMIN — SODIUM CHLORIDE 1000 ML: 9 INJECTION, SOLUTION INTRAVENOUS at 23:16

## 2018-05-25 ASSESSMENT — ENCOUNTER SYMPTOMS
NAUSEA: 1
VOMITING: 1

## 2018-05-25 NOTE — ED AVS SNAPSHOT
Emergency Department    [address] [place] [address]    Phone:  [phone]    Fax:  [phone]                                       Ines Mott   MRN: [id number]    Department:   Emergency Department   Date of Visit:  5/25/2018           After Visit Summary Signature Page     I have received my discharge instructions, and my questions have been answered. I have discussed any challenges I see with this plan with the nurse or doctor.    ..........................................................................................................................................  Patient/Patient Representative Signature      ..........................................................................................................................................  Patient Representative Print Name and Relationship to Patient    ..................................................               ................................................  Date                                            Time    ..........................................................................................................................................  Reviewed by Signature/Title    ...................................................              ..............................................  Date                                                            Time

## 2018-05-25 NOTE — ED PROVIDER NOTES
"  History     Chief Complaint:  Medication reaction    HPI   Ines Mott is a 53 year old female who presents with concerns for a medication reaction. The patient reports that she took 0.5 suboxone today for the first time and that shortly after she began experiencing fatigue, vomiting, and a sense that her tongue was dry. She feels as though she has had difficulty swallowing not due to swelling, but due to her throat being dry. The patient describes having \"slow breathing\", but denies any shortness of breath. Of note she was exposed to several individuals with similar symptoms including vomiting 3-4 days ago.    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    Respiratory: Negative for shortness of breath.    Cardiovascular: Positive for chest pain.   Gastrointestinal: Positive for nausea and vomiting.   All other systems reviewed and are negative.    Physical Exam   Vitals:  Patient " "Vitals for the past 24 hrs:   BP Temp Temp src Pulse Heart Rate Resp SpO2 Height Weight   05/24/18 2136 - - - - 75 16 98 % - -   05/24/18 1942 117/59 98.6  F (37  C) Oral 65 - 18 95 % 1.676 m (5' 6\") 119.3 kg (263 lb)        Physical Exam  General/Appearance: appears stated age, well-groomed, appears comfortable  Eyes: EOMI, no scleral injection, no icterus  ENT: MMM, no tongue/OP/uvula edema/erythema  Neck: supple, nl ROM, no stiffness  Cardiovascular: RRR, nl S1S2, no m/r/g, 2+ pulses in all 4 extremities, cap refill <2sec  Respiratory: CTAB, good air movement throughout, no wheezes/rhonchi/rales, no increased WOB, no retractions  Back: no lesions  GI: abd soft, non-distended, nttp,  no HSM, no rebound, no guarding, nl BS  MSK: RAMIREZ, good tone, no bony abnormality  Skin: warm and well-perfused, no rash, no edema, no ecchymosis, nl turgor  Neuro: GCS 15, alert and oriented, no gross focal neuro deficits  Psych: interacts appropriately  Heme: no petechia, no purpura, no active bleeding    Emergency Department Course     Interventions:  2027 Zofran 4 mg IV   2027 Normal Saline 1000 mL IV       Emergency Department Course:  Nursing notes and vitals reviewed.  I performed an exam of the patient as documented above.     Findings and plan explained to the Patient. Patient discharged home with instructions regarding supportive care, medications, and reasons to return. The importance of close follow-up was reviewed.      I personally answered all related questions prior to discharge.    Impression & Plan      Medical Decision Making:  This patient is a 53-year-old female, well-known to the ED, who presents today initially with concerns for a medication reaction.  She has Suboxone for the first time today and shortly afterwards developed vomiting as well as after that a sense of \"dry tongue and dry throat.\"  She has recently been exposed to multiple kids who had vomiting and symptoms consistent with viral gastroenteritis.  I " suspect that is the cause.  There is no visible tongue, lip, or uvula swelling.  There was no difficulty swallowing, shortness of breath, chest pain.  There is no abdominal pain.  As her symptoms completely resolved with IV fluids and Zofran this further makes me think this is not an allergic reaction.  I think Suboxone will be good for the patient and her long-term medical issues therefore I encouraged her to try it again.  Clearly if the symptoms continue to recur after every dose will then will have to reevaluate and I suspect it is related to an allergic reaction.  At this time though, after the above interventions, she feels well, is tolerating fluids, no longer has any sense of dry mouth, dry throat, vomiting and feels ready for discharge.    Diagnosis:    ICD-10-CM    1. Non-intractable vomiting with nausea, unspecified vomiting type R11.2         Disposition:   Discharged    CMS Diagnoses: None     Discharge Medications:  Discharge Medication List as of 5/24/2018  9:26 PM      START taking these medications    Details   !! ondansetron (ZOFRAN ODT) 4 MG ODT tab Take 1 tablet (4 mg) by mouth every 6 hours as needed for nausea, Disp-10 tablet, R-0, Local Print       !! - Potential duplicate medications found. Please discuss with provider.          Scribe Disclosure:  I, Garcia Santos, am serving as a scribe at 8:09 PM on 5/24/2018 to document services personally performed by Rebekah Patel MD, based on my observations and the provider's statements to me.    EMERGENCY DEPARTMENT       Rebekah Patel MD  05/24/18 1128

## 2018-05-25 NOTE — ED AVS SNAPSHOT
"  Emergency Department    [address] [facility] [address]    Phone:  [phone]    Fax:  [phone]                                       Ines Mott   MRN: [id number]    Department:   Emergency Department   Date of Visit:  5/25/2018           Patient Information     Date Of Birth          1965        Your diagnoses for this visit were:     Non-intractable vomiting with nausea, unspecified vomiting type        You were seen by Ayad Berwer DO.      Follow-up Information     Follow up with Pain clinic.    Why:  call tomorrow to update        Follow up with Dustin Fatima MD In 3 days.    Specialty:  Family Practice    Contact information:    [address]  [facility] [address]  [phone]          Discharge Instructions          * VOMITING [6yr-Adult]  Vomiting is a common symptom that may be due to different causes. These include gastroenteritis (\"stomach-flu\"), food poisoning and gastritis. There are other more serious causes of vomiting which may be hard to diagnose early in the illness. Therefore, it is important to watch for the warning signs listed below.  The main danger from repeated vomiting is \"dehydration\". This is due to excess loss of water and minerals from the body. When this occurs, body fluids must be replaced.`  HOME CARE:      If symptoms are severe, rest at home for the next 24 hours.    You may use acetaminophen (Tylenol) 650-1000 mg every 6 hours to control fever, unless another medicine was prescribed. [ NOTE : If you have chronic liver disease, talk with your doctor before using acetaminophen.] (Aspirin should never be used in anyone under 18 years of age who is ill with a fever. It may cause severe liver damage.)    Avoid tobacco and alcohol use, which may worsen your symptoms.    If medicines for vomiting were prescribed, take as directed.  DURING THE FIRST 12-24 HOURS follow the diet below. Try to take frequent small sips " even if you vomit occasionally:    FRUIT JUICES: Apple, grape juice, clear fruit drinks, electrolyte replacement and sports drinks.    BEVERAGES: Sport drinks such as Gatorade, soft drinks without caffeine; mineral water (plain or flavored), decaffeinated tea and coffee.    SOUPS: Clear broth, consommé and bouillon    DESSERTS: Plain gelatin (Jell-O), popsicles and fruit juice bars.  DURING THE NEXT 24 HOURS you may add the following to the above:    Hot cereal, plain toast, bread, rolls, crackers    Plain noodles, rice, mashed potatoes, chicken noodle or rice soup    Unsweetened canned fruit (avoid pineapple), bananas    Avoid dairy products    Limit caffeine and chocolate. No spices or seasonings except salt.  DURING THE NEXT 24 HOURS  Slowly go back to a normal diet, as you feel better and your symptoms lessen.  FOLLOW UP with your doctor as advised if you are not improving over the next 2-3 days.  GET PROMPT MEDICAL ATTENTION if any of the following occur:    Constant abdominal pain that stays in the same spot or gets worse    Continued vomiting (unable to keep liquids down) for 24 hours    Frequent diarrhea (more than 5 times a day); blood (red or black color) in diarrhea    No urine output for 12 hours or extreme thirst    Weakness, dizziness or fainting    Unusually drowsy or confused    Fever over 101.0  F (38.3  C) for more than 3 days    Yellow color of the eyes or skin    5117-7511 [employer]. [address] [place] [address]. All rights reserved. This information is not intended as a substitute for professional medical care. Always follow your healthcare professional's instructions.  This information has been modified by your health care provider with permission from the publisher.      Your next 10 appointments already scheduled     May 30, 2018  8:00 AM CDT   Return Visit with Gracie Mclaughlin MD   [facility] ([facility])    [address]  [address]  [facility] [address]   [phone]            May 31, 2018 10:30 AM CDT   Return Sleep Patient with Aniceto Mujica MD   [facility] ([facility])    [address]  [facility] [address]   [phone]            Jun 05, 2018 10:30 AM CDT   New Visit with KELVIN MA VISIT   [facility] ([facility])    [address]  [address]  [facility] [address]   [phone]            Jun 05, 2018 11:00 AM CDT   New Visit with MARTIN Pineda   [facility] ([facility])    [address]  [address]  [facility] [address]   [phone]            Jun 05, 2018 11:00 AM CDT   New Visit with MICHEAL Crowley CNP   [facility] ([facility])    [address]  [address]  [facility] [address]   [phone]              24 Hour Appointment Hotline       To make an appointment at any [facility], call [phone] ([phone]). If you don't have a family doctor or clinic, we will help you find one. [facility] (formerly [facility]] are conveniently located to serve the needs of you and your family.             Review of your medicines      Our records show that you are taking the medicines listed below. If these are incorrect, please call your family doctor or clinic.        Dose / Directions Last dose taken    albuterol (2.5 MG/3ML) 0.083% neb solution   Dose:  1 vial   Quantity:  30 vial        Take 1 vial (2.5 mg) by nebulization every 6 hours as needed for shortness of breath / dyspnea or wheezing   Refills:  3        amLODIPine 5 MG tablet   Commonly known as:  NORVASC   Dose:  5 mg   Quantity:  30 tablet        Take 1 tablet (5 mg) by mouth daily   Refills:  3        ASPIRIN NOT PRESCRIBED   Commonly known as:   INTENTIONAL   Quantity:  1 each        Please choose reason not prescribed, below   Refills:  0        baclofen 10 MG tablet   Commonly known as:  LIORESAL   Dose:  10 mg   Quantity:  60 tablet        Take 1 tablet (10 mg) by mouth 3 times daily   Refills:  11        blood glucose calibration solution   Commonly known as:  no brand specified   Quantity:  1 each        Use to calibrate blood glucose monitor as directed.   Refills:  0        blood glucose lancets standard   Commonly known as:  no brand specified   Quantity:  100 each        Use to test blood sugar daily times daily or as directed.   Refills:  11        blood glucose monitoring meter device kit   Commonly known as:  no brand specified   Quantity:  1 kit        Use to test blood sugar larias times daily or as directed.   Refills:  0        blood glucose monitoring test strip   Commonly known as:  no brand specified   Quantity:  100 strip        Use to test blood sugars once times daily or as directed   Refills:  3        Buprenorphine 7.5 MCG/HR Ptwk   Dose:  1 patch   Quantity:  1 patch        Place 1 patch onto the skin once a week   Refills:  0        buprenorphine HCl-naloxone HCl 8-2 MG per film   Commonly known as:  SUBOXONE   Quantity:  14 Film        One half film bid   Refills:  0        cloNIDine 0.1 MG tablet   Commonly known as:  CATAPRES   Dose:  0.1 mg   Quantity:  60 tablet        Take 1 tablet (0.1 mg) by mouth 2 times daily   Refills:  3        cyanocobalamin 1000 MCG/ML injection   Commonly known as:  VITAMIN B12   Dose:  1 mL   Quantity:  1 mL        Inject 1 mL (1,000 mcg) Subcutaneous every 30 days   Refills:  11        EPINEPHrine 0.3 MG/0.3ML injection 2-pack   Commonly known as:  EPIPEN/ADRENACLICK/or ANY BX GENERIC EQUIV   Dose:  0.3 mg   Quantity:  2 each        Inject 0.3 mLs (0.3 mg) into the muscle once as needed for anaphylaxis   Refills:  5        HYDROcodone-acetaminophen 5-325 MG per tablet   Commonly known as:  NORCO    Dose:  1 tablet   Quantity:  30 tablet        Take 1 tablet by mouth every 6 hours as needed for severe pain   Refills:  0        losartan-hydrochlorothiazide 100-25 MG per tablet   Commonly known as:  HYZAAR   Dose:  1 tablet   Quantity:  90 tablet        Take 1 tablet by mouth daily   Refills:  3        magnesium oxide 400 (241.3 Mg) MG tablet   Commonly known as:  MAG-OX   Dose:  400 mg   Quantity:  90 tablet        Take 1 tablet (400 mg) by mouth daily   Refills:  3        naloxone nasal spray   Commonly known as:  NARCAN   Dose:  4 mg   Quantity:  0.2 mL        Spray 1 spray (4 mg) into one nostril alternating nostrils as needed for opioid reversal every 2-3 minutes until assistance arrives   Refills:  0        * ondansetron 4 MG ODT tab   Commonly known as:  ZOFRAN-ODT   Dose:  4 mg   Quantity:  10 tablet        Take 1 tablet (4 mg) by mouth every 8 hours as needed for nausea   Refills:  1        * ondansetron 4 MG ODT tab   Commonly known as:  ZOFRAN ODT   Dose:  4 mg   Quantity:  10 tablet        Take 1 tablet (4 mg) by mouth every 6 hours as needed for nausea   Refills:  0        order for DME   Quantity:  1 Device        Right wrist splint for carpal tunnel syndrome  One* Use as directed at hour of sleep   Refills:  1        polyethylene glycol powder   Commonly known as:  MIRALAX/GLYCOLAX   Quantity:  1581 g        TAKE 17 GRAMS (1 CAPFUL) BY MOUTH DAILY   Refills:  11        potassium chloride 10 MEQ tablet   Commonly known as:  K-TAB,KLOR-CON   Dose:  10 mEq   Indication:  Low Amount of Potassium in the Blood   Quantity:  120 tablet        Take 1 tablet (10 mEq) by mouth daily   Refills:  11        sertraline 100 MG tablet   Commonly known as:  ZOLOFT   Dose:  150 mg   Indication:  Major Depressive Disorder   Quantity:  45 tablet        Take 1.5 tablets (150 mg) by mouth daily   Refills:  11        STATIN NOT PRESCRIBED (INTENTIONAL)   Dose:  1 each   Quantity:  0 each        1 each daily Statin not  prescribed intentionally due to Active liver disease (liver failure, cirrhosis, hepatitis) LIVER METS   Refills:  0        triamcinolone 0.1 % ointment   Commonly known as:  KENALOG   Quantity:  80 g        Apply sparingly to affected area three times daily for 14 days.   Refills:  0        Vitamin D (Cholecalciferol) 1000 units Tabs   Dose:  2000 Units   Quantity:  60 tablet        Take 2,000 Units by mouth daily   Refills:  11        * Notice:  This list has 2 medication(s) that are the same as other medications prescribed for you. Read the directions carefully, and ask your doctor or other care provider to review them with you.            Procedures and tests performed during your visit     Basic metabolic panel    CBC with platelets + differential    Chest XR,  PA & LAT    EKG 12-lead, tracing only    Troponin I      Orders Needing Specimen Collection     None      Pending Results     Date and Time Order Name Status Description    5/25/2018 2306 Chest XR,  PA & LAT Preliminary             Pending Culture Results     No orders found for last 3 day(s).            Pending Results Instructions     If you had any lab results that were not finalized at the time of your Discharge, you can call the ED Lab Result RN at [phone]. You will be contacted by this team for any positive Lab results or changes in treatment. The nurses are available 7 days a week from 10A to 6:30P.  You can leave a message 24 hours per day and they will return your call.        Test Results From Your Hospital Stay        5/25/2018 11:33 PM      Component Results     Component Value Ref Range & Units Status    WBC 9.3 4.0 - 11.0 10e9/L Final    RBC Count 3.73 (L) 3.8 - 5.2 10e12/L Final    Hemoglobin 11.0 (L) 11.7 - 15.7 g/dL Final    Hematocrit 34.2 (L) 35.0 - 47.0 % Final    MCV 92 78 - 100 fl Final    MCH 29.5 26.5 - 33.0 pg Final    MCHC 32.2 31.5 - 36.5 g/dL Final    RDW 13.4 10.0 - 15.0 % Final    Platelet Count 291 150 - 450 10e9/L  Final    Diff Method Automated Method  Final    % Neutrophils 52.7 % Final    % Lymphocytes 35.7 % Final    % Monocytes 7.3 % Final    % Eosinophils 3.9 % Final    % Basophils 0.2 % Final    % Immature Granulocytes 0.2 % Final    Nucleated RBCs 0 0 /100 Final    Absolute Neutrophil 4.9 1.6 - 8.3 10e9/L Final    Absolute Lymphocytes 3.3 0.8 - 5.3 10e9/L Final    Absolute Monocytes 0.7 0.0 - 1.3 10e9/L Final    Absolute Eosinophils 0.4 0.0 - 0.7 10e9/L Final    Absolute Basophils 0.0 0.0 - 0.2 10e9/L Final    Abs Immature Granulocytes 0.0 0 - 0.4 10e9/L Final    Absolute Nucleated RBC 0.0  Final         5/25/2018 11:48 PM      Component Results     Component Value Ref Range & Units Status    Sodium 140 133 - 144 mmol/L Final    Potassium 3.7 3.4 - 5.3 mmol/L Final    Chloride 104 94 - 109 mmol/L Final    Carbon Dioxide 30 20 - 32 mmol/L Final    Anion Gap 6 3 - 14 mmol/L Final    Glucose 126 (H) 70 - 99 mg/dL Final    Urea Nitrogen 11 7 - 30 mg/dL Final    Creatinine 0.92 0.52 - 1.04 mg/dL Final    GFR Estimate 63 >60 mL/min/1.7m2 Final    Non  GFR Calc    GFR Estimate If Black 77 >60 mL/min/1.7m2 Final    African American GFR Calc    Calcium 8.8 8.5 - 10.1 mg/dL Final         5/26/2018 12:09 AM      Narrative     CHEST 2 VIEWS  5/25/2018 11:38 PM     HISTORY: Palpitations.    COMPARISON: 1/24/2018.    FINDINGS: The lungs are clear. Normal-sized cardiac silhouette.  Moderate elevation of the right hemidiaphragm. A catheter projecting  over the right anterior chest wall likely represents a portion of a  ventriculoperitoneal shunt.        Impression     IMPRESSION: No evidence of active cardiopulmonary disease.         5/25/2018 11:53 PM      Component Results     Component Value Ref Range & Units Status    Troponin I ES <0.015 0.000 - 0.045 ug/L Final    The 99th percentile for upper reference range is 0.045 ug/L.  Troponin values   in the range of 0.045 - 0.120 ug/L may be associated with risks of  adverse   clinical events.                  Clinical Quality Measure: Blood Pressure Screening     Your blood pressure was checked while you were in the emergency department today. The last reading we obtained was  BP: 143/69 . Please read the guidelines below about what these numbers mean and what you should do about them.  If your systolic blood pressure (the top number) is less than 120 and your diastolic blood pressure (the bottom number) is less than 80, then your blood pressure is normal. There is nothing more that you need to do about it.  If your systolic blood pressure (the top number) is 120-139 or your diastolic blood pressure (the bottom number) is 80-89, your blood pressure may be higher than it should be. You should have your blood pressure rechecked within a year by a primary care provider.  If your systolic blood pressure (the top number) is 140 or greater or your diastolic blood pressure (the bottom number) is 90 or greater, you may have high blood pressure. High blood pressure is treatable, but if left untreated over time it can put you at risk for heart attack, stroke, or kidney failure. You should have your blood pressure rechecked by a primary care provider within the next 4 weeks.  If your provider in the emergency department today gave you specific instructions to follow-up with your doctor or provider even sooner than that, you should follow that instruction and not wait for up to 4 weeks for your follow-up visit.        Thank you for choosing [facility]       Thank you for choosing [facility] for your care. Our goal is always to provide you with excellent care. Hearing back from our patients is one way we can continue to improve our services. Please take a few minutes to complete the written survey that you may receive in the mail after you visit with us. Thank you!        [employer] Information     [employer] lets you send messages to your doctor, view your test results, renew your prescriptions,  "schedule appointments and more. To sign up, go to [email or web address] . Click on \"Log in\" on the left side of the screen, which will take you to the Welcome page. Then click on \"Sign up Now\" on the right side of the page.     You will be asked to enter the access code listed below, as well as some personal information. Please follow the directions to create your username and password.     Your access code is: [email or web address]  Expires: 2018  9:19 AM     Your access code will  in 90 days. If you need help or a new code, please call your [facility] clinic or [phone].        Care EveryWhere ID     This is your Care EveryWhere ID. This could be used by other organizations to access your [facility] medical records  [id number]        Equal Access to Services     VALENTE RIVERA : Marc Leonard, deion nunes, candida hernandez, emilee pardo . So [facility] [phone].    ATENCIÓN: Si habla español, tiene a christianson disposición servicios gratuitos de asistencia lingüística. Llame al [phone].    We comply with applicable federal civil rights laws and [place] laws. We do not discriminate on the basis of race, color, national origin, age, disability, sex, sexual orientation, or gender identity.            After Visit Summary       This is your record. Keep this with you and show to your community pharmacist(s) and doctor(s) at your next visit.                  "

## 2018-05-25 NOTE — TELEPHONE ENCOUNTER
Reason for Call:  Subx     Detailed comments: pt called yesterday she took her first Subx dose and got sick, she threw up and had dry mouth. Pt was seen in the ED yesterday. Pt took her 2nd dose of Subx today. Pt want a call back from a nurse.     Phone Number Patient can be reached at: Home number on file [phone] (home)    Best Time: anytime    Can we leave a detailed message on this number? YES    Call taken on 5/25/2018 at 10:55 AM by Tyrese Yang

## 2018-05-25 NOTE — DISCHARGE INSTRUCTIONS
"   * VOMITING [6yr-Adult]  Vomiting is a common symptom that may be due to different causes. These include gastroenteritis (\"stomach-flu\"), food poisoning and gastritis. There are other more serious causes of vomiting which may be hard to diagnose early in the illness. Therefore, it is important to watch for the warning signs listed below.  The main danger from repeated vomiting is \"dehydration\". This is due to excess loss of water and minerals from the body. When this occurs, body fluids must be replaced.`  HOME CARE:      If symptoms are severe, rest at home for the next 24 hours.    You may use acetaminophen (Tylenol) 650-1000 mg every 6 hours to control fever, unless another medicine was prescribed. [ NOTE : If you have chronic liver disease, talk with your doctor before using acetaminophen.] (Aspirin should never be used in anyone under 18 years of age who is ill with a fever. It may cause severe liver damage.)    Avoid tobacco and alcohol use, which may worsen your symptoms.    If medicines for vomiting were prescribed, take as directed.  DURING THE FIRST 12-24 HOURS follow the diet below. Try to take frequent small sips even if you vomit occasionally:    FRUIT JUICES: Apple, grape juice, clear fruit drinks, electrolyte replacement and sports drinks.    BEVERAGES: Sport drinks such as Gatorade, soft drinks without caffeine; mineral water (plain or flavored), decaffeinated tea and coffee.    SOUPS: Clear broth, consommé and bouillon    DESSERTS: Plain gelatin (Jell-O), popsicles and fruit juice bars.  DURING THE NEXT 24 HOURS you may add the following to the above:    Hot cereal, plain toast, bread, rolls, crackers    Plain noodles, rice, mashed potatoes, chicken noodle or rice soup    Unsweetened canned fruit (avoid pineapple), bananas    Avoid dairy products    Limit caffeine and chocolate. No spices or seasonings except salt.  DURING THE NEXT 24 HOURS  Slowly go back to a normal diet, as you feel better and " your symptoms lessen.  FOLLOW UP with your doctor as advised if you are not improving over the next 2-3 days.  GET PROMPT MEDICAL ATTENTION if any of the following occur:    Constant abdominal pain that stays in the same spot or gets worse    Continued vomiting (unable to keep liquids down) for 24 hours    Frequent diarrhea (more than 5 times a day); blood (red or black color) in diarrhea    No urine output for 12 hours or extreme thirst    Weakness, dizziness or fainting    Unusually drowsy or confused    Fever over 101.0  F (38.3  C) for more than 3 days    Yellow color of the eyes or skin    1815-1078 [employer]. [address] [place] [address]. All rights reserved. This information is not intended as a substitute for professional medical care. Always follow your healthcare professional's instructions.  This information has been modified by your health care provider with permission from the publisher.

## 2018-05-25 NOTE — TELEPHONE ENCOUNTER
Returned call to patient.  She is currently in ER with nausea and vomiting for the second day.  Advised that the medication was not likely to be causing symptoms and that she should continue to take the medication.  Patient verbalized understanding.  Reminded patient that taking suboxone and opiates can cause precipitated withdrawal.  Patient denied using opiates for 3 days.

## 2018-05-25 NOTE — ED AVS SNAPSHOT
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      Follow-up Information     Follow up with Dustin Fatima MD.    Specialty:  Family Practice    Why:  take zofran odt 30 minutes prior to suboxone use    Contact information:    [address]  [facility] [address]  [phone]        Discharge References/Attachments     DRUG REACTION, OTHER (ENGLISH)      Your next 10 appointments already scheduled     May 30, 2018  8:00 AM CDT   Return Visit with Gracie Mclaughlin MD   [facility] ([facility])    [address]  [address]  [facility] [address]   [phone]            May 31, 2018 10:30 AM CDT   Return Sleep Patient with Aniceto Mujica MD   [facility] ([facility])    [address] [address] [phone]            Jun 05, 2018 10:30 AM CDT   New Visit with KELVIN MA VISIT   [facility] ([facility])    [address]  [address]  [facility] [address]   [phone]            Jun 05, 2018 11:00 AM CDT   New Visit with MARTIN Pineda   [facility] ([facility])    [address]  [address]  [facility] [address]   [phone]            Jun 05, 2018 11:00 AM CDT   New Visit with MICHEAL Crowley CNP   [facility] ([facility])    [address] [place] [address] [address]  [address]  [facility] [address] [phone]              24 Hour Appointment Hotline       To make an appointment at any [facility], call [phone] ([phone]). If you don't have a family doctor or clinic, we will help you find one. [facility] are conveniently located to serve the needs of you and your family.             Review of your medicines      Our records show that you are taking the medicines listed below. If these are incorrect, please call your family doctor or clinic.        Dose / Directions Last dose taken    albuterol (2.5 MG/3ML) 0.083% neb solution   Dose:  1 vial   Quantity:  30 vial        Take 1 vial (2.5 mg) by nebulization every 6 hours as needed for shortness of breath / dyspnea or wheezing   Refills:  3        amLODIPine 5 MG tablet   Commonly known as:  NORVASC   Dose:  5 mg   Quantity:  30 tablet        Take 1 tablet (5 mg) by mouth daily   Refills:  3        ASPIRIN NOT PRESCRIBED   Commonly known as:  INTENTIONAL   Quantity:  1 each        Please choose reason not prescribed, below   Refills:  0        baclofen 10 MG tablet   Commonly known as:  LIORESAL   Dose:  10 mg   Quantity:  60 tablet        Take 1 tablet (10 mg) by mouth 3 times daily   Refills:  11        blood glucose calibration solution   Commonly known as:  no brand specified   Quantity:  1 each        Use to calibrate blood glucose monitor as directed.   Refills:  0        blood glucose lancets standard   Commonly known as:  no brand specified   Quantity:  100 each        Use to test blood sugar daily times daily or as directed.   Refills:  11        blood glucose monitoring meter device kit   Commonly known as:  no brand specified   Quantity:  1 kit        Use to test blood sugar larisa times daily or as directed.   Refills:  0        blood glucose monitoring test strip   Commonly known as:  no brand specified   Quantity:   100 strip        Use to test blood sugars once times daily or as directed   Refills:  3        Buprenorphine 7.5 MCG/HR Ptwk   Dose:  1 patch   Quantity:  1 patch        Place 1 patch onto the skin once a week   Refills:  0        buprenorphine HCl-naloxone HCl 8-2 MG per film   Commonly known as:  SUBOXONE   Quantity:  14 Film        One half film bid   Refills:  0        cloNIDine 0.1 MG tablet   Commonly known as:  CATAPRES   Dose:  0.1 mg   Quantity:  60 tablet        Take 1 tablet (0.1 mg) by mouth 2 times daily   Refills:  3        cyanocobalamin 1000 MCG/ML injection   Commonly known as:  VITAMIN B12   Dose:  1 mL   Quantity:  1 mL        Inject 1 mL (1,000 mcg) Subcutaneous every 30 days   Refills:  11        EPINEPHrine 0.3 MG/0.3ML injection 2-pack   Commonly known as:  EPIPEN/ADRENACLICK/or ANY BX GENERIC EQUIV   Dose:  0.3 mg   Quantity:  2 each        Inject 0.3 mLs (0.3 mg) into the muscle once as needed for anaphylaxis   Refills:  5        HYDROcodone-acetaminophen 5-325 MG per tablet   Commonly known as:  NORCO   Dose:  1 tablet   Quantity:  30 tablet        Take 1 tablet by mouth every 6 hours as needed for severe pain   Refills:  0        losartan-hydrochlorothiazide 100-25 MG per tablet   Commonly known as:  HYZAAR   Dose:  1 tablet   Quantity:  90 tablet        Take 1 tablet by mouth daily   Refills:  3        magnesium oxide 400 (241.3 Mg) MG tablet   Commonly known as:  MAG-OX   Dose:  400 mg   Quantity:  90 tablet        Take 1 tablet (400 mg) by mouth daily   Refills:  3        naloxone nasal spray   Commonly known as:  NARCAN   Dose:  4 mg   Quantity:  0.2 mL        Spray 1 spray (4 mg) into one nostril alternating nostrils as needed for opioid reversal every 2-3 minutes until assistance arrives   Refills:  0        * ondansetron 4 MG ODT tab   Commonly known as:  ZOFRAN-ODT   Dose:  4 mg   Quantity:  10 tablet        Take 1 tablet (4 mg) by mouth every 8 hours as needed for nausea    Refills:  1        * ondansetron 4 MG ODT tab   Commonly known as:  ZOFRAN ODT   Dose:  4 mg   Quantity:  10 tablet        Take 1 tablet (4 mg) by mouth every 6 hours as needed for nausea   Refills:  0        order for DME   Quantity:  1 Device        Right wrist splint for carpal tunnel syndrome  One* Use as directed at hour of sleep   Refills:  1        polyethylene glycol powder   Commonly known as:  MIRALAX/GLYCOLAX   Quantity:  1581 g        TAKE 17 GRAMS (1 CAPFUL) BY MOUTH DAILY   Refills:  11        potassium chloride 10 MEQ tablet   Commonly known as:  K-TAB,KLOR-CON   Dose:  10 mEq   Indication:  Low Amount of Potassium in the Blood   Quantity:  120 tablet        Take 1 tablet (10 mEq) by mouth daily   Refills:  11        sertraline 100 MG tablet   Commonly known as:  ZOLOFT   Dose:  150 mg   Indication:  Major Depressive Disorder   Quantity:  45 tablet        Take 1.5 tablets (150 mg) by mouth daily   Refills:  11        STATIN NOT PRESCRIBED (INTENTIONAL)   Dose:  1 each   Quantity:  0 each        1 each daily Statin not prescribed intentionally due to Active liver disease (liver failure, cirrhosis, hepatitis) LIVER METS   Refills:  0        triamcinolone 0.1 % ointment   Commonly known as:  KENALOG   Quantity:  80 g        Apply sparingly to affected area three times daily for 14 days.   Refills:  0        Vitamin D (Cholecalciferol) 1000 units Tabs   Dose:  2000 Units   Quantity:  60 tablet        Take 2,000 Units by mouth daily   Refills:  11        * Notice:  This list has 2 medication(s) that are the same as other medications prescribed for you. Read the directions carefully, and ask your doctor or other care provider to review them with you.            Orders Needing Specimen Collection     None      Pending Results     No orders found from 5/23/2018 to 5/26/2018.            Pending Culture Results     No orders found from 5/23/2018 to 5/26/2018.            Pending Results Instructions     If you  had any lab results that were not finalized at the time of your Discharge, you can call the ED Lab Result RN at [phone]. You will be contacted by this team for any positive Lab results or changes in treatment. The nurses are available 7 days a week from 10A to 6:30P.  You can leave a message 24 hours per day and they will return your call.        Test Results From Your Hospital Stay               Clinical Quality Measure: Blood Pressure Screening     Your blood pressure was checked while you were in the emergency department today. The last reading we obtained was  BP: 142/81 . Please read the guidelines below about what these numbers mean and what you should do about them.  If your systolic blood pressure (the top number) is less than 120 and your diastolic blood pressure (the bottom number) is less than 80, then your blood pressure is normal. There is nothing more that you need to do about it.  If your systolic blood pressure (the top number) is 120-139 or your diastolic blood pressure (the bottom number) is 80-89, your blood pressure may be higher than it should be. You should have your blood pressure rechecked within a year by a primary care provider.  If your systolic blood pressure (the top number) is 140 or greater or your diastolic blood pressure (the bottom number) is 90 or greater, you may have high blood pressure. High blood pressure is treatable, but if left untreated over time it can put you at risk for heart attack, stroke, or kidney failure. You should have your blood pressure rechecked by a primary care provider within the next 4 weeks.  If your provider in the emergency department today gave you specific instructions to follow-up with your doctor or provider even sooner than that, you should follow that instruction and not wait for up to 4 weeks for your follow-up visit.        Thank you for choosing [facility]       Thank you for choosing [facility] for your care. Our goal is always to provide you  "with excellent care. Hearing back from our patients is one way we can continue to improve our services. Please take a few minutes to complete the written survey that you may receive in the mail after you visit with us. Thank you!        Realtime[employer] Information     Real[employer] lets you send messages to your doctor, view your test results, renew your prescriptions, schedule appointments and more. To sign up, go to [email or web address] . Click on \"Log in\" on the left side of the screen, which will take you to the Welcome page. Then click on \"Sign up Now\" on the right side of the page.     You will be asked to enter the access code listed below, as well as some personal information. Please follow the directions to create your username and password.     Your access code is: [email or web address]  Expires: 2018  9:19 AM     Your access code will  in 90 days. If you need help or a new code, please call your [facility] clinic or [phone].        Care EveryWhere ID     This is your Care EveryWhere ID. This could be used by other organizations to access your [facility] medical records  [id number]        Equal Access to Services     VALENTE RIVERA AH: Hadii casimiro Leonard, wakristen nunes, qaybta kaalmakandy hernandez, emilee coyne. So [facility] [phone].    ATENCIÓN: Si habla español, tiene a christianson disposición servicios gratuitos de asistencia lingüística. Sabine al [phone].    We comply with applicable federal civil rights laws and [place] laws. We do not discriminate on the basis of race, color, national origin, age, disability, sex, sexual orientation, or gender identity.            After Visit Summary       This is your record. Keep this with you and show to your community pharmacist(s) and doctor(s) at your next visit.                  "

## 2018-05-25 NOTE — ED PROVIDER NOTES
"  History     Chief Complaint:  Vomiting    The history is provided by the patient.      Ines Mott is a 53 year old female with a history of metastatic 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 site, methamphetamine abuse, spina bifida, chronic pain syndrome, who presents to the emergency department today by ambulance for evaluation of vomiting. The patient was seen in the emergency department last night after an episode of nausea and vomiting after she took her first dose of Suboxone. She was discharged home with a prescription for Zofran, but did not get this filled. She took a second dose of Suboxone this morning and vomited again shortly after. She presents to the emergency department once again for nausea and vomiting. She also reports some \"dizziness and dry throat\" and \"kidney pain from the cancer.\" While in the emergency department, she states that she is feeling better after being given Zofran by EMS prior to her arrival.     Allergies:  Ativan [Lorazepam]  Macrobid [Nitrofurantoin]  Amoxicillin  Buspirone  Busulfan  Cefaclor  Cephalosporins  Ciprofloxacin  Clindamycin  Dilaudid [Hydromorphone]  Diphenhydramine Hcl  Imitrex [Sumatriptan Succinate]  Ketorolac Tromethamine  Lansoprazole  Lansoprazole  Latex  Metoclopramide  Paroxetine  Penicillins  Prednisone  Red Dye  Sulfa Drugs  Lidoderm [Lidocaine]    Medications:    albuterol (2.5 MG/3ML) 0.083% neb solution  amLODIPine (NORVASC) 5 MG tablet  ASPIRIN NOT PRESCRIBED (INTENTIONAL)  baclofen (LIORESAL) 10 MG tablet  Buprenorphine 7.5 MCG/HR PTWK  buprenorphine HCl-naloxone HCl (SUBOXONE) 8-2 MG per film  cloNIDine (CATAPRES) 0.1 MG tablet  cyanocobalamin (VITAMIN B12) 1000 MCG/ML injection  EPINEPHrine (EPIPEN) 0.3 MG/0.3ML injection  HYDROcodone-acetaminophen (NORCO) 5-325 MG per tablet  losartan-hydrochlorothiazide (HYZAAR) 100-25 MG per tablet  magnesium oxide (MAG-OX) 400 (241.3 MG) MG tablet  naloxone (NARCAN) nasal spray  ondansetron (ZOFRAN ODT) 4 MG " "ODT tab  polyethylene glycol (MIRALAX/GLYCOLAX) powder  potassium chloride (K-TAB,KLOR-CON) 10 MEQ tablet  sertraline (ZOLOFT) 100 MG tablet  triamcinolone (KENALOG) 0.1 % ointment  Vitamin D, Cholecalciferol, 1000 UNITS TABS  STATIN NOT PRESCRIBED, INTENTIONAL    Past Medical History:    Arthritis   Asthma    Chronic pain syndrome   Depressive disorder   Diabetes (H)   Hydrocephalus   Hypertension   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  Smoking Status: Former Smoker  Smokeless Tobacco: Never Used  Alcohol Use: Positive; Binge drinking  Marital Status:       Review of Systems   Gastrointestinal: Positive for nausea and vomiting.   All other systems reviewed and are negative.    Physical Exam     Patient Vitals for the past 24 hrs:   BP Temp Temp src Pulse Resp SpO2 Height Weight   05/25/18 1200 126/83 - - 74 18 94 % - -   05/25/18 1130 (!) 125/110 - - 68 16 94 % - -   05/25/18 1121 142/81 98.6  F (37  C) Oral 62 18 97 % 1.702 m (5' 7\") 116.1 kg (256 lb)      Physical Exam  Constitutional: Morbidly obese middle-aged female.  HENT: No signs of trauma.   Eyes: EOM are normal. Pupils are equal, round, and reactive to light.   Neck: Normal range of motion. No JVD present. No cervical adenopathy.  Cardiovascular: Regular rhythm.  Exam reveals no gallop and no friction rub.    No murmur heard.  Pulmonary/Chest: Bilateral breath sounds normal. No wheezes, rhonchi or rales.  Abdominal: Soft.  Mild diffuse tenderness. No rebound or guarding.   Musculoskeletal: No edema. No tenderness.   Lymphadenopathy: No lymphadenopathy.   Neurological: Alert and oriented to person, place, and time. Normal " strength. Coordination normal.   Skin: Skin is warm and dry. No rash noted. No erythema.     Emergency Department Course     Emergency Department Course:    Nursing notes and vitals reviewed.    1145 I performed an exam of the patient as documented above.     I discussed the treatment plan with the patient. She expressed understanding of this plan and consented to discharge. She will be discharged home with instructions for care and follow up. In addition, the patient will return to the emergency department if their symptoms persist, worsen, if new symptoms arise or if there is any concern.  All questions were answered.      Impression & Plan      Medical Decision Making:  Ines Mott is a 53 year old female who has known metastatic endocrine tumor, but also has a history of narcotic abuse and is currently on Suboxone, which she was just started on. She took a pill yesterday and vomited and was seen in the emergency department by Dr. Patel. She was given a prescription to use after Zofran and fluids solved her problem. Today, she took her second dose of Suboxone, but had never filled the Zofran prescription, and vomited again. She feels ok at this point and is drinking water. We have talked about first filling her prescription and then taking it 30 minutes before the Suboxone to see if this enables her to keep it down. If this does not work, I have recommended follow-up with either her primary MD or contacting the pain clinic to look for other options.     Diagnosis:    ICD-10-CM    1. 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 Adverse effect of drug, initial encounter T88.7XXA      Disposition:   The patient is discharged to home.     Scribe Disclosure:  Jackelyn CONDE, am serving as a scribe at 11:31 AM on 5/25/2018 to document services personally performed by Roman Freeman MD, based on my observations and the provider's statements to me.       EMERGENCY DEPARTMENT        Roman Freeman MD  05/25/18 2274

## 2018-05-25 NOTE — ED NOTES
Bed: ED25  Expected date:   Expected time:   Means of arrival:   Comments:  432 53F nausea   ETA now

## 2018-05-26 ASSESSMENT — ENCOUNTER SYMPTOMS
NAUSEA: 1
PALPITATIONS: 1
FEVER: 0
SHORTNESS OF BREATH: 1
VOMITING: 1
ABDOMINAL PAIN: 1

## 2018-05-26 NOTE — ED PROVIDER NOTES
History     Chief Complaint:  Palpitations, vomiting    HPI   Ines Mott is a 53 year old female with a history of metastatic 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 site, methamphetamine abuse, spina bifida, chronic pain syndrome, who presents to the emergency department today for evaluation of palpitations and vomiting. Per chart review, she was seen earlier today by Dr. Freeman for evaluation of vomiting. The patient was discharged yesterday with a prescription for Zofran but has not been able to fill it. She has switched over from oxycodone to Suboxone and took her first dose yesterday. She took 1/2 yesterday and another 1/2 today around 1700. Since taking the Suboxone today, she has been feeling palpitations which radiates to her right arm, mild shortness of breath, abdominal pain around her cancer and more than 5 episodes of vomiting, prompting her to present to the emergency department.  Symptoms are worse right after taking the Suboxone.  Her last bowel movement was 2 days ago and she denies fever.     Allergies:  Ativan [Lorazepam]  Macrobid [Nitrofurantoin]  Amoxicillin  Buspirone  Busulfan  Cefaclor  Cephalosporins  Ciprofloxacin  Clindamycin  Dilaudid [Hydromorphone]  Diphenhydramine Hcl  Imitrex [Sumatriptan Succinate]  Ketorolac Tromethamine  Lansoprazole  Latex  Metoclopramide  Paroxetine  Penicillins  Prednisone  Red Dye  Sulfa Drugs  Lidoderm [Lidocaine]    Medications:    amLODIPine (NORVASC) 5 MG tablet  baclofen (LIORESAL) 10 MG tablet  cloNIDine (CATAPRES) 0.1 MG tablet  HYDROcodone-acetaminophen (NORCO) 5-325 MG per tablet  losartan-hydrochlorothiazide (HYZAAR) 100-25 MG per tablet  magnesium oxide (MAG-OX) 400 (241.3 MG) MG tablet  ondansetron (ZOFRAN ODT) 4 MG ODT tab  potassium chloride (K-TAB,KLOR-CON) 10 MEQ tablet  sertraline (ZOLOFT) 100 MG tablet    Past Medical History:    Arthritis   Asthma   Cancer    Chronic pain syndrome   Depressive disorder   Diabetes  "   Hydrocephalus   Hypertension   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   Smoking Status: former  Smokeless Tobacco: never  Alcohol Use: yes  Marital Status:        Review of Systems   Constitutional: Negative for fever.   Respiratory: Positive for shortness of breath.    Cardiovascular: Positive for palpitations.   Gastrointestinal: Positive for abdominal pain (chronic), nausea and vomiting.   All other systems reviewed and are negative.    Physical Exam   First Vitals: /69  Temp 97.8  F (36.6  C) (Oral)  Resp 18  Ht 1.676 m (5' 6\")  Wt 119.3 kg (263 lb)  SpO2 95%  BMI 42.45 kg/m2    Physical Exam  General: Alert and cooperative with exam. Patient in mild distress. Normal mentation.  Head:  Scalp is NC/AT  Eyes:  No scleral icterus, PERRL  ENT:  The external nose and ears are normal. The oropharynx is normal and without erythema; mucus membranes are moist. Uvula midline, no evidence of deep space infection.  Neck:  Normal range of motion without rigidity.  CV:  Regular rate and rhythm    No pathologic murmur   Resp:  Breath sounds are clear bilaterally    Non-labored, no retractions or accessory muscle use  GI:  Abdomen is soft, no distension, minimal diffuse tenderness. No peritoneal signs.  Obese.  MS:  No lower extremity edema   Skin:  Warm and dry, No rash or lesions noted.  Neuro: Oriented x 3. No gross motor deficits.       Emergency Department Course     ECG:  Indication: Chest Pain  Completed at 2231.  Read at 2302.   Sinus bradycardia  Minimal voltage criteria for LVH, may be normal variant  Inferior infarct, age undetermined  Cannot rule out Anterior infarct, age undetermined  Abnormal ECG  Rate 59 bpm. OK " interval 194. QRS duration 94. QT/QTc 396/392. P-R-T axes 41 -21 -8.    Imaging:  Radiology findings were communicated with the patient who voiced understanding of the findings.    Chest XR,  PA & LAT   Final Result   IMPRESSION: No evidence of active cardiopulmonary disease.      RADHA GARCIA MD     Laboratory:  Laboratory findings were communicated with the patient who voiced understanding of the findings.    CBC: WBC 9.3, HGB 11.0 (L),   BMP: Creatinine 0.92, glucose 126 (H)  Troponin: <0.015    Interventions:  2315 Zofran 4 mg IV   2315 NS Bolus 1,000mL IV    Emergency Department Course:  Nursing notes and vitals reviewed.  I performed an exam of the patient as documented above.   IV was inserted and blood was drawn for laboratory testing, results above.  The patient was sent for a Chest XR,  while in the emergency department, results above.     0057 Patient rechecked and updated.     I personally reviewed the laboratory and imaging results with the Patient and answered all related questions prior to discharge.  Findings and plan explained to the Patient. Patient discharged home with instructions regarding supportive care, medications, and reasons to return. The importance of close follow-up was reviewed.     Impression & Plan      Medical Decision Making:  Ines Mott is a 53 year old female who presents for the second time in the emergency department today with reports of nausea/vomiting, and palpitations among other complaints; recently started Suboxone; patient has care plan. The patient's medical records and history were reviewed.  Initial consideration for, but not limited to, arrhythmia, medication side effects/tolerance, atypical ACS/MI, electrolyte abnormality, among others. Labs, ECG and imaging were obtained. ECG demonstrates normal sinus rhythm without evidence of acute ischemia, infarction or arrhythmia. Chest XR was unremarkable and labs without significant finding as noted above.  Patient was given IV fluids and Zofran with noted improvement. At this time, no emergent cause for patient's symptoms can be determined; likely secondary to recent initiation of Suboxone. Recommended filling her prescription for zofran as previously prescribed and discussed additional supportive care. Return precautions discussed and recommended the patient call her pain clinic to discuss her current symptoms and follow up closely. Patient discharged to home.     Diagnosis:    ICD-10-CM    1. Non-intractable vomiting with nausea, unspecified vomiting type R11.2    2. Palpitations R00.2        Disposition:  discharged to home      Scribe Disclosure:  Derrick CONDE, am serving as a scribe at 11:00 PM on 5/25/2018 to document services personally performed by Ayad Brewer DO based on my observations and the provider's statements to me.     5/25/2018    EMERGENCY DEPARTMENT       Ayad Brewer DO  05/26/18 0301

## 2018-05-26 NOTE — DISCHARGE INSTRUCTIONS
"   * VOMITING [6yr-Adult]  Vomiting is a common symptom that may be due to different causes. These include gastroenteritis (\"stomach-flu\"), food poisoning and gastritis. There are other more serious causes of vomiting which may be hard to diagnose early in the illness. Therefore, it is important to watch for the warning signs listed below.  The main danger from repeated vomiting is \"dehydration\". This is due to excess loss of water and minerals from the body. When this occurs, body fluids must be replaced.`  HOME CARE:      If symptoms are severe, rest at home for the next 24 hours.    You may use acetaminophen (Tylenol) 650-1000 mg every 6 hours to control fever, unless another medicine was prescribed. [ NOTE : If you have chronic liver disease, talk with your doctor before using acetaminophen.] (Aspirin should never be used in anyone under 18 years of age who is ill with a fever. It may cause severe liver damage.)    Avoid tobacco and alcohol use, which may worsen your symptoms.    If medicines for vomiting were prescribed, take as directed.  DURING THE FIRST 12-24 HOURS follow the diet below. Try to take frequent small sips even if you vomit occasionally:    FRUIT JUICES: Apple, grape juice, clear fruit drinks, electrolyte replacement and sports drinks.    BEVERAGES: Sport drinks such as Gatorade, soft drinks without caffeine; mineral water (plain or flavored), decaffeinated tea and coffee.    SOUPS: Clear broth, consommé and bouillon    DESSERTS: Plain gelatin (Jell-O), popsicles and fruit juice bars.  DURING THE NEXT 24 HOURS you may add the following to the above:    Hot cereal, plain toast, bread, rolls, crackers    Plain noodles, rice, mashed potatoes, chicken noodle or rice soup    Unsweetened canned fruit (avoid pineapple), bananas    Avoid dairy products    Limit caffeine and chocolate. No spices or seasonings except salt.  DURING THE NEXT 24 HOURS  Slowly go back to a normal diet, as you feel better and " your symptoms lessen.  FOLLOW UP with your doctor as advised if you are not improving over the next 2-3 days.  GET PROMPT MEDICAL ATTENTION if any of the following occur:    Constant abdominal pain that stays in the same spot or gets worse    Continued vomiting (unable to keep liquids down) for 24 hours    Frequent diarrhea (more than 5 times a day); blood (red or black color) in diarrhea    No urine output for 12 hours or extreme thirst    Weakness, dizziness or fainting    Unusually drowsy or confused    Fever over 101.0  F (38.3  C) for more than 3 days    Yellow color of the eyes or skin    8840-3580 [employer]. [address] [place] [address]. All rights reserved. This information is not intended as a substitute for professional medical care. Always follow your healthcare professional's instructions.  This information has been modified by your health care provider with permission from the publisher.

## 2018-05-30 ENCOUNTER — APPOINTMENT (OUTPATIENT)
Dept: GENERAL RADIOLOGY | Facility: CLINIC | Age: 53
DRG: 287 | End: 2018-05-30
Attending: EMERGENCY MEDICINE
Payer: COMMERCIAL

## 2018-05-30 ENCOUNTER — HOSPITAL ENCOUNTER (INPATIENT)
Facility: CLINIC | Age: 53
LOS: 1 days | Discharge: HOME | DRG: 287 | End: 2018-05-31
Attending: EMERGENCY MEDICINE | Admitting: INTERNAL MEDICINE
Payer: COMMERCIAL

## 2018-05-30 ENCOUNTER — APPOINTMENT (OUTPATIENT)
Dept: CARDIOLOGY | Facility: CLINIC | Age: 53
DRG: 287 | End: 2018-05-30
Attending: INTERNAL MEDICINE
Payer: COMMERCIAL

## 2018-05-30 DIAGNOSIS — Z92.89 HISTORY OF VENTRICULOPERITONEAL SHUNTING: ICD-10-CM

## 2018-05-30 DIAGNOSIS — I16.0 HYPERTENSIVE URGENCY: Primary | ICD-10-CM

## 2018-05-30 DIAGNOSIS — I21.4 NSTEMI (NON-ST ELEVATED MYOCARDIAL INFARCTION) (H): ICD-10-CM

## 2018-05-30 DIAGNOSIS — G89.29 OTHER CHRONIC PAIN: ICD-10-CM

## 2018-05-30 LAB
ALBUMIN SERPL-MCNC: 3.8 G/DL (ref 3.4–5)
ALBUMIN UR-MCNC: NEGATIVE MG/DL
ALP SERPL-CCNC: 90 U/L (ref 40–150)
ALT SERPL W P-5'-P-CCNC: 27 U/L (ref 0–50)
ANION GAP SERPL CALCULATED.3IONS-SCNC: 9 MMOL/L (ref 3–14)
APPEARANCE UR: CLEAR
AST SERPL W P-5'-P-CCNC: 15 U/L (ref 0–45)
BASOPHILS # BLD AUTO: 0 10E9/L (ref 0–0.2)
BASOPHILS NFR BLD AUTO: 0.1 %
BILIRUB SERPL-MCNC: 1.2 MG/DL (ref 0.2–1.3)
BILIRUB UR QL STRIP: NEGATIVE
BUN SERPL-MCNC: 8 MG/DL (ref 7–30)
CALCIUM SERPL-MCNC: 9.6 MG/DL (ref 8.5–10.1)
CHLORIDE SERPL-SCNC: 104 MMOL/L (ref 94–109)
CO2 SERPL-SCNC: 28 MMOL/L (ref 20–32)
COLOR UR AUTO: ABNORMAL
CREAT SERPL-MCNC: 0.75 MG/DL (ref 0.52–1.04)
D DIMER PPP FEU-MCNC: 0.4 UG/ML FEU (ref 0–0.5)
DIFFERENTIAL METHOD BLD: ABNORMAL
EOSINOPHIL # BLD AUTO: 0.2 10E9/L (ref 0–0.7)
EOSINOPHIL NFR BLD AUTO: 2 %
ERYTHROCYTE [DISTWIDTH] IN BLOOD BY AUTOMATED COUNT: 13.2 % (ref 10–15)
ERYTHROCYTE [DISTWIDTH] IN BLOOD BY AUTOMATED COUNT: 13.3 % (ref 10–15)
GFR SERPL CREATININE-BSD FRML MDRD: 81 ML/MIN/1.7M2
GLUCOSE SERPL-MCNC: 146 MG/DL (ref 70–99)
GLUCOSE UR STRIP-MCNC: NEGATIVE MG/DL
HCT VFR BLD AUTO: 35.4 % (ref 35–47)
HCT VFR BLD AUTO: 40.1 % (ref 35–47)
HGB BLD-MCNC: 11.4 G/DL (ref 11.7–15.7)
HGB BLD-MCNC: 13.1 G/DL (ref 11.7–15.7)
HGB UR QL STRIP: ABNORMAL
IMM GRANULOCYTES # BLD: 0.1 10E9/L (ref 0–0.4)
IMM GRANULOCYTES NFR BLD: 0.4 %
INTERPRETATION ECG - MUSE: NORMAL
INTERPRETATION ECG - MUSE: NORMAL
KCT BLD-ACNC: 143 SEC (ref 75–150)
KETONES UR STRIP-MCNC: NEGATIVE MG/DL
LEUKOCYTE ESTERASE UR QL STRIP: NEGATIVE
LIPASE SERPL-CCNC: 91 U/L (ref 73–393)
LMWH PPP CHRO-ACNC: 0.26 IU/ML
LYMPHOCYTES # BLD AUTO: 2.2 10E9/L (ref 0.8–5.3)
LYMPHOCYTES NFR BLD AUTO: 18.5 %
MCH RBC QN AUTO: 29.1 PG (ref 26.5–33)
MCH RBC QN AUTO: 29.6 PG (ref 26.5–33)
MCHC RBC AUTO-ENTMCNC: 32.2 G/DL (ref 31.5–36.5)
MCHC RBC AUTO-ENTMCNC: 32.7 G/DL (ref 31.5–36.5)
MCV RBC AUTO: 90 FL (ref 78–100)
MCV RBC AUTO: 91 FL (ref 78–100)
MONOCYTES # BLD AUTO: 0.6 10E9/L (ref 0–1.3)
MONOCYTES NFR BLD AUTO: 5.5 %
MUCOUS THREADS #/AREA URNS LPF: PRESENT /LPF
NEUTROPHILS # BLD AUTO: 8.6 10E9/L (ref 1.6–8.3)
NEUTROPHILS NFR BLD AUTO: 73.5 %
NITRATE UR QL: NEGATIVE
NRBC # BLD AUTO: 0 10*3/UL
NRBC BLD AUTO-RTO: 0 /100
PH UR STRIP: 7.5 PH (ref 5–7)
PLATELET # BLD AUTO: 288 10E9/L (ref 150–450)
PLATELET # BLD AUTO: 295 10E9/L (ref 150–450)
POTASSIUM SERPL-SCNC: 3.8 MMOL/L (ref 3.4–5.3)
PROT SERPL-MCNC: 7.9 G/DL (ref 6.8–8.8)
RBC # BLD AUTO: 3.92 10E12/L (ref 3.8–5.2)
RBC # BLD AUTO: 4.42 10E12/L (ref 3.8–5.2)
RBC #/AREA URNS AUTO: 2 /HPF (ref 0–2)
SODIUM SERPL-SCNC: 141 MMOL/L (ref 133–144)
SOURCE: ABNORMAL
SP GR UR STRIP: 1.01 (ref 1–1.03)
SQUAMOUS #/AREA URNS AUTO: <1 /HPF (ref 0–1)
TROPONIN I SERPL-MCNC: 0.11 UG/L (ref 0–0.04)
TROPONIN I SERPL-MCNC: 0.14 UG/L (ref 0–0.04)
TROPONIN I SERPL-MCNC: 0.16 UG/L (ref 0–0.04)
UROBILINOGEN UR STRIP-MCNC: NORMAL MG/DL (ref 0–2)
WBC # BLD AUTO: 10 10E9/L (ref 4–11)
WBC # BLD AUTO: 11.6 10E9/L (ref 4–11)
WBC #/AREA URNS AUTO: 2 /HPF (ref 0–5)

## 2018-05-30 PROCEDURE — 25500064 ZZH RX 255 OP 636: Performed by: INTERNAL MEDICINE

## 2018-05-30 PROCEDURE — 71046 X-RAY EXAM CHEST 2 VIEWS: CPT

## 2018-05-30 PROCEDURE — 27210795 ZZH PAD DEFIB QUICK CR4

## 2018-05-30 PROCEDURE — 36415 COLL VENOUS BLD VENIPUNCTURE: CPT | Performed by: PHYSICIAN ASSISTANT

## 2018-05-30 PROCEDURE — 96375 TX/PRO/DX INJ NEW DRUG ADDON: CPT

## 2018-05-30 PROCEDURE — 93458 L HRT ARTERY/VENTRICLE ANGIO: CPT | Mod: 26 | Performed by: INTERNAL MEDICINE

## 2018-05-30 PROCEDURE — 93010 ELECTROCARDIOGRAM REPORT: CPT | Performed by: INTERNAL MEDICINE

## 2018-05-30 PROCEDURE — 99223 1ST HOSP IP/OBS HIGH 75: CPT | Mod: 25 | Performed by: INTERNAL MEDICINE

## 2018-05-30 PROCEDURE — 25000128 H RX IP 250 OP 636: Performed by: EMERGENCY MEDICINE

## 2018-05-30 PROCEDURE — 25000132 ZZH RX MED GY IP 250 OP 250 PS 637: Performed by: EMERGENCY MEDICINE

## 2018-05-30 PROCEDURE — 93005 ELECTROCARDIOGRAM TRACING: CPT

## 2018-05-30 PROCEDURE — 83690 ASSAY OF LIPASE: CPT | Performed by: EMERGENCY MEDICINE

## 2018-05-30 PROCEDURE — 85379 FIBRIN DEGRADATION QUANT: CPT | Performed by: EMERGENCY MEDICINE

## 2018-05-30 PROCEDURE — 93005 ELECTROCARDIOGRAM TRACING: CPT | Mod: 76

## 2018-05-30 PROCEDURE — 4A023N7 MEASUREMENT OF CARDIAC SAMPLING AND PRESSURE, LEFT HEART, PERCUTANEOUS APPROACH: ICD-10-PCS | Performed by: INTERNAL MEDICINE

## 2018-05-30 PROCEDURE — 84484 ASSAY OF TROPONIN QUANT: CPT | Performed by: PHYSICIAN ASSISTANT

## 2018-05-30 PROCEDURE — 99285 EMERGENCY DEPT VISIT HI MDM: CPT | Mod: 25

## 2018-05-30 PROCEDURE — C1769 GUIDE WIRE: HCPCS

## 2018-05-30 PROCEDURE — B2111ZZ FLUOROSCOPY OF MULTIPLE CORONARY ARTERIES USING LOW OSMOLAR CONTRAST: ICD-10-PCS | Performed by: INTERNAL MEDICINE

## 2018-05-30 PROCEDURE — 81001 URINALYSIS AUTO W/SCOPE: CPT | Performed by: PHYSICIAN ASSISTANT

## 2018-05-30 PROCEDURE — 85520 HEPARIN ASSAY: CPT | Performed by: PHYSICIAN ASSISTANT

## 2018-05-30 PROCEDURE — 27211089 ZZH KIT ACIST INJECTOR CR3

## 2018-05-30 PROCEDURE — 85027 COMPLETE CBC AUTOMATED: CPT | Performed by: PHYSICIAN ASSISTANT

## 2018-05-30 PROCEDURE — 99223 1ST HOSP IP/OBS HIGH 75: CPT | Mod: AI | Performed by: PHYSICIAN ASSISTANT

## 2018-05-30 PROCEDURE — 80053 COMPREHEN METABOLIC PANEL: CPT | Performed by: EMERGENCY MEDICINE

## 2018-05-30 PROCEDURE — 40000275 ZZH STATISTIC RCP TIME EA 10 MIN

## 2018-05-30 PROCEDURE — 25000128 H RX IP 250 OP 636: Performed by: INTERNAL MEDICINE

## 2018-05-30 PROCEDURE — 99152 MOD SED SAME PHYS/QHP 5/>YRS: CPT | Mod: GC | Performed by: INTERNAL MEDICINE

## 2018-05-30 PROCEDURE — 27210914 ZZH SHEATH CR8

## 2018-05-30 PROCEDURE — 96376 TX/PRO/DX INJ SAME DRUG ADON: CPT

## 2018-05-30 PROCEDURE — 27210787 ZZH MANIFOLD CR2

## 2018-05-30 PROCEDURE — 27210946 ZZH KIT HC TOTES DISP CR8

## 2018-05-30 PROCEDURE — 85347 COAGULATION TIME ACTIVATED: CPT

## 2018-05-30 PROCEDURE — 84484 ASSAY OF TROPONIN QUANT: CPT | Performed by: EMERGENCY MEDICINE

## 2018-05-30 PROCEDURE — 93306 TTE W/DOPPLER COMPLETE: CPT

## 2018-05-30 PROCEDURE — 25000128 H RX IP 250 OP 636: Performed by: PHYSICIAN ASSISTANT

## 2018-05-30 PROCEDURE — 25000132 ZZH RX MED GY IP 250 OP 250 PS 637: Performed by: INTERNAL MEDICINE

## 2018-05-30 PROCEDURE — 96365 THER/PROPH/DIAG IV INF INIT: CPT

## 2018-05-30 PROCEDURE — 80061 LIPID PANEL: CPT | Performed by: PHYSICIAN ASSISTANT

## 2018-05-30 PROCEDURE — 25000125 ZZHC RX 250: Performed by: INTERNAL MEDICINE

## 2018-05-30 PROCEDURE — B2151ZZ FLUOROSCOPY OF LEFT HEART USING LOW OSMOLAR CONTRAST: ICD-10-PCS | Performed by: INTERNAL MEDICINE

## 2018-05-30 PROCEDURE — 93306 TTE W/DOPPLER COMPLETE: CPT | Mod: 26 | Performed by: INTERNAL MEDICINE

## 2018-05-30 PROCEDURE — 27210856 ZZH ACCESS HEART CATH CR2

## 2018-05-30 PROCEDURE — 21000001 ZZH R&B HEART CARE

## 2018-05-30 PROCEDURE — 25000132 ZZH RX MED GY IP 250 OP 250 PS 637: Performed by: PHYSICIAN ASSISTANT

## 2018-05-30 PROCEDURE — 85025 COMPLETE CBC W/AUTO DIFF WBC: CPT | Performed by: EMERGENCY MEDICINE

## 2018-05-30 RX ORDER — ACETAMINOPHEN 650 MG/1
650 SUPPOSITORY RECTAL EVERY 4 HOURS PRN
Status: DISCONTINUED | OUTPATIENT
Start: 2018-05-30 | End: 2018-05-31 | Stop reason: HOSPADM

## 2018-05-30 RX ORDER — NICARDIPINE HYDROCHLORIDE 2.5 MG/ML
100 INJECTION INTRAVENOUS
Status: DISCONTINUED | OUTPATIENT
Start: 2018-05-30 | End: 2018-05-30 | Stop reason: HOSPADM

## 2018-05-30 RX ORDER — BISACODYL 10 MG
10 SUPPOSITORY, RECTAL RECTAL DAILY PRN
Status: DISCONTINUED | OUTPATIENT
Start: 2018-05-30 | End: 2018-05-31 | Stop reason: HOSPADM

## 2018-05-30 RX ORDER — DOBUTAMINE HYDROCHLORIDE 200 MG/100ML
2-20 INJECTION INTRAVENOUS CONTINUOUS PRN
Status: DISCONTINUED | OUTPATIENT
Start: 2018-05-30 | End: 2018-05-30 | Stop reason: HOSPADM

## 2018-05-30 RX ORDER — NIFEDIPINE 10 MG/1
10 CAPSULE ORAL
Status: DISCONTINUED | OUTPATIENT
Start: 2018-05-30 | End: 2018-05-30 | Stop reason: HOSPADM

## 2018-05-30 RX ORDER — LIDOCAINE HYDROCHLORIDE 10 MG/ML
30 INJECTION, SOLUTION EPIDURAL; INFILTRATION; INTRACAUDAL; PERINEURAL
Status: DISCONTINUED | OUTPATIENT
Start: 2018-05-30 | End: 2018-05-30 | Stop reason: HOSPADM

## 2018-05-30 RX ORDER — NITROGLYCERIN 0.4 MG/1
0.4 TABLET SUBLINGUAL EVERY 5 MIN PRN
Status: DISCONTINUED | OUTPATIENT
Start: 2018-05-30 | End: 2018-05-30 | Stop reason: HOSPADM

## 2018-05-30 RX ORDER — METHYLPREDNISOLONE SODIUM SUCCINATE 125 MG/2ML
125 INJECTION, POWDER, LYOPHILIZED, FOR SOLUTION INTRAMUSCULAR; INTRAVENOUS
Status: DISCONTINUED | OUTPATIENT
Start: 2018-05-30 | End: 2018-05-30 | Stop reason: HOSPADM

## 2018-05-30 RX ORDER — METOPROLOL TARTRATE 1 MG/ML
5 INJECTION, SOLUTION INTRAVENOUS EVERY 5 MIN PRN
Status: DISCONTINUED | OUTPATIENT
Start: 2018-05-30 | End: 2018-05-30 | Stop reason: HOSPADM

## 2018-05-30 RX ORDER — PRASUGREL 10 MG/1
10-60 TABLET, FILM COATED ORAL
Status: DISCONTINUED | OUTPATIENT
Start: 2018-05-30 | End: 2018-05-30 | Stop reason: HOSPADM

## 2018-05-30 RX ORDER — ONDANSETRON 2 MG/ML
4 INJECTION INTRAMUSCULAR; INTRAVENOUS EVERY 6 HOURS PRN
Status: DISCONTINUED | OUTPATIENT
Start: 2018-05-30 | End: 2018-05-31 | Stop reason: HOSPADM

## 2018-05-30 RX ORDER — ASPIRIN 81 MG/1
81 TABLET, CHEWABLE ORAL DAILY
Status: DISCONTINUED | OUTPATIENT
Start: 2018-05-31 | End: 2018-05-30

## 2018-05-30 RX ORDER — IOPAMIDOL 755 MG/ML
75 INJECTION, SOLUTION INTRAVASCULAR ONCE
Status: DISCONTINUED | OUTPATIENT
Start: 2018-05-30 | End: 2018-05-31 | Stop reason: HOSPADM

## 2018-05-30 RX ORDER — MORPHINE SULFATE 4 MG/ML
1-2 INJECTION, SOLUTION INTRAMUSCULAR; INTRAVENOUS EVERY 5 MIN PRN
Status: DISCONTINUED | OUTPATIENT
Start: 2018-05-30 | End: 2018-05-30 | Stop reason: HOSPADM

## 2018-05-30 RX ORDER — DEXTROSE MONOHYDRATE 25 G/50ML
12.5-5 INJECTION, SOLUTION INTRAVENOUS EVERY 30 MIN PRN
Status: DISCONTINUED | OUTPATIENT
Start: 2018-05-30 | End: 2018-05-30 | Stop reason: HOSPADM

## 2018-05-30 RX ORDER — LORAZEPAM 2 MG/ML
.5-2 INJECTION INTRAMUSCULAR EVERY 4 HOURS PRN
Status: DISCONTINUED | OUTPATIENT
Start: 2018-05-30 | End: 2018-05-30 | Stop reason: HOSPADM

## 2018-05-30 RX ORDER — ACETAMINOPHEN 325 MG/1
325-650 TABLET ORAL EVERY 4 HOURS PRN
Status: DISCONTINUED | OUTPATIENT
Start: 2018-05-30 | End: 2018-05-30

## 2018-05-30 RX ORDER — NITROGLYCERIN 5 MG/ML
100-200 VIAL (ML) INTRAVENOUS
Status: DISCONTINUED | OUTPATIENT
Start: 2018-05-30 | End: 2018-05-30 | Stop reason: HOSPADM

## 2018-05-30 RX ORDER — VERAPAMIL HYDROCHLORIDE 2.5 MG/ML
1-2.5 INJECTION, SOLUTION INTRAVENOUS
Status: COMPLETED | OUTPATIENT
Start: 2018-05-30 | End: 2018-05-30

## 2018-05-30 RX ORDER — SODIUM NITROPRUSSIDE 25 MG/ML
100-200 INJECTION INTRAVENOUS
Status: DISCONTINUED | OUTPATIENT
Start: 2018-05-30 | End: 2018-05-30 | Stop reason: HOSPADM

## 2018-05-30 RX ORDER — POTASSIUM CHLORIDE 29.8 MG/ML
20 INJECTION INTRAVENOUS
Status: DISCONTINUED | OUTPATIENT
Start: 2018-05-30 | End: 2018-05-30 | Stop reason: HOSPADM

## 2018-05-30 RX ORDER — DIAZEPAM 5 MG
5 TABLET ORAL
Status: DISCONTINUED | OUTPATIENT
Start: 2018-05-30 | End: 2018-05-30

## 2018-05-30 RX ORDER — ADENOSINE 3 MG/ML
12-12000 INJECTION, SOLUTION INTRAVENOUS
Status: DISCONTINUED | OUTPATIENT
Start: 2018-05-30 | End: 2018-05-30 | Stop reason: HOSPADM

## 2018-05-30 RX ORDER — FUROSEMIDE 10 MG/ML
20-100 INJECTION INTRAMUSCULAR; INTRAVENOUS
Status: DISCONTINUED | OUTPATIENT
Start: 2018-05-30 | End: 2018-05-30 | Stop reason: HOSPADM

## 2018-05-30 RX ORDER — CLOPIDOGREL BISULFATE 75 MG/1
75 TABLET ORAL
Status: DISCONTINUED | OUTPATIENT
Start: 2018-05-30 | End: 2018-05-30 | Stop reason: HOSPADM

## 2018-05-30 RX ORDER — PHENYLEPHRINE HCL IN 0.9% NACL 1 MG/10 ML
20-100 SYRINGE (ML) INTRAVENOUS
Status: DISCONTINUED | OUTPATIENT
Start: 2018-05-30 | End: 2018-05-30 | Stop reason: HOSPADM

## 2018-05-30 RX ORDER — PROTAMINE SULFATE 10 MG/ML
25-100 INJECTION, SOLUTION INTRAVENOUS EVERY 5 MIN PRN
Status: DISCONTINUED | OUTPATIENT
Start: 2018-05-30 | End: 2018-05-30 | Stop reason: HOSPADM

## 2018-05-30 RX ORDER — VERAPAMIL HYDROCHLORIDE 2.5 MG/ML
1-2.5 INJECTION, SOLUTION INTRAVENOUS
Status: DISCONTINUED | OUTPATIENT
Start: 2018-05-30 | End: 2018-05-30 | Stop reason: HOSPADM

## 2018-05-30 RX ORDER — NITROGLYCERIN 0.4 MG/1
0.4 TABLET SUBLINGUAL EVERY 5 MIN PRN
Status: DISCONTINUED | OUTPATIENT
Start: 2018-05-30 | End: 2018-05-31 | Stop reason: HOSPADM

## 2018-05-30 RX ORDER — ENALAPRILAT 1.25 MG/ML
1.25-2.5 INJECTION INTRAVENOUS
Status: DISCONTINUED | OUTPATIENT
Start: 2018-05-30 | End: 2018-05-30 | Stop reason: HOSPADM

## 2018-05-30 RX ORDER — MORPHINE SULFATE 4 MG/ML
2-4 INJECTION, SOLUTION INTRAMUSCULAR; INTRAVENOUS
Status: DISCONTINUED | OUTPATIENT
Start: 2018-05-30 | End: 2018-05-30

## 2018-05-30 RX ORDER — POTASSIUM CHLORIDE 1500 MG/1
20 TABLET, EXTENDED RELEASE ORAL
Status: DISCONTINUED | OUTPATIENT
Start: 2018-05-30 | End: 2018-05-30 | Stop reason: HOSPADM

## 2018-05-30 RX ORDER — LORAZEPAM 2 MG/ML
0.5 INJECTION INTRAMUSCULAR
Status: DISCONTINUED | OUTPATIENT
Start: 2018-05-30 | End: 2018-05-30

## 2018-05-30 RX ORDER — ALUMINA, MAGNESIA, AND SIMETHICONE 2400; 2400; 240 MG/30ML; MG/30ML; MG/30ML
30 SUSPENSION ORAL EVERY 4 HOURS PRN
Status: DISCONTINUED | OUTPATIENT
Start: 2018-05-30 | End: 2018-05-31 | Stop reason: HOSPADM

## 2018-05-30 RX ORDER — ATROPINE SULFATE 0.1 MG/ML
.5-1 INJECTION INTRAVENOUS
Status: DISCONTINUED | OUTPATIENT
Start: 2018-05-30 | End: 2018-05-30 | Stop reason: HOSPADM

## 2018-05-30 RX ORDER — PROCHLORPERAZINE 25 MG
25 SUPPOSITORY, RECTAL RECTAL EVERY 12 HOURS PRN
Status: DISCONTINUED | OUTPATIENT
Start: 2018-05-30 | End: 2018-05-30 | Stop reason: ALTCHOICE

## 2018-05-30 RX ORDER — DOPAMINE HYDROCHLORIDE 160 MG/100ML
2-20 INJECTION, SOLUTION INTRAVENOUS CONTINUOUS PRN
Status: DISCONTINUED | OUTPATIENT
Start: 2018-05-30 | End: 2018-05-30 | Stop reason: HOSPADM

## 2018-05-30 RX ORDER — BACLOFEN 10 MG/1
10 TABLET ORAL 3 TIMES DAILY
Status: DISCONTINUED | OUTPATIENT
Start: 2018-05-30 | End: 2018-05-30

## 2018-05-30 RX ORDER — PROTAMINE SULFATE 10 MG/ML
1-5 INJECTION, SOLUTION INTRAVENOUS
Status: DISCONTINUED | OUTPATIENT
Start: 2018-05-30 | End: 2018-05-30 | Stop reason: HOSPADM

## 2018-05-30 RX ORDER — CLONIDINE HYDROCHLORIDE 0.1 MG/1
0.1 TABLET ORAL DAILY
Status: DISCONTINUED | OUTPATIENT
Start: 2018-05-31 | End: 2018-05-31 | Stop reason: HOSPADM

## 2018-05-30 RX ORDER — POTASSIUM CHLORIDE 7.45 MG/ML
10 INJECTION INTRAVENOUS
Status: DISCONTINUED | OUTPATIENT
Start: 2018-05-30 | End: 2018-05-30 | Stop reason: HOSPADM

## 2018-05-30 RX ORDER — ASPIRIN 325 MG
325 TABLET ORAL
Status: DISCONTINUED | OUTPATIENT
Start: 2018-05-30 | End: 2018-05-30 | Stop reason: HOSPADM

## 2018-05-30 RX ORDER — BUPRENORPHINE AND NALOXONE 8; 2 MG/1; MG/1
0.5 FILM, SOLUBLE BUCCAL; SUBLINGUAL 2 TIMES DAILY
Status: DISCONTINUED | OUTPATIENT
Start: 2018-05-30 | End: 2018-05-30

## 2018-05-30 RX ORDER — HYDRALAZINE HYDROCHLORIDE 20 MG/ML
10-20 INJECTION INTRAMUSCULAR; INTRAVENOUS
Status: DISCONTINUED | OUTPATIENT
Start: 2018-05-30 | End: 2018-05-30 | Stop reason: HOSPADM

## 2018-05-30 RX ORDER — DIPHENHYDRAMINE HYDROCHLORIDE 50 MG/ML
25-50 INJECTION INTRAMUSCULAR; INTRAVENOUS
Status: DISCONTINUED | OUTPATIENT
Start: 2018-05-30 | End: 2018-05-30 | Stop reason: HOSPADM

## 2018-05-30 RX ORDER — SODIUM CHLORIDE 9 MG/ML
INJECTION, SOLUTION INTRAVENOUS CONTINUOUS
Status: DISCONTINUED | OUTPATIENT
Start: 2018-05-30 | End: 2018-05-30 | Stop reason: HOSPADM

## 2018-05-30 RX ORDER — HYDROCODONE BITARTRATE AND ACETAMINOPHEN 5; 325 MG/1; MG/1
1-2 TABLET ORAL EVERY 4 HOURS PRN
Status: DISCONTINUED | OUTPATIENT
Start: 2018-05-30 | End: 2018-05-31 | Stop reason: HOSPADM

## 2018-05-30 RX ORDER — NITROGLYCERIN 0.4 MG/1
0.4 TABLET SUBLINGUAL EVERY 5 MIN PRN
Status: DISCONTINUED | OUTPATIENT
Start: 2018-05-30 | End: 2018-05-30

## 2018-05-30 RX ORDER — NITROGLYCERIN 20 MG/100ML
.07-1.7 INJECTION INTRAVENOUS CONTINUOUS PRN
Status: DISCONTINUED | OUTPATIENT
Start: 2018-05-30 | End: 2018-05-30 | Stop reason: HOSPADM

## 2018-05-30 RX ORDER — HEPARIN SODIUM 1000 [USP'U]/ML
1000-10000 INJECTION, SOLUTION INTRAVENOUS; SUBCUTANEOUS EVERY 5 MIN PRN
Status: DISCONTINUED | OUTPATIENT
Start: 2018-05-30 | End: 2018-05-30 | Stop reason: HOSPADM

## 2018-05-30 RX ORDER — PROCHLORPERAZINE MALEATE 5 MG
10 TABLET ORAL EVERY 6 HOURS PRN
Status: DISCONTINUED | OUTPATIENT
Start: 2018-05-30 | End: 2018-05-30 | Stop reason: ALTCHOICE

## 2018-05-30 RX ORDER — MAGNESIUM OXIDE 400 MG/1
400 TABLET ORAL DAILY
Status: DISCONTINUED | OUTPATIENT
Start: 2018-05-30 | End: 2018-05-31 | Stop reason: HOSPADM

## 2018-05-30 RX ORDER — NALOXONE HYDROCHLORIDE 0.4 MG/ML
0.4 INJECTION, SOLUTION INTRAMUSCULAR; INTRAVENOUS; SUBCUTANEOUS EVERY 5 MIN PRN
Status: DISCONTINUED | OUTPATIENT
Start: 2018-05-30 | End: 2018-05-30 | Stop reason: HOSPADM

## 2018-05-30 RX ORDER — NITROGLYCERIN 5 MG/ML
100-500 VIAL (ML) INTRAVENOUS
Status: DISCONTINUED | OUTPATIENT
Start: 2018-05-30 | End: 2018-05-30 | Stop reason: HOSPADM

## 2018-05-30 RX ORDER — EPINEPHRINE 1 MG/ML
0.3 INJECTION, SOLUTION, CONCENTRATE INTRAVENOUS
Status: DISCONTINUED | OUTPATIENT
Start: 2018-05-30 | End: 2018-05-30 | Stop reason: HOSPADM

## 2018-05-30 RX ORDER — ASPIRIN 81 MG/1
324 TABLET, CHEWABLE ORAL ONCE
Status: COMPLETED | OUTPATIENT
Start: 2018-05-30 | End: 2018-05-30

## 2018-05-30 RX ORDER — PROMETHAZINE HYDROCHLORIDE 25 MG/ML
12.5 INJECTION, SOLUTION INTRAMUSCULAR; INTRAVENOUS ONCE
Status: COMPLETED | OUTPATIENT
Start: 2018-05-30 | End: 2018-05-30

## 2018-05-30 RX ORDER — FENTANYL CITRATE 50 UG/ML
25-50 INJECTION, SOLUTION INTRAMUSCULAR; INTRAVENOUS
Status: DISCONTINUED | OUTPATIENT
Start: 2018-05-30 | End: 2018-05-30 | Stop reason: HOSPADM

## 2018-05-30 RX ORDER — POLYETHYLENE GLYCOL 3350 17 G/17G
17 POWDER, FOR SOLUTION ORAL 2 TIMES DAILY
Status: DISCONTINUED | OUTPATIENT
Start: 2018-05-30 | End: 2018-05-30

## 2018-05-30 RX ORDER — AMOXICILLIN 250 MG
2 CAPSULE ORAL 2 TIMES DAILY PRN
Status: DISCONTINUED | OUTPATIENT
Start: 2018-05-30 | End: 2018-05-30

## 2018-05-30 RX ORDER — ONDANSETRON 2 MG/ML
4 INJECTION INTRAMUSCULAR; INTRAVENOUS EVERY 4 HOURS PRN
Status: DISCONTINUED | OUTPATIENT
Start: 2018-05-30 | End: 2018-05-30 | Stop reason: HOSPADM

## 2018-05-30 RX ORDER — LIDOCAINE 40 MG/G
CREAM TOPICAL
Status: DISCONTINUED | OUTPATIENT
Start: 2018-05-30 | End: 2018-05-30

## 2018-05-30 RX ORDER — LIDOCAINE 40 MG/G
CREAM TOPICAL
Status: DISCONTINUED | OUTPATIENT
Start: 2018-05-30 | End: 2018-05-30 | Stop reason: HOSPADM

## 2018-05-30 RX ORDER — SERTRALINE HYDROCHLORIDE 100 MG/1
100 TABLET, FILM COATED ORAL DAILY
Status: DISCONTINUED | OUTPATIENT
Start: 2018-05-31 | End: 2018-05-31 | Stop reason: HOSPADM

## 2018-05-30 RX ORDER — ASPIRIN 81 MG/1
81-324 TABLET, CHEWABLE ORAL
Status: DISCONTINUED | OUTPATIENT
Start: 2018-05-30 | End: 2018-05-30 | Stop reason: HOSPADM

## 2018-05-30 RX ORDER — NALOXONE HYDROCHLORIDE 0.4 MG/ML
.1-.4 INJECTION, SOLUTION INTRAMUSCULAR; INTRAVENOUS; SUBCUTANEOUS
Status: DISCONTINUED | OUTPATIENT
Start: 2018-05-30 | End: 2018-05-31 | Stop reason: HOSPADM

## 2018-05-30 RX ORDER — POLYETHYLENE GLYCOL 3350 17 G/17G
17 POWDER, FOR SOLUTION ORAL 2 TIMES DAILY
Status: DISCONTINUED | OUTPATIENT
Start: 2018-05-30 | End: 2018-05-31 | Stop reason: HOSPADM

## 2018-05-30 RX ORDER — FENTANYL CITRATE 50 UG/ML
25-50 INJECTION, SOLUTION INTRAMUSCULAR; INTRAVENOUS
Status: ACTIVE | OUTPATIENT
Start: 2018-05-30 | End: 2018-05-31

## 2018-05-30 RX ORDER — LIDOCAINE HYDROCHLORIDE 10 MG/ML
1-10 INJECTION, SOLUTION EPIDURAL; INFILTRATION; INTRACAUDAL; PERINEURAL
Status: COMPLETED | OUTPATIENT
Start: 2018-05-30 | End: 2018-05-30

## 2018-05-30 RX ORDER — NITROGLYCERIN 5 MG/ML
100-500 VIAL (ML) INTRAVENOUS
Status: COMPLETED | OUTPATIENT
Start: 2018-05-30 | End: 2018-05-30

## 2018-05-30 RX ORDER — MORPHINE SULFATE 2 MG/ML
1-2 INJECTION, SOLUTION INTRAMUSCULAR; INTRAVENOUS EVERY 5 MIN PRN
Status: DISCONTINUED | OUTPATIENT
Start: 2018-05-30 | End: 2018-05-30 | Stop reason: HOSPADM

## 2018-05-30 RX ORDER — CLONIDINE HYDROCHLORIDE 0.1 MG/1
0.1 TABLET ORAL 2 TIMES DAILY
Status: DISCONTINUED | OUTPATIENT
Start: 2018-05-30 | End: 2018-05-30

## 2018-05-30 RX ORDER — ONDANSETRON 4 MG/1
4 TABLET, ORALLY DISINTEGRATING ORAL EVERY 6 HOURS PRN
Status: DISCONTINUED | OUTPATIENT
Start: 2018-05-30 | End: 2018-05-31 | Stop reason: HOSPADM

## 2018-05-30 RX ORDER — BACLOFEN 10 MG/1
10 TABLET ORAL 2 TIMES DAILY PRN
Status: DISCONTINUED | OUTPATIENT
Start: 2018-05-30 | End: 2018-05-31 | Stop reason: HOSPADM

## 2018-05-30 RX ORDER — SODIUM CHLORIDE 9 MG/ML
INJECTION, SOLUTION INTRAVENOUS CONTINUOUS
Status: DISCONTINUED | OUTPATIENT
Start: 2018-05-30 | End: 2018-05-30

## 2018-05-30 RX ORDER — LABETALOL HYDROCHLORIDE 5 MG/ML
10 INJECTION, SOLUTION INTRAVENOUS
Status: DISCONTINUED | OUTPATIENT
Start: 2018-05-30 | End: 2018-05-31 | Stop reason: HOSPADM

## 2018-05-30 RX ORDER — LIDOCAINE 40 MG/G
CREAM TOPICAL
Status: DISCONTINUED | OUTPATIENT
Start: 2018-05-30 | End: 2018-05-31 | Stop reason: HOSPADM

## 2018-05-30 RX ORDER — ONDANSETRON 2 MG/ML
4 INJECTION INTRAMUSCULAR; INTRAVENOUS
Status: DISCONTINUED | OUTPATIENT
Start: 2018-05-30 | End: 2018-05-30

## 2018-05-30 RX ORDER — LOSARTAN POTASSIUM AND HYDROCHLOROTHIAZIDE 25; 100 MG/1; MG/1
1 TABLET ORAL DAILY
Status: DISCONTINUED | OUTPATIENT
Start: 2018-05-30 | End: 2018-05-31 | Stop reason: HOSPADM

## 2018-05-30 RX ORDER — NALOXONE HYDROCHLORIDE 0.4 MG/ML
.2-.4 INJECTION, SOLUTION INTRAMUSCULAR; INTRAVENOUS; SUBCUTANEOUS
Status: ACTIVE | OUTPATIENT
Start: 2018-05-30 | End: 2018-05-31

## 2018-05-30 RX ORDER — BUPIVACAINE HYDROCHLORIDE 2.5 MG/ML
1-10 INJECTION, SOLUTION EPIDURAL; INFILTRATION; INTRACAUDAL
Status: DISCONTINUED | OUTPATIENT
Start: 2018-05-30 | End: 2018-05-30 | Stop reason: HOSPADM

## 2018-05-30 RX ORDER — ATROPINE SULFATE 0.1 MG/ML
0.5 INJECTION INTRAVENOUS EVERY 5 MIN PRN
Status: ACTIVE | OUTPATIENT
Start: 2018-05-30 | End: 2018-05-31

## 2018-05-30 RX ORDER — CLOPIDOGREL BISULFATE 75 MG/1
300-600 TABLET ORAL
Status: DISCONTINUED | OUTPATIENT
Start: 2018-05-30 | End: 2018-05-30 | Stop reason: HOSPADM

## 2018-05-30 RX ORDER — NALOXONE HYDROCHLORIDE 0.4 MG/ML
.1-.4 INJECTION, SOLUTION INTRAMUSCULAR; INTRAVENOUS; SUBCUTANEOUS
Status: DISCONTINUED | OUTPATIENT
Start: 2018-05-30 | End: 2018-05-30

## 2018-05-30 RX ORDER — ACETAMINOPHEN 325 MG/1
650 TABLET ORAL EVERY 4 HOURS PRN
Status: DISCONTINUED | OUTPATIENT
Start: 2018-05-30 | End: 2018-05-31 | Stop reason: HOSPADM

## 2018-05-30 RX ORDER — LORAZEPAM 0.5 MG/1
0.5 TABLET ORAL
Status: DISCONTINUED | OUTPATIENT
Start: 2018-05-30 | End: 2018-05-30

## 2018-05-30 RX ORDER — CLOPIDOGREL 300 MG/1
300-600 TABLET, FILM COATED ORAL
Status: DISCONTINUED | OUTPATIENT
Start: 2018-05-30 | End: 2018-05-30 | Stop reason: HOSPADM

## 2018-05-30 RX ORDER — LIDOCAINE HYDROCHLORIDE 10 MG/ML
1-10 INJECTION, SOLUTION INFILTRATION; PERINEURAL
Status: DISCONTINUED | OUTPATIENT
Start: 2018-05-30 | End: 2018-05-30 | Stop reason: HOSPADM

## 2018-05-30 RX ORDER — SODIUM CHLORIDE 9 MG/ML
INJECTION, SOLUTION INTRAVENOUS CONTINUOUS
Status: DISCONTINUED | OUTPATIENT
Start: 2018-05-30 | End: 2018-05-31 | Stop reason: HOSPADM

## 2018-05-30 RX ORDER — AMOXICILLIN 250 MG
1 CAPSULE ORAL 2 TIMES DAILY PRN
Status: DISCONTINUED | OUTPATIENT
Start: 2018-05-30 | End: 2018-05-30

## 2018-05-30 RX ORDER — FLUMAZENIL 0.1 MG/ML
0.2 INJECTION, SOLUTION INTRAVENOUS
Status: DISCONTINUED | OUTPATIENT
Start: 2018-05-30 | End: 2018-05-30 | Stop reason: HOSPADM

## 2018-05-30 RX ADMIN — FENTANYL CITRATE 50 MCG: 50 INJECTION, SOLUTION INTRAMUSCULAR; INTRAVENOUS at 15:50

## 2018-05-30 RX ADMIN — NITROGLYCERIN 0.4 MG: 0.4 TABLET SUBLINGUAL at 09:46

## 2018-05-30 RX ADMIN — SODIUM CHLORIDE: 9 INJECTION, SOLUTION INTRAVENOUS at 13:24

## 2018-05-30 RX ADMIN — ONDANSETRON 4 MG: 2 INJECTION INTRAMUSCULAR; INTRAVENOUS at 07:54

## 2018-05-30 RX ADMIN — MORPHINE SULFATE 3 MG: 4 INJECTION INTRAVENOUS at 13:09

## 2018-05-30 RX ADMIN — HYDROCODONE BITARTRATE AND ACETAMINOPHEN 1 TABLET: 5; 325 TABLET ORAL at 17:04

## 2018-05-30 RX ADMIN — MIDAZOLAM 1 MG: 1 INJECTION INTRAMUSCULAR; INTRAVENOUS at 15:15

## 2018-05-30 RX ADMIN — BACLOFEN 10 MG: 10 TABLET ORAL at 13:22

## 2018-05-30 RX ADMIN — HUMAN ALBUMIN MICROSPHERES AND PERFLUTREN 9 ML: 10; .22 INJECTION, SOLUTION INTRAVENOUS at 11:58

## 2018-05-30 RX ADMIN — SODIUM CHLORIDE: 9 INJECTION, SOLUTION INTRAVENOUS at 19:15

## 2018-05-30 RX ADMIN — MIDAZOLAM 1 MG: 1 INJECTION INTRAMUSCULAR; INTRAVENOUS at 15:20

## 2018-05-30 RX ADMIN — VERAPAMIL HYDROCHLORIDE 2.5 MG: 2.5 INJECTION, SOLUTION INTRAVENOUS at 15:38

## 2018-05-30 RX ADMIN — NITROGLYCERIN 0.4 MG: 0.4 TABLET SUBLINGUAL at 22:06

## 2018-05-30 RX ADMIN — SODIUM CHLORIDE 1000 ML: 9 INJECTION, SOLUTION INTRAVENOUS at 06:27

## 2018-05-30 RX ADMIN — SODIUM CHLORIDE: 9 INJECTION, SOLUTION INTRAVENOUS at 20:33

## 2018-05-30 RX ADMIN — FENTANYL CITRATE 50 MCG: 50 INJECTION, SOLUTION INTRAMUSCULAR; INTRAVENOUS at 15:20

## 2018-05-30 RX ADMIN — MORPHINE SULFATE 2 MG: 4 INJECTION INTRAVENOUS at 10:33

## 2018-05-30 RX ADMIN — Medication 12.5 MG: at 20:33

## 2018-05-30 RX ADMIN — POLYETHYLENE GLYCOL 3350 17 G: 17 POWDER, FOR SOLUTION ORAL at 20:33

## 2018-05-30 RX ADMIN — FENTANYL CITRATE 50 MCG: 50 INJECTION, SOLUTION INTRAMUSCULAR; INTRAVENOUS at 15:15

## 2018-05-30 RX ADMIN — NITROGLYCERIN 200 MCG: 5 INJECTION, SOLUTION INTRAVENOUS at 15:37

## 2018-05-30 RX ADMIN — NITROGLYCERIN 0.4 MG: 0.4 TABLET SUBLINGUAL at 08:41

## 2018-05-30 RX ADMIN — FENTANYL CITRATE 50 MCG: 50 INJECTION, SOLUTION INTRAMUSCULAR; INTRAVENOUS at 15:40

## 2018-05-30 RX ADMIN — NITROGLYCERIN 0.4 MG: 0.4 TABLET SUBLINGUAL at 06:25

## 2018-05-30 RX ADMIN — HEPARIN SODIUM 1000 UNITS/HR: 10000 INJECTION, SOLUTION INTRAVENOUS at 06:36

## 2018-05-30 RX ADMIN — NITROGLYCERIN 0.4 MG: 0.4 TABLET SUBLINGUAL at 08:08

## 2018-05-30 RX ADMIN — LIDOCAINE HYDROCHLORIDE 18 ML: 10 INJECTION, SOLUTION EPIDURAL; INFILTRATION; INTRACAUDAL; PERINEURAL at 15:40

## 2018-05-30 RX ADMIN — Medication 12.5 MG: at 11:32

## 2018-05-30 RX ADMIN — NITROGLYCERIN 0.4 MG: 0.4 TABLET SUBLINGUAL at 07:56

## 2018-05-30 RX ADMIN — PROMETHAZINE HYDROCHLORIDE 12.5 MG: 25 INJECTION INTRAMUSCULAR; INTRAVENOUS at 06:26

## 2018-05-30 RX ADMIN — LOSARTAN POTASSIUM AND HYDROCHLOROTHIAZIDE 1 TABLET: 100; 25 TABLET, FILM COATED ORAL at 13:21

## 2018-05-30 RX ADMIN — SODIUM CHLORIDE: 9 INJECTION, SOLUTION INTRAVENOUS at 08:45

## 2018-05-30 RX ADMIN — Medication 5000 UNITS: at 06:37

## 2018-05-30 RX ADMIN — HYDROCODONE BITARTRATE AND ACETAMINOPHEN 1 TABLET: 5; 325 TABLET ORAL at 23:00

## 2018-05-30 RX ADMIN — ASPIRIN 81 MG 324 MG: 81 TABLET ORAL at 06:32

## 2018-05-30 RX ADMIN — ONDANSETRON 4 MG: 2 INJECTION INTRAMUSCULAR; INTRAVENOUS at 06:20

## 2018-05-30 RX ADMIN — NITROGLYCERIN 0.4 MG: 0.4 TABLET SUBLINGUAL at 06:39

## 2018-05-30 ASSESSMENT — ACTIVITIES OF DAILY LIVING (ADL)
BATHING: 0-->INDEPENDENT
COGNITION: 0 - NO COGNITION ISSUES REPORTED
RETIRED_EATING: 0-->INDEPENDENT
AMBULATION: 0-->INDEPENDENT
DRESS: 0-->INDEPENDENT
TOILETING: 0-->INDEPENDENT
FALL_HISTORY_WITHIN_LAST_SIX_MONTHS: NO
RETIRED_COMMUNICATION: 0-->UNDERSTANDS/COMMUNICATES WITHOUT DIFFICULTY
TRANSFERRING: 0-->INDEPENDENT
SWALLOWING: 0-->SWALLOWS FOODS/LIQUIDS WITHOUT DIFFICULTY

## 2018-05-30 ASSESSMENT — PAIN DESCRIPTION - DESCRIPTORS
DESCRIPTORS: ACHING;CONSTANT
DESCRIPTORS: STABBING

## 2018-05-30 ASSESSMENT — ENCOUNTER SYMPTOMS
FEVER: 0
COUGH: 0
VOMITING: 0
DIAPHORESIS: 1
CHEST TIGHTNESS: 1
SHORTNESS OF BREATH: 0
ABDOMINAL PAIN: 0
NAUSEA: 1

## 2018-05-30 NOTE — PROVIDER NOTIFICATION
MD Notification    Notified Person: MD Cardiology    Notified Person Name: Dr Rhoades     Notification Date/Time: 1030 5/30/2018    Notification Interaction: verbal with readback    Purpose of Notification: Pt still has ongoing 2/10 on/off chest discomfort, despite NTG SL. SBP at times in 160's to 170's. SL NTG not working for reducing pain.     Orders Received: continue to monitor, awaiting angiogram today.     Comments:

## 2018-05-30 NOTE — ED PROVIDER NOTES
History     Chief Complaint:  Chest Pain     HPI   Ines Mott is a 53 year old female with a history of anxiety, methamphetamine abuse, spina bifida, chronic pain syndrome, hypertension and type two diabetes with a intracranial shunt in place who presents to the Emergency Department for evaluation of chest pain. The patient was seen in the ED on 5/25/18 for evaluation of nausea and vomiting after taking Suboxone. Of note, the patient was seen the night prior and did not fill her prescription for Zofran after being discharged. Per EMS, patient was given 4 mg Zofran. Tonight the patient states she took one dose of Suboxone after not taking this for three days because it made her vomit. Approximately thirty minutes after taking this dose she noted the onset of chest pain/ tightness, nausea, vomiting and diaphoresis.  He denies having any new abdominal pain.  She has no history of coronary disease or blood clot in her legs or lungs.  She had a negative CT of the chest abdomen and pelvis on May 22, through care everywhere.  The patient has a follow up appointment with her PCP later today.  The patient denies any fevers, cough, leg swelling, abdominal pain or shortness of breath.  Patient did not yet take her Clonidine today.  She is most worried that she is having a heart attack.    Per chart review, she had a nondiagnostic stress test in 2012.  She had another one ordered by one of my ED colleagues last year though this test does not seem to have ever been completed.      Cardiac Risk Factors:  History of hypertension - yes  History of hyperlipidemia - no  History of diabetes - yes  History of smoking - yes  Family history of heart complications - no      Allergies:  Ativan [Lorazepam]  Macrobid [Nitrofurantoin]  Amoxicillin  Buspirone  Busulfan  Cefaclor  Cephalosporins  Ciprofloxacin  Clindamycin  Dilaudid   Diphenhydramine Hcl  Imitrex [Sumatriptan Succinate]  Ketorolac  Tromethamine  Lansoprazole  Latex  Metoclopramide  Paroxetine  Penicillins  Prednisone  Red Dye  Sulfa Drugs  Lidoderm [Lidocaine]    Medications:    albuterol (2.5 MG/3ML) 0.083% neb solution  amlodipine (NORVASC) 5 MG tablet  ASPIRIN NOT PRESCRIBED (INTENTIONAL)  baclofen (LIORESAL) 10 MG tablet  blood glucose (NO BRAND SPECIFIED) lancets standard  blood glucose calibration (NO BRAND SPECIFIED) solution  blood glucose monitoring (NO BRAND SPECIFIED) meter device kit  blood glucose monitoring (NO BRAND SPECIFIED) test strip  Buprenorphine 7.5 MCG/HR PTWK  buprenorphine HCl-naloxone HCl (SUBOXONE) 8-2 MG per film  clonidine (CATAPRES) 0.1 MG tablet  cyanocobalamin (VITAMIN B12) 1000 MCG/ML injection  Epinephrine (EPIPEN) 0.3 MG/0.3ML injection  Hydrocodone-acetaminophen (NORCO) 5-325 MG per tablet  losartan-hydrochlorothiazide (HYZAAR) 100-25 MG per tablet  magnesium oxide (MAG-OX) 400 (241.3 MG) MG tablet  naloxone (NARCAN) nasal spray  Zofran  polyethylene glycol (MIRALAX/GLYCOLAX) powder  potassium chloride (K-TAB,KLOR-CON) 10 MEQ tablet  sertraline (ZOLOFT) 100 MG tablet  STATIN NOT PRESCRIBED, INTENTIONAL,  triamcinolone (KENALOG) 0.1 % ointment  Vitamin D, Cholecalciferol, 1000 UNITS TABS     Past Medical History:    Arthritis   Asthma    Chronic pain syndrome   Depressive disorder   Hydrocephalus    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   Methamphetamine abuse   Migraines   Obesity   Right renal mass   Spina bifida  PTSD  Dyspnea and respiratory abnormality  Fibromyalgia  Hypertension  Myelomeningocele with hydrocephalus, cervical region    Kidney infection  Carcinoma of colon metastatic to liver  Angioedema  Intracranial shunt  Seasonal affective disorder  Anxiety  Degeneration of lumbosacral intervertebral disc  Type 2 diabetes  Alcohol abuse     Past Surgical History:    Appendectomy  Cholecystectomy   Dental extractions  Gyn surgery   Implant shunt ventriculoperitoneal      Family History:   "  Cancer: Father     Social History:  The patient was accompanied to the ED by EMS.  Smoking Status: Former Smoker  Smokeless Tobacco: Never Used  Alcohol Use: Positive; Binge drinking  Marital Status:       Review of Systems   Constitutional: Positive for diaphoresis. Negative for fever.   Respiratory: Positive for chest tightness. Negative for cough and shortness of breath.    Cardiovascular: Positive for chest pain. Negative for leg swelling.   Gastrointestinal: Positive for nausea. Negative for abdominal pain and vomiting.   All other systems reviewed and are negative.    Physical Exam   First Vitals:  Patient Vitals for the past 24 hrs:   BP Temp Temp src Pulse Heart Rate Resp SpO2 Height Weight   05/30/18 0638 (!) 155/100 - - - 98 18 97 % - -   05/30/18 0630 - - - - 105 - 95 % - -   05/30/18 0629 (!) 149/94 - - - 105 - - - -   05/30/18 0620 (!) 161/98 - - - 92 - 94 % - -   05/30/18 0525 (!) 175/100 - - - - - - - -   05/30/18 0457 (!) 196/111 98.5  F (36.9  C) Oral 103 - 12 91 % 1.676 m (5' 6\") 116.1 kg (256 lb)     Physical Exam  General: woman sitting upright  HENT: mucous membranes moist,  shunt palpable in R scalp  CV: rate as above, regular rhythm, trace symmetric lower extremity edema, no JVD, palpable symmetric radial pulses  Resp: clear throughout, normal effort, no crackles or wheezing  GI: abdomen soft and nontender, no guarding, negative Del Toro's sign  MSK: no bony tenderness to chest  Skin: appropriately warm and dry, no erythema or vesicles to chest wall, no diaphoresis present  Neuro: alert, clear speech, oriented, face symmetric  Psych: calm, cooperative      Emergency Department Course   ECG:  Indication: Chest tightness  Time: 0458  Vent. Rate 95 bpm. GA interval 180. QRS duration 90. QT/QTc 340/427. P-R-T axis 42 -20 17.   normal sinus rhythm. Minimal voltage criteria LVH, may be normal variant. Inferior infarct age undetermined. Cannot rule out anterior infarct, age undetermined. " Abnormal ECG. Read time: 0506.    Indication: repeat  Time: 0620  Vent. Rate 91 bpm. [place] interval 176. QRS duration 90. QT/QTc 350/430. P-R-T axis 38 -29 0.   normal sinus rhythm. Minimal voltage criteria for LVH, may be normal variant. Inferior infarct, age undetermined. Cannot rule out anterior infarct, age undetermined. Abnormal ECG. Read time: 0622    Imaging:  Radiographic findings were communicated with the patient who voiced understanding of the findings.    Chest XR:  No evidence of active cardiopulmonary disease. As per radiology.       Laboratory:  CBC: WBC: 11.6(H), HGB: 13.1, PLT: 288  CMP: Glucose 146(H), o/w WNL (Creat 0.75)  0550 Troponin: 0.113(H)  Lipase: 91    Interventions:  0620 Zofran 4 mg IV  0625 Nitroglycerin 0.4 mg Sublingual  0626 Phenergan 12.5 mg IV  0627 NS 1 L IV  0632 Asprin 324 mg PO  0636 Heparin infusion 1,000 Units/hr  0637 Heparin loading dose 5,000 Units IV  0639 Nitroglycerin 0.4 mg Sublingual    Emergency Department Course:  Nursing notes and vitals reviewed. I performed an exam of the patient as documented above.     EKG obtained in the ED, see results above.     Blood drawn. This was sent to the lab for further testing, results above.    The patient was sent for a chest x-ray while here in the emergency department, findings above.    The patient was placed on continuous cardiac and pulse ox monitoring.    EKG obtained in the ED, see results above.     0559 I reassessed the patient.     0610 I reassessed the patient.     0635 I spoke with Dr. Lopez regarding this patient.    0649 I reassessed the patient. Chest pain resolved after nitroglycerin    0656 spoke with patient again.  While she told the nurse her pain had gone away with nitroglycerin, she now tells me she has minimal residual discomfort.  I reexamined her.  She is in no distress.    0710 I spoke with , Hospitalist, who is the incoming daytime physician and had taken signout from Dr. Lopez.  I relayed to  him my concern for the possibility of pulmonary embolism.  The patient has a 22-gauge antecubital IV that is not satisfactory for a CT PE protocol.  Patient is quite strongly opposed to further IV attempts as it took a number of attempts and convincing the patient to allow further attempts before this IV can be established.  Addison Fatima MD and I have agreed that I will add on a d-dimer, and his team will follow up on the results and arrange for appropriate additional testing and adjustments to treatment if needed pending the results.  If this is positive, imaging test to evaluate for PE would be indicated, and if positive then her intravenous heparin infusion dosing would need to be increased.    Findings and plan explained to the Patient who consents to admission. Discussed the patient with Dr. Lopez, who will admit the patient [place] [facility] [place] for further monitoring, evaluation, and treatment.    Impression & Plan      Medical Decision Making:  While Mr. Mott is a frequent visitor to this ED and has some chronic conditions for which she has a care plan, her presentation tonight with chest pain is different than her usual symptoms.  She was understandably worried that she is having a heart attack, and in fact her troponin is found to be modestly elevated, having been undetectably low just 5 days ago in her system.  She has no EKG findings to warrant immediate Cath Lab activation.  She has no respiratory distress, though her borderline tachycardia as well as somewhat low oxygen saturation do raise the question of pulmonary embolism.  She is a somewhat poor historian, at times saying that she has no new leg swelling and later telling me that perhaps she does.  She was given standard treatments for an N-STEMI.  As is documented above, based on ultimately hearing that in fact the nitroglycerin had not completely resolved her symptoms, as well as her slight vital sign abnormalities, further pursuit of  "pulmonary embolism is warranted and has been initiated, to be continued by the hospitalist service.  I do think she is stable for admission at this time, as heparin has been initiated and she is feeling much better than arrival.  She would likely require cardiology consultation.  These findings and plan were explained to the patient who agrees with this plan.    Diagnosis:    ICD-10-CM    1. NSTEMI (non-ST elevated myocardial infarction) (H) I21.4    2. History of ventriculoperitoneal shunting Z92.89    3. Other chronic pain G89.29        Disposition:  Admitted to [facility] inpatient    This record was created at least in part using electronic voice recognition software, so please excuse any \"typos.\"      I, Aster Soni, am serving as a scribe on 5/30/2018 at 5:08 AM to personally document services performed by Jason Sorto, * based on my observations and the provider's statements to me.   Aster Soni  5/30/2018    EMERGENCY DEPARTMENT       Jason Sorto MD  05/30/18 0724    "

## 2018-05-30 NOTE — H&P
Admitted:     05/30/2018      PRIMARY CARE PHYSICIAN:  Dr. Dustin Fatima.      CHIEF COMPLAINT:  Chest pain.      HISTORY OF PRESENT ILLNESS:  Ines Mott is a 53-year-old female with a past medical history significant for spina bifida, chronic pain syndrome, fibromyalgia, prediabetes, essential hypertension, generalized anxiety disorder, major depressive disorder, PTSD, Arnold-Chiari malformation resulting in hydrocephalus with subsequent  shunt placement, stable neuroendocrine cancer with mets to the liver, obesity, obstructive sleep apnea and recent urinary tract infection who presented to [facility] Emergency Department on 05/30/2018 due to chest pain.      Of note, the patient has been seen multiple times in different emergency departments within this last several weeks.  It appears the patient was seen at [facility] on 05/25 and 24th here at [facility] Emergency [facility] due to nausea and vomiting after starting oral Suboxone.  It appears that at that time she had a negative troponin.  EKG was without ischemic changes and she did not fill her Zofran prescription at that time.  The patient was then seen on 05/28 at [facility] and was diagnosed with a urinary tract infection and discharged with Cipro for which she has not started.      The patient indicated that she had not been taking her Suboxone for the last 3 days but took some last night, went to bed and then awoke in the middle of the night with chest pressure with associated sweatiness, nausea, vomiting, radiation into her left arm and some shortness of breath.  The patient indicated to me that she has not experienced this before and that her pain seemed to worsen with activity.  The patient did attempt to drink milk, thinking it was acid reflux without any relief.      The patient was evaluated by Dr. Sorto in the Emergency Department.  Evaluation has included a comprehensive metabolic panel that  revealed a creatinine of 0.75 with a GFR of 81, glucose of 146, otherwise within normal limits.  Lipase level was within normal limits at 91.  Initial troponin was elevated at 0.113.  CBC with differential revealed a white count of 11.6, hemoglobin of 13.1, a platelet count of 288, absolute neutrophil of 8.6, otherwise within normal limits.  A 2-view chest x-ray was performed and showed no evidence of active cardiopulmonary disease.  An EKG was performed which showed normal sinus rhythm without ischemic changes.  There is noted T-wave inversions in leads III, aVF and some flattening of T-wave in V1.  The patient received a liter of IV fluids, normal saline, a full dose oral aspirin, was started on a heparin drip with initial loading dose, 2 sublingual tablets of nitroglycerin and 4 mg of IV Zofran as well as 12.5 mg of IV Phenergan.  The patient was then admitted to [facility] under the Hospitalist Service for continued evaluation and management.      I evaluated the patient in the Emergency Department where she was lying in bed upon arrival.  The patient again indicated that she awokened with chest pressure throughout her chest with radiation into her left arm with associated nausea, vomiting, diaphoresis and shortness of breath and has not experienced anything like this before.  She also indicates that she has had frequent fevers, chills, night sweats and extreme fatigue and weakness for the last several days, specifically with weakness in her legs.  She complains of a current headache, frequent palpitations, chronic abdominal pain, ongoing issues with constipation and describes that she was recently diagnosed with a urinary tract infection for which she was prescribed ciprofloxacin, but has not picked up this prescription and continues to have urinary frequency.  She indicates that she has pain and discomfort all over and does have a remote history of seizures.      PAST MEDICAL HISTORY:   1.   Spina bifida.   2.  Chronic pain syndrome.   3.  Fibromyalgia.   4.  Prediabetes with a recent A1c of 5.6 from 03/2018.   5.  Essential hypertension.   6.  Generalized anxiety disorder.   7.  Depression.   8.  PTSD.   9.  Arnold-Chiari malformation resulting in hydrocephalus with subsequent  shunt placement.   10.  Stable neuroendocrine cancer with mets to the liver.   11.  Obesity.   12.  Obstructive sleep apnea for which the patient is compliant with CPAP.   13.  History of alcohol abuse.   14.  History of substance abuse including methamphetamine, marijuana, cocaine and ecstasy.      PAST SURGICAL HISTORY:   1.  Appendectomy.   2.  Cholecystectomy.   3.  Partial hysterectomy.   4.  Right salpingo-oophorectomy.   5.   shunt placement with revision x 2.   6.  Right renal biopsy x 2.   7.  IR embolization of the liver.      PRIOR TO ADMIT MEDICATIONS:   Prescriptions Prior to Admission   Medication Sig Dispense Refill Last Dose     ASPIRIN NOT PRESCRIBED (INTENTIONAL) Please choose reason not prescribed, below 1 each 0 Not Taking     BACLOFEN PO Take 10 mg by mouth 2 times daily as needed for muscle spasms   Past Week at Unknown time     buprenorphine HCl-naloxone HCl (SUBOXONE) 8-2 MG per film One half film bid 14 Film 0 5/29/2018 at x1     cloNIDine (CATAPRES) 0.1 MG tablet Take 1 tablet (0.1 mg) by mouth 2 times daily 60 tablet 3 5/29/2018 at Unknown time     cyanocobalamin (VITAMIN B12) 1000 MCG/ML injection Inject 1 mL (1,000 mcg) Subcutaneous every 30 days 1 mL 11 4/30/2018 at Unknown     HYDROcodone-acetaminophen (NORCO) 5-325 MG per tablet Take 1 tablet by mouth every 6 hours as needed for severe pain 30 tablet 0 5/29/2018 at Unknown time     losartan-hydrochlorothiazide (HYZAAR) 100-25 MG per tablet Take 1 tablet by mouth daily 90 tablet 3 5/29/2018 at Unknown time     magnesium oxide (MAG-OX) 400 (241.3 MG) MG tablet Take 1 tablet (400 mg) by mouth daily 90 tablet 3 5/29/2018 at Unknown time      ondansetron (ZOFRAN-ODT) 4 MG ODT tab Take 1 tablet (4 mg) by mouth every 8 hours as needed for nausea 10 tablet 1 Past Week at Unknown time     polyethylene glycol (MIRALAX/GLYCOLAX) powder TAKE 17 GRAMS (1 CAPFUL) BY MOUTH DAILY 1581 g 11 Past Week at Unknown time     potassium chloride (K-TAB,KLOR-CON) 10 MEQ tablet Take 1 tablet (10 mEq) by mouth daily 120 tablet 11 5/29/2018 at Unknown time     sertraline (ZOLOFT) 100 MG tablet Take 1.5 tablets (150 mg) by mouth daily (Patient taking differently: Take 100 mg by mouth daily ) 45 tablet 11 5/29/2018 at Unknown time     Vitamin D, Cholecalciferol, 1000 UNITS TABS Take 2,000 Units by mouth daily 60 tablet 11 5/29/2018 at Unknown time     albuterol (2.5 MG/3ML) 0.083% neb solution Take 1 vial (2.5 mg) by nebulization every 6 hours as needed for shortness of breath / dyspnea or wheezing 30 vial 3 More than a month at Only in winter     blood glucose (NO BRAND SPECIFIED) lancets standard Use to test blood sugar daily times daily or as directed. 100 each 11 Taking     blood glucose calibration (NO BRAND SPECIFIED) solution Use to calibrate blood glucose monitor as directed. 1 each 0 Taking     blood glucose monitoring (NO BRAND SPECIFIED) meter device kit Use to test blood sugar larisa times daily or as directed. 1 kit 0 Taking     blood glucose monitoring (NO BRAND SPECIFIED) test strip Use to test blood sugars once times daily or as directed 100 strip 3 Taking     EPINEPHrine (EPIPEN) 0.3 MG/0.3ML injection Inject 0.3 mLs (0.3 mg) into the muscle once as needed for anaphylaxis 2 each 5 Unknown at Unknown time     naloxone (NARCAN) nasal spray Spray 1 spray (4 mg) into one nostril alternating nostrils as needed for opioid reversal every 2-3 minutes until assistance arrives 0.2 mL 0 Unknown at Unknown time     order for DME Right wrist splint for carpal tunnel syndrome   One*  Use as directed at hour of sleep 1 Device 1 Taking     STATIN NOT PRESCRIBED, INTENTIONAL, 1  each daily Statin not prescribed intentionally due to Active liver disease (liver failure, cirrhosis, hepatitis)  LIVER METS 0 each 0 Taking        ALLERGIES:    Allergies   Allergen Reactions     Ativan [Lorazepam] Anaphylaxis     Macrobid [Nitrofurantoin] Anaphylaxis     Amoxicillin      Buspirone      Buspar     Busulfan Hives     Edema     Cefaclor Hives     Cephalosporins      Ciprofloxacin      Clindamycin Itching     Dilaudid [Hydromorphone]      Diphenhydramine Hcl      Benadryl     Imitrex [Sumatriptan Succinate]      blood pressure raised uncontrobably     Ketorolac Tromethamine      Toradol     Lansoprazole      Prevacid     Lansoprazole      Prevacid     Latex      Converted from LW Latex Sensitivity Flag     Metoclopramide Hives     Reglan     Paroxetine      Paxil     Penicillins      Prednisone Hives     Red Dye      Sulfa Drugs      THICKENED AND DIFFICULTY SWALLOWING      Lidoderm [Lidocaine] Rash     Localized rash at patch site       SOCIAL HISTORY:  The patient currently resides in an apartment in [place].  She indicates she is a former smoker.  She smoked 2 packs per day for approximately 19 years and quit in 1994.  She indicates that she has off and on use of alcohol, appears to have  binge drinking behavior and has undergone alcohol treatment noted in 1998.  The patient also has a history of methamphetamine abuse, marijuana abuse, cocaine abuse and ecstasy abuse for which the patient states she is currently clean.  She does not currently utilize a cane or a walker.      FAMILY MEDICAL HISTORY:   1.  Father passed away due to Hodgkin's lymphoma.   2.  Mother is alive and has hypertension.   3.  Maternal aunt has liver cancer.   4.  Maternal grandmother had heart disease.   5.  Maternal grandfather had diabetes.      REVIEW OF SYSTEMS:  A 10-point review of systems was performed.  All pertinent positives are listed in the history of present illness, otherwise is negative.      PHYSICAL  EXAMINATION:   VITAL SIGNS:  Temperature is 98.6 degrees Fahrenheit with a blood pressure of 163/102, heart rate of 88 beats per minute, respiratory rate of 18, O2 saturation 98% on 3 liters per nasal cannula.  The patient last rated her pain a 5/10.   GENERAL:  The patient is awake, alert and cooperative, in no apparent distress, alert and oriented x 3.   HEENT:  Normocephalic, atraumatic.  Moist mucous membranes present.  No exudates noted in the posterior pharynx.  Uvula is midline.  Eyes:  Pupils equal, round, and reactive to light.  Extraocular movements are intact.  Normal sclerae.   NECK:  Supple, normal range of motion, no tracheal deviation, no cervical lymphadenopathy present.   CARDIOVASCULAR:  Regular rate and rhythm.  No rubs, murmurs or gallops appreciated.   PULMONARY:  Lungs are clear to auscultation bilaterally.  No wheezes, rhonchi or rales appreciated.   GASTROINTESTINAL:  Bowel sounds are present in all 4 quadrants, soft, nondistended, tender to palpation in the epigastric and right upper quadrant, obese.   NEUROLOGIC:  Cranial nerves II-XII are grossly intact.  The patient demonstrates equal sensation, coordination and strength in the upper and lower extremities bilaterally.   EXTREMITIES:  No lower extremity edema noted bilaterally.  Calves are nontender to palpation.      ASSESSMENT AND PLAN:  Ines Mott is a 53-year-old female with a past medical history significant for spina bifida, chronic pain syndrome, fibromyalgia, prediabetes, essential hypertension, generalized anxiety disorder, major depressive disorder, posttraumatic stress disorder, Arnold-Chiari malformation resulting in hydrocephalus requiring ventriculoperitoneal shunt placement, stable neuroendocrine cancer with metastasis to the liver, obesity and obstructive sleep apnea and recent urinary tract infection who is being admitted to [facility] on 05/30/2018 due to a non-ST elevation myocardial infarction.        1.  Non-ST elevation myocardial infarction:  The patient has noted elevated troponin level of 0.113.  EKG is in normal sinus rhythm with some T-wave inversions in leads III and aVF, T-wave flattening in V1 and does not appear to have ischemic changes.  The patient's pain was described as a pressure-like pain throughout the chest with radiation into her left arm with associated diaphoresis, nausea, vomiting and shortness of breath.  The patient's pain seemed to be improved with sublingual nitro administered in the Emergency Department.  At this point, the patient will be continued on heparin drip that was started in the Emergency Department.  Will plan to trend serial troponin and monitor on continuous telemetry.  The patient has already received full dose aspirin in the Emergency Department and will plan to start a baby aspirin daily tomorrow.  Have formally consulted Cardiology.  Will place the patient on NPO status in case patient proceeds to coronary angiogram or stress test.  The patient will resume prior to admit clonidine 0.1 mg daily, Hyzaar 100/25 mg tablet daily and will order for Lopressor 12.5 mg 2 times daily. Check a fasting lipid profile to be drawn tomorrow and consider starting statin (LFT's appear to be stable).  Continue with PRN SL Nitro for chest pain, PRN IV morphine available and PO PRN Norco.    2.  Essential hypertension:  The patient presented with elevated blood pressures as high as 196/111.  The patient did not take her morning medications prior to the Emergency Department visit.  Upon discussion, the patient states she is no longer taking amlodipine, but is compliant with clonidine and Hyzaar.  I will resume both clonidine and Hyzaar as stated above with additional Lopressor 12.5 mg 2 times daily.  Will order for IV labetalol for systolic blood pressures greater than 180.   3.  Chronic pain syndrome with noted fibromyalgia:  The patient appears to be utilizing Suboxone 8-2 mg film but  also on Norco.  The patient states she is taking half a film 2 times daily of Suboxone.  As she has complaints of chest pain and mild abdominal discomfort will continue with PRN IV morphine and PRN PO Norco and will hold Suboxone.    4.  Generalized anxiety disorder with noted major depression and PTSD:  Upon review of patient's prior to admit medications, she appears to be on Zoloft 100 mg daily.  This will be resumed at this point.   5.  Prediabetes:  The patient's previous A1c was noted to be 5.6 from March of this year.  No interventions appear necessary at this point.   6.  Recent urinary tract infection:  The patient was diagnosed on 05/28/2018 at an outside facility and was prescribed oral ciprofloxacin for which the patient has not started.  Will recheck a urinalysis today and if abnormal will start the patient on ciprofloxacin.   7.  Obesity with associated obstructive sleep apnea:  The patient's BMI is calculated at 41.32 and she does indicate she is compliant with home CPAP.  Will defer ongoing management of obesity to primary care provider and would advise for lifestyle modifications.  I will order for home CPAP with home settings.   8.  Arnold-Chiari malformation resulting in hydrocephalus with subsequent  shunt placement:  This appears to be stable.  No further interventions appear necessary.   9.  Stable neuroendocrine cancer with mets to the liver:  This appears to be stable.  No further interventions appear necessary.   10.  Spina bifida.  This appears to be stable at this point.  Will resume prior to admit baclofen 10 mg 2 times daily PRN and continue with analgesic management as stated above.   11.  Deep venous thrombosis prophylaxis:  The patient has been started on a heparin drip, which will continue at this point and pharmacy will assist with dosing.      PAIN ASSESSMENT:    Current Pain Score 5/30/2018   Patient currently in pain? yes   Pain score (0-10) -   Pain location -   Pain  descriptors -   CPOT pain score -   - Ines is experiencing pain due to chest pain and known chronic pain/fibro. Pain management was discussed and the plan was created in a collaborative fashion.  Ines's response to the current recommendations: compliant.  - Please see the plan for pain management as documented above.    CODE STATUS:  Discussed with the patient, she elected to be full code.      DISPOSITION:  The patient is being admitted under inpatient status due to an NSTEMI.  The patient is currently on a heparin drip and will likely be hospitalized for a minimum of 2 evenings while undergoing continued evaluation and management.      The patient was discussed with Dr. Riki Fatima who agrees with the assessment and plan as stated above.  Dr. Fatima will evaluate the patient independently.         RIKI FATIMA MD       As dictated by ROMEO GARCIA PA-C            D: 2018   T: 2018   MT: ESPINOZA      Name:     INES GALVEZ   MRN:      [id number]        Account:      [id number]   :      1965        Admitted:     2018                   Document: [id number]       cc: Dustin Fatima Jr, MD

## 2018-05-30 NOTE — PROGRESS NOTES
Coronary angiography showed no obstructive disease.  Her elevated troponins could be related to hypertension with blood pressure elevated on admission in the hospital.  Her echo showed proximal septal hypertrophy which can be seen sometimes in patients with hypertension.  Ejection fraction was normal with possible septal hypokinesis versus normal variant.  LV gram did not show wall motion abnormalities.  Given no obstructive disease, I would continue medical therapy for blood pressure management and risk factor modification.  If blood pressure remains high, one could consider switching metoprolol to Coreg or Bystolic.  Amlodipine may be another option.  She should follow with her primary care physician for blood pressure management and other medical issues.  She believes that her symptoms could be related to the new medication, Suboxone.  Continuation of that medication is to be reevaluated by the primary service.  We will see her on as needed basis.  Recall of questions.
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
## 2018-05-30 NOTE — ED NOTES
[facility]  ED Nurse Handoff Report    ED Chief complaint: Chest Pain (Chest tightness started this evening. Nausea/clammy.)      ED Diagnosis:   Final diagnoses:   NSTEMI (non-ST elevated myocardial infarction) (H)   History of ventriculoperitoneal shunting   Other chronic pain       Code Status: Full Code    Allergies:   Allergies   Allergen Reactions     Ativan [Lorazepam] Anaphylaxis     Macrobid [Nitrofurantoin] Anaphylaxis     Amoxicillin      Buspirone      Buspar     Busulfan Hives     Edema     Cefaclor Hives     Cephalosporins      Ciprofloxacin      Clindamycin Itching     Dilaudid [Hydromorphone]      Diphenhydramine Hcl      Benadryl     Imitrex [Sumatriptan Succinate]      blood pressure raised uncontrobably     Ketorolac Tromethamine      Toradol     Lansoprazole      Prevacid     Lansoprazole      Prevacid     Latex      Converted from LW Latex Sensitivity Flag     Metoclopramide Hives     Reglan     Paroxetine      Paxil     Penicillins      Prednisone Hives     Red Dye      Sulfa Drugs      THICKENED AND DIFFICULTY SWALLOWING      Lidoderm [Lidocaine] Rash     Localized rash at patch site       Activity level - Baseline/Home:  Independent    Activity Level - Current:   Independent     Needed?: No    Isolation: No  Infection: Not Applicable    Bariatric?: No    Vital Signs:   Vitals:    05/30/18 0620 05/30/18 0629 05/30/18 0630 05/30/18 0638   BP: (!) 161/98 (!) 149/94  (!) 155/100   Pulse:       Resp:    18   Temp:       TempSrc:       SpO2: 94%  95% 97%   Weight:       Height:           Cardiac Rhythm: ,        Pain level: 0-10 Pain Scale: 5    Is this patient confused?: No   Suicidality: Screened negative    Patient Report: Initial Complaint: Pt presents with left sided chest pain, diaphoresis, and nausea/vomiting that started this AM. Pt has had intermittent nausea and feeling unwell for last week as she started on suboxone.  Focused Assessment: Pt with left sided  chest pain that waxes/wanes in severity. Nauseated and intermittent dry heaving.  Tests Performed: labs and x-rays  Abnormal Results: troponin 0.113, WBC 11.6  Treatments provided: 4mg zofran, 12.5mg phenergan, which resolved nausea. 2 nitro which resolved chest pain. 324mg ASA, 1L NS. Placed on 2L O2 as O2 sat dropped to 91% on RA. Heparin gtt    Family Comments: Contacted  via phone.    OBS brochure/video discussed/provided to patient: N/A    ED Medications:   Medications   ondansetron (ZOFRAN) injection 4 mg (4 mg Intravenous Given 5/30/18 0620)   heparin infusion 25,000 units in 0.45% NaCl 250 mL (1,000 Units/hr Intravenous New Bag 5/30/18 0636)   nitroGLYcerin (NITROSTAT) sublingual tablet 0.4 mg (0.4 mg Sublingual Given 5/30/18 0639)   0.9% sodium chloride BOLUS (1,000 mLs Intravenous New Bag 5/30/18 0627)   promethazine (PHENERGAN) IV injection 12.5 mg (12.5 mg Intravenous Given 5/30/18 0626)   aspirin chewable tablet 324 mg (324 mg Oral Given 5/30/18 0632)   heparin Loading Dose bolus dose from infusion pump 5,000 Units (5,000 Units Intravenous Given 5/30/18 0637)       Drips infusing?:  Yes-heparin    For the majority of the shift this patient was Green.   Interventions performed were none.    Severe Sepsis OR Septic Shock Diagnosis Present: No      ED NURSE PHONE NUMBER: [phone]

## 2018-05-30 NOTE — PLAN OF CARE
Problem: Patient Care Overview  Goal: Plan of Care/Patient Progress Review  OT/CR: Order rec'd however patient just admitted with possible NSTEMI. Holding eval until plan in place.

## 2018-05-30 NOTE — PROVIDER NOTIFICATION
MD Notification    Notified Person: MD hospitalist    Notified Person Name: Asael Arce    Notification Date/Time: 0830 5/30/2018    Notification Interaction: Telephone     Purpose of Notification: Pt having 2/10 chest pain, nausea. SL NTG given X 2 with no change in level of pain. 12 lead ECG unchanged from previous.     Orders Received: Try PRN morphine; call if no relief in pain.     Comments:

## 2018-05-30 NOTE — IP AVS SNAPSHOT
MRN:[id number]                      After Visit Summary   5/30/2018    Ines Mott    MRN: [id number]           Thank you!     Thank you for choosing [facility] for your care. Our goal is always to provide you with excellent care. Hearing back from our patients is one way we can continue to improve our services. Please take a few minutes to complete the written survey that you may receive in the mail after you visit with us. Thank you!        Patient Information     Date Of Birth          1965        Designated Caregiver       Most Recent Value    Caregiver    Will someone help with your care after discharge? yes    Name of designated caregiver Aj Mott    Phone number of caregiver see chart    Caregiver address see chart      About your hospital stay     You were admitted on:  May 30, 2018 You last received care in the:  [facility]    You were discharged on:  May 31, 2018        Reason for your hospital stay       You were admitted with chest pain and underwent coronary angiogram                  Who to Call     For medical emergencies, please call 911.  For non-urgent questions about your medical care, please call your primary care provider or clinic, [phone]          Attending Provider     Provider Specialty    Jason Sorto MD Emergency Medicine    [facility], Addison Morales MD Internal Medicine       Primary Care Provider Office Phone # Fax #    Dustin Rina Fatima [phone]       When to contact your care team       Call your primary doctor if you have any of the following: Chest pain or shortness of breath or palpitations.                  After Care Instructions     Activity       Your activity upon discharge: activity as tolerated            Diet       low-salt, low-fat, low carb diet.            Discharge Instructions       Strongly recommend risk factor modification.  Consider aggressive lifestyle modification  with diet and exercise.  Monitor home blood pressures and review on PCP visit for optimization of antihypertensive regimen.   Encourage hold if systolic blood pressure less than hundred on heart rate hold if systolic blood pressure   Encouraged compliance to medications  Suboxone on hold as patient feels her symptoms could be related to it. Discussed with primary care provider for the need for Suboxone.  Follow-up with PCP within five days.                  Follow-up Appointments     Follow-up and recommended labs and tests        Follow up with primary care provider, OTTO YUSUF, within 7 days for hospital follow- up.  The following labs/tests are recommended: bmp.                  Your next 10 appointments already scheduled     Jun 01, 2018  8:40 AM CDT   [facility]    [address]  [address]  [place] [address]   [phone]            Jun 05, 2018 10:30 AM CDT   New Visit with KELVIN MA VISIT   [facility] ([facility])    [address]  [address]  [place] [address]   [phone]            Jun 05, 2018 11:00 AM CDT   New Visit with MARTIN Pineda   [facility] ([facility])    [address]  [address]  [place] [address]   [phone]            Jun 05, 2018 11:00 AM CDT   New Visit with MICHEAL Crowley CNP   [facility] ([facility])    [address]  [address]  [place] [address]   [phone]              Further instructions from your care team       Cardiac Angioplasty/Stent Discharge Instructions - Femoral & Radial    After you go home:      Have an adult stay with you until tomorrow.    Drink extra fluids for 2 days.    You may  resume your normal diet.    No smoking       For 24 hours - due to the sedation you received:    Relax and take it easy.    Do NOT make any important or legal decisions.    Do NOT drive or operate machines at home or at work.    Do NOT drink alcohol.    Care of Wrist & Groin Puncture Sites:      For the first 24 hrs - check the puncture site every 1-2 hours while awake.    For 2 days, when you cough, sneeze, laugh or move your bowels,  hold your hand over the puncture site and press firmly on/above the site    Remove the bandaid after 24 hours. If there is minor oozing, apply another bandaid and remove it after 12 hours.    It is normal to have a small bruise or pea size lump at the site.    You may shower tomorrow. Do NOT take a bath, or use a hot tub or pool for at least 3 days. Do NOT scrub the site. Do not use lotion or powder near the puncture site.    Activity - For 2 days:    Wrist site:      do not use your hand or arm to support your weight (such as rising from a chair)     do not bend your wrist (such as lifting a garage door).    do not lift more than 5 pounds or exercise your arm (such as tennis, golf or bowling).    Do NOT do any heavy activity such as exercise, lifting, or straining.     Groin site:        No stooping or squatting    Do NOT do any heavy activity such as exercise, lifting, or straining.     No housework, yard work or any activity that make you sweat    Do NOT lift more than 10 pounds    Bleeding:    If you start bleeding from the site in your wrist:      Sit down and press firmly on/above the site with your fingers for 10 minutes.     Once bleeding stops, keep your arm still for 2 hours.     Call [facility] as soon as you can.    If you start bleeding from the site in your groin:      Lie down flat and press firmly on/above the site for 10 minutes.    Once bleeding stops, lay flat for 2 hours.     Call [facility] as soon as you can.    Call 911 right away if you have heavy bleeding or  bleeding that does not stop.      Medicines:      If you are taking antiplatelet medications such as Plavix, Brilinta or Effient, do not stop taking it until you talk to your cardiologist.        If you are on Metformin (Glucophage), do not restart it until you have blood tests (within 2 to 3 days after discharge).  After you have your blood drawn, you may restart the Metformin.     Take your medications, including blood thinners, unless your provider tells you not to.  If you take Coumadin (Warfarin), have your INR checked by your provider in  3-5 days. Call your clinic to schedule this.    If you have stopped any medicines, check with your provider about when to restart them.    Follow Up Appointments:      Follow up with [facility] Nurse Practitioner at [facility] of patient preference in 7-10 days.    Cardiac Rehab will contact you for follow up care.    Call the clinic if:      You have increased pain or a large or growing hard lump around the site.    The site is red, swollen, hot or tender.    Blood or fluid is draining from the site.    You have chills or a fever greater than 101 F (38 C).    Your arm or leg feels numb, cool or changes color.    You have hives, a rash or unusual itching.    New pain in the back or belly that you cannot control with Tylenol.    Any questions or concerns.    Other Instructions:      If you received a stent - carry your stent card with you at all times.        [facility] [place]:    [phone] [facility] (7 days a week)            Pending Results     No orders found for last 3 day(s).            Statement of Approval     Ordered          05/31/18 1557  I have reviewed and agree with all the recommendations and orders detailed in this document.  EFFECTIVE NOW     Approved and electronically signed by:  Evelyn Bruno MD             Admission Information     Date & Time Provider Department Dept. Phone    5/30/2018 Addison Fatima MD  "[facility] [phone]      Your Vitals Were     Blood Pressure Pulse Temperature Respirations Height Weight    132/79 (BP Location: Right arm) 103 97.8  F (36.6  C) (Oral) 18 1.676 m (5' 6\") 116.6 kg (257 lb 1.6 oz)    Pulse Oximetry BMI (Body Mass Index)                93% 41.5 kg/m2          [employer] Information     [employer] lets you send messages to your doctor, view your test results, renew your prescriptions, schedule appointments and more. To sign up, go to [email or web address] . Click on \"Log in\" on the left side of the screen, which will take you to the Welcome page. Then click on \"Sign up Now\" on the right side of the page.     You will be asked to enter the access code listed below, as well as some personal information. Please follow the directions to create your username and password.     Your access code is: [email or web address]  Expires: 2018  9:19 AM     Your access code will  in 90 days. If you need help or a new code, please call your [place] clinic or [phone].        Care EveryWhere ID     This is your Care EveryWhere ID. This could be used by other organizations to access your [place] medical records  [id number]        Equal Access to Services     VALENTE RIVERA AH: Hadii casimiro keller Soevangelina, waaxda luqadaha, qaybta kaalmada mary, emilee coyne. So [facility] [phone].    ATENCIÓN: Si habla español, tiene a christianson disposición servicios gratuitos de asistencia lingüística. Sabine al [phone].    We comply with applicable federal civil rights laws and [place] laws. We do not discriminate on the basis of race, color, national origin, age, disability, sex, sexual orientation, or gender identity.               Review of your medicines      START taking        Dose / Directions    metoprolol tartrate 25 MG tablet   Commonly known as:  LOPRESSOR   Used for:  Hypertensive urgency        Dose:  25 mg   Take 1 tablet (25 mg) by " mouth 2 times daily Hold IF Heart Rate less than 55 or IF systolic blood pressure less than 95 mmHg   Quantity:  30 tablet   Refills:  0         CONTINUE these medicines which may have CHANGED, or have new prescriptions. If we are uncertain of the size of tablets/capsules you have at home, strength may be listed as something that might have changed.        Dose / Directions    sertraline 100 MG tablet   Commonly known as:  ZOLOFT   Indication:  Major Depressive Disorder   This may have changed:  how much to take   Used for:  Recurrent major depression in partial remission (H), Other constipation        Dose:  150 mg   Take 1.5 tablets (150 mg) by mouth daily   Quantity:  45 tablet   Refills:  11         CONTINUE these medicines which have NOT CHANGED        Dose / Directions    albuterol (2.5 MG/3ML) 0.083% neb solution   Used for:  Mild intermittent asthma with acute exacerbation        Dose:  1 vial   Take 1 vial (2.5 mg) by nebulization every 6 hours as needed for shortness of breath / dyspnea or wheezing   Quantity:  30 vial   Refills:  3       ASPIRIN NOT PRESCRIBED   Commonly known as:  INTENTIONAL   Used for:  Aspirin intolerance        Please choose reason not prescribed, below   Quantity:  1 each   Refills:  0       BACLOFEN PO        Dose:  10 mg   Take 10 mg by mouth 2 times daily as needed for muscle spasms   Refills:  0       blood glucose calibration solution   Commonly known as:  no brand specified   Used for:  Type 2 diabetes mellitus without complication, without long-term current use of insulin (H)        Use to calibrate blood glucose monitor as directed.   Quantity:  1 each   Refills:  0       blood glucose lancets standard   Commonly known as:  no brand specified   Used for:  Type 2 diabetes mellitus without complication, without long-term current use of insulin (H)        Use to test blood sugar daily times daily or as directed.   Quantity:  100 each   Refills:  11       blood glucose  monitoring meter device kit   Commonly known as:  no brand specified   Used for:  Type 2 diabetes mellitus without complication, without long-term current use of insulin (H)        Use to test blood sugar larisa times daily or as directed.   Quantity:  1 kit   Refills:  0       blood glucose monitoring test strip   Commonly known as:  no brand specified   Used for:  Type 2 diabetes mellitus without complication, without long-term current use of insulin (H)        Use to test blood sugars once times daily or as directed   Quantity:  100 strip   Refills:  3       cloNIDine 0.1 MG tablet   Commonly known as:  CATAPRES   Used for:  Hypertension goal BP (blood pressure) < 140/80        Dose:  0.1 mg   Take 1 tablet (0.1 mg) by mouth 2 times daily   Quantity:  60 tablet   Refills:  3       cyanocobalamin 1000 MCG/ML injection   Commonly known as:  VITAMIN B12   Used for:  B12 deficiency        Dose:  1 mL   Inject 1 mL (1,000 mcg) Subcutaneous every 30 days   Quantity:  1 mL   Refills:  11       EPINEPHrine 0.3 MG/0.3ML injection 2-pack   Commonly known as:  EPIPEN/ADRENACLICK/or ANY BX GENERIC EQUIV   Used for:  Late effect of other and unspecified external causes        Dose:  0.3 mg   Inject 0.3 mLs (0.3 mg) into the muscle once as needed for anaphylaxis   Quantity:  2 each   Refills:  5       HYDROcodone-acetaminophen 5-325 MG per tablet   Commonly known as:  NORCO   Used for:  Restless legs syndrome, Chronic pain syndrome        Dose:  1 tablet   Take 1 tablet by mouth every 6 hours as needed for severe pain   Quantity:  30 tablet   Refills:  0       losartan-hydrochlorothiazide 100-25 MG per tablet   Commonly known as:  HYZAAR   Used for:  Hypertension goal BP (blood pressure) < 140/80        Dose:  1 tablet   Take 1 tablet by mouth daily   Quantity:  90 tablet   Refills:  3       magnesium oxide 400 (241.3 Mg) MG tablet   Commonly known as:  MAG-OX   Used for:  Cramp of limb        Dose:  400 mg   Take 1 tablet (400  mg) by mouth daily   Quantity:  90 tablet   Refills:  3       naloxone nasal spray   Commonly known as:  NARCAN   Used for:  Chronic pain syndrome        Dose:  4 mg   Spray 1 spray (4 mg) into one nostril alternating nostrils as needed for opioid reversal every 2-3 minutes until assistance arrives   Quantity:  0.2 mL   Refills:  0       ondansetron 4 MG ODT tab   Commonly known as:  ZOFRAN-ODT        Dose:  4 mg   Take 1 tablet (4 mg) by mouth every 8 hours as needed for nausea   Quantity:  10 tablet   Refills:  1       order for DME   Used for:  Carpal tunnel syndrome of right wrist        Right wrist splint for carpal tunnel syndrome  One* Use as directed at hour of sleep   Quantity:  1 Device   Refills:  1       polyethylene glycol powder   Commonly known as:  MIRALAX/GLYCOLAX   Used for:  Slow transit constipation        TAKE 17 GRAMS (1 CAPFUL) BY MOUTH DAILY   Quantity:  1581 g   Refills:  11       potassium chloride 10 MEQ tablet   Commonly known as:  K-TAB,KLOR-CON   Indication:  Low Amount of Potassium in the Blood   Used for:  Hypokalemia        Dose:  10 mEq   Take 1 tablet (10 mEq) by mouth daily   Quantity:  120 tablet   Refills:  11       STATIN NOT PRESCRIBED (INTENTIONAL)   Used for:  Hyperlipidemia LDL goal <100, Type 2 diabetes mellitus without complication (H)        Dose:  1 each   1 each daily Statin not prescribed intentionally due to Active liver disease (liver failure, cirrhosis, hepatitis) LIVER METS   Quantity:  0 each   Refills:  0       Vitamin D (Cholecalciferol) 1000 units Tabs   Used for:  Vitamin D deficiency        Dose:  2000 Units   Take 2,000 Units by mouth daily   Quantity:  60 tablet   Refills:  11         STOP taking     buprenorphine HCl-naloxone HCl 8-2 MG per film   Commonly known as:  SUBOXONE                Where to get your medicines      These medications were sent to [facility] [place], [place] - [address]  [address], [place] [address]      Phone:  [phone]     metoprolol tartrate 25 MG tablet                Protect others around you: Learn how to safely use, store and throw away your medicines at www.disposemymeds.org.             Medication List: This is a list of all your medications and when to take them. Check marks below indicate your daily home schedule. Keep this list as a reference.      Medications           Morning Afternoon Evening Bedtime As Needed    albuterol (2.5 MG/3ML) 0.083% neb solution   Take 1 vial (2.5 mg) by nebulization every 6 hours as needed for shortness of breath / dyspnea or wheezing                                   ASPIRIN NOT PRESCRIBED   Commonly known as:  INTENTIONAL   Please choose reason not prescribed, below                                BACLOFEN PO   Take 10 mg by mouth 2 times daily as needed for muscle spasms   Last time this was given:  10 mg on 5/31/2018  9:02 AM                                   blood glucose calibration solution   Commonly known as:  no brand specified   Use to calibrate blood glucose monitor as directed.                                   blood glucose lancets standard   Commonly known as:  no brand specified   Use to test blood sugar daily times daily or as directed.                                   blood glucose monitoring meter device kit   Commonly known as:  no brand specified   Use to test blood sugar larisa times daily or as directed.                                   blood glucose monitoring test strip   Commonly known as:  no brand specified   Use to test blood sugars once times daily or as directed                                   cloNIDine 0.1 MG tablet   Commonly known as:  CATAPRES   Take 1 tablet (0.1 mg) by mouth 2 times daily   Last time this was given:  0.1 mg on 5/31/2018  9:01 AM                                      cyanocobalamin 1000 MCG/ML injection   Commonly known as:  VITAMIN B12   Inject 1 mL (1,000 mcg) Subcutaneous every 30 days                                 EPINEPHrine 0.3 MG/0.3ML injection 2-pack   Commonly known as:  EPIPEN/ADRENACLICK/or ANY BX GENERIC EQUIV   Inject 0.3 mLs (0.3 mg) into the muscle once as needed for anaphylaxis                                   HYDROcodone-acetaminophen 5-325 MG per tablet   Commonly known as:  NORCO   Take 1 tablet by mouth every 6 hours as needed for severe pain   Last time this was given:  2 tablets on 5/31/2018 12:56 PM                                   losartan-hydrochlorothiazide 100-25 MG per tablet   Commonly known as:  HYZAAR   Take 1 tablet by mouth daily   Last time this was given:  1 tablet on 5/31/2018  9:01 AM   Next Dose Due:  Tomorrow 6/1/2018                                   magnesium oxide 400 (241.3 Mg) MG tablet   Commonly known as:  MAG-OX   Take 1 tablet (400 mg) by mouth daily                                   metoprolol tartrate 25 MG tablet   Commonly known as:  LOPRESSOR   Take 1 tablet (25 mg) by mouth 2 times daily Hold IF Heart Rate less than 55 or IF systolic blood pressure less than 95 mmHg   Last time this was given:  12.5 mg on 5/31/2018  9:01 AM   Next Dose Due:  Tonight 5/31/2018.                                      naloxone nasal spray   Commonly known as:  NARCAN   Spray 1 spray (4 mg) into one nostril alternating nostrils as needed for opioid reversal every 2-3 minutes until assistance arrives                                   ondansetron 4 MG ODT tab   Commonly known as:  ZOFRAN-ODT   Take 1 tablet (4 mg) by mouth every 8 hours as needed for nausea                                   order for DME   Right wrist splint for carpal tunnel syndrome  One* Use as directed at hour of sleep                                   polyethylene glycol powder   Commonly known as:  MIRALAX/GLYCOLAX   TAKE 17 GRAMS (1 CAPFUL) BY MOUTH DAILY   Next Dose Due:  Tomorrow 6/1/2018.                                   potassium chloride 10 MEQ tablet   Commonly known as:  K-TAB,KLOR-CON   Take 1 tablet (10 mEq)  by mouth daily                                   sertraline 100 MG tablet   Commonly known as:  ZOLOFT   Take 1.5 tablets (150 mg) by mouth daily                                   STATIN NOT PRESCRIBED (INTENTIONAL)   1 each daily Statin not prescribed intentionally due to Active liver disease (liver failure, cirrhosis, hepatitis) LIVER METS                                Vitamin D (Cholecalciferol) 1000 units Tabs   Take 2,000 Units by mouth daily

## 2018-05-30 NOTE — IP AVS SNAPSHOT
[facility]    [address] [place] [address]., [address]    [place] [address]    Phone:  [phone]                                       After Visit Summary   5/30/2018    Ines Mott    MRN: [id number]           After Visit Summary Signature Page     I have received my discharge instructions, and my questions have been answered. I have discussed any challenges I see with this plan with the nurse or doctor.    ..........................................................................................................................................  Patient/Patient Representative Signature      ..........................................................................................................................................  Patient Representative Print Name and Relationship to Patient    ..................................................               ................................................  Date                                            Time    ..........................................................................................................................................  Reviewed by Signature/Title    ...................................................              ..............................................  Date                                                            Time

## 2018-05-31 VITALS
HEIGHT: 66 IN | DIASTOLIC BLOOD PRESSURE: 79 MMHG | TEMPERATURE: 97.8 F | RESPIRATION RATE: 18 BRPM | BODY MASS INDEX: 41.32 KG/M2 | WEIGHT: 257.1 LBS | SYSTOLIC BLOOD PRESSURE: 132 MMHG | HEART RATE: 103 BPM | OXYGEN SATURATION: 93 %

## 2018-05-31 LAB
CHOLEST SERPL-MCNC: 173 MG/DL
HDLC SERPL-MCNC: 52 MG/DL
INTERPRETATION ECG - MUSE: NORMAL
LDLC SERPL CALC-MCNC: 104 MG/DL
NONHDLC SERPL-MCNC: 121 MG/DL
TRIGL SERPL-MCNC: 86 MG/DL

## 2018-05-31 PROCEDURE — 25000128 H RX IP 250 OP 636: Performed by: INTERNAL MEDICINE

## 2018-05-31 PROCEDURE — 93005 ELECTROCARDIOGRAM TRACING: CPT

## 2018-05-31 PROCEDURE — 93010 ELECTROCARDIOGRAM REPORT: CPT | Performed by: INTERNAL MEDICINE

## 2018-05-31 PROCEDURE — 25000132 ZZH RX MED GY IP 250 OP 250 PS 637: Performed by: PHYSICIAN ASSISTANT

## 2018-05-31 PROCEDURE — 99239 HOSP IP/OBS DSCHRG MGMT >30: CPT | Performed by: HOSPITALIST

## 2018-05-31 PROCEDURE — 25000128 H RX IP 250 OP 636: Performed by: PHYSICIAN ASSISTANT

## 2018-05-31 PROCEDURE — 25000132 ZZH RX MED GY IP 250 OP 250 PS 637: Performed by: INTERNAL MEDICINE

## 2018-05-31 PROCEDURE — 25000131 ZZH RX MED GY IP 250 OP 636 PS 637: Performed by: PHYSICIAN ASSISTANT

## 2018-05-31 RX ORDER — METOPROLOL TARTRATE 25 MG/1
25 TABLET, FILM COATED ORAL 2 TIMES DAILY
Qty: 30 TABLET | Refills: 0 | Status: SHIPPED | OUTPATIENT
Start: 2018-05-31 | End: 2018-08-27

## 2018-05-31 RX ORDER — METOPROLOL TARTRATE 25 MG/1
25 TABLET, FILM COATED ORAL 2 TIMES DAILY
Qty: 30 TABLET | Refills: 0 | Status: SHIPPED | OUTPATIENT
Start: 2018-05-31 | End: 2018-05-31

## 2018-05-31 RX ORDER — PROCHLORPERAZINE 25 MG
25 SUPPOSITORY, RECTAL RECTAL EVERY 12 HOURS PRN
Status: DISCONTINUED | OUTPATIENT
Start: 2018-05-31 | End: 2018-05-31 | Stop reason: HOSPADM

## 2018-05-31 RX ADMIN — LABETALOL HYDROCHLORIDE 10 MG: 5 INJECTION, SOLUTION INTRAVENOUS at 04:08

## 2018-05-31 RX ADMIN — LOSARTAN POTASSIUM AND HYDROCHLOROTHIAZIDE 1 TABLET: 100; 25 TABLET, FILM COATED ORAL at 09:01

## 2018-05-31 RX ADMIN — PROCHLORPERAZINE EDISYLATE 5 MG: 5 INJECTION INTRAMUSCULAR; INTRAVENOUS at 06:57

## 2018-05-31 RX ADMIN — POLYETHYLENE GLYCOL 3350 17 G: 17 POWDER, FOR SOLUTION ORAL at 09:01

## 2018-05-31 RX ADMIN — HYDROCODONE BITARTRATE AND ACETAMINOPHEN 1 TABLET: 5; 325 TABLET ORAL at 00:47

## 2018-05-31 RX ADMIN — HYDROCODONE BITARTRATE AND ACETAMINOPHEN 2 TABLET: 5; 325 TABLET ORAL at 09:01

## 2018-05-31 RX ADMIN — HYDROCODONE BITARTRATE AND ACETAMINOPHEN 2 TABLET: 5; 325 TABLET ORAL at 04:15

## 2018-05-31 RX ADMIN — HYDROCODONE BITARTRATE AND ACETAMINOPHEN 2 TABLET: 5; 325 TABLET ORAL at 12:56

## 2018-05-31 RX ADMIN — ONDANSETRON 4 MG: 2 INJECTION INTRAMUSCULAR; INTRAVENOUS at 02:31

## 2018-05-31 RX ADMIN — Medication 12.5 MG: at 09:01

## 2018-05-31 RX ADMIN — BACLOFEN 10 MG: 10 TABLET ORAL at 09:02

## 2018-05-31 RX ADMIN — CLONIDINE HYDROCHLORIDE 0.1 MG: 0.1 TABLET ORAL at 09:01

## 2018-05-31 ASSESSMENT — PAIN DESCRIPTION - DESCRIPTORS
DESCRIPTORS: ACHING
DESCRIPTORS: SHARP

## 2018-05-31 NOTE — PROGRESS NOTES
Pt was only able to tolerated CPAP 8 21%, for part of the night. Will continue to follow.  5/31/2018  Afshan Dimas

## 2018-05-31 NOTE — PROGRESS NOTES
VSS. Tele shows sinus rhythm. Pt denies CP or SOB. Discharge instructions, medication indications/side effect, and follow up instructions discussed w/ pt. All questions answered. All pt belongings are accounted for and sent home w/ pt. Handouts given. Pt could not wait for prescription of metoprolol; [facility] in [place] per Dr. Bruno. Pt left floor via wheelchair and transport aide escort, and was driven home by .

## 2018-05-31 NOTE — PLAN OF CARE
Problem: Patient Care Overview  Goal: Plan of Care/Patient Progress Review  Discharge Planner OT   Patient plan for discharge: N/A   Current status: Orders rec'd and chart reviewed for OT/CR eval. Pt admitted due to chest pain and elevated troponins, pt s/p angio showed no obstructive disease.  Her elevated troponins could be related to hypertension with blood pressure elevated on admission in the hospital, per chart. Spoke with cardiologist and nurse and in agreement that OT/CR not needed and will complete OT/CR orders.   Barriers to return to prior living situation: N/A  Recommendations for discharge: N/A  Rationale for recommendations: N/A       Entered by: Donna Montelongo 05/31/2018 12:17 PM

## 2018-05-31 NOTE — DISCHARGE INSTRUCTIONS
Cardiac Angioplasty/Stent Discharge Instructions - Femoral & Radial    After you go home:      Have an adult stay with you until tomorrow.    Drink extra fluids for 2 days.    You may resume your normal diet.    No smoking       For 24 hours - due to the sedation you received:    Relax and take it easy.    Do NOT make any important or legal decisions.    Do NOT drive or operate machines at home or at work.    Do NOT drink alcohol.    Care of Wrist & Groin Puncture Sites:      For the first 24 hrs - check the puncture site every 1-2 hours while awake.    For 2 days, when you cough, sneeze, laugh or move your bowels,  hold your hand over the puncture site and press firmly on/above the site    Remove the bandaid after 24 hours. If there is minor oozing, apply another bandaid and remove it after 12 hours.    It is normal to have a small bruise or pea size lump at the site.    You may shower tomorrow. Do NOT take a bath, or use a hot tub or pool for at least 3 days. Do NOT scrub the site. Do not use lotion or powder near the puncture site.    Activity - For 2 days:    Wrist site:      do not use your hand or arm to support your weight (such as rising from a chair)     do not bend your wrist (such as lifting a garage door).    do not lift more than 5 pounds or exercise your arm (such as tennis, golf or bowling).    Do NOT do any heavy activity such as exercise, lifting, or straining.     Groin site:        No stooping or squatting    Do NOT do any heavy activity such as exercise, lifting, or straining.     No housework, yard work or any activity that make you sweat    Do NOT lift more than 10 pounds    Bleeding:    If you start bleeding from the site in your wrist:      Sit down and press firmly on/above the site with your fingers for 10 minutes.     Once bleeding stops, keep your arm still for 2 hours.     Call [facility] as soon as you can.    If you start bleeding from the site in your groin:      Lie down flat and  press firmly on/above the site for 10 minutes.    Once bleeding stops, lay flat for 2 hours.     Call [facility] as soon as you can.    Call 911 right away if you have heavy bleeding or bleeding that does not stop.      Medicines:      If you are taking antiplatelet medications such as Plavix, Brilinta or Effient, do not stop taking it until you talk to your cardiologist.        If you are on Metformin (Glucophage), do not restart it until you have blood tests (within 2 to 3 days after discharge).  After you have your blood drawn, you may restart the Metformin.     Take your medications, including blood thinners, unless your provider tells you not to.  If you take Coumadin (Warfarin), have your INR checked by your provider in  3-5 days. Call your clinic to schedule this.    If you have stopped any medicines, check with your provider about when to restart them.    Follow Up Appointments:      Follow up with [facility] Nurse Practitioner at [facility] of patient preference in 7-10 days.    Cardiac Rehab will contact you for follow up care.    Call the clinic if:      You have increased pain or a large or growing hard lump around the site.    The site is red, swollen, hot or tender.    Blood or fluid is draining from the site.    You have chills or a fever greater than 101 F (38 C).    Your arm or leg feels numb, cool or changes color.    You have hives, a rash or unusual itching.    New pain in the back or belly that you cannot control with Tylenol.    Any questions or concerns.    Other Instructions:      If you received a stent - carry your stent card with you at all times.        [facility] [place]:    [phone] [facility] (7 days a week)

## 2018-05-31 NOTE — DISCHARGE SUMMARY
Discharge Summary  Hospitalist    Date of Admission:  5/30/2018  Date of Discharge:  5/31/2018  Discharging Provider: Evelyn Bruno MD    Primary Care Physician   OTTO YUSUF  Primary Care Provider Phone Number: [phone]  Primary Care Provider Fax Number: [phone]    PRINCIPAL DIAGNOSIS  Chest pain resolved.  Positive troponin from demand in the setting of malignant hypertension.    Past Medical History:   Diagnosis Date     Arthritis      Asthma      Cancer (H)      Chronic pain syndrome     has ED care plan     Depressive disorder      Diabetes (H)      Hydrocephalus     s/p  shunt     Hypertension      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)     mets to liver     Methamphetamine abuse      Migraines      Mild intermittent asthma 9/22/2015     Obesity      Right renal mass      Spina bifida        History of Present Illness   Ines Mott is an 53 year old female who presented with chest pain    Hospital Course   Ines Mott is a 53-year-old female with medical history significant for spina bifida, chronic pain syndrome, fibromyalgia, prediabetes, essential hypertension, generalized anxiety disorder, major depressive disorder, PTSD, Arnold-Chiari malformation resulting in hydrocephalus with subsequent  shunt placement, stable neuroendocrine cancer with mets to the liver, obesity, obstructive sleep apnea and recent urinary tract infection admitted to [facility] on 05/30/2018 due to chest pain.  Of note, the patient has been seen multiple times in different emergency departments within this last several weeks.  Please refer to history and physical for details.    Chest pain resolved.  Positive troponin from demand in the setting of malignant hypertension.  At the time of admission systolic blood pressures between 180-190.  Creatinine of 0.75 with a GFR of 81, glucose of 146  Lipase level 91. white count of 11.6, hemoglobin of 13.1, a platelet count of 288  chest  x-ray no evidence of active cardiopulmonary disease.   EKG  normal sinus rhythm without ischemic changes.T-wave inversions in leads III, aVF and some flattening of T-wave in V1.    Troponin trend from 0.113 > 0.157> 0.136  Received a liter of IV fluids a full dose oral aspirin, started on a heparin drip with initial loading dos  2 sublingual tablets of nitroglycerin and 4 mg of IV Zofran as well as 12.5 mg of IV Phenergan  subsequently had improvement in symptoms.    Continued on telemetry monitoring with no acute events.  Echo showed proximal septal hypertrophy,  Ejection fraction was normal with possible septal hypokinesis versus normal variant.  Coronary angiography showed no obstructive disease.LV gram did not show wall motion abnormalities.  Given no obstructive disease cardiology recommended medical therapy for blood pressure management and risk factor modification.    Started on oral metoprolol for malignant hypertension and blood pressure improved with systolic in 130 -150. Have strongly emphasized on the need for close blood pressure monitoring as outpatient and review home blood pressure readings on PCP visit for optimization of antihypertensive regimen. Also emphasized on the need for compliance to medication.  PTA clonidine 0.1 mg daily, Hyzaar 100/25 mg tablet daily continued.  Have also held Suboxone as patient feels her symptoms could likely be related to Suboxone therapy.  HDL 52, . Has history of liver disease/not tolerated statins in the past and hence statin therapy not prescribed at the time of discharge.    Malignant hypertension  at the time of admission systolic blood pressures fluctuating between 180- 190.  PTA clonidine/Hyzaar continued.  Started on metoprolol 25 mg b.i.d.  Close monitoring of blood pressures and review on PCP visit for optimization of medical therapy.  /Noncompliance for medications    Chronic pain syndrome with noted fibromyalgia:    The patient appears to be  utilizing Suboxone 8-2 mg film but also on Norco.    The patient states she is taking half a film 2 times daily of Suboxone.    As she has complaints of chest pain recommended to continue PTA Norco and  Suboxone on hold.    Generalized anxiety disorder with major depression and PTSD:    continue PTA Zoloft 100 mg daily.      Prediabetes:    The patient's previous A1c was noted to be 5.6 from March of this year.    No interventions appear necessary at this point.  Consider aggressive lifestyle modification.    Recent urinary tract infection:   The patient was diagnosed on 05/28/2018 at an outside facility and was prescribed oral ciprofloxacin for which the patient has not started.    UA showed nitrite negative, leukocyte esterase negative, too WBCs.   Discussed no need for antibiotics at this time.   Adequate oral hydration, timed voiding recommended.     Medically complicated obesity   obstructive sleep apnea:    The patient's BMI is calculated at 41.32   continue PTA CPAP with home settings.  Consider lifestyle modification with diet and exercise.    Arnold-Chiari malformation resulting in hydrocephalus with subsequent  shunt placement:   This appears to be stable.  No further interventions appear necessary.     Stable neuroendocrine cancer with mets to the liver:    This appears to be stable.  No further interventions appear necessary.     Spina bifida.   This appears to be stable at this point.  Continued prior to admit baclofen 10 mg 2 times daily PRN and as needed norco    # Discharge Pain Plan:   - During her hospitalization, Ines experienced pain due to chronic back pain.  The pain plan for discharge was discussed with Ines and the plan was created in a collaborative fashion.    - Chronic/continued opioids: PTA Norco to be continued with bowel regimen.  Patient and interdisciplinary team involved in care and agree with plan.    Evelyn Bruno MD    Pending Results   Unresulted Labs Ordered in the  Past 30 Days of this Admission     No orders found for last 61 day(s).        Physical Exam   Vitals:    05/30/18 0457 05/30/18 0746 05/31/18 0230   Weight: 116.1 kg (256 lb) 117.8 kg (259 lb 9.6 oz) 116.6 kg (257 lb 1.6 oz)     Vital Signs with Ranges  Temp:  [97.8  F (36.6  C)-98.7  F (37.1  C)] 97.8  F (36.6  C)  Heart Rate:  [54-90] 54  Resp:  [16-18] 18  BP: (103-184)/() 132/79  SpO2:  [88 %-96 %] 93 %  I/O last 3 completed shifts:  In: 1370 [P.O.:1370]  Out: 3250 [Urine:2850; Emesis/NG output:400]  PHYSICAL EXAM  GENERAL: Patient is in no distress. Alert and oriented.  HEART: Regular rate and rhythm. S1S2. No murmurs  LUNGS: bilateral decreased breath sounds, no wheezing no crackles.  ABDOMEN: Soft, no abdominal tenderness, bowel sounds heard   NEURO: moving all extremities.  EXTREMITIES: trace pedal edema. 2+ peripheral pulses.  SKIN: Warm, dry. No rash or bruising.  PSYCHIATRY Cooperative  )Consultations This Hospital Stay   PHARMACY TO DOSE HEPARIN  CARDIAC REHAB IP CONSULT  CARDIOLOGY IP CONSULT  PHARMACY TO DOSE HEPARIN  PHARMACY IP CONSULT  PHARMACY IP CONSULT  SMOKING CESSATION PROGRAM IP CONSULT  SMOKING CESSATION PROGRAM IP CONSULT    Time Spent on this Encounter   IEvelyn, personally saw the patient today and spent greater than 30 minutes discharging this patient.  More than 50% of time spent in direct patient care, care coordination, patient/caregiver counseling, and formalizing plan of care.    Discharge Orders     Reason for your hospital stay   You were admitted with chest pain and underwent coronary angiogram     Follow-up and recommended labs and tests    Follow up with primary care provider, OTTO YUSUF, within 7 days for hospital follow- up.  The following labs/tests are recommended: bmp.     Activity   Your activity upon discharge: activity as tolerated     When to contact your care team   Call your primary doctor if you have any of the following: Chest pain or  shortness of breath or palpitations.     Discharge Instructions   Strongly recommend risk factor modification.  Consider aggressive lifestyle modification with diet and exercise.  Monitor home blood pressures and review on PCP visit for optimization of antihypertensive regimen.   Encourage hold if systolic blood pressure less than hundred on heart rate hold if systolic blood pressure   Encouraged compliance to medications  Suboxone on hold as patient feels her symptoms could be related to it. Discussed with primary care provider for the need for Suboxone.  Follow-up with PCP within five days.     Full Code     Diet   low-salt, low-fat, low carb diet.         Discharge Medications   Current Discharge Medication List      START taking these medications    Details   metoprolol tartrate (LOPRESSOR) 25 MG tablet Take 1 tablet (25 mg) by mouth 2 times daily Hold IF Heart Rate less than 55 or IF systolic blood pressure less than 95 mmHg  Qty: 30 tablet, Refills: 0    Associated Diagnoses: Hypertensive urgency         CONTINUE these medications which have NOT CHANGED    Details   ASPIRIN NOT PRESCRIBED (INTENTIONAL) Please choose reason not prescribed, below  Qty: 1 each, Refills: 0    Associated Diagnoses: Aspirin intolerance      BACLOFEN PO Take 10 mg by mouth 2 times daily as needed for muscle spasms      cloNIDine (CATAPRES) 0.1 MG tablet Take 1 tablet (0.1 mg) by mouth 2 times daily  Qty: 60 tablet, Refills: 3    Associated Diagnoses: Hypertension goal BP (blood pressure) < 140/80      cyanocobalamin (VITAMIN B12) 1000 MCG/ML injection Inject 1 mL (1,000 mcg) Subcutaneous every 30 days  Qty: 1 mL, Refills: 11    Associated Diagnoses: B12 deficiency      HYDROcodone-acetaminophen (NORCO) 5-325 MG per tablet Take 1 tablet by mouth every 6 hours as needed for severe pain  Qty: 30 tablet, Refills: 0    Associated Diagnoses: Restless legs syndrome; Chronic pain syndrome      losartan-hydrochlorothiazide (HYZAAR) 100-25 MG  per tablet Take 1 tablet by mouth daily  Qty: 90 tablet, Refills: 3    Associated Diagnoses: Hypertension goal BP (blood pressure) < 140/80      magnesium oxide (MAG-OX) 400 (241.3 MG) MG tablet Take 1 tablet (400 mg) by mouth daily  Qty: 90 tablet, Refills: 3    Associated Diagnoses: Cramp of limb      ondansetron (ZOFRAN-ODT) 4 MG ODT tab Take 1 tablet (4 mg) by mouth every 8 hours as needed for nausea  Qty: 10 tablet, Refills: 1      polyethylene glycol (MIRALAX/GLYCOLAX) powder TAKE 17 GRAMS (1 CAPFUL) BY MOUTH DAILY  Qty: 1581 g, Refills: 11    Associated Diagnoses: Slow transit constipation      potassium chloride (K-TAB,KLOR-CON) 10 MEQ tablet Take 1 tablet (10 mEq) by mouth daily  Qty: 120 tablet, Refills: 11    Associated Diagnoses: Hypokalemia      sertraline (ZOLOFT) 100 MG tablet Take 1.5 tablets (150 mg) by mouth daily  Qty: 45 tablet, Refills: 11    Associated Diagnoses: Recurrent major depression in partial remission (H); Other constipation      Vitamin D, Cholecalciferol, 1000 UNITS TABS Take 2,000 Units by mouth daily  Qty: 60 tablet, Refills: 11    Associated Diagnoses: Vitamin D deficiency      albuterol (2.5 MG/3ML) 0.083% neb solution Take 1 vial (2.5 mg) by nebulization every 6 hours as needed for shortness of breath / dyspnea or wheezing  Qty: 30 vial, Refills: 3    Associated Diagnoses: Mild intermittent asthma with acute exacerbation      blood glucose (NO BRAND SPECIFIED) lancets standard Use to test blood sugar daily times daily or as directed.  Qty: 100 each, Refills: 11    Associated Diagnoses: Type 2 diabetes mellitus without complication, without long-term current use of insulin (H)      blood glucose calibration (NO BRAND SPECIFIED) solution Use to calibrate blood glucose monitor as directed.  Qty: 1 each, Refills: 0    Associated Diagnoses: Type 2 diabetes mellitus without complication, without long-term current use of insulin (H)      blood glucose monitoring (NO BRAND SPECIFIED)  meter device kit Use to test blood sugar larisa times daily or as directed.  Qty: 1 kit, Refills: 0    Associated Diagnoses: Type 2 diabetes mellitus without complication, without long-term current use of insulin (H)      blood glucose monitoring (NO BRAND SPECIFIED) test strip Use to test blood sugars once times daily or as directed  Qty: 100 strip, Refills: 3    Associated Diagnoses: Type 2 diabetes mellitus without complication, without long-term current use of insulin (H)      EPINEPHrine (EPIPEN) 0.3 MG/0.3ML injection Inject 0.3 mLs (0.3 mg) into the muscle once as needed for anaphylaxis  Qty: 2 each, Refills: 5    Comments: Profile Rx: patient will contact pharmacy when needed  Associated Diagnoses: Late effect of other and unspecified external causes      naloxone (NARCAN) nasal spray Spray 1 spray (4 mg) into one nostril alternating nostrils as needed for opioid reversal every 2-3 minutes until assistance arrives  Qty: 0.2 mL, Refills: 0    Associated Diagnoses: Chronic pain syndrome      order for DME Right wrist splint for carpal tunnel syndrome   One*  Use as directed at hour of sleep  Qty: 1 Device, Refills: 1    Associated Diagnoses: Carpal tunnel syndrome of right wrist      STATIN NOT PRESCRIBED, INTENTIONAL, 1 each daily Statin not prescribed intentionally due to Active liver disease (liver failure, cirrhosis, hepatitis)  LIVER METS  Qty: 0 each, Refills: 0    Associated Diagnoses: Hyperlipidemia LDL goal <100; Type 2 diabetes mellitus without complication (H)         STOP taking these medications       buprenorphine HCl-naloxone HCl (SUBOXONE) 8-2 MG per film Comments:   Reason for Stopping:             Allergies   Allergies   Allergen Reactions     Ativan [Lorazepam] Anaphylaxis     Macrobid [Nitrofurantoin] Anaphylaxis     Amoxicillin      Buspirone      Buspar     Busulfan Hives     Edema     Cefaclor Hives     Cephalosporins      Ciprofloxacin      Clindamycin Itching     Dilaudid [Hydromorphone]       Diphenhydramine Hcl      Benadryl     Imitrex [Sumatriptan Succinate]      blood pressure raised uncontrobably     Ketorolac Tromethamine      Toradol     Lansoprazole      Prevacid     Lansoprazole      Prevacid     Latex      Converted from LW Latex Sensitivity Flag     Metoclopramide Hives     Reglan     Paroxetine      Paxil     Penicillins      Prednisone Hives     Red Dye      Sulfa Drugs      THICKENED AND DIFFICULTY SWALLOWING      Lidoderm [Lidocaine] Rash     Localized rash at patch site       Discharge Disposition   Discharged to home  Condition at discharge: Good    DATA  Most Recent 3 CBC's:  Recent Labs   Lab Test  05/30/18   0823 05/30/18   0523  05/25/18   2314   WBC  10.0  11.6*  9.3   HGB  11.4*  13.1  11.0*   MCV  90  91  92   PLT  295  288  291      Most Recent 3 BMP's:  Recent Labs   Lab Test  05/30/18   0523 05/25/18   2314  05/18/18   0040   NA  141  140  144   POTASSIUM  3.8  3.7  3.7   CHLORIDE  104  104  106   CO2  28  30  31   BUN  8  11  11   CR  0.75  0.92  0.80   ANIONGAP  9  6  7   PAUL  9.6  8.8  8.8   GLC  146*  126*  125*     Most Recent 2 LFT's:  Recent Labs   Lab Test  05/30/18   0523  03/22/18   0354   AST  15  10   ALT  27  23   ALKPHOS  90  87   BILITOTAL  1.2  1.1     Most Recent 3 Troponin's:  Recent Labs   Lab Test  05/30/18   1140  05/30/18   0823  05/30/18   0523   12/18/15   0445   10/23/14   1935   10/21/14   0243   TROPI  0.136*  0.157*  0.113*   < >   --    < >   --    < >   --    TROPONIN   --    --    --    --   0.01   --   0.01   --   0.00    < > = values in this interval not displayed.     Most Recent Cholesterol Panel:  Recent Labs   Lab Test  05/30/18   0823   CHOL  173   LDL  104*   HDL  52   TRIG  86       Results for orders placed or performed during the hospital encounter of 05/30/18   XR Chest 2 Views    Narrative    CHEST 2 VIEWS  5/30/2018 6:06 AM     HISTORY: Acute chest pain.    COMPARISON: 5/26/2018.    FINDINGS: The lungs are clear. Normal-sized  cardiac silhouette. A  portion of a ventriculoperitoneal shunt catheter is present coursing  within the right anterior chest wall.      Impression    IMPRESSION: No evidence of active cardiopulmonary disease.    RADHA GARCIA MD

## 2018-05-31 NOTE — PLAN OF CARE
Problem: Patient Care Overview  Goal: Plan of Care/Patient Progress Review  Outcome: No Change  Pt A&)x4, VSS, Tele-NSR, on RA. Pt up with stand-by assist. Pt c/o chest pain, 12-lead EKG obtained with no changes and one dose of SL nitro given with relief. Pt also c/o a headache and was given PRN norco. IV running NS @ 75ml/hr. Radial and femoral angio sites WDL and CMS intact.

## 2018-05-31 NOTE — PLAN OF CARE
Problem: Patient Care Overview  Goal: Plan of Care/Patient Progress Review  Outcome: No Change  [id number] RN: Pt a/o, tele SB/SR w/ HR 50's-70's. PRN Labetalol given x1 for , otherwise VSS. Pt c/o 8/10 chest pain at beginning of night improved w/ PRN Norco (EKG unchanged, refused Nitro, no non-verbal signs of pain noted). At approximately 0400 pt c/o 10/10 generalized pain, nausea, and overall malaise--'s/100's, 500ml emesis. Denied chest pain at this time. Sx improved w/ PRN Labetalol, Zofran, and Norco. Right groin and Left radial sites c/d/i, no bruit or hematoma noted. CMS intact to all extremities.  Up w/ SBA. Will continue to monitor.

## 2018-05-31 NOTE — PLAN OF CARE
Problem: Patient Care Overview  Goal: Plan of Care/Patient Progress Review  Outcome: Improving  Admitted from ER today; had angiogram L radial and right groin approach. Clean coronaries.     PLAN: Control blood pressure, than potential for discharge.     CNS: Pt A&O X4. Ongoing 2-4/10 chest pain, general discomfort. Norco PRN for pain, no IV narcotics.  CVS: SR in 60's to 70's.  RESP: RA  GI: regular diet  : no concerns, voids on commode   MOBILITY: Only up to commode today, as had significant ongoing chest pain and then angiogram. Transferred with no difficulty.   SKIN: TR band in situ left radial, can come off any time. Right groin site CDI.

## 2018-06-01 ENCOUNTER — TELEPHONE (OUTPATIENT)
Dept: CARDIOLOGY | Facility: CLINIC | Age: 53
End: 2018-06-01

## 2018-06-01 NOTE — TELEPHONE ENCOUNTER
Patient was evaluated by cardiology while inpatient for chest pain. Angiogram completed without intervention. Called patient to discuss any post hospital d/c questions she may have, review medication changes, and confirm f/u appts. Patient denied any questions regarding medications that she was taking prior to admission. Patient denied any SOB, chest pain, or light headedness. RN confirmed with patient that she needs a f/u OV to be scheduled in PMD in 7-10 days.. Patient advised to call clinic with any cardiac related questions or concerns prior to this maria eugenia't. Patient verbalized understanding and agreed with willy. JUSTIN Pedraza RN.

## 2018-06-04 ENCOUNTER — TELEPHONE (OUTPATIENT)
Dept: FAMILY MEDICINE | Facility: CLINIC | Age: 53
End: 2018-06-04

## 2018-06-05 ENCOUNTER — APPOINTMENT (OUTPATIENT)
Dept: FAMILY MEDICINE | Facility: CLINIC | Age: 53
End: 2018-06-05
Payer: COMMERCIAL

## 2018-06-05 ENCOUNTER — OFFICE VISIT (OUTPATIENT)
Dept: BEHAVIORAL HEALTH | Facility: CLINIC | Age: 53
End: 2018-06-05
Payer: COMMERCIAL

## 2018-06-05 ENCOUNTER — OFFICE VISIT (OUTPATIENT)
Dept: FAMILY MEDICINE | Facility: CLINIC | Age: 53
End: 2018-06-05
Payer: COMMERCIAL

## 2018-06-05 VITALS
WEIGHT: 258 LBS | DIASTOLIC BLOOD PRESSURE: 58 MMHG | BODY MASS INDEX: 41.64 KG/M2 | TEMPERATURE: 95.3 F | RESPIRATION RATE: 16 BRPM | OXYGEN SATURATION: 97 % | HEART RATE: 61 BPM | SYSTOLIC BLOOD PRESSURE: 102 MMHG

## 2018-06-05 DIAGNOSIS — G89.4 CHRONIC PAIN SYNDROME: Primary | ICD-10-CM

## 2018-06-05 DIAGNOSIS — Z09 HOSPITAL DISCHARGE FOLLOW-UP: ICD-10-CM

## 2018-06-05 DIAGNOSIS — F43.10 PTSD (POST-TRAUMATIC STRESS DISORDER): Chronic | ICD-10-CM

## 2018-06-05 DIAGNOSIS — I10 HYPERTENSION GOAL BP (BLOOD PRESSURE) < 140/80: Chronic | ICD-10-CM

## 2018-06-05 DIAGNOSIS — F33.41 RECURRENT MAJOR DEPRESSION IN PARTIAL REMISSION (H): Primary | Chronic | ICD-10-CM

## 2018-06-05 PROCEDURE — 99495 TRANSJ CARE MGMT MOD F2F 14D: CPT | Performed by: NURSE PRACTITIONER

## 2018-06-05 PROCEDURE — 90834 PSYTX W PT 45 MINUTES: CPT | Performed by: SOCIAL WORKER

## 2018-06-05 RX ORDER — OXYCODONE AND ACETAMINOPHEN 5; 325 MG/1; MG/1
1 TABLET ORAL EVERY 6 HOURS PRN
Qty: 21 TABLET | Refills: 0 | Status: SHIPPED | OUTPATIENT
Start: 2018-06-05 | End: 2018-06-12

## 2018-06-05 NOTE — TELEPHONE ENCOUNTER
Patient called back- she was seen today at [facility] for her post hospital f/u. She has no chest pain, she was started on Lopressor.  She is having a  coming to her home to help with coordination of her care.  She will ask if she can go back and forth betw Dr. Dustin Fatima and the [facility].

## 2018-06-05 NOTE — MR AVS SNAPSHOT
After Visit Summary   6/5/2018    Ines Mott    MRN: [id number]           Patient Information     Date Of Birth          1965        Visit Information        Provider Department      6/5/2018 11:00 AM Marika Santana APRN [facility]        Today's Diagnoses     Chronic pain syndrome    -  1    Hypertension goal BP (blood pressure) < 140/80        PTSD (post-traumatic stress disorder)        Hospital discharge follow-up          Care Instructions    We have ordered pool therapy for you, they should call you to set up an appt    We will restart your Oxycodone for one week until you see Dr. Mckeon     I will see you again 2 weeks           Follow-ups after your visit        Additional Services     PHYSICAL THERAPY REFERRAL       *This therapy referral will be filtered to a centralized scheduling office at [facility] and the patient will receive a call to schedule an appointment at a [place] location most convenient for them. *     [facility] provides Physical Therapy evaluation and treatment and many specialty services across the [place] system.  If requesting a specialty program, please choose from the list below.    If you have not heard from the scheduling office within 2 business days, please call [phone] for all locations, with the exception of [place], please call [phone] and [facility], please call [phone]  Treatment: Evaluation & Treatment  Special Instructions/Modalities: pool therapy  Special Programs: Aquatic Therapy ([place], [place] and [place] locations only)    Please be aware that coverage of these services is subject to the terms and limitations of your health insurance plan.  Call member services at your health plan with any benefit or coverage questions.      **Note to Provider:  If you are referring outside of [place] for the therapy appointment, please list the  "name of the location in the \"special instructions\" above, print the referral and give to the patient to schedule the appointment.                  Who to contact     If you have questions or need follow up information about today's clinic visit or your schedule please contact [facility] directly at [phone].  Normal or non-critical lab and imaging results will be communicated to you by MyChart, letter or phone within 4 business days after the clinic has received the results. If you do not hear from us within 7 days, please contact the clinic through MyChart or phone. If you have a critical or abnormal lab result, we will notify you by phone as soon as possible.  Submit refill requests through [employer] or call your pharmacy and they will forward the refill request to us. Please allow 3 business days for your refill to be completed.          Additional Information About Your Visit        MyChart Information     [employer] lets you send messages to your doctor, view your test results, renew your prescriptions, schedule appointments and more. To sign up, go to [email or web address] . Click on \"Log in\" on the left side of the screen, which will take you to the Welcome page. Then click on \"Sign up Now\" on the right side of the page.     You will be asked to enter the access code listed below, as well as some personal information. Please follow the directions to create your username and password.     Your access code is: [email or web address]  Expires: 2018  9:19 AM     Your access code will  in 90 days. If you need help or a new code, please call your [facility] [place] or [phone].        Care EveryWhere ID     This is your Care EveryWhere ID. This could be used by other organizations to access your [place] medical records  [id number]        Your Vitals Were     Pulse Temperature Respirations Pulse Oximetry BMI (Body Mass Index)       61 95.3  F (35.2  C) (Temporal) 16 97% 41.64 " kg/m2        Blood Pressure from Last 3 Encounters:   06/05/18 102/58   05/31/18 132/79   05/25/18 143/69    Weight from Last 3 Encounters:   06/05/18 258 lb (117 kg)   05/31/18 257 lb 1.6 oz (116.6 kg)   05/25/18 263 lb (119.3 kg)              We Performed the Following     PHYSICAL THERAPY REFERRAL          Today's Medication Changes          These changes are accurate as of 6/5/18 12:25 PM.  If you have any questions, ask your nurse or doctor.               Start taking these medicines.        Dose/Directions    oxyCODONE-acetaminophen 5-325 MG per tablet   Commonly known as:  PERCOCET   Used for:  Chronic pain syndrome   Started by:  Marika Santana APRN CNP        Dose:  1 tablet   Take 1 tablet by mouth every 6 hours as needed for pain   Quantity:  21 tablet   Refills:  0         These medicines have changed or have updated prescriptions.        Dose/Directions    sertraline 100 MG tablet   Commonly known as:  ZOLOFT   Indication:  Major Depressive Disorder   This may have changed:  how much to take   Used for:  Recurrent major depression in partial remission (H), Other constipation        Dose:  150 mg   Take 1.5 tablets (150 mg) by mouth daily   Quantity:  45 tablet   Refills:  11         Stop taking these medicines if you haven't already. Please contact your care team if you have questions.     HYDROcodone-acetaminophen 5-325 MG per tablet   Commonly known as:  NORCO   Stopped by:  Marika Santana APRN CNP                Where to get your medicines      Some of these will need a paper prescription and others can be bought over the counter.  Ask your nurse if you have questions.     Bring a paper prescription for each of these medications     oxyCODONE-acetaminophen 5-325 MG per tablet               Information about OPIOIDS     PRESCRIPTION OPIOIDS: WHAT YOU NEED TO KNOW   You have a prescription for an opioid (narcotic) pain medicine. Opioids can cause addiction. If you have a  history of chemical dependency of any type, you are at a higher risk of becoming addicted to opioids. Only take this medicine after all other options have been tried. Take it for as short a time and as few doses as possible.     Do not:    Drive. If you drive while taking these medicines, you could be arrested for driving under the influence (DUI).    Operate heavy machinery    Do any other dangerous activities while taking these medicines.     Drink any alcohol while taking these medicines.      Take with any other medicines that contain acetaminophen. Read all labels carefully. Look for the word  acetaminophen  or  Tylenol.  Ask your pharmacist if you have questions or are unsure.    Store your pills in a secure place, locked if possible. We will not replace any lost or stolen medicine. If you don t finish your medicine, please throw away (dispose) as directed by your pharmacist. The [place] Pollution Control Agency has more information about safe disposal: https://www.pca.[facility].mn.us/living-green/managing-unwanted-medications    All opioids tend to cause constipation. Drink plenty of water and eat foods that have a lot of fiber, such as fruits, vegetables, prune juice, apple juice and high-fiber cereal. Take a laxative (Miralax, milk of magnesia, Colace, Senna) if you don t move your bowels at least every other day.          Primary Care Provider Office Phone # Fax #    Dustin Fatima [phone] [address] [facility] [address]        Equal Access to Services     VALENTE RIVERA AH: Hadii aad ku hadasho Soevangelina, waaxda luqadaha, qaybta kaalmada adeamandeepyada, emilee coyne. So [facility] [phone].    ATENCIÓN: Si habla español, tiene a christianson disposición servicios gratuitos de asistencia lingüística. Llame al [phone].    We comply with applicable federal civil rights laws and [place] laws. We do not discriminate on the basis of race, color, national  origin, age, disability, sex, sexual orientation, or gender identity.            Thank you!     Thank you for choosing [facility]  for your care. Our goal is always to provide you with excellent care. Hearing back from our patients is one way we can continue to improve our services. Please take a few minutes to complete the written survey that you may receive in the mail after your visit with us. Thank you!             Your Updated Medication List - Protect others around you: Learn how to safely use, store and throw away your medicines at www.disposemymeds.org.          This list is accurate as of 6/5/18 12:25 PM.  Always use your most recent med list.                   Brand Name Dispense Instructions for use Diagnosis    albuterol (2.5 MG/3ML) 0.083% neb solution     30 vial    Take 1 vial (2.5 mg) by nebulization every 6 hours as needed for shortness of breath / dyspnea or wheezing    Mild intermittent asthma with acute exacerbation       ASPIRIN NOT PRESCRIBED    INTENTIONAL    1 each    Please choose reason not prescribed, below    Aspirin intolerance       BACLOFEN PO      Take 10 mg by mouth 2 times daily as needed for muscle spasms        blood glucose calibration solution    no brand specified    1 each    Use to calibrate blood glucose monitor as directed.    Type 2 diabetes mellitus without complication, without long-term current use of insulin (H)       blood glucose lancets standard    no brand specified    100 each    Use to test blood sugar daily times daily or as directed.    Type 2 diabetes mellitus without complication, without long-term current use of insulin (H)       blood glucose monitoring meter device kit    no brand specified    1 kit    Use to test blood sugar larisa times daily or as directed.    Type 2 diabetes mellitus without complication, without long-term current use of insulin (H)       blood glucose monitoring test strip    no brand specified    100 strip     Use to test blood sugars once times daily or as directed    Type 2 diabetes mellitus without complication, without long-term current use of insulin (H)       cloNIDine 0.1 MG tablet    CATAPRES    60 tablet    Take 1 tablet (0.1 mg) by mouth 2 times daily    Hypertension goal BP (blood pressure) < 140/80       cyanocobalamin 1000 MCG/ML injection    VITAMIN B12    1 mL    Inject 1 mL (1,000 mcg) Subcutaneous every 30 days    B12 deficiency       EPINEPHrine 0.3 MG/0.3ML injection 2-pack    EPIPEN/ADRENACLICK/or ANY BX GENERIC EQUIV    2 each    Inject 0.3 mLs (0.3 mg) into the muscle once as needed for anaphylaxis    Late effect of other and unspecified external causes       losartan-hydrochlorothiazide 100-25 MG per tablet    HYZAAR    90 tablet    Take 1 tablet by mouth daily    Hypertension goal BP (blood pressure) < 140/80       magnesium oxide 400 (241.3 Mg) MG tablet    MAG-OX    90 tablet    Take 1 tablet (400 mg) by mouth daily    Cramp of limb       metoprolol tartrate 25 MG tablet    LOPRESSOR    30 tablet    Take 1 tablet (25 mg) by mouth 2 times daily Hold IF Heart Rate less than 55 or IF systolic blood pressure less than 95 mmHg    Hypertensive urgency       naloxone nasal spray    NARCAN    0.2 mL    Spray 1 spray (4 mg) into one nostril alternating nostrils as needed for opioid reversal every 2-3 minutes until assistance arrives    Chronic pain syndrome       ondansetron 4 MG ODT tab    ZOFRAN-ODT    10 tablet    Take 1 tablet (4 mg) by mouth every 8 hours as needed for nausea        order for DME     1 Device    Right wrist splint for carpal tunnel syndrome  One* Use as directed at hour of sleep    Carpal tunnel syndrome of right wrist       oxyCODONE-acetaminophen 5-325 MG per tablet    PERCOCET    21 tablet    Take 1 tablet by mouth every 6 hours as needed for pain    Chronic pain syndrome       polyethylene glycol powder    MIRALAX/GLYCOLAX    1581 g    TAKE 17 GRAMS (1 CAPFUL) BY MOUTH DAILY     Slow transit constipation       potassium chloride 10 MEQ tablet    K-TAB,KLOR-CON    120 tablet    Take 1 tablet (10 mEq) by mouth daily    Hypokalemia       sertraline 100 MG tablet    ZOLOFT    45 tablet    Take 1.5 tablets (150 mg) by mouth daily    Recurrent major depression in partial remission (H), Other constipation       STATIN NOT PRESCRIBED (INTENTIONAL)     0 each    1 each daily Statin not prescribed intentionally due to Active liver disease (liver failure, cirrhosis, hepatitis) LIVER METS    Hyperlipidemia LDL goal <100, Type 2 diabetes mellitus without complication (H)       Vitamin D (Cholecalciferol) 1000 units Tabs     60 tablet    Take 2,000 Units by mouth daily    Vitamin D deficiency

## 2018-06-05 NOTE — TELEPHONE ENCOUNTER
ED / Discharge Outreach Protocol    Patient Contact    Attempt # 2    Was call answered?  No.  Left message on voicemail with information to call me back.  Is patient switching clinics to Integrated Primary Care?  See OV encounter 6-5-18
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
## 2018-06-07 ENCOUNTER — PATIENT OUTREACH (OUTPATIENT)
Dept: CARE COORDINATION | Facility: CLINIC | Age: 53
End: 2018-06-07

## 2018-06-07 ASSESSMENT — ACTIVITIES OF DAILY LIVING (ADL): DEPENDENT_IADLS:: TRANSPORTATION

## 2018-06-07 NOTE — PROGRESS NOTES
Clinic Care Coordination Contact  [facility]/Voicemail    Referral Source: PCP  Clinical Data: Care Coordinator Outreach  Outreach attempted x 1.  Left message on voicemail with call back information and requested return call.  Plan: Care Coordinator will try to reach patient again in 3-5 business days.  BRISSA Davis    Care Coordinator  [facility] ,[facility]   [phone]

## 2018-06-11 ENCOUNTER — OFFICE VISIT (OUTPATIENT)
Dept: FAMILY MEDICINE | Facility: CLINIC | Age: 53
End: 2018-06-11
Payer: COMMERCIAL

## 2018-06-11 VITALS
RESPIRATION RATE: 16 BRPM | TEMPERATURE: 97.2 F | HEART RATE: 66 BPM | OXYGEN SATURATION: 97 % | DIASTOLIC BLOOD PRESSURE: 80 MMHG | BODY MASS INDEX: 41.4 KG/M2 | WEIGHT: 256.5 LBS | SYSTOLIC BLOOD PRESSURE: 124 MMHG

## 2018-06-11 DIAGNOSIS — Z01.818 PREOP GENERAL PHYSICAL EXAM: Primary | ICD-10-CM

## 2018-06-11 DIAGNOSIS — C64.1 MALIGNANT NEOPLASM OF KIDNEY EXCLUDING RENAL PELVIS, RIGHT (H): ICD-10-CM

## 2018-06-11 PROCEDURE — 99214 OFFICE O/P EST MOD 30 MIN: CPT | Performed by: FAMILY MEDICINE

## 2018-06-11 NOTE — MR AVS SNAPSHOT
After Visit Summary   6/11/2018    Ines Mott    MRN: [id number]           Patient Information     Date Of Birth          1965        Visit Information        Provider Department      6/11/2018 9:00 AM Max Summers MD [facility]        Today's Diagnoses     Preop general physical exam    -  1    Malignant neoplasm of kidney excluding renal pelvis, right (H)          Care Instructions      Before Your Surgery      Call your surgeon if there is any change in your health. This includes signs of a cold or flu (such as a sore throat, runny nose, cough, rash or fever).    Do not smoke, drink alcohol or take over the counter medicine (unless your surgeon or primary care doctor tells you to) for the 24 hours before and after surgery.    If you take prescribed drugs: Follow your doctor s orders about which medicines to take and which to stop until after surgery.    Eating and drinking prior to surgery: follow the instructions from your surgeon    Take a shower or bath the night before surgery. Use the soap your surgeon gave you to gently clean your skin. If you do not have soap from your surgeon, use your regular soap. Do not shave or scrub the surgery site.  Wear clean pajamas and have clean sheets on your bed.           Follow-ups after your visit        Follow-up notes from your care team     Return if symptoms worsen or fail to improve.      Your next 10 appointments already scheduled     Jun 12, 2018  8:40 AM CDT   [facility] Visit with Giulia Mckeon MD   [facility]    [address]  [address]  [facility] [address]   [phone]            Jun 19, 2018  2:00 PM CDT   Return Visit with MICHEAL Crowley [facility] ([facility])    [address]  [address]  [facility] [address]   [phone]             Jun 19, 2018  2:30 PM CDT   [facility]    [address]  [address]  [facility] [address] [phone]              Who to contact     If you have questions or need follow up information about today's clinic visit or your schedule please contact [facility] directly at [phone].  Normal or non-critical lab and imaging results will be communicated to you by MyChart, letter or phone within 4 business days after the clinic has received the results. If you do not hear from us within 7 days, please contact the clinic through MyChart or phone. If you have a critical or abnormal lab result, we will notify you by phone as soon as possible.  Submit refill requests through [facility] or call your pharmacy and they will forward the refill request to us. Please allow 3 business days for your refill to be completed.          Additional Information About Your Visit        Care EveryWhere ID     This is your Care EveryWhere ID. This could be used by other organizations to access your [facility] medical records  [id number]        Your Vitals Were     Pulse Temperature Respirations Pulse Oximetry BMI (Body Mass Index)       66 97.2  F (36.2  C) (Tympanic) 16 97% 41.4 kg/m2        Blood Pressure from Last 3 Encounters:   06/11/18 124/80   06/05/18 102/58   05/31/18 132/79    Weight from Last 3 Encounters:   06/11/18 256 lb 8 oz (116.3 kg)   06/05/18 258 lb (117 kg)   05/31/18 257 lb 1.6 oz (116.6 kg)              Today, you had the following     No orders found for display         Today's Medication Changes          These changes are accurate as of 6/11/18  9:30 AM.  If you have any questions, ask your nurse or doctor.               These medicines have changed or have updated prescriptions.        Dose/Directions    sertraline 100 MG tablet   Commonly known as:  ZOLOFT    Indication:  Major Depressive Disorder   This may have changed:  how much to take   Used for:  Recurrent major depression in partial remission (H), Other constipation        Dose:  150 mg   Take 1.5 tablets (150 mg) by mouth daily   Quantity:  45 tablet   Refills:  11                Primary Care Provider Office Phone # Fax #    Dustin Rina Fatima [phone]       [address] [facility] [address]        Equal Access to Services     VALENTE RIVERA : Hadii aad ku hadasho Soomaali, waaxda luqadaha, qaybta kaalmada adeegyada, waxay idiin hayaan adeeg kharaeric pardo . So [facility] [phone].    ATENCIÓN: Si manisha esphector, tiene a christianson disposición servicios gratuitos de asistencia lingüística. Ll[facility] [phone].    We comply with applicable federal civil rights laws and [place] laws. We do not discriminate on the basis of race, color, national origin, age, disability, sex, sexual orientation, or gender identity.            Thank you!     Thank you for choosing [facility]  for your care. Our goal is always to provide you with excellent care. Hearing back from our patients is one way we can continue to improve our services. Please take a few minutes to complete the written survey that you may receive in the mail after your visit with us. Thank you!             Your Updated Medication List - Protect others around you: Learn how to safely use, store and throw away your medicines at www.disposemymeds.org.          This list is accurate as of 6/11/18  9:30 AM.  Always use your most recent med list.                   Brand Name Dispense Instructions for use Diagnosis    albuterol (2.5 MG/3ML) 0.083% neb solution     30 vial    Take 1 vial (2.5 mg) by nebulization every 6 hours as needed for shortness of breath / dyspnea or wheezing    Mild intermittent asthma with acute exacerbation       ASPIRIN NOT PRESCRIBED    INTENTIONAL    1 each    Please choose reason not  prescribed, below    Aspirin intolerance       BACLOFEN PO      Take 10 mg by mouth 2 times daily as needed for muscle spasms        blood glucose calibration solution    no brand specified    1 each    Use to calibrate blood glucose monitor as directed.    Type 2 diabetes mellitus without complication, without long-term current use of insulin (H)       blood glucose lancets standard    no brand specified    100 each    Use to test blood sugar daily times daily or as directed.    Type 2 diabetes mellitus without complication, without long-term current use of insulin (H)       blood glucose monitoring meter device kit    no brand specified    1 kit    Use to test blood sugar larisa times daily or as directed.    Type 2 diabetes mellitus without complication, without long-term current use of insulin (H)       blood glucose monitoring test strip    no brand specified    100 strip    Use to test blood sugars once times daily or as directed    Type 2 diabetes mellitus without complication, without long-term current use of insulin (H)       cloNIDine 0.1 MG tablet    CATAPRES    60 tablet    Take 1 tablet (0.1 mg) by mouth 2 times daily    Hypertension goal BP (blood pressure) < 140/80       cyanocobalamin 1000 MCG/ML injection    VITAMIN B12    1 mL    Inject 1 mL (1,000 mcg) Subcutaneous every 30 days    B12 deficiency       EPINEPHrine 0.3 MG/0.3ML injection 2-pack    EPIPEN/ADRENACLICK/or ANY BX GENERIC EQUIV    2 each    Inject 0.3 mLs (0.3 mg) into the muscle once as needed for anaphylaxis    Late effect of other and unspecified external causes       losartan-hydrochlorothiazide 100-25 MG per tablet    HYZAAR    90 tablet    Take 1 tablet by mouth daily    Hypertension goal BP (blood pressure) < 140/80       magnesium oxide 400 (241.3 Mg) MG tablet    MAG-OX    90 tablet    Take 1 tablet (400 mg) by mouth daily    Cramp of limb       metoprolol tartrate 25 MG tablet    LOPRESSOR    30 tablet    Take 1 tablet (25 mg)  by mouth 2 times daily Hold IF Heart Rate less than 55 or IF systolic blood pressure less than 95 mmHg    Hypertensive urgency       naloxone nasal spray    NARCAN    0.2 mL    Spray 1 spray (4 mg) into one nostril alternating nostrils as needed for opioid reversal every 2-3 minutes until assistance arrives    Chronic pain syndrome       ondansetron 4 MG ODT tab    ZOFRAN-ODT    10 tablet    Take 1 tablet (4 mg) by mouth every 8 hours as needed for nausea        order for DME     1 Device    Right wrist splint for carpal tunnel syndrome  One* Use as directed at hour of sleep    Carpal tunnel syndrome of right wrist       oxyCODONE-acetaminophen 5-325 MG per tablet    PERCOCET    21 tablet    Take 1 tablet by mouth every 6 hours as needed for pain    Chronic pain syndrome       polyethylene glycol powder    MIRALAX/GLYCOLAX    1581 g    TAKE 17 GRAMS (1 CAPFUL) BY MOUTH DAILY    Slow transit constipation       potassium chloride 10 MEQ tablet    K-TAB,KLOR-CON    120 tablet    Take 1 tablet (10 mEq) by mouth daily    Hypokalemia       sertraline 100 MG tablet    ZOLOFT    45 tablet    Take 1.5 tablets (150 mg) by mouth daily    Recurrent major depression in partial remission (H), Other constipation       STATIN NOT PRESCRIBED (INTENTIONAL)     0 each    1 each daily Statin not prescribed intentionally due to Active liver disease (liver failure, cirrhosis, hepatitis) LIVER METS    Hyperlipidemia LDL goal <100, Type 2 diabetes mellitus without complication (H)       Vitamin D (Cholecalciferol) 1000 units Tabs     60 tablet    Take 2,000 Units by mouth daily    Vitamin D deficiency
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
## 2018-06-12 ENCOUNTER — TELEPHONE (OUTPATIENT)
Dept: FAMILY MEDICINE | Facility: CLINIC | Age: 53
End: 2018-06-12

## 2018-06-12 ENCOUNTER — OFFICE VISIT (OUTPATIENT)
Dept: ADDICTION MEDICINE | Facility: CLINIC | Age: 53
End: 2018-06-12
Payer: COMMERCIAL

## 2018-06-12 VITALS
OXYGEN SATURATION: 99 % | DIASTOLIC BLOOD PRESSURE: 84 MMHG | SYSTOLIC BLOOD PRESSURE: 120 MMHG | HEART RATE: 67 BPM | RESPIRATION RATE: 16 BRPM | BODY MASS INDEX: 41.48 KG/M2 | TEMPERATURE: 94.4 F | WEIGHT: 257 LBS

## 2018-06-12 DIAGNOSIS — F10.10 ALCOHOL ABUSE: ICD-10-CM

## 2018-06-12 DIAGNOSIS — F11.90 CHRONIC, CONTINUOUS USE OF OPIOIDS: ICD-10-CM

## 2018-06-12 DIAGNOSIS — C7B.09 METASTATIC CARCINOID TUMOR TO INTRA-ABDOMINAL SITE (H): ICD-10-CM

## 2018-06-12 DIAGNOSIS — M79.7 FIBROMYALGIA: ICD-10-CM

## 2018-06-12 DIAGNOSIS — F11.20 OPIOID USE DISORDER, SEVERE, DEPENDENCE (H): ICD-10-CM

## 2018-06-12 DIAGNOSIS — G89.4 CHRONIC PAIN SYNDROME: ICD-10-CM

## 2018-06-12 DIAGNOSIS — G89.4 CHRONIC PAIN SYNDROME: Primary | ICD-10-CM

## 2018-06-12 DIAGNOSIS — Q05.0: ICD-10-CM

## 2018-06-12 PROCEDURE — 99215 OFFICE O/P EST HI 40 MIN: CPT | Performed by: ANESTHESIOLOGY

## 2018-06-12 RX ORDER — OXYCODONE AND ACETAMINOPHEN 5; 325 MG/1; MG/1
1 TABLET ORAL EVERY 6 HOURS PRN
Qty: 21 TABLET | Refills: 0 | Status: SHIPPED | OUTPATIENT
Start: 2018-06-12 | End: 2018-06-19

## 2018-06-12 NOTE — PATIENT INSTRUCTIONS
Plan is we are going to talk to [facility] about your prognosis.  We will go down one of 2 paths:     1. Detox and a chemical dependency assessment and ongoing Suboxone medication for addiction and chronic pain    2. A palliative care consult for ongoing prescribing    Follow up with your primary on 6/19/2018    We have given you a prescription for one week to get you to this appointment.     Giulia Mckeon MD

## 2018-06-12 NOTE — MR AVS SNAPSHOT
After Visit Summary   6/12/2018    Ines Mott    MRN: [id number]           Patient Information     Date Of Birth          1965        Visit Information        Provider Department      6/12/2018 8:40 AM Giulia Mckeon MD [facility] Primary Care        Care Instructions    Plan is we are going to talk to [place] [facility] about your prognosis.  We will go down one of 2 paths:     1. Detox and a chemical dependency assessment and ongoing Suboxone medication for addiction and chronic pain    2. A palliative care consult for ongoing prescribing    Follow up with your primary on 6/19/2018    We have given you a prescription for one week to get you to this appointment.     Giulia Mckeon MD           Follow-ups after your visit        Your next 10 appointments already scheduled     Jun 19, 2018  2:00 PM CDT   Return Visit with MICHEAL Crowley CNP   [facility] Primary [facility]    [address]  [address]  [facility] [address] [phone]            Jun 19, 2018  2:30 PM CDT   [facility]    [address]  [address]  [facility] [address] [phone]              Who to contact     If you have questions or need follow up information about today's clinic visit or your schedule please contact [facility] PRIMARY [facility] directly at [phone].  Normal or non-critical lab and imaging results will be communicated to you by MyChart, letter or phone within 4 business days after the clinic has received the results. If you do not hear from us within 7 days, please contact the clinic through MyChart or phone. If you have a critical or abnormal lab result, we will notify you by phone as soon as possible.  Submit refill requests through [employer] and [employer] or call your pharmacy and they  will forward the refill request to us. Please allow 3 business days for your refill to be completed.          Additional Information About Your Visit        Care EveryWhere ID     This is your Care EveryWhere ID. This could be used by other organizations to access your [place] medical records  [id number]        Your Vitals Were     Pulse Temperature Respirations Pulse Oximetry BMI (Body Mass Index)       67 94.4  F (34.7  C) (Temporal) 16 99% 41.48 kg/m2        Blood Pressure from Last 3 Encounters:   06/12/18 120/84   06/11/18 124/80   06/05/18 102/58    Weight from Last 3 Encounters:   06/12/18 257 lb (116.6 kg)   06/11/18 256 lb 8 oz (116.3 kg)   06/05/18 258 lb (117 kg)              Today, you had the following     No orders found for display         Today's Medication Changes          These changes are accurate as of 6/12/18  9:46 AM.  If you have any questions, ask your nurse or doctor.               These medicines have changed or have updated prescriptions.        Dose/Directions    sertraline 100 MG tablet   Commonly known as:  ZOLOFT   Indication:  Major Depressive Disorder   This may have changed:  how much to take   Used for:  Recurrent major depression in partial remission (H), Other constipation        Dose:  150 mg   Take 1.5 tablets (150 mg) by mouth daily   Quantity:  45 tablet   Refills:  11            Where to get your medicines      Some of these will need a paper prescription and others can be bought over the counter.  Ask your nurse if you have questions.     Bring a paper prescription for each of these medications     oxyCODONE-acetaminophen 5-325 MG per tablet                Primary Care Provider Office Phone # Fax #    Dustin Fatima [phone] [address]  [facility] [address]        Equal Access to Services     VALENTE RIVERA AH: Marc Leonard, deion nunes, emilee oconnor. So  [facility] [phone].    ATENCIÓN: Si manisha kitchen, tiene a christianson disposición servicios gratuitos de asistencia lingüística. Sabine galeas [phone].    We comply with applicable federal civil rights laws and [place] laws. We do not discriminate on the basis of race, color, national origin, age, disability, sex, sexual orientation, or gender identity.            Thank you!     Thank you for choosing [facility] PRIMARY CARE  for your care. Our goal is always to provide you with excellent care. Hearing back from our patients is one way we can continue to improve our services. Please take a few minutes to complete the written survey that you may receive in the mail after your visit with us. Thank you!             Your Updated Medication List - Protect others around you: Learn how to safely use, store and throw away your medicines at www.disposemymeds.org.          This list is accurate as of 6/12/18  9:46 AM.  Always use your most recent med list.                   Brand Name Dispense Instructions for use Diagnosis    albuterol (2.5 MG/3ML) 0.083% neb solution     30 vial    Take 1 vial (2.5 mg) by nebulization every 6 hours as needed for shortness of breath / dyspnea or wheezing    Mild intermittent asthma with acute exacerbation       ASPIRIN NOT PRESCRIBED    INTENTIONAL    1 each    Please choose reason not prescribed, below    Aspirin intolerance       BACLOFEN PO      Take 10 mg by mouth 2 times daily as needed for muscle spasms        blood glucose calibration solution    no brand specified    1 each    Use to calibrate blood glucose monitor as directed.    Type 2 diabetes mellitus without complication, without long-term current use of insulin (H)       blood glucose lancets standard    no brand specified    100 each    Use to test blood sugar daily times daily or as directed.    Type 2 diabetes mellitus without complication, without long-term current use of insulin (H)       blood glucose monitoring  meter device kit    no brand specified    1 kit    Use to test blood sugar larisa times daily or as directed.    Type 2 diabetes mellitus without complication, without long-term current use of insulin (H)       blood glucose monitoring test strip    no brand specified    100 strip    Use to test blood sugars once times daily or as directed    Type 2 diabetes mellitus without complication, without long-term current use of insulin (H)       cloNIDine 0.1 MG tablet    CATAPRES    60 tablet    Take 1 tablet (0.1 mg) by mouth 2 times daily    Hypertension goal BP (blood pressure) < 140/80       cyanocobalamin 1000 MCG/ML injection    VITAMIN B12    1 mL    Inject 1 mL (1,000 mcg) Subcutaneous every 30 days    B12 deficiency       EPINEPHrine 0.3 MG/0.3ML injection 2-pack    EPIPEN/ADRENACLICK/or ANY BX GENERIC EQUIV    2 each    Inject 0.3 mLs (0.3 mg) into the muscle once as needed for anaphylaxis    Late effect of other and unspecified external causes       losartan-hydrochlorothiazide 100-25 MG per tablet    HYZAAR    90 tablet    Take 1 tablet by mouth daily    Hypertension goal BP (blood pressure) < 140/80       magnesium oxide 400 (241.3 Mg) MG tablet    MAG-OX    90 tablet    Take 1 tablet (400 mg) by mouth daily    Cramp of limb       metoprolol tartrate 25 MG tablet    LOPRESSOR    30 tablet    Take 1 tablet (25 mg) by mouth 2 times daily Hold IF Heart Rate less than 55 or IF systolic blood pressure less than 95 mmHg    Hypertensive urgency       naloxone nasal spray    NARCAN    0.2 mL    Spray 1 spray (4 mg) into one nostril alternating nostrils as needed for opioid reversal every 2-3 minutes until assistance arrives    Chronic pain syndrome       ondansetron 4 MG ODT tab    ZOFRAN-ODT    10 tablet    Take 1 tablet (4 mg) by mouth every 8 hours as needed for nausea        order for DME     1 Device    Right wrist splint for carpal tunnel syndrome  One* Use as directed at hour of sleep    Carpal tunnel syndrome  of right wrist       oxyCODONE-acetaminophen 5-325 MG per tablet    PERCOCET    21 tablet    Take 1 tablet by mouth every 6 hours as needed for pain    Chronic pain syndrome       polyethylene glycol powder    MIRALAX/GLYCOLAX    1581 g    TAKE 17 GRAMS (1 CAPFUL) BY MOUTH DAILY    Slow transit constipation       potassium chloride 10 MEQ tablet    K-TAB,KLOR-CON    120 tablet    Take 1 tablet (10 mEq) by mouth daily    Hypokalemia       sertraline 100 MG tablet    ZOLOFT    45 tablet    Take 1.5 tablets (150 mg) by mouth daily    Recurrent major depression in partial remission (H), Other constipation       STATIN NOT PRESCRIBED (INTENTIONAL)     0 each    1 each daily Statin not prescribed intentionally due to Active liver disease (liver failure, cirrhosis, hepatitis) LIVER METS    Hyperlipidemia LDL goal <100, Type 2 diabetes mellitus without complication (H)       Vitamin D (Cholecalciferol) 1000 units Tabs     60 tablet    Take 2,000 Units by mouth daily    Vitamin D deficiency
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
## 2018-06-12 NOTE — PROGRESS NOTES
Writer called [facility] for Fax for PIETRO.    Authorization for Release of Protected Health Information faxed to [facility] at [phone]    Mateusz Juárez RN

## 2018-06-19 ENCOUNTER — OFFICE VISIT (OUTPATIENT)
Dept: BEHAVIORAL HEALTH | Facility: CLINIC | Age: 53
End: 2018-06-19
Payer: COMMERCIAL

## 2018-06-19 ENCOUNTER — OFFICE VISIT (OUTPATIENT)
Dept: FAMILY MEDICINE | Facility: CLINIC | Age: 53
End: 2018-06-19
Payer: COMMERCIAL

## 2018-06-19 VITALS
DIASTOLIC BLOOD PRESSURE: 80 MMHG | RESPIRATION RATE: 18 BRPM | TEMPERATURE: 98.4 F | SYSTOLIC BLOOD PRESSURE: 102 MMHG | HEART RATE: 64 BPM | BODY MASS INDEX: 40.75 KG/M2 | WEIGHT: 252.5 LBS | OXYGEN SATURATION: 97 %

## 2018-06-19 DIAGNOSIS — Z12.11 SCREEN FOR COLON CANCER: ICD-10-CM

## 2018-06-19 DIAGNOSIS — F33.41 RECURRENT MAJOR DEPRESSION IN PARTIAL REMISSION (H): Chronic | ICD-10-CM

## 2018-06-19 DIAGNOSIS — F43.10 PTSD (POST-TRAUMATIC STRESS DISORDER): Primary | Chronic | ICD-10-CM

## 2018-06-19 DIAGNOSIS — G89.4 CHRONIC PAIN SYNDROME: ICD-10-CM

## 2018-06-19 DIAGNOSIS — Z11.59 NEED FOR HEPATITIS C SCREENING TEST: ICD-10-CM

## 2018-06-19 DIAGNOSIS — Z12.31 VISIT FOR SCREENING MAMMOGRAM: ICD-10-CM

## 2018-06-19 PROCEDURE — 87522 HEPATITIS C REVRS TRNSCRPJ: CPT | Performed by: NURSE PRACTITIONER

## 2018-06-19 PROCEDURE — 36415 COLL VENOUS BLD VENIPUNCTURE: CPT | Performed by: NURSE PRACTITIONER

## 2018-06-19 PROCEDURE — 90834 PSYTX W PT 45 MINUTES: CPT | Performed by: SOCIAL WORKER

## 2018-06-19 PROCEDURE — 86803 HEPATITIS C AB TEST: CPT | Performed by: NURSE PRACTITIONER

## 2018-06-19 PROCEDURE — 99214 OFFICE O/P EST MOD 30 MIN: CPT | Performed by: NURSE PRACTITIONER

## 2018-06-19 RX ORDER — OXYCODONE AND ACETAMINOPHEN 5; 325 MG/1; MG/1
1 TABLET ORAL EVERY 6 HOURS PRN
Qty: 42 TABLET | Refills: 0 | Status: SHIPPED | OUTPATIENT
Start: 2018-06-19 | End: 2018-07-03

## 2018-06-19 ASSESSMENT — PAIN SCALES - GENERAL: PAINLEVEL: SEVERE PAIN (7)

## 2018-06-19 NOTE — PROGRESS NOTES
"[facility] - Integrated Primary Care   June 19, 2018      Behavioral Health Clinician Progress Note    Patient Name: Ines Mott           Service Type:  Individual      Service Location:   Face to Face in Clinic      Session Start Time: 2:40 p.m.  Session End Time: 3:30 p.m.      Session Length: 38 - 52      Attendees: Patient and PCP    Visit Activities (Refresh list every visit): [facility]    Diagnostic Assessment Date: to be completed at next visit  Treatment Plan Review Date: to be completed at next visit  See Flowsheets for today's PHQ-9 and RASTA-7 results  Previous PHQ-9:   PHQ-9 SCORE 5/4/2018 5/11/2018 5/18/2018   Total Score - - -   Total Score MyChart - 9 (Mild depression) -   Total Score 23 9 7     Previous RASTA-7:   RASTA-7 SCORE 8/24/2015 11/4/2016 12/5/2017   Total Score - - 3 (minimal anxiety)   Total Score 7 9 3       GONZÁLEZ LEVEL:  No flowsheet data found.    DATA  Extended Session (60+ minutes): No  Interactive Complexity: No  Crisis: No  [facility] Patient: No    Treatment Objective(s) Addressed in This Session:  Target Behavior(s): disease management/lifestyle changes cancer    Adjustment Difficulties: will develop coping/problem-solving skills to facilitate more adaptive adjustment  Psychological distress related to Chronic Disease Management    Current Stressors / Issues:    [facility] co-visit with patient and PCP in the clinic. Unable to complete a diagnostic assessment today due to time limitations. Patient agreed to schedule a follow up visit in 2-3 weeks to complete a DA.  Patient's primary concerns for today's visit are: upcoming kidney surgery, housing, and aftercare. Patient reports things are \"up and down\" with her . She has not decided whether they will stay together, and is worried that if they separate or divorce, she will not be able to afford to live on her own. Patient states that while they can renew their current lease, their rent is going to increase to over $900/month, and " "this exceeds her social security income.   Patient states she planned to follow up with [facility] Janny Aleman, and that she failed to call her back due to short term memory problems and pre-occupation with pain.   Patient reports she will have no recovery support after her surgery, and that \"I've done this by myself before and I'll have to do it again\". She agreed to ask Janny about home care options to assist with recovery post-surgery.   Patient reports her mood is anxious due to the upcoming procedure. She is worried about uncontrolled pain; PCP discussed medication options and a future taper off opioids, unless she meets palliative care needs.   Patient states she is agreeable to other options in the interest of pain management.  Patient denies SI/SIB thoughts or urges, and reports no changes in her thinking. She states that being in [place] triggers traumatic memories, though she is able to control her thoughts and emotions.  Patient continues to lopez, and states this is not currently a problem.    Progress on Treatment Objective(s) / Homework:  New Objective established this session - CONTEMPLATION (Considering change and yet undecided); Intervened by assessing the negative and positive thinking (ambivalence) about behavior change    Motivational Interviewing    MI Intervention: Expressed Empathy/Understanding, Supported Autonomy, Collaboration, Evocation, Permission to raise concern or advise, Open-ended questions and Reflections: simple and complex     Change Talk Expressed by the Patient: Ability to change Reasons to change Committment to change    Provider Response to Change Talk: E - Evoked more info from patient about behavior change, A - Affirmed patient's thoughts, decisions, or attempts at behavior change, R - Reflected patient's change talk and S - Summarized patient's change talk statements    Also provided psychoeducation about behavioral health condition, symptoms, and treatment options    Care " "Plan review completed: No    Medication Review:  No changes to current psychiatric medication(s) - zoloft, clonidine    Medication Compliance:  Yes    Changes in Health Issues:   Yes: Chronic disease management, Associated Psychological Distress  Cancer, Please see medical note by PCP MICHEAL Gaming, CNP    Chemical Use Review:   Substance Use: increase in alcohol .  Client reports frequency of use \"had a few drinks about three weeks ago because I didn't have any oxy\".  Contemplation  Provided encouragement towards sobriety   Patient also has a hx of polysubstance dependence; drugs of choice: heroin, opiates, alcohol - currently using alcohol and opiates. Reports intent to stop using alcohol \"because then things get crazy\".     Tobacco Use: No current tobacco use.      Assessment: Current Emotional / Mental Status (status of significant symptoms):  Risk status (Self / Other harm or suicidal ideation)  Patient has had a history of suicidal ideation: \"comes and goes\" and suicide attempts: most recent attempt 10/21/14, pt attempted to overdose on methamphetamine; attempted in the past                                                                                           Patient denies current fears or concerns for personal safety.  Patient denies current or recent suicidal ideation or behaviors.  Patient denies current or recent homicidal ideation or behaviors.  Patient denies current or recent self injurious behavior or ideation.  Patient denies other safety concerns.  A safety and risk management plan has not been developed at this time, however patient was encouraged to call [facility] should there be a change in any of these risk factors.    Appearance:   Appropriate   Eye Contact:   Good   Psychomotor Behavior: Normal   Attitude:   Cooperative   Orientation:   All  Speech   Rate / Production: Normal    Volume:  Normal   Mood:    Depressed   Affect:    Flat   Thought Content:  Clear   Thought " Form:  Coherent  Logical   Insight:    Fair     Diagnoses:  1. PTSD (post-traumatic stress disorder)    2. Recurrent major depression in partial remission        Collateral Reports Completed:  Not Applicable    Plan: (Homework, other):  Patient was given information about behavioral services and encouraged to schedule a follow up appointment with the clinic [facility] in 3 weeks to complete a diagnostic assessment.  She was also given information about mental health symptoms and treatment options  and deep Breathing Strategies to practice when experiencing anxiety and depression.  CD Recommendations: Maintain Sobriety.    MARTIN Palma, [facility]

## 2018-06-19 NOTE — Clinical Note
Ines Carvalho said she's been meaning to follow up with you about help with housing and medical transportation, but that short-term memory problems are interfering. She plans to call you by the end of the day tomorrow. If you don't hear from her, could you please give her a call? She's scheduled for kidney surgery on Thursday, FYI. Thank you, Davion

## 2018-06-19 NOTE — PATIENT INSTRUCTIONS
We have reordered your oxycodone for 2 more weeks until we get the rest of your records and you have completed your surgery    We will see you in 2 weeks     We will get a HepC screen today, and schedule you a mammogram    Please turn your FIT card  ASAP

## 2018-06-19 NOTE — MR AVS SNAPSHOT
After Visit Summary   6/19/2018    Ines Mott    MRN: [id number]           Patient Information     Date Of Birth          1965        Visit Information        Provider Department      6/19/2018 2:00 PM Marika Santana APRN CNP [facility]        Today's Diagnoses     Screen for colon cancer        Visit for screening mammogram        Screening for malignant neoplasm of cervix        Need for hepatitis C screening test        Screening for diabetic peripheral neuropathy        Chronic pain syndrome          Care Instructions    We have reordered your oxycodone for 2 more weeks until we get the rest of your records and you have completed your surgery    We will see you in 2 weeks     We will get a HepC screen today, and schedule you a mammogram    Please turn your FIT card in ASAP           Follow-ups after your visit        Future tests that were ordered for you today     Open Future Orders        Priority Expected Expires Ordered    Fecal colorectal cancer screen FIT - Future (S+30) Routine 7/10/2018 7/19/2018 6/19/2018    MA SCREENING DIGITAL BILAT - Future  (s+30) Routine  6/19/2019 6/19/2018            Who to contact     If you have questions or need follow up information about today's clinic visit or your schedule please contact [facility] directly at [phone].  Normal or non-critical lab and imaging results will be communicated to you by MyChart, letter or phone within 4 business days after the clinic has received the results. If you do not hear from us within 7 days, please contact the clinic through MyChart or phone. If you have a critical or abnormal lab result, we will notify you by phone as soon as possible.  Submit refill requests through [employer] or call your pharmacy and they will forward the refill request to us. Please allow 3 business days for your refill to be completed.          Additional Information  About Your Visit        Care EveryWhere ID     This is your Care EveryWhere ID. This could be used by other organizations to access your [facility] medical records  [id number]        Your Vitals Were     Pulse Temperature Respirations Pulse Oximetry BMI (Body Mass Index)       64 98.4  F (36.9  C) (Oral) 18 97% 40.75 kg/m2        Blood Pressure from Last 3 Encounters:   06/19/18 102/80   06/12/18 120/84   06/11/18 124/80    Weight from Last 3 Encounters:   06/19/18 252 lb 8 oz (114.5 kg)   06/12/18 257 lb (116.6 kg)   06/11/18 256 lb 8 oz (116.3 kg)              We Performed the Following     Hepatitis C Screen Reflex to HCV RNA Quant and Genotype          Today's Medication Changes          These changes are accurate as of 6/19/18  3:08 PM.  If you have any questions, ask your nurse or doctor.               These medicines have changed or have updated prescriptions.        Dose/Directions    oxyCODONE-acetaminophen 5-325 MG per tablet   Commonly known as:  PERCOCET   This may have changed:  additional instructions   Used for:  Chronic pain syndrome   Changed by:  Marika Santana, MICHEAL CNP        Dose:  1 tablet   Take 1 tablet by mouth every 6 hours as needed for pain Not to exceed 3 tabs a day   Quantity:  42 tablet   Refills:  0       sertraline 100 MG tablet   Commonly known as:  ZOLOFT   Indication:  Major Depressive Disorder   This may have changed:  how much to take   Used for:  Recurrent major depression in partial remission (H), Other constipation        Dose:  150 mg   Take 1.5 tablets (150 mg) by mouth daily   Quantity:  45 tablet   Refills:  11            Where to get your medicines      Some of these will need a paper prescription and others can be bought over the counter.  Ask your nurse if you have questions.     Bring a paper prescription for each of these medications     oxyCODONE-acetaminophen 5-325 MG per tablet               Information about OPIOIDS     PRESCRIPTION OPIOIDS: WHAT YOU  NEED TO KNOW   We gave you an opioid (narcotic) pain medicine. It is important to manage your pain, but opioids are not always the best choice. You should first try all the other options your care team gave you. Take this medicine for as short a time (and as few doses) as possible.     These medicines have risks:    DO NOT drive when on new or higher doses of pain medicine. These medicines can affect your alertness and reaction times, and you could be arrested for driving under the influence (DUI). If you need to use opioids long-term, talk to your care team about driving.    DO NOT operate heave machinery    DO NOT do any other dangerous activities while taking these medicines.     DO NOT drink any alcohol while taking these medicines.      If the opioid prescribed includes acetaminophen, DO NOT take with any other medicines that contain acetaminophen. Read all labels carefully. Look for the word  acetaminophen  or  Tylenol.  Ask your pharmacist if you have questions or are unsure.    You can get addicted to pain medicines, especially if you have a history of addiction (chemical, alcohol or substance dependence). Talk to your care team about ways to reduce this risk.    Store your pills in a secure place, locked if possible. We will not replace any lost or stolen medicine. If you don t finish your medicine, please throw away (dispose) as directed by your pharmacist. The [place] Pollution Control Agency has more information about safe disposal: https://www.pca.[facility].[place].us/living-green/managing-unwanted-medications.     All opioids tend to cause constipation. Drink plenty of water and eat foods that have a lot of fiber, such as fruits, vegetables, prune juice, apple juice and high-fiber cereal. Take a laxative (Miralax, milk of magnesia, Colace, Senna) if you don t move your bowels at least every other day.          Primary Care Provider Office Phone # Fax #    Dustin Fatima [phone]        [address]  [facility] [address]        Equal Access to Services     FLAVIOWARREN MIGUEL : Hadii casimiro zapata tretrina Sodanisali, waaislinnda ludoreenadaha, kenta hannadelonkandy stylesalinkandy, emilee talbot chilozhao de la cruzshiraeric coyne. So [facility] [phone].    ATENCIÓN: Si habla español, tiene a christianson disposición servicios gratuitos de asistencia lingüística. Llame al [phone].    We comply with applicable federal civil rights laws and [place] laws. We do not discriminate on the basis of race, color, national origin, age, disability, sex, sexual orientation, or gender identity.            Thank you!     Thank you for choosing [facility]  for your care. Our goal is always to provide you with excellent care. Hearing back from our patients is one way we can continue to improve our services. Please take a few minutes to complete the written survey that you may receive in the mail after your visit with us. Thank you!             Your Updated Medication List - Protect others around you: Learn how to safely use, store and throw away your medicines at www.disposemymeds.org.          This list is accurate as of 6/19/18  3:08 PM.  Always use your most recent med list.                   Brand Name Dispense Instructions for use Diagnosis    albuterol (2.5 MG/3ML) 0.083% neb solution     30 vial    Take 1 vial (2.5 mg) by nebulization every 6 hours as needed for shortness of breath / dyspnea or wheezing    Mild intermittent asthma with acute exacerbation       ASPIRIN NOT PRESCRIBED    INTENTIONAL    1 each    Please choose reason not prescribed, below    Aspirin intolerance       BACLOFEN PO      Take 10 mg by mouth 2 times daily as needed for muscle spasms        blood glucose calibration solution    no brand specified    1 each    Use to calibrate blood glucose monitor as directed.    Type 2 diabetes mellitus without complication, without long-term current use of insulin (H)       blood glucose lancets standard     no brand specified    100 each    Use to test blood sugar daily times daily or as directed.    Type 2 diabetes mellitus without complication, without long-term current use of insulin (H)       blood glucose monitoring meter device kit    no brand specified    1 kit    Use to test blood sugar larisa times daily or as directed.    Type 2 diabetes mellitus without complication, without long-term current use of insulin (H)       blood glucose monitoring test strip    no brand specified    100 strip    Use to test blood sugars once times daily or as directed    Type 2 diabetes mellitus without complication, without long-term current use of insulin (H)       cloNIDine 0.1 MG tablet    CATAPRES    60 tablet    Take 1 tablet (0.1 mg) by mouth 2 times daily    Hypertension goal BP (blood pressure) < 140/80       cyanocobalamin 1000 MCG/ML injection    VITAMIN B12    1 mL    Inject 1 mL (1,000 mcg) Subcutaneous every 30 days    B12 deficiency       EPINEPHrine 0.3 MG/0.3ML injection 2-pack    EPIPEN/ADRENACLICK/or ANY BX GENERIC EQUIV    2 each    Inject 0.3 mLs (0.3 mg) into the muscle once as needed for anaphylaxis    Late effect of other and unspecified external causes       losartan-hydrochlorothiazide 100-25 MG per tablet    HYZAAR    90 tablet    Take 1 tablet by mouth daily    Hypertension goal BP (blood pressure) < 140/80       magnesium oxide 400 (241.3 Mg) MG tablet    MAG-OX    90 tablet    Take 1 tablet (400 mg) by mouth daily    Cramp of limb       metoprolol tartrate 25 MG tablet    LOPRESSOR    30 tablet    Take 1 tablet (25 mg) by mouth 2 times daily Hold IF Heart Rate less than 55 or IF systolic blood pressure less than 95 mmHg    Hypertensive urgency       naloxone nasal spray    NARCAN    0.2 mL    Spray 1 spray (4 mg) into one nostril alternating nostrils as needed for opioid reversal every 2-3 minutes until assistance arrives    Chronic pain syndrome       ondansetron 4 MG ODT tab    ZOFRAN-ODT    10 tablet     Take 1 tablet (4 mg) by mouth every 8 hours as needed for nausea        order for DME     1 Device    Right wrist splint for carpal tunnel syndrome  One* Use as directed at hour of sleep    Carpal tunnel syndrome of right wrist       oxyCODONE-acetaminophen 5-325 MG per tablet    PERCOCET    42 tablet    Take 1 tablet by mouth every 6 hours as needed for pain Not to exceed 3 tabs a day    Chronic pain syndrome       polyethylene glycol powder    MIRALAX/GLYCOLAX    1581 g    TAKE 17 GRAMS (1 CAPFUL) BY MOUTH DAILY    Slow transit constipation       potassium chloride 10 MEQ tablet    K-TAB,KLOR-CON    120 tablet    Take 1 tablet (10 mEq) by mouth daily    Hypokalemia       sertraline 100 MG tablet    ZOLOFT    45 tablet    Take 1.5 tablets (150 mg) by mouth daily    Recurrent major depression in partial remission (H), Other constipation       STATIN NOT PRESCRIBED (INTENTIONAL)     0 each    1 each daily Statin not prescribed intentionally due to Active liver disease (liver failure, cirrhosis, hepatitis) LIVER METS    Hyperlipidemia LDL goal <100, Type 2 diabetes mellitus without complication (H)       Vitamin D (Cholecalciferol) 1000 units Tabs     60 tablet    Take 2,000 Units by mouth daily    Vitamin D deficiency
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
## 2018-06-19 NOTE — MR AVS SNAPSHOT
After Visit Summary   6/19/2018    Ines Mott    MRN: [id number]           Patient Information     Date Of Birth          1965        Visit Information        Provider Department      6/19/2018 2:30 PM Davion Rees, [facility]        Today's Diagnoses     PTSD (post-traumatic stress disorder)    -  1    Recurrent major depression in partial remission           Follow-ups after your visit        Future tests that were ordered for you today     Open Future Orders        Priority Expected Expires Ordered    Fecal colorectal cancer screen FIT - Future (S+30) Routine 7/10/2018 7/19/2018 6/19/2018    MA SCREENING DIGITAL BILAT - Future  (s+30) Routine  6/19/2019 6/19/2018            Who to contact     If you have questions or need follow up information about today's clinic visit or your schedule please contact [facility] directly at [phone].  Normal or non-critical lab and imaging results will be communicated to you by MyChart, letter or phone within 4 business days after the clinic has received the results. If you do not hear from us within 7 days, please contact the clinic through MyChart or phone. If you have a critical or abnormal lab result, we will notify you by phone as soon as possible.  Submit refill requests through "[employer] or call your pharmacy and they will forward the refill request to us. Please allow 3 business days for your refill to be completed.          Additional Information About Your Visit        Care EveryWhere ID     This is your Care EveryWhere ID. This could be used by other organizations to access your [place] medical records  [id number]         Blood Pressure from Last 3 Encounters:   06/19/18 102/80   06/12/18 120/84   06/11/18 124/80    Weight from Last 3 Encounters:   06/19/18 252 lb 8 oz (114.5 kg)   06/12/18 257 lb (116.6 kg)   06/11/18 256 lb 8 oz (116.3 kg)              Today, you  had the following     No orders found for display         Today's Medication Changes          These changes are accurate as of 6/19/18 11:59 PM.  If you have any questions, ask your nurse or doctor.               These medicines have changed or have updated prescriptions.        Dose/Directions    oxyCODONE-acetaminophen 5-325 MG per tablet   Commonly known as:  PERCOCET   This may have changed:  additional instructions   Used for:  Chronic pain syndrome   Changed by:  Marika Santana APRN CNP        Dose:  1 tablet   Take 1 tablet by mouth every 6 hours as needed for pain Not to exceed 3 tabs a day   Quantity:  42 tablet   Refills:  0       sertraline 100 MG tablet   Commonly known as:  ZOLOFT   Indication:  Major Depressive Disorder   This may have changed:  how much to take   Used for:  Recurrent major depression in partial remission (H), Other constipation        Dose:  150 mg   Take 1.5 tablets (150 mg) by mouth daily   Quantity:  45 tablet   Refills:  11            Where to get your medicines      Some of these will need a paper prescription and others can be bought over the counter.  Ask your nurse if you have questions.     Bring a paper prescription for each of these medications     oxyCODONE-acetaminophen 5-325 MG per tablet                Primary Care Provider Office Phone # Fax #    Dustin Rina Fatima [phone] [address] [facility] [address]        Equal Access to Services     VALENTE RIVERA AH: Hadii casimiro toledoo Soevangelina, waaxda luqadaha, qaybta kaalmada adeegyada, emilee coyne. So [facility] [phone].    ATENCIÓN: Si habla español, tiene a christianson disposición servicios gratuitos de asistencia lingüística. Llame al [phone].    We comply with applicable federal civil rights laws and [place] laws. We do not discriminate on the basis of race, color, national origin, age, disability, sex, sexual orientation, or gender  identity.            Thank you!     Thank you for choosing [facility]  for your care. Our goal is always to provide you with excellent care. Hearing back from our patients is one way we can continue to improve our services. Please take a few minutes to complete the written survey that you may receive in the mail after your visit with us. Thank you!             Your Updated Medication List - Protect others around you: Learn how to safely use, store and throw away your medicines at www.disposemymeds.org.          This list is accurate as of 6/19/18 11:59 PM.  Always use your most recent med list.                   Brand Name Dispense Instructions for use Diagnosis    albuterol (2.5 MG/3ML) 0.083% neb solution     30 vial    Take 1 vial (2.5 mg) by nebulization every 6 hours as needed for shortness of breath / dyspnea or wheezing    Mild intermittent asthma with acute exacerbation       ASPIRIN NOT PRESCRIBED    INTENTIONAL    1 each    Please choose reason not prescribed, below    Aspirin intolerance       BACLOFEN PO      Take 10 mg by mouth 2 times daily as needed for muscle spasms        blood glucose calibration solution    no brand specified    1 each    Use to calibrate blood glucose monitor as directed.    Type 2 diabetes mellitus without complication, without long-term current use of insulin (H)       blood glucose lancets standard    no brand specified    100 each    Use to test blood sugar daily times daily or as directed.    Type 2 diabetes mellitus without complication, without long-term current use of insulin (H)       blood glucose monitoring meter device kit    no brand specified    1 kit    Use to test blood sugar larisa times daily or as directed.    Type 2 diabetes mellitus without complication, without long-term current use of insulin (H)       blood glucose monitoring test strip    no brand specified    100 strip    Use to test blood sugars once times daily or as  directed    Type 2 diabetes mellitus without complication, without long-term current use of insulin (H)       cloNIDine 0.1 MG tablet    CATAPRES    60 tablet    Take 1 tablet (0.1 mg) by mouth 2 times daily    Hypertension goal BP (blood pressure) < 140/80       cyanocobalamin 1000 MCG/ML injection    VITAMIN B12    1 mL    Inject 1 mL (1,000 mcg) Subcutaneous every 30 days    B12 deficiency       EPINEPHrine 0.3 MG/0.3ML injection 2-pack    EPIPEN/ADRENACLICK/or ANY BX GENERIC EQUIV    2 each    Inject 0.3 mLs (0.3 mg) into the muscle once as needed for anaphylaxis    Late effect of other and unspecified external causes       losartan-hydrochlorothiazide 100-25 MG per tablet    HYZAAR    90 tablet    Take 1 tablet by mouth daily    Hypertension goal BP (blood pressure) < 140/80       magnesium oxide 400 (241.3 Mg) MG tablet    MAG-OX    90 tablet    Take 1 tablet (400 mg) by mouth daily    Cramp of limb       metoprolol tartrate 25 MG tablet    LOPRESSOR    30 tablet    Take 1 tablet (25 mg) by mouth 2 times daily Hold IF Heart Rate less than 55 or IF systolic blood pressure less than 95 mmHg    Hypertensive urgency       naloxone nasal spray    NARCAN    0.2 mL    Spray 1 spray (4 mg) into one nostril alternating nostrils as needed for opioid reversal every 2-3 minutes until assistance arrives    Chronic pain syndrome       ondansetron 4 MG ODT tab    ZOFRAN-ODT    10 tablet    Take 1 tablet (4 mg) by mouth every 8 hours as needed for nausea        order for DME     1 Device    Right wrist splint for carpal tunnel syndrome  One* Use as directed at hour of sleep    Carpal tunnel syndrome of right wrist       oxyCODONE-acetaminophen 5-325 MG per tablet    PERCOCET    42 tablet    Take 1 tablet by mouth every 6 hours as needed for pain Not to exceed 3 tabs a day    Chronic pain syndrome       polyethylene glycol powder    MIRALAX/GLYCOLAX    1581 g    TAKE 17 GRAMS (1 CAPFUL) BY MOUTH DAILY    Slow transit  constipation       potassium chloride 10 MEQ tablet    K-TAB,KLOR-CON    120 tablet    Take 1 tablet (10 mEq) by mouth daily    Hypokalemia       sertraline 100 MG tablet    ZOLOFT    45 tablet    Take 1.5 tablets (150 mg) by mouth daily    Recurrent major depression in partial remission (H), Other constipation       STATIN NOT PRESCRIBED (INTENTIONAL)     0 each    1 each daily Statin not prescribed intentionally due to Active liver disease (liver failure, cirrhosis, hepatitis) LIVER METS    Hyperlipidemia LDL goal <100, Type 2 diabetes mellitus without complication (H)       Vitamin D (Cholecalciferol) 1000 units Tabs     60 tablet    Take 2,000 Units by mouth daily    Vitamin D deficiency

## 2018-06-21 ENCOUNTER — TRANSFERRED RECORDS (OUTPATIENT)
Dept: HEALTH INFORMATION MANAGEMENT | Facility: CLINIC | Age: 53
End: 2018-06-21

## 2018-06-21 LAB — HCV AB SERPL QL IA: REACTIVE

## 2018-06-22 ENCOUNTER — PATIENT OUTREACH (OUTPATIENT)
Dept: CARE COORDINATION | Facility: CLINIC | Age: 53
End: 2018-06-22

## 2018-06-22 LAB
HCV RNA SERPL NAA+PROBE-ACNC: NORMAL [IU]/ML
HCV RNA SERPL NAA+PROBE-LOG IU: NORMAL LOG IU/ML

## 2018-06-22 ASSESSMENT — ACTIVITIES OF DAILY LIVING (ADL)
DEPENDENT_IADLS:: TRANSPORTATION
DEPENDENT_IADLS:: TRANSPORTATION

## 2018-06-22 NOTE — PROGRESS NOTES
Clinic Care Coordination Contact  Care Team Conversations    SW received message from patient's [facility] asking to call patient about services that patient is in need of.   SW called and left patient a message to call this SW.     Plan: SW will continue to follow and assist as needed.     BRISSA Davis    Care Coordinator  [facility] ,[facility]   [phone]

## 2018-06-25 ENCOUNTER — TELEPHONE (OUTPATIENT)
Dept: FAMILY MEDICINE | Facility: CLINIC | Age: 53
End: 2018-06-25

## 2018-06-25 NOTE — TELEPHONE ENCOUNTER
Reason for Call:  Other FYI    Detailed comments: Patient was admitted on 6-21-18 to [facility] to disorder of kidney and ureter. Discharged on 6-22-18 and admitted again on 6-24-18 to [facility] to fever and flank pain. Still currently inpatient    Phone Number Patient can be reached at: Other phone number:  N/A    Best Time: N/A    Can we leave a detailed message on this number? Not Applicable    Call taken on 6/25/2018 at 2:59 PM by JACLYN KAUFFMAN

## 2018-06-29 ENCOUNTER — TELEPHONE (OUTPATIENT)
Dept: FAMILY MEDICINE | Facility: CLINIC | Age: 53
End: 2018-06-29

## 2018-06-29 NOTE — TELEPHONE ENCOUNTER
FYI-  Nurse was called with verbal approval for orders requested below. She also reports pt was sent home from hospital with 10 Oxycodone and pt reported she they are missing. Nurse would like PCP aware.

## 2018-06-29 NOTE — TELEPHONE ENCOUNTER
Is she going to [facility] for her pain medications etc   We dont replace lost prescriptions  Dustin Fatima Jr., MD

## 2018-06-29 NOTE — TELEPHONE ENCOUNTER
Reason for Call:  Other call back    Detailed comments: Home care nurse needs verbal orders for infusion therapy nursing visits. Please call back to discuss.     Phone Number Patient can be reached at: Other phone number:  [phone]*    Best Time: as soon as possible    Can we leave a detailed message on this number? YES    Call taken on 6/29/2018 at 12:34 PM by Lauren Upton

## 2018-07-02 ENCOUNTER — TELEPHONE (OUTPATIENT)
Dept: FAMILY MEDICINE | Facility: CLINIC | Age: 53
End: 2018-07-02

## 2018-07-02 ENCOUNTER — OFFICE VISIT (OUTPATIENT)
Dept: FAMILY MEDICINE | Facility: CLINIC | Age: 53
End: 2018-07-02
Payer: COMMERCIAL

## 2018-07-02 VITALS
WEIGHT: 245 LBS | SYSTOLIC BLOOD PRESSURE: 124 MMHG | HEART RATE: 66 BPM | DIASTOLIC BLOOD PRESSURE: 64 MMHG | TEMPERATURE: 96.5 F | BODY MASS INDEX: 39.54 KG/M2 | OXYGEN SATURATION: 98 % | RESPIRATION RATE: 20 BRPM

## 2018-07-02 DIAGNOSIS — E78.5 HYPERLIPIDEMIA WITH TARGET LDL LESS THAN 130: Chronic | ICD-10-CM

## 2018-07-02 DIAGNOSIS — F11.20 OPIOID USE DISORDER, SEVERE, DEPENDENCE (H): ICD-10-CM

## 2018-07-02 DIAGNOSIS — B37.31 YEAST INFECTION OF THE VAGINA: ICD-10-CM

## 2018-07-02 DIAGNOSIS — Z98.2 INTRACRANIAL SHUNT: ICD-10-CM

## 2018-07-02 DIAGNOSIS — F33.41 RECURRENT MAJOR DEPRESSION IN PARTIAL REMISSION (H): Chronic | ICD-10-CM

## 2018-07-02 DIAGNOSIS — I10 HYPERTENSION GOAL BP (BLOOD PRESSURE) < 140/80: Primary | Chronic | ICD-10-CM

## 2018-07-02 DIAGNOSIS — F43.10 PTSD (POST-TRAUMATIC STRESS DISORDER): Chronic | ICD-10-CM

## 2018-07-02 DIAGNOSIS — D64.9 ANEMIA, UNSPECIFIED TYPE: Primary | ICD-10-CM

## 2018-07-02 DIAGNOSIS — F11.90 CHRONIC, CONTINUOUS USE OF OPIOIDS: ICD-10-CM

## 2018-07-02 DIAGNOSIS — G43.809 OTHER MIGRAINE WITHOUT STATUS MIGRAINOSUS, NOT INTRACTABLE: ICD-10-CM

## 2018-07-02 DIAGNOSIS — G47.33 OSA (OBSTRUCTIVE SLEEP APNEA): ICD-10-CM

## 2018-07-02 PROCEDURE — 99214 OFFICE O/P EST MOD 30 MIN: CPT | Performed by: FAMILY MEDICINE

## 2018-07-02 NOTE — PATIENT INSTRUCTIONS
.D64.9) Anemia, unspecified type  (primary encounter diagnosis)  Comment:    Plan: CBC with platelets         STANDING ORDER PLACED   OTTO YUSUF JR., MD

## 2018-07-02 NOTE — PROGRESS NOTES
SUBJECTIVE:   Ines Mott is a 53 year old female who presents to clinic today for the following health issues:      ED/UC Followup:    Facility:  [facility]  Date of visit: 6/30/018  Reason for visit: issues with PICC line  Current Status: not feeling well       Jump to Section ?  Active ProblemsAllergiesCurrent MedicationsDocument InformationEncountersFamily HistoryLast Filed Vital SignsMedical HistoryPatient DemographicsPlan of TreatmentProceduresResolved ProblemsResultsSocial HistorySurgical History  Ines Mott  Clinical Summary, generated on Jul. 02, 2018  Printout Information  Document Contents  Clinical Summary  Document Received Date  Jul. 02, 2018  Document Source Organization  [email or web address] [facility]  Patient Demographics  - 53 y.o. Female, born Eddie. 15, 1965     Patient Address Communication Language Race / Ethnicity     [address] [place] [id number]   [phone] (Home)  [phone] (Mobile)   English - Spoken (Preferred)  English - Written (Preferred) White / Not  or    Allergies  Reconcile with Patient's Chart    Active Allergy Reactions Severity Noted Date Comments   Amoxicillin Anaphylaxis, Hives High 12/13/2005     Diphenhydramine     07/03/2005 not to take 2nd to heart palpitations    took vistaril IM 12/29/07 in ED     Blue Dye Rash   03/13/2018     Blueberry Rash   03/13/2018     Buspirone Anaphylaxis High 04/19/2018     Cefaclor Hives   12/13/2005     Clindamycin Itching   07/12/2011     Hydromorphone Hives, Itching   07/03/2005 took percocet 11/20/07 ED visit.    pt tolerated morphine IV in the past per ED MD order on 1/18/08.     Sumatriptan Hypertension   07/03/2005     Latex Other - Describe In Comment Field   10/19/2007 Spinal Bifida     Nsaids (Non-Steroidal Anti-Inflammatory Drug) Hives, Hepatic Dysfunction   07/03/2005 Cannot take due to liver cancer      Red Dye Rash   03/13/2018     Metoclopramide Hives, Edema    07/03/2005     Sulfa (Sulfonamide Antibiotics) Respiratory Distress   09/23/2014     Current Medications  Reconcile with Patient's Chart    Prescription Sig. Disp. Refills Start Date End Date Status   sertraline (ZOLOFT) 50 mg tablet   Take 100 mg by mouth every morning.         Active   cyanocobalamin (VITAMIN B-12) 1,000 mcg/mL injection   Inject intramuscular every 4 weeks.         Active   albuterol HFA (PRO-AIR,VENTOLIN,PROVENTIL) 90 mcg/actuation inhaler   Inhale 1-2 Puffs by mouth every 4 hours if needed. 1 Inhaler   0 08/09/2012   Active   losartan-hydrochlorothiazide, 50-12.5 mg, (HYZAAR) 50-12.5 mg tablet   Take 1 tablet by mouth once daily.         Active   potassium chloride (K-DUR) 20 mEq tablet   Take 1 tablet by mouth 2 times daily with meals. 10 tablet   0 05/28/2013   Active   ondansetron (ZOFRAN ODT) 4 mg disintegrating tablet   Place 1 tablet on the tongue every 8 hours if needed for Nausea/Vomiting. 12 tablet   0 05/30/2013   Active   cloNIDine HCl (CATAPRES) 0.1 mg tablet   Take 0.1 mg by mouth once daily.         Active   baclofen (LIORESAL) 10 mg tablet   Take 10 mg by mouth 2 times daily if needed for Other (Specify).         Active   polyethylene glycoL (MIRALAX) 17 gram/dose powder   Take 1 scoop by mouth once daily.         Active   Cholecalciferol, Vitamin D3, (VITAMIN D-3) 2,000 unit tablet   Take 2,000 Units by mouth once daily.         Active   oxybutynin (DITROPAN) 5 mg tablet    Indications: Renal mass, right Take 1 tablet by mouth 2 times daily if needed for Other (Specify). 20 tablet   0 06/22/2018   Active   acetaminophen (TYLENOL) 325 mg tablet   Take 1-2 tablets by mouth every 4 hours if needed (mild pain). Max acetaminophen dose: 4000mg in 24 hrs.   0 06/28/2018   Active   ertapenem (INVANZ) 1 gram solr    Indications: Pyelonephritis Inject 10 mL intravenous every 24 hours for 12 days. 120 mL   0 06/29/2018 07/11/2018 Active   oxyCODONE (ROXICODONE) 5 mg immediate release  tablet    Indications: Pyelonephritis Take 1-2 tablets by mouth every 4 hours if needed for Pain (For moderate pain.) 10 tablet   0 2018   Active   magnesium oxide (MAG-) 400 mg tablet   Take 400 mg by mouth once daily.         Active   oxyCODONE-acetaminophen, 5-325 mg, (PERCOCET) per tablet   Take 1-2 tablets by mouth every 4 hours if needed for Pain. Max acetaminophen dose: 4000mg in 24 hrs. 20 tablet   0 2014 Discontinued   ciprofloxacin HCl (CIPRO) 500 mg tablet    Indications: Urinary tract infection in female Take 1 tablet by mouth 2 times daily for 7 days. 14 tablet   0 2018    Active Problems  Reconcile with Patient's Chart    Problem Noted Date   Pyelonephritis 2018   ALEXUS on CPAP 2018   HTN (hypertension) 2018   Renal cell carcinoma (HC) 2018   Chronic pain 2014   Abdominal pain, other specified site 2014   Pain medication agreement 2014   Overview:     Used to get pain medications from Dr. Juan C Sanchez at [facility] until 2014 when he received an anonymous letter stating that patient sells her narcotics.     Hx of substance abuse 2013   Overview:     - EtOH - sober 4 years  - Hx of tobacco, marijuana, cocaine (smoking), ectasy - clean since age 18       Bipolar disorder (HC) 2013   Mild cognitive impairment 2013   Fibrocystic breast disease 2013   Asymptomatic bacteriuria 2013   Hx of nephrolithiasis 2013   Liver masses 2013   Overview:     Likely due to neuroendocrine tumor. Had excellent response to bland embolization in  and is due for repeat imaging in about 2014 with Dr. Pedro of Oncology.     Renal mass 2013   Arnold-Chiari Malformation s/p  shunt  2009   Overview:     Shunt placed 2000 revision 2001, 2003, 2005 adjustment  and 2014. Followed by Neurosurgery at [facility].     Cervical meningomyelocele 2009   Hx of  recurrent hypoglycemic episodes 08/20/2009   Chronic pain (back, abdominal and headaches) 08/20/2009   Overview:     Chronic headaches-extensive evaluation at [facility] in 8/2014 that showed no problem with the shunt. Chronic abdominal pain-thought to possibly be due to liver masses from known Neuroendocrine tumor, followed by Dr. Suhas Pedro at [facility].     Asthma     Overview:     On albuterol      Neuroendocrine carcinoma (HC)     Resolved Problems    Problem Noted Date Resolved Date   Acute abdominal pain 04/02/2014 08/26/2014   Abdominal pain, generalized 04/24/2013 08/26/2014   Hematemesis 04/24/2013 08/26/2014   Hematochezia 04/24/2013 08/26/2014   Leukocytosis 04/24/2013 08/26/2014   Pneumonia, organism unspecified(486) 02/28/2011 04/24/2013   Obesity, unspecified 08/20/2009 04/24/2013   Right Leg Pain associated with LBP 08/20/2009 04/24/2013   Spina bifida with hydrocephalus, cervical region (HC)   04/24/2013   Migraine, unspecified, without mention of intractable migraine without mention of status migrainosus   04/24/2013   Encounters  - from Last 3 Months    Date Type Specialty Care Team Description   06/30/2018 Hospital Encounter Medical Imaging Johnson Simms MD   Canceled (Patient)   06/30/2018 Emergency Emergency Johnson Simms MD   S/P PICC central line placement (Primary Dx);   Bruising;   Abdominal pain, unspecified abdominal location;   Pyelonephritis   06/30/2018 Home Care Visit [facility] Health and Comm Services Keiry Reed RN   SN - HOME VISIT   06/29/2018 Home Care Visit Home Health and Comm Services Milvia Sow RN   SN IV - [facility] START OF CARE   06/28/2018 Telephone Home Health and Comm Services Kendra Garland RN   Referral ([facility] SOC/[facility])   06/28/2018 Pharmacy Visit         06/24/2018 - 06/28/2018 Hospital Encounter Hosp InPatient Gladis Victoria MD Brennom, MD Samy Abreu Jean Burnham, MD Ruppman, Zak Dai MD    [facility]  Hospitalists Of   Flank pain (Primary Dx);   Nausea and vomiting, intractability of vomiting not specified, unspecified vomiting type;   Renal cell carcinoma of right kidney (HC);   Pyelonephritis   06/21/2018 - 06/22/2018 Hospital Encounter Hosp InPatient Koby Fowler MD   Renal mass, right   06/21/2018 Anesthesia Event Medical Imaging Jesenia Juarez MD       06/21/2018 Orders Only Medical Imaging Staff, Other Clinical   <No scans attached>   06/20/2018 Procedure Pass         06/09/2018 Emergency Emergency Adriana Hudson MD   Right leg pain (Primary Dx);   Anxiety about health;   Other chronic pain   05/28/2018 Emergency Emergency Donna Belle MD   Chronic flank pain (Primary Dx);   Urinary tract infection in female   05/22/2018 Emergency Emergency Jovana Montoya PA Peterson, David Roy, MD   Flank pain (Primary Dx);   Other chronic pain;   Shortness of breath   05/11/2018 Lab Requisition Laboratory Suhas Pedro MD       05/11/2018 Lab Requisition Laboratory Suhas Pedro MD       05/03/2018 Ancillary Orders Medical Imaging Koby Fowler MD       04/19/2018 Hospital Encounter Medical Imaging Joseph Last MD   Right renal mass   04/19/2018 Ancillary Orders Medical Imaging Joseph Last MD       04/19/2018 Telephone Oncology Jessica Kline, RN   Appointment (Follow up appointment to discuss pathology results, cystoscopy)   04/19/2018 Ancillary Orders Medical Imaging Joseph Last MD       04/09/2018 Lab Requisition Laboratory Suhas Pedro MD       Surgical History    Surgery Date Laterality Comments    shunt 2000 (revision 01,03,05, 09)   programmable set at 1.0 as of 8/09; placed at [facility]- Dr. Canales [phone]   PARTIAL HYSTERECTOMY         CHOLECYSTECTOMY         APPENDECTOMY         ESOPHAGOGASTRODUODENOSCOPY 5/3/0213   Endoscopy, Upper (EGD)   Endoscopic Ultrasound  "5/3/2013       COLONOSCOPY 2013       CT BIOPSY RENAL RIGHT 13   lower pole Rt. kidney   IR EMBOLIZATION 3/31/2014   [place]; Liver   right renal mass biopsy 2018 Right     Medical History    Medical History Date Comments   Migraine, unspecified, without mention of intractable migraine without mention of status migrainosus       Calculus of kidney       Neuroendocrine tumor   mets to the liver    Pain medication agreement 2014 Used to get pain medications from Dr. Juan C Sanchez at the [facility] until 2014 when he received an anonymous letter stating that patient sells her narcotics.   ALEXUS on CPAP       Renal mass       Cancer (HC)   liver    Family History    Medical History Relation Name Comments   Cancer-colon Maternal Grandfather       Cancer-colon Mother       Hypertension Mother         Relation Name Status Comments   Father    lymphoma   Maternal Grandfather         Mother   Alive     Social History    Tobacco Use Types Packs/Day Years Used Date   Former Smoker Cigarettes         Smokeless Tobacco: Never Used           Comments: Quit age 29, smoked 2 packs per day for 19 years.      Alcohol Use Drinks/Week oz/Week Comments   No     Former alcohol use.      Sex Assigned at Birth Date Recorded   Not on file     Last Filed Vital Signs    Vital Sign Reading Time Taken   Blood Pressure 114/63 2018 1:43 PM CDT   Pulse 66 2018 1:43 PM CDT   Temperature 37.5  C (99.5  F) 2018 1:43 PM CDT   Respiratory Rate 20 2018 1:43 PM CDT   Oxygen Saturation 98% 2018 1:43 PM CDT   Inhaled Oxygen Concentration - -   Weight 112 kg (247 lb) 2018 1:43 PM CDT   Height 167.6 cm (5' 6\") 2018 1:43 PM CDT   Body Mass Index 39.87 2018 1:43 PM CDT   Plan of Treatment    Health Maintenance Due Date Last Done Comments   Tdap 01/15/1976       Depression screening for age 12+ 01/15/1977       Tetanus booster 01/15/1985       Pap test for age 21-65 " 01/15/1986       Lipids for age 45-75 03/01/2010 03/01/2005     Mammogram for age 45-75 05/02/2014 05/02/2013, 05/20/2008     Zoster (shingles) series for age 50+ (1 of 2 - Shingrix only series - Shingrix-RZV #1 next) 01/15/2015       Influenza for age 50-64 08/01/2018       BMI (ht and wt on same day) for age 18+ 09/13/2018 03/13/2018     Colonoscopy through age 75 05/04/2023 05/04/2013     Procedures  - from Last 3 Months    Procedure Name Priority Date/Time Associated Diagnosis Comments   HCHG MOUTHPIECE PR1 Routine 06/21/2018 9:30 AM CDT   Results for this procedure are in the results section.     HCHG STYLET PR1 Routine 06/21/2018 9:30 AM CDT   Results for this procedure are in the results section.     HCHG INSTRUMENT DISP PR10 Routine 06/21/2018 9:30 AM CDT   Results for this procedure are in the results section.     HCHG TUBE PR1 Routine 06/21/2018 9:30 AM CDT   Results for this procedure are in the results section.     Results  - from Last 3 Months           Hemoglobin q 12 (06/28/2018 7:00 AM)  Only the most recent of 9 results within the time period is included.    Hemoglobin q 12 (06/28/2018 7:00 AM)   Component Value Ref Range   HEMOGLOBIN  9.0 (L) 12.0 - 16.0 g/dL   MCV  89 80 - 100 fL     Hemoglobin q 12 (06/28/2018 7:00 AM)   Specimen Performing Laboratory   Blood [facility]AT[facility]    [address]    [place] [address]       Hemoglobin q 12 (06/28/2018 7:00 AM)   Narrative                Back to top of Results      XR SHUNT SERIES CUSTOM (06/27/2018 2:00 PM)  XR SHUNT SERIES CUSTOM (06/27/2018 2:00 PM)   Narrative   INDICATION:    Shunt placement.        COMPARISON:    None.        TECHNIQUE:    Shunt series.        FINDINGS:    Right parietal approach  shunt catheter in place. Shunt catheter tubing     is intact throughout its course in the right neck, chest and entering the     left upper abdomen. Distal tip terminates in the left lower quadrant.     Programmable shunt valve is set to a setting of 2.5 performance level.    Right PICC line with tip terminating over the SVC. Poor inspiratory     effort.  Accentuated cardiomediastinal silhouette. Normal bowel gas     pattern. Right ureteral stent.            Dictated by Maurisio Starkey MD @ Jun 27 2018  5:16PM     Back to top of Results      CT HEAD BRAIN WO (06/27/2018 1:34 PM)  CT HEAD BRAIN WO (06/27/2018 1:34 PM)   Narrative   Indication:    Follow-up shunted hydrocephalus. Headache. Chiari malformation.         Technique:    Performed without IV contrast.        Comparison:    01/03/2014         Findings:    A single ventricular catheter remains in place from a right parietal approach. The supratentorial ventricular system is of very small caliber. No extra-axial fluid collections are identified. The 4th ventricle is also small in size.    Baseline brain abnormalities including Chiari malformation and cerebral atrophy. These findings are all unchanged from 01/03/2014. There is a partially calcified extra-axial mass arising from a 13 mm base in the right lateral parietal region, and measuring up to 7 mm in maximum thickness. In retrospect this is present on the 2014 exam and is mildly grown from that date. This likely represents a convexity meningioma.     The calvarium and skull base are unremarkable, with normal aeration of the visualized petrous temporal bones and paranasal sinuses on both sides.        Impression:    1. The ventricular system is well decompressed and unchanged from 01/03/2014. A single catheter remains in place.     2. Baseline brain abnormalities again noted.     3. Small convexity meningioma over the right anterolateral parietal region, increased in size from 01/03/2014.        Please note that all CT scans at this facility use dose modulation, iterative reconstruction, and/or weight-based dosing when appropriate to reduce radiation dose to as low as reasonably achievable.        Dictated by  Jason Farmer MD @ Jun 27 2018  2:55PM        Signed by Dr. Jason Farmer @ Jun 27 2018  3:01PM       CT HEAD BRAIN WO (06/27/2018 1:34 PM)   Procedure Note   Interface, Powerscribe - 06/27/2018 3:03 PM CDT    Indication:  Follow-up shunted hydrocephalus. Headache. Chiari malformation.     Technique:  Performed without IV contrast.    Comparison:  01/03/2014     Findings:  A single ventricular catheter remains in place from a right parietal approach. The supratentorial ventricular system is of very small caliber. No extra-axial fluid collections are identified. The 4th ventricle is also small in size.  Baseline brain abnormalities including Chiari malformation and cerebral atrophy. These findings are all unchanged from 01/03/2014. There is a partially calcified extra-axial mass arising from a 13 mm base in the right lateral parietal region, and measuring up to 7 mm in maximum thickness. In retrospect this is present on the 2014 exam and is mildly grown from that date. This likely represents a convexity meningioma.   The calvarium and skull base are unremarkable, with normal aeration of the visualized petrous temporal bones and paranasal sinuses on both sides.    Impression:  1. The ventricular system is well decompressed and unchanged from 01/03/2014. A single catheter remains in place.   2. Baseline brain abnormalities again noted.   3. Small convexity meningioma over the right anterolateral parietal region, increased in size from 01/03/2014.    Please note that all CT scans at this facility use dose modulation, iterative reconstruction, and/or weight-based dosing when appropriate to reduce radiation dose to as low as reasonably achievable.    Dictated by Jason Farmer MD @ Jun 27 2018 2:55PM    Signed by Dr. Jason Farmer @ Jun 27 2018 3:01PM     Back to top of Results      INSERT PICC LINE (06/27/2018 12:15 PM)  INSERT PICC LINE (06/27/2018 12:15 PM)   Erin Barrera RN     6/27/2018  12:16 PM    PICC Line Insertion Note        6/27/2018   12:15 PM        Procedure education reviewed: Placement procedure, Benefits,     Risks, Complications and Questions answered, discussed with:     Patient face to face.    Patient face to face confirms understanding of procedure.    Confirmed consent was obtained         used: No        Reason for insertion:  IV access, IV antibiotics and MD order        Medical/ Surgical/ Allergies History reviewed:  Yes.        Preprocedure Verification:  Yes    1) Patient identity verified; 2) side/site/procedure confirmed;     3) relevant information/documentation available, reviewed and     properly matched to the patient; 4) consent accurate and     complete; 5) equipment and supplies available     Site Marking:  Yes    Site marked if not in continuous attendance with the patient    Time Out:  Yes    Time out was conducted just prior to starting procedure to verify     the four required elements: 1) patient name and date of birth 2)     confirmation that the correct side/site are marked if applicable,     including visualization of the site darvin 3) name of procedure     including laterality if applicable, and 4) essential imaging and     results are properly labeled and appropriately displayed, if     applicable.        Site assessment pre-insertion:  Intact.    Local anesthetic used at site:  Yes, 1% Lidocaine.        PICC/Midline was placed using the following Central Line     Insertion Checklist:     Hand Hygiene: Yes    Maximal Barrier Precautions including Sterile Gown, Hat and     Mask: Yes    Full Body Drape: Yes    Site cleansed with:  Chlorhexidine gluconate prep.        PICC Line 1 Lumen Right;Basilic;Upper Arm PICC/SVC (Active)     06/27/18 1214 Right;Basilic;Upper Arm     Visible Catheter Length (cm): 2 cm     (IA) Placed Prior to Admission?:      Type: Valved Catheter     Size: 4 Russian     Tip Location: PICC/SVC     PICC Mid-Arm Circumference (cm):  35 cm     Total Length of Catheter (cm): 48 cm     (IA) Insertion Attempts:      Placement Verification: Blood Return;ECG     Removed Catheter Length (cm):      Insertion Attempts: 1     Placed/Present Prior to Encounter:      Removed/Resolved Prior to Encounter:      Local Anesthetic:      Visible Catheter Length (cm) 2 cm 6/27/2018 12:00 PM     Status Lumen One Blood Return;Capped/Locked 6/27/2018 12:00 PM     Site Description Dry/Flat 6/27/2018 12:00 PM     Site Intervention Direct Pressure Applied;Site Care 6/27/2018     12:00 PM     Dressing Assessment Intact 6/27/2018 12:00 PM     Dressing Intervention Antimicrobial Patch Applied;Catheter     Securement Device Applied;Hospital Dressing Policy/Protocol     Followed 6/27/2018 12:00 PM                             Line  Name:Bard, Solo Power PICC (proximal valve)    Lot Number: [id number]    Access Assistance:Modified Seldinger Technique (Micro-Introducer)     WITH Dermatotomy (skin nick), Ultrasound Guidance and Tip     Locating System        Visible Catheter Length:     Catheter measurements made from insertion site to start of hub;     Groshong midline measurements include 2 cm of catheter in the     connector.  If measurement is different than previously noted,     call the appropriately trained staff.  Upon removal of VAD,     catheter tip is intact.  If upon removal VAD is not intact,     inform appropriately trained staff and enter a note regarding     catheter findings.           Post-insertion:         Able to remove guidewire:  Smoothly.         Able to aspirate blood in each lumen: Yes         Able to flush catheter without resistance in each lumen:      Yes        Each lumen flushed with:  20 ml(s) of 0.9% saline, and  no     Heparin..    Cap applied to each lumen:  Yes    Line secured with: Stat-Lock and Tape    Site dressed with:      Hospital dressing policy/ protocol followed  and Transparent .        PICC Line standing orders  implemented:  Yes    X- ray pending:  No, 3ecg technology used.        Intended tip location:  PICC (Superior Vena Cava)        Insertion complications: None    Patient tolerated the procedure:  Yes                                                          Back to top of Results      Blood culture STAT (06/26/2018 11:39 AM)  Only the most recent of 4 results within the time period is included.    Blood culture STAT (06/26/2018 11:39 AM)   Component Value Ref Range   CULTURE No Growth.       Blood culture STAT (06/26/2018 11:39 AM)   Specimen Performing Laboratory   Blood [facility] LAB[facility]AT[facility] LAB[facility]AT[facility]    [address]    [place] [address]     Back to top of Results      CT ABDOMEN PELVIS STONE PROTOCOL WO (06/25/2018 1:39 AM)  CT ABDOMEN PELVIS STONE PROTOCOL WO (06/25/2018 1:39 AM)   Narrative   INDICATION: Right flank pain, post cryoblation 3 days ago        TECHNIQUE:    CT abdomen and pelvis without contrast.        COMPARISON:    May 22, 2018 and April 19, 2018         FINDINGS:    Lower chest: Scarring/atelectasis         Liver: Incompletely evaluated innumerable hepatic lesions         Spleen: Unremarkable.         Pancreas: Unremarkable.         Gallbladder and bile ducts: Cholecystectomy changes         Kidneys: Retroperitoneal hematoma inferior to the right kidney. Right ureteral stent in place and in satisfactory position. Stable low-attenuation structure within the midpole of the right kidney..         Adrenal glands: Unremarkable.         GI tract: Unremarkable. Appendix additional findings as described above is normal.         Vascular structures: Unremarkable.         Lymph nodes: Unremarkable.         Miscellaneous: Stable ventriculoperitoneal shunt in place. No free air. Small amount of free fluid.         Pelvic Organs: Hysterectomy         Bones: Unremarkable for age.         IMPRESSION:    Retroperitoneal hematoma inferior to the right kidney.        Dictated by  Lux Turner MD @ 6/25/2018 1:55:23 AM        Please note that all CT scans at this facility use dose modulation, iterative reconstruction, and/or weight-based dosing when appropriate to reduce radiation dose to as low as reasonably achievable.        Dictated by: Lux Turner MD @ 06/25/2018 01:55:32        (Electronically Signed)       CT ABDOMEN PELVIS STONE PROTOCOL WO (06/25/2018 1:39 AM)   Procedure Note   Interface, Syntropharmacribe - 06/25/2018 1:56 AM CDT    INDICATION: Right flank pain, post cryoblation 3 days ago    TECHNIQUE:  CT abdomen and pelvis without contrast.    COMPARISON:  May 22, 2018 and April 19, 2018     FINDINGS:  Lower chest: Scarring/atelectasis     Liver: Incompletely evaluated innumerable hepatic lesions     Spleen: Unremarkable.     Pancreas: Unremarkable.     Gallbladder and bile ducts: Cholecystectomy changes     Kidneys: Retroperitoneal hematoma inferior to the right kidney. Right ureteral stent in place and in satisfactory position. Stable low-attenuation structure within the midpole of the right kidney..     Adrenal glands: Unremarkable.     GI tract: Unremarkable. Appendix additional findings as described above is normal.     Vascular structures: Unremarkable.     Lymph nodes: Unremarkable.     Miscellaneous: Stable ventriculoperitoneal shunt in place. No free air. Small amount of free fluid.     Pelvic Organs: Hysterectomy     Bones: Unremarkable for age.     IMPRESSION:  Retroperitoneal hematoma inferior to the right kidney.    Dictated by Lux Turner MD @ 6/25/2018 1:55:23 AM    Please note that all CT scans at this facility use dose modulation, iterative reconstruction, and/or weight-based dosing when appropriate to reduce radiation dose to as low as reasonably achievable.    Dictated by: Lux Turner MD @ 06/25/2018 01:55:32    (Electronically Signed)     Back to top of Results      XR CHEST 1 VIEW PORTABLE (06/25/2018 12:10 AM)  XR CHEST 1 VIEW PORTABLE  (06/25/2018 12:10 AM)   Narrative   INDICATION: Sepsis        TECHNIQUE: Chest radiograph 1 view        COMPARISON: 12/09/2013        FINDINGS:         Mediastinum: The heart silhouette is normal in size and morphology. The mediastinum is normal in appearance. Mild elevation the right hemidiaphragm is noted without change.         Lungs: Both lungs are clear. No pleural effusions are identified. No pneumothorax is identified.         Bones and soft tissue: Unremarkable for age.         IMPRESSION:     1. No acute cardiopulmonary disease seen.            Dictated by Yordy Hayden MD @ Jun 25 2018 12:25AM        (Electronically Signed)       XR CHEST 1 VIEW PORTABLE (06/25/2018 12:10 AM)   Procedure Note   Interface, Powerscribe - 06/25/2018 12:25 AM CDT    INDICATION: Sepsis    TECHNIQUE: Chest radiograph 1 view    COMPARISON: 12/09/2013    FINDINGS:     Mediastinum: The heart silhouette is normal in size and morphology. The mediastinum is normal in appearance. Mild elevation the right hemidiaphragm is noted without change.     Lungs: Both lungs are clear. No pleural effusions are identified. No pneumothorax is identified.     Bones and soft tissue: Unremarkable for age.     IMPRESSION:   1. No acute cardiopulmonary disease seen.      Dictated by Yordy Hayden MD @ Jun 25 2018 12:25AM    (Electronically Signed)     Back to top of Results      URINALYSIS MICROSCOPIC (06/24/2018 11:52 PM)  Only the most recent of 3 results within the time period is included.    URINALYSIS MICROSCOPIC (06/24/2018 11:52 PM)   Component Value Ref Range   RBC >100 (A) 0-2, None Seen /HPF   WBC >100 (A) 0-2, 3-5, None Seen /HPF   BACTERIA  Moderate (A) None Seen, Rare, Few Bacteria/HPF   EPITHELIAL CELLS  Few None Seen, Few Epi/HPF   HYALINE CASTS 0-2 0-2, 3-5 /LPF      URINALYSIS MICROSCOPIC (06/24/2018 11:52 PM)   Specimen Performing Laboratory   Urine [facility] LABORATORY-CENTRAL LABORATORY    [address]    [place]  [address]     Back to top of Results      URINE CULTURE (06/24/2018 11:52 PM)  Only the most recent of 4 results within the time period is included.    URINE CULTURE (06/24/2018 11:52 PM)   Component Value Ref Range   CULTURE RESULT (A)     CULTURE >100,000 CFU/mL Escherichia coli     CULTURE <10,000 CFU/mL Multiple organisms probable contaminants       URINE CULTURE (06/24/2018 11:52 PM)   Specimen Performing Laboratory   Urine [facility] LABORATORY-CENTRAL LABORATORY    [address]    [place] [address]       URINE CULTURE (06/24/2018 11:52 PM)   Organism Antibiotic Method Susceptibility   Escherichia coli TRIMETHOPRIM/SULF   >=16/304: R     Escherichia coli AMPICILLIN   >=32: R     Escherichia coli CEFAZOLIN-UC   <=4: S       Comment: Cefazolin-UC interpretations are for therapy of uncomplicated UTIs due to E.coli, K.pneumoniae, or P.mirablis.   Escherichia coli GENTAMICIN   <=1: S     Escherichia coli CEFTRIAXONE   <=1: S     Escherichia coli CEFTAZIDIME   <=1: S     Escherichia coli CIPROFLOXACIN   >=4: R     Escherichia coli PIPERACILLIN/TAZO   <=4: S     Escherichia coli IMIPENEM   <=0.25: S     Escherichia coli TOBRAMYCIN   <=1: S     Escherichia coli AMPICILLIN/SULBACTAM   16: I     Escherichia coli CEFEPIME   <=1: S     Escherichia coli NITROFURANTOIN   <=16: S     Escherichia coli LEVOFLOXACIN   >=8: R     Back to top of Results      URINALYSIS W REFLEX MICROSCOPIC IF POSITIVE (06/24/2018 11:52 PM)  Only the most recent of 3 results within the time period is included.    URINALYSIS W REFLEX MICROSCOPIC IF POSITIVE (06/24/2018 11:52 PM)   Component Value Ref Range   COLOR  Yellow Yellow Color   CLARITY  Cloudy (A) Clear Clarity   SPECIFIC GRAVITY,URINE  1.015 1.010, 1.015, 1.020, 1.025    PH,URINE  7.5 6.0, 7.0, 8.0, 5.5, 6.5, 7.5, 8.5    UROBILINOGEN,QUALITATIVE  Increased (A) Normal EU/dl   PROTEIN, URINE 30 (A) Negative mg/dL   GLUCOSE, URINE Negative Negative mg/dL   KETONES,URINE   Negative Negative mg/dL   BILIRUBIN,URINE  Negative Negative    OCCULT BLOOD,URINE  Large (A) Negative    NITRITE  Negative Negative    LEUKOCYTE ESTERASE  Large (A) Negative      URINALYSIS W REFLEX MICROSCOPIC IF POSITIVE (06/24/2018 11:52 PM)   Specimen Performing Laboratory   Urine [facility] LABORATORY-CENTRAL LABORATORY    [address]    [place] [address]     Back to top of Results      ISTAT LACTATE SCREEN VENOUS (06/24/2018 11:10 PM)  ISTAT LACTATE SCREEN VENOUS (06/24/2018 11:10 PM)   Component Value Ref Range   LACTATE VENOUS SCREEN ISTAT <1.8 <=2.0     ISTAT LACTATE SCREEN VENOUS (06/24/2018 11:10 PM)   Specimen Performing Laboratory   Blood [facility] LABORATORY-CENTRAL LABORATORY    [address]    [place] [address]     Back to top of Results      BASIC METABOLIC PANEL (06/24/2018 10:53 PM)  Only the most recent of 3 results within the time period is included.    BASIC METABOLIC PANEL (06/24/2018 10:53 PM)   Component Value Ref Range   SODIUM 138 135 - 145 mmol/L   POTASSIUM 3.8 3.5 - 5.0 mmol/L   CHLORIDE 102 98 - 110 mmol/L   CO2,TOTAL 27 21 - 31 mmol/L   ANION GAP 9 5 - 18    GLUCOSE 135 (H) 65 - 100 mg/dL   CALCIUM 9.7 8.5 - 10.5 mg/dL   BUN 11 8 - 25 mg/dL   CREATININE 0.89 0.57 - 1.11 mg/dL   BUN/CREAT RATIO  12 10 - 20    GFR if African American >60 >60 ml/min/1.73m2   GFR if not African American >60 >60 ml/min/1.73m2     BASIC METABOLIC PANEL (06/24/2018 10:53 PM)   Specimen Performing Laboratory   Blood [facility] LABORATORY-CENTRAL LABORATORY    [address]    [place] [address]     Back to top of Results      EXTRA TUBE RED (06/24/2018 10:52 PM)  Only the most recent of 4 results within the time period is included.    EXTRA TUBE RED (06/24/2018 10:52 PM)   Specimen Performing Laboratory   Blood [facility] LABORATORY-CENTRAL LABORATORY    [address]    [place] [address]     Back to top of  Results      CBC WITH AUTO DIFFERENTIAL (06/24/2018 10:52 PM)  Only the most recent of 3 results within the time period is included.    CBC WITH AUTO DIFFERENTIAL (06/24/2018 10:52 PM)   Component Value Ref Range   WHITE BLOOD COUNT  9.0 4.5 - 11.0 thou/cu mm   RED BLOOD COUNT  3.38 (L) 4.00 - 5.20 mil/cu mm   HEMOGLOBIN  9.9 (L) 12.0 - 16.0 g/dL   HEMATOCRIT  30.6 (L) 33.0 - 51.0 %   MCV  91 80 - 100 fL   MCH  29.3 26.0 - 34.0 pg   MCHC  32.4 32.0 - 36.0 g/dL   RDW  13.4 11.5 - 15.5 %   PLATELET COUNT  242 140 - 440 thou/cu mm   MPV  9.8 6.5 - 11.0 fL   NEUTROPHILS  76.5 %   LYMPHOCYTES  11.5 %   MONOCYTES  9.2 %   EOSINOPHILS  2.5 %   BASOPHILS  0.1 %   IMMATURE GRANULOCYTES(METAS,MYELOS,PROS) 0.2 %   ABSOLUTE NEUTROPHILS  6.9 1.7 - 7.0 thou/cu mm   ABSOLUTE LYMPHOCYTES  1.0 0.9 - 2.9 thou/cu mm   ABSOLUTE MONOCYTES  0.8 <0.9 thou/cu mm   ABSOLUTE EOSINOPHILS  0.2 <0.5 thou/cu mm   ABSOLUTE BASOPHILS  0.0 <0.3 thou/cu mm   ABSOLUTE IMMATURE GRANULOCYTES(METAS,MYELOS,PROS) 0.0 <0.3 thou/cu mm     CBC WITH AUTO DIFFERENTIAL (06/24/2018 10:52 PM)   Specimen Performing Laboratory   Blood [facility] LABORATORY-CENTRAL LABORATORY    [address]    [place] [address]     Back to top of Results      EXTRA TUBE GRAY (06/24/2018 10:52 PM)  EXTRA TUBE GRAY (06/24/2018 10:52 PM)   Specimen Performing Laboratory   Blood [facility] LABORATORY-CENTRAL LABORATORY    [address]    [place] [address]     Back to top of Results      EXTRA TUBE BLUE (06/24/2018 10:52 PM)  Only the most recent of 3 results within the time period is included.    EXTRA TUBE BLUE (06/24/2018 10:52 PM)   Specimen Performing Laboratory   Blood [facility] LABORATORY-CENTRAL LABORATORY    [address]    [place] [address]     Back to top of Results      PROCALCITONIN (06/24/2018 10:52 PM)  PROCALCITONIN (06/24/2018 10:52 PM)   Component Value Ref Range   PROCALCITONIN 0.10 <0.50 ng/ml      PROCALCITONIN (06/24/2018 10:52 PM)   Specimen Performing Laboratory   Blood [facility] LABORATORY-CENTRAL LABORATORY    [address]    [place] [address]       PROCALCITONIN (06/24/2018 10:52 PM)   Narrative   PCN <0.5 ng/mL:      Systemic infection (sepsis) not likely;                         localized infection possible.        PCN >0.5 - <2 ng/mL: Systemic infection possible but consider                         other causes for elevation.        PCN >2 - <10 ng/mL:  System infection likely (unless other                         causes known).          PCN >10 ng/mL:       Marked systemic inflammatory response:                         usually sepsis/septic shock.        Note: PCN levels may be elevated (unrelated to systemic           bacterial infection)due to trauma, surgery, severe          burns,cardiogenic shock, in neonates,and associated          with malarial or invasive fungal infections.     Back to top of Results      CBC AND DIFFERENTIAL (06/24/2018 10:52 PM)  Only the most recent of 2 results within the time period is included.    CBC AND DIFFERENTIAL (06/24/2018 10:52 PM)   Specimen Performing Laboratory   Blood        CBC AND DIFFERENTIAL (06/24/2018 10:52 PM)   Narrative   The following orders were created for panel order CBC AND DIFFERENTIAL.    Procedure                               Abnormality         Status                       ---------                               -----------         ------                       CBC WITH AUTO DIFFERENTIAL[[id number]]   Abnormal            Final result                     Please view results for these tests on the individual orders.     Back to top of Results      CT CRYOTHERAPY KIDNEY RIGHT (06/21/2018 12:25 PM)  CT CRYOTHERAPY KIDNEY RIGHT (06/21/2018 12:25 PM)   Impressions         1. Technically successful cryoablation of a 3.8 cm renal cell carcinoma     using four different cryoprobes.      2. Given the proximity to the  duodenum and the proximal ureter and given     the location in the inferomedial portion of the right kidney, preoperative     ureteral stent placement, hydrodissection and intraprocedural temperature     monitoring was performed with a good result.          Please note that all CT scans at this facility use dose modulation,     iterative reconstruction and/or weight-based dosing when appropriate to     reduce radiation dose to as low as reasonably achievable.         Almas Chang M.D.    Vascular and Interventional Radiologist    [employer]     [email or web address]    [email or web address]    D:6/21/2018 T:6/21/2018        cc:  MD Joseph Membreno MD Kenneth Haycraft, MD           CT CRYOTHERAPY KIDNEY RIGHT (06/21/2018 12:25 PM)   Narrative   DATE OF PROCEDURE:  6/21/2018         PROCEDURES:        1.  CT-guided hydrodissection of the right ureter and small bowel using a     5-Arabic One-Step needle.    2.  CT-guided placement of an intraprocedural temperature probe between     the right renal tumor and the ureter.    3.  CT-guided cryoablation using four separate cryoablation probes of a     right-sided renal cell carcinoma in the inferomedial pole.          INDICATION:  53-year-old female with a history of metastatic carcinoma and     an enlarging 3.8 cm renal cell carcinoma, biopsy proven, in the     inferomedial pole of the right kidney.          INTERVENTIONAL RADIOLOGIST:  Almas Chang M.D.    UROLOGIST:  Joseph Last M.D.        ANESTHESIA:  General anesthesia.          MEDICATIONS:  1% Lidocaine SQ.        APPROACH:  Right posterior flank, lumbar.    CLOSURE DEVICE:  None.  The tracts were ablated.    IMPLANTED DEVICES:  Right ureteral stent was placed by Dr. Last.    SAMPLES:  None.      ESTIMATED BLOOD LOSS:  Less than 10 cc.    COMPLICATIONS:  None immediate.        TECHNIQUE:  The patient was seen and examined and a  written informed     consent was obtained after a discussion about risks and benefits as seen     in the preoperative note.  The patient was then brought to CT procedure     room and there intubated by the Anesthesia Department.  I had a discussion     with Dr. Last, the urologist who assisted with these procedures,     about my concern about the proximity of the ureter to the mass.  Dr. Last agreed and we felt placement of a preoperative retrograde     stent was indicated.        Dr. Last, therefore, in the room performed cystoscopy and right     ureteral stent placement which is dictated separately.        The patient was placed prone in the CT gantry.  Another time-out was     performed (one was performed prior to the stent placement as well).  The      demonstrated a good location of the ureteral stent.  A CT was also     performed of the abdomen in the area of interest and this demonstrated a     subtle low-attenuation and exophytic tumor from the inferomedial pole of     the right kidney.  The best approach planned and BBs were placed at the     planned access site.  We determined that given the size of the lesion, we     needed four cryoablation probes.  In addition, we decided we could not     perform this unless we were able to displace the third portion of the     duodenum and the ureter given their proximity to the tumor.          Therefore, we decided to first place our access for displacement of these     tissues.  Under intermittent CT fluoroscopic guidance, a 15 cm 5-Portuguese     One-Step needle was advanced from a posterior right translumbar approach     between the right ureter and the right ureteral mass in the inferior pole.     This was verified with CT.  Approximately 25 cc of saline mixed with a     tiny amount of contrast was infused and this demonstrated we were able to     successfully displace the ureter and the duodenum.  Therefore, we then     proceeded  to place four separate cryoablation probes into the mass in a     blanca configuration.  The probes were locked into place.  A temperature     probe was then placed between the ureter and the tumor under CT     fluoroscopic guidance as well.  Another 50 cc of saline mixed with a tiny     amount of contrast was infused.  A CT was again performed of the area     confirming a good location for the ablation and confirmed that the ureter     and the duodenum had moved away sufficiently.  Proximity to colon was     noted, however, it was deemed to be far enough away that it would not pose     a problem.          Cryoablation was started and we performed a scan at 3 minutes, which     verified a good position and good formation of the iceball.  After     approximately 5 minutes, we started scaling back the power to 40% on the     most inferior probe as we had a large enough iceball and since there was     some proximity to the colon in this location.  After completing a first     10-minute cycle, we had a 5-minute thaw cycle and then another 10-minute     freeze cycle in which we scaled back the lowest probe again at 5 minutes.              Temperatures reached below zero in the probe, which was located along the     superior and medial margin just beyond the edge of the tumor between the     mass and the ureter confirming that we had a safe distance to the ureter.      A small amount of saline was also infused during the freezing periods.  A     second scan was also obtained at 7 minutes into the second cycle to see     the maximum iceball.  Following the completion, another 5-minute thaw     cycle was performed and then we ablated the tracts and removed the     needles.  The temperature probe and the One-Step needle were also removed.     An immediate post-procedure scan was performed which verified we had no     immediate post-procedure complication.          The patient tolerated the procedure well and was extubated  in the room.      The patient was then transferred to PACU in good condition.          FINDINGS:  The initial CT examination demonstrates a subtly     hypo-attenuating mass measuring approximately 3.6 cm in the inferior and     medial portion of the right kidney.  A right ureteral stent had been     successfully placed and is seen at the J-tip in the lower pole of the     kidney.  There is some proximity to the duodenum at the third/fourth     portions.  A high-attenuation exophytic renal lesion has been previously     demonstrated to be a benign cyst per report.        Under intermittent CT fluoroscopic guidance, a One-Step needle was     inserted between the ureter and the kidney and hydrodisplacement was     performed, displacing both the duodenum and the right ureter.  Then, four     cryoablation probes were placed in a blanca configuration.  The iceball     appears to encompass the entire tumor during the ablation and even     surrounds a smaller portion of the tip of the lower pole ureteral stent.      The temperature was confirmed to not reach lethal temperature and was     located between the kidney and the ureter along the upper and medial     portions of the tumor.          The tracts were ablated and there were no immediate post-procedure     complications.           Back to top of Results      PROTIME-INR (06/21/2018 9:35 AM)  Only the most recent of 2 results within the time period is included.    PROTIME-INR (06/21/2018 9:35 AM)   Component Value Ref Range   INR 1.2 <1.3   PROTIME 14.6 (H) 11.9 - 13.9 sec     PROTIME-INR (06/21/2018 9:35 AM)   Specimen Performing Laboratory   Blood [facility] LABORATORY-CENTRAL LABORATORY    [address]    [place] [address]       PROTIME-INR (06/21/2018 9:35 AM)   Narrative             Therapeutic Range    2.0-3.0 for most anticoagulated patients    2.5-3.5 or 4.0 for high risk patients        The INR is only used for patients on stable oral  anticoagulant therapy.     It makes no significant contribution to the diagnosis or treatment     of patients whose Protime is prolonged for other reasons.     Back to top of Results      ENDOTRACHEAL TUBE (06/21/2018 9:30 AM)  ENDOTRACHEAL TUBE (06/21/2018 9:30 AM)   Narrative   Bill Hanson CRNA     6/21/2018  9:33 AM    Procedure: ETT        Patient location during procedure: OR    ETT Properties    Mask Ventilation: oral airway and difficult (2 handed mask decreased neck     extension)    Final Technique: video laryngoscopy    Type: straight    Location: oral    Cuffed: yes    Tube Size: 6.0 mm    Stylet: yes    Laryngoscope Blade: Mac    Blade Size: 3    Cormack-Lehane Grade View: 1    Insertion Attempts: 1    Placement Verification: auscultation, end tidal CO2 and symmetrical chest     wall movement    Assessment: pharynx clear and atraumatic    Secured at: 22    Measured From: lips    Tooth guard used and removed: yes    Difficulty: 0 (not difficult)    Electronically signed by BILL HANSON                                                                                                                                                                   ENDOTRACHEAL TUBE (06/21/2018 9:30 AM)   Procedure Note   Bill Hanson CRNA - 06/21/2018 9:30 AM CDT    Procedure: ETT    Patient location during procedure: OR  ETT Properties  Mask Ventilation: oral airway and difficult (2 handed mask decreased neck extension)  Final Technique: video laryngoscopy  Type: straight  Location: oral  Cuffed: yes  Tube Size: 6.0 mm  Stylet: yes  Laryngoscope Blade: Mac  Blade Size: 3  Cormack-Lehane Grade View: 1  Insertion Attempts: 1  Placement Verification: auscultation, end tidal CO2 and symmetrical chest wall movement  Assessment: pharynx clear and atraumatic  Secured at: 22  Measured From: lips  Tooth guard used and removed: yes  Difficulty: 0 (not difficult)  Electronically signed by BILL HANSON                     Back to top of Results      TYPE & SCREEN (06/21/2018 9:11 AM)  TYPE & SCREEN (06/21/2018 9:11 AM)   Component Value Ref Range   ABORH  A Rh Negative     ANTIBODY SCREEN Negative Negative   SPECIMEN EXPIRATION DATE/TIME 06/24/18 23:59       TYPE & SCREEN (06/21/2018 9:11 AM)   Specimen Performing Laboratory   Blood [facility] LAB-CENTRAL LAB BLOOD BANK    [address] [place] [address]     Back to top of Results      INR,POCT (06/21/2018 9:04 AM)  INR,POCT (06/21/2018 9:04 AM)   Component Value Ref Range   INR 1.7 (H) <1.3     INR,POCT (06/21/2018 9:04 AM)   Specimen Performing Laboratory   Blood [facility] LABORATORY-CENTRAL LABORATORY    [address]    [place] [address]       INR,POCT (06/21/2018 9:04 AM)   Narrative             Therapeutic Range    2.0-3.0 for most anticoagulated patients    2.5-3.5 or 4.0 for high risk patients     Back to top of Results      SCAN CORRESP-EKG RESULTS (06/11/2018 5:11 PM)  Only the most recent of 2 results within the time period is included.    SCAN CORRESP-EKG RESULTS (06/11/2018 5:11 PM)   Narrative   Ordered by an unspecified provider.     Back to top of Results      D-Dimer (06/09/2018 9:09 AM)  D-Dimer (06/09/2018 9:09 AM)   Component Value Ref Range   D-DIMER,QUANTITATIVE  0.55 See Comment / FEU mcg/mL   D-DIMER INTERP Abnormal (A)       D-Dimer (06/09/2018 9:09 AM)   Specimen Performing Laboratory   Blood [facility] LABORATORYCENTRAL LABORATORY    [address] [place] [id number]       D-Dimer (06/09/2018 9:09 AM)   Narrative   The cut off value for exclusion of Deep Vein Thrombosis and / or Pulmonary    Embolism is 0.50 FEU mcg/mL     For patients greater than 50 years of age the upper limit is age dependent and was calculated with the formula:       (PATIENT AGE x 0.01) FEU mcg/mL = Upper limit of normal range     Back to top of Results      CBC (05/28/2018 8:40 PM)  Only the most recent of 2  results within the time period is included.    CBC (05/28/2018 8:40 PM)   Component Value Ref Range   WHITE BLOOD COUNT  7.5 4.5 - 11.0 thou/cu mm   RED BLOOD COUNT  4.05 4.00 - 5.20 mil/cu mm   HEMOGLOBIN  11.7 (L) 12.0 - 16.0 g/dL   HEMATOCRIT  36.9 33.0 - 51.0 %   MCV  91 80 - 100 fL   MCH  28.9 26.0 - 34.0 pg   MCHC  31.7 (L) 32.0 - 36.0 g/dL   RDW  13.3 11.5 - 15.5 %   PLATELET COUNT  295 140 - 440 thou/cu mm   MPV  9.5 6.5 - 11.0 fL     CBC (05/28/2018 8:40 PM)   Specimen Performing Laboratory   Blood [facility] LABORATORY-CENTRAL LABORATORY    [address]    [place] [address]     Back to top of Results      US VENOUS LOWER EXTREMITY RIGHT (05/22/2018 8:43 PM)  US VENOUS LOWER EXTREMITY RIGHT (05/22/2018 8:43 PM)   Narrative   CLINICAL HISTORY:    Pain and swelling right lower extremity ; pleuritic chest pain         TECHNIQUE:    A compression venous ultrasound exam was performed of the right lower extremity using gray-scale imaging, color Doppler and spectral Doppler analysis.        FINDINGS:    Sonographic imaging of the right lower extremity demonstrates normal compressibility and color Doppler venous blood flow within the common femoral vein, deep femoral vein, and the proximal greater saphenous vein. Within the thigh, the femoral vein is patent and compressible. At a lower level, the popliteal and posterior tibial veins also show normal compressibility and color Doppler venous blood flow.    Limited imaging of the contralateral groin demonstrates a normal spectral waveform and color Doppler venous blood flow within the left common femoral vein.         IMPRESSION:    Normal venous ultrasound exam. No evidence of deep vein thrombosis within the right lower extremity.            Dictated by Rudi Hernández MD @ May 22 2018  8:57PM        Signed by Dr. Rudi Hernández @ May 22 2018  8:57PM       US VENOUS LOWER EXTREMITY RIGHT (05/22/2018 8:43 PM)   Procedure Note   Interface,  Powerscribe - 05/22/2018 8:58 PM CDT    CLINICAL HISTORY:  Pain and swelling right lower extremity ; pleuritic chest pain     TECHNIQUE:  A compression venous ultrasound exam was performed of the right lower extremity using gray-scale imaging, color Doppler and spectral Doppler analysis.    FINDINGS:  Sonographic imaging of the right lower extremity demonstrates normal compressibility and color Doppler venous blood flow within the common femoral vein, deep femoral vein, and the proximal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

## 2018-07-02 NOTE — TELEPHONE ENCOUNTER
(D64.9) Anemia, unspecified type  (primary encounter diagnosis)  Comment:    Plan: CBC with platelets         STANDING ORDER PLACED   OTTO YUSUF JR., MD

## 2018-07-02 NOTE — PATIENT INSTRUCTIONS
URINARY TRACT INFECTION ON   ertapenem (INVANZ) 1 gram solr    Indications: Pyelonephritis Inject 10 mL intravenous every 24 hours for 12 days. 120 mL   0 06/29/2018 07/11/2018 Active   YEAST VAGINITIS  (I10) Hypertension goal BP (blood pressure) < 140/80  (primary encounter diagnosis)  Comment:    Plan:      (E78.5) Hyperlipidemia with target LDL less than 130  Comment:    Plan:      (F33.41) Recurrent major depression in partial remission  Comment:    Plan:      (F43.10) PTSD (post-traumatic stress disorder)  Comment:    Plan:      (F11.20) Opioid use disorder, severe, dependence (H)  Comment:    Plan:      (F11.90) Chronic, continuous use of opioids  Comment:    Plan:      (Z98.2) Intracranial shunt  Comment:    Plan:      (G43.809) Other migraine without status migrainosus, not intractable  Comment:    Plan:      (G47.33) ALEXUS (obstructive sleep apnea)  Comment:    Plan:      (B37.3) Yeast infection of the vagina  Comment:    Plan: clotrimazole (GYNE-LOTRIMIN 3) 2 % CREA

## 2018-07-02 NOTE — MR AVS SNAPSHOT
After Visit Summary   7/2/2018    Ines Mott    MRN: [id number]           Patient Information     Date Of Birth          1965        Visit Information        Provider Department      7/2/2018 9:30 AM Dustin Fatima MD [facility]        Today's Diagnoses     Hypertension goal BP (blood pressure) < 140/80    -  1    Hyperlipidemia with target LDL less than 130        Recurrent major depression in partial remission        PTSD (post-traumatic stress disorder)        Opioid use disorder, severe, dependence (H)        Chronic, continuous use of opioids        Intracranial shunt        Other migraine without status migrainosus, not intractable        ALEXUS (obstructive sleep apnea)        Yeast infection of the vagina          Care Instructions    URINARY TRACT INFECTION ON   ertapenem (INVANZ) 1 gram solr    Indications: Pyelonephritis Inject 10 mL intravenous every 24 hours for 12 days. 120 mL   0 06/29/2018 07/11/2018 Active   YEAST VAGINITIS  (I10) Hypertension goal BP (blood pressure) < 140/80  (primary encounter diagnosis)  Comment:    Plan:      (E78.5) Hyperlipidemia with target LDL less than 130  Comment:    Plan:      (F33.41) Recurrent major depression in partial remission  Comment:    Plan:      (F43.10) PTSD (post-traumatic stress disorder)  Comment:    Plan:      (F11.20) Opioid use disorder, severe, dependence (H)  Comment:    Plan:      (F11.90) Chronic, continuous use of opioids  Comment:    Plan:      (Z98.2) Intracranial shunt  Comment:    Plan:      (G43.809) Other migraine without status migrainosus, not intractable  Comment:    Plan:      (G47.33) ALEXUS (obstructive sleep apnea)  Comment:    Plan:      (B37.3) Yeast infection of the vagina  Comment:    Plan: clotrimazole (GYNE-LOTRIMIN 3) 2 % CREA                          Follow-ups after your visit        Your next 10 appointments already scheduled     Jul 02, 2018 11:00 AM CDT    [facility] with MICHEAL Brennan CNP   [facility]    [address]  [address]  [facility] [id number] [phone]            Jul 02, 2018 11:00 AM CDT   Return Visit with MARTIN Pineda   [facility] ([facility])    [address]  [address]  [facility] [id number] [phone]              Who to contact     If you have questions or need follow up information about today's clinic visit or your schedule please contact [facility] directly at [phone].  Normal or non-critical lab and imaging results will be communicated to you by MyChart, letter or phone within 4 business days after the clinic has received the results. If you do not hear from us within 7 days, please contact the clinic through MyChart or phone. If you have a critical or abnormal lab result, we will notify you by phone as soon as possible.  Submit refill requests through [email or web address] or call your pharmacy and they will forward the refill request to us. Please allow 3 business days for your refill to be completed.          Additional Information About Your Visit        Care EveryWhere ID     This is your Care EveryWhere ID. This could be used by other organizations to access your [place] [facility] medical records  [id number]        Your Vitals Were     Pulse Temperature Respirations Pulse Oximetry BMI (Body Mass Index)       66 96.5  F (35.8  C) (Tympanic) 20 98% 39.54 kg/m2        Blood Pressure from Last 3 Encounters:   07/02/18 124/64   06/19/18 102/80   06/12/18 120/84    Weight from Last 3 Encounters:   07/02/18 245 lb (111.1 kg)   06/19/18 252 lb 8 oz (114.5 kg)   06/12/18 257 lb (116.6 kg)              Today, you had the following     No orders found for display         Today's Medication Changes          These changes are accurate as of 7/2/18  9:55  AM.  If you have any questions, ask your nurse or doctor.               Start taking these medicines.        Dose/Directions    clotrimazole 2 % Crea   Commonly known as:  GYNE-LOTRIMIN 3   Used for:  Yeast infection of the vagina   Started by:  Dustin Fatima MD        Dose:  2 g   Place 2 g vaginally daily   Quantity:  1 Tube   Refills:  1         These medicines have changed or have updated prescriptions.        Dose/Directions    sertraline 100 MG tablet   Commonly known as:  ZOLOFT   Indication:  Major Depressive Disorder   This may have changed:  how much to take   Used for:  Recurrent major depression in partial remission (H), Other constipation        Dose:  150 mg   Take 1.5 tablets (150 mg) by mouth daily   Quantity:  45 tablet   Refills:  11            Where to get your medicines      These medications were sent to [facility] [address] [place] [address] [address] [facility]  [address] [facility] [address]     Phone:  [phone]     clotrimazole 2 % Crea                Primary Care Provider Office Phone # Fax #    Marika Santana, APRN [facility] [phone]       [address] [facility] [address]  [facility] [address]        Equal Access to Services     [facility] Beach[facility]OPOL[facility] AH: Hadii aad ku hadasho Soomaali, waaxda luqadaha, qaybta kaalmada adeegyada, waxay idiin hayaan adeamandeep kharaeric ladeonna ah. So [facility] [phone].    ATENCIÓN: Si habla español, tiene a christianson disposición servicios gratuitos de asistencia lingüística. Sabine al [phone].    We comply with applicable federal civil rights laws and [place] laws. We do not discriminate on the basis of race, color, national origin, age, disability, sex, sexual orientation, or gender identity.            Thank you!     Thank you for choosing [facility] [place] [facility]  for your care. Our goal is always to provide you with excellent care. Hearing back from our patients is one way we can continue to improve our  services. Please take a few minutes to complete the written survey that you may receive in the mail after your visit with us. Thank you!             Your Updated Medication List - Protect others around you: Learn how to safely use, store and throw away your medicines at www.disposemymeds.org.          This list is accurate as of 7/2/18  9:55 AM.  Always use your most recent med list.                   Brand Name Dispense Instructions for use Diagnosis    albuterol (2.5 MG/3ML) 0.083% neb solution     30 vial    Take 1 vial (2.5 mg) by nebulization every 6 hours as needed for shortness of breath / dyspnea or wheezing    Mild intermittent asthma with acute exacerbation       ASPIRIN NOT PRESCRIBED    INTENTIONAL    1 each    Please choose reason not prescribed, below    Aspirin intolerance       BACLOFEN PO      Take 10 mg by mouth 2 times daily as needed for muscle spasms        blood glucose calibration solution    no brand specified    1 each    Use to calibrate blood glucose monitor as directed.    Type 2 diabetes mellitus without complication, without long-term current use of insulin (H)       blood glucose lancets standard    no brand specified    100 each    Use to test blood sugar daily times daily or as directed.    Type 2 diabetes mellitus without complication, without long-term current use of insulin (H)       blood glucose monitoring meter device kit    no brand specified    1 kit    Use to test blood sugar larisa times daily or as directed.    Type 2 diabetes mellitus without complication, without long-term current use of insulin (H)       blood glucose monitoring test strip    no brand specified    100 strip    Use to test blood sugars once times daily or as directed    Type 2 diabetes mellitus without complication, without long-term current use of insulin (H)       cloNIDine 0.1 MG tablet    CATAPRES    60 tablet    Take 1 tablet (0.1 mg) by mouth 2 times daily    Hypertension goal BP (blood pressure) <  140/80       clotrimazole 2 % Crea    GYNE-LOTRIMIN 3    1 Tube    Place 2 g vaginally daily    Yeast infection of the vagina       cyanocobalamin 1000 MCG/ML injection    VITAMIN B12    1 mL    Inject 1 mL (1,000 mcg) Subcutaneous every 30 days    B12 deficiency       EPINEPHrine 0.3 MG/0.3ML injection 2-pack    EPIPEN/ADRENACLICK/or ANY BX GENERIC EQUIV    2 each    Inject 0.3 mLs (0.3 mg) into the muscle once as needed for anaphylaxis    Late effect of other and unspecified external causes       losartan-hydrochlorothiazide 100-25 MG per tablet    HYZAAR    90 tablet    Take 1 tablet by mouth daily    Hypertension goal BP (blood pressure) < 140/80       magnesium oxide 400 (241.3 Mg) MG tablet    MAG-OX    90 tablet    Take 1 tablet (400 mg) by mouth daily    Cramp of limb       metoprolol tartrate 25 MG tablet    LOPRESSOR    30 tablet    Take 1 tablet (25 mg) by mouth 2 times daily Hold IF Heart Rate less than 55 or IF systolic blood pressure less than 95 mmHg    Hypertensive urgency       naloxone nasal spray    NARCAN    0.2 mL    Spray 1 spray (4 mg) into one nostril alternating nostrils as needed for opioid reversal every 2-3 minutes until assistance arrives    Chronic pain syndrome       ondansetron 4 MG ODT tab    ZOFRAN-ODT    10 tablet    Take 1 tablet (4 mg) by mouth every 8 hours as needed for nausea        order for DME     1 Device    Right wrist splint for carpal tunnel syndrome  One* Use as directed at hour of sleep    Carpal tunnel syndrome of right wrist       oxyCODONE-acetaminophen 5-325 MG per tablet    PERCOCET    42 tablet    Take 1 tablet by mouth every 6 hours as needed for pain Not to exceed 3 tabs a day    Chronic pain syndrome       polyethylene glycol powder    MIRALAX/GLYCOLAX    1581 g    TAKE 17 GRAMS (1 CAPFUL) BY MOUTH DAILY    Slow transit constipation       potassium chloride 10 MEQ tablet    K-TAB,KLOR-CON    120 tablet    Take 1 tablet (10 mEq) by mouth daily    Hypokalemia        sertraline 100 MG tablet    ZOLOFT    45 tablet    Take 1.5 tablets (150 mg) by mouth daily    Recurrent major depression in partial remission (H), Other constipation       STATIN NOT PRESCRIBED (INTENTIONAL)     0 each    1 each daily Statin not prescribed intentionally due to Active liver disease (liver failure, cirrhosis, hepatitis) LIVER METS    Hyperlipidemia LDL goal <100, Type 2 diabetes mellitus without complication (H)       Vitamin D (Cholecalciferol) 1000 units Tabs     60 tablet    Take 2,000 Units by mouth daily    Vitamin D deficiency

## 2018-07-02 NOTE — TELEPHONE ENCOUNTER
Reason for Call:  Other call back    Detailed comments: Home care nurse calling to find out if patient will need labs drawn. Has appointments Tuesday, Wednesday, Friday.  Please call to discuss    Phone Number Patient can be reached at: Other phone number: [phone]     Best Time: any    Can we leave a detailed message on this number? YES    Call taken on 7/2/2018 at 3:10 PM by JACLYN KAUFFMAN

## 2018-07-03 ASSESSMENT — PATIENT HEALTH QUESTIONNAIRE - PHQ9: SUM OF ALL RESPONSES TO PHQ QUESTIONS 1-9: 8

## 2018-07-04 ENCOUNTER — TRANSFERRED RECORDS (OUTPATIENT)
Dept: HEALTH INFORMATION MANAGEMENT | Facility: CLINIC | Age: 53
End: 2018-07-04

## 2018-07-05 NOTE — TELEPHONE ENCOUNTER
Patient has transferred her primary care to the [facility] clinic.  We are not just following her for pain management.  Please redirect her to the [facility] clinic for all her concerns.  I have an appt with her 7/10/18.      Ligia COOPER

## 2018-07-09 ENCOUNTER — TELEPHONE (OUTPATIENT)
Dept: FAMILY MEDICINE | Facility: CLINIC | Age: 53
End: 2018-07-09

## 2018-07-09 NOTE — TELEPHONE ENCOUNTER
Patient was admitted to [facility] for abdominal pain on 7/4/18.     [facility] is calling to inform Dr. Fatima.

## 2018-07-09 NOTE — TELEPHONE ENCOUNTER
Faheem maldonado/ [facility] called needing clarification on dosing of potassium chloride (K-TAB,KLOR-CON) 10 MEQ tablet.     Faheem tel: [phone] (okay to leave a detailed message)    Mercedes Schmidt

## 2018-07-10 ENCOUNTER — PATIENT OUTREACH (OUTPATIENT)
Dept: CARE COORDINATION | Facility: CLINIC | Age: 53
End: 2018-07-10

## 2018-07-10 ENCOUNTER — OFFICE VISIT (OUTPATIENT)
Dept: FAMILY MEDICINE | Facility: CLINIC | Age: 53
End: 2018-07-10
Payer: COMMERCIAL

## 2018-07-10 ENCOUNTER — OFFICE VISIT (OUTPATIENT)
Dept: BEHAVIORAL HEALTH | Facility: CLINIC | Age: 53
End: 2018-07-10
Payer: COMMERCIAL

## 2018-07-10 VITALS
BODY MASS INDEX: 39.06 KG/M2 | OXYGEN SATURATION: 97 % | RESPIRATION RATE: 16 BRPM | WEIGHT: 242 LBS | TEMPERATURE: 96.6 F | HEART RATE: 74 BPM | SYSTOLIC BLOOD PRESSURE: 98 MMHG | DIASTOLIC BLOOD PRESSURE: 78 MMHG

## 2018-07-10 DIAGNOSIS — F43.10 PTSD (POST-TRAUMATIC STRESS DISORDER): Primary | Chronic | ICD-10-CM

## 2018-07-10 DIAGNOSIS — G89.18 PAIN FOLLOWING SURGERY OR PROCEDURE: ICD-10-CM

## 2018-07-10 DIAGNOSIS — R19.7 DIARRHEA, UNSPECIFIED TYPE: ICD-10-CM

## 2018-07-10 DIAGNOSIS — G89.4 CHRONIC PAIN SYNDROME: ICD-10-CM

## 2018-07-10 DIAGNOSIS — Z09 HOSPITAL DISCHARGE FOLLOW-UP: Primary | ICD-10-CM

## 2018-07-10 DIAGNOSIS — C64.1 MALIGNANT NEOPLASM OF KIDNEY EXCLUDING RENAL PELVIS, RIGHT (H): ICD-10-CM

## 2018-07-10 DIAGNOSIS — F33.41 RECURRENT MAJOR DEPRESSION IN PARTIAL REMISSION (H): Chronic | ICD-10-CM

## 2018-07-10 DIAGNOSIS — F11.20 OPIOID USE DISORDER, SEVERE, DEPENDENCE (H): ICD-10-CM

## 2018-07-10 DIAGNOSIS — F41.9 ANXIETY: ICD-10-CM

## 2018-07-10 LAB

## 2018-07-10 PROCEDURE — 80306 DRUG TEST PRSMV INSTRMNT: CPT | Performed by: NURSE PRACTITIONER

## 2018-07-10 PROCEDURE — 99214 OFFICE O/P EST MOD 30 MIN: CPT | Performed by: NURSE PRACTITIONER

## 2018-07-10 PROCEDURE — 90832 PSYTX W PT 30 MINUTES: CPT | Performed by: SOCIAL WORKER

## 2018-07-10 RX ORDER — OXYCODONE AND ACETAMINOPHEN 5; 325 MG/1; MG/1
1 TABLET ORAL EVERY 6 HOURS PRN
Qty: 21 TABLET | Refills: 0 | Status: SHIPPED | OUTPATIENT
Start: 2018-07-10 | End: 2018-07-19

## 2018-07-10 RX ORDER — LACTOBACILLUS RHAMNOSUS GG 10B CELL
1 CAPSULE ORAL 2 TIMES DAILY
Qty: 60 CAPSULE | Refills: 1 | Status: ON HOLD | OUTPATIENT
Start: 2018-07-10 | End: 2024-04-25

## 2018-07-10 ASSESSMENT — ACTIVITIES OF DAILY LIVING (ADL): DEPENDENT_IADLS:: TRANSPORTATION

## 2018-07-10 NOTE — MR AVS SNAPSHOT
After Visit Summary   7/10/2018    Ines Mott    MRN: [id number]           Patient Information     Date Of Birth          1965        Visit Information        Provider Department      7/10/2018 2:30 PM Davion Rees, [facility]        Today's Diagnoses     PTSD (post-traumatic stress disorder)    -  1    Recurrent major depression in partial remission           Follow-ups after your visit        Your next 10 appointments already scheduled     Jul 17, 2018  1:00 PM CDT   Return Visit with Giulia Mckeon MD   [facility] ([facility])    [address]  [address]  [facility] [id number] [phone]            Jul 17, 2018  2:00 PM CDT   Return Visit with MARTIN Pineda   [facility] ([facility])    [address]  [address]  [facility] [id number] [phone]              Who to contact     If you have questions or need follow up information about today's clinic visit or your schedule please contact [facility] directly at [phone].  Normal or non-critical lab and imaging results will be communicated to you by MyChart, letter or phone within 4 business days after the clinic has received the results. If you do not hear from us within 7 days, please contact the clinic through MyChart or phone. If you have a critical or abnormal lab result, we will notify you by phone as soon as possible.  Submit refill requests through [employer] or call your pharmacy and they will forward the refill request to us. Please allow 3 business days for your refill to be completed.          Additional Information About Your Visit        Care EveryWhere ID     This is your Care EveryWhere ID. This could be used by other organizations to access your [place] medical records  [id number]         Blood  Pressure from Last 3 Encounters:   07/10/18 98/78   07/02/18 124/64   06/19/18 102/80    Weight from Last 3 Encounters:   07/10/18 242 lb (109.8 kg)   07/02/18 245 lb (111.1 kg)   06/19/18 252 lb 8 oz (114.5 kg)              Today, you had the following     No orders found for display         Today's Medication Changes          These changes are accurate as of 7/10/18 11:17 PM.  If you have any questions, ask your nurse or doctor.               Start taking these medicines.        Dose/Directions    [email or web address] DIGESTIVE HEALTH Caps   Used for:  Diarrhea, unspecified type   Started by:  Marika Santana APRN CNP        Dose:  1 capsule   Take 1 capsule by mouth 2 times daily   Quantity:  60 capsule   Refills:  1         These medicines have changed or have updated prescriptions.        Dose/Directions    sertraline 100 MG tablet   Commonly known as:  ZOLOFT   Indication:  Major Depressive Disorder   This may have changed:  how much to take   Used for:  Recurrent major depression in partial remission (H), Other constipation        Dose:  150 mg   Take 1.5 tablets (150 mg) by mouth daily   Quantity:  45 tablet   Refills:  11            Where to get your medicines      These medications were sent to [facility] [address] [place] [address]     Phone:  [phone]     [employer] DIGESTIVE HEALTH Caps         Some of these will need a paper prescription and others can be bought over the counter.  Ask your nurse if you have questions.     Bring a paper prescription for each of these medications     oxyCODONE-acetaminophen 5-325 MG per tablet                Primary Care Provider Office Phone # Fax #    MICHEAL Crowley [phone]       [address] [place] [address] [address]  [facility] [address]        Equal Access to Services     VALENTE RIVERA AH: deion Rosenberg qaybta kaalmada adeegyada, waxay  antione llanosamandeep franklin'aan ah. So [facility] [phone].    ATENCIÓN: Si jennyla imtiaz, tiene a christianson disposición servicios gratuitos de asistencia lingüística. Sabine al [phone].    We comply with applicable federal civil rights laws and [place] laws. We do not discriminate on the basis of race, color, national origin, age, disability, sex, sexual orientation, or gender identity.            Thank you!     Thank you for choosing [facility]  for your care. Our goal is always to provide you with excellent care. Hearing back from our patients is one way we can continue to improve our services. Please take a few minutes to complete the written survey that you may receive in the mail after your visit with us. Thank you!             Your Updated Medication List - Protect others around you: Learn how to safely use, store and throw away your medicines at www.disposemymeds.org.          This list is accurate as of 7/10/18 11:17 PM.  Always use your most recent med list.                   Brand Name Dispense Instructions for use Diagnosis    albuterol (2.5 MG/3ML) 0.083% neb solution     30 vial    Take 1 vial (2.5 mg) by nebulization every 6 hours as needed for shortness of breath / dyspnea or wheezing    Mild intermittent asthma with acute exacerbation       ASPIRIN NOT PRESCRIBED    INTENTIONAL    1 each    Please choose reason not prescribed, below    Aspirin intolerance       BACLOFEN PO      Take 10 mg by mouth 2 times daily as needed for muscle spasms        blood glucose calibration solution    no brand specified    1 each    Use to calibrate blood glucose monitor as directed.    Type 2 diabetes mellitus without complication, without long-term current use of insulin (H)       blood glucose lancets standard    no brand specified    100 each    Use to test blood sugar daily times daily or as directed.    Type 2 diabetes mellitus without complication, without long-term current use of  insulin (H)       blood glucose monitoring meter device kit    no brand specified    1 kit    Use to test blood sugar larisa times daily or as directed.    Type 2 diabetes mellitus without complication, without long-term current use of insulin (H)       blood glucose monitoring test strip    no brand specified    100 strip    Use to test blood sugars once times daily or as directed    Type 2 diabetes mellitus without complication, without long-term current use of insulin (H)       cloNIDine 0.1 MG tablet    CATAPRES    60 tablet    Take 1 tablet (0.1 mg) by mouth 2 times daily    Hypertension goal BP (blood pressure) < 140/80       clotrimazole 2 % Crea    GYNE-LOTRIMIN 3    1 Tube    Place 2 g vaginally daily    Yeast infection of the vagina       [employer] DIGESTIVE HEALTH Caps     60 capsule    Take 1 capsule by mouth 2 times daily    Diarrhea, unspecified type       cyanocobalamin 1000 MCG/ML injection    VITAMIN B12    1 mL    Inject 1 mL (1,000 mcg) Subcutaneous every 30 days    B12 deficiency       EPINEPHrine 0.3 MG/0.3ML injection 2-pack    EPIPEN/ADRENACLICK/or ANY BX GENERIC EQUIV    2 each    Inject 0.3 mLs (0.3 mg) into the muscle once as needed for anaphylaxis    Late effect of other and unspecified external causes       losartan-hydrochlorothiazide 100-25 MG per tablet    HYZAAR    90 tablet    Take 1 tablet by mouth daily    Hypertension goal BP (blood pressure) < 140/80       magnesium oxide 400 (241.3 Mg) MG tablet    MAG-OX    90 tablet    Take 1 tablet (400 mg) by mouth daily    Cramp of limb       metoprolol tartrate 25 MG tablet    LOPRESSOR    30 tablet    Take 1 tablet (25 mg) by mouth 2 times daily Hold IF Heart Rate less than 55 or IF systolic blood pressure less than 95 mmHg    Hypertensive urgency       naloxone nasal spray    NARCAN    0.2 mL    Spray 1 spray (4 mg) into one nostril alternating nostrils as needed for opioid reversal every 2-3 minutes until assistance arrives    Chronic  pain syndrome       * ondansetron 4 MG ODT tab    ZOFRAN-ODT    10 tablet    Take 1 tablet (4 mg) by mouth every 8 hours as needed for nausea        * ondansetron 4 MG ODT tab    ZOFRAN-ODT    90 tablet    DISSOLVE ONE TABLET IN MOUTH EVERY 8 HOURS AS NEEDED FOR NAUSEA    Chronic pain syndrome       order for DME     1 Device    Right wrist splint for carpal tunnel syndrome  One* Use as directed at hour of sleep    Carpal tunnel syndrome of right wrist       oxyCODONE-acetaminophen 5-325 MG per tablet    PERCOCET    21 tablet    Take 1 tablet by mouth every 6 hours as needed for pain Not to exceed 3 tabs a day    Pain following surgery or procedure       polyethylene glycol powder    MIRALAX/GLYCOLAX    1581 g    TAKE 17 GRAMS (1 CAPFUL) BY MOUTH DAILY    Slow transit constipation       potassium chloride 10 MEQ tablet    K-TAB,KLOR-CON    120 tablet    Take 1 tablet (10 mEq) by mouth daily    Hypokalemia       sertraline 100 MG tablet    ZOLOFT    45 tablet    Take 1.5 tablets (150 mg) by mouth daily    Recurrent major depression in partial remission (H), Other constipation       STATIN NOT PRESCRIBED (INTENTIONAL)     0 each    1 each daily Statin not prescribed intentionally due to Active liver disease (liver failure, cirrhosis, hepatitis) LIVER METS    Hyperlipidemia LDL goal <100, Type 2 diabetes mellitus without complication (H)       Vitamin D (Cholecalciferol) 1000 units Tabs     60 tablet    Take 2,000 Units by mouth daily    Vitamin D deficiency       * Notice:  This list has 2 medication(s) that are the same as other medications prescribed for you. Read the directions carefully, and ask your doctor or other care provider to review them with you.

## 2018-07-10 NOTE — TELEPHONE ENCOUNTER
Writer KAREN with Potassium Rx on current patient med list. If any further questions Song from [facility] to call clinic.     Thanks! Ilda Maldonado RN

## 2018-07-10 NOTE — PROGRESS NOTES
"[facility] (formerly [facility]3] - Integrated Primary Care   June 19, 2018      Behavioral Health Clinician Progress Note    Patient Name: Ines Mott           Service Type:  Individual      Service Location:   Face to Face in Clinic      Session Start Time: 2:40 p.m.  Session End Time: 3:30 p.m.      Session Length: 38 - 52      Attendees: Patient and PCP    Visit Activities (Refresh list every visit): [facility]    Diagnostic Assessment Date: to be completed at next visit  Treatment Plan Review Date: to be completed at next visit  See Flowsheets for today's PHQ-9 and RASTA-7 results  Previous PHQ-9:   PHQ-9 SCORE 5/11/2018 5/18/2018 7/2/2018   Total Score - - -   Total Score MyChart 9 (Mild depression) - -   Total Score 9 7 8     Previous RASTA-7:   RASTA-7 SCORE 8/24/2015 11/4/2016 12/5/2017   Total Score - - 3 (minimal anxiety)   Total Score 7 9 3       GONZÁLEZ LEVEL:  No flowsheet data found.    DATA  Extended Session (60+ minutes): No  Interactive Complexity: No  Crisis: No  [facility] Patient: No    Treatment Objective(s) Addressed in This Session:  Target Behavior(s): disease management/lifestyle changes cancer    Adjustment Difficulties: will develop coping/problem-solving skills to facilitate more adaptive adjustment  Psychological distress related to Chronic Disease Management    Current Stressors / Issues:    [facility] co-visit with patient and PCP in the clinic. Patient completed surgery last week and appears to be in a significant amount of pain. She reports she was discharged from the hospital without pain medications and was advised to follow up with PCP. Patient states her  is not supportive and she is getting some help from her daughter. She is frustrated and states \"I just want him to leave, but he did renew our lease for 6 months so I don't have to worry about moving right now\". Patient reports she took a bus and the light rail to get to today's appointment. She is willing to meet with [facility] Janny Aleman to explore " "transportation options. Patient endorsed low mood due to pain and her home environment. She denied SI/SIB thoughts or urges, and states \"I just feel so weak. I'm wiped out. I want my strength back\". Patient endorsed minimal appetite since the surgery and is trying to eat small meals throughout the day. She is somewhat hopeful her appetite will return, as she is aware she needs nutrition to recover.  Patient will schedule a follow up [facility] visit in one week. She will practice self-care at home and take medications as prescribed.     Progress on Treatment Objective(s) / Homework:  New Objective established this session - CONTEMPLATION (Considering change and yet undecided); Intervened by assessing the negative and positive thinking (ambivalence) about behavior change    Motivational Interviewing    MI Intervention: Expressed Empathy/Understanding, Supported Autonomy, Collaboration, Evocation, Permission to raise concern or advise, Open-ended questions and Reflections: simple and complex     Change Talk Expressed by the Patient: Ability to change Reasons to change Committment to change    Provider Response to Change Talk: E - Evoked more info from patient about behavior change, A - Affirmed patient's thoughts, decisions, or attempts at behavior change, R - Reflected patient's change talk and S - Summarized patient's change talk statements    Also provided psychoeducation about behavioral health condition, symptoms, and treatment options    Care Plan review completed: No    Medication Review:  No changes to current psychiatric medication(s) - zoloft, clonidine    Medication Compliance:  Yes    Changes in Health Issues:   Yes: Chronic disease management, Associated Psychological Distress  Cancer, Please see medical note by PCP MICHEAL Gaming, CNP    Chemical Use Review:   Substance Use: decrease in alcohol .  Client reports frequency of use no alcohol or non-prescribed drugs.  Contemplation  Provided support and " "affirmation for steps taken towards sobriety    Patient also has a hx of polysubstance dependence; drugs of choice: heroin, opiates, alcohol - currently using alcohol and opiates. Reports intent to stop using alcohol \"because then things get crazy\".     Tobacco Use: No current tobacco use.      Assessment: Current Emotional / Mental Status (status of significant symptoms):  Risk status (Self / Other harm or suicidal ideation)  Patient has had a history of suicidal ideation: \"comes and goes\" and suicide attempts: most recent attempt 10/21/14, pt attempted to overdose on methamphetamine; attempted in the past                                                                                           Patient denies current fears or concerns for personal safety.  Patient denies current or recent suicidal ideation or behaviors.  Patient denies current or recent homicidal ideation or behaviors.  Patient denies current or recent self injurious behavior or ideation.  Patient denies other safety concerns.  A safety and risk management plan has not been developed at this time, however patient was encouraged to call [place] [phone] should there be a change in any of these risk factors.    Appearance:   Appropriate , uncomfortable due to pain  Eye Contact:   Fair   Psychomotor Behavior: Normal   Attitude:   Cooperative   Orientation:   All  Speech   Rate / Production: Normal    Volume:  Normal   Mood:    Depressed   Affect:    Flat   Thought Content:  Clear   Thought Form:  Coherent   Insight:    Fair     Diagnoses:  1. PTSD (post-traumatic stress disorder)    2. Recurrent major depression in partial remission        Collateral Reports Completed:  Not Applicable    Plan: (Homework, other):  Patient was given information about behavioral services and encouraged to schedule a follow up appointment with the clinic [facility] in 1 week . She was also given information about mental health symptoms and treatment options  and Cognitive " Behavioral Therapy skills to practice when experiencing anxiety and depression. CD Recommendations: Maintain Sobriety.    MARTIN Palma, [facility] [place]

## 2018-07-10 NOTE — MR AVS SNAPSHOT
After Visit Summary   7/10/2018    Ines Mott    MRN: [id number]           Patient Information     Date Of Birth          1965        Visit Information        Provider Department      7/10/2018 2:30 PM Marika Santana APRN CNP [facility]        Today's Diagnoses     Hospital discharge follow-up    -  1    Diarrhea, unspecified type        Opioid use disorder, severe, dependence (H)        Pain following surgery or procedure        Chronic pain syndrome        Malignant neoplasm of kidney excluding renal pelvis, right (H)        Anxiety          Care Instructions    We have reordered your pain meds until you see Dr. Mckeon and then we will make a decision on your palliative status     Please start a probiotic for your gut since you are on antibiotics     You will meet with our [facility] next week and with me in one month               Follow-ups after your visit        Your next 10 appointments already scheduled     Jul 17, 2018  1:00 PM CDT   Return Visit with Giulia Mckeon MD   [facility] ([facility])    [address]  [address]  [facility] [address] [phone]            Jul 17, 2018  2:00 PM CDT   [facility]    [address]  [address]  [facility] [address] [phone]              Who to contact     If you have questions or need follow up information about today's clinic visit or your schedule please contact [facility] [place] [facility] directly at [phone].  Normal or non-critical lab and imaging results will be communicated to you by MyChart, letter or phone within 4 business days after the clinic has received the results. If you do not hear from us within 7 days, please contact the clinic through MyChart or phone. If you have a  critical or abnormal lab result, we will notify you by phone as soon as possible.  Submit refill requests through [employer] or call your pharmacy and they will forward the refill request to us. Please allow 3 business days for your refill to be completed.          Additional Information About Your Visit        Care EveryWhere ID     This is your Care EveryWhere ID. This could be used by other organizations to access your [facility] medical records  [id number]        Your Vitals Were     Pulse Temperature Respirations Pulse Oximetry BMI (Body Mass Index)       74 96.6  F (35.9  C) (Temporal) 16 97% 39.06 kg/m2        Blood Pressure from Last 3 Encounters:   07/10/18 98/78   07/02/18 124/64   06/19/18 102/80    Weight from Last 3 Encounters:   07/10/18 242 lb (109.8 kg)   07/02/18 245 lb (111.1 kg)   06/19/18 252 lb 8 oz (114.5 kg)              We Performed the Following     Urine Drugs of Abuse Screen Panel 13          Today's Medication Changes          These changes are accurate as of 7/10/18 11:59 PM.  If you have any questions, ask your nurse or doctor.               Start taking these medicines.        Dose/Directions    [facility] DIGESTIVE HEALTH Caps   Used for:  Diarrhea, unspecified type   Started by:  Marika Santana APRN CNP        Dose:  1 capsule   Take 1 capsule by mouth 2 times daily   Quantity:  60 capsule   Refills:  1         These medicines have changed or have updated prescriptions.        Dose/Directions    sertraline 100 MG tablet   Commonly known as:  ZOLOFT   Indication:  Major Depressive Disorder   This may have changed:  how much to take   Used for:  Recurrent major depression in partial remission (H), Other constipation        Dose:  150 mg   Take 1.5 tablets (150 mg) by mouth daily   Quantity:  45 tablet   Refills:  11            Where to get your medicines      These medications were sent to [facility] [address] [place] [address] [address] [place] [address] [address]  [facility] [address]     Phone:  [phone]     [facility] Caps         Some of these will need a paper prescription and others can be bought over the counter.  Ask your nurse if you have questions.     Bring a paper prescription for each of these medications     oxyCODONE-acetaminophen 5-325 MG per tablet               Information about OPIOIDS     PRESCRIPTION OPIOIDS: WHAT YOU NEED TO KNOW   We gave you an opioid (narcotic) pain medicine. It is important to manage your pain, but opioids are not always the best choice. You should first try all the other options your care team gave you. Take this medicine for as short a time (and as few doses) as possible.     These medicines have risks:    DO NOT drive when on new or higher doses of pain medicine. These medicines can affect your alertness and reaction times, and you could be arrested for driving under the influence (DUI). If you need to use opioids long-term, talk to your care team about driving.    DO NOT operate heave machinery    DO NOT do any other dangerous activities while taking these medicines.     DO NOT drink any alcohol while taking these medicines.      If the opioid prescribed includes acetaminophen, DO NOT take with any other medicines that contain acetaminophen. Read all labels carefully. Look for the word  acetaminophen  or  Tylenol.  Ask your pharmacist if you have questions or are unsure.    You can get addicted to pain medicines, especially if you have a history of addiction (chemical, alcohol or substance dependence). Talk to your care team about ways to reduce this risk.    Store your pills in a secure place, locked if possible. We will not replace any lost or stolen medicine. If you don t finish your medicine, please throw away (dispose) as directed by your pharmacist. The [place] Pollution Control Agency has more information about safe disposal:  https://www.pca.[facility].mn.us/living-green/managing-unwanted-medications.     All opioids tend to cause constipation. Drink plenty of water and eat foods that have a lot of fiber, such as fruits, vegetables, prune juice, apple juice and high-fiber cereal. Take a laxative (Miralax, milk of magnesia, Colace, Senna) if you don t move your bowels at least every other day.          Primary Care Provider Office Phone # Fax #    Marika MICHEAL [facility] [phone]       [address] [facility] [address]  [facility] [address]        Goals        General    Transportation (pt-stated)     Notes - Note created  7/11/2018  9:56 AM by Elizabeth Aleman LSW    Goal Statement: I am in need of transportation resources  Measure of Success: Patient will utilize transportation benefits  Supportive Steps to Achieve: SW will assist patient in obtaining transportation to medical appointments.   Date to Achieve By: Ongoing        Equal Access to Services     VALENTE RIVERA AH: Hadii casimiro toledoo Soevangelina, waaxda luqadaha, qaybta kaalmada mary, emilee pardo . So [facility] [phone].    ATENCIÓN: Si habla español, tiene a christianson disposición servicios gratuitos de asistencia lingüística. Llame al [phone].    We comply with applicable federal civil rights laws and [place] laws. We do not discriminate on the basis of race, color, national origin, age, disability, sex, sexual orientation, or gender identity.            Thank you!     Thank you for choosing [facility]  for your care. Our goal is always to provide you with excellent care. Hearing back from our patients is one way we can continue to improve our services. Please take a few minutes to complete the written survey that you may receive in the mail after your visit with us. Thank you!             Your Updated Medication List - Protect others around you: Learn how to safely use, store and throw away your medicines  at www.disposemymeds.org.          This list is accurate as of 7/10/18 11:59 PM.  Always use your most recent med list.                   Brand Name Dispense Instructions for use Diagnosis    albuterol (2.5 MG/3ML) 0.083% neb solution     30 vial    Take 1 vial (2.5 mg) by nebulization every 6 hours as needed for shortness of breath / dyspnea or wheezing    Mild intermittent asthma with acute exacerbation       ASPIRIN NOT PRESCRIBED    INTENTIONAL    1 each    Please choose reason not prescribed, below    Aspirin intolerance       BACLOFEN PO      Take 10 mg by mouth 2 times daily as needed for muscle spasms        blood glucose calibration solution    no brand specified    1 each    Use to calibrate blood glucose monitor as directed.    Type 2 diabetes mellitus without complication, without long-term current use of insulin (H)       blood glucose lancets standard    no brand specified    100 each    Use to test blood sugar daily times daily or as directed.    Type 2 diabetes mellitus without complication, without long-term current use of insulin (H)       blood glucose monitoring meter device kit    no brand specified    1 kit    Use to test blood sugar larisa times daily or as directed.    Type 2 diabetes mellitus without complication, without long-term current use of insulin (H)       blood glucose monitoring test strip    no brand specified    100 strip    Use to test blood sugars once times daily or as directed    Type 2 diabetes mellitus without complication, without long-term current use of insulin (H)       cloNIDine 0.1 MG tablet    CATAPRES    60 tablet    Take 1 tablet (0.1 mg) by mouth 2 times daily    Hypertension goal BP (blood pressure) < 140/80       clotrimazole 2 % Crea    GYNE-LOTRIMIN 3    1 Tube    Place 2 g vaginally daily    Yeast infection of the vagina       CULTURELLE DIGESTIVE HEALTH Caps     60 capsule    Take 1 capsule by mouth 2 times daily    Diarrhea, unspecified type        cyanocobalamin 1000 MCG/ML injection    VITAMIN B12    1 mL    Inject 1 mL (1,000 mcg) Subcutaneous every 30 days    B12 deficiency       EPINEPHrine 0.3 MG/0.3ML injection 2-pack    EPIPEN/ADRENACLICK/or ANY BX GENERIC EQUIV    2 each    Inject 0.3 mLs (0.3 mg) into the muscle once as needed for anaphylaxis    Late effect of other and unspecified external causes       losartan-hydrochlorothiazide 100-25 MG per tablet    HYZAAR    90 tablet    Take 1 tablet by mouth daily    Hypertension goal BP (blood pressure) < 140/80       magnesium oxide 400 (241.3 Mg) MG tablet    MAG-OX    90 tablet    Take 1 tablet (400 mg) by mouth daily    Cramp of limb       metoprolol tartrate 25 MG tablet    LOPRESSOR    30 tablet    Take 1 tablet (25 mg) by mouth 2 times daily Hold IF Heart Rate less than 55 or IF systolic blood pressure less than 95 mmHg    Hypertensive urgency       naloxone nasal spray    NARCAN    0.2 mL    Spray 1 spray (4 mg) into one nostril alternating nostrils as needed for opioid reversal every 2-3 minutes until assistance arrives    Chronic pain syndrome       * ondansetron 4 MG ODT tab    ZOFRAN-ODT    10 tablet    Take 1 tablet (4 mg) by mouth every 8 hours as needed for nausea        * ondansetron 4 MG ODT tab    ZOFRAN-ODT    90 tablet    DISSOLVE ONE TABLET IN MOUTH EVERY 8 HOURS AS NEEDED FOR NAUSEA    Chronic pain syndrome       order for DME     1 Device    Right wrist splint for carpal tunnel syndrome  One* Use as directed at hour of sleep    Carpal tunnel syndrome of right wrist       oxyCODONE-acetaminophen 5-325 MG per tablet    PERCOCET    21 tablet    Take 1 tablet by mouth every 6 hours as needed for pain Not to exceed 3 tabs a day    Pain following surgery or procedure       polyethylene glycol powder    MIRALAX/GLYCOLAX    1581 g    TAKE 17 GRAMS (1 CAPFUL) BY MOUTH DAILY    Slow transit constipation       potassium chloride 10 MEQ tablet    K-TAB,KLOR-CON    120 tablet    Take 1 tablet (10  mEq) by mouth daily    Hypokalemia       sertraline 100 MG tablet    ZOLOFT    45 tablet    Take 1.5 tablets (150 mg) by mouth daily    Recurrent major depression in partial remission (H), Other constipation       STATIN NOT PRESCRIBED (INTENTIONAL)     0 each    1 each daily Statin not prescribed intentionally due to Active liver disease (liver failure, cirrhosis, hepatitis) LIVER METS    Hyperlipidemia LDL goal <100, Type 2 diabetes mellitus without complication (H)       Vitamin D (Cholecalciferol) 1000 units Tabs     60 tablet    Take 2,000 Units by mouth daily    Vitamin D deficiency       * Notice:  This list has 2 medication(s) that are the same as other medications prescribed for you. Read the directions carefully, and ask your doctor or other care provider to review them with you.
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
## 2018-07-11 ENCOUNTER — PATIENT OUTREACH (OUTPATIENT)
Dept: CARE COORDINATION | Facility: CLINIC | Age: 53
End: 2018-07-11

## 2018-07-11 ASSESSMENT — ACTIVITIES OF DAILY LIVING (ADL): DEPENDENT_IADLS:: TRANSPORTATION

## 2018-07-11 NOTE — PROGRESS NOTES
Clinic Care Coordination Contact  Care Team Conversations    SW met with patient at patient's appointment.    Patient is in need of transportation resources to make it to appointments. SW explained to patient transportation resources through health insurance.     Plan: SW will continue to follow and assist as needed.   Patient to call this SW with any questions or concerns.     BRISSA Davis    Care Coordinator  [facility] ,[facility], and Women's   [phone]
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
## 2018-07-12 NOTE — PATIENT INSTRUCTIONS
We have reordered your pain meds until you see Dr. Mckeon and then we will make a decision on your palliative status     Please start a probiotic for your gut since you are on antibiotics     You will meet with our C next week and with me in one month

## 2018-07-13 ENCOUNTER — TRANSFERRED RECORDS (OUTPATIENT)
Dept: HEALTH INFORMATION MANAGEMENT | Facility: CLINIC | Age: 53
End: 2018-07-13

## 2018-07-14 ENCOUNTER — TRANSFERRED RECORDS (OUTPATIENT)
Dept: HEALTH INFORMATION MANAGEMENT | Facility: CLINIC | Age: 53
End: 2018-07-14

## 2018-07-19 ENCOUNTER — OFFICE VISIT (OUTPATIENT)
Dept: FAMILY MEDICINE | Facility: CLINIC | Age: 53
End: 2018-07-19
Payer: COMMERCIAL

## 2018-07-19 ENCOUNTER — OFFICE VISIT (OUTPATIENT)
Dept: BEHAVIORAL HEALTH | Facility: CLINIC | Age: 53
End: 2018-07-19
Payer: COMMERCIAL

## 2018-07-19 ENCOUNTER — TELEPHONE (OUTPATIENT)
Dept: FAMILY MEDICINE | Facility: CLINIC | Age: 53
End: 2018-07-19

## 2018-07-19 VITALS
OXYGEN SATURATION: 99 % | HEART RATE: 64 BPM | TEMPERATURE: 97.3 F | RESPIRATION RATE: 14 BRPM | DIASTOLIC BLOOD PRESSURE: 60 MMHG | WEIGHT: 236 LBS | SYSTOLIC BLOOD PRESSURE: 90 MMHG | BODY MASS INDEX: 38.09 KG/M2

## 2018-07-19 DIAGNOSIS — F43.10 PTSD (POST-TRAUMATIC STRESS DISORDER): Chronic | ICD-10-CM

## 2018-07-19 DIAGNOSIS — F33.41 RECURRENT MAJOR DEPRESSION IN PARTIAL REMISSION (H): Primary | Chronic | ICD-10-CM

## 2018-07-19 DIAGNOSIS — Z12.11 SCREEN FOR COLON CANCER: ICD-10-CM

## 2018-07-19 DIAGNOSIS — G89.18 PAIN FOLLOWING SURGERY OR PROCEDURE: ICD-10-CM

## 2018-07-19 DIAGNOSIS — Z09 HOSPITAL DISCHARGE FOLLOW-UP: Primary | ICD-10-CM

## 2018-07-19 DIAGNOSIS — F11.20 OPIOID USE DISORDER, SEVERE, DEPENDENCE (H): ICD-10-CM

## 2018-07-19 PROBLEM — C80.1 MALIGNANT NEOPLASM (H): Status: ACTIVE | Noted: 2018-07-19

## 2018-07-19 PROBLEM — N12 PYELONEPHRITIS: Status: ACTIVE | Noted: 2018-06-28

## 2018-07-19 PROBLEM — K68.3 RETROPERITONEAL HEMATOMA: Status: ACTIVE | Noted: 2018-07-14

## 2018-07-19 PROCEDURE — 90832 PSYTX W PT 30 MINUTES: CPT | Performed by: SOCIAL WORKER

## 2018-07-19 PROCEDURE — 99214 OFFICE O/P EST MOD 30 MIN: CPT | Performed by: NURSE PRACTITIONER

## 2018-07-19 RX ORDER — OXYCODONE HYDROCHLORIDE 5 MG/1
5-10 TABLET ORAL
COMMUNITY
Start: 2018-07-17 | End: 2018-07-19 | Stop reason: ALTCHOICE

## 2018-07-19 RX ORDER — OXYCODONE AND ACETAMINOPHEN 5; 325 MG/1; MG/1
1 TABLET ORAL 3 TIMES DAILY
Qty: 90 TABLET | Refills: 0 | Status: SHIPPED | OUTPATIENT
Start: 2018-07-19 | End: 2018-07-31

## 2018-07-19 RX ORDER — PANTOPRAZOLE SODIUM 40 MG/1
40 TABLET, DELAYED RELEASE ORAL
COMMUNITY
Start: 2018-07-17 | End: 2018-08-27
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
## 2018-07-19 NOTE — TELEPHONE ENCOUNTER
Reason for Call: Request for an order or referral:    Order or referral being requested: resume to homecare    Date needed: as soon as possible    Has the patient been seen by the PCP for this problem? YES    Additional comments:     Phone number Patient can be reached at:  Other phone number:      Best Time:      Can we leave a detailed message on this number?  YES    Call taken on 7/19/2018 at 1:51 PM by ASHOK AHUMADA

## 2018-07-19 NOTE — MR AVS SNAPSHOT
After Visit Summary   7/19/2018    Ines Mott    MRN: [id number]           Patient Information     Date Of Birth          1965        Visit Information        Provider Department      7/19/2018 2:00 PM Marika Santana APRN CNP [facility]        Today's Diagnoses     Hospital discharge follow-up    -  1    Screen for colon cancer        Pain following surgery or procedure          Care Instructions    I have renewed your Oxycodone 3x daily as needed, I have given you a month supply    Please keep your appt with our [facility] and pain specialist on 7/31    Try and eat small freq meals     We will see you again in one month          Follow-ups after your visit        Your next 10 appointments already scheduled     Jul 20, 2018  9:30 AM CDT   [facility]    [address]  [address]  [facility] [place] [id number] [phone]           Bring a current list of meds and any records pertaining to this visit. For Physicals, please bring immunization records and any forms needing to be filled out. Please arrive 10 minutes early to complete paperwork.            Jul 31, 2018 11:20 AM CDT   Return Visit with Giulia Mckeon MD   [facility]    [address]  [address]  [facility] [place] [id number] [phone]            Jul 31, 2018  1:00 PM CDT   Return Visit with Davion Rees [facility] ([facility])    [address]  [address]  [facility] [place] [address] [phone]              Who to contact     If you have questions or need follow up information about today's clinic visit or your schedule please contact [facility] directly at [phone].  Normal or  non-critical lab and imaging results will be communicated to you by MyChart, letter or phone within 4 business days after the clinic has received the results. If you do not hear from us within 7 days, please contact the clinic through MyChart or phone. If you have a critical or abnormal lab result, we will notify you by phone as soon as possible.  Submit refill requests through [employer] or call your pharmacy and they will forward the refill request to us. Please allow 3 business days for your refill to be completed.          Additional Information About Your Visit        Care EveryWhere ID     This is your Care EveryWhere ID. This could be used by other organizations to access your [place] medical records  [id number]        Your Vitals Were     Pulse Temperature Respirations Pulse Oximetry BMI (Body Mass Index)       64 97.3  F (36.3  C) (Temporal) 14 99% 38.09 kg/m2        Blood Pressure from Last 3 Encounters:   07/19/18 90/60   07/10/18 98/78   07/02/18 124/64    Weight from Last 3 Encounters:   07/19/18 236 lb (107 kg)   07/10/18 242 lb (109.8 kg)   07/02/18 245 lb (111.1 kg)              Today, you had the following     No orders found for display         Today's Medication Changes          These changes are accurate as of 7/19/18  2:52 PM.  If you have any questions, ask your nurse or doctor.               These medicines have changed or have updated prescriptions.        Dose/Directions    oxyCODONE-acetaminophen 5-325 MG per tablet   Commonly known as:  PERCOCET   This may have changed:    - when to take this  - reasons to take this   Used for:  Pain following surgery or procedure   Changed by:  Marika Santana, APRN CNP        Dose:  1 tablet   Take 1 tablet by mouth 3 times daily Not to exceed 3 tabs a day   Quantity:  90 tablet   Refills:  0       sertraline 100 MG tablet   Commonly known as:  ZOLOFT   Indication:  Major Depressive Disorder   This may have changed:  how much to take   Used  for:  Recurrent major depression in partial remission (H), Other constipation        Dose:  150 mg   Take 1.5 tablets (150 mg) by mouth daily   Quantity:  45 tablet   Refills:  11         Stop taking these medicines if you haven't already. Please contact your care team if you have questions.     oxyCODONE IR 5 MG tablet   Commonly known as:  ROXICODONE   Stopped by:  Marika Santana APRN CNP                Where to get your medicines      Some of these will need a paper prescription and others can be bought over the counter.  Ask your nurse if you have questions.     Bring a paper prescription for each of these medications     oxyCODONE-acetaminophen 5-325 MG per tablet               Information about OPIOIDS     PRESCRIPTION OPIOIDS: WHAT YOU NEED TO KNOW   We gave you an opioid (narcotic) pain medicine. It is important to manage your pain, but opioids are not always the best choice. You should first try all the other options your care team gave you. Take this medicine for as short a time (and as few doses) as possible.     These medicines have risks:    DO NOT drive when on new or higher doses of pain medicine. These medicines can affect your alertness and reaction times, and you could be arrested for driving under the influence (DUI). If you need to use opioids long-term, talk to your care team about driving.    DO NOT operate heave machinery    DO NOT do any other dangerous activities while taking these medicines.     DO NOT drink any alcohol while taking these medicines.      If the opioid prescribed includes acetaminophen, DO NOT take with any other medicines that contain acetaminophen. Read all labels carefully. Look for the word  acetaminophen  or  Tylenol.  Ask your pharmacist if you have questions or are unsure.    You can get addicted to pain medicines, especially if you have a history of addiction (chemical, alcohol or substance dependence). Talk to your care team about ways to reduce this  risk.    Store your pills in a secure place, locked if possible. We will not replace any lost or stolen medicine. If you don t finish your medicine, please throw away (dispose) as directed by your pharmacist. The [place] Pollution Control Agency has more information about safe disposal: https://www.pca.[facility].[place].us/living-green/managing-unwanted-medications.     All opioids tend to cause constipation. Drink plenty of water and eat foods that have a lot of fiber, such as fruits, vegetables, prune juice, apple juice and high-fiber cereal. Take a laxative (Miralax, milk of magnesia, Colace, Senna) if you don t move your bowels at least every other day.          Primary Care Provider Office Phone # Fax #    MICHEAL Crowley [facility] [phone]       [address] [facility] [address]  [facility] [address]        Goals        General    Transportation (pt-stated)     Notes - Note created  7/11/2018  9:56 AM by Elizabeth Aleman LSW    Goal Statement: I am in need of transportation resources  Measure of Success: Patient will utilize transportation benefits  Supportive Steps to Achieve: SW will assist patient in obtaining transportation to medical appointments.   Date to Achieve By: Ongoing        Equal Access to Services     VALENTE RIVERA AH: Marc Leonard, waaislinnda des, qaybta kaalmada mary, emilee coyne. So [facility] [phone].    ATENCIÓN: Si habla español, tiene a christianson disposición servicios gratuitos de asistencia lingüística. Llame al [phone].    We comply with applicable federal civil rights laws and [place] laws. We do not discriminate on the basis of race, color, national origin, age, disability, sex, sexual orientation, or gender identity.            Thank you!     Thank you for choosing [facility]  for your care. Our goal is always to provide you with excellent care. Hearing back from our patients is one way we can  continue to improve our services. Please take a few minutes to complete the written survey that you may receive in the mail after your visit with us. Thank you!             Your Updated Medication List - Protect others around you: Learn how to safely use, store and throw away your medicines at www.disposemymeds.org.          This list is accurate as of 7/19/18  2:52 PM.  Always use your most recent med list.                   Brand Name Dispense Instructions for use Diagnosis    albuterol (2.5 MG/3ML) 0.083% neb solution     30 vial    Take 1 vial (2.5 mg) by nebulization every 6 hours as needed for shortness of breath / dyspnea or wheezing    Mild intermittent asthma with acute exacerbation       ASPIRIN NOT PRESCRIBED    INTENTIONAL    1 each    Please choose reason not prescribed, below    Aspirin intolerance       BACLOFEN PO      Take 10 mg by mouth 2 times daily as needed for muscle spasms        blood glucose calibration solution    no brand specified    1 each    Use to calibrate blood glucose monitor as directed.    Type 2 diabetes mellitus without complication, without long-term current use of insulin (H)       blood glucose lancets standard    no brand specified    100 each    Use to test blood sugar daily times daily or as directed.    Type 2 diabetes mellitus without complication, without long-term current use of insulin (H)       blood glucose monitoring meter device kit    no brand specified    1 kit    Use to test blood sugar larisa times daily or as directed.    Type 2 diabetes mellitus without complication, without long-term current use of insulin (H)       blood glucose monitoring test strip    no brand specified    100 strip    Use to test blood sugars once times daily or as directed    Type 2 diabetes mellitus without complication, without long-term current use of insulin (H)       cloNIDine 0.1 MG tablet    CATAPRES    60 tablet    Take 1 tablet (0.1 mg) by mouth 2 times daily    Hypertension  goal BP (blood pressure) < 140/80       clotrimazole 2 % Crea    GYNE-LOTRIMIN 3    1 Tube    Place 2 g vaginally daily    Yeast infection of the vagina       CULTUREE DIGESTIVE HEALTH Caps     60 capsule    Take 1 capsule by mouth 2 times daily    Diarrhea, unspecified type       cyanocobalamin 1000 MCG/ML injection    VITAMIN B12    1 mL    Inject 1 mL (1,000 mcg) Subcutaneous every 30 days    B12 deficiency       EPINEPHrine 0.3 MG/0.3ML injection 2-pack    EPIPEN/ADRENACLICK/or ANY BX GENERIC EQUIV    2 each    Inject 0.3 mLs (0.3 mg) into the muscle once as needed for anaphylaxis    Late effect of other and unspecified external causes       losartan-hydrochlorothiazide 100-25 MG per tablet    HYZAAR    90 tablet    Take 1 tablet by mouth daily    Hypertension goal BP (blood pressure) < 140/80       magnesium oxide 400 (241.3 Mg) MG tablet    MAG-OX    90 tablet    Take 1 tablet (400 mg) by mouth daily    Cramp of limb       metoprolol tartrate 25 MG tablet    LOPRESSOR    30 tablet    Take 1 tablet (25 mg) by mouth 2 times daily Hold IF Heart Rate less than 55 or IF systolic blood pressure less than 95 mmHg    Hypertensive urgency       naloxone nasal spray    NARCAN    0.2 mL    Spray 1 spray (4 mg) into one nostril alternating nostrils as needed for opioid reversal every 2-3 minutes until assistance arrives    Chronic pain syndrome       * ondansetron 4 MG ODT tab    ZOFRAN-ODT    10 tablet    Take 1 tablet (4 mg) by mouth every 8 hours as needed for nausea        * ondansetron 4 MG ODT tab    ZOFRAN-ODT    90 tablet    DISSOLVE ONE TABLET IN MOUTH EVERY 8 HOURS AS NEEDED FOR NAUSEA    Chronic pain syndrome       order for DME     1 Device    Right wrist splint for carpal tunnel syndrome  One* Use as directed at hour of sleep    Carpal tunnel syndrome of right wrist       oxyCODONE-acetaminophen 5-325 MG per tablet    PERCOCET    90 tablet    Take 1 tablet by mouth 3 times daily Not to exceed 3 tabs a day     Pain following surgery or procedure       pantoprazole 40 MG EC tablet    PROTONIX     Take 40 mg by mouth        polyethylene glycol powder    MIRALAX/GLYCOLAX    1581 g    TAKE 17 GRAMS (1 CAPFUL) BY MOUTH DAILY    Slow transit constipation       potassium chloride 10 MEQ tablet    K-TAB,KLOR-CON    120 tablet    Take 1 tablet (10 mEq) by mouth daily    Hypokalemia       sertraline 100 MG tablet    ZOLOFT    45 tablet    Take 1.5 tablets (150 mg) by mouth daily    Recurrent major depression in partial remission (H), Other constipation       STATIN NOT PRESCRIBED (INTENTIONAL)     0 each    1 each daily Statin not prescribed intentionally due to Active liver disease (liver failure, cirrhosis, hepatitis) LIVER METS    Hyperlipidemia LDL goal <100, Type 2 diabetes mellitus without complication (H)       Vitamin D (Cholecalciferol) 1000 units Tabs     60 tablet    Take 2,000 Units by mouth daily    Vitamin D deficiency       * Notice:  This list has 2 medication(s) that are the same as other medications prescribed for you. Read the directions carefully, and ask your doctor or other care provider to review them with you.

## 2018-07-19 NOTE — PATIENT INSTRUCTIONS
I have renewed your Oxycodone 3x daily as needed, I have given you a month supply    Please keep your appt with our [facility] and pain specialist on 7/31    Try and eat small freq meals     We will see you again in one month

## 2018-07-19 NOTE — MR AVS SNAPSHOT
After Visit Summary   7/19/2018    Ines Mott    MRN: [id number]           Patient Information     Date Of Birth          1965        Visit Information        Provider Department      7/19/2018 2:00 PM Davion Rees LICSW [facility]        Today's Diagnoses     Recurrent major depression in partial remission    -  1    PTSD (post-traumatic stress disorder)        Opioid use disorder, severe, dependence (H)           Follow-ups after your visit        Your next 10 appointments already scheduled     Jul 31, 2018 11:20 AM CDT   Return Visit with Giulia Mckeon MD   [facility] ([facility])    [address]  [address]  [facility] [address]   [phone]            Jul 31, 2018  1:00 PM CDT   [facility]    [address]  [address]  [facility] [address] [phone]            Jul 31, 2018  4:00 PM CDT   Telephone Visit with Aniceto Mujica MD   [facility] ([facility])    [address] [place] [address] [phone]           Note: this is not an onsite visit; there is no need to come to the facility.              Who to contact     If you have questions or need follow up information about today's clinic visit or your schedule please contact [facility] directly at [phone].  Normal or non-critical lab and imaging results will be communicated to you by MyChart, letter or phone within 4 business days after the clinic has received the results. If you do not hear from us within 7 days, please contact the clinic through MyChart or phone. If you have a critical or abnormal lab result, we will notify you by phone as soon as possible.  Submit refill  requests through [employer] or call your pharmacy and they will forward the refill request to us. Please allow 3 business days for your refill to be completed.          Additional Information About Your Visit        Care EveryWhere ID     This is your Care EveryWhere ID. This could be used by other organizations to access your [place] medical records  [id number]         Blood Pressure from Last 3 Encounters:   07/19/18 90/60   07/10/18 98/78   07/02/18 124/64    Weight from Last 3 Encounters:   07/19/18 236 lb (107 kg)   07/10/18 242 lb (109.8 kg)   07/02/18 245 lb (111.1 kg)              Today, you had the following     No orders found for display         Today's Medication Changes          These changes are accurate as of 7/19/18 11:59 PM.  If you have any questions, ask your nurse or doctor.               These medicines have changed or have updated prescriptions.        Dose/Directions    oxyCODONE-acetaminophen 5-325 MG per tablet   Commonly known as:  PERCOCET   This may have changed:    - when to take this  - reasons to take this   Used for:  Pain following surgery or procedure   Changed by:  Marika Santana APRN CNP        Dose:  1 tablet   Take 1 tablet by mouth 3 times daily Not to exceed 3 tabs a day   Quantity:  90 tablet   Refills:  0       sertraline 100 MG tablet   Commonly known as:  ZOLOFT   Indication:  Major Depressive Disorder   This may have changed:  how much to take   Used for:  Recurrent major depression in partial remission (H), Other constipation        Dose:  150 mg   Take 1.5 tablets (150 mg) by mouth daily   Quantity:  45 tablet   Refills:  11         Stop taking these medicines if you haven't already. Please contact your care team if you have questions.     oxyCODONE IR 5 MG tablet   Commonly known as:  ROXICODONE   Stopped by:  Marika Santana APRN CNP                Where to get your medicines      Some of these will need a paper prescription and others can be  bought over the counter.  Ask your nurse if you have questions.     Bring a paper prescription for each of these medications     oxyCODONE-acetaminophen 5-325 MG per tablet                Primary Care Provider Office Phone # Fax #    MICHEAL Crowley [phone]       [address] [facility] [address]  [facility] [address]        Goals        General    Transportation (pt-stated)     Notes - Note created  7/11/2018  9:56 AM by Elizabeth Aleman LSW    Goal Statement: I am in need of transportation resources  Measure of Success: Patient will utilize transportation benefits  Supportive Steps to Achieve: SW will assist patient in obtaining transportation to medical appointments.   Date to Achieve By: Ongoing        Equal Access to Services     VALENTE RIVERA AH: Marc Leonard, deion nunes, candida hernandez, emilee coyne. So [facility] [phone].    ATENCIÓN: Si habla español, tiene a christianson disposición servicios gratuitos de asistencia lingüística. Llame al [phone].    We comply with applicable federal civil rights laws and [place] laws. We do not discriminate on the basis of race, color, national origin, age, disability, sex, sexual orientation, or gender identity.            Thank you!     Thank you for choosing [facility]  for your care. Our goal is always to provide you with excellent care. Hearing back from our patients is one way we can continue to improve our services. Please take a few minutes to complete the written survey that you may receive in the mail after your visit with us. Thank you!             Your Updated Medication List - Protect others around you: Learn how to safely use, store and throw away your medicines at www.disposemymeds.org.          This list is accurate as of 7/19/18 11:59 PM.  Always use your most recent med list.                   Brand Name Dispense Instructions for use Diagnosis     albuterol (2.5 MG/3ML) 0.083% neb solution     30 vial    Take 1 vial (2.5 mg) by nebulization every 6 hours as needed for shortness of breath / dyspnea or wheezing    Mild intermittent asthma with acute exacerbation       ASPIRIN NOT PRESCRIBED    INTENTIONAL    1 each    Please choose reason not prescribed, below    Aspirin intolerance       BACLOFEN PO      Take 10 mg by mouth 2 times daily as needed for muscle spasms        blood glucose calibration solution    no brand specified    1 each    Use to calibrate blood glucose monitor as directed.    Type 2 diabetes mellitus without complication, without long-term current use of insulin (H)       blood glucose lancets standard    no brand specified    100 each    Use to test blood sugar daily times daily or as directed.    Type 2 diabetes mellitus without complication, without long-term current use of insulin (H)       blood glucose monitoring meter device kit    no brand specified    1 kit    Use to test blood sugar larisa times daily or as directed.    Type 2 diabetes mellitus without complication, without long-term current use of insulin (H)       blood glucose monitoring test strip    no brand specified    100 strip    Use to test blood sugars once times daily or as directed    Type 2 diabetes mellitus without complication, without long-term current use of insulin (H)       cloNIDine 0.1 MG tablet    CATAPRES    60 tablet    Take 1 tablet (0.1 mg) by mouth 2 times daily    Hypertension goal BP (blood pressure) < 140/80       clotrimazole 2 % Crea    GYNE-LOTRIMIN 3    1 Tube    Place 2 g vaginally daily    Yeast infection of the vagina       CULTURELLE DIGESTIVE HEALTH Caps     60 capsule    Take 1 capsule by mouth 2 times daily    Diarrhea, unspecified type       cyanocobalamin 1000 MCG/ML injection    VITAMIN B12    1 mL    Inject 1 mL (1,000 mcg) Subcutaneous every 30 days    B12 deficiency       EPINEPHrine 0.3 MG/0.3ML injection 2-pack     EPIPEN/ADRENACLICK/or ANY BX GENERIC EQUIV    2 each    Inject 0.3 mLs (0.3 mg) into the muscle once as needed for anaphylaxis    Late effect of other and unspecified external causes       losartan-hydrochlorothiazide 100-25 MG per tablet    HYZAAR    90 tablet    Take 1 tablet by mouth daily    Hypertension goal BP (blood pressure) < 140/80       magnesium oxide 400 (241.3 Mg) MG tablet    MAG-OX    90 tablet    Take 1 tablet (400 mg) by mouth daily    Cramp of limb       metoprolol tartrate 25 MG tablet    LOPRESSOR    30 tablet    Take 1 tablet (25 mg) by mouth 2 times daily Hold IF Heart Rate less than 55 or IF systolic blood pressure less than 95 mmHg    Hypertensive urgency       naloxone nasal spray    NARCAN    0.2 mL    Spray 1 spray (4 mg) into one nostril alternating nostrils as needed for opioid reversal every 2-3 minutes until assistance arrives    Chronic pain syndrome       * ondansetron 4 MG ODT tab    ZOFRAN-ODT    10 tablet    Take 1 tablet (4 mg) by mouth every 8 hours as needed for nausea        * ondansetron 4 MG ODT tab    ZOFRAN-ODT    90 tablet    DISSOLVE ONE TABLET IN MOUTH EVERY 8 HOURS AS NEEDED FOR NAUSEA    Chronic pain syndrome       order for DME     1 Device    Right wrist splint for carpal tunnel syndrome  One* Use as directed at hour of sleep    Carpal tunnel syndrome of right wrist       oxyCODONE-acetaminophen 5-325 MG per tablet    PERCOCET    90 tablet    Take 1 tablet by mouth 3 times daily Not to exceed 3 tabs a day    Pain following surgery or procedure       pantoprazole 40 MG EC tablet    PROTONIX     Take 40 mg by mouth        polyethylene glycol powder    MIRALAX/GLYCOLAX    1581 g    TAKE 17 GRAMS (1 CAPFUL) BY MOUTH DAILY    Slow transit constipation       potassium chloride 10 MEQ tablet    K-TAB,KLOR-CON    120 tablet    Take 1 tablet (10 mEq) by mouth daily    Hypokalemia       sertraline 100 MG tablet    ZOLOFT    45 tablet    Take 1.5 tablets (150 mg) by mouth  daily    Recurrent major depression in partial remission (H), Other constipation       STATIN NOT PRESCRIBED (INTENTIONAL)     0 each    1 each daily Statin not prescribed intentionally due to Active liver disease (liver failure, cirrhosis, hepatitis) LIVER METS    Hyperlipidemia LDL goal <100, Type 2 diabetes mellitus without complication (H)       Vitamin D (Cholecalciferol) 1000 units Tabs     60 tablet    Take 2,000 Units by mouth daily    Vitamin D deficiency       * Notice:  This list has 2 medication(s) that are the same as other medications prescribed for you. Read the directions carefully, and ask your doctor or other care provider to review them with you.

## 2018-07-19 NOTE — PROGRESS NOTES
[facility]   June 19, 2018      Behavioral Health Clinician Progress Note    Patient Name: Ines Mott           Service Type:  Individual      Service Location:   Face to Face in Clinic      Session Start Time: 2:10 p.m.  Session End Time: 2:40 p.m.      Session Length: 16 - 37      Attendees: Patient and PCP    Visit Activities (Refresh list every visit): [facility] [employer]    Diagnostic Assessment Date: to be completed at next visit  Treatment Plan Review Date: to be completed at next visit  See Flowsheets for today's PHQ-9 and RASTA-7 results  Previous PHQ-9:   PHQ-9 SCORE 5/11/2018 5/18/2018 7/2/2018   Total Score - - -   Total Score MyChart 9 (Mild depression) - -   Total Score 9 7 8     Previous RASTA-7:   RASTA-7 SCORE 8/24/2015 11/4/2016 12/5/2017   Total Score - - 3 (minimal anxiety)   Total Score 7 9 3       GONZÁLEZ LEVEL:  No flowsheet data found.    DATA  Extended Session (60+ minutes): No  Interactive Complexity: No  Crisis: No  [facility] Patient: No    Treatment Objective(s) Addressed in This Session:  Target Behavior(s): disease management/lifestyle changes cancer    Adjustment Difficulties: will develop coping/problem-solving skills to facilitate more adaptive adjustment  Psychological distress related to Chronic Disease Management    Current Stressors / Issues:    [facility] co-visit with patient and PCP in the clinic. Patient presents for a hospital follow up and reports her primary concerns are fatigue and nausea.  Patient reports her mood is somewhat improved as her  is helping more and she is sleeping well enough. She states her primary source of anxiety is pain, vomiting, and frequent appointments. She is struggling with her appetite due to nausea and vomiting, and is aware this is contributing to weakness and fatigue. Patient will try to eat small frequent meals or add protein shakes to her daily routine.  Patient denied SI/SIB thoughts or urges.  Patient agreed to schedule a  "follow up [facility] only visit to complete a diagnostic assessment and discuss coping skills to manage depression and anxiety.    Progress on Treatment Objective(s) / Homework:  New Objective established this session - CONTEMPLATION (Considering change and yet undecided); Intervened by assessing the negative and positive thinking (ambivalence) about behavior change    Motivational Interviewing    MI Intervention: Expressed Empathy/Understanding, Supported Autonomy, Collaboration, Evocation, Permission to raise concern or advise, Open-ended questions and Reflections: simple and complex     Change Talk Expressed by the Patient: Ability to change Reasons to change Committment to change    Provider Response to Change Talk: E - Evoked more info from patient about behavior change, A - Affirmed patient's thoughts, decisions, or attempts at behavior change, R - Reflected patient's change talk and S - Summarized patient's change talk statements    Also provided psychoeducation about behavioral health condition, symptoms, and treatment options    Care Plan review completed: No    Medication Review:  No changes to current psychiatric medication(s) - zoloft, clonidine    Medication Compliance:  Yes    Changes in Health Issues:   Yes: Chronic disease management, Associated Psychological Distress  Cancer, Please see medical note by PCP MICHEAL Gaming, CNP    Chemical Use Review:   Substance Use: decrease in alcohol .  Client reports frequency of use no alcohol or non-prescribed drugs.  Contemplation  Provided support and affirmation for steps taken towards sobriety    Patient also has a hx of polysubstance dependence; drugs of choice: heroin, opiates, alcohol - currently using alcohol and opiates. Reports intent to stop using alcohol \"because then things get crazy\".     Tobacco Use: No current tobacco use.      Assessment: Current Emotional / Mental Status (status of significant symptoms):  Risk status (Self / Other harm or " "suicidal ideation)  Patient has had a history of suicidal ideation: \"comes and goes\" and suicide attempts: most recent attempt 10/21/14, pt attempted to overdose on methamphetamine; attempted in the past                                                                                           Patient denies current fears or concerns for personal safety.  Patient denies current or recent suicidal ideation or behaviors.  Patient denies current or recent homicidal ideation or behaviors.  Patient denies current or recent self injurious behavior or ideation.  Patient denies other safety concerns.  A safety and risk management plan has not been developed at this time, however patient was encouraged to call [place] [phone] should there be a change in any of these risk factors.    Appearance:   Appropriate , uncomfortable due to pain  Eye Contact:   Fair   Psychomotor Behavior: Normal   Attitude:   Cooperative   Orientation:   All  Speech   Rate / Production: Normal    Volume:  Normal   Mood:    Depressed   Affect:    Flat   Thought Content:  Clear   Thought Form:  Coherent   Insight:    Fair     Diagnoses:  1. Recurrent major depression in partial remission    2. PTSD (post-traumatic stress disorder)    3. Opioid use disorder, severe, dependence (H)        Collateral Reports Completed:  Not Applicable    Plan: (Homework, other):  Patient was given information about behavioral services and encouraged to schedule a follow up appointment with the clinic [facility] in 1 week . She was also given information about mental health symptoms and treatment options  and Cognitive Behavioral Therapy skills to practice when experiencing anxiety and depression. CD Recommendations: Maintain Sobriety.    MARTIN Palma, [facility]  "

## 2018-07-24 ENCOUNTER — TRANSFERRED RECORDS (OUTPATIENT)
Dept: HEALTH INFORMATION MANAGEMENT | Facility: CLINIC | Age: 53
End: 2018-07-24

## 2018-07-31 ENCOUNTER — TELEPHONE (OUTPATIENT)
Dept: ADDICTION MEDICINE | Facility: CLINIC | Age: 53
End: 2018-07-31

## 2018-07-31 ENCOUNTER — OFFICE VISIT (OUTPATIENT)
Dept: ADDICTION MEDICINE | Facility: CLINIC | Age: 53
End: 2018-07-31
Payer: COMMERCIAL

## 2018-07-31 VITALS
RESPIRATION RATE: 16 BRPM | BODY MASS INDEX: 38.66 KG/M2 | SYSTOLIC BLOOD PRESSURE: 128 MMHG | TEMPERATURE: 97.3 F | OXYGEN SATURATION: 97 % | WEIGHT: 239.5 LBS | DIASTOLIC BLOOD PRESSURE: 64 MMHG | HEART RATE: 73 BPM

## 2018-07-31 DIAGNOSIS — N28.89 RENAL MASS: ICD-10-CM

## 2018-07-31 DIAGNOSIS — G89.4 CHRONIC PAIN SYNDROME: Primary | ICD-10-CM

## 2018-07-31 DIAGNOSIS — G43.809 OTHER MIGRAINE WITHOUT STATUS MIGRAINOSUS, NOT INTRACTABLE: ICD-10-CM

## 2018-07-31 DIAGNOSIS — G89.18 PAIN FOLLOWING SURGERY OR PROCEDURE: ICD-10-CM

## 2018-07-31 DIAGNOSIS — M79.7 FIBROMYALGIA: ICD-10-CM

## 2018-07-31 DIAGNOSIS — F11.90 CHRONIC, CONTINUOUS USE OF OPIOIDS: ICD-10-CM

## 2018-07-31 DIAGNOSIS — C7B.09 METASTATIC CARCINOID TUMOR TO INTRA-ABDOMINAL SITE (H): ICD-10-CM

## 2018-07-31 DIAGNOSIS — F10.10 ALCOHOL ABUSE: ICD-10-CM

## 2018-07-31 LAB

## 2018-07-31 PROCEDURE — 99215 OFFICE O/P EST HI 40 MIN: CPT | Performed by: ANESTHESIOLOGY

## 2018-07-31 PROCEDURE — 80306 DRUG TEST PRSMV INSTRMNT: CPT | Performed by: PEDIATRICS

## 2018-07-31 RX ORDER — OXYCODONE AND ACETAMINOPHEN 5; 325 MG/1; MG/1
1 TABLET ORAL 3 TIMES DAILY
Qty: 72 TABLET | Refills: 0 | Status: SHIPPED | OUTPATIENT
Start: 2018-07-31 | End: 2018-08-24

## 2018-07-31 RX ORDER — OXYCODONE AND ACETAMINOPHEN 10; 325 MG/1; MG/1
1-1.5 TABLET ORAL
COMMUNITY
Start: 2018-07-26 | End: 2018-08-24

## 2018-07-31 NOTE — MR AVS SNAPSHOT
After Visit Summary   7/31/2018    Ines Mott    MRN: [id number]           Patient Information     Date Of Birth          1965        Visit Information        Provider Department      7/31/2018 11:20 AM Giulia Mckeon MD [facility]        Today's Diagnoses     Fibromyalgia    -  1    Alcohol abuse        Renal mass        Other migraine without status migrainosus, not intractable        Chronic pain syndrome        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)        Pain following surgery or procedure          Care Instructions    August 24th 0830 - follow up appointment with your PCP, Ligia Santana.  I will bridge your oxycodone until that time.      Giulia Mckeon MD               Follow-ups after your visit        Your next 10 appointments already scheduled     Jul 31, 2018  1:00 PM CDT   Return Visit with MARTIN Pineda   [facility] ([facility])    [address]  [address]  [facility] [address] [phone]            Aug 07, 2018  4:15 PM CDT   Telephone Visit with Aniceto Mujica MD   [facility] ([facility])    [address] [place] [id number] [phone]           Note: this is not an onsite visit; there is no need to come to the facility.            Aug 24, 2018  8:30 AM CDT   Return Visit with MICHEAL [facility] ([facility])    [address]  [address]  [facility] [address] [phone]            Aug 24, 2018  8:30 AM CDT   Return Visit with MARTIN Pineda   [facility] ([facility])    [address]  [address]  [facility] [address] [phone]              Who to contact     If you have questions  or need follow up information about today's clinic visit or your schedule please contact [facility] directly at [phone].  Normal or non-critical lab and imaging results will be communicated to you by MyChart, letter or phone within 4 business days after the clinic has received the results. If you do not hear from us within 7 days, please contact the clinic through MyChart or phone. If you have a critical or abnormal lab result, we will notify you by phone as soon as possible.  Submit refill requests through [employer] or call your pharmacy and they will forward the refill request to us. Please allow 3 business days for your refill to be completed.          Additional Information About Your Visit        Care EveryWhere ID     This is your Care EveryWhere ID. This could be used by other organizations to access your [facility] medical records  [id number]        Your Vitals Were     Pulse Temperature Respirations Pulse Oximetry BMI (Body Mass Index)       73 97.3  F (36.3  C) (Temporal) 16 97% 38.66 kg/m2        Blood Pressure from Last 3 Encounters:   07/31/18 128/64   07/19/18 90/60   07/10/18 98/78    Weight from Last 3 Encounters:   07/31/18 239 lb 8 oz (108.6 kg)   07/19/18 236 lb (107 kg)   07/10/18 242 lb (109.8 kg)              We Performed the Following     Urine Drugs of Abuse Screen Panel 13          Today's Medication Changes          These changes are accurate as of 7/31/18 11:40 AM.  If you have any questions, ask your nurse or doctor.               These medicines have changed or have updated prescriptions.        Dose/Directions    sertraline 100 MG tablet   Commonly known as:  ZOLOFT   Indication:  Major Depressive Disorder   This may have changed:  how much to take   Used for:  Recurrent major depression in partial remission (H), Other constipation        Dose:  150 mg   Take 1.5 tablets (150 mg) by mouth daily   Quantity:  45 tablet   Refills:  11            Where to  get your medicines      Some of these will need a paper prescription and others can be bought over the counter.  Ask your nurse if you have questions.     Bring a paper prescription for each of these medications     oxyCODONE-acetaminophen 5-325 MG per tablet               Information about OPIOIDS     PRESCRIPTION OPIOIDS: WHAT YOU NEED TO KNOW   We gave you an opioid (narcotic) pain medicine. It is important to manage your pain, but opioids are not always the best choice. You should first try all the other options your care team gave you. Take this medicine for as short a time (and as few doses) as possible.     These medicines have risks:    DO NOT drive when on new or higher doses of pain medicine. These medicines can affect your alertness and reaction times, and you could be arrested for driving under the influence (DUI). If you need to use opioids long-term, talk to your care team about driving.    DO NOT operate heave machinery    DO NOT do any other dangerous activities while taking these medicines.     DO NOT drink any alcohol while taking these medicines.      If the opioid prescribed includes acetaminophen, DO NOT take with any other medicines that contain acetaminophen. Read all labels carefully. Look for the word  acetaminophen  or  Tylenol.  Ask your pharmacist if you have questions or are unsure.    You can get addicted to pain medicines, especially if you have a history of addiction (chemical, alcohol or substance dependence). Talk to your care team about ways to reduce this risk.    Store your pills in a secure place, locked if possible. We will not replace any lost or stolen medicine. If you don t finish your medicine, please throw away (dispose) as directed by your pharmacist. The [place] Pollution Control Agency has more information about safe disposal: https://www.pca.[facility].mn.us/living-green/managing-unwanted-medications.     All opioids tend to cause constipation. Drink plenty of water and  eat foods that have a lot of fiber, such as fruits, vegetables, prune juice, apple juice and high-fiber cereal. Take a laxative (Miralax, milk of magnesia, Colace, Senna) if you don t move your bowels at least every other day.          Primary Care Provider Office Phone # Fax #    MICHEAL Crowley [facility] [phone]       [address] [facility] [address]  [facility] [address]        Goals        General    Transportation (pt-stated)     Notes - Note created  7/11/2018  9:56 AM by Elizabeth Aleman LSW    Goal Statement: I am in need of transportation resources  Measure of Success: Patient will utilize transportation benefits  Supportive Steps to Achieve: SW will assist patient in obtaining transportation to medical appointments.   Date to Achieve By: Ongoing        Equal Access to Services     VALENTE RIVERA : Marc Leonard, wakristen nunes, qaybjulito kaalmakandy hernandez, emilee pardo . So [facility] [phone].    ATENCIÓN: Si habla español, tiene a christianson disposición servicios gratuitos de asistencia lingüística. Llame al [phone].    We comply with applicable federal civil rights laws and [place] laws. We do not discriminate on the basis of race, color, national origin, age, disability, sex, sexual orientation, or gender identity.            Thank you!     Thank you for choosing [facility]  for your care. Our goal is always to provide you with excellent care. Hearing back from our patients is one way we can continue to improve our services. Please take a few minutes to complete the written survey that you may receive in the mail after your visit with us. Thank you!             Your Updated Medication List - Protect others around you: Learn how to safely use, store and throw away your medicines at www.disposemymeds.org.          This list is accurate as of 7/31/18 11:40 AM.  Always use your most recent med list.                    Brand Name Dispense Instructions for use Diagnosis    albuterol (2.5 MG/3ML) 0.083% neb solution     30 vial    Take 1 vial (2.5 mg) by nebulization every 6 hours as needed for shortness of breath / dyspnea or wheezing    Mild intermittent asthma with acute exacerbation       ASPIRIN NOT PRESCRIBED    INTENTIONAL    1 each    Please choose reason not prescribed, below    Aspirin intolerance       BACLOFEN PO      Take 10 mg by mouth 2 times daily as needed for muscle spasms        blood glucose calibration solution    no brand specified    1 each    Use to calibrate blood glucose monitor as directed.    Type 2 diabetes mellitus without complication, without long-term current use of insulin (H)       blood glucose lancets standard    no brand specified    100 each    Use to test blood sugar daily times daily or as directed.    Type 2 diabetes mellitus without complication, without long-term current use of insulin (H)       blood glucose monitoring meter device kit    no brand specified    1 kit    Use to test blood sugar larisa times daily or as directed.    Type 2 diabetes mellitus without complication, without long-term current use of insulin (H)       blood glucose monitoring test strip    no brand specified    100 strip    Use to test blood sugars once times daily or as directed    Type 2 diabetes mellitus without complication, without long-term current use of insulin (H)       cloNIDine 0.1 MG tablet    CATAPRES    60 tablet    Take 1 tablet (0.1 mg) by mouth 2 times daily    Hypertension goal BP (blood pressure) < 140/80       clotrimazole 2 % Crea    GYNE-LOTRIMIN 3    1 Tube    Place 2 g vaginally daily    Yeast infection of the vagina       CULTURELLE DIGESTIVE HEALTH Caps     60 capsule    Take 1 capsule by mouth 2 times daily    Diarrhea, unspecified type       cyanocobalamin 1000 MCG/ML injection    VITAMIN B12    1 mL    Inject 1 mL (1,000 mcg) Subcutaneous every 30 days    B12 deficiency       EPINEPHrine  0.3 MG/0.3ML injection 2-pack    EPIPEN/ADRENACLICK/or ANY BX GENERIC EQUIV    2 each    Inject 0.3 mLs (0.3 mg) into the muscle once as needed for anaphylaxis    Late effect of other and unspecified external causes       losartan-hydrochlorothiazide 100-25 MG per tablet    HYZAAR    90 tablet    Take 1 tablet by mouth daily    Hypertension goal BP (blood pressure) < 140/80       magnesium oxide 400 (241.3 Mg) MG tablet    MAG-OX    90 tablet    Take 1 tablet (400 mg) by mouth daily    Cramp of limb       metoprolol tartrate 25 MG tablet    LOPRESSOR    30 tablet    Take 1 tablet (25 mg) by mouth 2 times daily Hold IF Heart Rate less than 55 or IF systolic blood pressure less than 95 mmHg    Hypertensive urgency       naloxone nasal spray    NARCAN    0.2 mL    Spray 1 spray (4 mg) into one nostril alternating nostrils as needed for opioid reversal every 2-3 minutes until assistance arrives    Chronic pain syndrome       * ondansetron 4 MG ODT tab    ZOFRAN-ODT    10 tablet    Take 1 tablet (4 mg) by mouth every 8 hours as needed for nausea        * ondansetron 4 MG ODT tab    ZOFRAN-ODT    90 tablet    DISSOLVE ONE TABLET IN MOUTH EVERY 8 HOURS AS NEEDED FOR NAUSEA    Chronic pain syndrome       order for DME     1 Device    Right wrist splint for carpal tunnel syndrome  One* Use as directed at hour of sleep    Carpal tunnel syndrome of right wrist       * oxyCODONE-acetaminophen  MG per tablet    PERCOCET     Take 1-1.5 tablets by mouth        * oxyCODONE-acetaminophen 5-325 MG per tablet    PERCOCET    72 tablet    Take 1 tablet by mouth 3 times daily for 24 days Not to exceed 3 tabs a day    Pain following surgery or procedure       pantoprazole 40 MG EC tablet    PROTONIX     Take 40 mg by mouth        polyethylene glycol powder    MIRALAX/GLYCOLAX    1581 g    TAKE 17 GRAMS (1 CAPFUL) BY MOUTH DAILY    Slow transit constipation       potassium chloride 10 MEQ tablet    K-TAB,KLOR-CON    120 tablet     Take 1 tablet (10 mEq) by mouth daily    Hypokalemia       sertraline 100 MG tablet    ZOLOFT    45 tablet    Take 1.5 tablets (150 mg) by mouth daily    Recurrent major depression in partial remission (H), Other constipation       STATIN NOT PRESCRIBED (INTENTIONAL)     0 each    1 each daily Statin not prescribed intentionally due to Active liver disease (liver failure, cirrhosis, hepatitis) LIVER METS    Hyperlipidemia LDL goal <100, Type 2 diabetes mellitus without complication (H)       Vitamin D (Cholecalciferol) 1000 units Tabs     60 tablet    Take 2,000 Units by mouth daily    Vitamin D deficiency       * Notice:  This list has 4 medication(s) that are the same as other medications prescribed for you. Read the directions carefully, and ask your doctor or other care provider to review them with you.

## 2018-07-31 NOTE — TELEPHONE ENCOUNTER
Call from [facility] pharmacy ([facility]) , pt was seen at [facility] ER on 7/21, and she was prescribed Morphine 15 mg take 1-2 tabs every 6 hours as needed by Dr. Faraz Eubanks. The pharmacist wants to know should he dispensing medication. Pt is waiting at the pharmacy.     [facility] contact info: [phone]      Tyrese Ynag
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
## 2018-07-31 NOTE — PATIENT INSTRUCTIONS
August 24th 0830 - follow up appointment with your PCP, Ligia Santana.  I will bridge your oxycodone until that time.      Giulia Mckeon MD

## 2018-07-31 NOTE — TELEPHONE ENCOUNTER
I saw her in clinic today and she admitted overuse. However, her overuse was secondary to NOT picking up the supplemental prescriptions she received from the ER and from the inpatient team. She did not know she could fill prescriptions from doctors other than her primary. She has progressive cancer and a real reason to have needed more opioids in the last few weeks.  She had renal surgery complicated by infection and hematoma.      She had the empty bottle with the prescription and a  date of 7/19/2018 on it so she did pick that prescription up.      In light of what the pharmacy is saying, I guess she will need to go through withdrawal until her next prescription is due to be filled.  It sounds like there is not much that can be done.     Giulia Mckeon MD

## 2018-07-31 NOTE — TELEPHONE ENCOUNTER
[facility] pharmacist (Mateusz) called about Percocet Rx this med is over 2 weeks early to fill. Pt has been receiving this med from 3 different providers. The pharmacy has some questions before filling the Rx. When pt was told that the pharmacist would need to contact the clinic before filling due to some concerns pt took back the Rx and left.    Pharmacy tel: [phone]      Tyrese Yang

## 2018-08-01 NOTE — TELEPHONE ENCOUNTER
Pharmacy is currently closed. Patient was not reached, but vm left to call back.  MPMP checked; Morphine was not filled or reported filled yet.     Will discuss with provider.

## 2018-08-01 NOTE — TELEPHONE ENCOUNTER
"Number for \"Nick from [facility] pharmacy\" ([phone]) was a [facility] pharmacy that has never dispensed meds to this patient.      Outreach to patient made again.  Patient answered and stated she would prefer to fill the Morphine rx at the [facility].  Patient stated she is in a good deal of pain due to her current medical condition. She will bring the RX in [facility] this evening.    RN spoke to [facility] pharmacist and relayed provider's recommendation and a brief history of the recent problems filling pain meds.  The pharmacist said that they have an internal policy that allows them to make the final call on refills and they will evaluate the appropriateness of this one when the patient presents it.       "

## 2018-08-15 ENCOUNTER — PATIENT OUTREACH (OUTPATIENT)
Dept: CARE COORDINATION | Facility: CLINIC | Age: 53
End: 2018-08-15

## 2018-08-15 ASSESSMENT — ACTIVITIES OF DAILY LIVING (ADL): DEPENDENT_IADLS:: TRANSPORTATION

## 2018-08-15 NOTE — PROGRESS NOTES
Clinic Care Coordination Contact  [facility]/Voicemail    Referral Source: PCP  Clinical Data: Care Coordinator Outreach  Outreach attempted x 1.  Left message on voicemail with call back information and requested return call.  Plan: Care Coordinator will continue to work with patient.  BRISSA Davis    Care Coordinator  [facility] [facility], and [facility]   [phone]

## 2018-08-16 NOTE — TELEPHONE ENCOUNTER
Mateusz from [facility] called again today (8/16/18) regarding the same matter. The pharmacy/pharmacists have concerns because the amount of meds this pt has been filling is very dangerous. According to Mateusz and the  pt has been filling meds from several different providers (mainly of Emergency Departments). July 19th Pt filled the Percocet rx from Dr. Mckeon for 72 tabs, then on July 26th pt filled 36 Percocet @ 10mg, On August 2nd Pt filled 10 morphine IR15 followed by August 8th filling 20 Oxy @ 5mg. The pt has now tried over 5 times to get people to fill her percocet at this [facility] pharmacy and has tried other pharmacies as well. According to Mateusz med is not due until 8/17/18 but he is worried about the quantity and frequency of all of these meds and would like a call back to confirm that the med should be dispensed tomorrow.     Please call [facility] pharmacy back as soon as possible. Mateusz will not be in on 8/17 but ask for any pharmacists as they share notes.     [facility] tel: [phone]    Mercedes Schmidt

## 2018-08-17 NOTE — TELEPHONE ENCOUNTER
Contacted pharmacy.  They do not have the paper script at the pharmacy and they are unsure of where patient took it to get filled.

## 2018-08-18 ENCOUNTER — HEALTH MAINTENANCE LETTER (OUTPATIENT)
Age: 53
End: 2018-08-18

## 2018-08-23 ENCOUNTER — TELEPHONE (OUTPATIENT)
Dept: FAMILY MEDICINE | Facility: CLINIC | Age: 53
End: 2018-08-23

## 2018-08-23 NOTE — TELEPHONE ENCOUNTER
Leticia from [facility] called requesting that orders for home care be signed along with discharge orders today. Per huddle with doctor Fatima, Leticia was advice to contact complex care. Per nurse, initial approval was given 06/29/2018 while under doctor Luke care for why they will need discharge orders from same provider. Pt is a now at [facility] and they will take over pt's care, but need previous home care orders signed. Routing message to provider for review. Leticia will have orders faxed to clinic. Nurse is expecting a call back today with providers response.

## 2018-08-24 ENCOUNTER — PATIENT OUTREACH (OUTPATIENT)
Dept: CARE COORDINATION | Facility: CLINIC | Age: 53
End: 2018-08-24

## 2018-08-24 DIAGNOSIS — C80.1 MALIGNANT NEOPLASM (H): Primary | ICD-10-CM

## 2018-08-24 NOTE — PROGRESS NOTES
Clinic Care Coordination Contact  Care Team Conversations    Patient was supposed to see PCP for appointment this morning to discuss palliative care referral given advancing renal carcinoma and subsequent related chronic pain but patient no showed the appointment. Chart reviewed. Patient was discharged from [facility] observation unit last night. Admitted to obs for treatment of pyelonephritis. Contacted [facility] who saw her in hospital. She finished her IV antibiotics last night and they sent her home in a cab. Urinary stents removed during obs stay as she was otherwise scheduled for removal of these today. Hospital staff were helping her complete MA application as she has current lapse in insurance but it is unclear if she finished the application. PCP would like patient to reschedule for in clinic or phone appointment. Called patient and left msg for her to call back to discuss.    Carla Sims R.N.  Clinic Care Coordinator  [facility]  [facility]  [phone]

## 2018-08-27 ENCOUNTER — OFFICE VISIT (OUTPATIENT)
Dept: BEHAVIORAL HEALTH | Facility: CLINIC | Age: 53
End: 2018-08-27
Payer: COMMERCIAL

## 2018-08-27 ENCOUNTER — OFFICE VISIT (OUTPATIENT)
Dept: FAMILY MEDICINE | Facility: CLINIC | Age: 53
End: 2018-08-27
Payer: COMMERCIAL

## 2018-08-27 VITALS
HEART RATE: 72 BPM | BODY MASS INDEX: 37.93 KG/M2 | DIASTOLIC BLOOD PRESSURE: 78 MMHG | TEMPERATURE: 97.3 F | OXYGEN SATURATION: 98 % | RESPIRATION RATE: 16 BRPM | WEIGHT: 235 LBS | SYSTOLIC BLOOD PRESSURE: 112 MMHG

## 2018-08-27 DIAGNOSIS — C18.9 CARCINOMA OF COLON METASTATIC TO LIVER (H): ICD-10-CM

## 2018-08-27 DIAGNOSIS — Z76.5 DRUG-SEEKING BEHAVIOR: ICD-10-CM

## 2018-08-27 DIAGNOSIS — E87.6 HYPOKALEMIA: ICD-10-CM

## 2018-08-27 DIAGNOSIS — C64.1 MALIGNANT NEOPLASM OF KIDNEY EXCLUDING RENAL PELVIS, RIGHT (H): ICD-10-CM

## 2018-08-27 DIAGNOSIS — C78.7 CARCINOMA OF COLON METASTATIC TO LIVER (H): ICD-10-CM

## 2018-08-27 DIAGNOSIS — G89.4 CHRONIC PAIN SYNDROME: Primary | ICD-10-CM

## 2018-08-27 DIAGNOSIS — H65.05 RECURRENT ACUTE SEROUS OTITIS MEDIA OF LEFT EAR: ICD-10-CM

## 2018-08-27 DIAGNOSIS — R51.9 CHRONIC NONINTRACTABLE HEADACHE, UNSPECIFIED HEADACHE TYPE: ICD-10-CM

## 2018-08-27 DIAGNOSIS — G89.29 CHRONIC NONINTRACTABLE HEADACHE, UNSPECIFIED HEADACHE TYPE: ICD-10-CM

## 2018-08-27 DIAGNOSIS — E11.9 TYPE 2 DIABETES MELLITUS WITHOUT COMPLICATION, WITHOUT LONG-TERM CURRENT USE OF INSULIN (H): ICD-10-CM

## 2018-08-27 DIAGNOSIS — I10 ESSENTIAL HYPERTENSION: Chronic | ICD-10-CM

## 2018-08-27 DIAGNOSIS — E53.8 VITAMIN B12 DEFICIENCY (NON ANEMIC): ICD-10-CM

## 2018-08-27 DIAGNOSIS — F33.41 RECURRENT MAJOR DEPRESSION IN PARTIAL REMISSION (H): ICD-10-CM

## 2018-08-27 DIAGNOSIS — F33.1 MODERATE RECURRENT MAJOR DEPRESSION (H): Primary | ICD-10-CM

## 2018-08-27 DIAGNOSIS — F31.31 BIPOLAR AFFECTIVE DISORDER, CURRENTLY DEPRESSED, MILD (H): ICD-10-CM

## 2018-08-27 DIAGNOSIS — C7B.09 METASTATIC CARCINOID TUMOR TO INTRA-ABDOMINAL SITE (H): ICD-10-CM

## 2018-08-27 LAB

## 2018-08-27 PROCEDURE — 90832 PSYTX W PT 30 MINUTES: CPT | Performed by: SOCIAL WORKER

## 2018-08-27 PROCEDURE — 80306 DRUG TEST PRSMV INSTRMNT: CPT | Performed by: NURSE PRACTITIONER

## 2018-08-27 PROCEDURE — 99214 OFFICE O/P EST MOD 30 MIN: CPT | Performed by: NURSE PRACTITIONER

## 2018-08-27 RX ORDER — POTASSIUM CHLORIDE 750 MG/1
10 TABLET, EXTENDED RELEASE ORAL DAILY
Qty: 120 TABLET | Refills: 11 | Status: SHIPPED | OUTPATIENT
Start: 2018-08-27 | End: 2019-05-14

## 2018-08-27 RX ORDER — OXYCODONE AND ACETAMINOPHEN 10; 325 MG/1; MG/1
1-1.5 TABLET ORAL EVERY 6 HOURS PRN
Qty: 120 TABLET | Refills: 0 | Status: SHIPPED | OUTPATIENT
Start: 2018-08-27 | End: 2018-09-10

## 2018-08-27 RX ORDER — BACLOFEN 10 MG/1
10 TABLET ORAL 2 TIMES DAILY
Qty: 60 TABLET | Refills: 1 | Status: SHIPPED | OUTPATIENT
Start: 2018-08-27 | End: 2018-10-29

## 2018-08-27 RX ORDER — CYANOCOBALAMIN 1000 UG/ML
1 INJECTION, SOLUTION INTRAMUSCULAR; SUBCUTANEOUS
Qty: 1 ML | Refills: 0 | OUTPATIENT
Start: 2018-08-27 | End: 2018-10-29

## 2018-08-27 RX ORDER — SERTRALINE HYDROCHLORIDE 100 MG/1
100 TABLET, FILM COATED ORAL DAILY
Qty: 30 TABLET | Refills: 3 | Status: SHIPPED | OUTPATIENT
Start: 2018-08-27 | End: 2019-05-14

## 2018-08-27 RX ORDER — OXYCODONE HYDROCHLORIDE 5 MG/1
5 TABLET ORAL
COMMUNITY
Start: 2018-08-23 | End: 2018-08-27 | Stop reason: ALTCHOICE

## 2018-08-27 NOTE — LETTER
[facility] PRIMARY CARE    08/27/18    Patient: Ines Mott  YOB: 1965  Medical Record Number: [id number]                                                                  Controlled Substance Agreement  I understand that my care provider has prescribed controlled substances (narcotics, tranquilizers, and/or stimulants) to help manage my condition(s).  I am taking this medicine to help me function or work.  I know that this is strong medicine.  It could have serious side effects and even cause a dependency on the drug.  If I stop these medicines suddenly, I could have severe withdrawal symptoms.    The risks, benefits, and side effects of these medication(s) were explained to me.  I agree that:  1. I will take part in other treatments as advised by my provider.  This may be psychiatry or counseling, physical therapy, behavioral therapy, group treatment, or a referral to a pain clinic.  I will reduce or stop my medicine when my provider tells me to do so.   2. I will take my medicines as prescribed.  I will not change the dose or schedule unless my provider tells me to.  There will be no refills if I  run out early.   I may be contacted at any time without warning and asked to complete a drug test or pill count.   3. I will keep all my appointments at the clinic.  If I miss appointments or fail to follow instructions, my provider may stop my medicine.  4. I will not ask other providers to prescribe controlled substances. And I will not accept controlled substances from other people. If I need another prescribed controlled substance for a new reason, I will notify my provider within one business day.  5. If I enroll in the [place] Medical Marijuana program, I will tell my provider.  I will also sign an agreement to share my medical records with my provider.  6. I will use one pharmacy to fill all of my controlled substance prescriptions.  If my prescription is mailed to my  pharmacy, it may take 5 to 7 days for my medicine to be ready.  7. I understand that my provider, clinic care team, and pharmacy can track controlled substance prescriptions from other providers through a central database (prescription monitoring program).  8. I will bring in my list of medications (or my medicine bottles) each time I come to the clinic.  [id number] REV-  07/2018                                                                                                                                   Page 1 of 2      [facility] INTEGRATED PRIMARY CARE    08/27/18    Patient: Ines Mott  YOB: 1965  Medical Record Number: [id number]    9. Refills of controlled substances will be made only during office hours.  It is up to me to make sure that I do not run out of my medicines on weekends or holidays.    10. I am responsible for my prescriptions.  If the medicine/prescription is lost or stolen, it will not be replaced.   I also agree not to share these medicines with anyone.  11. I agree to not use ANY illegal or recreational drugs.  This includes marijuana, cocaine, bath salts or other drugs.  I agree not to use alcohol unless my provider says I may.  I agree to give urine samples whenever asked.  If I fail to give a urine sample, the provider may stop my medicine.     12. I will tell my nurse or provider right away if I become pregnant or have a new medical problem treated outside of [facility].  13. I understand that this medicine can affect my thinking and judgment.  It may be unsafe for me to drive, use machinery and do dangerous tasks.  I will not do any of these things until I know how the medicine affects me.  If my dose changes, I will wait to see how it affects me.  I will contact my provider if I have concerns about medicine side effects.  I understand that if I do not follow any of the conditions above, my prescriptions or treatment may be stopped.    I agree that my  provider, clinic care team, and pharmacy may work with any city, state or federal law enforcement agency that investigates the misuse, sale, or other diversion of my controlled medicine. I will allow my provider to discuss my care with or share a copy of this agreement with any other treating provider, pharmacy or emergency room where I receive care.  I agree to give up (waive) any right of privacy or confidentiality with respect to these authorizations.   I have read this agreement and have asked questions about anything I did not understand.   ___________________________________    ___________________________  Patient Signature                                                           Date and Time  ___________________________________     ____________________________  Witness                                                                            Date and Time  ___________________________________  Marika Santana NP, APRN CNP  [id number] REV-  07/2018                                                                                                                                                   Page 2 of 2

## 2018-08-27 NOTE — PATIENT INSTRUCTIONS
Please call sleep medicine to get your CPAP machine     For your ear please try warm packs, insufflation, try Loratadine, increase your clear and avoid dairy      I have ordered you a Palliative care referral, I have reordered your opioids     We have resigned your CSA today     We will see you again in one month     Earache, No Infection (Adult)  Earaches can happen without an infection. This occurs when air and fluid build up behind the eardrum causing a feeling of fullness and discomfort and reduced hearing. This is called otitis media with effusion (OME) or serous otitis media. It means there is fluid in the middle ear. It is not the same as acute otitis media, which is typically from infection.  OME can happen when you have a cold if congestion blocks the passage that drains the middle ear. This passage is called the eustachian tube. OME may also occur with nasal allergies or after a bacterial middle ear infection.    The pain or discomfort may come and go. You may hear clicking or popping sounds when you chew or swallow. You may feel that your balance is off. Or you may hear ringing in the ear.  It often takes from several weeks up to 3 months for the fluid to clear on its own. Oral pain relievers and ear drops help if there is pain. Decongestants and antihistamines sometimes help. Antibiotics don't help since there is no infection. Your doctor may prescribe a nasal spray to help reduce swelling in the nose and eustachian tube. This can allow the ear to drain.  If your OME doesn't improve after 3 months, surgery may be used to drain the fluid and insert a small tube in the eardrum to allow continued drainage.  Because the middle ear fluid can become infected, it is important to watch for signs of an ear infection which may develop later. These signs include increased ear pain, fever, or drainage from the ear.  Home care  The following guidelines will help you care for yourself at home:    You may use  over-the-counter medicine as directed to control pain, unless another medicine was prescribed. If you have chronic liver or kidney disease or ever had a stomach ulcer or GI bleeding, talk with your doctor before using these medicines. Aspirin should never be used in anyone under 18 years of age who is ill with a fever. It may cause severe liver damage.    You may use over-the-counter decongestants such as phenylephrine or pseudoephedrine. But they are not always helpful. Don't use nasal spray decongestants more than 3 days. Longer use can make congestion worse. Prescription nasal sprays from your doctor don't typically have those restrictions.    Antihistamines may help if you are also having allergy symptoms.    You may use medicines such as guaifenesin to thin mucus and promote drainage.  Follow-up care  Follow up with your healthcare provider or as advised if you are not feeling better after 3 days.  When to seek medical advice  Call your healthcare provider right away if any of the following occur:    Your ear pain gets worse or does not start to improve     Fever of 100.4 F (38 C) or higher, or as directed by your healthcare provider    Fluid or blood draining from the ear    Headache or sinus pain    Stiff neck    Unusual drowsiness or confusion  Date Last Reviewed: 10/1/2016    6061-6141 [employer]. [address] [place] [address], [place] [address]. All rights reserved. This information is not intended as a substitute for professional medical care. Always follow your healthcare professional's instructions.

## 2018-08-27 NOTE — PROGRESS NOTES
"[facility] - Integrated Primary Care   August 27, 2018      Behavioral Health Clinician Progress Note    Patient Name: Ines Mott           Service Type:  Individual      Service Location:   Face to Face in Clinic      Session Start Time: 9:05 a.m.  Session End Time: 9:35 a.m.      Session Length: 16 - 37      Attendees: Patient and PCP    Visit Activities (Refresh list every visit): [facility]    Diagnostic Assessment Date: to be completed at next visit  Treatment Plan Review Date: to be completed at next visit  See Flowsheets for today's PHQ-9 and RASTA-7 results  Previous PHQ-9:   PHQ-9 SCORE 5/11/2018 5/18/2018 7/2/2018   Total Score - - -   Total Score MyChart 9 (Mild depression) - -   Total Score 9 7 8     Previous RASTA-7:   RASTA-7 SCORE 8/24/2015 11/4/2016 12/5/2017   Total Score - - 3 (minimal anxiety)   Total Score 7 9 3       GONZÁLEZ LEVEL:  No flowsheet data found.    DATA  Extended Session (60+ minutes): No  Interactive Complexity: No  Crisis: No  [facility] Patient: No    Treatment Objective(s) Addressed in This Session:  Target Behavior(s): disease management/lifestyle changes cancer    Adjustment Difficulties: will develop coping/problem-solving skills to facilitate more adaptive adjustment  Psychological distress related to Chronic Disease Management    Current Stressors / Issues:    [facility] co-visit with patient and PCP in the clinic for a hospital follow up. Patient reports her primary concerns for today's visit are to review and fill medications, pain, and a possible ear infection. Patient reports her mood is \"okay\" despite several stressors, including: rats in the building, bedbugs, conflict with her , lapse in health insurance (which she believes is due to refusing case management from [facility] as \"they didn't do anything\"), and managing illness. She states that she's been able to maintain sobriety because \"I just set my mind to it and that's it\". Patient reports she was offered cocaine and " "declined, and that she has avoided alcohol.   Patient has struggled to pay for medications since insurance lapsed, and states she is working on getting it reinstated. If she is approved for coverage, she states it will be retroactive to August 1st.   Patient reports she is getting along with her daughter, who also provides 2 hrs/day of PCA services. Patient states her  helps her with rides and will pay to fill her prescriptions this month. She has a friend in the building who supports her sobriety.  Patient agreed to schedule a follow up [facility] visit in 1-2 weeks to complete a diagnostic assessment, and to establish therapeutic rapport for ongoing short-term support. Patient said \"my depression will kick in as the seasons change\".  PCP discussed cancer prognosis with patient, which she states she was unaware of until today \"this is a shock\". Discussed the importance of self-care and encouraging positive support people to be mindful of her needs. Patient agreed to speak with family and friends and will stay connected. [facility] will call patient tomorrow to check in regarding mood and how she is coping. PCP will send a referral for a palliative care consult.  Patient denied SI/SIB thoughts or urges, denied changes in thinking or auditory/visual hallucinations.    Progress on Treatment Objective(s) / Homework:  New Objective established this session - PREPARATION (Decided to change - considering how); Intervened by negotiating a change plan and determining options / strategies for behavior change, identifying triggers, exploring social supports, and working towards setting a date to begin behavior change    Motivational Interviewing    MI Intervention: Expressed Empathy/Understanding, Supported Autonomy, Collaboration, Evocation, Permission to raise concern or advise, Open-ended questions and Reflections: simple and complex     Change Talk Expressed by the Patient: Ability to change Reasons to change Committment to " "change    Provider Response to Change Talk: E - Evoked more info from patient about behavior change, A - Affirmed patient's thoughts, decisions, or attempts at behavior change, R - Reflected patient's change talk and S - Summarized patient's change talk statements    Also provided psychoeducation about behavioral health condition, symptoms, and treatment options    Care Plan review completed: No    Medication Review:  No changes to current psychiatric medication(s) - zoloft, clonidine    Medication Compliance:  Yes    Changes in Health Issues:   Yes: Chronic disease management, Associated Psychological Distress  Cancer, Please see medical note by PCP Ligia Santana, APRN, CNP    Chemical Use Review:   Substance Use: decrease in alcohol  and non prescription drugs .  Client reports frequency of use no alcohol or non-prescribed drugs.  Action  Provided support and affirmation for steps taken towards sobriety    Patient also has a hx of polysubstance dependence; drugs of choice: heroin, opiates, alcohol - currently using alcohol and opiates. Reports intent to stop using alcohol \"because then things get crazy\".     Tobacco Use: No current tobacco use.      Assessment: Current Emotional / Mental Status (status of significant symptoms):  Risk status (Self / Other harm or suicidal ideation)  Patient has had a history of suicidal ideation: \"comes and goes\" and suicide attempts: most recent attempt 10/21/14, pt attempted to overdose on methamphetamine; attempted in the past                                                                                           Patient denies current fears or concerns for personal safety.  Patient denies current or recent suicidal ideation or behaviors.  Patient denies current or recent homicidal ideation or behaviors.  Patient denies current or recent self injurious behavior or ideation.  Patient denies other safety concerns.  A safety and risk management plan has not been developed at " this time, however patient was encouraged to call [facility] should there be a change in any of these risk factors.    Appearance:   Appropriate ,  Eye Contact:   Good   Psychomotor Behavior: Normal   Attitude:   Cooperative   Orientation:   All  Speech   Rate / Production: Normal    Volume:  Normal   Mood:    Anxious  Depressed   Affect:    Appropriate - mood congruent  Thought Content:  Clear   Thought Form:  Coherent   Insight:    Fair     Diagnoses:  1. Moderate recurrent major depression (H)        Collateral Reports Completed:  Not Applicable    Plan: (Homework, other):  Patient was given information about behavioral services and encouraged to schedule a follow up appointment with the clinic [facility] in 1 week . [facility] will call patient tomorrow to check in. She was also given information about mental health symptoms and treatment options  and Cognitive Behavioral Therapy skills to practice when experiencing anxiety and depression. CD Recommendations: Maintain Sobriety.    MARTIN Palma, [facility]

## 2018-08-27 NOTE — MR AVS SNAPSHOT
After Visit Summary   8/27/2018    Ines Mott    MRN: [id number]           Patient Information     Date Of Birth          1965        Visit Information        Provider Department      8/27/2018 9:00 AM Davion Rees[place] [facility]        Today's Diagnoses     Moderate recurrent major depression (H)    -  1       Follow-ups after your visit        Who to contact     If you have questions or need follow up information about today's clinic visit or your schedule please contact [facility] directly at [phone].  Normal or non-critical lab and imaging results will be communicated to you by MyChart, letter or phone within 4 business days after the clinic has received the results. If you do not hear from us within 7 days, please contact the clinic through MyChart or phone. If you have a critical or abnormal lab result, we will notify you by phone as soon as possible.  Submit refill requests through [employer] or call your pharmacy and they will forward the refill request to us. Please allow 3 business days for your refill to be completed.          Additional Information About Your Visit        Care EveryWhere ID     This is your Care EveryWhere ID. This could be used by other organizations to access your [place] medical records  [id number]         Blood Pressure from Last 3 Encounters:   08/27/18 112/78   07/31/18 128/64   07/19/18 90/60    Weight from Last 3 Encounters:   08/27/18 235 lb (106.6 kg)   07/31/18 239 lb 8 oz (108.6 kg)   07/19/18 236 lb (107 kg)              Today, you had the following     No orders found for display         Today's Medication Changes          These changes are accurate as of 8/27/18  9:29 AM.  If you have any questions, ask your nurse or doctor.               These medicines have changed or have updated prescriptions.        Dose/Directions    sertraline 100 MG tablet   Commonly known as:   ZOLOFT   Indication:  Major Depressive Disorder   This may have changed:  how much to take   Used for:  Recurrent major depression in partial remission (H), Other constipation        Dose:  150 mg   Take 1.5 tablets (150 mg) by mouth daily   Quantity:  45 tablet   Refills:  11         Stop taking these medicines if you haven't already. Please contact your care team if you have questions.     BACLOFEN PO   Stopped by:  Marika Santana APRN CNP                    Primary Care Provider Office Phone # Fax #    MICHEAL Crowley [phone]       [address] [facility] [address]  [facility] [address]        Goals        General    Transportation (pt-stated)     Notes - Note created  7/11/2018  9:56 AM by Elizabeth Aleman LSW    Goal Statement: I am in need of transportation resources  Measure of Success: Patient will utilize transportation benefits  Supportive Steps to Achieve: SW will assist patient in obtaining transportation to medical appointments.   Date to Achieve By: Ongoing        Equal Access to Services     VALENTE RIVERA AH: Hadii aad ku hadasho Soevangelina, waaxda luqadaha, qaybta kaalmada adeamandeepyakandy, emilee pardo . So [facility] [phone].    ATENCIÓN: Si habla español, tiene a christianson disposición servicios gratuitos de asistencia lingüística. Llame al [phone].    We comply with applicable federal civil rights laws and [place] laws. We do not discriminate on the basis of race, color, national origin, age, disability, sex, sexual orientation, or gender identity.            Thank you!     Thank you for choosing [facility]  for your care. Our goal is always to provide you with excellent care. Hearing back from our patients is one way we can continue to improve our services. Please take a few minutes to complete the written survey that you may receive in the mail after your visit with us. Thank you!             Your Updated  Medication List - Protect others around you: Learn how to safely use, store and throw away your medicines at www.disposemymeds.org.          This list is accurate as of 8/27/18  9:29 AM.  Always use your most recent med list.                   Brand Name Dispense Instructions for use Diagnosis    albuterol (2.5 MG/3ML) 0.083% neb solution     30 vial    Take 1 vial (2.5 mg) by nebulization every 6 hours as needed for shortness of breath / dyspnea or wheezing    Mild intermittent asthma with acute exacerbation       ASPIRIN NOT PRESCRIBED    INTENTIONAL    1 each    Please choose reason not prescribed, below    Aspirin intolerance       blood glucose calibration solution    no brand specified    1 each    Use to calibrate blood glucose monitor as directed.    Type 2 diabetes mellitus without complication, without long-term current use of insulin (H)       blood glucose lancets standard    no brand specified    100 each    Use to test blood sugar daily times daily or as directed.    Type 2 diabetes mellitus without complication, without long-term current use of insulin (H)       blood glucose monitoring meter device kit    no brand specified    1 kit    Use to test blood sugar larisa times daily or as directed.    Type 2 diabetes mellitus without complication, without long-term current use of insulin (H)       blood glucose monitoring test strip    no brand specified    100 strip    Use to test blood sugars once times daily or as directed    Type 2 diabetes mellitus without complication, without long-term current use of insulin (H)       cloNIDine 0.1 MG tablet    CATAPRES    60 tablet    Take 1 tablet (0.1 mg) by mouth 2 times daily    Hypertension goal BP (blood pressure) < 140/80       clotrimazole 2 % Crea    GYNE-LOTRIMIN 3    1 Tube    Place 2 g vaginally daily    Yeast infection of the vagina       CULTURELLE DIGESTIVE HEALTH Caps     60 capsule    Take 1 capsule by mouth 2 times daily    Diarrhea, unspecified  type       cyanocobalamin 1000 MCG/ML injection    VITAMIN B12    1 mL    Inject 1 mL (1,000 mcg) Subcutaneous every 30 days    B12 deficiency       EPINEPHrine 0.3 MG/0.3ML injection 2-pack    EPIPEN/ADRENACLICK/or ANY BX GENERIC EQUIV    2 each    Inject 0.3 mLs (0.3 mg) into the muscle once as needed for anaphylaxis    Late effect of other and unspecified external causes       losartan-hydrochlorothiazide 100-25 MG per tablet    HYZAAR    90 tablet    Take 1 tablet by mouth daily    Hypertension goal BP (blood pressure) < 140/80       magnesium oxide 400 (241.3 Mg) MG tablet    MAG-OX    90 tablet    Take 1 tablet (400 mg) by mouth daily    Cramp of limb       metoprolol tartrate 25 MG tablet    LOPRESSOR    30 tablet    Take 1 tablet (25 mg) by mouth 2 times daily Hold IF Heart Rate less than 55 or IF systolic blood pressure less than 95 mmHg    Hypertensive urgency       naloxone nasal spray    NARCAN    0.2 mL    Spray 1 spray (4 mg) into one nostril alternating nostrils as needed for opioid reversal every 2-3 minutes until assistance arrives    Chronic pain syndrome       * ondansetron 4 MG ODT tab    ZOFRAN-ODT    10 tablet    Take 1 tablet (4 mg) by mouth every 8 hours as needed for nausea        * ondansetron 4 MG ODT tab    ZOFRAN-ODT    90 tablet    DISSOLVE ONE TABLET IN MOUTH EVERY 8 HOURS AS NEEDED FOR NAUSEA    Chronic pain syndrome       order for DME     1 Device    Right wrist splint for carpal tunnel syndrome  One* Use as directed at hour of sleep    Carpal tunnel syndrome of right wrist       oxyCODONE IR 5 MG tablet    ROXICODONE     Take 5 mg by mouth        oxyCODONE-acetaminophen  MG per tablet    PERCOCET    15 tablet    Take 1-1.5 tablets by mouth every 6 hours as needed for severe pain    Carcinoma of colon metastatic to liver (H), Chronic pain syndrome       pantoprazole 40 MG EC tablet    PROTONIX     Take 40 mg by mouth        polyethylene glycol powder    MIRALAX/GLYCOLAX     1581 g    TAKE 17 GRAMS (1 CAPFUL) BY MOUTH DAILY    Slow transit constipation       potassium chloride 10 MEQ tablet    K-TAB,KLOR-CON    120 tablet    Take 1 tablet (10 mEq) by mouth daily    Hypokalemia       sertraline 100 MG tablet    ZOLOFT    45 tablet    Take 1.5 tablets (150 mg) by mouth daily    Recurrent major depression in partial remission (H), Other constipation       STATIN NOT PRESCRIBED (INTENTIONAL)     0 each    1 each daily Statin not prescribed intentionally due to Active liver disease (liver failure, cirrhosis, hepatitis) LIVER METS    Hyperlipidemia LDL goal <100, Type 2 diabetes mellitus without complication (H)       Vitamin D (Cholecalciferol) 1000 units Tabs     60 tablet    Take 2,000 Units by mouth daily    Vitamin D deficiency       * Notice:  This list has 2 medication(s) that are the same as other medications prescribed for you. Read the directions carefully, and ask your doctor or other care provider to review them with you.

## 2018-08-27 NOTE — PROGRESS NOTES
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 and discomfort and reduced hearing. This is called otitis media with effusion (OME) or serous otitis media. It means there is fluid in the middle ear. It is not the same as acute otitis media, which is typically from infection.  OME can happen when you have a cold if congestion blocks the passage that drains the middle ear. This passage is called the eustachian tube. OME may also occur with nasal allergies or after a bacterial middle ear infection.    The pain or discomfort may come and go. You may hear clicking or popping sounds when you chew or swallow. You may feel that your balance is off. Or you may hear ringing in the ear.  It often takes from several weeks up to 3 months for the fluid to clear on its own. Oral pain relievers and ear drops help if there is pain. Decongestants and antihistamines sometimes help. Antibiotics don't help since there is no infection. Your doctor may prescribe a nasal spray to help reduce swelling in the nose and eustachian tube. This can allow the ear to drain.  If your OME doesn't improve after 3 months, surgery may be used to drain the fluid and insert a small tube in the eardrum to allow continued drainage.  Because the middle ear fluid can become infected, it is important to watch for signs of an ear infection which may develop later. These signs include increased ear pain, fever, or drainage  from the ear.  Home care  The following guidelines will help you care for yourself at home:    You may use over-the-counter medicine as directed to control pain, unless another medicine was prescribed. If you have chronic liver or kidney disease or ever had a stomach ulcer or GI bleeding, talk with your doctor before using these medicines. Aspirin should never be used in anyone under 18 years of age who is ill with a fever. It may cause severe liver damage.    You may use over-the-counter decongestants such as phenylephrine or pseudoephedrine. But they are not always helpful. Don't use nasal spray decongestants more than 3 days. Longer use can make congestion worse. Prescription nasal sprays from your doctor don't typically have those restrictions.    Antihistamines may help if you are also having allergy symptoms.    You may use medicines such as guaifenesin to thin mucus and promote drainage.  Follow-up care  Follow up with your healthcare provider or as advised if you are not feeling better after 3 days.  When to seek medical advice  Call your healthcare provider right away if any of the following occur:    Your ear pain gets worse or does not start to improve     Fever of 100.4 F (38 C) or higher, or as directed by your healthcare provider    Fluid or blood draining from the ear    Headache or sinus pain    Stiff neck    Unusual drowsiness or confusion  Date Last Reviewed: 10/1/2016    2540-1840 [employer]. [address] [place] [address]. All rights reserved. This information is not intended as a substitute for professional medical care. Always follow your healthcare professional's instructions.            MICHEAL Crowley [facility] PRIMARY [facility]

## 2018-08-27 NOTE — MR AVS SNAPSHOT
After Visit Summary   8/27/2018    Ines Mott    MRN: [id number]           Patient Information     Date Of Birth          1965        Visit Information        Provider Department      8/27/2018 9:00 AM Marika Santana APRN [facility]        Today's Diagnoses     Posttraumatic stress disorder    -  1    Essential hypertension        Carcinoma of colon metastatic to liver (H)        Chronic nonintractable headache, unspecified headache type        Chronic pain syndrome        Type 2 diabetes mellitus without complication, without long-term current use of insulin (H)        Malignant neoplasm of kidney excluding renal pelvis, right (H)        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)        Drug-seeking behavior        Vitamin B12 deficiency (non anemic)        Hypokalemia        Recurrent major depression in partial remission (H)        Other constipation        Recurrent acute serous otitis media of left ear          Care Instructions    Please call sleep medicine to get your CPAP machine     For your ear please try warm packs, insufflation, try Loratadine, increase your clear and avoid dairy      I have ordered you a Palliative care referral, I have reordered your opioids     We have resigned your CSA today     We will see you again in one month     Earache, No Infection (Adult)  Earaches can happen without an infection. This occurs when air and fluid build up behind the eardrum causing a feeling of fullness and discomfort and reduced hearing. This is called otitis media with effusion (OME) or serous otitis media. It means there is fluid in the middle ear. It is not the same as acute otitis media, which is typically from infection.  OME can happen when you have a cold if congestion blocks the passage that drains the middle ear. This passage is called the eustachian tube. OME may also occur with nasal allergies or after a bacterial  middle ear infection.    The pain or discomfort may come and go. You may hear clicking or popping sounds when you chew or swallow. You may feel that your balance is off. Or you may hear ringing in the ear.  It often takes from several weeks up to 3 months for the fluid to clear on its own. Oral pain relievers and ear drops help if there is pain. Decongestants and antihistamines sometimes help. Antibiotics don't help since there is no infection. Your doctor may prescribe a nasal spray to help reduce swelling in the nose and eustachian tube. This can allow the ear to drain.  If your OME doesn't improve after 3 months, surgery may be used to drain the fluid and insert a small tube in the eardrum to allow continued drainage.  Because the middle ear fluid can become infected, it is important to watch for signs of an ear infection which may develop later. These signs include increased ear pain, fever, or drainage from the ear.  Home care  The following guidelines will help you care for yourself at home:    You may use over-the-counter medicine as directed to control pain, unless another medicine was prescribed. If you have chronic liver or kidney disease or ever had a stomach ulcer or GI bleeding, talk with your doctor before using these medicines. Aspirin should never be used in anyone under 18 years of age who is ill with a fever. It may cause severe liver damage.    You may use over-the-counter decongestants such as phenylephrine or pseudoephedrine. But they are not always helpful. Don't use nasal spray decongestants more than 3 days. Longer use can make congestion worse. Prescription nasal sprays from your doctor don't typically have those restrictions.    Antihistamines may help if you are also having allergy symptoms.    You may use medicines such as guaifenesin to thin mucus and promote drainage.  Follow-up care  Follow up with your healthcare provider or as advised if you are not feeling better after 3 days.  When  to seek medical advice  Call your healthcare provider right away if any of the following occur:    Your ear pain gets worse or does not start to improve     Fever of 100.4 F (38 C) or higher, or as directed by your healthcare provider    Fluid or blood draining from the ear    Headache or sinus pain    Stiff neck    Unusual drowsiness or confusion  Date Last Reviewed: 10/1/2016    0296-8439 [employer]. [address] [place] [address]. All rights reserved. This information is not intended as a substitute for professional medical care. Always follow your healthcare professional's instructions.                Follow-ups after your visit        Additional Services     PALLIATIVE CARE REFERRAL       Your provider has referred you to Palliative Care Services.    To schedule an Outpatient Palliative Care Referral appointment, please call: [facility]: [facility] and [facility] [phone]   [email or web address].    Please be aware that coverage of these services is subject to the terms and limitations of your health insurance plan.  Call member services at your health plan with any benefit or coverage questions.      Please bring the following with you to your appointment:    (1) Any X-Rays, CTs or MRIs which have been performed.  Contact the facility where they were done to arrange for  prior to your scheduled appointment.   (2) If you have recently seen a provider outside of the [facility], please bring your most recent clinic note and/or imaging results  (3) List of current medications - please bring ALL of the medications that you are currently taking (in their original bottles) to your appointment  (4) This referral request  (5) Any documents/labs given to you for this referral    Services Requested: Evaluate and treat symptoms (including writing prescriptions)    Please complete the following questions:  1. What is  patient's life-limiting diagnosis? Cancer  2. What is the reason for the referral? Pain management, end of life goals,   3. What is the patient's prognosis? Limited    Palliative Care Definition:    Palliative Care is specialized medical care for people with serious illness.  This type of care is focused on providing patients with relief from symptoms, pain and stresses of serious illness - whatever the diagnosis may be.  The goal of Palliative Care is to improve quality of life for both the patient and the family.  Palliative Care is provided by a team of doctors, nurses and other specialists who work with the patient's other doctors to provide an extra layer of support.  Palliative Care is appropriate at any age and at any stage in a serious illness, and can be provided together with curative treatment.                  Follow-up notes from your care team     Return in about 4 weeks (around 9/24/2018) for Routine Visit.      Who to contact     If you have questions or need follow up information about today's clinic visit or your schedule please contact [facility] PRIMARY CARE directly at [phone].  Normal or non-critical lab and imaging results will be communicated to you by MyChart, letter or phone within 4 business days after the clinic has received the results. If you do not hear from us within 7 days, please contact the clinic through [employer]hart or phone. If you have a critical or abnormal lab result, we will notify you by phone as soon as possible.  Submit refill requests through [employer] or call your pharmacy and they will forward the refill request to us. Please allow 3 business days for your refill to be completed.          Additional Information About Your Visit        Care EveryWhere ID     This is your Care EveryWhere ID. This could be used by other organizations to access your [place] medical records  [id number]        Your Vitals Were     Pulse Temperature Respirations Pulse  Oximetry Breastfeeding? BMI (Body Mass Index)    72 97.3  F (36.3  C) (Temporal) 16 98% No 37.93 kg/m2       Blood Pressure from Last 3 Encounters:   08/27/18 112/78   07/31/18 128/64   07/19/18 90/60    Weight from Last 3 Encounters:   08/27/18 235 lb (106.6 kg)   07/31/18 239 lb 8 oz (108.6 kg)   07/19/18 236 lb (107 kg)              We Performed the Following     Drug Abuse Screen Panel 13, Urine (Pain Care Package)     PALLIATIVE CARE REFERRAL          Today's Medication Changes          These changes are accurate as of 8/27/18 10:37 AM.  If you have any questions, ask your nurse or doctor.               These medicines have changed or have updated prescriptions.        Dose/Directions    baclofen 10 MG tablet   Commonly known as:  LIORESAL   This may have changed:    - when to take this  - reasons to take this   Used for:  Chronic pain syndrome   Changed by:  Marika Santana APRN CNP        Dose:  10 mg   Take 1 tablet (10 mg) by mouth 2 times daily   Quantity:  60 tablet   Refills:  1       * cyanocobalamin 1000 MCG/ML injection   Commonly known as:  VITAMIN B12   This may have changed:  Another medication with the same name was added. Make sure you understand how and when to take each.   Used for:  B12 deficiency   Changed by:  Marika Santana APRN CNP        Dose:  1 mL   Inject 1 mL (1,000 mcg) Subcutaneous every 30 days   Quantity:  1 mL   Refills:  11       * cyanocobalamin 1000 MCG/ML injection   Commonly known as:  VITAMIN B12   This may have changed:  You were already taking a medication with the same name, and this prescription was added. Make sure you understand how and when to take each.   Used for:  Vitamin B12 deficiency (non anemic)   Changed by:  Marika Santana APRN CNP        Dose:  1 mL   Inject 1 mL (1,000 mcg) into the muscle every 30 days   Quantity:  1 mL   Refills:  0       * Notice:  This list has 2 medication(s) that are the same as other medications  prescribed for you. Read the directions carefully, and ask your doctor or other care provider to review them with you.      Stop taking these medicines if you haven't already. Please contact your care team if you have questions.     oxyCODONE IR 5 MG tablet   Commonly known as:  ROXICODONE   Stopped by:  Marika Santana APRN CNP                Where to get your medicines      These medications were sent to [facility] [address] [place] [address]  [address] [facility] [address]     Phone:  [phone]     baclofen 10 MG tablet    potassium chloride 10 MEQ tablet    sertraline 100 MG tablet         Some of these will need a paper prescription and others can be bought over the counter.  Ask your nurse if you have questions.     Bring a paper prescription for each of these medications     oxyCODONE-acetaminophen  MG per tablet       You don't need a prescription for these medications     cyanocobalamin 1000 MCG/ML injection               Information about OPIOIDS     PRESCRIPTION OPIOIDS: WHAT YOU NEED TO KNOW   We gave you an opioid (narcotic) pain medicine. It is important to manage your pain, but opioids are not always the best choice. You should first try all the other options your care team gave you. Take this medicine for as short a time (and as few doses) as possible.    Some activities can increase your pain, such as bandage changes or therapy sessions. It may help to take your pain medicine 30 to 60 minutes before these activities. Reduce your stress by getting enough sleep, working on hobbies you enjoy and practicing relaxation or meditation. Talk to your care team about ways to manage your pain beyond prescription opioids.    These medicines have risks:    DO NOT drive when on new or higher doses of pain medicine. These medicines can affect your alertness and reaction times, and you could be arrested for driving under the influence (DUI). If you need  to use opioids long-term, talk to your care team about driving.    DO NOT operate heavy machinery    DO NOT do any other dangerous activities while taking these medicines.    DO NOT drink any alcohol while taking these medicines.     If the opioid prescribed includes acetaminophen, DO NOT take with any other medicines that contain acetaminophen. Read all labels carefully. Look for the word  acetaminophen  or  Tylenol.  Ask your pharmacist if you have questions or are unsure.    You can get addicted to pain medicines, especially if you have a history of addiction (chemical, alcohol or substance dependence). Talk to your care team about ways to reduce this risk.    All opioids tend to cause constipation. Drink plenty of water and eat foods that have a lot of fiber, such as fruits, vegetables, prune juice, apple juice and high-fiber cereal. Take a laxative (Miralax, milk of magnesia, Colace, Senna) if you don t move your bowels at least every other day. Other side effects include upset stomach, sleepiness, dizziness, throwing up, tolerance (needing more of the medicine to have the same effect), physical dependence and slowed breathing.    Store your pills in a secure place, locked if possible. We will not replace any lost or stolen medicine. If you don t finish your medicine, please throw away (dispose) as directed by your pharmacist. The [place] Pollution [employer] Agency has more information about safe disposal: [email or web address]         Primary Care Provider Office Phone # Fax #    MICHEAL Crowley [facility] [phone]       [address] [place] [address]  [facility] [address]        Goals        General    Transportation (pt-stated)     Notes - Note created  7/11/2018  9:56 AM by Elizabeth Aleman LSW    Goal Statement: I am in need of transportation resources  Measure of Success: Patient will utilize transportation benefits  Supportive Steps to  Achieve: SW will assist patient in obtaining transportation to medical appointments.   Date to Achieve By: Ongoing        Equal Access to Services     VALENTE RIVERA : Marc Leonard, deion nunes, kenjulito ferreiradelonkandy hernandez, waxay idiin hayrupeshzhao de la cruzshiraeric coyne. [facility] [phone].    ATENCIÓN: Si habla español, tiene a christianson disposición servicios gratuitos de asistencia lingüística. Llame al [phone].    We comply with applicable federal civil rights laws and [place] laws. We do not discriminate on the basis of race, color, national origin, age, disability, sex, sexual orientation, or gender identity.            Thank you!     Thank you for choosing [facility]  for your care. Our goal is always to provide you with excellent care. Hearing back from our patients is one way we can continue to improve our services. Please take a few minutes to complete the written survey that you may receive in the mail after your visit with us. Thank you!             Your Updated Medication List - Protect others around you: Learn how to safely use, store and throw away your medicines at www.disposemymeds.org.          This list is accurate as of 8/27/18 10:37 AM.  Always use your most recent med list.                   Brand Name Dispense Instructions for use Diagnosis    albuterol (2.5 MG/3ML) 0.083% neb solution     30 vial    Take 1 vial (2.5 mg) by nebulization every 6 hours as needed for shortness of breath / dyspnea or wheezing    Mild intermittent asthma with acute exacerbation       ASPIRIN NOT PRESCRIBED    INTENTIONAL    1 each    Please choose reason not prescribed, below    Aspirin intolerance       baclofen 10 MG tablet    LIORESAL    60 tablet    Take 1 tablet (10 mg) by mouth 2 times daily    Chronic pain syndrome       blood glucose calibration solution    no brand specified    1 each    Use to calibrate blood glucose monitor as directed.    Type 2 diabetes  mellitus without complication, without long-term current use of insulin (H)       blood glucose lancets standard    no brand specified    100 each    Use to test blood sugar daily times daily or as directed.    Type 2 diabetes mellitus without complication, without long-term current use of insulin (H)       blood glucose monitoring meter device kit    no brand specified    1 kit    Use to test blood sugar larisa times daily or as directed.    Type 2 diabetes mellitus without complication, without long-term current use of insulin (H)       blood glucose monitoring test strip    no brand specified    100 strip    Use to test blood sugars once times daily or as directed    Type 2 diabetes mellitus without complication, without long-term current use of insulin (H)       cloNIDine 0.1 MG tablet    CATAPRES    60 tablet    Take 1 tablet (0.1 mg) by mouth 2 times daily    Hypertension goal BP (blood pressure) < 140/80       clotrimazole 2 % Crea    GYNE-LOTRIMIN 3    1 Tube    Place 2 g vaginally daily    Yeast infection of the vagina       CULTUREE DIGESTIVE HEALTH Caps     60 capsule    Take 1 capsule by mouth 2 times daily    Diarrhea, unspecified type       * cyanocobalamin 1000 MCG/ML injection    VITAMIN B12    1 mL    Inject 1 mL (1,000 mcg) Subcutaneous every 30 days    B12 deficiency       * cyanocobalamin 1000 MCG/ML injection    VITAMIN B12    1 mL    Inject 1 mL (1,000 mcg) into the muscle every 30 days    Vitamin B12 deficiency (non anemic)       EPINEPHrine 0.3 MG/0.3ML injection 2-pack    EPIPEN/ADRENACLICK/or ANY BX GENERIC EQUIV    2 each    Inject 0.3 mLs (0.3 mg) into the muscle once as needed for anaphylaxis    Late effect of other and unspecified external causes       losartan-hydrochlorothiazide 100-25 MG per tablet    HYZAAR    90 tablet    Take 1 tablet by mouth daily    Hypertension goal BP (blood pressure) < 140/80       magnesium oxide 400 (241.3 Mg) MG tablet    MAG-OX    90 tablet    Take 1  tablet (400 mg) by mouth daily    Cramp of limb       naloxone nasal spray    NARCAN    0.2 mL    Spray 1 spray (4 mg) into one nostril alternating nostrils as needed for opioid reversal every 2-3 minutes until assistance arrives    Chronic pain syndrome       * ondansetron 4 MG ODT tab    ZOFRAN-ODT    10 tablet    Take 1 tablet (4 mg) by mouth every 8 hours as needed for nausea        * ondansetron 4 MG ODT tab    ZOFRAN-ODT    90 tablet    DISSOLVE ONE TABLET IN MOUTH EVERY 8 HOURS AS NEEDED FOR NAUSEA    Chronic pain syndrome       order for DME     1 Device    Right wrist splint for carpal tunnel syndrome  One* Use as directed at hour of sleep    Carpal tunnel syndrome of right wrist       oxyCODONE-acetaminophen  MG per tablet    PERCOCET    120 tablet    Take 1-1.5 tablets by mouth every 6 hours as needed for severe pain    Carcinoma of colon metastatic to liver (H), Chronic pain syndrome       polyethylene glycol powder    MIRALAX/GLYCOLAX    1581 g    TAKE 17 GRAMS (1 CAPFUL) BY MOUTH DAILY    Slow transit constipation       potassium chloride 10 MEQ tablet    K-TAB,KLOR-CON    120 tablet    Take 1 tablet (10 mEq) by mouth daily    Hypokalemia       sertraline 100 MG tablet    ZOLOFT    30 tablet    Take 1 tablet (100 mg) by mouth daily    Recurrent major depression in partial remission (H), Other constipation       STATIN NOT PRESCRIBED (INTENTIONAL)     0 each    1 each daily Statin not prescribed intentionally due to Active liver disease (liver failure, cirrhosis, hepatitis) LIVER METS    Hyperlipidemia LDL goal <100, Type 2 diabetes mellitus without complication (H)       Vitamin D (Cholecalciferol) 1000 units Tabs     60 tablet    Take 2,000 Units by mouth daily    Vitamin D deficiency       * Notice:  This list has 4 medication(s) that are the same as other medications prescribed for you. Read the directions carefully, and ask your doctor or other care provider to review them with you.

## 2018-08-29 ENCOUNTER — TELEPHONE (OUTPATIENT)
Dept: BEHAVIORAL HEALTH | Facility: CLINIC | Age: 53
End: 2018-08-29

## 2018-08-29 NOTE — PROGRESS NOTES
Clinic Care Coordination Contact  Care Team Conversations    Patient seen in clinic by PCP and [facility]. PCP told her that her cancer is metastatic and is progressing. Palliative care referral discussed. Needs to return to oncology. Reviewed with [facility] who will see her for therapy only visit 9-10-18. Will attempt to meet with patient at appointment.     Carla Sims R.N.  Clinic Care Coordinator  [facility]  [phone]

## 2018-08-29 NOTE — TELEPHONE ENCOUNTER
Attempted to call pt yesterday - left vm. Was able to reach pt today to check in. Patient reports that family is stepping up and being supportive. She reports her mood is okay though she is sleeping more than usual. Patient states she is unable to get to the clinic for her medications due to her 's work schedule. Discussed options with PCP who will have medications delivered via  as a one-time exception.  Patient has a [facility] visit scheduled on 9/10. She will call the clinic if needed in the meantime.

## 2018-09-10 ENCOUNTER — TELEPHONE (OUTPATIENT)
Dept: FAMILY MEDICINE | Facility: CLINIC | Age: 53
End: 2018-09-10

## 2018-09-10 ENCOUNTER — PATIENT OUTREACH (OUTPATIENT)
Dept: CARE COORDINATION | Facility: CLINIC | Age: 53
End: 2018-09-10

## 2018-09-10 ENCOUNTER — OFFICE VISIT (OUTPATIENT)
Dept: BEHAVIORAL HEALTH | Facility: CLINIC | Age: 53
End: 2018-09-10
Payer: COMMERCIAL

## 2018-09-10 DIAGNOSIS — C78.7 CARCINOMA OF COLON METASTATIC TO LIVER (H): ICD-10-CM

## 2018-09-10 DIAGNOSIS — C64.1 MALIGNANT NEOPLASM OF KIDNEY EXCLUDING RENAL PELVIS, RIGHT (H): Primary | ICD-10-CM

## 2018-09-10 DIAGNOSIS — F43.10 POSTTRAUMATIC STRESS DISORDER: Chronic | ICD-10-CM

## 2018-09-10 DIAGNOSIS — G89.4 CHRONIC PAIN SYNDROME: ICD-10-CM

## 2018-09-10 DIAGNOSIS — C18.9 CARCINOMA OF COLON METASTATIC TO LIVER (H): ICD-10-CM

## 2018-09-10 PROBLEM — F43.23 ADJUSTMENT DISORDER WITH MIXED ANXIETY AND DEPRESSED MOOD: Status: ACTIVE | Noted: 2018-09-10

## 2018-09-10 PROBLEM — F11.90 CHRONIC, CONTINUOUS USE OF OPIOIDS: Status: RESOLVED | Noted: 2018-06-12 | Resolved: 2018-09-10

## 2018-09-10 PROCEDURE — 90791 PSYCH DIAGNOSTIC EVALUATION: CPT | Performed by: SOCIAL WORKER

## 2018-09-10 RX ORDER — OXYCODONE AND ACETAMINOPHEN 10; 325 MG/1; MG/1
1-2 TABLET ORAL EVERY 6 HOURS PRN
Qty: 240 TABLET | Refills: 0
Start: 2018-09-10 | End: 2018-09-24

## 2018-09-10 ASSESSMENT — ACTIVITIES OF DAILY LIVING (ADL): DEPENDENT_IADLS:: TRANSPORTATION

## 2018-09-10 NOTE — TELEPHONE ENCOUNTER
Patient arrived in clinic for a [facility] appt only, power down, patient states that her pain has escalated and had to increase her Percocet to 2 tabs every 6 hours at times, so is now out, went into the ED, 9/5, 9/7, 9/8,9/9  due to pain, given meds to bridge, Percocet 2 tabs at a time      ED Course:  9:05 PM I reviewed the patient's chart, and met with the patient to gather history and to perform my initial exam. We discussed plans for the ED course, including diagnostic testing and treatment.   9:17 PM Patient will be discharged home with instructions regarding proposed management plan and indications for return to the emergency department.    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     The patient has a history of renal cell carcinoma. She underwent cryoablation and right ureteral stent replacement on 6/21, and has had several ED visits and hospitalizations since then for UTI, pyelonephritis, hematuria, and flank pain.      Here today due to persistent and unrelenting right flank pain. This is an ongoing problem for Ms. Chen - she does receive rx for Percocet every month - but took extra medication and is not w/o any tablets.  Denies high fever, shaking chills, new or different symptoms. Has appointment tomorrow morning with her doctor at the pain clinic, at which time her Percocet rx will be refilled.       Unable to schedule with Oncology and Palliative due to no insurance at this time, hoping to have it reinstated in 2 weeks, paying for meds now,     P; Will reorder meds today and increase dose to 1-2 tabs of Percocet every 6  hours, [facility] [place]
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
## 2018-09-10 NOTE — MR AVS SNAPSHOT
After Visit Summary   9/10/2018    Ines Mott    MRN: [id number]           Patient Information     Date Of Birth          1965        Visit Information        Provider Department      9/10/2018 10:00 AM Davion Rees, [facility]        Today's Diagnoses     Posttraumatic stress disorder           Follow-ups after your visit        Your next 10 appointments already scheduled     Sep 24, 2018  8:30 AM CDT   Return Visit with MARTIN Pineda   [facility]    [address]  [address]  [facility] [address]   [phone]            Sep 25, 2018 10:30 AM CDT   Return Visit with MICHEAL Crowley [facility] ([facility])    [address]  [address]  [facility] [address] [phone]            Sep 25, 2018 10:30 AM CDT   [facility]    [address]  [address]  [facility] [address] [phone]              Who to contact     If you have questions or need follow up information about today's clinic visit or your schedule please contact [facility] directly at [phone].  Normal or non-critical lab and imaging results will be communicated to you by MyChart, letter or phone within 4 business days after the clinic has received the results. If you do not hear from us within 7 days, please contact the clinic through MyChart or phone. If you have a critical or abnormal lab result, we will notify you by phone as soon as possible.  Submit refill requests through [employer] or call your pharmacy and they will forward the refill request to us. Please allow 3 business days for your refill to be completed.          Additional Information  "About Your Visit        [facility] [employer] Information     REHAPP lets you send messages to your doctor, view your test results, renew your prescriptions, schedule appointments and more. To sign up, go to [email or web address] . Click on \"Log in\" on the left side of the screen, which will take you to the Welcome page. Then click on \"Sign up Now\" on the right side of the page.     You will be asked to enter the access code listed below, as well as some personal information. Please follow the directions to create your username and password.     Your access code is: [email or web address]  Expires: 2018  2:06 PM     Your access code will  in 90 days. If you need help or a new code, please call your [place] clinic or [phone].        Care EveryWhere ID     This is your Care EveryWhere ID. This could be used by other organizations to access your [place] medical records  [id number]         Blood Pressure from Last 3 Encounters:   18 112/78   18 128/64   18 90/60    Weight from Last 3 Encounters:   18 235 lb (106.6 kg)   18 239 lb 8 oz (108.6 kg)   18 236 lb (107 kg)              Today, you had the following     No orders found for display         Today's Medication Changes          These changes are accurate as of 9/10/18 11:59 PM.  If you have any questions, ask your nurse or doctor.               These medicines have changed or have updated prescriptions.        Dose/Directions    oxyCODONE-acetaminophen  MG per tablet   Commonly known as:  PERCOCET   This may have changed:  how much to take   Used for:  Carcinoma of colon metastatic to liver (H), Chronic pain syndrome, Malignant neoplasm of kidney excluding renal pelvis, right (H)   Changed by:  Marika Santana APRN CNP        Dose:  1-2 tablet   Take 1-2 tablets by mouth every 6 hours as needed for severe pain   Quantity:  240 tablet   Refills:  0            Where to get your medicines      Some of these will " need a paper prescription and others can be bought over the counter.  Ask your nurse if you have questions.     You don't need a prescription for these medications     oxyCODONE-acetaminophen  MG per tablet                Primary Care Provider Office Phone # Fax #    MICHEAL Crowley [facility] [phone]       [address]  [facility] [address]        Goals        General    Transportation (pt-stated)     Notes - Note created  7/11/2018  9:56 AM by Elizabeth Aleman LSW    Goal Statement: I am in need of transportation resources  Measure of Success: Patient will utilize transportation benefits  Supportive Steps to Achieve: SW will assist patient in obtaining transportation to medical appointments.   Date to Achieve By: Ongoing        Equal Access to Services     VALENTE RIVERA : Hadmichelle toledoo Aisha, waaxda luqadaha, qaybta kaalmada adeegyada, emilee coyne. So [facility] [phone].    ATENCIÓN: Si habla español, tiene a christianson disposición servicios gratuitos de asistencia lingüística. Llame al [phone].    We comply with applicable federal civil rights laws and [place] laws. We do not discriminate on the basis of race, color, national origin, age, disability, sex, sexual orientation, or gender identity.            Thank you!     Thank you for choosing [facility]  for your care. Our goal is always to provide you with excellent care. Hearing back from our patients is one way we can continue to improve our services. Please take a few minutes to complete the written survey that you may receive in the mail after your visit with us. Thank you!             Your Updated Medication List - Protect others around you: Learn how to safely use, store and throw away your medicines at www.disposemymeds.org.          This list is accurate as of 9/10/18 11:59 PM.  Always use your most recent med list.                   Brand Name  Dispense Instructions for use Diagnosis    albuterol (2.5 MG/3ML) 0.083% neb solution     30 vial    Take 1 vial (2.5 mg) by nebulization every 6 hours as needed for shortness of breath / dyspnea or wheezing    Mild intermittent asthma with acute exacerbation       ASPIRIN NOT PRESCRIBED    INTENTIONAL    1 each    Please choose reason not prescribed, below    Aspirin intolerance       baclofen 10 MG tablet    LIORESAL    60 tablet    Take 1 tablet (10 mg) by mouth 2 times daily    Chronic pain syndrome       blood glucose calibration solution    no brand specified    1 each    Use to calibrate blood glucose monitor as directed.    Type 2 diabetes mellitus without complication, without long-term current use of insulin (H)       blood glucose lancets standard    no brand specified    100 each    Use to test blood sugar daily times daily or as directed.    Type 2 diabetes mellitus without complication, without long-term current use of insulin (H)       blood glucose monitoring meter device kit    no brand specified    1 kit    Use to test blood sugar larisa times daily or as directed.    Type 2 diabetes mellitus without complication, without long-term current use of insulin (H)       blood glucose monitoring test strip    no brand specified    100 strip    Use to test blood sugars once times daily or as directed    Type 2 diabetes mellitus without complication, without long-term current use of insulin (H)       cloNIDine 0.1 MG tablet    CATAPRES    60 tablet    Take 1 tablet (0.1 mg) by mouth 2 times daily    Hypertension goal BP (blood pressure) < 140/80       clotrimazole 2 % Crea    GYNE-LOTRIMIN 3    1 Tube    Place 2 g vaginally daily    Yeast infection of the vagina       CULTURELLE DIGESTIVE HEALTH Caps     60 capsule    Take 1 capsule by mouth 2 times daily    Diarrhea, unspecified type       * cyanocobalamin 1000 MCG/ML injection    VITAMIN B12    1 mL    Inject 1 mL (1,000 mcg) Subcutaneous every 30 days     B12 deficiency       * cyanocobalamin 1000 MCG/ML injection    VITAMIN B12    1 mL    Inject 1 mL (1,000 mcg) into the muscle every 30 days    Vitamin B12 deficiency (non anemic)       EPINEPHrine 0.3 MG/0.3ML injection 2-pack    EPIPEN/ADRENACLICK/or ANY BX GENERIC EQUIV    2 each    Inject 0.3 mLs (0.3 mg) into the muscle once as needed for anaphylaxis    Late effect of other and unspecified external causes       losartan-hydrochlorothiazide 100-25 MG per tablet    HYZAAR    90 tablet    Take 1 tablet by mouth daily    Hypertension goal BP (blood pressure) < 140/80       magnesium oxide 400 (241.3 Mg) MG tablet    MAG-OX    90 tablet    Take 1 tablet (400 mg) by mouth daily    Cramp of limb       naloxone nasal spray    NARCAN    0.2 mL    Spray 1 spray (4 mg) into one nostril alternating nostrils as needed for opioid reversal every 2-3 minutes until assistance arrives    Chronic pain syndrome       * ondansetron 4 MG ODT tab    ZOFRAN-ODT    10 tablet    Take 1 tablet (4 mg) by mouth every 8 hours as needed for nausea        * ondansetron 4 MG ODT tab    ZOFRAN-ODT    90 tablet    DISSOLVE ONE TABLET IN MOUTH EVERY 8 HOURS AS NEEDED FOR NAUSEA    Chronic pain syndrome       order for DME     1 Device    Right wrist splint for carpal tunnel syndrome  One* Use as directed at hour of sleep    Carpal tunnel syndrome of right wrist       oxyCODONE-acetaminophen  MG per tablet    PERCOCET    240 tablet    Take 1-2 tablets by mouth every 6 hours as needed for severe pain    Carcinoma of colon metastatic to liver (H), Chronic pain syndrome, Malignant neoplasm of kidney excluding renal pelvis, right (H)       polyethylene glycol powder    MIRALAX/GLYCOLAX    1581 g    TAKE 17 GRAMS (1 CAPFUL) BY MOUTH DAILY    Slow transit constipation       potassium chloride 10 MEQ tablet    K-TAB,KLOR-CON    120 tablet    Take 1 tablet (10 mEq) by mouth daily    Hypokalemia       sertraline 100 MG tablet    ZOLOFT    30 tablet     Take 1 tablet (100 mg) by mouth daily    Recurrent major depression in partial remission (H)       STATIN NOT PRESCRIBED (INTENTIONAL)     0 each    1 each daily Statin not prescribed intentionally due to Active liver disease (liver failure, cirrhosis, hepatitis) LIVER METS    Hyperlipidemia LDL goal <100, Type 2 diabetes mellitus without complication (H)       Vitamin D (Cholecalciferol) 1000 units Tabs     60 tablet    Take 2,000 Units by mouth daily    Vitamin D deficiency       * Notice:  This list has 4 medication(s) that are the same as other medications prescribed for you. Read the directions carefully, and ask your doctor or other care provider to review them with you.

## 2018-09-17 NOTE — PROGRESS NOTES
Clinic Care Coordination Contact  Care Team Conversations    SW called [facility] to review what needs to happen to have patient's Medical assistance opened.     Patient in need of the following to have MA opened.     . Please provide verification of the current balance of Ines's direct express card.  2. Please provide verification of James's [employer] checking account balance. Document must be dated on or after  7/6/18.  3. On the bree care application form , you reported that James has a second job at [facility] , however the paycheck shows the employer name as [employer]. Please clarify if James is working at [facility] [employer] through [employer], Is so , please provide a written statement for this. If not, Please provide verification of 30 days worth of gross income from [employer] from 7/6/18 - 8/6/18.    SW called and left voice mail for patient to call this SW.     SMITH will continue to follow.     BRISSA Davis    Care Coordinator  [facility], [facility], and Women's   [phone]

## 2018-09-18 ENCOUNTER — PATIENT OUTREACH (OUTPATIENT)
Dept: CARE COORDINATION | Facility: CLINIC | Age: 53
End: 2018-09-18

## 2018-09-18 ENCOUNTER — TELEPHONE (OUTPATIENT)
Dept: FAMILY MEDICINE | Facility: CLINIC | Age: 53
End: 2018-09-18

## 2018-09-18 DIAGNOSIS — Z00.00 ROUTINE GENERAL MEDICAL EXAMINATION AT A HEALTH CARE FACILITY: Primary | ICD-10-CM

## 2018-09-18 NOTE — TELEPHONE ENCOUNTER
Writer spoke with pt, pt is interested in a mammogram (pt will need a diagnotic mammogram).   Per pt she doesn't need a pap smear because she had a hysterectomy.       Tyrese Yang

## 2018-09-18 NOTE — LETTER
[place] CARE COORDINATION    September 18, 2018    Ines Mott  [address]  [facility] [address]      Dear Ines,    I am a clinic care coordinator who works with Mraika Santana, DARION, APRN CNP at [facility]. I wanted to introduce myself and provide you with my contact information so that you can call me with questions or concerns about your health care. Below is a description of clinic care coordination and how I can further assist you.     The clinic care coordinator is a registered nurse and/or  who understand the health care system. The goal of clinic care coordination is to help you manage your health and improve access to the [facility] system in the most efficient manner. The registered nurse can assist you in meeting your health care goals by providing education, coordinating services, and strengthening the communication among your providers. The  can assist you with financial, behavioral, psychosocial, chemical dependency, counseling, and/or psychiatric resources.    Please feel free to contact me at [phone], with any questions or concerns. We at [facility] are focused on providing you with the highest-quality healthcare experience possible and that all starts with you.     Sincerely,     Carla Sims

## 2018-09-18 NOTE — TELEPHONE ENCOUNTER
Letter mailed to patient in regards to wellness retreat. Patient is due for a pap smear and mammogram. Will call patient and find out if she is interested.

## 2018-09-18 NOTE — PROGRESS NOTES
Clinic Care Coordination Contact  [facility]/Voicemail       Clinical Data: Care Coordinator Outreach  Outreach attempted x 1.  Left message on voicemail with call back information and requested return call.  Plan: Care Coordinator will mail out care coordination introduction letter with care coordinator contact information and explanation of care coordination services. Care Coordinator will try to reach patient again in 3-5 business days.    Carla Sims R.N.  Clinic Care Coordinator  [facility]  [phone]

## 2018-09-24 ENCOUNTER — PATIENT OUTREACH (OUTPATIENT)
Dept: CARE COORDINATION | Facility: CLINIC | Age: 53
End: 2018-09-24

## 2018-09-24 ENCOUNTER — OFFICE VISIT (OUTPATIENT)
Dept: BEHAVIORAL HEALTH | Facility: CLINIC | Age: 53
End: 2018-09-24
Payer: COMMERCIAL

## 2018-09-24 ENCOUNTER — OFFICE VISIT (OUTPATIENT)
Dept: FAMILY MEDICINE | Facility: CLINIC | Age: 53
End: 2018-09-24
Payer: COMMERCIAL

## 2018-09-24 VITALS
HEART RATE: 79 BPM | SYSTOLIC BLOOD PRESSURE: 130 MMHG | DIASTOLIC BLOOD PRESSURE: 86 MMHG | OXYGEN SATURATION: 98 % | TEMPERATURE: 98.7 F | RESPIRATION RATE: 16 BRPM

## 2018-09-24 DIAGNOSIS — Z76.5 DRUG-SEEKING BEHAVIOR: ICD-10-CM

## 2018-09-24 DIAGNOSIS — C18.9 CARCINOMA OF COLON METASTATIC TO LIVER (H): ICD-10-CM

## 2018-09-24 DIAGNOSIS — G89.4 CHRONIC PAIN SYNDROME: ICD-10-CM

## 2018-09-24 DIAGNOSIS — F11.93 OPIOID WITHDRAWAL (H): ICD-10-CM

## 2018-09-24 DIAGNOSIS — C64.1 MALIGNANT NEOPLASM OF KIDNEY EXCLUDING RENAL PELVIS, RIGHT (H): Primary | ICD-10-CM

## 2018-09-24 DIAGNOSIS — G47.33 OSA ON CPAP: Primary | ICD-10-CM

## 2018-09-24 DIAGNOSIS — C7B.09 METASTATIC CARCINOID TUMOR TO INTRA-ABDOMINAL SITE (H): ICD-10-CM

## 2018-09-24 DIAGNOSIS — F43.10 POSTTRAUMATIC STRESS DISORDER: Primary | Chronic | ICD-10-CM

## 2018-09-24 DIAGNOSIS — I10 HYPERTENSION GOAL BP (BLOOD PRESSURE) < 140/80: Chronic | ICD-10-CM

## 2018-09-24 DIAGNOSIS — Z00.00 ROUTINE GENERAL MEDICAL EXAMINATION AT A HEALTH CARE FACILITY: ICD-10-CM

## 2018-09-24 DIAGNOSIS — C78.7 CARCINOMA OF COLON METASTATIC TO LIVER (H): ICD-10-CM

## 2018-09-24 LAB
AMPHETAMINES UR QL: NOT DETECTED NG/ML
BARBITURATES UR QL SCN: NOT DETECTED NG/ML
BENZODIAZ UR QL SCN: NOT DETECTED NG/ML
BUPRENORPHINE UR QL: NOT DETECTED NG/ML
CANNABINOIDS UR QL: NOT DETECTED NG/ML
COCAINE UR QL SCN: NOT DETECTED NG/ML
D-METHAMPHET UR QL: NOT DETECTED NG/ML
METHADONE UR QL SCN: NOT DETECTED NG/ML
OPIATES UR QL SCN: NOT DETECTED NG/ML
OXYCODONE UR QL SCN: NOT DETECTED NG/ML
PCP UR QL SCN: NOT DETECTED NG/ML
PROPOXYPH UR QL: NOT DETECTED NG/ML
TRICYCLICS UR QL SCN: NOT DETECTED NG/ML

## 2018-09-24 PROCEDURE — 99214 OFFICE O/P EST MOD 30 MIN: CPT | Performed by: NURSE PRACTITIONER

## 2018-09-24 PROCEDURE — 90785 PSYTX COMPLEX INTERACTIVE: CPT | Performed by: SOCIAL WORKER

## 2018-09-24 PROCEDURE — 80306 DRUG TEST PRSMV INSTRMNT: CPT | Performed by: NURSE PRACTITIONER

## 2018-09-24 PROCEDURE — 90834 PSYTX W PT 45 MINUTES: CPT | Performed by: SOCIAL WORKER

## 2018-09-24 RX ORDER — CLONIDINE HYDROCHLORIDE 0.1 MG/1
0.1 TABLET ORAL 2 TIMES DAILY
Qty: 60 TABLET | Refills: 3 | Status: SHIPPED | OUTPATIENT
Start: 2018-09-24 | End: 2019-05-14

## 2018-09-24 RX ORDER — OXYCODONE AND ACETAMINOPHEN 10; 325 MG/1; MG/1
1-2 TABLET ORAL EVERY 6 HOURS PRN
Qty: 16 TABLET | Refills: 0 | Status: SHIPPED | OUTPATIENT
Start: 2018-09-24 | End: 2018-09-25 | Stop reason: DRUGHIGH

## 2018-09-24 ASSESSMENT — ACTIVITIES OF DAILY LIVING (ADL): DEPENDENT_IADLS:: TRANSPORTATION
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
## 2018-09-24 NOTE — MR AVS SNAPSHOT
"              After Visit Summary   9/24/2018    Ines Mott    MRN: [id number]           Patient Information     Date Of Birth          1965        Visit Information        Provider Department      9/24/2018 8:30 AM Daivon Rees, [facility]        Today's Diagnoses     Posttraumatic stress disorder    -  1       Follow-ups after your visit        Your next 10 appointments already scheduled     Sep 25, 2018 10:30 AM CDT   Return Visit with MICHEAL Crowley CNP   [facility] ([facility])    [address]  [address]  [facility] [id number] [phone]            Sep 25, 2018 10:30 AM CDT   [facility]    [address]  [address]  [facility] [id number] [phone]              Who to contact     If you have questions or need follow up information about today's clinic visit or your schedule please contact [facility] directly at [phone].  Normal or non-critical lab and imaging results will be communicated to you by Qurihart, letter or phone within 4 business days after the clinic has received the results. If you do not hear from us within 7 days, please contact the clinic through Qurihart or phone. If you have a critical or abnormal lab result, we will notify you by phone as soon as possible.  Submit refill requests through Commun.it or call your pharmacy and they will forward the refill request to us. Please allow 3 business days for your refill to be completed.          Additional Information About Your Visit        MyChart Information     Commun.it lets you send messages to your doctor, view your test results, renew your prescriptions, schedule appointments and more. To sign up, go to [email or web address] . Click on \"Log in\" " "on the left side of the screen, which will take you to the Welcome page. Then click on \"Sign up Now\" on the right side of the page.     You will be asked to enter the access code listed below, as well as some personal information. Please follow the directions to create your username and password.     Your access code is: [email or web address]  Expires: 2018  2:06 PM     Your access code will  in 90 days. If you need help or a new code, please call your [facility] or [phone].        Care EveryWhere ID     This is your Care EveryWhere ID. This could be used by other organizations to access your [facility] medical records  [id number]         Blood Pressure from Last 3 Encounters:   18 130/86   18 112/78   18 128/64    Weight from Last 3 Encounters:   18 235 lb (106.6 kg)   18 239 lb 8 oz (108.6 kg)   18 236 lb (107 kg)              We Performed the Following     C PSYCHOTHERAPY COMPLEX INTERACTIVE          Where to get your medicines      These medications were sent to [facility] [address] [place] [address] [address] [facility]  [address] [facility] [address]     Phone:  [phone]     cloNIDine 0.1 MG tablet         Some of these will need a paper prescription and others can be bought over the counter.  Ask your nurse if you have questions.     Bring a paper prescription for each of these medications     oxyCODONE-acetaminophen  MG per tablet          Primary Care Provider Office Phone # Fax #    MICHEAL Crowley [facility] [phone]       [address] [facility] [address]  [facility] [address]        Goals        General    Transportation (pt-stated)     Notes - Note created  2018  9:56 AM by Elizabeth Aleman LSW    Goal Statement: I am in need of transportation resources  Measure of Success: Patient will utilize transportation benefits  Supportive Steps to Achieve: SW will assist patient in obtaining transportation to " medical appointments.   Date to Achieve By: Ongoing        Equal Access to Services     VALENTE RIVERA : Marc Leonard, deion nunes, emilee oconnor. So [facility] [phone].    ATENCIÓN: Si habla español, tiene a christianson disposición servicios gratuitos de asistencia lingüística. Llame al [phone].    We comply with applicable federal civil rights laws and [place] laws. We do not discriminate on the basis of race, color, national origin, age, disability, sex, sexual orientation, or gender identity.            Thank you!     Thank you for choosing [facility]  for your care. Our goal is always to provide you with excellent care. Hearing back from our patients is one way we can continue to improve our services. Please take a few minutes to complete the written survey that you may receive in the mail after your visit with us. Thank you!             Your Updated Medication List - Protect others around you: Learn how to safely use, store and throw away your medicines at www.disposemymeds.org.          This list is accurate as of 9/24/18 10:54 AM.  Always use your most recent med list.                   Brand Name Dispense Instructions for use Diagnosis    albuterol (2.5 MG/3ML) 0.083% neb solution     30 vial    Take 1 vial (2.5 mg) by nebulization every 6 hours as needed for shortness of breath / dyspnea or wheezing    Mild intermittent asthma with acute exacerbation       ASPIRIN NOT PRESCRIBED    INTENTIONAL    1 each    Please choose reason not prescribed, below    Aspirin intolerance       baclofen 10 MG tablet    LIORESAL    60 tablet    Take 1 tablet (10 mg) by mouth 2 times daily    Chronic pain syndrome       blood glucose calibration solution    no brand specified    1 each    Use to calibrate blood glucose monitor as directed.    Type 2 diabetes mellitus without complication, without long-term current use of  insulin (H)       blood glucose lancets standard    no brand specified    100 each    Use to test blood sugar daily times daily or as directed.    Type 2 diabetes mellitus without complication, without long-term current use of insulin (H)       blood glucose monitoring meter device kit    no brand specified    1 kit    Use to test blood sugar larisa times daily or as directed.    Type 2 diabetes mellitus without complication, without long-term current use of insulin (H)       blood glucose monitoring test strip    no brand specified    100 strip    Use to test blood sugars once times daily or as directed    Type 2 diabetes mellitus without complication, without long-term current use of insulin (H)       cloNIDine 0.1 MG tablet    CATAPRES    60 tablet    Take 1 tablet (0.1 mg) by mouth 2 times daily    Hypertension goal BP (blood pressure) < 140/80, Opioid withdrawal (H)       clotrimazole 2 % Crea    GYNE-LOTRIMIN 3    1 Tube    Place 2 g vaginally daily    Yeast infection of the vagina       CULTUREE DIGESTIVE HEALTH Caps     60 capsule    Take 1 capsule by mouth 2 times daily    Diarrhea, unspecified type       * cyanocobalamin 1000 MCG/ML injection    VITAMIN B12    1 mL    Inject 1 mL (1,000 mcg) Subcutaneous every 30 days    B12 deficiency       * cyanocobalamin 1000 MCG/ML injection    VITAMIN B12    1 mL    Inject 1 mL (1,000 mcg) into the muscle every 30 days    Vitamin B12 deficiency (non anemic)       EPINEPHrine 0.3 MG/0.3ML injection 2-pack    EPIPEN/ADRENACLICK/or ANY BX GENERIC EQUIV    2 each    Inject 0.3 mLs (0.3 mg) into the muscle once as needed for anaphylaxis    Late effect of other and unspecified external causes       losartan-hydrochlorothiazide 100-25 MG per tablet    HYZAAR    90 tablet    Take 1 tablet by mouth daily    Hypertension goal BP (blood pressure) < 140/80       magnesium oxide 400 (241.3 Mg) MG tablet    MAG-OX    90 tablet    Take 1 tablet (400 mg) by mouth daily    Cramp of  limb       naloxone nasal spray    NARCAN    0.2 mL    Spray 1 spray (4 mg) into one nostril alternating nostrils as needed for opioid reversal every 2-3 minutes until assistance arrives    Chronic pain syndrome       * ondansetron 4 MG ODT tab    ZOFRAN-ODT    10 tablet    Take 1 tablet (4 mg) by mouth every 8 hours as needed for nausea        * ondansetron 4 MG ODT tab    ZOFRAN-ODT    90 tablet    DISSOLVE ONE TABLET IN MOUTH EVERY 8 HOURS AS NEEDED FOR NAUSEA    Chronic pain syndrome       order for DME     1 Device    Right wrist splint for carpal tunnel syndrome  One* Use as directed at hour of sleep    Carpal tunnel syndrome of right wrist       oxyCODONE-acetaminophen  MG per tablet    PERCOCET    16 tablet    Take 1-2 tablets by mouth every 6 hours as needed for severe pain    Carcinoma of colon metastatic to liver (H), Chronic pain syndrome, Malignant neoplasm of kidney excluding renal pelvis, right (H)       polyethylene glycol powder    MIRALAX/GLYCOLAX    1581 g    TAKE 17 GRAMS (1 CAPFUL) BY MOUTH DAILY    Slow transit constipation       potassium chloride 10 MEQ tablet    K-TAB,KLOR-CON    120 tablet    Take 1 tablet (10 mEq) by mouth daily    Hypokalemia       sertraline 100 MG tablet    ZOLOFT    30 tablet    Take 1 tablet (100 mg) by mouth daily    Recurrent major depression in partial remission (H)       STATIN NOT PRESCRIBED (INTENTIONAL)     0 each    1 each daily Statin not prescribed intentionally due to Active liver disease (liver failure, cirrhosis, hepatitis) LIVER METS    Hyperlipidemia LDL goal <100, Type 2 diabetes mellitus without complication (H)       Vitamin D (Cholecalciferol) 1000 units Tabs     60 tablet    Take 2,000 Units by mouth daily    Vitamin D deficiency       * Notice:  This list has 4 medication(s) that are the same as other medications prescribed for you. Read the directions carefully, and ask your doctor or other care provider to review them with you.

## 2018-09-24 NOTE — PROGRESS NOTES
"Clinic Care Coordination Contact  Care Team Conversations    Met with patient at clinic appointment. Had sleep study in May. Has CPAP machine at home but states it doesn't work (they took it apart when I was in group home and it hasn't worked right since\"). Chart review of sleep study confirms severe obstructive sleep apnea. No insurance currently but PCP has verbally authorized me to place CPAP machine order with supplies and she can fill once insurance lapse has resolved. Order placed, signed printed and placed on PCP's desk for signature.     "

## 2018-09-24 NOTE — PROGRESS NOTES
"[facility] - Integrated Primary Care   September 24, 2018      Behavioral Health Clinician Progress Note    Patient Name: Ines Mott           Service Type:  Individual      Service Location:   Face to Face in Clinic      Session Start Time: 8:35 a.m.  Session End Time: 9:25 a.m.      Session Length: 38 - 52      Attendees: Patient and PCP MSW Clinical Intern    Visit Activities (Refresh list every visit): [facility] Mostly    Diagnostic Assessment Date: to be completed at next visit  Treatment Plan Review Date: to be completed at next visit  See Flowsheets for today's PHQ-9 and RASTA-7 results  Previous PHQ-9:   PHQ-9 SCORE 5/11/2018 5/18/2018 7/2/2018   Total Score - - -   Total Score MyChart 9 (Mild depression) - -   Total Score 9 7 8     Previous RASTA-7:   RASTA-7 SCORE 8/24/2015 11/4/2016 12/5/2017   Total Score - - 3 (minimal anxiety)   Total Score 7 9 3       GONZÁLEZ LEVEL:  No flowsheet data found.    DATA  Extended Session (60+ minutes): No  Interactive Complexity: Yes, visit entailed Interactive Complexity evidenced by:  -The need to manage maladaptive communication (related to, e.g., high anxiety, high reactivity, repeated questions, or disagreement) among participants that complicates delivery of care  Crisis: No  [facility] Patient: No    Treatment Objective(s) Addressed in This Session:  Target Behavior(s): disease management/lifestyle changes cancer    Adjustment Difficulties: will develop coping/problem-solving skills to facilitate more adaptive adjustment  Psychological distress related to Chronic Disease Management    Current Stressors / Issues:    [facility] visit with patient in the clinic. Upon arrival, patient presented in distress; tearful, complaining of severe pain and opiate withdrawal. Patient endorsed increased depression and that she drove to the airport with a friend and thought about \"just getting on a plane and going. I'm tired of dealing with everything\". Patient denied SI/SIB thoughts or urges, and " "states she's exhausted dealing with not knowing what's happening with the cancer, uncontrolled pain, poor sleep, and little to no appetite. Patient states she ran out of pain meds three days ago and has been going through withdrawal. She went to the ED 3x and was not given a bridge the last time. Patient states her  is trying to be supportive and [place] her to the casino last night \"because he thought it would make me feel better\". She walked around for over an hour looking for him. Patient completed a WHODAS and provided conflicting information regarding ability to stand and walk.   PCP joined the visit to discuss patient's pain management needs. Discussed that patient has been taking more medication than prescribed and has demonstrated a pattern of running out early. Explained that this cannot continue and that she needs to schedule and attend a palliative care appointment to determine the best ongoing care plan.  Patient agreed though continues to run into barriers because she doesn't have insurance. Her  is paying for medication out of pocket. Patient met with LEEANNA Davis to get updates on insurance and to try scheduling appointments despite insurance barriers.  Patient has a follow visit schedule tomorrow with PCP and [facility]. Patient will schedule a follow up [facility] only visit in two weeks. Patient is worried about how she will manage when her  leaves for [place] in Dec. [place], though states this is not a good option because \"we're the same. We can't stand being together for too long\".    Progress on Treatment Objective(s) / Homework:  New Objective established this session - PREPARATION (Decided to change - considering how); Intervened by negotiating a change plan and determining options / strategies for behavior change, identifying triggers, exploring social supports, and working towards setting a date to begin behavior change    Motivational " "Interviewing    MI Intervention: Expressed Empathy/Understanding, Supported Autonomy, Collaboration, Evocation, Permission to raise concern or advise, Open-ended questions and Reflections: simple and complex     Change Talk Expressed by the Patient: Ability to change Reasons to change Committment to change    Provider Response to Change Talk: E - Evoked more info from patient about behavior change, A - Affirmed patient's thoughts, decisions, or attempts at behavior change, R - Reflected patient's change talk and S - Summarized patient's change talk statements    Also provided psychoeducation about behavioral health condition, symptoms, and treatment options    Care Plan review completed: No    Medication Review:  No changes to current psychiatric medication(s) - zoloft, clonidine    Medication Compliance:  Yes    Changes in Health Issues:   Yes: Chronic disease management, Associated Psychological Distress  Cancer, Please see medical note by PCP MICHEAL Gaming, CNP    Chemical Use Review:   Substance Use: decrease in alcohol  and non prescription drugs .  Client reports frequency of use no alcohol or non-prescribed drugs.  Action  Provided support and affirmation for steps taken towards sobriety    Patient also has a hx of polysubstance dependence; drugs of choice: heroin, opiates, alcohol - currently using alcohol and opiates. Reports intent to stop using alcohol \"because then things get crazy\".     Tobacco Use: No current tobacco use.      Assessment: Current Emotional / Mental Status (status of significant symptoms):  Risk status (Self / Other harm or suicidal ideation)  Patient has had a history of suicidal ideation: \"comes and goes\" and suicide attempts: most recent attempt 10/21/14, pt attempted to overdose on methamphetamine; attempted in the past                                                                                           Patient denies current fears or concerns for personal " safety.  Patient denies current or recent suicidal ideation or behaviors.  Patient denies current or recent homicidal ideation or behaviors.  Patient denies current or recent self injurious behavior or ideation.  Patient denies other safety concerns.  A safety and risk management plan has not been developed at this time, however patient was encouraged to call [facility] should there be a change in any of these risk factors.    Appearance:   Appropriate   Eye Contact:   Good   Psychomotor Behavior: Normal   Attitude:   Cooperative   Orientation:   All  Speech   Rate / Production: Normal    Volume:  Normal   Mood:    Anxious  Depressed   Affect:    Labile   Thought Content:  Clear   Thought Form:  Coherent   Insight:    Fair     Diagnoses:  1. Posttraumatic stress disorder        Collateral Reports Completed:  Not Applicable    Plan: (Homework, other):  Patient was given information about behavioral services and encouraged to schedule a follow up appointment with the clinic [facility] in 2 weeks . Patient will take medications as prescribed. She was also given information about mental health symptoms and treatment options  and Cognitive Behavioral Therapy skills to practice when experiencing anxiety and depression. CD Recommendations: Maintain Sobriety.    MARTIN Palma, [facility]

## 2018-09-24 NOTE — PROGRESS NOTES
Clinic Care Coordination Contact  Care Team Conversations    Patient had apt today with [facility] and PCP.     SW checked [place] IT's. Patient's Medical Assistance is not active.     SW and patient called [facility] and spoke with [facility]. Patient stated that she mailed in her husbands bank statement and her direct express card last week on Wednesday. Per [facility] information is not in the system yet as it can take 3-5 business days and information should be received. SW asked for this case to be expedited due patient's medical diagnosis. SW was informed that since patient's case is closed nothing can be done. This SW name can not be added to patient's file as patient's case is closed.     Per PCP patient is not able to schedule with palliative care due to [facility] being closed. SMITH called and spoke to Zuleyma, [phone] asking to schedule apt. Per Zuleyma she will need to check with Sully ORLANDO to see if patient can be scheduled. SMITH requested that Zuleyma call this SW back once a decision has been made.     SMITH reached out manager to discuss access challenges to palliative care clinic due to insurance issues.     SMITH called and left a message for [facility] assistance program requesting a call back.     SMITH updated PCP and C of the above.    Plan:  1.) Patient to bring direct express and [email or web address] [place] statements tomorrow to clinic so that this SW can resubmit again just in case the [facility] did not process information.   2.) SMITH will continue to follow

## 2018-09-24 NOTE — MR AVS SNAPSHOT
After Visit Summary   9/24/2018    Ines Mott    MRN: [id number]           Patient Information     Date Of Birth          1965        Visit Information        Provider Department      9/24/2018 8:30 AM Marika Santana APRN CNP [facility]        Today's Diagnoses     Malignant neoplasm of kidney excluding renal pelvis, right (H)    -  1    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)        Drug-seeking behavior        Chronic pain syndrome        Hypertension goal BP (blood pressure) < 140/80        Opioid withdrawal (H)        Carcinoma of colon metastatic to liver (H)           Follow-ups after your visit        Follow-up notes from your care team     Return in about 1 day (around 9/25/2018) for co-visit with PCP and [facility].      Your next 10 appointments already scheduled     Sep 25, 2018 10:30 AM CDT   Return Visit with MICHEAL Crowley CNP   [facility] ([facility]    [address]  [address]  [facility] [address] [phone]            Sep 25, 2018 10:30 AM CDT   Return Visit with Davion Rees [facility] ([facility])    [address]  [address]  [facility] [address] [phone]              Who to contact     If you have questions or need follow up information about today's clinic visit or your schedule please contact [facility] directly at [phone].  Normal or non-critical lab and imaging results will be communicated to you by MyChart, letter or phone within 4 business days after the clinic has received the results. If you do not hear from us within 7 days, please contact the clinic through MyChart or phone. If you have a critical or abnormal lab result, we will notify you by phone as soon as possible.  Submit refill requests through  "[employer] or call your pharmacy and they will forward the refill request to us. Please allow 3 business days for your refill to be completed.          Additional Information About Your Visit        MyChart Information     The [employer] lets you send messages to your doctor, view your test results, renew your prescriptions, schedule appointments and more. To sign up, go to [email or web address] . Click on \"Log in\" on the left side of the screen, which will take you to the Welcome page. Then click on \"Sign up Now\" on the right side of the page.     You will be asked to enter the access code listed below, as well as some personal information. Please follow the directions to create your username and password.     Your access code is: [email or web address]  Expires: 2018  2:06 PM     Your access code will  in 90 days. If you need help or a new code, please call your [place] clinic or [phone].        Care EveryWhere ID     This is your Care EveryWhere ID. This could be used by other organizations to access your [place] medical records  [id number]        Your Vitals Were     Pulse Temperature Respirations Pulse Oximetry Breastfeeding?       79 98.7  F (37.1  C) (Oral) 16 98% No        Blood Pressure from Last 3 Encounters:   18 130/86   18 112/78   18 128/64    Weight from Last 3 Encounters:   18 235 lb (106.6 kg)   18 239 lb 8 oz (108.6 kg)   18 236 lb (107 kg)              We Performed the Following     Drug Abuse Screen Panel 13, Urine (Pain Care Package)          Today's Medication Changes          These changes are accurate as of 18  6:11 PM.  If you have any questions, ask your nurse or doctor.               Start taking these medicines.        Dose/Directions    order for DME   Used for:  ALEXUS on CPAP   Started by:  Martha Sims RN        Equipment being ordered: CPAP with supplies   Quantity:  1 each   Refills:  0            Where to get your medicines    "   These medications were sent to [facility] [address] [place] [address] [address] [facility]  [address] [facility] [address]     Phone:  [phone]     cloNIDine 0.1 MG tablet         Some of these will need a paper prescription and others can be bought over the counter.  Ask your nurse if you have questions.     Bring a paper prescription for each of these medications     order for DME    oxyCODONE-acetaminophen  MG per tablet               Information about OPIOIDS     PRESCRIPTION OPIOIDS: WHAT YOU NEED TO KNOW   We gave you an opioid (narcotic) pain medicine. It is important to manage your pain, but opioids are not always the best choice. You should first try all the other options your care team gave you. Take this medicine for as short a time (and as few doses) as possible.    Some activities can increase your pain, such as bandage changes or therapy sessions. It may help to take your pain medicine 30 to 60 minutes before these activities. Reduce your stress by getting enough sleep, working on hobbies you enjoy and practicing relaxation or meditation. Talk to your care team about ways to manage your pain beyond prescription opioids.    These medicines have risks:    DO NOT drive when on new or higher doses of pain medicine. These medicines can affect your alertness and reaction times, and you could be arrested for driving under the influence (DUI). If you need to use opioids long-term, talk to your care team about driving.    DO NOT operate heavy machinery    DO NOT do any other dangerous activities while taking these medicines.    DO NOT drink any alcohol while taking these medicines.     If the opioid prescribed includes acetaminophen, DO NOT take with any other medicines that contain acetaminophen. Read all labels carefully. Look for the word  acetaminophen  or  Tylenol.  Ask your pharmacist if you have questions or are unsure.    You can get addicted to pain medicines, especially  if you have a history of addiction (chemical, alcohol or substance dependence). Talk to your care team about ways to reduce this risk.    All opioids tend to cause constipation. Drink plenty of water and eat foods that have a lot of fiber, such as fruits, vegetables, prune juice, apple juice and high-fiber cereal. Take a laxative (Miralax, milk of magnesia, Colace, Senna) if you don t move your bowels at least every other day. Other side effects include upset stomach, sleepiness, dizziness, throwing up, tolerance (needing more of the medicine to have the same effect), physical dependence and slowed breathing.    Store your pills in a secure place, locked if possible. We will not replace any lost or stolen medicine. If you don t finish your medicine, please throw away (dispose) as directed by your pharmacist. The [place] [employer]ution [employer] has more information about safe disposal: https://www.pca.[facility].mn.us/living-green/managing-unwanted-medications         Primary Care Provider Office Phone # Fax #    MICHEAL Crowley [facility] [employer] [phone]       [address] [facility] [address]  [facility] [address]        Goals        General    Transportation (pt-stated)     Notes - Note created  7/11/2018  9:56 AM by Elizabeth Aleman LSW    Goal Statement: I am in need of transportation resources  Measure of Success: Patient will utilize transportation benefits  Supportive Steps to Achieve: SW will assist patient in obtaining transportation to medical appointments.   Date to Achieve By: Ongoing        Equal Access to Services     VALENTE RIVERA AH: Hadii casimiro Leonard, waaislinnda ludoreenadaha, qaybta kaalmada emilee hernandez. So [facility] [phone].    ATENCIÓN: Si habla español, tiene a christianson disposición servicios gratuitos de asistencia lingüística. Llame al [phone].    We comply with applicable federal civil rights laws and [place] laws. We do not discriminate on the  basis of race, color, national origin, age, disability, sex, sexual orientation, or gender identity.            Thank you!     Thank you for choosing [facility]  for your care. Our goal is always to provide you with excellent care. Hearing back from our patients is one way we can continue to improve our services. Please take a few minutes to complete the written survey that you may receive in the mail after your visit with us. Thank you!             Your Updated Medication List - Protect others around you: Learn how to safely use, store and throw away your medicines at www.disposemymeds.org.          This list is accurate as of 9/24/18  6:11 PM.  Always use your most recent med list.                   Brand Name Dispense Instructions for use Diagnosis    albuterol (2.5 MG/3ML) 0.083% neb solution     30 vial    Take 1 vial (2.5 mg) by nebulization every 6 hours as needed for shortness of breath / dyspnea or wheezing    Mild intermittent asthma with acute exacerbation       ASPIRIN NOT PRESCRIBED    INTENTIONAL    1 each    Please choose reason not prescribed, below    Aspirin intolerance       baclofen 10 MG tablet    LIORESAL    60 tablet    Take 1 tablet (10 mg) by mouth 2 times daily    Chronic pain syndrome       blood glucose calibration solution    no brand specified    1 each    Use to calibrate blood glucose monitor as directed.    Type 2 diabetes mellitus without complication, without long-term current use of insulin (H)       blood glucose lancets standard    no brand specified    100 each    Use to test blood sugar daily times daily or as directed.    Type 2 diabetes mellitus without complication, without long-term current use of insulin (H)       blood glucose monitoring meter device kit    no brand specified    1 kit    Use to test blood sugar larisa times daily or as directed.    Type 2 diabetes mellitus without complication, without long-term current use of insulin (H)        blood glucose monitoring test strip    no brand specified    100 strip    Use to test blood sugars once times daily or as directed    Type 2 diabetes mellitus without complication, without long-term current use of insulin (H)       cloNIDine 0.1 MG tablet    CATAPRES    60 tablet    Take 1 tablet (0.1 mg) by mouth 2 times daily    Hypertension goal BP (blood pressure) < 140/80, Opioid withdrawal (H)       clotrimazole 2 % Crea    GYNE-LOTRIMIN 3    1 Tube    Place 2 g vaginally daily    Yeast infection of the vagina       CULTUREE DIGESTIVE HEALTH Caps     60 capsule    Take 1 capsule by mouth 2 times daily    Diarrhea, unspecified type       * cyanocobalamin 1000 MCG/ML injection    VITAMIN B12    1 mL    Inject 1 mL (1,000 mcg) Subcutaneous every 30 days    B12 deficiency       * cyanocobalamin 1000 MCG/ML injection    VITAMIN B12    1 mL    Inject 1 mL (1,000 mcg) into the muscle every 30 days    Vitamin B12 deficiency (non anemic)       EPINEPHrine 0.3 MG/0.3ML injection 2-pack    EPIPEN/ADRENACLICK/or ANY BX GENERIC EQUIV    2 each    Inject 0.3 mLs (0.3 mg) into the muscle once as needed for anaphylaxis    Late effect of other and unspecified external causes       losartan-hydrochlorothiazide 100-25 MG per tablet    HYZAAR    90 tablet    Take 1 tablet by mouth daily    Hypertension goal BP (blood pressure) < 140/80       magnesium oxide 400 (241.3 Mg) MG tablet    MAG-OX    90 tablet    Take 1 tablet (400 mg) by mouth daily    Cramp of limb       naloxone nasal spray    NARCAN    0.2 mL    Spray 1 spray (4 mg) into one nostril alternating nostrils as needed for opioid reversal every 2-3 minutes until assistance arrives    Chronic pain syndrome       * ondansetron 4 MG ODT tab    ZOFRAN-ODT    10 tablet    Take 1 tablet (4 mg) by mouth every 8 hours as needed for nausea        * ondansetron 4 MG ODT tab    ZOFRAN-ODT    90 tablet    DISSOLVE ONE TABLET IN MOUTH EVERY 8 HOURS AS NEEDED FOR NAUSEA     Chronic pain syndrome       order for DME     1 Device    Right wrist splint for carpal tunnel syndrome  One* Use as directed at hour of sleep    Carpal tunnel syndrome of right wrist       order for DME     1 each    Equipment being ordered: CPAP with supplies    ALEXUS on CPAP       oxyCODONE-acetaminophen  MG per tablet    PERCOCET    16 tablet    Take 1-2 tablets by mouth every 6 hours as needed for severe pain    Carcinoma of colon metastatic to liver (H), Chronic pain syndrome, Malignant neoplasm of kidney excluding renal pelvis, right (H)       polyethylene glycol powder    MIRALAX/GLYCOLAX    1581 g    TAKE 17 GRAMS (1 CAPFUL) BY MOUTH DAILY    Slow transit constipation       potassium chloride 10 MEQ tablet    K-TAB,KLOR-CON    120 tablet    Take 1 tablet (10 mEq) by mouth daily    Hypokalemia       sertraline 100 MG tablet    ZOLOFT    30 tablet    Take 1 tablet (100 mg) by mouth daily    Recurrent major depression in partial remission (H)       STATIN NOT PRESCRIBED (INTENTIONAL)     0 each    1 each daily Statin not prescribed intentionally due to Active liver disease (liver failure, cirrhosis, hepatitis) LIVER METS    Hyperlipidemia LDL goal <100, Type 2 diabetes mellitus without complication (H)       Vitamin D (Cholecalciferol) 1000 units Tabs     60 tablet    Take 2,000 Units by mouth daily    Vitamin D deficiency       * Notice:  This list has 4 medication(s) that are the same as other medications prescribed for you. Read the directions carefully, and ask your doctor or other care provider to review them with you.

## 2018-09-24 NOTE — PROGRESS NOTES
Clinic Care Coordination Contact  Care Team Conversations    Received call back from patient pharmacy assistance plan, Jennifer Almaguer. For patient to utilize plan patient will need to provide proof of income.     SW will update patient.     BRISSA Davis    Care Coordinator  [facility] ,[facility], and [facility]   [phone]

## 2018-09-25 ENCOUNTER — OFFICE VISIT (OUTPATIENT)
Dept: BEHAVIORAL HEALTH | Facility: CLINIC | Age: 53
End: 2018-09-25

## 2018-09-25 ENCOUNTER — OFFICE VISIT (OUTPATIENT)
Dept: FAMILY MEDICINE | Facility: CLINIC | Age: 53
End: 2018-09-25
Payer: COMMERCIAL

## 2018-09-25 ENCOUNTER — PATIENT OUTREACH (OUTPATIENT)
Dept: CARE COORDINATION | Facility: CLINIC | Age: 53
End: 2018-09-25

## 2018-09-25 VITALS
WEIGHT: 237 LBS | HEART RATE: 72 BPM | DIASTOLIC BLOOD PRESSURE: 82 MMHG | BODY MASS INDEX: 38.25 KG/M2 | TEMPERATURE: 98.6 F | SYSTOLIC BLOOD PRESSURE: 112 MMHG | RESPIRATION RATE: 18 BRPM | OXYGEN SATURATION: 99 %

## 2018-09-25 DIAGNOSIS — F43.10 POSTTRAUMATIC STRESS DISORDER: Primary | Chronic | ICD-10-CM

## 2018-09-25 DIAGNOSIS — C64.1 RENAL CELL CARCINOMA OF RIGHT KIDNEY (H): ICD-10-CM

## 2018-09-25 DIAGNOSIS — C18.9 CARCINOMA OF COLON METASTATIC TO LIVER (H): Primary | ICD-10-CM

## 2018-09-25 DIAGNOSIS — Z12.11 SCREEN FOR COLON CANCER: ICD-10-CM

## 2018-09-25 DIAGNOSIS — Z13.89 SCREENING FOR DIABETIC PERIPHERAL NEUROPATHY: ICD-10-CM

## 2018-09-25 DIAGNOSIS — G47.33 OSA ON CPAP: ICD-10-CM

## 2018-09-25 DIAGNOSIS — C78.7 CARCINOMA OF COLON METASTATIC TO LIVER (H): Primary | ICD-10-CM

## 2018-09-25 DIAGNOSIS — C7B.09 METASTATIC CARCINOID TUMOR TO INTRA-ABDOMINAL SITE (H): ICD-10-CM

## 2018-09-25 LAB — HBA1C MFR BLD: 5.5 % (ref 0–5.6)

## 2018-09-25 PROCEDURE — 99207 ZZC NO CHARGE BEHAVIORAL WARM HANDOFF: CPT | Performed by: SOCIAL WORKER

## 2018-09-25 PROCEDURE — 99215 OFFICE O/P EST HI 40 MIN: CPT | Performed by: NURSE PRACTITIONER

## 2018-09-25 PROCEDURE — 83036 HEMOGLOBIN GLYCOSYLATED A1C: CPT | Performed by: NURSE PRACTITIONER

## 2018-09-25 PROCEDURE — 36415 COLL VENOUS BLD VENIPUNCTURE: CPT | Performed by: NURSE PRACTITIONER

## 2018-09-25 RX ORDER — OXYCODONE AND ACETAMINOPHEN 10; 325 MG/1; MG/1
1 TABLET ORAL 4 TIMES DAILY
Qty: 16 TABLET | Refills: 0 | Status: SHIPPED | OUTPATIENT
Start: 2018-09-25 | End: 2018-09-28

## 2018-09-25 ASSESSMENT — ACTIVITIES OF DAILY LIVING (ADL): DEPENDENT_IADLS:: TRANSPORTATION

## 2018-09-25 NOTE — PROGRESS NOTES
Clinic Care Coordination Contact  Care Team Conversations    SW met with patient. Patient did not bring direct express statement or husbands bank statements.  Community Paramedic referral placed for home safety evaluation.     SW called Palliative Care- appointment made for 10/11/2018 at 7:45am. SW called and told this to patient. Also left voice mail per patient request stating the above information.     Plan: SW will continue to follow.     BRISSA Davis    Care Coordinator  [facility]- ,[facility], [facility], and Women[facility]   [phone]

## 2018-09-25 NOTE — PATIENT INSTRUCTIONS
We will schedule a CP visit to assess your safety in the home     We will be reducing your Percocet to no more than 4 tabs daily    You will continue to work with our [facility] on your insurance and Palliative care appt     You will come back next week to  your RX

## 2018-09-25 NOTE — MR AVS SNAPSHOT
After Visit Summary   9/25/2018    Ines Mott    MRN: [id number]           Patient Information     Date Of Birth          1965        Visit Information        Provider Department      9/25/2018 10:30 AM Marika Santana APRN CNP 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      Your next 10 appointments already scheduled     Oct 11, 2018  8:00 AM CDT   (Arrive by 7:45 AM)   New Patient Visit with Quyen Mae MD   [facility] ([facility])    [address] [place] [address]  [address]  [facility] [address] [phone]              Future tests that were ordered for you today     Open Future Orders        Priority Expected Expires Ordered    Fecal colorectal cancer screen FIT - Future (S+30) Routine 10/16/2018 10/25/2018 9/25/2018            Who to contact     If you have questions or need follow up information about today's clinic visit or your schedule please contact [facility] directly at [phone].  Normal or non-critical lab and imaging results will be communicated to  "you by MyChart, letter or phone within 4 business days after the clinic has received the results. If you do not hear from us within 7 days, please contact the clinic through [employer] or phone. If you have a critical or abnormal lab result, we will notify you by phone as soon as possible.  Submit refill requests through "[employer] or call your pharmacy and they will forward the refill request to us. Please allow 3 business days for your refill to be completed.          Additional Information About Your Visit        [employer] Information     "[employer] lets you send messages to your doctor, view your test results, renew your prescriptions, schedule appointments and more. To sign up, go to [email or web address] . Click on \"Log in\" on the left side of the screen, which will take you to the Welcome page. Then click on \"Sign up Now\" on the right side of the page.     You will be asked to enter the access code listed below, as well as some personal information. Please follow the directions to create your username and password.     Your access code is: [email or web address]  Expires: 2018  2:06 PM     Your access code will  in 90 days. If you need help or a new code, please call your [place] clinic or [phone].        Care EveryWhere ID     This is your Care EveryWhere ID. This could be used by other organizations to access your [place] medical records  [id number]        Your Vitals Were     Pulse Temperature Respirations Pulse Oximetry Breastfeeding? BMI (Body Mass Index)    72 98.6  F (37  C) (Oral) 18 99% No 38.25 kg/m2       Blood Pressure from Last 3 Encounters:   18 112/82   18 130/86   18 112/78    Weight from Last 3 Encounters:   18 237 lb (107.5 kg)   18 235 lb (106.6 kg)   18 239 lb 8 oz (108.6 kg)              We Performed the Following     HEMOGLOBIN A1C          Today's Medication Changes          These changes are accurate as of 18 11:47 AM.  If you have any " questions, ask your nurse or doctor.               These medicines have changed or have updated prescriptions.        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  Ask your nurse if you have questions.     Bring a paper prescription for each of these medications     oxyCODONE-acetaminophen  MG per tablet               Information about OPIOIDS     PRESCRIPTION OPIOIDS: WHAT YOU NEED TO KNOW   We gave you an opioid (narcotic) pain medicine. It is important to manage your pain, but opioids are not always the best choice. You should first try all the other options your care team gave you. Take this medicine for as short a time (and as few doses) as possible.    Some activities can increase your pain, such as bandage changes or therapy sessions. It may help to take your pain medicine 30 to 60 minutes before these activities. Reduce your stress by getting enough sleep, working on hobbies you enjoy and practicing relaxation or meditation. Talk to your care team about ways to manage your pain beyond prescription opioids.    These medicines have risks:    DO NOT drive when on new or higher doses of pain medicine. These medicines can affect your alertness and reaction times, and you could be arrested for driving under the influence (DUI). If you need to use opioids long-term, talk to your care team about driving.    DO NOT operate heavy  machinery    DO NOT do any other dangerous activities while taking these medicines.    DO NOT drink any alcohol while taking these medicines.     If the opioid prescribed includes acetaminophen, DO NOT take with any other medicines that contain acetaminophen. Read all labels carefully. Look for the word  acetaminophen  or  Tylenol.  Ask your pharmacist if you have questions or are unsure.    You can get addicted to pain medicines, especially if you have a history of addiction (chemical, alcohol or substance dependence). Talk to your care team about ways to reduce this risk.    All opioids tend to cause constipation. Drink plenty of water and eat foods that have a lot of fiber, such as fruits, vegetables, prune juice, apple juice and high-fiber cereal. Take a laxative (Miralax, milk of magnesia, Colace, Senna) if you don t move your bowels at least every other day. Other side effects include upset stomach, sleepiness, dizziness, throwing up, tolerance (needing more of the medicine to have the same effect), physical dependence and slowed breathing.    Store your pills in a secure place, locked if possible. We will not replace any lost or stolen medicine. If you don t finish your medicine, please throw away (dispose) as directed by your pharmacist. The [place] Pollution [employer] has more information about safe disposal: https://www.pca.[facility].[place].us/living-green/managing-unwanted-medications         Primary Care Provider Office Phone # Fax #    MICHEAL Crowley [phone]       [address] [address]  [facility] [address]        Goals        General    Transportation (pt-stated)     Notes - Note created  7/11/2018  9:56 AM by Elizabeth Aleman LSW    Goal Statement: I am in need of transportation resources  Measure of Success: Patient will utilize transportation benefits  Supportive Steps to Achieve: SW will assist patient in obtaining transportation to medical appointments.    Date to Achieve By: Ongoing         Lifestyle    Patient achieves sleep pattern objective     Notes - Note created  9/25/2018 10:06 AM by Martha Sims, RN    #1-Goal Statement: patient will obtain and use CPAP machine nightly  Measure of Success: improved sleep  Supportive Steps to Achieve: RN CC obtained DME order from PCP  Barriers: poor follow up; current machine broken  Strengths: assistance from PCP/RN CC  Date to Achieve By: 11-1-18  Patient expressed understanding of goal: good            Equal Access to Services     VALENTE RIVERA AH: Hadii aad ku hadasho Soomaali, waaxda luqadaha, qaybta kaalmada adeegyada, waxay idiin hayrupeshn adeeg jonoshiraeric pardo . So [facility] [phone].    ATENCIÓN: Si habla español, tiene a christianson disposición servicios gratuitos de asistencia lingüística. [facility] [phone].    We comply with applicable federal civil rights laws and [place] laws. We do not discriminate on the basis of race, color, national origin, age, disability, sex, sexual orientation, or gender identity.            Thank you!     Thank you for choosing [facility]  for your care. Our goal is always to provide you with excellent care. Hearing back from our patients is one way we can continue to improve our services. Please take a few minutes to complete the written survey that you may receive in the mail after your visit with us. Thank you!             Your Updated Medication List - Protect others around you: Learn how to safely use, store and throw away your medicines at www.disposemymeds.org.          This list is accurate as of 9/25/18 11:47 AM.  Always use your most recent med list.                   Brand Name Dispense Instructions for use Diagnosis    albuterol (2.5 MG/3ML) 0.083% neb solution     30 vial    Take 1 vial (2.5 mg) by nebulization every 6 hours as needed for shortness of breath / dyspnea or wheezing    Mild intermittent asthma with acute exacerbation       ASPIRIN  NOT PRESCRIBED    INTENTIONAL    1 each    Please choose reason not prescribed, below    Aspirin intolerance       baclofen 10 MG tablet    LIORESAL    60 tablet    Take 1 tablet (10 mg) by mouth 2 times daily    Chronic pain syndrome       blood glucose calibration solution    no brand specified    1 each    Use to calibrate blood glucose monitor as directed.    Type 2 diabetes mellitus without complication, without long-term current use of insulin (H)       blood glucose lancets standard    no brand specified    100 each    Use to test blood sugar daily times daily or as directed.    Type 2 diabetes mellitus without complication, without long-term current use of insulin (H)       blood glucose monitoring meter device kit    no brand specified    1 kit    Use to test blood sugar larisa times daily or as directed.    Type 2 diabetes mellitus without complication, without long-term current use of insulin (H)       blood glucose monitoring test strip    no brand specified    100 strip    Use to test blood sugars once times daily or as directed    Type 2 diabetes mellitus without complication, without long-term current use of insulin (H)       cloNIDine 0.1 MG tablet    CATAPRES    60 tablet    Take 1 tablet (0.1 mg) by mouth 2 times daily    Hypertension goal BP (blood pressure) < 140/80, Opioid withdrawal (H)       clotrimazole 2 % Crea    GYNE-LOTRIMIN 3    1 Tube    Place 2 g vaginally daily    Yeast infection of the vagina       CULTUREE DIGESTIVE HEALTH Caps     60 capsule    Take 1 capsule by mouth 2 times daily    Diarrhea, unspecified type       * cyanocobalamin 1000 MCG/ML injection    VITAMIN B12    1 mL    Inject 1 mL (1,000 mcg) Subcutaneous every 30 days    B12 deficiency       * cyanocobalamin 1000 MCG/ML injection    VITAMIN B12    1 mL    Inject 1 mL (1,000 mcg) into the muscle every 30 days    Vitamin B12 deficiency (non anemic)       EPINEPHrine 0.3 MG/0.3ML injection 2-pack    EPIPEN/ADRENACLICK/or  ANY BX GENERIC EQUIV    2 each    Inject 0.3 mLs (0.3 mg) into the muscle once as needed for anaphylaxis    Late effect of other and unspecified external causes       losartan-hydrochlorothiazide 100-25 MG per tablet    HYZAAR    90 tablet    Take 1 tablet by mouth daily    Hypertension goal BP (blood pressure) < 140/80       magnesium oxide 400 (241.3 Mg) MG tablet    MAG-OX    90 tablet    Take 1 tablet (400 mg) by mouth daily    Cramp of limb       naloxone nasal spray    NARCAN    0.2 mL    Spray 1 spray (4 mg) into one nostril alternating nostrils as needed for opioid reversal every 2-3 minutes until assistance arrives    Chronic pain syndrome       * ondansetron 4 MG ODT tab    ZOFRAN-ODT    10 tablet    Take 1 tablet (4 mg) by mouth every 8 hours as needed for nausea        * ondansetron 4 MG ODT tab    ZOFRAN-ODT    90 tablet    DISSOLVE ONE TABLET IN MOUTH EVERY 8 HOURS AS NEEDED FOR NAUSEA    Chronic pain syndrome       order for DME     1 Device    Right wrist splint for carpal tunnel syndrome  One* Use as directed at hour of sleep    Carpal tunnel syndrome of right wrist       order for DME     1 each    Equipment being ordered: CPAP with supplies    ALEXUS on CPAP       oxyCODONE-acetaminophen  MG per tablet    PERCOCET    16 tablet    Take 1 tablet by mouth 4 times daily    Carcinoma of colon metastatic to liver (H), 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), Renal cell carcinoma of right kidney (H)       polyethylene glycol powder    MIRALAX/GLYCOLAX    1581 g    TAKE 17 GRAMS (1 CAPFUL) BY MOUTH DAILY    Slow transit constipation       potassium chloride 10 MEQ tablet    K-TAB,KLOR-CON    120 tablet    Take 1 tablet (10 mEq) by mouth daily    Hypokalemia       sertraline 100 MG tablet    ZOLOFT    30 tablet    Take 1 tablet (100 mg) by mouth daily    Recurrent major depression in partial remission (H)       STATIN NOT PRESCRIBED (INTENTIONAL)     0 each    1 each daily Statin not  prescribed intentionally due to Active liver disease (liver failure, cirrhosis, hepatitis) LIVER METS    Hyperlipidemia LDL goal <100, Type 2 diabetes mellitus without complication (H)       Vitamin D (Cholecalciferol) 1000 units Tabs     60 tablet    Take 2,000 Units by mouth daily    Vitamin D deficiency       * Notice:  This list has 4 medication(s) that are the same as other medications prescribed for you. Read the directions carefully, and ask your doctor or other care provider to review them with you.
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
## 2018-09-25 NOTE — LETTER
My Depression Action Plan  Name: Ines Mott   Date of Birth 1965  Date: 9/25/2018    My doctor: Marika Santana   My clinic: [facility]  [address] [place] [address]  [address]  [facility] [address]  [phone]          GREEN    ZONE   Good Control    What it looks like:     Things are going generally well. You have normal up s and down s. You may even feel depressed from time to time, but bad moods usually last less than a day.   What you need to do:  1. Continue to care for yourself (see self care plan)  2. Check your depression survival kit and update it as needed  3. Follow your physician s recommendations including any medication.  4. Do not stop taking medication unless you consult with your physician first.           YELLOW         ZONE Getting Worse    What it looks like:     Depression is starting to interfere with your life.     It may be hard to get out of bed; you may be starting to isolate yourself from others.    Symptoms of depression are starting to last most all day and this has happened for several days.     You may have suicidal thoughts but they are not constant.   What you need to do:     1. Call your care team, your response to treatment will improve if you keep your care team informed of your progress. Yellow periods are signs an adjustment may need to be made.     2. Continue your self-care, even if you have to fake it!    3. Talk to someone in your support network    4. Open up your depression survival kit           RED    ZONE Medical Alert - Get Help    What it looks like:     Depression is seriously interfering with your life.     You may experience these or other symptoms: You can t get out of bed most days, can t work or engage in other necessary activities, you have trouble taking care of basic hygiene, or basic responsibilities, thoughts of suicide or death that will not go away, self-injurious behavior.     What you need to  do:  1. Call your care team and request a same-day appointment. If they are not available (weekends or after hours) call your local crisis line, emergency room or 911.            Depression Self Care Plan / Survival Kit    Self-Care for Depression  Here s the deal. Your body and mind are really not as separate as most people think.  What you do and think affects how you feel and how you feel influences what you do and think. This means if you do things that people who feel good do, it will help you feel better.  Sometimes this is all it takes.  There is also a place for medication and therapy depending on how severe your depression is, so be sure to consult with your medical provider and/ or Behavioral Health Consultant if your symptoms are worsening or not improving.     In order to better manage my stress, I will:    Exercise  Get some form of exercise, every day. This will help reduce pain and release endorphins, the  feel good  chemicals in your brain. This is almost as good as taking antidepressants!  This is not the same as joining a gym and then never going! (they count on that by the way ) It can be as simple as just going for a walk or doing some gardening, anything that will get you moving.      Hygiene   Maintain good hygiene (Get out of bed in the morning, Make your bed, Brush your teeth, Take a shower, and Get dressed like you were going to work, even if you are unemployed).  If your clothes don't fit try to get ones that do.    Diet  I will strive to eat foods that are good for me, drink plenty of water, and avoid excessive sugar, caffeine, alcohol, and other mood-altering substances.  Some foods that are helpful in depression are: complex carbohydrates, B vitamins, flaxseed, fish or fish oil, fresh fruits and vegetables.    Psychotherapy  I agree to participate in Individual Therapy (if recommended).    Medication  If prescribed medications, I agree to take them.  Missing doses can result in serious  side effects.  I understand that drinking alcohol, or other illicit drug use, may cause potential side effects.  I will not stop my medication abruptly without first discussing it with my provider.    Staying Connected With Others  I will stay in touch with my friends, family members, and my primary care provider/team.    Use your imagination  Be creative.  We all have a creative side; it doesn t matter if it s oil painting, sand castles, or mud pies! This will also kick up the endorphins.    Witness Beauty  (AKA stop and smell the roses) Take a look outside, even in mid-winter. Notice colors, textures. Watch the squirrels and birds.     Service to others  Be of service to others.  There is always someone else in need.  By helping others we can  get out of ourselves  and remember the really important things.  This also provides opportunities for practicing all the other parts of the program.    Humor  Laugh and be silly!  Adjust your TV habits for less news and crime-drama and more comedy.    Control your stress  Try breathing deep, massage therapy, biofeedback, and meditation. Find time to relax each day.     My support system    Clinic Contact:  Phone number:    Contact 1:  Phone number:    Contact 2:  Phone number:    Congregation/:  Phone number:    Therapist:  Phone number:    Local crisis center:    Phone number:    Other community support:  Phone number:

## 2018-09-25 NOTE — MR AVS SNAPSHOT
After Visit Summary   9/25/2018    Ines Mott    MRN: [id number]           Patient Information     Date Of Birth          1965        Visit Information        Provider Department      9/25/2018 10:30 AM Davion Rees LICSW [facility]        Today's Diagnoses     Posttraumatic stress disorder    -  1       Follow-ups after your visit        Your next 10 appointments already scheduled     Oct 08, 2018  9:00 AM CDT   Return Visit with MARTIN Pineda   [facility] ([facility])    [address]  [address]  [facility] [address]   [phone]            Oct 10, 2018  9:30 AM CDT   Return Visit with MICHEAL Crowley [facility] ([facility])    [address]  [address]  [facility] [address] [phone]            Oct 10, 2018  9:30 AM CDT   Return Visit with MARTIN Pineda   [facility] ([facility])    [address]  [address]  [facility] [address] [phone]            Oct 11, 2018  8:00 AM CDT   (Arrive by 7:45 AM)   New Patient Visit with Quyen Mae MD   [facility] ([email or web address])    [address]  [address]  [facility] [address] [phone]              Future tests that were ordered for you today     Open Future Orders        Priority Expected Expires Ordered    Fecal colorectal cancer screen FIT - Future (S+30) Routine 10/16/2018 10/25/2018 9/25/2018            Who to contact     If you have questions or need follow up information about today's clinic visit or your schedule please contact [facility] directly at [phone].  Normal or non-critical lab and imaging results will be communicated to you by Abe  "letter or phone within 4 business days after the clinic has received the results. If you do not hear from us within 7 days, please contact the clinic through iDoc24 or phone. If you have a critical or abnormal lab result, we will notify you by phone as soon as possible.  Submit refill requests through iDoc24 or call your pharmacy and they will forward the refill request to us. Please allow 3 business days for your refill to be completed.          Additional Information About Your Visit        [email or web address] Information     [email or web address] lets you send messages to your doctor, view your test results, renew your prescriptions, schedule appointments and more. To sign up, go to [email or web address] . Click on \"Log in\" on the left side of the screen, which will take you to the Welcome page. Then click on \"Sign up Now\" on the right side of the page.     You will be asked to enter the access code listed below, as well as some personal information. Please follow the directions to create your username and password.     Your access code is: [email or web address]  Expires: 2018  2:06 PM     Your access code will  in 90 days. If you need help or a new code, please call your [place] clinic or [phone].        Care EveryWhere ID     This is your Care EveryWhere ID. This could be used by other organizations to access your [place] medical records  [id number]         Blood Pressure from Last 3 Encounters:   18 112/82   18 130/86   18 112/78    Weight from Last 3 Encounters:   18 237 lb (107.5 kg)   18 235 lb (106.6 kg)   18 239 lb 8 oz (108.6 kg)              Today, you had the following     No orders found for display         Today's Medication Changes          These changes are accurate as of 18 12:38 PM.  If you have any questions, ask your nurse or doctor.               These medicines have changed or have updated prescriptions.        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  Ask your nurse if you have questions.     Bring a paper prescription for each of these medications     oxyCODONE-acetaminophen  MG per tablet                Primary Care Provider Office Phone # Fax #    MICHEAL Crowley [phone]       [address] [facility] [address]  [facility] [address]        Goals        General    Transportation (pt-stated)     Notes - Note created  7/11/2018  9:56 AM by Elizabeth Aleman LSW    Goal Statement: I am in need of transportation resources  Measure of Success: Patient will utilize transportation benefits  Supportive Steps to Achieve: SW will assist patient in obtaining transportation to medical appointments.   Date to Achieve By: Ongoing         Lifestyle    Patient achieves sleep pattern objective     Notes - Note created  9/25/2018 10:06 AM by Martha Sims, RN    #1-Goal Statement: patient will obtain and use CPAP machine nightly  Measure of Success: improved sleep  Supportive Steps to Achieve: RN CC obtained DME order from PCP  Barriers: poor follow up; current machine broken  Strengths: assistance from PCP/RN CC  Date to Achieve By: 11-1-18  Patient expressed understanding of goal: good            Equal Access to Services     VALENTE RIVERA AH: Marc Leonard, wakristen luqadaha, qaybta emilee wang . So [facility]  [phone].    ATENCIÓN: Si manisha kitchen, tiene a christianson disposición servicios gratuitos de asistencia lingüística. Sabine galeas [phone].    We comply with applicable federal civil rights laws and [place] laws. We do not discriminate on the basis of race, color, national origin, age, disability, sex, sexual orientation, or gender identity.            Thank you!     Thank you for choosing [facility]  for your care. Our goal is always to provide you with excellent care. Hearing back from our patients is one way we can continue to improve our services. Please take a few minutes to complete the written survey that you may receive in the mail after your visit with us. Thank you!             Your Updated Medication List - Protect others around you: Learn how to safely use, store and throw away your medicines at www.disposemymeds.org.          This list is accurate as of 9/25/18 12:38 PM.  Always use your most recent med list.                   Brand Name Dispense Instructions for use Diagnosis    albuterol (2.5 MG/3ML) 0.083% neb solution     30 vial    Take 1 vial (2.5 mg) by nebulization every 6 hours as needed for shortness of breath / dyspnea or wheezing    Mild intermittent asthma with acute exacerbation       ASPIRIN NOT PRESCRIBED    INTENTIONAL    1 each    Please choose reason not prescribed, below    Aspirin intolerance       baclofen 10 MG tablet    LIORESAL    60 tablet    Take 1 tablet (10 mg) by mouth 2 times daily    Chronic pain syndrome       blood glucose calibration solution    no brand specified    1 each    Use to calibrate blood glucose monitor as directed.    Type 2 diabetes mellitus without complication, without long-term current use of insulin (H)       blood glucose lancets standard    no brand specified    100 each    Use to test blood sugar daily times daily or as directed.    Type 2 diabetes mellitus without complication, without long-term current use of insulin  (H)       blood glucose monitoring meter device kit    no brand specified    1 kit    Use to test blood sugar larisa times daily or as directed.    Type 2 diabetes mellitus without complication, without long-term current use of insulin (H)       blood glucose monitoring test strip    no brand specified    100 strip    Use to test blood sugars once times daily or as directed    Type 2 diabetes mellitus without complication, without long-term current use of insulin (H)       cloNIDine 0.1 MG tablet    CATAPRES    60 tablet    Take 1 tablet (0.1 mg) by mouth 2 times daily    Hypertension goal BP (blood pressure) < 140/80, Opioid withdrawal (H)       clotrimazole 2 % Crea    GYNE-LOTRIMIN 3    1 Tube    Place 2 g vaginally daily    Yeast infection of the vagina       CULTUREE DIGESTIVE HEALTH Caps     60 capsule    Take 1 capsule by mouth 2 times daily    Diarrhea, unspecified type       * cyanocobalamin 1000 MCG/ML injection    VITAMIN B12    1 mL    Inject 1 mL (1,000 mcg) Subcutaneous every 30 days    B12 deficiency       * cyanocobalamin 1000 MCG/ML injection    VITAMIN B12    1 mL    Inject 1 mL (1,000 mcg) into the muscle every 30 days    Vitamin B12 deficiency (non anemic)       EPINEPHrine 0.3 MG/0.3ML injection 2-pack    EPIPEN/ADRENACLICK/or ANY BX GENERIC EQUIV    2 each    Inject 0.3 mLs (0.3 mg) into the muscle once as needed for anaphylaxis    Late effect of other and unspecified external causes       losartan-hydrochlorothiazide 100-25 MG per tablet    HYZAAR    90 tablet    Take 1 tablet by mouth daily    Hypertension goal BP (blood pressure) < 140/80       magnesium oxide 400 (241.3 Mg) MG tablet    MAG-OX    90 tablet    Take 1 tablet (400 mg) by mouth daily    Cramp of limb       naloxone nasal spray    NARCAN    0.2 mL    Spray 1 spray (4 mg) into one nostril alternating nostrils as needed for opioid reversal every 2-3 minutes until assistance arrives    Chronic pain syndrome       * ondansetron 4 MG  ODT tab    ZOFRAN-ODT    10 tablet    Take 1 tablet (4 mg) by mouth every 8 hours as needed for nausea        * ondansetron 4 MG ODT tab    ZOFRAN-ODT    90 tablet    DISSOLVE ONE TABLET IN MOUTH EVERY 8 HOURS AS NEEDED FOR NAUSEA    Chronic pain syndrome       order for DME     1 Device    Right wrist splint for carpal tunnel syndrome  One* Use as directed at hour of sleep    Carpal tunnel syndrome of right wrist       order for DME     1 each    Equipment being ordered: CPAP with supplies    ALEXUS on CPAP       oxyCODONE-acetaminophen  MG per tablet    PERCOCET    16 tablet    Take 1 tablet by mouth 4 times daily    Carcinoma of colon metastatic to liver (H), 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), Renal cell carcinoma of right kidney (H)       polyethylene glycol powder    MIRALAX/GLYCOLAX    1581 g    TAKE 17 GRAMS (1 CAPFUL) BY MOUTH DAILY    Slow transit constipation       potassium chloride 10 MEQ tablet    K-TAB,KLOR-CON    120 tablet    Take 1 tablet (10 mEq) by mouth daily    Hypokalemia       sertraline 100 MG tablet    ZOLOFT    30 tablet    Take 1 tablet (100 mg) by mouth daily    Recurrent major depression in partial remission (H)       STATIN NOT PRESCRIBED (INTENTIONAL)     0 each    1 each daily Statin not prescribed intentionally due to Active liver disease (liver failure, cirrhosis, hepatitis) LIVER METS    Hyperlipidemia LDL goal <100, Type 2 diabetes mellitus without complication (H)       Vitamin D (Cholecalciferol) 1000 units Tabs     60 tablet    Take 2,000 Units by mouth daily    Vitamin D deficiency       * Notice:  This list has 4 medication(s) that are the same as other medications prescribed for you. Read the directions carefully, and ask your doctor or other care provider to review them with you.

## 2018-09-25 NOTE — PROGRESS NOTES
Documentation only: unable to see patient for longer than five minutes due to time limitations. Patient reports she is feeling much better physically and her mood is improved. She would like to schedule a [facility] only visit in two weeks. She left MA paperwork on the table at home and will talk with PCP about whether CPs can assist with this.

## 2018-09-25 NOTE — PROGRESS NOTES
Clinic Care Coordination Contact  Care Team Conversations    Received signed DME order for CPAP machine and supplies back from provider. Faxed to  [facility] store. Left detailed msg for patient to contact me if she doesn't hear from them by next week.     Carla Sims R.N.  Clinic Care Coordinator  [facility]  [phone]

## 2018-09-26 ENCOUNTER — ALLIED HEALTH/NURSE VISIT (OUTPATIENT)
Dept: BEHAVIORAL HEALTH | Facility: CLINIC | Age: 53
End: 2018-09-26
Payer: COMMERCIAL

## 2018-09-26 ENCOUNTER — PATIENT OUTREACH (OUTPATIENT)
Dept: CARE COORDINATION | Facility: CLINIC | Age: 53
End: 2018-09-26

## 2018-09-26 VITALS
HEART RATE: 66 BPM | TEMPERATURE: 97.7 F | DIASTOLIC BLOOD PRESSURE: 82 MMHG | OXYGEN SATURATION: 99 % | SYSTOLIC BLOOD PRESSURE: 102 MMHG | RESPIRATION RATE: 16 BRPM

## 2018-09-26 DIAGNOSIS — G47.33 OSA ON CPAP: Primary | ICD-10-CM

## 2018-09-26 DIAGNOSIS — Z00.00 ENCOUNTER FOR ROUTINE ADULT HEALTH EXAMINATION WITHOUT ABNORMAL FINDINGS: Primary | ICD-10-CM

## 2018-09-26 PROCEDURE — 99207 C CASE MANAGEMENT EACH 15 MINUTES: CPT

## 2018-09-26 ASSESSMENT — PAIN SCALES - GENERAL: PAINLEVEL: NO PAIN (0)

## 2018-09-26 NOTE — MR AVS SNAPSHOT
After Visit Summary   9/26/2018    Ines Mott    MRN: [id number]           Patient Information     Date Of Birth          1965        Visit Information        Provider Department      9/26/2018 3:50 PM Larry Howe [facility]        Today's Diagnoses     Encounter for routine adult health examination without abnormal findings    -  1       Follow-ups after your visit        Your next 10 appointments already scheduled     Oct 08, 2018  9:00 AM CDT   Return Visit with MARTIN Pineda   [facility] ([facility])    [address]  [address]  [facility] [address]   [phone]            Oct 10, 2018  9:30 AM CDT   Return Visit with MICHEAL Crowley [facility] ([facility])    [address]  [address]  [facility] [address]   [phone]            Oct 10, 2018  9:30 AM CDT   Return Visit with MARTIN Pineda   [facility] ([facility])    [address]  [address]  [facility] [address]   [phone]            Oct 11, 2018  8:00 AM CDT   (Arrive by 7:45 AM)   New Patient Visit with Quyen Mae MD   [facility] ([facility])    [address]  [address]  [facility] [address]   [phone]            Oct 24, 2018 11:00 AM CDT   Community Paramedic with [facility], [facility]   [facility] ([facility])    [address]  [address]  [facility] [address]   [phone]              Future tests that were ordered for you today     Open Future Orders        Priority Expected Expires Ordered    Fecal colorectal cancer screen FIT - Future (S+30) Routine 10/16/2018 10/25/2018  "2018            Who to contact     If you have questions or need follow up information about today's clinic visit or your schedule please contact [facility] directly at [phone].  Normal or non-critical lab and imaging results will be communicated to you by MyChart, letter or phone within 4 business days after the clinic has received the results. If you do not hear from us within 7 days, please contact the clinic through MyChart or phone. If you have a critical or abnormal lab result, we will notify you by phone as soon as possible.  Submit refill requests through [employer] or call your pharmacy and they will forward the refill request to us. Please allow 3 business days for your refill to be completed.          Additional Information About Your Visit        [employer] Group[facility]eter[facility] Administration [facility] Center[facility] [employer] Information     [employer] lets you send messages to your doctor, view your test results, renew your prescriptions, schedule appointments and more. To sign up, go to [email or web address] . Click on \"Log in\" on the left side of the screen, which will take you to the Welcome page. Then click on \"Sign up Now\" on the right side of the page.     You will be asked to enter the access code listed below, as well as some personal information. Please follow the directions to create your username and password.     Your access code is: [email or web address]  Expires: 2018  2:06 PM     Your access code will  in 90 days. If you need help or a new code, please call your [place] clinic or [phone].        Care EveryWhere ID     This is your Care EveryWhere ID. This could be used by other organizations to access your [place] medical records  [id number]        Your Vitals Were     Pulse Temperature Respirations Pulse Oximetry          66 97.7  F (36.5  C) (Temporal) 16 99%         Blood Pressure from Last 3 Encounters:   18 102/82   18 112/82   18 130/86    Weight from Last 3 Encounters:   18 237 lb " (107.5 kg)   08/27/18 235 lb (106.6 kg)   07/31/18 239 lb 8 oz (108.6 kg)              We Performed the Following     C CASE MANAGEMENT EACH 15 MINUTES        Primary Care Provider Office Phone # Fax #    MICHEAL Crowley [facility] [phone]       [address]  [facility] [address]        Goals        General    Transportation (pt-stated)     Notes - Note created  7/11/2018  9:56 AM by Elizabeth Aleman LSW    Goal Statement: I am in need of transportation resources  Measure of Success: Patient will utilize transportation benefits  Supportive Steps to Achieve: SW will assist patient in obtaining transportation to medical appointments.   Date to Achieve By: Ongoing         Lifestyle    Patient achieves sleep pattern objective     Notes - Note created  9/25/2018 10:06 AM by Martha Sims, RN    #1-Goal Statement: patient will obtain and use CPAP machine nightly  Measure of Success: improved sleep  Supportive Steps to Achieve: RN CC obtained DME order from PCP  Barriers: poor follow up; current machine broken  Strengths: assistance from PCP/RN CC  Date to Achieve By: 11-1-18  Patient expressed understanding of goal: good            Equal Access to Services     VALENTE RIVERA AH: Hadii casimiro Leonard, waaislinnda des, qaybta caitlin hernandez, emilee pardo . So [facility] [phone].    ATENCIÓN: Si habla español, tiene a christianson disposición servicios gratuitos de asistencia lingüística. Llame al [phone].    We comply with applicable federal civil rights laws and [place] laws. We do not discriminate on the basis of race, color, national origin, age, disability, sex, sexual orientation, or gender identity.            Thank you!     Thank you for choosing [facility]  for your care. Our goal is always to provide you with excellent care. Hearing back from our patients is one way we can continue to improve our services.  Please take a few minutes to complete the written survey that you may receive in the mail after your visit with us. Thank you!             Your Updated Medication List - Protect others around you: Learn how to safely use, store and throw away your medicines at www.disposemymeds.org.          This list is accurate as of 9/26/18  4:07 PM.  Always use your most recent med list.                   Brand Name Dispense Instructions for use Diagnosis    albuterol (2.5 MG/3ML) 0.083% neb solution     30 vial    Take 1 vial (2.5 mg) by nebulization every 6 hours as needed for shortness of breath / dyspnea or wheezing    Mild intermittent asthma with acute exacerbation       ASPIRIN NOT PRESCRIBED    INTENTIONAL    1 each    Please choose reason not prescribed, below    Aspirin intolerance       baclofen 10 MG tablet    LIORESAL    60 tablet    Take 1 tablet (10 mg) by mouth 2 times daily    Chronic pain syndrome       blood glucose calibration solution    no brand specified    1 each    Use to calibrate blood glucose monitor as directed.    Type 2 diabetes mellitus without complication, without long-term current use of insulin (H)       blood glucose lancets standard    no brand specified    100 each    Use to test blood sugar daily times daily or as directed.    Type 2 diabetes mellitus without complication, without long-term current use of insulin (H)       blood glucose monitoring meter device kit    no brand specified    1 kit    Use to test blood sugar larisa times daily or as directed.    Type 2 diabetes mellitus without complication, without long-term current use of insulin (H)       blood glucose monitoring test strip    no brand specified    100 strip    Use to test blood sugars once times daily or as directed    Type 2 diabetes mellitus without complication, without long-term current use of insulin (H)       cloNIDine 0.1 MG tablet    CATAPRES    60 tablet    Take 1 tablet (0.1 mg) by mouth 2 times daily     Hypertension goal BP (blood pressure) < 140/80, Opioid withdrawal (H)       clotrimazole 2 % Crea    GYNE-LOTRIMIN 3    1 Tube    Place 2 g vaginally daily    Yeast infection of the vagina       CULTUREE DIGESTIVE HEALTH Caps     60 capsule    Take 1 capsule by mouth 2 times daily    Diarrhea, unspecified type       * cyanocobalamin 1000 MCG/ML injection    VITAMIN B12    1 mL    Inject 1 mL (1,000 mcg) Subcutaneous every 30 days    B12 deficiency       * cyanocobalamin 1000 MCG/ML injection    VITAMIN B12    1 mL    Inject 1 mL (1,000 mcg) into the muscle every 30 days    Vitamin B12 deficiency (non anemic)       EPINEPHrine 0.3 MG/0.3ML injection 2-pack    EPIPEN/ADRENACLICK/or ANY BX GENERIC EQUIV    2 each    Inject 0.3 mLs (0.3 mg) into the muscle once as needed for anaphylaxis    Late effect of other and unspecified external causes       losartan-hydrochlorothiazide 100-25 MG per tablet    HYZAAR    90 tablet    Take 1 tablet by mouth daily    Hypertension goal BP (blood pressure) < 140/80       magnesium oxide 400 (241.3 Mg) MG tablet    MAG-OX    90 tablet    Take 1 tablet (400 mg) by mouth daily    Cramp of limb       naloxone nasal spray    NARCAN    0.2 mL    Spray 1 spray (4 mg) into one nostril alternating nostrils as needed for opioid reversal every 2-3 minutes until assistance arrives    Chronic pain syndrome       * ondansetron 4 MG ODT tab    ZOFRAN-ODT    10 tablet    Take 1 tablet (4 mg) by mouth every 8 hours as needed for nausea        * ondansetron 4 MG ODT tab    ZOFRAN-ODT    90 tablet    DISSOLVE ONE TABLET IN MOUTH EVERY 8 HOURS AS NEEDED FOR NAUSEA    Chronic pain syndrome       order for DME     1 Device    Right wrist splint for carpal tunnel syndrome  One* Use as directed at hour of sleep    Carpal tunnel syndrome of right wrist       order for DME     1 each    Equipment being ordered: CPAP with supplies    ALEXUS on CPAP       oxyCODONE-acetaminophen  MG per tablet    PERCOCET     16 tablet    Take 1 tablet by mouth 4 times daily    Carcinoma of colon metastatic to liver (H), 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), Renal cell carcinoma of right kidney (H)       polyethylene glycol powder    MIRALAX/GLYCOLAX    1581 g    TAKE 17 GRAMS (1 CAPFUL) BY MOUTH DAILY    Slow transit constipation       potassium chloride 10 MEQ tablet    K-TAB,KLOR-CON    120 tablet    Take 1 tablet (10 mEq) by mouth daily    Hypokalemia       sertraline 100 MG tablet    ZOLOFT    30 tablet    Take 1 tablet (100 mg) by mouth daily    Recurrent major depression in partial remission (H)       STATIN NOT PRESCRIBED (INTENTIONAL)     0 each    1 each daily Statin not prescribed intentionally due to Active liver disease (liver failure, cirrhosis, hepatitis) LIVER METS    Hyperlipidemia LDL goal <100, Type 2 diabetes mellitus without complication (H)       Vitamin D (Cholecalciferol) 1000 units Tabs     60 tablet    Take 2,000 Units by mouth daily    Vitamin D deficiency       * Notice:  This list has 4 medication(s) that are the same as other medications prescribed for you. Read the directions carefully, and ask your doctor or other care provider to review them with you.

## 2018-09-26 NOTE — PROGRESS NOTES
COMMUNITY PARAMEDICS FOLLOW UP VISIT    September 26, 2018 [id number]    SUBJECTIVE:                                                      Ines Mott is a 53 year old female being seen at home for a follow-up visit.    /82  Pulse 66  Temp 97.7  F (36.5  C) (Temporal)  Resp 16  SpO2 99%    Acute concern/Follow-up issue: initial assessment, med rec, and home safety assessment    Recent blood sugars: 131    REVIEW OF SYSTEMS/PE                                                      Skin: negative  Eyes: negative  Ears/Nose/Throat: negative, congested; recent cold  Respiratory: Cough  Cardiovascular: negative  Gastrointestinal: Pt states she feels a little nauseated after just eating.  Genitourinary: negative  Musculoskeletal: negative  Neurologic: negative  Psychiatric: negative      MENTAL HEALTH SYMPTOMS                                                      Duration: yesterday, better today because the sun is out.  Description:  Depression: YES  Anxiety: no   Sleep problems: YES- per Pt has restless leg syndrome, feels like choking while sleeping and wakes up often; pt is working on getting her CPAP working.  Concentration problems: no   Therapies tried and outcome: none    See PHQ-9 and RASTA scores, as appropriate      PAIN MANAGEMENT                                                      Pain Eval  0/10        Amount of exercise or physical activity: walks everyday    Problems taking medications regularly: No    Medication side effects: none  Diet: regular (no restrictions)        Pt opened door for CP at apartment. Pt's home was clean, not cluttered. Pt says the area used to be really bad but has gotten better. Pt indicated that the area she lives there is a lot of drug activity. Pt did not have medication bottles for CP to do medication rec. Pt fills her old medication boxes and throws away medication bottles.     Paperwork which was requested by SW was gathered from Pt and brought to SW at  [facility].    Acute concern/Follow-up recommendations: take medications as prescribed, keep medication bottles for medication rec, fill medication box prior to next appt with CP.    Next CP visit scheduled: 10/24 @1100    Issues for Provider to follow up on: Medication compliance; potential concerns about narcotic use.    Provider follow up visit needed: 10/10 with PCP Esther Howe on 9/26/2018 at 4:06 PM

## 2018-09-26 NOTE — PROGRESS NOTES
Clinic Care Coordination Contact  Care Team Conversations    Called  home medical equipment company to check to see if they received my order for CPAP and supplies that was faxed yesterday. Per Jatinder they received the order but need face to face documentation of need for CPAP. Sent staff msg to PCP to addend note. Jatinder has everything else she needs and will watch for the note and contact patient to discuss process.     Carla Sims R.N.  Clinic Care Coordinator  [facility]  [phone]

## 2018-09-27 DIAGNOSIS — N64.4 BREAST PAIN: ICD-10-CM

## 2018-09-28 ENCOUNTER — TELEPHONE (OUTPATIENT)
Dept: FAMILY MEDICINE | Facility: CLINIC | Age: 53
End: 2018-09-28

## 2018-09-28 ENCOUNTER — PATIENT OUTREACH (OUTPATIENT)
Dept: CARE COORDINATION | Facility: CLINIC | Age: 53
End: 2018-09-28

## 2018-09-28 DIAGNOSIS — C18.9 CARCINOMA OF COLON METASTATIC TO LIVER (H): ICD-10-CM

## 2018-09-28 DIAGNOSIS — C64.1 RENAL CELL CARCINOMA OF RIGHT KIDNEY (H): ICD-10-CM

## 2018-09-28 DIAGNOSIS — C7B.09 METASTATIC CARCINOID TUMOR TO INTRA-ABDOMINAL SITE (H): ICD-10-CM

## 2018-09-28 DIAGNOSIS — C78.7 CARCINOMA OF COLON METASTATIC TO LIVER (H): ICD-10-CM

## 2018-09-28 NOTE — TELEPHONE ENCOUNTER
Controlled Substance Refill Request for Percocet   Problem List Complete:  Yes   checked in past 3 months?  No, route to RN

## 2018-09-28 NOTE — TELEPHONE ENCOUNTER
Pt called she need a refill on her Percocet, pt said she will run out today. Pt need this med refill today. Pt will  Rx in clinic.    Pt contact:  [phone]      Tyrese Yang

## 2018-10-01 RX ORDER — OXYCODONE AND ACETAMINOPHEN 10; 325 MG/1; MG/1
1 TABLET ORAL 4 TIMES DAILY
Qty: 28 TABLET | Refills: 0 | Status: SHIPPED | OUTPATIENT
Start: 2018-10-01 | End: 2018-10-10

## 2018-10-01 NOTE — TELEPHONE ENCOUNTER
Controlled Substance Refill Request for oxyCODONE-acetaminophen (PERCOCET)  MG per tablet   Last refill: 9/25/18-- #16 for 4 day supply    Last clinic visit: 9/25/18   Next appt: 10/10/18      Controlled substance agreement on file: Yes:  Date 8/30/18.  Documentation in problem list reviewed:  Yes  Processing:  Patient will  in clinic    [place] reviewed:  reviewed- no concerns      Flora Saravia RN  10/01/18  10:38 AM

## 2018-10-08 ENCOUNTER — OFFICE VISIT (OUTPATIENT)
Dept: BEHAVIORAL HEALTH | Facility: CLINIC | Age: 53
End: 2018-10-08
Payer: COMMERCIAL

## 2018-10-08 DIAGNOSIS — F43.10 POSTTRAUMATIC STRESS DISORDER: Primary | Chronic | ICD-10-CM

## 2018-10-08 PROCEDURE — 90832 PSYTX W PT 30 MINUTES: CPT

## 2018-10-08 PROCEDURE — 90785 PSYTX COMPLEX INTERACTIVE: CPT
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
## 2018-10-08 NOTE — MR AVS SNAPSHOT
After Visit Summary   10/8/2018    Ines Mott    MRN: [id number]           Patient Information     Date Of Birth          1965        Visit Information        Provider Department      10/8/2018 9:00 AM Trina Vargas [facility]        Today's Diagnoses     Posttraumatic stress disorder    -  1       Follow-ups after your visit        Your next 10 appointments already scheduled     Oct 18, 2018  1:00 PM CDT   MA DIAGNOSTIC BILATERAL W/ KARIN with [id number]   [facility] ([facility])    [address] [place] [address], 2nd Floor  [facility] [address] [phone]           How do I prepare for my exam? (Food and drink instructions) No Food and Drink Restrictions.  How do I prepare for my exam? (Other instructions) Do not use any powder, lotion or deodorant under your arms or on your breast. If you do, we will ask you to remove it before your exam.  What should I wear: Wear comfortable, two-piece clothing.  How long does the exam take: Most scans will take 15 minutes.  What should I bring: Bring any previous mammograms from other facilities or have them mailed to the breast center.  Do I need a :  No  is needed.  What do I need to tell my doctor: If you have any allergies, tell your care team.  What should I do after the exam: No restrictions, You may resume normal activities.  What is this test: This test is an x-ray of the breast to look for breast disease. The breast is pressed between two plates to flatten and spread the tissue. An X-ray is taken of the breast from different angles.  Who should I call with questions: If you have any questions, please call the Imaging Department where you will have your exam. Directions, parking instructions, and other information is available on our website, [email or web address].  Other information about my exam Three-dimensional (3D) mammograms are available at [facility]  "locations in [place], [place], [place], [place], [facility], [place], and [place]. [facility] locations include [place] and the [facility] in [place].  Benefits of 3D mammograms include: * Improved rate of cancer detection * Decreases your chance of having to go back for more tests, which means fewer: * \"False-positive\" results (This means that there is an abnormal area but it isn't cancer.) * Invasive testing procedures, such as a biopsy or surgery * Can provide clearer images of the breast if you have dense breast tissue.  *3D mammography is an optional exam that anyone can have with a 2D mammogram. It doesn't replace or take the place of a 2D mammogram. 2D mammograms remain an effective screening test for all women.  Not all insurance companies cover the cost of a 3D mammogram. Check with your insurance.            Oct 18, 2018  1:30 PM CDT   US BREAST BUSHRA LIMITED 1-3 QUAD with UCBCUS2   [facility] ([facility])    [address] [place] [address], 2nd Floor  [facility] [address] [phone]           How do I prepare for my exam? (Food and drink instructions) No Food and Drink Restrictions.  How do I prepare for my exam? (Other instructions) You do not need to do anything special to prepare for your exam.  What should I wear: Wear comfortable clothes.  How long does the exam take: Most ultrasounds take 30 to 60 minutes.  What should I bring: Bring a list of your medicines, including vitamins, minerals and over-the-counter drugs. It is safest to leave personal items at home.  Do I need a :  No  is needed.  What do I need to tell my doctor: Tell your doctor about any allergies you may have.  What should I do after the exam: No restrictions, You may resume normal activities.  What is this test: An ultrasound uses sound waves to make pictures of the body. Sound waves do not cause pain. The only discomfort may be the pressure of the " wand against your skin or full bladder.  Who should I call with questions: If you have any questions, please call the Imaging Department where you will have your exam. Directions, parking instructions, and other information is available on our website, [email or web address].            Oct 24, 2018 11:00 AM CDT   Community Paramedic with Timo Community Paramedic 1, Timo Community Paramedic 2   [facility] ([facility])    [address]  [address]  [facility] [address] [phone]            Oct 25, 2018  3:00 PM CDT   (Arrive by 2:45 PM)   New Patient Visit with Samy Salmeron MD   [facility] ([facility])    [address]  [address]  [facility] [address]   [phone]            Nov 07, 2018  9:00 AM CST   Return Visit with MICHEAL Crowley [facility] ([facility])    [address]  [address]  [facility] [id number] [phone]            Nov 07, 2018  9:00 AM CST   Return Visit with Davion Rees [facility] ([facility])    [address]  [address]  [facility] [id number] [phone]              Who to contact     If you have questions or need follow up information about today's clinic visit or your schedule please contact [facility] directly at [phone].  Normal or non-critical lab and imaging results will be communicated to you by MyChart, letter or phone within 4 business days after the clinic has received the results. If you do not hear from us within 7 days, please contact the clinic through MyChart or phone. If you have a critical or abnormal lab result, we will notify you by phone as soon as possible.  Submit refill requests through [employer] or call your pharmacy and they will forward the  "refill request to us. Please allow 3 business days for your refill to be completed.          Additional Information About Your Visit        MyChart Information     [employer] lets you send messages to your doctor, view your test results, renew your prescriptions, schedule appointments and more. To sign up, go to [email or web address] . Click on \"Log in\" on the left side of the screen, which will take you to the Welcome page. Then click on \"Sign up Now\" on the right side of the page.     You will be asked to enter the access code listed below, as well as some personal information. Please follow the directions to create your username and password.     Your access code is: [email or web address]  Expires: 2018  2:06 PM     Your access code will  in 90 days. If you need help or a new code, please call your [facility] clinic or [phone].        Care EveryWhere ID     This is your Care EveryWhere ID. This could be used by other organizations to access your [facility] medical records  [id number]         Blood Pressure from Last 3 Encounters:   10/11/18 108/76   10/10/18 142/88   18 102/82    Weight from Last 3 Encounters:   10/11/18 238 lb (108 kg)   10/10/18 236 lb (107 kg)   18 237 lb (107.5 kg)              We Performed the Following     C PSYCHOTHERAPY COMPLEX INTERACTIVE        Primary Care Provider Office Phone # Fax #    MICHEAL Crowley [facility] [phone]       [address] [place] [address]  [facility] [address]        Goals        General    Transportation (pt-stated)     Notes - Note created  2018  9:56 AM by Elizabeth Aleman LSW    Goal Statement: I am in need of transportation resources  Measure of Success: Patient will utilize transportation benefits  Supportive Steps to Achieve: SW will assist patient in obtaining transportation to medical appointments.   Date to Achieve By: Ongoing         Lifestyle    Patient achieves sleep pattern objective     Notes - Note created  " 9/25/2018 10:06 AM by Martha Sims, RN    #1-Goal Statement: patient will obtain and use CPAP machine nightly  Measure of Success: improved sleep  Supportive Steps to Achieve: RN CC obtained DME order from PCP  Barriers: poor follow up; current machine broken  Strengths: assistance from PCP/RN CC  Date to Achieve By: 11-1-18  Patient expressed understanding of goal: good            Equal Access to Services     VALENTE RIVERA : Hadii aad ku hadasho Soomaali, waaxda luqadaha, qaybta kaalmada adeegyada, waxay luigiin hayrupeshn vietamandeep jononimisha laEulaliascooter . So [facility] [phone].    ATENCIÓN: Si habla español, tiene a christianson disposición servicios gratuitos de asistencia lingüística. Llame al [phone].    We comply with applicable federal civil rights laws and [place] laws. We do not discriminate on the basis of race, color, national origin, age, disability, sex, sexual orientation, or gender identity.            Thank you!     Thank you for choosing [facility]  for your care. Our goal is always to provide you with excellent care. Hearing back from our patients is one way we can continue to improve our services. Please take a few minutes to complete the written survey that you may receive in the mail after your visit with us. Thank you!             Your Updated Medication List - Protect others around you: Learn how to safely use, store and throw away your medicines at www.disposemymeds.org.          This list is accurate as of 10/8/18 11:59 PM.  Always use your most recent med list.                   Brand Name Dispense Instructions for use Diagnosis    albuterol (2.5 MG/3ML) 0.083% neb solution     30 vial    Take 1 vial (2.5 mg) by nebulization every 6 hours as needed for shortness of breath / dyspnea or wheezing    Mild intermittent asthma with acute exacerbation       ASPIRIN NOT PRESCRIBED    INTENTIONAL    1 each    Please choose reason not prescribed, below    Aspirin intolerance        baclofen 10 MG tablet    LIORESAL    60 tablet    Take 1 tablet (10 mg) by mouth 2 times daily    Chronic pain syndrome       blood glucose calibration solution    no brand specified    1 each    Use to calibrate blood glucose monitor as directed.    Type 2 diabetes mellitus without complication, without long-term current use of insulin (H)       blood glucose lancets standard    no brand specified    100 each    Use to test blood sugar daily times daily or as directed.    Type 2 diabetes mellitus without complication, without long-term current use of insulin (H)       blood glucose monitoring meter device kit    no brand specified    1 kit    Use to test blood sugar larisa times daily or as directed.    Type 2 diabetes mellitus without complication, without long-term current use of insulin (H)       blood glucose monitoring test strip    no brand specified    100 strip    Use to test blood sugars once times daily or as directed    Type 2 diabetes mellitus without complication, without long-term current use of insulin (H)       cloNIDine 0.1 MG tablet    CATAPRES    60 tablet    Take 1 tablet (0.1 mg) by mouth 2 times daily    Hypertension goal BP (blood pressure) < 140/80, Opioid withdrawal (H)       clotrimazole 2 % Crea    GYNE-LOTRIMIN 3    1 Tube    Place 2 g vaginally daily    Yeast infection of the vagina       CULTUREE DIGESTIVE HEALTH Caps     60 capsule    Take 1 capsule by mouth 2 times daily    Diarrhea, unspecified type       * cyanocobalamin 1000 MCG/ML injection    VITAMIN B12    1 mL    Inject 1 mL (1,000 mcg) Subcutaneous every 30 days    B12 deficiency       * cyanocobalamin 1000 MCG/ML injection    VITAMIN B12    1 mL    Inject 1 mL (1,000 mcg) into the muscle every 30 days    Vitamin B12 deficiency (non anemic)       EPINEPHrine 0.3 MG/0.3ML injection 2-pack    EPIPEN/ADRENACLICK/or ANY BX GENERIC EQUIV    2 each    Inject 0.3 mLs (0.3 mg) into the muscle once as needed for anaphylaxis    Late  effect of other and unspecified external causes       losartan-hydrochlorothiazide 100-25 MG per tablet    HYZAAR    90 tablet    Take 1 tablet by mouth daily    Hypertension goal BP (blood pressure) < 140/80       magnesium oxide 400 (241.3 Mg) MG tablet    MAG-OX    90 tablet    Take 1 tablet (400 mg) by mouth daily    Cramp of limb       naloxone nasal spray    NARCAN    0.2 mL    Spray 1 spray (4 mg) into one nostril alternating nostrils as needed for opioid reversal every 2-3 minutes until assistance arrives    Chronic pain syndrome       * ondansetron 4 MG ODT tab    ZOFRAN-ODT    10 tablet    Take 1 tablet (4 mg) by mouth every 8 hours as needed for nausea        * ondansetron 4 MG ODT tab    ZOFRAN-ODT    90 tablet    DISSOLVE ONE TABLET IN MOUTH EVERY 8 HOURS AS NEEDED FOR NAUSEA    Chronic pain syndrome       order for DME     1 Device    Right wrist splint for carpal tunnel syndrome  One* Use as directed at hour of sleep    Carpal tunnel syndrome of right wrist       order for DME     1 each    Equipment being ordered: CPAP with supplies    ALEXUS on CPAP       polyethylene glycol powder    MIRALAX/GLYCOLAX    1581 g    TAKE 17 GRAMS (1 CAPFUL) BY MOUTH DAILY    Slow transit constipation       potassium chloride 10 MEQ tablet    K-TAB,KLOR-CON    120 tablet    Take 1 tablet (10 mEq) by mouth daily    Hypokalemia       sertraline 100 MG tablet    ZOLOFT    30 tablet    Take 1 tablet (100 mg) by mouth daily    Recurrent major depression in partial remission (H)       STATIN NOT PRESCRIBED (INTENTIONAL)     0 each    1 each daily Statin not prescribed intentionally due to Active liver disease (liver failure, cirrhosis, hepatitis) LIVER METS    Hyperlipidemia LDL goal <100, Type 2 diabetes mellitus without complication (H)       Vitamin D (Cholecalciferol) 1000 units Tabs     60 tablet    Take 2,000 Units by mouth daily    Vitamin D deficiency       * Notice:  This list has 4 medication(s) that are the same as  other medications prescribed for you. Read the directions carefully, and ask your doctor or other care provider to review them with you.

## 2018-10-10 ENCOUNTER — OFFICE VISIT (OUTPATIENT)
Dept: BEHAVIORAL HEALTH | Facility: CLINIC | Age: 53
End: 2018-10-10
Payer: COMMERCIAL

## 2018-10-10 ENCOUNTER — OFFICE VISIT (OUTPATIENT)
Dept: FAMILY MEDICINE | Facility: CLINIC | Age: 53
End: 2018-10-10
Payer: COMMERCIAL

## 2018-10-10 ENCOUNTER — PATIENT OUTREACH (OUTPATIENT)
Dept: CARE COORDINATION | Facility: CLINIC | Age: 53
End: 2018-10-10

## 2018-10-10 VITALS
WEIGHT: 236 LBS | SYSTOLIC BLOOD PRESSURE: 142 MMHG | HEART RATE: 77 BPM | BODY MASS INDEX: 38.09 KG/M2 | OXYGEN SATURATION: 99 % | DIASTOLIC BLOOD PRESSURE: 88 MMHG | RESPIRATION RATE: 14 BRPM | TEMPERATURE: 97.2 F

## 2018-10-10 DIAGNOSIS — F10.10 ALCOHOL ABUSE, EPISODIC DRINKING BEHAVIOR: ICD-10-CM

## 2018-10-10 DIAGNOSIS — G31.84 MILD COGNITIVE IMPAIRMENT: ICD-10-CM

## 2018-10-10 DIAGNOSIS — F11.20 OPIOID USE DISORDER, SEVERE, DEPENDENCE (H): ICD-10-CM

## 2018-10-10 DIAGNOSIS — R82.90 NONSPECIFIC FINDING ON EXAMINATION OF URINE: ICD-10-CM

## 2018-10-10 DIAGNOSIS — C7B.09 METASTATIC CARCINOID TUMOR TO INTRA-ABDOMINAL SITE (H): ICD-10-CM

## 2018-10-10 DIAGNOSIS — F43.10 POSTTRAUMATIC STRESS DISORDER: Primary | Chronic | ICD-10-CM

## 2018-10-10 DIAGNOSIS — G89.4 CHRONIC PAIN SYNDROME: Primary | ICD-10-CM

## 2018-10-10 DIAGNOSIS — C18.9 CARCINOMA OF COLON METASTATIC TO LIVER (H): ICD-10-CM

## 2018-10-10 DIAGNOSIS — G47.33 OSA ON CPAP: Primary | ICD-10-CM

## 2018-10-10 DIAGNOSIS — C64.1 RENAL CELL CARCINOMA OF RIGHT KIDNEY (H): ICD-10-CM

## 2018-10-10 DIAGNOSIS — Z23 NEED FOR PROPHYLACTIC VACCINATION AND INOCULATION AGAINST INFLUENZA: ICD-10-CM

## 2018-10-10 DIAGNOSIS — C78.7 CARCINOMA OF COLON METASTATIC TO LIVER (H): ICD-10-CM

## 2018-10-10 DIAGNOSIS — N39.0 SYMPTOMATIC URINARY TRACT INFECTION: ICD-10-CM

## 2018-10-10 LAB
ALBUMIN UR-MCNC: NEGATIVE MG/DL
AMPHETAMINES UR QL: NOT DETECTED NG/ML
APPEARANCE UR: CLEAR
BACTERIA #/AREA URNS HPF: ABNORMAL /HPF
BARBITURATES UR QL SCN: NOT DETECTED NG/ML
BENZODIAZ UR QL SCN: NOT DETECTED NG/ML
BILIRUB UR QL STRIP: NEGATIVE
BUPRENORPHINE UR QL: NOT DETECTED NG/ML
CANNABINOIDS UR QL: NOT DETECTED NG/ML
COCAINE UR QL SCN: NOT DETECTED NG/ML
COLOR UR AUTO: YELLOW
D-METHAMPHET UR QL: NOT DETECTED NG/ML
GLUCOSE UR STRIP-MCNC: NEGATIVE MG/DL
HGB UR QL STRIP: ABNORMAL
KETONES UR STRIP-MCNC: NEGATIVE MG/DL
LEUKOCYTE ESTERASE UR QL STRIP: ABNORMAL
METHADONE UR QL SCN: NOT DETECTED NG/ML
NITRATE UR QL: NEGATIVE
NON-SQ EPI CELLS #/AREA URNS LPF: ABNORMAL /LPF
OPIATES UR QL SCN: NOT DETECTED NG/ML
OXYCODONE UR QL SCN: NOT DETECTED NG/ML
PCP UR QL SCN: NOT DETECTED NG/ML
PH UR STRIP: 5.5 PH (ref 5–7)
PROPOXYPH UR QL: NOT DETECTED NG/ML
RBC #/AREA URNS AUTO: ABNORMAL /HPF
SOURCE: ABNORMAL
SP GR UR STRIP: 1.02 (ref 1–1.03)
TRICYCLICS UR QL SCN: NOT DETECTED NG/ML
UROBILINOGEN UR STRIP-ACNC: 0.2 EU/DL (ref 0.2–1)
WBC #/AREA URNS AUTO: ABNORMAL /HPF

## 2018-10-10 PROCEDURE — 87088 URINE BACTERIA CULTURE: CPT | Performed by: NURSE PRACTITIONER

## 2018-10-10 PROCEDURE — 80306 DRUG TEST PRSMV INSTRMNT: CPT | Performed by: NURSE PRACTITIONER

## 2018-10-10 PROCEDURE — 90834 PSYTX W PT 45 MINUTES: CPT | Performed by: SOCIAL WORKER

## 2018-10-10 PROCEDURE — 87186 SC STD MICRODIL/AGAR DIL: CPT | Performed by: NURSE PRACTITIONER

## 2018-10-10 PROCEDURE — 99000 SPECIMEN HANDLING OFFICE-LAB: CPT | Performed by: NURSE PRACTITIONER

## 2018-10-10 PROCEDURE — 87086 URINE CULTURE/COLONY COUNT: CPT | Performed by: NURSE PRACTITIONER

## 2018-10-10 PROCEDURE — 81001 URINALYSIS AUTO W/SCOPE: CPT | Performed by: NURSE PRACTITIONER

## 2018-10-10 PROCEDURE — 80307 DRUG TEST PRSMV CHEM ANLYZR: CPT | Mod: 90 | Performed by: NURSE PRACTITIONER

## 2018-10-10 PROCEDURE — 90785 PSYTX COMPLEX INTERACTIVE: CPT | Performed by: SOCIAL WORKER

## 2018-10-10 PROCEDURE — 82043 UR ALBUMIN QUANTITATIVE: CPT | Performed by: NURSE PRACTITIONER

## 2018-10-10 PROCEDURE — 99215 OFFICE O/P EST HI 40 MIN: CPT | Performed by: NURSE PRACTITIONER

## 2018-10-10 RX ORDER — OXYCODONE AND ACETAMINOPHEN 10; 325 MG/1; MG/1
1 TABLET ORAL 4 TIMES DAILY
Qty: 28 TABLET | Refills: 0 | Status: SHIPPED | OUTPATIENT
Start: 2018-10-10 | End: 2018-10-17

## 2018-10-10 ASSESSMENT — ACTIVITIES OF DAILY LIVING (ADL): DEPENDENT_IADLS:: TRANSPORTATION

## 2018-10-10 NOTE — PATIENT INSTRUCTIONS
"We will help you get an appt with Oncology in the [facility] system      Please increase your fluids and you can start some Cranberry tabs or 100% cranberry juice     I will call you about your labs     We will give you only one week of your Percocet at a time if you run out we will not refill early      * BLADDER INFECTION,Female (Adult)    A bladder infection (\"cystitis\" or \"UTI\") usually causes a constant urge to urinate and a burning when passing urine. Urine may be cloudy, smelly or dark. There may be pain in the lower abdomen. A bladder infection occurs when bacteria from the vaginal area enter the bladder opening (urethra). This can occur from sexual intercourse, wearing tight clothing, dehydration and other factors.  HOME CARE:  1. Drink lots of fluids (at least 6-8 glasses a day, unless you must restrict fluids for other medical reasons). This will force the medicine into your urinary system and flush the bacteria out of your body. Cranberry juice has been shown to help clear out the bacteria.  2. Avoid sexual intercourse until your symptoms are gone.  3. A bladder infection is treated with antibiotics. You may also be given Pyridium (generic = phenazopyridine) to reduce the burning sensation. This medicine will cause your urine to become a bright orange color. The orange urine may stain clothing. You may wear a pad or panty-liner to protect clothing.  PREVENTING FUTURE INFECTIONS:  1. Always wipe from front to back after a bowel movement.  2. Keep the genital area clean and dry.  3. Drink plenty of fluids each day to avoid dehydration.  4. Urinate right after intercourse to flush out the bladder.  5. Wear cotton underwear and cotton-lined panty hose; avoid tight-fitting pants.  6. If you are on birth control pills and are having frequent bladder infections, discuss with your doctor.  FOLLOW UP: Return to this facility or see your doctor if ALL symptoms are not gone after three days of treatment.  GET PROMPT " MEDICAL ATTENTION if any of the following occur:    Fever over 101 F (38.3 C)    No improvement by the third day of treatment    Increasing back or abdominal pain    Repeated vomiting; unable to keep medicine down    Weakness, dizziness or fainting    Vaginal discharge    Pain, redness or swelling in the labia (outer vaginal area)    6359-4436 [employer]. [address] [place] [address]. All rights reserved. This information is not intended as a substitute for professional medical care. Always follow your healthcare professional's instructions.  This information has been modified by your health care provider with permission from the publisher.

## 2018-10-10 NOTE — MR AVS SNAPSHOT
After Visit Summary   10/10/2018    Ines Mott    MRN: [id number]           Patient Information     Date Of Birth          1965        Visit Information        Provider Department      10/10/2018 9:30 AM Davion Rees, MARTIN [facility]        Today's Diagnoses     Posttraumatic stress disorder    -  1    Opioid use disorder, severe, dependence (H)           Follow-ups after your visit        Your next 10 appointments already scheduled     Oct 15, 2018  9:30 AM CDT   Return Visit with Trina Vargas   [facility]    [address] [place] [address] [address]  [address]  [facility] [address]   [phone]            Oct 18, 2018  1:00 PM CDT   MA DIAGNOSTIC BILATERAL W/ KARIN with UCBCMA2   [facility] ([facility])    [address]  [facility] [id number] [phone]           How do I prepare for my exam? (Food and drink instructions) No Food and Drink Restrictions.  How do I prepare for my exam? (Other instructions) Do not use any powder, lotion or deodorant under your arms or on your breast. If you do, we will ask you to remove it before your exam.  What should I wear: Wear comfortable, two-piece clothing.  How long does the exam take: Most scans will take 15 minutes.  What should I bring: Bring any previous mammograms from other facilities or have them mailed to the breast center.  Do I need a :  No  is needed.  What do I need to tell my doctor: If you have any allergies, tell your care team.  What should I do after the exam: No restrictions, You may resume normal activities.  What is this test: This test is an x-ray of the breast to look for breast disease. The breast is pressed between two plates to flatten and spread the tissue. An X-ray is taken of the breast from different angles.  Who should I call with questions: If you  "have any questions, please call the Imaging Department where you will have your exam. Directions, parking instructions, and other information is available on our website, [email or web address].  Other information about my exam Three-dimensional (3D) mammograms are available at [facility] locations in [facility], [place], and [place]. [facility] locations include [place] and the [facility] in [place].  Benefits of 3D mammograms include: * Improved rate of cancer detection * Decreases your chance of having to go back for more tests, which means fewer: * \"False-positive\" results (This means that there is an abnormal area but it isn't cancer.) * Invasive testing procedures, such as a biopsy or surgery * Can provide clearer images of the breast if you have dense breast tissue.  *3D mammography is an optional exam that anyone can have with a 2D mammogram. It doesn't replace or take the place of a 2D mammogram. 2D mammograms remain an effective screening test for all women.  Not all insurance companies cover the cost of a 3D mammogram. Check with your insurance.            Oct 18, 2018  1:30 PM CDT   US BREAST BUSHRA LIMITED 1-3 QUAD with UCBCUS2   [facility] ([facility] [place])    [address] [facility] [address] [phone]           How do I prepare for my exam? (Food and drink instructions) No Food and Drink Restrictions.  How do I prepare for my exam? (Other instructions) You do not need to do anything special to prepare for your exam.  What should I wear: Wear comfortable clothes.  How long does the exam take: Most ultrasounds take 30 to 60 minutes.  What should I bring: Bring a list of your medicines, including vitamins, minerals and over-the-counter drugs. It is safest to leave personal items at home.  Do I need a :  No  is needed.  What do I need to tell my doctor: Tell your " doctor about any allergies you may have.  What should I do after the exam: No restrictions, You may resume normal activities.  What is this test: An ultrasound uses sound waves to make pictures of the body. Sound waves do not cause pain. The only discomfort may be the pressure of the wand against your skin or full bladder.  Who should I call with questions: If you have any questions, please call the Imaging Department where you will have your exam. Directions, parking instructions, and other information is available on our website, [email or web address].            Oct 24, 2018 11:00 AM CDT   Community Paramedic with Timo [facility] 1, Timo [facility] 2   [facility] ([facility])    [address]  [address]  [facility] [address]   [phone]            Nov 07, 2018  9:00 AM CST   Return Visit with MICHEAL Crowley CNP   [facility] ([facility])    [address]  [address]  [facility] [id number] [phone]            Nov 07, 2018  9:00 AM CST   [facility]    [address]  [address]  [facility] [address]   [phone]              Who to contact     If you have questions or need follow up information about today's clinic visit or your schedule please contact [facility] directly at [phone].  Normal or non-critical lab and imaging results will be communicated to you by MyChart, letter or phone within 4 business days after the clinic has received the results. If you do not hear from us within 7 days, please contact the clinic through MyChart or phone. If you have a critical or abnormal lab result, we will notify you by phone as soon as possible.  Submit refill requests through [employer] or call your  "pharmacy and they will forward the refill request to us. Please allow 3 business days for your refill to be completed.          Additional Information About Your Visit        MyChart Information     [employer] lets you send messages to your doctor, view your test results, renew your prescriptions, schedule appointments and more. To sign up, go to [email or web address] . Click on \"Log in\" on the left side of the screen, which will take you to the Welcome page. Then click on \"Sign up Now\" on the right side of the page.     You will be asked to enter the access code listed below, as well as some personal information. Please follow the directions to create your username and password.     Your access code is: [email or web address]  Expires: 2018  2:06 PM     Your access code will  in 90 days. If you need help or a new code, please call your [facility] clinic or [phone].        Care EveryWhere ID     This is your Care EveryWhere ID. This could be used by other organizations to access your [facility] medical records  [id number]         Blood Pressure from Last 3 Encounters:   10/11/18 108/76   10/10/18 142/88   18 102/82    Weight from Last 3 Encounters:   10/11/18 238 lb (108 kg)   10/10/18 236 lb (107 kg)   18 237 lb (107.5 kg)              We Performed the Following     C PSYCHOTHERAPY COMPLEX INTERACTIVE          Where to get your medicines      Some of these will need a paper prescription and others can be bought over the counter.  Ask your nurse if you have questions.     Bring a paper prescription for each of these medications     oxyCODONE-acetaminophen  MG per tablet          Primary Care Provider Office Phone # Fax #    MICHEAL Crowley [facility] [phone]       [address] [place] [address] [address] [place] [id number]        Goals        General    Transportation (pt-stated)     Notes - Note created  2018  9:56 AM by Elizabeth Aleman LSW    Goal Statement: I am in need of " transportation resources  Measure of Success: Patient will utilize transportation benefits  Supportive Steps to Achieve: SW will assist patient in obtaining transportation to medical appointments.   Date to Achieve By: Ongoing         Lifestyle    Patient achieves sleep pattern objective     Notes - Note created  9/25/2018 10:06 AM by Martha Sims, RN    #1-Goal Statement: patient will obtain and use CPAP machine nightly  Measure of Success: improved sleep  Supportive Steps to Achieve: RN CC obtained DME order from PCP  Barriers: poor follow up; current machine broken  Strengths: assistance from PCP/RN CC  Date to Achieve By: 11-1-18  Patient expressed understanding of goal: good            Equal Access to Services     WARREN RIVERA : Hadii casimiro zapata hadashtrina Soevangelina, waaxda luqadaha, qaybta kaalmada mary, emilee pardo . So [facility] [phone].    ATENCIÓN: Si habla español, tiene a christianson disposición servicios gratuitos de asistencia lingüística. Llame al [phone].    We comply with applicable federal civil rights laws and [place] laws. We do not discriminate on the basis of race, color, national origin, age, disability, sex, sexual orientation, or gender identity.            Thank you!     Thank you for choosing [facility]  for your care. Our goal is always to provide you with excellent care. Hearing back from our patients is one way we can continue to improve our services. Please take a few minutes to complete the written survey that you may receive in the mail after your visit with us. Thank you!             Your Updated Medication List - Protect others around you: Learn how to safely use, store and throw away your medicines at www.disposemymeds.org.          This list is accurate as of 10/10/18 11:59 PM.  Always use your most recent med list.                   Brand Name Dispense Instructions for use Diagnosis    albuterol (2.5 MG/3ML) 0.083%  neb solution     30 vial    Take 1 vial (2.5 mg) by nebulization every 6 hours as needed for shortness of breath / dyspnea or wheezing    Mild intermittent asthma with acute exacerbation       ASPIRIN NOT PRESCRIBED    INTENTIONAL    1 each    Please choose reason not prescribed, below    Aspirin intolerance       baclofen 10 MG tablet    LIORESAL    60 tablet    Take 1 tablet (10 mg) by mouth 2 times daily    Chronic pain syndrome       blood glucose calibration solution    no brand specified    1 each    Use to calibrate blood glucose monitor as directed.    Type 2 diabetes mellitus without complication, without long-term current use of insulin (H)       blood glucose lancets standard    no brand specified    100 each    Use to test blood sugar daily times daily or as directed.    Type 2 diabetes mellitus without complication, without long-term current use of insulin (H)       blood glucose monitoring meter device kit    no brand specified    1 kit    Use to test blood sugar larisa times daily or as directed.    Type 2 diabetes mellitus without complication, without long-term current use of insulin (H)       blood glucose monitoring test strip    no brand specified    100 strip    Use to test blood sugars once times daily or as directed    Type 2 diabetes mellitus without complication, without long-term current use of insulin (H)       cloNIDine 0.1 MG tablet    CATAPRES    60 tablet    Take 1 tablet (0.1 mg) by mouth 2 times daily    Hypertension goal BP (blood pressure) < 140/80, Opioid withdrawal (H)       clotrimazole 2 % Crea    GYNE-LOTRIMIN 3    1 Tube    Place 2 g vaginally daily    Yeast infection of the vagina       CULTURELLE DIGESTIVE HEALTH Caps     60 capsule    Take 1 capsule by mouth 2 times daily    Diarrhea, unspecified type       * cyanocobalamin 1000 MCG/ML injection    VITAMIN B12    1 mL    Inject 1 mL (1,000 mcg) Subcutaneous every 30 days    B12 deficiency       * cyanocobalamin 1000 MCG/ML  injection    VITAMIN B12    1 mL    Inject 1 mL (1,000 mcg) into the muscle every 30 days    Vitamin B12 deficiency (non anemic)       EPINEPHrine 0.3 MG/0.3ML injection 2-pack    EPIPEN/ADRENACLICK/or ANY BX GENERIC EQUIV    2 each    Inject 0.3 mLs (0.3 mg) into the muscle once as needed for anaphylaxis    Late effect of other and unspecified external causes       losartan-hydrochlorothiazide 100-25 MG per tablet    HYZAAR    90 tablet    Take 1 tablet by mouth daily    Hypertension goal BP (blood pressure) < 140/80       magnesium oxide 400 (241.3 Mg) MG tablet    MAG-OX    90 tablet    Take 1 tablet (400 mg) by mouth daily    Cramp of limb       naloxone nasal spray    NARCAN    0.2 mL    Spray 1 spray (4 mg) into one nostril alternating nostrils as needed for opioid reversal every 2-3 minutes until assistance arrives    Chronic pain syndrome       * ondansetron 4 MG ODT tab    ZOFRAN-ODT    10 tablet    Take 1 tablet (4 mg) by mouth every 8 hours as needed for nausea        * ondansetron 4 MG ODT tab    ZOFRAN-ODT    90 tablet    DISSOLVE ONE TABLET IN MOUTH EVERY 8 HOURS AS NEEDED FOR NAUSEA    Chronic pain syndrome       order for DME     1 Device    Right wrist splint for carpal tunnel syndrome  One* Use as directed at hour of sleep    Carpal tunnel syndrome of right wrist       order for DME     1 each    Equipment being ordered: CPAP with supplies    ALEXUS on CPAP       oxyCODONE-acetaminophen  MG per tablet    PERCOCET    28 tablet    Take 1 tablet by mouth 4 times daily    Carcinoma of colon metastatic to liver (H), 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), Renal cell carcinoma of right kidney (H)       polyethylene glycol powder    MIRALAX/GLYCOLAX    1581 g    TAKE 17 GRAMS (1 CAPFUL) BY MOUTH DAILY    Slow transit constipation       potassium chloride 10 MEQ tablet    K-TAB,KLOR-CON    120 tablet    Take 1 tablet (10 mEq) by mouth daily    Hypokalemia       sertraline 100 MG tablet     ZOLOFT    30 tablet    Take 1 tablet (100 mg) by mouth daily    Recurrent major depression in partial remission (H)       STATIN NOT PRESCRIBED (INTENTIONAL)     0 each    1 each daily Statin not prescribed intentionally due to Active liver disease (liver failure, cirrhosis, hepatitis) LIVER METS    Hyperlipidemia LDL goal <100, Type 2 diabetes mellitus without complication (H)       Vitamin D (Cholecalciferol) 1000 units Tabs     60 tablet    Take 2,000 Units by mouth daily    Vitamin D deficiency       * Notice:  This list has 4 medication(s) that are the same as other medications prescribed for you. Read the directions carefully, and ask your doctor or other care provider to review them with you.

## 2018-10-10 NOTE — MR AVS SNAPSHOT
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)        Renal cell carcinoma of right kidney (H)        Chronic pain syndrome        Alcohol abuse, episodic drinking behavior        Mild cognitive impairment          Care Instructions    We will help you get an appt with Oncology in the [facility] system      Please increase your fluids and you can start some Cranberry tabs or 100% cranberry juice     I will call you about your labs     We will give you only one week of your Percocet at a time if you run out we will not refill early      * BLADDER INFECTION,Female (Adult)    A bladder infection (\"cystitis\" or \"UTI\") usually causes a constant urge to urinate and a burning when passing urine. Urine may be cloudy, smelly or dark. There may be pain in the lower abdomen. A bladder infection occurs when bacteria from the vaginal area enter the bladder opening (urethra). This can occur from sexual intercourse, wearing tight clothing, dehydration and other factors.  HOME CARE:  1. Drink lots of fluids (at least 6-8 glasses a day, unless you must restrict fluids for other medical reasons). This will force the medicine into your urinary system and flush the bacteria out of your body. Cranberry juice has been shown to help clear out the bacteria.  2. Avoid sexual intercourse until your symptoms are gone.  3. A bladder infection is treated with antibiotics. You may also be given Pyridium (generic = phenazopyridine) to reduce the " burning sensation. This medicine will cause your urine to become a bright orange color. The orange urine may stain clothing. You may wear a pad or panty-liner to protect clothing.  PREVENTING FUTURE INFECTIONS:  1. Always wipe from front to back after a bowel movement.  2. Keep the genital area clean and dry.  3. Drink plenty of fluids each day to avoid dehydration.  4. Urinate right after intercourse to flush out the bladder.  5. Wear cotton underwear and cotton-lined panty hose; avoid tight-fitting pants.  6. If you are on birth control pills and are having frequent bladder infections, discuss with your doctor.  FOLLOW UP: Return to this facility or see your doctor if ALL symptoms are not gone after three days of treatment.  GET PROMPT MEDICAL ATTENTION if any of the following occur:    Fever over 101 F (38.3 C)    No improvement by the third day of treatment    Increasing back or abdominal pain    Repeated vomiting; unable to keep medicine down    Weakness, dizziness or fainting    Vaginal discharge    Pain, redness or swelling in the labia (outer vaginal area)    8723-1863 [employer]. [address] [place] [address]. All rights reserved. This information is not intended as a substitute for professional medical care. Always follow your healthcare professional's instructions.  This information has been modified by your health care provider with permission from the publisher.            Follow-ups after your visit        Follow-up notes from your care team     Return in about 4 weeks (around 11/7/2018) for co-visit with PCP and [facility].      Your next 10 appointments already scheduled     Oct 11, 2018  8:00 AM CDT   (Arrive by 7:45 AM)   New Patient Visit with Quyen Mae MD   [facility] ([facility])    [address]  [address]  [facility] [address] [phone]            Oct 15, 2018  9:30 AM CDT   Return Visit with Trina  Sam   [facility] ([facility])    [address] [place] [address] So  [address]  [facility] [address] [phone]            Oct 18, 2018  1:00 PM CDT   MA DIAGNOSTIC BILATERAL W/ KARIN with UCBCMA2   [facility] ([facility])    [address]  [facility] [id number] [phone]           How do I prepare for my exam? (Food and drink instructions) No Food and Drink Restrictions.  How do I prepare for my exam? (Other instructions) Do not use any powder, lotion or deodorant under your arms or on your breast. If you do, we will ask you to remove it before your exam.  What should I wear: Wear comfortable, two-piece clothing.  How long does the exam take: Most scans will take 15 minutes.  What should I bring: Bring any previous mammograms from other facilities or have them mailed to the breast center.  Do I need a :  No  is needed.  What do I need to tell my doctor: If you have any allergies, tell your care team.  What should I do after the exam: No restrictions, You may resume normal activities.  What is this test: This test is an x-ray of the breast to look for breast disease. The breast is pressed between two plates to flatten and spread the tissue. An X-ray is taken of the breast from different angles.  Who should I call with questions: If you have any questions, please call the Imaging Department where you will have your exam. Directions, parking instructions, and other information is available on our website, [email or web address].  Other information about my exam Three-dimensional (3D) mammograms are available at [place] locations in [facility], [facility], [place], and [place]. [facility] locations include [place] and the [facility] in [place].  Benefits of 3D mammograms include: * Improved rate of cancer detection * Decreases your  "chance of having to go back for more tests, which means fewer: * \"False-positive\" results (This means that there is an abnormal area but it isn't cancer.) * Invasive testing procedures, such as a biopsy or surgery * Can provide clearer images of the breast if you have dense breast tissue.  *3D mammography is an optional exam that anyone can have with a 2D mammogram. It doesn't replace or take the place of a 2D mammogram. 2D mammograms remain an effective screening test for all women.  Not all insurance companies cover the cost of a 3D mammogram. Check with your insurance.            Oct 18, 2018  1:30 PM CDT   US BREAST BUSHRA LIMITED 1-3 QUAD with UCBCUS2   [facility] ([facility])    [address]  [facility] [address] [phone]           How do I prepare for my exam? (Food and drink instructions) No Food and Drink Restrictions.  How do I prepare for my exam? (Other instructions) You do not need to do anything special to prepare for your exam.  What should I wear: Wear comfortable clothes.  How long does the exam take: Most ultrasounds take 30 to 60 minutes.  What should I bring: Bring a list of your medicines, including vitamins, minerals and over-the-counter drugs. It is safest to leave personal items at home.  Do I need a :  No  is needed.  What do I need to tell my doctor: Tell your doctor about any allergies you may have.  What should I do after the exam: No restrictions, You may resume normal activities.  What is this test: An ultrasound uses sound waves to make pictures of the body. Sound waves do not cause pain. The only discomfort may be the pressure of the wand against your skin or full bladder.  Who should I call with questions: If you have any questions, please call the Imaging Department where you will have your exam. Directions, parking instructions, and other information is available on our website, " "[email or web address].            Oct 24, 2018 11:00 AM CDT   Community Paramedic with [facility], [facility] Par[facility]   [facility] ([facility])    [address] [place] [address]  [address]  [facility] [address] [phone]              Who to contact     If you have questions or need follow up information about today's clinic visit or your schedule please contact [facility] directly at [phone].  Normal or non-critical lab and imaging results will be communicated to you by MyChart, letter or phone within 4 business days after the clinic has received the results. If you do not hear from us within 7 days, please contact the clinic through [employer] or phone. If you have a critical or abnormal lab result, we will notify you by phone as soon as possible.  Submit refill requests through [employer] or call your pharmacy and they will forward the refill request to us. Please allow 3 business days for your refill to be completed.          Additional Information About Your Visit        [employer] Information     [employer] lets you send messages to your doctor, view your test results, renew your prescriptions, schedule appointments and more. To sign up, go to [email or web address] . Click on \"Log in\" on the left side of the screen, which will take you to the Welcome page. Then click on \"Sign up Now\" on the right side of the page.     You will be asked to enter the access code listed below, as well as some personal information. Please follow the directions to create your username and password.     Your access code is: [email or web address]  Expires: 2018  2:06 PM     Your access code will  in 90 days. If you need help or a new code, please call your [facility] or [phone].        Care EveryWhere ID     This is your Care EveryWhere ID. This could be used by other organizations to access your [place] medical " records  [id number]        Your Vitals Were     Pulse Temperature Respirations Pulse Oximetry BMI (Body Mass Index)       77 97.2  F (36.2  C) (Oral) 14 99% 38.09 kg/m2        Blood Pressure from Last 3 Encounters:   10/10/18 142/88   09/26/18 102/82   09/25/18 112/82    Weight from Last 3 Encounters:   10/10/18 236 lb (107 kg)   09/25/18 237 lb (107.5 kg)   08/27/18 235 lb (106.6 kg)              We Performed the Following     Albumin Random Urine Quantitative with Creat Ratio     UA with Microscopic reflex to Culture     Vaccine Administration, Initial [[id number]]          Where to get your medicines      Some of these will need a paper prescription and others can be bought over the counter.  Ask your nurse if you have questions.     Bring a paper prescription for each of these medications     oxyCODONE-acetaminophen  MG per tablet         Information about OPIOIDS     PRESCRIPTION OPIOIDS: WHAT YOU NEED TO KNOW   We gave you an opioid (narcotic) pain medicine. It is important to manage your pain, but opioids are not always the best choice. You should first try all the other options your care team gave you. Take this medicine for as short a time (and as few doses) as possible.    Some activities can increase your pain, such as bandage changes or therapy sessions. It may help to take your pain medicine 30 to 60 minutes before these activities. Reduce your stress by getting enough sleep, working on hobbies you enjoy and practicing relaxation or meditation. Talk to your care team about ways to manage your pain beyond prescription opioids.    These medicines have risks:    DO NOT drive when on new or higher doses of pain medicine. These medicines can affect your alertness and reaction times, and you could be arrested for driving under the influence (DUI). If you need to use opioids long-term, talk to your care team about driving.    DO NOT operate heavy machinery    DO NOT do any other dangerous activities while  taking these medicines.    DO NOT drink any alcohol while taking these medicines.     If the opioid prescribed includes acetaminophen, DO NOT take with any other medicines that contain acetaminophen. Read all labels carefully. Look for the word  acetaminophen  or  Tylenol.  Ask your pharmacist if you have questions or are unsure.    You can get addicted to pain medicines, especially if you have a history of addiction (chemical, alcohol or substance dependence). Talk to your care team about ways to reduce this risk.    All opioids tend to cause constipation. Drink plenty of water and eat foods that have a lot of fiber, such as fruits, vegetables, prune juice, apple juice and high-fiber cereal. Take a laxative (Miralax, milk of magnesia, Colace, Senna) if you don t move your bowels at least every other day. Other side effects include upset stomach, sleepiness, dizziness, throwing up, tolerance (needing more of the medicine to have the same effect), physical dependence and slowed breathing.    Store your pills in a secure place, locked if possible. We will not replace any lost or stolen medicine. If you don t finish your medicine, please throw away (dispose) as directed by your pharmacist. The [place] Pollution [employer] has more information about safe disposal: https://www.pca.[facility].mn.us/living-green/managing-unwanted-medications         Primary Care Provider Office Phone # Fax #    MICHEAL Crowley [facility] [phone]       [address] [facility] [address]  [facility] [address]        Goals        General    Transportation (pt-stated)     Notes - Note created  7/11/2018  9:56 AM by Elizabeth Aleman LSW    Goal Statement: I am in need of transportation resources  Measure of Success: Patient will utilize transportation benefits  Supportive Steps to Achieve: SW will assist patient in obtaining transportation to medical appointments.   Date to Achieve By: Ongoing         Lifestyle    Patient  achieves sleep pattern objective     Notes - Note created  9/25/2018 10:06 AM by Martha Sims, RN    #1-Goal Statement: patient will obtain and use CPAP machine nightly  Measure of Success: improved sleep  Supportive Steps to Achieve: RN CC obtained DME order from PCP  Barriers: poor follow up; current machine broken  Strengths: assistance from PCP/RN CC  Date to Achieve By: 11-1-18  Patient expressed understanding of goal: good            Equal Access to Services     VALENTE RIVERA AH: Hadii aad ku hadasho Soomaali, waaxda luqadaha, qaybta kaalmada adeegyada, waxradha talbot chilozhao de la cruznimisha edvin . So [facility] [phone].    ATENCIÓN: Si habla español, tiene a christianson disposición servicios gratuitos de asistencia lingüística. Llame al [phone].    We comply with applicable federal civil rights laws and [place] laws. We do not discriminate on the basis of race, color, national origin, age, disability, sex, sexual orientation, or gender identity.            Thank you!     Thank you for choosing [facility] [place] [facility]  for your care. Our goal is always to provide you with excellent care. Hearing back from our patients is one way we can continue to improve our services. Please take a few minutes to complete the written survey that you may receive in the mail after your visit with us. Thank you!             Your Updated Medication List - Protect others around you: Learn how to safely use, store and throw away your medicines at www.disposemymeds.org.          This list is accurate as of 10/10/18 11:25 AM.  Always use your most recent med list.                   Brand Name Dispense Instructions for use Diagnosis    albuterol (2.5 MG/3ML) 0.083% neb solution     30 vial    Take 1 vial (2.5 mg) by nebulization every 6 hours as needed for shortness of breath / dyspnea or wheezing    Mild intermittent asthma with acute exacerbation       ASPIRIN NOT PRESCRIBED    INTENTIONAL    1 each    Please choose  reason not prescribed, below    Aspirin intolerance       baclofen 10 MG tablet    LIORESAL    60 tablet    Take 1 tablet (10 mg) by mouth 2 times daily    Chronic pain syndrome       blood glucose calibration solution    no brand specified    1 each    Use to calibrate blood glucose monitor as directed.    Type 2 diabetes mellitus without complication, without long-term current use of insulin (H)       blood glucose lancets standard    no brand specified    100 each    Use to test blood sugar daily times daily or as directed.    Type 2 diabetes mellitus without complication, without long-term current use of insulin (H)       blood glucose monitoring meter device kit    no brand specified    1 kit    Use to test blood sugar larisa times daily or as directed.    Type 2 diabetes mellitus without complication, without long-term current use of insulin (H)       blood glucose monitoring test strip    no brand specified    100 strip    Use to test blood sugars once times daily or as directed    Type 2 diabetes mellitus without complication, without long-term current use of insulin (H)       cloNIDine 0.1 MG tablet    CATAPRES    60 tablet    Take 1 tablet (0.1 mg) by mouth 2 times daily    Hypertension goal BP (blood pressure) < 140/80, Opioid withdrawal (H)       clotrimazole 2 % Crea    GYNE-LOTRIMIN 3    1 Tube    Place 2 g vaginally daily    Yeast infection of the vagina       CULTUREE DIGESTIVE HEALTH Caps     60 capsule    Take 1 capsule by mouth 2 times daily    Diarrhea, unspecified type       * cyanocobalamin 1000 MCG/ML injection    VITAMIN B12    1 mL    Inject 1 mL (1,000 mcg) Subcutaneous every 30 days    B12 deficiency       * cyanocobalamin 1000 MCG/ML injection    VITAMIN B12    1 mL    Inject 1 mL (1,000 mcg) into the muscle every 30 days    Vitamin B12 deficiency (non anemic)       EPINEPHrine 0.3 MG/0.3ML injection 2-pack    EPIPEN/ADRENACLICK/or ANY BX GENERIC EQUIV    2 each    Inject 0.3 mLs (0.3 mg)  into the muscle once as needed for anaphylaxis    Late effect of other and unspecified external causes       losartan-hydrochlorothiazide 100-25 MG per tablet    HYZAAR    90 tablet    Take 1 tablet by mouth daily    Hypertension goal BP (blood pressure) < 140/80       magnesium oxide 400 (241.3 Mg) MG tablet    MAG-OX    90 tablet    Take 1 tablet (400 mg) by mouth daily    Cramp of limb       naloxone nasal spray    NARCAN    0.2 mL    Spray 1 spray (4 mg) into one nostril alternating nostrils as needed for opioid reversal every 2-3 minutes until assistance arrives    Chronic pain syndrome       * ondansetron 4 MG ODT tab    ZOFRAN-ODT    10 tablet    Take 1 tablet (4 mg) by mouth every 8 hours as needed for nausea        * ondansetron 4 MG ODT tab    ZOFRAN-ODT    90 tablet    DISSOLVE ONE TABLET IN MOUTH EVERY 8 HOURS AS NEEDED FOR NAUSEA    Chronic pain syndrome       order for DME     1 Device    Right wrist splint for carpal tunnel syndrome  One* Use as directed at hour of sleep    Carpal tunnel syndrome of right wrist       order for DME     1 each    Equipment being ordered: CPAP with supplies    ALEXUS on CPAP       oxyCODONE-acetaminophen  MG per tablet    PERCOCET    28 tablet    Take 1 tablet by mouth 4 times daily    Carcinoma of colon metastatic to liver (H), 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), Renal cell carcinoma of right kidney (H)       polyethylene glycol powder    MIRALAX/GLYCOLAX    1581 g    TAKE 17 GRAMS (1 CAPFUL) BY MOUTH DAILY    Slow transit constipation       potassium chloride 10 MEQ tablet    K-TAB,KLOR-CON    120 tablet    Take 1 tablet (10 mEq) by mouth daily    Hypokalemia       sertraline 100 MG tablet    ZOLOFT    30 tablet    Take 1 tablet (100 mg) by mouth daily    Recurrent major depression in partial remission (H)       STATIN NOT PRESCRIBED (INTENTIONAL)     0 each    1 each daily Statin not prescribed intentionally due to Active liver disease (liver  failure, cirrhosis, hepatitis) LIVER METS    Hyperlipidemia LDL goal <100, Type 2 diabetes mellitus without complication (H)       Vitamin D (Cholecalciferol) 1000 units Tabs     60 tablet    Take 2,000 Units by mouth daily    Vitamin D deficiency       * Notice:  This list has 4 medication(s) that are the same as other medications prescribed for you. Read the directions carefully, and ask your doctor or other care provider to review them with you.

## 2018-10-10 NOTE — PROGRESS NOTES
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 Drug  Screen Comprehensive , Urine with         Reported Meds ([facility]) (Pain Care Package)        Drug screens ordered     (R82.90) Nonspecific finding on examination of urine  Comment: suspect UTI  Plan: Urine Culture Aerobic Bacterial        Labs ordered       Patient Instructions   We will help you get an appt with Oncology in the [facility] system      Please increase your fluids and you can start some Cranberry tabs or 100% cranberry juice     I will call you about your labs     We will give you only one week of your Percocet at a time if you run out we will not refill early      * BLADDER INFECTION,Female (Adult)    A bladder infection (\"cystitis\" or \"UTI\") usually causes a constant urge to urinate and a burning when passing urine. Urine may be cloudy, smelly or dark. There may be pain in the lower abdomen. A bladder infection occurs when bacteria from the vaginal area enter the bladder opening (urethra). This can occur from sexual intercourse, wearing tight clothing, dehydration and other factors.  HOME CARE:  1. Drink lots of fluids (at least 6-8 glasses a day, unless you must restrict fluids for other medical reasons). This will force the medicine into your urinary system and flush the bacteria out of your body. Cranberry juice has been shown to help clear out the bacteria.  2. Avoid sexual intercourse until your symptoms are " gone.  3. A bladder infection is treated with antibiotics. You may also be given Pyridium (generic = phenazopyridine) to reduce the burning sensation. This medicine will cause your urine to become a bright orange color. The orange urine may stain clothing. You may wear a pad or panty-liner to protect clothing.  PREVENTING FUTURE INFECTIONS:  1. Always wipe from front to back after a bowel movement.  2. Keep the genital area clean and dry.  3. Drink plenty of fluids each day to avoid dehydration.  4. Urinate right after intercourse to flush out the bladder.  5. Wear cotton underwear and cotton-lined panty hose; avoid tight-fitting pants.  6. If you are on birth control pills and are having frequent bladder infections, discuss with your doctor.  FOLLOW UP: Return to this facility or see your doctor if ALL symptoms are not gone after three days of treatment.  GET PROMPT MEDICAL ATTENTION if any of the following occur:    Fever over 101 F (38.3 C)    No improvement by the third day of treatment    Increasing back or abdominal pain    Repeated vomiting; unable to keep medicine down    Weakness, dizziness or fainting    Vaginal discharge    Pain, redness or swelling in the labia (outer vaginal area)    7151-5092 [employer]. [address] [place] [address], [place] [address]. All rights reserved. This information is not intended as a substitute for professional medical care. Always follow your healthcare professional's instructions.  This information has been modified by your health care provider with permission from the publisher.        MICHEAL Crowley [facility] PRIMARY CARE          Injectable Influenza Immunization Documentation    1.  Is the person to be vaccinated sick today?   No    2. Does the person to be vaccinated have an allergy to a component   of the vaccine?   No  Egg Allergy Algorithm Link    3. Has the person to be vaccinated ever had a serious reaction   to  influenza vaccine in the past?   No    4. Has the person to be vaccinated ever had Guillain-Barré syndrome?   No    Form completed by Ligia BURTON

## 2018-10-10 NOTE — PROGRESS NOTES
Clinic Care Coordination Contact  Care Team Conversations    Met with patient at clinic office visit per request of PCP. Patient still without CPAP machine. Chart reviewed. I had contacted Arlys at  [employer] and she was going to follow up with patient. Confirmed that patient's phone number is correct in Epic, but she does acknowledge that she has been having problems with her phone lately. Called  [employer] and left msg for Arlys to call me back to discuss obtaining new CPAP machine.     Carla Sims R.N.  Clinic Care Coordinator  [facility]  [phone]

## 2018-10-10 NOTE — PROGRESS NOTES
[facility] - Integrated Primary Care   October 10, 2018      Behavioral Health Clinician Progress Note    Patient Name: Ines Mott           Service Type:  Individual      Service Location:   Face to Face in Clinic      Session Start Time: 8:35 a.m.  Session End Time: 9:25 a.m.      Session Length: 38 - 52      Attendees: Patient and PCP     Visit Activities (Refresh list every visit): [facility]    Diagnostic Assessment Date: to be completed at next visit  Treatment Plan Review Date: to be completed at next visit  See Flowsheets for today's PHQ-9 and RASTA-7 results  Previous PHQ-9:   PHQ-9 SCORE 5/11/2018 5/18/2018 7/2/2018   Total Score - - -   Total Score MyChart 9 (Mild depression) - -   Total Score 9 7 8     Previous RASTA-7:   RASTA-7 SCORE 8/24/2015 11/4/2016 12/5/2017   Total Score - - 3 (minimal anxiety)   Total Score 7 9 3       GONZÁLEZ LEVEL:  No flowsheet data found.    DATA  Extended Session (60+ minutes): No  Interactive Complexity: Yes, visit entailed Interactive Complexity evidenced by:  -The need to manage maladaptive communication (related to, e.g., high anxiety, high reactivity, repeated questions, or disagreement) among participants that complicates delivery of care  Crisis: No  [facility] Patient: No    Treatment Objective(s) Addressed in This Session:  Target Behavior(s): disease management/lifestyle changes cancer    Adjustment Difficulties: will develop coping/problem-solving skills to facilitate more adaptive adjustment  Psychological distress related to Chronic Disease Management    Current Stressors / Issues:    [facility] co-visit with patient and PCP in the clinic. Patient reports her pain continues as she is out of medication. She states she is managing urges to drink by staying away from alcohol and resting as much as possible. Her motivation is to avoid further conflict with her  and to maintain housing. Patient endorsed worry about the future, specifically when her  leaves for  [place] in December. She is dependent on him to help with transportation to appointments and for help around the house. Patient is aware that her insurance is reinstated, and feels relieved that she will be able to get a CPAP, and keep prescriptions filled. Patient acknowledged difficulty taking opiates as prescribed, and states she understands the risk and concerns around taking too much. Patient does not think she has a problem, and states she needs this medication to control her pain. She is willing to consider alternative interventions, and states she will keep the upcoming scheduled palliative care and therapy visits.  Patient states she is sober and that she can stop using drugs and alcohol on her own.  Patient denied SI/SIB thoughts or urges. She states she has one sober support person she call if needed. She agree to follow the recommendations of her care team.     Progress on Treatment Objective(s) / Homework:  New Objective established this session - PREPARATION (Decided to change - considering how); Intervened by negotiating a change plan and determining options / strategies for behavior change, identifying triggers, exploring social supports, and working towards setting a date to begin behavior change    Motivational Interviewing    MI Intervention: Expressed Empathy/Understanding, Supported Autonomy, Collaboration, Evocation, Permission to raise concern or advise, Open-ended questions and Reflections: simple and complex     Change Talk Expressed by the Patient: Ability to change Reasons to change Committment to change    Provider Response to Change Talk: E - Evoked more info from patient about behavior change, A - Affirmed patient's thoughts, decisions, or attempts at behavior change, R - Reflected patient's change talk and S - Summarized patient's change talk statements    Also provided psychoeducation about behavioral health condition, symptoms, and treatment options    Care Plan review completed:  "No    Medication Review:  No changes to current psychiatric medication(s) - zoloft, clonidine    Medication Compliance:  Yes    Changes in Health Issues:   Yes: Chronic disease management, Associated Psychological Distress  Cancer, Please see medical note by PCP MICHEAL Gaming, CNP    Chemical Use Review:   Substance Use: decrease in alcohol  and non prescription drugs .  Client reports frequency of use no alcohol or non-prescribed drugs.  Action  Provided support and affirmation for steps taken towards sobriety    Patient also has a hx of polysubstance dependence; drugs of choice: heroin, opiates, alcohol - currently using alcohol and opiates. Reports intent to stop using alcohol \"because then things get crazy\".     Tobacco Use: No current tobacco use.      Assessment: Current Emotional / Mental Status (status of significant symptoms):  Risk status (Self / Other harm or suicidal ideation)  Patient has had a history of suicidal ideation: \"comes and goes\" and suicide attempts: most recent attempt 10/21/14, pt attempted to overdose on methamphetamine; attempted in the past                                                                                           Patient denies current fears or concerns for personal safety.  Patient denies current or recent suicidal ideation or behaviors.  Patient denies current or recent homicidal ideation or behaviors.  Patient denies current or recent self injurious behavior or ideation.  Patient denies other safety concerns.  A safety and risk management plan has not been developed at this time, however patient was encouraged to call [facility] should there be a change in any of these risk factors.    Appearance:   Appropriate   Eye Contact:   Good   Psychomotor Behavior: Normal   Attitude:   Cooperative   Orientation:   All  Speech   Rate / Production: Normal    Volume:  Normal   Mood:    Anxious  Depressed   Affect:    Labile   Thought Content:  Clear   Thought " Form:  Coherent   Insight:    Fair     Diagnoses:  1. Posttraumatic stress disorder        Collateral Reports Completed:  Not Applicable    Plan: (Homework, other):  Patient was given information about behavioral services and encouraged to schedule a follow up appointment with the clinic [facility] in 2 weeks . Patient will take medications as prescribed. She was also given information about mental health symptoms and treatment options  and Cognitive Behavioral Therapy skills to practice when experiencing anxiety and depression. CD Recommendations: Maintain Sobriety.    MARTIN Palma, [facility]

## 2018-10-11 ENCOUNTER — OFFICE VISIT (OUTPATIENT)
Dept: PALLIATIVE CARE | Facility: CLINIC | Age: 53
End: 2018-10-11
Attending: INTERNAL MEDICINE
Payer: COMMERCIAL

## 2018-10-11 VITALS
WEIGHT: 238 LBS | TEMPERATURE: 97.5 F | RESPIRATION RATE: 14 BRPM | OXYGEN SATURATION: 98 % | HEART RATE: 67 BPM | DIASTOLIC BLOOD PRESSURE: 76 MMHG | SYSTOLIC BLOOD PRESSURE: 108 MMHG | HEIGHT: 66 IN | BODY MASS INDEX: 38.25 KG/M2

## 2018-10-11 DIAGNOSIS — C7B.09 METASTATIC CARCINOID TUMOR TO INTRA-ABDOMINAL SITE (H): Primary | ICD-10-CM

## 2018-10-11 DIAGNOSIS — G89.4 CHRONIC PAIN SYNDROME: ICD-10-CM

## 2018-10-11 DIAGNOSIS — M79.7 FIBROMYALGIA: ICD-10-CM

## 2018-10-11 DIAGNOSIS — G89.3 CANCER ASSOCIATED PAIN: ICD-10-CM

## 2018-10-11 DIAGNOSIS — C64.9 RENAL CELL CARCINOMA, UNSPECIFIED LATERALITY (H): ICD-10-CM

## 2018-10-11 LAB
CREAT UR-MCNC: 222 MG/DL
MICROALBUMIN UR-MCNC: 129 MG/L
MICROALBUMIN/CREAT UR: 58.11 MG/G CR (ref 0–25)

## 2018-10-11 PROCEDURE — G0463 HOSPITAL OUTPT CLINIC VISIT: HCPCS | Mod: ZF

## 2018-10-11 PROCEDURE — 99204 OFFICE O/P NEW MOD 45 MIN: CPT | Mod: ZP | Performed by: INTERNAL MEDICINE

## 2018-10-11 ASSESSMENT — ASTHMA QUESTIONNAIRES: ACT_TOTALSCORE: 18

## 2018-10-11 ASSESSMENT — PAIN SCALES - GENERAL: PAINLEVEL: NO PAIN (0)

## 2018-10-11 NOTE — MR AVS SNAPSHOT
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 [id number] [phone]            Oct 18, 2018  1:00 PM CDT   MA DIAGNOSTIC BILATERAL W/ KARIN with UCBCMA2   [facility] ([facility])    [address]  [facility] [id number] [phone]           How do I prepare for my exam? (Food and drink instructions) No Food and Drink Restrictions.  How do I prepare for my exam? (Other instructions) Do not use any powder, lotion or deodorant under your arms or on your breast. If you do, we will ask you to remove it before your exam.  What should I wear: Wear comfortable, two-piece clothing.  How long does the exam take: Most scans will take 15 minutes.  What should I bring: Bring any previous mammograms from other facilities or have them mailed to the breast center.  Do I need a :  No  is needed.  What do I need to tell my doctor: If you have any allergies, tell your care team.  What should I do after the exam: No restrictions, You may resume normal activities.  What is this test: This test is an x-ray of the breast to look for breast disease. The breast is pressed between two plates to flatten and spread the tissue.  "An X-ray is taken of the breast from different angles.  Who should I call with questions: If you have any questions, please call the Imaging Department where you will have your exam. Directions, parking instructions, and other information is available on our website, [email or web address].  Other information about my exam Three-dimensional (3D) mammograms are available at [place] locations in [facility], [place], [place], [facility], [place], and [place]. [facility] locations include [place] and [facility] in [place].  Benefits of 3D mammograms include: * Improved rate of cancer detection * Decreases your chance of having to go back for more tests, which means fewer: * \"False-positive\" results (This means that there is an abnormal area but it isn't cancer.) * Invasive testing procedures, such as a biopsy or surgery * Can provide clearer images of the breast if you have dense breast tissue.  *3D mammography is an optional exam that anyone can have with a 2D mammogram. It doesn't replace or take the place of a 2D mammogram. 2D mammograms remain an effective screening test for all women.  Not all insurance companies cover the cost of a 3D mammogram. Check with your insurance.            Oct 18, 2018  1:30 PM CDT   US BREAST BUSHRA LIMITED 1-3 QUAD with UCBCUS2   [facility] ([facility])    [address] [place] [address], [address] [facility] [address] [phone]           How do I prepare for my exam? (Food and drink instructions) No Food and Drink Restrictions.  How do I prepare for my exam? (Other instructions) You do not need to do anything special to prepare for your exam.  What should I wear: Wear comfortable clothes.  How long does the exam take: Most ultrasounds take 30 to 60 minutes.  What should I bring: Bring a list of your medicines, including vitamins, minerals and over-the-counter drugs. It is safest to leave personal items " at home.  Do I need a :  No  is needed.  What do I need to tell my doctor: Tell your doctor about any allergies you may have.  What should I do after the exam: No restrictions, You may resume normal activities.  What is this test: An ultrasound uses sound waves to make pictures of the body. Sound waves do not cause pain. The only discomfort may be the pressure of the wand against your skin or full bladder.  Who should I call with questions: If you have any questions, please call the Imaging Department where you will have your exam. Directions, parking instructions, and other information is available on our website, [email or web address].            Oct 24, 2018 11:00 AM CDT   Community Paramedic with Timo Community Paramedic 1, Timo Community Paramedic 2   [facility] ([facility])    [address]  [address]  [facility] [address]   [phone]            Nov 07, 2018  9:00 AM CST   Return Visit with MICHEAL Crowley CNP   [facility] ([facility])    [address]  [address]  [facility] [address]   [phone]            Nov 07, 2018  9:00 AM CST   Return Visit with MARTIN Pineda   [facility] ([facility])    [address]  [address]  [facility] [address]   [phone]              Who to contact     If you have questions or need follow up information about today's clinic visit or your schedule please contact [facility] directly at [phone].  Normal or non-critical lab and imaging results will be communicated to you by MyChart, letter or phone within 4 business days after the clinic has received the results. If you do not hear from us within 7 days, please contact the clinic through MyChart or phone. If you have a critical or abnormal lab result, we will notify  "you by phone as soon as possible.  Submit refill requests through [facility] or call your pharmacy and they will forward the refill request to us. Please allow 3 business days for your refill to be completed.          Additional Information About Your Visit        [employer] Information     [facility] lets you send messages to your doctor, view your test results, renew your prescriptions, schedule appointments and more. To sign up, go to [email or web address] . Click on \"Log in\" on the left side of the screen, which will take you to the Welcome page. Then click on \"Sign up Now\" on the right side of the page.     You will be asked to enter the access code listed below, as well as some personal information. Please follow the directions to create your username and password.     Your access code is: [email or web address]  Expires: 2018  2:06 PM     Your access code will  in 90 days. If you need help or a new code, please call your [facility] clinic or [phone].        Care EveryWhere ID     This is your Care EveryWhere ID. This could be used by other organizations to access your [facility] medical records  [id number]        Your Vitals Were     Pulse Temperature Respirations Height Pulse Oximetry BMI (Body Mass Index)    67 97.5  F (36.4  C) (Oral) 14 1.676 m (5' 5.98\") 98% 38.43 kg/m2       Blood Pressure from Last 3 Encounters:   10/11/18 108/76   10/10/18 142/88   18 102/82    Weight from Last 3 Encounters:   10/11/18 108 kg (238 lb)   10/10/18 107 kg (236 lb)   18 107.5 kg (237 lb)              Today, you had the following     No orders found for display       Primary Care Provider Office Phone # Fax #    MICHEAL Crowley [facility] [phone]       [address]  [facility] [address]        Goals        General    Transportation (pt-stated)     Notes - Note created  2018  9:56 AM by Elizabeth Aleman LSW    Goal Statement: I am in need of transportation " resources  Measure of Success: Patient will utilize transportation benefits  Supportive Steps to Achieve: SW will assist patient in obtaining transportation to medical appointments.   Date to Achieve By: Ongoing         Lifestyle    Patient achieves sleep pattern objective     Notes - Note created  9/25/2018 10:06 AM by Martha Sims, RN    #1-Goal Statement: patient will obtain and use CPAP machine nightly  Measure of Success: improved sleep  Supportive Steps to Achieve: RN CC obtained DME order from PCP  Barriers: poor follow up; current machine broken  Strengths: assistance from PCP/RN CC  Date to Achieve By: 11-1-18  Patient expressed understanding of goal: good            Equal Access to Services     VALENTE RIVERA : Hadmichelle keller Soevangelina, waaxda luqadaha, qaybjulito kaalmakandy hernandez, emilee pardo . So [facility] [phone].    ATENCIÓN: Si habla español, tiene a christianson disposición servicios gratuitos de asistencia lingüística. Llame al [phone].    We comply with applicable federal civil rights laws and [place] laws. We do not discriminate on the basis of race, color, national origin, age, disability, sex, sexual orientation, or gender identity.            Thank you!     Thank you for choosing [facility]  for your care. Our goal is always to provide you with excellent care. Hearing back from our patients is one way we can continue to improve our services. Please take a few minutes to complete the written survey that you may receive in the mail after your visit with us. Thank you!             Your Updated Medication List - Protect others around you: Learn how to safely use, store and throw away your medicines at www.disposemymeds.org.          This list is accurate as of 10/11/18 11:59 PM.  Always use your most recent med list.                   Brand Name Dispense Instructions for use Diagnosis    albuterol (2.5 MG/3ML) 0.083% neb solution     30 vial     Take 1 vial (2.5 mg) by nebulization every 6 hours as needed for shortness of breath / dyspnea or wheezing    Mild intermittent asthma with acute exacerbation       ASPIRIN NOT PRESCRIBED    INTENTIONAL    1 each    Please choose reason not prescribed, below    Aspirin intolerance       baclofen 10 MG tablet    LIORESAL    60 tablet    Take 1 tablet (10 mg) by mouth 2 times daily    Chronic pain syndrome       blood glucose calibration solution    no brand specified    1 each    Use to calibrate blood glucose monitor as directed.    Type 2 diabetes mellitus without complication, without long-term current use of insulin (H)       blood glucose lancets standard    no brand specified    100 each    Use to test blood sugar daily times daily or as directed.    Type 2 diabetes mellitus without complication, without long-term current use of insulin (H)       blood glucose monitoring meter device kit    no brand specified    1 kit    Use to test blood sugar larisa times daily or as directed.    Type 2 diabetes mellitus without complication, without long-term current use of insulin (H)       blood glucose monitoring test strip    no brand specified    100 strip    Use to test blood sugars once times daily or as directed    Type 2 diabetes mellitus without complication, without long-term current use of insulin (H)       cloNIDine 0.1 MG tablet    CATAPRES    60 tablet    Take 1 tablet (0.1 mg) by mouth 2 times daily    Hypertension goal BP (blood pressure) < 140/80, Opioid withdrawal (H)       clotrimazole 2 % Crea    GYNE-LOTRIMIN 3    1 Tube    Place 2 g vaginally daily    Yeast infection of the vagina       CULTURELLE DIGESTIVE HEALTH Caps     60 capsule    Take 1 capsule by mouth 2 times daily    Diarrhea, unspecified type       * cyanocobalamin 1000 MCG/ML injection    VITAMIN B12    1 mL    Inject 1 mL (1,000 mcg) Subcutaneous every 30 days    B12 deficiency       * cyanocobalamin 1000 MCG/ML injection    VITAMIN B12    1  mL    Inject 1 mL (1,000 mcg) into the muscle every 30 days    Vitamin B12 deficiency (non anemic)       EPINEPHrine 0.3 MG/0.3ML injection 2-pack    EPIPEN/ADRENACLICK/or ANY BX GENERIC EQUIV    2 each    Inject 0.3 mLs (0.3 mg) into the muscle once as needed for anaphylaxis    Late effect of other and unspecified external causes       losartan-hydrochlorothiazide 100-25 MG per tablet    HYZAAR    90 tablet    Take 1 tablet by mouth daily    Hypertension goal BP (blood pressure) < 140/80       magnesium oxide 400 (241.3 Mg) MG tablet    MAG-OX    90 tablet    Take 1 tablet (400 mg) by mouth daily    Cramp of limb       naloxone nasal spray    NARCAN    0.2 mL    Spray 1 spray (4 mg) into one nostril alternating nostrils as needed for opioid reversal every 2-3 minutes until assistance arrives    Chronic pain syndrome       * ondansetron 4 MG ODT tab    ZOFRAN-ODT    10 tablet    Take 1 tablet (4 mg) by mouth every 8 hours as needed for nausea        * ondansetron 4 MG ODT tab    ZOFRAN-ODT    90 tablet    DISSOLVE ONE TABLET IN MOUTH EVERY 8 HOURS AS NEEDED FOR NAUSEA    Chronic pain syndrome       order for DME     1 Device    Right wrist splint for carpal tunnel syndrome  One* Use as directed at hour of sleep    Carpal tunnel syndrome of right wrist       order for DME     1 each    Equipment being ordered: CPAP with supplies    ALEXUS on CPAP       oxyCODONE-acetaminophen  MG per tablet    PERCOCET    28 tablet    Take 1 tablet by mouth 4 times daily    Carcinoma of colon metastatic to liver (H), 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), Renal cell carcinoma of right kidney (H)       polyethylene glycol powder    MIRALAX/GLYCOLAX    1581 g    TAKE 17 GRAMS (1 CAPFUL) BY MOUTH DAILY    Slow transit constipation       potassium chloride 10 MEQ tablet    K-TAB,KLOR-CON    120 tablet    Take 1 tablet (10 mEq) by mouth daily    Hypokalemia       sertraline 100 MG tablet    ZOLOFT    30 tablet    Take  1 tablet (100 mg) by mouth daily    Recurrent major depression in partial remission (H)       STATIN NOT PRESCRIBED (INTENTIONAL)     0 each    1 each daily Statin not prescribed intentionally due to Active liver disease (liver failure, cirrhosis, hepatitis) LIVER METS    Hyperlipidemia LDL goal <100, Type 2 diabetes mellitus without complication (H)       Vitamin D (Cholecalciferol) 1000 units Tabs     60 tablet    Take 2,000 Units by mouth daily    Vitamin D deficiency       * Notice:  This list has 4 medication(s) that are the same as other medications prescribed for you. Read the directions carefully, and ask your doctor or other care provider to review them with you.

## 2018-10-11 NOTE — NURSING NOTE
"Oncology Rooming Note    October 11, 2018 8:10 AM   Ines Mott is a 53 year old female who presents for:    No chief complaint on file.    Initial Vitals: /76 (BP Location: Right arm, Patient Position: Chair, Cuff Size: Adult Large)  Pulse 67  Temp 97.5  F (36.4  C) (Oral)  Resp 14  Ht 1.676 m (5' 5.98\")  Wt 108 kg (238 lb)  SpO2 98%  BMI 38.43 kg/m2 Estimated body mass index is 38.43 kg/(m^2) as calculated from the following:    Height as of this encounter: 1.676 m (5' 5.98\").    Weight as of this encounter: 108 kg (238 lb). Body surface area is 2.24 meters squared.  No Pain (0) Comment: Data Unavailable   No LMP recorded. Patient has had a hysterectomy.  Allergies reviewed: Yes  Medications reviewed: Yes    Medications: MEDICATION REFILLS NEEDED TODAY. Provider was notified.  Pharmacy name entered into [facility]: [facility] - [address]    Clinical concerns: Lisinopril and Zoloft need to be refilled. Tu was notified.    10 minutes for nursing intake (face to face time)     Saji Gallegos LPN              "
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
## 2018-10-12 ENCOUNTER — TELEPHONE (OUTPATIENT)
Dept: FAMILY MEDICINE | Facility: CLINIC | Age: 53
End: 2018-10-12

## 2018-10-12 DIAGNOSIS — N39.0 URINARY TRACT INFECTION: Primary | ICD-10-CM

## 2018-10-12 DIAGNOSIS — N30.00 ACUTE CYSTITIS WITHOUT HEMATURIA: Primary | ICD-10-CM

## 2018-10-12 LAB
BACTERIA SPEC CULT: ABNORMAL
BACTERIA SPEC CULT: ABNORMAL
SPECIMEN SOURCE: ABNORMAL

## 2018-10-12 RX ORDER — CEFDINIR 300 MG/1
600 CAPSULE ORAL DAILY
Qty: 20 CAPSULE | Refills: 0 | Status: SHIPPED | OUTPATIENT
Start: 2018-10-12 | End: 2018-10-29

## 2018-10-12 RX ORDER — CEFPODOXIME PROXETIL 200 MG/1
200 TABLET, FILM COATED ORAL 2 TIMES DAILY
Qty: 10 TABLET | Refills: 0 | Status: SHIPPED | OUTPATIENT
Start: 2018-10-12 | End: 2018-10-29

## 2018-10-12 NOTE — TELEPHONE ENCOUNTER
Positive UA, and UC consulted with ID so prescribed cefpodoxime, E Coli resistant to all other oral antibiotics,  some possible allergy rxn's patient informed and will report to ED if SOB,     Ligia COOPER

## 2018-10-12 NOTE — TELEPHONE ENCOUNTER
Call to Jessica in [place] [facility] Re: alternative antibiotics to Vantin, her recommendation is Cefdinir 600 mg cap  every day x 10 days. Verbal order given from PCP to change to this. Sent to Pharmacy.  Marika Frost RN

## 2018-10-12 NOTE — TELEPHONE ENCOUNTER
Sandra from [facility] pharmacy called requesting an alternative to cefpodoxime (VANTIN) 200 MG tablet. Pt's insurance does not cover this.     Mercedes Schmidt

## 2018-10-15 ENCOUNTER — HOSPITAL ENCOUNTER (EMERGENCY)
Facility: CLINIC | Age: 53
Discharge: HOME | End: 2018-10-15
Attending: NURSE PRACTITIONER | Admitting: NURSE PRACTITIONER
Payer: COMMERCIAL

## 2018-10-15 ENCOUNTER — TELEPHONE (OUTPATIENT)
Dept: FAMILY MEDICINE | Facility: CLINIC | Age: 53
End: 2018-10-15

## 2018-10-15 ENCOUNTER — OFFICE VISIT (OUTPATIENT)
Dept: BEHAVIORAL HEALTH | Facility: CLINIC | Age: 53
End: 2018-10-15
Payer: COMMERCIAL

## 2018-10-15 VITALS
HEIGHT: 66 IN | TEMPERATURE: 97.8 F | WEIGHT: 236 LBS | BODY MASS INDEX: 37.93 KG/M2 | OXYGEN SATURATION: 100 % | SYSTOLIC BLOOD PRESSURE: 141 MMHG | RESPIRATION RATE: 18 BRPM | DIASTOLIC BLOOD PRESSURE: 92 MMHG | HEART RATE: 90 BPM

## 2018-10-15 DIAGNOSIS — T78.40XA ALLERGIC REACTION, INITIAL ENCOUNTER: ICD-10-CM

## 2018-10-15 DIAGNOSIS — F43.10 POSTTRAUMATIC STRESS DISORDER: Primary | Chronic | ICD-10-CM

## 2018-10-15 DIAGNOSIS — T78.2XXA ANAPHYLACTIC REACTION: Primary | ICD-10-CM

## 2018-10-15 DIAGNOSIS — F31.9 BIPOLAR DISORDER (H): ICD-10-CM

## 2018-10-15 DIAGNOSIS — F11.20 OPIOID USE DISORDER, SEVERE, DEPENDENCE (H): ICD-10-CM

## 2018-10-15 LAB
ALBUMIN UR-MCNC: NEGATIVE MG/DL
APPEARANCE UR: CLEAR
BACTERIA #/AREA URNS HPF: ABNORMAL /HPF
BILIRUB UR QL STRIP: NEGATIVE
COLOR UR AUTO: ABNORMAL
GLUCOSE UR STRIP-MCNC: NEGATIVE MG/DL
HGB UR QL STRIP: NEGATIVE
KETONES UR STRIP-MCNC: NEGATIVE MG/DL
LEUKOCYTE ESTERASE UR QL STRIP: NEGATIVE
NITRATE UR QL: NEGATIVE
PAIN DRUG SCR UR W RPTD MEDS: NORMAL
PH UR STRIP: 6 PH (ref 5–7)
RBC #/AREA URNS AUTO: <1 /HPF (ref 0–2)
SOURCE: ABNORMAL
SP GR UR STRIP: 1.01 (ref 1–1.03)
SQUAMOUS #/AREA URNS AUTO: 1 /HPF (ref 0–1)
UROBILINOGEN UR STRIP-MCNC: NORMAL MG/DL (ref 0–2)
WBC #/AREA URNS AUTO: 2 /HPF (ref 0–5)

## 2018-10-15 PROCEDURE — 99283 EMERGENCY DEPT VISIT LOW MDM: CPT

## 2018-10-15 PROCEDURE — 87086 URINE CULTURE/COLONY COUNT: CPT | Performed by: NURSE PRACTITIONER

## 2018-10-15 PROCEDURE — 90832 PSYTX W PT 30 MINUTES: CPT

## 2018-10-15 PROCEDURE — 81001 URINALYSIS AUTO W/SCOPE: CPT | Performed by: NURSE PRACTITIONER

## 2018-10-15 RX ORDER — EPINEPHRINE 0.3 MG/.3ML
0.3 INJECTION SUBCUTANEOUS
Qty: 0.3 ML | Refills: 1 | Status: SHIPPED | OUTPATIENT
Start: 2018-10-15

## 2018-10-15 NOTE — ED NOTES
Bed: FT02  Expected date:   Expected time:   Means of arrival:   Comments:  Mark 437 Allergic reaction 53 female      Roxane Hidalgo RN  10/15/18 3677

## 2018-10-15 NOTE — ED PROVIDER NOTES
"  History     Chief Complaint:  Allergic Reaction     The history is provided by the patient.   Ines Mott is a 53 year old female with a history of metastatic neuroendocrine and renal cancer, as well as allergic rhinitis who presents for evaluation of an allergic reaction. The patient reports that she was put on Cefdinir, having taken her first dose 2 hours prior to presentation, to treat a current UTI. She notes that shortly after taking her first dose, she noticed that her lips felt \"cold and tingly\" and that her throat also felt odd. She ate some ice cream which she notes did help her throat. She called EMS at that time as she was concerned that she was going to have a similar reaction to this medication to her reaction to Macrobid in 2010, during which time the patient states that she needed to be intubated. Patient also states that she may be withdrawing from oxycodone as she has not taken a dose for 3-4 days and was unable to refill her prescription today. She has been on oxycodone for 10+ years. She does note she spoke with primary care today who declined filling her pain medication as she has follow-up with palliative care on Thursday. Patient denies other complaint.  Blood glucose per EMS was >100.    Urine Culture (10/10/2018):  ESCHERICHIA COLI >100,000 colonies/ml      Antibiotic Interpretation Sensitivity Unit     AMIKACIN Sensitive <=2 ug/mL     AMPICILLIN Resistant >=32 ug/mL     AMPICILLIN/SULBACTAM Intermediate 16 ug/mL     CEFEPIME Sensitive <=1 ug/mL     CEFTAZIDIME Sensitive <=1 ug/mL     CEFTRIAXONE Sensitive <=1 ug/mL     CIPROFLOXACIN Resistant >=4 ug/mL     GENTAMICIN Sensitive <=1 ug/mL     LEVOFLOXACIN Resistant >=8 ug/mL     MEROPENEM Sensitive <=0.25 ug/mL     Piperacillin/Tazo Sensitive <=4 ug/mL     TOBRAMYCIN Sensitive <=1 ug/mL     Trimethoprim/Sulfa Resistant >=16/304 ug/mL          Allergies:  Amoxicillin  Ativan [Lorazepam]  Buspirone  Ciprofloxacin  Macrobid " "[Nitrofurantoin]  Busulfan  Cefaclor  Cefdinir  Cephalosporins  Clindamycin  Dilaudid [Hydromorphone]  Diphenhydramine  Diphenhydramine Hcl  Imitrex [Sumatriptan Succinate]  Ketorolac Tromethamine  Lansoprazole  Lansoprazole  Latex  Metoclopramide  Metoclopramide  Moxifloxacin  No Clinical Screening - See Comments  Nsaids  Paroxetine  Paroxetine  Prednisone  Quinolones  Sumatriptan  Blue Dyes  Ketorolac Tromethamine  Lidoderm [Lidocaine]  Penicillins  Red Dye  Sulfa Drugs  Vaccinium Angustifolium      Medications:    Albuterol inhaler  Aspirin  Lioresal  Omnicef  Vantin  Catapres  Vitamin B12  Hyzaar  Narcan  Zofran  Percocet  Miralax  Zoloft  Vitamin D, cholecalciferol      Past Medical History:    Hypertension  PTSD  Malignant neoplasm  Retroperitoneal hematoma  Pyelonephritis  renal cell carcinoma  ACS  Episodic tension-type headache  Lesion of right native kidney  Type II diabetes  Chronic seasonal allergic rhinitis  Chronic pain syndrome  Degeneration of lumbosacral intervertebral disc  Migraine  HCH  Hyperlipidemia  Intermittent asthma  Angioedema   Carcinoma of colon metastatic to liver  ALEXUS on CPAP  Meningomyelocele  Fibromyalgia  Drug-seeking behavior  Pain medication agreement  Bipolar disorder  Neuroendocrine carcinoma  TMJ arthralgia  Nephrolithiasis  Liver mass  Mild cognitive impairment   shunt status  ADD  Acne rosacea  Vitamin D deficiency   Arnold-chiari malformation  Hydrocephalus  Spinal bifida    Past Surgical History:    Appendectomy  Cholecystectomy  Hysterectomy  Implant  shunt  Extraction dental    Family History:    Cancer     Social History:  Presents via EMS    Tobacco use: Former smoker  Alcohol use: Yes (\"binge drinking\")  PCP: Marika Santana NP    Marital Status:       Review of Systems   Neurological:        (+) \"tingling/cold\" lips   All other systems reviewed and are negative.    Physical Exam     Patient Vitals for the past 24 hrs:   BP Temp Temp src Pulse " "Resp SpO2 Height Weight   10/15/18 1833 - - - - - - 1.676 m (5' 6\") 107 kg (236 lb)   10/15/18 1825 (!) 141/92 97.8  F (36.6  C) Oral 90 18 100 % - -        Physical Exam  Nursing notes and vital signs reviewed.  General:  Alert. No discomfort, well kept   Eyes: PERRL, conjunctivae pink no scleral icterus or conjunctival injection  ENT:  Moist mucus membranes, posterior oropharynx clear without erythema or exudates. Uvula midline without edema, no tongue/lip/oropharngeal swelling.  Mallampati I.  No stridor or wheezing.  Respiratory:  Lungs clear to auscultation bilaterally, no crackles/rubs/wheezes.  Good air movement  CV: Normal rate and rhythm, no murmurs/rubs/gallops  GI:  Abdomen soft and non-distended.  Normoactive BS.  No tenderness, guarding or rebound  Skin: Warm, dry.  No rashes or petechiae. No hives.  Musculoskeletal: No peripheral edema or calf tenderness  Neuro: Alert and oriented to person/place/time  Psychiatric:  Affect normal. Normal personal interaction. Good eye contact    Emergency Department Course     Laboratory:  UA with micro: Bacteria moderate o/w negative  Urine culture: Pending     Emergency Department Course:  Past medical records, nursing notes, and vitals reviewed.  1815: I performed an exam of the patient and obtained history, as documented above.     The patient provided a urine sample here in the emergency department. This was sent for laboratory testing, findings above.     1920: I rechecked the patient. Patient states she has no throat swelling or shortness of breath. No throat/tongue/lip swelling. Tolerating PO.    1925: I shared services for this patient with Dr. Mateusz Mckinney.    1940: I discussed the patient with Dr. Dawn of Infectious Disease.    2005: I rechecked the patient. Findings and plan explained to the Patient. Patient discharged home with instructions regarding supportive care, medications, and reasons to return. The importance of close follow-up was reviewed.  "     Impression & Plan      Medical Decision Making:  Ines Mott is a 53 year old female who presents for evaluation of an allergic reaction. On examination, the patient has no rash, no tongue or lip swelling, and is speaking in normal, clear sentences, and her lungs are clear without stridor or wheezing. Her oxygenation is 100% on room air. She has no nausea or abdominal pain. She notes her initial symptoms of lip and throat scratching are currently resolved. Symptoms are not consistent with anaphylaxis. She has taken no medications for her symptoms. There is no indication for epinephrine today. She was monitored for 2 hours with no worsening of her symptoms and has had complete resolution while in the emergency department. Of note, the patient also states that her dysuria, from 10/10/18, is completely resolved. Secondary to this, a urinalysis was sent and returned with no leukocyte esterace or nitrites and less than 2 WBC's. She had moderate bacteria but no other current symptoms of dysuria, fevers, or signs of systemic infection. I spoke with Dr. Dawn of infectious disease who recommended no treatment for the positive urine culture on the 10th as her symptoms are resolved and urinalysis is without signs of infection today. Urine was sent for culture. She is appropriate for out-patient discharge. She has follow up regarding her pain with palliative care on Thursday and no indication for needing pain medication today. She has a COWS score of 0 and there are no signs of active withdrawal of opiates. She will follow up with primary care doctor tomorrow to discuss her symptoms and to follow up in regards to the urine culture. She is discharged home in the care of her .    Diagnosis:    ICD-10-CM   1. Allergic reaction, initial encounter T78.40XA       Disposition:  Discharged to home with plan as outlined.     Scribe Disclosure:  Brenden CONDE, am serving as a scribe at 6:19 PM on 10/15/2018 to  document services personally performed by Rosalee Akins CNP based on my observations and the provider's statements to me.   10/15/2018    EMERGENCY DEPARTMENT          Rosalee Akins CNP  10/15/18 6254

## 2018-10-15 NOTE — ED AVS SNAPSHOT
Emergency Department    [address] [place] [id number]    Phone:  [phone]    Fax:  [phone]                                       Ines Mott   MRN: [id number]    Department:   Emergency Department   Date of Visit:  10/15/2018           After Visit Summary Signature Page     I have received my discharge instructions, and my questions have been answered. I have discussed any challenges I see with this plan with the nurse or doctor.    ..........................................................................................................................................  Patient/Patient Representative Signature      ..........................................................................................................................................  Patient Representative Print Name and Relationship to Patient    ..................................................               ................................................  Date                                   Time    ..........................................................................................................................................  Reviewed by Signature/Title    ...................................................              ..............................................  Date                                               Time          22EPIC Rev 08/18
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
## 2018-10-15 NOTE — ED AVS SNAPSHOT
Emergency Department    [address] [facility] [address]    Phone:  [phone]    Fax:  [phone]                                       Ines Mott   MRN: [id number]    Department:   Emergency Department   Date of Visit:  10/15/2018           Patient Information     Date Of Birth          1965        Your diagnoses for this visit were:     Allergic reaction, initial encounter        You were seen by Rosalee Akins CNP.      Follow-up Information     Follow up with Marika Santana APRN CNP In 1 day.    Specialty:  Nurse Practitioner    Why:  to discuss reaction to cefdinir and urinalysis results.  And to follow-up on new urine culture    Contact information:    [address] [facility] [address]  [facility] [address] [phone]          Follow up with  Emergency Department.    Specialty:  EMERGENCY MEDICINE    Why:  As needed, If symptoms worsen    Contact information:    [address] [facility] [address] [phone]        Discharge Instructions       STOP taking the Cefdinir.  Your urine has been sent for culture again.  Follow-up with your primary provider regarding this urine culture in 1-2 days to discuss further need for antibiotics.     Discharge Instructions  Allergic Reaction    An allergic reaction can result in a rash, itching, swelling, watery eyes, or a runny nose. A serious reaction can cause swelling of your mouth or throat, or difficulty breathing (wheezing). The most serious allergy is called anaphylaxis, and can be life-threatening. Many allergies result in hives, also called urticaria.       An allergy happens when the body s natural defense system (immune system) overreacts to something. The thing that triggers your allergic reaction is called an allergen. The first time you are exposed to your allergen, you may not have any reaction, but the body makes a protein called an antibody. The antibody lets the body recognize and remember the  allergen.  Every time you are exposed to your allergen you get more antibody and your reaction can be more severe.      Generally, every Emergency Department visit should have a follow-up clinic visit with either a primary or a specialty clinic/provider. Please follow-up as instructed by your emergency provider today.    Call 911 if you have:    Swelling of the lips, tongue or throat.    Hoarse voice, drooling or trouble breathing.    Chest pain or shortness of breath.    Fainting or unconsciousness.    What can I do to help myself?    If you know what caused your allergy, do not touch it, throw any of it away, and tell others not to have it around you. Wear a medical alert bracelet with a name of your allergen on it.    If you do not know what you are allergic to, keep a journal of everything that you are exposed to (foods, soaps, medicines, etc.). Take this with you when you follow up with your primary provider or specialist (Allergist). This may help determine what is causing the allergic reaction.    Take any medicines that are prescribed.    Antihistamines can decrease rash or itching. You may use Benadryl  (diphenhydramine) for rash or itching according to package directions, or use a prescription antihistamine as recommended by your provider.    For significant allergic reactions, you may have been given a prescription for an epinephrine (adrenaline) auto injector. Carry this with you at all times! Use it if you are having any symptoms of anaphylaxis.  Do not be afraid to use it. Return to the Emergency Department if you use your auto injector, call 911 if it does not resolve the symptoms. It is only meant to buy time until you can get to the Emergency Department!  If you were given a prescription for medicine here today, be sure to read all of the information (including the package insert) that comes with your prescription.  This will include important information about the medicine, its side effects, and  any warnings that you need to know about.  The pharmacist who fills the prescription can provide more information and answer questions you may have about the medicine.  If you have questions or concerns that the pharmacist cannot address, please call or return to the Emergency Department.   Remember that you can always come back to the Emergency Department if you are not able to see your regular provider in the amount of time listed above, if you get any new symptoms, or if there is anything that worries you.        Your next 10 appointments already scheduled     Oct 18, 2018  1:00 PM CDT   MA DIAGNOSTIC BILATERAL W/ KARIN with [id number]   [facility] ([facility])    [address], [address]nd [address]  [facility] [address] [phone]           How do I prepare for my exam? (Food and drink instructions) No Food and Drink Restrictions.  How do I prepare for my exam? (Other instructions) Do not use any powder, lotion or deodorant under your arms or on your breast. If you do, we will ask you to remove it before your exam.  What should I wear: Wear comfortable, two-piece clothing.  How long does the exam take: Most scans will take 15 minutes.  What should I bring: Bring any previous mammograms from other facilities or have them mailed to the breast center.  Do I need a :  No  is needed.  What do I need to tell my doctor: If you have any allergies, tell your care team.  What should I do after the exam: No restrictions, You may resume normal activities.  What is this test: This test is an x-ray of the breast to look for breast disease. The breast is pressed between two plates to flatten and spread the tissue. An X-ray is taken of the breast from different angles.  Who should I call with questions: If you have any questions, please call the Imaging Department where you will have your exam. Directions, parking instructions, and other information is available on our  "website, [email or web address].  Other information about my exam Three-dimensional (3D) mammograms are available at [facility] locations in [facility], [facility], [facility], [place], and [place]. [facility] locations include [place] and the [facility] in [place].  Benefits of 3D mammograms include: * Improved rate of cancer detection * Decreases your chance of having to go back for more tests, which means fewer: * \"False-positive\" results (This means that there is an abnormal area but it isn't cancer.) * Invasive testing procedures, such as a biopsy or surgery * Can provide clearer images of the breast if you have dense breast tissue.  *3D mammography is an optional exam that anyone can have with a 2D mammogram. It doesn't replace or take the place of a 2D mammogram. 2D mammograms remain an effective screening test for all women.  Not all insurance companies cover the cost of a 3D mammogram. Check with your insurance.            Oct 18, 2018  1:30 PM CDT   US BREAST BUSHRA LIMITED 1-3 QUAD with [email or web address]   [facility] ([facility])    [address] [place] [address], 2nd Floor  [facility] [address] [phone]           How do I prepare for my exam? (Food and drink instructions) No Food and Drink Restrictions.  How do I prepare for my exam? (Other instructions) You do not need to do anything special to prepare for your exam.  What should I wear: Wear comfortable clothes.  How long does the exam take: Most ultrasounds take 30 to 60 minutes.  What should I bring: Bring a list of your medicines, including vitamins, minerals and over-the-counter drugs. It is safest to leave personal items at home.  Do I need a :  No  is needed.  What do I need to tell my doctor: Tell your doctor about any allergies you may have.  What should I do after the exam: No restrictions, You may resume normal activities.  What is this test: An ultrasound " uses sound waves to make pictures of the body. Sound waves do not cause pain. The only discomfort may be the pressure of the wand against your skin or full bladder.  Who should I call with questions: If you have any questions, please call the Imaging Department where you will have your exam. Directions, parking instructions, and other information is available on our website, [email or web address].            Oct 24, 2018 11:00 AM CDT   Community Paramedic with Timo [facility] Par[facility]ic 1, Timo [facility] Par[facility]ic 2   [facility] ([facility])    [address]  [address]  [facility] [address]   [phone]            Oct 25, 2018  3:00 PM CDT   (Arrive by 2:45 PM)   New Patient Visit with Samy Salmeron MD   [facility] ([facility])    [address]  [address]  [facility] [address]   [phone]            Nov 07, 2018  9:00 AM CST   Return Visit with MICHEAL [facility] ([facility])    [address]  [address]  [facility] [address]   [phone]            Nov 07, 2018  9:00 AM CST   Return Visit with Davion Rees [facility] ([facility])    [address]  [address]  [facility] [address]   [phone]              24 Hour Appointment Hotline       To make an appointment at any [facility], call [phone] ([phone]). If you don't have a family doctor or clinic, we will help you find one. [facility] are conveniently located to serve the needs of you and your family.             Review of your medicines      Our records show that you are taking the medicines listed below. If these are incorrect, please call your family doctor or clinic.        Dose / Directions Last dose taken    albuterol (2.5 MG/3ML)  0.083% neb solution   Dose:  1 vial   Quantity:  30 vial        Take 1 vial (2.5 mg) by nebulization every 6 hours as needed for shortness of breath / dyspnea or wheezing   Refills:  3        ASPIRIN NOT PRESCRIBED   Commonly known as:  INTENTIONAL   Quantity:  1 each        Please choose reason not prescribed, below   Refills:  0        baclofen 10 MG tablet   Commonly known as:  LIORESAL   Dose:  10 mg   Quantity:  60 tablet        Take 1 tablet (10 mg) by mouth 2 times daily   Refills:  1        blood glucose calibration solution   Commonly known as:  no brand specified   Quantity:  1 each        Use to calibrate blood glucose monitor as directed.   Refills:  0        blood glucose lancets standard   Commonly known as:  no brand specified   Quantity:  100 each        Use to test blood sugar daily times daily or as directed.   Refills:  11        blood glucose monitoring meter device kit   Commonly known as:  no brand specified   Quantity:  1 kit        Use to test blood sugar larisa times daily or as directed.   Refills:  0        blood glucose monitoring test strip   Commonly known as:  no brand specified   Quantity:  100 strip        Use to test blood sugars once times daily or as directed   Refills:  3        cefdinir 300 MG capsule   Commonly known as:  OMNICEF   Dose:  600 mg   Quantity:  20 capsule        Take 2 capsules (600 mg) by mouth daily   Refills:  0        cefpodoxime 200 MG tablet   Commonly known as:  VANTIN   Dose:  200 mg   Quantity:  10 tablet        Take 1 tablet (200 mg) by mouth 2 times daily   Refills:  0        cloNIDine 0.1 MG tablet   Commonly known as:  CATAPRES   Dose:  0.1 mg   Quantity:  60 tablet        Take 1 tablet (0.1 mg) by mouth 2 times daily   Refills:  3        clotrimazole 2 % Crea   Commonly known as:  GYNE-LOTRIMIN 3   Dose:  2 g   Quantity:  1 Tube        Place 2 g vaginally daily   Refills:  1        [facility] DIGESTIVE HEALTH Caps   Dose:  1 capsule   Quantity:  60  capsule        Take 1 capsule by mouth 2 times daily   Refills:  1        * cyanocobalamin 1000 MCG/ML injection   Commonly known as:  VITAMIN B12   Dose:  1 mL   Quantity:  1 mL        Inject 1 mL (1,000 mcg) Subcutaneous every 30 days   Refills:  11        * cyanocobalamin 1000 MCG/ML injection   Commonly known as:  VITAMIN B12   Dose:  1 mL   Quantity:  1 mL        Inject 1 mL (1,000 mcg) into the muscle every 30 days   Refills:  0        EPINEPHrine 0.3 MG/0.3ML injection 2-pack   Commonly known as:  EPIPEN/ADRENACLICK/or ANY BX GENERIC EQUIV   Dose:  0.3 mg   Quantity:  0.3 mL        Inject 0.3 mLs (0.3 mg) into the muscle once as needed for anaphylaxis   Refills:  1        losartan-hydrochlorothiazide 100-25 MG per tablet   Commonly known as:  HYZAAR   Dose:  1 tablet   Quantity:  90 tablet        Take 1 tablet by mouth daily   Refills:  3        magnesium oxide 400 (241.3 Mg) MG tablet   Commonly known as:  MAG-OX   Dose:  400 mg   Quantity:  90 tablet        Take 1 tablet (400 mg) by mouth daily   Refills:  3        naloxone nasal spray   Commonly known as:  NARCAN   Dose:  4 mg   Quantity:  0.2 mL        Spray 1 spray (4 mg) into one nostril alternating nostrils as needed for opioid reversal every 2-3 minutes until assistance arrives   Refills:  0        * ondansetron 4 MG ODT tab   Commonly known as:  ZOFRAN-ODT   Dose:  4 mg   Quantity:  10 tablet        Take 1 tablet (4 mg) by mouth every 8 hours as needed for nausea   Refills:  1        * ondansetron 4 MG ODT tab   Commonly known as:  ZOFRAN-ODT   Quantity:  90 tablet        DISSOLVE ONE TABLET IN MOUTH EVERY 8 HOURS AS NEEDED FOR NAUSEA   Refills:  0        order for DME   Quantity:  1 Device        Right wrist splint for carpal tunnel syndrome  One* Use as directed at hour of sleep   Refills:  1        order for DME   Quantity:  1 each        Equipment being ordered: CPAP with supplies   Refills:  0        oxyCODONE-acetaminophen  MG per tablet    Commonly known as:  PERCOCET   Dose:  1 tablet   Quantity:  28 tablet        Take 1 tablet by mouth 4 times daily   Refills:  0        polyethylene glycol powder   Commonly known as:  MIRALAX/GLYCOLAX   Quantity:  1581 g        TAKE 17 GRAMS (1 CAPFUL) BY MOUTH DAILY   Refills:  11        potassium chloride 10 MEQ tablet   Commonly known as:  K-TAB,KLOR-CON   Dose:  10 mEq   Indication:  Low Amount of Potassium in the Blood   Quantity:  120 tablet        Take 1 tablet (10 mEq) by mouth daily   Refills:  11        sertraline 100 MG tablet   Commonly known as:  ZOLOFT   Dose:  100 mg   Indication:  Major Depressive Disorder   Quantity:  30 tablet        Take 1 tablet (100 mg) by mouth daily   Refills:  3        STATIN NOT PRESCRIBED (INTENTIONAL)   Dose:  1 each   Quantity:  0 each        1 each daily Statin not prescribed intentionally due to Active liver disease (liver failure, cirrhosis, hepatitis) LIVER METS   Refills:  0        Vitamin D (Cholecalciferol) 1000 units Tabs   Dose:  2000 Units   Quantity:  60 tablet        Take 2,000 Units by mouth daily   Refills:  11        * Notice:  This list has 4 medication(s) that are the same as other medications prescribed for you. Read the directions carefully, and ask your doctor or other care provider to review them with you.            Procedures and tests performed during your visit     UA with Microscopic reflex to Culture      Orders Needing Specimen Collection     None      Pending Results     No orders found from 10/13/2018 to 10/16/2018.            Pending Culture Results     No orders found from 10/13/2018 to 10/16/2018.            Pending Results Instructions     If you had any lab results that were not finalized at the time of your Discharge, you can call the ED Lab Result RN at [phone]. You will be contacted by this team for any positive Lab results or changes in treatment. The nurses are available 7 days a week from 10A to 6:30P.  You can leave a  message 24 hours per day and they will return your call.        Test Results From Your Hospital Stay        10/15/2018  7:38 PM      Component Results     Component Value Ref Range & Units Status    Color Urine Straw  Final    Appearance Urine Clear  Final    Glucose Urine Negative NEG^Negative mg/dL Final    Bilirubin Urine Negative NEG^Negative Final    Ketones Urine Negative NEG^Negative mg/dL Final    Specific Gravity Urine 1.007 1.003 - 1.035 Final    Blood Urine Negative NEG^Negative Final    pH Urine 6.0 5.0 - 7.0 pH Final    Protein Albumin Urine Negative NEG^Negative mg/dL Final    Urobilinogen mg/dL Normal 0.0 - 2.0 mg/dL Final    Nitrite Urine Negative NEG^Negative Final    Leukocyte Esterase Urine Negative NEG^Negative Final    Source Midstream Urine  Final    WBC Urine 2 0 - 5 /HPF Final    RBC Urine <1 0 - 2 /HPF Final    Bacteria Urine Moderate (A) NEG^Negative /HPF Final    Squamous Epithelial /HPF Urine 1 0 - 1 /HPF Final                Clinical Quality Measure: Blood Pressure Screening     Your blood pressure was checked while you were in the emergency department today. The last reading we obtained was  BP: (!) 141/92 . Please read the guidelines below about what these numbers mean and what you should do about them.  If your systolic blood pressure (the top number) is less than 120 and your diastolic blood pressure (the bottom number) is less than 80, then your blood pressure is normal. There is nothing more that you need to do about it.  If your systolic blood pressure (the top number) is 120-139 or your diastolic blood pressure (the bottom number) is 80-89, your blood pressure may be higher than it should be. You should have your blood pressure rechecked within a year by a primary care provider.  If your systolic blood pressure (the top number) is 140 or greater or your diastolic blood pressure (the bottom number) is 90 or greater, you may have high blood pressure. High blood pressure is  "treatable, but if left untreated over time it can put you at risk for heart attack, stroke, or kidney failure. You should have your blood pressure rechecked by a primary care provider within the next 4 weeks.  If your provider in the emergency department today gave you specific instructions to follow-up with your doctor or provider even sooner than that, you should follow that instruction and not wait for up to 4 weeks for your follow-up visit.        Thank you for choosing [facility]       Thank you for choosing [facility] for your care. Our goal is always to provide you with excellent care. Hearing back from our patients is one way we can continue to improve our services. Please take a few minutes to complete the written survey that you may receive in the mail after you visit with us. Thank you!        [employer] [employer] Information     [employer] lets you send messages to your doctor, view your test results, renew your prescriptions, schedule appointments and more. To sign up, go to [email or web address] . Click on \"Log in\" on the left side of the screen, which will take you to the Welcome page. Then click on \"Sign up Now\" on the right side of the page.     You will be asked to enter the access code listed below, as well as some personal information. Please follow the directions to create your username and password.     Your access code is: [email or web address]  Expires: 2018  2:06 PM     Your access code will  in 90 days. If you need help or a new code, please call your [facility] clinic or [phone].        Care EveryWhere ID     This is your Care EveryWhere ID. This could be used by other organizations to access your [facility] medical records  [id number]        Equal Access to Services     [facility] Hospital[facility] : Marc Leonard, deion nunes, emilee oconnor. So [facility] [phone].    ATENCIÓN: Si habla español, tiene a christianson disposición servicios gratuitos de " asistencia lingüística. Sabine al [phone].    We comply with applicable federal civil rights laws and [place] laws. We do not discriminate on the basis of race, color, national origin, age, disability, sex, sexual orientation, or gender identity.            After Visit Summary       This is your record. Keep this with you and show to your community pharmacist(s) and doctor(s) at your next visit.

## 2018-10-15 NOTE — TELEPHONE ENCOUNTER
Controlled Substance Refill Request for percocet    Last refill: 10/10/18  #28  Ran out 10/13/18    Last clinic visit: 10/10/18      Next appt: 11/7/18    Controlled substance agreement on file: Yes:  Date 8/30/18.    Documentation in problem list reviewed:  Yes    Processing:  Patient will  in clinic    [place] reviewed:  reviewed- recommend provider review  [place]:  [email or web address]        See's Oncology on 10/25/18

## 2018-10-15 NOTE — MR AVS SNAPSHOT
After Visit Summary   10/15/2018    Ines Mott    MRN: [id number]           Patient Information     Date Of Birth          1965        Visit Information        Provider Department      10/15/2018 9:30 AM Trina Vargas [facility]        Today's Diagnoses     Posttraumatic stress disorder    -  1    Opioid use disorder, severe, dependence (H)        Bipolar disorder (H)           Follow-ups after your visit        Your next 10 appointments already scheduled     Nov 09, 2018 10:00 AM CST   Return Visit with Trina Vargas   [facility] ([facility])    [address] [place]   [address]  [facility] [address]   [phone]            Nov 13, 2018 11:00 AM CST   (Arrive by 10:45 AM)   Return Visit with Quyen Mae MD   [facility] ([facility])    [address]  [address]  [facility] [id number] [phone]            Nov 16, 2018  2:00 PM CST   (Arrive by 1:45 PM)   New Patient Visit with Pedro Belle MD   [facility] ([facility])    [address]  4th Floor  [facility] [id number] [phone]              Who to contact     If you have questions or need follow up information about today's clinic visit or your schedule please contact [facility] directly at [phone].  Normal or non-critical lab and imaging results will be communicated to you by MyChart, letter or phone within 4 business days after the clinic has received the results. If you do not hear from us within 7 days, please contact the clinic through MyChart or phone. If you have a critical or abnormal lab result, we will notify you by phone as soon as possible.  Submit refill requests through [employer] or call your pharmacy and they will forward the refill request  to us. Please allow 3 business days for your refill to be completed.          Additional Information About Your Visit        Care EveryWhere ID     This is your Care EveryWhere ID. This could be used by other organizations to access your [facility] medical records  [id number]         Blood Pressure from Last 3 Encounters:   10/29/18 122/86   10/25/18 129/81   10/24/18 130/70    Weight from Last 3 Encounters:   10/29/18 108 kg (238 lb)   10/25/18 110.1 kg (242 lb 11.2 oz)   10/23/18 109.5 kg (241 lb 8 oz)              Today, you had the following     No orders found for display         Where to get your medicines      These medications were sent to [facility] [place], [place] - [address]  [address], [facility] [address]     Phone:  [phone]     EPINEPHrine 0.3 MG/0.3ML injection 2-pack          Primary Care Provider Office Phone # Fax #    MICHEAL Crowley [facility] [phone]       [address]  [facility] [address]        Goals        General    Transportation (pt-stated)     Notes - Note created  7/11/2018  9:56 AM by Elizabeth Aleman BSW    Goal Statement: I am in need of transportation resources  Measure of Success: Patient will utilize transportation benefits  Supportive Steps to Achieve: SW will assist patient in obtaining transportation to medical appointments.   Date to Achieve By: Ongoing         Lifestyle    Patient achieves sleep pattern objective     Notes - Note created  9/25/2018 10:06 AM by Martha Sims, RN    #1-Goal Statement: patient will obtain and use CPAP machine nightly  Measure of Success: improved sleep  Supportive Steps to Achieve: RN CC obtained DME order from PCP  Barriers: poor follow up; current machine broken  Strengths: assistance from PCP/RN CC  Date to Achieve By: 11-1-18  Patient expressed understanding of goal: good            Equal Access to Services     VALENTE RIVERA AH: Marc Leonard  deion nunes, candida hernandez, emilee pearson vietamandeep jononimisha laEulaliaaazhao ah. So [facility] [phone].    ATENCIÓN: Si manisha kitchen, tiene a christianson disposición servicios gratuitos de asistencia lingüística. Sabine al [phone].    We comply with applicable federal civil rights laws and [place] laws. We do not discriminate on the basis of race, color, national origin, age, disability, sex, sexual orientation, or gender identity.            Thank you!     Thank you for choosing [facility]  for your care. Our goal is always to provide you with excellent care. Hearing back from our patients is one way we can continue to improve our services. Please take a few minutes to complete the written survey that you may receive in the mail after your visit with us. Thank you!             Your Updated Medication List - Protect others around you: Learn how to safely use, store and throw away your medicines at www.disposemymeds.org.          This list is accurate as of 10/15/18 11:59 PM.  Always use your most recent med list.                   Brand Name Dispense Instructions for use Diagnosis    albuterol (2.5 MG/3ML) 0.083% neb solution     30 vial    Take 1 vial (2.5 mg) by nebulization every 6 hours as needed for shortness of breath / dyspnea or wheezing    Mild intermittent asthma with acute exacerbation       ASPIRIN NOT PRESCRIBED    INTENTIONAL    1 each    Please choose reason not prescribed, below    Aspirin intolerance       blood glucose calibration solution    no brand specified    1 each    Use to calibrate blood glucose monitor as directed.    Type 2 diabetes mellitus without complication, without long-term current use of insulin (H)       blood glucose lancets standard    no brand specified    100 each    Use to test blood sugar daily times daily or as directed.    Type 2 diabetes mellitus without complication, without long-term current use of insulin (H)       blood glucose  monitoring meter device kit    no brand specified    1 kit    Use to test blood sugar larisa times daily or as directed.    Type 2 diabetes mellitus without complication, without long-term current use of insulin (H)       blood glucose monitoring test strip    no brand specified    100 strip    Use to test blood sugars once times daily or as directed    Type 2 diabetes mellitus without complication, without long-term current use of insulin (H)       cloNIDine 0.1 MG tablet    CATAPRES    60 tablet    Take 1 tablet (0.1 mg) by mouth 2 times daily    Hypertension goal BP (blood pressure) < 140/80, Opioid withdrawal (H)       [facility] DIGEST[facility]ALTH Caps     60 capsule    Take 1 capsule by mouth 2 times daily    Diarrhea, unspecified type       EPINEPHrine 0.3 MG/0.3ML injection 2-pack    EPIPEN/ADRENACLICK/or ANY BX GENERIC EQUIV    0.3 mL    Inject 0.3 mLs (0.3 mg) into the muscle once as needed for anaphylaxis    Anaphylactic reaction       losartan-hydrochlorothiazide 100-25 MG per tablet    HYZAAR    90 tablet    Take 1 tablet by mouth daily    Hypertension goal BP (blood pressure) < 140/80       naloxone nasal spray    NARCAN    0.2 mL    Spray 1 spray (4 mg) into one nostril alternating nostrils as needed for opioid reversal every 2-3 minutes until assistance arrives    Chronic pain syndrome       ondansetron 4 MG ODT tab    ZOFRAN-ODT    10 tablet    Take 1 tablet (4 mg) by mouth every 8 hours as needed for nausea        order for DME     1 Device    Right wrist splint for carpal tunnel syndrome  One* Use as directed at hour of sleep    Carpal tunnel syndrome of right wrist       order for DME     1 each    Equipment being ordered: CPAP with supplies    ALEXUS on CPAP       polyethylene glycol powder    MIRALAX/GLYCOLAX    1581 g    TAKE 17 GRAMS (1 CAPFUL) BY MOUTH DAILY    Slow transit constipation       potassium chloride 10 MEQ tablet    K-TAB,KLOR-CON    120 tablet    Take 1 tablet (10 mEq) by mouth daily     Hypokalemia       sertraline 100 MG tablet    ZOLOFT    30 tablet    Take 1 tablet (100 mg) by mouth daily    Recurrent major depression in partial remission (H)       STATIN NOT PRESCRIBED (INTENTIONAL)     0 each    1 each daily Statin not prescribed intentionally due to Active liver disease (liver failure, cirrhosis, hepatitis) LIVER METS    Hyperlipidemia LDL goal <100, Type 2 diabetes mellitus without complication (H)       Vitamin D (Cholecalciferol) 1000 units Tabs     60 tablet    Take 2,000 Units by mouth daily    Vitamin D deficiency

## 2018-10-16 ENCOUNTER — TELEPHONE (OUTPATIENT)
Dept: FAMILY MEDICINE | Facility: CLINIC | Age: 53
End: 2018-10-16

## 2018-10-16 LAB
BACTERIA SPEC CULT: NORMAL
BACTERIA SPEC CULT: NORMAL
Lab: NORMAL
SPECIMEN SOURCE: NORMAL

## 2018-10-16 NOTE — DISCHARGE INSTRUCTIONS
STOP taking the Cefdinir.  Your urine has been sent for culture again.  Follow-up with your primary provider regarding this urine culture in 1-2 days to discuss further need for antibiotics.     Discharge Instructions  Allergic Reaction    An allergic reaction can result in a rash, itching, swelling, watery eyes, or a runny nose. A serious reaction can cause swelling of your mouth or throat, or difficulty breathing (wheezing). The most serious allergy is called anaphylaxis, and can be life-threatening. Many allergies result in hives, also called urticaria.       An allergy happens when the body s natural defense system (immune system) overreacts to something. The thing that triggers your allergic reaction is called an allergen. The first time you are exposed to your allergen, you may not have any reaction, but the body makes a protein called an antibody. The antibody lets the body recognize and remember the allergen.  Every time you are exposed to your allergen you get more antibody and your reaction can be more severe.      Generally, every Emergency Department visit should have a follow-up clinic visit with either a primary or a specialty clinic/provider. Please follow-up as instructed by your emergency provider today.    Call 911 if you have:    Swelling of the lips, tongue or throat.    Hoarse voice, drooling or trouble breathing.    Chest pain or shortness of breath.    Fainting or unconsciousness.    What can I do to help myself?    If you know what caused your allergy, do not touch it, throw any of it away, and tell others not to have it around you. Wear a medical alert bracelet with a name of your allergen on it.    If you do not know what you are allergic to, keep a journal of everything that you are exposed to (foods, soaps, medicines, etc.). Take this with you when you follow up with your primary provider or specialist (Allergist). This may help determine what is causing the allergic reaction.    Take any  medicines that are prescribed.    Antihistamines can decrease rash or itching. You may use Benadryl  (diphenhydramine) for rash or itching according to package directions, or use a prescription antihistamine as recommended by your provider.    For significant allergic reactions, you may have been given a prescription for an epinephrine (adrenaline) auto injector. Carry this with you at all times! Use it if you are having any symptoms of anaphylaxis.  Do not be afraid to use it. Return to the Emergency Department if you use your auto injector, call 911 if it does not resolve the symptoms. It is only meant to buy time until you can get to the Emergency Department!  If you were given a prescription for medicine here today, be sure to read all of the information (including the package insert) that comes with your prescription.  This will include important information about the medicine, its side effects, and any warnings that you need to know about.  The pharmacist who fills the prescription can provide more information and answer questions you may have about the medicine.  If you have questions or concerns that the pharmacist cannot address, please call or return to the Emergency Department.   Remember that you can always come back to the Emergency Department if you are not able to see your regular provider in the amount of time listed above, if you get any new symptoms, or if there is anything that worries you.

## 2018-10-16 NOTE — ED PROVIDER NOTES
"  Emergency Department Attending Supervision Note  10/15/2018  7:59 PM      I evaluated this patient in conjunction with Rosalee Akins CNP      Briefly, the patient presented with an allergic reaction. The patient was put on Cefdinir for a UTI infection. She took her first dose of this medication 2 hours to presentation to the ED and shortly after began to notice that her lips felt \"tingly\" and that her throat felt odd. She called EMS for concern that she was going to develop a more serious allergic reaction. Of note that patient had to be intubated in 2010 after she had an allergic reaction to Macrobid. Patient also notes that she may be going through withdrawal from oxycodone, as she has been on it for 10+ years and has not taken a dose for 3-4 days due to her inability to refill her prescription. She spoke with her PCP today who declined to refill her prescription pending her follow-up with palliative care in three days. Currently the patient notes some facial itching, swelling and tingling in her lips. She also has a headache and lower right back and right lower quadrant abdominal pain. She denies any visual changes or other symptoms.     On my exam:  General: Alert, appears well-developed and well-nourished. Cooperative.     In no distress  HEENT:  Head:  Atraumatic  Ears:  External ears are normal  Mouth/Throat:  Oropharynx is without erythema or exudate and mucous membranes are moist.   Eyes:   Conjunctivae normal and EOM are normal. No scleral icterus. No uvular edema    Pupils are equal, round, and reactive to light.   Neck:   Normal range of motion. Neck supple.  CV:  Normal rate, regular rhythm, normal heart sounds and radial pulses are 2+ and symmetric.  No murmur.  Resp:  Breath sounds are clear bilaterally.  No wheezing.    Non-labored, no retractions or accessory muscle use  GI:  Abdomen is soft, no distension, no tenderness. No rebound or guarding.  No CVA tenderness bilaterally  MS:  Normal range " of motion. No edema.    Normal strength in all 4 extremities.     Back atraumatic.    No midline cervical, thoracic, or lumbar tenderness  Skin:  Warm and dry.  No rash or lesions noted.  Neuro:  Alert. Normal strength.  GCS: 15  Psych:  Normal mood and affect.  Lymph: No anterior or posterior cervical lymphadenopathy noted.    Results:  Labs Ordered and Resulted from Time of ED Arrival Up to the Time of Departure from the ED   ROUTINE UA WITH MICROSCOPIC REFLEX TO CULTURE - Abnormal; Notable for the following:        Result Value    Bacteria Urine Moderate (*)     All other components within normal limits     ED course:  Past medical records, nursing notes, and vitals reviewed.  2058: I performed an exam of the patient and obtained history, as documented above.    2110: Discussed case with Rosalee Mart CNP and agreed to her plan and MDM.    My impression is:    Patient is a 53-year-old female with a complex past medical history pertinent for chronic pain, metastatic neuroendocrine carcinoma, and  shunt who presents with concerns for allergic reaction to oral antibiotics recently prescribed for suspected urinary tract infection.  Patient has no symptoms consistent with UTI at this time, although states that she was prescribed abx yesterday based on a urinalysis.  She has no evidence of wheezing, angioedema, or anaphylaxis here in the emergency department and has been observed for over 1 hour without any new symptom onset.  Unfortunately, she is allergic to a majority of medications including diphenhydramine.  Due to the complexity of the patient's presentation, I had BORA Lee discuss the case with infectious disease regarding whether the patient would benefit from admission and IV antibiotics or if we could potentially obtain a repeat urinalysis and send a culture to better determine appropriate treatment as she is asymptomatic at this time.  Ultimately Dr. Dawn with infectious disease recommended  re-obtaining a urinalysis and sending for culture with plan to hold antibiotics at this time particularly due to the patient's complexity with multiple allergies to a variety of oral antibiotics.  In addition, the patient complains of some chronic pain symptoms although in accordance with her emergency care plan we will not receive any additional IV or oral medications here in the emergency department.  She will follow-up with primary care to rediscuss the urine culture and return to the emergency department for any new or worsening symptoms.  I agree with the medical decision making and plan as described in Rosalee Mart CNP's note.  Discharged home.    Diagnosis    ICD-10-CM   1. Allergic reaction, initial encounter T78.40XA     I, Maria A Cummings, am serving as a scribe at 8:00 PM on 10/15/2018 to document services personally performed by Mateusz Mckinney MD based on my observations and the provider's statements to me.          Mateusz Mckinney MD  10/16/18 0038

## 2018-10-16 NOTE — TELEPHONE ENCOUNTER
Consulted with Palliative care about pain plan moving forward, recommendations as follows:  1. See oncology to confirm dx and prognosis, has an appt 10/25/18 with [facility]    2.  Not a candidate for opioids, taper off    3. Work on getting her into Detox    Please see if you can get her scheduled with me tomorrow morning 9:30     Ligia COOPER

## 2018-10-17 ENCOUNTER — OFFICE VISIT (OUTPATIENT)
Dept: BEHAVIORAL HEALTH | Facility: CLINIC | Age: 53
End: 2018-10-17
Payer: COMMERCIAL

## 2018-10-17 ENCOUNTER — OFFICE VISIT (OUTPATIENT)
Dept: FAMILY MEDICINE | Facility: CLINIC | Age: 53
End: 2018-10-17
Payer: COMMERCIAL

## 2018-10-17 VITALS
WEIGHT: 240 LBS | BODY MASS INDEX: 38.74 KG/M2 | HEART RATE: 73 BPM | OXYGEN SATURATION: 99 % | TEMPERATURE: 98.7 F | SYSTOLIC BLOOD PRESSURE: 128 MMHG | DIASTOLIC BLOOD PRESSURE: 84 MMHG

## 2018-10-17 DIAGNOSIS — F11.20 OPIOID USE DISORDER, SEVERE, DEPENDENCE (H): ICD-10-CM

## 2018-10-17 DIAGNOSIS — F43.10 POSTTRAUMATIC STRESS DISORDER: Primary | Chronic | ICD-10-CM

## 2018-10-17 DIAGNOSIS — C78.7 CARCINOMA OF COLON METASTATIC TO LIVER (H): ICD-10-CM

## 2018-10-17 DIAGNOSIS — G89.4 CHRONIC PAIN SYNDROME: Primary | ICD-10-CM

## 2018-10-17 DIAGNOSIS — C64.1 RENAL CELL CARCINOMA OF RIGHT KIDNEY (H): ICD-10-CM

## 2018-10-17 DIAGNOSIS — C18.9 CARCINOMA OF COLON METASTATIC TO LIVER (H): ICD-10-CM

## 2018-10-17 DIAGNOSIS — C7B.09 METASTATIC CARCINOID TUMOR TO INTRA-ABDOMINAL SITE (H): ICD-10-CM

## 2018-10-17 PROCEDURE — 90832 PSYTX W PT 30 MINUTES: CPT | Performed by: SOCIAL WORKER

## 2018-10-17 PROCEDURE — 99214 OFFICE O/P EST MOD 30 MIN: CPT | Performed by: NURSE PRACTITIONER

## 2018-10-17 RX ORDER — OXYCODONE AND ACETAMINOPHEN 10; 325 MG/1; MG/1
1 TABLET ORAL 4 TIMES DAILY
Qty: 28 TABLET | Refills: 0 | Status: SHIPPED | OUTPATIENT
Start: 2018-10-17 | End: 2018-10-29 | Stop reason: ALTCHOICE
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
## 2018-10-17 NOTE — MR AVS SNAPSHOT
After Visit Summary   10/17/2018    Ines Mott    MRN: [id number]           Patient Information     Date Of Birth          1965        Visit Information        Provider Department      10/17/2018 9:30 AM Davion Rees, [facility]        Today's Diagnoses     Posttraumatic stress disorder    -  1       Follow-ups after your visit        Your next 10 appointments already scheduled     Oct 23, 2018  9:30 AM CDT   Return Visit with Giulia Mckeon MD   [facility] ([facility])    [address]  [address]  [facility] [address]   [phone]            Oct 24, 2018 11:00 AM CDT   Community Paramedic with [facility] 1, [facility] 2   [facility] ([facility])    [address]  [address]  [facility] [address] [phone]            Oct 25, 2018  3:00 PM CDT   (Arrive by 2:45 PM)   New Patient Visit with Samy Salmeron MD   [facility] ([facility])    [address]  [address]  [facility] [address] [phone]            Oct 26, 2018 10:00 AM CDT   Return Visit with Trina Vargas   [facility] ([facility])    [address]  [address]  [facility] [address] [phone]            Oct 29, 2018  9:00 AM CDT   Return Visit with Trina Vargas   [facility] ([facility])    [address]  [address]  [facility] [address] [phone]            Oct 29, 2018  9:00 AM CDT   Return Visit with MICHEAL Crowley CNP   [facility] ([facility])    [address]  [address]  [facility] [address] [phone]              Who to contact     If you  have questions or need follow up information about today's clinic visit or your schedule please contact [facility] directly at [phone].  Normal or non-critical lab and imaging results will be communicated to you by MyChart, letter or phone within 4 business days after the clinic has received the results. If you do not hear from us within 7 days, please contact the clinic through MyChart or phone. If you have a critical or abnormal lab result, we will notify you by phone as soon as possible.  Submit refill requests through Weesh or call your pharmacy and they will forward the refill request to us. Please allow 3 business days for your refill to be completed.          Additional Information About Your Visit        Care EveryWhere ID     This is your Care EveryWhere ID. This could be used by other organizations to access your [facility] medical records  [id number]         Blood Pressure from Last 3 Encounters:   10/17/18 128/84   10/15/18 (!) 141/92   10/11/18 108/76    Weight from Last 3 Encounters:   10/17/18 108.9 kg (240 lb)   10/15/18 107 kg (236 lb)   10/11/18 108 kg (238 lb)              Today, you had the following     No orders found for display         Today's Medication Changes          These changes are accurate as of 10/17/18 11:59 PM.  If you have any questions, ask your nurse or doctor.               These medicines have changed or have updated prescriptions.        Dose/Directions    oxyCODONE-acetaminophen  MG per tablet   Commonly known as:  PERCOCET   This may have changed:  additional instructions   Used for:  Carcinoma of colon metastatic to liver (H), 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), Renal cell carcinoma of right kidney (H)   Changed by:  Marika Santana, MICHEAL CNP        Dose:  1 tablet   Take 1 tablet by mouth 4 times daily Taper start 10/18, 3 tabs daily for 4 days, then 2 tabs daily for 5 days then one tab daily until  off   Quantity:  28 tablet   Refills:  0            Where to get your medicines      Some of these will need a paper prescription and others can be bought over the counter.  Ask your nurse if you have questions.     Bring a paper prescription for each of these medications     oxyCODONE-acetaminophen  MG per tablet                Primary Care Provider Office Phone # Fax #    MICHEAL Crowley [facility] [phone]       [address] [facility] [address]  [facility] [address]        Goals        General    Transportation (pt-stated)     Notes - Note created  7/11/2018  9:56 AM by Elizabeth Aleman, KERVIN    Goal Statement: I am in need of transportation resources  Measure of Success: Patient will utilize transportation benefits  Supportive Steps to Achieve: SW will assist patient in obtaining transportation to medical appointments.   Date to Achieve By: Ongoing         Lifestyle    Patient achieves sleep pattern objective     Notes - Note created  9/25/2018 10:06 AM by Martha Sims, RN    #1-Goal Statement: patient will obtain and use CPAP machine nightly  Measure of Success: improved sleep  Supportive Steps to Achieve: RN CC obtained DME order from PCP  Barriers: poor follow up; current machine broken  Strengths: assistance from PCP/RN CC  Date to Achieve By: 11-1-18  Patient expressed understanding of goal: good            Equal Access to Services     VALENTE RIVERA AH: Marc Leonard, waaislinnda domadaha, qaybta kaalmada mary, emilee coyne. So [facility] [phone].    ATENCIÓN: Si habla español, tiene a christianson disposición servicios gratuitos de asistencia lingüística. Llame al [phone].    We comply with applicable federal civil rights laws and [place] laws. We do not discriminate on the basis of race, color, national origin, age, disability, sex, sexual orientation, or gender identity.            Thank you!     Thank you for choosing [facility]  for your care. Our goal is always to provide you with excellent care. Hearing back from our patients is one way we can continue to improve our services. Please take a few minutes to complete the written survey that you may receive in the mail after your visit with us. Thank you!             Your Updated Medication List - Protect others around you: Learn how to safely use, store and throw away your medicines at www.disposemymeds.org.          This list is accurate as of 10/17/18 11:59 PM.  Always use your most recent med list.                   Brand Name Dispense Instructions for use Diagnosis    albuterol (2.5 MG/3ML) 0.083% neb solution     30 vial    Take 1 vial (2.5 mg) by nebulization every 6 hours as needed for shortness of breath / dyspnea or wheezing    Mild intermittent asthma with acute exacerbation       ASPIRIN NOT PRESCRIBED    INTENTIONAL    1 each    Please choose reason not prescribed, below    Aspirin intolerance       baclofen 10 MG tablet    LIORESAL    60 tablet    Take 1 tablet (10 mg) by mouth 2 times daily    Chronic pain syndrome       blood glucose calibration solution    no brand specified    1 each    Use to calibrate blood glucose monitor as directed.    Type 2 diabetes mellitus without complication, without long-term current use of insulin (H)       blood glucose lancets standard    no brand specified    100 each    Use to test blood sugar daily times daily or as directed.    Type 2 diabetes mellitus without complication, without long-term current use of insulin (H)       blood glucose monitoring meter device kit    no brand specified    1 kit    Use to test blood sugar larisa times daily or as directed.    Type 2 diabetes mellitus without complication, without long-term current use of insulin (H)       blood glucose monitoring test strip    no brand specified    100 strip    Use to test blood sugars once times daily or as directed    Type 2 diabetes mellitus  without complication, without long-term current use of insulin (H)       cefdinir 300 MG capsule    OMNICEF    20 capsule    Take 2 capsules (600 mg) by mouth daily    Urinary tract infection       cefpodoxime 200 MG tablet    VANTIN    10 tablet    Take 1 tablet (200 mg) by mouth 2 times daily    Acute cystitis without hematuria       cloNIDine 0.1 MG tablet    CATAPRES    60 tablet    Take 1 tablet (0.1 mg) by mouth 2 times daily    Hypertension goal BP (blood pressure) < 140/80, Opioid withdrawal (H)       clotrimazole 2 % Crea    GYNE-LOTRIMIN 3    1 Tube    Place 2 g vaginally daily    Yeast infection of the vagina       CULTUREE DIGESTIVE HEALTH Caps     60 capsule    Take 1 capsule by mouth 2 times daily    Diarrhea, unspecified type       * cyanocobalamin 1000 MCG/ML injection    VITAMIN B12    1 mL    Inject 1 mL (1,000 mcg) Subcutaneous every 30 days    B12 deficiency       * cyanocobalamin 1000 MCG/ML injection    VITAMIN B12    1 mL    Inject 1 mL (1,000 mcg) into the muscle every 30 days    Vitamin B12 deficiency (non anemic)       EPINEPHrine 0.3 MG/0.3ML injection 2-pack    EPIPEN/ADRENACLICK/or ANY BX GENERIC EQUIV    0.3 mL    Inject 0.3 mLs (0.3 mg) into the muscle once as needed for anaphylaxis    Anaphylactic reaction       losartan-hydrochlorothiazide 100-25 MG per tablet    HYZAAR    90 tablet    Take 1 tablet by mouth daily    Hypertension goal BP (blood pressure) < 140/80       magnesium oxide 400 (241.3 Mg) MG tablet    MAG-OX    90 tablet    Take 1 tablet (400 mg) by mouth daily    Cramp of limb       naloxone nasal spray    NARCAN    0.2 mL    Spray 1 spray (4 mg) into one nostril alternating nostrils as needed for opioid reversal every 2-3 minutes until assistance arrives    Chronic pain syndrome       * ondansetron 4 MG ODT tab    ZOFRAN-ODT    10 tablet    Take 1 tablet (4 mg) by mouth every 8 hours as needed for nausea        * ondansetron 4 MG ODT tab    ZOFRAN-ODT    90 tablet     DISSOLVE ONE TABLET IN MOUTH EVERY 8 HOURS AS NEEDED FOR NAUSEA    Chronic pain syndrome       order for DME     1 Device    Right wrist splint for carpal tunnel syndrome  One* Use as directed at hour of sleep    Carpal tunnel syndrome of right wrist       order for DME     1 each    Equipment being ordered: CPAP with supplies    ALEXUS on CPAP       oxyCODONE-acetaminophen  MG per tablet    PERCOCET    28 tablet    Take 1 tablet by mouth 4 times daily Taper start 10/18, 3 tabs daily for 4 days, then 2 tabs daily for 5 days then one tab daily until off    Carcinoma of colon metastatic to liver (H), 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), Renal cell carcinoma of right kidney (H)       polyethylene glycol powder    MIRALAX/GLYCOLAX    1581 g    TAKE 17 GRAMS (1 CAPFUL) BY MOUTH DAILY    Slow transit constipation       potassium chloride 10 MEQ tablet    K-TAB,KLOR-CON    120 tablet    Take 1 tablet (10 mEq) by mouth daily    Hypokalemia       sertraline 100 MG tablet    ZOLOFT    30 tablet    Take 1 tablet (100 mg) by mouth daily    Recurrent major depression in partial remission (H)       STATIN NOT PRESCRIBED (INTENTIONAL)     0 each    1 each daily Statin not prescribed intentionally due to Active liver disease (liver failure, cirrhosis, hepatitis) LIVER METS    Hyperlipidemia LDL goal <100, Type 2 diabetes mellitus without complication (H)       Vitamin D (Cholecalciferol) 1000 units Tabs     60 tablet    Take 2,000 Units by mouth daily    Vitamin D deficiency       * Notice:  This list has 4 medication(s) that are the same as other medications prescribed for you. Read the directions carefully, and ask your doctor or other care provider to review them with you.

## 2018-10-17 NOTE — PROGRESS NOTES
[facility]   October 17, 2018      Behavioral Health Clinician Progress Note    Patient Name: Ines Mott           Service Type:  Individual      Service Location:   Face to Face in Clinic      Session Start Time: 9:45 a.m.  Session End Time: 10:10 a.m.      Session Length: 16 - 37      Attendees: Patient and PCP     Visit Activities (Refresh list every visit): [facility] [employer]    Diagnostic Assessment Date: to be completed at next visit  Treatment Plan Review Date: to be completed at next visit  See Flowsheets for today's PHQ-9 and RASTA-7 results  Previous PHQ-9:   PHQ-9 SCORE 5/11/2018 5/18/2018 7/2/2018   Total Score - - -   Total Score MyChart 9 (Mild depression) - -   Total Score 9 7 8     Previous RASTA-7:   RASTA-7 SCORE 8/24/2015 11/4/2016 12/5/2017   Total Score - - 3 (minimal anxiety)   Total Score 7 9 3       GONZÁLEZ LEVEL:  No flowsheet data found.    DATA  Extended Session (60+ minutes): No  Interactive Complexity: No  Crisis: No  [facility] Patient: No    Treatment Objective(s) Addressed in This Session:  Target Behavior(s): disease management/lifestyle changes cancer    Adjustment Difficulties: will develop coping/problem-solving skills to facilitate more adaptive adjustment  Psychological distress related to Chronic Disease Management    Current Stressors / Issues:    [facility] co-visit with patient and PCP in the clinic. Primary concerns for today's visit include pain management and cancer follow up. Discussed patient's continued pattern of not taking medications as prescribed and behaviors consistent with addiction and overuse. Patient acknowledged difficulty adhering to directions and expressed understanding when informed that PCP would be tapering her off of oxycodone. Patient agreed to schedule an appointment at the pain clinic to discuss alternatives for pain management. Patient will attend upcoming oncology and mammography appointments. Patient reports her mood is better and that  "she is feeling supported by her daughter and .  Patient denied SI/SIB thoughts or urges.     Progress on Treatment Objective(s) / Homework:  New Objective established this session - PREPARATION (Decided to change - considering how); Intervened by negotiating a change plan and determining options / strategies for behavior change, identifying triggers, exploring social supports, and working towards setting a date to begin behavior change    Motivational Interviewing    MI Intervention: Expressed Empathy/Understanding, Supported Autonomy, Collaboration, Evocation, Permission to raise concern or advise, Open-ended questions and Reflections: simple and complex     Change Talk Expressed by the Patient: Ability to change Reasons to change Committment to change    Provider Response to Change Talk: E - Evoked more info from patient about behavior change, A - Affirmed patient's thoughts, decisions, or attempts at behavior change, R - Reflected patient's change talk and S - Summarized patient's change talk statements    Also provided psychoeducation about behavioral health condition, symptoms, and treatment options    Care Plan review completed: No    Medication Review:  No changes to current psychiatric medication(s) - zoloft, clonidine    Medication Compliance:  No - pattern of running out of oxycodone early    Changes in Health Issues:   Yes: Chronic disease management, Associated Psychological Distress  Cancer, Please see medical note by PCP MICHEAL Gaming, CNP    Chemical Use Review:   Substance Use: decrease in alcohol  and non prescription drugs .  Client reports frequency of use no alcohol or non-prescribed drugs.  Action  Provided support and affirmation for steps taken towards sobriety    Patient also has a hx of polysubstance dependence; drugs of choice: heroin, opiates, alcohol - currently using alcohol and opiates. Reports intent to stop using alcohol \"because then things get crazy\".     Tobacco " "Use: No current tobacco use.      Assessment: Current Emotional / Mental Status (status of significant symptoms):  Risk status (Self / Other harm or suicidal ideation)  Patient has had a history of suicidal ideation: \"comes and goes\" and suicide attempts: most recent attempt 10/21/14, pt attempted to overdose on methamphetamine; attempted in the past                                                                                           Patient denies current fears or concerns for personal safety.  Patient denies current or recent suicidal ideation or behaviors.  Patient denies current or recent homicidal ideation or behaviors.  Patient denies current or recent self injurious behavior or ideation.  Patient denies other safety concerns.  A safety and risk management plan has not been developed at this time, however patient was encouraged to call [place] [id number] should there be a change in any of these risk factors.    Appearance:   Appropriate   Eye Contact:   Good   Psychomotor Behavior: Normal   Attitude:   Cooperative   Orientation:   All  Speech   Rate / Production: Normal    Volume:  Normal   Mood:    Anxious   Affect:    Appropriate   Thought Content:  Clear   Thought Form:  Coherent   Insight:    Fair     Diagnoses:  1. Posttraumatic stress disorder        Collateral Reports Completed:  Not Applicable    Plan: (Homework, other):  Patient was given information about behavioral services and encouraged to schedule a follow up appointment with Trina Vargas [facility] Clinical Intern, in 2 weeks . Patient will take medications as prescribed and follow the taper. She was also given information about mental health symptoms and treatment options  and Cognitive Behavioral Therapy skills to practice when experiencing anxiety and depression. CD Recommendations: Maintain Sobriety.    MARTIN Palma, [facility]  "

## 2018-10-17 NOTE — MR AVS SNAPSHOT
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)        Renal cell carcinoma of right kidney (H)          Care Instructions    We are going to taper you off your oxycodone, starting tomorrow 3 tabs for for 4 days, then 2 tabs for 5 days then one tab daily until out of medication     You will see our pain specialist on 10/23 at 9:30     It is ok to use Naproxen or Ibuprofen as well as acetaminophen for pain relief as you taper, no more than 3,000mg daily of acetaminophen, no more than 1,000mg of naproxen, no more than 2400mg of Ibuprofen in a day               Follow-ups after your visit        Follow-up notes from your care team     Return in about 3 weeks (around 11/7/2018) for Keep your already scheduled follow up.      Your next 10 appointments already scheduled     Oct 18, 2018  1:00 PM CDT   MA DIAGNOSTIC BILATERAL W/ KARIN with UCBCMA2   [facility] ([facility])    [address] [place] [address], 2nd [facility] [address] [phone]           How do I prepare for my exam? (Food and drink instructions) No Food and Drink Restrictions.  How do I prepare for my exam? (Other instructions) Do not use any powder, lotion or deodorant under your arms or on your breast. If you do, we will ask you to remove it before your exam.  What should I wear: Wear comfortable, two-piece clothing.  How long does the exam take: Most scans will take 15 minutes.  What should I bring: Bring any previous mammograms from other facilities or have them mailed to the breast center.  Do I need a :  No  " is needed.  What do I need to tell my doctor: If you have any allergies, tell your care team.  What should I do after the exam: No restrictions, You may resume normal activities.  What is this test: This test is an x-ray of the breast to look for breast disease. The breast is pressed between two plates to flatten and spread the tissue. An X-ray is taken of the breast from different angles.  Who should I call with questions: If you have any questions, please call the Imaging Department where you will have your exam. Directions, parking instructions, and other information is available on our website, [email or web address].  Other information about my exam Three-dimensional (3D) mammograms are available at [place] locations in [facility], [place], and [place]. [facility] locations include [place] and [facility] in [place].  Benefits of 3D mammograms include: * Improved rate of cancer detection * Decreases your chance of having to go back for more tests, which means fewer: * \"False-positive\" results (This means that there is an abnormal area but it isn't cancer.) * Invasive testing procedures, such as a biopsy or surgery * Can provide clearer images of the breast if you have dense breast tissue.  *3D mammography is an optional exam that anyone can have with a 2D mammogram. It doesn't replace or take the place of a 2D mammogram. 2D mammograms remain an effective screening test for all women.  Not all insurance companies cover the cost of a 3D mammogram. Check with your insurance.            Oct 18, 2018  1:30 PM CDT   US BREAST BUSHRA LIMITED 1-3 QUAD with UCBCUS2   [facility] ([facility])    [address], 2nd [address]  [facility] [address] [phone]           How do I prepare for my exam? (Food and drink instructions) No Food and Drink Restrictions.  How do I prepare for my exam? (Other " instructions) You do not need to do anything special to prepare for your exam.  What should I wear: Wear comfortable clothes.  How long does the exam take: Most ultrasounds take 30 to 60 minutes.  What should I bring: Bring a list of your medicines, including vitamins, minerals and over-the-counter drugs. It is safest to leave personal items at home.  Do I need a :  No  is needed.  What do I need to tell my doctor: Tell your doctor about any allergies you may have.  What should I do after the exam: No restrictions, You may resume normal activities.  What is this test: An ultrasound uses sound waves to make pictures of the body. Sound waves do not cause pain. The only discomfort may be the pressure of the wand against your skin or full bladder.  Who should I call with questions: If you have any questions, please call the Imaging Department where you will have your exam. Directions, parking instructions, and other information is available on our website, [email or web address].            Oct 22, 2018 10:30 AM CDT   PAP SETUP with DAVID HANNA   [facility] ([facility])    [address] [place] [address]   [phone]            Oct 24, 2018 11:00 AM CDT   Community Paramedic with Timo Community Paramedic 1, Timo Community Paramedic 2   [facility] ([facility])    [address] [place] [address]  [address]  [facility] [address]   [phone]            Oct 25, 2018  3:00 PM CDT   (Arrive by 2:45 PM)   New Patient Visit with Samy Salmeron MD   [facility] ([facility])    [address]  [address]  [facility] [address]   [phone]            Oct 26, 2018 10:00 AM CDT   Return Visit with Trina Vargas   [facility] ([facility])    [address] [place] [address]  [address] [place] [address]  [address]   [phone]            Nov 07, 2018  9:00 AM CST   Return Visit with MICHEAL Crowley CNP   [facility] ([facility]    [address]  [address]  [facility] [address]   [phone]            Nov 07, 2018  9:00 AM CST   Return Visit with MARTIN Pineda   [facility] ([facility])    [address]  [address]  [facility] [address] [phone]              Who to contact     If you have questions or need follow up information about today's clinic visit or your schedule please contact [facility] directly at [phone].  Normal or non-critical lab and imaging results will be communicated to you by MyChart, letter or phone within 4 business days after the clinic has received the results. If you do not hear from us within 7 days, please contact the clinic through MyChart or phone. If you have a critical or abnormal lab result, we will notify you by phone as soon as possible.  Submit refill requests through [employer] or call your pharmacy and they will forward the refill request to us. Please allow 3 business days for your refill to be completed.          Additional Information About Your Visit        Care EveryWhere ID     This is your Care EveryWhere ID. This could be used by other organizations to access your [place] medical records  [id number]        Your Vitals Were     Pulse Temperature Pulse Oximetry BMI (Body Mass Index)          73 98.7  F (37.1  C) (Oral) 99% 38.74 kg/m2         Blood Pressure from Last 3 Encounters:   10/17/18 128/84   10/15/18 (!) 141/92   10/11/18 108/76    Weight from Last 3 Encounters:   10/17/18 240 lb (108.9 kg)   10/15/18 236 lb (107 kg)   10/11/18 238 lb (108 kg)              Today, you had the following     No orders found for display         Today's Medication Changes           These changes are accurate as of 10/17/18 10:21 AM.  If you have any questions, ask your nurse or doctor.               These medicines have changed or have updated prescriptions.        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  Ask your nurse if you have questions.     Bring a paper prescription for each of these medications     oxyCODONE-acetaminophen  MG per tablet               Information about OPIOIDS     PRESCRIPTION OPIOIDS: WHAT YOU NEED TO KNOW   We gave you an opioid (narcotic) pain medicine. It is important to manage your pain, but opioids are not always the best choice. You should first try all the other options your care team gave you. Take this medicine for as short a time (and as few doses) as possible.    Some activities can increase your pain, such as bandage changes or therapy sessions. It may help to take your pain medicine 30 to 60 minutes before these activities. Reduce your stress by getting enough sleep, working on hobbies you enjoy and practicing relaxation or meditation. Talk to your care team about ways to manage your pain beyond prescription opioids.    These medicines have risks:    DO NOT drive when on new or higher doses of pain medicine. These medicines can affect your alertness and reaction times, and you could be arrested for driving under the  influence (DUI). If you need to use opioids long-term, talk to your care team about driving.    DO NOT operate heavy machinery    DO NOT do any other dangerous activities while taking these medicines.    DO NOT drink any alcohol while taking these medicines.     If the opioid prescribed includes acetaminophen, DO NOT take with any other medicines that contain acetaminophen. Read all labels carefully. Look for the word  acetaminophen  or  Tylenol.  Ask your pharmacist if you have questions or are unsure.    You can get addicted to pain medicines, especially if you have a history of addiction (chemical, alcohol or substance dependence). Talk to your care team about ways to reduce this risk.    All opioids tend to cause constipation. Drink plenty of water and eat foods that have a lot of fiber, such as fruits, vegetables, prune juice, apple juice and high-fiber cereal. Take a laxative (Miralax, milk of magnesia, Colace, Senna) if you don t move your bowels at least every other day. Other side effects include upset stomach, sleepiness, dizziness, throwing up, tolerance (needing more of the medicine to have the same effect), physical dependence and slowed breathing.    Store your pills in a secure place, locked if possible. We will not replace any lost or stolen medicine. If you don t finish your medicine, please throw away (dispose) as directed by your pharmacist. The [place] Pollution Control Agency has more information about safe disposal: https://www.pca.state.[place]/living-green/managing-unwanted-medications         Primary Care Provider Office Phone # Fax #    MICHEAL Crowley [facility] [phone]       [address] [place] [address]  [facility] [address]        Goals        General    Transportation (pt-stated)     Notes - Note created  7/11/2018  9:56 AM by Elizabeth Aleman LSW    Goal Statement: I am in need of transportation resources  Measure of Success: Patient will utilize transportation  benefits  Supportive Steps to Achieve: SW will assist patient in obtaining transportation to medical appointments.   Date to Achieve By: Ongoing         Lifestyle    Patient achieves sleep pattern objective     Notes - Note created  9/25/2018 10:06 AM by Martha Sims, RN    #1-Goal Statement: patient will obtain and use CPAP machine nightly  Measure of Success: improved sleep  Supportive Steps to Achieve: RN CC obtained DME order from PCP  Barriers: poor follow up; current machine broken  Strengths: assistance from PCP/RN CC  Date to Achieve By: 11-1-18  Patient expressed understanding of goal: good            Equal Access to Services     VALENTE RIVERA : Hadii casimiro zapata hadasho Soomaali, waaxda luqadaha, qaybta kaalmada adeegyada, emilee pardo . So [facility] [phone].    ATENCIÓN: Si habla español, tiene a christianson disposición servicios gratuitos de asistencia lingüística. Llame al [phone].    We comply with applicable federal civil rights laws and [place] laws. We do not discriminate on the basis of race, color, national origin, age, disability, sex, sexual orientation, or gender identity.            Thank you!     Thank you for choosing [facility]  for your care. Our goal is always to provide you with excellent care. Hearing back from our patients is one way we can continue to improve our services. Please take a few minutes to complete the written survey that you may receive in the mail after your visit with us. Thank you!             Your Updated Medication List - Protect others around you: Learn how to safely use, store and throw away your medicines at www.disposemymeds.org.          This list is accurate as of 10/17/18 10:21 AM.  Always use your most recent med list.                   Brand Name Dispense Instructions for use Diagnosis    albuterol (2.5 MG/3ML) 0.083% neb solution     30 vial    Take 1 vial (2.5 mg) by nebulization every 6 hours as  needed for shortness of breath / dyspnea or wheezing    Mild intermittent asthma with acute exacerbation       ASPIRIN NOT PRESCRIBED    INTENTIONAL    1 each    Please choose reason not prescribed, below    Aspirin intolerance       baclofen 10 MG tablet    LIORESAL    60 tablet    Take 1 tablet (10 mg) by mouth 2 times daily    Chronic pain syndrome       blood glucose calibration solution    no brand specified    1 each    Use to calibrate blood glucose monitor as directed.    Type 2 diabetes mellitus without complication, without long-term current use of insulin (H)       blood glucose lancets standard    no brand specified    100 each    Use to test blood sugar daily times daily or as directed.    Type 2 diabetes mellitus without complication, without long-term current use of insulin (H)       blood glucose monitoring meter device kit    no brand specified    1 kit    Use to test blood sugar larisa times daily or as directed.    Type 2 diabetes mellitus without complication, without long-term current use of insulin (H)       blood glucose monitoring test strip    no brand specified    100 strip    Use to test blood sugars once times daily or as directed    Type 2 diabetes mellitus without complication, without long-term current use of insulin (H)       cefdinir 300 MG capsule    OMNICEF    20 capsule    Take 2 capsules (600 mg) by mouth daily    Urinary tract infection       cefpodoxime 200 MG tablet    VANTIN    10 tablet    Take 1 tablet (200 mg) by mouth 2 times daily    Acute cystitis without hematuria       cloNIDine 0.1 MG tablet    CATAPRES    60 tablet    Take 1 tablet (0.1 mg) by mouth 2 times daily    Hypertension goal BP (blood pressure) < 140/80, Opioid withdrawal (H)       clotrimazole 2 % Crea    GYNE-LOTRIMIN 3    1 Tube    Place 2 g vaginally daily    Yeast infection of the vagina       CULTURELLE DIGESTIVE HEALTH Caps     60 capsule    Take 1 capsule by mouth 2 times daily    Diarrhea,  unspecified type       * cyanocobalamin 1000 MCG/ML injection    VITAMIN B12    1 mL    Inject 1 mL (1,000 mcg) Subcutaneous every 30 days    B12 deficiency       * cyanocobalamin 1000 MCG/ML injection    VITAMIN B12    1 mL    Inject 1 mL (1,000 mcg) into the muscle every 30 days    Vitamin B12 deficiency (non anemic)       EPINEPHrine 0.3 MG/0.3ML injection 2-pack    EPIPEN/ADRENACLICK/or ANY BX GENERIC EQUIV    0.3 mL    Inject 0.3 mLs (0.3 mg) into the muscle once as needed for anaphylaxis    Anaphylactic reaction       losartan-hydrochlorothiazide 100-25 MG per tablet    HYZAAR    90 tablet    Take 1 tablet by mouth daily    Hypertension goal BP (blood pressure) < 140/80       magnesium oxide 400 (241.3 Mg) MG tablet    MAG-OX    90 tablet    Take 1 tablet (400 mg) by mouth daily    Cramp of limb       naloxone nasal spray    NARCAN    0.2 mL    Spray 1 spray (4 mg) into one nostril alternating nostrils as needed for opioid reversal every 2-3 minutes until assistance arrives    Chronic pain syndrome       * ondansetron 4 MG ODT tab    ZOFRAN-ODT    10 tablet    Take 1 tablet (4 mg) by mouth every 8 hours as needed for nausea        * ondansetron 4 MG ODT tab    ZOFRAN-ODT    90 tablet    DISSOLVE ONE TABLET IN MOUTH EVERY 8 HOURS AS NEEDED FOR NAUSEA    Chronic pain syndrome       order for DME     1 Device    Right wrist splint for carpal tunnel syndrome  One* Use as directed at hour of sleep    Carpal tunnel syndrome of right wrist       order for DME     1 each    Equipment being ordered: CPAP with supplies    ALEXUS on CPAP       oxyCODONE-acetaminophen  MG per tablet    PERCOCET    28 tablet    Take 1 tablet by mouth 4 times daily Taper start 10/18, 3 tabs daily for 4 days, then 2 tabs daily for 5 days then one tab daily until off    Carcinoma of colon metastatic to liver (H), 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), Renal cell carcinoma of right kidney (H)       polyethylene glycol  powder    MIRALAX/GLYCOLAX    1581 g    TAKE 17 GRAMS (1 CAPFUL) BY MOUTH DAILY    Slow transit constipation       potassium chloride 10 MEQ tablet    K-TAB,KLOR-CON    120 tablet    Take 1 tablet (10 mEq) by mouth daily    Hypokalemia       sertraline 100 MG tablet    ZOLOFT    30 tablet    Take 1 tablet (100 mg) by mouth daily    Recurrent major depression in partial remission (H)       STATIN NOT PRESCRIBED (INTENTIONAL)     0 each    1 each daily Statin not prescribed intentionally due to Active liver disease (liver failure, cirrhosis, hepatitis) LIVER METS    Hyperlipidemia LDL goal <100, Type 2 diabetes mellitus without complication (H)       Vitamin D (Cholecalciferol) 1000 units Tabs     60 tablet    Take 2,000 Units by mouth daily    Vitamin D deficiency       * Notice:  This list has 4 medication(s) that are the same as other medications prescribed for you. Read the directions carefully, and ask your doctor or other care provider to review them with you.

## 2018-10-17 NOTE — PATIENT INSTRUCTIONS
We are going to taper you off your oxycodone, starting tomorrow 3 tabs for for 4 days, then 2 tabs for 5 days then one tab daily until out of medication     You will see our pain specialist on 10/23 at 9:30     It is ok to use Naproxen or Ibuprofen as well as acetaminophen for pain relief as you taper, no more than 3,000mg daily of acetaminophen, no more than 1,000mg of naproxen, no more than 2400mg of Ibuprofen in a day

## 2018-10-18 ENCOUNTER — RADIANT APPOINTMENT (OUTPATIENT)
Dept: MAMMOGRAPHY | Facility: CLINIC | Age: 53
End: 2018-10-18
Attending: NURSE PRACTITIONER
Payer: COMMERCIAL

## 2018-10-22 ENCOUNTER — TELEPHONE (OUTPATIENT)
Dept: FAMILY MEDICINE | Facility: CLINIC | Age: 53
End: 2018-10-22

## 2018-10-22 ENCOUNTER — DOCUMENTATION ONLY (OUTPATIENT)
Dept: SLEEP MEDICINE | Facility: CLINIC | Age: 53
End: 2018-10-22
Payer: COMMERCIAL

## 2018-10-22 DIAGNOSIS — G47.33 OSA ON CPAP: ICD-10-CM

## 2018-10-22 NOTE — PROGRESS NOTES
"Clinic Care Coordination Contact  Care Team Conversations    Called patient. She obtained her CPAP machine and had CPAP evaluation appt today. Reported chest pain to clinic triage nurse. Thinks she is going through medication withdrawal (opiate). Has appt with addiction medicine provider tomorrow. Patient's goal is to restart her pain medication, \"or maybe I can try something not as strong as the oxy, like vicodin\". Advised options would be discussed. Keep appt with community paramedic team as scheduled on Wednesday. She is anxious because she is out of pain medication and \"they're going to ask a lot of questions\". Advised I can meet with her for debrief conversation if she likes after appt tomorrow. She agrees this may be helpful and will reach out to me if needed. Agree with plan.     Carla Sims R.N.  Clinic Care Coordinator  [facility]  [phone]              "

## 2018-10-22 NOTE — PROGRESS NOTES
Patient was offered choice of vendor and chose [facility].  Ines VELEZ Mott was set up at [place] on October 22, 2018 Patient received a Resmed AirSense 10 Auto. Pressures were set at 8-12 cmH2O.   Patient s ramp is 6 cm H2O for Auto and FLEX/EPR is EPR, 2.  Patient received a Resmed Mask name: Airfit F20 Full Face mask Size Medium, heated tubing and heated humidifier.  Patient is not enrolled in the STM Program and does need to meet compliance. Patient has a follow up on TBD (needs to be scheduled) with Dr. Mujica.    Josey Vasquez

## 2018-10-22 NOTE — TELEPHONE ENCOUNTER
"S-(situation): Patient calling with chest pain.    B-(background): Patient has cardiac history of HTN and ACS.    A-(assessment): Patient reports chest pain in sharp/achey, in the middle of the chest. Pain comes and goes. Reports pain has been ongoing since yesterday morning. Patient reports history of blood clots, no signs of current clots. Patient able to talk in complete sentences-- denied difficulty breathing. Patient does report feeling like her heart was \"flip-flopping\" prior to taking medications which happens occasionally. Patient reports having low K and Mg levels that she takes daily meds for. Palpitations have subsided since taking meds. Patient reports this has happened in the past when coming off of oxycodone and patient was put back on her oxycodone to relieve symptoms.     R-(recommendations): Patient will continue to monitor. Patient informed to call back if symptoms worsen, palpitations return or any other concerns arise.       Flora Saravia RN  10/22/18  12:34 PM      "

## 2018-10-23 ENCOUNTER — OFFICE VISIT (OUTPATIENT)
Dept: ADDICTION MEDICINE | Facility: CLINIC | Age: 53
End: 2018-10-23
Payer: COMMERCIAL

## 2018-10-23 VITALS
OXYGEN SATURATION: 100 % | RESPIRATION RATE: 16 BRPM | SYSTOLIC BLOOD PRESSURE: 132 MMHG | WEIGHT: 241.5 LBS | BODY MASS INDEX: 38.98 KG/M2 | DIASTOLIC BLOOD PRESSURE: 84 MMHG | HEART RATE: 84 BPM

## 2018-10-23 DIAGNOSIS — R10.84 GENERALIZED ABDOMINAL PAIN: ICD-10-CM

## 2018-10-23 DIAGNOSIS — G89.4 CHRONIC PAIN SYNDROME: ICD-10-CM

## 2018-10-23 DIAGNOSIS — M79.7 FIBROMYALGIA: ICD-10-CM

## 2018-10-23 DIAGNOSIS — F11.20 OPIOID USE DISORDER, SEVERE, DEPENDENCE (H): Primary | ICD-10-CM

## 2018-10-23 PROCEDURE — 99215 OFFICE O/P EST HI 40 MIN: CPT | Performed by: ANESTHESIOLOGY

## 2018-10-23 NOTE — MR AVS SNAPSHOT
After Visit Summary   10/23/2018    Ines Mott    MRN: [id number]           Patient Information     Date Of Birth          1965        Visit Information        Provider Department      10/23/2018 9:30 AM Giulia Mckeon MD [facility]        Care Instructions    I am recommending detox and Suboxone MAT which you are refusing at this time.  I will not be prescribing opioids.     Call and make an appointment to have a chemical dependency evaluation. (rule 25)  I believe the next step is treatment of your addiction.     [facility]   [phone]    Giulia Mckeon MD             Follow-ups after your visit        Your next 10 appointments already scheduled     Oct 24, 2018 11:00 AM CDT   Community Paramedic with [facility] 1, [facility]   [facility] ([facility])    [address]  [address]  [facility] [id number] [phone]            Oct 25, 2018  3:00 PM CDT   (Arrive by 2:45 PM)   New Patient Visit with Samy Salmeron MD   [facility] ([facility])    [address]  [address]  [facility] [address] [phone]            Oct 26, 2018 10:00 AM CDT   Return Visit with Trina Vargas   [facility] ([facility])    [address]  [address]  [facility] [address] [phone]            Oct 29, 2018  9:00 AM CDT   Return Visit with Trina Vargas   [facility] ([facility])    [address]  [address]  [facility] [address] [phone]            Oct 29, 2018  9:00 AM CDT   Return Visit with MICHEAL Crowley CNP   [facility] ([facility])    [address]  [address]  [facility] [address]    [phone]              Who to contact     If you have questions or need follow up information about today's clinic visit or your schedule please contact [facility] directly at [phone].  Normal or non-critical lab and imaging results will be communicated to you by MyChart, letter or phone within 4 business days after the clinic has received the results. If you do not hear from us within 7 days, please contact the clinic through MyChart or phone. If you have a critical or abnormal lab result, we will notify you by phone as soon as possible.  Submit refill requests through [employer] or call your pharmacy and they will forward the refill request to us. Please allow 3 business days for your refill to be completed.          Additional Information About Your Visit        Care EveryWhere ID     This is your Care EveryWhere ID. This could be used by other organizations to access your [place] medical records  [id number]        Your Vitals Were     Pulse Respirations Pulse Oximetry BMI (Body Mass Index)          84 16 100% 38.98 kg/m2         Blood Pressure from Last 3 Encounters:   10/23/18 132/84   10/17/18 128/84   10/15/18 (!) 141/92    Weight from Last 3 Encounters:   10/23/18 241 lb 8 oz (109.5 kg)   10/17/18 240 lb (108.9 kg)   10/15/18 236 lb (107 kg)              Today, you had the following     No orders found for display       Primary Care Provider Office Phone # Fax #    MICHEAL Crowley [facility] [phone]       [address] [place] [address]  [facility] [id number]        Goals        General    Transportation (pt-stated)     Notes - Note created  7/11/2018  9:56 AM by Elizabeth Aleman, KERVIN    Goal Statement: I am in need of transportation resources  Measure of Success: Patient will utilize transportation benefits  Supportive Steps to Achieve: SW will assist patient in obtaining transportation to medical appointments.   Date to Achieve By: Ongoing          Lifestyle    Patient achieves sleep pattern objective     Notes - Note created  9/25/2018 10:06 AM by Martha Sims, RN    #1-Goal Statement: patient will obtain and use CPAP machine nightly  Measure of Success: improved sleep  Supportive Steps to Achieve: RN CC obtained DME order from PCP  Barriers: poor follow up; current machine broken  Strengths: assistance from PCP/RN CC  Date to Achieve By: 11-1-18  Patient expressed understanding of goal: good            Equal Access to Services     VALENTE RIVERA AH: Hadii casimiro zapata hadasho Soomaali, waaxda luqadaha, qaybta kaalmada adeegyada, emilee talbot chilozhao de la cruzshiraeric pardo . So [facility] [phone].    ATENCIÓN: Si habla español, tiene a christianson disposición servicios gratuitos de asistencia lingüística. Llame al [phone].    We comply with applicable federal civil rights laws and [place] laws. We do not discriminate on the basis of race, color, national origin, age, disability, sex, sexual orientation, or gender identity.            Thank you!     Thank you for choosing [facility]  for your care. Our goal is always to provide you with excellent care. Hearing back from our patients is one way we can continue to improve our services. Please take a few minutes to complete the written survey that you may receive in the mail after your visit with us. Thank you!             Your Updated Medication List - Protect others around you: Learn how to safely use, store and throw away your medicines at www.disposemymeds.org.          This list is accurate as of 10/23/18  9:50 AM.  Always use your most recent med list.                   Brand Name Dispense Instructions for use Diagnosis    albuterol (2.5 MG/3ML) 0.083% neb solution     30 vial    Take 1 vial (2.5 mg) by nebulization every 6 hours as needed for shortness of breath / dyspnea or wheezing    Mild intermittent asthma with acute exacerbation       ASPIRIN NOT PRESCRIBED    INTENTIONAL    1  each    Please choose reason not prescribed, below    Aspirin intolerance       baclofen 10 MG tablet    LIORESAL    60 tablet    Take 1 tablet (10 mg) by mouth 2 times daily    Chronic pain syndrome       blood glucose calibration solution    no brand specified    1 each    Use to calibrate blood glucose monitor as directed.    Type 2 diabetes mellitus without complication, without long-term current use of insulin (H)       blood glucose lancets standard    no brand specified    100 each    Use to test blood sugar daily times daily or as directed.    Type 2 diabetes mellitus without complication, without long-term current use of insulin (H)       blood glucose monitoring meter device kit    no brand specified    1 kit    Use to test blood sugar larisa times daily or as directed.    Type 2 diabetes mellitus without complication, without long-term current use of insulin (H)       blood glucose monitoring test strip    no brand specified    100 strip    Use to test blood sugars once times daily or as directed    Type 2 diabetes mellitus without complication, without long-term current use of insulin (H)       cefdinir 300 MG capsule    OMNICEF    20 capsule    Take 2 capsules (600 mg) by mouth daily    Urinary tract infection       cefpodoxime 200 MG tablet    VANTIN    10 tablet    Take 1 tablet (200 mg) by mouth 2 times daily    Acute cystitis without hematuria       cloNIDine 0.1 MG tablet    CATAPRES    60 tablet    Take 1 tablet (0.1 mg) by mouth 2 times daily    Hypertension goal BP (blood pressure) < 140/80, Opioid withdrawal (H)       clotrimazole 2 % Crea    GYNE-LOTRIMIN 3    1 Tube    Place 2 g vaginally daily    Yeast infection of the vagina       CULTUREE DIGESTIVE HEALTH Caps     60 capsule    Take 1 capsule by mouth 2 times daily    Diarrhea, unspecified type       * cyanocobalamin 1000 MCG/ML injection    VITAMIN B12    1 mL    Inject 1 mL (1,000 mcg) Subcutaneous every 30 days    B12 deficiency       *  cyanocobalamin 1000 MCG/ML injection    VITAMIN B12    1 mL    Inject 1 mL (1,000 mcg) into the muscle every 30 days    Vitamin B12 deficiency (non anemic)       EPINEPHrine 0.3 MG/0.3ML injection 2-pack    EPIPEN/ADRENACLICK/or ANY BX GENERIC EQUIV    0.3 mL    Inject 0.3 mLs (0.3 mg) into the muscle once as needed for anaphylaxis    Anaphylactic reaction       losartan-hydrochlorothiazide 100-25 MG per tablet    HYZAAR    90 tablet    Take 1 tablet by mouth daily    Hypertension goal BP (blood pressure) < 140/80       magnesium oxide 400 (241.3 Mg) MG tablet    MAG-OX    90 tablet    Take 1 tablet (400 mg) by mouth daily    Cramp of limb       naloxone nasal spray    NARCAN    0.2 mL    Spray 1 spray (4 mg) into one nostril alternating nostrils as needed for opioid reversal every 2-3 minutes until assistance arrives    Chronic pain syndrome       * ondansetron 4 MG ODT tab    ZOFRAN-ODT    10 tablet    Take 1 tablet (4 mg) by mouth every 8 hours as needed for nausea        * ondansetron 4 MG ODT tab    ZOFRAN-ODT    90 tablet    DISSOLVE ONE TABLET IN MOUTH EVERY 8 HOURS AS NEEDED FOR NAUSEA    Chronic pain syndrome       order for DME     1 Device    Right wrist splint for carpal tunnel syndrome  One* Use as directed at hour of sleep    Carpal tunnel syndrome of right wrist       order for DME     1 each    Equipment being ordered: CPAP with supplies    ALEXUS on CPAP       oxyCODONE-acetaminophen  MG per tablet    PERCOCET    28 tablet    Take 1 tablet by mouth 4 times daily Taper start 10/18, 3 tabs daily for 4 days, then 2 tabs daily for 5 days then one tab daily until off    Carcinoma of colon metastatic to liver (H), 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), Renal cell carcinoma of right kidney (H)       polyethylene glycol powder    MIRALAX/GLYCOLAX    1581 g    TAKE 17 GRAMS (1 CAPFUL) BY MOUTH DAILY    Slow transit constipation       potassium chloride 10 MEQ tablet    K-TAB,KLOR-CON     120 tablet    Take 1 tablet (10 mEq) by mouth daily    Hypokalemia       sertraline 100 MG tablet    ZOLOFT    30 tablet    Take 1 tablet (100 mg) by mouth daily    Recurrent major depression in partial remission (H)       STATIN NOT PRESCRIBED (INTENTIONAL)     0 each    1 each daily Statin not prescribed intentionally due to Active liver disease (liver failure, cirrhosis, hepatitis) LIVER METS    Hyperlipidemia LDL goal <100, Type 2 diabetes mellitus without complication (H)       Vitamin D (Cholecalciferol) 1000 units Tabs     60 tablet    Take 2,000 Units by mouth daily    Vitamin D deficiency       * Notice:  This list has 4 medication(s) that are the same as other medications prescribed for you. Read the directions carefully, and ask your doctor or other care provider to review them with you.

## 2018-10-23 NOTE — PATIENT INSTRUCTIONS
I am recommending detox and Suboxone MAT which you are refusing at this time.  I will not be prescribing opioids.     Call and make an appointment to have a chemical dependency evaluation. (rule 25)  I believe the next step is treatment of your addiction.     [facility]   [phone]    Giulia Mckeon MD
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
## 2018-10-24 ENCOUNTER — CARE COORDINATION (OUTPATIENT)
Dept: FAMILY MEDICINE | Facility: CLINIC | Age: 53
End: 2018-10-24

## 2018-10-24 ENCOUNTER — ALLIED HEALTH/NURSE VISIT (OUTPATIENT)
Dept: BEHAVIORAL HEALTH | Facility: CLINIC | Age: 53
End: 2018-10-24
Payer: COMMERCIAL

## 2018-10-24 VITALS
HEART RATE: 66 BPM | DIASTOLIC BLOOD PRESSURE: 70 MMHG | RESPIRATION RATE: 16 BRPM | OXYGEN SATURATION: 100 % | SYSTOLIC BLOOD PRESSURE: 130 MMHG

## 2018-10-24 DIAGNOSIS — G44.229 CHRONIC TENSION-TYPE HEADACHE, NOT INTRACTABLE: Primary | ICD-10-CM

## 2018-10-24 DIAGNOSIS — G89.4 CHRONIC PAIN SYNDROME: ICD-10-CM

## 2018-10-24 ASSESSMENT — PAIN SCALES - GENERAL: PAINLEVEL: EXTREME PAIN (9)

## 2018-10-24 NOTE — MR AVS SNAPSHOT
After Visit Summary   10/24/2018    Ines Mott    MRN: [id number]           Patient Information     Date Of Birth          1965        Visit Information        Provider Department      10/24/2018 11:00 AM 2, [facility]; 1, [facility]        Today's Diagnoses     Chronic tension-type headache, not intractable    -  1    Chronic pain syndrome           Follow-ups after your visit        Your next 10 appointments already scheduled     Oct 25, 2018  3:00 PM CDT   (Arrive by 2:45 PM)   New Patient Visit with Samy Salmeron MD   [facility] ([facility] [place])    [address]  [address]  [facility] [address]   [phone]            Oct 26, 2018 10:00 AM CDT   Return Visit with Trina Vargas   [facility] ([facility])    [address]  [address]  [place] [address] [phone]            Oct 29, 2018  9:00 AM CDT   Return Visit with Trina Vargas   [facility] ([facility])    [address]  [address]  [facility] [address] [phone]            Oct 29, 2018  9:00 AM CDT   Return Visit with MICHELA Crowley CNP   [facility] ([facility])    [address]  [address]  [facility] [address] [phone]              Who to contact     If you have questions or need follow up information about today's clinic visit or your schedule please contact [facility] directly at [phone].  Normal or non-critical lab and imaging results will be communicated to you by MyChart, letter or phone within 4 business days after the clinic has received the results. If you do not hear from us within 7 days, please contact the clinic through MyChart or phone. If  you have a critical or abnormal lab result, we will notify you by phone as soon as possible.  Submit refill requests through [employer] or call your pharmacy and they will forward the refill request to us. Please allow 3 business days for your refill to be completed.          Additional Information About Your Visit        Care EveryWhere ID     This is your Care EveryWhere ID. This could be used by other organizations to access your [facility] medical records  [id number]        Your Vitals Were     Pulse Respirations Pulse Oximetry             66 16 100%          Blood Pressure from Last 3 Encounters:   10/24/18 130/70   10/23/18 132/84   10/17/18 128/84    Weight from Last 3 Encounters:   10/23/18 109.5 kg (241 lb 8 oz)   10/17/18 108.9 kg (240 lb)   10/15/18 107 kg (236 lb)              We Performed the Following     [facility]-PATIENT MEETS CRITERIA        Primary Care Provider Office Phone # Fax #    Marika MICHEAL López [facility] [phone]       [address] [place] [address]  [facility] [address]        Goals        General    Transportation (pt-stated)     Notes - Note created  7/11/2018  9:56 AM by Elizabeth Aleman BSW    Goal Statement: I am in need of transportation resources  Measure of Success: Patient will utilize transportation benefits  Supportive Steps to Achieve: SW will assist patient in obtaining transportation to medical appointments.   Date to Achieve By: Ongoing         Lifestyle    Patient achieves sleep pattern objective     Notes - Note created  9/25/2018 10:06 AM by Martha Sims RN    #1-Goal Statement: patient will obtain and use CPAP machine nightly  Measure of Success: improved sleep  Supportive Steps to Achieve: RN CC obtained DME order from PCP  Barriers: poor follow up; current machine broken  Strengths: assistance from PCP/RN CC  Date to Achieve By: 11-1-18  Patient expressed understanding of goal: good            Equal Access to Services     VALENTE RIVERA  AH: Hadmichelle rosaleschandrikatrina Aisha, waaislinnda luqadaha, qaybta kanina hernandez, emilee antione chilozhao llanosamandeep jonoshiraeric coyne. So [facility] [phone].    ATENCIÓN: Si habla español, tiene a christianson disposición servicios gratuitos de asistencia lingüística. Llame al [phone].    We comply with applicable federal civil rights laws and [place] laws. We do not discriminate on the basis of race, color, national origin, age, disability, sex, sexual orientation, or gender identity.            Thank you!     Thank you for choosing [facility]  for your care. Our goal is always to provide you with excellent care. Hearing back from our patients is one way we can continue to improve our services. Please take a few minutes to complete the written survey that you may receive in the mail after your visit with us. Thank you!             Your Updated Medication List - Protect others around you: Learn how to safely use, store and throw away your medicines at www.disposemymeds.org.          This list is accurate as of 10/24/18  1:17 PM.  Always use your most recent med list.                   Brand Name Dispense Instructions for use Diagnosis    albuterol (2.5 MG/3ML) 0.083% neb solution     30 vial    Take 1 vial (2.5 mg) by nebulization every 6 hours as needed for shortness of breath / dyspnea or wheezing    Mild intermittent asthma with acute exacerbation       ASPIRIN NOT PRESCRIBED    INTENTIONAL    1 each    Please choose reason not prescribed, below    Aspirin intolerance       baclofen 10 MG tablet    LIORESAL    60 tablet    Take 1 tablet (10 mg) by mouth 2 times daily    Chronic pain syndrome       blood glucose calibration solution    no brand specified    1 each    Use to calibrate blood glucose monitor as directed.    Type 2 diabetes mellitus without complication, without long-term current use of insulin (H)       blood glucose lancets standard    no brand specified    100 each    Use to test blood  sugar daily times daily or as directed.    Type 2 diabetes mellitus without complication, without long-term current use of insulin (H)       blood glucose monitoring meter device kit    no brand specified    1 kit    Use to test blood sugar larisa times daily or as directed.    Type 2 diabetes mellitus without complication, without long-term current use of insulin (H)       blood glucose monitoring test strip    no brand specified    100 strip    Use to test blood sugars once times daily or as directed    Type 2 diabetes mellitus without complication, without long-term current use of insulin (H)       cefdinir 300 MG capsule    OMNICEF    20 capsule    Take 2 capsules (600 mg) by mouth daily    Urinary tract infection       cefpodoxime 200 MG tablet    VANTIN    10 tablet    Take 1 tablet (200 mg) by mouth 2 times daily    Acute cystitis without hematuria       cloNIDine 0.1 MG tablet    CATAPRES    60 tablet    Take 1 tablet (0.1 mg) by mouth 2 times daily    Hypertension goal BP (blood pressure) < 140/80, Opioid withdrawal (H)       clotrimazole 2 % Crea    GYNE-LOTRIMIN 3    1 Tube    Place 2 g vaginally daily    Yeast infection of the vagina       CULTUREE DIGESTIVE HEALTH Caps     60 capsule    Take 1 capsule by mouth 2 times daily    Diarrhea, unspecified type       * cyanocobalamin 1000 MCG/ML injection    VITAMIN B12    1 mL    Inject 1 mL (1,000 mcg) Subcutaneous every 30 days    B12 deficiency       * cyanocobalamin 1000 MCG/ML injection    VITAMIN B12    1 mL    Inject 1 mL (1,000 mcg) into the muscle every 30 days    Vitamin B12 deficiency (non anemic)       EPINEPHrine 0.3 MG/0.3ML injection 2-pack    EPIPEN/ADRENACLICK/or ANY BX GENERIC EQUIV    0.3 mL    Inject 0.3 mLs (0.3 mg) into the muscle once as needed for anaphylaxis    Anaphylactic reaction       losartan-hydrochlorothiazide 100-25 MG per tablet    HYZAAR    90 tablet    Take 1 tablet by mouth daily    Hypertension goal BP (blood pressure) <  140/80       magnesium oxide 400 (241.3 Mg) MG tablet    MAG-OX    90 tablet    Take 1 tablet (400 mg) by mouth daily    Cramp of limb       naloxone nasal spray    NARCAN    0.2 mL    Spray 1 spray (4 mg) into one nostril alternating nostrils as needed for opioid reversal every 2-3 minutes until assistance arrives    Chronic pain syndrome       * ondansetron 4 MG ODT tab    ZOFRAN-ODT    10 tablet    Take 1 tablet (4 mg) by mouth every 8 hours as needed for nausea        * ondansetron 4 MG ODT tab    ZOFRAN-ODT    90 tablet    DISSOLVE ONE TABLET IN MOUTH EVERY 8 HOURS AS NEEDED FOR NAUSEA    Chronic pain syndrome       order for DME     1 Device    Right wrist splint for carpal tunnel syndrome  One* Use as directed at hour of sleep    Carpal tunnel syndrome of right wrist       order for DME     1 each    Equipment being ordered: CPAP with supplies    ALEXUS on CPAP       oxyCODONE-acetaminophen  MG per tablet    PERCOCET    28 tablet    Take 1 tablet by mouth 4 times daily Taper start 10/18, 3 tabs daily for 4 days, then 2 tabs daily for 5 days then one tab daily until off    Carcinoma of colon metastatic to liver (H), 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), Renal cell carcinoma of right kidney (H)       polyethylene glycol powder    MIRALAX/GLYCOLAX    1581 g    TAKE 17 GRAMS (1 CAPFUL) BY MOUTH DAILY    Slow transit constipation       potassium chloride 10 MEQ tablet    K-TAB,KLOR-CON    120 tablet    Take 1 tablet (10 mEq) by mouth daily    Hypokalemia       sertraline 100 MG tablet    ZOLOFT    30 tablet    Take 1 tablet (100 mg) by mouth daily    Recurrent major depression in partial remission (H)       STATIN NOT PRESCRIBED (INTENTIONAL)     0 each    1 each daily Statin not prescribed intentionally due to Active liver disease (liver failure, cirrhosis, hepatitis) LIVER METS    Hyperlipidemia LDL goal <100, Type 2 diabetes mellitus without complication (H)       Vitamin D  (Cholecalciferol) 1000 units Tabs     60 tablet    Take 2,000 Units by mouth daily    Vitamin D deficiency       * Notice:  This list has 4 medication(s) that are the same as other medications prescribed for you. Read the directions carefully, and ask your doctor or other care provider to review them with you.

## 2018-10-24 NOTE — PROGRESS NOTES
[facility]  No Data Recorded      Social Work Care Navigator Note      Patient: Ines Mott  Date: October 24, 2018  Preferred Name: Ines    Previous PHQ-9:   PHQ-9 SCORE 5/11/2018 5/18/2018 7/2/2018   Total Score - - -   Total Score MyChart 9 (Mild depression) - -   Total Score 9 7 8     Previous RASTA-7:   RASTA-7 SCORE 8/24/2015 11/4/2016 12/5/2017   Total Score - - 3 (minimal anxiety)   Total Score 7 9 3     GONZÁLEZ LEVEL:  No flowsheet data found.    Preferred Contact:  No Data Recorded    Type of Contact Today: Phone call (not reached/unavailable)      Data: (subjective / Objective):   set up transportation for patient's upcoming appointments through provide a ride ( [employer]) and they will provide transportaiton   Attempted to reach patient, but was unsuccessful. Left voice mail with above information. Patient has apt on Monday 10/29/2018, [facility].     BRISSA Davis   [facility]  Care Coordinator          Next 5 appointments (look out 90 days)     Oct 26, 2018 10:00 AM CDT   Return Visit with Trina Vargas   [facility] ([facility])    [address]  [address]  [facility] [address]   [phone]            Oct 29, 2018  9:00 AM CDT   Return Visit with Trina Vargas   [facility] ([facility])    [address]  [address]  [facility] [address]   [phone]            Oct 29, 2018  9:00 AM CDT   Return Visit with MICHEAL Crowley [facility] ([facility])    [address]  [address]  [facility] [address]   [phone]

## 2018-10-24 NOTE — PROGRESS NOTES
"COMMUNITY PARAMEDICS MEDICATION CHECK    October 24, 2018 8034-1631      Ines Mott is a 53 year old female being seen at home for a Community [facility] Home Visit for Medication Check.    Patient was seen along with DAVIDSON Devlin and [facility]  Janny.    /70  Pulse 66  Resp 16  SpO2 100% BGL: 101    Acute concern/Follow-up issue: Medication check. Medications were added to med box for one week.    Medications need to be refilled: Pt needs refill of albuterol (for nebulizer), vitamin D3, and Losartan.    During Visit Pt spoke of most [facility] and Pt said \"I'm still angry about that.\"     Pt disclosed that she needs a new glucometer. The old glucometer is testing high.    The patient meets one or more of the following criteria:  * Has received hospital emergency department services three or more times in four consecutive months within a twelve month period and * Requires services to prevent readmission to a nursing home or hospital    Larry Howe on 10/24/2018 at 1:10 PM      "

## 2018-10-25 ENCOUNTER — PRE VISIT (OUTPATIENT)
Dept: ONCOLOGY | Facility: CLINIC | Age: 53
End: 2018-10-25

## 2018-10-25 ENCOUNTER — ONCOLOGY VISIT (OUTPATIENT)
Dept: ONCOLOGY | Facility: CLINIC | Age: 53
End: 2018-10-25
Attending: INTERNAL MEDICINE
Payer: COMMERCIAL

## 2018-10-25 VITALS
TEMPERATURE: 99 F | BODY MASS INDEX: 39.01 KG/M2 | OXYGEN SATURATION: 97 % | RESPIRATION RATE: 14 BRPM | HEART RATE: 67 BPM | HEIGHT: 66 IN | DIASTOLIC BLOOD PRESSURE: 81 MMHG | WEIGHT: 242.7 LBS | SYSTOLIC BLOOD PRESSURE: 129 MMHG

## 2018-10-25 DIAGNOSIS — C7A.8 NEUROENDOCRINE CARCINOMA (H): Primary | ICD-10-CM

## 2018-10-25 DIAGNOSIS — C7A.8 NEUROENDOCRINE CARCINOMA (H): ICD-10-CM

## 2018-10-25 DIAGNOSIS — C64.1 RENAL CELL CARCINOMA OF RIGHT KIDNEY (H): ICD-10-CM

## 2018-10-25 LAB
ALBUMIN SERPL-MCNC: 3.5 G/DL (ref 3.4–5)
ALP SERPL-CCNC: 90 U/L (ref 40–150)
ALT SERPL W P-5'-P-CCNC: 20 U/L (ref 0–50)
ANION GAP SERPL CALCULATED.3IONS-SCNC: 6 MMOL/L (ref 3–14)
AST SERPL W P-5'-P-CCNC: 10 U/L (ref 0–45)
BASOPHILS # BLD AUTO: 0 10E9/L (ref 0–0.2)
BASOPHILS NFR BLD AUTO: 0.2 %
BILIRUB SERPL-MCNC: 0.8 MG/DL (ref 0.2–1.3)
BUN SERPL-MCNC: 12 MG/DL (ref 7–30)
CALCIUM SERPL-MCNC: 9.6 MG/DL (ref 8.5–10.1)
CHLORIDE SERPL-SCNC: 104 MMOL/L (ref 94–109)
CO2 SERPL-SCNC: 31 MMOL/L (ref 20–32)
CREAT SERPL-MCNC: 0.81 MG/DL (ref 0.52–1.04)
DIFFERENTIAL METHOD BLD: ABNORMAL
EOSINOPHIL # BLD AUTO: 0.2 10E9/L (ref 0–0.7)
EOSINOPHIL NFR BLD AUTO: 2.7 %
ERYTHROCYTE [DISTWIDTH] IN BLOOD BY AUTOMATED COUNT: 14.3 % (ref 10–15)
GFR SERPL CREATININE-BSD FRML MDRD: 74 ML/MIN/1.7M2
GLUCOSE SERPL-MCNC: 85 MG/DL (ref 70–99)
HCT VFR BLD AUTO: 35.3 % (ref 35–47)
HGB BLD-MCNC: 11.3 G/DL (ref 11.7–15.7)
IMM GRANULOCYTES # BLD: 0 10E9/L (ref 0–0.4)
IMM GRANULOCYTES NFR BLD: 0.2 %
LYMPHOCYTES # BLD AUTO: 3 10E9/L (ref 0.8–5.3)
LYMPHOCYTES NFR BLD AUTO: 37.1 %
MCH RBC QN AUTO: 28 PG (ref 26.5–33)
MCHC RBC AUTO-ENTMCNC: 32 G/DL (ref 31.5–36.5)
MCV RBC AUTO: 87 FL (ref 78–100)
MONOCYTES # BLD AUTO: 0.5 10E9/L (ref 0–1.3)
MONOCYTES NFR BLD AUTO: 6.7 %
NEUTROPHILS # BLD AUTO: 4.3 10E9/L (ref 1.6–8.3)
NEUTROPHILS NFR BLD AUTO: 53.1 %
NRBC # BLD AUTO: 0 10*3/UL
NRBC BLD AUTO-RTO: 0 /100
PLATELET # BLD AUTO: 352 10E9/L (ref 150–450)
POTASSIUM SERPL-SCNC: 3.7 MMOL/L (ref 3.4–5.3)
PROT SERPL-MCNC: 7.4 G/DL (ref 6.8–8.8)
RBC # BLD AUTO: 4.04 10E12/L (ref 3.8–5.2)
SODIUM SERPL-SCNC: 141 MMOL/L (ref 133–144)
WBC # BLD AUTO: 8.1 10E9/L (ref 4–11)

## 2018-10-25 PROCEDURE — 80053 COMPREHEN METABOLIC PANEL: CPT | Performed by: INTERNAL MEDICINE

## 2018-10-25 PROCEDURE — G0463 HOSPITAL OUTPT CLINIC VISIT: HCPCS | Mod: ZF

## 2018-10-25 PROCEDURE — 86316 IMMUNOASSAY TUMOR OTHER: CPT | Performed by: INTERNAL MEDICINE

## 2018-10-25 PROCEDURE — 99205 OFFICE O/P NEW HI 60 MIN: CPT | Mod: ZP | Performed by: INTERNAL MEDICINE

## 2018-10-25 PROCEDURE — 36415 COLL VENOUS BLD VENIPUNCTURE: CPT | Performed by: INTERNAL MEDICINE

## 2018-10-25 PROCEDURE — 85025 COMPLETE CBC W/AUTO DIFF WBC: CPT | Performed by: INTERNAL MEDICINE

## 2018-10-25 ASSESSMENT — PAIN SCALES - GENERAL: PAINLEVEL: WORST PAIN (10)

## 2018-10-25 NOTE — PATIENT INSTRUCTIONS
Labs today including 24 hr urine test    Schedule PET GA [address]    Follow with palliative care    Schedule Urology appointment    Refer to nutritionist    See me back in 3 weeks
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
## 2018-10-25 NOTE — MR AVS SNAPSHOT
After Visit Summary   10/25/2018    Ines Mott    MRN: [id number]           Patient Information     Date Of Birth          1965        Visit Information        Provider Department      10/25/2018 3:00 PM Samy Salmeron MD [facility]        Today's Diagnoses     Neuroendocrine carcinoma (H)    -  1    Renal cell carcinoma of right kidney (H)          Care Instructions    Labs today including 24 hr urine test    Schedule PET GA 68 Dotatate    Follow with palliative care    Schedule Urology appointment    Refer to nutritionist    See me back in 3 weeks              Follow-ups after your visit        Additional Services     NUTRITION REFERRAL       Your provider has referred you to: PREFERRED PROVIDERS:    Please be aware that coverage of these services is subject to the terms and limitations of your health insurance plan.  Call member services at your health plan with any benefit or coverage questions.      Please bring the following with you to your appointment:    (1) This referral request  (2) Any documents given to you regarding this referral  (3) Any specific questions you have about diet and/or food choices            UROLOGY ADULT REFERRAL       Your provider has referred you to: PREFERRED PROVIDERS:    Please be aware that coverage of these services is subject to the terms and limitations of your health insurance plan.  Call member services at your health plan with any benefit or coverage questions.      Please bring the following with you to your appointment:    (1) Any X-Rays, CTs or MRIs which have been performed.  Contact the facility where they were done to arrange for  prior to your scheduled appointment.    (2) List of current medications  (3) This referral request   (4) Any documents/labs given to you for this referral                  Your next 10 appointments already scheduled     Dec 05, 2018  8:15 AM CST   PET GA 68 DOTATATE with UUPET1   [facility],  [place] PET CT ([facility])    [address] [address]   [phone]           How do I prepare for my exam? (Food and drink instructions) Drink 32 ounces of water to ensure adequate hydration prior to administration of Ga 68 dotatate.  What should I wear? Wear loose fitting clothing to your exam.  How long does the exam take?  Most scans will take between 2-3 hours.  What should I bring: Please bring a list of your medicines (including vitamins, minerals and over-the-counter drugs). Leave your valuables at home.  Do I need a :  No  is needed.  What should I do after the exam: Drink and void frequently during the first hours following administration to reduce radiation exposure.  What do I need to tell my doctor: Tell your doctor: * If there is any chance you may be pregnant or if you are breastfeeding. * If you have problems lying in small spaces (claustrophobia). If you do, your doctor may give you medicine to help you relax.  What is this test: Your doctor has ordered a PET scan (positron emission tomography). This will take pictures of the cells and organs in your body. Your doctor may have also ordered a CT scan (computed tomography). This is another way to take pictures of your cells and organs. It is done at the same time as the PET scan. The pictures will help us understand your problem so we can make a treatment plan that fits your needs.  Who should I call with questions: Please call your Imaging Department at your exam site with any questions. Directions, parking instructions, and other information is available on our website, [email or web address].            Dec 17, 2018  9:00 AM CST   Return Visit with MICHEAL Crowley CNP   [facility] ([facility]    [address]  [address]  [facility] [address]   [phone]            Dec 17, 2018  9:00  "AM CST   Return Visit with Davion Rees LICSW   [facility] ([facility]    [address]  [address]  [facility] [id number] [phone]            Dec 17, 2018 10:00 AM CST   Return Visit with KERVIN Dean   [facility] ([facility])    [address]  [address]  [facility] [id number] [phone]            Dec 17, 2018 11:30 AM CST   Return Visit with Giulia Mckeon MD   [facility] ([facility])    [address]  [address]  [facility] [id number] [phone]              Who to contact     If you have questions or need follow up information about today's clinic visit or your schedule please contact [facility] directly at [phone].  Normal or non-critical lab and imaging results will be communicated to you by MyChart, letter or phone within 4 business days after the clinic has received the results. If you do not hear from us within 7 days, please contact the clinic through [employer] or phone. If you have a critical or abnormal lab result, we will notify you by phone as soon as possible.  Submit refill requests through [employer] or call your pharmacy and they will forward the refill request to us. Please allow 3 business days for your refill to be completed.          Additional Information About Your Visit        Care EveryWhere ID     This is your Care EveryWhere ID. This could be used by other organizations to access your [facility] medical records  [id number]        Your Vitals Were     Pulse Temperature Respirations Height Pulse Oximetry BMI (Body Mass Index)    67 99  F (37.2  C) (Oral) 14 1.676 m (5' 5.98\") 97% 39.19 kg/m2       Blood Pressure from Last 3 Encounters:   11/27/18 (!) 179/96   11/16/18 (!) 150/102   10/29/18 122/86    Weight from Last 3 Encounters:   11/27/18 " 110.9 kg (244 lb 7 oz)   11/16/18 111.6 kg (246 lb)   10/29/18 108 kg (238 lb)              We Performed the Following     NUTRITION REFERRAL     UROLOGY ADULT REFERRAL        Primary Care Provider Office Phone # Fax #    MICHEAL Crowley [facility] [phone]       [address]  [facility] [address]        Goals        General    Transportation (pt-stated)     Notes - Note created  7/11/2018  9:56 AM by Elizabeth Aleman BSW    Goal Statement: I am in need of transportation resources  Measure of Success: Patient will utilize transportation benefits  Supportive Steps to Achieve: SW will assist patient in obtaining transportation to medical appointments.   Date to Achieve By: Ongoing         Lifestyle    Patient achieves sleep pattern objective     Notes - Note created  9/25/2018 10:06 AM by Martha Sims, RN    #1-Goal Statement: patient will obtain and use CPAP machine nightly  Measure of Success: improved sleep  Supportive Steps to Achieve: RN CC obtained DME order from PCP  Barriers: poor follow up; current machine broken  Strengths: assistance from PCP/RN CC  Date to Achieve By: 11-1-18  Patient expressed understanding of goal: good            Equal Access to Services     VALENTE RIVERA AH: Hadii casimiro Leonard, waaislinnda des, qaybta kaalerika hernandez, eimlee coyne. So [facility] [phone].    ATENCIÓN: Si habla español, tiene a christianson disposición servicios gratuitos de asistencia lingüística. Llame al [phone].    We comply with applicable federal civil rights laws and [place] laws. We do not discriminate on the basis of race, color, national origin, age, disability, sex, sexual orientation, or gender identity.            Thank you!     Thank you for choosing [facility]  for your care. Our goal is always to provide you with excellent care. Hearing back from our patients is one way we can continue to improve our  services. Please take a few minutes to complete the written survey that you may receive in the mail after your visit with us. Thank you!             Your Updated Medication List - Protect others around you: Learn how to safely use, store and throw away your medicines at www.disposemymeds.org.          This list is accurate as of 10/25/18 11:59 PM.  Always use your most recent med list.                   Brand Name Dispense Instructions for use Diagnosis    albuterol (2.5 MG/3ML) 0.083% neb solution    PROVENTIL    30 vial    Take 1 vial (2.5 mg) by nebulization every 6 hours as needed for shortness of breath / dyspnea or wheezing    Mild intermittent asthma with acute exacerbation       ASPIRIN NOT PRESCRIBED    INTENTIONAL    1 each    Please choose reason not prescribed, below    Aspirin intolerance       blood glucose calibration solution    NO BRAND SPECIFIED    1 each    Use to calibrate blood glucose monitor as directed.    Type 2 diabetes mellitus without complication, without long-term current use of insulin (H)       blood glucose lancets standard    NO BRAND SPECIFIED    100 each    Use to test blood sugar daily times daily or as directed.    Type 2 diabetes mellitus without complication, without long-term current use of insulin (H)       blood glucose monitoring meter device kit    NO BRAND SPECIFIED    1 kit    Use to test blood sugar larisa times daily or as directed.    Type 2 diabetes mellitus without complication, without long-term current use of insulin (H)       blood glucose monitoring test strip    NO BRAND SPECIFIED    100 strip    Use to test blood sugars once times daily or as directed    Type 2 diabetes mellitus without complication, without long-term current use of insulin (H)       cloNIDine 0.1 MG tablet    CATAPRES    60 tablet    Take 1 tablet (0.1 mg) by mouth 2 times daily    Hypertension goal BP (blood pressure) < 140/80, Opioid withdrawal (H)       [facility] DIGEST[facility] Caps      60 capsule    Take 1 capsule by mouth 2 times daily    Diarrhea, unspecified type       EPINEPHrine 0.3 MG/0.3ML injection 2-pack    EPIPEN/ADRENACLICK/or ANY BX GENERIC EQUIV    0.3 mL    Inject 0.3 mLs (0.3 mg) into the muscle once as needed for anaphylaxis    Anaphylactic reaction       losartan-hydrochlorothiazide 100-25 MG per tablet    HYZAAR    90 tablet    Take 1 tablet by mouth daily    Hypertension goal BP (blood pressure) < 140/80       naloxone 4 MG/0.1ML nasal spray    NARCAN    0.2 mL    Spray 1 spray (4 mg) into one nostril alternating nostrils as needed for opioid reversal every 2-3 minutes until assistance arrives    Chronic pain syndrome       ondansetron 4 MG ODT tab    ZOFRAN-ODT    10 tablet    Take 1 tablet (4 mg) by mouth every 8 hours as needed for nausea        order for DME     1 Device    Right wrist splint for carpal tunnel syndrome  One* Use as directed at hour of sleep    Carpal tunnel syndrome of right wrist       order for DME     1 each    Equipment being ordered: CPAP with supplies    ALEXUS on CPAP       oxyCODONE-acetaminophen  MG per tablet    PERCOCET    28 tablet    Take 1 tablet by mouth 4 times daily Taper start 10/18, 3 tabs daily for 4 days, then 2 tabs daily for 5 days then one tab daily until off    Carcinoma of colon metastatic to liver (H), 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), Renal cell carcinoma of right kidney (H)       polyethylene glycol powder    MIRALAX/GLYCOLAX    1581 g    TAKE 17 GRAMS (1 CAPFUL) BY MOUTH DAILY    Slow transit constipation       potassium chloride ER 10 MEQ CR tablet    K-TAB/KLOR-CON    120 tablet    Take 1 tablet (10 mEq) by mouth daily    Hypokalemia       sertraline 100 MG tablet    ZOLOFT    30 tablet    Take 1 tablet (100 mg) by mouth daily    Recurrent major depression in partial remission (H)       STATIN NOT PRESCRIBED    INTENTIONAL    0 each    1 each daily Statin not prescribed intentionally due to Active  liver disease (liver failure, cirrhosis, hepatitis) LIVER METS    Hyperlipidemia LDL goal <100, Type 2 diabetes mellitus without complication (H)       Vitamin D (Cholecalciferol) 1000 units Tabs     60 tablet    Take 2,000 Units by mouth daily    Vitamin D deficiency
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
## 2018-10-25 NOTE — NURSING NOTE
"Oncology Rooming Note    October 25, 2018 2:50 PM   Ines Mott is a 53 year old female who presents for:    Chief Complaint   Patient presents with     Oncology Clinic Visit     [facility] NEW - LIVER/ COLON CA     Initial Vitals: /81 (BP Location: Right arm, Patient Position: Chair, Cuff Size: Adult Large)  Pulse 67  Temp 99  F (37.2  C) (Oral)  Resp 14  Ht 1.676 m (5' 5.98\")  Wt 110.1 kg (242 lb 11.2 oz)  SpO2 97%  BMI 39.19 kg/m2 Estimated body mass index is 39.19 kg/(m^2) as calculated from the following:    Height as of this encounter: 1.676 m (5' 5.98\").    Weight as of this encounter: 110.1 kg (242 lb 11.2 oz). Body surface area is 2.26 meters squared.  Worst Pain (10) Comment: Data Unavailable   No LMP recorded. Patient has had a hysterectomy.  Allergies reviewed: Yes  Medications reviewed: Yes    Medications: Medication refills not needed today.  Pharmacy name entered into [facility]: [facility] [address] [place] [address]    Clinical concerns: No new concerns. Jaron was notified.    10 minutes for nursing intake (face to face time)     Saji Gallegos LPN            "

## 2018-10-26 ENCOUNTER — TELEPHONE (OUTPATIENT)
Dept: UROLOGY | Facility: CLINIC | Age: 53
End: 2018-10-26

## 2018-10-26 ENCOUNTER — OFFICE VISIT (OUTPATIENT)
Dept: BEHAVIORAL HEALTH | Facility: CLINIC | Age: 53
End: 2018-10-26
Payer: COMMERCIAL

## 2018-10-26 ENCOUNTER — PATIENT OUTREACH (OUTPATIENT)
Dept: CARE COORDINATION | Facility: CLINIC | Age: 53
End: 2018-10-26

## 2018-10-26 DIAGNOSIS — F43.10 POSTTRAUMATIC STRESS DISORDER: Primary | Chronic | ICD-10-CM

## 2018-10-26 DIAGNOSIS — F11.20 OPIOID USE DISORDER, SEVERE, DEPENDENCE (H): ICD-10-CM

## 2018-10-26 DIAGNOSIS — G47.33 OSA ON CPAP: Primary | ICD-10-CM

## 2018-10-26 PROCEDURE — 90785 PSYTX COMPLEX INTERACTIVE: CPT

## 2018-10-26 PROCEDURE — 90832 PSYTX W PT 30 MINUTES: CPT

## 2018-10-26 ASSESSMENT — ACTIVITIES OF DAILY LIVING (ADL): DEPENDENT_IADLS:: TRANSPORTATION

## 2018-10-26 NOTE — MR AVS SNAPSHOT
After Visit Summary   10/26/2018    Ines Mott    MRN: [id number]           Patient Information     Date Of Birth          1965        Visit Information        Provider Department      10/26/2018 10:00 AM Trina Vargas [facility]         Follow-ups after your visit        Your next 10 appointments already scheduled     Oct 29, 2018  9:00 AM CDT   Return Visit with Trina Vargas   [facility] ([facility])    [address]  [address]  [facility] [id number] [phone]            Oct 29, 2018  9:00 AM CDT   Return Visit with MICHEAL Crowley CNP   [facility] ([facility])    [address]  [address]  [facility] [address] [phone]            Nov 13, 2018 11:00 AM CST   (Arrive by 10:45 AM)   Return Visit with Quyen Mae MD   [facility] ([facility])    [address]  [address]  [facility] [address] [phone]            Nov 16, 2018  2:00 PM CST   (Arrive by 1:45 PM)   New Patient Visit with Pedro Belle MD   [facility] ([facility])    [address]  [address]  [facility] [id number] [phone]              Future tests that were ordered for you today     Open Future Orders        Priority Expected Expires Ordered    5-HIAA quantitative urine Routine  10/25/2019 10/25/2018    PET GA 68 Dotatate Routine  10/25/2019 10/25/2018            Who to contact     If you have questions or need follow up information about today's clinic visit or your schedule please contact [facility] directly at [phone].  Normal or non-critical lab and imaging results will be communicated to you by MyChart, letter or phone within 4 business  days after the clinic has received the results. If you do not hear from us within 7 days, please contact the clinic through [employer] or phone. If you have a critical or abnormal lab result, we will notify you by phone as soon as possible.  Submit refill requests through [employer] or call your pharmacy and they will forward the refill request to us. Please allow 3 business days for your refill to be completed.          Additional Information About Your Visit        Care EveryWhere ID     This is your Care EveryWhere ID. This could be used by other organizations to access your [facility] medical records  [id number]         Blood Pressure from Last 3 Encounters:   10/25/18 129/81   10/24/18 130/70   10/23/18 132/84    Weight from Last 3 Encounters:   10/25/18 242 lb 11.2 oz (110.1 kg)   10/23/18 241 lb 8 oz (109.5 kg)   10/17/18 240 lb (108.9 kg)              Today, you had the following     No orders found for display       Primary Care Provider Office Phone # Fax #    MICHEAL Crowley [facility] [phone]       [address] [facility] [address]  [facility] [address]        Goals        General    Transportation (pt-stated)     Notes - Note created  7/11/2018  9:56 AM by Elizabeth Aleman BSW    Goal Statement: I am in need of transportation resources  Measure of Success: Patient will utilize transportation benefits  Supportive Steps to Achieve: SW will assist patient in obtaining transportation to medical appointments.   Date to Achieve By: Ongoing         Lifestyle    Patient achieves sleep pattern objective     Notes - Note created  9/25/2018 10:06 AM by Martha Sims, RN    #1-Goal Statement: patient will obtain and use CPAP machine nightly  Measure of Success: improved sleep  Supportive Steps to Achieve: RN CC obtained DME order from PCP  Barriers: poor follow up; current machine broken  Strengths: assistance from PCP/RN CC  Date to Achieve By: 11-1-18  Patient expressed understanding of goal:  good            Equal Access to Services     VALENTE RIVERA : Hadii aad ku hadchandrikatrina Tishaevangelina, waaislinnda luqadaha, qaybta hannadelonkandy hernandez, emilee coyne. So [facility] [phone].    ATENCIÓN: Si habla español, tiene a christianson disposición servicios gratuitos de asistencia lingüística. Llame al [phone].    We comply with applicable federal civil rights laws and [place] laws. We do not discriminate on the basis of race, color, national origin, age, disability, sex, sexual orientation, or gender identity.            Thank you!     Thank you for choosing [facility]  for your care. Our goal is always to provide you with excellent care. Hearing back from our patients is one way we can continue to improve our services. Please take a few minutes to complete the written survey that you may receive in the mail after your visit with us. Thank you!             Your Updated Medication List - Protect others around you: Learn how to safely use, store and throw away your medicines at www.disposemymeds.org.          This list is accurate as of 10/26/18 10:53 AM.  Always use your most recent med list.                   Brand Name Dispense Instructions for use Diagnosis    albuterol (2.5 MG/3ML) 0.083% neb solution     30 vial    Take 1 vial (2.5 mg) by nebulization every 6 hours as needed for shortness of breath / dyspnea or wheezing    Mild intermittent asthma with acute exacerbation       ASPIRIN NOT PRESCRIBED    INTENTIONAL    1 each    Please choose reason not prescribed, below    Aspirin intolerance       baclofen 10 MG tablet    LIORESAL    60 tablet    Take 1 tablet (10 mg) by mouth 2 times daily    Chronic pain syndrome       blood glucose calibration solution    no brand specified    1 each    Use to calibrate blood glucose monitor as directed.    Type 2 diabetes mellitus without complication, without long-term current use of insulin (H)       blood glucose lancets  standard    no brand specified    100 each    Use to test blood sugar daily times daily or as directed.    Type 2 diabetes mellitus without complication, without long-term current use of insulin (H)       blood glucose monitoring meter device kit    no brand specified    1 kit    Use to test blood sugar larisa times daily or as directed.    Type 2 diabetes mellitus without complication, without long-term current use of insulin (H)       blood glucose monitoring test strip    no brand specified    100 strip    Use to test blood sugars once times daily or as directed    Type 2 diabetes mellitus without complication, without long-term current use of insulin (H)       cefdinir 300 MG capsule    OMNICEF    20 capsule    Take 2 capsules (600 mg) by mouth daily    Urinary tract infection       cefpodoxime 200 MG tablet    VANTIN    10 tablet    Take 1 tablet (200 mg) by mouth 2 times daily    Acute cystitis without hematuria       cloNIDine 0.1 MG tablet    CATAPRES    60 tablet    Take 1 tablet (0.1 mg) by mouth 2 times daily    Hypertension goal BP (blood pressure) < 140/80, Opioid withdrawal (H)       clotrimazole 2 % Crea    GYNE-LOTRIMIN 3    1 Tube    Place 2 g vaginally daily    Yeast infection of the vagina       CULTURELLE DIGESTIVE HEALTH Caps     60 capsule    Take 1 capsule by mouth 2 times daily    Diarrhea, unspecified type       * cyanocobalamin 1000 MCG/ML injection    VITAMIN B12    1 mL    Inject 1 mL (1,000 mcg) Subcutaneous every 30 days    B12 deficiency       * cyanocobalamin 1000 MCG/ML injection    VITAMIN B12    1 mL    Inject 1 mL (1,000 mcg) into the muscle every 30 days    Vitamin B12 deficiency (non anemic)       EPINEPHrine 0.3 MG/0.3ML injection 2-pack    EPIPEN/ADRENACLICK/or ANY BX GENERIC EQUIV    0.3 mL    Inject 0.3 mLs (0.3 mg) into the muscle once as needed for anaphylaxis    Anaphylactic reaction       losartan-hydrochlorothiazide 100-25 MG per tablet    HYZAAR    90 tablet    Take 1  tablet by mouth daily    Hypertension goal BP (blood pressure) < 140/80       magnesium oxide 400 (241.3 Mg) MG tablet    MAG-OX    90 tablet    Take 1 tablet (400 mg) by mouth daily    Cramp of limb       naloxone nasal spray    NARCAN    0.2 mL    Spray 1 spray (4 mg) into one nostril alternating nostrils as needed for opioid reversal every 2-3 minutes until assistance arrives    Chronic pain syndrome       * ondansetron 4 MG ODT tab    ZOFRAN-ODT    10 tablet    Take 1 tablet (4 mg) by mouth every 8 hours as needed for nausea        * ondansetron 4 MG ODT tab    ZOFRAN-ODT    90 tablet    DISSOLVE ONE TABLET IN MOUTH EVERY 8 HOURS AS NEEDED FOR NAUSEA    Chronic pain syndrome       order for DME     1 Device    Right wrist splint for carpal tunnel syndrome  One* Use as directed at hour of sleep    Carpal tunnel syndrome of right wrist       order for DME     1 each    Equipment being ordered: CPAP with supplies    ALEXUS on CPAP       oxyCODONE-acetaminophen  MG per tablet    PERCOCET    28 tablet    Take 1 tablet by mouth 4 times daily Taper start 10/18, 3 tabs daily for 4 days, then 2 tabs daily for 5 days then one tab daily until off    Carcinoma of colon metastatic to liver (H), 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), Renal cell carcinoma of right kidney (H)       polyethylene glycol powder    MIRALAX/GLYCOLAX    1581 g    TAKE 17 GRAMS (1 CAPFUL) BY MOUTH DAILY    Slow transit constipation       potassium chloride 10 MEQ tablet    K-TAB,KLOR-CON    120 tablet    Take 1 tablet (10 mEq) by mouth daily    Hypokalemia       sertraline 100 MG tablet    ZOLOFT    30 tablet    Take 1 tablet (100 mg) by mouth daily    Recurrent major depression in partial remission (H)       STATIN NOT PRESCRIBED (INTENTIONAL)     0 each    1 each daily Statin not prescribed intentionally due to Active liver disease (liver failure, cirrhosis, hepatitis) LIVER METS    Hyperlipidemia LDL goal <100, Type 2 diabetes  mellitus without complication (H)       Vitamin D (Cholecalciferol) 1000 units Tabs     60 tablet    Take 2,000 Units by mouth daily    Vitamin D deficiency       * Notice:  This list has 4 medication(s) that are the same as other medications prescribed for you. Read the directions carefully, and ask your doctor or other care provider to review them with you.

## 2018-10-26 NOTE — TELEPHONE ENCOUNTER
Per maryse from Dr. Samy Salmeron:    She was dx with neuroendocrine tumor in 2013 and also had colonoscopy/EGD done.    We also need slides from 2013 as well as April 2018 when she was dx with kidney cancer.    She had CT scans done in July, July, and August 2018.     These will need to be obtained for review. Msg sent to [facility] recs collection team 10/26.

## 2018-10-26 NOTE — PROGRESS NOTES
"Clinic Care Coordination Contact  Care Team Conversations    Met with patient after [facility] appointment. Did attend oncology and palliative care visits. Relieved that oncologist offered hope that her liver cancer is relatively stable and likely would have slow progression and offered hope of a longer life \"if I stay healthy and don't drink or do street drugs\". Upset that she is no longer being given opioid pain medications but much less so than previously. Reviewed connection between pain, trauma and addiction. Verbalizes some understanding after further discussion. Keep appt Monday with [facility]. She agrees with plan.     Carla Sims R.N.  Clinic Care Coordinator  [facility]  [phone]              "

## 2018-10-26 NOTE — PROGRESS NOTES
[facility] Integrated [facility]   October 26, 2018      Behavioral Health Clinician Progress Note    Patient Name: Ines Mott           Service Type:  Individual      Service Location:   Face to Face in Clinic      Session Start Time: 10:00 a.m.  Session End Time: 10:30 a.m.      Session Length: 16 - 37      Attendees: Patient MSW Clinical Intern    Visit Activities (Refresh list every visit): [facility] Only    Diagnostic Assessment Date: to be completed at next visit  Treatment Plan Review Date: to be completed at next visit  See Flowsheets for today's PHQ-9 and RASTA-7 results  Previous PHQ-9:   PHQ-9 SCORE 5/11/2018 5/18/2018 7/2/2018   Total Score - - -   Total Score MyChart 9 (Mild depression) - -   Total Score 9 7 8     Previous RASTA-7:   RASTA-7 SCORE 8/24/2015 11/4/2016 12/5/2017   Total Score - - 3 (minimal anxiety)   Total Score 7 9 3       GONZÁLEZ LEVEL:  No flowsheet data found.    DATA  Extended Session (60+ minutes): No  Interactive Complexity: Yes, visit entailed Interactive Complexity evidenced by:  -The need to manage maladaptive communication (related to, e.g., high anxiety, high reactivity, repeated questions, or disagreement) among participants that complicates delivery of care  Crisis: No  [facility] Patient: No    Treatment Objective(s) Addressed in This Session:  Target Behavior(s): disease management/lifestyle changes cancer    Adjustment Difficulties: will develop coping/problem-solving skills to facilitate more adaptive adjustment  Psychological distress related to Chronic Disease Management    Current Stressors / Issues:  [facility] only visit with patient in the clinic. Patient reports she is experiencing pain and feeling groggy. Patient said she went to the oncology appointment yesterday and was told she has kidney and liver cancer. The oncologist is planning to complete a PET scan to see where the cancer has metastasized. She reports that the oncologist told her that the this type of cancer  "progresses slowly and she shared that she has a, \"sense of relief about this because I thought I was dying soon.\" Patient reported that the oncologist told her she should be on pain medications and he would talk to someone (she didn't remember who) about getting her back on them. Discussed with patient the conversation she had with Ligia, PCP, about no longer being prescribed any controlled substances at this clinic due to unresolved addiction issues. Patient states the oncologist told her that if she is able to take care of herself physically by not smoking, drinking, or using street drugs, she can live a long life and discussed how this is motivation for her.   Patient shared that depression worsens during the winter, and came up with strategies to cope with these symptoms. Patient plans to spend more time with her grandchildren, doing artwork, and talking to family members regularly.   Patient has a primary care visit scheduled on Monday and will schedule a [facility] only follow up in two weeks.       Progress on Treatment Objective(s) / Homework:  New Objective established this session - CONTEMPLATION (Considering change and yet undecided); Intervened by assessing the negative and positive thinking (ambivalence) about behavior change    Motivational Interviewing    MI Intervention: Expressed Empathy/Understanding, Supported Autonomy, Collaboration, Evocation, Permission to raise concern or advise, Open-ended questions and Reflections: simple and complex     Change Talk Expressed by the Patient: Ability to change Reasons to change Committment to change    Provider Response to Change Talk: E - Evoked more info from patient about behavior change, A - Affirmed patient's thoughts, decisions, or attempts at behavior change, R - Reflected patient's change talk and S - Summarized patient's change talk statements    Also provided psychoeducation about behavioral health condition, symptoms, and treatment options    Care Plan " "review completed: No    Medication Review:  No changes to current psychiatric medication(s) - zoloft, clonidine    Medication Compliance:  Yes    Changes in Health Issues:   Yes: Pain, Associated Psychological Distress    Chemical Use Review:   Substance Use: Problem use continues with no change since last session, Reviewed information and resources for treatment and ongoing sobriety  Reviewed concerns related to health related substance abuse risk  Provided encouragement towards sobriety  Provided support and affirmation for steps taken towards sobriety    Patient also has a hx of polysubstance dependence; drugs of choice: heroin, opiates, alcohol.     Tobacco Use: No current tobacco use.      Assessment: Current Emotional / Mental Status (status of significant symptoms):  Risk status (Self / Other harm or suicidal ideation)  Patient has had a history of suicidal ideation: \"comes and goes\" and suicide attempts: most recent attempt 10/21/14, pt attempted to overdose on methamphetamine; attempted in the past                                                                                           Patient denies current fears or concerns for personal safety.  Patient denies current or recent suicidal ideation or behaviors.  Patient denies current or recent homicidal ideation or behaviors.  Patient denies current or recent self injurious behavior or ideation.  Patient denies other safety concerns.  A safety and risk management plan has not been developed at this time, however patient was encouraged to call [facility] should there be a change in any of these risk factors.    Appearance:   Appropriate   Eye Contact:   Good   Psychomotor Behavior: Normal   Attitude:   Cooperative   Orientation:   All  Speech   Rate / Production: Normal    Volume:  Normal   Mood:    Anxious  Normal  Affect:    Appropriate   Thought Content:  Clear   Thought Form:  Coherent   Insight:    Fair     Diagnoses:  1. Posttraumatic stress " disorder    2. Opioid use disorder, severe, dependence (H)        Collateral Reports Completed:  Not Applicable    Plan: (Homework, other):  Patient was given information about behavioral services and encouraged to schedule a follow up appointment with the clinic [facility] in 2 weeks . Patient will take medications as prescribed. She was also given information about mental health symptoms and treatment options  and Cognitive Behavioral Therapy skills to practice when experiencing anxiety and depression. CD Recommendations: Maintain Sobriety. Note written by MARCI Cartwright Clinical Intern.   Reviewed/Supervised by MARCI Palma, LICSW

## 2018-10-27 NOTE — PROGRESS NOTES
3 DAY STM VISIT    Diagnostic AHI: 69 PSG    Patient contacted for 3 day STM visit.  Message left for patient to return call.     Device type: Auto-CPAP  PAP settings from order::  CPAP min 8 cm  H20       CPAP max 12 cm  H20  Mask type:    Per patient choice (All Options)     Device settings from machine      Min CPAP 8.0            Max CPAP 12.0      Assessment: Nightly usage, most nights over four hours.  Action plan: Pt to have f/u 14 day STM visit.  Patient has a follow up visit scheduled:   no.

## 2018-10-29 ENCOUNTER — PRE VISIT (OUTPATIENT)
Dept: UROLOGY | Facility: CLINIC | Age: 53
End: 2018-10-29

## 2018-10-29 ENCOUNTER — OFFICE VISIT (OUTPATIENT)
Dept: BEHAVIORAL HEALTH | Facility: CLINIC | Age: 53
End: 2018-10-29
Payer: COMMERCIAL

## 2018-10-29 ENCOUNTER — OFFICE VISIT (OUTPATIENT)
Dept: FAMILY MEDICINE | Facility: CLINIC | Age: 53
End: 2018-10-29
Payer: COMMERCIAL

## 2018-10-29 ENCOUNTER — DOCUMENTATION ONLY (OUTPATIENT)
Dept: SLEEP MEDICINE | Facility: CLINIC | Age: 53
End: 2018-10-29
Payer: COMMERCIAL

## 2018-10-29 VITALS
RESPIRATION RATE: 16 BRPM | DIASTOLIC BLOOD PRESSURE: 86 MMHG | OXYGEN SATURATION: 99 % | HEART RATE: 74 BPM | BODY MASS INDEX: 38.43 KG/M2 | WEIGHT: 238 LBS | SYSTOLIC BLOOD PRESSURE: 122 MMHG | TEMPERATURE: 97.1 F

## 2018-10-29 DIAGNOSIS — R25.2 CRAMP OF LIMB: ICD-10-CM

## 2018-10-29 DIAGNOSIS — G31.84 MILD COGNITIVE IMPAIRMENT: ICD-10-CM

## 2018-10-29 DIAGNOSIS — E53.8 B12 DEFICIENCY: ICD-10-CM

## 2018-10-29 DIAGNOSIS — F43.10 POSTTRAUMATIC STRESS DISORDER: Primary | Chronic | ICD-10-CM

## 2018-10-29 DIAGNOSIS — F11.20 OPIOID USE DISORDER, SEVERE, DEPENDENCE (H): Primary | ICD-10-CM

## 2018-10-29 DIAGNOSIS — F43.10 POSTTRAUMATIC STRESS DISORDER: Chronic | ICD-10-CM

## 2018-10-29 DIAGNOSIS — G89.4 CHRONIC PAIN SYNDROME: ICD-10-CM

## 2018-10-29 DIAGNOSIS — F11.20 OPIOID USE DISORDER, SEVERE, DEPENDENCE (H): ICD-10-CM

## 2018-10-29 LAB — CGA SERPL-MCNC: 109 NG/ML (ref 0–95)

## 2018-10-29 PROCEDURE — 99214 OFFICE O/P EST MOD 30 MIN: CPT | Performed by: NURSE PRACTITIONER

## 2018-10-29 PROCEDURE — 90785 PSYTX COMPLEX INTERACTIVE: CPT

## 2018-10-29 PROCEDURE — 90832 PSYTX W PT 30 MINUTES: CPT

## 2018-10-29 RX ORDER — CYANOCOBALAMIN 1000 UG/ML
1 INJECTION, SOLUTION INTRAMUSCULAR; SUBCUTANEOUS
Qty: 1 ML | Refills: 11 | Status: SHIPPED | OUTPATIENT
Start: 2018-10-29 | End: 2019-05-14

## 2018-10-29 RX ORDER — BACLOFEN 10 MG/1
10 TABLET ORAL 2 TIMES DAILY
Qty: 60 TABLET | Refills: 3 | Status: SHIPPED | OUTPATIENT
Start: 2018-10-29 | End: 2019-05-14

## 2018-10-29 NOTE — MR AVS SNAPSHOT
After Visit Summary   10/29/2018    Ines Mott    MRN: [id number]           Patient Information     Date Of Birth          1965        Visit Information        Provider Department      10/29/2018 9:00 AM Marika Santana APRN [facility]        Today's Diagnoses     Opioid use disorder, severe, dependence (H)    -  1    B12 deficiency        Chronic pain syndrome        Cramp of limb        Mild cognitive impairment        Posttraumatic stress disorder          Care Instructions    Please follow up with Palliative and oncology for pain management    If you do not want to return to [facility] we can help you find another PCP    We will offer you 30 days of acute care services and 30 days of medications until you find another PCP, I have reordered some of your meds today           Follow-ups after your visit        Follow-up notes from your care team     Return in about 3 weeks (around 11/19/2018) for with your specialists .      Your next 10 appointments already scheduled     Nov 09, 2018 10:00 AM CST   Return Visit with Trina Vargas   [facility] ([facility])    [address]  [address]  [facility] [address]   [phone]            Nov 13, 2018 11:00 AM CST   (Arrive by 10:45 AM)   Return Visit with Quyen Mae MD   [facility] ([facility])    [address] [place] [address]  [address]  [facility] [address] [phone]            Nov 16, 2018  2:00 PM CST   (Arrive by 1:45 PM)   New Patient Visit with Pedro Belle MD   [facility] ([facility])    [address] [place] [address] Se  4th Floor  [facility] [address] [phone]              Who to contact     If you have questions or need follow up information about today's clinic visit or your schedule please contact  [facility] directly at [phone].  Normal or non-critical lab and imaging results will be communicated to you by MyChart, letter or phone within 4 business days after the clinic has received the results. If you do not hear from us within 7 days, please contact the clinic through MyChart or phone. If you have a critical or abnormal lab result, we will notify you by phone as soon as possible.  Submit refill requests through [employer] or call your pharmacy and they will forward the refill request to us. Please allow 3 business days for your refill to be completed.          Additional Information About Your Visit        Care EveryWhere ID     This is your Care EveryWhere ID. This could be used by other organizations to access your [place] medical records  [id number]        Your Vitals Were     Pulse Temperature Respirations Pulse Oximetry BMI (Body Mass Index)       74 97.1  F (36.2  C) (Temporal) 16 99% 38.43 kg/m2        Blood Pressure from Last 3 Encounters:   10/29/18 122/86   10/25/18 129/81   10/24/18 130/70    Weight from Last 3 Encounters:   10/29/18 238 lb (108 kg)   10/25/18 242 lb 11.2 oz (110.1 kg)   10/23/18 241 lb 8 oz (109.5 kg)              Today, you had the following     No orders found for display         Today's Medication Changes          These changes are accurate as of 10/29/18 11:59 PM.  If you have any questions, ask your nurse or doctor.               Stop taking these medicines if you haven't already. Please contact your care team if you have questions.     oxyCODONE-acetaminophen  MG per tablet   Commonly known as:  PERCOCET   Stopped by:  Marika Santana APRN CNP                Where to get your medicines      These medications were sent to [facility] [address] [place] [address] [address] [facility]  [address] [facility] [address] [facility] [address]     Phone:  [phone]     baclofen 10 MG tablet    cyanocobalamin 1000 MCG/ML injection     magnesium oxide 400 (241.3 Mg) MG tablet                Primary Care Provider Office Phone # Fax #    MICHE[facility] [phone]       [address] [place] [address] [address] [place] [id number]        Goals        General    Transportation (pt-stated)     Notes - Note created  7/11/2018  9:56 AM by Elizabeth Aleman, KERVIN    Goal Statement: I am in need of transportation resources  Measure of Success: Patient will utilize transportation benefits  Supportive Steps to Achieve: SW will assist patient in obtaining transportation to medical appointments.   Date to Achieve By: Ongoing         Lifestyle    Patient achieves sleep pattern objective     Notes - Note created  9/25/2018 10:06 AM by Martha Sims, RN    #1-Goal Statement: patient will obtain and use CPAP machine nightly  Measure of Success: improved sleep  Supportive Steps to Achieve: RN CC obtained DME order from PCP  Barriers: poor follow up; current machine broken  Strengths: assistance from PCP/RN CC  Date to Achieve By: 11-1-18  Patient expressed understanding of goal: good            Equal Access to Services     VALENTE RIVERA AH: Hadii casimiro ku hadasho Soomaali, waaxda luqadaha, qaybta kaalmada adeegyada, waxay antione pardo . So [facility] [phone].    ATENCIÓN: Si habla español, tiene a christianson disposición servicios gratuitos de asistencia lingüística. Llame al [phone].    We comply with applicable federal civil rights laws and [place] laws. We do not discriminate on the basis of race, color, national origin, age, disability, sex, sexual orientation, or gender identity.            Thank you!     Thank you for choosing [facility]  for your care. Our goal is always to provide you with excellent care. Hearing back from our patients is one way we can continue to improve our services. Please take a few minutes to complete the written survey that you may receive in the mail after your  visit with us. Thank you!             Your Updated Medication List - Protect others around you: Learn how to safely use, store and throw away your medicines at www.disposemymeds.org.          This list is accurate as of 10/29/18 11:59 PM.  Always use your most recent med list.                   Brand Name Dispense Instructions for use Diagnosis    albuterol (2.5 MG/3ML) 0.083% neb solution     30 vial    Take 1 vial (2.5 mg) by nebulization every 6 hours as needed for shortness of breath / dyspnea or wheezing    Mild intermittent asthma with acute exacerbation       ASPIRIN NOT PRESCRIBED    INTENTIONAL    1 each    Please choose reason not prescribed, below    Aspirin intolerance       baclofen 10 MG tablet    LIORESAL    60 tablet    Take 1 tablet (10 mg) by mouth 2 times daily    Chronic pain syndrome       blood glucose calibration solution    no brand specified    1 each    Use to calibrate blood glucose monitor as directed.    Type 2 diabetes mellitus without complication, without long-term current use of insulin (H)       blood glucose lancets standard    no brand specified    100 each    Use to test blood sugar daily times daily or as directed.    Type 2 diabetes mellitus without complication, without long-term current use of insulin (H)       blood glucose monitoring meter device kit    no brand specified    1 kit    Use to test blood sugar larisa times daily or as directed.    Type 2 diabetes mellitus without complication, without long-term current use of insulin (H)       blood glucose monitoring test strip    no brand specified    100 strip    Use to test blood sugars once times daily or as directed    Type 2 diabetes mellitus without complication, without long-term current use of insulin (H)       cloNIDine 0.1 MG tablet    CATAPRES    60 tablet    Take 1 tablet (0.1 mg) by mouth 2 times daily    Hypertension goal BP (blood pressure) < 140/80, Opioid withdrawal (H)       [facility] Caps      60 capsule    Take 1 capsule by mouth 2 times daily    Diarrhea, unspecified type       cyanocobalamin 1000 MCG/ML injection    VITAMIN B12    1 mL    Inject 1 mL (1,000 mcg) Subcutaneous every 30 days    B12 deficiency       EPINEPHrine 0.3 MG/0.3ML injection 2-pack    EPIPEN/ADRENACLICK/or ANY BX GENERIC EQUIV    0.3 mL    Inject 0.3 mLs (0.3 mg) into the muscle once as needed for anaphylaxis    Anaphylactic reaction       losartan-hydrochlorothiazide 100-25 MG per tablet    HYZAAR    90 tablet    Take 1 tablet by mouth daily    Hypertension goal BP (blood pressure) < 140/80       magnesium oxide 400 (241.3 Mg) MG tablet    MAG-OX    90 tablet    Take 1 tablet (400 mg) by mouth daily    Cramp of limb       naloxone nasal spray    NARCAN    0.2 mL    Spray 1 spray (4 mg) into one nostril alternating nostrils as needed for opioid reversal every 2-3 minutes until assistance arrives    Chronic pain syndrome       ondansetron 4 MG ODT tab    ZOFRAN-ODT    10 tablet    Take 1 tablet (4 mg) by mouth every 8 hours as needed for nausea        order for DME     1 Device    Right wrist splint for carpal tunnel syndrome  One* Use as directed at hour of sleep    Carpal tunnel syndrome of right wrist       order for DME     1 each    Equipment being ordered: CPAP with supplies    ALEXUS on CPAP       polyethylene glycol powder    MIRALAX/GLYCOLAX    1581 g    TAKE 17 GRAMS (1 CAPFUL) BY MOUTH DAILY    Slow transit constipation       potassium chloride 10 MEQ tablet    K-TAB,KLOR-CON    120 tablet    Take 1 tablet (10 mEq) by mouth daily    Hypokalemia       sertraline 100 MG tablet    ZOLOFT    30 tablet    Take 1 tablet (100 mg) by mouth daily    Recurrent major depression in partial remission (H)       STATIN NOT PRESCRIBED (INTENTIONAL)     0 each    1 each daily Statin not prescribed intentionally due to Active liver disease (liver failure, cirrhosis, hepatitis) LIVER METS    Hyperlipidemia LDL goal <100, Type 2 diabetes  mellitus without complication (H)       Vitamin D (Cholecalciferol) 1000 units Tabs     60 tablet    Take 2,000 Units by mouth daily    Vitamin D deficiency

## 2018-10-29 NOTE — TELEPHONE ENCOUNTER
MEDICAL RECORDS REQUEST   [facility]  [address] [place] [address]  PHONE: [phone]  Fax: [phone]        FUTURE VISIT INFORMATION                                                   Ines Mott, : 1965 scheduled for future visit at [facility]    APPOINTMENT INFORMATION:    Date: 2018    Provider:  Pedro Belle    Reason for Visit/Diagnosis: Hx of renal cell ca    REFERRAL INFORMATION:    Referring provider:  Samy Salmeron    Specialty: MD    Referring providers clinic:  [facility]    Clinic contact number:  [phone]    RECORDS REQUESTED FOR VISIT                                                     NOTES  STATUS/DETAILS   OFFICE NOTE from referring provider  yes   OFFICE NOTE from other specialist  yes   DISCHARGE SUMMARY from hospital  no   DISCHARGE REPORT from the ER  no   OPERATIVE REPORT  no   MEDICATION LIST  yes       PRE-VISIT CHECKLIST      Record collection complete Yes   Appointment appropriately scheduled           (right time/right provider) Yes   MyChart activation Yes   Questionnaire complete If no, please explain in process     Completed by: Erin Schmidt
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
## 2018-10-29 NOTE — Clinical Note
Romario Mena, The plan section needed to be updated. Please see my revision and let me know if you have any questions. Thanks, [place]

## 2018-10-29 NOTE — MR AVS SNAPSHOT
After Visit Summary   10/29/2018    Ines Mott    MRN: [id number]           Patient Information     Date Of Birth          1965        Visit Information        Provider Department      10/29/2018 9:00 AM Trina Vargas [facility]        Today's Diagnoses     Posttraumatic stress disorder    -  1    Opioid use disorder, severe, dependence (H)           Follow-ups after your visit        Your next 10 appointments already scheduled     Nov 09, 2018 10:00 AM CST   Return Visit with Trina Vargas   [facility] ([facility])    [address] [place], [place] [address]   [address]  [facility] [id number] [phone]            Nov 13, 2018 11:00 AM CST   (Arrive by 10:45 AM)   Return Visit with Quyen Mae MD   [facility] ([facility])    [address]  [address]  [facility] [id number] [phone]            Nov 16, 2018  2:00 PM CST   (Arrive by 1:45 PM)   New Patient Visit with Pedro Belle MD   [facility] ([facility])    [address]  4th Floor  [facility] [id number] [phone]              Who to contact     If you have questions or need follow up information about today's clinic visit or your schedule please contact [facility] directly at [phone].  Normal or non-critical lab and imaging results will be communicated to you by MyChart, letter or phone within 4 business days after the clinic has received the results. If you do not hear from us within 7 days, please contact the clinic through MyChart or phone. If you have a critical or abnormal lab result, we will notify you by phone as soon as possible.  Submit refill requests through [employer] or call your pharmacy and they will forward the refill request to us. Please allow 3  business days for your refill to be completed.          Additional Information About Your Visit        Care EveryWhere ID     This is your Care EveryWhere ID. This could be used by other organizations to access your [facility] medical records  [id number]         Blood Pressure from Last 3 Encounters:   10/29/18 122/86   10/25/18 129/81   10/24/18 130/70    Weight from Last 3 Encounters:   10/29/18 108 kg (238 lb)   10/25/18 110.1 kg (242 lb 11.2 oz)   10/23/18 109.5 kg (241 lb 8 oz)              We Performed the Following     C PSYCHOTHERAPY COMPLEX INTERACTIVE          Today's Medication Changes          These changes are accurate as of 10/29/18 11:59 PM.  If you have any questions, ask your nurse or doctor.               Stop taking these medicines if you haven't already. Please contact your care team if you have questions.     oxyCODONE-acetaminophen  MG per tablet   Commonly known as:  PERCOCET   Stopped by:  Marika Santana APRN CNP                Where to get your medicines      These medications were sent to [facility] [address] [place] [address] [address] [facility]  [address] [facility] [place] [facility] [address]     Phone:  [phone]     baclofen 10 MG tablet    cyanocobalamin 1000 MCG/ML injection    magnesium oxide 400 (241.3 Mg) MG tablet                Primary Care Provider Office Phone # Fax #    MICHEAL Crowley [phone]       [address] [facility] [address]  [facility] [address]        Goals        General    Transportation (pt-stated)     Notes - Note created  7/11/2018  9:56 AM by Elizabeth Aleman BSW    Goal Statement: I am in need of transportation resources  Measure of Success: Patient will utilize transportation benefits  Supportive Steps to Achieve: SW will assist patient in obtaining transportation to medical appointments.   Date to Achieve By: Ongoing         Lifestyle    Patient achieves sleep pattern objective     Notes - Note created   9/25/2018 10:06 AM by Martha Sims, RN    #1-Goal Statement: patient will obtain and use CPAP machine nightly  Measure of Success: improved sleep  Supportive Steps to Achieve: RN CC obtained DME order from PCP  Barriers: poor follow up; current machine broken  Strengths: assistance from PCP/RN CC  Date to Achieve By: 11-1-18  Patient expressed understanding of goal: good            Equal Access to Services     VALENTE RIVERA : Hadii aad ku hadasho Soomaali, waaxda luqadaha, qaybta kaalmada adeegyada, waxay luigiin chilon vietamandeep jononimisha laEulaliascooter . So [facility] [phone].    ATENCIÓN: Si habla español, tiene a christianson disposición servicios gratuitos de asistencia lingüística. Llame al [phone].    We comply with applicable federal civil rights laws and [place] laws. We do not discriminate on the basis of race, color, national origin, age, disability, sex, sexual orientation, or gender identity.            Thank you!     Thank you for choosing [facility]  for your care. Our goal is always to provide you with excellent care. Hearing back from our patients is one way we can continue to improve our services. Please take a few minutes to complete the written survey that you may receive in the mail after your visit with us. Thank you!             Your Updated Medication List - Protect others around you: Learn how to safely use, store and throw away your medicines at www.disposemymeds.org.          This list is accurate as of 10/29/18 11:59 PM.  Always use your most recent med list.                   Brand Name Dispense Instructions for use Diagnosis    albuterol (2.5 MG/3ML) 0.083% neb solution     30 vial    Take 1 vial (2.5 mg) by nebulization every 6 hours as needed for shortness of breath / dyspnea or wheezing    Mild intermittent asthma with acute exacerbation       ASPIRIN NOT PRESCRIBED    INTENTIONAL    1 each    Please choose reason not prescribed, below    Aspirin intolerance        baclofen 10 MG tablet    LIORESAL    60 tablet    Take 1 tablet (10 mg) by mouth 2 times daily    Chronic pain syndrome       blood glucose calibration solution    no brand specified    1 each    Use to calibrate blood glucose monitor as directed.    Type 2 diabetes mellitus without complication, without long-term current use of insulin (H)       blood glucose lancets standard    no brand specified    100 each    Use to test blood sugar daily times daily or as directed.    Type 2 diabetes mellitus without complication, without long-term current use of insulin (H)       blood glucose monitoring meter device kit    no brand specified    1 kit    Use to test blood sugar larisa times daily or as directed.    Type 2 diabetes mellitus without complication, without long-term current use of insulin (H)       blood glucose monitoring test strip    no brand specified    100 strip    Use to test blood sugars once times daily or as directed    Type 2 diabetes mellitus without complication, without long-term current use of insulin (H)       cloNIDine 0.1 MG tablet    CATAPRES    60 tablet    Take 1 tablet (0.1 mg) by mouth 2 times daily    Hypertension goal BP (blood pressure) < 140/80, Opioid withdrawal (H)       [facility] Caps     60 capsule    Take 1 capsule by mouth 2 times daily    Diarrhea, unspecified type       cyanocobalamin 1000 MCG/ML injection    VITAMIN B12    1 mL    Inject 1 mL (1,000 mcg) Subcutaneous every 30 days    B12 deficiency       EPINEPHrine 0.3 MG/0.3ML injection 2-pack    EPIPEN/ADRENACLICK/or ANY BX GENERIC EQUIV    0.3 mL    Inject 0.3 mLs (0.3 mg) into the muscle once as needed for anaphylaxis    Anaphylactic reaction       losartan-hydrochlorothiazide 100-25 MG per tablet    HYZAAR    90 tablet    Take 1 tablet by mouth daily    Hypertension goal BP (blood pressure) < 140/80       magnesium oxide 400 (241.3 Mg) MG tablet    MAG-OX    90 tablet    Take 1 tablet (400 mg) by mouth  daily    Cramp of limb       naloxone nasal spray    NARCAN    0.2 mL    Spray 1 spray (4 mg) into one nostril alternating nostrils as needed for opioid reversal every 2-3 minutes until assistance arrives    Chronic pain syndrome       ondansetron 4 MG ODT tab    ZOFRAN-ODT    10 tablet    Take 1 tablet (4 mg) by mouth every 8 hours as needed for nausea        order for DME     1 Device    Right wrist splint for carpal tunnel syndrome  One* Use as directed at hour of sleep    Carpal tunnel syndrome of right wrist       order for DME     1 each    Equipment being ordered: CPAP with supplies    ALEXUS on CPAP       polyethylene glycol powder    MIRALAX/GLYCOLAX    1581 g    TAKE 17 GRAMS (1 CAPFUL) BY MOUTH DAILY    Slow transit constipation       potassium chloride 10 MEQ tablet    K-TAB,KLOR-CON    120 tablet    Take 1 tablet (10 mEq) by mouth daily    Hypokalemia       sertraline 100 MG tablet    ZOLOFT    30 tablet    Take 1 tablet (100 mg) by mouth daily    Recurrent major depression in partial remission (H)       STATIN NOT PRESCRIBED (INTENTIONAL)     0 each    1 each daily Statin not prescribed intentionally due to Active liver disease (liver failure, cirrhosis, hepatitis) LIVER METS    Hyperlipidemia LDL goal <100, Type 2 diabetes mellitus without complication (H)       Vitamin D (Cholecalciferol) 1000 units Tabs     60 tablet    Take 2,000 Units by mouth daily    Vitamin D deficiency

## 2018-10-29 NOTE — PROGRESS NOTES
[facility] - Integrated Primary Care   October 29, 2018      Behavioral Health Clinician Progress Note    Patient Name: Ines Mott           Service Type:  Individual      Service Location:   Face to Face in Clinic      Session Start Time: 09:25 a.m.  Session End Time: 9:50 a.m.      Session Length: 16 - 37      Attendees: Patient, PCP, and MSW Clinical Intern    Visit Activities (Refresh list every visit): [facility] [employer]    Diagnostic Assessment Date: to be completed at next visit  Treatment Plan Review Date: to be completed at next visit  See Flowsheets for today's PHQ-9 and RASTA-7 results  Previous PHQ-9:   PHQ-9 SCORE 5/11/2018 5/18/2018 7/2/2018   Total Score - - -   Total Score MyChart 9 (Mild depression) - -   Total Score 9 7 8     Previous RASTA-7:   RASTA-7 SCORE 8/24/2015 11/4/2016 12/5/2017   Total Score - - 3 (minimal anxiety)   Total Score 7 9 3       GONZÁLEZ LEVEL:  No flowsheet data found.    DATA  Extended Session (60+ minutes): No  Interactive Complexity: Yes, visit entailed Interactive Complexity evidenced by:  -The need to manage maladaptive communication (related to, e.g., high anxiety, high reactivity, repeated questions, or disagreement) among participants that complicates delivery of care  Crisis: No  [facility] Patient: No    Treatment Objective(s) Addressed in This Session:  Target Behavior(s): disease management/lifestyle changes cancer    Adjustment Difficulties: will develop coping/problem-solving skills to facilitate more adaptive adjustment  Psychological distress related to Chronic Disease Management    Current Stressors / Issues:  [facility] co-visit with patient and PCP in the clinic. Patient reports she is in pain, sleeping more than usual, and feeling depressed. Patient states she is taking her medication as prescribed, however, it appears she is not following the taper as she ran out of oxycodone early again. Patient and PCP discussed her pain management regimen and she reports she is buying  percocet off the street. PCP discussed the danger of taking pills that aren't prescribed to her, and offered resources for detox or suboxone; patient refused both. Patient became upset with PCP's decision to not prescribe pain medications and said she will not return to the clinic. Discussed referring patient to a new PCP, and she said she will find her own. Patient is scheduled to see [facility] for a therapy visit next week, however she said she is not coming back to the clinic. Patient was reminded that if she would like support in finding resources for detox or sobriety support, PCP and [facility] could assist her in this. Patient declined. 30 days of acute care will be provided.     Progress on Treatment Objective(s) / Homework:  Minimal progress - PRECONTEMPLATION (Not seeing need for change); Intervened by educating the patient about the effects of current behavior on health.  Evoked information about reasons to continue behavior, express concern / recommendations, and explored any change talk    Motivational Interviewing    MI Intervention: Expressed Empathy/Understanding, Supported Autonomy, Collaboration, Evocation, Permission to raise concern or advise, Open-ended questions and Reflections: simple and complex     Change Talk Expressed by the Patient: Ability to change Reasons to change Committment to change    Provider Response to Change Talk: E - Evoked more info from patient about behavior change, A - Affirmed patient's thoughts, decisions, or attempts at behavior change, R - Reflected patient's change talk and S - Summarized patient's change talk statements    Also provided psychoeducation about behavioral health condition, symptoms, and treatment options    Care Plan review completed: No    Medication Review:  No changes to current psychiatric medication(s) - zoloft, clonidine    Medication Compliance:  No    Changes in Health Issues:   Yes: Please see medical note by PCP Ligia Santana, APRN, CNP    Chemical  "Use Review:   Substance Use: Problem use continues with no change since last session, Reviewed information and resources for treatment and ongoing sobriety  Reviewed concerns related to health related substance abuse risk  Provided encouragement towards sobriety  Provided support and affirmation for steps taken towards sobriety    Patient also has a hx of polysubstance dependence; drugs of choice: heroin, opiates, alcohol.     Tobacco Use: No current tobacco use.      Assessment: Current Emotional / Mental Status (status of significant symptoms):  Risk status (Self / Other harm or suicidal ideation)  Patient has had a history of suicidal ideation: \"comes and goes\" and suicide attempts: most recent attempt 10/21/14, pt attempted to overdose on methamphetamine; attempted in the past                                                                                           Patient denies current fears or concerns for personal safety.  Patient denies current or recent suicidal ideation or behaviors.  Patient denies current or recent homicidal ideation or behaviors.  Patient denies current or recent self injurious behavior or ideation.  Patient denies other safety concerns.  A safety and risk management plan has not been developed at this time, however patient was encouraged to call [place] [phone] should there be a change in any of these risk factors.    Appearance:   Appropriate   Eye Contact:   Good   Psychomotor Behavior: Agitated   Attitude:   Belligerent  Uncooperative   Orientation:   All  Speech   Rate / Production: Normal    Volume:  Normal   Mood:    Anxious  Irritable   Affect:    Expansive   Thought Content:  Clear   Thought Form:  Coherent   Insight:    Poor     Diagnoses:  1. Posttraumatic stress disorder    2. Opioid use disorder, severe, dependence (H)        Collateral Reports Completed:  Not Applicable    Plan: (Homework, other):  Patient reports she is not planning to return to the clinic. " Encouraged her to ask for help connecting with a primary care clinic. Per PCP, 30 days of acute care will be provided.  MARCI Cartwright Clinical Intern.   Reviewed/Supervised by MARCI Palma, LICSW

## 2018-10-30 LAB — COPATH REPORT: NORMAL

## 2018-10-30 PROCEDURE — 00000346 ZZHCL STATISTIC REVIEW OUTSIDE SLIDES TC 88321: Performed by: INTERNAL MEDICINE

## 2018-11-01 NOTE — PATIENT INSTRUCTIONS
Please follow up with Palliative and oncology for pain management    If you do not want to return to [facility] we can help you find another PCP    We will offer you 30 days of acute care services and 30 days of medications until you find another PCP, I have reordered some of your meds today

## 2018-11-05 NOTE — PROGRESS NOTES
14 DAY STM VISIT    Diagnostic AHI: 69 PSG    Subjective measures:   Pt states that she is having a hard time using it because she doesn't feel like she's getting enough pressure.  I turned her ramp off and so it will just start at 8cm H2O.  If she continues to have issues she will call and we can increase the base pressure.    Assessment: Pt not meeting objective benchmarks for compliance  Patient failing following subjective benchmarks: pressure issues  Action plan: pt to have 30 day STM visit.    Device type: Auto-CPAP  PAP settings: CPAP min 8.0 cm  H20     CPAP max 12.0 cm  H20    95th% pressure 11 cm   Mask type:  Per patient choice (All Options)  Objective measures: 14 day rolling measures      Compliance  14 %      Leak  15.84 lpm  last  upload      AHI 1.56   last  upload      Average number of minutes 105     Average hours of usage 1.8          Objective measure goal  Compliance   Goal >70%  Leak   Goal < 24 lpm  AHI  Goal < 5  Usage  Goal >240

## 2018-11-06 ENCOUNTER — DOCUMENTATION ONLY (OUTPATIENT)
Dept: SLEEP MEDICINE | Facility: CLINIC | Age: 53
End: 2018-11-06

## 2018-11-06 DIAGNOSIS — G47.33 OSA ON CPAP: ICD-10-CM

## 2018-11-10 NOTE — TELEPHONE ENCOUNTER
Date of appointment: 11/16/2018   Diagnosis/reason for appointment: Continuation of care for Colon Cancer and Renal Cell Carcinoma  Referring provider/facility: Dr. Jaron Pablo  Who called:    Recent Studies IN [facility], CARE EVERYWHERE ([facility])  Imaging:  Pathology:  Labs:  Previous chemo/radiation (if known):    Records requested from:   Records received from:    Additional information:

## 2018-11-14 ENCOUNTER — CARE COORDINATION (OUTPATIENT)
Dept: ONCOLOGY | Facility: CLINIC | Age: 53
End: 2018-11-14

## 2018-11-14 NOTE — PROGRESS NOTES
Left a detailed voice mail message for Kofi regarding the following:  Called patient to ask that she call [facility] in imaging at [phone] to schedule her Donate PET scan. He has left messages but has not received a call back.  Explained that her appointment with Dr Salmeron has been canceled due to not having the scan, he does need this imaging to determine the POC.  Requested that she call scheduling at [phone] to schedule a follow up appointment with Dr Salmeron after she has scheduled her scan.  Left triage number, [phone], in the event that she has any questions or concerns.

## 2018-11-15 ENCOUNTER — CARE COORDINATION (OUTPATIENT)
Dept: BEHAVIORAL HEALTH | Facility: CLINIC | Age: 53
End: 2018-11-15

## 2018-11-15 NOTE — PROGRESS NOTES
Behavioral Health Home Services  [facility]: Integrated Primary Care      Social Work Care Navigator Note      Patient: Ines Mott  Date: November 15, 2018  Preferred Name: Ines    Previous PHQ-9:   PHQ-9 SCORE 5/11/2018 5/18/2018 7/2/2018   Total Score - - -   Total Score MyChart 9 (Mild depression) - -   Total Score 9 7 8     Previous RASTA-7:   RASTA-7 SCORE 8/24/2015 11/4/2016 12/5/2017   Total Score - - 3 (minimal anxiety)   Total Score 7 9 3     GONZÁLEZ LEVEL:  No flowsheet data found.    Preferred Contact:  No Data Recorded    Type of Contact Today: Phone call (patient / identified key support person reached)      Data: (subjective / Objective):  Patient Goals:  No Data Recorded    Recent ED/IP Admission or Discharge?   None     SW received call from patient. Patient stated to this SW that she would like to come back to clinic. Patient stated that she has been drinking, has had a few black outs, and that her mom and daughter our upset with her. Patient reported that her kidney's hurt. Patient stated that her  is leaving on 12/10 for [employer] for about a month, and patient would like to do inpatient treatment. SW assisted patient in calling [employer] and set up transportation to upcoming appointments.       Patient is eligible for [facility]. SW will work with PCP to see if patient can continue at this clinic.    BRISSA Davis  [facility]   Integrated Primary Care Clinic  [phone]

## 2018-11-16 ENCOUNTER — PRE VISIT (OUTPATIENT)
Dept: UROLOGY | Facility: CLINIC | Age: 53
End: 2018-11-16

## 2018-11-16 ENCOUNTER — OFFICE VISIT (OUTPATIENT)
Dept: UROLOGY | Facility: CLINIC | Age: 53
End: 2018-11-16
Payer: COMMERCIAL

## 2018-11-16 VITALS
WEIGHT: 246 LBS | DIASTOLIC BLOOD PRESSURE: 102 MMHG | HEART RATE: 78 BPM | BODY MASS INDEX: 39.53 KG/M2 | HEIGHT: 66 IN | SYSTOLIC BLOOD PRESSURE: 150 MMHG

## 2018-11-16 DIAGNOSIS — C64.1 RENAL CELL CARCINOMA OF RIGHT KIDNEY (H): Primary | ICD-10-CM

## 2018-11-16 RX ORDER — AMLODIPINE BESYLATE 5 MG/1
5 TABLET ORAL
COMMUNITY
Start: 2018-11-14 | End: 2019-05-14

## 2018-11-16 ASSESSMENT — PAIN SCALES - GENERAL: PAINLEVEL: EXTREME PAIN (8)
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
## 2018-11-21 ENCOUNTER — TELEPHONE (OUTPATIENT)
Dept: FAMILY MEDICINE | Facility: CLINIC | Age: 53
End: 2018-11-21

## 2018-11-21 NOTE — TELEPHONE ENCOUNTER
Our goal is to have forms completed within 72 hours, however some forms may require a visit or additional information.    What clinic location was the form placed at [facility] or [place].?     Who is the form from? Home Care  Where did the form come from? Faxed to clinic   The form was placed in the inbox of Dustin Fatima Jr MD      Please fax to [phone]    Additional comments:     Call take on 11/21/2018 at 3:32 PM by Nirmala Trevino

## 2018-11-27 ENCOUNTER — OFFICE VISIT (OUTPATIENT)
Dept: PALLIATIVE CARE | Facility: CLINIC | Age: 53
End: 2018-11-27
Attending: INTERNAL MEDICINE
Payer: COMMERCIAL

## 2018-11-27 VITALS
DIASTOLIC BLOOD PRESSURE: 96 MMHG | OXYGEN SATURATION: 98 % | SYSTOLIC BLOOD PRESSURE: 179 MMHG | WEIGHT: 244.44 LBS | BODY MASS INDEX: 39.29 KG/M2 | TEMPERATURE: 96.5 F | RESPIRATION RATE: 16 BRPM | HEART RATE: 68 BPM | HEIGHT: 66 IN

## 2018-11-27 DIAGNOSIS — C7B.09 METASTATIC CARCINOID TUMOR TO INTRA-ABDOMINAL SITE (H): Primary | ICD-10-CM

## 2018-11-27 DIAGNOSIS — G89.3 CANCER ASSOCIATED PAIN: ICD-10-CM

## 2018-11-27 DIAGNOSIS — G89.4 CHRONIC PAIN SYNDROME: ICD-10-CM

## 2018-11-27 PROCEDURE — 99214 OFFICE O/P EST MOD 30 MIN: CPT | Performed by: INTERNAL MEDICINE

## 2018-11-27 PROCEDURE — G0463 HOSPITAL OUTPT CLINIC VISIT: HCPCS | Mod: ZF

## 2018-11-27 ASSESSMENT — PAIN SCALES - GENERAL: PAINLEVEL: EXTREME PAIN (8)

## 2018-11-27 NOTE — NURSING NOTE
"Oncology Rooming Note    November 27, 2018 9:58 AM   Ines Mott is a 53 year old female who presents for:    Chief Complaint   Patient presents with     Oncology Clinic Visit     Return: Palliative Care Pain Control     Initial Vitals: BP (!) 179/96  Pulse 68  Temp 96.5  F (35.8  C) (Tympanic)  Resp 16  Ht 1.676 m (5' 6\")  Wt 110.9 kg (244 lb 7 oz)  SpO2 98%  Breastfeeding? No  BMI 39.45 kg/m2 Estimated body mass index is 39.45 kg/(m^2) as calculated from the following:    Height as of this encounter: 1.676 m (5' 6\").    Weight as of this encounter: 110.9 kg (244 lb 7 oz). Body surface area is 2.27 meters squared.  Extreme Pain (8) Comment: left side   No LMP recorded. Patient has had a hysterectomy.  Allergies reviewed: Yes  Medications reviewed: Yes    Medications: Medication refills not needed today.  Pharmacy name entered into [facility]: [id number] - [address]    Clinical concerns: no new concerns today Dr. Mae was notified.    10 minutes for nursing intake (face to face time)     Adrianna Lopez CMA              "

## 2018-11-27 NOTE — MR AVS SNAPSHOT
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)    [address] [address] [phone]           How do I prepare for my exam? (Food and drink instructions) Drink 32 ounces of water to ensure adequate hydration prior to administration of Ga 68 dotatate.  What should I wear? Wear loose fitting clothing to your exam.  How long does the exam take?  Most scans will take between 2-3 hours.  What should I bring: Please bring a list of your medicines (including vitamins, minerals and over-the-counter drugs). Leave your valuables at home.  Do I need a :  No  is needed.  What should I do after the exam: Drink and void frequently during the first hours following administration to reduce radiation exposure.  What do I need to tell my doctor: Tell your doctor: * If there is any chance you may be pregnant or if you are breastfeeding. * If you have problems lying in small spaces (claustrophobia). If you do, your doctor may give you medicine to help you relax.  What is this test: Your doctor has ordered a PET scan (positron emission tomography). This will take pictures of the cells and organs in your body. Your doctor may have also ordered a CT scan (computed tomography). This is another way to take pictures of your cells and organs. It is done at the same time as  the PET scan. The pictures will help us understand your problem so we can make a treatment plan that fits your needs.  Who should I call with questions: Please call your Imaging Department at your exam site with any questions. Directions, parking instructions, and other information is available on our website, [email or web address].            Dec 06, 2018  6:00 PM CST   (Arrive by 5:45 PM)   Return Visit with Samy Salmeron MD   [facility] ([facility])    [address]  [address]  [facility] [id number] [phone]            Dec 17, 2018  9:00 AM CST   Return Visit with MICHEAL Crowley CNP   [facility] ([facility])    [address]  [address]  [facility] [address]   [phone]            Dec 17, 2018  9:00 AM CST   Return Visit with MARTIN Pineda   [facility] ([facility])    [address]  [address]  [facility] [address]   [phone]            Dec 17, 2018 10:00 AM CST   Return Visit with KERVIN Dean   [facility] ([facility])    [address]  [address]  [facility] [address]   [phone]            Dec 17, 2018 11:30 AM CST   Return Visit with Giulia Mckeon MD   [facility] ([facility])    [address]  [address]  [facility] [address] [phone]              Who to contact     If you have questions or need follow up information about today's clinic visit or your schedule please contact [facility] directly at [phone].  Normal or non-critical lab and imaging results will be communicated to you by MyChart, letter or phone within 4 business days after the clinic has received the results. If you do not hear from us within 7  "days, please contact the clinic through ePatientFinder or phone. If you have a critical or abnormal lab result, we will notify you by phone as soon as possible.  Submit refill requests through ePatientFinder or call your pharmacy and they will forward the refill request to us. Please allow 3 business days for your refill to be completed.          Additional Information About Your Visit        Care EveryWhere ID     This is your Care EveryWhere ID. This could be used by other organizations to access your [facility] medical records  [id number]        Your Vitals Were     Pulse Temperature Respirations Height Pulse Oximetry Breastfeeding?    68 96.5  F (35.8  C) (Tympanic) 16 1.676 m (5' 6\") 98% No    BMI (Body Mass Index)                   39.45 kg/m2            Blood Pressure from Last 3 Encounters:   11/27/18 (!) 179/96   11/16/18 (!) 150/102   10/29/18 122/86    Weight from Last 3 Encounters:   11/27/18 110.9 kg (244 lb 7 oz)   11/16/18 111.6 kg (246 lb)   10/29/18 108 kg (238 lb)              Today, you had the following     No orders found for display       Primary Care Provider Office Phone # Fax #    MICHEAL Crowley [facility] [phone]       [address] [place] [address]  [facility] [address]        Goals        General    Transportation (pt-stated)     Notes - Note created  7/11/2018  9:56 AM by Elizabeth Aleman BSW    Goal Statement: I am in need of transportation resources  Measure of Success: Patient will utilize transportation benefits  Supportive Steps to Achieve: SW will assist patient in obtaining transportation to medical appointments.   Date to Achieve By: Ongoing         Lifestyle    Patient achieves sleep pattern objective     Notes - Note created  9/25/2018 10:06 AM by Martha Sims RN    #1-Goal Statement: patient will obtain and use CPAP machine nightly  Measure of Success: improved sleep  Supportive Steps to Achieve: RN CC obtained DME order from PCP  Barriers: poor follow up; " current machine broken  Strengths: assistance from PCP/RN CC  Date to Achieve By: 11-1-18  Patient expressed understanding of goal: good            Equal Access to Services     VALENTE RIVERA : Marc Leonard, deion fernandesguillaumeha, matildearuna ferreiradelonkandy hernandez, emilee talbot chilozhao llanosamandeep jonoshiraeric coyne. So [facility] [phone].    ATENCIÓN: Si habla español, tiene a christianson disposición servicios gratuitos de asistencia lingüística. Llame al [phone].    We comply with applicable federal civil rights laws and [place] laws. We do not discriminate on the basis of race, color, national origin, age, disability, sex, sexual orientation, or gender identity.            Thank you!     Thank you for choosing [facility]  for your care. Our goal is always to provide you with excellent care. Hearing back from our patients is one way we can continue to improve our services. Please take a few minutes to complete the written survey that you may receive in the mail after your visit with us. Thank you!             Your Updated Medication List - Protect others around you: Learn how to safely use, store and throw away your medicines at www.disposemymeds.org.          This list is accurate as of 11/27/18 11:59 PM.  Always use your most recent med list.                   Brand Name Dispense Instructions for use Diagnosis    albuterol (2.5 MG/3ML) 0.083% neb solution    PROVENTIL    30 vial    Take 1 vial (2.5 mg) by nebulization every 6 hours as needed for shortness of breath / dyspnea or wheezing    Mild intermittent asthma with acute exacerbation       amLODIPine 5 MG tablet    NORVASC     Take 5 mg by mouth        ASPIRIN NOT PRESCRIBED    INTENTIONAL    1 each    Please choose reason not prescribed, below    Aspirin intolerance       baclofen 10 MG tablet    LIORESAL    60 tablet    Take 1 tablet (10 mg) by mouth 2 times daily    Chronic pain syndrome       blood glucose calibration solution    NO BRAND  SPECIFIED    1 each    Use to calibrate blood glucose monitor as directed.    Type 2 diabetes mellitus without complication, without long-term current use of insulin (H)       blood glucose lancets standard    NO BRAND SPECIFIED    100 each    Use to test blood sugar daily times daily or as directed.    Type 2 diabetes mellitus without complication, without long-term current use of insulin (H)       blood glucose monitoring meter device kit    NO BRAND SPECIFIED    1 kit    Use to test blood sugar larisa times daily or as directed.    Type 2 diabetes mellitus without complication, without long-term current use of insulin (H)       blood glucose monitoring test strip    NO BRAND SPECIFIED    100 strip    Use to test blood sugars once times daily or as directed    Type 2 diabetes mellitus without complication, without long-term current use of insulin (H)       cloNIDine 0.1 MG tablet    CATAPRES    60 tablet    Take 1 tablet (0.1 mg) by mouth 2 times daily    Hypertension goal BP (blood pressure) < 140/80, Opioid withdrawal (H)       [facility] [employer] HEALTH Caps     60 capsule    Take 1 capsule by mouth 2 times daily    Diarrhea, unspecified type       EPINEPHrine 0.3 MG/0.3ML injection 2-pack    EPIPEN/ADRENACLICK/or ANY BX GENERIC EQUIV    0.3 mL    Inject 0.3 mLs (0.3 mg) into the muscle once as needed for anaphylaxis    Anaphylactic reaction       losartan-hydrochlorothiazide 100-25 MG tablet    HYZAAR    90 tablet    Take 1 tablet by mouth daily    Hypertension goal BP (blood pressure) < 140/80       magnesium oxide 400 (241.3 Mg) MG tablet    MAG-OX    90 tablet    Take 1 tablet (400 mg) by mouth daily    Cramp of limb       naloxone 4 MG/0.1ML nasal spray    NARCAN    0.2 mL    Spray 1 spray (4 mg) into one nostril alternating nostrils as needed for opioid reversal every 2-3 minutes until assistance arrives    Chronic pain syndrome       ondansetron 4 MG ODT tab    ZOFRAN-ODT    10 tablet    Take 1 tablet (4  mg) by mouth every 8 hours as needed for nausea        order for DME     1 Device    Right wrist splint for carpal tunnel syndrome  One* Use as directed at hour of sleep    Carpal tunnel syndrome of right wrist       order for DME     1 each    Equipment being ordered: CPAP with supplies    ALEXUS on CPAP       polyethylene glycol powder    MIRALAX/GLYCOLAX    1581 g    TAKE 17 GRAMS (1 CAPFUL) BY MOUTH DAILY    Slow transit constipation       potassium chloride ER 10 MEQ CR tablet    K-TAB/KLOR-CON    120 tablet    Take 1 tablet (10 mEq) by mouth daily    Hypokalemia       sertraline 100 MG tablet    ZOLOFT    30 tablet    Take 1 tablet (100 mg) by mouth daily    Recurrent major depression in partial remission (H)       STATIN NOT PRESCRIBED    INTENTIONAL    0 each    1 each daily Statin not prescribed intentionally due to Active liver disease (liver failure, cirrhosis, hepatitis) LIVER METS    Hyperlipidemia LDL goal <100, Type 2 diabetes mellitus without complication (H)       vitamin B-12 1000 MCG/ML injection    CYANOCOBALAMIN    1 mL    Inject 1 mL (1,000 mcg) Subcutaneous every 30 days    B12 deficiency       Vitamin D (Cholecalciferol) 1000 units Tabs     60 tablet    Take 2,000 Units by mouth daily    Vitamin D deficiency

## 2018-11-27 NOTE — LETTER
11/27/2018       RE: Ines Mott  [id number]  [address]  [facility] [address]     Dear Colleague,    Thank you for referring your patient, Ines Mott, to the [facility] at [facility]. Please see a copy of my visit note below.    Palliative Care Outpatient Clinic Progress Note    Patient Name:  Ines Mott  Primary Provider:  Marika Santana    Chief Complaint:   Metastatic neuroendocrine tumor  Renal cancer s/p cryoablation   Pain - right sided abdominal pain  Chronic total body pain  Chronic back and neck pain with hx of spina bifida s/p surgical correction  Hx of opioid misuse  Hx of etoh abuse    Summary: Ines is a 53 yr old female with well differentiated neuroendocrine tumor. She had embolization of liver mets in 2017 with increased right quadrant pain after embolization. Presented initially with abdominal pain which lead to initial dx. Since diagnosis about 5 years ago, her cancer has been treated with octreotide but not consistently. Most recent scans from 8/2018 showed that tumor overall stable without any consistent treatment and the bulk of the cancer is in her liver. Cancer is behaving very indolently as scans stable for many years.     Very specific PET scan ordered and if new scans are negative for more growth would indicate very slow growing cancer and her prognosis is likely years.       Last Palliative care appointment: 10/11/18     Reviewed: yes    Interim History:  Ines Mott 53 year old presents for follow up on pain management. Ines states that she continues to have severe right quadrant, cancer pain and that the pain is so terrible that some days she can not function. She states that only thing that controls the pain is oxycodone and that she never abused the oxycodone as pain clinic states she did. She tells me that they did not give her enough medications to last the allotted time. She wonders  what is in her chart and why no one will treat her pain and believes that there are things in her chart that are not true and this makes her angry. She has gone to a new primary care doc because she did not like the other primary care doc. She states that she has not used alcohol in a long time and no longer has an alcohol problem - has her alcohol under control and she never abused oxycodone and she therefore does not need a detox program like pain clinic recommended and she is never going back to pain clinic.     She state that she is prepared for her  to go back to [place] and even though she is scared he will not be able to return to the country, she understands that he has to go to see his mom. She feels she will be fine while he is gone as she will stay with her mother.     She states that her mom will confirm everything she has been saying and her mom sees how much pain she is in and also knows that she needs pain medication.     I tried to discuss ways to treat the cancer pain (embolization, non opioid, etc) but Ines did not want to hear anything I had to say. Once she hear me say that I did not believe opioids were the best way to treat her cancer pain, she became angry and did not want to listen to anything I was saying.       Review of Systems:  ROS: 10 point ROS neg other than the symptoms noted above in the HPI        Allergies   Allergen Reactions     Amoxicillin Hives and Anaphylaxis     Takes penicillin without reaction     Ativan [Lorazepam] Anaphylaxis     Buspirone Anaphylaxis     LW Reaction: HIVES  Buspar     Ciprofloxacin Itching     Other reaction(s): Bronchospasm     Macrobid [Nitrofurantoin] Anaphylaxis     Busulfan Hives     Edema     Cefaclor Hives     PN: LW Reaction: HIVES     Cefdinir Other (See Comments)     Lip itching and scratchy throat     Cephalosporins      Clindamycin Itching     Dilaudid [Hydromorphone] Hives and Itching     PN: LW Reaction: HIVES  took percocet  11/20/07 ED visit.  pt tolerated morphine IV in the past per ED MD order on 1/18/08.     Diphenhydramine Hives     Edema  not to take 2nd to heart palpitations  took vistaril IM 12/29/07 in ED     Diphenhydramine Hcl      Benadryl     Imitrex [Sumatriptan Succinate]      blood pressure raised uncontrobably     Ketorolac Tromethamine      Toradol     Lansoprazole      Prevacid     Lansoprazole      Prevacid     Latex Other (See Comments)     PN: Converted from LW Latex Sensitivity Flag  Spinal Bifida  Converted from LW Latex Sensitivity Flag     Metoclopramide Hives     PN: LW Reaction: HIVES  Reglan     Metoclopramide Itching     Moxifloxacin      Other reaction(s): Hives     No Clinical Screening - See Comments Swelling and Hives     Nsaids Hives     Other reaction(s): Hepatic Dysfunction  Cannot take due to liver cancer      Paroxetine      Paxil     Paroxetine      Paxil     Prednisone Hives     Quinolones      Sumatriptan      PN: LW Reaction: HIVES     Blue Dyes Rash     Ketorolac Tromethamine Rash     PN: LW Reaction: HIVES     Lidoderm [Lidocaine] Rash     Localized rash at patch site     Penicillins Rash     PN: LW Reaction: HIVES     Red Dye Rash     Sulfa Drugs Rash     Other reaction(s): Respiratory Distress  THICKENED AND DIFFICULTY SWALLOWING      Vaccinium Angustifolium Rash     Current Outpatient Prescriptions   Medication Sig Dispense Refill     albuterol (2.5 MG/3ML) 0.083% neb solution Take 1 vial (2.5 mg) by nebulization every 6 hours as needed for shortness of breath / dyspnea or wheezing 30 vial 3     amLODIPine (NORVASC) 5 MG tablet Take 5 mg by mouth       blood glucose (NO BRAND SPECIFIED) lancets standard Use to test blood sugar daily times daily or as directed. 100 each 11     blood glucose calibration (NO BRAND SPECIFIED) solution Use to calibrate blood glucose monitor as directed. 1 each 0     blood glucose monitoring (NO BRAND SPECIFIED) meter device kit Use to test blood sugar larisa  times daily or as directed. 1 kit 0     blood glucose monitoring (NO BRAND SPECIFIED) test strip Use to test blood sugars once times daily or as directed 100 strip 3     cyanocobalamin (VITAMIN B12) 1000 MCG/ML injection Inject 1 mL (1,000 mcg) Subcutaneous every 30 days 1 mL 11     EPINEPHrine (EPIPEN/ADRENACLICK/OR ANY BX GENERIC EQUIV) 0.3 MG/0.3ML injection 2-pack Inject 0.3 mLs (0.3 mg) into the muscle once as needed for anaphylaxis 0.3 mL 1     losartan-hydrochlorothiazide (HYZAAR) 100-25 MG per tablet Take 1 tablet by mouth daily 90 tablet 3     magnesium oxide (MAG-OX) 400 (241.3 Mg) MG tablet Take 1 tablet (400 mg) by mouth daily 90 tablet 3     ondansetron (ZOFRAN-ODT) 4 MG ODT tab Take 1 tablet (4 mg) by mouth every 8 hours as needed for nausea 10 tablet 1     order for DME Equipment being ordered: CPAP with supplies 1 each 0     polyethylene glycol (MIRALAX/GLYCOLAX) powder TAKE 17 GRAMS (1 CAPFUL) BY MOUTH DAILY 1581 g 11     potassium chloride (K-TAB,KLOR-CON) 10 MEQ tablet Take 1 tablet (10 mEq) by mouth daily 120 tablet 11     sertraline (ZOLOFT) 100 MG tablet Take 1 tablet (100 mg) by mouth daily 30 tablet 3     Vitamin D, Cholecalciferol, 1000 UNITS TABS Take 2,000 Units by mouth daily 60 tablet 11     ASPIRIN NOT PRESCRIBED (INTENTIONAL) Please choose reason not prescribed, below (Patient not taking: Reported on 11/27/2018) 1 each 0     baclofen (LIORESAL) 10 MG tablet Take 1 tablet (10 mg) by mouth 2 times daily (Patient not taking: Reported on 11/27/2018) 60 tablet 3     cloNIDine (CATAPRES) 0.1 MG tablet Take 1 tablet (0.1 mg) by mouth 2 times daily (Patient not taking: Reported on 11/27/2018) 60 tablet 3     Lactobacillus-Inulin ([facility] DIGESTIVE [facility]) CAPS Take 1 capsule by mouth 2 times daily (Patient not taking: Reported on 11/27/2018) 60 capsule 1     naloxone (NARCAN) nasal spray Spray 1 spray (4 mg) into one nostril alternating nostrils as needed for opioid reversal every 2-3  "minutes until assistance arrives (Patient not taking: Reported on 11/27/2018) 0.2 mL 0     order for DME Right wrist splint for carpal tunnel syndrome   One*  Use as directed at hour of sleep (Patient not taking: Reported on 11/27/2018) 1 Device 1     STATIN NOT PRESCRIBED, INTENTIONAL, 1 each daily Statin not prescribed intentionally due to Active liver disease (liver failure, cirrhosis, hepatitis)  LIVER METS (Patient not taking: Reported on 11/27/2018) 0 each 0     Past Medical History:   Diagnosis Date     Arthritis      Asthma      Cancer (H)      Chronic pain syndrome     has ED care plan     Depressive disorder      Diabetes (H)      Hydrocephalus     s/p  shunt     Hypertension      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)     mets to liver     Methamphetamine abuse (H)      Migraines      Mild intermittent asthma 9/22/2015     Obesity      Right renal mass      Spina bifida      Past Surgical History:   Procedure Laterality Date     APPENDECTOMY       CHOLECYSTECTOMY       EXTRACTION(S) DENTAL       HYSTERECTOMY, PAP NO LONGER INDICATED       IMPLANT SHUNT VENTRICULOPERITONEAL             BP (!) 179/96  Pulse 68  Temp 96.5  F (35.8  C) (Tympanic)  Resp 16  Ht 1.676 m (5' 6\")  Wt 110.9 kg (244 lb 7 oz)  SpO2 98%  Breastfeeding? No  BMI 39.45 kg/m2  General: alert, appears stated age, slightly disheveled, comfortable, in NAD  Eyes: EOMI, sclera clear  HEENT: NC/AT; mucous membranes moist  Lungs: no increased work of breathing, speaking full sentences   Neuro: A&O x 3; CN II-XII grossly intact; gait normal  Neuropsych: alert, good eye contact, pleasant initially but angry as encounter went on      Key Data Reviewed:  Reviewed scans and labs    Weak indication = all others, including chronic non-cancer pain (eg cLBP, fibromyalgia, chronic headaches) in patients who also have active cancer. With rare exceptions we should not prescribe patients opioids for those indications, outside of " helping patients to taper off. For patients with short prognoses who are chronically on opioids, however, the burdens to the patient of tapering off is typically not worth the benefit due to lack of time for rehabilitation, etc.    Prognosis >1 year  Risk: High (ORT >7)  Indication for Opioid Use: Weak  Avoid Opioids      Impression:     53 yr old female with metastatic neuroendocrine tumor with bulk of mets in liver, renal cancer s/p cryoablation, fibromyalgia, chronic back, neck pain with hx of spina bifida, and complaints of right sided abdominal / liver pain for past year + which likely corresponds to liver mets. She is scheduled for specific PET scan tomorrow to help determine further oncology treatment for the cancer.      Recommendations & Counseling:      Ines still needs further workup of her cancer and while her right quadrant abdominal pain is likely due to cancer, embolization, non opioid treatments and a multidisciplinary approach to managing her pain are definitely the best options for pain management for her. Her prognosis with slow growing cancer is years and because of that, combined with her hx of depression, etoh and opioid abuse history, the best plan would be for a detox program and then followed by multimodality pain management program that avoided use of opioids but focused on all other options to control her liver (cancer) pain as well as her chronic pain.  Embolization of her liver would be included in multimodality approach to treating her pain as bulk of her cancer is in liver which corresponds to her abdominal pain.     Currently Ines is refusing to see pain management, she refuses a multimodality approach to treating her pain, she is refusing detox programs and stated that she never abused opioids and that her etoh abuse is in the past and no longer a problem.  She was very angry with me for not prescribing opioids for her as she feels that opioids are the only thing that gives her  pain relief and the only way to manage her pain. She left the visit very angry, saying she wasn't going to follow up with anyone in this system since no one wants to help her and no one wants to treat her pain.     I discussed care with Dr. Mckeon, addiction medicine physician. According to Dr. Mckeon, Ines is not an opioid candidate because she refuses treatment and refuses to recognize that she has an drug addiction.  While trying to get her to follow up with oncology in September and October, she repeatedly abused the opioids that were prescribed for her and then bought on opioids on the street once they were no longer prescribed.  She refused multidisciplinary help in pain clinic and only wanted opioids. When opioid treatment through either a program or suboxone was recommended, she refused both and denied drug abuse problem.       Again, thank you for allowing me to participate in the care of your patient.      Sincerely,    Quyen Mae MD

## 2018-11-27 NOTE — PROGRESS NOTES
Palliative Care Outpatient Clinic Progress Note    Patient Name:  Ines Mott  Primary Provider:  Marika Santana    Chief Complaint:   Metastatic neuroendocrine tumor  Renal cancer s/p cryoablation   Pain - right sided abdominal pain  Chronic total body pain  Chronic back and neck pain with hx of spina bifida s/p surgical correction  Hx of opioid misuse  Hx of etoh abuse    Summary: Ines is a 53 yr old female with well differentiated neuroendocrine tumor. She had embolization of liver mets in 2017 with increased right quadrant pain after embolization. Presented initially with abdominal pain which lead to initial dx. Since diagnosis about 5 years ago, her cancer has been treated with octreotide but not consistently. Most recent scans from 8/2018 showed that tumor overall stable without any consistent treatment and the bulk of the cancer is in her liver. Cancer is behaving very indolently as scans stable for many years.     Very specific PET scan ordered and if new scans are negative for more growth would indicate very slow growing cancer and her prognosis is likely years.       Last Palliative care appointment: 10/11/18     Reviewed: yes    Interim History:  Ines Mott 53 year old presents for follow up on pain management. Ines states that she continues to have severe right quadrant, cancer pain and that the pain is so terrible that some days she can not function. She states that only thing that controls the pain is oxycodone and that she never abused the oxycodone as pain clinic states she did. She tells me that they did not give her enough medications to last the allotted time. She wonders what is in her chart and why no one will treat her pain and believes that there are things in her chart that are not true and this makes her angry. She has gone to a new primary care doc because she did not like the other primary care doc. She states that she has not used alcohol in a long time and  no longer has an alcohol problem - has her alcohol under control and she never abused oxycodone and she therefore does not need a detox program like pain clinic recommended and she is never going back to pain clinic.     She state that she is prepared for her  to go back to [place] and even though she is scared he will not be able to return to the country, she understands that he has to go to see his mom. She feels she will be fine while he is gone as she will stay with her mother.     She states that her mom will confirm everything she has been saying and her mom sees how much pain she is in and also knows that she needs pain medication.     I tried to discuss ways to treat the cancer pain (embolization, non opioid, etc) but Ines did not want to hear anything I had to say. Once she hear me say that I did not believe opioids were the best way to treat her cancer pain, she became angry and did not want to listen to anything I was saying.       Review of Systems:  ROS: 10 point ROS neg other than the symptoms noted above in the HPI        Allergies   Allergen Reactions     Amoxicillin Hives and Anaphylaxis     Takes penicillin without reaction     Ativan [Lorazepam] Anaphylaxis     Buspirone Anaphylaxis     LW Reaction: HIVES  Buspar     Ciprofloxacin Itching     Other reaction(s): Bronchospasm     Macrobid [Nitrofurantoin] Anaphylaxis     Busulfan Hives     Edema     Cefaclor Hives     PN: LW Reaction: HIVES     Cefdinir Other (See Comments)     Lip itching and scratchy throat     Cephalosporins      Clindamycin Itching     Dilaudid [Hydromorphone] Hives and Itching     PN: LW Reaction: HIVES  took percocet 11/20/07 ED visit.  pt tolerated morphine IV in the past per ED MD order on 1/18/08.     Diphenhydramine Hives     Edema  not to take 2nd to heart palpitations  took vistaril IM 12/29/07 in ED     Diphenhydramine Hcl      Benadryl     Imitrex [Sumatriptan Succinate]      blood pressure raised uncontrobably      Ketorolac Tromethamine      Toradol     Lansoprazole      Prevacid     Lansoprazole      Prevacid     Latex Other (See Comments)     PN: Converted from LW Latex Sensitivity Flag  Spinal Bifida  Converted from LW Latex Sensitivity Flag     Metoclopramide Hives     PN: LW Reaction: HIVES  Reglan     Metoclopramide Itching     Moxifloxacin      Other reaction(s): Hives     No Clinical Screening - See Comments Swelling and Hives     Nsaids Hives     Other reaction(s): Hepatic Dysfunction  Cannot take due to liver cancer      Paroxetine      Paxil     Paroxetine      Paxil     Prednisone Hives     Quinolones      Sumatriptan      PN: LW Reaction: HIVES     Blue Dyes Rash     Ketorolac Tromethamine Rash     PN: LW Reaction: HIVES     Lidoderm [Lidocaine] Rash     Localized rash at patch site     Penicillins Rash     PN: LW Reaction: HIVES     Red Dye Rash     Sulfa Drugs Rash     Other reaction(s): Respiratory Distress  THICKENED AND DIFFICULTY SWALLOWING      Vaccinium Angustifolium Rash     Current Outpatient Prescriptions   Medication Sig Dispense Refill     albuterol (2.5 MG/3ML) 0.083% neb solution Take 1 vial (2.5 mg) by nebulization every 6 hours as needed for shortness of breath / dyspnea or wheezing 30 vial 3     amLODIPine (NORVASC) 5 MG tablet Take 5 mg by mouth       blood glucose (NO BRAND SPECIFIED) lancets standard Use to test blood sugar daily times daily or as directed. 100 each 11     blood glucose calibration (NO BRAND SPECIFIED) solution Use to calibrate blood glucose monitor as directed. 1 each 0     blood glucose monitoring (NO BRAND SPECIFIED) meter device kit Use to test blood sugar larisa times daily or as directed. 1 kit 0     blood glucose monitoring (NO BRAND SPECIFIED) test strip Use to test blood sugars once times daily or as directed 100 strip 3     cyanocobalamin (VITAMIN B12) 1000 MCG/ML injection Inject 1 mL (1,000 mcg) Subcutaneous every 30 days 1 mL 11     EPINEPHrine  (EPIPEN/ADRENACLICK/OR ANY BX GENERIC EQUIV) 0.3 MG/0.3ML injection 2-pack Inject 0.3 mLs (0.3 mg) into the muscle once as needed for anaphylaxis 0.3 mL 1     losartan-hydrochlorothiazide (HYZAAR) 100-25 MG per tablet Take 1 tablet by mouth daily 90 tablet 3     magnesium oxide (MAG-OX) 400 (241.3 Mg) MG tablet Take 1 tablet (400 mg) by mouth daily 90 tablet 3     ondansetron (ZOFRAN-ODT) 4 MG ODT tab Take 1 tablet (4 mg) by mouth every 8 hours as needed for nausea 10 tablet 1     order for DME Equipment being ordered: CPAP with supplies 1 each 0     polyethylene glycol (MIRALAX/GLYCOLAX) powder TAKE 17 GRAMS (1 CAPFUL) BY MOUTH DAILY 1581 g 11     potassium chloride (K-TAB,KLOR-CON) 10 MEQ tablet Take 1 tablet (10 mEq) by mouth daily 120 tablet 11     sertraline (ZOLOFT) 100 MG tablet Take 1 tablet (100 mg) by mouth daily 30 tablet 3     Vitamin D, Cholecalciferol, 1000 UNITS TABS Take 2,000 Units by mouth daily 60 tablet 11     ASPIRIN NOT PRESCRIBED (INTENTIONAL) Please choose reason not prescribed, below (Patient not taking: Reported on 11/27/2018) 1 each 0     baclofen (LIORESAL) 10 MG tablet Take 1 tablet (10 mg) by mouth 2 times daily (Patient not taking: Reported on 11/27/2018) 60 tablet 3     cloNIDine (CATAPRES) 0.1 MG tablet Take 1 tablet (0.1 mg) by mouth 2 times daily (Patient not taking: Reported on 11/27/2018) 60 tablet 3     Lactobacillus-Inulin ([facility]) CAPS Take 1 capsule by mouth 2 times daily (Patient not taking: Reported on 11/27/2018) 60 capsule 1     naloxone (NARCAN) nasal spray Spray 1 spray (4 mg) into one nostril alternating nostrils as needed for opioid reversal every 2-3 minutes until assistance arrives (Patient not taking: Reported on 11/27/2018) 0.2 mL 0     order for DME Right wrist splint for carpal tunnel syndrome   One*  Use as directed at hour of sleep (Patient not taking: Reported on 11/27/2018) 1 Device 1     STATIN NOT PRESCRIBED, INTENTIONAL, 1 each daily  "Statin not prescribed intentionally due to Active liver disease (liver failure, cirrhosis, hepatitis)  LIVER METS (Patient not taking: Reported on 11/27/2018) 0 each 0     Past Medical History:   Diagnosis Date     Arthritis      Asthma      Cancer (H)      Chronic pain syndrome     has ED care plan     Depressive disorder      Diabetes (H)      Hydrocephalus     s/p  shunt     Hypertension      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)     mets to liver     Methamphetamine abuse (H)      Migraines      Mild intermittent asthma 9/22/2015     Obesity      Right renal mass      Spina bifida      Past Surgical History:   Procedure Laterality Date     APPENDECTOMY       CHOLECYSTECTOMY       EXTRACTION(S) DENTAL       HYSTERECTOMY, PAP NO LONGER INDICATED       IMPLANT SHUNT VENTRICULOPERITONEAL             BP (!) 179/96  Pulse 68  Temp 96.5  F (35.8  C) (Tympanic)  Resp 16  Ht 1.676 m (5' 6\")  Wt 110.9 kg (244 lb 7 oz)  SpO2 98%  Breastfeeding? No  BMI 39.45 kg/m2  General: alert, appears stated age, slightly disheveled, comfortable, in NAD  Eyes: EOMI, sclera clear  HEENT: NC/AT; mucous membranes moist  Lungs: no increased work of breathing, speaking full sentences   Neuro: A&O x 3; CN II-XII grossly intact; gait normal  Neuropsych: alert, good eye contact, pleasant initially but angry as encounter went on      Key Data Reviewed:  Reviewed scans and labs    Weak indication = all others, including chronic non-cancer pain (eg cLBP, fibromyalgia, chronic headaches) in patients who also have active cancer. With rare exceptions we should not prescribe patients opioids for those indications, outside of helping patients to taper off. For patients with short prognoses who are chronically on opioids, however, the burdens to the patient of tapering off is typically not worth the benefit due to lack of time for rehabilitation, etc.    Prognosis >1 year  Risk: High (ORT >7)  Indication for Opioid Use: " Weak  Avoid Opioids      Impression:     53 yr old female with metastatic neuroendocrine tumor with bulk of mets in liver, renal cancer s/p cryoablation, fibromyalgia, chronic back, neck pain with hx of spina bifida, and complaints of right sided abdominal / liver pain for past year + which likely corresponds to liver mets. She is scheduled for specific PET scan tomorrow to help determine further oncology treatment for the cancer.      Recommendations & Counseling:      Ines still needs further workup of her cancer and while her right quadrant abdominal pain is likely due to cancer, embolization, non opioid treatments and a multidisciplinary approach to managing her pain are definitely the best options for pain management for her. Her prognosis with slow growing cancer is years and because of that, combined with her hx of depression, etoh and opioid abuse history, the best plan would be for a detox program and then followed by multimodality pain management program that avoided use of opioids but focused on all other options to control her liver (cancer) pain as well as her chronic pain.  Embolization of her liver would be included in multimodality approach to treating her pain as bulk of her cancer is in liver which corresponds to her abdominal pain.     Currently Ines is refusing to see pain management, she refuses a multimodality approach to treating her pain, she is refusing detox programs and stated that she never abused opioids and that her etoh abuse is in the past and no longer a problem.  She was very angry with me for not prescribing opioids for her as she feels that opioids are the only thing that gives her pain relief and the only way to manage her pain. She left the visit very angry, saying she wasn't going to follow up with anyone in this system since no one wants to help her and no one wants to treat her pain.     I discussed care with Dr. Mckeon, addiction medicine physician. According to   Iens Mckeon is not an opioid candidate because she refuses treatment and refuses to recognize that she has an drug addiction.  While trying to get her to follow up with oncology in September and October, she repeatedly abused the opioids that were prescribed for her and then bought on opioids on the street once they were no longer prescribed.  She refused multidisciplinary help in pain clinic and only wanted opioids. When opioid treatment through either a program or suboxone was recommended, she refused both and denied drug abuse problem.     Thank you for involving us in the patient's care. 30 minutes face time over half spent counseling.  Quyen Mae MD / Palliative Medicine / Pager [phone] / After-Hours Answering Service [phone] / Main Palliative Clinic - [phone] / [facility] Inpatient Team Consult Pager [phone] (answered 8am-430pm M-F)

## 2018-11-28 ENCOUNTER — DOCUMENTATION ONLY (OUTPATIENT)
Dept: SLEEP MEDICINE | Facility: CLINIC | Age: 53
End: 2018-11-28

## 2018-11-28 ENCOUNTER — DOCUMENTATION ONLY (OUTPATIENT)
Dept: NUTRITION | Facility: CLINIC | Age: 53
End: 2018-11-28

## 2018-11-28 DIAGNOSIS — G47.33 OSA ON CPAP: ICD-10-CM

## 2018-11-28 NOTE — PROGRESS NOTES
Nutrition Services:     Patient checked in for Nutrition appointment today and did no realize she was seeing a 'dietitian'.  She thought her appointment was to see Dr. Salmeron, Oncologist.      She kindly refused to see dietitian today indicating that her 'nutrition, diet and eating is good'.  She acknowledged her weight loss and reports that it is intentional.   RD encouraged pt to seek MNT services in future if she and/or MD feel as though this is a concern.      Melissa Soriano RD, LD  [facility]  [phone]  Pager: [phone]

## 2018-11-28 NOTE — PROGRESS NOTES
30 DAY STM VISIT    Diagnostic AHI: 69 PSG    Subjective measures:   Pt states that things are going well when she is using it.  She's had a lot going on right now and so she doesn't always use it.  When she uses it, she does feel benefit from therapy.    Assessment: Pt not meeting objective benchmarks for compliance  Patient meeting subjective benchmarks.   Action plan: 2 week STM recheck appt scheduled  Patient has not scheduled a follow up visit with Dr. Mujica.   Device type: Auto-CPAP  PAP settings: CPAP min 8.0 cm  H20     CPAP max 12.0 cm  H20    95th% pressure 10.8 cm  H20   Mask type:  Per patient choice (All Options)  Objective measures: 14 day rolling measures      Compliance  7 %      Leak  21.6 lpm  last  upload      AHI 0.2   last  upload      Average number of minutes 83      Objective measure goal  Compliance   Goal >70%  Leak   Goal < 24 lpm  AHI  Goal < 5  Usage  Goal >240

## 2018-12-04 ENCOUNTER — HOSPITAL ENCOUNTER (EMERGENCY)
Facility: CLINIC | Age: 53
Discharge: HOME | End: 2018-12-04
Attending: EMERGENCY MEDICINE | Admitting: EMERGENCY MEDICINE
Payer: COMMERCIAL

## 2018-12-04 ENCOUNTER — PATIENT OUTREACH (OUTPATIENT)
Dept: CARE COORDINATION | Facility: CLINIC | Age: 53
End: 2018-12-04

## 2018-12-04 VITALS
RESPIRATION RATE: 16 BRPM | OXYGEN SATURATION: 96 % | BODY MASS INDEX: 38.57 KG/M2 | SYSTOLIC BLOOD PRESSURE: 178 MMHG | DIASTOLIC BLOOD PRESSURE: 93 MMHG | TEMPERATURE: 98.6 F | WEIGHT: 240 LBS | HEIGHT: 66 IN

## 2018-12-04 DIAGNOSIS — R51.9 CHRONIC NONINTRACTABLE HEADACHE, UNSPECIFIED HEADACHE TYPE: ICD-10-CM

## 2018-12-04 DIAGNOSIS — R45.851 SUICIDAL THOUGHTS: ICD-10-CM

## 2018-12-04 DIAGNOSIS — G89.29 CHRONIC NONINTRACTABLE HEADACHE, UNSPECIFIED HEADACHE TYPE: ICD-10-CM

## 2018-12-04 DIAGNOSIS — N39.0 ACUTE UTI: ICD-10-CM

## 2018-12-04 DIAGNOSIS — F19.10 POLYSUBSTANCE ABUSE (H): ICD-10-CM

## 2018-12-04 LAB
ALBUMIN SERPL-MCNC: 3.6 G/DL (ref 3.4–5)
ALBUMIN UR-MCNC: 30 MG/DL
ALP SERPL-CCNC: 83 U/L (ref 40–150)
ALT SERPL W P-5'-P-CCNC: 22 U/L (ref 0–50)
AMPHETAMINES UR QL SCN: POSITIVE
ANION GAP SERPL CALCULATED.3IONS-SCNC: 8 MMOL/L (ref 3–14)
APAP SERPL-MCNC: <2 MG/L (ref 10–20)
APPEARANCE UR: ABNORMAL
AST SERPL W P-5'-P-CCNC: 12 U/L (ref 0–45)
BACTERIA #/AREA URNS HPF: ABNORMAL /HPF
BARBITURATES UR QL: NEGATIVE
BASOPHILS # BLD AUTO: 0 10E9/L (ref 0–0.2)
BASOPHILS NFR BLD AUTO: 0.2 %
BENZODIAZ UR QL: NEGATIVE
BILIRUB SERPL-MCNC: 1.3 MG/DL (ref 0.2–1.3)
BILIRUB UR QL STRIP: NEGATIVE
BUN SERPL-MCNC: 11 MG/DL (ref 7–30)
CALCIUM SERPL-MCNC: 9.8 MG/DL (ref 8.5–10.1)
CANNABINOIDS UR QL SCN: NEGATIVE
CHLORIDE SERPL-SCNC: 109 MMOL/L (ref 94–109)
CO2 SERPL-SCNC: 27 MMOL/L (ref 20–32)
COCAINE UR QL: POSITIVE
COLOR UR AUTO: YELLOW
CREAT SERPL-MCNC: 0.8 MG/DL (ref 0.52–1.04)
DIFFERENTIAL METHOD BLD: ABNORMAL
DIFFERENTIAL METHOD BLD: NORMAL
EOSINOPHIL # BLD AUTO: 0.1 10E9/L (ref 0–0.7)
EOSINOPHIL NFR BLD AUTO: 1.3 %
ERYTHROCYTE [DISTWIDTH] IN BLOOD BY AUTOMATED COUNT: 14.9 % (ref 10–15)
ERYTHROCYTE [DISTWIDTH] IN BLOOD BY AUTOMATED COUNT: NORMAL % (ref 10–15)
ETHANOL SERPL-MCNC: <0.01 G/DL
GFR SERPL CREATININE-BSD FRML MDRD: 75 ML/MIN/1.7M2
GLUCOSE SERPL-MCNC: 89 MG/DL (ref 70–99)
GLUCOSE UR STRIP-MCNC: NEGATIVE MG/DL
HCT VFR BLD AUTO: 34 % (ref 35–47)
HCT VFR BLD AUTO: NORMAL % (ref 35–47)
HGB BLD-MCNC: 11.1 G/DL (ref 11.7–15.7)
HGB BLD-MCNC: NORMAL G/DL (ref 11.7–15.7)
HGB UR QL STRIP: ABNORMAL
IMM GRANULOCYTES # BLD: 0 10E9/L (ref 0–0.4)
IMM GRANULOCYTES NFR BLD: 0.2 %
INR PPP: 0.94 (ref 0.86–1.14)
INTERPRETATION ECG - MUSE: NORMAL
KETONES UR STRIP-MCNC: NEGATIVE MG/DL
LEUKOCYTE ESTERASE UR QL STRIP: ABNORMAL
LYMPHOCYTES # BLD AUTO: 2.9 10E9/L (ref 0.8–5.3)
LYMPHOCYTES NFR BLD AUTO: 28.2 %
MCH RBC QN AUTO: 28 PG (ref 26.5–33)
MCH RBC QN AUTO: NORMAL PG (ref 26.5–33)
MCHC RBC AUTO-ENTMCNC: 32.6 G/DL (ref 31.5–36.5)
MCHC RBC AUTO-ENTMCNC: NORMAL G/DL (ref 31.5–36.5)
MCV RBC AUTO: 86 FL (ref 78–100)
MCV RBC AUTO: NORMAL FL (ref 78–100)
MONOCYTES # BLD AUTO: 0.7 10E9/L (ref 0–1.3)
MONOCYTES NFR BLD AUTO: 7.2 %
MUCOUS THREADS #/AREA URNS LPF: PRESENT /LPF
NEUTROPHILS # BLD AUTO: 6.4 10E9/L (ref 1.6–8.3)
NEUTROPHILS NFR BLD AUTO: 62.9 %
NITRATE UR QL: POSITIVE
NRBC # BLD AUTO: 0 10*3/UL
NRBC BLD AUTO-RTO: 0 /100
OPIATES UR QL SCN: NEGATIVE
PCP UR QL SCN: NEGATIVE
PH UR STRIP: 5.5 PH (ref 5–7)
PLATELET # BLD AUTO: 275 10E9/L (ref 150–450)
PLATELET # BLD AUTO: NORMAL 10E9/L (ref 150–450)
POTASSIUM SERPL-SCNC: 3.1 MMOL/L (ref 3.4–5.3)
PROT SERPL-MCNC: 7.6 G/DL (ref 6.8–8.8)
RBC # BLD AUTO: 3.96 10E12/L (ref 3.8–5.2)
RBC # BLD AUTO: NORMAL 10E12/L (ref 3.8–5.2)
RBC #/AREA URNS AUTO: 3 /HPF (ref 0–2)
SALICYLATES SERPL-MCNC: <2 MG/DL
SODIUM SERPL-SCNC: 144 MMOL/L (ref 133–144)
SOURCE: ABNORMAL
SP GR UR STRIP: 1.02 (ref 1–1.03)
SQUAMOUS #/AREA URNS AUTO: 16 /HPF (ref 0–1)
TROPONIN I SERPL-MCNC: <0.015 UG/L (ref 0–0.04)
UROBILINOGEN UR STRIP-MCNC: NORMAL MG/DL (ref 0–2)
WBC # BLD AUTO: 10.1 10E9/L (ref 4–11)
WBC # BLD AUTO: NORMAL 10E9/L (ref 4–11)
WBC #/AREA URNS AUTO: 18 /HPF (ref 0–5)

## 2018-12-04 PROCEDURE — 81001 URINALYSIS AUTO W/SCOPE: CPT | Performed by: EMERGENCY MEDICINE

## 2018-12-04 PROCEDURE — 80320 DRUG SCREEN QUANTALCOHOLS: CPT | Performed by: EMERGENCY MEDICINE

## 2018-12-04 PROCEDURE — 25000128 H RX IP 250 OP 636: Performed by: EMERGENCY MEDICINE

## 2018-12-04 PROCEDURE — 80329 ANALGESICS NON-OPIOID 1 OR 2: CPT | Performed by: EMERGENCY MEDICINE

## 2018-12-04 PROCEDURE — 93005 ELECTROCARDIOGRAM TRACING: CPT

## 2018-12-04 PROCEDURE — 96361 HYDRATE IV INFUSION ADD-ON: CPT

## 2018-12-04 PROCEDURE — 96360 HYDRATION IV INFUSION INIT: CPT

## 2018-12-04 PROCEDURE — 99285 EMERGENCY DEPT VISIT HI MDM: CPT | Mod: 25

## 2018-12-04 PROCEDURE — 85025 COMPLETE CBC W/AUTO DIFF WBC: CPT | Performed by: EMERGENCY MEDICINE

## 2018-12-04 PROCEDURE — 85610 PROTHROMBIN TIME: CPT | Performed by: EMERGENCY MEDICINE

## 2018-12-04 PROCEDURE — 80053 COMPREHEN METABOLIC PANEL: CPT | Performed by: EMERGENCY MEDICINE

## 2018-12-04 PROCEDURE — 80307 DRUG TEST PRSMV CHEM ANLYZR: CPT | Performed by: EMERGENCY MEDICINE

## 2018-12-04 PROCEDURE — 84484 ASSAY OF TROPONIN QUANT: CPT | Performed by: EMERGENCY MEDICINE

## 2018-12-04 PROCEDURE — 90791 PSYCH DIAGNOSTIC EVALUATION: CPT

## 2018-12-04 RX ORDER — SODIUM CHLORIDE 9 MG/ML
1000 INJECTION, SOLUTION INTRAVENOUS CONTINUOUS
Status: DISCONTINUED | OUTPATIENT
Start: 2018-12-04 | End: 2018-12-04 | Stop reason: HOSPADM

## 2018-12-04 RX ADMIN — SODIUM CHLORIDE 1000 ML: 9 INJECTION, SOLUTION INTRAVENOUS at 15:37

## 2018-12-04 ASSESSMENT — ACTIVITIES OF DAILY LIVING (ADL): DEPENDENT_IADLS:: TRANSPORTATION

## 2018-12-04 ASSESSMENT — ENCOUNTER SYMPTOMS: ABDOMINAL PAIN: 1

## 2018-12-04 NOTE — ED AVS SNAPSHOT
Emergency Department    [address] [place] [address]    Phone:  [phone]    Fax:  [phone]                                       Ines Mott   MRN: [id number]    Department:   Emergency Department   Date of Visit:  12/4/2018           After Visit Summary Signature Page     I have received my discharge instructions, and my questions have been answered. I have discussed any challenges I see with this plan with the nurse or doctor.    ..........................................................................................................................................  Patient/Patient Representative Signature      ..........................................................................................................................................  Patient Representative Print Name and Relationship to Patient    ..................................................               ................................................  Date                                   Time    ..........................................................................................................................................  Reviewed by Signature/Title    ...................................................              ..............................................  Date                                               Time          22EPIC Rev 08/18

## 2018-12-04 NOTE — ED PROVIDER NOTES
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 The patient reports that she is depressed and suicidal, stating that her  only \" [her] for a green card and is now leaving for [place] next week.\" She states that last night around 2000 she took \"ice\", a form of meth and then took cocaine at 0800 this morning in an attempt to commit suicide. The patient denies any other co-ingestants or smoking. The patient also reports that since last night she has been having intermittent chest pain that radiates into her left arm. She states that this pain comes in 10 minute intervals then resolves. The patient adds that she also has some left upper quadrant abdominal pain which she attributes to her liver cancer. She states that she wants help.  She further indicates that she always has urinary symptoms but will not take antibiotics.  She also has 1 of her typical headaches that she usually has.    Allergies:  Amoxicillin  Ativan [Lorazepam]  Buspirone  Ciprofloxacin  Macrobid  Busulfan  Cefaclor  Cefdinir  Cephalosporins  Clindamycin  Dilaudid  Diphenhydramine  Diphenhydramine HCL  Imitrex  Ketorolac Tromethamine  Lansoprazole  Latex  Metoclopramide  Mocifloxacin  No Clinical Screening  NSAIDs  Paroxetine  Prednisone  Quinolones  Sumatriptan  Blue Dyes  Ketorolac Tromehtamine  Lidoderm  Penicillins  Red Dye  Sulfa Drugs  Vaccinium Angustifolium    Medications:    Albuterol Neb solution  Norvasc  ASPIRIN NOT PRESCRIBED INTENTIONAL  Lioresal  Catapres  Vitamin B12  Epinephrine injection 2-pack  [facility]  Hyzaar  Mag-Ox  Narcan  Zofran-ODT  Miralax/Glycolax  K-Tab, Klor-Con  Zoloft  STATIN NOT PRESCRIBED INTENTIONAL  Vitamin D, Cholecalciferol 1000 " "Units Tabs    Past Medical History:    Arthritis  Asthma  Neuroendocrine carcinoma of liver  Chronic Pain syndrome  Depressive Disorder  Diabetes  Hydrocephalus  Hypertension  Metastatic carcinoid tumor to of colon metastatic to liver  Methamphetamine abuse  Migraines  Obesity  Right renal mass  Spina bifida  Arnold-Chiari malformation  Dyspnea  Drug dependence and abuse  Myalgia  Acne rosacea  ADD  Mild Cognitive Impairment  Nephrolithiasis  Fibrocystic breast disease  Bipolar disorder  Hepatitis C  TMJ Arthralgia  Alcohol abuse  Fibromyalgia  ALEXUS on CPAP  Kidney infection  Intracranial shunt  Seasonal Affective Disorder  Type 2 diabetes  ACS  Opoid use disorder, severe dependence  Pyelonephritis  PTSD    Past Surgical History:    Appendectomy  Cholecystectomy  Dental Extractions  Hysterectomy  Implant  shunt    Family History:    Cancer    Social History:  Smoking Status: Former Smoker  Alcohol Use: Binge Drinking  Patient presents alone.  Marital Status:       Review of Systems   Cardiovascular: Positive for chest pain.   Gastrointestinal: Positive for abdominal pain.   Psychiatric/Behavioral: Positive for self-injury and suicidal ideas.   All other systems reviewed and are negative.    Physical Exam     Patient Vitals for the past 24 hrs:   BP Temp Temp src Heart Rate Resp SpO2 Height Weight   12/04/18 1840 (!) 178/93 - - - - - - -   12/04/18 1838 - - - - - 96 % - -   12/04/18 1715 - - - - - 97 % - -   12/04/18 1700 - - - - - 96 % - -   12/04/18 1630 - - - - - 99 % - -   12/04/18 1600 - - - - - 99 % - -   12/04/18 1530 - - - - 16 97 % - -   12/04/18 1430 - - - - 18 98 % - -   12/04/18 1412 - - - 90 16 98 % - -   12/04/18 1402 (!) 153/96 - - 91 10 97 % - -   12/04/18 1339 (!) 197/112 98.6  F (37  C) Oral 93 16 97 % 1.676 m (5' 6\") 108.9 kg (240 lb)     Physical Exam  Nursing note and vitals reviewed.  Constitutional:  Oriented to person, place, and time. Cooperative.   HENT:   Nose:    Nose normal. "   Mouth/Throat:   Mucous membranes are normal.   Eyes:    Conjunctivae normal and EOM are normal.      Pupils are equal, round, and reactive to light.   Neck:    Trachea normal.   Cardiovascular:  Normal rate, regular rhythm, normal heart sounds and normal pulses. No murmur heard.  Pulmonary/Chest:  Effort normal and breath sounds normal.   Abdominal:   Soft. Normal appearance and bowel sounds are normal.      There is no tenderness.      There is no rebound and no CVA tenderness.   Musculoskeletal:  Extremities atraumatic x 4.   Lymphadenopathy:  No cervical adenopathy.   Neurological:   Alert and oriented to person, place, and time. Normal strength.      No cranial nerve deficit or sensory deficit. GCS eye subscore is 4. GCS verbal subscore is 5. GCS motor subscore is 6.   Skin:    Skin is intact. No rash noted.   Psychiatric:   Endorses ongoing depression and suicidal thoughts.      Emergency Department Course     ECG (15:16:19):  Rate 87 bpm. KY interval 176 ms. QRS duration 96 ms. QT/QTc 386/464 ms. P-R-T axes 46 -27 42.   Normal sinus rhythm.  Minimal voltage criteria for LVH, may be normal variant.  Cannot rule out Inferior infarct, age undetermined.  Abnormal ECG.  No significant change compared to ECG dated 5/3/18.  Interpreted at 1522 by Edson Lizarraga MD.    Laboratory:    CBC:  HGB 11.1 (L), HCT 34.0 (L), o/w AWNL (WBC 10.1, )    UA: Urine Bloo Trace (A), Protein Albumin Urine 30 (A), Nitrite Positive (A), Leukocyte Esterase Urine Trace (A), WBC/HPF 18 (H), RBC/HPF 3 (H), Bacteria Moderate (A), Squamous Epithelial/HPF Urine 16 (H), Mucous Urine Present (A), o/w Negative    1530: Acetaminophen level: <2    1530: Salicylate level: <2    1530: Alcohol blood level: <0.01 g/dL    1530: Troponin I: <0.015    CMP: Potassium 3.1 (L), o/w AWNL (Creatinine 0.80)    INR: 0.94    Drug abuse screen 77 urine (FL, RH, SH): Amphetamine Qual Urine Positive (A), Cocaine Qual Urine Positive (A), o/w Negative  "    Interventions:    1537: NS 1L IV Bolus    Emergency Department Course:  Past medical records, nursing notes, and vitals reviewed.  1455: I performed an exam of the patient and obtained history, as documented above.    IV inserted and blood drawn.    1617: I discussed the case with DEC regarding the patient.    1718: Findings and plan explained to the Patient. Patient discharged home with instructions regarding supportive care, medications, and reasons to return. The importance of close follow-up was reviewed.     Impression & Plan      Medical Decision Making:  This is a 53-year-old female who came in for further evaluation of suicidal thoughts as well as what she describes as a suicide attempt.  However her suicide attempt involved using meth and cocaine.  Currently she has some ongoing suicidal thoughts and depression.  She also indicates she has had some chest pain, although she currently does not have chest pain.  I proceeded with the above workup including blood work and an EKG, as well as a urine sample and urine drug screen.  I also had Erin, our DEC , see the patient.  Her workup here actually is unremarkable other than her urine sample.  The urine is significantly contaminated, but it also does have nitrites, and the patient indicates that she always has a UTI.  She also told me however that she will not take antibiotics due to all of her allergies.  She also requested that she be provided oxycodone for her headache.  I explained that I am not allowed to do that, as she has a care plan stating we are not to provide her any narcotics.  She contracts for safety and exit does not want to stay in the hospital.  She prefers to try and get help with her addiction though.  She has resources for that and we provided her with resources as well.  She then demanded that we provide her a cab ride home, as her \"insurance will cover it.\"  At this point she was discharged.      Diagnosis:    ICD-10-CM    1. " Polysubstance abuse (H) F19.10    2. Suicidal thoughts R45.851    3. Acute UTI N39.0    4. Chronic nonintractable headache, unspecified headache type R51        Disposition:  Discharged to home.      Maria A Emiliano  12/4/2018    EMERGENCY DEPARTMENT  I, Maria A Emiliano, am serving as a scribe at 2:55 PM on 12/4/2018 to document services personally performed by Edson Lizarraga MD based on my observations and the provider's statements to me.        Edson Lizarraga MD  12/04/18 [id number]

## 2018-12-04 NOTE — ED NOTES
Bed: ED10  Expected date:   Expected time:   Means of arrival:   Comments:  [facility] [place] - 429 - 53 F Headache HTN eta 1330

## 2018-12-04 NOTE — ED NOTES
Bed: [facility]  Expected date:   Expected time:   Means of arrival:   Comments:  Hold for 10

## 2018-12-04 NOTE — PROGRESS NOTES
Clinic Care Coordination Contact    Situation: Patient chart reviewed by care coordinator.    Background: received copy of palliative care office visit note    Assessment: as above    Plan/Recommendations: no opioid treatment per palliative care provider    Carla Sims R.N.  Clinic Care Coordinator  [facility] [place]  [phone]

## 2018-12-04 NOTE — ED AVS SNAPSHOT
Emergency Department    [address] [facility] [address]    Phone:  [phone]    Fax:  [phone]                                       Ines Mott   MRN: [id number]    Department:   Emergency Department   Date of Visit:  12/4/2018           Patient Information     Date Of Birth          1965        Your diagnoses for this visit were:     Polysubstance abuse (H)     Suicidal thoughts     Acute UTI     Chronic nonintractable headache, unspecified headache type        You were seen by Edson Lizarraga MD.      Follow-up Information     Schedule an appointment as soon as possible for a visit with Marika Santana APRN CNP.    Specialty:  Nurse Practitioner    Contact information:    [address] [facility] [address]  [facility] [address] [phone]          Follow up with  Emergency Department.    Specialty:  EMERGENCY MEDICINE    Why:  If symptoms worsen    Contact information:    [address] [facility] [address] [phone]        Discharge Instructions       Discharge Instructions  Urinary Tract Infection  You or your child have been diagnosed with a urinary tract infection, or UTI. The urinary tract includes the kidneys (which make urine/pee), ureters (the tubes that carry urine/pee from the kidneys to the bladder), the bladder (which stores urine/pee), and urethra (the tube that carries urine/pee out of the bladder). Urinary tract infections occur when bacteria travel up the urethra into the bladder (bladder infection) and, in some cases, from there into the kidneys (kidney infection).  Generally, every Emergency Department visit should have a follow-up clinic visit with either a primary or a specialty clinic/provider. Please follow-up as instructed by your emergency provider today.  Return to the Emergency Department if:    You or your child have severe back pain.    You or your child are vomiting (throwing up) so that you cannot take your  medicine.    You or your child have a new fever (had not previously had a fever) over 101 F.    You or your child have confusion or are very weak, or feel very ill.    Your child seems much more ill, will not wake up, will not respond right, or is crying for a long time and will not calm down.    You or your child are showing signs of dehydration. These signs may include decreased urination (pee), dry mouth/gums/tongue, or decreased activity.    Follow-up with your provider:     Children under 24 months need to be seen by their regular provider within one week after a diagnosis of a UTI. It may be necessary to do some more tests to look at the child s kidney or bladder.    You should begin to feel better within 24 - 48 hours of starting your antibiotic; follow-up with your regular clinic/doctor/provider if this is not the case.    Treatment:     You will be treated with an antibiotic to kill the bacteria. We have to make an educated guess, based on what we know about common bacteria and antibiotics, as to which antibiotic will work for your infection. We will be correct most times but there will be some cases where the antibiotic chosen is not correct (see urine cultures below).    Take a pain medication such as acetaminophen (Tylenol ) or ibuprofen (Advil , Motrin , Nuprin ).    Phenazopyridine (Pyridium , Uristat ) is a prescription medication that numbs the bladder to reduce the burning pain of some UTIs.  The same medication is available in a non-prescription version (Azo-Standard , Urodol ). This medication will change the color of the urine and tears (usually blue or orange). If you wear contacts, do not wear them while taking this medication as they may be stained by the medication.    Urine Cultures:    If indicated, a urine culture may have been performed today. This test generally takes 24-48 hours to complete so the results are not known at this time. The results can confirm that an infection is present  "but also determine which antibiotic is effective for the specific bacteria that is causing the infection. If your urine culture shows that the antibiotic you were given today will not work to treat your infection, we will attempt to contact you to make arrangements to change the antibiotic. If the culture confirms that the antibiotic is effective for your infection, you will not be contacted. We often recommend follow-up with your regular physician/provider on the culture results regardless of this process.    Antibiotic Warning:     If you have been placed on antibiotics - watch for signs of allergic reaction.  These include rash, lip swelling, difficulty breathing, wheezing, and dizziness.  If you develop any of these symptoms, stop the antibiotic immediately and go to an emergency room or urgent care for evaluation.    Probiotics: If you have been given an antibiotic, you may want to also take a probiotic pill or eat yogurt with live cultures. Probiotics have \"good bacteria\" to help your intestines stay healthy. Studies have shown that probiotics help prevent diarrhea and other intestine problems (including C. diff infection) when you take antibiotics. You can buy these without a prescription in the pharmacy section of the store.   If you were given a prescription for medicine here today, be sure to read all of the information (including the package insert) that comes with your prescription.  This will include important information about the medicine, its side effects, and any warnings that you need to know about.  The pharmacist who fills the prescription can provide more information and answer questions you may have about the medicine.  If you have questions or concerns that the pharmacist cannot address, please call or return to the Emergency Department.   Remember that you can always come back to the Emergency Department if you are not able to see your regular provider in the amount of time listed above, if " you get any new symptoms, or if there is anything that worries you.      Discharge Instructions  Headache    You were seen today for a headache. Headaches may be caused by many different things such as muscle tension, sinus inflammation, anxiety and stress, having too little sleep, too much alcohol, some medical conditions or injury. You may have a migraine, which is caused by changes in the blood vessels in your head.  At this time your provider does not find that your headache is a sign of anything dangerous or life-threatening.  However, sometimes the signs of serious illness do not show up right away.      Generally, every Emergency Department visit should have a follow-up clinic visit with either a primary or a specialty clinic/provider. Please follow-up as instructed by your emergency provider today.    Return to the Emergency Department if:    You get a new fever of 100.4 F or higher.    Your headache gets much worse.    You get a stiff neck with your headache.    You get a new headache that is significantly different or worse than headaches you have had before.    You are vomiting (throwing up) and cannot keep food or water down.    You have blurry or double vision or other problems with your eyes.    You have a new weakness on one side of your body.    You have difficulty with balance which is new.    You or your family thinks you are confused.    You have a seizure.    What can I do to help myself?    Pain medications - You may take a pain medication such as Tylenol  (acetaminophen), Advil , Motrin  (ibuprofen) or Aleve  (naproxen).    Take a pain reliever as soon as you notice symptoms.  Starting medications as soon as you start to have symptoms may lessen the amount of pain you have.    Relaxing in a quiet, dark room may help.    Get enough sleep and eat meals regularly.    You may need to watch for certain foods or other things which may trigger your headaches.  Keeping a journal of your headaches and  possible triggers may help you and your primary provider to identify things which you should avoid which may be causing your headaches.  If you were given a prescription for medicine here today, be sure to read all of the information (including the package insert) that comes with your prescription.  This will include important information about the medicine, its side effects, and any warnings that you need to know about.  The pharmacist who fills the prescription can provide more information and answer questions you may have about the medicine.  If you have questions or concerns that the pharmacist cannot address, please call or return to the Emergency Department.   Remember that you can always come back to the Emergency Department if you are not able to see your regular provider in the amount of time listed above, if you get any new symptoms, or if there is anything that worries you.      Discharge References/Attachments     DRUG ABUSE (ENGLISH)    ADDICTION: YOUR TREATMENT OPTIONS (ENGLISH)    SUICIDE, RECOGNIZING WARNING SIGNS IN YOURSELF (ENGLISH)      Your next 10 appointments already scheduled     Dec 05, 2018  8:15 AM CST   (Arrive by 8:00 AM)   PET GA 68 DOTATATE with UUPET1   [facility] [place] PET CT ([facility])    [address] [facility] [address] [phone]           How do I prepare for my exam? (Food and drink instructions) Drink 32 ounces of water to ensure adequate hydration prior to administration of Ga 68 dotatate.  What should I wear? Wear loose fitting clothing to your exam.  How long does the exam take?  Most scans will take between 2-3 hours.  What should I bring: Please bring a list of your medicines (including vitamins, minerals and over-the-counter drugs). Leave your valuables at home.  Do I need a :  No  is needed.  What should I do after the exam: Drink and void frequently during the first hours following  administration to reduce radiation exposure.  What do I need to tell my doctor: Tell your doctor: * If there is any chance you may be pregnant or if you are breastfeeding. * If you have problems lying in small spaces (claustrophobia). If you do, your doctor may give you medicine to help you relax.  What is this test: Your doctor has ordered a PET scan (positron emission tomography). This will take pictures of the cells and organs in your body. Your doctor may have also ordered a CT scan (computed tomography). This is another way to take pictures of your cells and organs. It is done at the same time as the PET scan. The pictures will help us understand your problem so we can make a treatment plan that fits your needs.  Who should I call with questions: Please call your Imaging Department at your exam site with any questions. Directions, parking instructions, and other information is available on our website, [email or web address].            Dec 06, 2018  6:00 PM CST   (Arrive by 5:45 PM)   Return Visit with Samy Salmeron MD   [facility] ([facility])    [address] [place] [address]  [address]  [facility] [address]   [phone]            Dec 17, 2018  9:00 AM CST   Return Visit with MICHEAL Crowley CNP   [facility] ([facility])    [address]   [address]  [facility] [address]   [phone]            Dec 17, 2018  9:00 AM CST   Return Visit with MARTIN Pineda   [facility] ([facility])    [address]  [address]  [facility] [address]   [phone]            Dec 17, 2018 10:00 AM CST   Return Visit with KERVIN Dean   [facility] ([facility])    [address]  [address]  [facility] [address]   [phone]            Dec 17, 2018 11:30 AM  CST   Return Visit with Giulia Mckeon MD   [facility] ([facility])    [address] [place] [address] [address]  [address]  [facility] [address] [phone]              24 Hour Appointment Hotline       To make an appointment at any [facility]3], call [phone] ([phone]). If you don't have a family doctor or clinic, we will help you find one. [facility] are conveniently located to serve the needs of you and your family.             Review of your medicines      Our records show that you are taking the medicines listed below. If these are incorrect, please call your family doctor or clinic.        Dose / Directions Last dose taken    albuterol (2.5 MG/3ML) 0.083% neb solution   Commonly known as:  PROVENTIL   Dose:  1 vial   Quantity:  30 vial        Take 1 vial (2.5 mg) by nebulization every 6 hours as needed for shortness of breath / dyspnea or wheezing   Refills:  3        amLODIPine 5 MG tablet   Commonly known as:  NORVASC   Dose:  5 mg        Take 5 mg by mouth   Refills:  0        ASPIRIN NOT PRESCRIBED   Commonly known as:  INTENTIONAL   Quantity:  1 each        Please choose reason not prescribed, below   Refills:  0        baclofen 10 MG tablet   Commonly known as:  LIORESAL   Dose:  10 mg   Quantity:  60 tablet        Take 1 tablet (10 mg) by mouth 2 times daily   Refills:  3        blood glucose calibration solution   Commonly known as:  NO BRAND SPECIFIED   Quantity:  1 each        Use to calibrate blood glucose monitor as directed.   Refills:  0        blood glucose lancets standard   Commonly known as:  NO BRAND SPECIFIED   Quantity:  100 each        Use to test blood sugar daily times daily or as directed.   Refills:  11        blood glucose monitoring meter device kit   Commonly known as:  NO BRAND SPECIFIED   Quantity:  1 kit        Use to test blood sugar larisa times daily or as directed.   Refills:  0        blood glucose monitoring  test strip   Commonly known as:  NO BRAND SPECIFIED   Quantity:  100 strip        Use to test blood sugars once times daily or as directed   Refills:  3        cloNIDine 0.1 MG tablet   Commonly known as:  CATAPRES   Dose:  0.1 mg   Quantity:  60 tablet        Take 1 tablet (0.1 mg) by mouth 2 times daily   Refills:  3        CULTURELLE DIGESTIVE HEALTH Caps   Dose:  1 capsule   Quantity:  60 capsule        Take 1 capsule by mouth 2 times daily   Refills:  1        EPINEPHrine 0.3 MG/0.3ML injection 2-pack   Commonly known as:  EPIPEN/ADRENACLICK/or ANY BX GENERIC EQUIV   Dose:  0.3 mg   Quantity:  0.3 mL        Inject 0.3 mLs (0.3 mg) into the muscle once as needed for anaphylaxis   Refills:  1        losartan-hydrochlorothiazide 100-25 MG tablet   Commonly known as:  HYZAAR   Dose:  1 tablet   Quantity:  90 tablet        Take 1 tablet by mouth daily   Refills:  3        magnesium oxide 400 (241.3 Mg) MG tablet   Commonly known as:  MAG-OX   Dose:  400 mg   Quantity:  90 tablet        Take 1 tablet (400 mg) by mouth daily   Refills:  3        naloxone 4 MG/0.1ML nasal spray   Commonly known as:  NARCAN   Dose:  4 mg   Quantity:  0.2 mL        Spray 1 spray (4 mg) into one nostril alternating nostrils as needed for opioid reversal every 2-3 minutes until assistance arrives   Refills:  0        ondansetron 4 MG ODT tab   Commonly known as:  ZOFRAN-ODT   Dose:  4 mg   Quantity:  10 tablet        Take 1 tablet (4 mg) by mouth every 8 hours as needed for nausea   Refills:  1        order for DME   Quantity:  1 Device        Right wrist splint for carpal tunnel syndrome  One* Use as directed at hour of sleep   Refills:  1        order for DME   Quantity:  1 each        Equipment being ordered: CPAP with supplies   Refills:  0        polyethylene glycol powder   Commonly known as:  MIRALAX/GLYCOLAX   Quantity:  1581 g        TAKE 17 GRAMS (1 CAPFUL) BY MOUTH DAILY   Refills:  11        potassium chloride ER 10 MEQ CR  tablet   Commonly known as:  K-TAB/KLOR-CON   Dose:  10 mEq   Indication:  Low Amount of Potassium in the Blood   Quantity:  120 tablet        Take 1 tablet (10 mEq) by mouth daily   Refills:  11        sertraline 100 MG tablet   Commonly known as:  ZOLOFT   Dose:  100 mg   Indication:  Major Depressive Disorder   Quantity:  30 tablet        Take 1 tablet (100 mg) by mouth daily   Refills:  3        STATIN NOT PRESCRIBED   Commonly known as:  INTENTIONAL   Dose:  1 each   Quantity:  0 each        1 each daily Statin not prescribed intentionally due to Active liver disease (liver failure, cirrhosis, hepatitis) LIVER METS   Refills:  0        vitamin B-12 1000 MCG/ML injection   Commonly known as:  CYANOCOBALAMIN   Dose:  1 mL   Quantity:  1 mL        Inject 1 mL (1,000 mcg) Subcutaneous every 30 days   Refills:  11        Vitamin D (Cholecalciferol) 1000 units Tabs   Dose:  2000 Units   Quantity:  60 tablet        Take 2,000 Units by mouth daily   Refills:  11                Procedures and tests performed during your visit     Procedure/Test Number of Times Performed    Acetaminophen level 1    Alcohol level blood 1    CBC with platelets differential 2    Comprehensive metabolic panel 1    Drug abuse screen 77 urine (FL, RH, SH) 1    EKG 12-lead, tracing only 1    INR 1    Salicylate level 1    Troponin I 1    UA with Microscopic 1      Orders Needing Specimen Collection     Ordered          12/04/18 1653  UA with Microscopic - STAT, Prio: STAT, Needs to be Collected     Scheduled Task Status   12/04/18 1654 Collect UA with Microscopic Open   Order Class:  PCU Collect                  Pending Results     No orders found from 12/2/2018 to 12/5/2018.            Pending Culture Results     No orders found from 12/2/2018 to 12/5/2018.            Pending Results Instructions     If you had any lab results that were not finalized at the time of your Discharge, you can call the ED Lab Result RN at [phone]. You will be  contacted by this team for any positive Lab results or changes in treatment. The nurses are available 7 days a week from 10A to 6:30P.  You can leave a message 24 hours per day and they will return your call.        Test Results From Your Hospital Stay        12/4/2018  3:04 PM      Component Results     Component Value Ref Range & Units Status    Amphetamine Qual Urine Positive (A) NEG^Negative Final    Cutoff for a positive amphetamine is greater than 500 ng/mL. This is an   unconfirmed screening result to be used for medical purposes only.      Barbiturates Qual Urine Negative NEG^Negative Final    Cutoff for a negative barbiturate is 200 ng/mL or less.    Benzodiazepine Qual Urine Negative NEG^Negative Final    Cutoff for a negative benzodiazepine is 200 ng/mL or less.    Cannabinoids Qual Urine Negative NEG^Negative Final    Cutoff for a negative cannabinoid is 50 ng/mL or less.    Cocaine Qual Urine Positive (A) NEG^Negative Final    Cutoff for a positive cocaine is greater than 300 ng/mL. This is an   unconfirmed screening result to be used for medical purposes only.      Opiates Qualitative Urine Negative NEG^Negative Final    Cutoff for a negative opiate is 300 ng/mL or less.    PCP Qual Urine Negative NEG^Negative Final    Cutoff for a negative PCP is 25 ng/mL or less.         12/4/2018  4:07 PM      Component Results     Component Value Ref Range & Units Status    WBC Canceled, Test credited 4.0 - 11.0 10e9/L Final    RBC Count Canceled, Test credited 3.8 - 5.2 10e12/L Final    Hemoglobin Canceled, Test credited 11.7 - 15.7 g/dL Final    Hematocrit Canceled, Test credited 35.0 - 47.0 % Final    MCV Canceled, Test credited 78 - 100 fl Final    MCH Canceled, Test credited 26.5 - 33.0 pg Final    MCHC Canceled, Test credited 31.5 - 36.5 g/dL Final    RDW Canceled, Test credited 10.0 - 15.0 % Final    Platelet Count Canceled, Test credited 150 - 450 10e9/L Final    Diff Method Canceled, Test credited  Final          12/4/2018  4:16 PM      Component Results     Component Value Ref Range & Units Status    Sodium 144 133 - 144 mmol/L Final    Potassium 3.1 (L) 3.4 - 5.3 mmol/L Final    Chloride 109 94 - 109 mmol/L Final    Carbon Dioxide 27 20 - 32 mmol/L Final    Anion Gap 8 3 - 14 mmol/L Final    Glucose 89 70 - 99 mg/dL Final    Urea Nitrogen 11 7 - 30 mg/dL Final    Creatinine 0.80 0.52 - 1.04 mg/dL Final    GFR Estimate 75 >60 mL/min/1.7m2 Final    Non  GFR Calc    GFR Estimate If Black >90 >60 mL/min/1.7m2 Final    African American GFR Calc    Calcium 9.8 8.5 - 10.1 mg/dL Final    Bilirubin Total 1.3 0.2 - 1.3 mg/dL Final    Albumin 3.6 3.4 - 5.0 g/dL Final    Protein Total 7.6 6.8 - 8.8 g/dL Final    Alkaline Phosphatase 83 40 - 150 U/L Final    ALT 22 0 - 50 U/L Final    AST 12 0 - 45 U/L Final         12/4/2018  4:16 PM      Component Results     Component Value Ref Range & Units Status    Troponin I ES <0.015 0.000 - 0.045 ug/L Final    The 99th percentile for upper reference range is 0.045 ug/L.  Troponin values   in the range of 0.045 - 0.120 ug/L may be associated with risks of adverse   clinical events.           12/4/2018  4:20 PM      Component Results     Component Value Ref Range & Units Status    INR 0.94 0.86 - 1.14 Final         12/4/2018  3:09 PM      Component Results     Component Value Ref Range & Units Status    Color Urine Yellow  Final    Appearance Urine Slightly Cloudy  Final    Glucose Urine Negative NEG^Negative mg/dL Final    Bilirubin Urine Negative NEG^Negative Final    Ketones Urine Negative NEG^Negative mg/dL Final    Specific Gravity Urine 1.016 1.003 - 1.035 Final    Blood Urine Trace (A) NEG^Negative Final    pH Urine 5.5 5.0 - 7.0 pH Final    Protein Albumin Urine 30 (A) NEG^Negative mg/dL Final    Urobilinogen mg/dL Normal 0.0 - 2.0 mg/dL Final    Nitrite Urine Positive (A) NEG^Negative Final    Leukocyte Esterase Urine Trace (A) NEG^Negative Final    Source  Midstream Urine  Final    WBC Urine 18 (H) 0 - 5 /HPF Final    RBC Urine 3 (H) 0 - 2 /HPF Final    Bacteria Urine Moderate (A) NEG^Negative /HPF Final    Squamous Epithelial /HPF Urine 16 (H) 0 - 1 /HPF Final    Mucous Urine Present (A) NEG^Negative /LPF Final         12/4/2018  4:55 PM      Component Results     Component Value Ref Range & Units Status    Salicylate Level <2 mg/dL Final    Therapeutic:        <20  Anti inflammatory:  15-30           12/4/2018  4:12 PM      Component Results     Component Value Ref Range & Units Status    Ethanol g/dL <0.01 <0.01 g/dL Final         12/4/2018  4:53 PM      Component Results     Component Value Ref Range & Units Status    Acetaminophen Level <2 mg/L Final    Therapeutic range: 10-30 mg/L         12/4/2018  5:09 PM      Component Results     Component Value Ref Range & Units Status    WBC 10.1 4.0 - 11.0 10e9/L Final    RBC Count 3.96 3.8 - 5.2 10e12/L Final    Hemoglobin 11.1 (L) 11.7 - 15.7 g/dL Final    Hematocrit 34.0 (L) 35.0 - 47.0 % Final    MCV 86 78 - 100 fl Final    MCH 28.0 26.5 - 33.0 pg Final    MCHC 32.6 31.5 - 36.5 g/dL Final    RDW 14.9 10.0 - 15.0 % Final    Platelet Count 275 150 - 450 10e9/L Final    Diff Method Automated Method  Final    % Neutrophils 62.9 % Final    % Lymphocytes 28.2 % Final    % Monocytes 7.2 % Final    % Eosinophils 1.3 % Final    % Basophils 0.2 % Final    % Immature Granulocytes 0.2 % Final    Nucleated RBCs 0 0 /100 Final    Absolute Neutrophil 6.4 1.6 - 8.3 10e9/L Final    Absolute Lymphocytes 2.9 0.8 - 5.3 10e9/L Final    Absolute Monocytes 0.7 0.0 - 1.3 10e9/L Final    Absolute Eosinophils 0.1 0.0 - 0.7 10e9/L Final    Absolute Basophils 0.0 0.0 - 0.2 10e9/L Final    Abs Immature Granulocytes 0.0 0 - 0.4 10e9/L Final    Absolute Nucleated RBC 0.0  Final                Clinical Quality Measure: Blood Pressure Screening     Your blood pressure was checked while you were in the emergency department today. The last reading we  obtained was  BP: (!) 153/96 . Please read the guidelines below about what these numbers mean and what you should do about them.  If your systolic blood pressure (the top number) is less than 120 and your diastolic blood pressure (the bottom number) is less than 80, then your blood pressure is normal. There is nothing more that you need to do about it.  If your systolic blood pressure (the top number) is 120-139 or your diastolic blood pressure (the bottom number) is 80-89, your blood pressure may be higher than it should be. You should have your blood pressure rechecked within a year by a primary care provider.  If your systolic blood pressure (the top number) is 140 or greater or your diastolic blood pressure (the bottom number) is 90 or greater, you may have high blood pressure. High blood pressure is treatable, but if left untreated over time it can put you at risk for heart attack, stroke, or kidney failure. You should have your blood pressure rechecked by a primary care provider within the next 4 weeks.  If your provider in the emergency department today gave you specific instructions to follow-up with your doctor or provider even sooner than that, you should follow that instruction and not wait for up to 4 weeks for your follow-up visit.        Thank you for choosing [facility]       Thank you for choosing [facility] for your care. Our goal is always to provide you with excellent care. Hearing back from our patients is one way we can continue to improve our services. Please take a few minutes to complete the written survey that you may receive in the mail after you visit with us. Thank you!        Care EveryWhere ID     This is your Care EveryWhere ID. This could be used by other organizations to access your [facility] medical records  [id number]        Equal Access to Services     VALENTE RIVERA : Marc Leonard, deion nunes, emilee oconnor .  [facility] [phone].    ATENCIÓN: Si habla español, tiene a christianson disposición servicios gratuitos de asistencia lingüística. Llame al [phone].    We comply with applicable federal civil rights laws and [place] laws. We do not discriminate on the basis of race, color, national origin, age, disability, sex, sexual orientation, or gender identity.            After Visit Summary       This is your record. Keep this with you and show to your community pharmacist(s) and doctor(s) at your next visit.

## 2018-12-05 NOTE — DISCHARGE INSTRUCTIONS
Discharge Instructions  Urinary Tract Infection  You or your child have been diagnosed with a urinary tract infection, or UTI. The urinary tract includes the kidneys (which make urine/pee), ureters (the tubes that carry urine/pee from the kidneys to the bladder), the bladder (which stores urine/pee), and urethra (the tube that carries urine/pee out of the bladder). Urinary tract infections occur when bacteria travel up the urethra into the bladder (bladder infection) and, in some cases, from there into the kidneys (kidney infection).  Generally, every Emergency Department visit should have a follow-up clinic visit with either a primary or a specialty clinic/provider. Please follow-up as instructed by your emergency provider today.  Return to the Emergency Department if:    You or your child have severe back pain.    You or your child are vomiting (throwing up) so that you cannot take your medicine.    You or your child have a new fever (had not previously had a fever) over 101 F.    You or your child have confusion or are very weak, or feel very ill.    Your child seems much more ill, will not wake up, will not respond right, or is crying for a long time and will not calm down.    You or your child are showing signs of dehydration. These signs may include decreased urination (pee), dry mouth/gums/tongue, or decreased activity.    Follow-up with your provider:     Children under 24 months need to be seen by their regular provider within one week after a diagnosis of a UTI. It may be necessary to do some more tests to look at the child s kidney or bladder.    You should begin to feel better within 24 - 48 hours of starting your antibiotic; follow-up with your regular clinic/doctor/provider if this is not the case.    Treatment:     You will be treated with an antibiotic to kill the bacteria. We have to make an educated guess, based on what we know about common bacteria and antibiotics, as to which antibiotic will work  "for your infection. We will be correct most times but there will be some cases where the antibiotic chosen is not correct (see urine cultures below).    Take a pain medication such as acetaminophen (Tylenol ) or ibuprofen (Advil , Motrin , Nuprin ).    Phenazopyridine (Pyridium , Uristat ) is a prescription medication that numbs the bladder to reduce the burning pain of some UTIs.  The same medication is available in a non-prescription version (Azo-Standard , Urodol ). This medication will change the color of the urine and tears (usually blue or orange). If you wear contacts, do not wear them while taking this medication as they may be stained by the medication.    Urine Cultures:    If indicated, a urine culture may have been performed today. This test generally takes 24-48 hours to complete so the results are not known at this time. The results can confirm that an infection is present but also determine which antibiotic is effective for the specific bacteria that is causing the infection. If your urine culture shows that the antibiotic you were given today will not work to treat your infection, we will attempt to contact you to make arrangements to change the antibiotic. If the culture confirms that the antibiotic is effective for your infection, you will not be contacted. We often recommend follow-up with your regular physician/provider on the culture results regardless of this process.    Antibiotic Warning:     If you have been placed on antibiotics - watch for signs of allergic reaction.  These include rash, lip swelling, difficulty breathing, wheezing, and dizziness.  If you develop any of these symptoms, stop the antibiotic immediately and go to an emergency room or urgent care for evaluation.    Probiotics: If you have been given an antibiotic, you may want to also take a probiotic pill or eat yogurt with live cultures. Probiotics have \"good bacteria\" to help your intestines stay healthy. Studies have shown " that probiotics help prevent diarrhea and other intestine problems (including C. diff infection) when you take antibiotics. You can buy these without a prescription in the pharmacy section of the store.   If you were given a prescription for medicine here today, be sure to read all of the information (including the package insert) that comes with your prescription.  This will include important information about the medicine, its side effects, and any warnings that you need to know about.  The pharmacist who fills the prescription can provide more information and answer questions you may have about the medicine.  If you have questions or concerns that the pharmacist cannot address, please call or return to the Emergency Department.   Remember that you can always come back to the Emergency Department if you are not able to see your regular provider in the amount of time listed above, if you get any new symptoms, or if there is anything that worries you.      Discharge Instructions  Headache    You were seen today for a headache. Headaches may be caused by many different things such as muscle tension, sinus inflammation, anxiety and stress, having too little sleep, too much alcohol, some medical conditions or injury. You may have a migraine, which is caused by changes in the blood vessels in your head.  At this time your provider does not find that your headache is a sign of anything dangerous or life-threatening.  However, sometimes the signs of serious illness do not show up right away.      Generally, every Emergency Department visit should have a follow-up clinic visit with either a primary or a specialty clinic/provider. Please follow-up as instructed by your emergency provider today.    Return to the Emergency Department if:    You get a new fever of 100.4 F or higher.    Your headache gets much worse.    You get a stiff neck with your headache.    You get a new headache that is significantly different or worse  than headaches you have had before.    You are vomiting (throwing up) and cannot keep food or water down.    You have blurry or double vision or other problems with your eyes.    You have a new weakness on one side of your body.    You have difficulty with balance which is new.    You or your family thinks you are confused.    You have a seizure.    What can I do to help myself?    Pain medications - You may take a pain medication such as Tylenol  (acetaminophen), Advil , Motrin  (ibuprofen) or Aleve  (naproxen).    Take a pain reliever as soon as you notice symptoms.  Starting medications as soon as you start to have symptoms may lessen the amount of pain you have.    Relaxing in a quiet, dark room may help.    Get enough sleep and eat meals regularly.    You may need to watch for certain foods or other things which may trigger your headaches.  Keeping a journal of your headaches and possible triggers may help you and your primary provider to identify things which you should avoid which may be causing your headaches.  If you were given a prescription for medicine here today, be sure to read all of the information (including the package insert) that comes with your prescription.  This will include important information about the medicine, its side effects, and any warnings that you need to know about.  The pharmacist who fills the prescription can provide more information and answer questions you may have about the medicine.  If you have questions or concerns that the pharmacist cannot address, please call or return to the Emergency Department.   Remember that you can always come back to the Emergency Department if you are not able to see your regular provider in the amount of time listed above, if you get any new symptoms, or if there is anything that worries you.

## 2018-12-12 ENCOUNTER — CARE COORDINATION (OUTPATIENT)
Dept: ONCOLOGY | Facility: CLINIC | Age: 53
End: 2018-12-12

## 2018-12-12 ENCOUNTER — DOCUMENTATION ONLY (OUTPATIENT)
Dept: SLEEP MEDICINE | Facility: CLINIC | Age: 53
End: 2018-12-12

## 2018-12-12 DIAGNOSIS — G47.33 OSA ON CPAP: ICD-10-CM

## 2018-12-12 NOTE — PROGRESS NOTES
Spoke to Ines regarding her scheduled PET donatate scan on 12/19.  Discussed the importance of having the scan.   Further discussed the financial importance of showing to her appointment. Explained the cost related to a NO SHOW for this specific scan. She verbalized understand that she may be held responsible for a NO SHOW going forward due to the number of times she has not come to the appointment. Encouraged her to call if she knows that she cannot get transportation to the appointment no less then 24 hours prior.  She verbalized understanding of all the above.

## 2018-12-12 NOTE — PROGRESS NOTES
STM Recheck Visit     Subjective measures:   Pt states she's using it every night but she is having a hard time sleeping with it all night. She feels like 8 isn't enough pressure when she first starts.  She would like the base pressure increased.    Assessment: Pt not meeting objective benchmarks for compliance  Patient failing following subjective benchmarks: pressure issues  Action plan: order placed to provider for pressure change and 2 week STM recheck appt scheduled  Patient stated she will call to schedule a follow up visit with Dr. Mujica.   Device type: Auto-CPAP     PAP settings: CPAP min: 8 cm  H20     CPAP max: 12 cm  H20    95th% pressure: 10.7 cm  H20       Objective measures: 14 day rolling measures      Compliance: 21 %      Leak: 23.4 lpm  last  upload      AHI: 0.65   last  upload      Average number of minutes: 157      Diagnostic AHI:   PSG AHI: 69       Objective measure goal  Compliance   Goal >70%  Leak   Goal < 10%  AHI  Goal < 5  Usage  Goal >240
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
## 2018-12-17 ENCOUNTER — PATIENT OUTREACH (OUTPATIENT)
Dept: CARE COORDINATION | Facility: CLINIC | Age: 53
End: 2018-12-17

## 2018-12-17 DIAGNOSIS — C64.1 RENAL CELL CARCINOMA, RIGHT (H): Primary | ICD-10-CM

## 2018-12-17 NOTE — PROGRESS NOTES
Clinic Care Coordination Contact  Care Team Conversations    Patient no showed clinic appointment. Per PCP she will likely be discharged from clinic.     Carla Sims R.N.  Clinic Care Coordinator  [facility]  [phone]

## 2018-12-22 ENCOUNTER — HOSPITAL ENCOUNTER (EMERGENCY)
Facility: CLINIC | Age: 53
Discharge: HOME | End: 2018-12-23
Attending: EMERGENCY MEDICINE | Admitting: EMERGENCY MEDICINE
Payer: COMMERCIAL

## 2018-12-22 VITALS
WEIGHT: 246 LBS | HEART RATE: 73 BPM | DIASTOLIC BLOOD PRESSURE: 66 MMHG | HEIGHT: 66 IN | TEMPERATURE: 98.2 F | OXYGEN SATURATION: 98 % | RESPIRATION RATE: 18 BRPM | SYSTOLIC BLOOD PRESSURE: 157 MMHG | BODY MASS INDEX: 39.53 KG/M2

## 2018-12-22 DIAGNOSIS — R51.9 NONINTRACTABLE HEADACHE, UNSPECIFIED CHRONICITY PATTERN, UNSPECIFIED HEADACHE TYPE: ICD-10-CM

## 2018-12-22 DIAGNOSIS — R07.89 ATYPICAL CHEST PAIN: ICD-10-CM

## 2018-12-22 LAB — TROPONIN I SERPL-MCNC: <0.015 UG/L (ref 0–0.04)

## 2018-12-22 PROCEDURE — 84484 ASSAY OF TROPONIN QUANT: CPT | Performed by: EMERGENCY MEDICINE

## 2018-12-22 PROCEDURE — 25000132 ZZH RX MED GY IP 250 OP 250 PS 637: Performed by: EMERGENCY MEDICINE

## 2018-12-22 PROCEDURE — 25000128 H RX IP 250 OP 636: Performed by: EMERGENCY MEDICINE

## 2018-12-22 PROCEDURE — 93005 ELECTROCARDIOGRAM TRACING: CPT

## 2018-12-22 PROCEDURE — 99284 EMERGENCY DEPT VISIT MOD MDM: CPT | Mod: 25

## 2018-12-22 PROCEDURE — 96360 HYDRATION IV INFUSION INIT: CPT

## 2018-12-22 RX ORDER — SODIUM CHLORIDE 9 MG/ML
1000 INJECTION, SOLUTION INTRAVENOUS CONTINUOUS
Status: DISCONTINUED | OUTPATIENT
Start: 2018-12-22 | End: 2018-12-23 | Stop reason: HOSPADM

## 2018-12-22 RX ORDER — ACETAMINOPHEN 500 MG
1000 TABLET ORAL ONCE
Status: COMPLETED | OUTPATIENT
Start: 2018-12-22 | End: 2018-12-22

## 2018-12-22 RX ADMIN — ACETAMINOPHEN 1000 MG: 500 TABLET, FILM COATED ORAL at 22:38

## 2018-12-22 RX ADMIN — SODIUM CHLORIDE 1000 ML: 9 INJECTION, SOLUTION INTRAVENOUS at 22:46

## 2018-12-22 ASSESSMENT — MIFFLIN-ST. JEOR
SCORE: 1737.6
SCORE: 1737.6

## 2018-12-22 ASSESSMENT — ENCOUNTER SYMPTOMS
HEADACHES: 1
FEVER: 0
RHINORRHEA: 0
APPETITE CHANGE: 0
CHILLS: 0
PHOTOPHOBIA: 1

## 2018-12-22 NOTE — ED AVS SNAPSHOT
Emergency Department  [address] [place] [id number]  Phone:  [phone]  Fax:  [phone]                                    Ines Mott   MRN: [id number]    Department:   Emergency Department   Date of Visit:  12/22/2018           After Visit Summary Signature Page    I have received my discharge instructions, and my questions have been answered. I have discussed any challenges I see with this plan with the nurse or doctor.    ..........................................................................................................................................  Patient/Patient Representative Signature      ..........................................................................................................................................  Patient Representative Print Name and Relationship to Patient    ..................................................               ................................................  Date                                   Time    ..........................................................................................................................................  Reviewed by Signature/Title    ...................................................              ..............................................  Date                                               Time          22EPIC Rev 08/18

## 2018-12-23 LAB — INTERPRETATION ECG - MUSE: NORMAL

## 2018-12-23 NOTE — ED NOTES
"Hx intracranial shunt right side, pt reports \"it feels like it is going to blow up\" Pain rated 10 out of 10.   "

## 2018-12-23 NOTE — ED NOTES
Bed: ED01  Expected date: 12/22/18  Expected time: 9:40 PM  Means of arrival: Ambulance  Comments:  HEMS 426 53F elevated BP, CP
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
## 2018-12-23 NOTE — DISCHARGE INSTRUCTIONS
Discharge Instructions  Headache    You were seen today for a headache. Headaches may be caused by many different things such as muscle tension, sinus inflammation, anxiety and stress, having too little sleep, too much alcohol, some medical conditions or injury. You may have a migraine, which is caused by changes in the blood vessels in your head.  At this time your doctor does not find that your headache is a sign of anything dangerous or life-threatening.  However, sometimes the signs of serious illness do not show up right away.  If you have new or worse symptoms, you may need to be seen again in the emergency department or by your primary doctor.      Return to the Emergency Department if:  You get a fever of 101 F or higher.  Your headache gets much worse.  You get a stiff neck with your headache.  You get a new headache that is different or worse than headaches you have had before.  You are vomiting and can?t keep food or water down.  You have blurry or double vision or other problems with your eyes.  You have a new weakness on one side of your body.  You have difficulty with balance which is new.  You or your family thinks you are confused.  You have a seizure or convulsion.    What can I do to help myself?  Pain medications - You may take a pain medication such as Tylenol  (acetaminophen), Advil , Nuprin  (ibuprofen) or Aleve  (naproxen).  If you have been given a narcotic such as Vicodin  (hydrocodone with acetaminophen), Percocet  (oxycodone with acetaminophen), codeine, or a muscle relaxant such as Flexeril  (cyclobenzaprine) or Soma  (carisoprodol), do not drive for four hours after you have taken it. If the narcotic contains Tylenol  (acetaminophen), do not take Tylenol  with it. All narcotics will cause constipation, so eat a high fiber diet.      Take a pain reliever as soon as you notice symptoms.  Starting medications as soon as you start to have symptoms may lessen the amount of pain you  have.  Relaxing in a quiet, dark room may help.  Get enough sleep and eat meals regularly.  Schedule an appointment with your primary physician as instructed, or at least within 1 week.  You may need to watch for certain foods or other things which may trigger your headaches.  Keeping a journal of your headaches and possible triggers may help you and your primary doctor to identify things which you should avoid which may be causing your headaches.  If you were given a prescription for medicine here today, be sure to read all of the information (including the package insert) that comes with your prescription.  This will include important information about the medicine, its side effects, and any warnings that you need to know about.  The pharmacist who fills the prescription can provide more information and answer questions you may have about the medicine.  If you have questions or concerns that the pharmacist cannot address, please call or return to the Emergency Department.   Opioid Medication Information    Pain medications are among the most commonly prescribed medicines, so we are including this information for all our patients. If you did not receive pain medication or get a prescription for pain medicine, you can ignore it.     You may have been given a prescription for an opioid (narcotic) pain medicine and/or have received a pain medicine while here in the Emergency Department. These medicines can make you drowsy or impaired. You must not drive, operate dangerous equipment, or engage in any other dangerous activities while taking these medications. If you drive while taking these medications, you could be arrested for DUI, or driving under the influence. Do not drink any alcohol while you are taking these medications.     Opioid pain medications can cause addiction. If you have a history of chemical dependency of any type, you are at a higher risk of becoming addicted to pain medications.  Only take these  prescribed medications to treat your pain when all other options have been tried. Take it for as short a time and as few doses as possible. Store your pain pills in a secure place, as they are frequently stolen and provide a dangerous opportunity for children or visitors in your house to start abusing these powerful medications. We will not replace any lost or stolen medicine.  As soon as your pain is better, you should flush all your remaining medication.     Many prescription pain medications contain Tylenol  (acetaminophen), including Vicodin , Tylenol #3 , Norco , Lortab , and Percocet .  You should not take any extra pills of Tylenol  if you are using these prescription medications or you can get very sick.  Do not ever take more than 3000 mg of acetaminophen in any 24 hour period.    All opioids tend to cause constipation. Drink plenty of water and eat foods that have a lot of fiber, such as fruits, vegetables, prune juice, apple juice and high fiber cereal.  Take a laxative if you don?t move your bowels at least every other day. Miralax , Milk of Magnesia, Colace , or Senna  can be used to keep you regular.      Remember that you can always come back to the Emergency Department if you are not able to see your regular doctor in the amount of time listed above, if you get any new symptoms, or if there is anything that worries you.

## 2018-12-29 DIAGNOSIS — E87.6 HYPOKALEMIA: ICD-10-CM

## 2018-12-31 NOTE — TELEPHONE ENCOUNTER
"Requested Prescriptions   Pending Prescriptions Disp Refills     potassium chloride ER (K-TAB/KLOR-CON) 10 MEQ CR tablet [Pharmacy Med Name: POTA CHLORIDE ER 10MEQ TAB]  Last Written Prescription Date:  8/27/18  Last Fill Quantity: 120,  # refills: 11   Last office visit: 10/29/2018 with prescribing provider:  dominguez   Future Office Visit:   Next 5 appointments (look out 90 days)    Eddie 15, 2019  8:30 AM CST  [facility] Visit with Giulia [facility]on [facility]  [facility] [address]  [address]  [facility] [address]  [phone]   Eddie 15, 2019  9:00 AM CST  Return Visit with MICHEAL Crowley CNP  [facility] ([facility]) [address]  [address]  [facility] [address]  [phone]   Eddie 15, 2019  9:00 AM CST  Return Visit with MARTIN Pineda  [facility]) [address]  [address]  [facility] [address]  [phone]          30 tablet 47     Sig: TAKE ONE TABLET BY MOUTH ONCE DAILY    Potassium Supplements Protocol Failed - 12/29/2018  2:41 PM       Failed - Normal serum potassium in past 12 months    Recent Labs   Lab Test 12/04/18  1530   POTASSIUM 3.1*                   Passed - Recent (12 mo) or future (30 days) visit within the authorizing provider's specialty    Patient had office visit in the last 12 months or has a visit in the next 30 days with authorizing provider or within the authorizing provider's specialty.  See \"Patient Info\" tab in inbasket, or \"Choose Columns\" in Meds & Orders section of the refill encounter.             Passed - Patient is age 18 or older          "

## 2019-01-02 RX ORDER — POTASSIUM CHLORIDE 750 MG/1
TABLET, EXTENDED RELEASE ORAL
Qty: 30 TABLET | Refills: 3 | Status: SHIPPED | OUTPATIENT
Start: 2019-01-02 | End: 2020-05-01

## 2019-01-10 ENCOUNTER — CARE COORDINATION (OUTPATIENT)
Dept: ONCOLOGY | Facility: CLINIC | Age: 54
End: 2019-01-10
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
## 2019-01-15 ENCOUNTER — OFFICE VISIT (OUTPATIENT)
Dept: BEHAVIORAL HEALTH | Facility: CLINIC | Age: 54
End: 2019-01-15
Payer: COMMERCIAL

## 2019-01-15 ENCOUNTER — OFFICE VISIT (OUTPATIENT)
Dept: FAMILY MEDICINE | Facility: CLINIC | Age: 54
End: 2019-01-15
Payer: COMMERCIAL

## 2019-01-15 ENCOUNTER — OFFICE VISIT (OUTPATIENT)
Dept: ADDICTION MEDICINE | Facility: CLINIC | Age: 54
End: 2019-01-15
Payer: COMMERCIAL

## 2019-01-15 VITALS
WEIGHT: 255.5 LBS | HEART RATE: 72 BPM | TEMPERATURE: 98.8 F | OXYGEN SATURATION: 98 % | DIASTOLIC BLOOD PRESSURE: 60 MMHG | RESPIRATION RATE: 16 BRPM | SYSTOLIC BLOOD PRESSURE: 98 MMHG | BODY MASS INDEX: 41.06 KG/M2 | HEIGHT: 66 IN

## 2019-01-15 VITALS
RESPIRATION RATE: 16 BRPM | DIASTOLIC BLOOD PRESSURE: 60 MMHG | BODY MASS INDEX: 41.06 KG/M2 | SYSTOLIC BLOOD PRESSURE: 98 MMHG | OXYGEN SATURATION: 98 % | HEART RATE: 72 BPM | TEMPERATURE: 98.8 F | HEIGHT: 66 IN | WEIGHT: 255.5 LBS

## 2019-01-15 DIAGNOSIS — C64.1 RENAL CELL CARCINOMA OF RIGHT KIDNEY (H): ICD-10-CM

## 2019-01-15 DIAGNOSIS — G31.84 MILD COGNITIVE IMPAIRMENT: ICD-10-CM

## 2019-01-15 DIAGNOSIS — F31.9 BIPOLAR DISORDER (H): ICD-10-CM

## 2019-01-15 DIAGNOSIS — G89.4 CHRONIC PAIN SYNDROME: ICD-10-CM

## 2019-01-15 DIAGNOSIS — F11.20 OPIOID USE DISORDER, SEVERE, DEPENDENCE (H): Primary | ICD-10-CM

## 2019-01-15 DIAGNOSIS — F43.10 POSTTRAUMATIC STRESS DISORDER: Chronic | ICD-10-CM

## 2019-01-15 DIAGNOSIS — F10.10 ALCOHOL ABUSE: ICD-10-CM

## 2019-01-15 DIAGNOSIS — M79.7 FIBROMYALGIA: ICD-10-CM

## 2019-01-15 DIAGNOSIS — F10.10 ALCOHOL ABUSE: Primary | ICD-10-CM

## 2019-01-15 DIAGNOSIS — F43.10 POSTTRAUMATIC STRESS DISORDER: ICD-10-CM

## 2019-01-15 PROCEDURE — 90832 PSYTX W PT 30 MINUTES: CPT | Performed by: SOCIAL WORKER

## 2019-01-15 PROCEDURE — 99214 OFFICE O/P EST MOD 30 MIN: CPT | Performed by: NURSE PRACTITIONER

## 2019-01-15 PROCEDURE — 80306 DRUG TEST PRSMV INSTRMNT: CPT | Performed by: PEDIATRICS

## 2019-01-15 PROCEDURE — 99215 OFFICE O/P EST HI 40 MIN: CPT | Performed by: ANESTHESIOLOGY

## 2019-01-15 ASSESSMENT — MIFFLIN-ST. JEOR
SCORE: 1775.69
SCORE: 1775.69
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
## 2019-01-15 NOTE — Clinical Note
Please remove me as her PCP, we will offer her 30 days of refills if needed and 30 days of acute care if needed
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
## 2019-01-18 NOTE — PATIENT INSTRUCTIONS
I am sorry to hear that you no longer want to be a patient at the [facility], please let us know if you need help finding another clinic

## 2019-01-22 ENCOUNTER — HOSPITAL ENCOUNTER (EMERGENCY)
Facility: CLINIC | Age: 54
Discharge: HOME | End: 2019-01-22
Attending: EMERGENCY MEDICINE | Admitting: EMERGENCY MEDICINE
Payer: COMMERCIAL

## 2019-01-22 ENCOUNTER — APPOINTMENT (OUTPATIENT)
Dept: GENERAL RADIOLOGY | Facility: CLINIC | Age: 54
End: 2019-01-22
Payer: COMMERCIAL

## 2019-01-22 VITALS
HEIGHT: 66 IN | BODY MASS INDEX: 41.14 KG/M2 | HEART RATE: 71 BPM | TEMPERATURE: 98 F | RESPIRATION RATE: 18 BRPM | WEIGHT: 256 LBS | OXYGEN SATURATION: 97 % | DIASTOLIC BLOOD PRESSURE: 89 MMHG | SYSTOLIC BLOOD PRESSURE: 141 MMHG

## 2019-01-22 DIAGNOSIS — J06.9 VIRAL URI WITH COUGH: ICD-10-CM

## 2019-01-22 LAB
DEPRECATED S PYO AG THROAT QL EIA: NORMAL
SPECIMEN SOURCE: NORMAL

## 2019-01-22 PROCEDURE — 99284 EMERGENCY DEPT VISIT MOD MDM: CPT | Mod: 25

## 2019-01-22 PROCEDURE — 87880 STREP A ASSAY W/OPTIC: CPT | Performed by: EMERGENCY MEDICINE

## 2019-01-22 PROCEDURE — 94640 AIRWAY INHALATION TREATMENT: CPT

## 2019-01-22 PROCEDURE — 25000125 ZZHC RX 250: Performed by: EMERGENCY MEDICINE

## 2019-01-22 PROCEDURE — 25000131 ZZH RX MED GY IP 250 OP 636 PS 637: Performed by: EMERGENCY MEDICINE

## 2019-01-22 PROCEDURE — 87081 CULTURE SCREEN ONLY: CPT | Performed by: EMERGENCY MEDICINE

## 2019-01-22 PROCEDURE — 71046 X-RAY EXAM CHEST 2 VIEWS: CPT

## 2019-01-22 RX ORDER — ALBUTEROL SULFATE 0.83 MG/ML
2.5 SOLUTION RESPIRATORY (INHALATION) EVERY 6 HOURS PRN
Qty: 1 BOX | Refills: 0 | Status: SHIPPED | OUTPATIENT
Start: 2019-01-22 | End: 2019-05-14

## 2019-01-22 RX ORDER — IPRATROPIUM BROMIDE AND ALBUTEROL SULFATE 2.5; .5 MG/3ML; MG/3ML
3 SOLUTION RESPIRATORY (INHALATION) ONCE
Status: COMPLETED | OUTPATIENT
Start: 2019-01-22 | End: 2019-01-22

## 2019-01-22 RX ORDER — ONDANSETRON 4 MG/1
4 TABLET, ORALLY DISINTEGRATING ORAL ONCE
Status: COMPLETED | OUTPATIENT
Start: 2019-01-22 | End: 2019-01-22

## 2019-01-22 RX ADMIN — ONDANSETRON 4 MG: 4 TABLET, ORALLY DISINTEGRATING ORAL at 22:38

## 2019-01-22 RX ADMIN — IPRATROPIUM BROMIDE AND ALBUTEROL SULFATE 3 ML: .5; 2.5 SOLUTION RESPIRATORY (INHALATION) at 22:37

## 2019-01-22 ASSESSMENT — MIFFLIN-ST. JEOR: SCORE: 1777.96

## 2019-01-22 ASSESSMENT — ENCOUNTER SYMPTOMS
HEADACHES: 1
SHORTNESS OF BREATH: 1
VOMITING: 1
DIARRHEA: 0
SORE THROAT: 1
RHINORRHEA: 1
COUGH: 1
NECK PAIN: 0
FEVER: 1
NAUSEA: 1

## 2019-01-22 NOTE — ED AVS SNAPSHOT
Emergency Department  [address] [place] [id number]  Phone:  [phone]  Fax:  [phone]                                    Ines Mott   MRN: [id number]    Department:   Emergency Department   Date of Visit:  1/22/2019           After Visit Summary Signature Page    I have received my discharge instructions, and my questions have been answered. I have discussed any challenges I see with this plan with the nurse or doctor.    ..........................................................................................................................................  Patient/Patient Representative Signature      ..........................................................................................................................................  Patient Representative Print Name and Relationship to Patient    ..................................................               ................................................  Date                                   Time    ..........................................................................................................................................  Reviewed by Signature/Title    ...................................................              ..............................................  Date                                               Time          22EPIC Rev 08/18

## 2019-01-23 NOTE — DISCHARGE INSTRUCTIONS
Rest and drink plenty of fluids.  Wash hands frequently.  Albuterol nebulizer as needed for shortness of breath or severe cough.  Establish care with primary care provider and follow up to ensure you are improving as expected.  Return with severe symptoms as needed.

## 2019-01-25 LAB
BACTERIA SPEC CULT: NORMAL
SPECIMEN SOURCE: NORMAL

## 2019-01-25 NOTE — RESULT ENCOUNTER NOTE
Final Beta strep group A r/o culture is NEGATIVE for Group A streptococcus.    No treatment or change in treatment per [facility] protocol.

## 2019-02-19 ENCOUNTER — VIRTUAL VISIT (OUTPATIENT)
Dept: FAMILY MEDICINE | Facility: CLINIC | Age: 54
End: 2019-02-19
Payer: COMMERCIAL

## 2019-02-19 DIAGNOSIS — G89.4 CHRONIC PAIN SYNDROME: ICD-10-CM

## 2019-02-19 DIAGNOSIS — F11.20 OPIOID USE DISORDER, SEVERE, DEPENDENCE (H): Primary | ICD-10-CM

## 2019-02-19 PROCEDURE — 99441 ZZC PHYSICIAN TELEPHONE EVALUATION 5-10 MIN: CPT | Performed by: NURSE PRACTITIONER

## 2019-02-19 NOTE — PROGRESS NOTES
"Ines Mott is a 54 year old female who is being evaluated via a billable telephone visit.        The patient has been notified of following:     \"This telephone visit will be conducted via a call between you and your physician/provider. We have found that certain health care needs can be provided without the need for a physical exam.  This service lets us provide the care you need with a short phone conversation.  If a prescription is necessary we can send it directly to your pharmacy.  If lab work is needed we can place an order for that and you can then stop by our lab to have the test done at a later time.    If during the course of the call the physician/provider feels a telephone visit is not appropriate, you will not be charged for this service.\"     Consent has been obtained for this service by 1 care team member: yes. See the scanned image in the medical record.    Ines Mott complains of    Chief Complaint   Patient presents with     Telephone   -Pt complains of issue  With right knee. She reports pain x 2 weeks would like consult, evaluation and treatment.      I have reviewed and updated the patient's Past Medical History, Social History, Family History and Medication List.    ALLERGIES  Amoxicillin; Ativan [lorazepam]; Buspirone; Ciprofloxacin; Macrobid [nitrofurantoin]; Busulfan; Cefaclor; Cefdinir; Cephalosporins; Clindamycin; Dilaudid [hydromorphone]; Diphenhydramine; Diphenhydramine hcl; Imitrex [sumatriptan succinate]; Ketorolac tromethamine; Lansoprazole; Lansoprazole; Latex; Metoclopramide; Metoclopramide; Moxifloxacin; No clinical screening - see comments; Nsaids; Paroxetine; Paroxetine; Prednisone; Quinolones; Sumatriptan; Blue dyes; Ketorolac tromethamine; Lidoderm [lidocaine]; Penicillins; Red dye; Sulfa drugs; and Vaccinium angustifolium    Seb Roberson MA   (MA signature)    Additional provider notes:  Patient's phone breaking up, stating she is outside and can not hear very " well, discussed with patient her desire to return to [facility] given she has fired us 2x due to our refusal to prescribe narcotics for her pain and our recommendation to go into detox, Stated we have not changed our minds on the recommendations discussed previously, wondering if she is willing to go to detox now.  Patient states she has found another clinic and then her phone .  Attempted to call 2 more times but went right to voicemail.  Chart was reviewed and patient has made 2 ED visits in the past 3 weeks for pain and DVT concerns.  She was given 2 Percocet one time in the ED to help with pain and then sx relief discussed no DVT or major change in her health.   She was able to establish primary care with the [facility] and has an appointment on 19.     Assessment/Plan:  (F11.20) Opioid use disorder, severe, dependence (H)  (primary encounter diagnosis)  Comment: through her chart review it appears that the patient is still drug seeking, unable to question the patient directly due to phone dieing   Plan: Patient needs to follow up with her new primary clinic, she is no longer eligible for [facility] at this time     (G89.4) Chronic pain syndrome  Comment: continues to be an issue, unclear if patient is willing to go to detox and work with pain clinic on other strategies    Plan: Patient has found another clinic to establish care with and will no longer be scheduled at [facility]      I have reviewed the note as documented above.  This accurately captures the substance of my conversation with the patient.  Ligia SCHUSTERP      Total time of call between patient and provider was 10 minutes     Marika Santana, NP, APRN CNP

## 2019-02-19 NOTE — PROGRESS NOTES
The following medication was given:     MEDICATION: Shrinrix  ROUTE: IM  SITE: LD  DOSE: .5 ML  LOT #:[id number]  :  [employer]   EXPIRATION DATE:  11/22/20  NDC: 81665-223-81      [employer]ension [employer]   Lot [id number]  Expiration 11/29/20  NDC - 03235-222-68  Seb Roberson MA

## 2019-02-20 ENCOUNTER — OFFICE VISIT (OUTPATIENT)
Dept: INTERNAL MEDICINE | Facility: CLINIC | Age: 54
End: 2019-02-20
Payer: COMMERCIAL

## 2019-02-20 VITALS
SYSTOLIC BLOOD PRESSURE: 124 MMHG | DIASTOLIC BLOOD PRESSURE: 80 MMHG | WEIGHT: 262.8 LBS | BODY MASS INDEX: 42.42 KG/M2 | HEART RATE: 82 BPM | OXYGEN SATURATION: 97 % | RESPIRATION RATE: 16 BRPM

## 2019-02-20 DIAGNOSIS — F11.20 OPIOID USE DISORDER, SEVERE, DEPENDENCE (H): Primary | ICD-10-CM

## 2019-02-20 DIAGNOSIS — Z76.5 DRUG-SEEKING BEHAVIOR: ICD-10-CM

## 2019-02-20 PROCEDURE — 99212 OFFICE O/P EST SF 10 MIN: CPT | Performed by: INTERNAL MEDICINE

## 2019-02-20 ASSESSMENT — ANXIETY QUESTIONNAIRES
GAD7 TOTAL SCORE: 0
3. WORRYING TOO MUCH ABOUT DIFFERENT THINGS: NOT AT ALL
5. BEING SO RESTLESS THAT IT IS HARD TO SIT STILL: NOT AT ALL
IF YOU CHECKED OFF ANY PROBLEMS ON THIS QUESTIONNAIRE, HOW DIFFICULT HAVE THESE PROBLEMS MADE IT FOR YOU TO DO YOUR WORK, TAKE CARE OF THINGS AT HOME, OR GET ALONG WITH OTHER PEOPLE: NOT DIFFICULT AT ALL
1. FEELING NERVOUS, ANXIOUS, OR ON EDGE: NOT AT ALL
7. FEELING AFRAID AS IF SOMETHING AWFUL MIGHT HAPPEN: NOT AT ALL
6. BECOMING EASILY ANNOYED OR IRRITABLE: NOT AT ALL
2. NOT BEING ABLE TO STOP OR CONTROL WORRYING: NOT AT ALL

## 2019-02-20 ASSESSMENT — PATIENT HEALTH QUESTIONNAIRE - PHQ9
5. POOR APPETITE OR OVEREATING: NOT AT ALL
SUM OF ALL RESPONSES TO PHQ QUESTIONS 1-9: 0

## 2019-02-20 NOTE — PROGRESS NOTES
"Patient has a history of chronic pain (chronic daily headaches, chronic abdominal pain, fibromyalgia, lumbar radiculopathy), metastatic (to kidney and liver) neuroendocrine carcinoma (colon primary). She has a history of alcohol, narcotic, and gambling addictions. Patient is known to refuse all pain medications other than opioids. She refuses detox and Suboxone. Patient uses street drugs including methamphetamines and cocaine. She admits to heavy alcohol use and buys oxycodone off the street. She also uses bath salts to get high and smokes marijuana regularly. She has been discharged from multiple PCPs and pain clinics due to her abberent drug behaviors. She has been evaluated and followed by addiction medicine, but is unable to acknowledge that she has an addiction. She is frequently seen in the ER for pain related conditions and has an Emergency Care Plan in place due to her drug-seeking behaviors.    She presents today to establish care because her prior primary care provider would not prescribe her opioids. After greeting patient and introducing myself it was less than a minute before she showed me an empty oxycodone bottle and requested more. Explained to patient that, based on her history, I am not comfortable prescribing her opioids. Patient replied \"then what can you do for me?\"  Explained that I can address her primary care needs unrelated to her pain. I would want her to follow with a pain specialist for her pain,  her oncologist for her neuroendocrine carcinoma (she has not followed up as recommended), and her addiction specialist to treat her addiction with suboxone. Patient is not interested in following with a pain specialist, her oncologist, or an addiction specialist.  Patient reports that today's visit \"was a waste of time\" and leaves the room.    Time with patient: 10 minutes.    Christina Carson MD   [facility]  [address] [place] [place] [address]  T: " [phone], F: [phone]

## 2019-02-21 ASSESSMENT — ANXIETY QUESTIONNAIRES: GAD7 TOTAL SCORE: 0

## 2019-03-29 ENCOUNTER — TRANSFERRED RECORDS (OUTPATIENT)
Dept: HEALTH INFORMATION MANAGEMENT | Facility: CLINIC | Age: 54
End: 2019-03-29

## 2019-04-04 DIAGNOSIS — F33.41 RECURRENT MAJOR DEPRESSION IN PARTIAL REMISSION (H): ICD-10-CM

## 2019-04-05 RX ORDER — SERTRALINE HYDROCHLORIDE 100 MG/1
TABLET, FILM COATED ORAL
Qty: 30 TABLET | Refills: 2 | OUTPATIENT
Start: 2019-04-05

## 2019-04-05 NOTE — TELEPHONE ENCOUNTER
Patient requested refill of sertraline (ZOLOFT) 100 MG tablet-- on chart review noted patient had been seen by a provider at [facility]. Per that note, patient was hoping for opioids and was upset and left visit when not prescribed. Patient had visit with [facility] the day before and was informed she would no longer be able to be seen at [facility] as she no longer qualifies. Refill request will not be filled.     Flora Saravia RN  04/05/19  8:52 AM

## 2019-04-13 DIAGNOSIS — F33.41 RECURRENT MAJOR DEPRESSION IN PARTIAL REMISSION (H): ICD-10-CM

## 2019-04-15 RX ORDER — SERTRALINE HYDROCHLORIDE 100 MG/1
TABLET, FILM COATED ORAL
Qty: 30 TABLET | Refills: 2 | OUTPATIENT
Start: 2019-04-15

## 2019-04-15 NOTE — TELEPHONE ENCOUNTER
Patient is no longer [facility] patient. See refill request from 4/4/19.    Flora Saravia RN  04/15/19  9:51 AM

## 2019-04-22 DIAGNOSIS — F33.41 RECURRENT MAJOR DEPRESSION IN PARTIAL REMISSION (H): ICD-10-CM

## 2019-04-22 RX ORDER — SERTRALINE HYDROCHLORIDE 100 MG/1
TABLET, FILM COATED ORAL
Qty: 30 TABLET | Refills: 2 | OUTPATIENT
Start: 2019-04-22

## 2019-04-22 NOTE — TELEPHONE ENCOUNTER
Patient is no longer [facility] patient-- see request from 4/4/19.    Flora Saravia, RN  04/22/19  3:56 PM

## 2019-04-23 ENCOUNTER — DOCUMENTATION ONLY (OUTPATIENT)
Dept: SLEEP MEDICINE | Facility: CLINIC | Age: 54
End: 2019-04-23

## 2019-04-23 NOTE — PROGRESS NOTES
6 month [facility] Recheck Visit     Diagnostic AHI: 69  PSG    Message left for patient to return call     Assessment: Pt not meeting objective benchmarks for compliance     Action plan: waiting for patient to return call.   pt to follow up per provider request       Device type: Auto-CPAP  PAP settings: CPAP min 10.0 cm  H20     CPAP max 12.0 cm  H20    95th% pressure 11.4 cm  H20   Objective measures: 14 day rolling measures      Compliance  28 %      Leak  37.03 lpm  last  upload      AHI 0.57   last  upload      Average number of minutes 169      Objective measure goal  Compliance   Goal >70%  Leak   Goal < 24 lpm  AHI  Goal < 5  Usage  Goal >240

## 2019-05-13 ENCOUNTER — TELEPHONE (OUTPATIENT)
Dept: FAMILY MEDICINE | Facility: CLINIC | Age: 54
End: 2019-05-13

## 2019-05-13 NOTE — TELEPHONE ENCOUNTER
Reason for Call:  Other     Detailed comments: Ines was a [facility] patient. Said can't see him anymore.  Said pain clinc told her to go back to FP doctor.  Please check all the ED visits.  Also would like to find a new doctor but would like to continue with [facility] because he knows her history.    Phone Number Patient can be reached at: Home number on file [phone] (home)    Best Time: today    Can we leave a detailed message on this number? YES    Call taken on 5/13/2019 at 10:44 AM by ROBERT MCKENNA

## 2019-05-13 NOTE — TELEPHONE ENCOUNTER
Patient was called back.     She was told to contact her insurance to find out if she can still see here current PCP.     If so she will call back to set up appointment. Most likely her insurance that is telling her to make the change so she will follow up with them.

## 2019-05-14 ENCOUNTER — PATIENT OUTREACH (OUTPATIENT)
Dept: CARE COORDINATION | Facility: CLINIC | Age: 54
End: 2019-05-14

## 2019-05-14 ENCOUNTER — OFFICE VISIT (OUTPATIENT)
Dept: FAMILY MEDICINE | Facility: CLINIC | Age: 54
End: 2019-05-14
Payer: COMMERCIAL

## 2019-05-14 VITALS
WEIGHT: 259 LBS | DIASTOLIC BLOOD PRESSURE: 74 MMHG | OXYGEN SATURATION: 98 % | BODY MASS INDEX: 41.8 KG/M2 | TEMPERATURE: 96.8 F | HEART RATE: 74 BPM | SYSTOLIC BLOOD PRESSURE: 118 MMHG | RESPIRATION RATE: 16 BRPM

## 2019-05-14 DIAGNOSIS — E53.8 B12 DEFICIENCY: ICD-10-CM

## 2019-05-14 DIAGNOSIS — E55.9 VITAMIN D DEFICIENCY: ICD-10-CM

## 2019-05-14 DIAGNOSIS — E11.9 TYPE 2 DIABETES MELLITUS WITHOUT COMPLICATION, WITHOUT LONG-TERM CURRENT USE OF INSULIN (H): ICD-10-CM

## 2019-05-14 DIAGNOSIS — F10.10 ALCOHOL ABUSE: ICD-10-CM

## 2019-05-14 DIAGNOSIS — E87.6 HYPOKALEMIA: ICD-10-CM

## 2019-05-14 DIAGNOSIS — I10 HYPERTENSION GOAL BP (BLOOD PRESSURE) < 140/80: Chronic | ICD-10-CM

## 2019-05-14 DIAGNOSIS — F11.20 OPIOID USE DISORDER, SEVERE, DEPENDENCE (H): ICD-10-CM

## 2019-05-14 DIAGNOSIS — R25.2 CRAMP OF LIMB: ICD-10-CM

## 2019-05-14 DIAGNOSIS — G47.33 OSA ON CPAP: ICD-10-CM

## 2019-05-14 DIAGNOSIS — J45.21 MILD INTERMITTENT ASTHMA WITH ACUTE EXACERBATION: ICD-10-CM

## 2019-05-14 DIAGNOSIS — C78.7 CARCINOMA OF COLON METASTATIC TO LIVER (H): Primary | ICD-10-CM

## 2019-05-14 DIAGNOSIS — C18.9 CARCINOMA OF COLON METASTATIC TO LIVER (H): Primary | ICD-10-CM

## 2019-05-14 DIAGNOSIS — F33.41 RECURRENT MAJOR DEPRESSION IN PARTIAL REMISSION (H): ICD-10-CM

## 2019-05-14 DIAGNOSIS — E74.39 GLUCOSE INTOLERANCE: ICD-10-CM

## 2019-05-14 LAB — HBA1C MFR BLD: 5.9 % (ref 0–5.6)

## 2019-05-14 PROCEDURE — 36415 COLL VENOUS BLD VENIPUNCTURE: CPT | Performed by: FAMILY MEDICINE

## 2019-05-14 PROCEDURE — 82043 UR ALBUMIN QUANTITATIVE: CPT | Performed by: FAMILY MEDICINE

## 2019-05-14 PROCEDURE — 99214 OFFICE O/P EST MOD 30 MIN: CPT | Performed by: FAMILY MEDICINE

## 2019-05-14 PROCEDURE — 83036 HEMOGLOBIN GLYCOSYLATED A1C: CPT | Performed by: FAMILY MEDICINE

## 2019-05-14 RX ORDER — CYANOCOBALAMIN 1000 UG/ML
1 INJECTION, SOLUTION INTRAMUSCULAR; SUBCUTANEOUS
Qty: 1 ML | Refills: 11 | Status: SHIPPED | OUTPATIENT
Start: 2019-05-14 | End: 2020-02-07

## 2019-05-14 RX ORDER — SERTRALINE HYDROCHLORIDE 100 MG/1
100 TABLET, FILM COATED ORAL DAILY
Qty: 90 TABLET | Refills: 3 | Status: SHIPPED | OUTPATIENT
Start: 2019-05-14 | End: 2019-09-13

## 2019-05-14 RX ORDER — LOSARTAN POTASSIUM AND HYDROCHLOROTHIAZIDE 25; 100 MG/1; MG/1
1 TABLET ORAL DAILY
Qty: 90 TABLET | Refills: 3 | Status: SHIPPED | OUTPATIENT
Start: 2019-05-14 | End: 2020-05-01

## 2019-05-14 RX ORDER — ALBUTEROL SULFATE 0.83 MG/ML
2.5 SOLUTION RESPIRATORY (INHALATION) EVERY 6 HOURS PRN
Qty: 60 VIAL | Refills: 3 | Status: SHIPPED | OUTPATIENT
Start: 2019-05-14

## 2019-05-14 RX ORDER — MULTIVIT-MIN/IRON/FOLIC ACID/K 18-600-40
2000 CAPSULE ORAL DAILY
Qty: 90 TABLET | Refills: 11 | Status: SHIPPED | OUTPATIENT
Start: 2019-05-14 | End: 2020-05-01

## 2019-05-14 RX ORDER — ALBUTEROL SULFATE 0.83 MG/ML
2.5 SOLUTION RESPIRATORY (INHALATION) EVERY 6 HOURS PRN
Qty: 1 BOX | Refills: 0 | Status: SHIPPED | OUTPATIENT
Start: 2019-05-14 | End: 2019-06-17

## 2019-05-14 RX ORDER — POTASSIUM CHLORIDE 750 MG/1
10 TABLET, EXTENDED RELEASE ORAL DAILY
Qty: 120 TABLET | Refills: 11 | Status: SHIPPED | OUTPATIENT
Start: 2019-05-14 | End: 2019-08-06

## 2019-05-14 RX ORDER — AMLODIPINE BESYLATE 5 MG/1
5 TABLET ORAL DAILY
Qty: 90 TABLET | Refills: 3 | Status: SHIPPED | OUTPATIENT
Start: 2019-05-14 | End: 2020-06-17

## 2019-05-14 NOTE — PATIENT INSTRUCTIONS
(C18.9,  C78.7) Carcinoma of colon metastatic to liver (H)  (primary encounter diagnosis)  Comment:    Plan: PAIN MANAGEMENT REFERRAL, Comprehensive         metabolic panel, Lipase             (I10) Hypertension goal BP (blood pressure) < 140/80  Comment:    Plan: losartan-hydrochlorothiazide (HYZAAR) 100-25 MG        tablet, amLODIPine (NORVASC) 5 MG tablet             (F33.41) Recurrent major depression in partial remission (H)  Comment:    Plan: sertraline (ZOLOFT) 100 MG tablet             (E55.9) Vitamin D deficiency  Comment:    Plan: Vitamin D, Cholecalciferol, 1000 units TABS             (J45.21) Mild intermittent asthma with acute exacerbation  Comment:    Plan: albuterol (PROVENTIL) (2.5 MG/3ML) 0.083% neb         solution, albuterol (PROVENTIL) (2.5 MG/3ML)         0.083% neb solution             (E87.6) Hypokalemia  Comment:    Plan: potassium chloride ER (K-TAB/KLOR-CON) 10 MEQ         CR tablet             (R25.2) Cramp of limb  Comment:    Plan: magnesium oxide (MAG-OX) 400 (241.3 Mg) MG         tablet             (T78.2XXA) Anaphylactic reaction  Comment:    Plan:      (E11.9) Type 2 diabetes mellitus without complication, without long-term current use of insulin (H)  Comment:    Plan:       (E53.8) B12 deficiency  Comment:    Plan: cyanocobalamin (CYANOCOBALAMIN) 1000 MCG/ML         injection             (G47.33,  Z99.89) ALEXUS on CPAP  Comment:    Plan: order for DME             (F10.10) Alcohol abuse  Comment:    Plan: PAIN MANAGEMENT REFERRAL             (F11.20) Opioid use disorder, severe, dependence (H)  Comment:    Plan: PAIN MANAGEMENT REFERRAL              (E74.39) Glucose intolerance  Comment:    Plan: Hemoglobin A1c, Lipid panel reflex to direct         LDL Fasting, Albumin Random Urine Quantitative         with Creat Ratio

## 2019-05-14 NOTE — PROGRESS NOTES
Clinic Care Coordination Contact    Situation: Patient chart reviewed by care coordinator.    Background: Pt with complex history including carcinoma of colon metastatic to liver and addiction. Pt has seen various providers, none of which will prescribe opioids for her pain. Pt has been recommended to follow with pain specialist for pain management, referred again today. Pt has not followed with oncology as recommended and has continued to drink and purchase opioids outside of medical care. Pt has frequent ED visits at care facilities outside of  network. Pt has previously been seen by complex primary care but was discharged due to non adherence.     Assessment: Pt seen in clinic this morning by past PCP. SMITH INIGUEZ notified that pt may benefit from care coordination support. Pt was again referred to pain clinic today - [facility]- and encouraged to follow with oncology.     Plan/Recommendations: [facility] will outreach in 2 weeks for follow up. Pt should follow up with PCP in one month.     BRISSA De Anda   Social Work Care Coordinator  [phone]

## 2019-05-14 NOTE — LETTER
May 15, 2019      Ines Mott  [address] [place] [address]  [facility] [address]        Dear ,    We are writing to inform you of your test results.    NORMAL DIABETES URINE PROTEIN TEST   THREE MONTH GLUCOSE AVERAGE   PRE DIABETES LEVEL   SLIGHTLY WORSE THAN 7 MONTHS AGO     Resulted Orders   Hemoglobin A1c   Result Value Ref Range    Hemoglobin A1C 5.9 (H) 0 - 5.6 %      Comment:      Normal <5.7% Prediabetes 5.7-6.4%  Diabetes 6.5% or higher - adopted from ADA   consensus guidelines.     Albumin Random Urine Quantitative with Creat Ratio   Result Value Ref Range    Creatinine Urine 210 mg/dL    Albumin Urine mg/L 14 mg/L    Albumin Urine mg/g Cr 6.62 0 - 25 mg/g Cr       If you have any questions or concerns, please call the clinic at the number listed above.       Sincerely,        OTTO YUSUF MD

## 2019-05-14 NOTE — PROGRESS NOTES
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 strengthening exercises 30 repetitions twice daily of involved joints            .OTTO YUSUF MD 5/14/2019 11:03 AM  May 14, 2019

## 2019-05-14 NOTE — LETTER
My Asthma Action Plan  Name: Ines Mott   YOB: 1965  Date: 5/14/2019   My doctor: OTTO YUSUF MD   My clinic: [facility]        My Control Medicine: { :567327}  My Rescue Medicine: { :590532}  {AAP include Oral Steroid:701586} My Asthma Severity: { :694118}  Avoid your asthma triggers: { :683335}  None     {Is patient a child or adult?:141128}       GREEN ZONE   Good Control    I feel good    No cough or wheeze    Can work, sleep and play without asthma symptoms       Take your asthma control medicine every day.     1. If exercise triggers your asthma, take your rescue medication    15 minutes before exercise or sports, and    During exercise if you have asthma symptoms  2. Spacer to use with inhaler: If you have a spacer, make sure to use it with your inhaler             YELLOW ZONE Getting Worse  I have ANY of these:    I do not feel good    Cough or wheeze    Chest feels tight    Wake up at night   1. Keep taking your Green Zone medications  2. Start taking your rescue medicine:    every 20 minutes for up to 1 hour. Then every 4 hours for 24-48 hours.  3. If you stay in the Yellow Zone for more than 12-24 hours, contact your doctor.  4. If you do not return to the Green Zone in 12-24 hours or you get worse, start taking your oral steroid medicine if prescribed by your provider.           RED ZONE Medical Alert - Get Help  I have ANY of these:    I feel awful    Medicine is not helping    Breathing getting harder    Trouble walking or talking    Nose opens wide to breathe       1. Take your rescue medicine NOW  2. If your provider has prescribed an oral steroid medicine, start taking it NOW  3. Call your doctor NOW  4. If you are still in the Red Zone after 20 minutes and you have not reached your doctor:    Take your rescue medicine again and    Call 911 or go to the emergency room right away    See your regular doctor within 2 weeks of an Emergency  Room or Urgent Care visit for follow-up treatment.          Annual Reminders:  Meet with Asthma Educator,  Flu Shot in the Fall, consider Pneumonia Vaccination for patients with asthma (aged 19 and older).    Pharmacy: [facility] [address] [place] [address]                      Asthma Triggers  How To Control Things That Make Your Asthma Worse    Triggers are things that make your asthma worse.  Look at the list below to help you find your triggers and what you can do about them.  You can help prevent asthma flare-ups by staying away from your triggers.      Trigger                                                          What you can do   Cigarette Smoke  Tobacco smoke can make asthma worse. Do not allow smoking in your home, car or around you.  Be sure no one smokes at a child s day care or school.  If you smoke, ask your health care provider for ways to help you quit.  Ask family members to quit too.  Ask your health care provider for a referral to Quit Plan to help you quit smoking, or call 3-658-402-PLAN.     Colds, Flu, Bronchitis  These are common triggers of asthma. Wash your hands often.  Don t touch your eyes, nose or mouth.  Get a flu shot every year.     Dust Mites  These are tiny bugs that live in cloth or carpet. They are too small to see. Wash sheets and blankets in hot water every week.   Encase pillows and mattress in dust mite proof covers.  Avoid having carpet if you can. If you have carpet, vacuum weekly.   Use a dust mask and HEPA vacuum.   Pollen and Outdoor Mold  Some people are allergic to trees, grass, or weed pollen, or molds. Try to keep your windows closed.  Limit time out doors when pollen count is high.   Ask you health care provider about taking medicine during allergy season.     Animal Dander  Some people are allergic to skin flakes, urine or saliva from pets with fur or feathers. Keep pets with fur or feathers out of your home.    If you can t keep the pet  outdoors, then keep the pet out of your bedroom.  Keep the bedroom door closed.  Keep pets off cloth furniture and away from stuffed toys.     Mice, Rats, and Cockroaches  Some people are allergic to the waste from these pests.   Cover food and garbage.  Clean up spills and food crumbs.  Store grease in the refrigerator.   Keep food out of the bedroom.   Indoor Mold  This can be a trigger if your home has high moisture. Fix leaking faucets, pipes, or other sources of water.   Clean moldy surfaces.  Dehumidify basement if it is damp and smelly.   Smoke, Strong Odors, and Sprays  These can reduce air quality. Stay away from strong odors and sprays, such as perfume, powder, hair spray, paints, smoke incense, paint, cleaning products, candles and new carpet.   Exercise or Sports  Some people with asthma have this trigger. Be active!  Ask your doctor about taking medicine before sports or exercise to prevent symptoms.    Warm up for 5-10 minutes before and after sports or exercise.     Other Triggers of Asthma  Cold air:  Cover your nose and mouth with a scarf.  Sometimes laughing or crying can be a trigger.  Some medicines and food can trigger asthma.

## 2019-05-15 ENCOUNTER — TRANSFERRED RECORDS (OUTPATIENT)
Dept: HEALTH INFORMATION MANAGEMENT | Facility: CLINIC | Age: 54
End: 2019-05-15

## 2019-05-15 LAB
CREAT UR-MCNC: 210 MG/DL
MICROALBUMIN UR-MCNC: 14 MG/L
MICROALBUMIN/CREAT UR: 6.62 MG/G CR (ref 0–25)
RETINOPATHY: NEGATIVE

## 2019-05-15 ASSESSMENT — ASTHMA QUESTIONNAIRES: ACT_TOTALSCORE: 13

## 2019-06-06 DIAGNOSIS — J45.20 MILD INTERMITTENT ASTHMA WITHOUT COMPLICATION: ICD-10-CM

## 2019-06-06 NOTE — TELEPHONE ENCOUNTER
"Requested Prescriptions   Pending Prescriptions Disp Refills     albuterol (PROAIR HFA/PROVENTIL HFA/VENTOLIN HFA) 108 (90 Base) MCG/ACT inhaler [Pharmacy Med Name: ALBUTEROL HFA 90MCG  AER]  DISCONTINUED  Last Written Prescription Date:  1/25/2018 END: 2/20/2019  Last Fill Quantity: 1 Inhaler,  # refills: 1   Last office visit: 5/14/2019 with prescribing provider:  PAVAN Fatima   Future Office Visit:   Next 5 appointments (look out 90 days)    Jun 17, 2019 10:30 AM CDT  [facility] [address] [address] [phone]           2     Sig: INHALE TWO PUFFS BY MOUTH EVERY 4 HOURS AS NEEDED FOR SHORTNESS OF BREATH/DYSPNEA       Asthma Maintenance Inhalers - Anticholinergics Failed - 6/6/2019 12:43 PM        Failed - Asthma control assessment score within normal limits in last 6 months     Please review ACT score.   ACT Total Scores 5/4/2018 10/10/2018 5/14/2019   ACT TOTAL SCORE - - -   ASTHMA ER VISITS - - -   ASTHMA HOSPITALIZATIONS - - -   ACT TOTAL SCORE (Goal Greater than or Equal to 20) 20 18 13   In the past 12 months, how many times did you visit the emergency room for your asthma without being admitted to the hospital? 0 0 0   In the past 12 months, how many times were you hospitalized overnight because of your asthma? 0 0 0             Passed - Patient is age 12 years or older        Passed - Medication is active on med list        Passed - Recent (6 mo) or future (30 days) visit within the authorizing provider's specialty     Patient had office visit in the last 6 months or has a visit in the next 30 days with authorizing provider or within the authorizing provider's specialty.  See \"Patient Info\" tab in inbasket, or \"Choose Columns\" in Meds & Orders section of the refill encounter.               "

## 2019-06-07 RX ORDER — ALBUTEROL SULFATE 90 UG/1
AEROSOL, METERED RESPIRATORY (INHALATION)
Qty: 18 G | Refills: 3 | Status: SHIPPED | OUTPATIENT
Start: 2019-06-07

## 2019-06-12 ENCOUNTER — PATIENT OUTREACH (OUTPATIENT)
Dept: CARE COORDINATION | Facility: CLINIC | Age: 54
End: 2019-06-12

## 2019-06-12 NOTE — PROGRESS NOTES
"Clinic Care Coordination Contact    Clinic Care Coordination Contact  OUTREACH    Primary Diagnosis: Oncology  Chief Complaint   Patient presents with     Clinic Care Coordination - Follow-up      Universal Utilization: Pt has utilized multiple care systems and several providers. Non compliant with many no shows.   Utilization    Last refreshed: 6/8/2019  1:06 PM:  Hospital Admissions 0           Last refreshed: 6/8/2019  1:06 PM:  ED Visits 4           Last refreshed: 6/8/2019  1:06 PM:  No Show Count (past year) 13              Current as of: 6/8/2019  1:06 PM            Clinical Concerns:  Pt not feeling well today and believes she has an upper respiratory infection. Pt already scheduled for PCP appointment on 6/17.     Pt reports since last PCP visit she has not followed up with oncology or palliative care. Pt reported that she did not recall needing to follow up as she has a \"slow moving cancer\". Pt agreeable to SMITH INIGUEZ inquiring with oncology clinic what next steps would be and to verify that she should be seen at this time.     SMITH INIGUEZ reached [facility]. Reviewed pt's past non compliance, including several missed scans, and inquired what pt's next steps should be. Pt should schedule scan ([phone] -was not notified that new orders would be needed) and then schedule with Dr Salmeron ([phone])    Medication Management:  Pt reports she was declined by pain clinic that PCP referred her to at last OV. Pt has likely exhausted her options for pain clinics and also seen several PCPs. Pt continues to decline any pain management options that are not opioids.    Diet/Exercise/Sleep:  Pt reported she has not had working CPAP for over a month now. SMITH INIGUEZ can see this was brought up at [facility] visit on 5/7/19. Pt report she does not know if it was shut off or is broken.     SMITH INIGUEZ called [place] [facility] and was transferred to medical device. Passed on the information that pt had " shared and staff likely thought it was either a use error or it was broken. Suggested pt present in person to nearest location, [place], to have it looked at in person and instruction provided.    Future Appointments              In 5 days Dustin Fatima MD [facility]        Plan: SMITH CC notified pt of updates. SMITH CC will mail letter to pt with information about how to schedule oncology scan and appt as well as medical device information. Pt will attend OV 6/14. SMITH CC will outreach for follow up in one week.     BRISSA De Anda   Social Work Care Coordinator  [phone]

## 2019-06-12 NOTE — LETTER
[facility] CARE COORDINATION    June 12, 2019    Ines Mott  [address] [id number] [facility] [address]  [place] [address]      Dear Ines,    I am a clinic care coordinator who works at [employer]. Thank you for spending the time to talk with me.  I have included some helpful information for scheduling with oncology and getting your CPAP checked out.     To schedule a scan prior to your oncology appointment call [phone]  To schedule your oncology appointment call [phone]    You can get your CPAP checked out      [address], [place] [address]        Please feel free to contact me at [phone], with any questions or concerns. We at [facility] are focused on providing you with the highest-quality healthcare experience possible and that all starts with you.     Sincerely,     Srea Valencia

## 2019-06-17 ENCOUNTER — OFFICE VISIT (OUTPATIENT)
Dept: FAMILY MEDICINE | Facility: CLINIC | Age: 54
End: 2019-06-17
Payer: COMMERCIAL

## 2019-06-17 VITALS
OXYGEN SATURATION: 99 % | WEIGHT: 259 LBS | TEMPERATURE: 97.4 F | DIASTOLIC BLOOD PRESSURE: 84 MMHG | SYSTOLIC BLOOD PRESSURE: 134 MMHG | RESPIRATION RATE: 18 BRPM | HEART RATE: 76 BPM | BODY MASS INDEX: 41.8 KG/M2

## 2019-06-17 DIAGNOSIS — G89.4 CHRONIC PAIN SYNDROME: Primary | ICD-10-CM

## 2019-06-17 DIAGNOSIS — E78.5 HYPERLIPIDEMIA LDL GOAL <100: ICD-10-CM

## 2019-06-17 DIAGNOSIS — Z13.220 SCREENING FOR HYPERLIPIDEMIA: ICD-10-CM

## 2019-06-17 DIAGNOSIS — J20.9 ACUTE BRONCHITIS, UNSPECIFIED ORGANISM: ICD-10-CM

## 2019-06-17 PROCEDURE — 99214 OFFICE O/P EST MOD 30 MIN: CPT | Performed by: FAMILY MEDICINE

## 2019-06-17 RX ORDER — AZITHROMYCIN 500 MG/1
500 TABLET, FILM COATED ORAL DAILY
Qty: 3 TABLET | Refills: 0 | Status: SHIPPED | OUTPATIENT
Start: 2019-06-17 | End: 2019-10-01

## 2019-06-17 RX ORDER — BENZONATATE 100 MG/1
100 CAPSULE ORAL 3 TIMES DAILY PRN
Qty: 30 CAPSULE | Refills: 1 | Status: SHIPPED | OUTPATIENT
Start: 2019-06-17 | End: 2021-04-09

## 2019-06-17 NOTE — PATIENT INSTRUCTIONS
(Z13.220) Screening for hyperlipidemia  (primary encounter diagnosis)  Comment:    Plan: Lipid panel reflex to direct LDL Fasting             (Z68.41) BMI 40.0-44.9, adult (H)  Comment:     Plan: TSH WITH FREE T4 REFLEX             (E78.5) Hyperlipidemia LDL goal <100  Comment:    Plan: TSH WITH FREE T4 REFLEX             (G89.4) Chronic pain syndrome  Comment:    Plan: PAIN MANAGEMENT REFERRAL, CANCELED: PAIN         MANAGEMENT REFERRAL             (J20.9) Acute bronchitis, unspecified organism  Comment:    Plan: azithromycin (ZITHROMAX) 500 MG tablet,         benzonatate (TESSALON) 100 MG capsule
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
## 2019-06-18 ASSESSMENT — ASTHMA QUESTIONNAIRES: ACT_TOTALSCORE: 11

## 2019-06-19 ENCOUNTER — PATIENT OUTREACH (OUTPATIENT)
Dept: CARE COORDINATION | Facility: CLINIC | Age: 54
End: 2019-06-19

## 2019-06-19 NOTE — PROGRESS NOTES
Clinic Care Coordination Contact  [facility]/Voicemail       Clinical Data: Care Coordinator Outreach following up on initial letter mailed with information about oncology, scan and CPAP needs.  Outreach attempted x 1.  Left message on voicemail with call back information and requested return call.  Plan: Care Coordinator will try to reach patient again in 2 weeks.  BRISSA De Anda   Social Work Care Coordinator  [phone]

## 2019-06-22 ENCOUNTER — APPOINTMENT (OUTPATIENT)
Dept: GENERAL RADIOLOGY | Facility: CLINIC | Age: 54
End: 2019-06-22
Attending: EMERGENCY MEDICINE
Payer: COMMERCIAL

## 2019-06-22 ENCOUNTER — HOSPITAL ENCOUNTER (EMERGENCY)
Facility: CLINIC | Age: 54
Discharge: HOME | End: 2019-06-22
Attending: EMERGENCY MEDICINE | Admitting: EMERGENCY MEDICINE
Payer: COMMERCIAL

## 2019-06-22 VITALS
BODY MASS INDEX: 40.34 KG/M2 | OXYGEN SATURATION: 95 % | HEIGHT: 67 IN | HEART RATE: 76 BPM | TEMPERATURE: 97 F | RESPIRATION RATE: 18 BRPM | SYSTOLIC BLOOD PRESSURE: 140 MMHG | DIASTOLIC BLOOD PRESSURE: 73 MMHG | WEIGHT: 257 LBS

## 2019-06-22 DIAGNOSIS — M25.512 ACUTE PAIN OF LEFT SHOULDER: ICD-10-CM

## 2019-06-22 DIAGNOSIS — J40 BRONCHITIS: ICD-10-CM

## 2019-06-22 LAB
ALBUMIN SERPL-MCNC: 3.3 G/DL (ref 3.4–5)
ALP SERPL-CCNC: 82 U/L (ref 40–150)
ALT SERPL W P-5'-P-CCNC: 25 U/L (ref 0–50)
ANION GAP SERPL CALCULATED.3IONS-SCNC: 7 MMOL/L (ref 3–14)
AST SERPL W P-5'-P-CCNC: 9 U/L (ref 0–45)
BASOPHILS # BLD AUTO: 0 10E9/L (ref 0–0.2)
BASOPHILS NFR BLD AUTO: 0.2 %
BILIRUB SERPL-MCNC: 1.1 MG/DL (ref 0.2–1.3)
BUN SERPL-MCNC: 12 MG/DL (ref 7–30)
CALCIUM SERPL-MCNC: 9.3 MG/DL (ref 8.5–10.1)
CHLORIDE SERPL-SCNC: 105 MMOL/L (ref 94–109)
CO2 SERPL-SCNC: 29 MMOL/L (ref 20–32)
CREAT SERPL-MCNC: 0.88 MG/DL (ref 0.52–1.04)
DIFFERENTIAL METHOD BLD: NORMAL
EOSINOPHIL # BLD AUTO: 0.3 10E9/L (ref 0–0.7)
EOSINOPHIL NFR BLD AUTO: 3.2 %
ERYTHROCYTE [DISTWIDTH] IN BLOOD BY AUTOMATED COUNT: 13.6 % (ref 10–15)
GFR SERPL CREATININE-BSD FRML MDRD: 74 ML/MIN/{1.73_M2}
GLUCOSE SERPL-MCNC: 94 MG/DL (ref 70–99)
HCT VFR BLD AUTO: 36.3 % (ref 35–47)
HGB BLD-MCNC: 12 G/DL (ref 11.7–15.7)
IMM GRANULOCYTES # BLD: 0 10E9/L (ref 0–0.4)
IMM GRANULOCYTES NFR BLD: 0.3 %
INTERPRETATION ECG - MUSE: NORMAL
LYMPHOCYTES # BLD AUTO: 2.9 10E9/L (ref 0.8–5.3)
LYMPHOCYTES NFR BLD AUTO: 28 %
MCH RBC QN AUTO: 29.6 PG (ref 26.5–33)
MCHC RBC AUTO-ENTMCNC: 33.1 G/DL (ref 31.5–36.5)
MCV RBC AUTO: 89 FL (ref 78–100)
MONOCYTES # BLD AUTO: 0.7 10E9/L (ref 0–1.3)
MONOCYTES NFR BLD AUTO: 7 %
NEUTROPHILS # BLD AUTO: 6.4 10E9/L (ref 1.6–8.3)
NEUTROPHILS NFR BLD AUTO: 61.3 %
NRBC # BLD AUTO: 0 10*3/UL
NRBC BLD AUTO-RTO: 0 /100
PLATELET # BLD AUTO: 353 10E9/L (ref 150–450)
POTASSIUM SERPL-SCNC: 2.8 MMOL/L (ref 3.4–5.3)
PROT SERPL-MCNC: 7.1 G/DL (ref 6.8–8.8)
RBC # BLD AUTO: 4.06 10E12/L (ref 3.8–5.2)
SODIUM SERPL-SCNC: 141 MMOL/L (ref 133–144)
TROPONIN I SERPL-MCNC: <0.015 UG/L (ref 0–0.04)
WBC # BLD AUTO: 10.5 10E9/L (ref 4–11)

## 2019-06-22 PROCEDURE — 99285 EMERGENCY DEPT VISIT HI MDM: CPT | Mod: 25

## 2019-06-22 PROCEDURE — 96365 THER/PROPH/DIAG IV INF INIT: CPT

## 2019-06-22 PROCEDURE — 85025 COMPLETE CBC W/AUTO DIFF WBC: CPT | Performed by: EMERGENCY MEDICINE

## 2019-06-22 PROCEDURE — 25000128 H RX IP 250 OP 636: Performed by: EMERGENCY MEDICINE

## 2019-06-22 PROCEDURE — 71046 X-RAY EXAM CHEST 2 VIEWS: CPT

## 2019-06-22 PROCEDURE — 84484 ASSAY OF TROPONIN QUANT: CPT | Performed by: EMERGENCY MEDICINE

## 2019-06-22 PROCEDURE — 25000125 ZZHC RX 250: Performed by: EMERGENCY MEDICINE

## 2019-06-22 PROCEDURE — 93005 ELECTROCARDIOGRAM TRACING: CPT

## 2019-06-22 PROCEDURE — 80053 COMPREHEN METABOLIC PANEL: CPT | Performed by: EMERGENCY MEDICINE

## 2019-06-22 PROCEDURE — 94640 AIRWAY INHALATION TREATMENT: CPT

## 2019-06-22 PROCEDURE — 25000132 ZZH RX MED GY IP 250 OP 250 PS 637: Performed by: EMERGENCY MEDICINE

## 2019-06-22 RX ORDER — POTASSIUM CHLORIDE 1.5 G/1.58G
40 POWDER, FOR SOLUTION ORAL ONCE
Status: COMPLETED | OUTPATIENT
Start: 2019-06-22 | End: 2019-06-22

## 2019-06-22 RX ORDER — ALBUTEROL SULFATE 5 MG/ML
5 SOLUTION RESPIRATORY (INHALATION) EVERY 6 HOURS PRN
Status: DISCONTINUED | OUTPATIENT
Start: 2019-06-22 | End: 2019-06-22 | Stop reason: HOSPADM

## 2019-06-22 RX ORDER — MAGNESIUM SULFATE HEPTAHYDRATE 40 MG/ML
2 INJECTION, SOLUTION INTRAVENOUS ONCE
Status: COMPLETED | OUTPATIENT
Start: 2019-06-22 | End: 2019-06-22

## 2019-06-22 RX ADMIN — MAGNESIUM SULFATE HEPTAHYDRATE 2 G: 40 INJECTION, SOLUTION INTRAVENOUS at 21:06

## 2019-06-22 RX ADMIN — ALBUTEROL SULFATE 5 MG: 2.5 SOLUTION RESPIRATORY (INHALATION) at 20:28

## 2019-06-22 RX ADMIN — POTASSIUM CHLORIDE 40 MEQ: 1.5 POWDER, FOR SOLUTION ORAL at 21:06

## 2019-06-22 ASSESSMENT — ENCOUNTER SYMPTOMS
ARTHRALGIAS: 1
FEVER: 0
COUGH: 1
BACK PAIN: 1
NECK PAIN: 1

## 2019-06-22 ASSESSMENT — MIFFLIN-ST. JEOR: SCORE: 1790.43

## 2019-06-22 NOTE — ED AVS SNAPSHOT
Emergency Department  [address] [place] [address]  Phone:  [phone]  Fax:  [phone]                                    Ines Mott   MRN: [id number]    Department:   Emergency Department   Date of Visit:  6/22/2019           After Visit Summary Signature Page    I have received my discharge instructions, and my questions have been answered. I have discussed any challenges I see with this plan with the nurse or doctor.    ..........................................................................................................................................  Patient/Patient Representative Signature      ..........................................................................................................................................  Patient Representative Print Name and Relationship to Patient    ..................................................               ................................................  Date                                   Time    ..........................................................................................................................................  Reviewed by Signature/Title    ...................................................              ..............................................  Date                                               Time          22EPIC Rev 08/18

## 2019-06-23 NOTE — ED PROVIDER NOTES
"  History     Chief Complaint:  Shoulder Pain    HPI   Ines Mott is a 54 year old female with a history of chronic pain syndrome, spina bifida, migraines, diabetes, methamphetamine abuse, alcohol abuse, opioid abuse, and fibromyalgia who presents to the emergency department for evaluation of neck and shoulder pain. The patient notes that her symptoms began with a cough that started three weeks ago. She was seen previously for her cough by her PCP, and is currently on azithromycin. In addition to cough, she now is experiencing left shoulder pain, radiating down into her upper back. This is new as of two days ago. She complains that it hurts to take a deep breath. The cough that she has with the neck and shoulder pain is productive of brown phlegm. She denies fever or current anticoagulation. Of note, she states that her liver \"is acting up\" as well.    Allergies:  Amoxicillin  Ativan [Lorazepam]  Buspirone  Ciprofloxacin  Macrobid [Nitrofurantoin]  Busulfan  Cefaclor  Cefdinir  Cephalosporins  Clindamycin  Dilaudid [Hydromorphone]  Diphenhydramine  Imitrex [Sumatriptan Succinate]  Ketorolac Tromethamine  Lansoprazole  Latex  Metoclopramide  Moxifloxacin  No Clinical Screening - See Comments  Nsaids  Paroxetine  Prednisone  Quinolones  Sumatriptan  Blue Dyes  Lidoderm [Lidocaine]  Penicillins  Red Dye  Sulfa Drugs  Vaccinium Angustifolium     Medications:    Albuterol inhaler   Albuterol neb solution  Tessalone  Amlodipine  Azithromycin  Epipen  Lactobacillus-inulin  Hyzaar  Miralax   Potassium chloride ER   Zoloft     Past Medical History:    Arthritis   Asthma  Cancer   Chronic pain disorder  Diabetes   Hydrocephalus  Hypertension  Malignant neoplasm, liver  Retroperitoneal hematoma  Renal cell carcinoma  Opioid use disorder, sever, dependence   Acute coronary syndrome  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   Migraines   Obesity  Spina bifida    Methamphetamine abuse    Past Surgical History:  " "  Appendectomy   Cholecystectomy   Extractions dental  Hysterectomy   Implant shunt ventriculoperitoneal     Family History:    Father: Cancer     Social History:  The patient was alone to the ED  Smoking Status: former smoker  Smokeless Tobacco: Never used   Alcohol Use: No  Former heroin user  Marital Status:        Review of Systems   Constitutional: Negative for fever.   HENT: Positive for congestion.    Respiratory: Positive for cough.    Musculoskeletal: Positive for arthralgias, back pain and neck pain.        Shoulder pain   All other systems reviewed and are negative.      Physical Exam     Patient Vitals for the past 24 hrs:   BP Temp Temp src Pulse Resp SpO2 Height Weight   06/22/19 1955 137/74 97  F (36.1  C) Tympanic 76 18 98 % 1.689 m (5' 6.5\") 116.6 kg (257 lb)       Physical Exam  Vitals: reviewed by me  General: Pt seen on [facility], [facility], cooperative, and alert to conversation  Eyes: Tracking well, clear conjunctiva BL  ENT: MMM, midline trachea.   Lungs:   No tachypnea, no accessory muscle use. No respiratory distress.   CV: Rate as above, regular rhythm.    Abd: Soft, non tender, no guarding, no rebound. Non distended  MSK: no peripheral edema or joint effusion.  No evidence of trauma  Skin: No rash, normal turgor and temperature  Neuro: Clear speech and no facial droop.  Psych: Not RIS, no e/o AH/VH      Emergency Department Course   ECG:  Indication: Shoulder pain  Time: 2139  Vent. Rate 72 bpm. AL interval 178. QRS duration 106. QT/QTc 502/549. P-R-T axis 28 -24 19.  Normal sinus rhythm. Minimal voltage criteria for LVH, may be normal variants. Inferior infarct, age undetermined. Cannot rule out anterior infarct, age undermined. Abnormal EKG. Read time:  2145    Imaging:  XR Chest 2 views:    IMPRESSION: No acute abnormality. Lungs are well-inflated and clear.  Heart size is normal. Right internal jugular central line tip is  within the superior right atrium, unchanged.        " as per radiology.      Laboratory:  CBC: AWNL (WBC: 10.5, HGB: 12.0, PLT: 353)  CMP: Potassium 2.8 (L), albumin 3.3 (L) o/w WNL (Creatinine: 0.88)    2025 Troponin I <0.015    Interventions:  2028 Albuterol  5 mg neubulization  2106 Potassium chloride 40 mEq PO  2106 Magnesium sulfate 2 g IV      Emergency Department Course:    2100 Nursing notes and vitals reviewed.    2104 I performed an exam of the patient as documented above.     2125 I rechecked on the patient     2150 I personally reviewed the lab results with the patient and answered all related questions prior to discharge.    Findings and plan explained to the Patient. Patient discharged home with instructions regarding supportive care, medications, and reasons to return. The importance of close follow-up was reviewed.    Impression & Plan      Medical Decision Making:  Ines Mott is a very pleasant 54 year old female who presents to the emergency roomtoday shoulder pain for several weeks as well as a cough for that time as well. Patient is well know to this emergency room and fortunately has a negative EKG, negative troponin, normal chest Xray, and feels improve after taking her neb. She feels improved after work up here and appear to have low potassium likely related to her diuretics. We have gone over why she should taking her home potassium, and that she will take a double dose tomorrow and have her primary doctor recheck her potassium on Monday. She feels improved here after repletion fluids and magnessium, is asking to go home. I do think this is reasonable as she appears to be stable for outpatient management. Red flags for her to come back were discussed.    Diagnosis:    ICD-10-CM   1. Bronchitis J40   2. Acute pain of left shoulder M25.512     Disposition:   Discharged to home      Scribe Disclosure:  Jessica CONDE, am serving as a scribe at 9:04 PM on 6/22/2019 to document services personally performed by Zachary Albright MD  based on my observations and the provider's statements to me.    Scribe Disclosure:  I, Moira Rizvi, am serving as a scribe at 10:31 PM on 6/22/2019 to document services personally performed by  Zachary Albright MD based on my observations and the provider's statements to me.      Zachary Albright MD  06/24/19 1125

## 2019-06-23 NOTE — ED TRIAGE NOTES
Cough for 3 weeks, then 2 days ago developed left shoulder pain that radiates down into shoulder blade and back

## 2019-06-25 ENCOUNTER — DOCUMENTATION ONLY (OUTPATIENT)
Dept: SLEEP MEDICINE | Facility: CLINIC | Age: 54
End: 2019-06-25

## 2019-06-25 DIAGNOSIS — G47.33 OSA ON CPAP: ICD-10-CM

## 2019-06-25 NOTE — PROGRESS NOTES
Pt walked into the [facility] sleep lab today as a result of letters and phone calls telling pt to come in. Pt did not understand compliance or what was required of her, pt had no idea she had failed to meet compliance. Pt was educated on compliance what is required of her as the pt. Pt was then educated on restarting therapy and how to go about doing so and what it will require of her. Pt and I went over the standard compliance requirements again; we went over the rental of her current device if she wishes to keep it until she gets new f2f, sleep study and rx. Pt and I also went over the second compliance period and how pt is responsible for the payments of the three months if they are not compliant. Pt and I discussed the compliance period and what she would be responsible for if she decided to go forward with restarting therapy. Pt seemed to understand and was agreeable to restart. Pt scheduled a follow up with [facility] on Monday August 19, 2019 at 10am. Pt decided to rent her current machine until the restart compliance period starts. Pt was sent to the [facility] showroom to process cash payment for the first month s rent.

## 2019-07-02 ENCOUNTER — DOCUMENTATION ONLY (OUTPATIENT)
Dept: SLEEP MEDICINE | Facility: CLINIC | Age: 54
End: 2019-07-02

## 2019-07-02 DIAGNOSIS — G47.33 OSA ON CPAP: ICD-10-CM

## 2019-07-02 NOTE — PROGRESS NOTES
Pt called the [place] sleep lab regarding her mask. Pt states her mask cushion is ripped and does not work. Pt would like new supplies. Pt and i discussed compliance again and pt's current standing with insurance, cpap and compliance. Pt was not sure of compliance and did not remember most of what we had previously discussed in reference to compliance. Pt and i went over compliance and eligibility, pt was explained to why she is not eligible for supplies at this time. Pt understood. I explained to pt that there are online resources for supplies, i looked up f20 cushions on cpap.com, pt stated she cannot afford that. Pt offered her family as support as many of them use cpap, there might be an extra mask amongst them. Pt scheduled a mask fitting for Monday July 8, 2019 at 9am.

## 2019-07-08 ENCOUNTER — DOCUMENTATION ONLY (OUTPATIENT)
Dept: SLEEP MEDICINE | Facility: CLINIC | Age: 54
End: 2019-07-08

## 2019-07-08 DIAGNOSIS — G47.33 OSA ON CPAP: ICD-10-CM

## 2019-07-08 NOTE — PROGRESS NOTES
Pt no showed to appointment this morning. Called pt regarding rescheduling, Pt stated she forgot about her appointment and would like to reschedule for Monday July 15, 2019 at 10:30 am. Pt reports having taped the rip on her mask and it functions alright now it is manageable until her appointment.

## 2019-07-15 ENCOUNTER — OFFICE VISIT (OUTPATIENT)
Dept: SLEEP MEDICINE | Facility: CLINIC | Age: 54
End: 2019-07-15
Payer: COMMERCIAL

## 2019-07-15 ENCOUNTER — DOCUMENTATION ONLY (OUTPATIENT)
Dept: SLEEP MEDICINE | Facility: CLINIC | Age: 54
End: 2019-07-15
Payer: COMMERCIAL

## 2019-07-15 VITALS
SYSTOLIC BLOOD PRESSURE: 141 MMHG | WEIGHT: 260 LBS | DIASTOLIC BLOOD PRESSURE: 80 MMHG | HEART RATE: 61 BPM | HEIGHT: 66 IN | BODY MASS INDEX: 41.78 KG/M2 | OXYGEN SATURATION: 98 % | RESPIRATION RATE: 16 BRPM

## 2019-07-15 DIAGNOSIS — G47.33 OSA ON CPAP: Primary | ICD-10-CM

## 2019-07-15 DIAGNOSIS — G47.00 INSOMNIA, UNSPECIFIED TYPE: ICD-10-CM

## 2019-07-15 DIAGNOSIS — D64.9 LOW HEMOGLOBIN: ICD-10-CM

## 2019-07-15 DIAGNOSIS — G25.81 RESTLESS LEGS SYNDROME (RLS): ICD-10-CM

## 2019-07-15 DIAGNOSIS — G47.33 OSA ON CPAP: ICD-10-CM

## 2019-07-15 PROCEDURE — 99214 OFFICE O/P EST MOD 30 MIN: CPT | Performed by: PHYSICIAN ASSISTANT

## 2019-07-15 ASSESSMENT — MIFFLIN-ST. JEOR: SCORE: 1803.97

## 2019-07-15 NOTE — PATIENT INSTRUCTIONS
Try to use the CPAP every-night, all night (minimum of 4 hours). Many insurances require that we prove you are using the CPAP at least 4 hours on at least 70% of nights over a 30 day period. We have 90 days to meet those criteria.               Get your blood drawn to check ferritin an other iron measures to see if that is contributing to restless legs.     Your BMI is Body mass index is 41.34 kg/m .  Weight management is a personal decision.  If you are interested in exploring weight loss strategies, the following discussion covers the approaches that may be successful. Body mass index (BMI) is one way to tell whether you are at a healthy weight, overweight, or obese. It measures your weight in relation to your height.  A BMI of 18.5 to 24.9 is in the healthy range. A person with a BMI of 25 to 29.9 is considered overweight, and someone with a BMI of 30 or greater is considered obese. More than two-thirds of American adults are considered overweight or obese.  Being overweight or obese increases the risk for further weight gain. Excess weight may lead to heart disease and diabetes.  Creating and following plans for healthy eating and physical activity may help you improve your health.  Weight control is part of healthy lifestyle and includes exercise, emotional health, and healthy eating habits. Careful eating habits lifelong are the mainstay of weight control. Though there are significant health benefits from weight loss, long-term weight loss with diet alone may be very difficult to achieve- studies show long-term success with dietary management in less than 10% of people. Attaining a healthy weight may be especially difficult to achieve in those with severe obesity. In some cases, medications, devices and surgical management might be considered.  What can you do?  If you are overweight or obese and are interested in methods for weight loss, you should discuss this with your provider.     Consider reducing daily  calorie intake by 500 calories.     Keep a food journal.     Avoiding skipping meals, consider cutting portions instead.    Diet combined with exercise helps maintain muscle while optimizing fat loss. Strength training is particularly important for building and maintaining muscle mass. Exercise helps reduce stress, increase energy, and improves fitness. Increasing exercise without diet control, however, may not burn enough calories to loose weight.       Start walking three days a week 10-20 minutes at a time    Work towards walking thirty minutes five days a week     Eventually, increase the speed of your walking for 1-2 minutes at time    In addition, we recommend that you review healthy lifestyles and methods for weight loss available through the National Institutes of Health patient information sites:  http://win.niddk.nih.gov/publications/index.htm    And look into health and wellness programs that may be available through your health insurance provider, employer, local community center, or bree club.    Weight management plan: Patient was referred to their PCP to discuss a diet and exercise plan.

## 2019-07-15 NOTE — PROGRESS NOTES
Pt came into the [facility] sleep lab to resolve a broken mask issue. Pt is ineligible for supplies at this time due to compliance failure. Pt has rented her current machine to keep it during the turnover of getting new f2f and sleep study. [facility] had an opening today at the same time as pt was here. We rescheduled pt's restart compliance appointment. Pt saw [facility] and scheduled a new sleep study.   Pt saw DME after f2f visit. Pt current mask is the resmed airfit f20 size med. Pt's cushion is torn and will not seal pt has tried taping it but it remains unsuccessful.   Sleep lab dme had an extra resmed airfit f30 size small. Pt tried the mask and it fit and sealed well. Pt liked hte mask compared to her old one and was happy to get a working mask.   Pt received a resmed airfit f30 size smal demo mask to use in the meantime while restarting therapy. Pt and I discussed compliance and what is required of pt to meet. Pt was agreeable and seemed to understand well, pt states her and Rios went over compliance as well.
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
## 2019-07-15 NOTE — NURSING NOTE
"Chief Complaint   Patient presents with     CPAP Follow Up     Follow up eva        Initial /80   Pulse 61   Resp 16   Ht 1.689 m (5' 6.5\")   Wt 117.9 kg (260 lb)   SpO2 98%   BMI 41.34 kg/m   Estimated body mass index is 41.34 kg/m  as calculated from the following:    Height as of this encounter: 1.689 m (5' 6.5\").    Weight as of this encounter: 117.9 kg (260 lb).    Medication Reconciliation: complete    ESS 16    Erin Barrera MA      "

## 2019-07-29 ENCOUNTER — PATIENT OUTREACH (OUTPATIENT)
Dept: CARE COORDINATION | Facility: CLINIC | Age: 54
End: 2019-07-29

## 2019-07-29 NOTE — PROGRESS NOTES
Clinic Care Coordination Contact  [facility]/Voicemail       Clinical Data: Care Coordinator Outreach to follow up on information provided about oncology, scan and CPAP needs.     Per chart review, pt has followed up with Sleep Clinic and reengaged with new CPAP. Provided significant education on compliance requirements. No scheduled follow up with oncology. Pt recently in ED twice for abdominal pain, no acute findings, recommended to follow up with PCP.     Outreach attempted x 2.  Left message on voicemail with call back information and requested return call.  Plan:  Care Coordinator will try to reach patient again in 3-5 business days.  BRISSA De Anda   Social Work Care Coordinator  [phone]

## 2019-08-06 ENCOUNTER — OFFICE VISIT (OUTPATIENT)
Dept: FAMILY MEDICINE | Facility: CLINIC | Age: 54
End: 2019-08-06
Payer: COMMERCIAL

## 2019-08-06 VITALS
BODY MASS INDEX: 41.98 KG/M2 | OXYGEN SATURATION: 98 % | DIASTOLIC BLOOD PRESSURE: 68 MMHG | RESPIRATION RATE: 18 BRPM | TEMPERATURE: 97.2 F | SYSTOLIC BLOOD PRESSURE: 124 MMHG | WEIGHT: 264 LBS | HEART RATE: 72 BPM

## 2019-08-06 DIAGNOSIS — Z12.11 SPECIAL SCREENING FOR MALIGNANT NEOPLASMS, COLON: ICD-10-CM

## 2019-08-06 DIAGNOSIS — Z13.220 SCREENING FOR HYPERLIPIDEMIA: ICD-10-CM

## 2019-08-06 DIAGNOSIS — E74.39 GLUCOSE INTOLERANCE: ICD-10-CM

## 2019-08-06 DIAGNOSIS — J45.20 MILD INTERMITTENT ASTHMA WITHOUT COMPLICATION: ICD-10-CM

## 2019-08-06 DIAGNOSIS — G47.33 OSA ON CPAP: ICD-10-CM

## 2019-08-06 DIAGNOSIS — G89.4 CHRONIC PAIN SYNDROME: Primary | ICD-10-CM

## 2019-08-06 DIAGNOSIS — E78.00 PURE HYPERCHOLESTEROLEMIA: Chronic | ICD-10-CM

## 2019-08-06 DIAGNOSIS — G89.29 CHRONIC NONINTRACTABLE HEADACHE, UNSPECIFIED HEADACHE TYPE: ICD-10-CM

## 2019-08-06 DIAGNOSIS — F11.20 OPIOID USE DISORDER, SEVERE, DEPENDENCE (H): ICD-10-CM

## 2019-08-06 DIAGNOSIS — E78.5 HYPERLIPIDEMIA LDL GOAL <100: ICD-10-CM

## 2019-08-06 DIAGNOSIS — E11.9 ENCOUNTER FOR COMPREHENSIVE DIABETIC FOOT EXAMINATION, TYPE 2 DIABETES MELLITUS (H): ICD-10-CM

## 2019-08-06 DIAGNOSIS — R51.9 CHRONIC NONINTRACTABLE HEADACHE, UNSPECIFIED HEADACHE TYPE: ICD-10-CM

## 2019-08-06 LAB
CHOLEST SERPL-MCNC: 184 MG/DL
HDLC SERPL-MCNC: 56 MG/DL
LDLC SERPL CALC-MCNC: 93 MG/DL
NONHDLC SERPL-MCNC: 128 MG/DL
TRIGL SERPL-MCNC: 176 MG/DL
TSH SERPL DL<=0.005 MIU/L-ACNC: 3 MU/L (ref 0.4–4)

## 2019-08-06 PROCEDURE — 80061 LIPID PANEL: CPT | Performed by: FAMILY MEDICINE

## 2019-08-06 PROCEDURE — 36415 COLL VENOUS BLD VENIPUNCTURE: CPT | Performed by: FAMILY MEDICINE

## 2019-08-06 PROCEDURE — 84443 ASSAY THYROID STIM HORMONE: CPT | Performed by: FAMILY MEDICINE

## 2019-08-06 PROCEDURE — 99214 OFFICE O/P EST MOD 30 MIN: CPT | Performed by: FAMILY MEDICINE
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
## 2019-08-07 ASSESSMENT — ASTHMA QUESTIONNAIRES: ACT_TOTALSCORE: 21

## 2019-08-29 ENCOUNTER — TELEPHONE (OUTPATIENT)
Dept: FAMILY MEDICINE | Facility: CLINIC | Age: 54
End: 2019-08-29

## 2019-08-29 ENCOUNTER — TELEPHONE (OUTPATIENT)
Dept: PALLIATIVE MEDICINE | Facility: CLINIC | Age: 54
End: 2019-08-29

## 2019-08-29 DIAGNOSIS — R51.9 CHRONIC NONINTRACTABLE HEADACHE, UNSPECIFIED HEADACHE TYPE: ICD-10-CM

## 2019-08-29 DIAGNOSIS — C78.7 CARCINOMA OF COLON METASTATIC TO LIVER (H): ICD-10-CM

## 2019-08-29 DIAGNOSIS — G89.29 CHRONIC NONINTRACTABLE HEADACHE, UNSPECIFIED HEADACHE TYPE: ICD-10-CM

## 2019-08-29 DIAGNOSIS — Q07.00 ARNOLD-CHIARI SYNDROME WITHOUT SPINA BIFIDA OR HYDROCEPHALUS (H): ICD-10-CM

## 2019-08-29 DIAGNOSIS — Q07.00 ARNOLD-CHIARI MALFORMATION (H): ICD-10-CM

## 2019-08-29 DIAGNOSIS — C18.9 CARCINOMA OF COLON METASTATIC TO LIVER (H): ICD-10-CM

## 2019-08-29 DIAGNOSIS — F10.10 ALCOHOL ABUSE: ICD-10-CM

## 2019-08-29 DIAGNOSIS — G89.4 CHRONIC PAIN SYNDROME: Primary | ICD-10-CM

## 2019-08-29 NOTE — LETTER
August 29, 2019    Ines Mott  [address]  [facility] [address]    Dear Ines                                                                 Welcome to the [facility].  We are located at [address] [place] [address] [place] [address]. Your appointment at the [facility] has been scheduled on September 25th at 9:30AM with Andre Marroquin MD .    At your first visit, you will meet your team of caregivers who will help you to develop pain management strategies that will last a lifetime. You will meet with our support staff to review your insurance information, and collect your co-payment if required by your insurance company. You will also meet with a medical pain specialist and care coordinator who will assess your pain and develop a plan of care for your successful pain rehabilitation. You should expect to spend 1-2 hours at your first visit with us. Usually, patients work with us for a period of 6-12 months, and eventually return to their primary doctor once their pain management has stabilized.      To help us make your visit go as smoothly as possible, please bring the following items with you on your visit:     Completed Pain Questionnaire enclosed in this packet.  If you do not bring the completed questionnaire, we may have to reschedule your appointment.  List of any medicines that you are currently taking or have been prescribed  Important NON-[place] medical information such as medical records or tests results (X-rays, or laboratory tests)  Your health insurance card  Financial resources to cover your co-payment or balance due at the time of service (cash, personal check, Visa, and MasterCard are acceptable methods of payment)     Due to the demand for new patient evaluations, you must notify the scheduling department 48 hours in advance if you are not able to keep this appointment. Failure to do so could affect your ability to reschedule with our clinic.  Please be aware that we will not prescribe any medications at your first visit.     Please call [phone] with any questions regarding your appointment. We look forward to meeting you and working to address your health care needs.     Sincerely,        [facility]

## 2019-08-29 NOTE — TELEPHONE ENCOUNTER
Patient is requesting a new referral to   [facility]     Pended order and routing as high priority, because patient would like this done today.

## 2019-08-29 NOTE — TELEPHONE ENCOUNTER
Pain Management Center Referral      1. Confirmed address with patient? Yes  2. Confirmed phone number with patient? Yes  3. Confirmed referring provider? Yes  4. Is the PCP the same as the referring provider? Yes  5. Has the patient been to any previous pain clinics? Yes  (If yes, send PIETRO with welcome letter)  6. Which insurance are we to bill for this appointment?  [employer]     7. Informed pt of cancellation (48 hour) policy? Yes    REGARDING OPIOID MEDICATIONS: We will always address appropriateness of opioid pain medications, but we generally will not automatically take on a prescribing role. When we do take on prescribing of opioids for chronic pain, it is in collaboration with the referring physician for an intermediate period of time (months), with an expectation that the primary physician or provider will assume the prescribing role if medications are effective at stable doses with demonstrated compliance. Therefore, please do not assume that your prescribing responsibilities end on the day of pain clinic consultation.  8. Informed pt of prescribing policy? Yes    9.Please be aware that once you are established with a pain provider and location, you will need to continue have all future visits with that provider and location. It is best to determine what location is the most convenient for you and schedule with that one.    ** PATIENT INFORMED OF THIS POLICY Yes      9. Referring Provider: Dustin Fatima    [facility]

## 2019-09-05 ENCOUNTER — HOSPITAL ENCOUNTER (EMERGENCY)
Facility: CLINIC | Age: 54
Discharge: HOME | End: 2019-09-05
Attending: EMERGENCY MEDICINE | Admitting: EMERGENCY MEDICINE
Payer: COMMERCIAL

## 2019-09-05 VITALS
BODY MASS INDEX: 42.59 KG/M2 | WEIGHT: 265 LBS | SYSTOLIC BLOOD PRESSURE: 142 MMHG | TEMPERATURE: 98 F | DIASTOLIC BLOOD PRESSURE: 81 MMHG | RESPIRATION RATE: 20 BRPM | OXYGEN SATURATION: 97 % | HEIGHT: 66 IN

## 2019-09-05 DIAGNOSIS — R10.84 ABDOMINAL PAIN, GENERALIZED: ICD-10-CM

## 2019-09-05 DIAGNOSIS — K59.00 CONSTIPATION, UNSPECIFIED CONSTIPATION TYPE: ICD-10-CM

## 2019-09-05 DIAGNOSIS — G89.29 OTHER CHRONIC PAIN: ICD-10-CM

## 2019-09-05 LAB
ALBUMIN SERPL-MCNC: 3 G/DL (ref 3.4–5)
ALP SERPL-CCNC: 62 U/L (ref 40–150)
ALT SERPL W P-5'-P-CCNC: 20 U/L (ref 0–50)
ANION GAP SERPL CALCULATED.3IONS-SCNC: 3 MMOL/L (ref 3–14)
AST SERPL W P-5'-P-CCNC: 8 U/L (ref 0–45)
BASOPHILS # BLD AUTO: 0 10E9/L (ref 0–0.2)
BASOPHILS NFR BLD AUTO: 0.2 %
BILIRUB SERPL-MCNC: 0.6 MG/DL (ref 0.2–1.3)
BUN SERPL-MCNC: 15 MG/DL (ref 7–30)
CALCIUM SERPL-MCNC: 9.7 MG/DL (ref 8.5–10.1)
CHLORIDE SERPL-SCNC: 108 MMOL/L (ref 94–109)
CO2 SERPL-SCNC: 31 MMOL/L (ref 20–32)
CREAT SERPL-MCNC: 0.84 MG/DL (ref 0.52–1.04)
DIFFERENTIAL METHOD BLD: NORMAL
EOSINOPHIL # BLD AUTO: 0.4 10E9/L (ref 0–0.7)
EOSINOPHIL NFR BLD AUTO: 3.8 %
ERYTHROCYTE [DISTWIDTH] IN BLOOD BY AUTOMATED COUNT: 13.9 % (ref 10–15)
GFR SERPL CREATININE-BSD FRML MDRD: 78 ML/MIN/{1.73_M2}
GLUCOSE SERPL-MCNC: 98 MG/DL (ref 70–99)
HCT VFR BLD AUTO: 36.9 % (ref 35–47)
HGB BLD-MCNC: 12 G/DL (ref 11.7–15.7)
IMM GRANULOCYTES # BLD: 0 10E9/L (ref 0–0.4)
IMM GRANULOCYTES NFR BLD: 0.2 %
LIPASE SERPL-CCNC: 102 U/L (ref 73–393)
LYMPHOCYTES # BLD AUTO: 3 10E9/L (ref 0.8–5.3)
LYMPHOCYTES NFR BLD AUTO: 30.5 %
MCH RBC QN AUTO: 29.8 PG (ref 26.5–33)
MCHC RBC AUTO-ENTMCNC: 32.5 G/DL (ref 31.5–36.5)
MCV RBC AUTO: 92 FL (ref 78–100)
MONOCYTES # BLD AUTO: 0.6 10E9/L (ref 0–1.3)
MONOCYTES NFR BLD AUTO: 6.6 %
NEUTROPHILS # BLD AUTO: 5.7 10E9/L (ref 1.6–8.3)
NEUTROPHILS NFR BLD AUTO: 58.7 %
NRBC # BLD AUTO: 0 10*3/UL
NRBC BLD AUTO-RTO: 0 /100
PLATELET # BLD AUTO: 277 10E9/L (ref 150–450)
POTASSIUM SERPL-SCNC: 3.8 MMOL/L (ref 3.4–5.3)
PROT SERPL-MCNC: 6.3 G/DL (ref 6.8–8.8)
RBC # BLD AUTO: 4.03 10E12/L (ref 3.8–5.2)
SODIUM SERPL-SCNC: 142 MMOL/L (ref 133–144)
WBC # BLD AUTO: 9.7 10E9/L (ref 4–11)

## 2019-09-05 PROCEDURE — 99283 EMERGENCY DEPT VISIT LOW MDM: CPT | Mod: 25

## 2019-09-05 PROCEDURE — 83690 ASSAY OF LIPASE: CPT | Performed by: EMERGENCY MEDICINE

## 2019-09-05 PROCEDURE — 85025 COMPLETE CBC W/AUTO DIFF WBC: CPT | Performed by: EMERGENCY MEDICINE

## 2019-09-05 PROCEDURE — 80053 COMPREHEN METABOLIC PANEL: CPT | Performed by: EMERGENCY MEDICINE

## 2019-09-05 PROCEDURE — 96360 HYDRATION IV INFUSION INIT: CPT

## 2019-09-05 PROCEDURE — 25000128 H RX IP 250 OP 636: Performed by: EMERGENCY MEDICINE

## 2019-09-05 RX ORDER — SODIUM CHLORIDE 9 MG/ML
1000 INJECTION, SOLUTION INTRAVENOUS CONTINUOUS
Status: DISCONTINUED | OUTPATIENT
Start: 2019-09-05 | End: 2019-09-06 | Stop reason: HOSPADM

## 2019-09-05 RX ADMIN — SODIUM CHLORIDE 1000 ML: 9 INJECTION, SOLUTION INTRAVENOUS at 22:38

## 2019-09-05 ASSESSMENT — MIFFLIN-ST. JEOR: SCORE: 1818.78

## 2019-09-05 NOTE — ED AVS SNAPSHOT
Emergency Department  [address] [place] [address]  Phone:  [phone]  Fax:  [phone]                                    Ines Mott   MRN: [id number]    Department:   Emergency Department   Date of Visit:  9/5/2019           After Visit Summary Signature Page    I have received my discharge instructions, and my questions have been answered. I have discussed any challenges I see with this plan with the nurse or doctor.    ..........................................................................................................................................  Patient/Patient Representative Signature      ..........................................................................................................................................  Patient Representative Print Name and Relationship to Patient    ..................................................               ................................................  Date                                   Time    ..........................................................................................................................................  Reviewed by Signature/Title    ...................................................              ..............................................  Date                                               Time          22EPIC Rev 08/18

## 2019-09-06 ENCOUNTER — THERAPY VISIT (OUTPATIENT)
Dept: SLEEP MEDICINE | Facility: CLINIC | Age: 54
End: 2019-09-06
Payer: COMMERCIAL

## 2019-09-06 DIAGNOSIS — G47.33 OSA ON CPAP: ICD-10-CM

## 2019-09-06 PROCEDURE — 95811 POLYSOM 6/>YRS CPAP 4/> PARM: CPT | Performed by: PSYCHIATRY & NEUROLOGY

## 2019-09-06 NOTE — ED TRIAGE NOTES
"Pt started having abd pain yesterday, pt seen at [facility] and told she has a \"blockage\". Pt drank a bottle of Mag Citrate at 1700 with no results. Pt hx of liver and kidney cancer  "

## 2019-09-06 NOTE — DISCHARGE INSTRUCTIONS
Discharge Instructions  Abdominal Pain    Abdominal pain (belly pain) can be caused by many things. Your evaluation today does not show the exact cause for your pain. Your provider today has decided that it is unlikely your pain is due to a life threatening problem, or a problem requiring surgery or hospital admission. Sometimes those problems cannot be found right away, so it is very important that you follow up as directed.  Sometimes only the changes which occur over time allow the cause of your pain to be found.    Generally, every Emergency Department visit should have a follow-up clinic visit with either a primary or a specialty clinic/provider. Please follow-up as instructed by your emergency provider today. With abdominal pain, we often recommend very close follow-up, such as the following day.    ADULTS:  Return to the Emergency Department right away if:    You get an oral temperature above 102oF or as directed by your provider.  You have blood in your stools. This may be bright red or appear as black, tarry stools.    You keep vomiting (throwing up) or cannot drink liquids.  You see blood when you vomit.   You cannot have a bowel movement or you cannot pass gas.  Your stomach gets bloated or bigger.  Your skin or the whites of your eyes look yellow.  You faint.  You have bloody, frequent or painful urination (peeing).  You have new symptoms or anything that worries you.    CHILDREN:  Return to the Emergency Department right away if your child has any of the above-listed symptoms or the following:    Pushes your hand away or screams/cries when his/her belly is touched.  You notice your child is very fussy or weak.  Your child is very tired and is too tired to eat or drink.  Your child is dehydrated.  Signs of dehydration can be:  Significant change in the amount of wet diapers/urine.  Your infant or child starts to have dry mouth and lips, or no saliva (spit) or tears.    PREGNANT WOMEN:  Return to the  Emergency Department right away if you have any of the above-listed symptoms or the following:    You have bleeding, leaking fluid or passing tissue from the vagina.  You have worse pain or cramping, or pain in your shoulder or back.  You have vomiting that will not stop.  You have a temperature of 100oF or more.  Your baby is not moving as much as usual.  You faint.  You get a bad headache with or without eye problems and abdominal pain.  You have a seizure.  You have unusual discharge from your vagina and abdominal pain.    Abdominal pain is pretty common during pregnancy.  Your pain may or may not be related to your pregnancy. You should follow-up closely with your OB provider so they can evaluate you and your baby.  Until you follow-up with your regular provider, do the following:     Avoid sex and do not put anything in your vagina.  Drink clear fluids.  Only take medications approved by your provider.    MORE INFORMATION:    Appendicitis:  A possible cause of abdominal pain in any person who still has their appendix is acute appendicitis. Appendicitis is often hard to diagnose.  Testing does not always rule out early appendicitis or other causes of abdominal pain. Close follow-up with your provider and re-evaluations may be needed to figure out the reason for your abdominal pain.    Follow-up:  It is very important that you make an appointment with your clinic and go to the appointment.  If you do not follow-up with your primary provider, it may result in missing an important development which could result in permanent injury or disability and/or lasting pain.  If there is any problem keeping your appointment, call your provider or return to the Emergency Department.    Medications:  Take your medications as directed by your provider today.  Before using over-the-counter medications, ask your provider and make sure to take the medications as directed.  If you have any questions about medications, ask your  "provider.    Diet:  Resume your normal diet as much as possible, but do not eat fried, fatty or spicy foods while you have pain.  Do not drink alcohol or have caffeine.  Do not smoke tobacco.    Probiotics: If you have been given an antibiotic, you may want to also take a probiotic pill or eat yogurt with live cultures. Probiotics have \"good bacteria\" to help your intestines stay healthy. Studies have shown that probiotics help prevent diarrhea (loose stools) and other intestine problems (including C. diff infection) when you take antibiotics. You can buy these without a prescription in the pharmacy section of the store.     If you were given a prescription for medicine here today, be sure to read all of the information (including the package insert) that comes with your prescription.  This will include important information about the medicine, its side effects, and any warnings that you need to know about.  The pharmacist who fills the prescription can provide more information and answer questions you may have about the medicine.  If you have questions or concerns that the pharmacist cannot address, please call or return to the Emergency Department.       Remember that you can always come back to the Emergency Department if you are not able to see your regular provider in the amount of time listed above, if you get any new symptoms, or if there is anything that worries you.      Discharge Instructions  Chronic Pain Management  You were seen today for an issue regarding chronic or recurrent pain. You may have a condition that gives you pain every day, or a condition that causes pain that keeps coming back, or several conditions involving pain.   We will evaluate you for your symptoms to ensure that there is no medical emergency. This evaluation usually takes the form of a good medical history (interview) and physical examination. Rarely, imaging (x-rays or CAT scans) and lab work (blood tests) may be necessary in " addition to the history and examination. This approach is similar to our approach to other chronic/recurrent diseases where we evaluate for emergencies but leave much of the management of the problem to the regular provider/clinic.  We will offer suggestions to manage your pain but we do not generally utilize opiate pain medications for recurrent or chronic pain. There is an ongoing public health crisis with respect to opiates and we are aware of the risks to our patients from opiate pain medications. Opiates are strong pain relievers but they carry significant risks including sedation, confusion, constipation, falls, as well as dependence, addiction, and overdose-related deaths. These medications represent a significant risk to your health and are therefore reserved for the management of select conditions associated with acute, severe pain. For many conditions, the risks of opiate treatment simply outweigh the benefits. Additionally, for patients with chronic/recurrent pain or who frequently use opiates, management of pain needs to be performed by a single physician/provider who can follow their care consistently. As a result, we generally do not provide refills of opiates or treat chronic pain with injected opiates in the Emergency Department. It is with your best interests in mind that we adhere to these widely accepted best practices.    As with other chronic diseases like diabetes and asthma, management of chronic pain is best performed by regular visits to the same provider. In the case of chronic pain, this may be your primary physician/provider, your neurologist or other specialist, or with a chronic pain clinic/provider.  If you have concerns about our policy, please discuss them with your primary care provider or pain specialist, who can contact us if necessary for further information or clarification.  If you were given a prescription for medicine here today, be sure to read all of the information  (including the package insert) that comes with your prescription.  This will include important information about the medicine, its side effects, and any warnings that you need to know about.  The pharmacist who fills the prescription can provide more information and answer questions you may have about the medicine.  If you have questions or concerns that the pharmacist cannot address, please call or return to the Emergency Department.   Remember that you can always come back to the Emergency Department if you develop any new symptoms or if there is anything that worries you.

## 2019-09-06 NOTE — ED NOTES
"Pt yelled at MD to \" take this fucking IV out\". Pt refused to get last set of vitals and refused to sign discharge paperwork.   "

## 2019-09-09 LAB — SLPCOMP: NORMAL

## 2019-09-09 NOTE — PROCEDURES
" SLEEP STUDY INTERPRETATION  SPLIT NIGHT STUDY      Patient: JUAN GALVEZ  YOB: 1965  Study Date: 9/6/2019  MRN: [id number]  Referring Provider: -  Ordering Provider: Zachary Robles MD    Indications for Polysomnography: The patient is a 54 y old Female who is 5' 6\" and weighs 260.0 lbs. Her BMI is 41.8, [place] sleepiness scale 16.0 and neck circumference is 0.0 cm. Relevant medical history includes snoring, witnessed apnea. A diagnostic polysomnogram was performed to evaluate for sleep apnea. After 184.0 minutes of sleep time the patient exhibited sufficient respiratory events qualifying her for a CPAP trial which was then initiated.    Polysomnogram Data: A full night polysomnogram recorded the standard physiologic parameters including EEG, EOG, EMG, ECG, nasal and oral airflow. Respiratory parameters of chest and abdominal movements were recorded with respiratory inductance plethysmography. Oxygen saturation was recorded by pulse oximetry.  Hypopnea scoring rule used: 1B 4%    Diagnostic PSG  Sleep Architecture: Sleep fragmentation  The total recording time of the polysomnogram was 218.6 minutes. The total sleep time was 184.0 minutes. Sleep latency was 6.0 minutes. REM latency was 196.5 minutes. Arousal index was 136.3 arousals per hour. Sleep efficiency was 84.2%. Wake after sleep onset was 25.0 minutes. The patient spent 14.4% of total sleep time in Stage N1, 78.8% in Stage N2, 0.0% in Stage N3, and 6.8% in REM. Time in REM supine was 7.0 minutes.    Respiration: Severe ALEXUS with hypoxemia    Events ? The polysomnogram revealed a presence of 101 obstructive, - central, and - mixed apneas resulting in an apnea index of 32.9 events per hour. There were 190 obstructive hypopneas and - central hypopneas resulting in an obstructive hypopnea index of 62.0 and central hypopnea index of - events per hour. The combined apnea/hypopnea index was 94.9 events per hour (central apnea/hypopnea index was - " events per hour).  The REM AHI was 19.2 events per hour. The supine AHI was 8.6 events per hour. The RERA index was 26.4 events per hour. The RDI was 121.3 events per hour.    Snoring - was reported as loud.    Respiratory rate and pattern - was notable for normal respiratory rate and pattern.    Sustained Sleep Associated Hypoventilation - Transcutaneous carbon dioxide monitoring was not used.    Sleep Associated Hypoxemia - (Greater than 5 minutes O2 sat at or below 88%) was not present. Baseline oxygen saturation was 94.6%. Lowest oxygen saturation was 66.9%. Time spent less than or equal to 88% was 10.1 minutes. Time spent less than or equal to 89% was 13.4 minutes.     Treatment PSG  Sleep Architecture: Decreased sleep fragmentation  At 02:35:04 AM the patient was placed on PAP treatment and was titrated at pressures ranging from CPAP 5 cmH2O up to CPAP 12 cmH2O. The total recording time of the treatment portion of the study was 320.3 minutes. The total sleep time was 300.5 minutes. During the treatment portion of the study the sleep latency was 4.5 minutes. REM latency was 5.0 minutes. Arousal index was 26.8 arousals per hour. Sleep efficiency was 93.8%. Wake after sleep onset was 15.0 minutes. The patient spent 3.5% of total sleep time in Stage N1, 30.0% in Stage N2, 13.8% in Stage N3, and 52.7% in REM. Time in REM supine was 30.5 minutes.     Respiration: Sleep disordered breathing noted on the baseline portion of the study was nearly fully resolved with CPAP therapy.  Of note the patient did have REM supine during this portion of the study.      The final pressure was CPAP 12 cmH2O with an AHI of 1.9 events per hour. Time in REM supine on final pressure was 9.5 minutes.     Movement Activity: The patient had elevated periodic limb movements (PLMs) during the treatment portion of the study. The majority of these were not associated with cortical arousal.    Periodic Limb Movements  o During the diagnostic  portion of the study, there were 42 PLMs recorded. The PLM index was 13.7 movements per hour. The PLM Arousal Index was 6.5 per hour.  o During the treatment portion of the study, there were 245 PLMs recorded. The PLM index was 48.9 movements per hour. The PLM Arousal Index was 13.0 per hour.    REM EMG Activity - Excessive muscle activity was not clearly present in the absence of sleep disordered breathing.    Nocturnal Behavior - Abnormal sleep related behaviors were not noted.    Bruxism - None apparent.    Cardiac Summary: Sinus, intermittent tachy and bradycardia prior to PAP therapy.   During the diagnostic portion of the study, the average pulse rate was 73.4 bpm. The minimum pulse rate was 26.0 bpm while the maximum pulse rate was 120.3 bpm.    During the treatment portion of the study, the average pulse rate was 65.5 bpm. The minimum pulse rate was 52.5 bpm while the maximum pulse rate was 92.2 bpm.     Assessment:     Severe ALEXUS with hypoxemia   o Sleep disordered breathing noted on the baseline portion of the study was nearly fully resolved with CPAP therapy.  Of note the patient did have REM supine during this portion of the study.      The patient had elevated periodic limb movements (PLMs) during the treatment portion of the study. The majority of these were not associated with cortical arousal.  Recommendations:    Treatment of ALEXUS with Auto?titrating PAP therapy. Recommend clinical follow up with sleep management team, including review of compliance measures.    Advice regarding the risks of drowsy driving.    Suggest optimizing sleep schedule and avoiding sleep deprivation.    Weight management     Treatment of PLMs (dopaminergic agents or delta-2 ligands) should be targeted at patients who either have wakeful motor restlessness or those in whom there is a high clinical suspicion of periodic limb movement disorder and not for elevated PLMs alone.    Diagnostic Codes: G47.33, G47.36,  G47.9      Zachary Robles MD 9-9-2019

## 2019-09-10 ENCOUNTER — HOSPITAL ENCOUNTER (EMERGENCY)
Facility: CLINIC | Age: 54
Discharge: HOME | End: 2019-09-10
Attending: EMERGENCY MEDICINE | Admitting: EMERGENCY MEDICINE
Payer: COMMERCIAL

## 2019-09-10 ENCOUNTER — NURSE TRIAGE (OUTPATIENT)
Dept: NURSING | Facility: CLINIC | Age: 54
End: 2019-09-10

## 2019-09-10 ENCOUNTER — TELEPHONE (OUTPATIENT)
Dept: PALLIATIVE MEDICINE | Facility: CLINIC | Age: 54
End: 2019-09-10

## 2019-09-10 VITALS
OXYGEN SATURATION: 97 % | HEART RATE: 91 BPM | RESPIRATION RATE: 23 BRPM | TEMPERATURE: 97.5 F | SYSTOLIC BLOOD PRESSURE: 153 MMHG | DIASTOLIC BLOOD PRESSURE: 94 MMHG | WEIGHT: 256 LBS | HEIGHT: 66 IN | BODY MASS INDEX: 41.14 KG/M2

## 2019-09-10 DIAGNOSIS — R07.9 CHEST PAIN, UNSPECIFIED TYPE: ICD-10-CM

## 2019-09-10 DIAGNOSIS — F15.10 METHAMPHETAMINE ABUSE (H): ICD-10-CM

## 2019-09-10 LAB
ANION GAP SERPL CALCULATED.3IONS-SCNC: 7 MMOL/L (ref 3–14)
BASOPHILS # BLD AUTO: 0 10E9/L (ref 0–0.2)
BASOPHILS NFR BLD AUTO: 0.1 %
BUN SERPL-MCNC: 11 MG/DL (ref 7–30)
CALCIUM SERPL-MCNC: 9.4 MG/DL (ref 8.5–10.1)
CHLORIDE SERPL-SCNC: 110 MMOL/L (ref 94–109)
CO2 SERPL-SCNC: 26 MMOL/L (ref 20–32)
CREAT SERPL-MCNC: 0.91 MG/DL (ref 0.52–1.04)
DIFFERENTIAL METHOD BLD: NORMAL
EOSINOPHIL # BLD AUTO: 0.4 10E9/L (ref 0–0.7)
EOSINOPHIL NFR BLD AUTO: 4.1 %
ERYTHROCYTE [DISTWIDTH] IN BLOOD BY AUTOMATED COUNT: 13.9 % (ref 10–15)
GFR SERPL CREATININE-BSD FRML MDRD: 71 ML/MIN/{1.73_M2}
GLUCOSE SERPL-MCNC: 109 MG/DL (ref 70–99)
HCT VFR BLD AUTO: 38.5 % (ref 35–47)
HGB BLD-MCNC: 12.6 G/DL (ref 11.7–15.7)
IMM GRANULOCYTES # BLD: 0 10E9/L (ref 0–0.4)
IMM GRANULOCYTES NFR BLD: 0.4 %
LYMPHOCYTES # BLD AUTO: 3 10E9/L (ref 0.8–5.3)
LYMPHOCYTES NFR BLD AUTO: 31 %
MCH RBC QN AUTO: 30.2 PG (ref 26.5–33)
MCHC RBC AUTO-ENTMCNC: 32.7 G/DL (ref 31.5–36.5)
MCV RBC AUTO: 92 FL (ref 78–100)
MONOCYTES # BLD AUTO: 0.7 10E9/L (ref 0–1.3)
MONOCYTES NFR BLD AUTO: 7.6 %
NEUTROPHILS # BLD AUTO: 5.4 10E9/L (ref 1.6–8.3)
NEUTROPHILS NFR BLD AUTO: 56.8 %
NRBC # BLD AUTO: 0 10*3/UL
NRBC BLD AUTO-RTO: 0 /100
PLATELET # BLD AUTO: 265 10E9/L (ref 150–450)
POTASSIUM SERPL-SCNC: 3.1 MMOL/L (ref 3.4–5.3)
RBC # BLD AUTO: 4.17 10E12/L (ref 3.8–5.2)
SODIUM SERPL-SCNC: 143 MMOL/L (ref 133–144)
TROPONIN I SERPL-MCNC: <0.015 UG/L (ref 0–0.04)
WBC # BLD AUTO: 9.6 10E9/L (ref 4–11)

## 2019-09-10 PROCEDURE — 90791 PSYCH DIAGNOSTIC EVALUATION: CPT

## 2019-09-10 PROCEDURE — 85025 COMPLETE CBC W/AUTO DIFF WBC: CPT | Performed by: EMERGENCY MEDICINE

## 2019-09-10 PROCEDURE — 99285 EMERGENCY DEPT VISIT HI MDM: CPT | Mod: 25

## 2019-09-10 PROCEDURE — 93005 ELECTROCARDIOGRAM TRACING: CPT

## 2019-09-10 PROCEDURE — 84484 ASSAY OF TROPONIN QUANT: CPT | Performed by: EMERGENCY MEDICINE

## 2019-09-10 PROCEDURE — 87040 BLOOD CULTURE FOR BACTERIA: CPT | Mod: XU | Performed by: EMERGENCY MEDICINE

## 2019-09-10 PROCEDURE — 36415 COLL VENOUS BLD VENIPUNCTURE: CPT | Performed by: EMERGENCY MEDICINE

## 2019-09-10 PROCEDURE — 80048 BASIC METABOLIC PNL TOTAL CA: CPT | Performed by: EMERGENCY MEDICINE

## 2019-09-10 ASSESSMENT — ENCOUNTER SYMPTOMS
DIAPHORESIS: 1
HEADACHES: 1

## 2019-09-10 ASSESSMENT — MIFFLIN-ST. JEOR: SCORE: 1777.96

## 2019-09-10 NOTE — ED AVS SNAPSHOT
Emergency Department  [address] [place] [address]  Phone:  [phone]  Fax:  [phone]                                    Ines Mott   MRN: [id number]    Department:   Emergency Department   Date of Visit:  9/10/2019           After Visit Summary Signature Page    I have received my discharge instructions, and my questions have been answered. I have discussed any challenges I see with this plan with the nurse or doctor.    ..........................................................................................................................................  Patient/Patient Representative Signature      ..........................................................................................................................................  Patient Representative Print Name and Relationship to Patient    ..................................................               ................................................  Date                                   Time    ..........................................................................................................................................  Reviewed by Signature/Title    ...................................................              ..............................................  Date                                               Time          22EPIC Rev 08/18

## 2019-09-10 NOTE — TELEPHONE ENCOUNTER
Thank you for the referral to the pain mangement center, it has been reviewed by our medical team and supervisor. Unfortunately, patient is not a candidate for our pain management center due to previous history at our clinic.    Questions or concerns please call Hortensia Del Castillo at:   [phone]           Vane Bush    [facility]

## 2019-09-10 NOTE — ED TRIAGE NOTES
Pt reports smoking meth on Sunday. Pt reports feeling shaky since. Pt reports SI. Pt did not elaborate on suicidal plan. Pt reports she called a nurse line today and they referred her to come here.

## 2019-09-10 NOTE — TELEPHONE ENCOUNTER
"\"I am a vulnerable adult and I have so many things wrong with me. I was taking Oxycodone for 17 years and they cut me off. So on Sunday 9/8 I smoked meth all day long. I am thinking of suicide, I don't want to live. I am stepping out on my  and family. [place] out on my . I asked for more money and I started slamming my hands on a machine. A lady looked at me. My heart is fluttering, my face is yellow and pee is orange. I do not feel well. I also have diarrhea. I am home alone and laying in my bed not feeling well.\" Denies other sx. Spent a great deal of time on the phone speaking with patient about suicidal thoughts. She states \"I've tried in the past, but I don't have plans right now to do anything. But I don't want to live like this. I have thoughts of hurting other people. I told my  you might come home to a dead body.\"Triaged and advised 911. Patient agreed and promised me that she would call 911. I called the patient back at 4:49 pm to make sure she called. \"I just called my  from work. He's coming home to take me to [facility]. I also called my brother. He said\"it's about time you're going in for help.\"  Call back if needed.  Malissa Dykes RN [facility]        Reason for Disposition    Sounds like a life-threatening emergency to the triager    Additional Information    Negative: Patient attempted suicide    Negative: Patient is threatening suicide now    Negative: Violent behavior, or threatening to physically hurt or kill someone    Negative: [1] Patient is very confused (disoriented, slurred speech) AND [2] no other adult (e.g., friend or family member) available    Negative: [1] Difficult to awaken or acting very confused (disoriented, slurred speech) AND [2] new onset    Protocols used: SUICIDE [id number]      "

## 2019-09-10 NOTE — ED PROVIDER NOTES
"  History     Chief Complaint:  Chest pain    HPI   Ines Mott is a 54 year old female who presents with chest pain. The patient says that she smoked meth on 9/8 for the first time and since then she has developed some chest pain saying that her \"heart hurt\", headache, and diaphoresis. When asked about suicidal thoughts she says they were because of the meth, but now that she is coming down she is not having them. When the patient was asked about the injection site on her right AC she says that she donated plasma this morning. She denies IV drug use. She denies fever or rash. No other complaints.        Allergies:  Amoxicillin  Ativan  Buspirone  Ciprofloxacin  Macrobid  Busulfan  Cefaclor  Cefdinir   Cephalosporins  Dilaudid  Diphenhydramine   Imitrex  Ketorolac tromethamine  Lansoprazole  Latex  Metoclopramide  Moxifloxacin   Nsaids  Paroxetine  Quinolones  Sumatriptan  Blue dyes  Lidoderm  Penicillins   Sulfa  drugs  Vaccinum  Angustifolium        Medications:    Albuterol  Norvasc  Zithromax  Tessalon  Cyanocobalamin  Epipen  Culturelle  Hyzaar  Magnesium oxide  Miralax  K-tab  Zoloft     Past Medical History:    Arthritis   Asthma  Cancer  Chronic pain syndrome  Depressive disorder  Diabetes  Hydrocephalus   hypertension  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  Methamphetamine abuse  Migraines  Mild intermittent asthma  Obesity  Right renal mass  Spina bifida   Hyperlipidemia  Dyspnea and respiratory abnormality  Carcinoma of colon metastatic to liver   Kidney infection  obstructive sleep apnea   Fibromyalgia  Angioedema  Abdominal pain  Renal mass  Intracranial shunt  Pre diabetes  Degeneration of lumbosacral intervertebral disc   Alcohol abuse  Arnold-chiari malformation  ADD  Bipolar disorder  Fibrocystic breast disease  Hepatitis C  Nephrolithiasis   Hypoglycemia  Liver masses  Mild cognitive impairment  Myalgia  Pyelonephritis   TMJ arthralgia   Leukocytosis     Past Surgical History:  " "  Appendectomy  Cholecystectomy  Dental extraction  Hysterectomy  Implant shunt ventriculoperitoneal   EGD  IR embolization    Family History:    Cancer     Social History:  Smoking Status: Former Smoker  Smokeless Tobacco: Never Used  Alcohol Use: Positive  Drug Use: Positive   PCP: Dustin Fatima   Marital Status:        Review of Systems   Constitutional: Positive for diaphoresis.   Cardiovascular: Positive for chest pain.   Neurological: Positive for headaches.   Psychiatric/Behavioral: Negative for suicidal ideas.   10 point review of systems performed and is negative except as above and in HPI.       Physical Exam     Patient Vitals for the past 24 hrs:   BP Temp Temp src Pulse Resp SpO2 Height Weight   09/10/19 1819 -- 97.5  F (36.4  C) Temporal -- -- -- 1.676 m (5' 6\") 116.1 kg (256 lb)   09/10/19 1818 (!) 153/94 -- Temporal 91 23 97 % -- --        Physical Exam  General: Resting on the gurney, appears  uncomfortable  Head:  The scalp, face, and head appear normal  Mouth/Throat: Mucus membranes are moist  CV:  Regular rate    Normal S1 and S2  No pathological murmur   Resp:  Breath sounds clear and equal bilaterally    Non-labored, no retractions or accessory muscle use    No coarseness    No wheezing   GI:  Abdomen is soft, no rigidity    No tenderness to palpation  MS:  Normal motor assessment of all extremities.    Good capillary refill noted.    Chest pain not reproducible with palpation.   Skin:  No rash or lesions noted.    Right AC with a vena-puncture site.   Neuro:  Speech is normal and fluent. No apparent deficit.  Psych:  Awake. Alert. Denies thoughts of harming self or others.      Emergency Department Course     ECG:  ECG taken at 1911, ECG read at 1914  Normal sinus rhythm  Low voltage QRS  Possible inferior infarct, age undetermined.  Abnormal  ECG  Rate 83 bpm. TN interval 182 ms. QRS duration 94 ms. QT/QTc 396/465 ms. P-R-T axes 54 -27 39.    Laboratory:  Laboratory findings " were communicated with the patient who voiced understanding of the findings.    CBC: WBC 9.6, HGB 12.6,   BMP: potassium 3.1 (L), chloride 110 (H), glucose 109 (H) o/w WNL (Creatinine 0.91)  Troponin (Collected 1952): <0.015     Emergency Department Course:    1821 Nursing notes and vitals reviewed.    1825 I performed an exam of the patient as documented above.     1952 IV was inserted and blood was drawn for laboratory testing, results above.     2047 Patient rechecked and updated.      2115 The patient is discharged to home.     Impression & Plan      Medical Decision Making:  Ines Mott is a 54 year old female who presents to the emergency department today for evaluation of chest pain and reported suicidal thoughts.  She now denies any suicidal thoughts to me and was seen by DEC who also believes her not to be suicidal.  Laboratory evaluation was undertaken for chest pain including blood cultures as I am concerned for IV drug abuse.  She has not had fevers which would make endocarditis less likely.  She reports feeling quite well at this point and is eager for discharge home she is discharged per her request with recommendations for drug cessation and outpatient follow-up.            Diagnosis:    ICD-10-CM    1. Chest pain, unspecified type R07.9    2. Methamphetamine abuse (H) F15.10      Disposition:   Findings and plan explained to the Patient. Patient discharged home with instructions regarding supportive care, medications, and reasons to return. The importance of close follow-up was reviewed.     Discharge Medications:  No discharge medications.       Scribe Disclosure:  I, Jemal Rain, am serving as a scribe at 6:22 PM on 9/10/2019 to document services personally performed by Mara Cat MD based on my observations and the provider's statements to me.       EMERGENCY DEPARTMENT       Mara Cat MD  09/10/19 2208

## 2019-09-11 ENCOUNTER — TELEPHONE (OUTPATIENT)
Dept: FAMILY MEDICINE | Facility: CLINIC | Age: 54
End: 2019-09-11

## 2019-09-11 DIAGNOSIS — F15.10 METHAMPHETAMINE ABUSE (H): Primary | ICD-10-CM

## 2019-09-11 LAB — INTERPRETATION ECG - MUSE: NORMAL

## 2019-09-11 NOTE — ED NOTES
Pt initially in 1, and belongings placed in [facility] locker accordingly. Pt was moved to room 16. Upon RN entering room to discharge patient from 16, belongings were not moved with patient, but left in [facility] locker. EDT asked to gather patient belonging from [facility] locker and brought to patient, but were accidentally given to [facility] transport team for another patient. [facility] dispatch contacted and informed writer that belongings would be transported back to [facility] after the crew finished current patient transport. Patient aware.

## 2019-09-11 NOTE — ED NOTES
Pt denies any suicidal ideation today. Only having chest pain since Meth use on Sunday. No other complaints at this time.

## 2019-09-11 NOTE — TELEPHONE ENCOUNTER
PATIENT WAS IN EMERGENCY ROOM USING METHAMPHETAMINE     NEEDS AN OFFICE VISIT AND REFERRAL TO CHEMICAL DEPENDENCY AS SOON AS POSSIBLE     OTTO YUSUF JR., MD

## 2019-09-11 NOTE — TELEPHONE ENCOUNTER
"Hospital/TCU/ED for chronic condition Discharge Protocol    \"Hi, my name is Antonio Trevino RN, a registered nurse, and I am calling from [facility].  I am calling to follow up and see how things are going for you after your recent emergency visit/hospital/TCU stay.\"    Tell me how you are doing now that you are home?\"       Discharge Instructions    \"Let's review your discharge instructions.  What is/are the follow-up recommendations?  Pt. Response: follow up with PCP.     \"Has an appointment with your primary care provider been scheduled?\"   Yes. (confirm)    \"When you see the provider, I would recommend that you bring your medications with you.\"    Medications    \"Tell me what changed about your medicines when you discharged?\"    Changes to chronic meds?    2 or more - Epic MTM referral needed    \"What questions do you have about your medications?\"    None     New diagnoses of heart failure, COPD, diabetes, or MI?    No              Post Discharge Medication Reconciliation Status: discharge medications reconciled, continue medications without change.    Was MTM referral placed (*Make sure to put transitions as reason for referral)?   No    Call Summary    \"What questions or concerns do you have about your recent visit and your follow-up care?\"     none    \"If you have questions or things don't continue to improve, we encourage you contact us through the main clinic number (give number).  Even if the clinic is not open, triage nurses are available 24/7 to help you.     We would like you to know that our clinic has extended hours (provide information).  We also have urgent care (provide details on closest location and hours/contact info)\"      \"Thank you for your time and take care!\"             "

## 2019-09-11 NOTE — TELEPHONE ENCOUNTER
FYI-  Future appointment was scheduled using two 15 minute appointment. No further action needed unless pt should reschedule an appt a different day.

## 2019-09-13 ENCOUNTER — OFFICE VISIT (OUTPATIENT)
Dept: FAMILY MEDICINE | Facility: CLINIC | Age: 54
End: 2019-09-13
Payer: COMMERCIAL

## 2019-09-13 VITALS
HEART RATE: 76 BPM | SYSTOLIC BLOOD PRESSURE: 136 MMHG | BODY MASS INDEX: 42.61 KG/M2 | RESPIRATION RATE: 16 BRPM | WEIGHT: 264 LBS | TEMPERATURE: 97.1 F | DIASTOLIC BLOOD PRESSURE: 84 MMHG | OXYGEN SATURATION: 96 %

## 2019-09-13 DIAGNOSIS — Z76.5 DRUG-SEEKING BEHAVIOR: ICD-10-CM

## 2019-09-13 DIAGNOSIS — C78.7 CARCINOMA OF COLON METASTATIC TO LIVER (H): ICD-10-CM

## 2019-09-13 DIAGNOSIS — F31.32 BIPOLAR AFFECTIVE DISORDER, CURRENTLY DEPRESSED, MODERATE (H): ICD-10-CM

## 2019-09-13 DIAGNOSIS — C18.9 CARCINOMA OF COLON METASTATIC TO LIVER (H): ICD-10-CM

## 2019-09-13 DIAGNOSIS — Z98.2 VP (VENTRICULOPERITONEAL) SHUNT STATUS: ICD-10-CM

## 2019-09-13 DIAGNOSIS — R10.84 GENERALIZED ABDOMINAL PAIN: ICD-10-CM

## 2019-09-13 DIAGNOSIS — F10.10 ALCOHOL ABUSE: ICD-10-CM

## 2019-09-13 DIAGNOSIS — G89.4 CHRONIC PAIN SYNDROME: ICD-10-CM

## 2019-09-13 DIAGNOSIS — F33.41 RECURRENT MAJOR DEPRESSION IN PARTIAL REMISSION (H): Primary | ICD-10-CM

## 2019-09-13 DIAGNOSIS — E11.9 TYPE 2 DIABETES MELLITUS WITHOUT COMPLICATION, WITHOUT LONG-TERM CURRENT USE OF INSULIN (H): ICD-10-CM

## 2019-09-13 DIAGNOSIS — N28.9 LESION OF RIGHT NATIVE KIDNEY: ICD-10-CM

## 2019-09-13 DIAGNOSIS — Q07.00 ARNOLD-CHIARI MALFORMATION (H): ICD-10-CM

## 2019-09-13 DIAGNOSIS — F33.8 SEASONAL AFFECTIVE DISORDER (H): ICD-10-CM

## 2019-09-13 DIAGNOSIS — Z23 NEED FOR PROPHYLACTIC VACCINATION AND INOCULATION AGAINST INFLUENZA: ICD-10-CM

## 2019-09-13 DIAGNOSIS — E66.01 MORBID OBESITY (H): Chronic | ICD-10-CM

## 2019-09-13 PROCEDURE — 99215 OFFICE O/P EST HI 40 MIN: CPT | Mod: 25 | Performed by: FAMILY MEDICINE

## 2019-09-13 PROCEDURE — 90471 IMMUNIZATION ADMIN: CPT | Performed by: FAMILY MEDICINE

## 2019-09-13 PROCEDURE — 90682 RIV4 VACC RECOMBINANT DNA IM: CPT | Performed by: FAMILY MEDICINE

## 2019-09-13 RX ORDER — SERTRALINE HYDROCHLORIDE 100 MG/1
150 TABLET, FILM COATED ORAL DAILY
Qty: 145 TABLET | Refills: 3 | Status: SHIPPED | OUTPATIENT
Start: 2019-09-13 | End: 2020-05-01

## 2019-09-13 NOTE — PATIENT INSTRUCTIONS
CANNABIDIOL OIL  RUB TO SORE AREAS 4 X PER DAY   OTTO YUSUF JR., MD   [facility]  Map & Directions   Be the first to review!   [address], [place], [place] [id number] [phone]  OPEN NOW   Today: 8:00 am - 4:30 pm  (F33.41) Recurrent major depression in partial remission (H)  Comment:    Plan: sertraline (ZOLOFT) 100 MG tablet, MENTAL         HEALTH REFERRAL  - Adult; Outpatient Treatment;        Individual/Couples/Family/Group Therapy/Health         Psychology; G: [facility]         [phone]; We will contact you to schedule        the appointment or please call with any         questions

## 2019-09-13 NOTE — PROGRESS NOTES
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 weight loss and diabetic diet    Exercise: Walk in place and walking recommended exercises Range of motion, balance, isometric, and strengthening exercises 30 repetitions twice daily of involved joints      .OTTO YUSUF MD 9/13/2019 10:24 AM  September 13, 2019

## 2019-09-16 LAB
BACTERIA SPEC CULT: NO GROWTH
BACTERIA SPEC CULT: NO GROWTH
Lab: NORMAL
Lab: NORMAL
SPECIMEN SOURCE: NORMAL
SPECIMEN SOURCE: NORMAL

## 2019-10-01 ENCOUNTER — OFFICE VISIT (OUTPATIENT)
Dept: FAMILY MEDICINE | Facility: CLINIC | Age: 54
End: 2019-10-01
Payer: COMMERCIAL

## 2019-10-01 VITALS
WEIGHT: 268 LBS | RESPIRATION RATE: 16 BRPM | SYSTOLIC BLOOD PRESSURE: 130 MMHG | BODY MASS INDEX: 43.26 KG/M2 | HEART RATE: 65 BPM | OXYGEN SATURATION: 97 % | DIASTOLIC BLOOD PRESSURE: 88 MMHG

## 2019-10-01 DIAGNOSIS — G89.29 CHRONIC NONINTRACTABLE HEADACHE, UNSPECIFIED HEADACHE TYPE: ICD-10-CM

## 2019-10-01 DIAGNOSIS — R73.03 PRE-DIABETES: Primary | ICD-10-CM

## 2019-10-01 DIAGNOSIS — C78.7 CARCINOMA OF COLON METASTATIC TO LIVER (H): ICD-10-CM

## 2019-10-01 DIAGNOSIS — F10.10 ALCOHOL ABUSE, EPISODIC DRINKING BEHAVIOR: ICD-10-CM

## 2019-10-01 DIAGNOSIS — R51.9 CHRONIC NONINTRACTABLE HEADACHE, UNSPECIFIED HEADACHE TYPE: ICD-10-CM

## 2019-10-01 DIAGNOSIS — F11.20 OPIOID USE DISORDER, SEVERE, DEPENDENCE (H): ICD-10-CM

## 2019-10-01 DIAGNOSIS — J45.20 MILD INTERMITTENT ASTHMA WITHOUT COMPLICATION: ICD-10-CM

## 2019-10-01 DIAGNOSIS — G89.4 CHRONIC PAIN SYNDROME: ICD-10-CM

## 2019-10-01 DIAGNOSIS — F99 INSOMNIA DUE TO OTHER MENTAL DISORDER: ICD-10-CM

## 2019-10-01 DIAGNOSIS — F51.05 INSOMNIA DUE TO OTHER MENTAL DISORDER: ICD-10-CM

## 2019-10-01 DIAGNOSIS — C18.9 CARCINOMA OF COLON METASTATIC TO LIVER (H): ICD-10-CM

## 2019-10-01 DIAGNOSIS — Q07.00 ARNOLD-CHIARI SYNDROME WITHOUT SPINA BIFIDA OR HYDROCEPHALUS (H): ICD-10-CM

## 2019-10-01 DIAGNOSIS — G44.219 EPISODIC TENSION-TYPE HEADACHE, NOT INTRACTABLE: ICD-10-CM

## 2019-10-01 PROCEDURE — 99214 OFFICE O/P EST MOD 30 MIN: CPT | Performed by: FAMILY MEDICINE

## 2019-10-01 RX ORDER — TRAZODONE HYDROCHLORIDE 50 MG/1
50 TABLET, FILM COATED ORAL AT BEDTIME
Qty: 30 TABLET | Refills: 11 | Status: SHIPPED | OUTPATIENT
Start: 2019-10-01 | End: 2020-05-01

## 2019-10-01 RX ORDER — TOPIRAMATE 25 MG/1
25 TABLET, FILM COATED ORAL 2 TIMES DAILY
Qty: 60 TABLET | Refills: 0 | Status: SHIPPED | OUTPATIENT
Start: 2019-10-01 | End: 2020-05-01

## 2019-10-01 NOTE — PROGRESS NOTES
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 following issues   The primary encounter diagnosis was Pre-diabetes. Diagnoses of Insomnia due to other mental disorder, Episodic tension-type headache, not intractable, Chronic pain syndrome, Chronic nonintractable headache, unspecified headache type, Opioid use disorder, severe, dependence (H), Alcohol abuse, episodic drinking behavior, Mild intermittent asthma without complication, 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), and Carcinoma of colon metastatic to liver (H) were also pertinent to this visit. over half of which involved counseling and coordination of care.      Patient Instructions   (R73.03) Pre-diabetes  (primary encounter diagnosis)  Comment:    Plan: OPHTHALMOLOGY ADULT REFERRAL             (F51.05,  F99) Insomnia due to other mental disorder  Comment:    Plan: traZODone (DESYREL) 50 MG tablet             (G44.219) Episodic tension-type headache, not intractable  Comment:    Plan: topiramate (TOPAMAX) 25 MG tablet             (G89.4) Chronic pain syndrome  Comment:    Plan: CARE COORDINATION REFERRAL             (R51) Chronic nonintractable headache, unspecified headache type  Comment:    Plan: CARE COORDINATION REFERRAL             (F11.20) Opioid use disorder, severe, dependence (H)  Comment:     Plan: CARE COORDINATION REFERRAL             (F10.10) Alcohol abuse, episodic drinking behavior  Comment:    Plan: CARE COORDINATION REFERRAL             (J45.20) Mild intermittent asthma without complication  Comment:    Plan: CARE COORDINATION REFERRAL             (Q07.00) 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)  Comment:    Plan: CARE COORDINATION REFERRAL             (C18.9,  C78.7) Carcinoma of colon metastatic to liver (H)  Comment:    Plan: CARE COORDINATION REFERRAL                            ALL THE ABOVE PROBLEMS ARE STABLE AND MED CHANGES AS NOTED        Diet: MEDITERRANEAN DIET AND DIABETES  AND WEIGHT LOSS         Exercise:  WALKING WALK IN PLACE 15 MINUTES WITH EACH MEAL  AS TOLERATED   Exercises Range of motion, balance, isometric, and strengthening exercises 30 repetitions twice daily of involved joints            .OTTO YUSUF MD 10/1/2019 9:04 AM  October 1, 2019

## 2019-10-01 NOTE — PATIENT INSTRUCTIONS
(R73.03) Pre-diabetes  (primary encounter diagnosis)  Comment:    Plan: OPHTHALMOLOGY ADULT REFERRAL             (F51.05,  F99) Insomnia due to other mental disorder  Comment:    Plan: traZODone (DESYREL) 50 MG tablet             (G44.219) Episodic tension-type headache, not intractable  Comment:    Plan: topiramate (TOPAMAX) 25 MG tablet             (G89.4) Chronic pain syndrome  Comment:    Plan: CARE COORDINATION REFERRAL             (R51) Chronic nonintractable headache, unspecified headache type  Comment:    Plan: CARE COORDINATION REFERRAL             (F11.20) Opioid use disorder, severe, dependence (H)  Comment:    Plan: CARE COORDINATION REFERRAL             (F10.10) Alcohol abuse, episodic drinking behavior  Comment:    Plan: CARE COORDINATION REFERRAL             (J45.20) Mild intermittent asthma without complication  Comment:    Plan: CARE COORDINATION REFERRAL             (Q07.00) 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)  Comment:    Plan: CARE COORDINATION REFERRAL             (C18.9,  C78.7) Carcinoma of colon metastatic to liver (H)  Comment:    Plan: CARE COORDINATION REFERRAL

## 2019-10-02 ENCOUNTER — PATIENT OUTREACH (OUTPATIENT)
Dept: FAMILY MEDICINE | Facility: CLINIC | Age: 54
End: 2019-10-02

## 2019-10-02 NOTE — LETTER
[place] CARE COORDINATION  [facility]  [address], [address]  [place]  [address]    October 10, 2019    Ines Mott  [address]   [facility] [address]      Dear Ines,    I am a Social Work Clinic Care Coordinator who works with Dr. Dustin Fatima at the [facility] (formerly the [facility] I have been trying to reach you recently to introduce Clinic Care Coordination and to see if there was anything I might assist you with.  I wanted to provide you with a description of clinic care coordination (below).     The clinic care coordinator is a registered nurse and/or  who understand the health care system. The goal of clinic care coordination is to help you manage your health and improve access to the [facility] in the most efficient manner. The registered nurse can assist you in meeting your health care goals by providing education, coordinating services, and strengthening the communication among your providers. The  can assist you with financial, behavioral, psychosocial, chemical dependency, counseling, and/or psychiatric resources.    Please feel free to contact me at [phone] with questions or concerns. We at [facility] are focused on providing you with the highest-quality healthcare experience possible and that all starts with you.     Sincerely,       [facility]    BRISSA Burciaga, Social Work Care Coordinator  [facility]   [address]  [address]  [email or web address]   Office: [phone]  Gender Pronouns: i.e,. she/her  Employed by [employer]

## 2019-10-03 NOTE — PROGRESS NOTES
Clinic Care Coordination Contact    Reason for Referral: Mental Wellness (Health) (Mental Illness/Chemical Depedency): Chemical Health Assessments, Serious Illness Conversation: Basic Advanced Care Planning - 1:1 facilitation, Utilization Concern and  TRANSPORTATION AND MENTAL SUPPORT ISSUES    Data: Writer called patient. Patient said that she (pt) was not able to talk with writer at this time and requested that writer call patient tomorrow morning.  Plan: Writer will plan to call patient tomorrow and discuss community resources as indicated.    BRISSA Burciaga  [facility] Care Coordination  Clinics: [facility]   Email: [email or web address]  Tele: [phone]      Addendum  10/04/19  3:55pm  Data: Writer called patient mid-day. Patient said that she (pt) was in a restaurant and would call writer when she got home today. Confirmed that patient had writer's phone number (on caller ID).  Patient has not called back as yet.    Plan: Writer will attempt to call patient again next week.    BRISSA Burciaga  [facility] Care Coordination  Clinics: [facility]   Email: [email or web address]  Tele: [phone]        Addendum  10/10/19  Data: Writer called patient early this afternoon. Writer left a voice message with call back information.    Writer emailed patient a letter (with writer's contact information) introducing Care Coordination.    Plan: Patient has writer's contact information. [facility]CCC will plan no further outreaches at this time but remains available if patient reaches out to writer or if a Care Coordination referral is made.      [facility]    BRISSA Burciaga, Social Work Care Coordinator  [facility]   [address] [place] [address]   [id number]  [email or web address]   Office: [phone]  Gender Pronouns: i.e,. she/her  Employed by [facility]

## 2019-10-10 ENCOUNTER — PATIENT OUTREACH (OUTPATIENT)
Dept: FAMILY MEDICINE | Facility: CLINIC | Age: 54
End: 2019-10-10

## 2019-10-21 ENCOUNTER — OFFICE VISIT (OUTPATIENT)
Dept: SLEEP MEDICINE | Facility: CLINIC | Age: 54
End: 2019-10-21
Payer: COMMERCIAL

## 2019-10-21 VITALS
HEART RATE: 66 BPM | WEIGHT: 265 LBS | BODY MASS INDEX: 42.59 KG/M2 | RESPIRATION RATE: 16 BRPM | SYSTOLIC BLOOD PRESSURE: 152 MMHG | HEIGHT: 66 IN | OXYGEN SATURATION: 96 % | DIASTOLIC BLOOD PRESSURE: 88 MMHG

## 2019-10-21 DIAGNOSIS — G25.81 RESTLESS LEGS SYNDROME (RLS): ICD-10-CM

## 2019-10-21 DIAGNOSIS — G47.33 OSA (OBSTRUCTIVE SLEEP APNEA): Primary | ICD-10-CM

## 2019-10-21 PROCEDURE — 99214 OFFICE O/P EST MOD 30 MIN: CPT | Performed by: PHYSICIAN ASSISTANT

## 2019-10-21 ASSESSMENT — MIFFLIN-ST. JEOR: SCORE: 1818.78

## 2019-10-21 NOTE — PATIENT INSTRUCTIONS
Your BMI is Body mass index is 42.77 kg/m .  Weight management is a personal decision.  If you are interested in exploring weight loss strategies, the following discussion covers the approaches that may be successful. Body mass index (BMI) is one way to tell whether you are at a healthy weight, overweight, or obese. It measures your weight in relation to your height.  A BMI of 18.5 to 24.9 is in the healthy range. A person with a BMI of 25 to 29.9 is considered overweight, and someone with a BMI of 30 or greater is considered obese. More than two-thirds of American adults are considered overweight or obese.  Being overweight or obese increases the risk for further weight gain. Excess weight may lead to heart disease and diabetes.  Creating and following plans for healthy eating and physical activity may help you improve your health.  Weight control is part of healthy lifestyle and includes exercise, emotional health, and healthy eating habits. Careful eating habits lifelong are the mainstay of weight control. Though there are significant health benefits from weight loss, long-term weight loss with diet alone may be very difficult to achieve- studies show long-term success with dietary management in less than 10% of people. Attaining a healthy weight may be especially difficult to achieve in those with severe obesity. In some cases, medications, devices and surgical management might be considered.  What can you do?  If you are overweight or obese and are interested in methods for weight loss, you should discuss this with your provider.     Consider reducing daily calorie intake by 500 calories.     Keep a food journal.     Avoiding skipping meals, consider cutting portions instead.    Diet combined with exercise helps maintain muscle while optimizing fat loss. Strength training is particularly important for building and maintaining muscle mass. Exercise helps reduce stress, increase energy, and improves fitness.  Increasing exercise without diet control, however, may not burn enough calories to loose weight.       Start walking three days a week 10-20 minutes at a time    Work towards walking thirty minutes five days a week     Eventually, increase the speed of your walking for 1-2 minutes at time    In addition, we recommend that you review healthy lifestyles and methods for weight loss available through the National Institutes of Health patient information sites:  http://win.niddk.nih.gov/publications/index.htm    And look into health and wellness programs that may be available through your health insurance provider, employer, local community center, or bree club.    Weight management plan: Patient was referred to their PCP to discuss a diet and exercise plan.
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
## 2019-10-28 ENCOUNTER — TELEPHONE (OUTPATIENT)
Dept: SLEEP MEDICINE | Facility: CLINIC | Age: 54
End: 2019-10-28

## 2019-10-28 NOTE — TELEPHONE ENCOUNTER
Left patient a voice mail to call us back and reschedule her follow-up with Rios as Rios is out of the office on 12/30/19.    Nelida Carroll

## 2019-11-13 ENCOUNTER — TELEPHONE (OUTPATIENT)
Dept: SLEEP MEDICINE | Facility: CLINIC | Age: 54
End: 2019-11-13

## 2019-11-13 DIAGNOSIS — G47.33 OSA ON CPAP: ICD-10-CM

## 2019-11-20 ENCOUNTER — TELEPHONE (OUTPATIENT)
Dept: FAMILY MEDICINE | Facility: CLINIC | Age: 54
End: 2019-11-20

## 2019-11-20 DIAGNOSIS — I10 BENIGN ESSENTIAL HYPERTENSION: Primary | ICD-10-CM

## 2019-11-20 RX ORDER — LOSARTAN POTASSIUM 100 MG/1
100 TABLET ORAL DAILY
Qty: 90 TABLET | Refills: 3 | Status: SHIPPED | OUTPATIENT
Start: 2019-11-20 | End: 2020-12-17

## 2019-11-20 RX ORDER — HYDROCHLOROTHIAZIDE 25 MG/1
25 TABLET ORAL DAILY
Qty: 90 TABLET | Refills: 3 | Status: SHIPPED | OUTPATIENT
Start: 2019-11-20 | End: 2020-05-01

## 2019-11-20 NOTE — TELEPHONE ENCOUNTER
Disp Refills Start End RANCHO   losartan-hydrochlorothiazide (HYZAAR) 100-25 MG tablet 90 tablet 3 5/14/2019  No   Sig - Route: Take 1 tablet by mouth daily - Oral   Sent to pharmacy as: losartan-hydrochlorothiazide (HYZAAR) 100-25 MG tablet   Class: E-Prescribe   Order: [id number]   E-Prescribing Status: Receipt confirmed by pharmacy (5/14/2019  7:52 AM CDT)       Message from pharmacy    Need for clarification    The combination LOSARTAN-HCTZ 100/25 is on backorder and we can't obtain any. Patient is out and hoping we can switch to separate pills, one LOSARTAN and one HCTZ for now.

## 2019-11-21 ENCOUNTER — TELEPHONE (OUTPATIENT)
Dept: FAMILY MEDICINE | Facility: CLINIC | Age: 54
End: 2019-11-21

## 2019-11-21 NOTE — TELEPHONE ENCOUNTER
(I10) Benign essential hypertension  (primary encounter diagnosis)  Comment:    Plan: losartan (COZAAR) 100 MG tablet,         hydrochlorothiazide (HYDRODIURIL) 25 MG tablet         Replacing   Dustin Fatima Jr., MD

## 2019-11-21 NOTE — TELEPHONE ENCOUNTER
Reason for Call:  Other call back    Detailed comments: The patient called and stated that she had called in a refill request yesterday for her Losartan. She went to the pharmacy today to pick it up and they told her there has been a shortage and nobody within 50 miles would have it available. She is wondering if Dr. Dustin Fatima would want to switch her to a different prescription as she stated that she needs to take her blood pressure medication. She would like a call back to discuss this.     Phone Number Patient can be reached at: Home number on file [phone] (home)    Best Time: Any    Can we leave a detailed message on this number? YES    Call taken on 11/21/2019 at 10:13 AM by Ngozi Benson

## 2019-11-21 NOTE — TELEPHONE ENCOUNTER
Writer called [facility] and verified Rx sent 11/20/2019 is available. They will get rx' ready for pt . Pt was called with plan above.

## 2019-12-02 ENCOUNTER — DOCUMENTATION ONLY (OUTPATIENT)
Dept: FAMILY MEDICINE | Facility: CLINIC | Age: 54
End: 2019-12-02

## 2020-01-24 ENCOUNTER — ANCILLARY PROCEDURE (OUTPATIENT)
Dept: GENERAL RADIOLOGY | Facility: CLINIC | Age: 55
End: 2020-01-24
Attending: FAMILY MEDICINE
Payer: COMMERCIAL

## 2020-01-24 ENCOUNTER — OFFICE VISIT (OUTPATIENT)
Dept: FAMILY MEDICINE | Facility: CLINIC | Age: 55
End: 2020-01-24
Payer: COMMERCIAL

## 2020-01-24 VITALS
TEMPERATURE: 97.6 F | OXYGEN SATURATION: 97 % | HEART RATE: 67 BPM | DIASTOLIC BLOOD PRESSURE: 88 MMHG | SYSTOLIC BLOOD PRESSURE: 148 MMHG | WEIGHT: 273 LBS | BODY MASS INDEX: 44.06 KG/M2

## 2020-01-24 DIAGNOSIS — R73.03 PRE-DIABETES: ICD-10-CM

## 2020-01-24 DIAGNOSIS — G89.4 CHRONIC PAIN SYNDROME: ICD-10-CM

## 2020-01-24 DIAGNOSIS — J18.9 PNEUMONIA OF LEFT LOWER LOBE DUE TO INFECTIOUS ORGANISM: Primary | ICD-10-CM

## 2020-01-24 DIAGNOSIS — C18.9 CARCINOMA OF COLON METASTATIC TO LIVER (H): ICD-10-CM

## 2020-01-24 DIAGNOSIS — J18.9 PNEUMONIA OF LEFT LOWER LOBE DUE TO INFECTIOUS ORGANISM: ICD-10-CM

## 2020-01-24 DIAGNOSIS — C78.7 CARCINOMA OF COLON METASTATIC TO LIVER (H): ICD-10-CM

## 2020-01-24 LAB
ERYTHROCYTE [DISTWIDTH] IN BLOOD BY AUTOMATED COUNT: 13.9 % (ref 10–15)
HBA1C MFR BLD: 5.6 % (ref 0–5.6)
HCT VFR BLD AUTO: 38.7 % (ref 35–47)
HGB BLD-MCNC: 12.5 G/DL (ref 11.7–15.7)
MCH RBC QN AUTO: 29.1 PG (ref 26.5–33)
MCHC RBC AUTO-ENTMCNC: 32.3 G/DL (ref 31.5–36.5)
MCV RBC AUTO: 90 FL (ref 78–100)
PLATELET # BLD AUTO: 350 10E9/L (ref 150–450)
RBC # BLD AUTO: 4.3 10E12/L (ref 3.8–5.2)
WBC # BLD AUTO: 9.8 10E9/L (ref 4–11)

## 2020-01-24 PROCEDURE — 80053 COMPREHEN METABOLIC PANEL: CPT | Performed by: FAMILY MEDICINE

## 2020-01-24 PROCEDURE — 99214 OFFICE O/P EST MOD 30 MIN: CPT | Performed by: FAMILY MEDICINE

## 2020-01-24 PROCEDURE — 36415 COLL VENOUS BLD VENIPUNCTURE: CPT | Performed by: FAMILY MEDICINE

## 2020-01-24 PROCEDURE — 83036 HEMOGLOBIN GLYCOSYLATED A1C: CPT | Performed by: FAMILY MEDICINE

## 2020-01-24 PROCEDURE — 71046 X-RAY EXAM CHEST 2 VIEWS: CPT | Mod: FY

## 2020-01-24 PROCEDURE — 85027 COMPLETE CBC AUTOMATED: CPT | Performed by: FAMILY MEDICINE

## 2020-01-24 NOTE — LETTER
January 30, 2020      Ines Mott  [address]  [facility] [address]        Dear ,    We are writing to inform you of your test results.    NORMAL THREE MONTH GLUCOSE AVERAGE AT TARGET LEVEL     NORMAL COMPLETE BLOOD PANEL WBCS RBCS AND PLATELETS   NORMAL COMPLETE METABOLIC PROFILE RENAL BLOOD SALTS LIVER GLUCOSE TEST   BORDERLINE  CHLORIDE NOT SIGNIFICANT     Resulted Orders   Comprehensive metabolic panel   Result Value Ref Range    Sodium 141 133 - 144 mmol/L    Potassium 3.9 3.4 - 5.3 mmol/L    Chloride 111 (H) 94 - 109 mmol/L    Carbon Dioxide 23 20 - 32 mmol/L    Anion Gap 7 3 - 14 mmol/L    Glucose 94 70 - 99 mg/dL    Urea Nitrogen 12 7 - 30 mg/dL    Creatinine 0.77 0.52 - 1.04 mg/dL    GFR Estimate 87 >60 mL/min/[1.73_m2]      Comment:      Non  GFR Calc  Starting 12/18/2018, serum creatinine based estimated GFR (eGFR) will be   calculated using the Chronic Kidney Disease Epidemiology Collaboration   (CKD-EPI) equation.      GFR Estimate If Black >90 >60 mL/min/[1.73_m2]      Comment:       GFR Calc  Starting 12/18/2018, serum creatinine based estimated GFR (eGFR) will be   calculated using the Chronic Kidney Disease Epidemiology Collaboration   (CKD-EPI) equation.      Calcium 9.8 8.5 - 10.1 mg/dL    Bilirubin Total 1.0 0.2 - 1.3 mg/dL    Albumin 3.8 3.4 - 5.0 g/dL    Protein Total 7.6 6.8 - 8.8 g/dL    Alkaline Phosphatase 91 40 - 150 U/L    ALT 24 0 - 50 U/L    AST 9 0 - 45 U/L   CBC with platelets   Result Value Ref Range    WBC 9.8 4.0 - 11.0 10e9/L    RBC Count 4.30 3.8 - 5.2 10e12/L    Hemoglobin 12.5 11.7 - 15.7 g/dL    Hematocrit 38.7 35.0 - 47.0 %    MCV 90 78 - 100 fl    MCH 29.1 26.5 - 33.0 pg    MCHC 32.3 31.5 - 36.5 g/dL    RDW 13.9 10.0 - 15.0 %    Platelet Count 350 150 - 450 10e9/L   Hemoglobin A1c   Result Value Ref Range    Hemoglobin A1C 5.6 0 - 5.6 %      Comment:      Normal <5.7% Prediabetes 5.7-6.4%  Diabetes 6.5% or higher -  adopted from ADA   consensus guidelines.         If you have any questions or concerns, please call the clinic at the number listed above.       Sincerely,        OTTO YUSUF MD

## 2020-01-24 NOTE — PATIENT INSTRUCTIONS
"  5 -10MINUTES WALKING WALK IN PLACE , DANCING OR AEROBIC EXERCISE THREE TIMES DAILY     REDUCES RISK OF DYING 50%  ,THAT IS 25 MINUTES PER DAY     BALANCE WITH KNEE RAISED TO PREVENT FALLS INITIALLY EYES OPEN ON ONE FOOT 6    PREVENTS FALLS 40%    STRENGTHENING UPPER EXTREMETIES WITH 80% MAXIMAL LIFT STRENGTHENS LUMBAR AND THORACIC SPINE    YOU CAN DO THE SAME THING WITH 1 POUND WEIGHTS  100 EACH POSTION X 5     OTTO YUSUF JR., MD   Diabetes: Exchange List  What are the exchange lists?   The exchange lists show you portions of food that equal 1 exchange. Foods are divided into food lists. The foods on each list are called exchanges because they have a similar number of calories, protein, carbohydrate and fat content. Foods from each list can be traded or \"exchanged\" for any other food on the same list because they all have a similar exchange value. A dietitian will help you plan how much food your child should eat at each meal and from what lists the foods should come from. At first you should measure your food until you are able to make good estimates about serving sizes. The following list is a sample of foods found on the exchange lists. For more information, you can buy the Exchange Lists for Meal Planning from: The American Diabetes Association [address] [place] [address] 1-572.635.7260 http://www.diabetes.org  Carbohydrate group   Starch List: One starch exchange contains about 15 grams of carbohydrate, 3 grams of protein, 0 to 1 grams of fat, and 80 calories. A starch exchange is sometimes called a carb exchange. Examples of one starch (carb) exchange are:   one slice of bread   1/2 hamburger or hot dog bun   3/4 cup of unsweetened cereal   1/3 cup pasta   3 cups popcorn   crackers (6 small saltines, 3 squares of vilma crackers, 3 of most other crackers)   1 pancake or waffle (5 inch)   15 to 20 fat-free or baked potato or corn chips.   The vegetables included in the starch exchanges include: " "  corn (1/2 cup or 1/2 cob)   white potato (1/4 large baked with skin or 1/2 cup mashed)   yam or sweet potato (1/2 cup)   green peas (1/2 cup)   squash, winter (1 cup)   lima beans (2/3 cup).   Fruit List: 1 fruit exchange contains about 15 grams of carbohydrate and 60 calories. Examples of one fruit exchange are:   grape juice (1/3 cup)   apple or pineapple juice (1/2 cup)   orange or grapefruit juice (1/2 cup)   1 small apple   orange or peach   1/2 banana   1 cup raspberries   1/3 of a small cantaloupe   1 slice of watermelon.   Milk List: 1 milk exchange contains about 8 grams of protein and 12 grams of carbohydrate. Items on the milk list are divided into fat-free, reduced fat, and whole milk depending on the number of fat grams in the exchange. Examples of one milk exchange are: Fat-Free (0 to 3 grams of fat)   1 cup of skim or non-fat milk   1 cup of 1% milk (also includes 1/2 fat exchange)   6 ounces flavored fat-free yogurt   Reduced-Fat (5 grams of fat)   6 ounces of plain, low-fat yogurt   1 cup 2% milk (also includes one fat exchange).   Whole Milk (8 grams of fat)   8 ounces of plain yogurt (made from whole milk)   1 cup whole milk.   Vegetable List: One vegetable exchange has 5 grams of carbohydrate, 2 grams of protein, no fat, and 25 calories. One-half cup of cooked or a cup of raw vegetables is a good measure for 1 exchange of most vegetables. Raw lettuce may be taken in larger quantities, but salad dressing usually equals 1 fat exchange. Other Carbohydrates List: One \"other carbohydrate\" exchange has 15 grams of carbohydrate. Many of these foods count as a starch exchange and one or more fat exchanges.   brownie (2 inch square) = 1 carb, 1 fat exchange   fruit snack roll = 1 carb exchange   granola bar = 1 and 1/2 carb exchanges   ice cream (1/2 cup) = 1 carb, 2 fat exchanges   frozen yogurt (1/2 cup) = 1 carb, 0 to 1 fat exchanges   tortilla chips (6-12) = 1 carb, 2 fat exchanges.   Meat and Meat " Substitute Group   Meats are divided into very lean meats, lean meats, medium-fat meats, and high-fat meats. People with diabetes should try to eat more lean and medium fat meats and stay away from the high fat choices. The Very Lean meat group includes foods that contain 7 grams of protein, 0 to 1 gram of fat, and 35 calories for 1 exchange. Examples include:   1 ounce chicken or turkey (white meat, no skin)   1 ounce fresh fish   1 ounce fat-free cheese   2 egg whites   The Lean meat group includes foods that contain 7 grams of protein, 3 grams of fat, and 55 calories for 1 meat exchange. Examples include:   1 ounce chicken or turkey (dark meat, no skin)   1 ounce fish   1 ounce lean pork   1 ounce USDA Select or Choice grades of lean beef   1 ounce cheese (with 3 grams of fat or less per ounce).   The Medium-Fat group includes foods that have 7 grams of protein, 5 grams of fat, and 75 calories for 1 meat exchange. Examples include:   1 ounce of ground beef, most cuts of beef, pork, lamb or veal   1 ounce of cheese (5 grams of fat per ounce or less)   1 egg   1 ounce fried fish.   The High-Fat foods have 7 grams of protein, 8 grams of fat, and 100 calories for 1 meat exchange. This group includes:   1 ounce of pork sausage   1 ounce of spare ribs   1 oz of regular cheese (American, Swiss etc.)   1 oz of lunch meat   1 hot dog (turkey or chicken).   Fat Group   Fat List: Fat is necessary for the body and is particularly important during periods of fasting (overnight), when it is very slowly absorbed. 1 fat exchange contains 5 grams of fat and 45 calories. The monounsaturated fats and polyunsaturated fats are better for us than saturated fats. The fat list includes: 1 exchange of monounsaturated fats equals:   1/2 Tbsp peanut butter   6 almonds   1 tsp of oil (olive, peanut, canola).   1 exchange of polyunsaturated fats equals:   1 tsp margarine   1 tsp of any vegetable oil (except coconut).   1 exchange of  saturated fat includes:   1 tsp butter   1 strip of zaidi   2 Tbsp of cream (half and half).   Free Foods   A free food contains less than 20 calories or less than 5 grams of carbohydrate per serving. If the food has a serving size listed on its package, it should be limited to 3 servings spread throughout the day. Examples of free foods include:   4 Tbsp fat-free margarine   1 Tbsp fat-free Miracle Whip   sugar-free gelatin   diet soft drinks   catsup   soy sauce   spices.   Combination Foods   Many foods, such as casseroles, are mixed together. Your dietitian can help you figure out how many exchanges to count for combination foods. For example:   lasagna (1 cup) = 2 carb exchanges and 2 medium-fat meat exchanges   spaghetti with meatballs (1 cup) = 2 carb exchanges and 2 medium-fat meat exchanges   pizza, cheese (1/4 of 12 in.) = 2 carbs, 2 medium-fat meats   chicken noodle soup (1 cup) = 1 carb exchange   frozen entrée (less than 300 calories) = 2 carbs, 3 lean meat exchanges   macaroni and cheese (1 cup) = 2 carb exchanges and 2 medium-fat meat exchanges.   20 EXCHANGES   1. MODIFIED FAST 250 CALORIES TWICE DAILY FEMALES  300 CALORIES TWICE DAILY FOR MALES   OATMEAL AND COTTAGE CHEESE WORK NICELY   COPIOUS WATER BETWEEN   5/2 PLAN DR VELASQUEZ [facility]  NORMAL DIET 5 DAYS PER WEEK  SEMIFAST 2 DAYS PER WEEK  LOOK UP 5-2 PLAN ON THE INTERNET    Weight Loss Tips  1. Do not eat after 6 hrs before your expected bedtime  2. Have your heaviest meal for breakfast, a slightly lighter meal at lunch and a snack 6 hrs before bed  3. No sugar/calorie drinks except milk ie no fruit juice, pop, alcohol.  4. Drink milk OR WATER  30min before meals to decrease your hunger. Also it is excellent as part of your last meal of the day snack  5. Drink lots of water  6. Increase fiber in diet: all bran cereal, salads, popcorn etc  7. Have only one small serving of fruit a day about 1/2 cup (as this is high in sugar)  8. EXERCISE is  the bottom line. Without it, you will gain weight even on a low calorie diet. Best if done 2-3X a day as can    2. The only way known to prevent diabetes or keep it from getting worse is exercise, 20-40 minutes 3 times a day around the time of meals    SLEEP 8 HOURS PER DAY  BLACK AND BLUEBERRIES EVERY DAY ANTINFLAMMATORY BENEFIT   PLAIN YOGURT SEVERAL TIMES DAILY AS TOLERATED IF NOT ALLERGIC   AVOID HIGH FRUCTOSE SYRUP AND OLENE IN YOUR DIET   GREEN TEA EXTRACT  PROBIOTIC CAPSULE DAILY  HIGH QUALITY   DON'T EAT LATE AT NIGHT  ISABELLEUMA ROSALINAA HELPS WITH APPETITE  KEEP A [employer] JOURNAL  888 BREATHER WHEN STRESSED OR COUNT BACK FROM 100 WITH SLOW BREATHING  POSITIVE AFFIRMATIONS       FIT BIT OR PEDOMETER 10,000 STEPS PER DAY   FIT BIT TWICH RECOMMENDED

## 2020-01-24 NOTE — LETTER
January 27, 2020      Ines Mott  [address]  [facility] [place] [address]        Dear ,    We are writing to inform you of your test results.    THREE MONTH GLUCOSE AVERAGE AT TARGET LEVEL   IN NORMAL RANGE   NORMAL COMPLETE BLOOD PANEL WBCS RBCS AND PLTS   KEEP ON DIABETES AND WEIGHT LOSS DIET   with EXERCISE   5 -10MINUTES WALKING WALK IN PLACE , DANCING OR AEROBIC EXERCISE THREE TIMES DAILY   REDUCES RISK OF DYING 50%  ,THAT IS 25 MINUTES PER DAY   BALANCE WITH KNEE RAISED TO PREVENT FALLS INITIALLY EYES OPEN ON ONE FOOT 6   PREVENTS FALLS 40%   STRENGTHENING UPPER EXTREMETIES WITH 80% MAXIMAL LIFT STRENGTHENS LUMBAR AND THORACIC SPINE   YOU CAN DO THE SAME THING WITH 1 POUND WEIGHTS  100 EACH POSTION X 5     Resulted Orders   CBC with platelets   Result Value Ref Range    WBC 9.8 4.0 - 11.0 10e9/L    RBC Count 4.30 3.8 - 5.2 10e12/L    Hemoglobin 12.5 11.7 - 15.7 g/dL    Hematocrit 38.7 35.0 - 47.0 %    MCV 90 78 - 100 fl    MCH 29.1 26.5 - 33.0 pg    MCHC 32.3 31.5 - 36.5 g/dL    RDW 13.9 10.0 - 15.0 %    Platelet Count 350 150 - 450 10e9/L   Hemoglobin A1c   Result Value Ref Range    Hemoglobin A1C 5.6 0 - 5.6 %      Comment:      Normal <5.7% Prediabetes 5.7-6.4%  Diabetes 6.5% or higher - adopted from ADA   consensus guidelines.         If you have any questions or concerns, please call the clinic at the number listed above.       Sincerely,        OTTO YUSUF MD

## 2020-01-24 NOTE — PROGRESS NOTES
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 good measure for 1 exchange of most vegetables. Raw lettuce may be taken in larger quantities, but salad dressing usually equals 1 fat exchange. Other Carbohydrates List: One \"other carbohydrate\" exchange has 15 grams of carbohydrate. Many of these foods count as a starch exchange and one or more fat exchanges.   brownie (2 inch square) = 1 carb, 1 fat exchange   fruit snack roll = 1 carb exchange   granola bar = 1 and 1/2 carb exchanges   ice cream (1/2 cup) = 1 carb, 2 fat exchanges   frozen yogurt (1/2 cup) = 1 carb, 0 to 1 fat exchanges   tortilla chips (6-12) = 1 carb, 2 fat exchanges.   Meat and Meat Substitute Group   Meats are divided into very lean meats, lean meats, medium-fat meats, and high-fat meats. People with diabetes should try to eat more lean and medium fat meats and stay away from the high fat choices. The Very Lean meat group includes foods that contain 7 grams of protein, 0 to 1 gram of fat, and 35 calories for 1 exchange. Examples include:   1 ounce chicken or turkey (white meat, no skin)   1 ounce fresh fish   1 ounce fat-free cheese   2 egg whites   The Lean meat group includes foods that contain 7 grams of protein, 3 grams of fat, and 55 calories for 1 meat exchange. Examples include:   1 ounce chicken or " turkey (dark meat, no skin)   1 ounce fish   1 ounce lean pork   1 ounce USDA Select or Choice grades of lean beef   1 ounce cheese (with 3 grams of fat or less per ounce).   The Medium-Fat group includes foods that have 7 grams of protein, 5 grams of fat, and 75 calories for 1 meat exchange. Examples include:   1 ounce of ground beef, most cuts of beef, pork, lamb or veal   1 ounce of cheese (5 grams of fat per ounce or less)   1 egg   1 ounce fried fish.   The High-Fat foods have 7 grams of protein, 8 grams of fat, and 100 calories for 1 meat exchange. This group includes:   1 ounce of pork sausage   1 ounce of spare ribs   1 oz of regular cheese (American, Swiss etc.)   1 oz of lunch meat   1 hot dog (turkey or chicken).   Fat Group   Fat List: Fat is necessary for the body and is particularly important during periods of fasting (overnight), when it is very slowly absorbed. 1 fat exchange contains 5 grams of fat and 45 calories. The monounsaturated fats and polyunsaturated fats are better for us than saturated fats. The fat list includes: 1 exchange of monounsaturated fats equals:   1/2 Tbsp peanut butter   6 almonds   1 tsp of oil (olive, peanut, canola).   1 exchange of polyunsaturated fats equals:   1 tsp margarine   1 tsp of any vegetable oil (except coconut).   1 exchange of saturated fat includes:   1 tsp butter   1 strip of zaidi   2 Tbsp of cream (half and half).   Free Foods   A free food contains less than 20 calories or less than 5 grams of carbohydrate per serving. If the food has a serving size listed on its package, it should be limited to 3 servings spread throughout the day. Examples of free foods include:   4 Tbsp fat-free margarine   1 Tbsp fat-free Miracle Whip   sugar-free gelatin   diet soft drinks   catsup   soy sauce   spices.   Combination Foods   Many foods, such as casseroles, are mixed together. Your dietitian can help you figure out how many exchanges to count for combination foods.  For example:   lasagna (1 cup) = 2 carb exchanges and 2 medium-fat meat exchanges   spaghetti with meatballs (1 cup) = 2 carb exchanges and 2 medium-fat meat exchanges   pizza, cheese (1/4 of 12 in.) = 2 carbs, 2 medium-fat meats   chicken noodle soup (1 cup) = 1 carb exchange   frozen entrée (less than 300 calories) = 2 carbs, 3 lean meat exchanges   macaroni and cheese (1 cup) = 2 carb exchanges and 2 medium-fat meat exchanges.   20 EXCHANGES   1. MODIFIED FAST 250 CALORIES TWICE DAILY FEMALES  300 CALORIES TWICE DAILY FOR MALES   OATMEAL AND COTTAGE CHEESE WORK NICELY   COPIOUS WATER BETWEEN   5/2 PLAN DR VELASQUEZ [facility]  NORMAL DIET 5 DAYS PER WEEK  SEMIFAST 2 DAYS PER WEEK  LOOK UP 5-2 PLAN ON THE INTERNET    Weight Loss Tips  1. Do not eat after 6 hrs before your expected bedtime  2. Have your heaviest meal for breakfast, a slightly lighter meal at lunch and a snack 6 hrs before bed  3. No sugar/calorie drinks except milk ie no fruit juice, pop, alcohol.  4. Drink milk OR WATER  30min before meals to decrease your hunger. Also it is excellent as part of your last meal of the day snack  5. Drink lots of water  6. Increase fiber in diet: all bran cereal, salads, popcorn etc  7. Have only one small serving of fruit a day about 1/2 cup (as this is high in sugar)  8. EXERCISE is the bottom line. Without it, you will gain weight even on a low calorie diet. Best if done 2-3X a day as can    2. The only way known to prevent diabetes or keep it from getting worse is exercise, 20-40 minutes 3 times a day around the time of meals    SLEEP 8 HOURS PER DAY  BLACK AND BLUEBERRIES EVERY DAY ANTINFLAMMATORY BENEFIT   PLAIN YOGURT SEVERAL TIMES DAILY AS TOLERATED IF NOT ALLERGIC   AVOID HIGH FRUCTOSE SYRUP AND OLENE IN YOUR DIET   GREEN TEA EXTRACT  PROBIOTIC CAPSULE DAILY  HIGH QUALITY   DON'T EAT LATE AT NIGHT  RALPH ATWOOD HELPS WITH APPETITE  KEEP A [employer] JOURNAL  888 BREATHER WHEN STRESSED OR COUNT BACK  FROM 100 WITH SLOW BREATHING  POSITIVE AFFIRMATIONS       FIT BIT OR PEDOMETER 10,000 STEPS PER DAY   FIT BIT TWICH RECOMMENDED                  ALL THE ABOVE PROBLEMS ARE STABLE AND MED CHANGES AS NOTED    Diet: MEDITERRANEAN DIET     Exercise:  AS TOLERATED   Exercises Range of motion, balance, isometric, and strengthening exercises 30 repetitions twice daily of involved joints      .OTTO YUSUF MD 1/24/2020 2:02 PM  January 25, 2020

## 2020-01-27 ENCOUNTER — OFFICE VISIT (OUTPATIENT)
Dept: SLEEP MEDICINE | Facility: CLINIC | Age: 55
End: 2020-01-27
Payer: COMMERCIAL

## 2020-01-27 VITALS
WEIGHT: 273 LBS | SYSTOLIC BLOOD PRESSURE: 151 MMHG | HEART RATE: 74 BPM | BODY MASS INDEX: 43.87 KG/M2 | RESPIRATION RATE: 16 BRPM | OXYGEN SATURATION: 99 % | DIASTOLIC BLOOD PRESSURE: 85 MMHG | HEIGHT: 66 IN

## 2020-01-27 DIAGNOSIS — G47.33 OSA ON CPAP: ICD-10-CM

## 2020-01-27 LAB
ALBUMIN SERPL-MCNC: 3.8 G/DL (ref 3.4–5)
ALP SERPL-CCNC: 91 U/L (ref 40–150)
ALT SERPL W P-5'-P-CCNC: 24 U/L (ref 0–50)
ANION GAP SERPL CALCULATED.3IONS-SCNC: 7 MMOL/L (ref 3–14)
AST SERPL W P-5'-P-CCNC: 9 U/L (ref 0–45)
BILIRUB SERPL-MCNC: 1 MG/DL (ref 0.2–1.3)
BUN SERPL-MCNC: 12 MG/DL (ref 7–30)
CALCIUM SERPL-MCNC: 9.8 MG/DL (ref 8.5–10.1)
CHLORIDE SERPL-SCNC: 111 MMOL/L (ref 94–109)
CO2 SERPL-SCNC: 23 MMOL/L (ref 20–32)
CREAT SERPL-MCNC: 0.77 MG/DL (ref 0.52–1.04)
GFR SERPL CREATININE-BSD FRML MDRD: 87 ML/MIN/{1.73_M2}
GLUCOSE SERPL-MCNC: 94 MG/DL (ref 70–99)
POTASSIUM SERPL-SCNC: 3.9 MMOL/L (ref 3.4–5.3)
PROT SERPL-MCNC: 7.6 G/DL (ref 6.8–8.8)
SODIUM SERPL-SCNC: 141 MMOL/L (ref 133–144)

## 2020-01-27 PROCEDURE — 99214 OFFICE O/P EST MOD 30 MIN: CPT | Performed by: PHYSICIAN ASSISTANT

## 2020-01-27 ASSESSMENT — MIFFLIN-ST. JEOR: SCORE: 1850.07
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
## 2020-01-27 NOTE — PATIENT INSTRUCTIONS
Your BMI is Body mass index is 44.06 kg/m .  Weight management is a personal decision.  If you are interested in exploring weight loss strategies, the following discussion covers the approaches that may be successful. Body mass index (BMI) is one way to tell whether you are at a healthy weight, overweight, or obese. It measures your weight in relation to your height.  A BMI of 18.5 to 24.9 is in the healthy range. A person with a BMI of 25 to 29.9 is considered overweight, and someone with a BMI of 30 or greater is considered obese. More than two-thirds of American adults are considered overweight or obese.  Being overweight or obese increases the risk for further weight gain. Excess weight may lead to heart disease and diabetes.  Creating and following plans for healthy eating and physical activity may help you improve your health.  Weight control is part of healthy lifestyle and includes exercise, emotional health, and healthy eating habits. Careful eating habits lifelong are the mainstay of weight control. Though there are significant health benefits from weight loss, long-term weight loss with diet alone may be very difficult to achieve- studies show long-term success with dietary management in less than 10% of people. Attaining a healthy weight may be especially difficult to achieve in those with severe obesity. In some cases, medications, devices and surgical management might be considered.  What can you do?  If you are overweight or obese and are interested in methods for weight loss, you should discuss this with your provider.     Consider reducing daily calorie intake by 500 calories.     Keep a food journal.     Avoiding skipping meals, consider cutting portions instead.    Diet combined with exercise helps maintain muscle while optimizing fat loss. Strength training is particularly important for building and maintaining muscle mass. Exercise helps reduce stress, increase energy, and improves fitness.  Increasing exercise without diet control, however, may not burn enough calories to loose weight.       Start walking three days a week 10-20 minutes at a time    Work towards walking thirty minutes five days a week     Eventually, increase the speed of your walking for 1-2 minutes at time    In addition, we recommend that you review healthy lifestyles and methods for weight loss available through the National Institutes of Health patient information sites:  http://win.niddk.nih.gov/publications/index.htm    And look into health and wellness programs that may be available through your health insurance provider, employer, local community center, or bree club.    Weight management plan: Patient was referred to their PCP to discuss a diet and exercise plan.

## 2020-01-27 NOTE — NURSING NOTE
"Chief Complaint   Patient presents with     CPAP Follow Up     Follow up eva       Initial BP (!) 151/85   Pulse 74   Resp 16   Ht 1.676 m (5' 6\")   Wt 123.8 kg (273 lb)   SpO2 99%   BMI 44.06 kg/m   Estimated body mass index is 44.06 kg/m  as calculated from the following:    Height as of this encounter: 1.676 m (5' 6\").    Weight as of this encounter: 123.8 kg (273 lb).    Medication Reconciliation: complete      ESS 11    Erin Barrera MA      "

## 2020-01-28 ENCOUNTER — DOCUMENTATION ONLY (OUTPATIENT)
Dept: SLEEP MEDICINE | Facility: CLINIC | Age: 55
End: 2020-01-28

## 2020-01-28 DIAGNOSIS — G47.33 OSA ON CPAP: ICD-10-CM

## 2020-01-28 NOTE — PROGRESS NOTES
Patient was offered choice of vendor and chose [facility].  Patient Ines Marroquino was set up at [place] on January 28, 2020. Patient received a Resmed AirSense 10 Auto. Pressures were set at 11-15 cm H2O.   Patient s ramp is off  and FLEX/EPR is 2.  Patient received a sample [employer] Mask name: Simplus Full Face mask size Small and heated humidifier.  Patient does need to meet compliance. Patient has a follow up on 4/29/2020 with Bennett Goltz, PA-C.    Jazmine Gr

## 2020-01-31 ENCOUNTER — DOCUMENTATION ONLY (OUTPATIENT)
Dept: SLEEP MEDICINE | Facility: CLINIC | Age: 55
End: 2020-01-31

## 2020-01-31 DIAGNOSIS — G47.33 OSA ON CPAP: ICD-10-CM

## 2020-01-31 NOTE — PROGRESS NOTES
3 DAY STM VISIT    Diagnostic AHI: 94.9    PSG    Patient contacted for 3 day STM visit  Data only recheck    Replacement device: Yes  STM ordered by provider: No     Device type: Auto-CPAP  PAP settings from order::  CPAP min 11 cm  H20       CPAP max 15 cm  H20       Mask type:    Full Face Mask     Device settings from machine      Min CPAP 11.0            Max CPAP 15.0            Assessment: Nightly usage over four hours.  Action plan: Patient removed from STM, STM not required or ordered. . Patient has a follow up visit scheduled:   yes within 31-90 days of set up.    Total time spent on accessing, reviewing and interpreting remote patient PAP therapy data:   10 minutes      Total time spent with direct patient communication :   0  minutes

## 2020-02-06 ENCOUNTER — TRANSFERRED RECORDS (OUTPATIENT)
Dept: HEALTH INFORMATION MANAGEMENT | Facility: CLINIC | Age: 55
End: 2020-02-06

## 2020-02-07 DIAGNOSIS — E53.8 B12 DEFICIENCY: ICD-10-CM

## 2020-02-07 RX ORDER — CYANOCOBALAMIN 1000 UG/ML
INJECTION, SOLUTION INTRAMUSCULAR; SUBCUTANEOUS
Qty: 1 ML | Refills: 0 | Status: SHIPPED | OUTPATIENT
Start: 2020-02-07 | End: 2020-03-09

## 2020-02-13 ENCOUNTER — TRANSFERRED RECORDS (OUTPATIENT)
Dept: HEALTH INFORMATION MANAGEMENT | Facility: CLINIC | Age: 55
End: 2020-02-13

## 2020-03-07 DIAGNOSIS — E53.8 B12 DEFICIENCY: ICD-10-CM

## 2020-03-08 ENCOUNTER — HOSPITAL ENCOUNTER (EMERGENCY)
Facility: CLINIC | Age: 55
Discharge: HOME | End: 2020-03-08
Attending: EMERGENCY MEDICINE | Admitting: EMERGENCY MEDICINE
Payer: COMMERCIAL

## 2020-03-08 ENCOUNTER — APPOINTMENT (OUTPATIENT)
Dept: GENERAL RADIOLOGY | Facility: CLINIC | Age: 55
End: 2020-03-08
Attending: EMERGENCY MEDICINE
Payer: COMMERCIAL

## 2020-03-08 ENCOUNTER — APPOINTMENT (OUTPATIENT)
Dept: CT IMAGING | Facility: CLINIC | Age: 55
End: 2020-03-08
Attending: EMERGENCY MEDICINE
Payer: COMMERCIAL

## 2020-03-08 VITALS
WEIGHT: 274 LBS | RESPIRATION RATE: 18 BRPM | SYSTOLIC BLOOD PRESSURE: 194 MMHG | TEMPERATURE: 98 F | DIASTOLIC BLOOD PRESSURE: 78 MMHG | OXYGEN SATURATION: 98 % | BODY MASS INDEX: 44.22 KG/M2

## 2020-03-08 DIAGNOSIS — R11.0 NAUSEA: ICD-10-CM

## 2020-03-08 DIAGNOSIS — R51.9 NONINTRACTABLE EPISODIC HEADACHE, UNSPECIFIED HEADACHE TYPE: ICD-10-CM

## 2020-03-08 DIAGNOSIS — M54.50 BILATERAL LOW BACK PAIN WITHOUT SCIATICA, UNSPECIFIED CHRONICITY: ICD-10-CM

## 2020-03-08 LAB
ALBUMIN SERPL-MCNC: 3.4 G/DL (ref 3.4–5)
ALP SERPL-CCNC: 92 U/L (ref 40–150)
ALT SERPL W P-5'-P-CCNC: 25 U/L (ref 0–50)
ANION GAP SERPL CALCULATED.3IONS-SCNC: 5 MMOL/L (ref 3–14)
AST SERPL W P-5'-P-CCNC: 6 U/L (ref 0–45)
BASOPHILS # BLD AUTO: 0 10E9/L (ref 0–0.2)
BASOPHILS NFR BLD AUTO: 0.2 %
BILIRUB SERPL-MCNC: 0.5 MG/DL (ref 0.2–1.3)
BUN SERPL-MCNC: 19 MG/DL (ref 7–30)
CALCIUM SERPL-MCNC: 9.8 MG/DL (ref 8.5–10.1)
CHLORIDE SERPL-SCNC: 109 MMOL/L (ref 94–109)
CO2 SERPL-SCNC: 26 MMOL/L (ref 20–32)
CREAT SERPL-MCNC: 0.83 MG/DL (ref 0.52–1.04)
DIFFERENTIAL METHOD BLD: ABNORMAL
EOSINOPHIL # BLD AUTO: 0.2 10E9/L (ref 0–0.7)
EOSINOPHIL NFR BLD AUTO: 1.3 %
ERYTHROCYTE [DISTWIDTH] IN BLOOD BY AUTOMATED COUNT: 14.1 % (ref 10–15)
GFR SERPL CREATININE-BSD FRML MDRD: 80 ML/MIN/{1.73_M2}
GLUCOSE SERPL-MCNC: 114 MG/DL (ref 70–99)
HCT VFR BLD AUTO: 36 % (ref 35–47)
HGB BLD-MCNC: 11.6 G/DL (ref 11.7–15.7)
IMM GRANULOCYTES # BLD: 0.1 10E9/L (ref 0–0.4)
IMM GRANULOCYTES NFR BLD: 0.4 %
LYMPHOCYTES # BLD AUTO: 2.7 10E9/L (ref 0.8–5.3)
LYMPHOCYTES NFR BLD AUTO: 22.3 %
MCH RBC QN AUTO: 29.4 PG (ref 26.5–33)
MCHC RBC AUTO-ENTMCNC: 32.2 G/DL (ref 31.5–36.5)
MCV RBC AUTO: 91 FL (ref 78–100)
MONOCYTES # BLD AUTO: 1 10E9/L (ref 0–1.3)
MONOCYTES NFR BLD AUTO: 8.1 %
NEUTROPHILS # BLD AUTO: 8.2 10E9/L (ref 1.6–8.3)
NEUTROPHILS NFR BLD AUTO: 67.7 %
NRBC # BLD AUTO: 0 10*3/UL
NRBC BLD AUTO-RTO: 0 /100
PLATELET # BLD AUTO: 334 10E9/L (ref 150–450)
POTASSIUM SERPL-SCNC: 3.9 MMOL/L (ref 3.4–5.3)
PROT SERPL-MCNC: 7.2 G/DL (ref 6.8–8.8)
RBC # BLD AUTO: 3.94 10E12/L (ref 3.8–5.2)
SODIUM SERPL-SCNC: 140 MMOL/L (ref 133–144)
WBC # BLD AUTO: 12.1 10E9/L (ref 4–11)

## 2020-03-08 PROCEDURE — 70450 CT HEAD/BRAIN W/O DYE: CPT

## 2020-03-08 PROCEDURE — 80053 COMPREHEN METABOLIC PANEL: CPT | Performed by: EMERGENCY MEDICINE

## 2020-03-08 PROCEDURE — 25800030 ZZH RX IP 258 OP 636: Performed by: EMERGENCY MEDICINE

## 2020-03-08 PROCEDURE — 96361 HYDRATE IV INFUSION ADD-ON: CPT

## 2020-03-08 PROCEDURE — 96374 THER/PROPH/DIAG INJ IV PUSH: CPT

## 2020-03-08 PROCEDURE — 25000128 H RX IP 250 OP 636: Performed by: EMERGENCY MEDICINE

## 2020-03-08 PROCEDURE — 71046 X-RAY EXAM CHEST 2 VIEWS: CPT

## 2020-03-08 PROCEDURE — 85025 COMPLETE CBC W/AUTO DIFF WBC: CPT | Performed by: EMERGENCY MEDICINE

## 2020-03-08 PROCEDURE — 99285 EMERGENCY DEPT VISIT HI MDM: CPT | Mod: 25

## 2020-03-08 RX ADMIN — SODIUM CHLORIDE 1000 ML: 9 INJECTION, SOLUTION INTRAVENOUS at 22:30

## 2020-03-08 RX ADMIN — PROCHLORPERAZINE EDISYLATE 10 MG: 5 INJECTION INTRAMUSCULAR; INTRAVENOUS at 22:30

## 2020-03-09 RX ORDER — CYANOCOBALAMIN 1000 UG/ML
INJECTION, SOLUTION INTRAMUSCULAR; SUBCUTANEOUS
Qty: 3 ML | Refills: 1 | Status: SHIPPED | OUTPATIENT
Start: 2020-03-09 | End: 2020-09-02

## 2020-03-09 ASSESSMENT — ENCOUNTER SYMPTOMS
NAUSEA: 1
RHINORRHEA: 0
FEVER: 1
COUGH: 1
SORE THROAT: 0
BACK PAIN: 1
CHILLS: 1

## 2020-03-09 NOTE — ED TRIAGE NOTES
Pt. had steroid injection to [facility] on Wednesday. now having generalized headache with rad.into neck.

## 2020-03-09 NOTE — TELEPHONE ENCOUNTER
Prescription approved per [facility] Refill Protocol for 12 months of refills since last appointment, which was 01/24/20

## 2020-03-09 NOTE — ED PROVIDER NOTES
History     Chief Complaint:  Headache    The history is provided by the patient.      Ines Mott is a 55 year old female with a history of spina bifida, hypertension, and hydrocephalus who presents to the emergency department for evaluation of a headache. The patient reports that she received a steroid injection to her lower back on 3/4/2020 and on 3/7/2020 began experiencing generalized headaches with radiating pain to her neck. She reports lower back pain, nausea, fevers, chills, blurred vision, and a cough accompanying her headache. The patient reports that she has received steroid injections to her lower back in the past and experienced a similar set of symptoms, however she reports that her current symptoms are much worse than before. She reports that she took took Hydrocodone without improvement. The patient states her vision blurs when standing up. The patient denies taking ibuprofen to treat her symptoms, noting that she was advised to avoid NSAIDS secondary to her history of liver cancer. The patient denies experiencing a sore throat or runny nose.    Allergies:  Amoxicillin  Ativan  Buspirone  Ciprofloxacin  Macrobid  Busulfan  Cefaclor  Cefdinir  Cephalosporins  Clindamycin  Diphenhydramine  Dilaudid  Imitrex  Latex  Metoclopramide  NSAIDS  Red dye  Sulfa drugs  Penicillins  Paxil  Quinolones  Blue dyes  Lidocaine    Medications:    Albuterol  Amlodipine  Benzonatate  Cyanocobalamin  Epinephrine  Hydrochlorothiazide  Lactobacilus  Cozaar  Hyzaar  Mag-ox  Zoloft  Topamax  Desyrel      Past Medical History:    Carcinoma of colon  Liver cancer  ALEXUS  Fibromyalgia  Arthritis  Asthma  Depression  Diabetes  Hydrocephalus  Hypertension  Migraines  Obesity  Spina bifida  Right renal mass    Past Surgical History:    Appendectomy  Cholecystectomy  Dental extractions  Shunt implant verntriculoperitoneal    Family History:    Cancer    Social History:  Presents alone  Former smoker  Alcohol use  Drug use:  methamphetamines  Marital Status:   [2]     Review of Systems   Constitutional: Positive for chills and fever.   HENT: Negative for rhinorrhea and sore throat.    Eyes: Positive for visual disturbance.   Respiratory: Positive for cough.    Gastrointestinal: Positive for nausea.   Musculoskeletal: Positive for back pain.   All other systems reviewed and are negative.    Physical Exam     Patient Vitals for the past 24 hrs:   BP Temp Temp src Heart Rate Resp SpO2 Weight   03/08/20 2149 (!) 194/78 98  F (36.7  C) Oral 59 18 98 % 124.3 kg (274 lb)       Physical Exam  Eyes:               Sclera white; Pupils are equal and round  ENT:                External ears and nares normal  CV:                  Rate as above with regular rhythm   Resp:               Breath sounds clear and equal bilaterally                          Non-labored, no retractions or accessory muscle use  GI:                   Abdomen is soft, non-tender, non-distended                          No rebound tenderness or peritoneal features  MS:                  Moves all extremities  Skin:                Warm and dry  Neuro:             Speech is normal and fluent. No apparent deficit.    Emergency Department Course     Imaging:  Radiology findings were communicated with the patient who voiced understanding of the findings.    1) CT HEAD W/O CONTRAST  IMPRESSION:  1.  No acute intracranial process. Stable shunted ventricular configuration.  As per radiology.     2) XR CHEST 2 VW  IMPRESSION: Right-sided shunt catheter. Heart size is normal. Lungs are clear bilaterally. Mediastinum and bony structures are unremarkable.  As per radiology.    Laboratory:  Laboratory findings were communicated with the patient who voiced understanding of the findings.    CBC: WBC: 12.1 (H), HGB: 11.6 (L), PLT: 334   CMP: Glucose 114 (H) o/w WNL (Creatinine: 0.83)    UA with micro: pending     Interventions:  2230 NS 1L IV   2230 Compazine 10 mg IV    Emergency  Department Course:  Past medical records, nursing notes, and vitals reviewed.    2150 I performed an exam of the patient as documented above.     IV was inserted and blood was drawn for laboratory testing, results above.    The patient provided a urine sample here in the emergency department. This was sent for laboratory testing, findings pending.    The patient was sent for a CT head and XR chest while in the emergency department, results above.     2335 I rechecked the patient and discussed the results of her workup thus far.     Findings and plan explained to the Patient. Patient discharged home with instructions regarding supportive care, medications, and reasons to return. The importance of close follow-up was reviewed.    I personally reviewed the laboratory and imaging results with the Patient and answered all related questions prior to discharge.     Impression & Plan     Medical Decision Making:  Ines Mott is a 55 year old female is here for evaluation of multiple symptoms that developed several days after a recent steroid injection into her lower back. The possibility of a spinal leak was considered, but given her rapid improvement with medications here I do not think this is likely to be the case. CT scan was obtained to evaluate for any shunt malfunctions and there was no hydrocephalous noted; also no intracranial blood. I do not suspect that she has an associated meningitis, and lumbar puncture is not indicated.  Chest XR shows no evidence of pneumonia. Blood work shows no electrolyte abnormalities or renal insufficiency. She was feeling markedly improved after medications here. I offered a prescription for home, which she declines.     Diagnosis:    ICD-10-CM   1. Nonintractable episodic headache, unspecified headache type  R51   2. Nausea  R11.0   3. Bilateral low back pain without sciatica, unspecified chronicity  M54.5       Disposition:  Discharged to home.    Scribe Disclosure:  Stanley CONDE  Elizabeth, am serving as a scribe at 11:18 PM on 3/8/2020 to document services personally performed by Shanice Tamez MD based on my observations and the provider's statements to me.         Shanice Tamez MD  03/09/20 0154

## 2020-03-12 ENCOUNTER — TELEPHONE (OUTPATIENT)
Dept: FAMILY MEDICINE | Facility: CLINIC | Age: 55
End: 2020-03-12

## 2020-03-12 NOTE — TELEPHONE ENCOUNTER
Panel Management Review      Patient has the following on her problem list:     Asthma review     ACT Total Scores 8/6/2019   ACT TOTAL SCORE -   ASTHMA ER VISITS -   ASTHMA HOSPITALIZATIONS -   ACT TOTAL SCORE (Goal Greater than or Equal to 20) 21   In the past 12 months, how many times did you visit the emergency room for your asthma without being admitted to the hospital? 0   In the past 12 months, how many times were you hospitalized overnight because of your asthma? 1      1. Is Asthma diagnosis on the Problem List? Yes    2. Is Asthma listed on Health Maintenance? Yes    3. Patient is due for:  ACT    Diabetes    ASA: Not Required     Last A1C  Lab Results   Component Value Date    A1C 5.6 01/24/2020    A1C 5.9 05/14/2019    A1C 5.5 09/25/2018    A1C 5.6 03/23/2018    A1C 5.6 10/12/2017     A1C tested: Passed    Last LDL:    Lab Results   Component Value Date    CHOL 184 08/06/2019     Lab Results   Component Value Date    HDL 56 08/06/2019     Lab Results   Component Value Date    LDL 93 08/06/2019     Lab Results   Component Value Date    TRIG 176 08/06/2019     Lab Results   Component Value Date    CHOLHDLRATIO 5.5 05/07/2015     Lab Results   Component Value Date    NHDL 128 08/06/2019       Is the patient on a Statin? NO             Is the patient on Aspirin? NO    Medications     HMG CoA Reductase Inhibitors     STATIN NOT PRESCRIBED, INTENTIONAL,       Salicylates     ASPIRIN NOT PRESCRIBED (INTENTIONAL)             Last three blood pressure readings:  BP Readings from Last 3 Encounters:   03/08/20 (!) 194/78   01/27/20 (!) 151/85   01/24/20 (!) 148/88       Date of last diabetes office visit: 1/24/2020     Tobacco History:     History   Smoking Status     Former Smoker   Smokeless Tobacco     Never Used         Hypertension   Last three blood pressure readings:  BP Readings from Last 3 Encounters:   03/08/20 (!) 194/78   01/27/20 (!) 151/85   01/24/20 (!) 148/88     Blood pressure: FAILED    HTN  Guidelines:  Less than 140/90      Composite cancer screening  Chart review shows that this patient is due/due soon for the following Colonoscopy  Summary:    Patient is due/failing the following:   ACT and PHYSICAL    Action needed:   Patient needs office visit for Physical. and Patient needs to do ACT.    Type of outreach:    Sent letter.    Questions for provider review:    None                                                                                                                                    Maggi Harris CMA

## 2020-03-12 NOTE — LETTER
March 12, 2020    Ines Mott  [address]  [facility] [address]    Dear Elijah Chacon cares about your health and your health plan.  I have reviewed your medical conditions, medication list and lab results, and am making recommendations based on this review to better manage your health.    You are in particular need of attention regarding:  -Colon Cancer Screening  -Wellness (Physical) Visit     I am recommending that you:     -schedule a WELLNESS (Physical) APPOINTMENT with me.   I will check fasting labs the same day - nothing to eat except water and meds for 8-10 hours prior.    -schedule a COLONOSCOPY to look for colon cancer (due every 10 years or 5 years in higher risk situations.)        Colon cancer is now the second leading cause of cancer-related deaths in the [place] for both men and women and there are over 130,000 new cases and 50,000 deaths per year from colon cancer.  Colonoscopies can prevent 90-95% of these deaths.  Problem lesions can be removed before they ever become cancer.  This test is not only looking for cancer, but also getting rid of precancerious lesions.    If you are under/uninsured, we recommend you contact the [employer] program. [employer] is a free colorectal cancer screening program that provides colonoscopies for eligible under/uninsured [place] men and women. If you are interested in receiving a free colonoscopy, please call [employer] at [phone] (mention code [employer]) to see if you re eligible.      If you do not wish to do a colonoscopy or cannot afford to do one, at this time, there is another option. It is called a FIT test or Fecal Immunochemical Occult Blood Test (take home stool sample kit).  It does not replace the colonoscopy for colorectal cancer screening, but it can detect hidden bleeding in the lower colon.  It does need to be repeated every year and if a positive result is obtained, you would be referred for a  colonoscopy.          If you have completed either one of these tests at another facility, please call with the details of when and where the tests were done and if they were normal or not. Or have the records sent to our clinic so that we can best coordinate your care.      Please call us at the [place] location:  [phone] or use [employer] to address the above recommendations.     Thank you for trusting [facility].  We appreciate the opportunity to serve you and look forward to supporting your healthcare in the future.    If you have (or plan to have) any of these tests done at a facility other than a [facility] or a [facility], please have the results sent to the [facility] location noted above.      Best Regards,    Dustin Fatima Jr MD

## 2020-03-21 ENCOUNTER — TRANSFERRED RECORDS (OUTPATIENT)
Dept: HEALTH INFORMATION MANAGEMENT | Facility: CLINIC | Age: 55
End: 2020-03-21

## 2020-03-30 ENCOUNTER — TELEPHONE (OUTPATIENT)
Dept: FAMILY MEDICINE | Facility: CLINIC | Age: 55
End: 2020-03-30

## 2020-03-30 NOTE — TELEPHONE ENCOUNTER
PHONE OFFICE VISIT FOR PAIN  AND DIABETES 2 GOAL 8%  OBESITY IN April OR MAY    Per lead MD postpone until may

## 2020-05-01 ENCOUNTER — VIRTUAL VISIT (OUTPATIENT)
Dept: FAMILY MEDICINE | Facility: CLINIC | Age: 55
End: 2020-05-01
Payer: COMMERCIAL

## 2020-05-01 DIAGNOSIS — E87.6 HYPOKALEMIA: ICD-10-CM

## 2020-05-01 DIAGNOSIS — F33.41 RECURRENT MAJOR DEPRESSION IN PARTIAL REMISSION (H): ICD-10-CM

## 2020-05-01 DIAGNOSIS — G44.219 EPISODIC TENSION-TYPE HEADACHE, NOT INTRACTABLE: ICD-10-CM

## 2020-05-01 DIAGNOSIS — E55.9 VITAMIN D DEFICIENCY: ICD-10-CM

## 2020-05-01 DIAGNOSIS — N39.0 RECURRENT UTI: ICD-10-CM

## 2020-05-01 DIAGNOSIS — R25.2 CRAMP OF LIMB: ICD-10-CM

## 2020-05-01 DIAGNOSIS — G89.4 CHRONIC PAIN SYNDROME: Primary | ICD-10-CM

## 2020-05-01 DIAGNOSIS — R16.0 LIVER MASSES: ICD-10-CM

## 2020-05-01 DIAGNOSIS — E11.9 TYPE 2 DIABETES MELLITUS WITHOUT COMPLICATION, WITHOUT LONG-TERM CURRENT USE OF INSULIN (H): ICD-10-CM

## 2020-05-01 DIAGNOSIS — K59.01 SLOW TRANSIT CONSTIPATION: ICD-10-CM

## 2020-05-01 PROCEDURE — 99443 ZZC PHYSICIAN TELEPHONE EVALUATION 21-30 MIN: CPT | Mod: TEL | Performed by: FAMILY MEDICINE

## 2020-05-01 RX ORDER — POTASSIUM CHLORIDE 750 MG/1
10 TABLET, EXTENDED RELEASE ORAL DAILY
Qty: 30 TABLET | Refills: 11 | Status: SHIPPED | OUTPATIENT
Start: 2020-05-01

## 2020-05-01 RX ORDER — TOPIRAMATE 25 MG/1
25 TABLET, FILM COATED ORAL 2 TIMES DAILY
Qty: 60 TABLET | Refills: 3 | Status: ON HOLD | OUTPATIENT
Start: 2020-05-01 | End: 2024-04-25

## 2020-05-01 RX ORDER — POLYETHYLENE GLYCOL 3350 17 G/17G
POWDER, FOR SOLUTION ORAL
Qty: 1581 G | Refills: 11 | Status: SHIPPED | OUTPATIENT
Start: 2020-05-01

## 2020-05-01 RX ORDER — SERTRALINE HYDROCHLORIDE 100 MG/1
150 TABLET, FILM COATED ORAL DAILY
Qty: 145 TABLET | Refills: 3 | Status: SHIPPED | OUTPATIENT
Start: 2020-05-01

## 2020-05-01 RX ORDER — MULTIVIT-MIN/IRON/FOLIC ACID/K 18-600-40
2000 CAPSULE ORAL DAILY
Qty: 90 TABLET | Refills: 11 | Status: ON HOLD | OUTPATIENT
Start: 2020-05-01 | End: 2024-04-25

## 2020-05-01 NOTE — PROGRESS NOTES
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 topiramate (TOPAMAX) 25 MG tablet   WALK IN PLACE 15 MINUTES WITH EACH MEAL      KNEE EXERCISES        ICE TO KNEE 5 MINUTES PRIOR TO EXERCISE IF POSSIBLE    ISOMETRIC KNEE EXTENDED POSITION STANDING X 30 TWICE DAILY \    FLEXION OF KNEE ISOMETRIC 90 DEGREE FLEXION X 30 TWICE DAILY    WITH FIVE POUND WEIGHTS OR PURSE    SITTING EXTENDED KNEE  X 3O SECONDS TWICE DAILY    STANDING WITH KNEE FLEXED X 30 SECONDS TWICE DAILY    CLOSED CHAIN EXERCISE  ,THAT IS ONE 1-2 INCH BOOK 30 REPS ON AND OFF THE BOOK OR PLATFORM     AFTER 2 WEEK INCREASE TO 3 INCHES    AFTER 4 WEEKS INCREASE TO 4 INCHES    WALL SITTING 30 SECONDS 45 DEGREES    AFTER 2 WEEKS 30 SECONDS 60 DEGREES    AFTER  4 WEEKS 30 SECONDS 90 DEGREES    BALANCE 30 SECONDS WITH OPPOSITE LEG AT 30 DEGREES AND ARM TO SIDE X 2 WEEKS    BALANCE 30 SECONDS WITH OPPOSITE LEG AT 60 DEGREES AND ARM TO SIDE X 2 WEEKS    REPEAT with OPPOSITE LEGS BALANCING   Assessment: Lower back pain  Plan: do these exercises at least twice daily:  Standing or sitting exercise can be done every two hours    PAIN RELIEVED WITH BED WEDGE OR PILLOWS    SLEEPING 7-8 HOURS PER NIGHT HELPS PAIN    GENTLE MASSAGE HELPS     SLEEPING WITH PILLOW BETWEEN OR UNDER  LEGS     TENS UNIT SOMETIMES HELP     OFTEN WALKING HELPS IF IT DOES NOT EXACERBATE LOWER BACK PAIN     OCCASIONAL MUSCLE RELAXANT FOR SHORT PERIOD HELPS     AVOID NARCOTICS IF POSSIBLE O    Bo' Flexion Versus Nadia   Extension Exercises For   Low Back Pain   Examples of Bo' Flexion Exercises  1. Pelvic tilt.  Please press the small of your back against the floor.  Start with 5-10  and increase to 100 count over one month   2. Single Knee to chest. Lie on your back with legs in bent position. Alternate one leg and the other very slowly bringing the knee to chest.  Start with a count of 5-10   Over one month work up to 100  3. Double knee to chest.  Lie on your back with knees in bent position.  Bring both knees to the chest slowly hold for a count of 5-to 10. Over one month work to 100  4. Partial sit-up or crunch.  Lie on your back in bent leg position.  Please bring your body with arms crossed in front  To 30 degrees of flexion. Start with 5-10 over one month work up to 100 or more  5. Sit back.  Please sit on the side of the bed or a stair landing  And lie backwards until the abdominal muscles start to quiver.  Hold for a count of 5-10 and over a month work up to 100.  5. Hamstring stretch.  Please extend your legs while sitting on the floor as tolerated for 5-10 count.  Gradually increase to count of 30 over one month      Alternately find a stair landing or sturdy chair and place heel  In a comfortable level of extension  And stretch one hamstring at a time for 5-10 seconds.  Increase to count of 30 over one month                         Squat.  Stand with legs comfortably apart and lower the body slowly by flexing the knees  for count of 5-10 over one month increase to 30.  Useful for anterior disc protrusion, facette joint arthritis spond-10ylolysis, spondylolisthesis and spinal stenosis    Nadia method or extension exercises  Useful disc bulging posteriorly  1. Prone pressups  Please lie on  your abdomen.   Please do a push with the upper half of your body only.  This should not cause the pain to shoot down your buttock thigh leg or foot  Start with 10 and repeat x 3 one minute apart.  Repeat x 10 every 1-2 hours  Pain tends to increase in the center of back  And leaves buttock thighs legs and foot over time  You may shift your pelvis in opposite direction of buttock and leg pain to achieve even better results  2. Superman with arms extended  Or at the side Simultaneously lift your arms and legs off the floor. Start with 5-10 over one month increase to 100.  3. Cat stretch or cobra Start  As if in extended position of prone pressup but hold the stretch for 5 or 10 count. Over one moth to count of 30.  4.  Extensions can be done in standing position  As well if prone pressup is inconvenient.  Shift your pelvis in opposite direction of limb pain.  Put your hands  Flat on your buttocks and lean backwards without loss of balance.  Count 10 x 3 times then 10 extensions hourly     OTTO YUSUF JR., MD                   (E55.9) Vitamin D deficiency  Comment:     Plan: Vitamin D, Cholecalciferol, 25 MCG (1000 UT)         TABS             (R25.2) Cramp of limb  Comment:    Plan: magnesium oxide (MAG-OX) 400 (241.3 Mg) MG         tablet             (R16.0) Liver masses  Comment:    Plan: Comprehensive metabolic panel, CBC with         platelets             (E11.9) Type 2 diabetes mellitus without complication, without long-term current use of insulin (H)  Comment:    Plan: Comprehensive metabolic panel, Albumin Random         Urine Quantitative with Creat Ratio, Hemoglobin        A1c             (N39.0) Recurrent UTI  Comment:    Plan: UA reflex to Microscopic and Culture                         ALL THE ABOVE PROBLEMS ARE STABLE AND MED CHANGES AS NOTED        Diet: MEDITERRANEAN DIET         Exercise:  AS TOLERATED WALK IN PLACE 15 MINUTES WITH EACH MEAL  KNEE PAIN AND LOWER BACK PAIN   Exercises Range of  motion, balance, isometric, and strengthening exercises 30 repetitions twice daily of involved joints            .OTTO YUSUF MD 5/1/2020 3:14 PM  May 1, 2020

## 2020-05-01 NOTE — PATIENT INSTRUCTIONS
"Ines Mott is a 55 year old female who is being evaluated via a billable telephone visit.      The patient has been notified of following:     \"This telephone visit will be conducted via a call between you and your physician/provider. We have found that certain health care needs can be provided without the need for a physical exam.  This service lets us provide the care you need with a short phone conversation.  If a prescription is necessary we can send it directly to your pharmacy.  If lab work is needed we can place an order for that and you can then stop by our lab to have the test done at a later time.    Telephone visits are billed at different rates depending on your insurance coverage. During this emergency period, for some insurers they may be billed the same as an in-person visit.  Please reach out to your insurance provider with any questions.    If during the course of the call the physician/provider feels a telephone visit is not appropriate, you will not be charged for this service.\"    Patient has given verbal consent for Telephone visit?  Yes    How would you like to obtain your AVS? Mail a copy    Subjective     Ines Mott is a 55 year old female who presents to clinic today for the following health issues:        HPI QUIT GOING TO PAIN CLINIC    ALCOHOL  NO RECENT EPISODES     DIFFICULT DEALING WITH EPIDEMIC    WENT TO HOSPITAL  SOME OF SYMPTOMS OF CORONA VIRUS    [facility] WILL BE DECIDING   TO DO TESTING     DEPRESSION MILD DEPRESSION     MUCH PAIN LOWER BACK AND KNEE PAIN BILATERAL     RIGHT SIDED  KIDNEY     NO FEVER      NO DYSURIA     WEIGHT GAIN     HARD ON THE KNEES     SCAR NEAR  SPINA BIFIDA SCAR    HEADACHES OCCASIONAL ONE OR TWO WEEKS     BROKE GLASS LENSES      NECK SCAR TISSUE IS SORE     LIVER CANCER FOLLOW UP  NOT DONE YET     SLOW GROWING     RENAL SPOT AS WELL  REMOVED TOP PART REMOVED AS WELL     SHOULDERS  SORE AS WELL      FINGERS SWELLING  LEFT SIDED     HEART  " WITHOUT CHEST PAINS OR PALPITATION     NO FOOT SWELLING     NO EXERTIONAL CHEST PAIN     BOWELS WORKING OK     EATING MORE VEGGIES AND WATER     KNEES SWELLING AT TIMES     OCCASIONAL WAKING     HIPS OK     FEET AND ANKLES OK     NO NUMBNESS IN FEET         Diabetes Follow-Up      How often are you checking your blood sugar? Not at all    What concerns do you have today about your diabetes? None         Do you have any of these symptoms? (Select all that apply)  Numbness in feet and Weight gain    Have you had a diabetic eye exam in the last 12 months? No    (G89.4) Chronic pain syndrome  (primary encounter diagnosis)  Comment:    Plan:      (K59.01) Slow transit constipation  Comment:           Plan: polyethylene glycol (MIRALAX) 17 GM/SCOOP         powder             (E87.6) Hypokalemia  Comment:    Plan: potassium chloride ER (K-TAB/KLOR-CON) 10 MEQ         CR tablet             (F33.41) Recurrent major depression in partial remission (H)  Comment:    Plan: sertraline (ZOLOFT) 100 MG tablet             (G44.219) Episodic tension-type headache, not intractable  Comment:    Plan: topiramate (TOPAMAX) 25 MG tablet   WALK IN PLACE 15 MINUTES WITH EACH MEAL      KNEE EXERCISES        ICE TO KNEE 5 MINUTES PRIOR TO EXERCISE IF POSSIBLE    ISOMETRIC KNEE EXTENDED POSITION STANDING X 30 TWICE DAILY \    FLEXION OF KNEE ISOMETRIC 90 DEGREE FLEXION X 30 TWICE DAILY    WITH FIVE POUND WEIGHTS OR PURSE    SITTING EXTENDED KNEE  X 3O SECONDS TWICE DAILY    STANDING WITH KNEE FLEXED X 30 SECONDS TWICE DAILY    CLOSED CHAIN EXERCISE  ,THAT IS ONE 1-2 INCH BOOK 30 REPS ON AND OFF THE BOOK OR PLATFORM     AFTER 2 WEEK INCREASE TO 3 INCHES    AFTER 4 WEEKS INCREASE TO 4 INCHES    WALL SITTING 30 SECONDS 45 DEGREES    AFTER 2 WEEKS 30 SECONDS 60 DEGREES    AFTER  4 WEEKS 30 SECONDS 90 DEGREES    BALANCE 30 SECONDS WITH OPPOSITE LEG AT 30 DEGREES AND ARM TO SIDE X 2 WEEKS    BALANCE 30 SECONDS WITH OPPOSITE LEG AT 60 DEGREES AND ARM  TO SIDE X 2 WEEKS    REPEAT with OPPOSITE LEGS BALANCING   Assessment: Lower back pain  Plan: do these exercises at least twice daily:  Standing or sitting exercise can be done every two hours    PAIN RELIEVED WITH BED WEDGE OR PILLOWS    SLEEPING 7-8 HOURS PER NIGHT HELPS PAIN    GENTLE MASSAGE HELPS     SLEEPING WITH PILLOW BETWEEN OR UNDER LEGS     TENS UNIT SOMETIMES HELP     OFTEN WALKING HELPS IF IT DOES NOT EXACERBATE LOWER BACK PAIN     OCCASIONAL MUSCLE RELAXANT FOR SHORT PERIOD HELPS     AVOID NARCOTICS IF POSSIBLE O    Bo' Flexion Versus Da   Extension Exercises For   Low Back Pain   Examples of Bo' Flexion Exercises  1. Pelvic tilt.  Please press the small of your back against the floor.  Start with 5-10  and increase to 100 count over one month   2. Single Knee to chest. Lie on your back with legs in bent position. Alternate one leg and the other very slowly bringing the knee to chest.  Start with a count of 5-10   Over one month work up to 100  3. Double knee to chest.  Lie on your back with knees in bent position.  Bring both knees to the chest slowly hold for a count of 5-to 10. Over one month work to 100  4. Partial sit-up or crunch.  Lie on your back in bent leg position.  Please bring your body with arms crossed in front  To 30 degrees of flexion. Start with 5-10 over one month work up to 100 or more  5. Sit back.  Please sit on the side of the bed or a stair landing  And lie backwards until the abdominal muscles start to quiver.  Hold for a count of 5-10 and over a month work up to 100.  5. Hamstring stretch.  Please extend your legs while sitting on the floor as tolerated for 5-10 count.  Gradually increase to count of 30 over one month      Alternately find a stair landing or sturdy chair and place heel  In a comfortable level of extension  And stretch one hamstring at a time for 5-10 seconds.  Increase to count of 30 over one month                         Squat.  Stand with  legs comfortably apart and lower the body slowly by flexing the knees  for count of 5-10 over one month increase to 30.  Useful for anterior disc protrusion, facette joint arthritis spond-10ylolysis, spondylolisthesis and spinal stenosis    Nadia method or extension exercises  Useful disc bulging posteriorly  1. Prone pressups  Please lie on your abdomen.   Please do a push with the upper half of your body only.  This should not cause the pain to shoot down your buttock thigh leg or foot  Start with 10 and repeat x 3 one minute apart.  Repeat x 10 every 1-2 hours  Pain tends to increase in the center of back  And leaves buttock thighs legs and foot over time  You may shift your pelvis in opposite direction of buttock and leg pain to achieve even better results  2. Superman with arms extended  Or at the side Simultaneously lift your arms and legs off the floor. Start with 5-10 over one month increase to 100.  3. Cat stretch or cobra Start  As if in extended position of prone pressup but hold the stretch for 5 or 10 count. Over one moth to count of 30.  4.  Extensions can be done in standing position  As well if prone pressup is inconvenient.  Shift your pelvis in opposite direction of limb pain.  Put your hands  Flat on your buttocks and lean backwards without loss of balance.  Count 10 x 3 times then 10 extensions hourly     OTTO YUSUF JR., MD                   (E55.9) Vitamin D deficiency  Comment:     Plan: Vitamin D, Cholecalciferol, 25 MCG (1000 UT)         TABS             (R25.2) Cramp of limb  Comment:    Plan: magnesium oxide (MAG-OX) 400 (241.3 Mg) MG         tablet             (R16.0) Liver masses  Comment:    Plan: Comprehensive metabolic panel, CBC with         platelets             (E11.9) Type 2 diabetes mellitus without complication, without long-term current use of insulin (H)  Comment:    Plan: Comprehensive metabolic panel, Albumin Random         Urine Quantitative with Creat Ratio,  Hemoglobin        A1c             (N39.0) Recurrent UTI  Comment:    Plan: UA reflex to Microscopic and Culture

## 2020-05-18 VITALS — BODY MASS INDEX: 42.38 KG/M2 | WEIGHT: 270 LBS | HEIGHT: 67 IN

## 2020-05-18 ASSESSMENT — MIFFLIN-ST. JEOR: SCORE: 1844.4
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
## 2020-05-18 NOTE — PATIENT INSTRUCTIONS
Your BMI is Body mass index is 42.93 kg/m .  Weight management is a personal decision.  If you are interested in exploring weight loss strategies, the following discussion covers the approaches that may be successful. Body mass index (BMI) is one way to tell whether you are at a healthy weight, overweight, or obese. It measures your weight in relation to your height.  A BMI of 18.5 to 24.9 is in the healthy range. A person with a BMI of 25 to 29.9 is considered overweight, and someone with a BMI of 30 or greater is considered obese. More than two-thirds of American adults are considered overweight or obese.  Being overweight or obese increases the risk for further weight gain. Excess weight may lead to heart disease and diabetes.  Creating and following plans for healthy eating and physical activity may help you improve your health.  Weight control is part of healthy lifestyle and includes exercise, emotional health, and healthy eating habits. Careful eating habits lifelong are the mainstay of weight control. Though there are significant health benefits from weight loss, long-term weight loss with diet alone may be very difficult to achieve- studies show long-term success with dietary management in less than 10% of people. Attaining a healthy weight may be especially difficult to achieve in those with severe obesity. In some cases, medications, devices and surgical management might be considered.  What can you do?  If you are overweight or obese and are interested in methods for weight loss, you should discuss this with your provider.     Consider reducing daily calorie intake by 500 calories.     Keep a food journal.     Avoiding skipping meals, consider cutting portions instead.    Diet combined with exercise helps maintain muscle while optimizing fat loss. Strength training is particularly important for building and maintaining muscle mass. Exercise helps reduce stress, increase energy, and improves fitness.  Increasing exercise without diet control, however, may not burn enough calories to loose weight.       Start walking three days a week 10-20 minutes at a time    Work towards walking thirty minutes five days a week     Eventually, increase the speed of your walking for 1-2 minutes at time    In addition, we recommend that you review healthy lifestyles and methods for weight loss available through the National Institutes of Health patient information sites:  http://win.niddk.nih.gov/publications/index.htm    And look into health and wellness programs that may be available through your health insurance provider, employer, local community center, or bree club.    Weight management plan: Patient was referred to their PCP to discuss a diet and exercise plan.

## 2020-05-19 ENCOUNTER — VIRTUAL VISIT (OUTPATIENT)
Dept: SLEEP MEDICINE | Facility: CLINIC | Age: 55
End: 2020-05-19
Payer: COMMERCIAL

## 2020-05-19 DIAGNOSIS — G47.33 OSA ON CPAP: ICD-10-CM

## 2020-05-21 ENCOUNTER — VIRTUAL VISIT (OUTPATIENT)
Dept: SLEEP MEDICINE | Facility: CLINIC | Age: 55
End: 2020-05-21
Payer: COMMERCIAL

## 2020-05-21 VITALS — BODY MASS INDEX: 42.38 KG/M2 | HEIGHT: 67 IN | WEIGHT: 270 LBS

## 2020-05-21 DIAGNOSIS — G47.33 OSA ON CPAP: ICD-10-CM

## 2020-05-21 PROCEDURE — 99213 OFFICE O/P EST LOW 20 MIN: CPT | Mod: TEL | Performed by: PHYSICIAN ASSISTANT

## 2020-05-21 ASSESSMENT — MIFFLIN-ST. JEOR: SCORE: 1844.4

## 2020-05-21 NOTE — PROGRESS NOTES
"Ines Mott is a 55 year old female who is being evaluated via a billable telephone visit.      The patient has been notified of following:     \"This telephone visit will be conducted via a call between you and your physician/provider. We have found that certain health care needs can be provided without the need for a physical exam.  This service lets us provide the care you need with a short phone conversation.  If a prescription is necessary we can send it directly to your pharmacy.  If lab work is needed we can place an order for that and you can then stop by our lab to have the test done at a later time.    Telephone visits are billed at different rates depending on your insurance coverage. During this emergency period, for some insurers they may be billed the same as an in-person visit.  Please reach out to your insurance provider with any questions.    If during the course of the call the physician/provider feels a telephone visit is not appropriate, you will not be charged for this service.\"    Patient has given verbal consent for Telephone visit?  Yes    What phone number would you like to be contacted at? [phone]    How would you like to obtain your AVS? Mail a copy      "
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
## 2020-06-17 DIAGNOSIS — I10 HYPERTENSION GOAL BP (BLOOD PRESSURE) < 140/80: Chronic | ICD-10-CM

## 2020-06-17 RX ORDER — AMLODIPINE BESYLATE 5 MG/1
TABLET ORAL
Qty: 90 TABLET | Refills: 3 | Status: SHIPPED | OUTPATIENT
Start: 2020-06-17

## 2020-07-09 DIAGNOSIS — R25.2 CRAMP OF LIMB: ICD-10-CM

## 2020-07-09 NOTE — TELEPHONE ENCOUNTER
Routing refill request to provider for review/approval because:  Drug not on the [facility] refill protocol   PCP has retired, covering team to review

## 2020-09-01 DIAGNOSIS — E53.8 B12 DEFICIENCY: ICD-10-CM

## 2020-09-02 RX ORDER — CYANOCOBALAMIN 1000 UG/ML
INJECTION, SOLUTION INTRAMUSCULAR; SUBCUTANEOUS
Qty: 3 ML | Refills: 0 | Status: SHIPPED | OUTPATIENT
Start: 2020-09-02 | End: 2020-11-16

## 2020-09-02 NOTE — TELEPHONE ENCOUNTER
Called patient. She will decide in the next couple days who she will want to see for a provider.  
Needs to establish care with a new provider.   
Routing refill request to provider for review/approval because:  Doctor Fatima retired.         
WDL

## 2020-11-03 ENCOUNTER — HOSPITAL ENCOUNTER (EMERGENCY)
Facility: CLINIC | Age: 55
Discharge: HOME | End: 2020-11-03
Attending: PHYSICIAN ASSISTANT | Admitting: PHYSICIAN ASSISTANT
Payer: COMMERCIAL

## 2020-11-03 ENCOUNTER — APPOINTMENT (OUTPATIENT)
Dept: ULTRASOUND IMAGING | Facility: CLINIC | Age: 55
End: 2020-11-03
Attending: PHYSICIAN ASSISTANT
Payer: COMMERCIAL

## 2020-11-03 VITALS
OXYGEN SATURATION: 98 % | DIASTOLIC BLOOD PRESSURE: 79 MMHG | HEART RATE: 68 BPM | RESPIRATION RATE: 18 BRPM | TEMPERATURE: 97.6 F | SYSTOLIC BLOOD PRESSURE: 166 MMHG

## 2020-11-03 DIAGNOSIS — M54.41 ACUTE RIGHT-SIDED LOW BACK PAIN WITH RIGHT-SIDED SCIATICA: ICD-10-CM

## 2020-11-03 LAB
ALBUMIN SERPL-MCNC: 3.6 G/DL (ref 3.4–5)
ALBUMIN UR-MCNC: NEGATIVE MG/DL
ALP SERPL-CCNC: 106 U/L (ref 40–150)
ALT SERPL W P-5'-P-CCNC: 29 U/L (ref 0–50)
ANION GAP SERPL CALCULATED.3IONS-SCNC: 4 MMOL/L (ref 3–14)
APPEARANCE UR: ABNORMAL
AST SERPL W P-5'-P-CCNC: 10 U/L (ref 0–45)
BACTERIA #/AREA URNS HPF: ABNORMAL /HPF
BASOPHILS # BLD AUTO: 0 10E9/L (ref 0–0.2)
BASOPHILS NFR BLD AUTO: 0.2 %
BILIRUB SERPL-MCNC: 0.6 MG/DL (ref 0.2–1.3)
BILIRUB UR QL STRIP: NEGATIVE
BUN SERPL-MCNC: 10 MG/DL (ref 7–30)
CALCIUM SERPL-MCNC: 9.4 MG/DL (ref 8.5–10.1)
CHLORIDE SERPL-SCNC: 109 MMOL/L (ref 94–109)
CO2 SERPL-SCNC: 27 MMOL/L (ref 20–32)
COLOR UR AUTO: YELLOW
CREAT SERPL-MCNC: 0.82 MG/DL (ref 0.52–1.04)
DIFFERENTIAL METHOD BLD: NORMAL
EOSINOPHIL # BLD AUTO: 0.4 10E9/L (ref 0–0.7)
EOSINOPHIL NFR BLD AUTO: 3.9 %
ERYTHROCYTE [DISTWIDTH] IN BLOOD BY AUTOMATED COUNT: 13.4 % (ref 10–15)
GFR SERPL CREATININE-BSD FRML MDRD: 80 ML/MIN/{1.73_M2}
GLUCOSE SERPL-MCNC: 117 MG/DL (ref 70–99)
GLUCOSE UR STRIP-MCNC: NEGATIVE MG/DL
HCT VFR BLD AUTO: 38.2 % (ref 35–47)
HGB BLD-MCNC: 12.3 G/DL (ref 11.7–15.7)
HGB UR QL STRIP: ABNORMAL
IMM GRANULOCYTES # BLD: 0 10E9/L (ref 0–0.4)
IMM GRANULOCYTES NFR BLD: 0.2 %
KETONES UR STRIP-MCNC: NEGATIVE MG/DL
LEUKOCYTE ESTERASE UR QL STRIP: NEGATIVE
LIPASE SERPL-CCNC: 93 U/L (ref 73–393)
LYMPHOCYTES # BLD AUTO: 2.7 10E9/L (ref 0.8–5.3)
LYMPHOCYTES NFR BLD AUTO: 29 %
MCH RBC QN AUTO: 30.1 PG (ref 26.5–33)
MCHC RBC AUTO-ENTMCNC: 32.2 G/DL (ref 31.5–36.5)
MCV RBC AUTO: 94 FL (ref 78–100)
MONOCYTES # BLD AUTO: 0.6 10E9/L (ref 0–1.3)
MONOCYTES NFR BLD AUTO: 6.7 %
MUCOUS THREADS #/AREA URNS LPF: PRESENT /LPF
NEUTROPHILS # BLD AUTO: 5.5 10E9/L (ref 1.6–8.3)
NEUTROPHILS NFR BLD AUTO: 60 %
NITRATE UR QL: NEGATIVE
NRBC # BLD AUTO: 0 10*3/UL
NRBC BLD AUTO-RTO: 0 /100
PH UR STRIP: 5.5 PH (ref 5–7)
PLATELET # BLD AUTO: 348 10E9/L (ref 150–450)
POTASSIUM SERPL-SCNC: 3.6 MMOL/L (ref 3.4–5.3)
PROT SERPL-MCNC: 7.4 G/DL (ref 6.8–8.8)
RBC # BLD AUTO: 4.08 10E12/L (ref 3.8–5.2)
RBC #/AREA URNS AUTO: 1 /HPF (ref 0–2)
SODIUM SERPL-SCNC: 140 MMOL/L (ref 133–144)
SOURCE: ABNORMAL
SP GR UR STRIP: 1.02 (ref 1–1.03)
SQUAMOUS #/AREA URNS AUTO: 13 /HPF (ref 0–1)
UROBILINOGEN UR STRIP-MCNC: NORMAL MG/DL (ref 0–2)
WBC # BLD AUTO: 9.2 10E9/L (ref 4–11)
WBC #/AREA URNS AUTO: 4 /HPF (ref 0–5)

## 2020-11-03 PROCEDURE — 80053 COMPREHEN METABOLIC PANEL: CPT | Performed by: PHYSICIAN ASSISTANT

## 2020-11-03 PROCEDURE — 93971 EXTREMITY STUDY: CPT | Mod: RT

## 2020-11-03 PROCEDURE — 83690 ASSAY OF LIPASE: CPT | Performed by: PHYSICIAN ASSISTANT

## 2020-11-03 PROCEDURE — 99284 EMERGENCY DEPT VISIT MOD MDM: CPT | Mod: 25

## 2020-11-03 PROCEDURE — 85025 COMPLETE CBC W/AUTO DIFF WBC: CPT | Performed by: PHYSICIAN ASSISTANT

## 2020-11-03 PROCEDURE — 81001 URINALYSIS AUTO W/SCOPE: CPT | Performed by: PHYSICIAN ASSISTANT

## 2020-11-03 ASSESSMENT — ENCOUNTER SYMPTOMS
MYALGIAS: 1
CHILLS: 0
WEAKNESS: 0
VOMITING: 0
ABDOMINAL PAIN: 0
NAUSEA: 0
NUMBNESS: 1
FEVER: 0
HEMATURIA: 1

## 2020-11-03 NOTE — ED PROVIDER NOTES
History     Chief Complaint:  Leg Pain    HPI   Ines Mott is a 55 year old female with a history of hypertension, diabetes, kidney cancer, and liver cancer who presents with right leg pain. The patient reports she has shooting pain in her right leg that goes down to her foot. She notes her leg is swollen, numb, and warm to the touch. She also reports she has hematuria and her urine has a foul smell. Denies fever/chills, abdominal pain, nausea or vomiting.   No loss control of bladder/bowels. No left leg symptoms.     Allergies:  Amoxicillin  Ativan  Buspirone  Ciprofloxacin  Macrobid  Busulfan  Cefaclor  Cefdinir  Cephalosporins  Clindamycin  Dilaudid  Diphenhydramine  Imitrex  Ketorolac  Lansoprazole  Latex  Metoclopramide  Moxifloxacin  Nsaids  Paroxetine  Prednisone  Quinolones  Sumatriptan  Blue dyes  Lidoderm  Penicillins  Red dye  Sulfa drugs  Vaccinum angustifolium    Medications:    Albuterol inhaler  Albuterol nebulizer  Amlodipine  Aspirin  Tessalon  Epinephrine  Losartan  Miralax  Zoloft  Topamax  Vitamin D    Past Medical History:    Arthritis  Asthma  Chronic pain syndrome  Depression   Diabetes   Hypertension   Hydrocephalus  Methamphetamine abuse  Migraines  Obesity  Kidney cancer  Spina bifida  Arnold-Chiari syndrome  Vitamin D deficiency  Bipolar disorder  Chronic pain syndrome  PTSD    Past Surgical History:    Appendectomy  Cholecystectomy  Dental extraction  Hysterectomy  Implant shunt ventriculoperitoneal  Biopsy right kidney    Family History:    Hodgkin's lymphoma  Colon cancer  Hypertension     Social History:  Smoking status: Former  Alcohol use: Yes  Drug use: Yes, methamphetamines  Patient presents alone.  PCP: No primary care provider on file.    Marital Status:        Review of Systems   Constitutional: Negative for chills and fever.   Gastrointestinal: Negative for abdominal pain, nausea and vomiting.   Genitourinary: Positive for hematuria.   Musculoskeletal: Positive  for myalgias (right leg pain).   Neurological: Positive for numbness. Negative for weakness.   All other systems reviewed and are negative.    Physical Exam     Patient Vitals for the past 24 hrs:   BP Temp Temp src Pulse Resp SpO2   11/03/20 1208 (!) 166/79 97.6  F (36.4  C) Temporal 68 18 98 %      Physical Exam  General: Alert, interactive.   Head:  Scalp is atraumatic.  Eyes:  EOM intact. The pupils are equal, round, and reactive to light. No scleral icterus.  ENT:                                      Ears:  The external ears are normal.  Nose:  The external nose is normal.  Throat:  The oropharynx is normal. Mucus membranes are moist.                 Neck:  Normal range of motion. There is no rigidity.   CV:  Regular rate and rhythm. No murmur. 2+ DP pulses bilaterally.  Resp:  Breath sounds are clear bilaterally. Non-labored, no retractions or accessory muscle use.  GI:  Abdomen is soft, no distension, no tenderness.   MS:  Normal range of motion. No midline C/T/L spine tenderness. Diffuse right low back tenderness.   Skin:  Warm and dry.   Neuro: Alert, attentive. GCS 15; 5/5 strength throughout the bilateral lower extremities (hip flexion/extension, knee flexion/extension, DF/PF, EHL/FHL); sensation intact to light touch throughout L2-S1 distributions to the lower extremities; 2+ DTRs to the bilateral lower extremities (patellar, achilles); normal gait  Psych:  Awake. Alert.  Appropriate interactions.       Emergency Department Course     Imaging:  US Lower Extremity Venous Duplex Right  IMPRESSION: Negative for deep venous thrombosis in the right lower  extremity.  Reading per radiology.       Radiographic findings were communicated with the patient who voiced understanding of the findings.    Laboratory:  CBC: WBC 9.2, HGB 12.3,     CMP: Glucose 117 (H), o/w WNL (Creatinine: 0.82)     Lipase: 93    UA: Slightly cloudy yellow urine, Blood: trace, Bacteria: few, Squamous epithelia: 13 (H), Mucous:  present, otherwise WNL       Emergency Department Course:  1221 Nursing notes and vitals reviewed. I performed an exam of the patient as documented above.     IV inserted. Blood drawn. This was sent to the lab for further testing, results above.    The patient provided a urine sample here in the emergency department. This was sent for laboratory testing, findings above.      The patient was sent for an ultrasound while in the emergency department, findings above.     1358 I rechecked the patient and discussed the results of her workup thus far.     Findings and plan explained to the Patient. Patient discharged home with instructions regarding supportive care, medications, and reasons to return. The importance of close follow-up was reviewed.    I personally reviewed the laboratory and imaging results with the Patient and answered all related questions prior to discharge.      Impression & Plan      Medical Decision Making:  Ines Mott is a 55 year old female who presents for evaluation of low back pain with radicular symptoms. There is no report of recent trauma; therefore x-rays are not necessary due to the low likelihood of fracture or subluxation. Advanced imaging with CT/MRI is not indicated at this time, but may be indicated in the future if symptoms fail to resolve. There is no clinical evidence of cauda equina syndrome, acute spinal cord compressive process, discitis, spinal/epidural space hematoma, epidural abscess, or other emergently worrisome etiology.  The patient also noted urinary symptoms.  Urine without signs of infection.  CBC and CMP overall unremarkable.  Right lower extremity ultrasound completed without evidence of DVT.    Suspect lumbar radiculopathy. The patient was advised that radiculopathy often takes significant time to resolve, and that follow up with primary care or spine specialist will be indicated if symptoms do not improve.  Unfortunately, patient has multiple drug allergies.   She is unable to take Tylenol or ibuprofen.  She has not tolerated Lidoderm patches in the past.  She requested narcotics and I explained that I do not feel comfortable prescribing narcotics for this type of pain.  She understands and plans to use heat/ice, and stretching activities.  Plan to follow-up with primary care provider for further pain management as needed.  Patient understands reasons to return including fever, bladder/bowel concerns, progressive weakness, or any other new/concerning symptoms. Patient agrees with the plan and all questions and concerns addressed prior to discharge home.         Diagnosis:    ICD-10-CM    1. Acute right-sided low back pain with right-sided sciatica  M54.41        Disposition:  discharged to home    Discharge Medications:  New Prescriptions    No medications on file       Nhi Corral  11/3/2020   [facility] EMERGENCY DEPT    Scribe Disclosure:  I, Nhi Corral, am serving as a scribe at 12:21 PM on 11/3/2020 to document services personally performed by Elvira Machuca PA-C based on my observations and the provider's statements to me.         Elvira Machuca PA-C  11/03/20 1545

## 2020-11-03 NOTE — DISCHARGE INSTRUCTIONS
*You may resume diet and activities.  *Heat/Ice and light stretching activities.   *Follow-up with your doctor in the next 2-3 days for reevaluation and ongoing medication prescriptions.  *Return if you develop numbness, weakness, bowel or bladder incontinence, faint or feel like you will faint or become worse in any way.    Discharge Instructions  Back Pain  You were seen today for back pain. Back pain can have many causes, but most will get better without surgery or other specific treatment. Sometimes there is a herniated ( slipped ) disc. We don t usually do MRI scans to look for these right away, since most herniated discs will get better on their own with time.  Today, we did not find any evidence that your back pain was caused by a serious condition, such as an infection, fracture, or tumor. However, sometimes symptoms develop over time and cannot be found during an emergency visit, so it is very important that you follow up with your primary doctor.  Return to the Emergency Department if:  You develop a fever with your back pain.   You have weakness or change in sensation in one or both legs.  You lose control of your bowels or bladder, or can t empty your bladder.  Your pain gets much worse.     Follow-up with your doctor:  Unless your pain has completely gone away, please make an appointment with your doctor within one week.  You may need further management of your back pain, such as more pain medication, imaging such as an X-ray or MRI, or physical therapy.    What can I do to help myself?  Remain active -- People are often afraid that they will hurt their back further or delay recovery by remaining active, but this is one of the best things you can do for your back. In fact, prolonged bed rest is not recommended. Studies have shown that people with low back pain recover faster when they remain active. Movement helps to bring blood flow to the muscles and relieve muscle spasms as well as preventing loss of  muscle strength.  Heat -- Using a heating pad can help with low back pain during the first few weeks. Do not sleep with a heating pad, as you can be burned.   Pain medications -- Take a pain medication such as, acetaminophen (Tylenol ), ibuprofen (Advil , Nuprin  ) or naproxen (Aleve ).  If you have been given a narcotic (such as codeine, hydrocodone, or oxycodone) or a muscle relaxant (such as Flexeril   or Soma  ), do not drive for four hours after you have taken it. If the narcotic contains acetaminophen (Tylenol), do not take Tylenol with it. All narcotics will cause constipation, so eat a high fiber diet.          Remember that you can always come back to the Emergency Department if you are not able to see your regular doctor in the amount of time listed above, if you get any new symptoms, or if there is anything that worries you.

## 2020-11-03 NOTE — ED AVS SNAPSHOT
[facility] Emergency Dept  [address] [place] [address]  Phone: [phone]  Fax: [phone]                                    Ines Mott   MRN: [id number]    Department: [facility] Emergency Dept   Date of Visit: 11/3/2020           After Visit Summary Signature Page    I have received my discharge instructions, and my questions have been answered. I have discussed any challenges I see with this plan with the nurse or doctor.    ..........................................................................................................................................  Patient/Patient Representative Signature      ..........................................................................................................................................  Patient Representative Print Name and Relationship to Patient    ..................................................               ................................................  Date                                   Time    ..........................................................................................................................................  Reviewed by Signature/Title    ...................................................              ..............................................  Date                                               Time          22EPIC Rev 08/18

## 2020-11-03 NOTE — ED TRIAGE NOTES
Pt reports right leg pain and swelling. Pt reports pain in leg goes from buttock to foot. No swelling noted in bilateral legs. Pt reports SOB, cough and fever. Pt states her mother had COVID and was in a coma but that was in the beginning of Oct

## 2020-11-11 ENCOUNTER — TELEPHONE (OUTPATIENT)
Dept: SLEEP MEDICINE | Facility: CLINIC | Age: 55
End: 2020-11-11

## 2020-11-11 DIAGNOSIS — G47.33 OSA ON CPAP: Primary | ICD-10-CM

## 2020-11-11 NOTE — TELEPHONE ENCOUNTER
Reason for call:  Other   Patient called regarding (reason for call): call back  Additional comments: Patient machine not working & need supplies    Phone number to reach patient:  Cell number on file:    Telephone Information:   Mobile [phone]       Best Time:  any    Can we leave a detailed message on this number?  YES    Travel screening: Not Applicable      I called Ines to ask if her machine is truly not working or if it is just that her supplies are old and therefore it seems ineffective. Data from [facility] shows a high leak, but otherwise the machine seems to be working. Her machine is new as of January. If it is malfunctioning in another way, she should bring it back to [facility] to have it evaluated. I wrote a prescription for new supplies and left a voicemail asking that she call the clinic if she needs something more than that.  Bennett Goltz, PA-C

## 2020-11-16 DIAGNOSIS — E53.8 B12 DEFICIENCY: ICD-10-CM

## 2020-11-16 RX ORDER — CYANOCOBALAMIN 1000 UG/ML
INJECTION, SOLUTION INTRAMUSCULAR; SUBCUTANEOUS
Qty: 3 ML | Refills: 0 | Status: SHIPPED | OUTPATIENT
Start: 2020-11-16 | End: 2020-12-10

## 2020-11-16 NOTE — TELEPHONE ENCOUNTER
Routing refill request to provider for review/approval because:  PCP has retired, covering team to review/advise.

## 2020-12-10 DIAGNOSIS — E53.8 B12 DEFICIENCY: ICD-10-CM

## 2020-12-10 RX ORDER — CYANOCOBALAMIN 1000 UG/ML
INJECTION, SOLUTION INTRAMUSCULAR; SUBCUTANEOUS
Qty: 3 ML | Refills: 0 | Status: SHIPPED | OUTPATIENT
Start: 2020-12-10

## 2020-12-10 NOTE — TELEPHONE ENCOUNTER
Medication is being filled for 1 time refill only due to:  Patient needs to be seen because needs to establish care with a new provider.

## 2021-02-03 ENCOUNTER — APPOINTMENT (OUTPATIENT)
Dept: GENERAL RADIOLOGY | Facility: CLINIC | Age: 56
End: 2021-02-03
Attending: PHYSICIAN ASSISTANT
Payer: COMMERCIAL

## 2021-02-03 ENCOUNTER — APPOINTMENT (OUTPATIENT)
Dept: CT IMAGING | Facility: CLINIC | Age: 56
End: 2021-02-03
Attending: PHYSICIAN ASSISTANT
Payer: COMMERCIAL

## 2021-02-03 ENCOUNTER — HOSPITAL ENCOUNTER (EMERGENCY)
Facility: CLINIC | Age: 56
Discharge: HOME | End: 2021-02-03
Attending: PHYSICIAN ASSISTANT | Admitting: PHYSICIAN ASSISTANT
Payer: COMMERCIAL

## 2021-02-03 VITALS
RESPIRATION RATE: 18 BRPM | OXYGEN SATURATION: 95 % | HEART RATE: 108 BPM | SYSTOLIC BLOOD PRESSURE: 162 MMHG | TEMPERATURE: 99.6 F | DIASTOLIC BLOOD PRESSURE: 89 MMHG

## 2021-02-03 DIAGNOSIS — R00.2 PALPITATIONS: ICD-10-CM

## 2021-02-03 DIAGNOSIS — R07.9 CHEST PAIN: ICD-10-CM

## 2021-02-03 DIAGNOSIS — R06.00 DYSPNEA: ICD-10-CM

## 2021-02-03 DIAGNOSIS — R55 SYNCOPE: ICD-10-CM

## 2021-02-03 DIAGNOSIS — R55 SYNCOPE, UNSPECIFIED SYNCOPE TYPE: ICD-10-CM

## 2021-02-03 DIAGNOSIS — R11.2 NAUSEA AND VOMITING: ICD-10-CM

## 2021-02-03 LAB
ALBUMIN SERPL-MCNC: 3.7 G/DL (ref 3.4–5)
ALP SERPL-CCNC: 106 U/L (ref 40–150)
ALT SERPL W P-5'-P-CCNC: 34 U/L (ref 0–50)
ANION GAP SERPL CALCULATED.3IONS-SCNC: 7 MMOL/L (ref 3–14)
AST SERPL W P-5'-P-CCNC: 18 U/L (ref 0–45)
BASOPHILS # BLD AUTO: 0 10E9/L (ref 0–0.2)
BASOPHILS NFR BLD AUTO: 0.3 %
BILIRUB SERPL-MCNC: 1 MG/DL (ref 0.2–1.3)
BUN SERPL-MCNC: 11 MG/DL (ref 7–30)
CALCIUM SERPL-MCNC: 10.3 MG/DL (ref 8.5–10.1)
CHLORIDE SERPL-SCNC: 112 MMOL/L (ref 94–109)
CO2 SERPL-SCNC: 24 MMOL/L (ref 20–32)
CREAT SERPL-MCNC: 0.81 MG/DL (ref 0.52–1.04)
D DIMER PPP FEU-MCNC: 0.3 UG/ML FEU (ref 0–0.5)
DIFFERENTIAL METHOD BLD: ABNORMAL
EOSINOPHIL # BLD AUTO: 0 10E9/L (ref 0–0.7)
EOSINOPHIL NFR BLD AUTO: 0.3 %
ERYTHROCYTE [DISTWIDTH] IN BLOOD BY AUTOMATED COUNT: 13.5 % (ref 10–15)
GFR SERPL CREATININE-BSD FRML MDRD: 81 ML/MIN/{1.73_M2}
GLUCOSE SERPL-MCNC: 100 MG/DL (ref 70–99)
HCT VFR BLD AUTO: 39.3 % (ref 35–47)
HGB BLD-MCNC: 12.7 G/DL (ref 11.7–15.7)
IMM GRANULOCYTES # BLD: 0.1 10E9/L (ref 0–0.4)
IMM GRANULOCYTES NFR BLD: 0.8 %
INTERPRETATION ECG - MUSE: NORMAL
LACTATE BLD-SCNC: 2 MMOL/L (ref 0.7–2)
LIPASE SERPL-CCNC: 70 U/L (ref 73–393)
LYMPHOCYTES # BLD AUTO: 2.6 10E9/L (ref 0.8–5.3)
LYMPHOCYTES NFR BLD AUTO: 17.2 %
MCH RBC QN AUTO: 29.3 PG (ref 26.5–33)
MCHC RBC AUTO-ENTMCNC: 32.3 G/DL (ref 31.5–36.5)
MCV RBC AUTO: 91 FL (ref 78–100)
MONOCYTES # BLD AUTO: 1.2 10E9/L (ref 0–1.3)
MONOCYTES NFR BLD AUTO: 7.7 %
NEUTROPHILS # BLD AUTO: 11.2 10E9/L (ref 1.6–8.3)
NEUTROPHILS NFR BLD AUTO: 73.7 %
NRBC # BLD AUTO: 0 10*3/UL
NRBC BLD AUTO-RTO: 0 /100
NT-PROBNP SERPL-MCNC: 137 PG/ML (ref 0–900)
PLATELET # BLD AUTO: 401 10E9/L (ref 150–450)
POTASSIUM SERPL-SCNC: 3.8 MMOL/L (ref 3.4–5.3)
PROT SERPL-MCNC: 7.8 G/DL (ref 6.8–8.8)
RBC # BLD AUTO: 4.33 10E12/L (ref 3.8–5.2)
SODIUM SERPL-SCNC: 143 MMOL/L (ref 133–144)
TROPONIN I SERPL-MCNC: <0.015 UG/L (ref 0–0.04)
TSH SERPL DL<=0.005 MIU/L-ACNC: 3.01 MU/L (ref 0.4–4)
WBC # BLD AUTO: 15.2 10E9/L (ref 4–11)

## 2021-02-03 PROCEDURE — 250N000013 HC RX MED GY IP 250 OP 250 PS 637: Performed by: PHYSICIAN ASSISTANT

## 2021-02-03 PROCEDURE — 70450 CT HEAD/BRAIN W/O DYE: CPT

## 2021-02-03 PROCEDURE — 83690 ASSAY OF LIPASE: CPT | Performed by: PHYSICIAN ASSISTANT

## 2021-02-03 PROCEDURE — 85025 COMPLETE CBC W/AUTO DIFF WBC: CPT | Performed by: PHYSICIAN ASSISTANT

## 2021-02-03 PROCEDURE — 84484 ASSAY OF TROPONIN QUANT: CPT | Performed by: PHYSICIAN ASSISTANT

## 2021-02-03 PROCEDURE — 258N000003 HC RX IP 258 OP 636: Performed by: PHYSICIAN ASSISTANT

## 2021-02-03 PROCEDURE — 99285 EMERGENCY DEPT VISIT HI MDM: CPT | Mod: 25

## 2021-02-03 PROCEDURE — 93005 ELECTROCARDIOGRAM TRACING: CPT

## 2021-02-03 PROCEDURE — 71046 X-RAY EXAM CHEST 2 VIEWS: CPT

## 2021-02-03 PROCEDURE — 96361 HYDRATE IV INFUSION ADD-ON: CPT

## 2021-02-03 PROCEDURE — 96375 TX/PRO/DX INJ NEW DRUG ADDON: CPT

## 2021-02-03 PROCEDURE — 250N000011 HC RX IP 250 OP 636: Performed by: PHYSICIAN ASSISTANT

## 2021-02-03 PROCEDURE — 83880 ASSAY OF NATRIURETIC PEPTIDE: CPT | Performed by: PHYSICIAN ASSISTANT

## 2021-02-03 PROCEDURE — 83605 ASSAY OF LACTIC ACID: CPT | Performed by: PHYSICIAN ASSISTANT

## 2021-02-03 PROCEDURE — 84443 ASSAY THYROID STIM HORMONE: CPT | Performed by: PHYSICIAN ASSISTANT

## 2021-02-03 PROCEDURE — 85379 FIBRIN DEGRADATION QUANT: CPT | Performed by: PHYSICIAN ASSISTANT

## 2021-02-03 PROCEDURE — 96374 THER/PROPH/DIAG INJ IV PUSH: CPT

## 2021-02-03 PROCEDURE — 80053 COMPREHEN METABOLIC PANEL: CPT | Performed by: PHYSICIAN ASSISTANT

## 2021-02-03 PROCEDURE — 250N000009 HC RX 250: Performed by: PHYSICIAN ASSISTANT

## 2021-02-03 RX ORDER — MAGNESIUM HYDROXIDE/ALUMINUM HYDROXICE/SIMETHICONE 120; 1200; 1200 MG/30ML; MG/30ML; MG/30ML
15 SUSPENSION ORAL ONCE
Status: COMPLETED | OUTPATIENT
Start: 2021-02-03 | End: 2021-02-03

## 2021-02-03 RX ORDER — ACETAMINOPHEN 325 MG/1
975 TABLET ORAL ONCE
Status: COMPLETED | OUTPATIENT
Start: 2021-02-03 | End: 2021-02-03

## 2021-02-03 RX ORDER — NITROGLYCERIN 0.4 MG/1
0.4 TABLET SUBLINGUAL
Status: COMPLETED | OUTPATIENT
Start: 2021-02-03 | End: 2021-02-03

## 2021-02-03 RX ORDER — ONDANSETRON 4 MG/1
4 TABLET, ORALLY DISINTEGRATING ORAL EVERY 8 HOURS PRN
Qty: 10 TABLET | Refills: 0 | Status: SHIPPED | OUTPATIENT
Start: 2021-02-03 | End: 2021-02-06

## 2021-02-03 RX ORDER — SODIUM CHLORIDE 9 MG/ML
INJECTION, SOLUTION INTRAVENOUS CONTINUOUS
Status: DISCONTINUED | OUTPATIENT
Start: 2021-02-03 | End: 2021-02-03 | Stop reason: HOSPADM

## 2021-02-03 RX ORDER — ONDANSETRON 2 MG/ML
4 INJECTION INTRAMUSCULAR; INTRAVENOUS EVERY 30 MIN PRN
Status: DISCONTINUED | OUTPATIENT
Start: 2021-02-03 | End: 2021-02-03 | Stop reason: HOSPADM

## 2021-02-03 RX ADMIN — ALUMINUM HYDROXIDE, MAGNESIUM HYDROXIDE, AND SIMETHICONE 15 ML: 200; 200; 20 SUSPENSION ORAL at 12:21

## 2021-02-03 RX ADMIN — SODIUM CHLORIDE 1000 ML: 9 INJECTION, SOLUTION INTRAVENOUS at 12:22

## 2021-02-03 RX ADMIN — ONDANSETRON 4 MG: 2 INJECTION INTRAMUSCULAR; INTRAVENOUS at 12:21

## 2021-02-03 RX ADMIN — ACETAMINOPHEN 975 MG: 325 TABLET, FILM COATED ORAL at 13:59

## 2021-02-03 RX ADMIN — SODIUM CHLORIDE: 9 INJECTION, SOLUTION INTRAVENOUS at 14:00

## 2021-02-03 RX ADMIN — FAMOTIDINE 20 MG: 10 INJECTION, SOLUTION INTRAVENOUS at 14:32

## 2021-02-03 RX ADMIN — NITROGLYCERIN 0.4 MG: 0.4 TABLET SUBLINGUAL at 12:41

## 2021-02-03 ASSESSMENT — ENCOUNTER SYMPTOMS
HEADACHES: 1
LIGHT-HEADEDNESS: 1
COUGH: 1
BACK PAIN: 0
SORE THROAT: 0
NAUSEA: 1
CHILLS: 0
NECK PAIN: 0
DIAPHORESIS: 1
RHINORRHEA: 1
PALPITATIONS: 1
FEVER: 0
SHORTNESS OF BREATH: 1
DIARRHEA: 1
VOMITING: 1

## 2021-02-03 NOTE — ED PROVIDER NOTES
History   Chief Complaint:  Palpitations and Hypertension       HPI   Ines Mott is a 56 year old female with history of hypertension, liver cancer, diabetes, and asthma who presents with hypertension. The patient reports she went to a party last night where she drank alcohol and smoked marijuana. She is unsure how much she drank and reports she lost consciousness, somewhere between 0500 and 0700 this morning. Prior to blacking out she remembers smoking weed. She got home around 0800 this morning. She started to feel unwell about 2 hours prior to arrival with some chest pain, nausea, sweats, a headache, and shortness of breath. This morning she also vomited x3 after drinking milk and orange juice. She also has had 2 episodes of diarrhea. She thinks there might have been something in the marijuana. She called EMS after experiencing palpations, light headedness, and feeling as though her blood pressure was high. She did not take her Lisinopril this morning. En route her heart rate was 120, her blood sugar was 126, and EKG showed sinus tachycardia with PVCs. She notes she has a new cough and runny nose. She did not fall or hit her head. She does not have a history of blood clots. She denies fever, chills, sore throat, congestion, neck pain, or back pain.    Review of Systems   Constitutional: Positive for diaphoresis. Negative for chills and fever.   HENT: Positive for rhinorrhea. Negative for congestion and sore throat.    Respiratory: Positive for cough and shortness of breath.    Cardiovascular: Positive for chest pain and palpitations.   Gastrointestinal: Positive for diarrhea, nausea and vomiting.   Musculoskeletal: Negative for back pain and neck pain.   Neurological: Positive for light-headedness and headaches.   All other systems reviewed and are negative.      Allergies:  Amoxicillin  Ativan   Buspirone  Ciprofloxacin  Macrobid  Busulfan  Cefaclor  Cefdinir  Cephalosporins  Clindamycin  Dilaudid    Diphenhydramine  Imitrex   Ketorolac Tromethamine  Lansoprazole  Latex  Metoclopramide  Moxifloxacin  Nsaids  Paroxetine  Prednisone  Quinolones  Sumatriptan  Blue Dyes  Lidoderm   Penicillins  Red Dye  Sulfa Drugs  Vaccinium Angustifolium    Medications:  Albuterol inhaler  Albuterol nebulizer  Tessalon  Epinephrine  Losartan  Zoloft  Statin  Topamax  Lisinopril    Past Medical History:    Arthritis  Asthma  Liver cancer  Depression   Diabetes   Hydrocephalus  Hypertension   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  Alcohol use: positive  Drug use: positive, marijuana    Physical Exam     Patient Vitals for the past 24 hrs:   BP Temp Temp src Pulse Resp SpO2   02/03/21 1545 -- -- -- 108 18 95 %   02/03/21 1530 (!) 162/89 -- -- 107 21 94 %   02/03/21 1515 -- -- -- 111 21 95 %   02/03/21 1445 -- -- -- 115 27 98 %   02/03/21 1430 (!) 152/103 -- -- 116 16 99 %   02/03/21 1400 -- -- -- 112 14 97 %   02/03/21 1345 -- -- -- 105 25 96 %   02/03/21 1330 (!) 162/90 -- -- 106 22 96 %   02/03/21 1315 -- -- -- 105 22 95 %   02/03/21 1300 (!) 172/95 -- -- 120 20 96 %   02/03/21 1245 -- -- -- 137 23 94 %   02/03/21 1230 (!) 165/107 -- -- 112 22 95 %   02/03/21 1215 -- -- -- 117 21 96 %   02/03/21 1200 (!) 157/103 -- -- 120 29 96 %   02/03/21 1156 (!) 140/103 99.6  F (37.6  C) Temporal 116 20 96 %       Physical Exam  Constitutional: Pleasant. Cooperative.   Eyes: Pupils equally round and reactive  HENT: No scalp hematoma. No scalp tenderness. No bony step-off or crepitus. No facial bone tenderness or instability. No periorbital  ecchymosis or Thakkar signs. Oropharynx is normal with moist mucus membranes. No evidence for intraoral injury.  Cardiovascular: Tachycardic. Regular rhythm, without murmurs.  Respiratory: Normal respiratory effort, lungs are clear bilaterally.  GI: Abdomen is soft, non-tender, non-distended. No guarding, rebound, or rigidity.  Musculoskeletal: No midline cervical, thoracic, or lumbar tenderness. Normal painless ROM of the neck. No clavicular tenderness. No upper extremity tenderness. No lower extremity tenderness. Normal ROM of extremities. No tenderness with compression of the rib cage or pelvis.  Skin: No rashes. No lacerations or abrasions noted.  Neurologic: Cranial nerves grossly intact, normal cognition, no focal deficits. Alert and oriented x 3.  GCS 15  Psychiatric: Normal affect.  Nursing notes and vital signs reviewed.      Emergency Department Course     ECG:  @ 1205  Indication: palpitations   Vent. Rate 115 bpm. NJ interval 172 ms. QRS duration 82 ms. QT/QTc 310/428 ms. P-R-T axis 54 -23 54.   Sinus tachycardia with occasional premature ventricular complexes. Minimal voltage criteria for LVH, may be normal variant. Possible inferior infarct, age undetermined. Possible anterolateral infarct, age undetermined.   Read by Dr. Freeman.     Imaging:  CT-scan Head w/o contrast:  IMPRESSION:     1. No evidence of acute intracranial pathology by head CT.  2. Right posterior approach ventriculostomy catheter is in similar  position to prior. Ventricular size is unchanged without evidence of  hydrocephalus.  Reading per radiology.      XR, Chest, 2 Views  IMPRESSION: AP and lateral views of the chest were obtained. Right IJ  Port-A-Cath distal tip projects over low SVC. Stable cardiomediastinal  silhouette. No suspicious focal pulmonary opacities. No significant  pleural effusion or pneumothorax.  Reading per radiology.      Laboratory:  CBC: WBC 15.2 (H), HGB 12.7,      CMP: Glucose 100 (H), Chloride: 112  (H), Calcium: 10.3 (H), o/w WNL (Creatinine: 0.81)     Lipase: 70 (L)    1314 Lactic acid whole blood: 2.0    1305 Troponin I: <0.015     Nt probnp inpatient (BNP): 137    D-dimer: 0.3    TSH with free T4 reflex: 3.01       Emergency Department Course:    Reviewed:  I reviewed nursing notes, vitals and past medical history    Assessments:  1152 I obtained history and examined the patient as noted above.   1351 I rechecked the patient and explained findings.   1601 I rechecked the patient.     Interventions:  1211 Zofran 4 mg IV  1221 Maalox 15 mL suspension PO  1222 0.9% Sodium Chloride BOLUS 1000 mLs IV    1241 Nitrostat 0.4 mg sublingual tablet  1359 Tylenol 975 mg tablet PO  1400 0.9% Sodium Chloride IV    1432 Pepcid 20 mg IV     Disposition:  The patient was discharged to home.       Impression & Plan     Medical Decision Making:  Ines Mott is a 56 year old female who presents to the ED for evaluation of syncope, chest pain, dyspnea, and palpitations. Patient states that she was out last night consuming alcohol and smoking marijuana, and is concerned there may have been a contaminant in the marijuana. See HPI as above for additional details. Vitals and physical exam as above. Patient was tachycardic throughout the duration of her stay, but had decrease following administration of fluids. Remainder of vitals as above. DDx was broad and included anemia, arrhythmia, valvular pathology, hypoglycemia, AAA rupture, aortic dissection, PE, SAH, orthostatic hypotension, vasovagal, ACS, PTX, GERD, among others. Work up as above. Discussed with patient the unclear etiology of her symptoms at this time. Patient did appear dehydrated and had consumed alcohol per the patient, this may be contributing to esophageal/epigastric discomfort, nausea/vomiting, headaches, and tachycardia. Low suspicion for remainder of differential based upon above broad work up. CT of head is reassuring without evidence for ICH despite  episode of syncope with fall. No concerning arrhythmias noted on continuous cardiac monitoring. Dimer negative. Troponin is negative despite 6+ hours for chest pain. Patient provided 2L of fluids in the ED. [place] patient was safe for discharge to home. Zofran for home. Advised patient to push fluids. Advised her to take OTC H2 blockers. Discussed reasons to return. All questions answered. Patient discharged to home in stable condition.    Covid-19  Ines Mott was evaluated during a global COVID-19 pandemic, which necessitated consideration that the patient might be at risk for infection with the SARS-CoV-2 virus that causes COVID-19.   Applicable protocols for evaluation were followed during the patient's care.     Diagnosis:    ICD-10-CM    1. Syncope  R55    2. Chest pain  R07.9    3. Dyspnea  R06.00    4. Palpitations  R00.2    5. Nausea and vomiting  R11.2        Discharge Medications:  New Prescriptions    ONDANSETRON (ZOFRAN ODT) 4 MG ODT TAB    Take 1 tablet (4 mg) by mouth every 8 hours as needed for nausea         Scribe Disclosure:  I, Nhi Corral, am serving as a scribe at 11:55 PM on 2/3/2021 to document services personally performed by Fidel Bagley PA-C based on my observations and the provider's statements to me.        This record was created at least in part using electronic voice recognition software, so please excuse any typographical errors.         Fidel Bagley PA-C  02/03/21 [id number]

## 2021-02-03 NOTE — ED NOTES
Bed: ED27  Expected date:   Expected time:   Means of arrival:   Comments:  Mark 441 Hypertension 56 f

## 2021-02-03 NOTE — ED NOTES
Pt presents to the ED via EMS for evaluation of hypertension and tachycardia. Per EMS, pt went to a party last evening where she consumed alcohol and smoked marijuana until early this morning. Pt reports nausea, sweats, diarrhea, and vomiting. Pt hypertensive and tachycardic for EMS. BS:126.

## 2021-02-03 NOTE — ED NOTES
"Patient hooked back up to vital monitoring following imaging studies. States \"my chest still hurts\". RN notified.   "

## 2021-03-09 DIAGNOSIS — E53.8 B12 DEFICIENCY: ICD-10-CM

## 2021-03-09 RX ORDER — CYANOCOBALAMIN 1000 UG/ML
INJECTION, SOLUTION INTRAMUSCULAR; SUBCUTANEOUS
Qty: 3 ML | Refills: 0 | OUTPATIENT
Start: 2021-03-09

## 2021-03-11 DIAGNOSIS — E53.8 B12 DEFICIENCY: ICD-10-CM

## 2021-03-11 RX ORDER — CYANOCOBALAMIN 1000 UG/ML
INJECTION, SOLUTION INTRAMUSCULAR; SUBCUTANEOUS
Qty: 3 ML | Refills: 0 | OUTPATIENT
Start: 2021-03-11

## 2021-04-01 DIAGNOSIS — E53.8 B12 DEFICIENCY: ICD-10-CM

## 2021-04-01 RX ORDER — CYANOCOBALAMIN 1000 UG/ML
INJECTION, SOLUTION INTRAMUSCULAR; SUBCUTANEOUS
Qty: 3 ML | Refills: 0 | OUTPATIENT
Start: 2021-04-01

## 2021-04-01 NOTE — TELEPHONE ENCOUNTER
Routing refill request to provider for review/approval because:  Vane given x1 and patient did not follow up, please advise -  Patient needs to be seen because needs to establish care with a new provider.    Linden JERONIMO RN

## 2021-04-01 NOTE — TELEPHONE ENCOUNTER
Already given lisette refill and did not follow up, no additional refills until seen.  Please call pt to schedule an in person annual exam

## 2021-04-07 RX ORDER — CYANOCOBALAMIN 1000 UG/ML
INJECTION, SOLUTION INTRAMUSCULAR; SUBCUTANEOUS
Qty: 3 ML | Refills: 0 | OUTPATIENT
Start: 2021-04-07

## 2021-04-07 NOTE — TELEPHONE ENCOUNTER
Spoke to patient, scheduled her for est care and physical in [facility].    Jo-Ann Silva,

## 2021-04-09 DIAGNOSIS — I24.9 ACS (ACUTE CORONARY SYNDROME) (H): ICD-10-CM

## 2021-04-09 DIAGNOSIS — Z86.19 HISTORY OF HEPATITIS C: ICD-10-CM

## 2021-04-09 PROBLEM — K68.3 RETROPERITONEAL HEMATOMA: Status: RESOLVED | Noted: 2018-07-14 | Resolved: 2021-04-09

## 2021-04-09 PROBLEM — L02.92 FURUNCLE OF SKIN OR SUBCUTANEOUS TISSUE: Status: RESOLVED | Noted: 2018-02-08 | Resolved: 2021-04-09

## 2021-04-09 PROBLEM — Z63.0 MARITAL DYSFUNCTION: Status: ACTIVE | Noted: 2018-05-11

## 2021-04-09 PROBLEM — F10.10 ALCOHOL ABUSE: Status: ACTIVE | Noted: 2018-05-04

## 2021-04-09 PROBLEM — F11.20 OPIOID USE DISORDER, SEVERE, DEPENDENCE (H): Status: ACTIVE | Noted: 2018-06-12

## 2021-04-09 PROBLEM — Z87.728 H/O SPINA BIFIDA: Status: ACTIVE | Noted: 2017-08-31

## 2021-04-09 PROBLEM — C64.9 RENAL CELL CARCINOMA (H): Status: ACTIVE | Noted: 2018-06-25
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
## 2021-04-09 PROBLEM — C64.1 MALIGNANT NEOPLASM OF KIDNEY EXCLUDING RENAL PELVIS, RIGHT (H): Status: ACTIVE | Noted: 2018-05-04

## 2021-04-09 PROBLEM — N12 PYELONEPHRITIS: Status: RESOLVED | Noted: 2018-06-28 | Resolved: 2021-04-09

## 2021-04-09 PROBLEM — F43.10 POSTTRAUMATIC STRESS DISORDER: Chronic | Status: ACTIVE | Noted: 2018-09-10

## 2021-05-02 ENCOUNTER — APPOINTMENT (OUTPATIENT)
Dept: GENERAL RADIOLOGY | Facility: CLINIC | Age: 56
End: 2021-05-02
Attending: EMERGENCY MEDICINE
Payer: COMMERCIAL

## 2021-05-02 ENCOUNTER — HOSPITAL ENCOUNTER (EMERGENCY)
Facility: CLINIC | Age: 56
Discharge: HOME | End: 2021-05-02
Attending: EMERGENCY MEDICINE | Admitting: EMERGENCY MEDICINE
Payer: COMMERCIAL

## 2021-05-02 VITALS
HEART RATE: 81 BPM | HEIGHT: 66 IN | RESPIRATION RATE: 18 BRPM | DIASTOLIC BLOOD PRESSURE: 75 MMHG | WEIGHT: 270 LBS | OXYGEN SATURATION: 96 % | TEMPERATURE: 98.9 F | SYSTOLIC BLOOD PRESSURE: 152 MMHG | BODY MASS INDEX: 43.39 KG/M2

## 2021-05-02 DIAGNOSIS — K13.79 UVULAR EDEMA: ICD-10-CM

## 2021-05-02 PROCEDURE — 250N000012 HC RX MED GY IP 250 OP 636 PS 637: Performed by: EMERGENCY MEDICINE

## 2021-05-02 PROCEDURE — 70360 X-RAY EXAM OF NECK: CPT

## 2021-05-02 PROCEDURE — 99283 EMERGENCY DEPT VISIT LOW MDM: CPT

## 2021-05-02 PROCEDURE — 250N000009 HC RX 250: Performed by: EMERGENCY MEDICINE

## 2021-05-02 RX ORDER — DEXAMETHASONE 4 MG/1
4 TABLET ORAL
Qty: 3 TABLET | Refills: 0 | Status: SHIPPED | OUTPATIENT
Start: 2021-05-02 | End: 2021-05-05

## 2021-05-02 RX ADMIN — ORAL VEHICLES - SUSP 10 MG: SUSPENSION at 10:07

## 2021-05-02 ASSESSMENT — ENCOUNTER SYMPTOMS
SHORTNESS OF BREATH: 0
FEVER: 0
CHILLS: 0
HEADACHES: 0
SORE THROAT: 1

## 2021-05-02 ASSESSMENT — MIFFLIN-ST. JEOR: SCORE: 1831.46

## 2021-05-02 NOTE — DISCHARGE INSTRUCTIONS
We suspect that the her symptoms are related to swelling of the uvula.  Continue dexamethasone for the next 3 days.  This may increase her blood sugar a little bit.  Please follow-up with your regular doctor return with fever greater than 100.4 or worsening condition.

## 2021-05-02 NOTE — ED PROVIDER NOTES
"  History   Chief Complaint:  Sore Throat     DEEPAK Mott is a 56 year old female with history of liver cancer, hypertension, diabetes mellitus type II, and asthma who presents via EMS with sore throat. For the past 24 hours, the patient states that she has been experiencing a worsening sore throat, prompting her to alert EMS. She reports that she feels \"warm\" but denies any recorded fevers. She denies any chills, chest pain, shortness of breath, headache, or any other symptoms.      Review of Systems   Constitutional: Negative for chills and fever.   HENT: Positive for sore throat.    Respiratory: Negative for shortness of breath.    Cardiovascular: Negative for chest pain.   Neurological: Negative for headaches.   All other systems reviewed and are negative.        Allergies:  Amoxicillin  Ativan   Buspirone  Ciprofloxacin  Macrobid   Busulfan  Cefaclor  Cefdinir  Cephalosporins  Clindamycin  Dilaudid   Diphenhydramine  Diphenhydramine Hcl  Imitrex   Ketorolac Tromethamine  Lansoprazole  Lansoprazole  Latex  Metoclopramide  Metoclopramide  Moxifloxacin  Nsaids  Paroxetine  Paroxetine  Prednisone  Quinolones  Sumatriptan  Blue Dyes  Ketorolac Tromethamine  Lidoderm   Penicillins  Red Dye  Sulfa Drugs  Vaccinium Angustifolium    Medications:  Albuterol inhaler  Albuterol nebulizer  Tessalon  Epinephrine  Losartan  Zoloft  Statin  Topamax  Lisinopril    Past Medical History:    Arthritis  Asthma  Liver cancer  Depression   Diabetes   Hydrocephalus  Hypertension   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    Physical Exam     Patient Vitals for the past 24 hrs:   BP Temp Temp src Pulse Resp SpO2 Height Weight   05/02/21 1121 -- -- -- -- -- 96 % -- --   05/02/21 1007 -- -- -- -- -- 97 % -- --   05/02/21 1000 (!) 152/75 -- -- -- 18 97 % -- --   05/02/21 0938 (!) 173/86 98.9  F (37.2  C) Oral 81 16 95 % 1.676 m (5' 6\") 122.5 kg (270 lb)       Physical Exam  Vitals signs reviewed.   Constitutional:       Appearance: She is obese.   HENT:      Mouth/Throat:      Mouth: Mucous membranes are moist.      Tonsils: No tonsillar exudate or tonsillar abscesses.      Comments: There is a slight amount of quinke edema of the midline uvula without soft tissue distention or uvular deviation.  Cardiovascular:      Rate and Rhythm: Normal rate and regular rhythm.   Pulmonary:      Effort: Pulmonary effort is normal.      Breath sounds: No stridor. No wheezing.   Skin:     Capillary Refill: Capillary refill takes less than 2 seconds.   Neurological:      General: No focal deficit present.      Mental Status: She is alert.   Psychiatric:         Mood and Affect: Mood normal.           Emergency Department Course     Imaging:    Neck soft tissue XR:  No appreciable soft tissue abnormality by x-ray. Cervical   spine degenerative change with fusion at C2-3 and nonvisualization of   multiple upper cervical spinous processes. Ventriculoperitoneal shunt   catheter. As per radiology.     Imaging independently reviewed and agree with radiologist interpretation.      Emergency Department Course:    Reviewed:  I reviewed nursing notes, vitals, past medical history and care everywhere.    Assessments:  0945 I obtained history and examined the patient as noted above.     1056 I rechecked the patient and explained findings.     Interventions:  1007 Decadron 10 mg PO    Disposition:  The patient was discharged to home.       Impression & Plan       Medical Decision Making:  Patient presents with a swollen uvula and concerns for " swelling of the back of the throat patient's voice is hoarse physical exam shows a well-appearing 56-year-old no concerns for infection on examination there is a slight amount of edema of the uvula suspect quinke edema.  Patient was given a single dose of dexamethasone which improved her symptoms.  Soft tissue x-rays are negative for posterior pharyngeal edema or concerns for epiglottitis.  Patient symptomatically improved and was discharged in stable condition asked to return with fever or worsening condition.      Covid-19  nIes Mott was evaluated during a global COVID-19 pandemic, which necessitated consideration that the patient might be at risk for infection with the SARS-CoV-2 virus that causes COVID-19.   Applicable protocols for evaluation were followed during the patient's care.     Diagnosis:    ICD-10-CM    1. Uvular edema  K13.79        Discharge Medications:  Discharge Medication List as of 5/2/2021 11:18 AM      START taking these medications    Details   dexamethasone (DECADRON) 4 MG tablet Take 1 tablet (4 mg) by mouth daily (with breakfast) for 3 days, Disp-3 tablet, R-0, Local Print             Scribe Disclosure:  Vicente CONDE, am serving as a scribe at 9:42 AM on 5/2/2021 to document services personally performed by Andrew Shell MD based on my observations and the provider's statements to me.              Andrew Shell MD  05/02/21 5558

## 2021-05-02 NOTE — ED NOTES
Bed: ED02  Expected date:   Expected time:   Means of arrival:   Comments:  Christina - 56 F sore throat SOB eta 0945

## 2021-05-14 DIAGNOSIS — G47.33 OBSTRUCTIVE SLEEP APNEA (ADULT) (PEDIATRIC): Primary | ICD-10-CM

## 2021-05-16 NOTE — TELEPHONE ENCOUNTER
Date of appointment: 10/25/18   Diagnosis/reason for appointment:Liver and Colon cancer  Referring provider/facility:  Who called:    Recent Studies  Imaging: [facility]  Pathology:[facility]  Labs:[facility]  Previous chemo/radiation (if known):    Records requested from:[facility]  Records received from:[facility]    Additional information:  
Images from [facility] have been resolved to PACS, Esdras from film room finshing to resolve.  
Sent request to [facility] for pathology slides from 2013 on and all CT done in 2018.  
Pt presents to ED ambulatory with mom and dad co asthma exacerbation, onset 1830 this evening. Pt took rescue inhaler albuterol, is on Symbicort daily, also takes flonase and zyrtec daily for seasonal allergies. Pt with mild expiratory wheeze heard throughout all lung fields, no nasal flaring or intercostal retractions noted. spo2 98% on Ra in triage

## 2021-06-08 LAB — INTERPRETATION ECG - MUSE: NORMAL

## 2021-06-08 PROCEDURE — 94640 AIRWAY INHALATION TREATMENT: CPT

## 2021-06-08 PROCEDURE — 96360 HYDRATION IV INFUSION INIT: CPT

## 2021-06-08 PROCEDURE — 99284 EMERGENCY DEPT VISIT MOD MDM: CPT | Mod: 25

## 2021-06-08 PROCEDURE — 93005 ELECTROCARDIOGRAM TRACING: CPT

## 2021-06-08 ASSESSMENT — MIFFLIN-ST. JEOR: SCORE: 1894.97

## 2021-06-09 ENCOUNTER — HOSPITAL ENCOUNTER (EMERGENCY)
Facility: CLINIC | Age: 56
Discharge: HOME | End: 2021-06-09
Attending: EMERGENCY MEDICINE | Admitting: EMERGENCY MEDICINE
Payer: COMMERCIAL

## 2021-06-09 VITALS
TEMPERATURE: 99 F | BODY MASS INDEX: 45.64 KG/M2 | SYSTOLIC BLOOD PRESSURE: 178 MMHG | DIASTOLIC BLOOD PRESSURE: 84 MMHG | RESPIRATION RATE: 18 BRPM | WEIGHT: 284 LBS | HEART RATE: 92 BPM | OXYGEN SATURATION: 97 % | HEIGHT: 66 IN

## 2021-06-09 DIAGNOSIS — R06.02 SOB (SHORTNESS OF BREATH): ICD-10-CM

## 2021-06-09 DIAGNOSIS — E86.0 DEHYDRATION: ICD-10-CM

## 2021-06-09 LAB
ALBUMIN SERPL-MCNC: 3.4 G/DL (ref 3.4–5)
ALBUMIN UR-MCNC: NEGATIVE MG/DL
ALP SERPL-CCNC: 84 U/L (ref 40–150)
ALT SERPL W P-5'-P-CCNC: 24 U/L (ref 0–50)
ANION GAP SERPL CALCULATED.3IONS-SCNC: 7 MMOL/L (ref 3–14)
APPEARANCE UR: CLEAR
AST SERPL W P-5'-P-CCNC: 10 U/L (ref 0–45)
BASOPHILS # BLD AUTO: 0 10E9/L (ref 0–0.2)
BASOPHILS NFR BLD AUTO: 0.3 %
BILIRUB SERPL-MCNC: 0.5 MG/DL (ref 0.2–1.3)
BILIRUB UR QL STRIP: NEGATIVE
BUN SERPL-MCNC: 15 MG/DL (ref 7–30)
CALCIUM SERPL-MCNC: 9.8 MG/DL (ref 8.5–10.1)
CHLORIDE SERPL-SCNC: 108 MMOL/L (ref 94–109)
CO2 SERPL-SCNC: 24 MMOL/L (ref 20–32)
COLOR UR AUTO: ABNORMAL
CREAT SERPL-MCNC: 0.93 MG/DL (ref 0.52–1.04)
DIFFERENTIAL METHOD BLD: ABNORMAL
EOSINOPHIL # BLD AUTO: 0.2 10E9/L (ref 0–0.7)
EOSINOPHIL NFR BLD AUTO: 1.7 %
ERYTHROCYTE [DISTWIDTH] IN BLOOD BY AUTOMATED COUNT: 13.8 % (ref 10–15)
GFR SERPL CREATININE-BSD FRML MDRD: 69 ML/MIN/{1.73_M2}
GLUCOSE SERPL-MCNC: 116 MG/DL (ref 70–99)
GLUCOSE UR STRIP-MCNC: NEGATIVE MG/DL
HCT VFR BLD AUTO: 36.6 % (ref 35–47)
HGB BLD-MCNC: 11.6 G/DL (ref 11.7–15.7)
HGB UR QL STRIP: ABNORMAL
HYALINE CASTS #/AREA URNS LPF: 1 /LPF (ref 0–2)
IMM GRANULOCYTES # BLD: 0.1 10E9/L (ref 0–0.4)
IMM GRANULOCYTES NFR BLD: 0.6 %
KETONES UR STRIP-MCNC: NEGATIVE MG/DL
LEUKOCYTE ESTERASE UR QL STRIP: NEGATIVE
LYMPHOCYTES # BLD AUTO: 2.2 10E9/L (ref 0.8–5.3)
LYMPHOCYTES NFR BLD AUTO: 19.2 %
MCH RBC QN AUTO: 29.1 PG (ref 26.5–33)
MCHC RBC AUTO-ENTMCNC: 31.7 G/DL (ref 31.5–36.5)
MCV RBC AUTO: 92 FL (ref 78–100)
MONOCYTES # BLD AUTO: 0.7 10E9/L (ref 0–1.3)
MONOCYTES NFR BLD AUTO: 6.1 %
MUCOUS THREADS #/AREA URNS LPF: PRESENT /LPF
NEUTROPHILS # BLD AUTO: 8.2 10E9/L (ref 1.6–8.3)
NEUTROPHILS NFR BLD AUTO: 72.1 %
NITRATE UR QL: NEGATIVE
NRBC # BLD AUTO: 0 10*3/UL
NRBC BLD AUTO-RTO: 0 /100
PH UR STRIP: 5.5 PH (ref 5–7)
PLATELET # BLD AUTO: 319 10E9/L (ref 150–450)
POTASSIUM SERPL-SCNC: 3.6 MMOL/L (ref 3.4–5.3)
PROT SERPL-MCNC: 7 G/DL (ref 6.8–8.8)
RBC # BLD AUTO: 3.99 10E12/L (ref 3.8–5.2)
RBC #/AREA URNS AUTO: <1 /HPF (ref 0–2)
SODIUM SERPL-SCNC: 139 MMOL/L (ref 133–144)
SOURCE: ABNORMAL
SP GR UR STRIP: 1.03 (ref 1–1.03)
SQUAMOUS #/AREA URNS AUTO: 6 /HPF (ref 0–1)
UROBILINOGEN UR STRIP-MCNC: 0 MG/DL (ref 0–2)
WBC # BLD AUTO: 11.3 10E9/L (ref 4–11)
WBC #/AREA URNS AUTO: 0 /HPF (ref 0–5)

## 2021-06-09 PROCEDURE — 81001 URINALYSIS AUTO W/SCOPE: CPT | Performed by: EMERGENCY MEDICINE

## 2021-06-09 PROCEDURE — 250N000009 HC RX 250: Performed by: EMERGENCY MEDICINE

## 2021-06-09 PROCEDURE — 258N000003 HC RX IP 258 OP 636: Performed by: EMERGENCY MEDICINE

## 2021-06-09 PROCEDURE — 85025 COMPLETE CBC W/AUTO DIFF WBC: CPT | Performed by: EMERGENCY MEDICINE

## 2021-06-09 PROCEDURE — 80053 COMPREHEN METABOLIC PANEL: CPT | Performed by: EMERGENCY MEDICINE

## 2021-06-09 RX ORDER — ALBUTEROL SULFATE 90 UG/1
2 AEROSOL, METERED RESPIRATORY (INHALATION) EVERY 4 HOURS PRN
Qty: 8 G | Refills: 0 | Status: SHIPPED | OUTPATIENT
Start: 2021-06-09

## 2021-06-09 RX ORDER — IPRATROPIUM BROMIDE AND ALBUTEROL SULFATE 2.5; .5 MG/3ML; MG/3ML
3 SOLUTION RESPIRATORY (INHALATION) ONCE
Status: COMPLETED | OUTPATIENT
Start: 2021-06-09 | End: 2021-06-09

## 2021-06-09 RX ADMIN — SODIUM CHLORIDE 1000 ML: 9 INJECTION, SOLUTION INTRAVENOUS at 01:44

## 2021-06-09 RX ADMIN — IPRATROPIUM BROMIDE AND ALBUTEROL SULFATE 3 ML: .5; 3 SOLUTION RESPIRATORY (INHALATION) at 01:44

## 2021-06-09 ASSESSMENT — ENCOUNTER SYMPTOMS
FEVER: 0
COUGH: 0
DYSURIA: 0
FLANK PAIN: 1
SHORTNESS OF BREATH: 1
WHEEZING: 1

## 2021-06-09 NOTE — ED PROVIDER NOTES
History   Chief Complaint:  Leg swelling, shortness of breath      HPI   Ines Mott is a 56 year old female with history of asthma, DM, fibromyalgia, obesity, and renal cancer who presents with shortness of breath and leg swelling.  The patient reports her AC went out 3 days ago and since that time she has had more shortness of breath, cough, and wheezing.  She is out of her Albuterol nebulizer solution currently.  With the hot, humid weather she has been sweating profusely and is concerned about dehydration although she is trying to keep up with fluids.  Her feet and hands have been more swollen as well.  She used to be on Lasix but stopped that due to urinary frequency and incontinence.  She has right flank pain which she relates to her known renal cancer.  She denies any pain with urination, chest pain, or cough.    Review of Systems   Constitutional: Negative for fever.   Respiratory: Positive for shortness of breath and wheezing. Negative for cough.    Cardiovascular: Positive for leg swelling. Negative for chest pain.   Genitourinary: Positive for flank pain. Negative for dysuria.   All other systems reviewed and are negative.    Allergies:  Amoxicillin  Lorazepam  Buspirone  Ciprofloxacin  Macrobid   Busulfan  Cephalosporins  Clindamycin  Hydromorphone  Diphenhydramine  Imitrex   Ketorolac   Lansoprazole  Latex  Metoclopramide  Moxifloxacin  Nsaids  Paroxetine  Prednisone  Quinolones  Sumatriptan  Blue Dyes  Lidocaine  Penicillins  Red Dye  Sulfa Drugs  Vaccinium Angustifolium    Medications:  Amlodipine   Losartan   Polyethylene glycol   Potassium chloride   Zoloft   Topamax  Vitamin D  Vitamin B12  EpiPen  Albuterol inhaler  Albuterol nebulizer solution    Past Medical History:    Asthma  Renal cancer  Colon cancer with liver metastasis  Fibromyalgia  Chronic pain syndrome  Depression  Diabetes mellitus   Hypertension  Hyperlipidemia  Hydrocephalus  Migraine headaches   Pyelonephritis    Obstructive  "sleep apnea  Angioedema  Obesity   Spina bifida  Arnold-Chiari malformation  Attention deficit and hyperactivity disorder   Alcohol abuse  Opioid use disorder, severe  Hepatitis C   Bipolar disorder  Drug seeking behavior   Fibrocystic breast disease    Past Surgical History:    Appendectomy  Cholecystectomy  Hysterectomy   shunt placement     Family History:    Cancer - father     Social History:  Presents to the ED alone  PCP: Tim Up     Physical Exam     Patient Vitals for the past 24 hrs:   BP Temp Temp src Pulse Resp SpO2 Height Weight   06/09/21 0256 (!) 178/84 -- -- 92 18 97 % -- --   06/08/21 2248 (!) 198/93 99  F (37.2  C) Temporal 99 18 -- 1.676 m (5' 6\") 128.8 kg (284 lb)       Physical Exam  General: Appears well-developed and well-nourished.   Head: No signs of trauma.   Mouth/Throat: Oropharynx is clear and moist. y  CV: Normal rate and regular rhythm.    Resp: Effort normal and breath sounds normal. No respiratory distress.   GI: Soft. There is no tenderness.  No rebound or guarding.  Normal bowel sounds.  No CVA tenderness.  MSK: Normal range of motion. Trace lower extremity edema. No Calf tenderness.  Neuro: The patient is alert and oriented. Speech normal.  Skin: Skin is warm and dry. No rash noted.   Psych: normal mood and affect. behavior is normal.       Emergency Department Course   ECG:  ECG taken at 2257, ECG read at 0111  Sinus rhythm with occasional premature ventricular complexes. Minimal voltage criteria for LVH, may be normal variant. Possible inferior infarct, age undetermined. Cannot rule out anterior infarct, age undetermined. Abnormal ECG.  No significant change as compared to prior, dated 2/3/21.  Rate 96 bpm. IL interval 192 ms. QRS duration 96 ms. QT/QTc 350/442 ms. P-R-T axes 47 -18 11.     Laboratory:   CBC: WBC 11.3 (H), HGB 11.6 (L),   CMP: Glucose 116 (H), otherwise WNL (Creatinine 0.93)    UA: Clear yellow urine, Blood small, Squamous Epithelial Cells/HPF 6 " (H), Mucous present, otherwise WNL     Emergency Department Course:  Reviewed:  I reviewed nursing notes, vitals and past medical history    Assessments:  (0107) I obtained history and examined the patient as noted above.   (0230) I rechecked the patient and explained findings.  She feels improved after a nebulizer treatment.     Interventions:  (0144) Normal Saline, 1 liter, IV bolus   (0144) Albuterol-Ipratropium inhalation solution, 2.5 mg-0.5 mg/3 ml; 3 mL, inhalation     Disposition:  The patient was discharged to home.     Impression & Plan     Medical Decision Making:  Ines Mott is a 56-year-old woman who presents due to shortness of breath and wheezing.  She states that she has been out of her medications and she feels that the heat has been affecting her particularly because her air conditioning has gone out.  She also noticed that she had some minor swelling to her lower extremities and hands.  On my evaluation she is not any respite distress.  Lung sounds were clear.  She was given IV fluids for hydration along with a DuoNeb.  Work-up was reassuring in the ER and she did feel better.  Patient was discharged with a refill of her albuterol and recommendation to continue pushing plenty of fluids.  She was recommended to follow up closely with her doctor for refill of the remainder of her typical medications.    Diagnosis:    ICD-10-CM    1. Dehydration  E86.0    2. SOB (shortness of breath)  R06.02        Discharge Medications:  Discharge Medication List as of 6/9/2021  2:39 AM      START taking these medications    Details   !! albuterol (PROAIR HFA) 108 (90 Base) MCG/ACT inhaler Inhale 2 puffs into the lungs every 4 hours as needed for shortness of breath / dyspnea, Disp-8 g, R-0, Local Print       !! - Potential duplicate medications found. Please discuss with provider.          Scribe Disclosure:  I, Elizabeth Tuttle, am serving as a scribe at 1:07 AM on 6/9/2021 to document services personally  performed by Suhas Bowie MD based on my observations and the provider's statements to me.         Suhas Bowie MD  06/10/21 1059
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
## 2021-07-17 NOTE — ED NOTES
Bed: ED01  Expected date:   Expected time:   Means of arrival:   Comments:  [facility] [address]- 51 f abd pain/hx liver CA  
Pt call light answered.  Pt requested her HOB up.  Pt also asked about test results coming back.  MD will notify pt.   
Clear

## 2021-08-06 ENCOUNTER — HOSPITAL ENCOUNTER (EMERGENCY)
Facility: CLINIC | Age: 56
Discharge: HOME | End: 2021-08-06
Attending: EMERGENCY MEDICINE | Admitting: EMERGENCY MEDICINE
Payer: COMMERCIAL

## 2021-08-06 ENCOUNTER — APPOINTMENT (OUTPATIENT)
Dept: CT IMAGING | Facility: CLINIC | Age: 56
End: 2021-08-06
Attending: EMERGENCY MEDICINE
Payer: COMMERCIAL

## 2021-08-06 VITALS
SYSTOLIC BLOOD PRESSURE: 177 MMHG | DIASTOLIC BLOOD PRESSURE: 77 MMHG | HEART RATE: 63 BPM | OXYGEN SATURATION: 99 % | RESPIRATION RATE: 16 BRPM | TEMPERATURE: 98.6 F

## 2021-08-06 DIAGNOSIS — R10.84 ABDOMINAL PAIN, GENERALIZED: ICD-10-CM

## 2021-08-06 DIAGNOSIS — R39.9 LOWER URINARY TRACT SYMPTOMS: ICD-10-CM

## 2021-08-06 DIAGNOSIS — R19.5 LOOSE STOOLS: ICD-10-CM

## 2021-08-06 LAB
ALBUMIN SERPL-MCNC: 3.6 G/DL (ref 3.4–5)
ALBUMIN UR-MCNC: NEGATIVE MG/DL
ALP SERPL-CCNC: 102 U/L (ref 40–150)
ALT SERPL W P-5'-P-CCNC: 28 U/L (ref 0–50)
ANION GAP SERPL CALCULATED.3IONS-SCNC: 4 MMOL/L (ref 3–14)
APPEARANCE UR: CLEAR
AST SERPL W P-5'-P-CCNC: 8 U/L (ref 0–45)
BACTERIA #/AREA URNS HPF: ABNORMAL /HPF
BASOPHILS # BLD AUTO: 0 10E3/UL (ref 0–0.2)
BASOPHILS NFR BLD AUTO: 0 %
BILIRUB SERPL-MCNC: 0.6 MG/DL (ref 0.2–1.3)
BILIRUB UR QL STRIP: NEGATIVE
BUN SERPL-MCNC: 11 MG/DL (ref 7–30)
CALCIUM SERPL-MCNC: 9.8 MG/DL (ref 8.5–10.1)
CHLORIDE BLD-SCNC: 111 MMOL/L (ref 94–109)
CO2 SERPL-SCNC: 27 MMOL/L (ref 20–32)
COLOR UR AUTO: YELLOW
CREAT SERPL-MCNC: 0.95 MG/DL (ref 0.52–1.04)
EOSINOPHIL # BLD AUTO: 0.2 10E3/UL (ref 0–0.7)
EOSINOPHIL NFR BLD AUTO: 2 %
ERYTHROCYTE [DISTWIDTH] IN BLOOD BY AUTOMATED COUNT: 13.2 % (ref 10–15)
GFR SERPL CREATININE-BSD FRML MDRD: 67 ML/MIN/1.73M2
GLUCOSE BLD-MCNC: 107 MG/DL (ref 70–99)
GLUCOSE UR STRIP-MCNC: NEGATIVE MG/DL
HCT VFR BLD AUTO: 39 % (ref 35–47)
HGB BLD-MCNC: 12.4 G/DL (ref 11.7–15.7)
HGB UR QL STRIP: ABNORMAL
IMM GRANULOCYTES # BLD: 0 10E3/UL
IMM GRANULOCYTES NFR BLD: 0 %
KETONES UR STRIP-MCNC: NEGATIVE MG/DL
LEUKOCYTE ESTERASE UR QL STRIP: ABNORMAL
LIPASE SERPL-CCNC: 69 U/L (ref 73–393)
LYMPHOCYTES # BLD AUTO: 2.7 10E3/UL (ref 0.8–5.3)
LYMPHOCYTES NFR BLD AUTO: 28 %
MCH RBC QN AUTO: 29.6 PG (ref 26.5–33)
MCHC RBC AUTO-ENTMCNC: 31.8 G/DL (ref 31.5–36.5)
MCV RBC AUTO: 93 FL (ref 78–100)
MONOCYTES # BLD AUTO: 0.5 10E3/UL (ref 0–1.3)
MONOCYTES NFR BLD AUTO: 6 %
MUCOUS THREADS #/AREA URNS LPF: PRESENT /LPF
NEUTROPHILS # BLD AUTO: 6 10E3/UL (ref 1.6–8.3)
NEUTROPHILS NFR BLD AUTO: 64 %
NITRATE UR QL: NEGATIVE
NRBC # BLD AUTO: 0 10E3/UL
NRBC BLD AUTO-RTO: 0 /100
PH UR STRIP: 5 [PH] (ref 5–7)
PLATELET # BLD AUTO: 352 10E3/UL (ref 150–450)
POTASSIUM BLD-SCNC: 3.4 MMOL/L (ref 3.4–5.3)
PROT SERPL-MCNC: 7.6 G/DL (ref 6.8–8.8)
RBC # BLD AUTO: 4.19 10E6/UL (ref 3.8–5.2)
RBC URINE: 1 /HPF
SODIUM SERPL-SCNC: 142 MMOL/L (ref 133–144)
SP GR UR STRIP: 1.03 (ref 1–1.03)
SQUAMOUS EPITHELIAL: 2 /HPF
UROBILINOGEN UR STRIP-MCNC: NORMAL MG/DL
WBC # BLD AUTO: 9.5 10E3/UL (ref 4–11)
WBC URINE: 3 /HPF

## 2021-08-06 PROCEDURE — 258N000003 HC RX IP 258 OP 636: Performed by: EMERGENCY MEDICINE

## 2021-08-06 PROCEDURE — 99285 EMERGENCY DEPT VISIT HI MDM: CPT | Mod: 25

## 2021-08-06 PROCEDURE — 250N000011 HC RX IP 250 OP 636: Performed by: EMERGENCY MEDICINE

## 2021-08-06 PROCEDURE — 82040 ASSAY OF SERUM ALBUMIN: CPT | Performed by: EMERGENCY MEDICINE

## 2021-08-06 PROCEDURE — 83690 ASSAY OF LIPASE: CPT | Performed by: EMERGENCY MEDICINE

## 2021-08-06 PROCEDURE — 36415 COLL VENOUS BLD VENIPUNCTURE: CPT | Performed by: EMERGENCY MEDICINE

## 2021-08-06 PROCEDURE — 74177 CT ABD & PELVIS W/CONTRAST: CPT

## 2021-08-06 PROCEDURE — 250N000009 HC RX 250: Performed by: EMERGENCY MEDICINE

## 2021-08-06 PROCEDURE — 81001 URINALYSIS AUTO W/SCOPE: CPT | Performed by: EMERGENCY MEDICINE

## 2021-08-06 PROCEDURE — 85025 COMPLETE CBC W/AUTO DIFF WBC: CPT | Performed by: EMERGENCY MEDICINE

## 2021-08-06 PROCEDURE — 96360 HYDRATION IV INFUSION INIT: CPT | Mod: 59

## 2021-08-06 RX ORDER — SODIUM CHLORIDE 9 MG/ML
INJECTION, SOLUTION INTRAVENOUS CONTINUOUS
Status: DISCONTINUED | OUTPATIENT
Start: 2021-08-06 | End: 2021-08-06 | Stop reason: HOSPADM

## 2021-08-06 RX ORDER — IOPAMIDOL 755 MG/ML
135 INJECTION, SOLUTION INTRAVASCULAR ONCE
Status: COMPLETED | OUTPATIENT
Start: 2021-08-06 | End: 2021-08-06

## 2021-08-06 RX ADMIN — SODIUM CHLORIDE 79 ML: 9 INJECTION, SOLUTION INTRAVENOUS at 19:33

## 2021-08-06 RX ADMIN — SODIUM CHLORIDE 1000 ML: 9 INJECTION, SOLUTION INTRAVENOUS at 18:54

## 2021-08-06 RX ADMIN — IOPAMIDOL 135 ML: 755 INJECTION, SOLUTION INTRAVENOUS at 19:33

## 2021-08-06 NOTE — ED TRIAGE NOTES
Seen today at urgent care after 1.5weeks of pelvic pain, diarrhea, fever, chills, vomiting. Symptoms not improving so pt went to UC, was told she had high WBCs.

## 2021-08-06 NOTE — ED PROVIDER NOTES
History   Chief Complaint:  Abnormal Labs       History limited by: Poor historian.   History supplemented by electronic chart review    Ines Mott is a 56 year old female with history of renal cell carcinoma who presents with abdominal pain and concern for abnormal urine results. The patient had a urine culture secondary to dysuria on July 28th that was positive for Klebsiela, see below. At that time she was started on Zithromax for 3 days.  Today, she went to urgent care earlier where she had a urinalysis with white blood cells in her urine, as well as many epithelial cells and many bacteria. Patient reports feeling sick for the last three weeks. Patient has not been treating with pain medications, though chart review shows that she was prescribed a Percocet at the end of July. For the last three weeks patient has had frequent, malodorous dysuria and reports malodorous diarrhea consisting of 3-4 loose brown bowel movements per day but has not had a stool study done.    Lab results from 8/6/21:  UA with Microscopic: Clarity: Cloudy (A), Specific Gravity: >1.030 (A), Blood: Moderate (A), Bacteria: Many (A), Epithelial Cells: Many (A) o/w Negative    Urine culture results on 7/28/21:  Urine Culture:  >100,000 CFU/mL Klebsiella aerogenes (formerly Enterobacter aerogenes)    Review of Systems   Reason unable to perform ROS: Poor Historian.       Allergies:  Amoxicillin  Ativan [Lorazepam]  Buspirone  Ciprofloxacin  Macrobid [Nitrofurantoin]  Busulfan  Cefaclor  Cefdinir  Cephalosporins  Clindamycin  Dilaudid [Hydromorphone]  Diphenhydramine  Imitrex [Sumatriptan Succinate]  Lansoprazole  Latex  Metoclopramide  Moxifloxacin  Nsaids  Paroxetine  Prednisone  Quinolones  Blue Dyes  Ketorolac Tromethamine  Lidoderm [Lidocaine]  Penicillins  Red Dye  Sulfa Drugs  Vaccinium  Angustifolium    Medications:  Albuterol  Proventil  Norvasc  Decadron  Epipen  Culturelle  Cozaar  Zoloft  Topamax  Roxicodone  Guaifenesin  Flovent  Mirapex    Past Medical History:    Arthritis  Asthma  Renal cell carcinoma  Chronic pain syndrome  Diabetes  Hydrocephalus  Hypertension  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  Methamphetamine abuse  Migraines  Pyelonephritis  Retroperitoneal hematoma  Spina bifida  PTSD  ACS  Type 2 diabetes  SAD  Intracranial shunt  Hyperlipidemia  Angioedema  Carcinoma of colon metastatic to liver  Fibromyalgia  Bipolar disorder  Fibrocystic breast disease  ADD  Arnold-Chiari malformation  DVT  Hemiparesis    Past Surgical History:    Appendectomy  Cholecystectomy  Dental extractions  Hysterectomy  Implant shunt ventriculoperitoneal  EGD   Colonoscopy  Biopsy renal right  IR embolization    Family History:    Father: Cancer  Mother: Colon cancer, hypertension    Social History:  Patient presents to the ED alone.  No alcohol use    Physical Exam     Patient Vitals for the past 24 hrs:   BP Temp Temp src Pulse Resp SpO2   08/06/21 2025 -- -- -- 63 16 99 %   08/06/21 1930 -- -- -- -- -- 98 %   08/06/21 1900 -- -- -- 64 -- 98 %   08/06/21 1830 (!) 177/77 -- -- 64 -- 98 %   08/06/21 1449 (!) 188/97 98.6  F (37  C) Oral 64 18 99 %       Physical Exam  General: Chronically ill but nontoxic-appearing woman sitting upright in room 25  HENT: mucous membranes moist   CV: rate as above, no pitting edema, normal radial pulses  Resp: normal effort, speaks in full phrases, no stridor, no cough observed  GI: Abdomen soft, protuberant, moderate diffuse tenderness without guarding, no distension  MSK: no bony tenderness, no CVAT  Skin: appropriately warm and dry, no abdominal rash  Neuro: alert, clear speech, oriented though somewhat poor historian, moves all extremities with good tone, no nuchal rigidity  Psych: cooperative    Emergency Department Course   Imaging:  CT Abdomen/Pelvis  with IV contrast:   1.  No acute process demonstrated in the abdomen and pelvis.  2.  Stable findings of liver lesions compared to previous.   As per radiology.    Laboratory:   CBC: WBC: 9.5, HGB: 12.4, PLT: 352    CMP: Glucose 107 (H), Chloride: 111 (H), o/w WNL (Creatinine: 0.95)    Lipase: 69 (L)    UA: Blood: Small (A), Leukocyte Esterase: Trace (A), Bacteria: Few (A), Squamous Epithelial: 2 (H), Mucous: Present, o/w Negative    Emergency Department Course:    Reviewed:  I reviewed nursing notes, vitals, past medical history and care everywhere    Assessments:  18:07 I obtained history and examined the patient as noted above.     20:14 I rechecked the patient and explained findings.     Interventions:  1854 NS 1L IV    Disposition:  The patient was discharged to home.     Impression & Plan     Medical Decision Making:  She is a complex history including multiple prior abdominal surgeries, as well as a care plan due to her chronic abdominal pain and other concerns for which she frequently comes the emergency department. Given her recent abnormal urine culture, I felt it was important to perform CT imaging to evaluate for the possibility of perinephric abscess, having also considered bowel obstruction and numerous other possible causes. Her urinalysis from earlier today was contaminated, and a repeat specimen obtained during this emergency department visit does not show evidence of infection. This, in combination with her numerous medication intolerances listed in epic, led to the conclusion to defer additional antibiotics at this time. She declined my offer of nonopioid analgesia. I do not think that opioid medications are indicated under the circumstances. No indication for hospitalization or surgical consultation at this time. The diagnostic uncertainty was acknowledged to the patient along with recommendation for close outpatient follow-up early this coming week and return precautions for sudden worsening at  any hour. She was discharged home after discussion of and agreement upon this plan.    Diagnosis:    ICD-10-CM    1. Abdominal pain, generalized  R10.84    2. Loose stools  R19.5    3. Lower urinary tract symptoms  R39.9        Scribe Disclosure:  I, Emir Acuna, am serving as a scribe at 6:20 PM on 8/6/2021 to document services personally performed by Jason Sorto MD based on my observations and the provider's statements to me.     This note was completed in part using Dragon voice recognition software. Although reviewed after completion, some word and grammatical errors may occur.             Jason Sorto MD  08/07/21 0313

## 2021-08-07 NOTE — DISCHARGE INSTRUCTIONS
Discharge Instructions  Abdominal Pain    Abdominal pain (belly pain) can be caused by many things. Your evaluation today does not show the exact cause for your pain. Your provider today has decided that it is unlikely your pain is due to a life threatening problem, or a problem requiring surgery or hospital admission. Sometimes those problems cannot be found right away, so it is very important that you follow up as directed.  Sometimes only the changes which occur over time allow the cause of your pain to be found.    Generally, every Emergency Department visit should have a follow-up clinic visit with either a primary or a specialty clinic/provider. Please follow-up as instructed by your emergency provider today. With abdominal pain, we often recommend very close follow-up, such as the following day.    ADULTS:  Return to the Emergency Department right away if:    You get an oral temperature above 102oF or as directed by your provider.  You have blood in your stools. This may be bright red or appear as black, tarry stools.    You keep vomiting (throwing up) or cannot drink liquids.  You see blood when you vomit.   You cannot have a bowel movement or you cannot pass gas.  Your stomach gets bloated or bigger.  Your skin or the whites of your eyes look yellow.  You faint.  You have bloody, frequent or painful urination (peeing).  You have new symptoms or anything that worries you.    CHILDREN:  Return to the Emergency Department right away if your child has any of the above-listed symptoms or the following:    Pushes your hand away or screams/cries when his/her belly is touched.  You notice your child is very fussy or weak.  Your child is very tired and is too tired to eat or drink.  Your child is dehydrated.  Signs of dehydration can be:  Significant change in the amount of wet diapers/urine.  Your infant or child starts to have dry mouth and lips, or no saliva (spit) or tears.    PREGNANT WOMEN:  Return to the  Emergency Department right away if you have any of the above-listed symptoms or the following:    You have bleeding, leaking fluid or passing tissue from the vagina.  You have worse pain or cramping, or pain in your shoulder or back.  You have vomiting that will not stop.  You have a temperature of 100oF or more.  Your baby is not moving as much as usual.  You faint.  You get a bad headache with or without eye problems and abdominal pain.  You have a seizure.  You have unusual discharge from your vagina and abdominal pain.    Abdominal pain is pretty common during pregnancy.  Your pain may or may not be related to your pregnancy. You should follow-up closely with your OB provider so they can evaluate you and your baby.  Until you follow-up with your regular provider, do the following:     Avoid sex and do not put anything in your vagina.  Drink clear fluids.  Only take medications approved by your provider.    MORE INFORMATION:    Appendicitis:  A possible cause of abdominal pain in any person who still has their appendix is acute appendicitis. Appendicitis is often hard to diagnose.  Testing does not always rule out early appendicitis or other causes of abdominal pain. Close follow-up with your provider and re-evaluations may be needed to figure out the reason for your abdominal pain.    Follow-up:  It is very important that you make an appointment with your clinic and go to the appointment.  If you do not follow-up with your primary provider, it may result in missing an important development which could result in permanent injury or disability and/or lasting pain.  If there is any problem keeping your appointment, call your provider or return to the Emergency Department.    Medications:  Take your medications as directed by your provider today.  Before using over-the-counter medications, ask your provider and make sure to take the medications as directed.  If you have any questions about medications, ask your  "provider.    Diet:  Resume your normal diet as much as possible, but do not eat fried, fatty or spicy foods while you have pain.  Do not drink alcohol or have caffeine.  Do not smoke tobacco.    Probiotics: If you have been given an antibiotic, you may want to also take a probiotic pill or eat yogurt with live cultures. Probiotics have \"good bacteria\" to help your intestines stay healthy. Studies have shown that probiotics help prevent diarrhea (loose stools) and other intestine problems (including C. diff infection) when you take antibiotics. You can buy these without a prescription in the pharmacy section of the store.     If you were given a prescription for medicine here today, be sure to read all of the information (including the package insert) that comes with your prescription.  This will include important information about the medicine, its side effects, and any warnings that you need to know about.  The pharmacist who fills the prescription can provide more information and answer questions you may have about the medicine.  If you have questions or concerns that the pharmacist cannot address, please call or return to the Emergency Department.       Remember that you can always come back to the Emergency Department if you are not able to see your regular provider in the amount of time listed above, if you get any new symptoms, or if there is anything that worries you.          Discharge Instructions  Chronic Pain Management  You were seen today for an issue regarding chronic or recurrent pain. You may have a condition that gives you pain every day, or a condition that causes pain that keeps coming back, or several conditions involving pain.   We will evaluate you for your symptoms to ensure that there is no medical emergency. This evaluation usually takes the form of a good medical history (interview) and physical examination. Rarely, imaging (x-rays or CAT scans) and lab work (blood tests) may be necessary " in addition to the history and examination. This approach is similar to our approach to other chronic/recurrent diseases where we evaluate for emergencies but leave much of the management of the problem to the regular provider/clinic.  We will offer suggestions to manage your pain but we do not generally utilize opiate pain medications for recurrent or chronic pain. There is an ongoing public health crisis with respect to opiates and we are aware of the risks to our patients from opiate pain medications. Opiates are strong pain relievers but they carry significant risks including sedation, confusion, constipation, falls, as well as dependence, addiction, and overdose-related deaths. These medications represent a significant risk to your health and are therefore reserved for the management of select conditions associated with acute, severe pain. For many conditions, the risks of opiate treatment simply outweigh the benefits. Additionally, for patients with chronic/recurrent pain or who frequently use opiates, management of pain needs to be performed by a single physician/provider who can follow their care consistently. As a result, we generally do not provide refills of opiates or treat chronic pain with injected opiates in the Emergency Department. It is with your best interests in mind that we adhere to these widely accepted best practices.    As with other chronic diseases like diabetes and asthma, management of chronic pain is best performed by regular visits to the same provider. In the case of chronic pain, this may be your primary physician/provider, your neurologist or other specialist, or with a chronic pain clinic/provider.  If you have concerns about our policy, please discuss them with your primary care provider or pain specialist, who can contact us if necessary for further information or clarification.  If you were given a prescription for medicine here today, be sure to read all of the information  (including the package insert) that comes with your prescription.  This will include important information about the medicine, its side effects, and any warnings that you need to know about.  The pharmacist who fills the prescription can provide more information and answer questions you may have about the medicine.  If you have questions or concerns that the pharmacist cannot address, please call or return to the Emergency Department.   Remember that you can always come back to the Emergency Department if you develop any new symptoms or if there is anything that worries you.

## 2021-08-22 ENCOUNTER — HOSPITAL ENCOUNTER (EMERGENCY)
Facility: CLINIC | Age: 56
Discharge: HOME | End: 2021-08-22
Attending: EMERGENCY MEDICINE | Admitting: EMERGENCY MEDICINE
Payer: COMMERCIAL

## 2021-08-22 ENCOUNTER — APPOINTMENT (OUTPATIENT)
Dept: CT IMAGING | Facility: CLINIC | Age: 56
End: 2021-08-22
Attending: EMERGENCY MEDICINE
Payer: COMMERCIAL

## 2021-08-22 VITALS
SYSTOLIC BLOOD PRESSURE: 181 MMHG | RESPIRATION RATE: 16 BRPM | BODY MASS INDEX: 42.38 KG/M2 | OXYGEN SATURATION: 97 % | HEART RATE: 68 BPM | TEMPERATURE: 97.4 F | WEIGHT: 270 LBS | HEIGHT: 67 IN | DIASTOLIC BLOOD PRESSURE: 86 MMHG

## 2021-08-22 DIAGNOSIS — K92.1 HEMATOCHEZIA: ICD-10-CM

## 2021-08-22 LAB
ALBUMIN SERPL-MCNC: 3.4 G/DL (ref 3.4–5)
ALP SERPL-CCNC: 94 U/L (ref 40–150)
ALT SERPL W P-5'-P-CCNC: 29 U/L (ref 0–50)
ANION GAP SERPL CALCULATED.3IONS-SCNC: 6 MMOL/L (ref 3–14)
AST SERPL W P-5'-P-CCNC: 9 U/L (ref 0–45)
BASOPHILS # BLD AUTO: 0 10E3/UL (ref 0–0.2)
BASOPHILS NFR BLD AUTO: 0 %
BILIRUB SERPL-MCNC: 0.6 MG/DL (ref 0.2–1.3)
BUN SERPL-MCNC: 12 MG/DL (ref 7–30)
CALCIUM SERPL-MCNC: 9.7 MG/DL (ref 8.5–10.1)
CHLORIDE BLD-SCNC: 109 MMOL/L (ref 94–109)
CO2 SERPL-SCNC: 27 MMOL/L (ref 20–32)
CREAT SERPL-MCNC: 1.17 MG/DL (ref 0.52–1.04)
EOSINOPHIL # BLD AUTO: 0.2 10E3/UL (ref 0–0.7)
EOSINOPHIL NFR BLD AUTO: 2 %
ERYTHROCYTE [DISTWIDTH] IN BLOOD BY AUTOMATED COUNT: 13 % (ref 10–15)
GFR SERPL CREATININE-BSD FRML MDRD: 52 ML/MIN/1.73M2
GLUCOSE BLD-MCNC: 96 MG/DL (ref 70–99)
HCT VFR BLD AUTO: 38.4 % (ref 35–47)
HGB BLD-MCNC: 12.5 G/DL (ref 11.7–15.7)
HOLD SPECIMEN: NORMAL
HOLD SPECIMEN: NORMAL
IMM GRANULOCYTES # BLD: 0 10E3/UL
IMM GRANULOCYTES NFR BLD: 0 %
LIPASE SERPL-CCNC: 81 U/L (ref 73–393)
LYMPHOCYTES # BLD AUTO: 2.7 10E3/UL (ref 0.8–5.3)
LYMPHOCYTES NFR BLD AUTO: 27 %
MCH RBC QN AUTO: 29.6 PG (ref 26.5–33)
MCHC RBC AUTO-ENTMCNC: 32.6 G/DL (ref 31.5–36.5)
MCV RBC AUTO: 91 FL (ref 78–100)
MONOCYTES # BLD AUTO: 0.7 10E3/UL (ref 0–1.3)
MONOCYTES NFR BLD AUTO: 7 %
NEUTROPHILS # BLD AUTO: 6.3 10E3/UL (ref 1.6–8.3)
NEUTROPHILS NFR BLD AUTO: 64 %
NRBC # BLD AUTO: 0 10E3/UL
NRBC BLD AUTO-RTO: 0 /100
PLATELET # BLD AUTO: 342 10E3/UL (ref 150–450)
POTASSIUM BLD-SCNC: 3.3 MMOL/L (ref 3.4–5.3)
PROT SERPL-MCNC: 7 G/DL (ref 6.8–8.8)
RBC # BLD AUTO: 4.23 10E6/UL (ref 3.8–5.2)
SODIUM SERPL-SCNC: 142 MMOL/L (ref 133–144)
WBC # BLD AUTO: 10 10E3/UL (ref 4–11)

## 2021-08-22 PROCEDURE — 80053 COMPREHEN METABOLIC PANEL: CPT | Performed by: EMERGENCY MEDICINE

## 2021-08-22 PROCEDURE — 83690 ASSAY OF LIPASE: CPT | Performed by: EMERGENCY MEDICINE

## 2021-08-22 PROCEDURE — 250N000009 HC RX 250: Performed by: EMERGENCY MEDICINE

## 2021-08-22 PROCEDURE — 96361 HYDRATE IV INFUSION ADD-ON: CPT

## 2021-08-22 PROCEDURE — 258N000003 HC RX IP 258 OP 636: Performed by: EMERGENCY MEDICINE

## 2021-08-22 PROCEDURE — 85025 COMPLETE CBC W/AUTO DIFF WBC: CPT | Performed by: EMERGENCY MEDICINE

## 2021-08-22 PROCEDURE — 82040 ASSAY OF SERUM ALBUMIN: CPT | Performed by: EMERGENCY MEDICINE

## 2021-08-22 PROCEDURE — 96360 HYDRATION IV INFUSION INIT: CPT | Mod: 59

## 2021-08-22 PROCEDURE — 99285 EMERGENCY DEPT VISIT HI MDM: CPT | Mod: 25

## 2021-08-22 PROCEDURE — 74177 CT ABD & PELVIS W/CONTRAST: CPT

## 2021-08-22 PROCEDURE — 36415 COLL VENOUS BLD VENIPUNCTURE: CPT | Performed by: EMERGENCY MEDICINE

## 2021-08-22 PROCEDURE — 250N000011 HC RX IP 250 OP 636: Performed by: EMERGENCY MEDICINE

## 2021-08-22 RX ORDER — IOPAMIDOL 755 MG/ML
135 INJECTION, SOLUTION INTRAVASCULAR ONCE
Status: COMPLETED | OUTPATIENT
Start: 2021-08-22 | End: 2021-08-22

## 2021-08-22 RX ADMIN — SODIUM CHLORIDE 1000 ML: 9 INJECTION, SOLUTION INTRAVENOUS at 18:33

## 2021-08-22 RX ADMIN — SODIUM CHLORIDE 79 ML: 9 INJECTION, SOLUTION INTRAVENOUS at 19:27

## 2021-08-22 RX ADMIN — IOPAMIDOL 135 ML: 755 INJECTION, SOLUTION INTRAVENOUS at 19:27

## 2021-08-22 ASSESSMENT — ENCOUNTER SYMPTOMS
BLOOD IN STOOL: 1
LIGHT-HEADEDNESS: 1
CHILLS: 1
DIARRHEA: 1
HEADACHES: 0
FEVER: 1
VOMITING: 1
ABDOMINAL PAIN: 1

## 2021-08-22 ASSESSMENT — MIFFLIN-ST. JEOR: SCORE: 1839.4

## 2021-08-22 NOTE — ED TRIAGE NOTES
"Patient was seen in ED for the same thing 2 weeks ago.    Complains of blood in stool every time she has a BM. States 4 episodes today.     Has a history of liver and kidney Cancer. States felt like she was going to pass out when she woke up this morning. States symptoms seem worse today. Has a  shunt in her abdomen and is having abdominal pain. Denies headache. \"I called before I came in because last time I had to wait 5 hours, this place was full.\"  "

## 2021-09-10 PROCEDURE — 88305 TISSUE EXAM BY PATHOLOGIST: CPT | Performed by: PATHOLOGY

## 2021-09-10 PROCEDURE — 88305 TISSUE EXAM BY PATHOLOGIST: CPT | Mod: TC,ORL | Performed by: INTERNAL MEDICINE

## 2021-09-13 ENCOUNTER — LAB REQUISITION (OUTPATIENT)
Dept: LAB | Facility: CLINIC | Age: 56
End: 2021-09-13
Payer: COMMERCIAL

## 2021-09-13 DIAGNOSIS — K62.1 RECTAL POLYP: ICD-10-CM

## 2021-09-13 DIAGNOSIS — K62.5 HEMORRHAGE OF ANUS AND RECTUM: ICD-10-CM

## 2021-09-13 DIAGNOSIS — K57.30 DIVERTICULOSIS OF LARGE INTESTINE WITHOUT PERFORATION OR ABSCESS WITHOUT BLEEDING: ICD-10-CM

## 2021-09-13 DIAGNOSIS — K63.5 POLYP OF COLON: ICD-10-CM

## 2021-09-13 DIAGNOSIS — K52.9 NONINFECTIVE GASTROENTERITIS AND COLITIS, UNSPECIFIED: ICD-10-CM

## 2021-09-14 LAB
PATH REPORT.COMMENTS IMP SPEC: NORMAL
PATH REPORT.FINAL DX SPEC: NORMAL
PATH REPORT.GROSS SPEC: NORMAL
PATH REPORT.MICROSCOPIC SPEC OTHER STN: NORMAL
PATH REPORT.RELEVANT HX SPEC: NORMAL
PHOTO IMAGE: NORMAL

## 2021-09-14 PROCEDURE — 88305 TISSUE EXAM BY PATHOLOGIST: CPT | Mod: 26 | Performed by: PATHOLOGY

## 2021-09-17 PROCEDURE — 88342 IMHCHEM/IMCYTCHM 1ST ANTB: CPT | Mod: TC,ORL | Performed by: INTERNAL MEDICINE

## 2021-09-17 PROCEDURE — 88342 IMHCHEM/IMCYTCHM 1ST ANTB: CPT | Performed by: PATHOLOGY

## 2021-09-20 ENCOUNTER — LAB REQUISITION (OUTPATIENT)
Dept: LAB | Facility: CLINIC | Age: 56
End: 2021-09-20
Payer: COMMERCIAL

## 2021-09-20 DIAGNOSIS — R10.13 EPIGASTRIC PAIN: ICD-10-CM

## 2021-09-20 DIAGNOSIS — K31.89 OTHER DISEASES OF STOMACH AND DUODENUM: ICD-10-CM

## 2021-09-20 DIAGNOSIS — K30 FUNCTIONAL DYSPEPSIA: ICD-10-CM

## 2021-09-21 LAB
PATH REPORT.COMMENTS IMP SPEC: NORMAL
PATH REPORT.COMMENTS IMP SPEC: NORMAL
PATH REPORT.FINAL DX SPEC: NORMAL
PATH REPORT.GROSS SPEC: NORMAL
PATH REPORT.MICROSCOPIC SPEC OTHER STN: NORMAL
PATH REPORT.RELEVANT HX SPEC: NORMAL
PHOTO IMAGE: NORMAL

## 2021-09-21 PROCEDURE — 88305 TISSUE EXAM BY PATHOLOGIST: CPT | Mod: 26 | Performed by: PATHOLOGY

## 2021-09-21 PROCEDURE — 88342 IMHCHEM/IMCYTCHM 1ST ANTB: CPT | Mod: 26 | Performed by: PATHOLOGY

## 2021-10-21 VITALS
OXYGEN SATURATION: 99 % | BODY MASS INDEX: 44.52 KG/M2 | WEIGHT: 277 LBS | DIASTOLIC BLOOD PRESSURE: 79 MMHG | HEIGHT: 66 IN | TEMPERATURE: 98.5 F | RESPIRATION RATE: 16 BRPM | HEART RATE: 75 BPM | SYSTOLIC BLOOD PRESSURE: 149 MMHG

## 2021-10-21 PROCEDURE — 99284 EMERGENCY DEPT VISIT MOD MDM: CPT | Mod: 25

## 2021-10-21 PROCEDURE — 76775 US EXAM ABDO BACK WALL LIM: CPT

## 2021-10-21 PROCEDURE — 10060 I&D ABSCESS SIMPLE/SINGLE: CPT

## 2021-10-21 ASSESSMENT — MIFFLIN-ST. JEOR: SCORE: 1863.21

## 2021-10-22 ENCOUNTER — ANCILLARY PROCEDURE (OUTPATIENT)
Dept: ULTRASOUND IMAGING | Facility: CLINIC | Age: 56
End: 2021-10-22
Attending: EMERGENCY MEDICINE
Payer: COMMERCIAL

## 2021-10-22 ENCOUNTER — HOSPITAL ENCOUNTER (EMERGENCY)
Facility: CLINIC | Age: 56
Discharge: HOME | End: 2021-10-22
Attending: EMERGENCY MEDICINE | Admitting: EMERGENCY MEDICINE
Payer: COMMERCIAL

## 2021-10-22 DIAGNOSIS — L02.91 ABSCESS: ICD-10-CM

## 2021-10-22 PROCEDURE — 250N000009 HC RX 250: Performed by: EMERGENCY MEDICINE

## 2021-10-22 RX ORDER — LIDOCAINE 40 MG/G
CREAM TOPICAL
Status: COMPLETED | OUTPATIENT
Start: 2021-10-22 | End: 2021-10-22

## 2021-10-22 RX ADMIN — LIDOCAINE: 40 CREAM TOPICAL at 00:42

## 2021-10-22 ASSESSMENT — ENCOUNTER SYMPTOMS
ABDOMINAL PAIN: 0
NAUSEA: 0
FEVER: 0
VOMITING: 0

## 2021-10-22 NOTE — ED TRIAGE NOTES
"3 days of boil on R buttocks.  Attmpted to \"pop it\" herself but still has pus and a something black in it.     dropped her off  "
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
## 2022-04-25 ENCOUNTER — TRANSCRIBE ORDERS (OUTPATIENT)
Dept: OTHER | Age: 57
End: 2022-04-25
Payer: COMMERCIAL

## 2022-04-25 DIAGNOSIS — C22.9: Primary | ICD-10-CM

## 2022-04-27 ENCOUNTER — PATIENT OUTREACH (OUTPATIENT)
Dept: ONCOLOGY | Facility: CLINIC | Age: 57
End: 2022-04-27
Payer: COMMERCIAL

## 2022-04-27 NOTE — PROGRESS NOTES
"Review of self-referral to Oncology for \"liver and kidney cancer.\"    Waiting on medical records team to retrieve recent notes fr [facility].        * 4/26/2013 liver bx at [facility] confirm neuroendocrine tumor mets, unknown primary    * Pt established w/ Dr Pedro at [facility] in 2013; some notes are available. Pt did not tolerate Sandostatin; she was also known for non compliance, lack of follow up for Oncology appts, drug abuse, among other psychosocial factors affecting adherence to plan of care.      * 4/2018 Pt dx'ed with right renal mass, biopsy proven RCC, s/p treatment, followed by Urology.     * 10/2018 Pt saw Dr Salmeron briefly but failed to follow up or no show'ed appts for scans, MD, etc. Then lost to follow up.    * 4/25/2022 pt calls to self-refer to  [facility], citing \"liver and kidney cancer.\" Her chart reveals that recently she saw [facility] PCP for urinary issues, CT AP was completed at [facility] showing worsening of known liver mets.     4/20/22 CT AP at [facility]  1. No hydronephrosis or obstructing urinary calculus.   2. Stable appearance of kidneys.   3. Probable progression multiple low-dense masses throughout the liver the patient with known metastatic disease.   4. Abdominal pelvic  shunt placement satisfactory.             We can offer first available GI Med Onc to reestablish care (Dr Gutiérrez 5/6/22 Fri at 7:30am) or Dr Salmeron for continuity of care (5/12/22). I spoke to pt who would prefer sooner visit w/ Dr Gutiérrez. Pt admits that she can no longer see [facility] b/c she has missed too many appts \"due to transportation,\" issues. Pt assures me that now she has reliable transportation to attend Oncology appts as needed.       "

## 2022-06-03 ENCOUNTER — DOCUMENTATION ONLY (OUTPATIENT)
Dept: SLEEP MEDICINE | Facility: CLINIC | Age: 57
End: 2022-06-03
Payer: COMMERCIAL

## 2022-06-03 DIAGNOSIS — G47.33 OSA ON CPAP: Primary | ICD-10-CM

## 2022-06-13 DIAGNOSIS — G47.33 OSA ON CPAP: Primary | ICD-10-CM

## 2022-06-13 DIAGNOSIS — G47.33 OBSTRUCTIVE SLEEP APNEA (ADULT) (PEDIATRIC): ICD-10-CM

## 2022-10-06 NOTE — TELEPHONE ENCOUNTER
10/7/2022-Request for images faxed to [facility] @ 417am    10/10/2022-[facility] Images now in PACS-MR @ 5am    FUTURE VISIT INFORMATION      FUTURE VISIT INFORMATION:    Date: 10/7/2022    Time: 730am    Location[place] [facility]  REFERRAL INFORMATION:    Referring provider:  Self     Referring providers clinic:      Reason for visit/diagnosis  Migraines     RECORDS REQUESTED FROM:       Clinic name Comments Records Status Imaging Status   [facility]         [facility] ED Visit-12/14/2020, 7/21/2020    CT Head-9/30/2022 Care Everywhere Requested

## 2022-10-07 ENCOUNTER — PRE VISIT (OUTPATIENT)
Dept: NEUROLOGY | Facility: CLINIC | Age: 57
End: 2022-10-07

## 2023-01-04 NOTE — ED AVS SNAPSHOT
Emergency Department    [address] [place] [address]    Phone:  [phone]    Fax:  [phone]                                       Ines Mott   MRN: [id number]    Department:   Emergency Department   Date of Visit:  5/25/2018           After Visit Summary Signature Page     I have received my discharge instructions, and my questions have been answered. I have discussed any challenges I see with this plan with the nurse or doctor.    ..........................................................................................................................................  Patient/Patient Representative Signature      ..........................................................................................................................................  Patient Representative Print Name and Relationship to Patient    ..................................................               ................................................  Date                                            Time    ..........................................................................................................................................  Reviewed by Signature/Title    ...................................................              ..............................................  Date                                                            Time           Follow-up in the outpatient setting return as needed.

## 2023-01-26 NOTE — PATIENT INSTRUCTIONS
We have ordered pool therapy for you, they should call you to set up an appt    We will restart your Oxycodone for one week until you see Dr. Mckeon, please take no more than 3 a day     I will see you again 2 weeks    Statement Selected

## 2023-03-23 NOTE — TELEPHONE ENCOUNTER
Oxycodone-acetaminophen 5/325 mg      Last Written Prescription Date:  11/24/17  Last Fill Quantity: 30,   # refills: 0  Last Office Visit: 11/1/17  Future Office visit:    Next 5 appointments (look out 90 days)     Dec 05, 2017  8:00 AM CST   [facility]    [address] [place] [id number] [phone]                   Routing refill request to provider for review/approval because:  Drug not on the [facility], P or [facility] refill protocol or controlled substance     No difficulties

## 2023-08-01 ENCOUNTER — DOCUMENTATION ONLY (OUTPATIENT)
Dept: SLEEP MEDICINE | Facility: CLINIC | Age: 58
End: 2023-08-01
Payer: COMMERCIAL

## 2023-08-01 DIAGNOSIS — G47.33 OSA ON CPAP: ICD-10-CM

## 2023-08-01 DIAGNOSIS — G47.33 OBSTRUCTIVE SLEEP APNEA (ADULT) (PEDIATRIC): Primary | ICD-10-CM

## 2023-08-01 NOTE — Clinical Note
Can she be sent a form letter saying she is due for a follow up as it has been 3 years. A prescription was written for CPAP supplies which will cover her for a year but she will need a follow up for further prescriptions.

## 2023-08-01 NOTE — LETTER
Dear Ines Mott,    I wrote a prescription for new CPAP supplies with will cover you for the next year. Your last visit in the sleep clinic was 3 years ago. We like to see you back every 2 years to make sure your therapy is still optimal and that nothing else in your health history has changed that might impair your sleep. You will need to make a follow up visit for further prescriptions. Please call [phone] to schedule.  Thank you,      Bennett Goltz, PA-C

## 2023-08-01 NOTE — LETTER
8/1/2023        RE: Ines AGUSTIN Mott  [address] [facility] [address]  [facility] [address]        Hi Kristi, Bennett Goltz, PA-C received a request to refill your DME (Durable Medical Equipment) supplies.  The provider did go ahead and refill the supplies, but is asking that you call and schedule a follow-up. Our records indicate that your last visit with us was 5/21/2020. Please call us at [phone] to schedule an appointment with [facility].  For further questions or concerns please message us via My Chart or call us at the number provided. Thank you for your time.       Sincerely,       [facility]        Sincerely,        Bennett Ezra Goltz, PA-C, TESSY

## 2023-08-01 NOTE — PROGRESS NOTES
"Synopsis shows very high leak, AHI is mildly elevated on some nights with \"unknown\" events, which are marked when the leak is high.   DME order was signed. Her last visit was 3 years ago. She will need a follow up for future prescriptions. We will send a letter as she is not on MyChart.  Bennett Goltz, PA-C   "

## 2023-08-15 NOTE — TELEPHONE ENCOUNTER
PATIENT IS DISCHARGE FROM HOSPITAL   WITH TUBES IN BLADDER AND KIDNEY   GOING TO PAIN CLINIC   I SIGNED FORMS FOR HER   GETTING ALONG BETTER with HER SPOUSE  OTTO YUSUF JR., MD    Pain Score: 7    Intended levels for injection/treatment: L3/L4, L4/L5, L5/S1      Percentage of pain relief after previous injection:100%    Type and Length of Conservative Treatment (PT, home exercise) - outcome of this therapy:    Medial branch block treatment plan is 2 test injections per side followed by a radiofrequency ablation of the designated nerves.

## 2023-08-29 DIAGNOSIS — T78.2XXA ANAPHYLACTIC REACTION: ICD-10-CM

## 2023-08-29 NOTE — TELEPHONE ENCOUNTER
"Patient not est with provider at [facility] anymore - 4/7/21 TE \"Spoke to patient, scheduled her for est care and physical in [facility]. \"    Routed to [facility]    Marilyn DIEHL RN  [facility]    "

## 2023-09-06 NOTE — TELEPHONE ENCOUNTER
Patient does not have future appt with [facility] scheduled. Appears she has scheduled with [facility]. Please refuse medication and close encounter

## 2023-09-07 RX ORDER — EPINEPHRINE 0.3 MG/.3ML
INJECTION SUBCUTANEOUS
Refills: 0 | OUTPATIENT
Start: 2023-09-07

## 2023-09-07 NOTE — TELEPHONE ENCOUNTER
Refused - patient not a [facility] patient, appears to go out of system at [facility]    Marilyn DIEHL RN  [facility]

## 2023-09-18 NOTE — NURSING NOTE
"Chief Complaint   Patient presents with     Recheck Medication       Initial /66 (Cuff Size: Adult Large)  Pulse 79  Temp 97.6  F (36.4  C) (Tympanic)  Resp 16  Wt 256 lb (116.1 kg)  SpO2 98%  BMI 41.32 kg/m2 Estimated body mass index is 41.32 kg/(m^2) as calculated from the following:    Height as of 4/2/18: 5' 6\" (1.676 m).    Weight as of this encounter: 256 lb (116.1 kg).  Medication Reconciliation: complete   Maggi Harirs CMA    " Pt called in requesting clarification about her Nexplanon Insertion instructions. PT stated she was told multiple different things. I spoke with Mrs. Lafleur directly and she stated pt would need to wait until when her next depo injection is due to get the nexplanon inserted. Pt verbalized understanding and scheduled appt for that date.
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
## 2024-04-11 NOTE — ED NOTES
Bed: ED04  Expected date:   Expected time:   Means of arrival:   Comments:  [id number] CP, narc withdrawals. ETA [id number]   Her/She

## 2024-04-24 NOTE — PROGRESS NOTES
Clinic Care Coordination Contact  [facility]/Voicemail    Clinical Data: Care Coordinator Outreach to follow up on information provided about oncology, scan and CPAP needs.      Per chart review, pt has followed up with [facility] and reengaged with new CPAP. Provided significant education on compliance requirements. No scheduled follow up with oncology. Pt recently in ED twice for abdominal pain, no acute findings, recommended to follow up with PCP.      Outreach attempted x 3.  Left message on voicemail with call back information and requested return call.    Plan: [facility] recently send letter to pt on 6/12/19 with contact information. No further outreaches will be made at this time unless a new referral is made or a change in the pt's status occurs. Patient was provided with this writer's contact information and encouraged to call with any questions or concerns.      BRISSA De Anda   Social Work Care Coordinator  [phone]   Yes

## 2024-04-25 ENCOUNTER — HOSPITAL ENCOUNTER (EMERGENCY)
Facility: CLINIC | Age: 59
End: 2024-04-25
Payer: COMMERCIAL

## 2024-04-25 ENCOUNTER — ANESTHESIA (OUTPATIENT)
Dept: SURGERY | Facility: CLINIC | Age: 59
End: 2024-04-25
Payer: COMMERCIAL

## 2024-04-25 ENCOUNTER — MEDICAL CORRESPONDENCE (OUTPATIENT)
Dept: HEALTH INFORMATION MANAGEMENT | Facility: CLINIC | Age: 59
End: 2024-04-25

## 2024-04-25 ENCOUNTER — HOSPITAL ENCOUNTER (OUTPATIENT)
Facility: CLINIC | Age: 59
Discharge: HOME | End: 2024-04-25
Attending: INTERNAL MEDICINE | Admitting: INTERNAL MEDICINE
Payer: COMMERCIAL

## 2024-04-25 ENCOUNTER — ANESTHESIA EVENT (OUTPATIENT)
Dept: SURGERY | Facility: CLINIC | Age: 59
End: 2024-04-25
Payer: COMMERCIAL

## 2024-04-25 VITALS
TEMPERATURE: 97.1 F | DIASTOLIC BLOOD PRESSURE: 86 MMHG | RESPIRATION RATE: 14 BRPM | BODY MASS INDEX: 42.61 KG/M2 | WEIGHT: 264 LBS | OXYGEN SATURATION: 100 % | HEART RATE: 55 BPM | SYSTOLIC BLOOD PRESSURE: 176 MMHG

## 2024-04-25 DIAGNOSIS — K92.0 HEMATEMESIS, UNSPECIFIED WHETHER NAUSEA PRESENT: ICD-10-CM

## 2024-04-25 DIAGNOSIS — K92.1 HEMATOCHEZIA: Primary | ICD-10-CM

## 2024-04-25 LAB
COLONOSCOPY: NORMAL
UPPER GI ENDOSCOPY: NORMAL

## 2024-04-25 PROCEDURE — 250N000011 HC RX IP 250 OP 636: Performed by: ANESTHESIOLOGY

## 2024-04-25 PROCEDURE — 370N000017 HC ANESTHESIA TECHNICAL FEE, PER MIN: Performed by: INTERNAL MEDICINE

## 2024-04-25 PROCEDURE — 258N000003 HC RX IP 258 OP 636: Performed by: ANESTHESIOLOGY

## 2024-04-25 PROCEDURE — 88305 TISSUE EXAM BY PATHOLOGIST: CPT | Mod: TC | Performed by: INTERNAL MEDICINE

## 2024-04-25 PROCEDURE — 250N000009 HC RX 250: Performed by: NURSE ANESTHETIST, CERTIFIED REGISTERED

## 2024-04-25 PROCEDURE — 272N000001 HC OR GENERAL SUPPLY STERILE: Performed by: INTERNAL MEDICINE

## 2024-04-25 PROCEDURE — 360N000076 HC SURGERY LEVEL 3, PER MIN: Performed by: INTERNAL MEDICINE

## 2024-04-25 PROCEDURE — 88305 TISSUE EXAM BY PATHOLOGIST: CPT | Mod: 26 | Performed by: PATHOLOGY

## 2024-04-25 PROCEDURE — 250N000013 HC RX MED GY IP 250 OP 250 PS 637: Performed by: ANESTHESIOLOGY

## 2024-04-25 PROCEDURE — 250N000011 HC RX IP 250 OP 636: Performed by: NURSE ANESTHETIST, CERTIFIED REGISTERED

## 2024-04-25 PROCEDURE — 999N000141 HC STATISTIC PRE-PROCEDURE NURSING ASSESSMENT: Performed by: INTERNAL MEDICINE

## 2024-04-25 PROCEDURE — 710N000012 HC RECOVERY PHASE 2, PER MINUTE: Performed by: INTERNAL MEDICINE

## 2024-04-25 RX ORDER — PROPOFOL 10 MG/ML
INJECTION, EMULSION INTRAVENOUS CONTINUOUS PRN
Status: DISCONTINUED | OUTPATIENT
Start: 2024-04-25 | End: 2024-04-25

## 2024-04-25 RX ORDER — ONDANSETRON 2 MG/ML
4 INJECTION INTRAMUSCULAR; INTRAVENOUS EVERY 30 MIN PRN
Status: DISCONTINUED | OUTPATIENT
Start: 2024-04-25 | End: 2024-04-25 | Stop reason: HOSPADM

## 2024-04-25 RX ORDER — ONDANSETRON 2 MG/ML
4 INJECTION INTRAMUSCULAR; INTRAVENOUS EVERY 6 HOURS PRN
Status: DISCONTINUED | OUTPATIENT
Start: 2024-04-25 | End: 2024-04-25 | Stop reason: HOSPADM

## 2024-04-25 RX ORDER — NALOXONE HYDROCHLORIDE 0.4 MG/ML
0.4 INJECTION, SOLUTION INTRAMUSCULAR; INTRAVENOUS; SUBCUTANEOUS
Status: DISCONTINUED | OUTPATIENT
Start: 2024-04-25 | End: 2024-04-25 | Stop reason: HOSPADM

## 2024-04-25 RX ORDER — OXYCODONE HYDROCHLORIDE 5 MG/1
5 CAPSULE ORAL 4 TIMES DAILY
COMMUNITY

## 2024-04-25 RX ORDER — NALOXONE HYDROCHLORIDE 0.4 MG/ML
0.2 INJECTION, SOLUTION INTRAMUSCULAR; INTRAVENOUS; SUBCUTANEOUS
Status: DISCONTINUED | OUTPATIENT
Start: 2024-04-25 | End: 2024-04-25 | Stop reason: HOSPADM

## 2024-04-25 RX ORDER — ONDANSETRON 4 MG/1
4 TABLET, ORALLY DISINTEGRATING ORAL EVERY 6 HOURS PRN
Status: DISCONTINUED | OUTPATIENT
Start: 2024-04-25 | End: 2024-04-25 | Stop reason: HOSPADM

## 2024-04-25 RX ORDER — ONDANSETRON 2 MG/ML
INJECTION INTRAMUSCULAR; INTRAVENOUS PRN
Status: DISCONTINUED | OUTPATIENT
Start: 2024-04-25 | End: 2024-04-25

## 2024-04-25 RX ORDER — HYDRALAZINE HYDROCHLORIDE 20 MG/ML
10 INJECTION INTRAMUSCULAR; INTRAVENOUS ONCE
Status: COMPLETED | OUTPATIENT
Start: 2024-04-25 | End: 2024-04-25

## 2024-04-25 RX ORDER — SODIUM CHLORIDE, SODIUM LACTATE, POTASSIUM CHLORIDE, CALCIUM CHLORIDE 600; 310; 30; 20 MG/100ML; MG/100ML; MG/100ML; MG/100ML
INJECTION, SOLUTION INTRAVENOUS CONTINUOUS
Status: DISCONTINUED | OUTPATIENT
Start: 2024-04-25 | End: 2024-04-25 | Stop reason: HOSPADM

## 2024-04-25 RX ORDER — OXYCODONE HYDROCHLORIDE 5 MG/1
10 TABLET ORAL
Status: DISCONTINUED | OUTPATIENT
Start: 2024-04-25 | End: 2024-04-25 | Stop reason: HOSPADM

## 2024-04-25 RX ORDER — KETAMINE HYDROCHLORIDE 10 MG/ML
INJECTION INTRAMUSCULAR; INTRAVENOUS PRN
Status: DISCONTINUED | OUTPATIENT
Start: 2024-04-25 | End: 2024-04-25

## 2024-04-25 RX ORDER — NALOXONE HYDROCHLORIDE 0.4 MG/ML
0.1 INJECTION, SOLUTION INTRAMUSCULAR; INTRAVENOUS; SUBCUTANEOUS
Status: DISCONTINUED | OUTPATIENT
Start: 2024-04-25 | End: 2024-04-25 | Stop reason: HOSPADM

## 2024-04-25 RX ORDER — ONDANSETRON 4 MG/1
4 TABLET, ORALLY DISINTEGRATING ORAL EVERY 30 MIN PRN
Status: DISCONTINUED | OUTPATIENT
Start: 2024-04-25 | End: 2024-04-25 | Stop reason: HOSPADM

## 2024-04-25 RX ORDER — OXYCODONE HYDROCHLORIDE 5 MG/1
5 TABLET ORAL
Status: COMPLETED | OUTPATIENT
Start: 2024-04-25 | End: 2024-04-25

## 2024-04-25 RX ORDER — FENTANYL CITRATE 50 UG/ML
INJECTION, SOLUTION INTRAMUSCULAR; INTRAVENOUS PRN
Status: DISCONTINUED | OUTPATIENT
Start: 2024-04-25 | End: 2024-04-25

## 2024-04-25 RX ORDER — ONDANSETRON 2 MG/ML
4 INJECTION INTRAMUSCULAR; INTRAVENOUS
Status: DISCONTINUED | OUTPATIENT
Start: 2024-04-25 | End: 2024-04-25 | Stop reason: HOSPADM

## 2024-04-25 RX ORDER — PROPOFOL 10 MG/ML
INJECTION, EMULSION INTRAVENOUS PRN
Status: DISCONTINUED | OUTPATIENT
Start: 2024-04-25 | End: 2024-04-25

## 2024-04-25 RX ADMIN — SODIUM CHLORIDE, POTASSIUM CHLORIDE, SODIUM LACTATE AND CALCIUM CHLORIDE: 600; 310; 30; 20 INJECTION, SOLUTION INTRAVENOUS at 15:09

## 2024-04-25 RX ADMIN — PROPOFOL 50 MG: 10 INJECTION, EMULSION INTRAVENOUS at 15:13

## 2024-04-25 RX ADMIN — SODIUM CHLORIDE, POTASSIUM CHLORIDE, SODIUM LACTATE AND CALCIUM CHLORIDE: 600; 310; 30; 20 INJECTION, SOLUTION INTRAVENOUS at 12:45

## 2024-04-25 RX ADMIN — FENTANYL CITRATE 50 MCG: 50 INJECTION INTRAMUSCULAR; INTRAVENOUS at 15:44

## 2024-04-25 RX ADMIN — ONDANSETRON 4 MG: 2 INJECTION INTRAMUSCULAR; INTRAVENOUS at 16:23

## 2024-04-25 RX ADMIN — SODIUM CHLORIDE, POTASSIUM CHLORIDE, SODIUM LACTATE AND CALCIUM CHLORIDE: 600; 310; 30; 20 INJECTION, SOLUTION INTRAVENOUS at 16:00

## 2024-04-25 RX ADMIN — ONDANSETRON 4 MG: 2 INJECTION INTRAMUSCULAR; INTRAVENOUS at 15:24

## 2024-04-25 RX ADMIN — HYDRALAZINE HYDROCHLORIDE 10 MG: 20 INJECTION INTRAMUSCULAR; INTRAVENOUS at 17:28

## 2024-04-25 RX ADMIN — Medication 20 MG: at 15:13

## 2024-04-25 RX ADMIN — Medication 10 MG: at 15:15

## 2024-04-25 RX ADMIN — PROPOFOL 150 MCG/KG/MIN: 10 INJECTION, EMULSION INTRAVENOUS at 15:13

## 2024-04-25 RX ADMIN — OXYCODONE HYDROCHLORIDE 5 MG: 5 TABLET ORAL at 16:34

## 2024-04-25 RX ADMIN — MIDAZOLAM 2 MG: 1 INJECTION INTRAMUSCULAR; INTRAVENOUS at 15:09

## 2024-04-25 ASSESSMENT — ACTIVITIES OF DAILY LIVING (ADL)
ADLS_ACUITY_SCORE: 37
ADLS_ACUITY_SCORE: 38
ADLS_ACUITY_SCORE: 37
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
## 2024-04-25 NOTE — ANESTHESIA CARE TRANSFER NOTE
Patient: Ines Mott    Procedure: Procedure(s):  Esophagoscopy, gastroscopy, duodenoscopy (EGD), combined  Colonoscopy       Diagnosis: Hematemesis [K92.0]  Rectal bleeding [K62.5]  Diagnosis Additional Information: No value filed.    Anesthesia Type:   MAC     Note:    Oropharynx: oropharynx clear of all foreign objects  Level of Consciousness: awake  Oxygen Supplementation: room air    Independent Airway: airway patency satisfactory and stable  Dentition: dentition unchanged  Vital Signs Stable: post-procedure vital signs reviewed and stable  Report to RN Given: handoff report given  Patient transferred to: Phase II    Handoff Report: Identifed the Patient, Identified the Reponsible Provider, Reviewed the pertinent medical history, Discussed the surgical course, Reviewed Intra-OP anesthesia mangement and issues during anesthesia, Set expectations for post-procedure period and Allowed opportunity for questions and acknowledgement of understanding      Vitals:  Vitals Value Taken Time   BP     Temp     Pulse     Resp     SpO2 96 % 04/25/24 1609   Vitals shown include unfiled device data.    Electronically Signed By: MICHEAL Church CRNA  April 25, 2024  4:11 PM

## 2024-04-25 NOTE — PROGRESS NOTES
Dr. Painter notified regarding elevated blood pressure and a headache.  10 mg Hydralazine given x 1 with little effect.  Oncoming MDA, Dr. Brown notified about above and said ok for patient to discharge home and to follow up with primary regarding elevated blood pressure.
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
## 2024-04-26 ENCOUNTER — HOSPITAL ENCOUNTER (EMERGENCY)
Facility: CLINIC | Age: 59
Discharge: HOME | End: 2024-04-27
Attending: EMERGENCY MEDICINE | Admitting: EMERGENCY MEDICINE
Payer: COMMERCIAL

## 2024-04-26 DIAGNOSIS — R10.84 GENERALIZED ABDOMINAL PAIN: ICD-10-CM

## 2024-04-26 DIAGNOSIS — R51.9 NONINTRACTABLE HEADACHE, UNSPECIFIED CHRONICITY PATTERN, UNSPECIFIED HEADACHE TYPE: ICD-10-CM

## 2024-04-26 LAB
BASOPHILS # BLD AUTO: 0 10E3/UL (ref 0–0.2)
BASOPHILS NFR BLD AUTO: 0 %
EOSINOPHIL # BLD AUTO: 0.3 10E3/UL (ref 0–0.7)
EOSINOPHIL NFR BLD AUTO: 3 %
ERYTHROCYTE [DISTWIDTH] IN BLOOD BY AUTOMATED COUNT: 13.4 % (ref 10–15)
GLUCOSE BLDC GLUCOMTR-MCNC: 99 MG/DL (ref 70–99)
HCT VFR BLD AUTO: 41.6 % (ref 35–47)
HGB BLD-MCNC: 13.5 G/DL (ref 11.7–15.7)
HOLD SPECIMEN: NORMAL
IMM GRANULOCYTES # BLD: 0 10E3/UL
IMM GRANULOCYTES NFR BLD: 0 %
LYMPHOCYTES # BLD AUTO: 3.7 10E3/UL (ref 0.8–5.3)
LYMPHOCYTES NFR BLD AUTO: 33 %
MCH RBC QN AUTO: 29.9 PG (ref 26.5–33)
MCHC RBC AUTO-ENTMCNC: 32.5 G/DL (ref 31.5–36.5)
MCV RBC AUTO: 92 FL (ref 78–100)
MONOCYTES # BLD AUTO: 0.8 10E3/UL (ref 0–1.3)
MONOCYTES NFR BLD AUTO: 7 %
NEUTROPHILS # BLD AUTO: 6.5 10E3/UL (ref 1.6–8.3)
NEUTROPHILS NFR BLD AUTO: 57 %
NRBC # BLD AUTO: 0 10E3/UL
NRBC BLD AUTO-RTO: 0 /100
PLATELET # BLD AUTO: 393 10E3/UL (ref 150–450)
RBC # BLD AUTO: 4.52 10E6/UL (ref 3.8–5.2)
WBC # BLD AUTO: 11.4 10E3/UL (ref 4–11)

## 2024-04-26 PROCEDURE — 258N000003 HC RX IP 258 OP 636: Performed by: EMERGENCY MEDICINE

## 2024-04-26 PROCEDURE — 36415 COLL VENOUS BLD VENIPUNCTURE: CPT | Performed by: EMERGENCY MEDICINE

## 2024-04-26 PROCEDURE — 83690 ASSAY OF LIPASE: CPT | Performed by: EMERGENCY MEDICINE

## 2024-04-26 PROCEDURE — 85025 COMPLETE CBC W/AUTO DIFF WBC: CPT | Performed by: EMERGENCY MEDICINE

## 2024-04-26 PROCEDURE — 81001 URINALYSIS AUTO W/SCOPE: CPT | Performed by: EMERGENCY MEDICINE

## 2024-04-26 PROCEDURE — 96361 HYDRATE IV INFUSION ADD-ON: CPT

## 2024-04-26 PROCEDURE — 99284 EMERGENCY DEPT VISIT MOD MDM: CPT | Mod: 25

## 2024-04-26 PROCEDURE — 80053 COMPREHEN METABOLIC PANEL: CPT | Performed by: EMERGENCY MEDICINE

## 2024-04-26 PROCEDURE — 96374 THER/PROPH/DIAG INJ IV PUSH: CPT | Mod: 59

## 2024-04-26 PROCEDURE — 82962 GLUCOSE BLOOD TEST: CPT

## 2024-04-26 PROCEDURE — 96372 THER/PROPH/DIAG INJ SC/IM: CPT | Performed by: EMERGENCY MEDICINE

## 2024-04-26 PROCEDURE — 250N000011 HC RX IP 250 OP 636: Performed by: EMERGENCY MEDICINE

## 2024-04-26 RX ORDER — OLANZAPINE 10 MG/2ML
5 INJECTION, POWDER, FOR SOLUTION INTRAMUSCULAR ONCE
Status: COMPLETED | OUTPATIENT
Start: 2024-04-26 | End: 2024-04-26

## 2024-04-26 RX ORDER — ONDANSETRON 2 MG/ML
4 INJECTION INTRAMUSCULAR; INTRAVENOUS ONCE
Status: COMPLETED | OUTPATIENT
Start: 2024-04-26 | End: 2024-04-26

## 2024-04-26 RX ADMIN — ONDANSETRON 4 MG: 2 INJECTION INTRAMUSCULAR; INTRAVENOUS at 23:22

## 2024-04-26 RX ADMIN — SODIUM CHLORIDE 1000 ML: 9 INJECTION, SOLUTION INTRAVENOUS at 23:22

## 2024-04-26 RX ADMIN — OLANZAPINE 5 MG: 10 INJECTION, POWDER, FOR SOLUTION INTRAMUSCULAR at 23:22

## 2024-04-26 ASSESSMENT — COLUMBIA-SUICIDE SEVERITY RATING SCALE - C-SSRS
6. HAVE YOU EVER DONE ANYTHING, STARTED TO DO ANYTHING, OR PREPARED TO DO ANYTHING TO END YOUR LIFE?: NO
2. HAVE YOU ACTUALLY HAD ANY THOUGHTS OF KILLING YOURSELF IN THE PAST MONTH?: NO
1. IN THE PAST MONTH, HAVE YOU WISHED YOU WERE DEAD OR WISHED YOU COULD GO TO SLEEP AND NOT WAKE UP?: NO

## 2024-04-26 ASSESSMENT — ACTIVITIES OF DAILY LIVING (ADL): ADLS_ACUITY_SCORE: 38

## 2024-04-27 VITALS
BODY MASS INDEX: 41.14 KG/M2 | TEMPERATURE: 97.1 F | SYSTOLIC BLOOD PRESSURE: 182 MMHG | OXYGEN SATURATION: 98 % | WEIGHT: 262.13 LBS | RESPIRATION RATE: 15 BRPM | DIASTOLIC BLOOD PRESSURE: 113 MMHG | HEIGHT: 67 IN | HEART RATE: 59 BPM

## 2024-04-27 LAB
ALBUMIN SERPL BCG-MCNC: 4.6 G/DL (ref 3.5–5.2)
ALBUMIN UR-MCNC: NEGATIVE MG/DL
ALP SERPL-CCNC: 92 U/L (ref 40–150)
ALT SERPL W P-5'-P-CCNC: 19 U/L (ref 0–50)
ANION GAP SERPL CALCULATED.3IONS-SCNC: 16 MMOL/L (ref 7–15)
APPEARANCE UR: CLEAR
AST SERPL W P-5'-P-CCNC: 16 U/L (ref 0–45)
BACTERIA #/AREA URNS HPF: ABNORMAL /HPF
BILIRUB SERPL-MCNC: 1.2 MG/DL
BILIRUB UR QL STRIP: NEGATIVE
BUN SERPL-MCNC: 7.4 MG/DL (ref 8–23)
CALCIUM SERPL-MCNC: 10.5 MG/DL (ref 8.6–10)
CHLORIDE SERPL-SCNC: 103 MMOL/L (ref 98–107)
COLOR UR AUTO: ABNORMAL
CREAT SERPL-MCNC: 0.9 MG/DL (ref 0.51–0.95)
DEPRECATED HCO3 PLAS-SCNC: 24 MMOL/L (ref 22–29)
EGFRCR SERPLBLD CKD-EPI 2021: 73 ML/MIN/1.73M2
GLUCOSE SERPL-MCNC: 102 MG/DL (ref 70–99)
GLUCOSE UR STRIP-MCNC: NEGATIVE MG/DL
HGB UR QL STRIP: ABNORMAL
KETONES UR STRIP-MCNC: NEGATIVE MG/DL
LEUKOCYTE ESTERASE UR QL STRIP: NEGATIVE
LIPASE SERPL-CCNC: 21 U/L (ref 13–60)
NITRATE UR QL: NEGATIVE
PH UR STRIP: 6.5 [PH] (ref 5–7)
POTASSIUM SERPL-SCNC: 3.4 MMOL/L (ref 3.4–5.3)
PROT SERPL-MCNC: 7.9 G/DL (ref 6.4–8.3)
RBC URINE: <1 /HPF
SODIUM SERPL-SCNC: 143 MMOL/L (ref 135–145)
SP GR UR STRIP: 1.01 (ref 1–1.03)
SQUAMOUS EPITHELIAL: 2 /HPF
UROBILINOGEN UR STRIP-MCNC: NORMAL MG/DL
WBC URINE: 3 /HPF

## 2024-04-27 PROCEDURE — 250N000013 HC RX MED GY IP 250 OP 250 PS 637: Performed by: EMERGENCY MEDICINE

## 2024-04-27 RX ORDER — DICYCLOMINE HYDROCHLORIDE 10 MG/1
20 CAPSULE ORAL ONCE
Status: COMPLETED | OUTPATIENT
Start: 2024-04-27 | End: 2024-04-27

## 2024-04-27 RX ADMIN — DICYCLOMINE HYDROCHLORIDE 20 MG: 10 CAPSULE ORAL at 00:15

## 2024-04-27 NOTE — DISCHARGE INSTRUCTIONS
Discharge Instructions  Abdominal Pain    Abdominal pain (belly pain) can be caused by many things. Your evaluation today does not show the exact cause for your pain. Your provider today has decided that it is unlikely your pain is due to a life threatening problem, or a problem requiring surgery or hospital admission. Sometimes those problems cannot be found right away, so it is very important that you follow up as directed.  Sometimes only the changes which occur over time allow the cause of your pain to be found.    Generally, every Emergency Department visit should have a follow-up clinic visit with either a primary or a specialty clinic/provider. Please follow-up as instructed by your emergency provider today. With abdominal pain, we often recommend very close follow-up, such as the following day.    ADULTS:  Return to the Emergency Department right away if:    You get an oral temperature above 102oF or as directed by your provider.  You have blood in your stools. This may be bright red or appear as black, tarry stools.    You keep vomiting (throwing up) or cannot drink liquids.  You see blood when you vomit.   You cannot have a bowel movement or you cannot pass gas.  Your stomach gets bloated or bigger.  Your skin or the whites of your eyes look yellow.  You faint.  You have bloody, frequent or painful urination (peeing).  You have new symptoms or anything that worries you.    CHILDREN:  Return to the Emergency Department right away if your child has any of the above-listed symptoms or the following:    Pushes your hand away or screams/cries when his/her belly is touched.  You notice your child is very fussy or weak.  Your child is very tired and is too tired to eat or drink.  Your child is dehydrated.  Signs of dehydration can be:  Significant change in the amount of wet diapers/urine.  Your infant or child starts to have dry mouth and lips, or no saliva (spit) or tears.    PREGNANT WOMEN:  Return to the  Emergency Department right away if you have any of the above-listed symptoms or the following:    You have bleeding, leaking fluid or passing tissue from the vagina.  You have worse pain or cramping, or pain in your shoulder or back.  You have vomiting that will not stop.  You have a temperature of 100oF or more.  Your baby is not moving as much as usual.  You faint.  You get a bad headache with or without eye problems and abdominal pain.  You have a seizure.  You have unusual discharge from your vagina and abdominal pain.    Abdominal pain is pretty common during pregnancy.  Your pain may or may not be related to your pregnancy. You should follow-up closely with your OB provider so they can evaluate you and your baby.  Until you follow-up with your regular provider, do the following:     Avoid sex and do not put anything in your vagina.  Drink clear fluids.  Only take medications approved by your provider.    MORE INFORMATION:    Appendicitis:  A possible cause of abdominal pain in any person who still has their appendix is acute appendicitis. Appendicitis is often hard to diagnose.  Testing does not always rule out early appendicitis or other causes of abdominal pain. Close follow-up with your provider and re-evaluations may be needed to figure out the reason for your abdominal pain.    Follow-up:  It is very important that you make an appointment with your clinic and go to the appointment.  If you do not follow-up with your primary provider, it may result in missing an important development which could result in permanent injury or disability and/or lasting pain.  If there is any problem keeping your appointment, call your provider or return to the Emergency Department.    Medications:  Take your medications as directed by your provider today.  Before using over-the-counter medications, ask your provider and make sure to take the medications as directed.  If you have any questions about medications, ask your  "provider.    Diet:  Resume your normal diet as much as possible, but do not eat fried, fatty or spicy foods while you have pain.  Do not drink alcohol or have caffeine.  Do not smoke tobacco.    Probiotics: If you have been given an antibiotic, you may want to also take a probiotic pill or eat yogurt with live cultures. Probiotics have \"good bacteria\" to help your intestines stay healthy. Studies have shown that probiotics help prevent diarrhea (loose stools) and other intestine problems (including C. diff infection) when you take antibiotics. You can buy these without a prescription in the pharmacy section of the store.     If you were given a prescription for medicine here today, be sure to read all of the information (including the package insert) that comes with your prescription.  This will include important information about the medicine, its side effects, and any warnings that you need to know about.  The pharmacist who fills the prescription can provide more information and answer questions you may have about the medicine.  If you have questions or concerns that the pharmacist cannot address, please call or return to the Emergency Department.       Remember that you can always come back to the Emergency Department if you are not able to see your regular provider in the amount of time listed above, if you get any new symptoms, or if there is anything that worries you.  Discharge Instructions  Headache    You were seen today for a headache. Headaches may be caused by many different things such as muscle tension, sinus inflammation, anxiety and stress, having too little sleep, too much alcohol, some medical conditions or injury. You may have a migraine, which is caused by changes in the blood vessels in your head.  At this time your provider does not find that your headache is a sign of anything dangerous or life-threatening.  However, sometimes the signs of serious illness do not show up right away.  "     Generally, every Emergency Department visit should have a follow-up clinic visit with either a primary or a specialty clinic/provider. Please follow-up as instructed by your emergency provider today.    Return to the Emergency Department if:  You get a new fever of 100.4 F or higher.  Your headache gets much worse.  You get a stiff neck with your headache.  You get a new headache that is significantly different or worse than headaches you have had before.  You are vomiting (throwing up) and cannot keep food or water down.  You have blurry or double vision or other problems with your eyes.  You have a new weakness on one side of your body.  You have difficulty with balance which is new.  You or your family thinks you are confused.  You have a seizure.    What can I do to help myself?  Pain medications - You may take a pain medication such as Tylenol  (acetaminophen), Advil , Motrin  (ibuprofen) or Aleve  (naproxen).  Take a pain reliever as soon as you notice symptoms.  Starting medications as soon as you start to have symptoms may lessen the amount of pain you have.  Relaxing in a quiet, dark room may help.  Get enough sleep and eat meals regularly.  You may need to watch for certain foods or other things which may trigger your headaches.  Keeping a journal of your headaches and possible triggers may help you and your primary provider to identify things which you should avoid which may be causing your headaches.  If you were given a prescription for medicine here today, be sure to read all of the information (including the package insert) that comes with your prescription.  This will include important information about the medicine, its side effects, and any warnings that you need to know about.  The pharmacist who fills the prescription can provide more information and answer questions you may have about the medicine.  If you have questions or concerns that the pharmacist cannot address, please call or return  to the Emergency Department.   Remember that you can always come back to the Emergency Department if you are not able to see your regular provider in the amount of time listed above, if you get any new symptoms, or if there is anything that worries you.

## 2024-04-27 NOTE — ED TRIAGE NOTES
"Pt had colonscopy uesterday with polpys removed.  7 pm tonight had BP at home of 180/101.  Reports \"worse headache of life getting worse as time goes on\"  also reports pain in back loose stools and vomiting. Stroke eval called.     Triage Assessment (Adult)       Row Name 04/26/24 2658          Triage Assessment    Airway WDL WDL                     "
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
## 2024-04-27 NOTE — ANESTHESIA POSTPROCEDURE EVALUATION
Patient: Ines Mott    Procedure: Procedure(s):  Esophagoscopy, gastroscopy, duodenoscopy, combined  Colonoscopy       Anesthesia Type:  MAC    Note:  Disposition: Outpatient   Postop Pain Control: Uneventful            Sign Out: Well controlled pain   PONV: No   Neuro/Psych: Uneventful            Sign Out: Acceptable/Baseline neuro status   Airway/Respiratory: Uneventful            Sign Out: Acceptable/Baseline resp. status   CV/Hemodynamics: Uneventful            Sign Out: Acceptable CV status; No obvious hypovolemia; No obvious fluid overload   Other NRE: NONE   DID A NON-ROUTINE EVENT OCCUR? No           Last vitals:  Vitals Value Taken Time   /113 04/27/24 0015   Temp 97.1  F (36.2  C) 04/26/24 2203   Pulse 59 04/27/24 0015   Resp 15 04/27/24 0015   SpO2 98 % 04/27/24 0015       Electronically Signed By: Andrew Painter MD  April 27, 2024  11:41 AM

## 2024-05-17 NOTE — Clinical Note
Pressure change completedTisha 
Pt would like base pressure increased for comfort.  Please sign order. Thanks
Attending Attestation (For Attendings USE Only)...

## 2024-09-25 ENCOUNTER — TELEPHONE (OUTPATIENT)
Dept: SLEEP MEDICINE | Facility: CLINIC | Age: 59
End: 2024-09-25
Payer: COMMERCIAL

## 2024-09-25 DIAGNOSIS — G47.33 OBSTRUCTIVE SLEEP APNEA (ADULT) (PEDIATRIC): Primary | ICD-10-CM

## 2024-09-25 NOTE — TELEPHONE ENCOUNTER
Pt stopped in at [facility] asking for a new Rx for new CPAP supplies. Pt uses [facility] in [place].    Thank you,  Elizabeth Carrillo    [facility]

## 2024-10-01 NOTE — TELEPHONE ENCOUNTER
Last ov 5/21/20  Spoke with patient. Advised appointment needed. Transferred to scheduling.     Jessica NASH RN  [facility]

## 2024-10-01 NOTE — TELEPHONE ENCOUNTER
Patient scheduled appointment 2/11/25.   Last ov 1/27/20  Patient asking if provider would be willing to place order for supplies prior to appointment. Pended for consideration.
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
## 2025-02-11 ENCOUNTER — OFFICE VISIT (OUTPATIENT)
Dept: SLEEP MEDICINE | Facility: CLINIC | Age: 60
End: 2025-02-11
Payer: COMMERCIAL

## 2025-02-11 VITALS
BODY MASS INDEX: 40.97 KG/M2 | HEIGHT: 67 IN | WEIGHT: 261 LBS | HEART RATE: 65 BPM | DIASTOLIC BLOOD PRESSURE: 81 MMHG | SYSTOLIC BLOOD PRESSURE: 158 MMHG | OXYGEN SATURATION: 95 %

## 2025-02-11 DIAGNOSIS — G47.33 OSA ON CPAP: Primary | ICD-10-CM

## 2025-02-11 PROCEDURE — 99203 OFFICE O/P NEW LOW 30 MIN: CPT | Performed by: PHYSICIAN ASSISTANT

## 2025-02-11 ASSESSMENT — SLEEP AND FATIGUE QUESTIONNAIRES
HOW LIKELY ARE YOU TO NOD OFF OR FALL ASLEEP WHILE SITTING AND READING: MODERATE CHANCE OF DOZING
HOW LIKELY ARE YOU TO NOD OFF OR FALL ASLEEP WHILE SITTING QUIETLY AFTER LUNCH WITHOUT ALCOHOL: WOULD NEVER DOZE
HOW LIKELY ARE YOU TO NOD OFF OR FALL ASLEEP WHEN YOU ARE A PASSENGER IN A CAR FOR AN HOUR WITHOUT A BREAK: WOULD NEVER DOZE
HOW LIKELY ARE YOU TO NOD OFF OR FALL ASLEEP WHILE SITTING INACTIVE IN A PUBLIC PLACE: WOULD NEVER DOZE
HOW LIKELY ARE YOU TO NOD OFF OR FALL ASLEEP WHILE WATCHING TV: SLIGHT CHANCE OF DOZING
HOW LIKELY ARE YOU TO NOD OFF OR FALL ASLEEP IN A CAR, WHILE STOPPED FOR A FEW MINUTES IN TRAFFIC: WOULD NEVER DOZE
HOW LIKELY ARE YOU TO NOD OFF OR FALL ASLEEP WHILE LYING DOWN TO REST IN THE AFTERNOON WHEN CIRCUMSTANCES PERMIT: WOULD NEVER DOZE
HOW LIKELY ARE YOU TO NOD OFF OR FALL ASLEEP WHILE SITTING AND TALKING TO SOMEONE: WOULD NEVER DOZE

## 2025-02-11 NOTE — NURSING NOTE
"Chief Complaint   Patient presents with    Sleep Problem     Need a new machine       Initial BP (!) 157/92   Pulse 65   Ht 1.689 m (5' 6.5\")   Wt 118.4 kg (261 lb)   SpO2 95%   BMI 41.50 kg/m   Estimated body mass index is 41.5 kg/m  as calculated from the following:    Height as of this encounter: 1.689 m (5' 6.5\").    Weight as of this encounter: 118.4 kg (261 lb).    Medication Reconciliation: complete  ESS 3  Neck circumference: 43 centimeters.  Lucretia Heredia MA   "

## 2025-02-17 NOTE — PHARMACY-ADMISSION MEDICATION HISTORY
Admission medication history interview status for the 5/30/2018  admission is complete. See EPIC admission navigator for prior to admission medications     Medication history source reliability:Moderate    Actions taken by pharmacist (provider contacted, etc): Spoke with patient about last doses, duplicate therapies, and allergy history.   Patient's  was unable to provide information; states he is at work all day at two jobs  Called patient's Pharmacy ([facility]) to confirm prescription fill history with retail pharmacist    Additional medication history information not noted on PTA med list :    Patient reports she was prescribed Norco and Suboxone; unclear why this would be necessary    States she took a couple days off of Suboxone as she did not think it was working    Multiple prescriptions being taken differently than prescribed per patient report; including: Clonidine and Baclofen    Cipro recently prescribed for UTI but not yet started by patient; note included in allergy list    Amlodipine has not been filled since March for one month supply - Removed from Medication List  Baclofen and Clonidine refills align with patient taking differently than prescribed.    Medication reconciliation/reorder completed by provider prior to medication history? Yes    Time spent in this activity: 25 minutes      Prior to Admission medications    Medication Sig Last Dose Taking? Auth Provider   ASPIRIN NOT PRESCRIBED (INTENTIONAL) Please choose reason not prescribed, below  Yes Dustin Fatima MD   BACLOFEN PO  Last filled 3/23: Rx for 10mg TID.  Patient reports taking differently Take 10 mg by mouth 2 times daily as needed for muscle spasms Past Week at Unknown time Yes Unknown, Entered By History   buprenorphine HCl-naloxone HCl (SUBOXONE) 8-2 MG per film  Last filled on 5/24 One half film bid 5/29/2018 at x1 Yes Gracie Mclaughlin MD   cloNIDine (CATAPRES) 0.1 MG tablet  Last filled 4/6 Take 1  tablet (0.1 mg) by mouth 2 times daily 5/29/2018 at Unknown time Yes Dustin Fatima MD   cyanocobalamin (VITAMIN B12) 1000 MCG/ML injection  Last filled 5/2    Inject 1 mL (1,000 mcg) Subcutaneous every 30 days 4/30/2018 at Unknown Yes Dustin Fatima MD   HYDROcodone-acetaminophen (NORCO) 5-325 MG per tablet  Last filled 5/22, 5/14, 5/4  Take 1 tablet by mouth every 6 hours as needed for severe pain 5/29/2018 at Unknown time Yes Dustin Fatima MD   losartan-hydrochlorothiazide (HYZAAR) 100-25 MG per tablet  Last filled 4/27 Take 1 tablet by mouth daily 5/29/2018 at Unknown time Yes Dustin Fatima MD   magnesium oxide (MAG-OX) 400 (241.3 MG) MG tablet Take 1 tablet (400 mg) by mouth daily 5/29/2018 at Unknown time Yes Dustin Fatima MD   ondansetron (ZOFRAN-ODT) 4 MG ODT tab Take 1 tablet (4 mg) by mouth every 8 hours as needed for nausea Past Week at Unknown time Yes Pedro Preston MD   polyethylene glycol (MIRALAX/GLYCOLAX) powder TAKE 17 GRAMS (1 CAPFUL) BY MOUTH DAILY Past Week at Unknown time Yes Dustin Fatima MD   potassium chloride (K-TAB,KLOR-CON) 10 MEQ tablet  Last filled 5/10 for 30 day supply   Take 1 tablet (10 mEq) by mouth daily 5/29/2018 at Unknown time Yes Dustin Fatima MD   sertraline (ZOLOFT) 100 MG tablet  Last filled 4/27. Rx for 1.5 tabs (150mg) daily. Take 1.5 tablets (150 mg) by mouth daily  Patient taking differently: Take 100 mg by mouth daily  5/29/2018 at Unknown time Yes Dustin Fatima MD   Vitamin D, Cholecalciferol, 1000 UNITS TABS Take 2,000 Units by mouth daily 5/29/2018 at Unknown time Yes Dustin Fatima MD   albuterol (2.5 MG/3ML) 0.083% neb solution Take 1 vial (2.5 mg) by nebulization every 6 hours as needed for shortness of breath / dyspnea or wheezing More than a month at Only in winter  Kushal, Dustin Flynn MD   EPINEPHrine (EPIPEN) 0.3 MG/0.3ML injection Inject 0.3 mLs (0.3 mg)  into the muscle once as needed for anaphylaxis Unknown at Unknown time  Dustin Fatima MD   naloxone (NARCAN) nasal spray Spray 1 spray (4 mg) into one nostril alternating nostrils as needed for opioid reversal every 2-3 minutes until assistance arrives Unknown at Unknown time  Dustin Fatima MD   STATIN NOT PRESCRIBED, INTENTIONAL, 1 each daily Statin not prescribed intentionally due to Active liver disease (liver failure, cirrhosis, hepatitis)  LIVER METS   Dustin Fatima MD          not applicable

## 2025-02-24 ENCOUNTER — TELEPHONE (OUTPATIENT)
Dept: SLEEP MEDICINE | Facility: CLINIC | Age: 60
End: 2025-02-24
Payer: COMMERCIAL

## 2025-02-24 NOTE — TELEPHONE ENCOUNTER
Order/Referral Request    Who is requesting: patient     Orders being requested: Take home sleep study order     Reason service is needed/diagnosis: Sleep Apnea     When are orders needed by: asap    Has this been discussed with Provider: Yes    Does patient have a preference on a Group/Provider/Facility? [facility] location     Does patient have an appointment scheduled?: No    Where to send orders: Place orders within Epic    Okay to leave a detailed message?: Yes at Cell number on file:    Telephone Information:   Mobile [phone]

## 2025-02-26 NOTE — TELEPHONE ENCOUNTER
Romario Lopez, no we will not need an additional study for patient to qualify for new machine. We have all documentation needed, including the split-night study from 2019 (which can absolutely be used here). No new study needed. Thanks!

## 2025-02-26 NOTE — TELEPHONE ENCOUNTER
Spoke with patient. DME message relayed. Patient wants to know if provider would like her to have study to see if her breathing has changed. IF not, she will go ahead with replacement device.Please advise

## 2025-03-03 NOTE — TELEPHONE ENCOUNTER
I called Ines and left a message stating I do not think a repeat study will be necessary and she can proceed with getting the replacement machine.  Bennett Goltz, PA-C

## 2025-04-22 NOTE — ED PROVIDER NOTES
History   Chief Complaint:  Rectal Bleeding and Diarrhea     The history is provided by the patient.      Ines Mott is a 56 year old female with history of liver cancer, kidney cancer, hypertension, diabetes mellitus type 2 and asthma who presents with blood in stool and diarrhea. The patient reports that she has had bright red blood in her stool since yesterday. She states she has diarrhea as well and has had 4 BMs today, all with blood present. She endorses that the amount of blood makes her lightheaded and as if she is going to have a syncopal episode. She was seen in the ED 2 weeks ago for the same issue and it resolved itself until yesterday. She notes abdominal pain as well and has a  shunt into her abdomen. She endorses fevers, chills and slight vomiting. She denies headache. Of note, she has a history of liver and kidney cancer.     Review of Systems   Constitutional: Positive for chills and fever.   Gastrointestinal: Positive for abdominal pain, blood in stool, diarrhea and vomiting.   Neurological: Positive for light-headedness. Negative for syncope and headaches.   All other systems reviewed and are negative.    Allergies:  Amoxicillin  Ativan [Lorazepam]  Buspirone  Ciprofloxacin  Macrobid [Nitrofurantoin]  Busulfan  Cefaclor  Cefdinir  Cephalosporins  Clindamycin  Dilaudid [Hydromorphone]  Diphenhydramine  Diphenhydramine Hcl  Imitrex [Sumatriptan Succinate]  Ketorolac Tromethamine  Lansoprazole  Lansoprazole  Latex  Metoclopramide  Metoclopramide  Moxifloxacin  Nsaids  Paroxetine  Paroxetine  Prednisone  Quinolones  Sumatriptan  Blue Dyes  Ketorolac Tromethamine  Lidoderm [Lidocaine]  Penicillins  Sulfa Drugs  Vaccinium Angustifolium    Medications:  Flovent inhaler  Hyzaar  Klor-con  Mirapex  Albuterol inhaler  Norvasc  Cyanocobalamin   Zoloft  Topamax   Epinephrine    Past Medical History:    Arthritis   Asthma   Liver cancer  Depression   Hydrocephalus  Hypertension   Metastatic carcinoid  Registration at bedside.       Martín Lugo RN  12/01/20 2026 "tumor to intraabdominal site  Methamphetamine abuse  Neuroendocrine carcinoma  Migraines  Right renal mass  Spinal bifida  PTSD  Alcohol abuse  Type II diabetes   Fibromyalgia  Kidney stone     Past Surgical History:    Appendectomy  Cholecystectomy  Dental extraction  Hysterectomy  Implant shunt ventriculoperitoneal     Family History:    Father - cancer    Social History:  Presents alone    Physical Exam     Physical Exam    Patient Vitals for the past 24 hrs:   BP Temp Temp src Pulse Resp SpO2 Height Weight   08/22/21 1719 (!) 181/86 97.4  F (36.3  C) Temporal 68 16 97 % 1.689 m (5' 6.5\") 122.5 kg (270 lb)       General: Alert and Interactive.   Head: No signs of trauma.   Mouth/Throat: Oropharynx is clear and moist.   Eyes: Conjunctivae are normal. Pupils are equal, round, and reactive to light.   Neck: Normal range of motion. No nuchal rigidity.   CV: Normal rate and regular rhythm.    Resp: Effort normal and breath sounds normal. No respiratory distress.   GI: Soft. There is no guarding. LUQ and RLQ pain to palpation.   MSK: Normal range of motion. no edema.   Neuro: The patient is alert and oriented to person, place, and time.  PERRLA, EOMI, strength in upper/lower extremities normal and symmetrical.   Sensation normal. Speech normal.  GCS eye subscore is 4. GCS verbal subscore is 5. GCS motor subscore is 6.   Skin: Skin is warm and dry. No rash noted.   Psych: normal mood and affect. behavior is normal.     Emergency Department Course   Imaging:  CT Ab/pelv w/ IV contrast:   1.  No acute process demonstrated in the abdomen and pelvis.   2.  Stable liver findings, as per radiology.    Laboratory:  CBC: WBC 10.0, HGB 12.5,    CMP: Glucose 96, Potassium: 3.3 (L), GFR: 52 (L), Creatinine: 1.17 (H) o/w WNL  Lipase: 81    Emergency Department Course:    Reviewed:  I reviewed nursing notes, vitals, past medical history and care everywhere    Assessments:  1815 I obtained history and examined the patient as " noted above.   2035 I rechecked the patient and explained findings.     Interventions:  1833 NS, 1 L, IV    Disposition:  The patient was discharged to home.     Impression & Plan   Medical Decision Making:  Ines Mott is a 56 year old female who presents with blood in stool.  I considered a broad differential diagnosis for this patient including upper GIB (ulcer, gastritis, avm, tumor, etc) vs lower GIB (tumor, diverticulosis, avm, meckels, etc), colitis, aortoenteric fistula, hemorrhoids, fissures,  Ischemic colitis, bacterial stool infection (salmonella, shigella, e. Coli, campylobacter).    The workup in the ED is consistent with gastrointestinal bleeding, although it is unclear if it is lower or upper.  I favor lower at this time due to lack of vomiting, lack of preceding black stools, no epigastric pain, no history of ulcers or NSAID use. No indication to start octreotide as my suspicion of variceal bleed is so low.    Bleeding is small and vitals/hgb are stable, outpatient management is indicated. Will have see primary tomorrow and get colonoscopy as first study.  Return if massive bleeding, more than 2 more bloody stools, lightheaded when stands, worsening or new abdominal pain.     Diagnosis:    ICD-10-CM    1. Hematochezia  K92.1        Discharge Medications:  Discharge Medication List as of 8/22/2021  8:47 PM          Scribe Disclosure:  I, Shivam Fernandez, am serving as a scribe at 6:07 PM on 8/22/2021 to document services personally performed by Ti Piedra MD based on my observations and the provider's statements to me.            Ti Piedra MD  08/23/21 0134     27-Dec-2024

## (undated) DEVICE — SOL WATER IRRIG 1000ML BOTTLE 2F7114

## (undated) DEVICE — BAG CLEAR TRASH 1.3M 39X33" P4040C

## (undated) DEVICE — SUCTION MANIFOLD NEPTUNE 2 SYS 4 PORT 0702-020-000

## (undated) DEVICE — TUBING SUCTION MEDI-VAC SOFT 3/16"X20' N520A

## (undated) DEVICE — PAD CHUX UNDERPAD 30X36" P3036C

## (undated) RX ORDER — FENTANYL CITRATE 50 UG/ML
INJECTION, SOLUTION INTRAMUSCULAR; INTRAVENOUS
Status: DISPENSED
Start: 2024-04-25

## (undated) RX ORDER — FENTANYL CITRATE 50 UG/ML
INJECTION, SOLUTION INTRAMUSCULAR; INTRAVENOUS
Status: DISPENSED
Start: 2018-05-30

## (undated) RX ORDER — ONDANSETRON 2 MG/ML
INJECTION INTRAMUSCULAR; INTRAVENOUS
Status: DISPENSED
Start: 2024-04-25

## (undated) RX ORDER — HYDRALAZINE HYDROCHLORIDE 20 MG/ML
INJECTION INTRAMUSCULAR; INTRAVENOUS
Status: DISPENSED
Start: 2024-04-25

## (undated) RX ORDER — VERAPAMIL HYDROCHLORIDE 2.5 MG/ML
INJECTION, SOLUTION INTRAVENOUS
Status: DISPENSED
Start: 2018-05-30

## (undated) RX ORDER — NITROGLYCERIN 5 MG/ML
VIAL (ML) INTRAVENOUS
Status: DISPENSED
Start: 2018-05-30

## (undated) RX ORDER — SIMETHICONE 40MG/0.6ML
SUSPENSION, DROPS(FINAL DOSAGE FORM)(ML) ORAL
Status: DISPENSED
Start: 2024-04-25

## (undated) RX ORDER — OXYCODONE HYDROCHLORIDE 5 MG/1
TABLET ORAL
Status: DISPENSED
Start: 2024-04-25

## (undated) RX ORDER — HEPARIN SODIUM 1000 [USP'U]/ML
INJECTION, SOLUTION INTRAVENOUS; SUBCUTANEOUS
Status: DISPENSED
Start: 2018-05-30

## (undated) RX ORDER — LIDOCAINE HYDROCHLORIDE 10 MG/ML
INJECTION, SOLUTION EPIDURAL; INFILTRATION; INTRACAUDAL; PERINEURAL
Status: DISPENSED
Start: 2018-05-30